# Patient Record
Sex: FEMALE | Race: BLACK OR AFRICAN AMERICAN | NOT HISPANIC OR LATINO | Employment: OTHER | URBAN - METROPOLITAN AREA
[De-identification: names, ages, dates, MRNs, and addresses within clinical notes are randomized per-mention and may not be internally consistent; named-entity substitution may affect disease eponyms.]

---

## 2017-08-09 ENCOUNTER — HOSPITAL ENCOUNTER (OUTPATIENT)
Dept: OTHER | Facility: HOSPITAL | Age: 42
Setting detail: SPECIMEN
Discharge: HOME | End: 2017-08-09
Attending: SURGERY | Admitting: SURGERY

## 2017-11-17 ENCOUNTER — HOSPITAL ENCOUNTER (OUTPATIENT)
Dept: OTHER | Facility: HOSPITAL | Age: 42
Setting detail: SPECIMEN
Discharge: HOME | End: 2017-11-17
Attending: SURGERY | Admitting: SURGERY

## 2018-09-11 ENCOUNTER — HOSPITAL ENCOUNTER (OUTPATIENT)
Dept: OTHER | Facility: HOSPITAL | Age: 43
Setting detail: SPECIMEN
Discharge: HOME | End: 2018-09-11
Attending: SURGERY | Admitting: SURGERY

## 2019-06-22 ENCOUNTER — APPOINTMENT (OUTPATIENT)
Dept: GENERAL RADIOLOGY | Facility: HOSPITAL | Age: 44
End: 2019-06-22

## 2019-06-22 ENCOUNTER — HOSPITAL ENCOUNTER (OUTPATIENT)
Dept: CARDIOLOGY | Facility: HOSPITAL | Age: 44
Setting detail: OBSERVATION
Discharge: HOME | End: 2019-06-22

## 2019-06-22 ENCOUNTER — HOSPITAL ENCOUNTER (INPATIENT)
Facility: HOSPITAL | Age: 44
LOS: 8 days | Discharge: HOME | End: 2019-06-30
Attending: INTERNAL MEDICINE | Admitting: INTERNAL MEDICINE

## 2019-06-22 DIAGNOSIS — I42.0 CARDIOMYOPATHY, DILATED (HCC): ICD-10-CM

## 2019-06-22 DIAGNOSIS — R07.9 CHEST PAIN, UNSPECIFIED TYPE: ICD-10-CM

## 2019-06-22 DIAGNOSIS — I50.9 ACUTE ON CHRONIC CONGESTIVE HEART FAILURE, UNSPECIFIED HEART FAILURE TYPE (HCC): Primary | ICD-10-CM

## 2019-06-22 DIAGNOSIS — I10 ESSENTIAL HYPERTENSION: ICD-10-CM

## 2019-06-22 DIAGNOSIS — N18.9 CHRONIC RENAL IMPAIRMENT, UNSPECIFIED CKD STAGE: ICD-10-CM

## 2019-06-22 DIAGNOSIS — I50.23 ACUTE ON CHRONIC SYSTOLIC CONGESTIVE HEART FAILURE (HCC): ICD-10-CM

## 2019-06-22 PROBLEM — J44.9 COPD (CHRONIC OBSTRUCTIVE PULMONARY DISEASE) (HCC): Status: ACTIVE | Noted: 2019-06-22

## 2019-06-22 LAB
ALBUMIN SERPL-MCNC: 4.2 G/DL (ref 3.5–4.8)
ALBUMIN/GLOB SERPL: 1.3 G/DL (ref 1–1.7)
ALP SERPL-CCNC: 70 U/L (ref 32–91)
ALT SERPL W P-5'-P-CCNC: 46 U/L (ref 14–54)
ANION GAP SERPL CALCULATED.3IONS-SCNC: 14 MMOL/L (ref 10–20)
AST SERPL-CCNC: 55 U/L (ref 15–41)
BASOPHILS # BLD AUTO: 0 10*3/MM3 (ref 0–0.2)
BASOPHILS NFR BLD AUTO: 0.6 % (ref 0–1.5)
BH CV ECHO MEAS - % IVS THICK: -6.8 %
BH CV ECHO MEAS - % LVPW THICK: -8.4 %
BH CV ECHO MEAS - ACS: 0.98 CM
BH CV ECHO MEAS - AI DEC SLOPE: 378.7 CM/SEC^2
BH CV ECHO MEAS - AI DEC TIME: 1.3 SEC
BH CV ECHO MEAS - AI MAX PG: 102.2 MMHG
BH CV ECHO MEAS - AI MAX VEL: 505.5 CM/SEC
BH CV ECHO MEAS - AI P1/2T: 391 MSEC
BH CV ECHO MEAS - AO MAX PG (FULL): 7.3 MMHG
BH CV ECHO MEAS - AO MAX PG: 11 MMHG
BH CV ECHO MEAS - AO MEAN PG (FULL): 3.9 MMHG
BH CV ECHO MEAS - AO MEAN PG: 5.8 MMHG
BH CV ECHO MEAS - AO ROOT AREA (BSA CORRECTED): 1.2
BH CV ECHO MEAS - AO ROOT AREA: 4.2 CM^2
BH CV ECHO MEAS - AO ROOT DIAM: 2.3 CM
BH CV ECHO MEAS - AO V2 MAX: 164.7 CM/SEC
BH CV ECHO MEAS - AO V2 MEAN: 111.2 CM/SEC
BH CV ECHO MEAS - AO V2 VTI: 27.2 CM
BH CV ECHO MEAS - ASC AORTA: 3 CM
BH CV ECHO MEAS - AVA(I,A): 1.5 CM^2
BH CV ECHO MEAS - AVA(I,D): 1.5 CM^2
BH CV ECHO MEAS - AVA(V,A): 1.6 CM^2
BH CV ECHO MEAS - AVA(V,D): 1.6 CM^2
BH CV ECHO MEAS - BSA(HAYCOCK): 2.1 M^2
BH CV ECHO MEAS - BSA: 2 M^2
BH CV ECHO MEAS - BZI_BMI: 30.4 KILOGRAMS/M^2
BH CV ECHO MEAS - BZI_METRIC_HEIGHT: 170.2 CM
BH CV ECHO MEAS - BZI_METRIC_WEIGHT: 88 KG
BH CV ECHO MEAS - EDV(CUBED): 291.1 ML
BH CV ECHO MEAS - EDV(MOD-SP2): 129.7 ML
BH CV ECHO MEAS - EDV(MOD-SP4): 166.5 ML
BH CV ECHO MEAS - EDV(TEICH): 225.7 ML
BH CV ECHO MEAS - EF(CUBED): 29.7 %
BH CV ECHO MEAS - EF(MOD-BP): 29 %
BH CV ECHO MEAS - EF(MOD-SP2): 31.5 %
BH CV ECHO MEAS - EF(MOD-SP4): 24 %
BH CV ECHO MEAS - EF(TEICH): 23.5 %
BH CV ECHO MEAS - ESV(CUBED): 204.7 ML
BH CV ECHO MEAS - ESV(MOD-SP2): 88.8 ML
BH CV ECHO MEAS - ESV(MOD-SP4): 126.5 ML
BH CV ECHO MEAS - ESV(TEICH): 172.8 ML
BH CV ECHO MEAS - FS: 11.1 %
BH CV ECHO MEAS - IVS/LVPW: 0.92
BH CV ECHO MEAS - IVSD: 1 CM
BH CV ECHO MEAS - IVSS: 0.93 CM
BH CV ECHO MEAS - LA DIMENSION(2D): 4.4 CM
BH CV ECHO MEAS - LV DIASTOLIC VOL/BSA (35-75): 83.4 ML/M^2
BH CV ECHO MEAS - LV MASS(C)D: 310.3 GRAMS
BH CV ECHO MEAS - LV MASS(C)DI: 155.5 GRAMS/M^2
BH CV ECHO MEAS - LV MASS(C)S: 229.2 GRAMS
BH CV ECHO MEAS - LV MASS(C)SI: 114.8 GRAMS/M^2
BH CV ECHO MEAS - LV MAX PG: 3.7 MMHG
BH CV ECHO MEAS - LV MEAN PG: 1.9 MMHG
BH CV ECHO MEAS - LV SYSTOLIC VOL/BSA (12-30): 63.4 ML/M^2
BH CV ECHO MEAS - LV V1 MAX: 95.8 CM/SEC
BH CV ECHO MEAS - LV V1 MEAN: 63.2 CM/SEC
BH CV ECHO MEAS - LV V1 VTI: 14.6 CM
BH CV ECHO MEAS - LVIDD: 6.6 CM
BH CV ECHO MEAS - LVIDS: 5.9 CM
BH CV ECHO MEAS - LVOT AREA: 2.8 CM^2
BH CV ECHO MEAS - LVOT DIAM: 1.9 CM
BH CV ECHO MEAS - LVPWD: 1.1 CM
BH CV ECHO MEAS - LVPWS: 1 CM
BH CV ECHO MEAS - MR MAX PG: 89.9 MMHG
BH CV ECHO MEAS - MR MAX VEL: 474.1 CM/SEC
BH CV ECHO MEAS - MV A MAX VEL: 51 CM/SEC
BH CV ECHO MEAS - MV DEC SLOPE: 727.6 CM/SEC^2
BH CV ECHO MEAS - MV DEC TIME: 0.2 SEC
BH CV ECHO MEAS - MV E MAX VEL: 146.1 CM/SEC
BH CV ECHO MEAS - MV E/A: 2.9
BH CV ECHO MEAS - MV MAX PG: 12 MMHG
BH CV ECHO MEAS - MV MEAN PG: 4.8 MMHG
BH CV ECHO MEAS - MV V2 MAX: 173 CM/SEC
BH CV ECHO MEAS - MV V2 MEAN: 103.5 CM/SEC
BH CV ECHO MEAS - MV V2 VTI: 42.3 CM
BH CV ECHO MEAS - MVA(VTI): 0.96 CM^2
BH CV ECHO MEAS - PA ACC TIME: 0.07 SEC
BH CV ECHO MEAS - PA MAX PG (FULL): 2.5 MMHG
BH CV ECHO MEAS - PA MAX PG: 3.4 MMHG
BH CV ECHO MEAS - PA MEAN PG (FULL): 0.61 MMHG
BH CV ECHO MEAS - PA MEAN PG: 1 MMHG
BH CV ECHO MEAS - PA PR(ACCEL): 49.4 MMHG
BH CV ECHO MEAS - PA V2 MAX: 90.1 CM/SEC
BH CV ECHO MEAS - PA V2 MEAN: 48.9 CM/SEC
BH CV ECHO MEAS - PA V2 VTI: 13 CM
BH CV ECHO MEAS - PI END-D VEL: 145.1 CM/SEC
BH CV ECHO MEAS - PI MAX PG: 11.6 MMHG
BH CV ECHO MEAS - PI MAX VEL: 170.1 CM/SEC
BH CV ECHO MEAS - RAP SYSTOLE: 8 MMHG
BH CV ECHO MEAS - RV MAX PG: 0.9 MMHG
BH CV ECHO MEAS - RV MEAN PG: 0.44 MMHG
BH CV ECHO MEAS - RV V1 MAX: 47.3 CM/SEC
BH CV ECHO MEAS - RV V1 MEAN: 30.6 CM/SEC
BH CV ECHO MEAS - RV V1 VTI: 8.4 CM
BH CV ECHO MEAS - RVDD: 1.5 CM
BH CV ECHO MEAS - RVSP: 36.8 MMHG
BH CV ECHO MEAS - SI(AO): 57.7 ML/M^2
BH CV ECHO MEAS - SI(CUBED): 43.3 ML/M^2
BH CV ECHO MEAS - SI(LVOT): 20.2 ML/M^2
BH CV ECHO MEAS - SI(MOD-SP2): 20.5 ML/M^2
BH CV ECHO MEAS - SI(MOD-SP4): 20 ML/M^2
BH CV ECHO MEAS - SI(TEICH): 26.5 ML/M^2
BH CV ECHO MEAS - SV(AO): 115.1 ML
BH CV ECHO MEAS - SV(CUBED): 86.4 ML
BH CV ECHO MEAS - SV(LVOT): 40.4 ML
BH CV ECHO MEAS - SV(MOD-SP2): 40.9 ML
BH CV ECHO MEAS - SV(MOD-SP4): 39.9 ML
BH CV ECHO MEAS - SV(TEICH): 52.9 ML
BH CV ECHO MEAS - TR MAX VEL: 266.6 CM/SEC
BILIRUB SERPL-MCNC: 1.6 MG/DL (ref 0.3–1.2)
BNP SERPL-MCNC: 1482 PG/ML
BUN BLD-MCNC: 18 MG/DL (ref 8–20)
BUN/CREAT SERPL: 12.9 (ref 5.4–26.2)
CALCIUM SPEC-SCNC: 9.2 MG/DL (ref 8.9–10.3)
CHLORIDE SERPL-SCNC: 104 MMOL/L (ref 101–111)
CO2 SERPL-SCNC: 21 MMOL/L (ref 22–32)
CREAT BLD-MCNC: 1.4 MG/DL (ref 0.4–1)
DEPRECATED RDW RBC AUTO: 50.3 FL (ref 37–54)
EOSINOPHIL # BLD AUTO: 0.1 10*3/MM3 (ref 0–0.4)
EOSINOPHIL NFR BLD AUTO: 0.7 % (ref 0.3–6.2)
ERYTHROCYTE [DISTWIDTH] IN BLOOD BY AUTOMATED COUNT: 15.5 % (ref 12.3–15.4)
GFR SERPL CREATININE-BSD FRML MDRD: 49 ML/MIN/1.73
GLOBULIN UR ELPH-MCNC: 3.2 GM/DL (ref 2.5–3.8)
GLUCOSE BLD-MCNC: 156 MG/DL (ref 65–99)
GLUCOSE BLDC GLUCOMTR-MCNC: 137 MG/DL (ref 70–105)
HCT VFR BLD AUTO: 50.6 % (ref 34–46.6)
HGB BLD-MCNC: 16.7 G/DL (ref 12–15.9)
HOLD SPECIMEN: NORMAL
HOLD SPECIMEN: NORMAL
LYMPHOCYTES # BLD AUTO: 2.5 10*3/MM3 (ref 0.7–3.1)
LYMPHOCYTES NFR BLD AUTO: 31.4 % (ref 19.6–45.3)
MAGNESIUM SERPL-MCNC: 2 MG/DL (ref 1.8–2.5)
MAXIMAL PREDICTED HEART RATE: 174 BPM
MCH RBC QN AUTO: 30.7 PG (ref 26.6–33)
MCHC RBC AUTO-ENTMCNC: 33 G/DL (ref 31.5–35.7)
MCV RBC AUTO: 93 FL (ref 79–97)
MONOCYTES # BLD AUTO: 0.2 10*3/MM3 (ref 0.1–0.9)
MONOCYTES NFR BLD AUTO: 2.9 % (ref 5–12)
NEUTROPHILS # BLD AUTO: 5 10*3/MM3 (ref 1.7–7)
NEUTROPHILS NFR BLD AUTO: 64.4 % (ref 42.7–76)
NRBC BLD AUTO-RTO: 0.2 /100 WBC (ref 0–0.2)
PHOSPHATE SERPL-MCNC: 3.9 MG/DL (ref 2.4–4.7)
PLATELET # BLD AUTO: 235 10*3/MM3 (ref 140–450)
PMV BLD AUTO: 7.8 FL (ref 6–12)
POTASSIUM BLD-SCNC: 3.6 MMOL/L (ref 3.6–5.1)
PROT SERPL-MCNC: 7.4 G/DL (ref 6.1–7.9)
RBC # BLD AUTO: 5.44 10*6/MM3 (ref 3.77–5.28)
SODIUM BLD-SCNC: 139 MMOL/L (ref 136–144)
STRESS TARGET HR: 148 BPM
TROPONIN I SERPL-MCNC: 0.03 NG/ML (ref 0–0.03)
WBC NRBC COR # BLD: 7.8 10*3/MM3 (ref 3.4–10.8)
WHOLE BLOOD HOLD SPECIMEN: NORMAL
WHOLE BLOOD HOLD SPECIMEN: NORMAL

## 2019-06-22 PROCEDURE — 94799 UNLISTED PULMONARY SVC/PX: CPT

## 2019-06-22 PROCEDURE — 83735 ASSAY OF MAGNESIUM: CPT | Performed by: INTERNAL MEDICINE

## 2019-06-22 PROCEDURE — 93306 TTE W/DOPPLER COMPLETE: CPT

## 2019-06-22 PROCEDURE — 84100 ASSAY OF PHOSPHORUS: CPT | Performed by: INTERNAL MEDICINE

## 2019-06-22 PROCEDURE — 82962 GLUCOSE BLOOD TEST: CPT

## 2019-06-22 PROCEDURE — 99214 OFFICE O/P EST MOD 30 MIN: CPT | Performed by: INTERNAL MEDICINE

## 2019-06-22 PROCEDURE — 93005 ELECTROCARDIOGRAM TRACING: CPT | Performed by: INTERNAL MEDICINE

## 2019-06-22 PROCEDURE — 93306 TTE W/DOPPLER COMPLETE: CPT | Performed by: INTERNAL MEDICINE

## 2019-06-22 PROCEDURE — 0399T HC MYOCARDL STRAIN IMAG QUAN ASSMT PER SESS: CPT

## 2019-06-22 PROCEDURE — 93005 ELECTROCARDIOGRAM TRACING: CPT

## 2019-06-22 PROCEDURE — 84484 ASSAY OF TROPONIN QUANT: CPT | Performed by: INTERNAL MEDICINE

## 2019-06-22 PROCEDURE — G0378 HOSPITAL OBSERVATION PER HR: HCPCS

## 2019-06-22 PROCEDURE — 84484 ASSAY OF TROPONIN QUANT: CPT | Performed by: NURSE PRACTITIONER

## 2019-06-22 PROCEDURE — 99222 1ST HOSP IP/OBS MODERATE 55: CPT | Performed by: INTERNAL MEDICINE

## 2019-06-22 PROCEDURE — 83880 ASSAY OF NATRIURETIC PEPTIDE: CPT | Performed by: NURSE PRACTITIONER

## 2019-06-22 PROCEDURE — 85025 COMPLETE CBC W/AUTO DIFF WBC: CPT | Performed by: NURSE PRACTITIONER

## 2019-06-22 PROCEDURE — 25010000002 ENOXAPARIN PER 10 MG: Performed by: INTERNAL MEDICINE

## 2019-06-22 PROCEDURE — 99285 EMERGENCY DEPT VISIT HI MDM: CPT

## 2019-06-22 PROCEDURE — 71045 X-RAY EXAM CHEST 1 VIEW: CPT

## 2019-06-22 PROCEDURE — 80053 COMPREHEN METABOLIC PANEL: CPT | Performed by: NURSE PRACTITIONER

## 2019-06-22 RX ORDER — FLUTICASONE PROPIONATE 50 MCG
2 SPRAY, SUSPENSION (ML) NASAL DAILY
Status: DISCONTINUED | OUTPATIENT
Start: 2019-06-23 | End: 2019-06-30 | Stop reason: HOSPADM

## 2019-06-22 RX ORDER — LEVOTHYROXINE SODIUM 0.2 MG/1
200 TABLET ORAL DAILY
Status: DISCONTINUED | OUTPATIENT
Start: 2019-06-23 | End: 2019-06-30 | Stop reason: HOSPADM

## 2019-06-22 RX ORDER — SODIUM CHLORIDE 0.9 % (FLUSH) 0.9 %
10 SYRINGE (ML) INJECTION AS NEEDED
Status: DISCONTINUED | OUTPATIENT
Start: 2019-06-22 | End: 2019-06-30 | Stop reason: HOSPADM

## 2019-06-22 RX ORDER — AMLODIPINE BESYLATE 5 MG/1
5 TABLET ORAL DAILY
Status: DISCONTINUED | OUTPATIENT
Start: 2019-06-23 | End: 2019-06-29

## 2019-06-22 RX ORDER — ASPIRIN 81 MG/1
243 TABLET, CHEWABLE ORAL ONCE
Status: COMPLETED | OUTPATIENT
Start: 2019-06-22 | End: 2019-06-22

## 2019-06-22 RX ORDER — LISINOPRIL 20 MG/1
20 TABLET ORAL ONCE
Status: COMPLETED | OUTPATIENT
Start: 2019-06-22 | End: 2019-06-22

## 2019-06-22 RX ORDER — ALPRAZOLAM 1 MG/1
1 TABLET ORAL 4 TIMES DAILY PRN
COMMUNITY
End: 2021-01-18

## 2019-06-22 RX ORDER — ONDANSETRON 4 MG/1
4 TABLET, FILM COATED ORAL EVERY 6 HOURS PRN
Status: DISCONTINUED | OUTPATIENT
Start: 2019-06-22 | End: 2019-06-30 | Stop reason: HOSPADM

## 2019-06-22 RX ORDER — FLUTICASONE PROPIONATE 50 MCG
2 SPRAY, SUSPENSION (ML) NASAL DAILY
COMMUNITY
End: 2019-08-13

## 2019-06-22 RX ORDER — DOCUSATE SODIUM 100 MG/1
100 CAPSULE, LIQUID FILLED ORAL DAILY
Status: DISCONTINUED | OUTPATIENT
Start: 2019-06-23 | End: 2019-06-30 | Stop reason: HOSPADM

## 2019-06-22 RX ORDER — MELATONIN
1000 DAILY
Status: DISCONTINUED | OUTPATIENT
Start: 2019-06-23 | End: 2019-06-30 | Stop reason: HOSPADM

## 2019-06-22 RX ORDER — CARVEDILOL 6.25 MG/1
6.25 TABLET ORAL 2 TIMES DAILY WITH MEALS
Status: DISCONTINUED | OUTPATIENT
Start: 2019-06-22 | End: 2019-06-30

## 2019-06-22 RX ORDER — HYDRALAZINE HYDROCHLORIDE 25 MG/1
50 TABLET, FILM COATED ORAL 3 TIMES DAILY
Status: DISCONTINUED | OUTPATIENT
Start: 2019-06-22 | End: 2019-06-30 | Stop reason: HOSPADM

## 2019-06-22 RX ORDER — FLUCONAZOLE 200 MG/1
200 TABLET ORAL EVERY OTHER DAY
COMMUNITY
End: 2019-08-16 | Stop reason: HOSPADM

## 2019-06-22 RX ORDER — BUMETANIDE 2 MG/1
2 TABLET ORAL 2 TIMES DAILY
COMMUNITY
End: 2019-07-22

## 2019-06-22 RX ORDER — AMLODIPINE BESYLATE 5 MG/1
5 TABLET ORAL DAILY
COMMUNITY
End: 2019-06-30 | Stop reason: HOSPADM

## 2019-06-22 RX ORDER — METRONIDAZOLE 500 MG/1
500 TABLET ORAL 2 TIMES DAILY
COMMUNITY
End: 2019-06-30 | Stop reason: HOSPADM

## 2019-06-22 RX ORDER — MELATONIN
1000 DAILY
COMMUNITY

## 2019-06-22 RX ORDER — LISINOPRIL 20 MG/1
20 TABLET ORAL DAILY
Status: DISCONTINUED | OUTPATIENT
Start: 2019-06-23 | End: 2019-06-30 | Stop reason: HOSPADM

## 2019-06-22 RX ORDER — FOLIC ACID 1 MG/1
1 TABLET ORAL DAILY
COMMUNITY

## 2019-06-22 RX ORDER — FAMOTIDINE 20 MG/1
40 TABLET, FILM COATED ORAL ONCE
Status: COMPLETED | OUTPATIENT
Start: 2019-06-23 | End: 2019-06-22

## 2019-06-22 RX ORDER — FOLIC ACID 1 MG/1
1 TABLET ORAL DAILY
Status: DISCONTINUED | OUTPATIENT
Start: 2019-06-23 | End: 2019-06-30 | Stop reason: HOSPADM

## 2019-06-22 RX ORDER — DOCUSATE SODIUM 100 MG/1
100 CAPSULE, LIQUID FILLED ORAL ONCE
Status: COMPLETED | OUTPATIENT
Start: 2019-06-22 | End: 2019-06-22

## 2019-06-22 RX ORDER — SODIUM CHLORIDE 0.9 % (FLUSH) 0.9 %
3 SYRINGE (ML) INJECTION EVERY 12 HOURS SCHEDULED
Status: DISCONTINUED | OUTPATIENT
Start: 2019-06-22 | End: 2019-06-30 | Stop reason: HOSPADM

## 2019-06-22 RX ORDER — BUMETANIDE 0.25 MG/ML
2 INJECTION INTRAMUSCULAR; INTRAVENOUS ONCE
Status: COMPLETED | OUTPATIENT
Start: 2019-06-22 | End: 2019-06-22

## 2019-06-22 RX ORDER — MELATONIN
1000 ONCE
Status: COMPLETED | OUTPATIENT
Start: 2019-06-22 | End: 2019-06-22

## 2019-06-22 RX ORDER — HYDRALAZINE HYDROCHLORIDE 50 MG/1
50 TABLET, FILM COATED ORAL 3 TIMES DAILY
COMMUNITY
End: 2019-12-12

## 2019-06-22 RX ORDER — FERROUS SULFATE 325(65) MG
325 TABLET ORAL
COMMUNITY

## 2019-06-22 RX ORDER — NITROGLYCERIN 0.4 MG/1
0.4 TABLET SUBLINGUAL
Status: DISCONTINUED | OUTPATIENT
Start: 2019-06-22 | End: 2019-06-30 | Stop reason: HOSPADM

## 2019-06-22 RX ORDER — FOLIC ACID 1 MG/1
1 TABLET ORAL ONCE
Status: COMPLETED | OUTPATIENT
Start: 2019-06-23 | End: 2019-06-22

## 2019-06-22 RX ORDER — ACETAMINOPHEN 325 MG/1
650 TABLET ORAL EVERY 4 HOURS PRN
Status: DISCONTINUED | OUTPATIENT
Start: 2019-06-22 | End: 2019-06-30 | Stop reason: HOSPADM

## 2019-06-22 RX ORDER — FAMOTIDINE 20 MG/1
40 TABLET, FILM COATED ORAL DAILY
Status: DISCONTINUED | OUTPATIENT
Start: 2019-06-23 | End: 2019-06-30 | Stop reason: HOSPADM

## 2019-06-22 RX ORDER — SODIUM CHLORIDE 0.9 % (FLUSH) 0.9 %
3-10 SYRINGE (ML) INJECTION AS NEEDED
Status: DISCONTINUED | OUTPATIENT
Start: 2019-06-22 | End: 2019-06-29

## 2019-06-22 RX ORDER — ASPIRIN 81 MG/1
81 TABLET, CHEWABLE ORAL DAILY
COMMUNITY
End: 2020-01-08 | Stop reason: HOSPADM

## 2019-06-22 RX ORDER — OXYCODONE AND ACETAMINOPHEN 7.5; 325 MG/1; MG/1
1 TABLET ORAL EVERY 6 HOURS PRN
Status: ON HOLD | COMMUNITY
End: 2020-01-08 | Stop reason: SDUPTHER

## 2019-06-22 RX ORDER — BUMETANIDE 1 MG/1
2 TABLET ORAL 2 TIMES DAILY
Status: DISCONTINUED | OUTPATIENT
Start: 2019-06-23 | End: 2019-06-26

## 2019-06-22 RX ORDER — LISINOPRIL 20 MG/1
20 TABLET ORAL DAILY
COMMUNITY
End: 2020-01-08 | Stop reason: HOSPADM

## 2019-06-22 RX ORDER — FERROUS SULFATE TAB EC 324 MG (65 MG FE EQUIVALENT) 324 (65 FE) MG
325 TABLET DELAYED RESPONSE ORAL
Status: DISCONTINUED | OUTPATIENT
Start: 2019-06-23 | End: 2019-06-30 | Stop reason: HOSPADM

## 2019-06-22 RX ORDER — ONDANSETRON 2 MG/ML
4 INJECTION INTRAMUSCULAR; INTRAVENOUS EVERY 6 HOURS PRN
Status: DISCONTINUED | OUTPATIENT
Start: 2019-06-22 | End: 2019-06-30 | Stop reason: HOSPADM

## 2019-06-22 RX ORDER — LEVOTHYROXINE SODIUM 0.2 MG/1
200 TABLET ORAL DAILY
COMMUNITY
End: 2021-01-23 | Stop reason: HOSPADM

## 2019-06-22 RX ADMIN — FOLIC ACID 1 MG: 1 TABLET ORAL at 23:22

## 2019-06-22 RX ADMIN — ENOXAPARIN SODIUM 40 MG: 40 INJECTION SUBCUTANEOUS at 21:26

## 2019-06-22 RX ADMIN — BUMETANIDE 2 MG: 0.25 INJECTION INTRAMUSCULAR; INTRAVENOUS at 11:41

## 2019-06-22 RX ADMIN — FAMOTIDINE 40 MG: 20 TABLET, FILM COATED ORAL at 23:22

## 2019-06-22 RX ADMIN — BUMETANIDE 2 MG: 0.25 INJECTION INTRAMUSCULAR; INTRAVENOUS at 21:23

## 2019-06-22 RX ADMIN — HYDRALAZINE HYDROCHLORIDE 50 MG: 25 TABLET, FILM COATED ORAL at 21:23

## 2019-06-22 RX ADMIN — ASPIRIN 81 MG 243 MG: 81 TABLET ORAL at 10:08

## 2019-06-22 RX ADMIN — LISINOPRIL 20 MG: 20 TABLET ORAL at 23:21

## 2019-06-22 RX ADMIN — MELATONIN 1000 UNITS: at 23:20

## 2019-06-22 RX ADMIN — CARVEDILOL 6.25 MG: 6.25 TABLET, FILM COATED ORAL at 21:23

## 2019-06-22 RX ADMIN — DOCUSATE SODIUM 100 MG: 100 CAPSULE, LIQUID FILLED ORAL at 23:20

## 2019-06-22 RX ADMIN — Medication: at 21:28

## 2019-06-22 RX ADMIN — NITROGLYCERIN 1 INCH: 20 OINTMENT TOPICAL at 10:08

## 2019-06-22 RX ADMIN — Medication 22 MG: at 23:12

## 2019-06-23 ENCOUNTER — APPOINTMENT (OUTPATIENT)
Dept: NUCLEAR MEDICINE | Facility: HOSPITAL | Age: 44
End: 2019-06-23

## 2019-06-23 LAB
ANION GAP SERPL CALCULATED.3IONS-SCNC: 12 MMOL/L (ref 10–20)
ANISOCYTOSIS BLD QL: ABNORMAL
ARTICHOKE IGE QN: 166 MG/DL (ref 0–100)
BH CV NUCLEAR PRIOR STUDY: 3
BNP SERPL-MCNC: 1383 PG/ML
BUN BLD-MCNC: 20 MG/DL (ref 8–20)
BUN/CREAT SERPL: 16.7 (ref 5.4–26.2)
CA-I SERPL ISE-MCNC: 1.15 MMOL/L (ref 1.2–1.3)
CALCIUM SPEC-SCNC: 8.9 MG/DL (ref 8.9–10.3)
CHLORIDE SERPL-SCNC: 105 MMOL/L (ref 101–111)
CHOLEST SERPL-MCNC: 239 MG/DL
CK SERPL-CCNC: 189 U/L (ref 20–180)
CO2 SERPL-SCNC: 20 MMOL/L (ref 22–32)
CREAT BLD-MCNC: 1.2 MG/DL (ref 0.4–1)
DEPRECATED RDW RBC AUTO: 49 FL (ref 37–54)
EOSINOPHIL # BLD MANUAL: 0.16 10*3/MM3 (ref 0–0.4)
EOSINOPHIL NFR BLD MANUAL: 2 % (ref 0.3–6.2)
ERYTHROCYTE [DISTWIDTH] IN BLOOD BY AUTOMATED COUNT: 15.2 % (ref 12.3–15.4)
GFR SERPL CREATININE-BSD FRML MDRD: 59 ML/MIN/1.73
GLUCOSE BLD-MCNC: 91 MG/DL (ref 65–99)
GLUCOSE BLDC GLUCOMTR-MCNC: 112 MG/DL (ref 70–105)
GLUCOSE BLDC GLUCOMTR-MCNC: 113 MG/DL (ref 70–105)
GLUCOSE BLDC GLUCOMTR-MCNC: 115 MG/DL (ref 70–105)
GLUCOSE BLDC GLUCOMTR-MCNC: 99 MG/DL (ref 70–105)
HCT VFR BLD AUTO: 44.1 % (ref 34–46.6)
HDLC SERPL QL: 4.27
HDLC SERPL-MCNC: 56 MG/DL
HGB BLD-MCNC: 15.1 G/DL (ref 12–15.9)
LDLC/HDLC SERPL: 2.52 {RATIO}
LYMPHOCYTES # BLD MANUAL: 2.8 10*3/MM3 (ref 0.7–3.1)
LYMPHOCYTES NFR BLD MANUAL: 35 % (ref 19.6–45.3)
LYMPHOCYTES NFR BLD MANUAL: 4 % (ref 5–12)
MAGNESIUM SERPL-MCNC: 1.9 MG/DL (ref 1.8–2.5)
MCH RBC QN AUTO: 31.4 PG (ref 26.6–33)
MCHC RBC AUTO-ENTMCNC: 34.3 G/DL (ref 31.5–35.7)
MCV RBC AUTO: 91.6 FL (ref 79–97)
MONOCYTES # BLD AUTO: 0.32 10*3/MM3 (ref 0.1–0.9)
NEUTROPHILS # BLD AUTO: 4.72 10*3/MM3 (ref 1.7–7)
NEUTROPHILS NFR BLD MANUAL: 52 % (ref 42.7–76)
NEUTS BAND NFR BLD MANUAL: 7 % (ref 0–5)
PHOSPHATE SERPL-MCNC: 4.3 MG/DL (ref 2.4–4.7)
PLAT MORPH BLD: NORMAL
PLATELET # BLD AUTO: 125 10*3/MM3 (ref 140–450)
PMV BLD AUTO: 9 FL (ref 6–12)
POTASSIUM BLD-SCNC: 3.4 MMOL/L (ref 3.6–5.1)
RBC # BLD AUTO: 4.81 10*6/MM3 (ref 3.77–5.28)
SODIUM BLD-SCNC: 137 MMOL/L (ref 136–144)
TOXIC GRANULATION: ABNORMAL
TRIGL SERPL-MCNC: 209 MG/DL
TROPONIN I SERPL-MCNC: 0.03 NG/ML (ref 0–0.03)
TSH SERPL DL<=0.05 MIU/L-ACNC: >48.6 MIU/ML (ref 0.34–5.6)
VLDLC SERPL-MCNC: 41.8 MG/DL
WBC NRBC COR # BLD: 8 10*3/MM3 (ref 3.4–10.8)

## 2019-06-23 PROCEDURE — 94799 UNLISTED PULMONARY SVC/PX: CPT

## 2019-06-23 PROCEDURE — 82330 ASSAY OF CALCIUM: CPT | Performed by: INTERNAL MEDICINE

## 2019-06-23 PROCEDURE — 84100 ASSAY OF PHOSPHORUS: CPT | Performed by: INTERNAL MEDICINE

## 2019-06-23 PROCEDURE — 84443 ASSAY THYROID STIM HORMONE: CPT | Performed by: INTERNAL MEDICINE

## 2019-06-23 PROCEDURE — A9500 TC99M SESTAMIBI: HCPCS | Performed by: INTERNAL MEDICINE

## 2019-06-23 PROCEDURE — 25010000002 ENOXAPARIN PER 10 MG: Performed by: INTERNAL MEDICINE

## 2019-06-23 PROCEDURE — 99232 SBSQ HOSP IP/OBS MODERATE 35: CPT | Performed by: INTERNAL MEDICINE

## 2019-06-23 PROCEDURE — 82962 GLUCOSE BLOOD TEST: CPT

## 2019-06-23 PROCEDURE — 0 TECHNETIUM SESTAMIBI: Performed by: INTERNAL MEDICINE

## 2019-06-23 PROCEDURE — 78451 HT MUSCLE IMAGE SPECT SING: CPT | Performed by: INTERNAL MEDICINE

## 2019-06-23 PROCEDURE — 80048 BASIC METABOLIC PNL TOTAL CA: CPT | Performed by: INTERNAL MEDICINE

## 2019-06-23 PROCEDURE — 83735 ASSAY OF MAGNESIUM: CPT | Performed by: INTERNAL MEDICINE

## 2019-06-23 PROCEDURE — 80061 LIPID PANEL: CPT | Performed by: INTERNAL MEDICINE

## 2019-06-23 PROCEDURE — 85027 COMPLETE CBC AUTOMATED: CPT | Performed by: INTERNAL MEDICINE

## 2019-06-23 PROCEDURE — 83880 ASSAY OF NATRIURETIC PEPTIDE: CPT | Performed by: INTERNAL MEDICINE

## 2019-06-23 PROCEDURE — 78451 HT MUSCLE IMAGE SPECT SING: CPT

## 2019-06-23 PROCEDURE — 82550 ASSAY OF CK (CPK): CPT | Performed by: INTERNAL MEDICINE

## 2019-06-23 PROCEDURE — 84484 ASSAY OF TROPONIN QUANT: CPT | Performed by: INTERNAL MEDICINE

## 2019-06-23 PROCEDURE — 85007 BL SMEAR W/DIFF WBC COUNT: CPT | Performed by: INTERNAL MEDICINE

## 2019-06-23 RX ORDER — OXYCODONE HYDROCHLORIDE 5 MG/1
7.5 TABLET ORAL EVERY 4 HOURS PRN
Status: DISCONTINUED | OUTPATIENT
Start: 2019-06-23 | End: 2019-06-29

## 2019-06-23 RX ADMIN — AMLODIPINE BESYLATE 5 MG: 5 TABLET ORAL at 10:16

## 2019-06-23 RX ADMIN — TECHNETIUM TC 99M SESTAMIBI 1 DOSE: 1 INJECTION INTRAVENOUS at 07:55

## 2019-06-23 RX ADMIN — OXYCODONE HYDROCHLORIDE 7.5 MG: 5 TABLET ORAL at 22:16

## 2019-06-23 RX ADMIN — LEVOTHYROXINE SODIUM 200 MCG: 100 TABLET ORAL at 05:36

## 2019-06-23 RX ADMIN — FAMOTIDINE 40 MG: 20 TABLET, FILM COATED ORAL at 10:18

## 2019-06-23 RX ADMIN — ENOXAPARIN SODIUM 40 MG: 40 INJECTION SUBCUTANEOUS at 16:47

## 2019-06-23 RX ADMIN — CARVEDILOL 6.25 MG: 6.25 TABLET, FILM COATED ORAL at 18:01

## 2019-06-23 RX ADMIN — LISINOPRIL 20 MG: 20 TABLET ORAL at 10:07

## 2019-06-23 RX ADMIN — HYDRALAZINE HYDROCHLORIDE 50 MG: 25 TABLET, FILM COATED ORAL at 16:48

## 2019-06-23 RX ADMIN — DOCUSATE SODIUM 100 MG: 100 CAPSULE, LIQUID FILLED ORAL at 10:17

## 2019-06-23 RX ADMIN — FERROUS SULFATE TAB EC 324 MG (65 MG FE EQUIVALENT) 324 MG: 324 (65 FE) TABLET DELAYED RESPONSE at 10:18

## 2019-06-23 RX ADMIN — Medication 10 ML: at 10:25

## 2019-06-23 RX ADMIN — CARVEDILOL 6.25 MG: 6.25 TABLET, FILM COATED ORAL at 10:17

## 2019-06-23 RX ADMIN — FLUTICASONE PROPIONATE 2 SPRAY: 50 SPRAY, METERED NASAL at 10:20

## 2019-06-23 RX ADMIN — Medication 3 ML: at 22:18

## 2019-06-23 RX ADMIN — HYDRALAZINE HYDROCHLORIDE 50 MG: 25 TABLET, FILM COATED ORAL at 22:15

## 2019-06-23 RX ADMIN — BUMETANIDE 2 MG: 1 TABLET ORAL at 18:02

## 2019-06-23 RX ADMIN — HYDRALAZINE HYDROCHLORIDE 50 MG: 25 TABLET, FILM COATED ORAL at 10:21

## 2019-06-23 RX ADMIN — FOLIC ACID 1 MG: 1 TABLET ORAL at 10:20

## 2019-06-23 RX ADMIN — BUMETANIDE 2 MG: 1 TABLET ORAL at 05:36

## 2019-06-23 RX ADMIN — OXYCODONE HYDROCHLORIDE 7.5 MG: 5 TABLET ORAL at 16:50

## 2019-06-24 ENCOUNTER — ANESTHESIA (OUTPATIENT)
Dept: CARDIOLOGY | Facility: HOSPITAL | Age: 44
End: 2019-06-24

## 2019-06-24 ENCOUNTER — APPOINTMENT (OUTPATIENT)
Dept: CARDIOLOGY | Facility: HOSPITAL | Age: 44
End: 2019-06-24

## 2019-06-24 ENCOUNTER — ANESTHESIA EVENT (OUTPATIENT)
Dept: CARDIOLOGY | Facility: HOSPITAL | Age: 44
End: 2019-06-24

## 2019-06-24 LAB
ANION GAP SERPL CALCULATED.3IONS-SCNC: 18 MMOL/L (ref 10–20)
BH CV ECHO MEAS - EF(MOD-BP): 30 %
BUN BLD-MCNC: 21 MG/DL (ref 8–20)
BUN/CREAT SERPL: 14 (ref 5.4–26.2)
CALCIUM SPEC-SCNC: 9.1 MG/DL (ref 8.9–10.3)
CHLORIDE SERPL-SCNC: 95 MMOL/L (ref 101–111)
CO2 SERPL-SCNC: 23 MMOL/L (ref 22–32)
CREAT BLD-MCNC: 1.5 MG/DL (ref 0.4–1)
GFR SERPL CREATININE-BSD FRML MDRD: 45 ML/MIN/1.73
GLUCOSE BLD-MCNC: 124 MG/DL (ref 65–99)
GLUCOSE BLDC GLUCOMTR-MCNC: 102 MG/DL (ref 70–105)
GLUCOSE BLDC GLUCOMTR-MCNC: 113 MG/DL (ref 70–105)
MAGNESIUM SERPL-MCNC: 1.8 MG/DL (ref 1.8–2.5)
PHOSPHATE SERPL-MCNC: 5 MG/DL (ref 2.4–4.7)
POTASSIUM BLD-SCNC: 3 MMOL/L (ref 3.6–5.1)
SODIUM BLD-SCNC: 136 MMOL/L (ref 136–144)

## 2019-06-24 PROCEDURE — 25010000002 ONDANSETRON PER 1 MG: Performed by: INTERNAL MEDICINE

## 2019-06-24 PROCEDURE — 25010000002 PROPOFOL 200 MG/20ML EMULSION: Performed by: ANESTHESIOLOGY

## 2019-06-24 PROCEDURE — 99232 SBSQ HOSP IP/OBS MODERATE 35: CPT | Performed by: INTERNAL MEDICINE

## 2019-06-24 PROCEDURE — B24BZZ4 ULTRASONOGRAPHY OF HEART WITH AORTA, TRANSESOPHAGEAL: ICD-10-PCS | Performed by: INTERNAL MEDICINE

## 2019-06-24 PROCEDURE — 25010000002 ENOXAPARIN PER 10 MG: Performed by: INTERNAL MEDICINE

## 2019-06-24 PROCEDURE — 80048 BASIC METABOLIC PNL TOTAL CA: CPT | Performed by: INTERNAL MEDICINE

## 2019-06-24 PROCEDURE — 94799 UNLISTED PULMONARY SVC/PX: CPT

## 2019-06-24 PROCEDURE — 93320 DOPPLER ECHO COMPLETE: CPT

## 2019-06-24 PROCEDURE — 82962 GLUCOSE BLOOD TEST: CPT

## 2019-06-24 PROCEDURE — 83735 ASSAY OF MAGNESIUM: CPT | Performed by: INTERNAL MEDICINE

## 2019-06-24 PROCEDURE — 93325 DOPPLER ECHO COLOR FLOW MAPG: CPT

## 2019-06-24 PROCEDURE — 93312 ECHO TRANSESOPHAGEAL: CPT | Performed by: INTERNAL MEDICINE

## 2019-06-24 PROCEDURE — 93312 ECHO TRANSESOPHAGEAL: CPT

## 2019-06-24 PROCEDURE — 84100 ASSAY OF PHOSPHORUS: CPT | Performed by: INTERNAL MEDICINE

## 2019-06-24 PROCEDURE — 93325 DOPPLER ECHO COLOR FLOW MAPG: CPT | Performed by: INTERNAL MEDICINE

## 2019-06-24 PROCEDURE — 93320 DOPPLER ECHO COMPLETE: CPT | Performed by: INTERNAL MEDICINE

## 2019-06-24 RX ORDER — PROPOFOL 10 MG/ML
INJECTION, EMULSION INTRAVENOUS AS NEEDED
Status: DISCONTINUED | OUTPATIENT
Start: 2019-06-24 | End: 2019-06-24 | Stop reason: SURG

## 2019-06-24 RX ORDER — PROPOFOL 10 MG/ML
VIAL (ML) INTRAVENOUS
Status: COMPLETED
Start: 2019-06-24 | End: 2019-06-24

## 2019-06-24 RX ADMIN — PROPOFOL 300 MG: 10 INJECTION, EMULSION INTRAVENOUS at 16:55

## 2019-06-24 RX ADMIN — Medication 10 ML: at 08:17

## 2019-06-24 RX ADMIN — MELATONIN 1000 UNITS: at 08:22

## 2019-06-24 RX ADMIN — OXYCODONE HYDROCHLORIDE 10 MG: 5 TABLET ORAL at 18:55

## 2019-06-24 RX ADMIN — LISINOPRIL 20 MG: 20 TABLET ORAL at 08:22

## 2019-06-24 RX ADMIN — FLUTICASONE PROPIONATE 2 SPRAY: 50 SPRAY, METERED NASAL at 08:20

## 2019-06-24 RX ADMIN — OXYCODONE HYDROCHLORIDE 7.5 MG: 5 TABLET ORAL at 13:50

## 2019-06-24 RX ADMIN — FERROUS SULFATE TAB EC 324 MG (65 MG FE EQUIVALENT) 324 MG: 324 (65 FE) TABLET DELAYED RESPONSE at 08:18

## 2019-06-24 RX ADMIN — CARVEDILOL 6.25 MG: 6.25 TABLET, FILM COATED ORAL at 08:20

## 2019-06-24 RX ADMIN — FOLIC ACID 1 MG: 1 TABLET ORAL at 08:18

## 2019-06-24 RX ADMIN — BUMETANIDE 2 MG: 1 TABLET ORAL at 05:23

## 2019-06-24 RX ADMIN — CARVEDILOL 6.25 MG: 6.25 TABLET, FILM COATED ORAL at 18:56

## 2019-06-24 RX ADMIN — FAMOTIDINE 40 MG: 20 TABLET, FILM COATED ORAL at 08:22

## 2019-06-24 RX ADMIN — HYDRALAZINE HYDROCHLORIDE 50 MG: 25 TABLET, FILM COATED ORAL at 20:28

## 2019-06-24 RX ADMIN — ONDANSETRON 4 MG: 2 INJECTION INTRAMUSCULAR; INTRAVENOUS at 21:26

## 2019-06-24 RX ADMIN — LEVOTHYROXINE SODIUM 200 MCG: 100 TABLET ORAL at 05:24

## 2019-06-24 RX ADMIN — BUMETANIDE 2 MG: 1 TABLET ORAL at 18:56

## 2019-06-24 RX ADMIN — ONDANSETRON HYDROCHLORIDE 4 MG: 4 TABLET, FILM COATED ORAL at 07:23

## 2019-06-24 RX ADMIN — DOCUSATE SODIUM 100 MG: 100 CAPSULE, LIQUID FILLED ORAL at 08:19

## 2019-06-24 RX ADMIN — HYDRALAZINE HYDROCHLORIDE 50 MG: 25 TABLET, FILM COATED ORAL at 08:21

## 2019-06-24 RX ADMIN — OXYCODONE HYDROCHLORIDE 7.5 MG: 5 TABLET ORAL at 05:22

## 2019-06-24 RX ADMIN — OXYCODONE HYDROCHLORIDE 10 MG: 5 TABLET ORAL at 10:00

## 2019-06-24 RX ADMIN — ENOXAPARIN SODIUM 40 MG: 40 INJECTION SUBCUTANEOUS at 18:56

## 2019-06-24 RX ADMIN — AMLODIPINE BESYLATE 5 MG: 5 TABLET ORAL at 08:21

## 2019-06-24 NOTE — ANESTHESIA PREPROCEDURE EVALUATION
Anesthesia Evaluation     Patient summary reviewed and Nursing notes reviewed   NPO Solid Status: > 8 hours  NPO Liquid Status: > 8 hours           Airway   Mallampati: II  TM distance: >3 FB  Neck ROM: full  No difficulty expected  Dental - normal exam     Pulmonary - negative pulmonary ROS and normal exam    breath sounds clear to auscultation  Cardiovascular - negative cardio ROS and normal exam  Exercise tolerance: unable to assess    ECG reviewed  Rhythm: regular  Rate: normal        Neuro/Psych- negative ROS  GI/Hepatic/Renal/Endo - negative ROS     Musculoskeletal (-) negative ROS    Abdominal  - normal exam   Substance History - negative use     OB/GYN negative ob/gyn ROS         Other - negative ROS                       Anesthesia Plan    ASA 4     MAC     intravenous induction   Anesthetic plan, all risks, benefits, and alternatives have been provided, discussed and informed consent has been obtained with: patient.

## 2019-06-24 NOTE — ANESTHESIA POSTPROCEDURE EVALUATION
Patient: Rafael Mccann    Procedure Summary     Date:  06/24/19 Room / Location:  [facility]    Anesthesia Start:  1654 Anesthesia Stop:  1728    Procedure:  ADULT TRANSESOPHAGEAL ECHO (FELICIA) W/ CONT IF NECESSARY PER PROTOCOL Diagnosis:       (Valvular Disease)      (Aortic Valve Assessment)    Scheduled Providers:  Héctor Eckert MD Provider:  Diego Burden MD    Anesthesia Type:  MAC ASA Status:  4          Anesthesia Type: MAC  Last vitals  BP   118/74 (06/24/19 1525)   Temp   97.4 °F (36.3 °C) (06/24/19 1100)   Pulse   57 (06/24/19 1525)   Resp   15 (06/24/19 1100)     SpO2   98 % (06/24/19 1525)     Post Anesthesia Care and Evaluation    Patient location during evaluation: PACU  Patient participation: complete - patient participated  Level of consciousness: awake  Pain score: 1  Pain management: adequate  Airway patency: patent  Anesthetic complications: No anesthetic complications  PONV Status: controlled  Cardiovascular status: acceptable, hemodynamically stable and stable  Respiratory status: acceptable  Hydration status: acceptable  Post Neuraxial Block status: Motor and sensory function returned to baseline

## 2019-06-25 LAB
ANION GAP SERPL CALCULATED.3IONS-SCNC: 13 MMOL/L (ref 10–20)
BUN BLD-MCNC: 19 MG/DL (ref 8–20)
BUN/CREAT SERPL: 12.7 (ref 5.4–26.2)
CALCIUM SPEC-SCNC: 9.3 MG/DL (ref 8.9–10.3)
CHLORIDE SERPL-SCNC: 97 MMOL/L (ref 101–111)
CO2 SERPL-SCNC: 26 MMOL/L (ref 22–32)
CREAT BLD-MCNC: 1.5 MG/DL (ref 0.4–1)
GFR SERPL CREATININE-BSD FRML MDRD: 45 ML/MIN/1.73
GLUCOSE BLD-MCNC: 96 MG/DL (ref 65–99)
GLUCOSE BLDC GLUCOMTR-MCNC: 115 MG/DL (ref 70–105)
GLUCOSE BLDC GLUCOMTR-MCNC: 128 MG/DL (ref 70–105)
GLUCOSE BLDC GLUCOMTR-MCNC: 81 MG/DL (ref 70–105)
GLUCOSE BLDC GLUCOMTR-MCNC: 91 MG/DL (ref 70–105)
MAGNESIUM SERPL-MCNC: 2.1 MG/DL (ref 1.8–2.5)
PHOSPHATE SERPL-MCNC: 5.5 MG/DL (ref 2.4–4.7)
POTASSIUM BLD-SCNC: 3.5 MMOL/L (ref 3.6–5.1)
SODIUM BLD-SCNC: 136 MMOL/L (ref 136–144)

## 2019-06-25 PROCEDURE — 80048 BASIC METABOLIC PNL TOTAL CA: CPT | Performed by: INTERNAL MEDICINE

## 2019-06-25 PROCEDURE — 25010000002 ENOXAPARIN PER 10 MG: Performed by: INTERNAL MEDICINE

## 2019-06-25 PROCEDURE — 99232 SBSQ HOSP IP/OBS MODERATE 35: CPT | Performed by: INTERNAL MEDICINE

## 2019-06-25 PROCEDURE — 83735 ASSAY OF MAGNESIUM: CPT | Performed by: INTERNAL MEDICINE

## 2019-06-25 PROCEDURE — 82962 GLUCOSE BLOOD TEST: CPT

## 2019-06-25 PROCEDURE — 99233 SBSQ HOSP IP/OBS HIGH 50: CPT | Performed by: INTERNAL MEDICINE

## 2019-06-25 PROCEDURE — 84100 ASSAY OF PHOSPHORUS: CPT | Performed by: INTERNAL MEDICINE

## 2019-06-25 RX ORDER — POTASSIUM CHLORIDE 20 MEQ/1
40 TABLET, EXTENDED RELEASE ORAL ONCE
Status: COMPLETED | OUTPATIENT
Start: 2019-06-25 | End: 2019-06-25

## 2019-06-25 RX ORDER — SPIRONOLACTONE 25 MG/1
25 TABLET ORAL DAILY
Status: DISCONTINUED | OUTPATIENT
Start: 2019-06-25 | End: 2019-06-30 | Stop reason: HOSPADM

## 2019-06-25 RX ADMIN — FAMOTIDINE 40 MG: 20 TABLET, FILM COATED ORAL at 08:41

## 2019-06-25 RX ADMIN — MELATONIN 1000 UNITS: at 08:42

## 2019-06-25 RX ADMIN — OXYCODONE HYDROCHLORIDE 7.5 MG: 5 TABLET ORAL at 23:36

## 2019-06-25 RX ADMIN — POTASSIUM CHLORIDE 40 MEQ: 1500 TABLET, EXTENDED RELEASE ORAL at 12:51

## 2019-06-25 RX ADMIN — Medication 3 ML: at 20:14

## 2019-06-25 RX ADMIN — FLUTICASONE PROPIONATE 2 SPRAY: 50 SPRAY, METERED NASAL at 12:50

## 2019-06-25 RX ADMIN — LEVOTHYROXINE SODIUM 200 MCG: 100 TABLET ORAL at 05:52

## 2019-06-25 RX ADMIN — OXYCODONE HYDROCHLORIDE 7.5 MG: 5 TABLET ORAL at 08:49

## 2019-06-25 RX ADMIN — BUMETANIDE 2 MG: 1 TABLET ORAL at 05:52

## 2019-06-25 RX ADMIN — DOCUSATE SODIUM 100 MG: 100 CAPSULE, LIQUID FILLED ORAL at 08:42

## 2019-06-25 RX ADMIN — BUMETANIDE 2 MG: 1 TABLET ORAL at 17:03

## 2019-06-25 RX ADMIN — OXYCODONE HYDROCHLORIDE 7.5 MG: 5 TABLET ORAL at 18:56

## 2019-06-25 RX ADMIN — LISINOPRIL 20 MG: 20 TABLET ORAL at 08:42

## 2019-06-25 RX ADMIN — Medication 3 ML: at 08:44

## 2019-06-25 RX ADMIN — CARVEDILOL 6.25 MG: 6.25 TABLET, FILM COATED ORAL at 17:06

## 2019-06-25 RX ADMIN — FERROUS SULFATE TAB EC 324 MG (65 MG FE EQUIVALENT) 325 MG: 324 (65 FE) TABLET DELAYED RESPONSE at 08:42

## 2019-06-25 RX ADMIN — FOLIC ACID 1 MG: 1 TABLET ORAL at 08:41

## 2019-06-25 RX ADMIN — CARVEDILOL 6.25 MG: 6.25 TABLET, FILM COATED ORAL at 08:49

## 2019-06-25 RX ADMIN — SPIRONOLACTONE 25 MG: 25 TABLET ORAL at 17:03

## 2019-06-25 RX ADMIN — OXYCODONE HYDROCHLORIDE 7.5 MG: 5 TABLET ORAL at 00:38

## 2019-06-25 RX ADMIN — AMLODIPINE BESYLATE 5 MG: 5 TABLET ORAL at 08:42

## 2019-06-25 RX ADMIN — ENOXAPARIN SODIUM 40 MG: 40 INJECTION SUBCUTANEOUS at 17:03

## 2019-06-25 RX ADMIN — HYDRALAZINE HYDROCHLORIDE 50 MG: 25 TABLET, FILM COATED ORAL at 08:42

## 2019-06-26 PROBLEM — I42.0 CARDIOMYOPATHY, DILATED: Status: ACTIVE | Noted: 2019-06-22

## 2019-06-26 PROBLEM — I10 ESSENTIAL HYPERTENSION: Status: ACTIVE | Noted: 2019-06-22

## 2019-06-26 PROBLEM — I50.23 ACUTE ON CHRONIC SYSTOLIC CONGESTIVE HEART FAILURE: Status: ACTIVE | Noted: 2019-06-22

## 2019-06-26 LAB
ANION GAP SERPL CALCULATED.3IONS-SCNC: 14 MMOL/L (ref 10–20)
BUN BLD-MCNC: 23 MG/DL (ref 8–20)
BUN/CREAT SERPL: 16.4 (ref 5.4–26.2)
CALCIUM SPEC-SCNC: 9.1 MG/DL (ref 8.9–10.3)
CHLORIDE SERPL-SCNC: 97 MMOL/L (ref 101–111)
CO2 SERPL-SCNC: 25 MMOL/L (ref 22–32)
CREAT BLD-MCNC: 1.4 MG/DL (ref 0.4–1)
GFR SERPL CREATININE-BSD FRML MDRD: 49 ML/MIN/1.73
GLUCOSE BLD-MCNC: 66 MG/DL (ref 65–99)
GLUCOSE BLDC GLUCOMTR-MCNC: 136 MG/DL (ref 70–105)
GLUCOSE BLDC GLUCOMTR-MCNC: 86 MG/DL (ref 70–105)
GLUCOSE BLDC GLUCOMTR-MCNC: 87 MG/DL (ref 70–105)
GLUCOSE BLDC GLUCOMTR-MCNC: 92 MG/DL (ref 70–105)
MAGNESIUM SERPL-MCNC: 2 MG/DL (ref 1.8–2.5)
PHOSPHATE SERPL-MCNC: 4.8 MG/DL (ref 2.4–4.7)
POTASSIUM BLD-SCNC: 3.4 MMOL/L (ref 3.6–5.1)
SODIUM BLD-SCNC: 136 MMOL/L (ref 136–144)

## 2019-06-26 PROCEDURE — 99232 SBSQ HOSP IP/OBS MODERATE 35: CPT | Performed by: INTERNAL MEDICINE

## 2019-06-26 PROCEDURE — 80048 BASIC METABOLIC PNL TOTAL CA: CPT | Performed by: INTERNAL MEDICINE

## 2019-06-26 PROCEDURE — 83735 ASSAY OF MAGNESIUM: CPT | Performed by: INTERNAL MEDICINE

## 2019-06-26 PROCEDURE — 82962 GLUCOSE BLOOD TEST: CPT

## 2019-06-26 PROCEDURE — 84100 ASSAY OF PHOSPHORUS: CPT | Performed by: INTERNAL MEDICINE

## 2019-06-26 PROCEDURE — 94799 UNLISTED PULMONARY SVC/PX: CPT

## 2019-06-26 PROCEDURE — 25010000002 ENOXAPARIN PER 10 MG: Performed by: INTERNAL MEDICINE

## 2019-06-26 RX ORDER — POTASSIUM CHLORIDE 20 MEQ/1
40 TABLET, EXTENDED RELEASE ORAL ONCE
Status: COMPLETED | OUTPATIENT
Start: 2019-06-26 | End: 2019-06-26

## 2019-06-26 RX ORDER — BUMETANIDE 1 MG/1
2 TABLET ORAL DAILY
Status: DISCONTINUED | OUTPATIENT
Start: 2019-06-27 | End: 2019-06-30 | Stop reason: HOSPADM

## 2019-06-26 RX ADMIN — FAMOTIDINE 40 MG: 20 TABLET, FILM COATED ORAL at 08:29

## 2019-06-26 RX ADMIN — CARVEDILOL 6.25 MG: 6.25 TABLET, FILM COATED ORAL at 18:37

## 2019-06-26 RX ADMIN — ENOXAPARIN SODIUM 40 MG: 40 INJECTION SUBCUTANEOUS at 16:26

## 2019-06-26 RX ADMIN — MELATONIN 1000 UNITS: at 08:29

## 2019-06-26 RX ADMIN — HYDRALAZINE HYDROCHLORIDE 50 MG: 25 TABLET, FILM COATED ORAL at 16:26

## 2019-06-26 RX ADMIN — AMLODIPINE BESYLATE 5 MG: 5 TABLET ORAL at 08:29

## 2019-06-26 RX ADMIN — Medication 3 ML: at 20:28

## 2019-06-26 RX ADMIN — LEVOTHYROXINE SODIUM 200 MCG: 100 TABLET ORAL at 05:13

## 2019-06-26 RX ADMIN — POTASSIUM CHLORIDE 40 MEQ: 1500 TABLET, EXTENDED RELEASE ORAL at 10:33

## 2019-06-26 RX ADMIN — DOCUSATE SODIUM 100 MG: 100 CAPSULE, LIQUID FILLED ORAL at 08:30

## 2019-06-26 RX ADMIN — FERROUS SULFATE TAB EC 324 MG (65 MG FE EQUIVALENT) 325 MG: 324 (65 FE) TABLET DELAYED RESPONSE at 08:28

## 2019-06-26 RX ADMIN — BUMETANIDE 2 MG: 1 TABLET ORAL at 05:13

## 2019-06-26 RX ADMIN — LISINOPRIL 20 MG: 20 TABLET ORAL at 08:30

## 2019-06-26 RX ADMIN — OXYCODONE HYDROCHLORIDE 7.5 MG: 5 TABLET ORAL at 16:26

## 2019-06-26 RX ADMIN — FLUTICASONE PROPIONATE 2 SPRAY: 50 SPRAY, METERED NASAL at 10:33

## 2019-06-26 RX ADMIN — FOLIC ACID 1 MG: 1 TABLET ORAL at 08:30

## 2019-06-26 RX ADMIN — OXYCODONE HYDROCHLORIDE 7.5 MG: 5 TABLET ORAL at 05:19

## 2019-06-26 RX ADMIN — Medication 3 ML: at 08:30

## 2019-06-26 RX ADMIN — HYDRALAZINE HYDROCHLORIDE 50 MG: 25 TABLET, FILM COATED ORAL at 08:29

## 2019-06-26 RX ADMIN — SPIRONOLACTONE 25 MG: 25 TABLET ORAL at 08:30

## 2019-06-26 RX ADMIN — CARVEDILOL 6.25 MG: 6.25 TABLET, FILM COATED ORAL at 08:30

## 2019-06-26 RX ADMIN — OXYCODONE HYDROCHLORIDE 7.5 MG: 5 TABLET ORAL at 22:21

## 2019-06-27 LAB
ANION GAP SERPL CALCULATED.3IONS-SCNC: 13.5 MMOL/L (ref 10–20)
BUN BLD-MCNC: 24 MG/DL (ref 8–20)
BUN/CREAT SERPL: 18.5 (ref 5.4–26.2)
CALCIUM SPEC-SCNC: 9.1 MG/DL (ref 8.9–10.3)
CHLORIDE SERPL-SCNC: 100 MMOL/L (ref 101–111)
CO2 SERPL-SCNC: 26 MMOL/L (ref 22–32)
CREAT BLD-MCNC: 1.3 MG/DL (ref 0.4–1)
DEPRECATED RDW RBC AUTO: 50.8 FL (ref 37–54)
ERYTHROCYTE [DISTWIDTH] IN BLOOD BY AUTOMATED COUNT: 15.6 % (ref 12.3–15.4)
GFR SERPL CREATININE-BSD FRML MDRD: 53 ML/MIN/1.73
GLUCOSE BLD-MCNC: 83 MG/DL (ref 65–99)
GLUCOSE BLDC GLUCOMTR-MCNC: 112 MG/DL (ref 70–105)
GLUCOSE BLDC GLUCOMTR-MCNC: 125 MG/DL (ref 70–105)
GLUCOSE BLDC GLUCOMTR-MCNC: 90 MG/DL (ref 70–105)
GLUCOSE BLDC GLUCOMTR-MCNC: 91 MG/DL (ref 70–105)
HCT VFR BLD AUTO: 50.6 % (ref 34–46.6)
HGB BLD-MCNC: 16.9 G/DL (ref 12–15.9)
MAGNESIUM SERPL-MCNC: 2.2 MG/DL (ref 1.8–2.5)
MCH RBC QN AUTO: 30.7 PG (ref 26.6–33)
MCHC RBC AUTO-ENTMCNC: 33.4 G/DL (ref 31.5–35.7)
MCV RBC AUTO: 91.9 FL (ref 79–97)
PHOSPHATE SERPL-MCNC: 4.6 MG/DL (ref 2.4–4.7)
PLATELET # BLD AUTO: 230 10*3/MM3 (ref 140–450)
PMV BLD AUTO: 8.2 FL (ref 6–12)
POTASSIUM BLD-SCNC: 3.5 MMOL/L (ref 3.6–5.1)
RBC # BLD AUTO: 5.5 10*6/MM3 (ref 3.77–5.28)
SODIUM BLD-SCNC: 136 MMOL/L (ref 136–144)
WBC NRBC COR # BLD: 7.6 10*3/MM3 (ref 3.4–10.8)

## 2019-06-27 PROCEDURE — 85027 COMPLETE CBC AUTOMATED: CPT | Performed by: INTERNAL MEDICINE

## 2019-06-27 PROCEDURE — 84100 ASSAY OF PHOSPHORUS: CPT | Performed by: INTERNAL MEDICINE

## 2019-06-27 PROCEDURE — 80048 BASIC METABOLIC PNL TOTAL CA: CPT | Performed by: INTERNAL MEDICINE

## 2019-06-27 PROCEDURE — 94799 UNLISTED PULMONARY SVC/PX: CPT

## 2019-06-27 PROCEDURE — 83735 ASSAY OF MAGNESIUM: CPT | Performed by: INTERNAL MEDICINE

## 2019-06-27 PROCEDURE — 99232 SBSQ HOSP IP/OBS MODERATE 35: CPT | Performed by: INTERNAL MEDICINE

## 2019-06-27 PROCEDURE — 82962 GLUCOSE BLOOD TEST: CPT

## 2019-06-27 RX ORDER — POTASSIUM CHLORIDE 20 MEQ/1
40 TABLET, EXTENDED RELEASE ORAL ONCE
Status: COMPLETED | OUTPATIENT
Start: 2019-06-27 | End: 2019-06-27

## 2019-06-27 RX ORDER — ATORVASTATIN CALCIUM 40 MG/1
40 TABLET, FILM COATED ORAL NIGHTLY
Status: DISCONTINUED | OUTPATIENT
Start: 2019-06-27 | End: 2019-06-30 | Stop reason: HOSPADM

## 2019-06-27 RX ADMIN — MELATONIN 1000 UNITS: at 09:18

## 2019-06-27 RX ADMIN — OXYCODONE HYDROCHLORIDE 7.5 MG: 5 TABLET ORAL at 09:28

## 2019-06-27 RX ADMIN — LEVOTHYROXINE SODIUM 200 MCG: 100 TABLET ORAL at 05:29

## 2019-06-27 RX ADMIN — DOCUSATE SODIUM 100 MG: 100 CAPSULE, LIQUID FILLED ORAL at 09:17

## 2019-06-27 RX ADMIN — FERROUS SULFATE TAB EC 324 MG (65 MG FE EQUIVALENT) 325 MG: 324 (65 FE) TABLET DELAYED RESPONSE at 09:16

## 2019-06-27 RX ADMIN — POTASSIUM CHLORIDE 40 MEQ: 1500 TABLET, EXTENDED RELEASE ORAL at 11:05

## 2019-06-27 RX ADMIN — LISINOPRIL 20 MG: 20 TABLET ORAL at 09:17

## 2019-06-27 RX ADMIN — OXYCODONE HYDROCHLORIDE 7.5 MG: 5 TABLET ORAL at 20:03

## 2019-06-27 RX ADMIN — FAMOTIDINE 40 MG: 20 TABLET, FILM COATED ORAL at 09:17

## 2019-06-27 RX ADMIN — OXYCODONE HYDROCHLORIDE 7.5 MG: 5 TABLET ORAL at 14:58

## 2019-06-27 RX ADMIN — CARVEDILOL 6.25 MG: 6.25 TABLET, FILM COATED ORAL at 17:51

## 2019-06-27 RX ADMIN — Medication 10 ML: at 21:32

## 2019-06-27 RX ADMIN — HYDRALAZINE HYDROCHLORIDE 50 MG: 25 TABLET, FILM COATED ORAL at 09:17

## 2019-06-27 RX ADMIN — SPIRONOLACTONE 25 MG: 25 TABLET ORAL at 09:17

## 2019-06-27 RX ADMIN — FOLIC ACID 1 MG: 1 TABLET ORAL at 09:17

## 2019-06-27 RX ADMIN — BUMETANIDE 2 MG: 1 TABLET ORAL at 09:17

## 2019-06-27 RX ADMIN — Medication 3 ML: at 09:19

## 2019-06-27 RX ADMIN — HYDRALAZINE HYDROCHLORIDE 50 MG: 25 TABLET, FILM COATED ORAL at 21:32

## 2019-06-27 RX ADMIN — CARVEDILOL 6.25 MG: 6.25 TABLET, FILM COATED ORAL at 09:18

## 2019-06-27 RX ADMIN — AMLODIPINE BESYLATE 5 MG: 5 TABLET ORAL at 09:16

## 2019-06-27 RX ADMIN — ATORVASTATIN CALCIUM 40 MG: 40 TABLET, FILM COATED ORAL at 21:32

## 2019-06-28 ENCOUNTER — APPOINTMENT (OUTPATIENT)
Dept: GENERAL RADIOLOGY | Facility: HOSPITAL | Age: 44
End: 2019-06-28

## 2019-06-28 ENCOUNTER — ANESTHESIA EVENT (OUTPATIENT)
Dept: CARDIOLOGY | Facility: HOSPITAL | Age: 44
End: 2019-06-28

## 2019-06-28 ENCOUNTER — ANESTHESIA (OUTPATIENT)
Dept: CARDIOLOGY | Facility: HOSPITAL | Age: 44
End: 2019-06-28

## 2019-06-28 LAB
ALBUMIN SERPL-MCNC: 4 G/DL (ref 3.5–4.8)
ALBUMIN/GLOB SERPL: 1.3 G/DL (ref 1–1.7)
ALP SERPL-CCNC: 62 U/L (ref 32–91)
ALT SERPL W P-5'-P-CCNC: 29 U/L (ref 14–54)
ANION GAP SERPL CALCULATED.3IONS-SCNC: 19.1 MMOL/L (ref 10–20)
ANION GAP SERPL CALCULATED.3IONS-SCNC: 20.9 MMOL/L (ref 10–20)
APTT PPP: 28 SECONDS (ref 24–31)
AST SERPL-CCNC: 33 U/L (ref 15–41)
BASOPHILS # BLD AUTO: 0 10*3/MM3 (ref 0–0.2)
BASOPHILS NFR BLD AUTO: 0.3 % (ref 0–1.5)
BILIRUB SERPL-MCNC: 0.7 MG/DL (ref 0.3–1.2)
BUN BLD-MCNC: 22 MG/DL (ref 8–20)
BUN BLD-MCNC: 26 MG/DL (ref 8–20)
BUN/CREAT SERPL: 16.9 (ref 5.4–26.2)
BUN/CREAT SERPL: 20 (ref 5.4–26.2)
CALCIUM SPEC-SCNC: 9.6 MG/DL (ref 8.9–10.3)
CALCIUM SPEC-SCNC: 9.6 MG/DL (ref 8.9–10.3)
CHLORIDE SERPL-SCNC: 102 MMOL/L (ref 101–111)
CHLORIDE SERPL-SCNC: 97 MMOL/L (ref 101–111)
CO2 SERPL-SCNC: 21 MMOL/L (ref 22–32)
CO2 SERPL-SCNC: 24 MMOL/L (ref 22–32)
CREAT BLD-MCNC: 1.3 MG/DL (ref 0.4–1)
CREAT BLD-MCNC: 1.3 MG/DL (ref 0.4–1)
DEPRECATED RDW RBC AUTO: 49.9 FL (ref 37–54)
EOSINOPHIL # BLD AUTO: 0.1 10*3/MM3 (ref 0–0.4)
EOSINOPHIL NFR BLD AUTO: 1.3 % (ref 0.3–6.2)
ERYTHROCYTE [DISTWIDTH] IN BLOOD BY AUTOMATED COUNT: 15.2 % (ref 12.3–15.4)
GFR SERPL CREATININE-BSD FRML MDRD: 53 ML/MIN/1.73
GFR SERPL CREATININE-BSD FRML MDRD: 53 ML/MIN/1.73
GLOBULIN UR ELPH-MCNC: 3.2 GM/DL (ref 2.5–3.8)
GLUCOSE BLD-MCNC: 130 MG/DL (ref 65–99)
GLUCOSE BLD-MCNC: 99 MG/DL (ref 65–99)
GLUCOSE BLDC GLUCOMTR-MCNC: 100 MG/DL (ref 70–105)
GLUCOSE BLDC GLUCOMTR-MCNC: 77 MG/DL (ref 70–105)
GLUCOSE BLDC GLUCOMTR-MCNC: 83 MG/DL (ref 70–105)
HCT VFR BLD AUTO: 48.9 % (ref 34–46.6)
HGB BLD-MCNC: 16 G/DL (ref 12–15.9)
INR PPP: 1.01 (ref 0.9–1.1)
LYMPHOCYTES # BLD AUTO: 2.4 10*3/MM3 (ref 0.7–3.1)
LYMPHOCYTES NFR BLD AUTO: 32.1 % (ref 19.6–45.3)
MAGNESIUM SERPL-MCNC: 2.2 MG/DL (ref 1.8–2.5)
MCH RBC QN AUTO: 30.4 PG (ref 26.6–33)
MCHC RBC AUTO-ENTMCNC: 32.8 G/DL (ref 31.5–35.7)
MCV RBC AUTO: 92.8 FL (ref 79–97)
MONOCYTES # BLD AUTO: 0.6 10*3/MM3 (ref 0.1–0.9)
MONOCYTES NFR BLD AUTO: 8.2 % (ref 5–12)
NEUTROPHILS # BLD AUTO: 4.4 10*3/MM3 (ref 1.7–7)
NEUTROPHILS NFR BLD AUTO: 58.1 % (ref 42.7–76)
NRBC BLD AUTO-RTO: 0.1 /100 WBC (ref 0–0.2)
PHOSPHATE SERPL-MCNC: 5.3 MG/DL (ref 2.4–4.7)
PLATELET # BLD AUTO: 200 10*3/MM3 (ref 140–450)
PMV BLD AUTO: 7.7 FL (ref 6–12)
POTASSIUM BLD-SCNC: 4.1 MMOL/L (ref 3.6–5.1)
POTASSIUM BLD-SCNC: 4.1 MMOL/L (ref 3.6–5.1)
POTASSIUM BLD-SCNC: 5.9 MMOL/L (ref 3.6–5.1)
PROT SERPL-MCNC: 7.2 G/DL (ref 6.1–7.9)
PROTHROMBIN TIME: 10.4 SECONDS (ref 9.6–11.7)
RBC # BLD AUTO: 5.27 10*6/MM3 (ref 3.77–5.28)
SODIUM BLD-SCNC: 136 MMOL/L (ref 136–144)
SODIUM BLD-SCNC: 138 MMOL/L (ref 136–144)
WBC NRBC COR # BLD: 7.5 10*3/MM3 (ref 3.4–10.8)

## 2019-06-28 PROCEDURE — C1894 INTRO/SHEATH, NON-LASER: HCPCS | Performed by: INTERNAL MEDICINE

## 2019-06-28 PROCEDURE — 25010000002 VANCOMYCIN 1 G RECONSTITUTED SOLUTION 1 EACH VIAL: Performed by: ANESTHESIOLOGY

## 2019-06-28 PROCEDURE — 84100 ASSAY OF PHOSPHORUS: CPT | Performed by: INTERNAL MEDICINE

## 2019-06-28 PROCEDURE — 25010000002 FENTANYL CITRATE (PF) 100 MCG/2ML SOLUTION: Performed by: ANESTHESIOLOGY

## 2019-06-28 PROCEDURE — 83735 ASSAY OF MAGNESIUM: CPT | Performed by: INTERNAL MEDICINE

## 2019-06-28 PROCEDURE — 85025 COMPLETE CBC W/AUTO DIFF WBC: CPT | Performed by: INTERNAL MEDICINE

## 2019-06-28 PROCEDURE — 02H63KZ INSERTION OF DEFIBRILLATOR LEAD INTO RIGHT ATRIUM, PERCUTANEOUS APPROACH: ICD-10-PCS | Performed by: INTERNAL MEDICINE

## 2019-06-28 PROCEDURE — 25010000002 IOPAMIDOL 61 % SOLUTION: Performed by: INTERNAL MEDICINE

## 2019-06-28 PROCEDURE — 25010000002 PROPOFOL 1000 MG/ML EMULSION: Performed by: ANESTHESIOLOGY

## 2019-06-28 PROCEDURE — C1769 GUIDE WIRE: HCPCS | Performed by: INTERNAL MEDICINE

## 2019-06-28 PROCEDURE — C1721 AICD, DUAL CHAMBER: HCPCS | Performed by: INTERNAL MEDICINE

## 2019-06-28 PROCEDURE — C1892 INTRO/SHEATH,FIXED,PEEL-AWAY: HCPCS | Performed by: INTERNAL MEDICINE

## 2019-06-28 PROCEDURE — 71045 X-RAY EXAM CHEST 1 VIEW: CPT

## 2019-06-28 PROCEDURE — 85610 PROTHROMBIN TIME: CPT | Performed by: INTERNAL MEDICINE

## 2019-06-28 PROCEDURE — 84132 ASSAY OF SERUM POTASSIUM: CPT | Performed by: INTERNAL MEDICINE

## 2019-06-28 PROCEDURE — 85730 THROMBOPLASTIN TIME PARTIAL: CPT | Performed by: INTERNAL MEDICINE

## 2019-06-28 PROCEDURE — 82962 GLUCOSE BLOOD TEST: CPT

## 2019-06-28 PROCEDURE — 33249 INSJ/RPLCMT DEFIB W/LEAD(S): CPT | Performed by: INTERNAL MEDICINE

## 2019-06-28 PROCEDURE — 02HK3KZ INSERTION OF DEFIBRILLATOR LEAD INTO RIGHT VENTRICLE, PERCUTANEOUS APPROACH: ICD-10-PCS | Performed by: INTERNAL MEDICINE

## 2019-06-28 PROCEDURE — 0JH608Z INSERTION OF DEFIBRILLATOR GENERATOR INTO CHEST SUBCUTANEOUS TISSUE AND FASCIA, OPEN APPROACH: ICD-10-PCS | Performed by: INTERNAL MEDICINE

## 2019-06-28 PROCEDURE — 93641 EP EVL 1/2CHMB PAC CVDFB TST: CPT | Performed by: INTERNAL MEDICINE

## 2019-06-28 PROCEDURE — 25010000002 MIDAZOLAM PER 1 MG: Performed by: ANESTHESIOLOGY

## 2019-06-28 PROCEDURE — C1898 LEAD, PMKR, OTHER THAN TRANS: HCPCS | Performed by: INTERNAL MEDICINE

## 2019-06-28 PROCEDURE — 4B02XTZ MEASUREMENT OF CARDIAC DEFIBRILLATOR, EXTERNAL APPROACH: ICD-10-PCS | Performed by: INTERNAL MEDICINE

## 2019-06-28 PROCEDURE — 80053 COMPREHEN METABOLIC PANEL: CPT | Performed by: INTERNAL MEDICINE

## 2019-06-28 PROCEDURE — 25010000002 MORPHINE PER 10 MG: Performed by: INTERNAL MEDICINE

## 2019-06-28 PROCEDURE — C1777 LEAD, AICD, ENDO SINGLE COIL: HCPCS | Performed by: INTERNAL MEDICINE

## 2019-06-28 PROCEDURE — 99232 SBSQ HOSP IP/OBS MODERATE 35: CPT | Performed by: INTERNAL MEDICINE

## 2019-06-28 DEVICE — LD ICD PLEXA PROMRI S65: Type: IMPLANTABLE DEVICE | Status: FUNCTIONAL

## 2019-06-28 DEVICE — LD PM SOLIA S 53: Type: IMPLANTABLE DEVICE | Status: FUNCTIONAL

## 2019-06-28 DEVICE — IMPLANTABLE DEVICE
Type: IMPLANTABLE DEVICE | Status: FUNCTIONAL
Brand: RIVACOR 7 DR-T

## 2019-06-28 RX ORDER — SODIUM CHLORIDE 0.9 % (FLUSH) 0.9 %
3-10 SYRINGE (ML) INJECTION AS NEEDED
Status: DISCONTINUED | OUTPATIENT
Start: 2019-06-28 | End: 2019-06-30 | Stop reason: HOSPADM

## 2019-06-28 RX ORDER — FENTANYL CITRATE 50 UG/ML
INJECTION, SOLUTION INTRAMUSCULAR; INTRAVENOUS AS NEEDED
Status: DISCONTINUED | OUTPATIENT
Start: 2019-06-28 | End: 2019-06-28 | Stop reason: SURG

## 2019-06-28 RX ORDER — MORPHINE SULFATE 4 MG/ML
1 INJECTION, SOLUTION INTRAMUSCULAR; INTRAVENOUS EVERY 4 HOURS PRN
Status: DISCONTINUED | OUTPATIENT
Start: 2019-06-28 | End: 2019-06-29

## 2019-06-28 RX ORDER — SODIUM CHLORIDE 9 MG/ML
INJECTION, SOLUTION INTRAVENOUS CONTINUOUS PRN
Status: DISCONTINUED | OUTPATIENT
Start: 2019-06-28 | End: 2019-06-28 | Stop reason: SURG

## 2019-06-28 RX ORDER — ONDANSETRON 2 MG/ML
4 INJECTION INTRAMUSCULAR; INTRAVENOUS EVERY 6 HOURS PRN
Status: DISCONTINUED | OUTPATIENT
Start: 2019-06-28 | End: 2019-06-30

## 2019-06-28 RX ORDER — LIDOCAINE HYDROCHLORIDE AND EPINEPHRINE BITARTRATE 20; .01 MG/ML; MG/ML
INJECTION, SOLUTION SUBCUTANEOUS
Status: DISPENSED
Start: 2019-06-28 | End: 2019-06-29

## 2019-06-28 RX ORDER — LIDOCAINE HYDROCHLORIDE AND EPINEPHRINE BITARTRATE 20; .01 MG/ML; MG/ML
INJECTION, SOLUTION SUBCUTANEOUS AS NEEDED
Status: DISCONTINUED | OUTPATIENT
Start: 2019-06-28 | End: 2019-06-28 | Stop reason: HOSPADM

## 2019-06-28 RX ORDER — NALOXONE HCL 0.4 MG/ML
0.4 VIAL (ML) INJECTION
Status: DISCONTINUED | OUTPATIENT
Start: 2019-06-28 | End: 2019-06-29

## 2019-06-28 RX ORDER — MIDAZOLAM HYDROCHLORIDE 1 MG/ML
INJECTION INTRAMUSCULAR; INTRAVENOUS
Status: COMPLETED
Start: 2019-06-28 | End: 2019-06-28

## 2019-06-28 RX ORDER — MIDAZOLAM HYDROCHLORIDE 1 MG/ML
INJECTION INTRAMUSCULAR; INTRAVENOUS AS NEEDED
Status: DISCONTINUED | OUTPATIENT
Start: 2019-06-28 | End: 2019-06-28 | Stop reason: SURG

## 2019-06-28 RX ORDER — BACITRACIN 50000 [IU]/1
INJECTION, POWDER, FOR SOLUTION INTRAMUSCULAR
Status: DISPENSED
Start: 2019-06-28 | End: 2019-06-29

## 2019-06-28 RX ORDER — FENTANYL CITRATE 50 UG/ML
INJECTION, SOLUTION INTRAMUSCULAR; INTRAVENOUS
Status: COMPLETED
Start: 2019-06-28 | End: 2019-06-28

## 2019-06-28 RX ORDER — SODIUM CHLORIDE 9 MG/ML
50 INJECTION, SOLUTION INTRAVENOUS CONTINUOUS
Status: DISCONTINUED | OUTPATIENT
Start: 2019-06-28 | End: 2019-06-30 | Stop reason: HOSPADM

## 2019-06-28 RX ORDER — PROPOFOL 10 MG/ML
VIAL (ML) INTRAVENOUS
Status: COMPLETED
Start: 2019-06-28 | End: 2019-06-28

## 2019-06-28 RX ORDER — SODIUM CHLORIDE 0.9 % (FLUSH) 0.9 %
3 SYRINGE (ML) INJECTION EVERY 12 HOURS SCHEDULED
Status: DISCONTINUED | OUTPATIENT
Start: 2019-06-28 | End: 2019-06-29

## 2019-06-28 RX ADMIN — SODIUM CHLORIDE 50 ML/HR: 900 INJECTION, SOLUTION INTRAVENOUS at 12:26

## 2019-06-28 RX ADMIN — Medication 3 ML: at 09:38

## 2019-06-28 RX ADMIN — VANCOMYCIN HYDROCHLORIDE 1 G: 1 INJECTION, POWDER, LYOPHILIZED, FOR SOLUTION INTRAVENOUS at 16:52

## 2019-06-28 RX ADMIN — HYDRALAZINE HYDROCHLORIDE 50 MG: 25 TABLET, FILM COATED ORAL at 20:36

## 2019-06-28 RX ADMIN — FENTANYL CITRATE 50 MCG: 50 INJECTION, SOLUTION INTRAMUSCULAR; INTRAVENOUS at 16:47

## 2019-06-28 RX ADMIN — LEVOTHYROXINE SODIUM 200 MCG: 100 TABLET ORAL at 06:41

## 2019-06-28 RX ADMIN — FENTANYL CITRATE 50 MCG: 50 INJECTION, SOLUTION INTRAMUSCULAR; INTRAVENOUS at 17:10

## 2019-06-28 RX ADMIN — OXYCODONE HYDROCHLORIDE 7.5 MG: 5 TABLET ORAL at 11:07

## 2019-06-28 RX ADMIN — MIDAZOLAM 2 MG: 1 INJECTION INTRAMUSCULAR; INTRAVENOUS at 16:40

## 2019-06-28 RX ADMIN — OXYCODONE HYDROCHLORIDE 7.5 MG: 5 TABLET ORAL at 19:24

## 2019-06-28 RX ADMIN — MORPHINE SULFATE 1 MG: 4 INJECTION INTRAVENOUS at 20:36

## 2019-06-28 RX ADMIN — OXYCODONE HYDROCHLORIDE 7.5 MG: 5 TABLET ORAL at 00:12

## 2019-06-28 RX ADMIN — ATORVASTATIN CALCIUM 40 MG: 40 TABLET, FILM COATED ORAL at 20:35

## 2019-06-28 RX ADMIN — Medication 3 ML: at 20:58

## 2019-06-28 RX ADMIN — SODIUM CHLORIDE: 0.9 INJECTION, SOLUTION INTRAVENOUS at 16:37

## 2019-06-28 RX ADMIN — PROPOFOL 80 MCG/KG/MIN: 10 INJECTION, EMULSION INTRAVENOUS at 16:45

## 2019-06-28 RX ADMIN — OXYCODONE HYDROCHLORIDE 7.5 MG: 5 TABLET ORAL at 23:25

## 2019-06-28 NOTE — ANESTHESIA PREPROCEDURE EVALUATION
Anesthesia Evaluation     Patient summary reviewed   no history of anesthetic complications:  NPO Solid Status: > 8 hours  NPO Liquid Status: > 8 hours           Airway   Mallampati: III  TM distance: >3 FB  Neck ROM: full  No difficulty expected  Dental      Pulmonary - normal exam   (+) COPD mild,   Cardiovascular - normal exam    (+) hypertension well controlled, CHF,       Neuro/Psych  GI/Hepatic/Renal/Endo - negative ROS     Musculoskeletal (-) negative ROS    Abdominal  - normal exam   Substance History - negative use     OB/GYN negative ob/gyn ROS         Other - negative ROS                       Anesthesia Plan    ASA 4     MAC     intravenous induction   Anesthetic plan, all risks, benefits, and alternatives have been provided, discussed and informed consent has been obtained with: patient.

## 2019-06-29 LAB
ANION GAP SERPL CALCULATED.3IONS-SCNC: 14.5 MMOL/L (ref 10–20)
BUN BLD-MCNC: 20 MG/DL (ref 8–20)
BUN/CREAT SERPL: 16.7 (ref 5.4–26.2)
CALCIUM SPEC-SCNC: 9.1 MG/DL (ref 8.9–10.3)
CHLORIDE SERPL-SCNC: 101 MMOL/L (ref 101–111)
CO2 SERPL-SCNC: 22 MMOL/L (ref 22–32)
CREAT BLD-MCNC: 1.2 MG/DL (ref 0.4–1)
GFR SERPL CREATININE-BSD FRML MDRD: 59 ML/MIN/1.73
GLUCOSE BLD-MCNC: 155 MG/DL (ref 65–99)
GLUCOSE BLDC GLUCOMTR-MCNC: 103 MG/DL (ref 70–105)
GLUCOSE BLDC GLUCOMTR-MCNC: 108 MG/DL (ref 70–105)
GLUCOSE BLDC GLUCOMTR-MCNC: 134 MG/DL (ref 70–105)
GLUCOSE BLDC GLUCOMTR-MCNC: 97 MG/DL (ref 70–105)
MAGNESIUM SERPL-MCNC: 2 MG/DL (ref 1.8–2.5)
PHOSPHATE SERPL-MCNC: 3.9 MG/DL (ref 2.4–4.7)
POTASSIUM BLD-SCNC: 3.5 MMOL/L (ref 3.6–5.1)
SODIUM BLD-SCNC: 134 MMOL/L (ref 136–144)

## 2019-06-29 PROCEDURE — 84100 ASSAY OF PHOSPHORUS: CPT | Performed by: INTERNAL MEDICINE

## 2019-06-29 PROCEDURE — 99233 SBSQ HOSP IP/OBS HIGH 50: CPT | Performed by: INTERNAL MEDICINE

## 2019-06-29 PROCEDURE — 80048 BASIC METABOLIC PNL TOTAL CA: CPT | Performed by: INTERNAL MEDICINE

## 2019-06-29 PROCEDURE — 83735 ASSAY OF MAGNESIUM: CPT | Performed by: INTERNAL MEDICINE

## 2019-06-29 PROCEDURE — 25010000002 MORPHINE PER 10 MG: Performed by: INTERNAL MEDICINE

## 2019-06-29 PROCEDURE — 25010000002 VANCOMYCIN 1 G RECONSTITUTED SOLUTION 1 EACH VIAL: Performed by: INTERNAL MEDICINE

## 2019-06-29 PROCEDURE — 82962 GLUCOSE BLOOD TEST: CPT

## 2019-06-29 PROCEDURE — 99232 SBSQ HOSP IP/OBS MODERATE 35: CPT | Performed by: INTERNAL MEDICINE

## 2019-06-29 RX ORDER — OXYCODONE HYDROCHLORIDE 5 MG/1
7.5 TABLET ORAL EVERY 6 HOURS PRN
Status: DISCONTINUED | OUTPATIENT
Start: 2019-06-29 | End: 2019-06-29

## 2019-06-29 RX ORDER — OXYCODONE HYDROCHLORIDE 5 MG/1
5 TABLET ORAL EVERY 6 HOURS PRN
Status: DISCONTINUED | OUTPATIENT
Start: 2019-06-29 | End: 2019-06-30 | Stop reason: HOSPADM

## 2019-06-29 RX ORDER — POTASSIUM CHLORIDE 20 MEQ/1
40 TABLET, EXTENDED RELEASE ORAL ONCE
Status: COMPLETED | OUTPATIENT
Start: 2019-06-29 | End: 2019-06-29

## 2019-06-29 RX ADMIN — POTASSIUM CHLORIDE 40 MEQ: 1500 TABLET, EXTENDED RELEASE ORAL at 10:15

## 2019-06-29 RX ADMIN — CARVEDILOL 6.25 MG: 6.25 TABLET, FILM COATED ORAL at 07:58

## 2019-06-29 RX ADMIN — SPIRONOLACTONE 25 MG: 25 TABLET ORAL at 07:57

## 2019-06-29 RX ADMIN — DOCUSATE SODIUM 100 MG: 100 CAPSULE, LIQUID FILLED ORAL at 07:58

## 2019-06-29 RX ADMIN — OXYCODONE HYDROCHLORIDE 7.5 MG: 5 TABLET ORAL at 05:08

## 2019-06-29 RX ADMIN — MORPHINE SULFATE 1 MG: 4 INJECTION INTRAVENOUS at 07:59

## 2019-06-29 RX ADMIN — AMLODIPINE BESYLATE 5 MG: 5 TABLET ORAL at 07:58

## 2019-06-29 RX ADMIN — FAMOTIDINE 20 MG: 20 TABLET, FILM COATED ORAL at 07:57

## 2019-06-29 RX ADMIN — MELATONIN 1000 UNITS: at 07:58

## 2019-06-29 RX ADMIN — MORPHINE SULFATE 1 MG: 4 INJECTION INTRAVENOUS at 00:57

## 2019-06-29 RX ADMIN — LISINOPRIL 20 MG: 20 TABLET ORAL at 07:58

## 2019-06-29 RX ADMIN — FLUTICASONE PROPIONATE 2 SPRAY: 50 SPRAY, METERED NASAL at 10:20

## 2019-06-29 RX ADMIN — SODIUM CHLORIDE 1000 MG: 900 INJECTION, SOLUTION INTRAVENOUS at 05:41

## 2019-06-29 RX ADMIN — BUMETANIDE 2 MG: 1 TABLET ORAL at 07:56

## 2019-06-29 RX ADMIN — ACETAMINOPHEN 650 MG: 325 TABLET, FILM COATED ORAL at 10:15

## 2019-06-29 RX ADMIN — ACETAMINOPHEN 650 MG: 325 TABLET, FILM COATED ORAL at 18:10

## 2019-06-29 RX ADMIN — FOLIC ACID 1 MG: 1 TABLET ORAL at 07:56

## 2019-06-29 RX ADMIN — OXYCODONE HYDROCHLORIDE 5 MG: 5 TABLET ORAL at 20:39

## 2019-06-29 RX ADMIN — OXYCODONE HYDROCHLORIDE 5 MG: 5 TABLET ORAL at 14:20

## 2019-06-29 RX ADMIN — LEVOTHYROXINE SODIUM 200 MCG: 100 TABLET ORAL at 05:41

## 2019-06-29 RX ADMIN — FERROUS SULFATE TAB EC 324 MG (65 MG FE EQUIVALENT) 324 MG: 324 (65 FE) TABLET DELAYED RESPONSE at 07:59

## 2019-06-29 RX ADMIN — ATORVASTATIN CALCIUM 40 MG: 40 TABLET, FILM COATED ORAL at 20:39

## 2019-06-29 NOTE — ANESTHESIA POSTPROCEDURE EVALUATION
Patient: Rafael Mccann    Procedure Summary     Date:  06/28/19 Room / Location:  [facility] CATH LAB 3 / [facility] CATH INVASIVE LOCATION    Anesthesia Start:  1635 Anesthesia Stop:  1817    Procedure:  Dual-chamber ICD insertion (Left ) Diagnosis:       Cardiomyopathy, dilated (CMS/HCC)      Acute on chronic systolic congestive heart failure (CMS/HCC)      Essential hypertension      (STATUS post dual-chamber ICD implantation without complications)    Provider:  Héctor Eckert MD Provider:  Boone Stone MD    Anesthesia Type:  MAC ASA Status:  4          Anesthesia Type: MAC  Last vitals  BP   127/82 (06/28/19 1945)   Temp   97.6 °F (36.4 °C) (06/28/19 1155)   Pulse   78 (06/28/19 1945)   Resp   18 (06/28/19 1155)     SpO2   94 % (06/28/19 1915)     Post Anesthesia Care and Evaluation    Patient location during evaluation: PACU  Patient participation: complete - patient participated  Level of consciousness: awake  Pain scale: See nurse's notes for pain score.  Pain management: adequate  Airway patency: patent  Anesthetic complications: No anesthetic complications  PONV Status: none  Cardiovascular status: acceptable  Respiratory status: acceptable  Hydration status: acceptable    Comments: Patient seen and examined postoperatively; vital signs stable; SpO2 greater than or equal to 90%; cardiopulmonary status stable; nausea/vomiting adequately controlled; pain adequately controlled; no apparent anesthesia complications; patient discharged from anesthesia care when discharge criteria were met

## 2019-06-30 VITALS
WEIGHT: 182.98 LBS | RESPIRATION RATE: 16 BRPM | DIASTOLIC BLOOD PRESSURE: 79 MMHG | BODY MASS INDEX: 28.72 KG/M2 | HEIGHT: 67 IN | HEART RATE: 74 BPM | SYSTOLIC BLOOD PRESSURE: 124 MMHG | OXYGEN SATURATION: 97 % | TEMPERATURE: 97.2 F

## 2019-06-30 PROBLEM — I50.9 CHF EXACERBATION: Status: RESOLVED | Noted: 2019-06-22 | Resolved: 2019-06-30

## 2019-06-30 PROBLEM — N18.9 CHRONIC RENAL IMPAIRMENT: Status: ACTIVE | Noted: 2019-06-30

## 2019-06-30 PROBLEM — I50.23 ACUTE ON CHRONIC SYSTOLIC CONGESTIVE HEART FAILURE (HCC): Status: RESOLVED | Noted: 2019-06-22 | Resolved: 2019-06-30

## 2019-06-30 LAB
GLUCOSE BLDC GLUCOMTR-MCNC: 73 MG/DL (ref 70–105)
GLUCOSE BLDC GLUCOMTR-MCNC: 97 MG/DL (ref 70–105)
MAGNESIUM SERPL-MCNC: 1.8 MG/DL (ref 1.8–2.5)
PHOSPHATE SERPL-MCNC: 4.4 MG/DL (ref 2.4–4.7)

## 2019-06-30 PROCEDURE — 82962 GLUCOSE BLOOD TEST: CPT

## 2019-06-30 PROCEDURE — 99238 HOSP IP/OBS DSCHRG MGMT 30/<: CPT | Performed by: INTERNAL MEDICINE

## 2019-06-30 PROCEDURE — 83735 ASSAY OF MAGNESIUM: CPT | Performed by: INTERNAL MEDICINE

## 2019-06-30 PROCEDURE — 99232 SBSQ HOSP IP/OBS MODERATE 35: CPT | Performed by: INTERNAL MEDICINE

## 2019-06-30 PROCEDURE — 84100 ASSAY OF PHOSPHORUS: CPT | Performed by: INTERNAL MEDICINE

## 2019-06-30 RX ORDER — CARVEDILOL 12.5 MG/1
12.5 TABLET ORAL 2 TIMES DAILY WITH MEALS
Qty: 60 TABLET | Refills: 0 | Status: SHIPPED | OUTPATIENT
Start: 2019-06-30 | End: 2020-03-06

## 2019-06-30 RX ORDER — ATORVASTATIN CALCIUM 40 MG/1
40 TABLET, FILM COATED ORAL NIGHTLY
Qty: 30 TABLET | Refills: 0 | Status: SHIPPED | OUTPATIENT
Start: 2019-06-30 | End: 2020-05-16

## 2019-06-30 RX ORDER — CARVEDILOL 6.25 MG/1
12.5 TABLET ORAL 2 TIMES DAILY WITH MEALS
Status: DISCONTINUED | OUTPATIENT
Start: 2019-06-30 | End: 2019-06-30 | Stop reason: HOSPADM

## 2019-06-30 RX ORDER — ACETAMINOPHEN 325 MG/1
650 TABLET ORAL EVERY 4 HOURS PRN
Start: 2019-06-30 | End: 2019-08-13

## 2019-06-30 RX ORDER — POTASSIUM CHLORIDE 20 MEQ/1
40 TABLET, EXTENDED RELEASE ORAL ONCE
Status: COMPLETED | OUTPATIENT
Start: 2019-06-30 | End: 2019-06-30

## 2019-06-30 RX ADMIN — ACETAMINOPHEN 650 MG: 325 TABLET, FILM COATED ORAL at 08:14

## 2019-06-30 RX ADMIN — MELATONIN 1000 UNITS: at 08:15

## 2019-06-30 RX ADMIN — FLUTICASONE PROPIONATE 2 SPRAY: 50 SPRAY, METERED NASAL at 08:19

## 2019-06-30 RX ADMIN — LISINOPRIL 20 MG: 20 TABLET ORAL at 08:16

## 2019-06-30 RX ADMIN — SODIUM CHLORIDE 50 ML/HR: 900 INJECTION, SOLUTION INTRAVENOUS at 02:22

## 2019-06-30 RX ADMIN — POTASSIUM CHLORIDE 40 MEQ: 1500 TABLET, EXTENDED RELEASE ORAL at 11:19

## 2019-06-30 RX ADMIN — OXYCODONE HYDROCHLORIDE 5 MG: 5 TABLET ORAL at 11:19

## 2019-06-30 RX ADMIN — DOCUSATE SODIUM 100 MG: 100 CAPSULE, LIQUID FILLED ORAL at 08:15

## 2019-06-30 RX ADMIN — BUMETANIDE 2 MG: 1 TABLET ORAL at 08:15

## 2019-06-30 RX ADMIN — LEVOTHYROXINE SODIUM 200 MCG: 100 TABLET ORAL at 05:46

## 2019-06-30 RX ADMIN — OXYCODONE HYDROCHLORIDE 5 MG: 5 TABLET ORAL at 03:32

## 2019-06-30 RX ADMIN — Medication 3 ML: at 08:19

## 2019-06-30 RX ADMIN — HYDRALAZINE HYDROCHLORIDE 50 MG: 25 TABLET, FILM COATED ORAL at 08:16

## 2019-06-30 RX ADMIN — FERROUS SULFATE TAB EC 324 MG (65 MG FE EQUIVALENT) 324 MG: 324 (65 FE) TABLET DELAYED RESPONSE at 08:15

## 2019-06-30 RX ADMIN — FOLIC ACID 1 MG: 1 TABLET ORAL at 08:16

## 2019-06-30 RX ADMIN — CARVEDILOL 6.25 MG: 6.25 TABLET, FILM COATED ORAL at 08:16

## 2019-06-30 RX ADMIN — SPIRONOLACTONE 25 MG: 25 TABLET ORAL at 08:15

## 2019-06-30 RX ADMIN — FAMOTIDINE 40 MG: 20 TABLET, FILM COATED ORAL at 08:16

## 2019-07-01 ENCOUNTER — READMISSION MANAGEMENT (OUTPATIENT)
Dept: CALL CENTER | Facility: HOSPITAL | Age: 44
End: 2019-07-01

## 2019-07-02 ENCOUNTER — READMISSION MANAGEMENT (OUTPATIENT)
Dept: CALL CENTER | Facility: HOSPITAL | Age: 44
End: 2019-07-02

## 2019-07-02 NOTE — OUTREACH NOTE
Prep Survey      Responses   Facility patient discharged from?  [facility]   Is patient eligible?  Yes   Discharge diagnosis  A/C CHF,  AICD placed   Does the patient have one of the following disease processes/diagnoses(primary or secondary)?  CHF   Does the patient have Home health ordered?  No   Is there a DME ordered?  No   Prep survey completed?  Yes          Gilma Gaytan RN

## 2019-07-09 ENCOUNTER — READMISSION MANAGEMENT (OUTPATIENT)
Dept: CALL CENTER | Facility: HOSPITAL | Age: 44
End: 2019-07-09

## 2019-07-09 NOTE — OUTREACH NOTE
CHF Week 2 Survey      Responses   Facility patient discharged from?  [facility]   Does the patient have one of the following disease processes/diagnoses(primary or secondary)?  CHF   Week 2 attempt successful?  Yes   Call start time  1056   Call end time  1104   Discharge diagnosis  A/C CHF,  AICD placed   Is patient permission given to speak with other caregiver?  No   Meds reviewed with patient/caregiver?  Yes   Is the patient having any side effects they believe may be caused by any medication additions or changes?  No   Does the patient have all medications ordered at discharge?  Yes   Is the patient taking all medications as directed (includes completed medication regime)?  Yes   Does the patient have a primary care provider?   Yes   Does the patient have an appointment with their PCP within 7 days of discharge?  Greater than 7 days   Comments regarding PCP  Phani Adames MD . Patient seeing PCP tomorrow.    What is preventing the patient from scheduling follow up appointments within 7 days of discharge?  Earlier appointment not available   Nursing Interventions  Verified appointment date/time/provider   Has the patient kept scheduled appointments due by today?  N/A   Comments  Follow Up with Wayne Luna MD , Thursday Aug 8, 2019 3:15 PM    Has home health visited the patient within 72 hours of discharge?  N/A   Psychosocial issues?  No   Did the patient receive a copy of their discharge instructions?  Yes   Nursing interventions  Reviewed instructions with patient   What is the patient's perception of their health status since discharge?  Improving   Nursing interventions  Nurse provided patient education   Is the patient weighing daily?  Yes   Does the patient have scales?  Yes   Daily weight interventions  Education provided on importance of daily weight   Is the patient able to teach back Heart Failure diet management?  Yes   Is the patient able to teach back Heart Failure Zones?  Yes   Is the  patient able to teach back signs and symptoms of worsening condition? (i.e. weight gain, shortness of air, etc.)  Yes   Is the patient/caregiver able to teach back the hierarchy of who to call/visit for symptoms/problems? PCP, Specialist, Home health nurse, Urgent Care, ED, 911  Yes   CHF Week 2 call completed?  Yes          Dina Estes RN

## 2019-07-16 ENCOUNTER — READMISSION MANAGEMENT (OUTPATIENT)
Dept: CALL CENTER | Facility: HOSPITAL | Age: 44
End: 2019-07-16

## 2019-07-16 NOTE — OUTREACH NOTE
CHF Week 3 Survey      Responses   Facility patient discharged from?  [facility]   Does the patient have one of the following disease processes/diagnoses(primary or secondary)?  CHF   Week 3 attempt successful?  No   Unsuccessful attempts  Attempt 1          Dina Estes RN

## 2019-07-17 ENCOUNTER — READMISSION MANAGEMENT (OUTPATIENT)
Dept: CALL CENTER | Facility: HOSPITAL | Age: 44
End: 2019-07-17

## 2019-07-17 NOTE — OUTREACH NOTE
CHF Week 3 Survey      Responses   Facility patient discharged from?  [facility]   Does the patient have one of the following disease processes/diagnoses(primary or secondary)?  CHF   Week 3 attempt successful?  Yes   Call start time  0900   Call end time  0904   Discharge diagnosis  A/C CHF,  AICD placed   Meds reviewed with patient/caregiver?  Yes   Is the patient having any side effects they believe may be caused by any medication additions or changes?  No   Does the patient have all medications ordered at discharge?  Yes   Is the patient taking all medications as directed (includes completed medication regime)?  Yes   Does the patient have a primary care provider?   Yes   Does the patient have an appointment with their PCP within 7 days of discharge?  Greater than 7 days   What is preventing the patient from scheduling follow up appointments within 7 days of discharge?  Earlier appointment not available [PATIENT HAS ALREADY SEEN DR. JACQUES AT THIS POINT]   Has the patient kept scheduled appointments due by today?  N/A   Comments  PT GOING IN FOR A PACEMAKER CHECK AT DR. READ'S OFFICE TOMORROW   Has home health visited the patient within 72 hours of discharge?  N/A   Did the patient receive a copy of their discharge instructions?  Yes   Nursing interventions  Reviewed instructions with patient   What is the patient's perception of their health status since discharge?  Improving   Is the patient weighing daily?  Yes   Does the patient have scales?  Yes   Daily weight interventions  Education provided on importance of daily weight   Is the patient able to teach back Heart Failure diet management?  Yes   Is the patient able to teach back Heart Failure Zones?  Yes   Is the patient able to teach back signs and symptoms of worsening condition? (i.e. weight gain, shortness of air, etc.)  Yes   Is the patient/caregiver able to teach back the hierarchy of who to call/visit for symptoms/problems? PCP, Specialist, Home health  nurse, Urgent Care, ED, 911  Yes   CHF Week 3 call completed?  Yes   Revoked  No further contact(revokes)-requires comment          Jacquelyn Varela LPN

## 2019-07-18 ENCOUNTER — ANCILLARY ORDERS (OUTPATIENT)
Dept: CARDIOLOGY | Facility: CLINIC | Age: 44
End: 2019-07-18

## 2019-07-18 ENCOUNTER — CLINICAL SUPPORT NO REQUIREMENTS (OUTPATIENT)
Dept: CARDIOLOGY | Facility: CLINIC | Age: 44
End: 2019-07-18

## 2019-07-18 VITALS
RESPIRATION RATE: 18 BRPM | OXYGEN SATURATION: 98 % | DIASTOLIC BLOOD PRESSURE: 76 MMHG | HEART RATE: 78 BPM | SYSTOLIC BLOOD PRESSURE: 110 MMHG

## 2019-07-18 DIAGNOSIS — Z95.810 PRESENCE OF AUTOMATIC CARDIOVERTER/DEFIBRILLATOR (AICD): Primary | ICD-10-CM

## 2019-07-18 DIAGNOSIS — T82.110A AICD LEAD MALFUNCTION: Primary | ICD-10-CM

## 2019-07-18 DIAGNOSIS — Z95.810 PRESENCE OF AUTOMATIC CARDIOVERTER/DEFIBRILLATOR (AICD): ICD-10-CM

## 2019-07-18 PROCEDURE — 93283 PRGRMG EVAL IMPLANTABLE DFB: CPT | Performed by: INTERNAL MEDICINE

## 2019-07-18 NOTE — PROGRESS NOTES
Patient here for incision check s/p   Biotronik dual chamber ICD implanted on 06/28/19.  Incision intact and healing well.  No signs or symptoms of infection noted.  Pt made aware that she can shower with incision uncovered.  Pt made aware that for the first month after implant she can't reach; push; pull or lift anything greater than 5 lbs with her left arm.  Pt is transmitting on home monitor.  Pt verbalized understanding.  Rep to interrogate device.    Per device rep; patient has atrial lead stimulation.  Device rep discussed interrogation with Dr. Eckert on the phone.  Pt to see Dr. Eckert on Monday 07/22/19.  Pt verbalized understanding.

## 2019-07-19 ENCOUNTER — HOSPITAL ENCOUNTER (OUTPATIENT)
Dept: GENERAL RADIOLOGY | Facility: HOSPITAL | Age: 44
Discharge: HOME | End: 2019-07-19
Admitting: INTERNAL MEDICINE

## 2019-07-19 DIAGNOSIS — Z95.810 PRESENCE OF AUTOMATIC CARDIOVERTER/DEFIBRILLATOR (AICD): ICD-10-CM

## 2019-07-19 DIAGNOSIS — T82.110A AICD LEAD MALFUNCTION: ICD-10-CM

## 2019-07-19 PROCEDURE — 71046 X-RAY EXAM CHEST 2 VIEWS: CPT

## 2019-07-20 ENCOUNTER — APPOINTMENT (OUTPATIENT)
Dept: GENERAL RADIOLOGY | Facility: HOSPITAL | Age: 44
End: 2019-07-20

## 2019-07-20 ENCOUNTER — HOSPITAL ENCOUNTER (EMERGENCY)
Facility: HOSPITAL | Age: 44
Discharge: HOME | End: 2019-07-20
Attending: EMERGENCY MEDICINE | Admitting: EMERGENCY MEDICINE

## 2019-07-20 VITALS
RESPIRATION RATE: 16 BRPM | TEMPERATURE: 98.6 F | BODY MASS INDEX: 25.76 KG/M2 | OXYGEN SATURATION: 96 % | WEIGHT: 170 LBS | SYSTOLIC BLOOD PRESSURE: 134 MMHG | DIASTOLIC BLOOD PRESSURE: 76 MMHG | HEIGHT: 68 IN | HEART RATE: 88 BPM

## 2019-07-20 DIAGNOSIS — E87.6 HYPOKALEMIA: ICD-10-CM

## 2019-07-20 DIAGNOSIS — R53.1 WEAKNESS: Primary | ICD-10-CM

## 2019-07-20 DIAGNOSIS — M79.10 MYALGIA: ICD-10-CM

## 2019-07-20 LAB
ANION GAP SERPL CALCULATED.3IONS-SCNC: 17.9 MMOL/L (ref 5–15)
BASOPHILS # BLD AUTO: 0 10*3/MM3 (ref 0–0.2)
BASOPHILS NFR BLD AUTO: 0.4 % (ref 0–1.5)
BUN BLD-MCNC: 18 MG/DL (ref 8–20)
BUN/CREAT SERPL: 10 (ref 5.4–26.2)
CALCIUM SPEC-SCNC: 9.2 MG/DL (ref 8.9–10.3)
CHLORIDE SERPL-SCNC: 96 MMOL/L (ref 101–111)
CO2 SERPL-SCNC: 23 MMOL/L (ref 22–32)
CREAT BLD-MCNC: 1.8 MG/DL (ref 0.4–1)
DEPRECATED RDW RBC AUTO: 45.9 FL (ref 37–54)
EOSINOPHIL # BLD AUTO: 0 10*3/MM3 (ref 0–0.4)
EOSINOPHIL NFR BLD AUTO: 0.1 % (ref 0.3–6.2)
ERYTHROCYTE [DISTWIDTH] IN BLOOD BY AUTOMATED COUNT: 14.3 % (ref 12.3–15.4)
GFR SERPL CREATININE-BSD FRML MDRD: 37 ML/MIN/1.73
GLUCOSE BLD-MCNC: 178 MG/DL (ref 65–99)
HCT VFR BLD AUTO: 49.7 % (ref 34–46.6)
HGB BLD-MCNC: 16.5 G/DL (ref 12–15.9)
LYMPHOCYTES # BLD AUTO: 1.6 10*3/MM3 (ref 0.7–3.1)
LYMPHOCYTES NFR BLD AUTO: 13.3 % (ref 19.6–45.3)
MCH RBC QN AUTO: 30.3 PG (ref 26.6–33)
MCHC RBC AUTO-ENTMCNC: 33.1 G/DL (ref 31.5–35.7)
MCV RBC AUTO: 91.5 FL (ref 79–97)
MONOCYTES # BLD AUTO: 0.8 10*3/MM3 (ref 0.1–0.9)
MONOCYTES NFR BLD AUTO: 6.4 % (ref 5–12)
NEUTROPHILS # BLD AUTO: 9.8 10*3/MM3 (ref 1.7–7)
NEUTROPHILS NFR BLD AUTO: 79.8 % (ref 42.7–76)
NRBC BLD AUTO-RTO: 0 /100 WBC (ref 0–0.2)
PLATELET # BLD AUTO: 218 10*3/MM3 (ref 140–450)
PMV BLD AUTO: 7.9 FL (ref 6–12)
POTASSIUM BLD-SCNC: 2.9 MMOL/L (ref 3.6–5.1)
RBC # BLD AUTO: 5.43 10*6/MM3 (ref 3.77–5.28)
SODIUM BLD-SCNC: 134 MMOL/L (ref 136–144)
WBC NRBC COR # BLD: 12.3 10*3/MM3 (ref 3.4–10.8)

## 2019-07-20 PROCEDURE — 85025 COMPLETE CBC W/AUTO DIFF WBC: CPT | Performed by: EMERGENCY MEDICINE

## 2019-07-20 PROCEDURE — 99284 EMERGENCY DEPT VISIT MOD MDM: CPT

## 2019-07-20 PROCEDURE — 93005 ELECTROCARDIOGRAM TRACING: CPT | Performed by: EMERGENCY MEDICINE

## 2019-07-20 PROCEDURE — 25010000002 ONDANSETRON PER 1 MG: Performed by: EMERGENCY MEDICINE

## 2019-07-20 PROCEDURE — 96375 TX/PRO/DX INJ NEW DRUG ADDON: CPT

## 2019-07-20 PROCEDURE — 71045 X-RAY EXAM CHEST 1 VIEW: CPT

## 2019-07-20 PROCEDURE — 96374 THER/PROPH/DIAG INJ IV PUSH: CPT

## 2019-07-20 PROCEDURE — 80048 BASIC METABOLIC PNL TOTAL CA: CPT | Performed by: EMERGENCY MEDICINE

## 2019-07-20 PROCEDURE — 25010000002 MORPHINE PER 10 MG: Performed by: EMERGENCY MEDICINE

## 2019-07-20 RX ORDER — SODIUM CHLORIDE 0.9 % (FLUSH) 0.9 %
10 SYRINGE (ML) INJECTION AS NEEDED
Status: DISCONTINUED | OUTPATIENT
Start: 2019-07-20 | End: 2019-07-20 | Stop reason: HOSPADM

## 2019-07-20 RX ORDER — POTASSIUM CHLORIDE 750 MG/1
10 TABLET, FILM COATED, EXTENDED RELEASE ORAL DAILY
Qty: 5 TABLET | Refills: 0 | Status: SHIPPED | OUTPATIENT
Start: 2019-07-20 | End: 2019-10-10 | Stop reason: SDUPTHER

## 2019-07-20 RX ORDER — ONDANSETRON 2 MG/ML
4 INJECTION INTRAMUSCULAR; INTRAVENOUS ONCE
Status: COMPLETED | OUTPATIENT
Start: 2019-07-20 | End: 2019-07-20

## 2019-07-20 RX ORDER — MORPHINE SULFATE 4 MG/ML
2 INJECTION, SOLUTION INTRAMUSCULAR; INTRAVENOUS ONCE
Status: COMPLETED | OUTPATIENT
Start: 2019-07-20 | End: 2019-07-20

## 2019-07-20 RX ADMIN — ONDANSETRON 4 MG: 2 INJECTION INTRAMUSCULAR; INTRAVENOUS at 13:17

## 2019-07-20 RX ADMIN — MORPHINE SULFATE 2 MG: 4 INJECTION INTRAVENOUS at 13:17

## 2019-07-22 ENCOUNTER — PREP FOR SURGERY (OUTPATIENT)
Dept: OTHER | Facility: HOSPITAL | Age: 44
End: 2019-07-22

## 2019-07-22 ENCOUNTER — OFFICE VISIT (OUTPATIENT)
Dept: CARDIOLOGY | Facility: CLINIC | Age: 44
End: 2019-07-22

## 2019-07-22 ENCOUNTER — HOSPITAL ENCOUNTER (OUTPATIENT)
Facility: HOSPITAL | Age: 44
Setting detail: HOSPITAL OUTPATIENT SURGERY
End: 2019-07-22
Attending: INTERNAL MEDICINE | Admitting: INTERNAL MEDICINE

## 2019-07-22 VITALS
WEIGHT: 170 LBS | RESPIRATION RATE: 18 BRPM | BODY MASS INDEX: 25.76 KG/M2 | SYSTOLIC BLOOD PRESSURE: 94 MMHG | HEIGHT: 68 IN | OXYGEN SATURATION: 98 % | HEART RATE: 79 BPM | DIASTOLIC BLOOD PRESSURE: 70 MMHG

## 2019-07-22 DIAGNOSIS — T82.120A DISPLACEMENT OF ATRIAL PACEMAKER LEADS, INITIAL ENCOUNTER: ICD-10-CM

## 2019-07-22 DIAGNOSIS — Z95.810 ICD (IMPLANTABLE CARDIOVERTER-DEFIBRILLATOR), DUAL, IN SITU: ICD-10-CM

## 2019-07-22 DIAGNOSIS — I42.0 CARDIOMYOPATHY, DILATED (HCC): ICD-10-CM

## 2019-07-22 DIAGNOSIS — I42.0 DILATED CARDIOMYOPATHY (HCC): Primary | ICD-10-CM

## 2019-07-22 DIAGNOSIS — R00.2 PALPITATIONS: ICD-10-CM

## 2019-07-22 DIAGNOSIS — I50.22 CHRONIC SYSTOLIC CONGESTIVE HEART FAILURE (HCC): ICD-10-CM

## 2019-07-22 DIAGNOSIS — Z95.810 ICD (IMPLANTABLE CARDIOVERTER-DEFIBRILLATOR), DUAL, IN SITU: Primary | ICD-10-CM

## 2019-07-22 PROCEDURE — 99024 POSTOP FOLLOW-UP VISIT: CPT | Performed by: INTERNAL MEDICINE

## 2019-07-22 PROCEDURE — 93000 ELECTROCARDIOGRAM COMPLETE: CPT | Performed by: INTERNAL MEDICINE

## 2019-07-22 RX ORDER — SODIUM CHLORIDE 0.9 % (FLUSH) 0.9 %
3-10 SYRINGE (ML) INJECTION AS NEEDED
Status: CANCELLED | OUTPATIENT
Start: 2019-07-22

## 2019-07-22 RX ORDER — SODIUM CHLORIDE 0.9 % (FLUSH) 0.9 %
3 SYRINGE (ML) INJECTION EVERY 12 HOURS SCHEDULED
Status: CANCELLED | OUTPATIENT
Start: 2019-07-22

## 2019-07-22 RX ORDER — FUROSEMIDE 40 MG/1
40 TABLET ORAL 2 TIMES DAILY
COMMUNITY
End: 2020-01-08 | Stop reason: HOSPADM

## 2019-07-22 NOTE — PROGRESS NOTES
Chief complaint--diaphragmatic stimulation and orthostatic hypotension symptoms      Subject--44-year-old female patient has dilated cardiomyopathy which was persistent with moderate mitral regurgitation and underwent a dual-chamber ICD implantation for primary prevention with underlying sinus bradycardia--she came for ICD check and complaint of phrenic nerve stimulation--ICD reprogrammed to the VDD mode with resolution of her symptoms and chest x-ray was done which showed atrial lead proximal migration--she went to the ER because of orthostatic symptoms and hypokalemia and patient is taking both Bumex and Lasix and complains of significant tiredness and fatigue without any edema  Complains of ongoing class III dyspnea with fatigue--somewhat better     Medical History        Past Medical History:   Diagnosis Date   • CHF (congestive heart failure) (CMS/HCC)     • COPD (chronic obstructive pulmonary disease) (CMS/[facility])     • Hypertension           Surgical History         Past Surgical History:   Procedure Laterality Date   • BREAST LUMPECTOMY       • CHOLECYSTECTOMY       • HYSTERECTOMY       • THYROID SURGERY             History reviewed. No pertinent family history.  Social History            Tobacco Use   • Smoking status: Former Smoker       Last attempt to quit: 2019       Years since quittin.4   • Smokeless tobacco: Never Used   Substance Use Topics   • Alcohol use: No       Frequency: Never   • Drug use: No    Allergies:  Hydrocodone and Penicillin g     Review of Systems   General:  positive for fatigue and tiredness  Eyes: No redness  Cardiovascular: No chest pain, no palpitations  Respiratory:   positive for class 3 shortness of breath  Gastrointestinal: No nausea or vomiting, bleeding  Genitourinary: no hematuria or dysuria  Musculoskeletal: No arthralgia or myalgia  Skin: No rash  Neurologic: No numbness, tingling, syncope  Hematologic/Lymphatic: No abnormal bleeding      Medications and allergies  reviewed    EKG shows sinus rhythm with diffuse nonspecific ST-T wave changes with a heart rate of 70 without any significant changes compared to prior ECG and indication for ECG includes dilated cardiomyopathy and heart failure        Physical Exam  VITALS REVIEWED--blood pressure is 98/60, pulse  64 bpm patient is afebrile within 14 times a minute     General:      well developed, well nourished, in no acute distress.    Head:      normocephalic and atraumatic.    Eyes:      PERRL/EOM intact, conjunctiva and sclera clear with out nystagmus.    Neck:      no masses, thyromegaly,  trachea central with normal respiratory effort and PMI displaced laterally  Lungs:      clear bilaterally to auscultation.    Heart:       Sinus rhythm  , soft systolic murmur without any rub   Msk:      no deformity or scoliosis noted of thoracic or lumbar spine.    Pulses:      pulses normal in all 4 extremities.    Extremities:       no cyanosis or clubbing--trace left pedal edema and trace right pedal edema.    Neurologic:      no focal deficits.   alert oriented x3  Skin:      intact without lesions or rashes.    Psych:      alert and cooperative; normal mood and affect; normal attention span and concentration.       ICD site is clean         Assessment plan     Recurrent class III systolic heart failure with severe LV dysfunction despite adequate medical treatment with EF 30%  Moderate mitral regurgitation and mild to moderate aortic regurgitation  Hypertensive heart disease  Chronic kidney disease and hypokalemia  Diabetes  Bradycardia SINUS  Post dual-chamber ICD insertion --phrenic nerve stimulation from proximal migration of atrial pacing lead resolved after programming to VDD mode --patient to undergo atrial lead revision and risks and benefits and outcomes educated   Orthostatic hypotension with hypokalemia from polypharmacy and duplicate using of diuretics--stop Bumex  Reduce Lasix dose to once daily dosing  I will repeat the  labs including potassium and magnesium and renal functions prior to lead revision

## 2019-08-08 ENCOUNTER — OFFICE VISIT (OUTPATIENT)
Dept: CARDIOLOGY | Facility: CLINIC | Age: 44
End: 2019-08-08

## 2019-08-08 VITALS
DIASTOLIC BLOOD PRESSURE: 89 MMHG | OXYGEN SATURATION: 97 % | HEART RATE: 92 BPM | BODY MASS INDEX: 26.62 KG/M2 | WEIGHT: 173 LBS | SYSTOLIC BLOOD PRESSURE: 139 MMHG

## 2019-08-08 DIAGNOSIS — I34.0 NON-RHEUMATIC MITRAL REGURGITATION: ICD-10-CM

## 2019-08-08 DIAGNOSIS — I35.1 NONRHEUMATIC AORTIC VALVE INSUFFICIENCY: ICD-10-CM

## 2019-08-08 DIAGNOSIS — I42.0 CARDIOMYOPATHY, DILATED (HCC): Primary | ICD-10-CM

## 2019-08-08 DIAGNOSIS — I42.0 DILATED CARDIOMYOPATHY (HCC): ICD-10-CM

## 2019-08-08 DIAGNOSIS — I10 ESSENTIAL HYPERTENSION: ICD-10-CM

## 2019-08-08 PROCEDURE — 99214 OFFICE O/P EST MOD 30 MIN: CPT | Performed by: INTERNAL MEDICINE

## 2019-08-08 NOTE — PROGRESS NOTES
Subjective:     Encounter Date:08/08/2019      Patient ID: Rafael Mccann is a 44 y.o. female.    Chief Complaint:      Dear Dr. Adames,    It is my pleasure seeing patient Rafael Mccann in the office today    She was recently hospitalized at [facility] with the diagnosis of     Essential hypertension   Acute on chronic systolic heart failure  History of cardiomyopathy   Acute on chronic renal insufficiency  Coronary artery disease with diffuse RCA disease  History of diabetes mellitus type 2  History of thyroid disease  Uncontrolled hypertension  Moderate mitral regurgitation  Moderate aortic regurgitation  Worsening cardiomyopathy with reduced LV ejection fraction most recent echocardiogram with LV ejection fraction of 30 to 35%        Patient with a prior history of a known cardiomyopathy, history of coronary artery disease with diffuse stenosis involving the right coronary artery currently being medically managed  Last cardiac catheterization 6 months back and last cardiac evaluation 6 months back currently on medical therapy for congestive heart failure and coronary artery disease  Patient presented with symptoms of shortness of breath, chest pain, progressively worsening edema and swelling  Patient also noted to have uncontrolled hypertension  Need for compliance with medical therapy and close monitoring and follow-up discussed with the patient  Continue beta-blocker  Diuresis and close monitoring of renal function  Worsening cardiomyopathy with reduced LV ejection fraction most recent echocardiogram with LV ejection fraction of 30 to 35%  s/p AICD placement  She likely needs a revision of the atrial lead  Need for close monitoring and follow-up discussed with the patient  Further recommendations based on patient hospital course  Repeat echocardiogram 1 week before next visit to reassess the LV systolic function and also valvular regurgitation                 The following  portions of the patient's history were reviewed and updated as appropriate: allergies, current medications, past family history, past medical history, past social history, past surgical history and problem list.    Allergies   Allergen Reactions   • Hydrocodone Hives   • Penicillin G Unknown (See Comments)         Current Outpatient Medications:   •  acetaminophen (TYLENOL) 325 MG tablet, Take 2 tablets by mouth Every 4 (Four) Hours As Needed for Mild Pain ., Disp: , Rfl:   •  ALPRAZolam (XANAX) 1 MG tablet, Take 1 mg by mouth 3 (Three) Times a Day As Needed for Anxiety., Disp: , Rfl:   •  aspirin 81 MG chewable tablet, Chew 81 mg Daily., Disp: , Rfl:   •  atorvastatin (LIPITOR) 40 MG tablet, Take 1 tablet by mouth Every Night., Disp: 30 tablet, Rfl: 0  •  carvedilol (COREG) 12.5 MG tablet, Take 1 tablet by mouth 2 (Two) Times a Day With Meals., Disp: 60 tablet, Rfl: 0  •  cholecalciferol (VITAMIN D3) 1000 units tablet, Take 1,000 Units by mouth Daily., Disp: , Rfl:   •  ferrous sulfate 325 (65 FE) MG tablet, Take 325 mg by mouth Daily With Breakfast., Disp: , Rfl:   •  fluconazole (DIFLUCAN) 200 MG tablet, Take 200 mg by mouth Every Other Day., Disp: , Rfl:   •  fluticasone (FLONASE) 50 MCG/ACT nasal spray, 2 sprays into the nostril(s) as directed by provider Daily., Disp: , Rfl:   •  folic acid (FOLVITE) 1 MG tablet, Take 1 mg by mouth Daily., Disp: , Rfl:   •  furosemide (LASIX) 40 MG tablet, Take 40 mg by mouth 2 (Two) Times a Day., Disp: , Rfl:   •  hydrALAZINE (APRESOLINE) 50 MG tablet, Take 50 mg by mouth 3 (Three) Times a Day., Disp: , Rfl:   •  levothyroxine (SYNTHROID, LEVOTHROID) 200 MCG tablet, Take 200 mcg by mouth Daily., Disp: , Rfl:   •  lisinopril (PRINIVIL,ZESTRIL) 20 MG tablet, Take 20 mg by mouth Daily., Disp: , Rfl:   •  metFORMIN (GLUCOPHAGE) 500 MG tablet, Take 500 mg by mouth Daily With Breakfast., Disp: , Rfl:   •  oxyCODONE-acetaminophen (PERCOCET) 7.5-325 MG per tablet, Take 1 tablet by  mouth Every 8 (Eight) Hours As Needed., Disp: , Rfl:   •  potassium chloride (K-DUR) 10 MEQ CR tablet, Take 1 tablet by mouth Daily., Disp: 5 tablet, Rfl: 0    History reviewed. No pertinent family history.    Past Medical History:   Diagnosis Date   • CHF (congestive heart failure) (CMS/HCC)    • COPD (chronic obstructive pulmonary disease) (CMS/HCC)    • Hyperlipidemia    • Hypertension        Past Surgical History:   Procedure Laterality Date   • BREAST LUMPECTOMY     • CARDIAC ELECTROPHYSIOLOGY PROCEDURE Left 2019    Procedure: Dual-chamber ICD insertion;  Surgeon: Héctor Eckert MD;  Location: [facility] INVASIVE LOCATION;  Service: Cardiovascular   • CHOLECYSTECTOMY     • HYSTERECTOMY     • INSERT / REPLACE / REMOVE PACEMAKER     • THYROID SURGERY         Social History     Socioeconomic History   • Marital status:      Spouse name: Not on file   • Number of children: Not on file   • Years of education: Not on file   • Highest education level: Not on file   Tobacco Use   • Smoking status: Former Smoker     Last attempt to quit: 2019     Years since quittin.6   • Smokeless tobacco: Never Used   Substance and Sexual Activity   • Alcohol use: No     Frequency: Never   • Drug use: No   • Sexual activity: Defer       Review of Systems   Constitution: Negative for chills, decreased appetite and malaise/fatigue.   HENT: Negative for congestion.    Eyes: Negative for blurred vision and double vision.   Cardiovascular: Positive for dyspnea on exertion and paroxysmal nocturnal dyspnea. Negative for chest pain, irregular heartbeat, leg swelling, near-syncope, orthopnea, palpitations and syncope.   Respiratory: Negative for cough and shortness of breath.    Hematologic/Lymphatic: Negative for adenopathy. Does not bruise/bleed easily.   Skin: Negative for rash.   Gastrointestinal: Negative for bloating and abdominal pain.   Neurological: Negative for dizziness and focal weakness.             Objective:         Vitals:    08/08/19 1525   BP: 139/89   Pulse: 92   SpO2: 97%       Physical Exam   Constitutional: She is oriented to person, place, and time. She appears well-developed and well-nourished.   HENT:   Head: Normocephalic and atraumatic.   Eyes: Conjunctivae are normal. Pupils are equal, round, and reactive to light.   Neck: Normal range of motion. Neck supple. No thyromegaly present.   Cardiovascular: Normal rate, regular rhythm, S1 normal, S2 normal and intact distal pulses.   Pulmonary/Chest: Effort normal and breath sounds normal.   Abdominal: Soft. Bowel sounds are normal.   Musculoskeletal: She exhibits no edema.   Neurological: She is alert and oriented to person, place, and time.   Skin: Skin is warm.   Nursing note and vitals reviewed.

## 2019-08-13 ENCOUNTER — APPOINTMENT (OUTPATIENT)
Dept: GENERAL RADIOLOGY | Facility: HOSPITAL | Age: 44
End: 2019-08-13

## 2019-08-13 ENCOUNTER — ANESTHESIA EVENT (OUTPATIENT)
Dept: CARDIOLOGY | Facility: HOSPITAL | Age: 44
End: 2019-08-13

## 2019-08-13 ENCOUNTER — HOSPITAL ENCOUNTER (OUTPATIENT)
Dept: CARDIOLOGY | Facility: HOSPITAL | Age: 44
Setting detail: OBSERVATION
Discharge: HOME | End: 2019-08-13

## 2019-08-13 ENCOUNTER — HOSPITAL ENCOUNTER (OUTPATIENT)
Facility: HOSPITAL | Age: 44
Discharge: HOME | End: 2019-08-16
Attending: EMERGENCY MEDICINE | Admitting: HOSPITALIST

## 2019-08-13 VITALS
DIASTOLIC BLOOD PRESSURE: 60 MMHG | SYSTOLIC BLOOD PRESSURE: 112 MMHG | WEIGHT: 172 LBS | HEIGHT: 67 IN | BODY MASS INDEX: 27 KG/M2

## 2019-08-13 DIAGNOSIS — R00.2 PALPITATIONS: Primary | ICD-10-CM

## 2019-08-13 DIAGNOSIS — Z95.810 ICD (IMPLANTABLE CARDIOVERTER-DEFIBRILLATOR), DUAL, IN SITU: ICD-10-CM

## 2019-08-13 DIAGNOSIS — I42.0 CARDIOMYOPATHY, DILATED (HCC): ICD-10-CM

## 2019-08-13 DIAGNOSIS — T82.120A DISPLACEMENT OF ATRIAL PACEMAKER LEADS, INITIAL ENCOUNTER: ICD-10-CM

## 2019-08-13 LAB
ALBUMIN SERPL-MCNC: 3.7 G/DL (ref 3.5–4.8)
ALBUMIN/GLOB SERPL: 1.1 G/DL (ref 1–1.7)
ALP SERPL-CCNC: 61 U/L (ref 32–91)
ALT SERPL W P-5'-P-CCNC: 31 U/L (ref 14–54)
ANION GAP SERPL CALCULATED.3IONS-SCNC: 15.1 MMOL/L (ref 5–15)
APTT PPP: 29.6 SECONDS (ref 24–31)
AST SERPL-CCNC: 33 U/L (ref 15–41)
BASOPHILS # BLD AUTO: 0 10*3/MM3 (ref 0–0.2)
BASOPHILS NFR BLD AUTO: 0.5 % (ref 0–1.5)
BILIRUB SERPL-MCNC: 0.8 MG/DL (ref 0.3–1.2)
BUN BLD-MCNC: 25 MG/DL (ref 8–20)
BUN/CREAT SERPL: 13.2 (ref 5.4–26.2)
CALCIUM SPEC-SCNC: 9 MG/DL (ref 8.9–10.3)
CHLORIDE SERPL-SCNC: 99 MMOL/L (ref 101–111)
CO2 SERPL-SCNC: 26 MMOL/L (ref 22–32)
CREAT BLD-MCNC: 1.9 MG/DL (ref 0.4–1)
DEPRECATED RDW RBC AUTO: 43.3 FL (ref 37–54)
EOSINOPHIL # BLD AUTO: 0.1 10*3/MM3 (ref 0–0.4)
EOSINOPHIL NFR BLD AUTO: 2.1 % (ref 0.3–6.2)
ERYTHROCYTE [DISTWIDTH] IN BLOOD BY AUTOMATED COUNT: 13.7 % (ref 12.3–15.4)
GFR SERPL CREATININE-BSD FRML MDRD: 34 ML/MIN/1.73
GLOBULIN UR ELPH-MCNC: 3.3 GM/DL (ref 2.5–3.8)
GLUCOSE BLD-MCNC: 89 MG/DL (ref 65–99)
GLUCOSE BLDC GLUCOMTR-MCNC: 102 MG/DL (ref 70–105)
GLUCOSE BLDC GLUCOMTR-MCNC: 102 MG/DL (ref 70–105)
GLUCOSE BLDC GLUCOMTR-MCNC: 111 MG/DL (ref 70–105)
HBA1C MFR BLD: 6.2 % (ref 3.5–5.6)
HCT VFR BLD AUTO: 45.2 % (ref 34–46.6)
HGB BLD-MCNC: 15 G/DL (ref 12–15.9)
HOLD SPECIMEN: NORMAL
INR PPP: 1.06 (ref 0.9–1.1)
LYMPHOCYTES # BLD AUTO: 2.3 10*3/MM3 (ref 0.7–3.1)
LYMPHOCYTES NFR BLD AUTO: 37 % (ref 19.6–45.3)
MAGNESIUM SERPL-MCNC: 1.9 MG/DL (ref 1.8–2.5)
MCH RBC QN AUTO: 29.8 PG (ref 26.6–33)
MCHC RBC AUTO-ENTMCNC: 33.1 G/DL (ref 31.5–35.7)
MCV RBC AUTO: 89.9 FL (ref 79–97)
MONOCYTES # BLD AUTO: 0.5 10*3/MM3 (ref 0.1–0.9)
MONOCYTES NFR BLD AUTO: 8.3 % (ref 5–12)
NEUTROPHILS # BLD AUTO: 3.2 10*3/MM3 (ref 1.7–7)
NEUTROPHILS NFR BLD AUTO: 52.1 % (ref 42.7–76)
NRBC BLD AUTO-RTO: 0.1 /100 WBC (ref 0–0.2)
PLATELET # BLD AUTO: 174 10*3/MM3 (ref 140–450)
PMV BLD AUTO: 8 FL (ref 6–12)
POTASSIUM BLD-SCNC: 3.1 MMOL/L (ref 3.6–5.1)
PROT SERPL-MCNC: 7 G/DL (ref 6.1–7.9)
PROTHROMBIN TIME: 10.8 SECONDS (ref 9.6–11.7)
RBC # BLD AUTO: 5.03 10*6/MM3 (ref 3.77–5.28)
SODIUM BLD-SCNC: 137 MMOL/L (ref 136–144)
TROPONIN I SERPL-MCNC: 0.03 NG/ML (ref 0–0.03)
TROPONIN I SERPL-MCNC: 0.08 NG/ML (ref 0–0.03)
TROPONIN I SERPL-MCNC: 0.09 NG/ML (ref 0–0.03)
TSH SERPL DL<=0.05 MIU/L-ACNC: 0.27 MIU/ML (ref 0.34–5.6)
WBC NRBC COR # BLD: 6.1 10*3/MM3 (ref 3.4–10.8)

## 2019-08-13 PROCEDURE — 93005 ELECTROCARDIOGRAM TRACING: CPT | Performed by: EMERGENCY MEDICINE

## 2019-08-13 PROCEDURE — 99220 PR INITIAL OBSERVATION CARE/DAY 70 MINUTES: CPT | Performed by: HOSPITALIST

## 2019-08-13 PROCEDURE — 71045 X-RAY EXAM CHEST 1 VIEW: CPT

## 2019-08-13 PROCEDURE — 82962 GLUCOSE BLOOD TEST: CPT

## 2019-08-13 PROCEDURE — 99213 OFFICE O/P EST LOW 20 MIN: CPT | Performed by: INTERNAL MEDICINE

## 2019-08-13 PROCEDURE — 85025 COMPLETE CBC W/AUTO DIFF WBC: CPT | Performed by: EMERGENCY MEDICINE

## 2019-08-13 PROCEDURE — G0378 HOSPITAL OBSERVATION PER HR: HCPCS

## 2019-08-13 PROCEDURE — 96374 THER/PROPH/DIAG INJ IV PUSH: CPT

## 2019-08-13 PROCEDURE — 84484 ASSAY OF TROPONIN QUANT: CPT | Performed by: EMERGENCY MEDICINE

## 2019-08-13 PROCEDURE — 93306 TTE W/DOPPLER COMPLETE: CPT | Performed by: INTERNAL MEDICINE

## 2019-08-13 PROCEDURE — 83735 ASSAY OF MAGNESIUM: CPT | Performed by: EMERGENCY MEDICINE

## 2019-08-13 PROCEDURE — 83036 HEMOGLOBIN GLYCOSYLATED A1C: CPT | Performed by: NURSE PRACTITIONER

## 2019-08-13 PROCEDURE — 99285 EMERGENCY DEPT VISIT HI MDM: CPT

## 2019-08-13 PROCEDURE — 25010000002 KETOROLAC TROMETHAMINE PER 15 MG: Performed by: PHYSICIAN ASSISTANT

## 2019-08-13 PROCEDURE — 80053 COMPREHEN METABOLIC PANEL: CPT | Performed by: EMERGENCY MEDICINE

## 2019-08-13 PROCEDURE — 85610 PROTHROMBIN TIME: CPT | Performed by: EMERGENCY MEDICINE

## 2019-08-13 PROCEDURE — 96375 TX/PRO/DX INJ NEW DRUG ADDON: CPT

## 2019-08-13 PROCEDURE — 84484 ASSAY OF TROPONIN QUANT: CPT | Performed by: PHYSICIAN ASSISTANT

## 2019-08-13 PROCEDURE — 84484 ASSAY OF TROPONIN QUANT: CPT | Performed by: NURSE PRACTITIONER

## 2019-08-13 PROCEDURE — 85730 THROMBOPLASTIN TIME PARTIAL: CPT | Performed by: EMERGENCY MEDICINE

## 2019-08-13 PROCEDURE — 93306 TTE W/DOPPLER COMPLETE: CPT

## 2019-08-13 PROCEDURE — 84443 ASSAY THYROID STIM HORMONE: CPT | Performed by: EMERGENCY MEDICINE

## 2019-08-13 RX ORDER — SODIUM CHLORIDE 0.9 % (FLUSH) 0.9 %
3-10 SYRINGE (ML) INJECTION AS NEEDED
Status: CANCELLED | OUTPATIENT
Start: 2019-08-13

## 2019-08-13 RX ORDER — ATORVASTATIN CALCIUM 40 MG/1
40 TABLET, FILM COATED ORAL NIGHTLY
Status: DISCONTINUED | OUTPATIENT
Start: 2019-08-13 | End: 2019-08-16 | Stop reason: HOSPADM

## 2019-08-13 RX ORDER — ONDANSETRON 4 MG/1
4 TABLET, FILM COATED ORAL EVERY 6 HOURS PRN
Status: DISCONTINUED | OUTPATIENT
Start: 2019-08-13 | End: 2019-08-13 | Stop reason: SDUPTHER

## 2019-08-13 RX ORDER — NICOTINE POLACRILEX 4 MG
15 LOZENGE BUCCAL
Status: DISCONTINUED | OUTPATIENT
Start: 2019-08-13 | End: 2019-08-16 | Stop reason: HOSPADM

## 2019-08-13 RX ORDER — ALUMINA, MAGNESIA, AND SIMETHICONE 2400; 2400; 240 MG/30ML; MG/30ML; MG/30ML
15 SUSPENSION ORAL EVERY 6 HOURS PRN
Status: DISCONTINUED | OUTPATIENT
Start: 2019-08-13 | End: 2019-08-16 | Stop reason: HOSPADM

## 2019-08-13 RX ORDER — HYDRALAZINE HYDROCHLORIDE 25 MG/1
50 TABLET, FILM COATED ORAL 3 TIMES DAILY
Status: DISCONTINUED | OUTPATIENT
Start: 2019-08-13 | End: 2019-08-16 | Stop reason: HOSPADM

## 2019-08-13 RX ORDER — POTASSIUM CHLORIDE 20 MEQ/1
TABLET, EXTENDED RELEASE ORAL
Status: COMPLETED
Start: 2019-08-13 | End: 2019-08-13

## 2019-08-13 RX ORDER — ONDANSETRON 2 MG/ML
4 INJECTION INTRAMUSCULAR; INTRAVENOUS EVERY 6 HOURS PRN
Status: DISCONTINUED | OUTPATIENT
Start: 2019-08-13 | End: 2019-08-16 | Stop reason: HOSPADM

## 2019-08-13 RX ORDER — SODIUM CHLORIDE 0.9 % (FLUSH) 0.9 %
3 SYRINGE (ML) INJECTION EVERY 12 HOURS SCHEDULED
Status: DISCONTINUED | OUTPATIENT
Start: 2019-08-13 | End: 2019-08-15 | Stop reason: SDUPTHER

## 2019-08-13 RX ORDER — DEXTROSE MONOHYDRATE 25 G/50ML
25 INJECTION, SOLUTION INTRAVENOUS
Status: DISCONTINUED | OUTPATIENT
Start: 2019-08-13 | End: 2019-08-16 | Stop reason: HOSPADM

## 2019-08-13 RX ORDER — DOCUSATE SODIUM 100 MG/1
100 CAPSULE, LIQUID FILLED ORAL 2 TIMES DAILY PRN
Status: DISCONTINUED | OUTPATIENT
Start: 2019-08-13 | End: 2019-08-16 | Stop reason: HOSPADM

## 2019-08-13 RX ORDER — KETOROLAC TROMETHAMINE 15 MG/ML
15 INJECTION, SOLUTION INTRAMUSCULAR; INTRAVENOUS EVERY 6 HOURS PRN
Status: DISCONTINUED | OUTPATIENT
Start: 2019-08-13 | End: 2019-08-14

## 2019-08-13 RX ORDER — MAGNESIUM SULFATE HEPTAHYDRATE 40 MG/ML
2 INJECTION, SOLUTION INTRAVENOUS AS NEEDED
Status: DISCONTINUED | OUTPATIENT
Start: 2019-08-13 | End: 2019-08-16 | Stop reason: HOSPADM

## 2019-08-13 RX ORDER — FOLIC ACID 1 MG/1
1 TABLET ORAL DAILY
Status: DISCONTINUED | OUTPATIENT
Start: 2019-08-13 | End: 2019-08-16 | Stop reason: HOSPADM

## 2019-08-13 RX ORDER — FUROSEMIDE 40 MG/1
40 TABLET ORAL 2 TIMES DAILY
Status: DISCONTINUED | OUTPATIENT
Start: 2019-08-13 | End: 2019-08-13

## 2019-08-13 RX ORDER — ASPIRIN 81 MG/1
324 TABLET, CHEWABLE ORAL ONCE
Status: COMPLETED | OUTPATIENT
Start: 2019-08-13 | End: 2019-08-13

## 2019-08-13 RX ORDER — POTASSIUM CHLORIDE 1.5 G/1.77G
40 POWDER, FOR SOLUTION ORAL AS NEEDED
Status: DISCONTINUED | OUTPATIENT
Start: 2019-08-13 | End: 2019-08-16 | Stop reason: HOSPADM

## 2019-08-13 RX ORDER — POTASSIUM CHLORIDE 20 MEQ/1
20 TABLET, EXTENDED RELEASE ORAL DAILY
Status: DISCONTINUED | OUTPATIENT
Start: 2019-08-13 | End: 2019-08-16 | Stop reason: HOSPADM

## 2019-08-13 RX ORDER — ONDANSETRON 2 MG/ML
4 INJECTION INTRAMUSCULAR; INTRAVENOUS EVERY 6 HOURS PRN
Status: DISCONTINUED | OUTPATIENT
Start: 2019-08-13 | End: 2019-08-13 | Stop reason: SDUPTHER

## 2019-08-13 RX ORDER — FUROSEMIDE 40 MG/1
40 TABLET ORAL
Status: DISCONTINUED | OUTPATIENT
Start: 2019-08-13 | End: 2019-08-14

## 2019-08-13 RX ORDER — ONDANSETRON 4 MG/1
4 TABLET, FILM COATED ORAL EVERY 6 HOURS PRN
Status: DISCONTINUED | OUTPATIENT
Start: 2019-08-13 | End: 2019-08-16 | Stop reason: HOSPADM

## 2019-08-13 RX ORDER — SODIUM CHLORIDE 0.9 % (FLUSH) 0.9 %
3 SYRINGE (ML) INJECTION EVERY 12 HOURS SCHEDULED
Status: DISCONTINUED | OUTPATIENT
Start: 2019-08-13 | End: 2019-08-16 | Stop reason: HOSPADM

## 2019-08-13 RX ORDER — SODIUM CHLORIDE 0.9 % (FLUSH) 0.9 %
3-10 SYRINGE (ML) INJECTION AS NEEDED
Status: DISCONTINUED | OUTPATIENT
Start: 2019-08-13 | End: 2019-08-15 | Stop reason: SDUPTHER

## 2019-08-13 RX ORDER — LISINOPRIL 20 MG/1
20 TABLET ORAL DAILY
Status: DISCONTINUED | OUTPATIENT
Start: 2019-08-13 | End: 2019-08-16 | Stop reason: HOSPADM

## 2019-08-13 RX ORDER — SODIUM CHLORIDE 9 MG/ML
9 INJECTION, SOLUTION INTRAVENOUS CONTINUOUS PRN
Status: CANCELLED | OUTPATIENT
Start: 2019-08-13

## 2019-08-13 RX ORDER — SODIUM CHLORIDE 0.9 % (FLUSH) 0.9 %
3 SYRINGE (ML) INJECTION EVERY 12 HOURS SCHEDULED
Status: CANCELLED | OUTPATIENT
Start: 2019-08-13

## 2019-08-13 RX ORDER — CARVEDILOL 6.25 MG/1
12.5 TABLET ORAL 2 TIMES DAILY WITH MEALS
Status: DISCONTINUED | OUTPATIENT
Start: 2019-08-13 | End: 2019-08-13

## 2019-08-13 RX ORDER — ASPIRIN 81 MG/1
81 TABLET, CHEWABLE ORAL DAILY
Status: DISCONTINUED | OUTPATIENT
Start: 2019-08-13 | End: 2019-08-16 | Stop reason: HOSPADM

## 2019-08-13 RX ORDER — SODIUM CHLORIDE 0.9 % (FLUSH) 0.9 %
3-10 SYRINGE (ML) INJECTION AS NEEDED
Status: DISCONTINUED | OUTPATIENT
Start: 2019-08-13 | End: 2019-08-16 | Stop reason: HOSPADM

## 2019-08-13 RX ORDER — LEVOTHYROXINE SODIUM 175 UG/1
175 TABLET ORAL DAILY
Status: DISCONTINUED | OUTPATIENT
Start: 2019-08-14 | End: 2019-08-16 | Stop reason: HOSPADM

## 2019-08-13 RX ORDER — DEXTROSE MONOHYDRATE 25 G/50ML
25 INJECTION, SOLUTION INTRAVENOUS
Status: DISCONTINUED | OUTPATIENT
Start: 2019-08-13 | End: 2019-08-13 | Stop reason: SDUPTHER

## 2019-08-13 RX ORDER — POTASSIUM CHLORIDE 20 MEQ/1
40 TABLET, EXTENDED RELEASE ORAL AS NEEDED
Status: DISCONTINUED | OUTPATIENT
Start: 2019-08-13 | End: 2019-08-16 | Stop reason: HOSPADM

## 2019-08-13 RX ORDER — NICOTINE POLACRILEX 4 MG
15 LOZENGE BUCCAL
Status: DISCONTINUED | OUTPATIENT
Start: 2019-08-13 | End: 2019-08-13 | Stop reason: SDUPTHER

## 2019-08-13 RX ORDER — CALCIUM CARBONATE 200(500)MG
2 TABLET,CHEWABLE ORAL 2 TIMES DAILY PRN
Status: DISCONTINUED | OUTPATIENT
Start: 2019-08-13 | End: 2019-08-16 | Stop reason: HOSPADM

## 2019-08-13 RX ORDER — FERROUS SULFATE TAB EC 324 MG (65 MG FE EQUIVALENT) 324 (65 FE) MG
324 TABLET DELAYED RESPONSE ORAL
Status: DISCONTINUED | OUTPATIENT
Start: 2019-08-13 | End: 2019-08-16 | Stop reason: HOSPADM

## 2019-08-13 RX ORDER — CARVEDILOL 6.25 MG/1
12.5 TABLET ORAL 2 TIMES DAILY WITH MEALS
Status: DISCONTINUED | OUTPATIENT
Start: 2019-08-13 | End: 2019-08-16 | Stop reason: HOSPADM

## 2019-08-13 RX ORDER — BISACODYL 5 MG/1
5 TABLET, DELAYED RELEASE ORAL DAILY PRN
Status: DISCONTINUED | OUTPATIENT
Start: 2019-08-13 | End: 2019-08-16 | Stop reason: HOSPADM

## 2019-08-13 RX ORDER — OXYCODONE HYDROCHLORIDE 5 MG/1
7.5 TABLET ORAL EVERY 6 HOURS PRN
Status: DISCONTINUED | OUTPATIENT
Start: 2019-08-13 | End: 2019-08-16 | Stop reason: HOSPADM

## 2019-08-13 RX ORDER — MAGNESIUM SULFATE HEPTAHYDRATE 40 MG/ML
4 INJECTION, SOLUTION INTRAVENOUS AS NEEDED
Status: DISCONTINUED | OUTPATIENT
Start: 2019-08-13 | End: 2019-08-16 | Stop reason: HOSPADM

## 2019-08-13 RX ORDER — LEVOTHYROXINE SODIUM 0.2 MG/1
200 TABLET ORAL DAILY
Status: DISCONTINUED | OUTPATIENT
Start: 2019-08-13 | End: 2019-08-13

## 2019-08-13 RX ORDER — BISACODYL 10 MG
10 SUPPOSITORY, RECTAL RECTAL DAILY PRN
Status: DISCONTINUED | OUTPATIENT
Start: 2019-08-13 | End: 2019-08-13 | Stop reason: SDUPTHER

## 2019-08-13 RX ORDER — MELATONIN
1000 DAILY
Status: DISCONTINUED | OUTPATIENT
Start: 2019-08-13 | End: 2019-08-16 | Stop reason: HOSPADM

## 2019-08-13 RX ORDER — BISACODYL 10 MG
10 SUPPOSITORY, RECTAL RECTAL DAILY PRN
Status: DISCONTINUED | OUTPATIENT
Start: 2019-08-13 | End: 2019-08-16 | Stop reason: HOSPADM

## 2019-08-13 RX ADMIN — Medication 3 ML: at 21:03

## 2019-08-13 RX ADMIN — OXYCODONE HYDROCHLORIDE 7.5 MG: 5 TABLET ORAL at 17:18

## 2019-08-13 RX ADMIN — KETOROLAC TROMETHAMINE 15 MG: 15 INJECTION, SOLUTION INTRAMUSCULAR; INTRAVENOUS at 09:37

## 2019-08-13 RX ADMIN — ATORVASTATIN CALCIUM 40 MG: 40 TABLET, FILM COATED ORAL at 20:59

## 2019-08-13 RX ADMIN — HYDRALAZINE HYDROCHLORIDE 50 MG: 25 TABLET, FILM COATED ORAL at 20:59

## 2019-08-13 RX ADMIN — POTASSIUM CHLORIDE 40 MEQ: 20 TABLET, EXTENDED RELEASE ORAL at 09:36

## 2019-08-13 RX ADMIN — POTASSIUM CHLORIDE 20 MEQ: 1500 TABLET, EXTENDED RELEASE ORAL at 03:37

## 2019-08-13 RX ADMIN — ASPIRIN 81 MG 324 MG: 81 TABLET ORAL at 02:15

## 2019-08-13 RX ADMIN — POTASSIUM CHLORIDE 40 MEQ: 20 TABLET, EXTENDED RELEASE ORAL at 17:18

## 2019-08-13 RX ADMIN — FUROSEMIDE 40 MG: 40 TABLET ORAL at 17:18

## 2019-08-13 RX ADMIN — POTASSIUM CHLORIDE 20 MEQ: 20 TABLET, EXTENDED RELEASE ORAL at 03:37

## 2019-08-13 NOTE — ED NOTES
Heart Palpitations for the past hour.  Also c/o low blood pressure and generalized weakness.       Malcolm Chakraborty RN  08/13/19 0138

## 2019-08-13 NOTE — NURSING NOTE
Placed call to cardiologist regarding troponin of 0.03 and then 0.09 on second draw. Waiting for call back. Call placed to Dr NICHOLE about echo and will let him know troponin results.

## 2019-08-13 NOTE — ED PROVIDER NOTES
Subjective   44-year-old female complains of episode of palpitations she qualifies as a fast heart rate associate with nausea, lightheadedness and fatigue.  Patient had mild left-sided chest pain associated with this with mild shortness of air.  Patient has a known cardiomyopathy with AICD placement earlier in the year.            Review of Systems   Cardiovascular: Positive for chest pain and palpitations.   Gastrointestinal: Positive for nausea.   Neurological: Positive for light-headedness.   All other systems reviewed and are negative.      Past Medical History:   Diagnosis Date   • CHF (congestive heart failure) (CMS/HCC)    • COPD (chronic obstructive pulmonary disease) (CMS/HCC)    • Hyperlipidemia    • Hypertension        Allergies   Allergen Reactions   • Hydrocodone Hives   • Penicillin G Unknown (See Comments)       Past Surgical History:   Procedure Laterality Date   • BREAST LUMPECTOMY     • CARDIAC ELECTROPHYSIOLOGY PROCEDURE Left 2019    Procedure: Dual-chamber ICD insertion;  Surgeon: Héctor Eckert MD;  Location: [facility] INVASIVE LOCATION;  Service: Cardiovascular   • CHOLECYSTECTOMY     • HYSTERECTOMY     • INSERT / REPLACE / REMOVE PACEMAKER     • THYROID SURGERY         No family history on file.    Social History     Socioeconomic History   • Marital status:      Spouse name: Not on file   • Number of children: Not on file   • Years of education: Not on file   • Highest education level: Not on file   Tobacco Use   • Smoking status: Former Smoker     Last attempt to quit: 2019     Years since quittin.6   • Smokeless tobacco: Never Used   Substance and Sexual Activity   • Alcohol use: No     Frequency: Never   • Drug use: No   • Sexual activity: Defer           Objective   Physical Exam   Constitutional: She is oriented to person, place, and time. She appears well-developed and well-nourished.   HENT:   Head: Normocephalic and atraumatic.   Mouth/Throat: Oropharynx is  clear and moist.   Eyes: Conjunctivae and EOM are normal. Pupils are equal, round, and reactive to light.   Neck: Normal range of motion. Neck supple.   Cardiovascular: Normal rate, regular rhythm, normal heart sounds and intact distal pulses.   Pulmonary/Chest: Effort normal and breath sounds normal.   Abdominal: Soft. Bowel sounds are normal. She exhibits no distension. There is no tenderness.   Musculoskeletal: Normal range of motion. She exhibits no edema or deformity.   Neurological: She is alert and oriented to person, place, and time. No cranial nerve deficit.   Motor and sensation intact   Skin: Skin is warm and dry. Capillary refill takes less than 2 seconds.   Psychiatric: She has a normal mood and affect. Her behavior is normal.       Procedures           ED Course  ED Course as of Aug 13 0340   Tue Aug 13, 2019   0204 EKG interpretation: Normal sinus rhythm, rate 78 LVH with likely strain pattern, unchanged from tracing done on July 20, 2019  [JR]      ED Course User Index  [JR] Mukesh Mcmahan MD                  [facility]  Number of Diagnoses or Management Options  Palpitations:   Diagnosis management comments: Results for orders placed or performed during the hospital encounter of 08/13/19  -Comprehensive Metabolic Panel       Result                      Value             Ref Range           Glucose                     89                65 - 99 mg/dL       BUN                         25 (H)            8 - 20 mg/dL        Creatinine                  1.90 (H)          0.40 - 1.00 *       Sodium                      137               136 - 144 mm*       Potassium                   3.1 (L)           3.6 - 5.1 mm*       Chloride                    99 (L)            101 - 111 mm*       CO2                         26.0              22.0 - 32.0 *       Calcium                     9.0               8.9 - 10.3 m*       Total Protein               7.0               6.1 - 7.9 g/*       Albumin                     3.70               3.50 - 4.80 *       ALT (SGPT)                  31                14 - 54 U/L         AST (SGOT)                  33                15 - 41 U/L         Alkaline Phosphatase        61                32 - 91 U/L         Total Bilirubin             0.8               0.3 - 1.2 mg*       eGFR   Amer          34 (L)            >60 mL/min/1*       Globulin                    3.3               2.5 - 3.8 gm*       A/G Ratio                   1.1               1.0 - 1.7 g/*       BUN/Creatinine Ratio        13.2              5.4 - 26.2          Anion Gap                   15.1 (H)          5.0 - 15.0 m*  -Protime-INR       Result                      Value             Ref Range           Protime                     10.8              9.6 - 11.7 S*       INR                         1.06              0.90 - 1.10    -aPTT       Result                      Value             Ref Range           PTT                         29.6              24.0 - 31.0 *  -Troponin       Result                      Value             Ref Range           Troponin I                  0.030             0.000 - 0.03*  -TSH       Result                      Value             Ref Range           TSH                         0.270 (L)         0.340 - 5.60*  -Magnesium       Result                      Value             Ref Range           Magnesium                   1.9               1.8 - 2.5 mg*  -CBC Auto Differential       Result                      Value             Ref Range           WBC                         6.10              3.40 - 10.80*       RBC                         5.03              3.77 - 5.28 *       Hemoglobin                  15.0              12.0 - 15.9 *       Hematocrit                  45.2              34.0 - 46.6 %       MCV                         89.9              79.0 - 97.0 *       MCH                         29.8              26.6 - 33.0 *       MCHC                        33.1              31.5 - 35.7 *        RDW                         13.7              12.3 - 15.4 %       RDW-SD                      43.3              37.0 - 54.0 *       MPV                         8.0               6.0 - 12.0 fL       Platelets                   174               140 - 450 10*       Neutrophil %                52.1              42.7 - 76.0 %       Lymphocyte %                37.0              19.6 - 45.3 %       Monocyte %                  8.3               5.0 - 12.0 %        Eosinophil %                2.1               0.3 - 6.2 %         Basophil %                  0.5               0.0 - 1.5 %         Neutrophils, Absolute       3.20              1.70 - 7.00 *       Lymphocytes, Absolute       2.30              0.70 - 3.10 *       Monocytes, Absolute         0.50              0.10 - 0.90 *       Eosinophils, Absolute       0.10              0.00 - 0.40 *       Basophils, Absolute         0.00              0.00 - 0.20 *       nRBC                        0.1               0.0 - 0.2 /1*  -Gold Top - SST       Result                      Value             Ref Range           Extra Tube                                                    Hold for add-on    Patient well, vital signs stable, asymptomatic at present.  Unable to evaluate AICD secondary to machine being misplaced.  Will admit for this and further evaluation.        Final diagnoses:   Palpitations            Mukesh Mcmahan MD  08/13/19 2695

## 2019-08-13 NOTE — PROGRESS NOTES
Chief complaint--patient admitted through ER for symptoms of palpitations and diaphoresis and nausea which has resolved after presentation to the emergency room without syncope patient is awaiting atrial lead revision in the morning   No symptoms when I examined the patient      Recent history for review --44-year-old female patient has dilated cardiomyopathy which was persistent with moderate mitral regurgitation and underwent a dual-chamber ICD implantation for primary prevention with underlying sinus bradycardia--she came for ICD check and complaint of phrenic nerve stimulation--ICD reprogrammed to the VDD mode with resolution of her symptoms and chest x-ray was done which showed atrial lead proximal migration--she went to the ER because of orthostatic symptoms and hypokalemia and patient is taking both Bumex and Lasix and complains of significant tiredness and fatigue without any edema  Complains of ongoing class III dyspnea with fatigue--somewhat better     Medical History           Past Medical History:   Diagnosis Date   • CHF (congestive heart failure) (CMS/[facility])     • COPD (chronic obstructive pulmonary disease) (CMS/[facility])     • Hypertension           Surgical History             Past Surgical History:   Procedure Laterality Date   • BREAST LUMPECTOMY       • CHOLECYSTECTOMY       • HYSTERECTOMY       • THYROID SURGERY             History reviewed. No pertinent family history.  Social History                Tobacco Use   • Smoking status: Former Smoker       Last attempt to quit: 2019       Years since quittin.4   • Smokeless tobacco: Never Used   Substance Use Topics   • Alcohol use: No       Frequency: Never   • Drug use: No    Allergies:  Hydrocodone and Penicillin g     Review of Systems   General:  positive for fatigue and tiredness  Eyes: No redness  Cardiovascular: No chest pain, no palpitations  Respiratory:   positive for class 3 shortness of breath  Gastrointestinal: No nausea or vomiting,  bleeding  Genitourinary: no hematuria or dysuria  Musculoskeletal: No arthralgia or myalgia  Skin: No rash  Neurologic: No numbness, tingling, syncope  Hematologic/Lymphatic: No abnormal bleeding        Medications and allergies reviewed             Physical Exam  VITALS REVIEWED--blood pressure is 112/60, pulse  64 bpm patient is afebrile within 14 times a minute     General:      well developed, well nourished, in no acute distress.    Head:      normocephalic and atraumatic.    Eyes:      PERRL/EOM intact, conjunctiva and sclera clear with out nystagmus.    Neck:      no masses, thyromegaly,  trachea central with normal respiratory effort and PMI displaced laterally  Lungs:      clear bilaterally to auscultation.    Heart:       Sinus rhythm  , soft systolic murmur without any rub   Msk:      no deformity or scoliosis noted of thoracic or lumbar spine.    Pulses:      pulses normal in all 4 extremities.    Extremities:       no cyanosis or clubbing--trace left pedal edema and trace right pedal edema.    Neurologic:      no focal deficits.   alert oriented x3  Skin:      intact without lesions or rashes.    Psych:      alert and cooperative; normal mood and affect; normal attention span and concentration.       ICD site is clean         Assessment plan     Recurrent class III systolic heart failure with severe LV dysfunction despite adequate medical treatment with EF 30%  Moderate mitral regurgitation and mild to moderate aortic regurgitation  Hypertensive heart disease  Chronic kidney disease and hypokalemia  Diabetes  Bradycardia SINUS  Post dual-chamber ICD insertion --phrenic nerve stimulation from proximal migration of atrial pacing lead resolved after programming to VDD mode --patient to undergo atrial lead revision and risks and benefits and outcomes educated --procedure scheduled in the morning   Prior history of orthostatic hypotension with hypokalemia from polypharmacy   Potassium to be  replaced  Abnormal thyroid and levothyroxine dose titrated down by hospitalist

## 2019-08-13 NOTE — PLAN OF CARE
Problem: Patient Care Overview  Goal: Plan of Care Review  Outcome: Ongoing (interventions implemented as appropriate)   08/13/19 7842   Coping/Psychosocial   Plan of Care Reviewed With patient   Plan of Care Review   Progress no change   OTHER   Outcome Summary Pt will go for pacemaker revision tomorrow morning at 8. Feeling okay with no complaints     Goal: Individualization and Mutuality  Outcome: Ongoing (interventions implemented as appropriate)    Goal: Discharge Needs Assessment  Outcome: Ongoing (interventions implemented as appropriate)    Goal: Interprofessional Rounds/Family Conf  Outcome: Ongoing (interventions implemented as appropriate)      Problem: Cardiac Rhythm Management Device (Adult)  Goal: Signs and Symptoms of Listed Potential Problems Will be Absent, Minimized or Managed (Cardiac Rhythm Management Device)  Outcome: Ongoing (interventions implemented as appropriate)      Problem: Fall Risk (Adult)  Goal: Identify Related Risk Factors and Signs and Symptoms  Outcome: Ongoing (interventions implemented as appropriate)    Goal: Absence of Fall  Outcome: Ongoing (interventions implemented as appropriate)

## 2019-08-13 NOTE — H&P
[facility] HOSPITALIST     Phani Adames MD    CHIEF COMPLAINT:     Palpitations, diaphoresis, nausea, near syncope, chest pain, and shortness of breath    HISTORY OF PRESENT ILLNESS:    Ms. Demario Mccann is a 44 year old  female with PMH of dilated cardiomyopathy, systolic heart failure w/ severe LV dysfunction EF 30%, moderate mitral regurgitation, mild to moderate aortic regurgitation, dual-chamber ICD, COPD, HTN, CKD, DM II. She is supposed to undergo atrial lead revision tomorrow with Dr. Eckert. She presented to the ED 8/13/2019 with complaints of palpitations. She felt like her heart was fluttering. She was nauseous and diaphoretic and dizzy like she might pass out. She stated she checked her vitals and her bp was 109/60 and HR was 66. She felt like she had chest pain and dyspnea from her heart palpating.     In the ED EKG showed NSR, LVH with likely strain pattern, unchanged from previous. Her troponin was normal. CXR showed mildly limited study demonstrating stable cardiomegaly with active pulmonary disease. Her K 3.1, Mg 1.9, Cr 1.9. She was given 324mg aspirin. She was admitted for further evaluation. She normally sees Dr. Luna.       Past Medical History:   Diagnosis Date   • CHF (congestive heart failure) (CMS/[facility])    • COPD (chronic obstructive pulmonary disease) (CMS/[facility])    • Diabetes (CMS/[facility])    • Hyperlipidemia    • Hypertension      Past Surgical History:   Procedure Laterality Date   • BREAST LUMPECTOMY     • CARDIAC ELECTROPHYSIOLOGY PROCEDURE Left 6/28/2019    Procedure: Dual-chamber ICD insertion;  Surgeon: Héctor Eckert MD;  Location: [facility] INVASIVE LOCATION;  Service: Cardiovascular   • CHOLECYSTECTOMY     • HYSTERECTOMY     • INSERT / REPLACE / REMOVE PACEMAKER     • THYROID SURGERY       Family History   Problem Relation Age of Onset   • Heart disease Father      Social History     Tobacco Use   • Smoking status: Former  "Smoker     Last attempt to quit: 2019     Years since quittin.6   • Smokeless tobacco: Never Used   Substance Use Topics   • Alcohol use: No     Frequency: Never   • Drug use: No       (Not in a hospital admission)  Allergies:  Hydrocodone and Penicillin g      There is no immunization history on file for this patient.        REVIEW OF SYSTEMS:     Review of Systems   Constitution: Positive for diaphoresis.   HENT: Negative.    Eyes: Negative.    Cardiovascular: Positive for chest pain, irregular heartbeat and palpitations.   Respiratory: Positive for shortness of breath. Negative for cough.    Endocrine: Negative.    Hematologic/Lymphatic: Negative.    Skin: Negative.    Musculoskeletal: Negative.    Gastrointestinal: Positive for nausea. Negative for vomiting.   Genitourinary: Negative.    Neurological: Negative.    Psychiatric/Behavioral: Negative.    Allergic/Immunologic: Negative.    All other systems reviewed and are negative.      Vital Signs  Temp:  [97.5 °F (36.4 °C)] 97.5 °F (36.4 °C)  Heart Rate:  [59-92] 63  Resp:  [16-18] 16  BP: (102-137)/(43-88) 114/55    Flowsheet Rows      First Filed Value   Admission Height  170.2 cm (67\") Documented at 2019   Admission Weight  78.2 kg (172 lb 6.4 oz) Documented at 2019 0134           Physical Exam:    Physical Exam   Constitutional: She is oriented to person, place, and time. She appears well-developed and well-nourished. No distress.   HENT:   Head: Normocephalic and atraumatic.   Nose: Nose normal.   Eyes: Conjunctivae and EOM are normal. Pupils are equal, round, and reactive to light.   Neck: Normal range of motion.   Cardiovascular: Normal rate, regular rhythm and intact distal pulses.   Murmur heard.  Pulmonary/Chest: Effort normal and breath sounds normal. No respiratory distress.   Abdominal: Soft. Bowel sounds are normal.   Musculoskeletal: Normal range of motion. She exhibits no edema, tenderness or deformity.   Neurological: She " is alert and oriented to person, place, and time. No cranial nerve deficit.   Skin: Skin is warm and dry. No rash noted. She is not diaphoretic. No erythema.   Psychiatric: She has a normal mood and affect. Her behavior is normal.   Nursing note and vitals reviewed.        Results Review:    I reviewed the patient's new clinical results.  Lab Results (most recent)     Procedure Component Value Units Date/Time    Extra Tubes [[id number]] Collected:  08/13/19 0216    Specimen:  Blood, Venous Line Updated:  08/13/19 0330    Narrative:       The following orders were created for panel order Extra Tubes.  Procedure                               Abnormality         Status                     ---------                               -----------         ------                     Gold Top - SST[[id number]]                                   Final result                 Please view results for these tests on the individual orders.    Gold Top - SST [[id number]] Collected:  08/13/19 0216    Specimen:  Blood Updated:  08/13/19 0330     Extra Tube Hold for add-ons.     Comment: Auto resulted.       TSH [[id number]]  (Abnormal) Collected:  08/13/19 0216    Specimen:  Blood Updated:  08/13/19 0313     TSH 0.270 mIU/mL      Comment: Results may be falsely decreased if patient taking Biotin.       Comprehensive Metabolic Panel [[id number]]  (Abnormal) Collected:  08/13/19 0216    Specimen:  Blood Updated:  08/13/19 0307     Glucose 89 mg/dL      BUN 25 mg/dL      Creatinine 1.90 mg/dL      Sodium 137 mmol/L      Potassium 3.1 mmol/L      Chloride 99 mmol/L      CO2 26.0 mmol/L      Calcium 9.0 mg/dL      Total Protein 7.0 g/dL      Albumin 3.70 g/dL      ALT (SGPT) 31 U/L      AST (SGOT) 33 U/L      Alkaline Phosphatase 61 U/L      Total Bilirubin 0.8 mg/dL      eGFR  African Amer 34 mL/min/1.73      Globulin 3.3 gm/dL      A/G Ratio 1.1 g/dL      BUN/Creatinine Ratio 13.2     Anion Gap 15.1 mmol/L     Magnesium [[id number]]  (Normal)  Collected:  08/13/19 0216    Specimen:  Blood Updated:  08/13/19 0306     Magnesium 1.9 mg/dL     Troponin [[id number]]  (Normal) Collected:  08/13/19 0216    Specimen:  Blood Updated:  08/13/19 0300     Troponin I 0.030 ng/mL     Narrative:       Troponin I Reference Range:    0.00-0.03  Negative.  Repeat testing in 4-6 hours if clinically indicated.    0.04-0.29  Suspicious for myocardial injury. Serial measurements and clinical  correlation may be necessary to confirm or exclude diagnosis of acute  coronary syndrome.  Repeat testing in 4-6 hours if indicated.     >0.29 Consistent with myocardial injury.  Recommend clinical and laboratory correlation.     Results my be falsely decreased if patient taking Biotin.     Protime-INR [[id number]]  (Normal) Collected:  08/13/19 0216    Specimen:  Blood Updated:  08/13/19 0234     Protime 10.8 Seconds      INR 1.06    aPTT [[id number]]  (Normal) Collected:  08/13/19 0216    Specimen:  Blood Updated:  08/13/19 0234     PTT 29.6 seconds     CBC & Differential [[id number]] Collected:  08/13/19 0216    Specimen:  Blood Updated:  08/13/19 0226    Narrative:       The following orders were created for panel order CBC & Differential.  Procedure                               Abnormality         Status                     ---------                               -----------         ------                     CBC Auto Differential[[id number]]        Normal              Final result                 Please view results for these tests on the individual orders.    CBC Auto Differential [[id number]]  (Normal) Collected:  08/13/19 0216    Specimen:  Blood Updated:  08/13/19 0226     WBC 6.10 10*3/mm3      RBC 5.03 10*6/mm3      Hemoglobin 15.0 g/dL      Hematocrit 45.2 %      MCV 89.9 fL      MCH 29.8 pg      MCHC 33.1 g/dL      RDW 13.7 %      RDW-SD 43.3 fl      MPV 8.0 fL      Platelets 174 10*3/mm3      Neutrophil % 52.1 %      Lymphocyte % 37.0 %      Monocyte % 8.3 %      Eosinophil %  2.1 %      Basophil % 0.5 %      Neutrophils, Absolute 3.20 10*3/mm3      Lymphocytes, Absolute 2.30 10*3/mm3      Monocytes, Absolute 0.50 10*3/mm3      Eosinophils, Absolute 0.10 10*3/mm3      Basophils, Absolute 0.00 10*3/mm3      nRBC 0.1 /100 WBC           Imaging Results (most recent)     Procedure Component Value Units Date/Time    XR Chest 1 View [[id number]] Collected:  08/13/19 0737     Updated:  08/13/19 0740    Narrative:       DATE OF EXAM:  8/13/2019 2:30 AM     PROCEDURE:  XR CHEST 1 VW-     INDICATIONS:  cp     COMPARISON:  7/20/2019, 7/19/2019, and 6/28/2019.     TECHNIQUE:   Single radiographic AP view of the chest was obtained.     FINDINGS:  Study is mildly limited by lordotic patient positioning. Overlying  artifacts. Stable left chest wall cardiac pacer/defibrillator. Partially  visualized chronic linear subsegmental scarring in the mid left lung.  The lungs otherwise remain clear. No pneumothorax. Stable enlargement of  the cardiac silhouette, likely accentuated by technique. No acute  osseous abnormality is identified.        Impression:       Mildly limited study demonstrating stable cardiomegaly with no active  pulmonary disease.     Electronically Signed By-Shane Moon On:8/13/2019 7:38 AM  This report was finalized on [id number] by  Shane Moon, .            Results for orders placed during the hospital encounter of 06/22/19   Adult Transesophageal Echo (FELICIA) W/ Cont if Necessary Per Protocol    Narrative · The left ventricular cavity is moderately dilated.  · Left ventricular wall thickness is consistent with mild asymmetric   hypertrophy.  · Left ventricular diastolic dysfunction.  · Right ventricular cavity is moderately dilated.  · Severely reduced right ventricular systolic function noted.  · Left atrial cavity size is moderately dilated.  · Mild to moderate aortic valve regurgitation is present.  · Moderate mitral valve regurgitation is present  · Mild tricuspid valve  regurgitation is present.     Patient underwent FELICIA after getting a valid consent and sedation   administered anesthesia team and FELICIA probe was easily placed into   esophagus and multiplanar imaging dormitory, color Doppler followed by   bubble study with agitated saline without any complications    Findings    Moderate to severe left ventricular enlargement with eccentric left   ventricle hypertrophy with almost abnormalities more profound in the   septum and anterior wall with overall EF between 25 to 30%  Anterior mitral valve leaflet pathology with thickening with moderate   mitral regurgitation  Mild to moderate aortic eustachian  Moderate to severe RV dysfunction also noted  No effusion             Assessment/Plan     Palpitations, Chest pain, Dyspnea, Near Syncope  - EKG showed NSR, LVH with likely strain pattern, unchanged from previous per ED report  - troponin 0.03  - CXR showed mildly limited study demonstrating stable cardiomegaly with active pulmonary disease.   - serial troponins s/o ACS  - consult cardiology    Hypokalemia  - replacement protocol     Dilated cardiomyopathy/systolic heart failure EF 30%, MR, AR s/p AICD 6/2019   - cont home aspirin, BB, ACE-I, statin  - pt scheduled for revision of atrial lead tomorrow with Babar  - consult cardiology    COPD  - stable not in exacerbation    Hypertension  - stable. Cont home coreg, lisinopril, hydralazine, Lasix    Hyperlipidemia  - cont home statin    Hypothyroidism  - TSH 0.27  - reduce home Synthroid dose from 200mcg to 175mcg, will need repeat tsh level as outpatient    DM II  - hold metformin while inpatient  - SSI only for now    CKD  - Cr currently 1.9 and previous Cr on 6/29/2019 was 1.2  - may need diuretics adjusted, will defer to cardiology      DVT prophylaxis - SCDs for now      ALEX Bragg  08/13/19  9:47 AM

## 2019-08-14 ENCOUNTER — ANESTHESIA (OUTPATIENT)
Dept: CARDIOLOGY | Facility: HOSPITAL | Age: 44
End: 2019-08-14

## 2019-08-14 ENCOUNTER — APPOINTMENT (OUTPATIENT)
Dept: NUCLEAR MEDICINE | Facility: HOSPITAL | Age: 44
End: 2019-08-14

## 2019-08-14 LAB
ANION GAP SERPL CALCULATED.3IONS-SCNC: 12.7 MMOL/L (ref 5–15)
BASOPHILS # BLD AUTO: 0 10*3/MM3 (ref 0–0.2)
BASOPHILS NFR BLD AUTO: 0.4 % (ref 0–1.5)
BH CV ECHO MEAS - EF(MOD-BP): 36 %
BH CV ECHO MEAS - LA DIMENSION(2D): 3.4 CM
BUN BLD-MCNC: 28 MG/DL (ref 8–20)
BUN/CREAT SERPL: 23.3 (ref 5.4–26.2)
CALCIUM SPEC-SCNC: 8.9 MG/DL (ref 8.9–10.3)
CHLORIDE SERPL-SCNC: 106 MMOL/L (ref 101–111)
CO2 SERPL-SCNC: 22 MMOL/L (ref 22–32)
CREAT BLD-MCNC: 1.2 MG/DL (ref 0.4–1)
DEPRECATED RDW RBC AUTO: 43.3 FL (ref 37–54)
EOSINOPHIL # BLD AUTO: 0.1 10*3/MM3 (ref 0–0.4)
EOSINOPHIL NFR BLD AUTO: 2.1 % (ref 0.3–6.2)
ERYTHROCYTE [DISTWIDTH] IN BLOOD BY AUTOMATED COUNT: 13.8 % (ref 12.3–15.4)
GFR SERPL CREATININE-BSD FRML MDRD: 59 ML/MIN/1.73
GLUCOSE BLD-MCNC: 101 MG/DL (ref 65–99)
GLUCOSE BLDC GLUCOMTR-MCNC: 153 MG/DL (ref 70–105)
GLUCOSE BLDC GLUCOMTR-MCNC: 86 MG/DL (ref 70–105)
GLUCOSE BLDC GLUCOMTR-MCNC: 97 MG/DL (ref 70–105)
HCT VFR BLD AUTO: 43.3 % (ref 34–46.6)
HGB BLD-MCNC: 14.3 G/DL (ref 12–15.9)
LV EF 2D ECHO EST: 35 %
LYMPHOCYTES # BLD AUTO: 2.9 10*3/MM3 (ref 0.7–3.1)
LYMPHOCYTES NFR BLD AUTO: 43.7 % (ref 19.6–45.3)
MAXIMAL PREDICTED HEART RATE: 174 BPM
MCH RBC QN AUTO: 29.5 PG (ref 26.6–33)
MCHC RBC AUTO-ENTMCNC: 32.9 G/DL (ref 31.5–35.7)
MCV RBC AUTO: 89.6 FL (ref 79–97)
MONOCYTES # BLD AUTO: 0.7 10*3/MM3 (ref 0.1–0.9)
MONOCYTES NFR BLD AUTO: 10.6 % (ref 5–12)
NEUTROPHILS # BLD AUTO: 2.9 10*3/MM3 (ref 1.7–7)
NEUTROPHILS NFR BLD AUTO: 43.2 % (ref 42.7–76)
NRBC BLD AUTO-RTO: 0.2 /100 WBC (ref 0–0.2)
PLATELET # BLD AUTO: 158 10*3/MM3 (ref 140–450)
PMV BLD AUTO: 8.1 FL (ref 6–12)
POTASSIUM BLD-SCNC: 3.7 MMOL/L (ref 3.6–5.1)
RBC # BLD AUTO: 4.84 10*6/MM3 (ref 3.77–5.28)
SODIUM BLD-SCNC: 137 MMOL/L (ref 136–144)
STRESS TARGET HR: 148 BPM
TROPONIN I SERPL-MCNC: 0.05 NG/ML (ref 0–0.03)
WBC NRBC COR # BLD: 6.6 10*3/MM3 (ref 3.4–10.8)

## 2019-08-14 PROCEDURE — 25010000002 FENTANYL CITRATE (PF) 100 MCG/2ML SOLUTION: Performed by: ANESTHESIOLOGIST ASSISTANT

## 2019-08-14 PROCEDURE — 93640 EP EVAL 1/2CHMBR PACG CVDFB: CPT | Performed by: INTERNAL MEDICINE

## 2019-08-14 PROCEDURE — 25010000002 PROPOFOL 1000 MG/ML EMULSION: Performed by: ANESTHESIOLOGIST ASSISTANT

## 2019-08-14 PROCEDURE — 82962 GLUCOSE BLOOD TEST: CPT

## 2019-08-14 PROCEDURE — 85025 COMPLETE CBC W/AUTO DIFF WBC: CPT | Performed by: PHYSICIAN ASSISTANT

## 2019-08-14 PROCEDURE — G0378 HOSPITAL OBSERVATION PER HR: HCPCS

## 2019-08-14 PROCEDURE — 63710000001 INSULIN LISPRO (HUMAN) PER 5 UNITS: Performed by: PHYSICIAN ASSISTANT

## 2019-08-14 PROCEDURE — 25010000002 MORPHINE PER 10 MG: Performed by: INTERNAL MEDICINE

## 2019-08-14 PROCEDURE — 99225 PR SBSQ OBSERVATION CARE/DAY 25 MINUTES: CPT | Performed by: HOSPITALIST

## 2019-08-14 PROCEDURE — 25010000002 VANCOMYCIN 1 G RECONSTITUTED SOLUTION 1 EACH VIAL: Performed by: INTERNAL MEDICINE

## 2019-08-14 PROCEDURE — 25010000002 VANCOMYCIN 1 G RECONSTITUTED SOLUTION: Performed by: ANESTHESIOLOGIST ASSISTANT

## 2019-08-14 PROCEDURE — 33215 REPOSITION PACING-DEFIB LEAD: CPT | Performed by: INTERNAL MEDICINE

## 2019-08-14 PROCEDURE — 80048 BASIC METABOLIC PNL TOTAL CA: CPT | Performed by: PHYSICIAN ASSISTANT

## 2019-08-14 PROCEDURE — 84484 ASSAY OF TROPONIN QUANT: CPT | Performed by: PHYSICIAN ASSISTANT

## 2019-08-14 PROCEDURE — 25010000002 MIDAZOLAM PER 1 MG: Performed by: ANESTHESIOLOGIST ASSISTANT

## 2019-08-14 PROCEDURE — 25010000002 PROPOFOL 10 MG/ML EMULSION: Performed by: ANESTHESIOLOGIST ASSISTANT

## 2019-08-14 DEVICE — THE CANGAROO® ENVELOPE IS INTENDED TO SECURELY HOLD A CARDIAC IMPLANTABLE ELECTRONIC DEVICE OR AN IMPLANTABLE NEUROSTIMULATOR TO CREATE A STABLE ENVIRONMENT WHEN IMPLANTED IN THE BODY. THE CARDIAC IMPLANTABLE ELECTRONIC DEVICES THAT MAY BE USED WITH THE CANGAROO® ENVELOPE INCLUDE PACEMAKER PULSE GENERATORS, DEFIBRILLATORS, OR OTHER CARDIAC IMPLANTABLE ELECTRONIC DEVICES. THE IMPLANTABLE NEUROSTIMULATOR DEVICES THAT MAY BE USED WITH THE CANGAROO® ENVELOPE INCLUDE VAGUS NERVE STIMULATORS, SPINAL CORDNEUROMODULATORS, DEEP BRAIN STIMULATORS AND SACRAL NERVE STIMULATORS.
Type: IMPLANTABLE DEVICE | Status: FUNCTIONAL
Brand: CANGAROO ENVELOPE

## 2019-08-14 RX ORDER — EPHEDRINE SULFATE 50 MG/ML
5 INJECTION, SOLUTION INTRAVENOUS ONCE AS NEEDED
Status: DISCONTINUED | OUTPATIENT
Start: 2019-08-14 | End: 2019-08-16 | Stop reason: HOSPADM

## 2019-08-14 RX ORDER — MORPHINE SULFATE 4 MG/ML
1 INJECTION, SOLUTION INTRAMUSCULAR; INTRAVENOUS EVERY 4 HOURS PRN
Status: DISCONTINUED | OUTPATIENT
Start: 2019-08-14 | End: 2019-08-16 | Stop reason: HOSPADM

## 2019-08-14 RX ORDER — VANCOMYCIN HYDROCHLORIDE 1 G/20ML
INJECTION, POWDER, LYOPHILIZED, FOR SOLUTION INTRAVENOUS AS NEEDED
Status: DISCONTINUED | OUTPATIENT
Start: 2019-08-14 | End: 2019-08-14 | Stop reason: SURG

## 2019-08-14 RX ORDER — FUROSEMIDE 40 MG/1
40 TABLET ORAL DAILY
Status: DISCONTINUED | OUTPATIENT
Start: 2019-08-14 | End: 2019-08-16 | Stop reason: HOSPADM

## 2019-08-14 RX ORDER — ALBUTEROL SULFATE 2.5 MG/3ML
2.5 SOLUTION RESPIRATORY (INHALATION) ONCE AS NEEDED
Status: DISCONTINUED | OUTPATIENT
Start: 2019-08-14 | End: 2019-08-16 | Stop reason: HOSPADM

## 2019-08-14 RX ORDER — PROPOFOL 10 MG/ML
VIAL (ML) INTRAVENOUS AS NEEDED
Status: DISCONTINUED | OUTPATIENT
Start: 2019-08-14 | End: 2019-08-14 | Stop reason: SURG

## 2019-08-14 RX ORDER — LIDOCAINE HYDROCHLORIDE AND EPINEPHRINE 10; 10 MG/ML; UG/ML
INJECTION, SOLUTION INFILTRATION; PERINEURAL AS NEEDED
Status: DISCONTINUED | OUTPATIENT
Start: 2019-08-14 | End: 2019-08-14 | Stop reason: HOSPADM

## 2019-08-14 RX ORDER — DIPHENHYDRAMINE HYDROCHLORIDE 50 MG/ML
12.5 INJECTION INTRAMUSCULAR; INTRAVENOUS
Status: DISCONTINUED | OUTPATIENT
Start: 2019-08-14 | End: 2019-08-16 | Stop reason: HOSPADM

## 2019-08-14 RX ORDER — LIDOCAINE HYDROCHLORIDE 10 MG/ML
INJECTION, SOLUTION EPIDURAL; INFILTRATION; INTRACAUDAL; PERINEURAL AS NEEDED
Status: DISCONTINUED | OUTPATIENT
Start: 2019-08-14 | End: 2019-08-14 | Stop reason: SURG

## 2019-08-14 RX ORDER — FENTANYL CITRATE 50 UG/ML
INJECTION, SOLUTION INTRAMUSCULAR; INTRAVENOUS AS NEEDED
Status: DISCONTINUED | OUTPATIENT
Start: 2019-08-14 | End: 2019-08-14 | Stop reason: SURG

## 2019-08-14 RX ORDER — LABETALOL HYDROCHLORIDE 5 MG/ML
5 INJECTION, SOLUTION INTRAVENOUS
Status: DISCONTINUED | OUTPATIENT
Start: 2019-08-14 | End: 2019-08-16 | Stop reason: HOSPADM

## 2019-08-14 RX ORDER — MEPERIDINE HYDROCHLORIDE 25 MG/ML
12.5 INJECTION INTRAMUSCULAR; INTRAVENOUS; SUBCUTANEOUS
Status: ACTIVE | OUTPATIENT
Start: 2019-08-14 | End: 2019-08-15

## 2019-08-14 RX ORDER — NALOXONE HCL 0.4 MG/ML
0.4 VIAL (ML) INJECTION
Status: DISCONTINUED | OUTPATIENT
Start: 2019-08-14 | End: 2019-08-16 | Stop reason: HOSPADM

## 2019-08-14 RX ORDER — KETAMINE HYDROCHLORIDE 10 MG/ML
INJECTION INTRAMUSCULAR; INTRAVENOUS AS NEEDED
Status: DISCONTINUED | OUTPATIENT
Start: 2019-08-14 | End: 2019-08-14 | Stop reason: SURG

## 2019-08-14 RX ORDER — HYDRALAZINE HYDROCHLORIDE 20 MG/ML
5 INJECTION INTRAMUSCULAR; INTRAVENOUS
Status: DISCONTINUED | OUTPATIENT
Start: 2019-08-14 | End: 2019-08-16 | Stop reason: HOSPADM

## 2019-08-14 RX ORDER — MIDAZOLAM HYDROCHLORIDE 1 MG/ML
1 INJECTION INTRAMUSCULAR; INTRAVENOUS
Status: DISCONTINUED | OUTPATIENT
Start: 2019-08-14 | End: 2019-08-16 | Stop reason: HOSPADM

## 2019-08-14 RX ORDER — ONDANSETRON 2 MG/ML
4 INJECTION INTRAMUSCULAR; INTRAVENOUS EVERY 6 HOURS PRN
Status: DISCONTINUED | OUTPATIENT
Start: 2019-08-14 | End: 2019-08-14

## 2019-08-14 RX ORDER — MIDAZOLAM HYDROCHLORIDE 1 MG/ML
INJECTION INTRAMUSCULAR; INTRAVENOUS AS NEEDED
Status: DISCONTINUED | OUTPATIENT
Start: 2019-08-14 | End: 2019-08-14 | Stop reason: SURG

## 2019-08-14 RX ORDER — SPIRONOLACTONE 25 MG/1
25 TABLET ORAL DAILY
Status: DISCONTINUED | OUTPATIENT
Start: 2019-08-14 | End: 2019-08-16 | Stop reason: HOSPADM

## 2019-08-14 RX ORDER — GLYCOPYRROLATE 0.2 MG/ML
INJECTION INTRAMUSCULAR; INTRAVENOUS AS NEEDED
Status: DISCONTINUED | OUTPATIENT
Start: 2019-08-14 | End: 2019-08-14 | Stop reason: SURG

## 2019-08-14 RX ORDER — SODIUM CHLORIDE 9 MG/ML
INJECTION, SOLUTION INTRAVENOUS CONTINUOUS PRN
Status: DISCONTINUED | OUTPATIENT
Start: 2019-08-14 | End: 2019-08-14 | Stop reason: SURG

## 2019-08-14 RX ADMIN — MORPHINE SULFATE 1 MG: 4 INJECTION INTRAVENOUS at 20:45

## 2019-08-14 RX ADMIN — LIDOCAINE HYDROCHLORIDE 50 MG: 10 INJECTION, SOLUTION EPIDURAL; INFILTRATION; INTRACAUDAL; PERINEURAL at 07:59

## 2019-08-14 RX ADMIN — VANCOMYCIN HYDROCHLORIDE 1 G: 1 INJECTION, POWDER, LYOPHILIZED, FOR SOLUTION INTRAVENOUS at 07:59

## 2019-08-14 RX ADMIN — MIDAZOLAM 2 MG: 1 INJECTION INTRAMUSCULAR; INTRAVENOUS at 07:59

## 2019-08-14 RX ADMIN — SPIRONOLACTONE 25 MG: 25 TABLET ORAL at 16:41

## 2019-08-14 RX ADMIN — Medication 3 ML: at 20:59

## 2019-08-14 RX ADMIN — HYDRALAZINE HYDROCHLORIDE 50 MG: 25 TABLET, FILM COATED ORAL at 20:00

## 2019-08-14 RX ADMIN — KETAMINE HYDROCHLORIDE 50 MG: 10 INJECTION INTRAMUSCULAR; INTRAVENOUS at 08:41

## 2019-08-14 RX ADMIN — PROPOFOL 80 MG: 10 INJECTION, EMULSION INTRAVENOUS at 08:00

## 2019-08-14 RX ADMIN — FUROSEMIDE 40 MG: 40 TABLET ORAL at 12:05

## 2019-08-14 RX ADMIN — PROPOFOL 80 MCG/KG/MIN: 10 INJECTION, EMULSION INTRAVENOUS at 08:01

## 2019-08-14 RX ADMIN — OXYCODONE HYDROCHLORIDE 7.5 MG: 5 TABLET ORAL at 12:05

## 2019-08-14 RX ADMIN — FENTANYL CITRATE 50 MCG: 50 INJECTION, SOLUTION INTRAMUSCULAR; INTRAVENOUS at 08:44

## 2019-08-14 RX ADMIN — INSULIN LISPRO 2 UNITS: 100 INJECTION, SOLUTION INTRAVENOUS; SUBCUTANEOUS at 20:57

## 2019-08-14 RX ADMIN — CARVEDILOL 12.5 MG: 6.25 TABLET, FILM COATED ORAL at 19:02

## 2019-08-14 RX ADMIN — GLYCOPYRROLATE 0.2 MG: 0.2 INJECTION, SOLUTION INTRAMUSCULAR; INTRAVENOUS at 08:33

## 2019-08-14 RX ADMIN — HYDRALAZINE HYDROCHLORIDE 50 MG: 25 TABLET, FILM COATED ORAL at 16:41

## 2019-08-14 RX ADMIN — SODIUM CHLORIDE 1000 MG: 900 INJECTION, SOLUTION INTRAVENOUS at 07:44

## 2019-08-14 RX ADMIN — ATORVASTATIN CALCIUM 40 MG: 40 TABLET, FILM COATED ORAL at 20:00

## 2019-08-14 RX ADMIN — OXYCODONE HYDROCHLORIDE 7.5 MG: 5 TABLET ORAL at 00:20

## 2019-08-14 RX ADMIN — MORPHINE SULFATE 1 MG: 4 INJECTION INTRAVENOUS at 16:41

## 2019-08-14 RX ADMIN — SODIUM CHLORIDE: 0.9 INJECTION, SOLUTION INTRAVENOUS at 07:59

## 2019-08-14 RX ADMIN — KETAMINE HYDROCHLORIDE 50 MG: 10 INJECTION INTRAMUSCULAR; INTRAVENOUS at 08:01

## 2019-08-14 RX ADMIN — OXYCODONE HYDROCHLORIDE 7.5 MG: 5 TABLET ORAL at 19:59

## 2019-08-14 RX ADMIN — FENTANYL CITRATE 50 MCG: 50 INJECTION, SOLUTION INTRAMUSCULAR; INTRAVENOUS at 07:59

## 2019-08-14 NOTE — PLAN OF CARE
Problem: Patient Care Overview  Goal: Plan of Care Review  Outcome: Ongoing (interventions implemented as appropriate)   08/14/19 0137   Coping/Psychosocial   Plan of Care Reviewed With patient   Plan of Care Review   Progress improving   OTHER   Outcome Summary Pt. c/o back pain last night, gave roxicodone as ordered, no other complaints, pt. rested well throughout night, NPO at [facility] for pacer revision surgery, will continue to monitor        Problem: Cardiac Rhythm Management Device (Adult)  Goal: Signs and Symptoms of Listed Potential Problems Will be Absent, Minimized or Managed (Cardiac Rhythm Management Device)  Outcome: Ongoing (interventions implemented as appropriate)      Problem: Fall Risk (Adult)  Goal: Identify Related Risk Factors and Signs and Symptoms  Outcome: Outcome(s) achieved Date Met: 08/14/19    Goal: Absence of Fall  Outcome: Ongoing (interventions implemented as appropriate)

## 2019-08-14 NOTE — ANESTHESIA PREPROCEDURE EVALUATION
Anesthesia Evaluation     Patient summary reviewed and Nursing notes reviewed   NPO Solid Status: > 8 hours  NPO Liquid Status: > 8 hours           Airway   Mallampati: II  TM distance: >3 FB  Neck ROM: full  No difficulty expected  Dental      Pulmonary - negative pulmonary ROS and normal exam   Cardiovascular - negative cardio ROS    Rhythm: regular  Rate: normal        Neuro/Psych- negative ROS  GI/Hepatic/Renal/Endo - negative ROS     Musculoskeletal (-) negative ROS    Abdominal  - normal exam   Substance History - negative use     OB/GYN negative ob/gyn ROS         Other                        Anesthesia Plan    ASA 4     MAC     intravenous induction   Anesthetic plan, all risks, benefits, and alternatives have been provided, discussed and informed consent has been obtained with: patient.    Plan discussed with CAA.

## 2019-08-14 NOTE — ANESTHESIA POSTPROCEDURE EVALUATION
Patient: Rafael Mccann    Procedure Summary     Date:  08/14/19 Room / Location:  [place] CATH LAB 3 / [facility] CATH INVASIVE LOCATION    Anesthesia Start:  0752 Anesthesia Stop:  0947    Procedure:  Lead Revision (Left ) Diagnosis:       ICD (implantable cardioverter-defibrillator), dual, in situ      Cardiomyopathy, dilated (CMS/HCC)      Displacement of atrial pacemaker leads, initial encounter      (Atrial lead repositioning and DFT testing)    Provider:  Héctor Eckert MD Provider:  Amaris Stapleton MD    Anesthesia Type:  MAC ASA Status:  4          Anesthesia Type: MAC  Last vitals  BP   170/88 (08/14/19 1015)   Temp   97.5 °F (36.4 °C) (08/14/19 0748)   Pulse   102 (08/14/19 1030)   Resp   14 (08/14/19 0748)     SpO2   (!) 89 % (08/14/19 1030)     Post Anesthesia Care and Evaluation    Patient location during evaluation: PACU  Patient participation: complete - patient participated  Level of consciousness: awake  Pain score: 0 (See nurse's notes for pain score)  Pain management: adequate  Airway patency: patent  Anesthetic complications: No anesthetic complications  PONV Status: none  Cardiovascular status: acceptable  Respiratory status: acceptable  Hydration status: acceptable    Comments: Patient seen and examined postoperatively; vital signs stable; SpO2 greater than or equal to 90%; cardiopulmonary status stable; nausea/vomiting adequately controlled; pain adequately controlled; no apparent anesthesia complications; patient discharged from anesthesia care when discharge criteria were met

## 2019-08-14 NOTE — PROGRESS NOTES
Discharge Planning Assessment   [place]     Patient Name: Rafael Mccann  MRN: [id number]  Today's Date: 8/14/2019    Admit Date: 8/13/2019    Discharge Needs Assessment     Row Name 08/14/19 1453       Living Environment    Lives With  child(jasbir), adult    Current Living Arrangements  home/apartment/condo    Primary Care Provided by  self    Able to Return to Prior Arrangements  yes       Resource/Environmental Concerns    Resource/Environmental Concerns  none    Transportation Concerns  car, none       Transition Planning    Patient/Family Anticipates Transition to  home    Patient/Family Anticipated Services at Transition  none    Transportation Anticipated  car, drives self;family or friend will provide       Discharge Needs Assessment    Concerns to be Addressed  no discharge needs identified    Equipment Currently Used at Home  bipap/cpap;oxygen Prior ro surgery states she was using oxygen cont at 2 L per [employer]     Anticipated Changes Related to Illness  none        Discharge Plan     Row Name 08/14/19 1456       Plan    Plan  Routine d/c to home                Demographic Summary     Row Name 08/14/19 1453       General Information    Admission Type  observation    Arrived From  emergency department    Required Notices Provided  Observation Status Notice    Referral Source  admission list    Reason for Consult  discharge planning    Preferred Language  English        Functional Status     Row Name 08/14/19 1453       Functional Status, IADL    Medications  independent    Meal Preparation  independent    Housekeeping  independent    Laundry  independent    Shopping  independent        D/C Barriers - None       Eugenia Ray RN

## 2019-08-14 NOTE — PLAN OF CARE
Problem: Patient Care Overview  Goal: Plan of Care Review  Outcome: Ongoing (interventions implemented as appropriate)   08/14/19 0250   OTHER   Outcome Summary Patient arrived to unit at 1056 after having her atrial lead revised. Patient complained of pain at the incision site. Left arm is in sling. Patient is currently on bedrest until the following morning.      Goal: Individualization and Mutuality  Outcome: Ongoing (interventions implemented as appropriate)    Goal: Discharge Needs Assessment  Outcome: Ongoing (interventions implemented as appropriate)    Goal: Interprofessional Rounds/Family Conf  Outcome: Ongoing (interventions implemented as appropriate)      Problem: Cardiac Rhythm Management Device (Adult)  Goal: Signs and Symptoms of Listed Potential Problems Will be Absent, Minimized or Managed (Cardiac Rhythm Management Device)  Outcome: Ongoing (interventions implemented as appropriate)      Problem: Fall Risk (Adult)  Goal: Absence of Fall  Outcome: Ongoing (interventions implemented as appropriate)

## 2019-08-15 LAB
ANION GAP SERPL CALCULATED.3IONS-SCNC: 16.8 MMOL/L (ref 5–15)
BASOPHILS # BLD AUTO: 0 10*3/MM3 (ref 0–0.2)
BASOPHILS NFR BLD AUTO: 0.4 % (ref 0–1.5)
BUN BLD-MCNC: 20 MG/DL (ref 8–20)
BUN/CREAT SERPL: 16.7 (ref 5.4–26.2)
CALCIUM SPEC-SCNC: 8.9 MG/DL (ref 8.9–10.3)
CHLORIDE SERPL-SCNC: 102 MMOL/L (ref 101–111)
CO2 SERPL-SCNC: 23 MMOL/L (ref 22–32)
CREAT BLD-MCNC: 1.2 MG/DL (ref 0.4–1)
DEPRECATED RDW RBC AUTO: 43.3 FL (ref 37–54)
EOSINOPHIL # BLD AUTO: 0.1 10*3/MM3 (ref 0–0.4)
EOSINOPHIL NFR BLD AUTO: 1.6 % (ref 0.3–6.2)
ERYTHROCYTE [DISTWIDTH] IN BLOOD BY AUTOMATED COUNT: 13.7 % (ref 12.3–15.4)
GFR SERPL CREATININE-BSD FRML MDRD: 59 ML/MIN/1.73
GLUCOSE BLD-MCNC: 126 MG/DL (ref 65–99)
GLUCOSE BLDC GLUCOMTR-MCNC: 112 MG/DL (ref 70–105)
GLUCOSE BLDC GLUCOMTR-MCNC: 112 MG/DL (ref 70–105)
GLUCOSE BLDC GLUCOMTR-MCNC: 125 MG/DL (ref 70–105)
GLUCOSE BLDC GLUCOMTR-MCNC: 94 MG/DL (ref 70–105)
HCT VFR BLD AUTO: 43.7 % (ref 34–46.6)
HGB BLD-MCNC: 14.3 G/DL (ref 12–15.9)
LYMPHOCYTES # BLD AUTO: 2.4 10*3/MM3 (ref 0.7–3.1)
LYMPHOCYTES NFR BLD AUTO: 31.1 % (ref 19.6–45.3)
MCH RBC QN AUTO: 29.6 PG (ref 26.6–33)
MCHC RBC AUTO-ENTMCNC: 32.7 G/DL (ref 31.5–35.7)
MCV RBC AUTO: 90.5 FL (ref 79–97)
MONOCYTES # BLD AUTO: 0.7 10*3/MM3 (ref 0.1–0.9)
MONOCYTES NFR BLD AUTO: 8.5 % (ref 5–12)
NEUTROPHILS # BLD AUTO: 4.5 10*3/MM3 (ref 1.7–7)
NEUTROPHILS NFR BLD AUTO: 58.4 % (ref 42.7–76)
NRBC BLD AUTO-RTO: 0.1 /100 WBC (ref 0–0.2)
PLATELET # BLD AUTO: 175 10*3/MM3 (ref 140–450)
PMV BLD AUTO: 8.1 FL (ref 6–12)
POTASSIUM BLD-SCNC: 3.8 MMOL/L (ref 3.6–5.1)
RBC # BLD AUTO: 4.83 10*6/MM3 (ref 3.77–5.28)
SODIUM BLD-SCNC: 138 MMOL/L (ref 136–144)
WBC NRBC COR # BLD: 7.8 10*3/MM3 (ref 3.4–10.8)

## 2019-08-15 PROCEDURE — 85025 COMPLETE CBC W/AUTO DIFF WBC: CPT | Performed by: PHYSICIAN ASSISTANT

## 2019-08-15 PROCEDURE — 99225 PR SBSQ OBSERVATION CARE/DAY 25 MINUTES: CPT | Performed by: HOSPITALIST

## 2019-08-15 PROCEDURE — 25010000002 MORPHINE PER 10 MG: Performed by: INTERNAL MEDICINE

## 2019-08-15 PROCEDURE — 82962 GLUCOSE BLOOD TEST: CPT

## 2019-08-15 PROCEDURE — G0378 HOSPITAL OBSERVATION PER HR: HCPCS

## 2019-08-15 PROCEDURE — 80048 BASIC METABOLIC PNL TOTAL CA: CPT | Performed by: PHYSICIAN ASSISTANT

## 2019-08-15 PROCEDURE — 25010000002 VANCOMYCIN 1 G RECONSTITUTED SOLUTION 1 EACH VIAL: Performed by: INTERNAL MEDICINE

## 2019-08-15 PROCEDURE — 99024 POSTOP FOLLOW-UP VISIT: CPT | Performed by: NURSE PRACTITIONER

## 2019-08-15 RX ADMIN — MORPHINE SULFATE 1 MG: 4 INJECTION INTRAVENOUS at 01:32

## 2019-08-15 RX ADMIN — FERROUS SULFATE TAB EC 324 MG (65 MG FE EQUIVALENT) 324 MG: 324 (65 FE) TABLET DELAYED RESPONSE at 08:01

## 2019-08-15 RX ADMIN — LISINOPRIL 20 MG: 20 TABLET ORAL at 08:01

## 2019-08-15 RX ADMIN — Medication 3 ML: at 20:23

## 2019-08-15 RX ADMIN — ASPIRIN 81 MG 81 MG: 81 TABLET ORAL at 08:01

## 2019-08-15 RX ADMIN — OXYCODONE HYDROCHLORIDE 7.5 MG: 5 TABLET ORAL at 06:28

## 2019-08-15 RX ADMIN — FOLIC ACID 1 MG: 1 TABLET ORAL at 08:01

## 2019-08-15 RX ADMIN — Medication 3 ML: at 08:02

## 2019-08-15 RX ADMIN — LEVOTHYROXINE SODIUM 175 MCG: 175 TABLET ORAL at 08:01

## 2019-08-15 RX ADMIN — POTASSIUM CHLORIDE 20 MEQ: 20 TABLET, EXTENDED RELEASE ORAL at 08:01

## 2019-08-15 RX ADMIN — ATORVASTATIN CALCIUM 40 MG: 40 TABLET, FILM COATED ORAL at 20:22

## 2019-08-15 RX ADMIN — SODIUM CHLORIDE 1000 MG: 900 INJECTION, SOLUTION INTRAVENOUS at 06:28

## 2019-08-15 RX ADMIN — Medication 3 ML: at 08:06

## 2019-08-15 RX ADMIN — MORPHINE SULFATE 1 MG: 4 INJECTION INTRAVENOUS at 07:06

## 2019-08-15 RX ADMIN — HYDRALAZINE HYDROCHLORIDE 50 MG: 25 TABLET, FILM COATED ORAL at 08:01

## 2019-08-15 RX ADMIN — SPIRONOLACTONE 25 MG: 25 TABLET ORAL at 08:05

## 2019-08-15 RX ADMIN — OXYCODONE HYDROCHLORIDE 7.5 MG: 5 TABLET ORAL at 23:26

## 2019-08-15 RX ADMIN — FUROSEMIDE 40 MG: 40 TABLET ORAL at 08:01

## 2019-08-15 RX ADMIN — CARVEDILOL 12.5 MG: 6.25 TABLET, FILM COATED ORAL at 08:01

## 2019-08-15 RX ADMIN — MELATONIN 1000 UNITS: at 08:01

## 2019-08-15 RX ADMIN — HYDRALAZINE HYDROCHLORIDE 50 MG: 25 TABLET, FILM COATED ORAL at 20:22

## 2019-08-15 NOTE — PROGRESS NOTES
"Hospitalist Team  0      Patient Care Team:  Phani Adames MD as PCP - General (Internal Medicine)      Chief Complaint / Subjective  Awake, denies for any new complaint, no nausea or vomiting.        ROS    Family History   Problem Relation Age of Onset   • Heart disease Father        Past Medical History:   Diagnosis Date   • CHF (congestive heart failure) (CMS/[facility])    • COPD (chronic obstructive pulmonary disease) (CMS/[facility])    • Diabetes (CMS/[facility])    • Hyperlipidemia    • Hypertension        Social History     Socioeconomic History   • Marital status:      Spouse name: Not on file   • Number of children: Not on file   • Years of education: Not on file   • Highest education level: Not on file   Tobacco Use   • Smoking status: Former Smoker     Last attempt to quit: 2019     Years since quittin.6   • Smokeless tobacco: Never Used   Substance and Sexual Activity   • Alcohol use: No     Frequency: Never   • Drug use: No   • Sexual activity: Defer           Objective      Vital Signs  Temp:  [97.3 °F (36.3 °C)-98.8 °F (37.1 °C)] 97.6 °F (36.4 °C)  Heart Rate:  [] 96  Resp:  [9-28] 16  BP: ()/(56-81) 105/71  Oxygen Therapy  SpO2: 98 %  Pulse Oximetry Type: Continuous  Device (Oxygen Therapy): room air  Flow (L/min): 0  Oximetry Probe Site Changed: No  Flowsheet Rows      First Filed Value   Admission Height  170.2 cm (67\") Documented at 2019 0134   Admission Weight  78.2 kg (172 lb 6.4 oz) Documented at 2019 0134        Intake & Output (last 3 days)        07 -  0700  07 - 08/15 0700 08/15 07 -  0700    P.O. 480 480 120    I.V. (mL/kg)  300 (3.6)     Total Intake(mL/kg) 480 (6) 780 (9.3) 120 (1.4)    Net +480 +780 +120           Urine Unmeasured Occurrence  1 x         Lines, Drains & Airways    Active LDAs     Name:   Placement date:   Placement time:   Site:   Days:    Peripheral IV 19 Right Antecubital   19    " Antecubital   less than 1    Peripheral IV 08/14/19 0748 Anterior;Left Forearm   08/14/19    0748    Forearm   less than 1                Physical Exam:    Physical Exam   Constitutional: She is oriented to person, place, and time. She appears well-developed and well-nourished. No distress.   HENT:   Head: Normocephalic and atraumatic.   Right Ear: External ear normal.   Left Ear: External ear normal.   Nose: Nose normal.   Mouth/Throat: Oropharynx is clear and moist. No oropharyngeal exudate.   Eyes: Conjunctivae and EOM are normal. Pupils are equal, round, and reactive to light. Right eye exhibits no discharge. Left eye exhibits no discharge. No scleral icterus.   Neck: Normal range of motion. No JVD present. No tracheal deviation present. No thyromegaly present.   Cardiovascular: Normal rate, regular rhythm, normal heart sounds and intact distal pulses. Exam reveals no gallop and no friction rub.   No murmur heard.  Pulmonary/Chest: Effort normal and breath sounds normal. No stridor. No respiratory distress. She has no wheezes. She has no rales. She exhibits no tenderness.   Abdominal: Soft. Bowel sounds are normal. She exhibits no distension and no mass. There is no tenderness. There is no rebound and no guarding. No hernia.   Musculoskeletal: Normal range of motion. She exhibits no edema, tenderness or deformity.   Lymphadenopathy:     She has no cervical adenopathy.   Neurological: She is alert and oriented to person, place, and time. No cranial nerve deficit or sensory deficit. She exhibits normal muscle tone. Coordination normal.   Skin: Skin is warm and dry. No rash noted. She is not diaphoretic. No erythema.   Psychiatric: She has a normal mood and affect. Her behavior is normal.   Nursing note and vitals reviewed.        Procedures:    Procedure(s):  Lead Revision    Assessment / Plan      -Atrial migration with phrenic nerve stimulation s/p RA lead revision 8/14/19 by Dr. Eckert----will discharge once  cleared by EP service.       Hypokalemia  - replacement protocol      Dilated cardiomyopathy/systolic heart failure EF 30%, MR, AR s/p AICD 6/2019   - cont home aspirin, BB, ACE-I, statin  - status post revision of atrial lead tomorrow with Marvinliudmila     COPD  - stable not in exacerbation     Hypertension  - stable. Cont home coreg, lisinopril, hydralazine, Lasix     Hyperlipidemia  - cont home statin     Hypothyroidism  - TSH 0.27  - reduce home Synthroid dose from 200mcg to 175mcg, will need repeat tsh level as outpatient     DM II  - hold metformin while inpatient  - SSI only for now     CKD  - Cr currently 1.9 and previous Cr on 6/29/2019 was 1.2  - may need diuretics adjusted, will defer to cardiology       DVT prophylaxis - SCDs for now          Results Review:     I reviewed the patient's new clinical results.    Results from last 7 days   Lab Units 08/15/19  0356 08/14/19  0337 08/13/19 0216   WBC 10*3/mm3 7.80 6.60 6.10   HEMOGLOBIN g/dL 14.3 14.3 15.0   HEMATOCRIT % 43.7 43.3 45.2   PLATELETS 10*3/mm3 175 158 174     Results from last 7 days   Lab Units 08/15/19  0356 08/14/19  0337 08/13/19 0216   SODIUM mmol/L 138 137 137   POTASSIUM mmol/L 3.8 3.7 3.1*   CHLORIDE mmol/L 102 106 99*   CO2 mmol/L 23.0 22.0 26.0   BUN mg/dL 20 28* 25*   CREATININE mg/dL 1.20* 1.20* 1.90*   CALCIUM mg/dL 8.9 8.9 9.0   BILIRUBIN mg/dL  --   --  0.8   ALK PHOS U/L  --   --  61   ALT (SGPT) U/L  --   --  31   AST (SGOT) U/L  --   --  33   GLUCOSE mg/dL 126* 101* 89     Results from last 7 days   Lab Units 08/13/19 0216   MAGNESIUM mg/dL 1.9     Lab Results   Component Value Date    CALCIUM 8.9 08/15/2019    PHOS 4.4 06/30/2019     Hemoglobin A1C   Date Value Ref Range Status   08/13/2019 6.2 (H) 3.5 - 5.6 % Final     Results from last 7 days   Lab Units 08/13/19  0216   INR  1.06               Microbiology Results (last 10 days)     ** No results found for the last 240 hours. **          ECG/EMG Results (most recent)      Procedure Component Value Units Date/Time    ECG 12 Lead [[id number]] Collected:  08/13/19 0141     Updated:  08/14/19 0623    Narrative:       HEART RATE= 78  bpm  RR Interval= 768  ms  LA Interval= 173  ms  P Horizontal Axis= -13  deg  P Front Axis= 54  deg  QRSD Interval= 107  ms  QT Interval= 411  ms  QRS Axis= -27  deg  T Wave Axis= 93  deg  - ABNORMAL ECG -  Sinus rhythm  Probable left atrial enlargement  Left ventricular hypertrophy  Anterior Q waves, possibly due to LVH  Nonspecific T abnormalities, lateral leads  Electronically Signed By: Mukesh Mcmahan ([place]) 14-Aug-2019 06:23:20  Date and Time of Study: 2019-08-13 01:41:12    EP/CRM Study [[id number]] Resulted:  08/14/19 0942     Updated:  08/14/19 0946    Narrative:       Procedure indication--severe dilated cardiomyopathy with LV ejection   fraction below 30%, compensated class 3 systolic heart failure,  sinus   bradycardia--patient went dual-chamber ICD few weeks ago and was noted to   have proximal migration of atrial lead and brought in for atrial lead   revision      1 g of vancomycin  before procedure and sedation by anesthesia     Procedures done  1.  Repositioning of the right atrial lead   2.  Defibrillation threshold testing  3. Lead testing and fluoroscopy      Procedure note  After obtaining a valid consent patient was draped in sterile fashion and   incision was infiltrated with local anesthesia--incision was opened and   hemostasis was acquired with cautery and ICD generator brought out of   pocket and the leads were gently dissected and freed from the   sleeve--stylette was placed in the right atrial lead and repositioned into   the right atrium--right atrial sensing was 2.4 mV with a threshold of 0.8   V with impedance of 520 ohms--additional slack was also placed in the   right ventricular lead--anchor sutures were then placed with nonabsorbable   suture on the lead sleeves--pocket was copiously irrigated antibiotic   solution and leads were  reconnected to the generator and the generator and   leads were placed in a pouch called core matrix--ventricular fibrillation   induction was done with a transvenous shock at 21 J without any evidence   of 52 ohms and back to sinus rhythm without any complications and incision   was closed in several layers and a sterile dressing applied without any   complications and final programming of the device attached to chart   Right atrial lead manufactured by [employer] and model #[id number]   Right ventricle lead  [employer] model #[id number] ICD   generator manufactured by [employer] model #[id number]     Plan  Transfer to [facility]  Potential discharge in the morning    Adult Transthoracic Echo Complete W/ Cont if Necessary Per Protocol [[id number]] Resulted:  08/14/19 0956     Updated:  08/14/19 1003     Target HR (85%) 148 bpm      Max. Pred. HR (100%) 174 bpm      EF(MOD-bp) 36.0 %      LA dimension(2D) 3.4 cm      Echo EF Estimated 35 %     Narrative:       · Mild pulmonic valve regurgitation is present.  · The left ventricular cavity is moderately dilated.  · Left ventricular wall thickness is consistent with mild concentric   hypertrophy.  · Estimated EF = 35%.  · Left ventricular systolic function is severely decreased.  · Mild to moderate tricuspid valve regurgitation is present.  · Moderate aortic valve regurgitation is present.  · Mild-to-moderate mitral valve regurgitation is present  · Mild aortic valve stenosis is present.  · Left atrial cavity size is mild-to-moderately dilated.  · There is moderate calcification of the aortic valve.                  Results for orders placed during the hospital encounter of 08/13/19   Adult Transthoracic Echo Complete W/ Cont if Necessary Per Protocol    Narrative · Mild pulmonic valve regurgitation is present.  · The left ventricular cavity is moderately dilated.  · Left ventricular wall thickness is consistent with mild concentric    hypertrophy.  · Estimated EF = 35%.  · Left ventricular systolic function is severely decreased.  · Mild to moderate tricuspid valve regurgitation is present.  · Moderate aortic valve regurgitation is present.  · Mild-to-moderate mitral valve regurgitation is present  · Mild aortic valve stenosis is present.  · Left atrial cavity size is mild-to-moderately dilated.  · There is moderate calcification of the aortic valve.          Xr Chest 1 View    Result Date: 8/13/2019  Mildly limited study demonstrating stable cardiomegaly with no active pulmonary disease.  Electronically Signed By-Shane Moon On:8/13/2019 7:38 AM This report was finalized on [id number] by  Shane Moon, .      Xrays, labs reviewed personally by physician.    Medication Review:   I have reviewed the patient's current medication list    Scheduled Meds    aspirin 81 mg Oral Daily   atorvastatin 40 mg Oral Nightly   carvedilol 12.5 mg Oral BID With Meals   cholecalciferol 1,000 Units Oral Daily   ferrous sulfate 324 mg Oral Daily With Breakfast   folic acid 1 mg Oral Daily   furosemide 40 mg Oral Daily   hydrALAZINE 50 mg Oral TID   insulin lispro 0-7 Units Subcutaneous 4x Daily With Meals & Nightly   levothyroxine 175 mcg Oral Daily   lisinopril 20 mg Oral Daily   potassium chloride 20 mEq Oral Daily   sodium chloride 3 mL Intravenous Q12H   spironolactone 25 mg Oral Daily       Meds Infusions       Meds PRN  •  albuterol  •  aluminum-magnesium hydroxide-simethicone  •  bisacodyl  •  bisacodyl  •  calcium carbonate  •  dextrose  •  dextrose  •  diphenhydrAMINE  •  docusate sodium  •  ePHEDrine  •  glucagon (human recombinant)  •  hydrALAZINE  •  labetalol  •  magnesium hydroxide  •  magnesium sulfate **OR** magnesium sulfate **OR** magnesium sulfate  •  meperidine  •  midazolam  •  Morphine **AND** naloxone  •  ondansetron **OR** ondansetron  •  oxyCODONE  •  potassium chloride  •  potassium chloride  •  sodium chloride        Salgram  MD Jade  08/15/19  7:45 PM

## 2019-08-15 NOTE — PLAN OF CARE
Problem: Patient Care Overview  Goal: Plan of Care Review  Outcome: Ongoing (interventions implemented as appropriate)      Problem: Cardiac Rhythm Management Device (Adult)  Goal: Signs and Symptoms of Listed Potential Problems Will be Absent, Minimized or Managed (Cardiac Rhythm Management Device)  Outcome: Ongoing (interventions implemented as appropriate)      Problem: Fall Risk (Adult)  Goal: Absence of Fall  Outcome: Ongoing (interventions implemented as appropriate)

## 2019-08-15 NOTE — PROGRESS NOTES
"Hospitalist Team  0      Patient Care Team:  Phani Adames MD as PCP - General (Internal Medicine)      Chief Complaint / Subjective  Denies for any new complaint, no nausea or vomiting.       ROS    Family History   Problem Relation Age of Onset   • Heart disease Father        Past Medical History:   Diagnosis Date   • CHF (congestive heart failure) (CMS/[facility])    • COPD (chronic obstructive pulmonary disease) (CMS/[facility])    • Diabetes (CMS/[facility])    • Hyperlipidemia    • Hypertension        Social History     Socioeconomic History   • Marital status:      Spouse name: Not on file   • Number of children: Not on file   • Years of education: Not on file   • Highest education level: Not on file   Tobacco Use   • Smoking status: Former Smoker     Last attempt to quit: 2019     Years since quittin.6   • Smokeless tobacco: Never Used   Substance and Sexual Activity   • Alcohol use: No     Frequency: Never   • Drug use: No   • Sexual activity: Defer           Objective      Vital Signs  Temp:  [96.1 °F (35.6 °C)-98 °F (36.7 °C)] 97.3 °F (36.3 °C)  Heart Rate:  [] 86  Resp:  [12-16] 12  BP: (110-178)/() 113/72  Oxygen Therapy  SpO2: 99 %  Pulse Oximetry Type: Intermittent  Device (Oxygen Therapy): room air  Flow (L/min): 0  Oximetry Probe Site Changed: No  Flowsheet Rows      First Filed Value   Admission Height  170.2 cm (67\") Documented at 2019 0134   Admission Weight  78.2 kg (172 lb 6.4 oz) Documented at 2019 0134        Intake & Output (last 3 days)        07 -  0700  07 -  0700  07 - 08/15 0700    P.O.  480 480    I.V. (mL/kg)   300 (3.7)    Total Intake(mL/kg)  480 (6) 780 (9.7)    Net  +480 +780           Urine Unmeasured Occurrence   1 x        Lines, Drains & Airways    Active LDAs     Name:   Placement date:   Placement time:   Site:   Days:    Peripheral IV 19 Right Antecubital   19    Antecubital   less than 1    " Peripheral IV 08/14/19 0748 Anterior;Left Forearm   08/14/19    0748    Forearm   less than 1                Physical Exam:    Physical Exam   Constitutional: She is oriented to person, place, and time. She appears well-developed and well-nourished. No distress.   HENT:   Head: Normocephalic and atraumatic.   Right Ear: External ear normal.   Left Ear: External ear normal.   Nose: Nose normal.   Mouth/Throat: Oropharynx is clear and moist. No oropharyngeal exudate.   Eyes: Conjunctivae and EOM are normal. Pupils are equal, round, and reactive to light. Right eye exhibits no discharge. Left eye exhibits no discharge. No scleral icterus.   Neck: Normal range of motion. No JVD present. No tracheal deviation present. No thyromegaly present.   Cardiovascular: Normal rate, regular rhythm, normal heart sounds and intact distal pulses. Exam reveals no gallop and no friction rub.   No murmur heard.  Pulmonary/Chest: Effort normal and breath sounds normal. No stridor. No respiratory distress. She has no wheezes. She has no rales. She exhibits no tenderness.   Abdominal: Soft. Bowel sounds are normal. She exhibits no distension and no mass. There is no tenderness. There is no rebound and no guarding. No hernia.   Musculoskeletal: Normal range of motion. She exhibits no edema, tenderness or deformity.   Lymphadenopathy:     She has no cervical adenopathy.   Neurological: She is alert and oriented to person, place, and time. No cranial nerve deficit or sensory deficit. She exhibits normal muscle tone. Coordination normal.   Skin: Skin is warm and dry. No rash noted. She is not diaphoretic. No erythema.   Psychiatric: She has a normal mood and affect. Her behavior is normal.   Nursing note and vitals reviewed.        Procedures:    Procedure(s):  Lead Revision    Assessment / Plan  Palpitations, Chest pain, Dyspnea, Near Syncope  - EKG showed NSR, LVH with likely strain pattern, unchanged from previous per ED report  - troponin  0.03  - CXR showed mildly limited study demonstrating stable cardiomegaly with active pulmonary disease.   - serial troponins negative.  -       Hypokalemia  - replacement protocol      Dilated cardiomyopathy/systolic heart failure EF 30%, MR, AR s/p AICD 6/2019   - cont home aspirin, BB, ACE-I, statin  - status post revision of atrial lead tomorrow with Babar     COPD  - stable not in exacerbation     Hypertension  - stable. Cont home coreg, lisinopril, hydralazine, Lasix     Hyperlipidemia  - cont home statin     Hypothyroidism  - TSH 0.27  - reduce home Synthroid dose from 200mcg to 175mcg, will need repeat tsh level as outpatient     DM II  - hold metformin while inpatient  - SSI only for now     CKD  - Cr currently 1.9 and previous Cr on 6/29/2019 was 1.2  - may need diuretics adjusted, will defer to cardiology       DVT prophylaxis - SCDs for now          Results Review:     I reviewed the patient's new clinical results.    Results from last 7 days   Lab Units 08/14/19  0337 08/13/19  0216   WBC 10*3/mm3 6.60 6.10   HEMOGLOBIN g/dL 14.3 15.0   HEMATOCRIT % 43.3 45.2   PLATELETS 10*3/mm3 158 174     Results from last 7 days   Lab Units 08/14/19  0337 08/13/19  0216   SODIUM mmol/L 137 137   POTASSIUM mmol/L 3.7 3.1*   CHLORIDE mmol/L 106 99*   CO2 mmol/L 22.0 26.0   BUN mg/dL 28* 25*   CREATININE mg/dL 1.20* 1.90*   CALCIUM mg/dL 8.9 9.0   BILIRUBIN mg/dL  --  0.8   ALK PHOS U/L  --  61   ALT (SGPT) U/L  --  31   AST (SGOT) U/L  --  33   GLUCOSE mg/dL 101* 89     Results from last 7 days   Lab Units 08/13/19  0216   MAGNESIUM mg/dL 1.9     Lab Results   Component Value Date    CALCIUM 8.9 08/14/2019    PHOS 4.4 06/30/2019     Hemoglobin A1C   Date Value Ref Range Status   08/13/2019 6.2 (H) 3.5 - 5.6 % Final     Results from last 7 days   Lab Units 08/13/19  0216   INR  1.06               Microbiology Results (last 10 days)     ** No results found for the last 240 hours. **          ECG/EMG Results (most  recent)     Procedure Component Value Units Date/Time    ECG 12 Lead [[id number]] Collected:  08/13/19 0141     Updated:  08/14/19 0623    Narrative:       HEART RATE= 78  bpm  RR Interval= 768  ms  NV Interval= 173  ms  P Horizontal Axis= -13  deg  P Front Axis= 54  deg  QRSD Interval= 107  ms  QT Interval= 411  ms  QRS Axis= -27  deg  T Wave Axis= 93  deg  - ABNORMAL ECG -  Sinus rhythm  Probable left atrial enlargement  Left ventricular hypertrophy  Anterior Q waves, possibly due to LVH  Nonspecific T abnormalities, lateral leads  Electronically Signed By: Mukesh Mcmahan ([place]) 14-Aug-2019 06:23:20  Date and Time of Study: 2019-08-13 01:41:12    EP/CRM Study [[id number]] Resulted:  08/14/19 0942     Updated:  08/14/19 0946    Narrative:       Procedure indication--severe dilated cardiomyopathy with LV ejection   fraction below 30%, compensated class 3 systolic heart failure,  sinus   bradycardia--patient went dual-chamber ICD few weeks ago and was noted to   have proximal migration of atrial lead and brought in for atrial lead   revision      1 g of vancomycin  before procedure and sedation by anesthesia     Procedures done  1.  Repositioning of the right atrial lead   2.  Defibrillation threshold testing  3. Lead testing and fluoroscopy      Procedure note  After obtaining a valid consent patient was draped in sterile fashion and   incision was infiltrated with local anesthesia--incision was opened and   hemostasis was acquired with cautery and ICD generator brought out of   pocket and the leads were gently dissected and freed from the   sleeve--stylette was placed in the right atrial lead and repositioned into   the right atrium--right atrial sensing was 2.4 mV with a threshold of 0.8   V with impedance of 520 ohms--additional slack was also placed in the   right ventricular lead--anchor sutures were then placed with nonabsorbable   suture on the lead sleeves--pocket was copiously irrigated antibiotic   solution and  leads were reconnected to the generator and the generator and   leads were placed in a pouch called core matrix--ventricular fibrillation   induction was done with a transvenous shock at 21 J without any evidence   of 52 ohms and back to sinus rhythm without any complications and incision   was closed in several layers and a sterile dressing applied without any   complications and final programming of the device attached to chart   Right atrial lead manufactured by [employer] and model #[id number]   Right ventricle lead  [employer] model #[id number] ICD   generator manufactured by [employer] model #[id number]     Plan  Transfer to [facility]  Potential discharge in the morning    Adult Transthoracic Echo Complete W/ Cont if Necessary Per Protocol [[id number]] Resulted:  08/14/19 0956     Updated:  08/14/19 1003     Target HR (85%) 148 bpm      Max. Pred. HR (100%) 174 bpm      EF(MOD-bp) 36.0 %      LA dimension(2D) 3.4 cm      Echo EF Estimated 35 %     Narrative:       · Mild pulmonic valve regurgitation is present.  · The left ventricular cavity is moderately dilated.  · Left ventricular wall thickness is consistent with mild concentric   hypertrophy.  · Estimated EF = 35%.  · Left ventricular systolic function is severely decreased.  · Mild to moderate tricuspid valve regurgitation is present.  · Moderate aortic valve regurgitation is present.  · Mild-to-moderate mitral valve regurgitation is present  · Mild aortic valve stenosis is present.  · Left atrial cavity size is mild-to-moderately dilated.  · There is moderate calcification of the aortic valve.                  Results for orders placed during the hospital encounter of 08/13/19   Adult Transthoracic Echo Complete W/ Cont if Necessary Per Protocol    Narrative · Mild pulmonic valve regurgitation is present.  · The left ventricular cavity is moderately dilated.  · Left ventricular wall thickness is consistent with mild concentric    hypertrophy.  · Estimated EF = 35%.  · Left ventricular systolic function is severely decreased.  · Mild to moderate tricuspid valve regurgitation is present.  · Moderate aortic valve regurgitation is present.  · Mild-to-moderate mitral valve regurgitation is present  · Mild aortic valve stenosis is present.  · Left atrial cavity size is mild-to-moderately dilated.  · There is moderate calcification of the aortic valve.          Xr Chest 1 View    Result Date: 8/13/2019  Mildly limited study demonstrating stable cardiomegaly with no active pulmonary disease.  Electronically Signed By-Shane Moon On:8/13/2019 7:38 AM This report was finalized on [id number] by  Shane Moon, .      Xrays, labs reviewed personally by physician.    Medication Review:   I have reviewed the patient's current medication list    Scheduled Meds    aspirin 81 mg Oral Daily   atorvastatin 40 mg Oral Nightly   carvedilol 12.5 mg Oral BID With Meals   cholecalciferol 1,000 Units Oral Daily   ferrous sulfate 324 mg Oral Daily With Breakfast   folic acid 1 mg Oral Daily   furosemide 40 mg Oral Daily   hydrALAZINE 50 mg Oral TID   insulin lispro 0-7 Units Subcutaneous 4x Daily With Meals & Nightly   levothyroxine 175 mcg Oral Daily   lisinopril 20 mg Oral Daily   potassium chloride 20 mEq Oral Daily   sodium chloride 3 mL Intravenous Q12H   sodium chloride 3 mL Intravenous Q12H   spironolactone 25 mg Oral Daily   [START ON 8/15/2019] vancomycin 1,000 mg Intravenous Once       Meds Infusions       Meds PRN  •  albuterol  •  aluminum-magnesium hydroxide-simethicone  •  bisacodyl  •  bisacodyl  •  calcium carbonate  •  dextrose  •  dextrose  •  diphenhydrAMINE  •  docusate sodium  •  ePHEDrine  •  glucagon (human recombinant)  •  hydrALAZINE  •  labetalol  •  magnesium hydroxide  •  magnesium sulfate **OR** magnesium sulfate **OR** magnesium sulfate  •  meperidine  •  midazolam  •  Morphine **AND** naloxone  •  ondansetron **OR** ondansetron  •   oxyCODONE  •  potassium chloride  •  potassium chloride  •  sodium chloride  •  sodium chloride        Meliza Hansen MD  08/14/19  9:12 PM

## 2019-08-15 NOTE — PROGRESS NOTES
" LOS: 0 days   Patient Care Team:  Phani Adames MD as PCP - General (Internal Medicine)    Chief Complaint: Dilated cardiomyopathy status post RA lead revision    Subjective:  Symptoms:  No shortness of breath or chest pain.    Diet:  No nausea or vomiting.    Activity level: Impaired due to weakness.    Pain:  Pain is requiring pain medication and well controlled.          Vital Signs  Temp:  [96.1 °F (35.6 °C)-98.8 °F (37.1 °C)] 98.8 °F (37.1 °C)  Heart Rate:  [] 91  Resp:  [12-16] 14  BP: (110-178)/() 125/81  Body mass index is 28.83 kg/m².    Intake/Output Summary (Last 24 hours) at 8/15/2019 0828  Last data filed at 8/14/2019 1440  Gross per 24 hour   Intake 780 ml   Output --   Net 780 ml     No intake/output data recorded.          08/14/19  0546 08/14/19  1032 08/15/19  0500   Weight: 80.1 kg (176 lb 9.4 oz) 80.6 kg (177 lb 11.1 oz) 83.5 kg (184 lb 1.4 oz)         Objective:  General Appearance:  Comfortable.    Vital signs: (most recent): Blood pressure 98/56, pulse 78, temperature 98.6 °F (37 °C), temperature source Axillary, resp. rate 9, height 170.2 cm (67\"), weight 83.5 kg (184 lb 1.4 oz), SpO2 95 %, not currently breastfeeding.    Lungs:  Normal effort and normal respiratory rate.  Breath sounds clear to auscultation.    Heart: Normal rate.  Regular rhythm.  S1 normal and S2 normal.    Abdomen: Abdomen is soft and non-distended.    Neurological: Patient is alert and oriented to person, place and time.    Skin:  Warm and dry.  (Left chest dressing dry and intact)            Results Review:        WBC WBC   Date Value Ref Range Status   08/15/2019 7.80 3.40 - 10.80 10*3/mm3 Final   08/14/2019 6.60 3.40 - 10.80 10*3/mm3 Final   08/13/2019 6.10 3.40 - 10.80 10*3/mm3 Final      HGB Hemoglobin   Date Value Ref Range Status   08/15/2019 14.3 12.0 - 15.9 g/dL Final   08/14/2019 14.3 12.0 - 15.9 g/dL Final   08/13/2019 15.0 12.0 - 15.9 g/dL Final      HCT Hematocrit   Date Value Ref Range " Status   08/15/2019 43.7 34.0 - 46.6 % Final   08/14/2019 43.3 34.0 - 46.6 % Final   08/13/2019 45.2 34.0 - 46.6 % Final      Platelets Platelets   Date Value Ref Range Status   08/15/2019 175 140 - 450 10*3/mm3 Final   08/14/2019 158 140 - 450 10*3/mm3 Final   08/13/2019 174 140 - 450 10*3/mm3 Final        PT/INR:    Protime   Date Value Ref Range Status   08/13/2019 10.8 9.6 - 11.7 Seconds Final   /  INR   Date Value Ref Range Status   08/13/2019 1.06 0.90 - 1.10 Final       Sodium Sodium   Date Value Ref Range Status   08/15/2019 138 136 - 144 mmol/L Final   08/14/2019 137 136 - 144 mmol/L Final   08/13/2019 137 136 - 144 mmol/L Final      Potassium Potassium   Date Value Ref Range Status   08/15/2019 3.8 3.6 - 5.1 mmol/L Final   08/14/2019 3.7 3.6 - 5.1 mmol/L Final   08/13/2019 3.1 (L) 3.6 - 5.1 mmol/L Final      Chloride Chloride   Date Value Ref Range Status   08/15/2019 102 101 - 111 mmol/L Final   08/14/2019 106 101 - 111 mmol/L Final   08/13/2019 99 (L) 101 - 111 mmol/L Final      Bicarbonate CO2   Date Value Ref Range Status   08/15/2019 23.0 22.0 - 32.0 mmol/L Final   08/14/2019 22.0 22.0 - 32.0 mmol/L Final   08/13/2019 26.0 22.0 - 32.0 mmol/L Final      BUN BUN   Date Value Ref Range Status   08/15/2019 20 8 - 20 mg/dL Final   08/14/2019 28 (H) 8 - 20 mg/dL Final   08/13/2019 25 (H) 8 - 20 mg/dL Final      Creatinine Creatinine   Date Value Ref Range Status   08/15/2019 1.20 (H) 0.40 - 1.00 mg/dL Final   08/14/2019 1.20 (H) 0.40 - 1.00 mg/dL Final   08/13/2019 1.90 (H) 0.40 - 1.00 mg/dL Final      Calcium Calcium   Date Value Ref Range Status   08/15/2019 8.9 8.9 - 10.3 mg/dL Final   08/14/2019 8.9 8.9 - 10.3 mg/dL Final   08/13/2019 9.0 8.9 - 10.3 mg/dL Final      Magnesium Magnesium   Date Value Ref Range Status   08/13/2019 1.9 1.8 - 2.5 mg/dL Final            aspirin 81 mg Oral Daily   atorvastatin 40 mg Oral Nightly   carvedilol 12.5 mg Oral BID With Meals   cholecalciferol 1,000 Units Oral Daily    ferrous sulfate 324 mg Oral Daily With Breakfast   folic acid 1 mg Oral Daily   furosemide 40 mg Oral Daily   hydrALAZINE 50 mg Oral TID   insulin lispro 0-7 Units Subcutaneous 4x Daily With Meals & Nightly   levothyroxine 175 mcg Oral Daily   lisinopril 20 mg Oral Daily   potassium chloride 20 mEq Oral Daily   sodium chloride 3 mL Intravenous Q12H   sodium chloride 3 mL Intravenous Q12H   spironolactone 25 mg Oral Daily            Patient Active Problem List   Diagnosis Code   • COPD (chronic obstructive pulmonary disease) (CMS/[facility]) J44.9   • Hypertension I10   • Cardiomyopathy, dilated (CMS/[facility]) I42.0   • Essential hypertension I10   • Chronic renal impairment N18.9   • ICD (implantable cardioverter-defibrillator), dual, in situ Z95.810   • Displacement of atrial pacemaker leads T82.120A   • Palpitations R00.2       ECHOCARDIOGRAM: 8/13/2019 FELICIA preprocedure: EF 35%, mild to moderate TR, moderate AR, mild to moderate MR, mild to moderately dilated left atrium          Normal sinus rhythm    Assessment & Plan    -Atrial migration with phrenic nerve stimulation s/p RA lead revision 8/14/19 by Dr. Eckert----appropriate device function per interrogation this morning  -Sinus bradycardia, dilated cardiomyopathy, hx dual chamber ICD ([employer])   -Admit with orthostatic hypotension r/t polypharmacy diuresis  -HTN----well controlled on current regimen  -COPD----albuterol prn  -CKD----creatinine stable post procedure  -CAD, chronic RCA disease----last cath 1/2018, Dr. Luna following    Patient complaint of increased drowsiness, states that she had similar issue with initial implant, bedside nurse states that patient has been falling asleep during eating and conversation.  Currently able to carry on a conversation with me, but states that she still is too drowsy.  Will evaluate pain medications.  Okay for discharge this afternoon/evening if level of consciousness has continued to improve.  Patient to follow-up in  our office next week for incision and device check.  Please call for any questions or concerns.    ALEX Bright  08/15/19  8:28 AM  [place], EP cardiology, [phone] (cell)  **All problems new to this practitioner  **EKG independently reviewed and interpreted

## 2019-08-15 NOTE — PLAN OF CARE
Problem: Patient Care Overview  Goal: Plan of Care Review  Outcome: Ongoing (interventions implemented as appropriate)   08/15/19 0535   Coping/Psychosocial   Plan of Care Reviewed With patient   Plan of Care Review   Progress no change   OTHER   Outcome Summary Patient remained stable all night. No overt signs of bleeding noted.     Goal: Individualization and Mutuality  Outcome: Ongoing (interventions implemented as appropriate)    Goal: Discharge Needs Assessment  Outcome: Ongoing (interventions implemented as appropriate)      Problem: Cardiac Rhythm Management Device (Adult)  Goal: Signs and Symptoms of Listed Potential Problems Will be Absent, Minimized or Managed (Cardiac Rhythm Management Device)  Outcome: Ongoing (interventions implemented as appropriate)      Problem: Fall Risk (Adult)  Goal: Absence of Fall  Outcome: Ongoing (interventions implemented as appropriate)

## 2019-08-16 VITALS
SYSTOLIC BLOOD PRESSURE: 93 MMHG | HEART RATE: 79 BPM | RESPIRATION RATE: 14 BRPM | HEIGHT: 67 IN | DIASTOLIC BLOOD PRESSURE: 55 MMHG | TEMPERATURE: 98.9 F | OXYGEN SATURATION: 97 % | WEIGHT: 190.04 LBS | BODY MASS INDEX: 29.83 KG/M2

## 2019-08-16 LAB
ANION GAP SERPL CALCULATED.3IONS-SCNC: 14.1 MMOL/L (ref 5–15)
BASOPHILS # BLD AUTO: 0 10*3/MM3 (ref 0–0.2)
BASOPHILS NFR BLD AUTO: 0.3 % (ref 0–1.5)
BUN BLD-MCNC: 17 MG/DL (ref 8–20)
BUN/CREAT SERPL: 13.1 (ref 5.4–26.2)
CALCIUM SPEC-SCNC: 8.7 MG/DL (ref 8.9–10.3)
CHLORIDE SERPL-SCNC: 103 MMOL/L (ref 101–111)
CO2 SERPL-SCNC: 23 MMOL/L (ref 22–32)
CREAT BLD-MCNC: 1.3 MG/DL (ref 0.4–1)
DEPRECATED RDW RBC AUTO: 43.8 FL (ref 37–54)
EOSINOPHIL # BLD AUTO: 0.1 10*3/MM3 (ref 0–0.4)
EOSINOPHIL NFR BLD AUTO: 1.3 % (ref 0.3–6.2)
ERYTHROCYTE [DISTWIDTH] IN BLOOD BY AUTOMATED COUNT: 13.9 % (ref 12.3–15.4)
GFR SERPL CREATININE-BSD FRML MDRD: 53 ML/MIN/1.73
GLUCOSE BLD-MCNC: 106 MG/DL (ref 65–99)
GLUCOSE BLDC GLUCOMTR-MCNC: 85 MG/DL (ref 70–105)
GLUCOSE BLDC GLUCOMTR-MCNC: 87 MG/DL (ref 70–105)
HCT VFR BLD AUTO: 41.6 % (ref 34–46.6)
HGB BLD-MCNC: 14 G/DL (ref 12–15.9)
LYMPHOCYTES # BLD AUTO: 2.5 10*3/MM3 (ref 0.7–3.1)
LYMPHOCYTES NFR BLD AUTO: 33.1 % (ref 19.6–45.3)
MCH RBC QN AUTO: 30.1 PG (ref 26.6–33)
MCHC RBC AUTO-ENTMCNC: 33.6 G/DL (ref 31.5–35.7)
MCV RBC AUTO: 89.5 FL (ref 79–97)
MONOCYTES # BLD AUTO: 0.8 10*3/MM3 (ref 0.1–0.9)
MONOCYTES NFR BLD AUTO: 10.2 % (ref 5–12)
NEUTROPHILS # BLD AUTO: 4.2 10*3/MM3 (ref 1.7–7)
NEUTROPHILS NFR BLD AUTO: 55.1 % (ref 42.7–76)
NRBC BLD AUTO-RTO: 0.1 /100 WBC (ref 0–0.2)
PLATELET # BLD AUTO: 146 10*3/MM3 (ref 140–450)
PMV BLD AUTO: 8.6 FL (ref 6–12)
POTASSIUM BLD-SCNC: 4.1 MMOL/L (ref 3.6–5.1)
RBC # BLD AUTO: 4.65 10*6/MM3 (ref 3.77–5.28)
SODIUM BLD-SCNC: 136 MMOL/L (ref 136–144)
WBC NRBC COR # BLD: 7.5 10*3/MM3 (ref 3.4–10.8)

## 2019-08-16 PROCEDURE — 82962 GLUCOSE BLOOD TEST: CPT

## 2019-08-16 PROCEDURE — 80048 BASIC METABOLIC PNL TOTAL CA: CPT | Performed by: PHYSICIAN ASSISTANT

## 2019-08-16 PROCEDURE — 85025 COMPLETE CBC W/AUTO DIFF WBC: CPT | Performed by: PHYSICIAN ASSISTANT

## 2019-08-16 PROCEDURE — 99217 PR OBSERVATION CARE DISCHARGE MANAGEMENT: CPT | Performed by: HOSPITALIST

## 2019-08-16 PROCEDURE — G0378 HOSPITAL OBSERVATION PER HR: HCPCS

## 2019-08-16 RX ADMIN — FERROUS SULFATE TAB EC 324 MG (65 MG FE EQUIVALENT) 324 MG: 324 (65 FE) TABLET DELAYED RESPONSE at 08:44

## 2019-08-16 RX ADMIN — LEVOTHYROXINE SODIUM 175 MCG: 175 TABLET ORAL at 08:44

## 2019-08-16 RX ADMIN — MELATONIN 1000 UNITS: at 08:44

## 2019-08-16 RX ADMIN — CARVEDILOL 12.5 MG: 6.25 TABLET, FILM COATED ORAL at 08:44

## 2019-08-16 RX ADMIN — SPIRONOLACTONE 25 MG: 25 TABLET ORAL at 08:43

## 2019-08-16 RX ADMIN — LISINOPRIL 20 MG: 20 TABLET ORAL at 08:43

## 2019-08-16 RX ADMIN — ASPIRIN 81 MG 81 MG: 81 TABLET ORAL at 08:44

## 2019-08-16 RX ADMIN — HYDRALAZINE HYDROCHLORIDE 50 MG: 25 TABLET, FILM COATED ORAL at 08:43

## 2019-08-16 RX ADMIN — FUROSEMIDE 40 MG: 40 TABLET ORAL at 08:43

## 2019-08-16 RX ADMIN — FOLIC ACID 1 MG: 1 TABLET ORAL at 08:43

## 2019-08-16 RX ADMIN — Medication 3 ML: at 08:44

## 2019-08-16 RX ADMIN — POTASSIUM CHLORIDE 20 MEQ: 20 TABLET, EXTENDED RELEASE ORAL at 08:44

## 2019-08-16 NOTE — PLAN OF CARE
Problem: Patient Care Overview  Goal: Plan of Care Review  Outcome: Outcome(s) achieved Date Met: 08/16/19      Problem: Cardiac Rhythm Management Device (Adult)  Goal: Signs and Symptoms of Listed Potential Problems Will be Absent, Minimized or Managed (Cardiac Rhythm Management Device)  Outcome: Outcome(s) achieved Date Met: 08/16/19      Problem: Fall Risk (Adult)  Goal: Absence of Fall  Outcome: Outcome(s) achieved Date Met: 08/16/19

## 2019-08-16 NOTE — PLAN OF CARE
Problem: Patient Care Overview  Goal: Plan of Care Review  Outcome: Ongoing (interventions implemented as appropriate)   08/16/19 0247   Coping/Psychosocial   Plan of Care Reviewed With patient   Plan of Care Review   Progress no change   OTHER   Outcome Summary Patient still complains of pain at incision site. Slightly more awake tonight, easy to arouse. Will continue to monitor.        Problem: Cardiac Rhythm Management Device (Adult)  Goal: Signs and Symptoms of Listed Potential Problems Will be Absent, Minimized or Managed (Cardiac Rhythm Management Device)  Outcome: Ongoing (interventions implemented as appropriate)      Problem: Fall Risk (Adult)  Goal: Absence of Fall  Outcome: Ongoing (interventions implemented as appropriate)

## 2019-08-16 NOTE — DISCHARGE SUMMARY
Date of Admission: 8/13/2019    Date of Discharge:  8/16/2019    Length of stay:  LOS: 0 days     Presenting Problem/History of Present Illness  Active Hospital Problems    Diagnosis  POA   • Palpitations [R00.2]  Yes   • ICD (implantable cardioverter-defibrillator), dual, in situ [Z95.810]  Yes   • Displacement of atrial pacemaker leads [T82.120A]  Yes   • Chronic renal impairment [N18.9]  Yes   • Cardiomyopathy, dilated (CMS/HCC) [I42.0]  Yes   • COPD (chronic obstructive pulmonary disease) (CMS/HCC) [J44.9]  Yes   • Hypertension [I10]  Yes      Resolved Hospital Problems   No resolved problems to display.      Discharge diagnosis    -Atrial migration with phrenic nerve stimulation s/p RA lead revision 8/14/19 by Dr. Eckert----   Hypokalemia replace as per protocol     Dilated cardiomyopathy/systolic heart failure EF 30%, MR, AR s/p AICD 6/2019   - status post revision of atrial lead  with Babar  COPD  Hypertension  Hyperlipidemia  Hypothyroidism  DM II  CKD                  Hospital Course  Patient is a 44 y.o. female presented with palpitation, patient recognized to have malposition of atrial lead, seen by cardiology service Dr. Eckert, underwent revision of atrial lead.  Patient has the uneventful postop.  Dr. Eckert advised for the discharge today to follow-up with him in the office.  Patient waiting on her right.  Patient advised to follow-up with the family physician and also with the cardiology service within a week time.  Patient verbalized understanding.    Past Medical History:     Past Medical History:   Diagnosis Date   • CHF (congestive heart failure) (CMS/HCC)    • COPD (chronic obstructive pulmonary disease) (CMS/HCC)    • Diabetes (CMS/HCC)    • Hyperlipidemia    • Hypertension        Past Surgical History:     Past Surgical History:   Procedure Laterality Date   • BREAST LUMPECTOMY     • CARDIAC ELECTROPHYSIOLOGY PROCEDURE Left 6/28/2019    Procedure: Dual-chamber ICD insertion;   Surgeon: Héctor Eckert MD;  Location: [facility] CATH INVASIVE LOCATION;  Service: Cardiovascular   • CARDIAC ELECTROPHYSIOLOGY PROCEDURE Left 2019    Procedure: Lead Revision;  Surgeon: Héctor Eckert MD;  Location: [facility] CATH INVASIVE LOCATION;  Service: Cardiovascular   • CHOLECYSTECTOMY     • HYSTERECTOMY     • INSERT / REPLACE / REMOVE PACEMAKER     • THYROID SURGERY         Social History:   Social History     Socioeconomic History   • Marital status:      Spouse name: Not on file   • Number of children: Not on file   • Years of education: Not on file   • Highest education level: Not on file   Tobacco Use   • Smoking status: Former Smoker     Last attempt to quit: 2019     Years since quittin.6   • Smokeless tobacco: Never Used   Substance and Sexual Activity   • Alcohol use: No     Frequency: Never   • Drug use: No   • Sexual activity: Defer       Procedures Performed  Procedure(s):  Lead Revision       Consults:   Consults     Date and Time Order Name Status Description    2019 0745 Inpatient Cardiology Consult            Pertinent Test Results:     I reviewed the patient's new clinical results.    Results from last 7 days   Lab Units 08/16/19  0336 08/15/19  0356 08/14/19  0337   WBC 10*3/mm3 7.50 7.80 6.60   HEMOGLOBIN g/dL 14.0 14.3 14.3   HEMATOCRIT % 41.6 43.7 43.3   PLATELETS 10*3/mm3 146 175 158     Results from last 7 days   Lab Units 08/16/19  0336 08/15/19  0356 08/14/19  0337   SODIUM mmol/L 136 138 137   POTASSIUM mmol/L 4.1 3.8 3.7   CHLORIDE mmol/L 103 102 106   CO2 mmol/L 23.0 23.0 22.0   BUN mg/dL 17 20 28*   CREATININE mg/dL 1.30* 1.20* 1.20*   GLUCOSE mg/dL 106* 126* 101*   CALCIUM mg/dL 8.7* 8.9 8.9     Results from last 7 days   Lab Units 19  0336 08/15/19  0356 19  0216   SODIUM mmol/L 136 138 137 137   POTASSIUM mmol/L 4.1 3.8 3.7 3.1*   CHLORIDE mmol/L 103 102 106 99*   CO2 mmol/L 23.0 23.0 22.0 26.0   BUN mg/dL 17 20 28* 25*    CREATININE mg/dL 1.30* 1.20* 1.20* 1.90*   CALCIUM mg/dL 8.7* 8.9 8.9 9.0   BILIRUBIN mg/dL  --   --   --  0.8   ALK PHOS U/L  --   --   --  61   ALT (SGPT) U/L  --   --   --  31   AST (SGOT) U/L  --   --   --  33   GLUCOSE mg/dL 106* 126* 101* 89     Results from last 7 days   Lab Units 08/13/19  0216   MAGNESIUM mg/dL 1.9     Lab Results   Component Value Date    CALCIUM 8.7 (L) 08/16/2019    PHOS 4.4 06/30/2019     No results found for: HGBA1C          Microbiology Results (last 10 days)     ** No results found for the last 240 hours. **          ECG/EMG Results (most recent)     Procedure Component Value Units Date/Time    ECG 12 Lead [[id number]] Collected:  08/13/19 0141     Updated:  08/14/19 0623    Narrative:       HEART RATE= 78  bpm  RR Interval= 768  ms  WI Interval= 173  ms  P Horizontal Axis= -13  deg  P Front Axis= 54  deg  QRSD Interval= 107  ms  QT Interval= 411  ms  QRS Axis= -27  deg  T Wave Axis= 93  deg  - ABNORMAL ECG -  Sinus rhythm  Probable left atrial enlargement  Left ventricular hypertrophy  Anterior Q waves, possibly due to LVH  Nonspecific T abnormalities, lateral leads  Electronically Signed By: Mukesh Mcmahan) 14-Aug-2019 06:23:20  Date and Time of Study: 2019-08-13 01:41:12    EP/CRM Study [[id number]] Resulted:  08/14/19 0942     Updated:  08/14/19 0946    Narrative:       Procedure indication--severe dilated cardiomyopathy with LV ejection   fraction below 30%, compensated class 3 systolic heart failure,  sinus   bradycardia--patient went dual-chamber ICD few weeks ago and was noted to   have proximal migration of atrial lead and brought in for atrial lead   revision      1 g of vancomycin  before procedure and sedation by anesthesia     Procedures done  1.  Repositioning of the right atrial lead   2.  Defibrillation threshold testing  3. Lead testing and fluoroscopy      Procedure note  After obtaining a valid consent patient was draped in sterile fashion and   incision was  infiltrated with local anesthesia--incision was opened and   hemostasis was acquired with cautery and ICD generator brought out of   pocket and the leads were gently dissected and freed from the   sleeve--stylette was placed in the right atrial lead and repositioned into   the right atrium--right atrial sensing was 2.4 mV with a threshold of 0.8   V with impedance of 520 ohms--additional slack was also placed in the   right ventricular lead--anchor sutures were then placed with nonabsorbable   suture on the lead sleeves--pocket was copiously irrigated antibiotic   solution and leads were reconnected to the generator and the generator and   leads were placed in a pouch called core matrix--ventricular fibrillation   induction was done with a transvenous shock at 21 J without any evidence   of 52 ohms and back to sinus rhythm without any complications and incision   was closed in several layers and a sterile dressing applied without any   complications and final programming of the device attached to chart   Right atrial lead manufactured by [employer] and model #[id number]   Right ventricle lead  [employer] model #[id number] ICD   generator manufactured by [employer] model #[id number]     Plan  Transfer to [facility]  Potential discharge in the morning    Adult Transthoracic Echo Complete W/ Cont if Necessary Per Protocol [[id number]] Resulted:  08/14/19 0956     Updated:  08/14/19 1003     Target HR (85%) 148 bpm      Max. Pred. HR (100%) 174 bpm      EF(MOD-bp) 36.0 %      LA dimension(2D) 3.4 cm      Echo EF Estimated 35 %     Narrative:       · Mild pulmonic valve regurgitation is present.  · The left ventricular cavity is moderately dilated.  · Left ventricular wall thickness is consistent with mild concentric   hypertrophy.  · Estimated EF = 35%.  · Left ventricular systolic function is severely decreased.  · Mild to moderate tricuspid valve regurgitation is present.  · Moderate aortic  valve regurgitation is present.  · Mild-to-moderate mitral valve regurgitation is present  · Mild aortic valve stenosis is present.  · Left atrial cavity size is mild-to-moderately dilated.  · There is moderate calcification of the aortic valve.                  Results for orders placed during the hospital encounter of 08/13/19   Adult Transthoracic Echo Complete W/ Cont if Necessary Per Protocol    Narrative · Mild pulmonic valve regurgitation is present.  · The left ventricular cavity is moderately dilated.  · Left ventricular wall thickness is consistent with mild concentric   hypertrophy.  · Estimated EF = 35%.  · Left ventricular systolic function is severely decreased.  · Mild to moderate tricuspid valve regurgitation is present.  · Moderate aortic valve regurgitation is present.  · Mild-to-moderate mitral valve regurgitation is present  · Mild aortic valve stenosis is present.  · Left atrial cavity size is mild-to-moderately dilated.  · There is moderate calcification of the aortic valve.          Xr Chest 1 View    Result Date: 8/13/2019  Mildly limited study demonstrating stable cardiomegaly with no active pulmonary disease.  Electronically Signed By-Shane Moon On:8/13/2019 7:38 AM This report was finalized on [id number] by  Shane Moon, .      Xrays, labs reviewed personally by physician.      Condition on Discharge:    Stable    Vital Signs  Temp:  [97.6 °F (36.4 °C)-99 °F (37.2 °C)] 98.9 °F (37.2 °C)  Heart Rate:  [] 78  Resp:  [14-28] 14  BP: ()/(55-76) 93/55    Physical Exam:  Physical Exam   Constitutional: She is oriented to person, place, and time. She appears well-developed and well-nourished. No distress.   HENT:   Head: Normocephalic and atraumatic.   Right Ear: External ear normal.   Left Ear: External ear normal.   Nose: Nose normal.   Mouth/Throat: Oropharynx is clear and moist. No oropharyngeal exudate.   Eyes: Conjunctivae and EOM are normal. Pupils are equal, round, and  reactive to light. Right eye exhibits no discharge. Left eye exhibits no discharge. No scleral icterus.   Neck: Normal range of motion. No JVD present. No tracheal deviation present. No thyromegaly present.   Cardiovascular: Normal rate, regular rhythm, normal heart sounds and intact distal pulses. Exam reveals no gallop and no friction rub.   No murmur heard.  Pulmonary/Chest: Effort normal and breath sounds normal. No stridor. No respiratory distress. She has no wheezes. She has no rales. She exhibits no tenderness.   Abdominal: Soft. Bowel sounds are normal. She exhibits no distension and no mass. There is no tenderness. There is no rebound and no guarding. No hernia.   Musculoskeletal: Normal range of motion. She exhibits no edema, tenderness or deformity.   Lymphadenopathy:     She has no cervical adenopathy.   Neurological: She is alert and oriented to person, place, and time. No cranial nerve deficit or sensory deficit. She exhibits normal muscle tone. Coordination normal.   Skin: Skin is warm and dry. No rash noted. She is not diaphoretic. No erythema.   Psychiatric: She has a normal mood and affect. Her behavior is normal.   Nursing note and vitals reviewed.      Discharge Diagnosis:     COPD (chronic obstructive pulmonary disease) (CMS/[facility])    Hypertension    Cardiomyopathy, dilated (CMS/[facility])    Chronic renal impairment    ICD (implantable cardioverter-defibrillator), dual, in situ    Displacement of atrial pacemaker leads    Palpitations      Discharge Disposition  Home or Self Care       Discharge Medications      Continue These Medications      Instructions Start Date   ALPRAZolam 1 MG tablet  Commonly known as:  XANAX   1 mg, Oral, 4 Times Daily PRN      aspirin 81 MG chewable tablet   81 mg, Oral, Daily      atorvastatin 40 MG tablet  Commonly known as:  LIPITOR   40 mg, Oral, Nightly      carvedilol 12.5 MG tablet  Commonly known as:  COREG   12.5 mg, Oral, 2 Times Daily With Meals       cholecalciferol 1000 units tablet  Commonly known as:  VITAMIN D3   1,000 Units, Oral, Daily      ferrous sulfate 325 (65 FE) MG tablet   65 mg, Oral, Daily With Breakfast      folic acid 1 MG tablet  Commonly known as:  FOLVITE   1 mg, Oral, Daily      furosemide 40 MG tablet  Commonly known as:  LASIX   40 mg, Oral, 2 Times Daily      hydrALAZINE 50 MG tablet  Commonly known as:  APRESOLINE   50 mg, Oral, 3 Times Daily      levothyroxine 200 MCG tablet  Commonly known as:  SYNTHROID, LEVOTHROID   200 mcg, Oral, Daily      lisinopril 20 MG tablet  Commonly known as:  PRINIVIL,ZESTRIL   20 mg, Oral, Daily      metFORMIN 500 MG tablet  Commonly known as:  GLUCOPHAGE   500 mg, Oral, Daily With Breakfast      oxyCODONE-acetaminophen 7.5-325 MG per tablet  Commonly known as:  PERCOCET   1 tablet, Oral, Every 6 Hours PRN      potassium chloride 10 MEQ CR tablet  Commonly known as:  K-DUR   10 mEq, Oral, Daily         Stop These Medications    fluconazole 200 MG tablet  Commonly known as:  DIFLUCAN            Discharge Diet:   Diet Instructions     Diet: Cardiac, Regular      Discharge Diet:   Cardiac  Regular             Activity at Discharge:   Activity Instructions     Driving Restrictions      Type of Restriction:  Driving    Driving Restrictions:  No Driving While Taking Narcotics    Other Activity Restrictions      Type of Restriction:  Other    Explain Other Restrictions:  Do not raise device/surgical arm over head, lift anything over 5 lbs, or excessive movement for 1 month after implant.  Wear sling at night, may wear during day if unable to remember not to utilize arm excessively.  Perform light movements of joints maggie          Follow-up Appointments  Future Appointments   Date Time Provider Department Center   8/22/2019 10:00 AM BARRY AMBROSIO, [facility] None   9/27/2019 10:00 AM JAY JAY WYNNE ECHO/VAS [facility]   10/10/2019  1:30 PM Wayne Luna MD [facility] None      Additional Instructions for the Follow-ups that You Need to Schedule     Discharge Follow-up with PCP   As directed       Currently Documented PCP:    Phani Adames MD    PCP Phone Number:    [phone]     Follow Up Details:  one week time.         Discharge Follow-up with Specialty: Dr. Eckert; 3 Months   As directed      Specialty:  Dr. Eckert    Follow Up:  3 Months         Discharge Follow-up with Specified Provider: Cardiology service Dr. Montanez; 2 Weeks   As directed      To:  Cardiology service Dr. Montanez    Follow Up:  2 Weeks         Discharge Follow-up with Specified Provider: Dr. Eckert's APRN/RN; 1 Month   As directed      To:  Dr. Franklin APRN/RN    Follow Up:  1 Month    Follow Up Details:  for incision and device check         Discharge Follow-up with Specified Provider: Dr. Franklin APRN/RN; 1 Week   As directed      To:  Dr. Franklin APRN/RN    Follow Up:  1 Week    Follow Up Details:  August 22nd at 10:00am for incision and device check, bring all medications to office appointment               Test Results Pending at Discharge       Risk for Readmission (LACE) Score: 11 (8/16/2019  6:00 AM)          Meliza Hansen MD  08/16/19  11:14 AM    Time: Greater than 30 minutes in arranging for the discharge.

## 2019-08-17 ENCOUNTER — READMISSION MANAGEMENT (OUTPATIENT)
Dept: CALL CENTER | Facility: HOSPITAL | Age: 44
End: 2019-08-17

## 2019-08-18 NOTE — OUTREACH NOTE
Prep Survey      Responses   Facility patient discharged from?  [facility]   Is patient eligible?  Yes   Discharge diagnosis  Lead revision this visit   Does the patient have one of the following disease processes/diagnoses(primary or secondary)?  Other   Does the patient have Home health ordered?  No   Is there a DME ordered?  No   Prep survey completed?  Yes          Shikha Cosme RN

## 2019-08-19 ENCOUNTER — READMISSION MANAGEMENT (OUTPATIENT)
Dept: CALL CENTER | Facility: HOSPITAL | Age: 44
End: 2019-08-19

## 2019-08-19 NOTE — PROGRESS NOTES
Case Management Discharge Note    Final Note: Routine d/c to home               Final Discharge Disposition Code: 01 - home or self-care

## 2019-08-19 NOTE — OUTREACH NOTE
Medical Week 1 Survey      Responses   Facility patient discharged from?  [facility]   Does the patient have one of the following disease processes/diagnoses(primary or secondary)?  Other   Is there a successful TCM telephone encounter documented?  No   Week 1 attempt successful?  No   Unsuccessful attempts  Attempt 4 [NO ANSWER, NO VM]          Jacquelyn Varela LPN

## 2019-08-22 ENCOUNTER — CLINICAL SUPPORT NO REQUIREMENTS (OUTPATIENT)
Dept: CARDIOLOGY | Facility: CLINIC | Age: 44
End: 2019-08-22

## 2019-08-22 DIAGNOSIS — Z95.810 ICD (IMPLANTABLE CARDIOVERTER-DEFIBRILLATOR), DUAL, IN SITU: ICD-10-CM

## 2019-08-22 DIAGNOSIS — I42.0 CARDIOMYOPATHY, DILATED (HCC): Primary | ICD-10-CM

## 2019-08-25 PROCEDURE — 93288 INTERROG EVL PM/LDLS PM IP: CPT | Performed by: NURSE PRACTITIONER

## 2019-08-25 NOTE — PROGRESS NOTES
I saw Ms. Mccann in the office today for follow-up pacemaker site check.  Her left-sided chest incision is well approximated, Steri-Strips are still intact, her incision is without drainage, edema, or erythema.  She is still wearing her left arm sling, I reiterated that she does not have to wear it during the day if she can remember restrictions on her left arm.  She states that she continues to wear it as it is more comfortable, I reiterated that it is important to exercise her joints to prevent impaired mobility or frozen joint.  Her pacemaker check in the office demonstrated appropriate ventricular functionality, however her atrial lead did not produce capture at maximal thresholds.  She is not atrial dependent.  She states that she has the same symptomology on her right side at maximal voltage that she had prior to lead revision.  She is to follow-up in 1 month with Dr. Eckert in the pacemaker clinic to reevaluate.    ALEX Mayers  EP cardiology

## 2019-08-26 ENCOUNTER — READMISSION MANAGEMENT (OUTPATIENT)
Dept: CALL CENTER | Facility: HOSPITAL | Age: 44
End: 2019-08-26

## 2019-08-26 NOTE — OUTREACH NOTE
Medical Week 2 Survey      Responses   Facility patient discharged from?  [facility]   Does the patient have one of the following disease processes/diagnoses(primary or secondary)?  Other   Week 2 attempt successful?  No   Rescheduled  Revoked   Revoke  Decline to participate [NO ANSWER, NO VM]          Jacquelyn Varela LPN

## 2019-09-17 ENCOUNTER — HOSPITAL ENCOUNTER (EMERGENCY)
Facility: HOSPITAL | Age: 44
Discharge: HOME | End: 2019-09-17
Admitting: EMERGENCY MEDICINE

## 2019-09-17 VITALS
HEIGHT: 67 IN | DIASTOLIC BLOOD PRESSURE: 79 MMHG | OXYGEN SATURATION: 94 % | WEIGHT: 177 LBS | SYSTOLIC BLOOD PRESSURE: 125 MMHG | BODY MASS INDEX: 27.78 KG/M2 | HEART RATE: 67 BPM | TEMPERATURE: 98.5 F | RESPIRATION RATE: 17 BRPM

## 2019-09-17 DIAGNOSIS — R21 RASH OF FACE: Primary | ICD-10-CM

## 2019-09-17 PROCEDURE — 96375 TX/PRO/DX INJ NEW DRUG ADDON: CPT

## 2019-09-17 PROCEDURE — 99283 EMERGENCY DEPT VISIT LOW MDM: CPT

## 2019-09-17 PROCEDURE — 25010000002 DIPHENHYDRAMINE PER 50 MG: Performed by: PHYSICIAN ASSISTANT

## 2019-09-17 PROCEDURE — 25010000002 METHYLPREDNISOLONE PER 125 MG: Performed by: PHYSICIAN ASSISTANT

## 2019-09-17 PROCEDURE — 96374 THER/PROPH/DIAG INJ IV PUSH: CPT

## 2019-09-17 RX ORDER — FAMOTIDINE 20 MG/1
20 TABLET, FILM COATED ORAL 2 TIMES DAILY
Qty: 30 TABLET | Refills: 0 | Status: SHIPPED | OUTPATIENT
Start: 2019-09-17 | End: 2020-01-08 | Stop reason: HOSPADM

## 2019-09-17 RX ORDER — DIPHENHYDRAMINE HYDROCHLORIDE 50 MG/ML
25 INJECTION INTRAMUSCULAR; INTRAVENOUS ONCE
Status: COMPLETED | OUTPATIENT
Start: 2019-09-17 | End: 2019-09-17

## 2019-09-17 RX ORDER — SODIUM CHLORIDE 0.9 % (FLUSH) 0.9 %
10 SYRINGE (ML) INJECTION AS NEEDED
Status: DISCONTINUED | OUTPATIENT
Start: 2019-09-17 | End: 2019-09-17 | Stop reason: HOSPADM

## 2019-09-17 RX ORDER — METHYLPREDNISOLONE SODIUM SUCCINATE 125 MG/2ML
125 INJECTION, POWDER, LYOPHILIZED, FOR SOLUTION INTRAMUSCULAR; INTRAVENOUS ONCE
Status: COMPLETED | OUTPATIENT
Start: 2019-09-17 | End: 2019-09-17

## 2019-09-17 RX ORDER — METHYLPREDNISOLONE 4 MG/1
TABLET ORAL
Qty: 21 TABLET | Refills: 0 | Status: SHIPPED | OUTPATIENT
Start: 2019-09-17 | End: 2019-10-10 | Stop reason: SDUPTHER

## 2019-09-17 RX ADMIN — FAMOTIDINE 20 MG: 10 INJECTION, SOLUTION INTRAVENOUS at 14:48

## 2019-09-17 RX ADMIN — DIPHENHYDRAMINE HYDROCHLORIDE 25 MG: 50 INJECTION, SOLUTION INTRAMUSCULAR; INTRAVENOUS at 14:48

## 2019-09-17 RX ADMIN — METHYLPREDNISOLONE SODIUM SUCCINATE 125 MG: 125 INJECTION, POWDER, FOR SOLUTION INTRAMUSCULAR; INTRAVENOUS at 14:47

## 2019-09-17 NOTE — DISCHARGE INSTRUCTIONS
Patient to take steroid pack and Pepcid.  Patient may use Benadryl OTC for further relief of itching and discomfort.    Follow-up with primary care provider this week for further management.  Avoid fragrant lotions or soaps to the hands and face.    Patient to return to the ER for new or worsening symptoms, increased swelling, redness.  Patient also to return for difficulty breathing, chest pain or swelling of lips and tongue.

## 2019-09-17 NOTE — ED PROVIDER NOTES
Marie   Is a 44-year-old female who presents the ER complaining of face on her rash and hands since yesterday morning.  States that she woke up with a red and itching rash on her face that is tender to the touch.  She reports that the rash has now spread to her hands.  She denies eating or drinking anything new, denies any new lotions or laundry detergents.  She states she only has allergies to penicillin and hydrocodone.  She reports that she has had some swelling in her lips, but denies any swelling in her tongue, or shortness of breath.  She denies any chest pain or fever            Review of Systems   Constitutional: Negative for fever.   HENT: Negative for sore throat and trouble swallowing.    Eyes: Positive for itching.   Respiratory: Negative for cough, chest tightness, shortness of breath and wheezing.    Cardiovascular: Negative for chest pain.   Gastrointestinal: Negative for abdominal pain, diarrhea, nausea and vomiting.   Genitourinary: Negative for dysuria.   Skin: Positive for color change and rash.        Redness and itching in the face and hands   Neurological: Negative for weakness, numbness and headaches.   Psychiatric/Behavioral: Negative for behavioral problems.       Past Medical History:   Diagnosis Date   • CHF (congestive heart failure) (CMS/[facility])    • COPD (chronic obstructive pulmonary disease) (CMS/[facility])    • Diabetes (CMS/[facility])    • Hyperlipidemia    • Hypertension        Allergies   Allergen Reactions   • Hydrocodone Hives   • Penicillin G Unknown (See Comments)       Past Surgical History:   Procedure Laterality Date   • BREAST LUMPECTOMY     • CARDIAC ELECTROPHYSIOLOGY PROCEDURE Left 6/28/2019    Procedure: Dual-chamber ICD insertion;  Surgeon: Héctor Eckert MD;  Location: [facility] INVASIVE LOCATION;  Service: Cardiovascular   • CARDIAC ELECTROPHYSIOLOGY PROCEDURE Left 8/14/2019    Procedure: Lead Revision;  Surgeon: Héctor Eckert MD;  Location: [facility] INVASIVE  "LOCATION;  Service: Cardiovascular   • CHOLECYSTECTOMY     • HYSTERECTOMY     • INSERT / REPLACE / REMOVE PACEMAKER     • THYROID SURGERY         Family History   Problem Relation Age of Onset   • Heart disease Father        Social History     Socioeconomic History   • Marital status:      Spouse name: Not on file   • Number of children: Not on file   • Years of education: Not on file   • Highest education level: Not on file   Tobacco Use   • Smoking status: Former Smoker     Last attempt to quit: 2019     Years since quittin.7   • Smokeless tobacco: Never Used   Substance and Sexual Activity   • Alcohol use: No     Frequency: Never   • Drug use: No   • Sexual activity: Defer           Objective   Physical Exam   Constitutional: She is oriented to person, place, and time. She appears well-developed and well-nourished. No distress.   HENT:   Head: Normocephalic and atraumatic.   Mouth/Throat: Oropharynx is clear and moist.   Eyes: EOM are normal. Pupils are equal, round, and reactive to light.   Neck: Normal range of motion. Neck supple.   Cardiovascular: Normal rate, regular rhythm and normal heart sounds.   Pulmonary/Chest: Effort normal and breath sounds normal.   Abdominal: Soft. Bowel sounds are normal. She exhibits no distension. There is no tenderness. There is no rebound and no guarding.   Musculoskeletal: Normal range of motion.   Neurological: She is alert and oriented to person, place, and time.   Skin: Skin is warm and dry. Capillary refill takes less than 2 seconds. Rash noted. There is erythema.   Red diffuse raised rash on entire face.  No abrasions, lesions or bites noted.  Some small bumps on bilateral wrists   Psychiatric: She has a normal mood and affect. Her behavior is normal.       Procedures           ED Course    /86   Pulse 77   Temp 98.3 °F (36.8 °C) (Oral)   Resp 14   Ht 170.2 cm (67\")   Wt 80.3 kg (177 lb)   SpO2 98%   BMI 27.72 kg/m²   Labs Reviewed - No data to " display  Medications   sodium chloride 0.9 % flush 10 mL (not administered)   diphenhydrAMINE (BENADRYL) injection 25 mg (25 mg Intravenous Given 9/17/19 1448)   famotidine (PEPCID) injection 20 mg (20 mg Intravenous Given 9/17/19 1448)   methylPREDNISolone sodium succinate (SOLU-Medrol) injection 125 mg (125 mg Intravenous Given 9/17/19 1447)     No radiology results for the last day                MDM  Number of Diagnoses or Management Options  Rash of face:   Diagnosis management comments: MEDICAL DECISION  Comorbidities: COPD, CHF, hypertension, diabetes  Differentials: Allergic reaction, contact dermatitis; this list is not all inclusive and does not constitute the entirety of considered causes    Patient was given Benadryl, Pepcid, Solu-Medrol.  She reports relief of tenderness and decreased redness in her face, she still complains of mild itching.  It is suspected that the rash may be an allergic reaction, source unknown.  She denies any shortness of breath or difficulty breathing.  Patient will be discharged with Medrol Dosepak and Pepcid.  She will be advised to take Benadryl OTC as needed for itching.  To follow-up with her primary care provider this week.  Patient was stable on discharge.        Final diagnoses:   Rash of face              Dina Jack, PA  09/17/19 2542

## 2019-09-23 ENCOUNTER — CLINICAL SUPPORT NO REQUIREMENTS (OUTPATIENT)
Dept: CARDIOLOGY | Facility: CLINIC | Age: 44
End: 2019-09-23

## 2019-09-23 DIAGNOSIS — I42.0 CARDIOMYOPATHY, DILATED (HCC): Primary | ICD-10-CM

## 2019-09-23 PROCEDURE — 93283 PRGRMG EVAL IMPLANTABLE DFB: CPT | Performed by: NURSE PRACTITIONER

## 2019-09-23 PROCEDURE — 99024 POSTOP FOLLOW-UP VISIT: CPT | Performed by: NURSE PRACTITIONER

## 2019-09-23 NOTE — PROGRESS NOTES
Cardiology Office Implant follow up      Reason for f/u: Incision check and device interrogation      Dear Dr. Adames, Phani Ennis MD and colleagues:    It was nice to see Rafael Mccann in follow up today 6 weeks weeks after device implant revision.  As you know, she is a 44 y.o. female with dilated cardiomyopathy with dual-chamber ICD who underwent RA lead revision at [facility] by Dr. Eckert on 8/14/2019. The device was interrogated and has normal functionality, the RA lead is sensing appropriately but pacing thresholds were unable to elicit a capture, see attached for specifics.  Incision left chest wall is well approximated without erythema, edema, or drainage.        Past Medical History:   Diagnosis Date   • CHF (congestive heart failure) (CMS/HCC)    • COPD (chronic obstructive pulmonary disease) (CMS/HCC)    • Diabetes (CMS/HCC)    • Hyperlipidemia    • Hypertension        Past Surgical History:   Procedure Laterality Date   • BREAST LUMPECTOMY     • CARDIAC ELECTROPHYSIOLOGY PROCEDURE Left 6/28/2019    Procedure: Dual-chamber ICD insertion;  Surgeon: Héctor Eckert MD;  Location: [facility] INVASIVE LOCATION;  Service: Cardiovascular   • CARDIAC ELECTROPHYSIOLOGY PROCEDURE Left 8/14/2019    Procedure: Lead Revision;  Surgeon: Héctor Eckert MD;  Location: [facility] INVASIVE LOCATION;  Service: Cardiovascular   • CHOLECYSTECTOMY     • HYSTERECTOMY     • INSERT / REPLACE / REMOVE PACEMAKER     • THYROID SURGERY           Current Outpatient Medications:   •  ALPRAZolam (XANAX) 1 MG tablet, Take 1 mg by mouth 4 (Four) Times a Day As Needed for Anxiety., Disp: , Rfl:   •  aspirin 81 MG chewable tablet, Chew 81 mg Daily., Disp: , Rfl:   •  atorvastatin (LIPITOR) 40 MG tablet, Take 1 tablet by mouth Every Night., Disp: 30 tablet, Rfl: 0  •  carvedilol (COREG) 12.5 MG tablet, Take 1 tablet by mouth 2 (Two) Times a Day With Meals., Disp: 60 tablet, Rfl: 0  •  cholecalciferol (VITAMIN D3) 1000  units tablet, Take 1,000 Units by mouth Daily., Disp: , Rfl:   •  famotidine (PEPCID) 20 MG tablet, Take 1 tablet by mouth 2 (Two) Times a Day., Disp: 30 tablet, Rfl: 0  •  ferrous sulfate 325 (65 FE) MG tablet, Take 65 mg by mouth Daily With Breakfast., Disp: , Rfl:   •  folic acid (FOLVITE) 1 MG tablet, Take 1 mg by mouth Daily., Disp: , Rfl:   •  furosemide (LASIX) 40 MG tablet, Take 40 mg by mouth 2 (Two) Times a Day., Disp: , Rfl:   •  hydrALAZINE (APRESOLINE) 50 MG tablet, Take 50 mg by mouth 3 (Three) Times a Day., Disp: , Rfl:   •  levothyroxine (SYNTHROID, LEVOTHROID) 200 MCG tablet, Take 200 mcg by mouth Daily., Disp: , Rfl:   •  lisinopril (PRINIVIL,ZESTRIL) 20 MG tablet, Take 20 mg by mouth Daily., Disp: , Rfl:   •  metFORMIN (GLUCOPHAGE) 500 MG tablet, Take 500 mg by mouth Daily With Breakfast., Disp: , Rfl:   •  methylPREDNISolone (MEDROL, NADIRA,) 4 MG tablet, Take as directed on package instructions., Disp: 21 tablet, Rfl: 0  •  oxyCODONE-acetaminophen (PERCOCET) 7.5-325 MG per tablet, Take 1 tablet by mouth Every 6 (Six) Hours As Needed., Disp: , Rfl:   •  potassium chloride (K-DUR) 10 MEQ CR tablet, Take 1 tablet by mouth Daily., Disp: 5 tablet, Rfl: 0    Social History     Socioeconomic History   • Marital status:      Spouse name: Not on file   • Number of children: Not on file   • Years of education: Not on file   • Highest education level: Not on file   Tobacco Use   • Smoking status: Former Smoker     Last attempt to quit: 2019     Years since quittin.7   • Smokeless tobacco: Never Used   Substance and Sexual Activity   • Alcohol use: No     Frequency: Never   • Drug use: No   • Sexual activity: Defer       Family History   Problem Relation Age of Onset   • Heart disease Father              ROS  There were no vitals taken for this visit..  Objective     Physical Exam      Procedures          Assessment/Plan:    Return to office 2 months for 3 months post revision incision and device  check.         Sara Park, ALEX  EP cardiology

## 2019-09-25 ENCOUNTER — HOSPITAL ENCOUNTER (EMERGENCY)
Facility: HOSPITAL | Age: 44
Discharge: HOME | End: 2019-09-25
Attending: EMERGENCY MEDICINE | Admitting: EMERGENCY MEDICINE

## 2019-09-25 ENCOUNTER — APPOINTMENT (OUTPATIENT)
Dept: GENERAL RADIOLOGY | Facility: HOSPITAL | Age: 44
End: 2019-09-25

## 2019-09-25 VITALS
WEIGHT: 178.57 LBS | DIASTOLIC BLOOD PRESSURE: 88 MMHG | OXYGEN SATURATION: 100 % | TEMPERATURE: 97.8 F | SYSTOLIC BLOOD PRESSURE: 152 MMHG | HEART RATE: 76 BPM | HEIGHT: 67 IN | RESPIRATION RATE: 18 BRPM | BODY MASS INDEX: 28.03 KG/M2

## 2019-09-25 DIAGNOSIS — R07.9 CHEST PAIN, UNSPECIFIED TYPE: Primary | ICD-10-CM

## 2019-09-25 LAB
ANION GAP SERPL CALCULATED.3IONS-SCNC: 14.2 MMOL/L (ref 5–15)
BASOPHILS # BLD AUTO: 0.1 10*3/MM3 (ref 0–0.2)
BASOPHILS NFR BLD AUTO: 0.9 % (ref 0–1.5)
BUN BLD-MCNC: 10 MG/DL (ref 8–20)
BUN/CREAT SERPL: 9.1 (ref 5.4–26.2)
CALCIUM SPEC-SCNC: 8.4 MG/DL (ref 8.9–10.3)
CHLORIDE SERPL-SCNC: 104 MMOL/L (ref 101–111)
CO2 SERPL-SCNC: 25 MMOL/L (ref 22–32)
CREAT BLD-MCNC: 1.1 MG/DL (ref 0.4–1)
DEPRECATED RDW RBC AUTO: 43.3 FL (ref 37–54)
EOSINOPHIL # BLD AUTO: 0.1 10*3/MM3 (ref 0–0.4)
EOSINOPHIL NFR BLD AUTO: 1.5 % (ref 0.3–6.2)
ERYTHROCYTE [DISTWIDTH] IN BLOOD BY AUTOMATED COUNT: 14.1 % (ref 12.3–15.4)
GFR SERPL CREATININE-BSD FRML MDRD: 65 ML/MIN/1.73
GLUCOSE BLD-MCNC: 141 MG/DL (ref 65–99)
HCT VFR BLD AUTO: 41 % (ref 34–46.6)
HGB BLD-MCNC: 14 G/DL (ref 12–15.9)
HOLD SPECIMEN: NORMAL
LYMPHOCYTES # BLD AUTO: 2.2 10*3/MM3 (ref 0.7–3.1)
LYMPHOCYTES NFR BLD AUTO: 31.6 % (ref 19.6–45.3)
MCH RBC QN AUTO: 30.2 PG (ref 26.6–33)
MCHC RBC AUTO-ENTMCNC: 34.2 G/DL (ref 31.5–35.7)
MCV RBC AUTO: 88.5 FL (ref 79–97)
MONOCYTES # BLD AUTO: 0.4 10*3/MM3 (ref 0.1–0.9)
MONOCYTES NFR BLD AUTO: 5.6 % (ref 5–12)
NEUTROPHILS # BLD AUTO: 4.1 10*3/MM3 (ref 1.7–7)
NEUTROPHILS NFR BLD AUTO: 60.4 % (ref 42.7–76)
NRBC BLD AUTO-RTO: 0.1 /100 WBC (ref 0–0.2)
PLATELET # BLD AUTO: 209 10*3/MM3 (ref 140–450)
PMV BLD AUTO: 6.9 FL (ref 6–12)
POTASSIUM BLD-SCNC: 3.2 MMOL/L (ref 3.6–5.1)
RBC # BLD AUTO: 4.63 10*6/MM3 (ref 3.77–5.28)
SODIUM BLD-SCNC: 140 MMOL/L (ref 136–144)
TROPONIN I SERPL-MCNC: <0.03 NG/ML (ref 0–0.03)
WBC NRBC COR # BLD: 6.9 10*3/MM3 (ref 3.4–10.8)
WHOLE BLOOD HOLD SPECIMEN: NORMAL

## 2019-09-25 PROCEDURE — 71045 X-RAY EXAM CHEST 1 VIEW: CPT

## 2019-09-25 PROCEDURE — 84484 ASSAY OF TROPONIN QUANT: CPT | Performed by: EMERGENCY MEDICINE

## 2019-09-25 PROCEDURE — 85025 COMPLETE CBC W/AUTO DIFF WBC: CPT | Performed by: EMERGENCY MEDICINE

## 2019-09-25 PROCEDURE — 99283 EMERGENCY DEPT VISIT LOW MDM: CPT

## 2019-09-25 PROCEDURE — 80048 BASIC METABOLIC PNL TOTAL CA: CPT | Performed by: EMERGENCY MEDICINE

## 2019-09-25 PROCEDURE — 93005 ELECTROCARDIOGRAM TRACING: CPT | Performed by: EMERGENCY MEDICINE

## 2019-09-25 RX ORDER — SODIUM CHLORIDE 0.9 % (FLUSH) 0.9 %
10 SYRINGE (ML) INJECTION AS NEEDED
Status: DISCONTINUED | OUTPATIENT
Start: 2019-09-25 | End: 2019-09-26 | Stop reason: HOSPADM

## 2019-09-25 RX ORDER — NAPROXEN 375 MG/1
375 TABLET ORAL 2 TIMES DAILY PRN
Qty: 14 TABLET | Refills: 0 | Status: SHIPPED | OUTPATIENT
Start: 2019-09-25 | End: 2019-10-10 | Stop reason: SDUPTHER

## 2019-09-26 NOTE — ED PROVIDER NOTES
Subjective   Patient is a 44-year-old female with sharp pain in her chest and may throughout the day today.  The pain lasts seconds to minutes at a time.  She has cough fever shortness of breath [place] or other associated complaints            Review of Systems  Negative for headache years of cough fever shortness of breath abdominal pain bombarded dysuria weakness weight loss or other complaint  Past Medical History:   Diagnosis Date   • CHF (congestive heart failure) (CMS/[facility])    • COPD (chronic obstructive pulmonary disease) (CMS/[facility])    • Diabetes (CMS/[facility])    • Hyperlipidemia    • Hypertension        Allergies   Allergen Reactions   • Hydrocodone Hives   • Penicillin G Unknown (See Comments)       Past Surgical History:   Procedure Laterality Date   • BREAST LUMPECTOMY     • CARDIAC ELECTROPHYSIOLOGY PROCEDURE Left 2019    Procedure: Dual-chamber ICD insertion;  Surgeon: Héctor Eckert MD;  Location: [facility] CATH INVASIVE LOCATION;  Service: Cardiovascular   • CARDIAC ELECTROPHYSIOLOGY PROCEDURE Left 2019    Procedure: Lead Revision;  Surgeon: Héctor Eckert MD;  Location: [facility] CATH INVASIVE LOCATION;  Service: Cardiovascular   • CHOLECYSTECTOMY     • HYSTERECTOMY     • INSERT / REPLACE / REMOVE PACEMAKER     • THYROID SURGERY         Family History   Problem Relation Age of Onset   • Heart disease Father        Social History     Socioeconomic History   • Marital status:      Spouse name: Not on file   • Number of children: Not on file   • Years of education: Not on file   • Highest education level: Not on file   Tobacco Use   • Smoking status: Former Smoker     Last attempt to quit: 2019     Years since quittin.7   • Smokeless tobacco: Never Used   Substance and Sexual Activity   • Alcohol use: No     Frequency: Never   • Drug use: No   • Sexual activity: Defer           Objective   Physical Exam  HEENT exam shows TMs to be clear but oropharynx, spit sclerae nonicteric.   Neck has no adenopathy JVD [place] lungs are clear.  Heart has regular rhythm without murmur gallop.  Chest is nontender.  Abdomen is soft nontender.  Extremity exam has no cyanosis or edema.  Procedures       KG interpretation shows normal sinus rhythm with no acute ST change    ED Course      Results for orders placed or performed during the hospital encounter of 09/25/19   Basic Metabolic Panel   Result Value Ref Range    Glucose 141 (H) 65 - 99 mg/dL    BUN 10 8 - 20 mg/dL    Creatinine 1.10 (H) 0.40 - 1.00 mg/dL    Sodium 140 136 - 144 mmol/L    Potassium 3.2 (L) 3.6 - 5.1 mmol/L    Chloride 104 101 - 111 mmol/L    CO2 25.0 22.0 - 32.0 mmol/L    Calcium 8.4 (L) 8.9 - 10.3 mg/dL    eGFR  African Amer 65 >60 mL/min/1.73    BUN/Creatinine Ratio 9.1 5.4 - 26.2    Anion Gap 14.2 5.0 - 15.0 mmol/L   Troponin   Result Value Ref Range    Troponin I <0.030 0.000 - 0.030 ng/mL   CBC Auto Differential   Result Value Ref Range    WBC 6.90 3.40 - 10.80 10*3/mm3    RBC 4.63 3.77 - 5.28 10*6/mm3    Hemoglobin 14.0 12.0 - 15.9 g/dL    Hematocrit 41.0 34.0 - 46.6 %    MCV 88.5 79.0 - 97.0 fL    MCH 30.2 26.6 - 33.0 pg    MCHC 34.2 31.5 - 35.7 g/dL    RDW 14.1 12.3 - 15.4 %    RDW-SD 43.3 37.0 - 54.0 fl    MPV 6.9 6.0 - 12.0 fL    Platelets 209 140 - 450 10*3/mm3    Neutrophil % 60.4 42.7 - 76.0 %    Lymphocyte % 31.6 19.6 - 45.3 %    Monocyte % 5.6 5.0 - 12.0 %    Eosinophil % 1.5 0.3 - 6.2 %    Basophil % 0.9 0.0 - 1.5 %    Neutrophils, Absolute 4.10 1.70 - 7.00 10*3/mm3    Lymphocytes, Absolute 2.20 0.70 - 3.10 10*3/mm3    Monocytes, Absolute 0.40 0.10 - 0.90 10*3/mm3    Eosinophils, Absolute 0.10 0.00 - 0.40 10*3/mm3    Basophils, Absolute 0.10 0.00 - 0.20 10*3/mm3    nRBC 0.1 0.0 - 0.2 /100 WBC   Light Blue Top   Result Value Ref Range    Extra Tube hold for add-on    Gold Top - SST   Result Value Ref Range    Extra Tube Hold for add-ons.      Xr Chest 1 View    Result Date: 9/25/2019  No acute infiltrate is identified.   Electronically Signed By-Dr. Asim Hilton MD On:9/25/2019 10:13 PM This report was finalized on [id number] by Dr. Asim Hilton MD.                MDM  Number of Diagnoses or Management Options  Diagnosis management comments: Patient has a benign physical exam.  She has no evidence of acute condition based on EKG and troponin.  Metabolic panel is normal.  There is no evidence of infectious process.  Patient is pain-free throughout her ED visit.  She will be discharged and will be placed on Naprosyn.  She will see MD for recheck.    Risk of Complications, Morbidity, and/or Mortality  Presenting problems: high  Diagnostic procedures: high  Management options: high    Patient Progress  Patient progress: stable      Final diagnoses:   Chest pain, unspecified type              Chidi Moses MD  09/25/19 2823

## 2019-09-27 ENCOUNTER — HOSPITAL ENCOUNTER (OUTPATIENT)
Dept: CARDIOLOGY | Facility: HOSPITAL | Age: 44
Discharge: HOME | End: 2019-09-27
Admitting: INTERNAL MEDICINE

## 2019-09-27 VITALS
BODY MASS INDEX: 27.94 KG/M2 | HEIGHT: 67 IN | SYSTOLIC BLOOD PRESSURE: 160 MMHG | DIASTOLIC BLOOD PRESSURE: 95 MMHG | WEIGHT: 178 LBS | HEART RATE: 79 BPM

## 2019-09-27 DIAGNOSIS — I42.0 CARDIOMYOPATHY, DILATED (HCC): ICD-10-CM

## 2019-09-27 PROCEDURE — 93306 TTE W/DOPPLER COMPLETE: CPT

## 2019-10-03 LAB
ASCENDING AORTA: 3.2 CM
AV VENA CONTRACTA: 0.8 CM
BH CV ECHO MEAS - ACS: 1.5 CM
BH CV ECHO MEAS - AI DEC SLOPE: 520.4 CM/SEC^2
BH CV ECHO MEAS - AI DEC TIME: 1 SEC
BH CV ECHO MEAS - AI MAX PG: 126.7 MMHG
BH CV ECHO MEAS - AI MAX VEL: 562.6 CM/SEC
BH CV ECHO MEAS - AI P1/2T: 316.7 MSEC
BH CV ECHO MEAS - AO MAX PG (FULL): 11 MMHG
BH CV ECHO MEAS - AO MAX PG: 19.2 MMHG
BH CV ECHO MEAS - AO MEAN PG (FULL): 5.9 MMHG
BH CV ECHO MEAS - AO MEAN PG: 10.1 MMHG
BH CV ECHO MEAS - AO ROOT AREA (BSA CORRECTED): 8.1
BH CV ECHO MEAS - AO ROOT AREA: 5.8 CM^2
BH CV ECHO MEAS - AO ROOT DIAM: 2.7 CM
BH CV ECHO MEAS - AO V2 MAX: 219.3 CM/SEC
BH CV ECHO MEAS - AO V2 MEAN: 149.4 CM/SEC
BH CV ECHO MEAS - AO V2 VTI: 41.4 CM
BH CV ECHO MEAS - ASC AORTA: 3.2 CM
BH CV ECHO MEAS - AVA(I,A): 2.2 CM^2
BH CV ECHO MEAS - AVA(I,D): 2.2 CM^2
BH CV ECHO MEAS - AVA(V,A): 2.2 CM^2
BH CV ECHO MEAS - AVA(V,D): 2.2 CM^2
BH CV ECHO MEAS - BSA(HAYCOCK): 0.76 M^2
BH CV ECHO MEAS - BSA: 0.33 M^2
BH CV ECHO MEAS - BZI_BMI: 3476 KILOGRAMS/M^2
BH CV ECHO MEAS - BZI_METRIC_HEIGHT: 15.2 CM
BH CV ECHO MEAS - BZI_METRIC_WEIGHT: 80.7 KG
BH CV ECHO MEAS - EDV(CUBED): 317.4 ML
BH CV ECHO MEAS - EDV(MOD-SP2): 225.2 ML
BH CV ECHO MEAS - EDV(MOD-SP4): 210.4 ML
BH CV ECHO MEAS - EDV(TEICH): 241 ML
BH CV ECHO MEAS - EF(CUBED): 37.8 %
BH CV ECHO MEAS - EF(MOD-BP): 36 %
BH CV ECHO MEAS - EF(MOD-SP2): 39.5 %
BH CV ECHO MEAS - EF(MOD-SP4): 32.4 %
BH CV ECHO MEAS - EF(TEICH): 30.3 %
BH CV ECHO MEAS - ESV(CUBED): 197.3 ML
BH CV ECHO MEAS - ESV(MOD-SP2): 136.3 ML
BH CV ECHO MEAS - ESV(MOD-SP4): 142.3 ML
BH CV ECHO MEAS - ESV(TEICH): 168 ML
BH CV ECHO MEAS - FS: 14.7 %
BH CV ECHO MEAS - IVS/LVPW: 1
BH CV ECHO MEAS - IVSD: 1.1 CM
BH CV ECHO MEAS - LA DIMENSION(2D): 3.9 CM
BH CV ECHO MEAS - LA DIMENSION: 4.3 CM
BH CV ECHO MEAS - LA/AO: 1.6
BH CV ECHO MEAS - LAT PEAK E' VEL: 6 CM/SEC
BH CV ECHO MEAS - LV DIASTOLIC VOL/BSA (35-75): 628.9 ML/M^2
BH CV ECHO MEAS - LV IVRT: 0.12 SEC
BH CV ECHO MEAS - LV MASS(C)D: 332.5 GRAMS
BH CV ECHO MEAS - LV MASS(C)DI: 993.7 GRAMS/M^2
BH CV ECHO MEAS - LV MAX PG: 8.2 MMHG
BH CV ECHO MEAS - LV MEAN PG: 4.2 MMHG
BH CV ECHO MEAS - LV SYSTOLIC VOL/BSA (12-30): 425.4 ML/M^2
BH CV ECHO MEAS - LV V1 MAX: 143.1 CM/SEC
BH CV ECHO MEAS - LV V1 MEAN: 96.2 CM/SEC
BH CV ECHO MEAS - LV V1 VTI: 26.7 CM
BH CV ECHO MEAS - LVIDD: 6.8 CM
BH CV ECHO MEAS - LVIDS: 5.8 CM
BH CV ECHO MEAS - LVOT AREA: 3.4 CM^2
BH CV ECHO MEAS - LVOT DIAM: 2.1 CM
BH CV ECHO MEAS - LVPWD: 1.1 CM
BH CV ECHO MEAS - MED PEAK E' VEL: 7 CM/SEC
BH CV ECHO MEAS - MR MAX VEL: 556 CM/SEC
BH CV ECHO MEAS - MR VTI: 217 CM
BH CV ECHO MEAS - MV A MAX VEL: 109 CM/SEC
BH CV ECHO MEAS - MV DEC SLOPE: 499.6 CM/SEC^2
BH CV ECHO MEAS - MV DEC TIME: 0.22 SEC
BH CV ECHO MEAS - MV E MAX VEL: 108.2 CM/SEC
BH CV ECHO MEAS - MV E/A: 0.99
BH CV ECHO MEAS - MV P1/2T: 57 MSEC
BH CV ECHO MEAS - MVA(P1/2T): 3.8 CM2
BH CV ECHO MEAS - PA ACC SLOPE: 538 CM/SEC2
BH CV ECHO MEAS - PA ACC TIME: 0.1 SEC
BH CV ECHO MEAS - PA MAX PG (FULL): 0.57 MMHG
BH CV ECHO MEAS - PA MAX PG: 1.9 MMHG
BH CV ECHO MEAS - PA MEAN PG (FULL): 0.49 MMHG
BH CV ECHO MEAS - PA MEAN PG: 1.2 MMHG
BH CV ECHO MEAS - PA PR(ACCEL): 32.1 MMHG
BH CV ECHO MEAS - PA V2 MAX: 69.4 CM/SEC
BH CV ECHO MEAS - PA V2 MEAN: 52.7 CM/SEC
BH CV ECHO MEAS - PA V2 VTI: 14.4 CM
BH CV ECHO MEAS - PAPD(PI EDV): 25 MMHG
BH CV ECHO MEAS - PI END-D VEL: 155.7 CM/SEC
BH CV ECHO MEAS - PVA(I,A): 4 CM^2
BH CV ECHO MEAS - PVA(I,D): 4 CM^2
BH CV ECHO MEAS - PVA(V,A): 4.5 CM^2
BH CV ECHO MEAS - PVA(V,D): 4.5 CM^2
BH CV ECHO MEAS - QP/QS: 0.63
BH CV ECHO MEAS - RAP SYSTOLE: 15 MMHG
BH CV ECHO MEAS - RV MAX PG: 1.4 MMHG
BH CV ECHO MEAS - RV MEAN PG: 0.71 MMHG
BH CV ECHO MEAS - RV V1 MAX: 58.3 CM/SEC
BH CV ECHO MEAS - RV V1 MEAN: 39.3 CM/SEC
BH CV ECHO MEAS - RV V1 VTI: 10.7 CM
BH CV ECHO MEAS - RVOT AREA: 5.4 CM^2
BH CV ECHO MEAS - RVOT DIAM: 2.6 CM
BH CV ECHO MEAS - RVSP: 57.1 MMHG
BH CV ECHO MEAS - SI(AO): 719.2 ML/M^2
BH CV ECHO MEAS - SI(CUBED): 359 ML/M^2
BH CV ECHO MEAS - SI(LVOT): 271.5 ML/M^2
BH CV ECHO MEAS - SI(MOD-SP2): 265.6 ML/M^2
BH CV ECHO MEAS - SI(MOD-SP4): 203.5 ML/M^2
BH CV ECHO MEAS - SI(TEICH): 218.1 ML/M^2
BH CV ECHO MEAS - SUP REN AO DIAM: 2.2 CM
BH CV ECHO MEAS - SV(AO): 240.6 ML
BH CV ECHO MEAS - SV(CUBED): 120.1 ML
BH CV ECHO MEAS - SV(LVOT): 90.9 ML
BH CV ECHO MEAS - SV(MOD-SP2): 88.9 ML
BH CV ECHO MEAS - SV(MOD-SP4): 68.1 ML
BH CV ECHO MEAS - SV(RVOT): 57.1 ML
BH CV ECHO MEAS - SV(TEICH): 73 ML
BH CV ECHO MEAS - TAPSE (>1.6): 1.9 CM2
BH CV ECHO MEAS - TR MAX VEL: 324.4 CM/SEC
BH CV ECHO MEASUREMENTS AVERAGE E/E' RATIO: 16.65
BH CV XLRA - RV BASE: 2.8 CM
BH CV XLRA - RV MID: 1.9 CM
BH CV XLRA - TDI S': 9 CM/SEC
IVRT: 118 MSEC
LEFT ATRIUM VOLUME INDEX: 45 ML/M2
LEFT ATRIUM VOLUME: 86 CM3
LV EF 2D ECHO EST: 35 %
MAXIMAL PREDICTED HEART RATE: 174 BPM
MR PISA EROA: 0.3 CM2
MV REGURGITANT VOLUME: 63 CC
MV VENA CONTRACTA: 0.7 CM
PISA ALIASING VEL: 0.46 M/S
PISA AR MAX VEL: 573 M/S
PISA RADIUS: 0.8 CM
PV VALVE AREA: 4.5 CM2
STRESS TARGET HR: 148 BPM

## 2019-10-03 PROCEDURE — 93306 TTE W/DOPPLER COMPLETE: CPT | Performed by: INTERNAL MEDICINE

## 2019-10-04 ENCOUNTER — TELEPHONE (OUTPATIENT)
Dept: CARDIOLOGY | Facility: CLINIC | Age: 44
End: 2019-10-04

## 2019-10-04 NOTE — TELEPHONE ENCOUNTER
Called and reminded Pt of her appt on 10/10/19 with Dr. Lennon and advised that Dr. Lennon will go over echo results at that time. Pt stated understanding.

## 2019-10-10 ENCOUNTER — OFFICE VISIT (OUTPATIENT)
Dept: CARDIOLOGY | Facility: CLINIC | Age: 44
End: 2019-10-10

## 2019-10-10 VITALS
RESPIRATION RATE: 18 BRPM | HEIGHT: 67 IN | DIASTOLIC BLOOD PRESSURE: 84 MMHG | OXYGEN SATURATION: 100 % | SYSTOLIC BLOOD PRESSURE: 147 MMHG | BODY MASS INDEX: 27.94 KG/M2 | HEART RATE: 67 BPM | WEIGHT: 178 LBS

## 2019-10-10 DIAGNOSIS — I42.0 CARDIOMYOPATHY, DILATED (HCC): Primary | ICD-10-CM

## 2019-10-10 DIAGNOSIS — I34.0 NONRHEUMATIC MITRAL VALVE REGURGITATION: ICD-10-CM

## 2019-10-10 DIAGNOSIS — E78.2 MIXED HYPERLIPIDEMIA: ICD-10-CM

## 2019-10-10 DIAGNOSIS — I25.10 CORONARY ARTERY DISEASE INVOLVING NATIVE CORONARY ARTERY OF NATIVE HEART WITHOUT ANGINA PECTORIS: ICD-10-CM

## 2019-10-10 DIAGNOSIS — I10 ESSENTIAL HYPERTENSION: ICD-10-CM

## 2019-10-10 DIAGNOSIS — I42.0 DILATED CARDIOMYOPATHY (HCC): ICD-10-CM

## 2019-10-10 DIAGNOSIS — I35.1 NONRHEUMATIC AORTIC VALVE INSUFFICIENCY: ICD-10-CM

## 2019-10-10 PROCEDURE — 99214 OFFICE O/P EST MOD 30 MIN: CPT | Performed by: INTERNAL MEDICINE

## 2019-10-10 RX ORDER — SPIRONOLACTONE 25 MG/1
25 TABLET ORAL DAILY
Qty: 30 TABLET | Refills: 2 | Status: SHIPPED | OUTPATIENT
Start: 2019-10-10 | End: 2019-11-19 | Stop reason: SDUPTHER

## 2019-10-10 NOTE — PROGRESS NOTES
"Cardiology Office Visit      Encounter Date:  10/10/2019    Patient ID:   Rafael Mccann is a 44 y.o. female.    Reason For Followup:  Cardiomyopathy  Hypertension  Hyperlipidemia  Valvular heart disease      Brief Clinical History:  Dear Dr. Adames, Phani Ennis MD    I had the pleasure of seeing Rafael Mccann today. As you are well aware, this is a 44 y.o. female with a past medical history that is significant for cardiomyopathy and valvular heart disease         Interval History:  Patient is complaining of some shortness of breath but otherwise denies any other new complaints    Assessment & Plan    Impressions:  Essential hypertension  Chronic systolic heart failure  History of cardiomyopathy   Chronic renal insufficiency  Coronary artery disease with diffuse RCA disease  History of diabetes mellitus type 2  History of thyroid disease  Moderate to severe mitral regurgitation  Moderate to severe aortic regurgitation  Cardiomyopathy with LV ejection fraction of 35%  s/p AICD placement/normal device function    Recommendations:  Patient with a prior history of a known cardiomyopathy, history of coronary artery disease with diffuse stenosis involving the right coronary artery currently being medically managed  Need for compliance with medical therapy and close monitoring and follow-up discussed with the patient  Continue beta-blocker  Lasix 40 mg p.o. once a day  Spirinolactone 25 mg p.o. once a day  Check a BMP in 1 week   Repeat echocardiogram in 2 months to reassess the valvular heart disease  Risk benefits and alternatives discussed the patient    Objective:    Vitals:  Vitals:    10/10/19 1546   BP: 147/84   BP Location: Left arm   Pulse: 67   Resp: 18   SpO2: 100%   Weight: 80.7 kg (178 lb)   Height: 170.2 cm (67\")       Physical Exam:    General: Alert, cooperative, no distress, appears stated age  Head:  Normocephalic, atraumatic, mucous membranes moist  Eyes:  Conjunctiva/corneas clear, EOM's " intact     Neck:  Supple,  no adenopathy;      Lungs: Clear to auscultation bilaterally, no wheezes rhonchi rales are noted  Chest wall: No tenderness  Heart::  Displaced LV apical impulse S1-S2 normal 3 x 6 pansystolic murmur left sternal border radiation to axilla, by 6 diastolic murmur right sternal border  Abdomen: Soft, non-tender, nondistended bowel sounds active  Extremities: No cyanosis, clubbing, or edema  Pulses: 2+ and symmetric all extremities  Skin:  No rashes or lesions  Neuro/psych: A&O x3. CN II through XII are grossly intact with appropriate affect      Allergies:  Allergies   Allergen Reactions   • Hydrocodone Hives   • Penicillin G Unknown (See Comments)       Medication Review:     Current Outpatient Medications:   •  ALPRAZolam (XANAX) 1 MG tablet, Take 1 mg by mouth 4 (Four) Times a Day As Needed for Anxiety., Disp: , Rfl:   •  aspirin 81 MG chewable tablet, Chew 81 mg Daily., Disp: , Rfl:   •  atorvastatin (LIPITOR) 40 MG tablet, Take 1 tablet by mouth Every Night., Disp: 30 tablet, Rfl: 0  •  carvedilol (COREG) 12.5 MG tablet, Take 1 tablet by mouth 2 (Two) Times a Day With Meals., Disp: 60 tablet, Rfl: 0  •  cholecalciferol (VITAMIN D3) 1000 units tablet, Take 1,000 Units by mouth Daily., Disp: , Rfl:   •  famotidine (PEPCID) 20 MG tablet, Take 1 tablet by mouth 2 (Two) Times a Day., Disp: 30 tablet, Rfl: 0  •  ferrous sulfate 325 (65 FE) MG tablet, Take 65 mg by mouth Daily With Breakfast., Disp: , Rfl:   •  folic acid (FOLVITE) 1 MG tablet, Take 1 mg by mouth Daily., Disp: , Rfl:   •  furosemide (LASIX) 40 MG tablet, Take 40 mg by mouth 2 (Two) Times a Day., Disp: , Rfl:   •  hydrALAZINE (APRESOLINE) 50 MG tablet, Take 50 mg by mouth 3 (Three) Times a Day., Disp: , Rfl:   •  levothyroxine (SYNTHROID, LEVOTHROID) 200 MCG tablet, Take 200 mcg by mouth Daily., Disp: , Rfl:   •  lisinopril (PRINIVIL,ZESTRIL) 20 MG tablet, Take 20 mg by mouth Daily., Disp: , Rfl:   •  metFORMIN (GLUCOPHAGE) 500  MG tablet, Take 500 mg by mouth Daily With Breakfast., Disp: , Rfl:   •  methylPREDNISolone (MEDROL, NADIRA,) 4 MG tablet, Take as directed on package instructions., Disp: 21 tablet, Rfl: 0  •  naproxen (NAPROSYN) 375 MG tablet, Take 1 tablet by mouth 2 (Two) Times a Day As Needed for Mild Pain ., Disp: 14 tablet, Rfl: 0  •  oxyCODONE-acetaminophen (PERCOCET) 7.5-325 MG per tablet, Take 1 tablet by mouth Every 6 (Six) Hours As Needed., Disp: , Rfl:   •  potassium chloride (K-DUR) 10 MEQ CR tablet, Take 1 tablet by mouth Daily., Disp: 5 tablet, Rfl: 0    Family History:  Family History   Problem Relation Age of Onset   • Heart disease Father        Past Medical History:  Past Medical History:   Diagnosis Date   • CHF (congestive heart failure) (CMS/HCC)    • COPD (chronic obstructive pulmonary disease) (CMS/HCC)    • Diabetes (CMS/[facility])    • Hyperlipidemia    • Hypertension        Past surgical History:  Past Surgical History:   Procedure Laterality Date   • BREAST LUMPECTOMY     • CARDIAC ELECTROPHYSIOLOGY PROCEDURE Left 2019    Procedure: Dual-chamber ICD insertion;  Surgeon: Héctor Eckert MD;  Location: [facility] CATH INVASIVE LOCATION;  Service: Cardiovascular   • CARDIAC ELECTROPHYSIOLOGY PROCEDURE Left 2019    Procedure: Lead Revision;  Surgeon: Héctor Eckert MD;  Location: [facility] CATH INVASIVE LOCATION;  Service: Cardiovascular   • CHOLECYSTECTOMY     • HYSTERECTOMY     • INSERT / REPLACE / REMOVE PACEMAKER     • THYROID SURGERY         Social History:  Social History     Socioeconomic History   • Marital status:      Spouse name: Not on file   • Number of children: Not on file   • Years of education: Not on file   • Highest education level: Not on file   Tobacco Use   • Smoking status: Former Smoker     Last attempt to quit: 2019     Years since quittin.7   • Smokeless tobacco: Never Used   Substance and Sexual Activity   • Alcohol use: No     Frequency: Never   • Drug use: No   •  Sexual activity: Defer       Review of Systems:  The following systems were reviewed as they relate to the cardiovascular system: Constitutional, Eyes, ENT, Cardiovascular, Respiratory, Gastrointestinal, Integumentary, Neurological, Psychiatric, Hematologic, Endocrine, Musculoskeletal, and Genitourinary. The pertinent cardiovascular findings are reported above with all other cardiovascular points within those systems being negative.    Diagnostic Study Review:     Current Electrocardiogram:  Procedures      NOTE: The following portions of the patient's history were reviewed and updated this visit as appropriate: allergies, current medications, past family history, past medical history, past social history, past surgical history and problem list.

## 2019-10-14 ENCOUNTER — TELEPHONE (OUTPATIENT)
Dept: CARDIAC REHAB | Facility: HOSPITAL | Age: 44
End: 2019-10-14

## 2019-11-08 ENCOUNTER — HOSPITAL ENCOUNTER (EMERGENCY)
Facility: HOSPITAL | Age: 44
Discharge: HOME | End: 2019-11-08
Attending: EMERGENCY MEDICINE | Admitting: EMERGENCY MEDICINE

## 2019-11-08 VITALS
WEIGHT: 180 LBS | BODY MASS INDEX: 28.25 KG/M2 | TEMPERATURE: 98.2 F | HEART RATE: 72 BPM | OXYGEN SATURATION: 100 % | SYSTOLIC BLOOD PRESSURE: 101 MMHG | DIASTOLIC BLOOD PRESSURE: 70 MMHG | RESPIRATION RATE: 14 BRPM | HEIGHT: 67 IN

## 2019-11-08 DIAGNOSIS — K08.89 TOOTHACHE: ICD-10-CM

## 2019-11-08 DIAGNOSIS — R51.9 ACUTE NONINTRACTABLE HEADACHE, UNSPECIFIED HEADACHE TYPE: Primary | ICD-10-CM

## 2019-11-08 PROCEDURE — 96374 THER/PROPH/DIAG INJ IV PUSH: CPT

## 2019-11-08 PROCEDURE — 25010000002 PROCHLORPERAZINE 10 MG/2ML SOLUTION: Performed by: EMERGENCY MEDICINE

## 2019-11-08 PROCEDURE — 25010000002 KETOROLAC TROMETHAMINE PER 15 MG: Performed by: EMERGENCY MEDICINE

## 2019-11-08 PROCEDURE — 96375 TX/PRO/DX INJ NEW DRUG ADDON: CPT

## 2019-11-08 PROCEDURE — 99283 EMERGENCY DEPT VISIT LOW MDM: CPT

## 2019-11-08 PROCEDURE — 25010000002 DIPHENHYDRAMINE PER 50 MG: Performed by: EMERGENCY MEDICINE

## 2019-11-08 RX ORDER — KETOROLAC TROMETHAMINE 30 MG/ML
30 INJECTION, SOLUTION INTRAMUSCULAR; INTRAVENOUS ONCE
Status: COMPLETED | OUTPATIENT
Start: 2019-11-08 | End: 2019-11-08

## 2019-11-08 RX ORDER — PROCHLORPERAZINE EDISYLATE 5 MG/ML
10 INJECTION INTRAMUSCULAR; INTRAVENOUS ONCE
Status: COMPLETED | OUTPATIENT
Start: 2019-11-08 | End: 2019-11-08

## 2019-11-08 RX ORDER — DIPHENHYDRAMINE HYDROCHLORIDE 50 MG/ML
25 INJECTION INTRAMUSCULAR; INTRAVENOUS ONCE
Status: COMPLETED | OUTPATIENT
Start: 2019-11-08 | End: 2019-11-08

## 2019-11-08 RX ADMIN — PROCHLORPERAZINE EDISYLATE 10 MG: 5 INJECTION INTRAMUSCULAR; INTRAVENOUS at 04:25

## 2019-11-08 RX ADMIN — DIPHENHYDRAMINE HYDROCHLORIDE 25 MG: 50 INJECTION, SOLUTION INTRAMUSCULAR; INTRAVENOUS at 04:25

## 2019-11-08 RX ADMIN — KETOROLAC TROMETHAMINE 30 MG: 30 INJECTION, SOLUTION INTRAMUSCULAR at 04:25

## 2019-11-08 NOTE — ED PROVIDER NOTES
Subjective   44-year-old female complains of 3 weeks of right dental pain at mandibular molar.  Patient has been seen by her dentist and placed on antibiotics.  Patient was seen by her family doctor several days ago who did a CT of her neck and switched her antibiotics.  Patient states occasion the pain radiates into her right parietal head and down into the glands of her neck.  Patient has no documented fevers.  Patient states occasionally the pain makes her anxious and she subsequent has palpitations but then calms down and this resolved.  No chest pain or shortness of air.  Pain is moderate, worse with chewing.  She states she has had headaches like this in the past with migraines, not the worst of her life, no sudden onset.            Review of Systems   HENT: Positive for dental problem and sore throat.    Cardiovascular: Positive for palpitations.   Neurological: Positive for headaches.   Psychiatric/Behavioral: The patient is nervous/anxious.    All other systems reviewed and are negative.      Past Medical History:   Diagnosis Date   • CHF (congestive heart failure) (CMS/HCC)    • COPD (chronic obstructive pulmonary disease) (CMS/HCC)    • Diabetes (CMS/HCC)    • Hyperlipidemia    • Hypertension        Allergies   Allergen Reactions   • Hydrocodone Hives   • Penicillin G Unknown (See Comments)       Past Surgical History:   Procedure Laterality Date   • BREAST LUMPECTOMY     • CARDIAC ELECTROPHYSIOLOGY PROCEDURE Left 6/28/2019    Procedure: Dual-chamber ICD insertion;  Surgeon: Héctor Eckert MD;  Location: [facility] CATH INVASIVE LOCATION;  Service: Cardiovascular   • CARDIAC ELECTROPHYSIOLOGY PROCEDURE Left 8/14/2019    Procedure: Lead Revision;  Surgeon: Héctor Eckert MD;  Location: [facility] CATH INVASIVE LOCATION;  Service: Cardiovascular   • CHOLECYSTECTOMY     • HYSTERECTOMY     • INSERT / REPLACE / REMOVE PACEMAKER     • THYROID SURGERY         Family History   Problem Relation Age of Onset   •  Heart disease Father        Social History     Socioeconomic History   • Marital status:      Spouse name: Not on file   • Number of children: Not on file   • Years of education: Not on file   • Highest education level: Not on file   Tobacco Use   • Smoking status: Former Smoker     Last attempt to quit: 2019     Years since quittin.8   • Smokeless tobacco: Never Used   Substance and Sexual Activity   • Alcohol use: No     Frequency: Never   • Drug use: No   • Sexual activity: Defer           Objective   Physical Exam   Constitutional: She is oriented to person, place, and time. She appears well-developed and well-nourished.   HENT:   Head: Normocephalic and atraumatic.   Mouth/Throat: Oropharynx is clear and moist.   Right mandibular molar with decay and tenderness to palpation, no drainable abscess, no trismus, posterior oropharynx is normal without any swelling.  Voice is clear without any hoarseness.  Neck is supple.  No adenopathy or submandibular swelling appreciated.   Eyes: Conjunctivae are normal. Pupils are equal, round, and reactive to light.   Neck: Normal range of motion. Neck supple.   Cardiovascular: Normal rate, regular rhythm, normal heart sounds and intact distal pulses.   Pulmonary/Chest: Effort normal and breath sounds normal.   Neurological: She is alert and oriented to person, place, and time. No cranial nerve deficit.   Motor and sensation intact   Skin: Skin is warm and dry. Capillary refill takes less than 2 seconds.   Psychiatric: She has a normal mood and affect. Her behavior is normal.       Procedures           ED Course                  MDM  Number of Diagnoses or Management Options  Acute nonintractable headache, unspecified headache type:   Toothache:   Diagnosis management comments: Well, pain relieved.  Patient has good follow-up with her doctor understands the need to return if there is any fever, increased pain in ability to swallow or open mouth.  Vital signs been  stable without any ectopy on the monitor during the ED stay.      Final diagnoses:   Acute nonintractable headache, unspecified headache type   Toothache              Mukesh Mcmahan MD  11/08/19 0508

## 2019-11-08 NOTE — ED NOTES
Pt has extensive cardiac hx and is dealing with a decayed tooth (right) that may be the source of headache currently. Due to being on abx for the tooth, the pt is concerned that her heart may become affected.     Claire Bah RN  11/08/19 0400

## 2019-11-19 RX ORDER — SPIRONOLACTONE 25 MG/1
25 TABLET ORAL DAILY
Qty: 30 TABLET | Refills: 2 | Status: SHIPPED | OUTPATIENT
Start: 2019-11-19 | End: 2020-01-08 | Stop reason: HOSPADM

## 2019-12-07 ENCOUNTER — HOSPITAL ENCOUNTER (EMERGENCY)
Facility: HOSPITAL | Age: 44
Discharge: HOME | End: 2019-12-07
Admitting: EMERGENCY MEDICINE

## 2019-12-07 VITALS
BODY MASS INDEX: 31.14 KG/M2 | SYSTOLIC BLOOD PRESSURE: 141 MMHG | WEIGHT: 198.41 LBS | OXYGEN SATURATION: 99 % | RESPIRATION RATE: 16 BRPM | TEMPERATURE: 98.1 F | DIASTOLIC BLOOD PRESSURE: 86 MMHG | HEART RATE: 71 BPM | HEIGHT: 67 IN

## 2019-12-07 DIAGNOSIS — K04.7 DENTAL ABSCESS: ICD-10-CM

## 2019-12-07 DIAGNOSIS — G89.29 CHRONIC DENTAL PAIN: Primary | ICD-10-CM

## 2019-12-07 DIAGNOSIS — K08.9 CHRONIC DENTAL PAIN: Primary | ICD-10-CM

## 2019-12-07 PROCEDURE — 99283 EMERGENCY DEPT VISIT LOW MDM: CPT

## 2019-12-07 RX ORDER — CLINDAMYCIN HYDROCHLORIDE 300 MG/1
300 CAPSULE ORAL 3 TIMES DAILY
Qty: 21 CAPSULE | Refills: 0 | Status: SHIPPED | OUTPATIENT
Start: 2019-12-07 | End: 2019-12-14

## 2019-12-07 RX ORDER — CLINDAMYCIN HYDROCHLORIDE 300 MG/1
300 CAPSULE ORAL ONCE
Status: COMPLETED | OUTPATIENT
Start: 2019-12-07 | End: 2019-12-07

## 2019-12-07 RX ORDER — IBUPROFEN 400 MG/1
800 TABLET ORAL ONCE
Status: COMPLETED | OUTPATIENT
Start: 2019-12-07 | End: 2019-12-07

## 2019-12-07 RX ADMIN — IBUPROFEN 800 MG: 400 TABLET, FILM COATED ORAL at 23:14

## 2019-12-07 RX ADMIN — CLINDAMYCIN HYDROCHLORIDE 300 MG: 300 CAPSULE ORAL at 23:14

## 2019-12-08 NOTE — ED PROVIDER NOTES
Subjective   Here with complaints of dental pain on the right side hurting into the jaw lower and upper has a chronic issue with this is been on antibiotics has seen dentist states also went to [facility] they did nothing for her per patient.  Running a fever not take currently taking antibiotics      Dental Pain   Location:  Lower and upper  Upper teeth location:  2/RU 2nd molar and 1/RU 3rd molar  Lower teeth location:  17/LL 3rd molar and 18/LL 2nd molar  Quality:  Aching  Severity:  Moderate  Onset quality:  Gradual  Duration:  2 days  Timing:  Constant  Progression:  Worsening  Chronicity:  Chronic  Context: abscess, dental caries and poor dentition    Context: cap still on, not crown fracture, not dental fracture, not enamel fracture, filling intact, not intrusion, not malocclusion, not recent dental surgery and not trauma    Previous work-up:  Dental exam  Relieved by:  Nothing  Worsened by:  Nothing  Ineffective treatments:  NSAIDs  Associated symptoms: facial pain    Associated symptoms: no congestion, no difficulty swallowing, no facial swelling, no fever, no headaches, no neck pain, no oral bleeding and no oral lesions    Risk factors: smoking        Review of Systems   Constitutional: Negative for activity change, appetite change, fatigue and fever.   HENT: Positive for dental problem. Negative for congestion, facial swelling, mouth sores, trouble swallowing and voice change.    Eyes: Negative for discharge and redness.   Respiratory: Negative for apnea, cough, chest tightness, shortness of breath and stridor.    Cardiovascular: Negative for chest pain, palpitations and leg swelling.   Gastrointestinal: Negative for abdominal distention, abdominal pain and nausea.   Musculoskeletal: Negative for back pain, joint swelling and neck pain.   Skin: Negative for color change, pallor and rash.   Neurological: Negative for dizziness, numbness and headaches.   Hematological: Negative for adenopathy.        Past Medical History:   Diagnosis Date   • CHF (congestive heart failure) (CMS/[facility])    • COPD (chronic obstructive pulmonary disease) (CMS/[facility])    • Diabetes (CMS/[facility])    • Hyperlipidemia    • Hypertension        Allergies   Allergen Reactions   • Hydrocodone Hives   • Penicillin G Unknown (See Comments)       Past Surgical History:   Procedure Laterality Date   • BREAST LUMPECTOMY     • CARDIAC ELECTROPHYSIOLOGY PROCEDURE Left 2019    Procedure: Dual-chamber ICD insertion;  Surgeon: Héctor Eckert MD;  Location:  [facility] CATH INVASIVE LOCATION;  Service: Cardiovascular   • CARDIAC ELECTROPHYSIOLOGY PROCEDURE Left 2019    Procedure: Lead Revision;  Surgeon: Héctor Eckert MD;  Location:  [facility] CATH INVASIVE LOCATION;  Service: Cardiovascular   • CHOLECYSTECTOMY     • HYSTERECTOMY     • INSERT / REPLACE / REMOVE PACEMAKER     • THYROID SURGERY         Family History   Problem Relation Age of Onset   • Heart disease Father        Social History     Socioeconomic History   • Marital status:      Spouse name: Not on file   • Number of children: Not on file   • Years of education: Not on file   • Highest education level: Not on file   Tobacco Use   • Smoking status: Former Smoker     Last attempt to quit: 2019     Years since quittin.9   • Smokeless tobacco: Never Used   Substance and Sexual Activity   • Alcohol use: No     Frequency: Never   • Drug use: No   • Sexual activity: Defer           Objective   Physical Exam   Constitutional: She is oriented to person, place, and time. She appears well-developed and well-nourished. No distress.   HENT:   Head: Normocephalic and atraumatic.   Mouth/Throat: Oropharynx is clear and moist and mucous membranes are normal. Abnormal dentition. Dental abscesses and dental caries present.       Eyes: Pupils are equal, round, and reactive to light. Conjunctivae and EOM are normal.   Neck: Normal range of motion. Neck supple.   Cardiovascular: Normal  rate and regular rhythm.   Pulmonary/Chest: Effort normal and breath sounds normal.   Abdominal: Soft. Bowel sounds are normal.   Musculoskeletal: Normal range of motion.   Neurological: She is alert and oriented to person, place, and time.   Skin: Skin is warm and dry. She is not diaphoretic.   Psychiatric: She has a normal mood and affect. Her behavior is normal. Judgment and thought content normal.   Nursing note and vitals reviewed.      Procedures           ED Course    No radiology results for the last day  Medications   ibuprofen (ADVIL,MOTRIN) tablet 800 mg (800 mg Oral Given 12/7/19 2314)   clindamycin (CLEOCIN) capsule 300 mg (300 mg Oral Given 12/7/19 2314)     Labs Reviewed - No data to display                    No data recorded                        MDM  Number of Diagnoses or Management Options  Chronic dental pain:   Dental abscess:   Diagnosis management comments: Patient nontoxic in appearance we will treat with clindamycin she is to follow-up with her dentist verbalized understanding      Final diagnoses:   Chronic dental pain   Dental abscess              Loraine Almonte, ALEX  12/07/19 4555

## 2019-12-08 NOTE — DISCHARGE INSTRUCTIONS
Continue over-the-counter Tylenol and Motrin as directed on the bottle call antibiotics we will up with with dentist

## 2019-12-12 ENCOUNTER — OFFICE VISIT (OUTPATIENT)
Dept: CARDIOLOGY | Facility: CLINIC | Age: 44
End: 2019-12-12

## 2019-12-12 VITALS
WEIGHT: 198 LBS | HEIGHT: 67 IN | HEART RATE: 58 BPM | BODY MASS INDEX: 31.08 KG/M2 | RESPIRATION RATE: 18 BRPM | SYSTOLIC BLOOD PRESSURE: 172 MMHG | DIASTOLIC BLOOD PRESSURE: 98 MMHG

## 2019-12-12 DIAGNOSIS — I42.0 DILATED CARDIOMYOPATHY (HCC): ICD-10-CM

## 2019-12-12 DIAGNOSIS — I35.1 NONRHEUMATIC AORTIC VALVE INSUFFICIENCY: ICD-10-CM

## 2019-12-12 DIAGNOSIS — I25.10 CORONARY ARTERIOSCLEROSIS IN NATIVE ARTERY: ICD-10-CM

## 2019-12-12 DIAGNOSIS — I10 ESSENTIAL HYPERTENSION: Primary | ICD-10-CM

## 2019-12-12 DIAGNOSIS — I34.0 NONRHEUMATIC MITRAL VALVE REGURGITATION: ICD-10-CM

## 2019-12-12 PROCEDURE — 99214 OFFICE O/P EST MOD 30 MIN: CPT | Performed by: INTERNAL MEDICINE

## 2019-12-12 RX ORDER — HYDRALAZINE HYDROCHLORIDE 100 MG/1
100 TABLET, FILM COATED ORAL 3 TIMES DAILY
Qty: 90 TABLET | Refills: 4 | Status: SHIPPED | OUTPATIENT
Start: 2019-12-12 | End: 2020-01-08 | Stop reason: HOSPADM

## 2019-12-12 NOTE — PROGRESS NOTES
"Cardiology Office Visit      Encounter Date:  12/12/2019    Patient ID:   Rafael Mccann is a 44 y.o. female.    Reason For Followup:  Cardiomyopathy  Hypertension  Hyperlipidemia  Valvular heart disease      Brief Clinical History:  Dear Dr. Adames, Phani Ennis MD    I had the pleasure of seeing Rafael Mccann today. As you are well aware, this is a 44 y.o. female with a past medical history that is significant for cardiomyopathy and valvular heart disease         Interval History:  Patient is complaining of some shortness of breath but otherwise denies any other new complaints    Assessment & Plan    Impressions:  Essential hypertension  Chronic systolic heart failure  History of cardiomyopathy   Chronic renal insufficiency  Coronary artery disease with diffuse RCA disease  History of diabetes mellitus type 2  History of thyroid disease  Moderate to severe mitral regurgitation  Moderate to severe aortic regurgitation  Cardiomyopathy with LV ejection fraction of 35%  s/p AICD placement/normal device function    Recommendations:  Patient with a prior history of a known cardiomyopathy, history of coronary artery disease with diffuse stenosis involving the right coronary artery currently being medically managed  Need for compliance with medical therapy and close monitoring and follow-up discussed with the patient  Continue beta-blocker  Lasix 40 mg p.o. once a day  Spirinolactone 25 mg p.o. once a day  Increase hydralazine 200 mg p.o. 3 times a day for better control of blood pressure  Repeat echocardiogram to reassess the valvular heart disease  Risk benefits and alternatives discussed the patient  Home blood pressure monitoring  Follow-up in office in 1 month    Objective:    Vitals:  Vitals:    12/12/19 1417   BP: 172/98   BP Location: Left arm   Pulse: 58   Resp: 18   Weight: 89.8 kg (198 lb)   Height: 170.2 cm (67\")       Physical Exam:    General: Alert, cooperative, no distress, appears stated " age  Head:  Normocephalic, atraumatic, mucous membranes moist  Eyes:  Conjunctiva/corneas clear, EOM's intact     Neck:  Supple,  no adenopathy;      Lungs: Clear to auscultation bilaterally, no wheezes rhonchi rales are noted  Chest wall: No tenderness  Heart::  Regular rate and rhythm, S1 and S2 normal, displaced LV apical impulse 2 x 6 ejection systolic murmur and also 2 x 6 diastolic murmur left sternal border  Abdomen: Soft, non-tender, nondistended bowel sounds active  Extremities: No cyanosis, clubbing, or edema  Pulses: 2+ and symmetric all extremities  Skin:  No rashes or lesions  Neuro/psych: A&O x3. CN II through XII are grossly intact with appropriate affect      Allergies:  Allergies   Allergen Reactions   • Hydrocodone Hives   • Penicillin G Unknown (See Comments)       Medication Review:     Current Outpatient Medications:   •  ALPRAZolam (XANAX) 1 MG tablet, Take 1 mg by mouth 4 (Four) Times a Day As Needed for Anxiety., Disp: , Rfl:   •  aspirin 81 MG chewable tablet, Chew 81 mg Daily., Disp: , Rfl:   •  atorvastatin (LIPITOR) 40 MG tablet, Take 1 tablet by mouth Every Night., Disp: 30 tablet, Rfl: 0  •  carvedilol (COREG) 12.5 MG tablet, Take 1 tablet by mouth 2 (Two) Times a Day With Meals., Disp: 60 tablet, Rfl: 0  •  cholecalciferol (VITAMIN D3) 1000 units tablet, Take 1,000 Units by mouth Daily., Disp: , Rfl:   •  clindamycin (CLEOCIN) 300 MG capsule, Take 1 capsule by mouth 3 (Three) Times a Day for 7 days., Disp: 21 capsule, Rfl: 0  •  famotidine (PEPCID) 20 MG tablet, Take 1 tablet by mouth 2 (Two) Times a Day., Disp: 30 tablet, Rfl: 0  •  ferrous sulfate 325 (65 FE) MG tablet, Take 65 mg by mouth Daily With Breakfast., Disp: , Rfl:   •  folic acid (FOLVITE) 1 MG tablet, Take 1 mg by mouth Daily., Disp: , Rfl:   •  furosemide (LASIX) 40 MG tablet, Take 40 mg by mouth 2 (Two) Times a Day., Disp: , Rfl:   •  hydrALAZINE (APRESOLINE) 50 MG tablet, Take 50 mg by mouth 3 (Three) Times a Day.,  Disp: , Rfl:   •  levothyroxine (SYNTHROID, LEVOTHROID) 200 MCG tablet, Take 200 mcg by mouth Daily., Disp: , Rfl:   •  lisinopril (PRINIVIL,ZESTRIL) 20 MG tablet, Take 20 mg by mouth Daily., Disp: , Rfl:   •  metFORMIN (GLUCOPHAGE) 500 MG tablet, Take 500 mg by mouth Daily With Breakfast., Disp: , Rfl:   •  oxyCODONE-acetaminophen (PERCOCET) 7.5-325 MG per tablet, Take 1 tablet by mouth Every 6 (Six) Hours As Needed., Disp: , Rfl:   •  spironolactone (ALDACTONE) 25 MG tablet, Take 1 tablet by mouth Daily., Disp: 30 tablet, Rfl: 2    Family History:  Family History   Problem Relation Age of Onset   • Heart disease Father        Past Medical History:  Past Medical History:   Diagnosis Date   • CHF (congestive heart failure) (CMS/HCC)    • COPD (chronic obstructive pulmonary disease) (CMS/HCC)    • Diabetes (CMS/HCC)    • Hyperlipidemia    • Hypertension        Past surgical History:  Past Surgical History:   Procedure Laterality Date   • BREAST LUMPECTOMY     • CARDIAC ELECTROPHYSIOLOGY PROCEDURE Left 2019    Procedure: Dual-chamber ICD insertion;  Surgeon: Héctor Eckert MD;  Location: [facility] CATH INVASIVE LOCATION;  Service: Cardiovascular   • CARDIAC ELECTROPHYSIOLOGY PROCEDURE Left 2019    Procedure: Lead Revision;  Surgeon: Héctor Eckert MD;  Location: [facility] CATH INVASIVE LOCATION;  Service: Cardiovascular   • CHOLECYSTECTOMY     • HYSTERECTOMY     • INSERT / REPLACE / REMOVE PACEMAKER     • THYROID SURGERY         Social History:  Social History     Socioeconomic History   • Marital status:      Spouse name: Not on file   • Number of children: Not on file   • Years of education: Not on file   • Highest education level: Not on file   Tobacco Use   • Smoking status: Former Smoker     Last attempt to quit: 2019     Years since quittin.9   • Smokeless tobacco: Never Used   Substance and Sexual Activity   • Alcohol use: No     Frequency: Never   • Drug use: No   • Sexual activity:  Defer       Review of Systems:  The following systems were reviewed as they relate to the cardiovascular system: Constitutional, Eyes, ENT, Cardiovascular, Respiratory, Gastrointestinal, Integumentary, Neurological, Psychiatric, Hematologic, Endocrine, Musculoskeletal, and Genitourinary. The pertinent cardiovascular findings are reported above with all other cardiovascular points within those systems being negative.    Diagnostic Study Review:     Current Electrocardiogram:  Procedures      NOTE: The following portions of the patient's history were reviewed and updated this visit as appropriate: allergies, current medications, past family history, past medical history, past social history, past surgical history and problem list.

## 2019-12-22 ENCOUNTER — HOSPITAL ENCOUNTER (INPATIENT)
Facility: HOSPITAL | Age: 44
LOS: 16 days | Discharge: NURSING, REHAB OR LONG-TERM CARE | End: 2020-01-08
Attending: EMERGENCY MEDICINE | Admitting: THORACIC SURGERY (CARDIOTHORACIC VASCULAR SURGERY)

## 2019-12-22 ENCOUNTER — APPOINTMENT (OUTPATIENT)
Dept: GENERAL RADIOLOGY | Facility: HOSPITAL | Age: 44
End: 2019-12-22

## 2019-12-22 DIAGNOSIS — I25.119 CORONARY ARTERY DISEASE INVOLVING NATIVE CORONARY ARTERY OF NATIVE HEART WITH ANGINA PECTORIS (HCC): ICD-10-CM

## 2019-12-22 DIAGNOSIS — Z95.810 ICD (IMPLANTABLE CARDIOVERTER-DEFIBRILLATOR), DUAL, IN SITU: ICD-10-CM

## 2019-12-22 DIAGNOSIS — I10 ESSENTIAL HYPERTENSION: ICD-10-CM

## 2019-12-22 DIAGNOSIS — R07.9 CHEST PAIN, UNSPECIFIED TYPE: Primary | ICD-10-CM

## 2019-12-22 DIAGNOSIS — Z95.2 S/P AVR (AORTIC VALVE REPLACEMENT): ICD-10-CM

## 2019-12-22 DIAGNOSIS — I20.0 UNSTABLE ANGINA PECTORIS (HCC): ICD-10-CM

## 2019-12-22 DIAGNOSIS — I35.1 NONRHEUMATIC AORTIC VALVE INSUFFICIENCY: ICD-10-CM

## 2019-12-22 DIAGNOSIS — I35.1 AORTIC VALVE INSUFFICIENCY, ETIOLOGY OF CARDIAC VALVE DISEASE UNSPECIFIED: ICD-10-CM

## 2019-12-22 DIAGNOSIS — I42.0 CARDIOMYOPATHY, DILATED (HCC): ICD-10-CM

## 2019-12-22 DIAGNOSIS — Z95.1 S/P CABG X 3: ICD-10-CM

## 2019-12-22 DIAGNOSIS — J43.9 PULMONARY EMPHYSEMA, UNSPECIFIED EMPHYSEMA TYPE (HCC): ICD-10-CM

## 2019-12-22 LAB
ALBUMIN SERPL-MCNC: 4.3 G/DL (ref 3.5–5.2)
ALBUMIN/GLOB SERPL: 1.3 G/DL
ALP SERPL-CCNC: 77 U/L (ref 39–117)
ALT SERPL W P-5'-P-CCNC: 17 U/L (ref 1–33)
ANION GAP SERPL CALCULATED.3IONS-SCNC: 14 MMOL/L (ref 5–15)
AST SERPL-CCNC: 25 U/L (ref 1–32)
BASOPHILS # BLD AUTO: 0 10*3/MM3 (ref 0–0.2)
BASOPHILS NFR BLD AUTO: 0.5 % (ref 0–1.5)
BILIRUB SERPL-MCNC: 0.4 MG/DL (ref 0.2–1.2)
BUN BLD-MCNC: 16 MG/DL (ref 6–20)
BUN/CREAT SERPL: 12.1 (ref 7–25)
CALCIUM SPEC-SCNC: 9.2 MG/DL (ref 8.6–10.5)
CHLORIDE SERPL-SCNC: 101 MMOL/L (ref 98–107)
CO2 SERPL-SCNC: 25 MMOL/L (ref 22–29)
CREAT BLD-MCNC: 1.32 MG/DL (ref 0.57–1)
DEPRECATED RDW RBC AUTO: 45.1 FL (ref 37–54)
EOSINOPHIL # BLD AUTO: 0.1 10*3/MM3 (ref 0–0.4)
EOSINOPHIL NFR BLD AUTO: 0.9 % (ref 0.3–6.2)
ERYTHROCYTE [DISTWIDTH] IN BLOOD BY AUTOMATED COUNT: 13.8 % (ref 12.3–15.4)
GFR SERPL CREATININE-BSD FRML MDRD: 53 ML/MIN/1.73
GLOBULIN UR ELPH-MCNC: 3.4 GM/DL
GLUCOSE BLD-MCNC: 97 MG/DL (ref 65–99)
HCT VFR BLD AUTO: 45.8 % (ref 34–46.6)
HGB BLD-MCNC: 15.6 G/DL (ref 12–15.9)
LYMPHOCYTES # BLD AUTO: 2 10*3/MM3 (ref 0.7–3.1)
LYMPHOCYTES NFR BLD AUTO: 22 % (ref 19.6–45.3)
MCH RBC QN AUTO: 31.7 PG (ref 26.6–33)
MCHC RBC AUTO-ENTMCNC: 34.1 G/DL (ref 31.5–35.7)
MCV RBC AUTO: 92.8 FL (ref 79–97)
MONOCYTES # BLD AUTO: 0.5 10*3/MM3 (ref 0.1–0.9)
MONOCYTES NFR BLD AUTO: 5.6 % (ref 5–12)
NEUTROPHILS # BLD AUTO: 6.4 10*3/MM3 (ref 1.7–7)
NEUTROPHILS NFR BLD AUTO: 71 % (ref 42.7–76)
NRBC BLD AUTO-RTO: 0.1 /100 WBC (ref 0–0.2)
NT-PROBNP SERPL-MCNC: 1606 PG/ML (ref 5–450)
PLATELET # BLD AUTO: 224 10*3/MM3 (ref 140–450)
PMV BLD AUTO: 8.1 FL (ref 6–12)
POTASSIUM BLD-SCNC: 3.6 MMOL/L (ref 3.5–5.2)
PROT SERPL-MCNC: 7.7 G/DL (ref 6–8.5)
RBC # BLD AUTO: 4.93 10*6/MM3 (ref 3.77–5.28)
SODIUM BLD-SCNC: 140 MMOL/L (ref 136–145)
TROPONIN T SERPL-MCNC: <0.01 NG/ML (ref 0–0.03)
WBC NRBC COR # BLD: 9 10*3/MM3 (ref 3.4–10.8)

## 2019-12-22 PROCEDURE — 83880 ASSAY OF NATRIURETIC PEPTIDE: CPT | Performed by: EMERGENCY MEDICINE

## 2019-12-22 PROCEDURE — 85610 PROTHROMBIN TIME: CPT | Performed by: EMERGENCY MEDICINE

## 2019-12-22 PROCEDURE — 85730 THROMBOPLASTIN TIME PARTIAL: CPT | Performed by: EMERGENCY MEDICINE

## 2019-12-22 PROCEDURE — 71045 X-RAY EXAM CHEST 1 VIEW: CPT

## 2019-12-22 PROCEDURE — 84484 ASSAY OF TROPONIN QUANT: CPT | Performed by: EMERGENCY MEDICINE

## 2019-12-22 PROCEDURE — 80053 COMPREHEN METABOLIC PANEL: CPT | Performed by: EMERGENCY MEDICINE

## 2019-12-22 PROCEDURE — 85379 FIBRIN DEGRADATION QUANT: CPT | Performed by: EMERGENCY MEDICINE

## 2019-12-22 PROCEDURE — 93005 ELECTROCARDIOGRAM TRACING: CPT | Performed by: EMERGENCY MEDICINE

## 2019-12-22 PROCEDURE — 85025 COMPLETE CBC W/AUTO DIFF WBC: CPT | Performed by: EMERGENCY MEDICINE

## 2019-12-22 PROCEDURE — 99284 EMERGENCY DEPT VISIT MOD MDM: CPT

## 2019-12-22 RX ORDER — NITROGLYCERIN 0.4 MG/1
0.4 TABLET SUBLINGUAL
Status: DISCONTINUED | OUTPATIENT
Start: 2019-12-22 | End: 2019-12-27 | Stop reason: HOSPADM

## 2019-12-22 RX ORDER — SODIUM CHLORIDE 0.9 % (FLUSH) 0.9 %
10 SYRINGE (ML) INJECTION AS NEEDED
Status: DISCONTINUED | OUTPATIENT
Start: 2019-12-22 | End: 2019-12-27 | Stop reason: HOSPADM

## 2019-12-22 RX ORDER — ASPIRIN 81 MG/1
324 TABLET, CHEWABLE ORAL ONCE
Status: COMPLETED | OUTPATIENT
Start: 2019-12-22 | End: 2019-12-22

## 2019-12-22 RX ADMIN — NITROGLYCERIN 0.4 MG: 0.4 TABLET SUBLINGUAL at 23:54

## 2019-12-22 RX ADMIN — NITROGLYCERIN 0.4 MG: 0.4 TABLET SUBLINGUAL at 23:39

## 2019-12-22 RX ADMIN — ASPIRIN 81 MG 324 MG: 81 TABLET ORAL at 23:39

## 2019-12-23 ENCOUNTER — APPOINTMENT (OUTPATIENT)
Dept: CARDIOLOGY | Facility: HOSPITAL | Age: 44
End: 2019-12-23

## 2019-12-23 PROBLEM — I20.0 UNSTABLE ANGINA PECTORIS (HCC): Status: ACTIVE | Noted: 2019-12-22

## 2019-12-23 PROBLEM — R07.9 CHEST PAIN: Status: ACTIVE | Noted: 2019-12-23

## 2019-12-23 PROBLEM — R00.2 PALPITATIONS: Status: RESOLVED | Noted: 2019-08-13 | Resolved: 2019-12-23

## 2019-12-23 PROBLEM — E03.9 HYPOTHYROIDISM (ACQUIRED): Chronic | Status: ACTIVE | Noted: 2019-12-23

## 2019-12-23 PROBLEM — K21.9 GERD WITHOUT ESOPHAGITIS: Chronic | Status: ACTIVE | Noted: 2019-12-23

## 2019-12-23 PROBLEM — I35.1 AORTIC VALVE REGURGITATION: Status: ACTIVE | Noted: 2019-12-22

## 2019-12-23 PROBLEM — T82.120A DISPLACEMENT OF ATRIAL PACEMAKER LEADS: Status: RESOLVED | Noted: 2019-07-22 | Resolved: 2019-12-23

## 2019-12-23 LAB
ANION GAP SERPL CALCULATED.3IONS-SCNC: 13 MMOL/L (ref 5–15)
APTT PPP: 27.6 SECONDS (ref 24–31)
BASOPHILS # BLD AUTO: 0 10*3/MM3 (ref 0–0.2)
BASOPHILS NFR BLD AUTO: 0.4 % (ref 0–1.5)
BH CV ECHO MEAS - % IVS THICK: 21.7 %
BH CV ECHO MEAS - % LVPW THICK: 8.6 %
BH CV ECHO MEAS - ACS: 1.3 CM
BH CV ECHO MEAS - AI DEC SLOPE: 296.1 CM/SEC^2
BH CV ECHO MEAS - AI DEC TIME: 1.6 SEC
BH CV ECHO MEAS - AI MAX PG: 94.4 MMHG
BH CV ECHO MEAS - AI MAX VEL: 485.7 CM/SEC
BH CV ECHO MEAS - AI P1/2T: 480.5 MSEC
BH CV ECHO MEAS - AO MAX PG (FULL): 14.8 MMHG
BH CV ECHO MEAS - AO MAX PG: 17.4 MMHG
BH CV ECHO MEAS - AO MEAN PG (FULL): 7.5 MMHG
BH CV ECHO MEAS - AO MEAN PG: 8.9 MMHG
BH CV ECHO MEAS - AO ROOT AREA (BSA CORRECTED): 1.3
BH CV ECHO MEAS - AO ROOT AREA: 5.3 CM^2
BH CV ECHO MEAS - AO ROOT DIAM: 2.6 CM
BH CV ECHO MEAS - AO V2 MAX: 206.9 CM/SEC
BH CV ECHO MEAS - AO V2 MEAN: 140.4 CM/SEC
BH CV ECHO MEAS - AO V2 VTI: 40.3 CM
BH CV ECHO MEAS - AVA(I,A): 1.5 CM^2
BH CV ECHO MEAS - AVA(I,D): 1.5 CM^2
BH CV ECHO MEAS - AVA(V,A): 1.4 CM^2
BH CV ECHO MEAS - AVA(V,D): 1.4 CM^2
BH CV ECHO MEAS - BSA(HAYCOCK): 2.1 M^2
BH CV ECHO MEAS - BSA: 2 M^2
BH CV ECHO MEAS - BZI_BMI: 30.2 KILOGRAMS/M^2
BH CV ECHO MEAS - BZI_METRIC_HEIGHT: 170.2 CM
BH CV ECHO MEAS - BZI_METRIC_WEIGHT: 87.5 KG
BH CV ECHO MEAS - EDV(CUBED): 106.3 ML
BH CV ECHO MEAS - EDV(MOD-SP2): 101.6 ML
BH CV ECHO MEAS - EDV(MOD-SP4): 108.8 ML
BH CV ECHO MEAS - EDV(TEICH): 104.3 ML
BH CV ECHO MEAS - EF(CUBED): 45.5 %
BH CV ECHO MEAS - EF(MOD-BP): 40 %
BH CV ECHO MEAS - EF(MOD-SP2): 35.8 %
BH CV ECHO MEAS - EF(MOD-SP4): 43.1 %
BH CV ECHO MEAS - EF(TEICH): 37.9 %
BH CV ECHO MEAS - ESV(CUBED): 58 ML
BH CV ECHO MEAS - ESV(MOD-SP2): 65.3 ML
BH CV ECHO MEAS - ESV(MOD-SP4): 62 ML
BH CV ECHO MEAS - ESV(TEICH): 64.7 ML
BH CV ECHO MEAS - FS: 18.3 %
BH CV ECHO MEAS - IVS/LVPW: 0.73
BH CV ECHO MEAS - IVSD: 1.4 CM
BH CV ECHO MEAS - IVSS: 1.7 CM
BH CV ECHO MEAS - LA DIMENSION(2D): 3.5 CM
BH CV ECHO MEAS - LV DIASTOLIC VOL/BSA (35-75): 54.6 ML/M^2
BH CV ECHO MEAS - LV MASS(C)D: 354.3 GRAMS
BH CV ECHO MEAS - LV MASS(C)DI: 177.9 GRAMS/M^2
BH CV ECHO MEAS - LV MASS(C)S: 333.6 GRAMS
BH CV ECHO MEAS - LV MASS(C)SI: 167.5 GRAMS/M^2
BH CV ECHO MEAS - LV MAX PG: 2.6 MMHG
BH CV ECHO MEAS - LV MEAN PG: 1.5 MMHG
BH CV ECHO MEAS - LV SYSTOLIC VOL/BSA (12-30): 31.1 ML/M^2
BH CV ECHO MEAS - LV V1 MAX: 79.9 CM/SEC
BH CV ECHO MEAS - LV V1 MEAN: 57.5 CM/SEC
BH CV ECHO MEAS - LV V1 VTI: 16.1 CM
BH CV ECHO MEAS - LVIDD: 4.7 CM
BH CV ECHO MEAS - LVIDS: 3.9 CM
BH CV ECHO MEAS - LVOT AREA: 3.7 CM^2
BH CV ECHO MEAS - LVOT DIAM: 2.2 CM
BH CV ECHO MEAS - LVPWD: 1.9 CM
BH CV ECHO MEAS - LVPWS: 2.1 CM
BH CV ECHO MEAS - MV A MAX VEL: 72 CM/SEC
BH CV ECHO MEAS - MV DEC SLOPE: 348.4 CM/SEC^2
BH CV ECHO MEAS - MV DEC TIME: 0.24 SEC
BH CV ECHO MEAS - MV E MAX VEL: 85.2 CM/SEC
BH CV ECHO MEAS - MV E/A: 1.2
BH CV ECHO MEAS - MV MAX PG: 3.7 MMHG
BH CV ECHO MEAS - MV MEAN PG: 1.9 MMHG
BH CV ECHO MEAS - MV V2 MAX: 96.6 CM/SEC
BH CV ECHO MEAS - MV V2 MEAN: 64.9 CM/SEC
BH CV ECHO MEAS - MV V2 VTI: 28.1 CM
BH CV ECHO MEAS - MVA(VTI): 2.1 CM^2
BH CV ECHO MEAS - PA ACC TIME: 0.13 SEC
BH CV ECHO MEAS - PA MAX PG (FULL): 0.57 MMHG
BH CV ECHO MEAS - PA MAX PG: 2 MMHG
BH CV ECHO MEAS - PA MEAN PG (FULL): 0.48 MMHG
BH CV ECHO MEAS - PA MEAN PG: 1.2 MMHG
BH CV ECHO MEAS - PA PR(ACCEL): 21.4 MMHG
BH CV ECHO MEAS - PA V2 MAX: 70.6 CM/SEC
BH CV ECHO MEAS - PA V2 MEAN: 53 CM/SEC
BH CV ECHO MEAS - PA V2 VTI: 16.5 CM
BH CV ECHO MEAS - RAP SYSTOLE: 3 MMHG
BH CV ECHO MEAS - RV MAX PG: 1.4 MMHG
BH CV ECHO MEAS - RV MEAN PG: 0.72 MMHG
BH CV ECHO MEAS - RV V1 MAX: 59.7 CM/SEC
BH CV ECHO MEAS - RV V1 MEAN: 40.4 CM/SEC
BH CV ECHO MEAS - RV V1 VTI: 13.8 CM
BH CV ECHO MEAS - RVDD: 1.9 CM
BH CV ECHO MEAS - RVSP: 20.8 MMHG
BH CV ECHO MEAS - SI(AO): 106.3 ML/M^2
BH CV ECHO MEAS - SI(CUBED): 24.3 ML/M^2
BH CV ECHO MEAS - SI(LVOT): 30.1 ML/M^2
BH CV ECHO MEAS - SI(MOD-SP2): 18.2 ML/M^2
BH CV ECHO MEAS - SI(MOD-SP4): 23.5 ML/M^2
BH CV ECHO MEAS - SI(TEICH): 19.9 ML/M^2
BH CV ECHO MEAS - SV(AO): 211.7 ML
BH CV ECHO MEAS - SV(CUBED): 48.3 ML
BH CV ECHO MEAS - SV(LVOT): 60 ML
BH CV ECHO MEAS - SV(MOD-SP2): 36.3 ML
BH CV ECHO MEAS - SV(MOD-SP4): 46.9 ML
BH CV ECHO MEAS - SV(TEICH): 39.5 ML
BH CV ECHO MEAS - TR MAX VEL: 211.2 CM/SEC
BUN BLD-MCNC: 16 MG/DL (ref 6–20)
BUN/CREAT SERPL: 14.7 (ref 7–25)
CALCIUM SPEC-SCNC: 9 MG/DL (ref 8.6–10.5)
CHLORIDE SERPL-SCNC: 100 MMOL/L (ref 98–107)
CO2 SERPL-SCNC: 26 MMOL/L (ref 22–29)
CREAT BLD-MCNC: 1.09 MG/DL (ref 0.57–1)
D DIMER PPP FEU-MCNC: 0.47 MCGFEU/ML (ref 0.17–0.59)
DEPRECATED RDW RBC AUTO: 45.5 FL (ref 37–54)
EOSINOPHIL # BLD AUTO: 0.1 10*3/MM3 (ref 0–0.4)
EOSINOPHIL NFR BLD AUTO: 0.9 % (ref 0.3–6.2)
ERYTHROCYTE [DISTWIDTH] IN BLOOD BY AUTOMATED COUNT: 14 % (ref 12.3–15.4)
GFR SERPL CREATININE-BSD FRML MDRD: 65 ML/MIN/1.73
GLUCOSE BLD-MCNC: 100 MG/DL (ref 65–99)
GLUCOSE BLDC GLUCOMTR-MCNC: 117 MG/DL (ref 70–105)
GLUCOSE BLDC GLUCOMTR-MCNC: 128 MG/DL (ref 70–105)
GLUCOSE BLDC GLUCOMTR-MCNC: 86 MG/DL (ref 70–105)
GLUCOSE BLDC GLUCOMTR-MCNC: 88 MG/DL (ref 70–105)
HCT VFR BLD AUTO: 41.5 % (ref 34–46.6)
HGB BLD-MCNC: 14.5 G/DL (ref 12–15.9)
HOLD SPECIMEN: NORMAL
HOLD SPECIMEN: NORMAL
INR PPP: 0.97 (ref 0.9–1.1)
LV EF 2D ECHO EST: 35 %
LYMPHOCYTES # BLD AUTO: 3 10*3/MM3 (ref 0.7–3.1)
LYMPHOCYTES NFR BLD AUTO: 33.7 % (ref 19.6–45.3)
MAXIMAL PREDICTED HEART RATE: 174 BPM
MCH RBC QN AUTO: 32.3 PG (ref 26.6–33)
MCHC RBC AUTO-ENTMCNC: 34.9 G/DL (ref 31.5–35.7)
MCV RBC AUTO: 92.5 FL (ref 79–97)
MONOCYTES # BLD AUTO: 0.6 10*3/MM3 (ref 0.1–0.9)
MONOCYTES NFR BLD AUTO: 7.2 % (ref 5–12)
NEUTROPHILS # BLD AUTO: 5.2 10*3/MM3 (ref 1.7–7)
NEUTROPHILS NFR BLD AUTO: 57.8 % (ref 42.7–76)
NRBC BLD AUTO-RTO: 0.1 /100 WBC (ref 0–0.2)
PLATELET # BLD AUTO: 199 10*3/MM3 (ref 140–450)
PMV BLD AUTO: 7.6 FL (ref 6–12)
POTASSIUM BLD-SCNC: 3.7 MMOL/L (ref 3.5–5.2)
PROTHROMBIN TIME: 10.3 SECONDS (ref 9.6–11.7)
RBC # BLD AUTO: 4.49 10*6/MM3 (ref 3.77–5.28)
SODIUM BLD-SCNC: 139 MMOL/L (ref 136–145)
STRESS TARGET HR: 148 BPM
TROPONIN T SERPL-MCNC: <0.01 NG/ML (ref 0–0.03)
WBC NRBC COR # BLD: 8.9 10*3/MM3 (ref 3.4–10.8)
WHOLE BLOOD HOLD SPECIMEN: NORMAL
WHOLE BLOOD HOLD SPECIMEN: NORMAL

## 2019-12-23 PROCEDURE — 99214 OFFICE O/P EST MOD 30 MIN: CPT | Performed by: INTERNAL MEDICINE

## 2019-12-23 PROCEDURE — G0378 HOSPITAL OBSERVATION PER HR: HCPCS

## 2019-12-23 PROCEDURE — 82962 GLUCOSE BLOOD TEST: CPT

## 2019-12-23 PROCEDURE — 80048 BASIC METABOLIC PNL TOTAL CA: CPT | Performed by: NURSE PRACTITIONER

## 2019-12-23 PROCEDURE — 85025 COMPLETE CBC W/AUTO DIFF WBC: CPT | Performed by: NURSE PRACTITIONER

## 2019-12-23 PROCEDURE — 99222 1ST HOSP IP/OBS MODERATE 55: CPT | Performed by: FAMILY MEDICINE

## 2019-12-23 PROCEDURE — 93306 TTE W/DOPPLER COMPLETE: CPT

## 2019-12-23 PROCEDURE — 84484 ASSAY OF TROPONIN QUANT: CPT | Performed by: NURSE PRACTITIONER

## 2019-12-23 PROCEDURE — 25010000002 ENOXAPARIN PER 10 MG: Performed by: NURSE PRACTITIONER

## 2019-12-23 PROCEDURE — 93306 TTE W/DOPPLER COMPLETE: CPT | Performed by: INTERNAL MEDICINE

## 2019-12-23 RX ORDER — ASPIRIN 81 MG/1
81 TABLET, CHEWABLE ORAL DAILY
Status: DISCONTINUED | OUTPATIENT
Start: 2019-12-23 | End: 2019-12-27 | Stop reason: HOSPADM

## 2019-12-23 RX ORDER — PANTOPRAZOLE SODIUM 40 MG/1
40 TABLET, DELAYED RELEASE ORAL EVERY MORNING
Status: DISCONTINUED | OUTPATIENT
Start: 2019-12-23 | End: 2019-12-25

## 2019-12-23 RX ORDER — HYDRALAZINE HYDROCHLORIDE 25 MG/1
100 TABLET, FILM COATED ORAL 3 TIMES DAILY
Status: DISCONTINUED | OUTPATIENT
Start: 2019-12-23 | End: 2019-12-27 | Stop reason: HOSPADM

## 2019-12-23 RX ORDER — CHOLECALCIFEROL (VITAMIN D3) 125 MCG
5 CAPSULE ORAL NIGHTLY PRN
Status: DISCONTINUED | OUTPATIENT
Start: 2019-12-23 | End: 2019-12-27 | Stop reason: HOSPADM

## 2019-12-23 RX ORDER — SODIUM CHLORIDE 0.9 % (FLUSH) 0.9 %
10 SYRINGE (ML) INJECTION EVERY 12 HOURS SCHEDULED
Status: DISCONTINUED | OUTPATIENT
Start: 2019-12-23 | End: 2019-12-27 | Stop reason: HOSPADM

## 2019-12-23 RX ORDER — ACETAMINOPHEN 325 MG/1
650 TABLET ORAL EVERY 4 HOURS PRN
Status: DISCONTINUED | OUTPATIENT
Start: 2019-12-23 | End: 2019-12-27 | Stop reason: HOSPADM

## 2019-12-23 RX ORDER — MONTELUKAST SODIUM 10 MG/1
10 TABLET ORAL NIGHTLY
Status: ON HOLD | COMMUNITY
End: 2022-08-18

## 2019-12-23 RX ORDER — GABAPENTIN 300 MG/1
300 CAPSULE ORAL 3 TIMES DAILY
Status: DISCONTINUED | OUTPATIENT
Start: 2019-12-23 | End: 2019-12-27 | Stop reason: HOSPADM

## 2019-12-23 RX ORDER — ALPRAZOLAM 1 MG/1
1 TABLET ORAL 4 TIMES DAILY PRN
Status: DISCONTINUED | OUTPATIENT
Start: 2019-12-23 | End: 2019-12-27 | Stop reason: HOSPADM

## 2019-12-23 RX ORDER — ATORVASTATIN CALCIUM 40 MG/1
40 TABLET, FILM COATED ORAL NIGHTLY
Status: DISCONTINUED | OUTPATIENT
Start: 2019-12-23 | End: 2019-12-27 | Stop reason: HOSPADM

## 2019-12-23 RX ORDER — ACETAMINOPHEN 160 MG/5ML
650 SOLUTION ORAL EVERY 4 HOURS PRN
Status: DISCONTINUED | OUTPATIENT
Start: 2019-12-23 | End: 2019-12-27 | Stop reason: HOSPADM

## 2019-12-23 RX ORDER — MONTELUKAST SODIUM 10 MG/1
10 TABLET ORAL NIGHTLY
Status: DISCONTINUED | OUTPATIENT
Start: 2019-12-23 | End: 2019-12-27 | Stop reason: HOSPADM

## 2019-12-23 RX ORDER — NICOTINE POLACRILEX 4 MG
15 LOZENGE BUCCAL
Status: DISCONTINUED | OUTPATIENT
Start: 2019-12-23 | End: 2019-12-27 | Stop reason: HOSPADM

## 2019-12-23 RX ORDER — FOLIC ACID 1 MG/1
1 TABLET ORAL DAILY
Status: DISCONTINUED | OUTPATIENT
Start: 2019-12-23 | End: 2019-12-27 | Stop reason: HOSPADM

## 2019-12-23 RX ORDER — ONDANSETRON 2 MG/ML
4 INJECTION INTRAMUSCULAR; INTRAVENOUS EVERY 6 HOURS PRN
Status: DISCONTINUED | OUTPATIENT
Start: 2019-12-23 | End: 2019-12-24

## 2019-12-23 RX ORDER — ALUMINA, MAGNESIA, AND SIMETHICONE 2400; 2400; 240 MG/30ML; MG/30ML; MG/30ML
15 SUSPENSION ORAL EVERY 6 HOURS PRN
Status: DISCONTINUED | OUTPATIENT
Start: 2019-12-23 | End: 2019-12-25

## 2019-12-23 RX ORDER — ACETAMINOPHEN 650 MG/1
650 SUPPOSITORY RECTAL EVERY 4 HOURS PRN
Status: DISCONTINUED | OUTPATIENT
Start: 2019-12-23 | End: 2019-12-27 | Stop reason: HOSPADM

## 2019-12-23 RX ORDER — ONDANSETRON 4 MG/1
4 TABLET, FILM COATED ORAL EVERY 6 HOURS PRN
Status: DISCONTINUED | OUTPATIENT
Start: 2019-12-23 | End: 2019-12-24

## 2019-12-23 RX ORDER — LISINOPRIL 20 MG/1
20 TABLET ORAL DAILY
Status: DISCONTINUED | OUTPATIENT
Start: 2019-12-23 | End: 2019-12-25

## 2019-12-23 RX ORDER — GABAPENTIN 300 MG/1
300 CAPSULE ORAL DAILY PRN
Status: ON HOLD | COMMUNITY
End: 2020-11-10

## 2019-12-23 RX ORDER — SODIUM CHLORIDE 0.9 % (FLUSH) 0.9 %
10 SYRINGE (ML) INJECTION AS NEEDED
Status: DISCONTINUED | OUTPATIENT
Start: 2019-12-23 | End: 2019-12-27 | Stop reason: HOSPADM

## 2019-12-23 RX ORDER — BISACODYL 5 MG/1
5 TABLET, DELAYED RELEASE ORAL DAILY PRN
Status: DISCONTINUED | OUTPATIENT
Start: 2019-12-23 | End: 2019-12-27 | Stop reason: HOSPADM

## 2019-12-23 RX ORDER — AMLODIPINE BESYLATE 5 MG/1
5 TABLET ORAL 2 TIMES DAILY
Status: DISCONTINUED | OUTPATIENT
Start: 2019-12-23 | End: 2019-12-26

## 2019-12-23 RX ORDER — OXYCODONE HYDROCHLORIDE 5 MG/1
7.5 TABLET ORAL EVERY 4 HOURS PRN
Status: DISCONTINUED | OUTPATIENT
Start: 2019-12-23 | End: 2019-12-27 | Stop reason: HOSPADM

## 2019-12-23 RX ORDER — FUROSEMIDE 40 MG/1
40 TABLET ORAL
Status: DISCONTINUED | OUTPATIENT
Start: 2019-12-23 | End: 2019-12-25

## 2019-12-23 RX ORDER — SPIRONOLACTONE 25 MG/1
25 TABLET ORAL DAILY
Status: DISCONTINUED | OUTPATIENT
Start: 2019-12-23 | End: 2019-12-25

## 2019-12-23 RX ORDER — ALLOPURINOL 300 MG/1
300 TABLET ORAL DAILY
COMMUNITY
End: 2020-01-08 | Stop reason: HOSPADM

## 2019-12-23 RX ORDER — FLUCONAZOLE 200 MG/1
200 TABLET ORAL
COMMUNITY
End: 2020-01-08 | Stop reason: HOSPADM

## 2019-12-23 RX ORDER — AMLODIPINE BESYLATE 5 MG/1
5 TABLET ORAL 2 TIMES DAILY
COMMUNITY
End: 2020-01-08 | Stop reason: HOSPADM

## 2019-12-23 RX ORDER — LEVOTHYROXINE SODIUM 0.1 MG/1
200 TABLET ORAL
Status: DISCONTINUED | OUTPATIENT
Start: 2019-12-23 | End: 2019-12-26

## 2019-12-23 RX ORDER — FAMOTIDINE 20 MG/1
20 TABLET, FILM COATED ORAL 2 TIMES DAILY
Status: DISCONTINUED | OUTPATIENT
Start: 2019-12-23 | End: 2019-12-27 | Stop reason: HOSPADM

## 2019-12-23 RX ORDER — DEXTROSE MONOHYDRATE 25 G/50ML
25 INJECTION, SOLUTION INTRAVENOUS
Status: DISCONTINUED | OUTPATIENT
Start: 2019-12-23 | End: 2019-12-27 | Stop reason: HOSPADM

## 2019-12-23 RX ORDER — CARVEDILOL 6.25 MG/1
12.5 TABLET ORAL 2 TIMES DAILY WITH MEALS
Status: DISCONTINUED | OUTPATIENT
Start: 2019-12-23 | End: 2019-12-27 | Stop reason: HOSPADM

## 2019-12-23 RX ORDER — PANTOPRAZOLE SODIUM 40 MG/1
40 TABLET, DELAYED RELEASE ORAL DAILY
Status: ON HOLD | COMMUNITY
End: 2022-08-18

## 2019-12-23 RX ORDER — FERROUS SULFATE TAB EC 324 MG (65 MG FE EQUIVALENT) 324 (65 FE) MG
324 TABLET DELAYED RESPONSE ORAL
Status: DISCONTINUED | OUTPATIENT
Start: 2019-12-23 | End: 2019-12-27 | Stop reason: HOSPADM

## 2019-12-23 RX ORDER — MELATONIN
1000 DAILY
Status: DISCONTINUED | OUTPATIENT
Start: 2019-12-23 | End: 2019-12-27 | Stop reason: HOSPADM

## 2019-12-23 RX ORDER — CLINDAMYCIN HYDROCHLORIDE 300 MG/1
300 CAPSULE ORAL 2 TIMES DAILY
COMMUNITY
Start: 2019-12-18 | End: 2020-01-08 | Stop reason: HOSPADM

## 2019-12-23 RX ORDER — ALLOPURINOL 300 MG/1
300 TABLET ORAL DAILY
Status: DISCONTINUED | OUTPATIENT
Start: 2019-12-23 | End: 2019-12-27 | Stop reason: HOSPADM

## 2019-12-23 RX ADMIN — ONDANSETRON HYDROCHLORIDE 4 MG: 4 TABLET, FILM COATED ORAL at 08:53

## 2019-12-23 RX ADMIN — FUROSEMIDE 40 MG: 40 TABLET ORAL at 08:45

## 2019-12-23 RX ADMIN — ENOXAPARIN SODIUM 40 MG: 40 INJECTION SUBCUTANEOUS at 16:50

## 2019-12-23 RX ADMIN — PANTOPRAZOLE SODIUM 40 MG: 40 TABLET, DELAYED RELEASE ORAL at 06:25

## 2019-12-23 RX ADMIN — GABAPENTIN 300 MG: 300 CAPSULE ORAL at 16:50

## 2019-12-23 RX ADMIN — CARVEDILOL 12.5 MG: 6.25 TABLET, FILM COATED ORAL at 08:45

## 2019-12-23 RX ADMIN — Medication 10 ML: at 21:37

## 2019-12-23 RX ADMIN — OXYCODONE HYDROCHLORIDE 7.5 MG: 5 TABLET ORAL at 17:09

## 2019-12-23 RX ADMIN — AMLODIPINE BESYLATE 5 MG: 5 TABLET ORAL at 08:45

## 2019-12-23 RX ADMIN — GABAPENTIN 300 MG: 300 CAPSULE ORAL at 21:35

## 2019-12-23 RX ADMIN — MONTELUKAST SODIUM 10 MG: 10 TABLET, FILM COATED ORAL at 21:35

## 2019-12-23 RX ADMIN — NITROGLYCERIN 1 INCH: 20 OINTMENT TOPICAL at 01:37

## 2019-12-23 RX ADMIN — Medication 10 ML: at 08:46

## 2019-12-23 RX ADMIN — FAMOTIDINE 20 MG: 20 TABLET ORAL at 21:35

## 2019-12-23 RX ADMIN — ATORVASTATIN CALCIUM 40 MG: 40 TABLET, FILM COATED ORAL at 21:39

## 2019-12-23 RX ADMIN — ALLOPURINOL 300 MG: 300 TABLET ORAL at 08:45

## 2019-12-23 RX ADMIN — FAMOTIDINE 20 MG: 20 TABLET ORAL at 08:45

## 2019-12-23 RX ADMIN — LEVOTHYROXINE SODIUM 200 MCG: 100 TABLET ORAL at 06:25

## 2019-12-23 RX ADMIN — GABAPENTIN 300 MG: 300 CAPSULE ORAL at 09:08

## 2019-12-23 RX ADMIN — ONDANSETRON HYDROCHLORIDE 4 MG: 4 TABLET, FILM COATED ORAL at 17:03

## 2019-12-23 RX ADMIN — FUROSEMIDE 40 MG: 40 TABLET ORAL at 17:03

## 2019-12-23 RX ADMIN — SPIRONOLACTONE 25 MG: 25 TABLET, FILM COATED ORAL at 08:45

## 2019-12-23 RX ADMIN — FOLIC ACID 1 MG: 1 TABLET ORAL at 08:45

## 2019-12-23 RX ADMIN — HYDRALAZINE HYDROCHLORIDE 100 MG: 25 TABLET ORAL at 08:45

## 2019-12-23 RX ADMIN — FERROUS SULFATE TAB EC 324 MG (65 MG FE EQUIVALENT) 324 MG: 324 (65 FE) TABLET DELAYED RESPONSE at 08:45

## 2019-12-23 RX ADMIN — LISINOPRIL 20 MG: 20 TABLET ORAL at 08:45

## 2019-12-23 RX ADMIN — ASPIRIN 81 MG 81 MG: 81 TABLET ORAL at 08:45

## 2019-12-23 RX ADMIN — ALPRAZOLAM 1 MG: 1 TABLET ORAL at 02:56

## 2019-12-23 RX ADMIN — AMLODIPINE BESYLATE 5 MG: 5 TABLET ORAL at 21:35

## 2019-12-23 RX ADMIN — OXYCODONE HYDROCHLORIDE 7.5 MG: 5 TABLET ORAL at 02:55

## 2019-12-23 RX ADMIN — ALPRAZOLAM 1 MG: 1 TABLET ORAL at 21:34

## 2019-12-23 RX ADMIN — MELATONIN 1000 UNITS: at 08:45

## 2019-12-23 RX ADMIN — HYDRALAZINE HYDROCHLORIDE 100 MG: 25 TABLET ORAL at 21:34

## 2019-12-23 RX ADMIN — CARVEDILOL 12.5 MG: 6.25 TABLET, FILM COATED ORAL at 17:08

## 2019-12-23 RX ADMIN — OXYCODONE HYDROCHLORIDE 7.5 MG: 5 TABLET ORAL at 21:34

## 2019-12-23 RX ADMIN — OXYCODONE HYDROCHLORIDE 7.5 MG: 5 TABLET ORAL at 08:53

## 2019-12-23 RX ADMIN — HYDRALAZINE HYDROCHLORIDE 100 MG: 25 TABLET ORAL at 16:50

## 2019-12-24 ENCOUNTER — APPOINTMENT (OUTPATIENT)
Dept: CARDIOLOGY | Facility: HOSPITAL | Age: 44
End: 2019-12-24

## 2019-12-24 LAB
ANION GAP SERPL CALCULATED.3IONS-SCNC: 11 MMOL/L (ref 5–15)
APTT PPP: 29.3 SECONDS (ref 24–31)
BH CV XLRA MEAS - DIST GSV THIGH DIST LEFT: 0.34 CM
BH CV XLRA MEAS - DIST GSV THIGH DIST RIGHT: 0.23 CM
BH CV XLRA MEAS - GSV ANKLE DIST LEFT: 0.3 CM
BH CV XLRA MEAS - GSV ANKLE DIST RIGHT: 0.3 CM
BH CV XLRA MEAS - MID GSV CALF LEFT: 0.17 CM
BH CV XLRA MEAS - MID GSV CALF RIGHT: 0.2 CM
BH CV XLRA MEAS - MID GSV THIGH  LEFT: 0.29 CM
BH CV XLRA MEAS - MID GSV THIGH  RIGHT: 0.22 CM
BH CV XLRA MEAS - PROX GSV CALF DIST LEFT: 0.22 CM
BH CV XLRA MEAS - PROX GSV CALF DIST RIGHT: 0.24 CM
BH CV XLRA MEAS - PROX GSV THIGH  LEFT: 0.21 CM
BH CV XLRA MEAS - PROX GSV THIGH  RIGHT: 0.33 CM
BH CV XLRA MEAS LEFT CCA RATIO VEL: 110 CM/SEC
BH CV XLRA MEAS LEFT DIST CCA EDV: -25.5 CM/SEC
BH CV XLRA MEAS LEFT DIST CCA PSV: -85.1 CM/SEC
BH CV XLRA MEAS LEFT DIST ICA EDV: -36 CM/SEC
BH CV XLRA MEAS LEFT DIST ICA PSV: -83.2 CM/SEC
BH CV XLRA MEAS LEFT ICA RATIO VEL: -93.8 CM/SEC
BH CV XLRA MEAS LEFT ICA/CCA RATIO: -0.85
BH CV XLRA MEAS LEFT PROX CCA EDV: 23.6 CM/SEC
BH CV XLRA MEAS LEFT PROX CCA PSV: 110 CM/SEC
BH CV XLRA MEAS LEFT PROX ECA PSV: -86.4 CM/SEC
BH CV XLRA MEAS LEFT PROX ICA EDV: -32.9 CM/SEC
BH CV XLRA MEAS LEFT PROX ICA PSV: -93.8 CM/SEC
BH CV XLRA MEAS LEFT PROX SCLA PSV: 177 CM/SEC
BH CV XLRA MEAS LEFT VERTEBRAL A PSV: -51.6 CM/SEC
BH CV XLRA MEAS RIGHT CCA RATIO VEL: 92.6 CM/SEC
BH CV XLRA MEAS RIGHT DIST CCA EDV: 13.7 CM/SEC
BH CV XLRA MEAS RIGHT DIST CCA PSV: 69 CM/SEC
BH CV XLRA MEAS RIGHT DIST ICA EDV: -38.5 CM/SEC
BH CV XLRA MEAS RIGHT DIST ICA PSV: -96.9 CM/SEC
BH CV XLRA MEAS RIGHT ICA RATIO VEL: -96.9 CM/SEC
BH CV XLRA MEAS RIGHT ICA/CCA RATIO: -1
BH CV XLRA MEAS RIGHT PROX CCA EDV: 14.3 CM/SEC
BH CV XLRA MEAS RIGHT PROX CCA PSV: 92.6 CM/SEC
BH CV XLRA MEAS RIGHT PROX ECA PSV: -107 CM/SEC
BH CV XLRA MEAS RIGHT PROX ICA EDV: -26.7 CM/SEC
BH CV XLRA MEAS RIGHT PROX ICA PSV: -95.7 CM/SEC
BH CV XLRA MEAS RIGHT PROX SCLA PSV: 197 CM/SEC
BH CV XLRA MEAS RIGHT VERTEBRAL A PSV: 57.8 CM/SEC
BUN BLD-MCNC: 15 MG/DL (ref 6–20)
BUN/CREAT SERPL: 11.5 (ref 7–25)
CALCIUM SPEC-SCNC: 9.1 MG/DL (ref 8.6–10.5)
CHLORIDE SERPL-SCNC: 99 MMOL/L (ref 98–107)
CO2 SERPL-SCNC: 26 MMOL/L (ref 22–29)
CREAT BLD-MCNC: 1.31 MG/DL (ref 0.57–1)
DEPRECATED RDW RBC AUTO: 45.9 FL (ref 37–54)
ERYTHROCYTE [DISTWIDTH] IN BLOOD BY AUTOMATED COUNT: 14.1 % (ref 12.3–15.4)
GFR SERPL CREATININE-BSD FRML MDRD: 53 ML/MIN/1.73
GLUCOSE BLD-MCNC: 111 MG/DL (ref 65–99)
GLUCOSE BLDC GLUCOMTR-MCNC: 100 MG/DL (ref 70–105)
GLUCOSE BLDC GLUCOMTR-MCNC: 120 MG/DL (ref 70–105)
GLUCOSE BLDC GLUCOMTR-MCNC: 123 MG/DL (ref 70–105)
GLUCOSE BLDC GLUCOMTR-MCNC: 93 MG/DL (ref 70–105)
HCT VFR BLD AUTO: 42.8 % (ref 34–46.6)
HGB BLD-MCNC: 15.1 G/DL (ref 12–15.9)
INR PPP: 0.92 (ref 0.9–1.1)
MCH RBC QN AUTO: 32.5 PG (ref 26.6–33)
MCHC RBC AUTO-ENTMCNC: 35.3 G/DL (ref 31.5–35.7)
MCV RBC AUTO: 92 FL (ref 79–97)
PLATELET # BLD AUTO: 224 10*3/MM3 (ref 140–450)
PMV BLD AUTO: 7.6 FL (ref 6–12)
POTASSIUM BLD-SCNC: 3.5 MMOL/L (ref 3.5–5.2)
PROTHROMBIN TIME: 9.8 SECONDS (ref 9.6–11.7)
RBC # BLD AUTO: 4.65 10*6/MM3 (ref 3.77–5.28)
SODIUM BLD-SCNC: 136 MMOL/L (ref 136–145)
WBC NRBC COR # BLD: 8.2 10*3/MM3 (ref 3.4–10.8)

## 2019-12-24 PROCEDURE — 93970 EXTREMITY STUDY: CPT

## 2019-12-24 PROCEDURE — 0 IOPAMIDOL PER 1 ML: Performed by: INTERNAL MEDICINE

## 2019-12-24 PROCEDURE — 93567 NJX CAR CTH SPRVLV AORTGRPHY: CPT | Performed by: INTERNAL MEDICINE

## 2019-12-24 PROCEDURE — B2051ZZ PLAIN RADIOGRAPHY OF LEFT HEART USING LOW OSMOLAR CONTRAST: ICD-10-PCS | Performed by: THORACIC SURGERY (CARDIOTHORACIC VASCULAR SURGERY)

## 2019-12-24 PROCEDURE — 99254 IP/OBS CNSLTJ NEW/EST MOD 60: CPT | Performed by: PHYSICIAN ASSISTANT

## 2019-12-24 PROCEDURE — 02RF08Z REPLACEMENT OF AORTIC VALVE WITH ZOOPLASTIC TISSUE, OPEN APPROACH: ICD-10-PCS | Performed by: THORACIC SURGERY (CARDIOTHORACIC VASCULAR SURGERY)

## 2019-12-24 PROCEDURE — 93460 R&L HRT ART/VENTRICLE ANGIO: CPT | Performed by: INTERNAL MEDICINE

## 2019-12-24 PROCEDURE — 85730 THROMBOPLASTIN TIME PARTIAL: CPT | Performed by: INTERNAL MEDICINE

## 2019-12-24 PROCEDURE — 80048 BASIC METABOLIC PNL TOTAL CA: CPT | Performed by: INTERNAL MEDICINE

## 2019-12-24 PROCEDURE — B2011ZZ PLAIN RADIOGRAPHY OF MULTIPLE CORONARY ARTERIES USING LOW OSMOLAR CONTRAST: ICD-10-PCS | Performed by: THORACIC SURGERY (CARDIOTHORACIC VASCULAR SURGERY)

## 2019-12-24 PROCEDURE — C1769 GUIDE WIRE: HCPCS | Performed by: INTERNAL MEDICINE

## 2019-12-24 PROCEDURE — 85610 PROTHROMBIN TIME: CPT | Performed by: INTERNAL MEDICINE

## 2019-12-24 PROCEDURE — 25010000002 MIDAZOLAM PER 1 MG: Performed by: INTERNAL MEDICINE

## 2019-12-24 PROCEDURE — 99255 IP/OBS CONSLTJ NEW/EST HI 80: CPT | Performed by: THORACIC SURGERY (CARDIOTHORACIC VASCULAR SURGERY)

## 2019-12-24 PROCEDURE — C1894 INTRO/SHEATH, NON-LASER: HCPCS | Performed by: INTERNAL MEDICINE

## 2019-12-24 PROCEDURE — 99232 SBSQ HOSP IP/OBS MODERATE 35: CPT | Performed by: FAMILY MEDICINE

## 2019-12-24 PROCEDURE — 25010000002 FENTANYL CITRATE (PF) 100 MCG/2ML SOLUTION: Performed by: INTERNAL MEDICINE

## 2019-12-24 PROCEDURE — 4A023N8 MEASUREMENT OF CARDIAC SAMPLING AND PRESSURE, BILATERAL, PERCUTANEOUS APPROACH: ICD-10-PCS | Performed by: THORACIC SURGERY (CARDIOTHORACIC VASCULAR SURGERY)

## 2019-12-24 PROCEDURE — 25010000002 ENOXAPARIN PER 10 MG: Performed by: FAMILY MEDICINE

## 2019-12-24 PROCEDURE — 99153 MOD SED SAME PHYS/QHP EA: CPT | Performed by: INTERNAL MEDICINE

## 2019-12-24 PROCEDURE — 99232 SBSQ HOSP IP/OBS MODERATE 35: CPT | Performed by: INTERNAL MEDICINE

## 2019-12-24 PROCEDURE — 93880 EXTRACRANIAL BILAT STUDY: CPT

## 2019-12-24 PROCEDURE — 99152 MOD SED SAME PHYS/QHP 5/>YRS: CPT | Performed by: INTERNAL MEDICINE

## 2019-12-24 PROCEDURE — 85027 COMPLETE CBC AUTOMATED: CPT | Performed by: INTERNAL MEDICINE

## 2019-12-24 PROCEDURE — 82962 GLUCOSE BLOOD TEST: CPT

## 2019-12-24 RX ORDER — ATROPINE SULFATE 1 MG/ML
.5-1 INJECTION, SOLUTION INTRAMUSCULAR; INTRAVENOUS; SUBCUTANEOUS
Status: DISCONTINUED | OUTPATIENT
Start: 2019-12-24 | End: 2019-12-27 | Stop reason: HOSPADM

## 2019-12-24 RX ORDER — ONDANSETRON 4 MG/1
4 TABLET, FILM COATED ORAL EVERY 6 HOURS PRN
Status: DISCONTINUED | OUTPATIENT
Start: 2019-12-24 | End: 2019-12-27 | Stop reason: HOSPADM

## 2019-12-24 RX ORDER — LIDOCAINE HYDROCHLORIDE 20 MG/ML
INJECTION, SOLUTION INFILTRATION; PERINEURAL AS NEEDED
Status: DISCONTINUED | OUTPATIENT
Start: 2019-12-24 | End: 2019-12-24 | Stop reason: HOSPADM

## 2019-12-24 RX ORDER — ISOSORBIDE MONONITRATE 30 MG/1
30 TABLET, EXTENDED RELEASE ORAL
Status: DISCONTINUED | OUTPATIENT
Start: 2019-12-24 | End: 2019-12-27 | Stop reason: HOSPADM

## 2019-12-24 RX ORDER — ACETAMINOPHEN 325 MG/1
650 TABLET ORAL EVERY 4 HOURS PRN
Status: DISCONTINUED | OUTPATIENT
Start: 2019-12-24 | End: 2019-12-27 | Stop reason: HOSPADM

## 2019-12-24 RX ORDER — SODIUM CHLORIDE 9 MG/ML
250 INJECTION, SOLUTION INTRAVENOUS ONCE AS NEEDED
Status: COMPLETED | OUTPATIENT
Start: 2019-12-24 | End: 2019-12-27

## 2019-12-24 RX ORDER — MIDAZOLAM HYDROCHLORIDE 1 MG/ML
INJECTION INTRAMUSCULAR; INTRAVENOUS AS NEEDED
Status: DISCONTINUED | OUTPATIENT
Start: 2019-12-24 | End: 2019-12-24 | Stop reason: HOSPADM

## 2019-12-24 RX ORDER — ONDANSETRON 2 MG/ML
4 INJECTION INTRAMUSCULAR; INTRAVENOUS EVERY 6 HOURS PRN
Status: DISCONTINUED | OUTPATIENT
Start: 2019-12-24 | End: 2019-12-27 | Stop reason: HOSPADM

## 2019-12-24 RX ORDER — FENTANYL CITRATE 50 UG/ML
INJECTION, SOLUTION INTRAMUSCULAR; INTRAVENOUS AS NEEDED
Status: DISCONTINUED | OUTPATIENT
Start: 2019-12-24 | End: 2019-12-24 | Stop reason: HOSPADM

## 2019-12-24 RX ADMIN — Medication 10 ML: at 08:04

## 2019-12-24 RX ADMIN — LEVOTHYROXINE SODIUM 200 MCG: 100 TABLET ORAL at 06:25

## 2019-12-24 RX ADMIN — GABAPENTIN 300 MG: 300 CAPSULE ORAL at 08:04

## 2019-12-24 RX ADMIN — ASPIRIN 81 MG 81 MG: 81 TABLET ORAL at 08:04

## 2019-12-24 RX ADMIN — MONTELUKAST SODIUM 10 MG: 10 TABLET, FILM COATED ORAL at 21:07

## 2019-12-24 RX ADMIN — FAMOTIDINE 20 MG: 20 TABLET ORAL at 21:07

## 2019-12-24 RX ADMIN — PANTOPRAZOLE SODIUM 40 MG: 40 TABLET, DELAYED RELEASE ORAL at 06:25

## 2019-12-24 RX ADMIN — GABAPENTIN 300 MG: 300 CAPSULE ORAL at 16:51

## 2019-12-24 RX ADMIN — OXYCODONE HYDROCHLORIDE 7.5 MG: 5 TABLET ORAL at 21:21

## 2019-12-24 RX ADMIN — ATORVASTATIN CALCIUM 40 MG: 40 TABLET, FILM COATED ORAL at 21:07

## 2019-12-24 RX ADMIN — FUROSEMIDE 40 MG: 40 TABLET ORAL at 08:04

## 2019-12-24 RX ADMIN — SPIRONOLACTONE 25 MG: 25 TABLET, FILM COATED ORAL at 08:04

## 2019-12-24 RX ADMIN — ALLOPURINOL 300 MG: 300 TABLET ORAL at 08:04

## 2019-12-24 RX ADMIN — FERROUS SULFATE TAB EC 324 MG (65 MG FE EQUIVALENT) 324 MG: 324 (65 FE) TABLET DELAYED RESPONSE at 08:04

## 2019-12-24 RX ADMIN — Medication 10 ML: at 21:07

## 2019-12-24 RX ADMIN — FOLIC ACID 1 MG: 1 TABLET ORAL at 08:04

## 2019-12-24 RX ADMIN — FUROSEMIDE 40 MG: 40 TABLET ORAL at 17:19

## 2019-12-24 RX ADMIN — ENOXAPARIN SODIUM 30 MG: 30 INJECTION SUBCUTANEOUS at 16:51

## 2019-12-24 RX ADMIN — FAMOTIDINE 20 MG: 20 TABLET ORAL at 08:04

## 2019-12-24 RX ADMIN — ALPRAZOLAM 1 MG: 1 TABLET ORAL at 21:21

## 2019-12-24 RX ADMIN — MELATONIN 1000 UNITS: at 08:04

## 2019-12-24 RX ADMIN — HYDRALAZINE HYDROCHLORIDE 100 MG: 25 TABLET ORAL at 21:07

## 2019-12-25 ENCOUNTER — APPOINTMENT (OUTPATIENT)
Dept: GENERAL RADIOLOGY | Facility: HOSPITAL | Age: 44
End: 2019-12-25

## 2019-12-25 LAB
ANION GAP SERPL CALCULATED.3IONS-SCNC: 10 MMOL/L (ref 5–15)
BASOPHILS # BLD AUTO: 0.1 10*3/MM3 (ref 0–0.2)
BASOPHILS NFR BLD AUTO: 0.9 % (ref 0–1.5)
BILIRUB UR QL STRIP: NEGATIVE
BUN BLD-MCNC: 16 MG/DL (ref 6–20)
BUN/CREAT SERPL: 13.7 (ref 7–25)
CALCIUM SPEC-SCNC: 9 MG/DL (ref 8.6–10.5)
CHLORIDE SERPL-SCNC: 102 MMOL/L (ref 98–107)
CK SERPL-CCNC: 104 U/L (ref 20–180)
CLARITY UR: CLEAR
CO2 SERPL-SCNC: 24 MMOL/L (ref 22–29)
COLOR UR: YELLOW
CREAT BLD-MCNC: 1.17 MG/DL (ref 0.57–1)
DEPRECATED RDW RBC AUTO: 46.4 FL (ref 37–54)
EOSINOPHIL # BLD AUTO: 0.1 10*3/MM3 (ref 0–0.4)
EOSINOPHIL NFR BLD AUTO: 1.1 % (ref 0.3–6.2)
EOSINOPHIL SPEC QL MICRO: 0 % EOS/100 CELLS (ref 0–0)
ERYTHROCYTE [DISTWIDTH] IN BLOOD BY AUTOMATED COUNT: 14.1 % (ref 12.3–15.4)
GFR SERPL CREATININE-BSD FRML MDRD: 60 ML/MIN/1.73
GLUCOSE BLD-MCNC: 109 MG/DL (ref 65–99)
GLUCOSE BLDC GLUCOMTR-MCNC: 102 MG/DL (ref 70–105)
GLUCOSE BLDC GLUCOMTR-MCNC: 104 MG/DL (ref 70–105)
GLUCOSE BLDC GLUCOMTR-MCNC: 122 MG/DL (ref 70–105)
GLUCOSE BLDC GLUCOMTR-MCNC: 99 MG/DL (ref 70–105)
GLUCOSE UR STRIP-MCNC: NEGATIVE MG/DL
HCT VFR BLD AUTO: 41.9 % (ref 34–46.6)
HGB BLD-MCNC: 14.6 G/DL (ref 12–15.9)
HGB UR QL STRIP.AUTO: NEGATIVE
KETONES UR QL STRIP: NEGATIVE
LEUKOCYTE ESTERASE UR QL STRIP.AUTO: NEGATIVE
LYMPHOCYTES # BLD AUTO: 2.2 10*3/MM3 (ref 0.7–3.1)
LYMPHOCYTES NFR BLD AUTO: 27.2 % (ref 19.6–45.3)
MAGNESIUM SERPL-MCNC: 2.1 MG/DL (ref 1.6–2.6)
MCH RBC QN AUTO: 32.4 PG (ref 26.6–33)
MCHC RBC AUTO-ENTMCNC: 34.8 G/DL (ref 31.5–35.7)
MCV RBC AUTO: 93.2 FL (ref 79–97)
MONOCYTES # BLD AUTO: 0.8 10*3/MM3 (ref 0.1–0.9)
MONOCYTES NFR BLD AUTO: 10.4 % (ref 5–12)
NEUTROPHILS # BLD AUTO: 4.9 10*3/MM3 (ref 1.7–7)
NEUTROPHILS NFR BLD AUTO: 60.4 % (ref 42.7–76)
NITRITE UR QL STRIP: NEGATIVE
NRBC BLD AUTO-RTO: 0.1 /100 WBC (ref 0–0.2)
PH UR STRIP.AUTO: 5.5 [PH] (ref 5–8)
PHOSPHATE SERPL-MCNC: 2.9 MG/DL (ref 2.5–4.5)
PLATELET # BLD AUTO: 193 10*3/MM3 (ref 140–450)
PMV BLD AUTO: 7.5 FL (ref 6–12)
POTASSIUM BLD-SCNC: 3.5 MMOL/L (ref 3.5–5.2)
PROT UR QL STRIP: NEGATIVE
PTH-INTACT SERPL-MCNC: 54.2 PG/ML (ref 15–65)
RBC # BLD AUTO: 4.49 10*6/MM3 (ref 3.77–5.28)
SODIUM BLD-SCNC: 136 MMOL/L (ref 136–145)
SODIUM UR-SCNC: 50 MMOL/L
SP GR UR STRIP: 1.02 (ref 1–1.03)
TSH SERPL DL<=0.05 MIU/L-ACNC: 11.57 UIU/ML (ref 0.27–4.2)
URATE SERPL-MCNC: 5.1 MG/DL (ref 2.4–5.7)
UROBILINOGEN UR QL STRIP: NORMAL
WBC NRBC COR # BLD: 8.1 10*3/MM3 (ref 3.4–10.8)

## 2019-12-25 PROCEDURE — 84443 ASSAY THYROID STIM HORMONE: CPT | Performed by: INTERNAL MEDICINE

## 2019-12-25 PROCEDURE — 83970 ASSAY OF PARATHORMONE: CPT | Performed by: INTERNAL MEDICINE

## 2019-12-25 PROCEDURE — 99232 SBSQ HOSP IP/OBS MODERATE 35: CPT | Performed by: FAMILY MEDICINE

## 2019-12-25 PROCEDURE — 84300 ASSAY OF URINE SODIUM: CPT | Performed by: INTERNAL MEDICINE

## 2019-12-25 PROCEDURE — 84100 ASSAY OF PHOSPHORUS: CPT | Performed by: FAMILY MEDICINE

## 2019-12-25 PROCEDURE — 87205 SMEAR GRAM STAIN: CPT | Performed by: INTERNAL MEDICINE

## 2019-12-25 PROCEDURE — 70355 PANORAMIC X-RAY OF JAWS: CPT

## 2019-12-25 PROCEDURE — 81003 URINALYSIS AUTO W/O SCOPE: CPT | Performed by: INTERNAL MEDICINE

## 2019-12-25 PROCEDURE — 84550 ASSAY OF BLOOD/URIC ACID: CPT | Performed by: INTERNAL MEDICINE

## 2019-12-25 PROCEDURE — 85025 COMPLETE CBC W/AUTO DIFF WBC: CPT | Performed by: FAMILY MEDICINE

## 2019-12-25 PROCEDURE — 83735 ASSAY OF MAGNESIUM: CPT | Performed by: FAMILY MEDICINE

## 2019-12-25 PROCEDURE — 82962 GLUCOSE BLOOD TEST: CPT

## 2019-12-25 PROCEDURE — 99232 SBSQ HOSP IP/OBS MODERATE 35: CPT | Performed by: INTERNAL MEDICINE

## 2019-12-25 PROCEDURE — 82550 ASSAY OF CK (CPK): CPT | Performed by: INTERNAL MEDICINE

## 2019-12-25 PROCEDURE — 80048 BASIC METABOLIC PNL TOTAL CA: CPT | Performed by: FAMILY MEDICINE

## 2019-12-25 PROCEDURE — 87641 MR-STAPH DNA AMP PROBE: CPT | Performed by: FAMILY MEDICINE

## 2019-12-25 PROCEDURE — 25010000002 ENOXAPARIN PER 10 MG: Performed by: FAMILY MEDICINE

## 2019-12-25 RX ADMIN — ENOXAPARIN SODIUM 30 MG: 30 INJECTION SUBCUTANEOUS at 15:16

## 2019-12-25 RX ADMIN — CARVEDILOL 12.5 MG: 6.25 TABLET, FILM COATED ORAL at 17:21

## 2019-12-25 RX ADMIN — LISINOPRIL 20 MG: 20 TABLET ORAL at 08:37

## 2019-12-25 RX ADMIN — MAGNESIUM HYDROXIDE 10 ML: 2400 SUSPENSION ORAL at 15:15

## 2019-12-25 RX ADMIN — FUROSEMIDE 40 MG: 40 TABLET ORAL at 08:35

## 2019-12-25 RX ADMIN — ASPIRIN 81 MG 81 MG: 81 TABLET ORAL at 08:36

## 2019-12-25 RX ADMIN — ALLOPURINOL 300 MG: 300 TABLET ORAL at 08:35

## 2019-12-25 RX ADMIN — ISOSORBIDE MONONITRATE 30 MG: 30 TABLET, EXTENDED RELEASE ORAL at 09:45

## 2019-12-25 RX ADMIN — OXYCODONE HYDROCHLORIDE 7.5 MG: 5 TABLET ORAL at 20:05

## 2019-12-25 RX ADMIN — PANTOPRAZOLE SODIUM 40 MG: 40 TABLET, DELAYED RELEASE ORAL at 08:35

## 2019-12-25 RX ADMIN — GABAPENTIN 300 MG: 300 CAPSULE ORAL at 00:44

## 2019-12-25 RX ADMIN — FOLIC ACID 1 MG: 1 TABLET ORAL at 08:35

## 2019-12-25 RX ADMIN — FAMOTIDINE 20 MG: 20 TABLET ORAL at 08:35

## 2019-12-25 RX ADMIN — MELATONIN 1000 UNITS: at 08:36

## 2019-12-25 RX ADMIN — Medication 10 ML: at 20:10

## 2019-12-25 RX ADMIN — CARVEDILOL 12.5 MG: 6.25 TABLET, FILM COATED ORAL at 08:37

## 2019-12-25 RX ADMIN — FAMOTIDINE 20 MG: 20 TABLET ORAL at 20:05

## 2019-12-25 RX ADMIN — ATORVASTATIN CALCIUM 40 MG: 40 TABLET, FILM COATED ORAL at 20:05

## 2019-12-25 RX ADMIN — MONTELUKAST SODIUM 10 MG: 10 TABLET, FILM COATED ORAL at 20:05

## 2019-12-25 RX ADMIN — OXYCODONE HYDROCHLORIDE 7.5 MG: 5 TABLET ORAL at 09:44

## 2019-12-25 RX ADMIN — SPIRONOLACTONE 25 MG: 25 TABLET, FILM COATED ORAL at 08:37

## 2019-12-25 RX ADMIN — Medication 10 ML: at 08:38

## 2019-12-25 RX ADMIN — LEVOTHYROXINE SODIUM 200 MCG: 100 TABLET ORAL at 06:26

## 2019-12-25 RX ADMIN — FERROUS SULFATE TAB EC 324 MG (65 MG FE EQUIVALENT) 324 MG: 324 (65 FE) TABLET DELAYED RESPONSE at 08:36

## 2019-12-26 ENCOUNTER — APPOINTMENT (OUTPATIENT)
Dept: ULTRASOUND IMAGING | Facility: HOSPITAL | Age: 44
End: 2019-12-26

## 2019-12-26 ENCOUNTER — TELEPHONE (OUTPATIENT)
Dept: CARDIAC SURGERY | Facility: CLINIC | Age: 44
End: 2019-12-26

## 2019-12-26 PROBLEM — I25.119 CORONARY ARTERY DISEASE INVOLVING NATIVE CORONARY ARTERY OF NATIVE HEART WITH ANGINA PECTORIS: Status: ACTIVE | Noted: 2019-12-22

## 2019-12-26 PROBLEM — I35.1 NONRHEUMATIC AORTIC VALVE INSUFFICIENCY: Status: ACTIVE | Noted: 2019-12-22

## 2019-12-26 LAB
ABO GROUP BLD: NORMAL
ALBUMIN SERPL-MCNC: 4 G/DL (ref 3.5–5.2)
ALBUMIN/GLOB SERPL: 1.4 G/DL
ALP SERPL-CCNC: 75 U/L (ref 39–117)
ALT SERPL W P-5'-P-CCNC: 15 U/L (ref 1–33)
ANION GAP SERPL CALCULATED.3IONS-SCNC: 10 MMOL/L (ref 5–15)
APTT PPP: 27.9 SECONDS (ref 24–31)
ARTERIAL PATENCY WRIST A: ABNORMAL
AST SERPL-CCNC: 19 U/L (ref 1–32)
ATMOSPHERIC PRESS: ABNORMAL MM[HG]
BASE EXCESS BLDA CALC-SCNC: 2.1 MMOL/L (ref 0–3)
BASOPHILS # BLD AUTO: 0 10*3/MM3 (ref 0–0.2)
BASOPHILS NFR BLD AUTO: 0.5 % (ref 0–1.5)
BDY SITE: ABNORMAL
BILIRUB SERPL-MCNC: 0.4 MG/DL (ref 0.2–1.2)
BLD GP AB SCN SERPL QL: NEGATIVE
BUN BLD-MCNC: 14 MG/DL (ref 6–20)
BUN/CREAT SERPL: 13.1 (ref 7–25)
CA-I SERPL ISE-MCNC: 1.16 MMOL/L (ref 1.2–1.3)
CALCIUM SPEC-SCNC: 9.1 MG/DL (ref 8.6–10.5)
CHLORIDE SERPL-SCNC: 101 MMOL/L (ref 98–107)
CHOLEST SERPL-MCNC: 191 MG/DL (ref 0–200)
CLOSE TME COLL+ADP + EPINEP PNL BLD: 97 % (ref 86–100)
CO2 BLDA-SCNC: 29.9 MMOL/L (ref 22–29)
CO2 SERPL-SCNC: 26 MMOL/L (ref 22–29)
CREAT BLD-MCNC: 1.07 MG/DL (ref 0.57–1)
DEPRECATED RDW RBC AUTO: 45.1 FL (ref 37–54)
DEPRECATED RDW RBC AUTO: 45.5 FL (ref 37–54)
EOSINOPHIL # BLD AUTO: 0.1 10*3/MM3 (ref 0–0.4)
EOSINOPHIL NFR BLD AUTO: 1.5 % (ref 0.3–6.2)
ERYTHROCYTE [DISTWIDTH] IN BLOOD BY AUTOMATED COUNT: 13.9 % (ref 12.3–15.4)
ERYTHROCYTE [DISTWIDTH] IN BLOOD BY AUTOMATED COUNT: 14 % (ref 12.3–15.4)
GFR SERPL CREATININE-BSD FRML MDRD: 67 ML/MIN/1.73
GLOBULIN UR ELPH-MCNC: 2.9 GM/DL
GLUCOSE BLD-MCNC: 112 MG/DL (ref 65–99)
GLUCOSE BLDC GLUCOMTR-MCNC: 101 MG/DL (ref 70–105)
GLUCOSE BLDC GLUCOMTR-MCNC: 106 MG/DL (ref 70–105)
GLUCOSE BLDC GLUCOMTR-MCNC: 107 MG/DL (ref 70–105)
GLUCOSE BLDC GLUCOMTR-MCNC: 73 MG/DL (ref 70–105)
HBA1C MFR BLD: 5.3 % (ref 3.5–5.6)
HCO3 BLDA-SCNC: 28.4 MMOL/L (ref 21–28)
HCT VFR BLD AUTO: 40.3 % (ref 34–46.6)
HCT VFR BLD AUTO: 41.3 % (ref 34–46.6)
HDLC SERPL-MCNC: 73 MG/DL (ref 40–60)
HEMODILUTION: NO
HGB BLD-MCNC: 13.9 G/DL (ref 12–15.9)
HGB BLD-MCNC: 14.1 G/DL (ref 12–15.9)
INR PPP: 0.91 (ref 0.9–1.1)
LDLC SERPL CALC-MCNC: 98 MG/DL (ref 0–100)
LDLC/HDLC SERPL: 1.34 {RATIO}
LYMPHOCYTES # BLD AUTO: 1.9 10*3/MM3 (ref 0.7–3.1)
LYMPHOCYTES NFR BLD AUTO: 26 % (ref 19.6–45.3)
MAGNESIUM SERPL-MCNC: 2.2 MG/DL (ref 1.6–2.6)
MCH RBC QN AUTO: 31.4 PG (ref 26.6–33)
MCH RBC QN AUTO: 32 PG (ref 26.6–33)
MCHC RBC AUTO-ENTMCNC: 34.2 G/DL (ref 31.5–35.7)
MCHC RBC AUTO-ENTMCNC: 34.5 G/DL (ref 31.5–35.7)
MCV RBC AUTO: 92 FL (ref 79–97)
MCV RBC AUTO: 93 FL (ref 79–97)
MODALITY: ABNORMAL
MONOCYTES # BLD AUTO: 0.6 10*3/MM3 (ref 0.1–0.9)
MONOCYTES NFR BLD AUTO: 8.4 % (ref 5–12)
MRSA DNA SPEC QL NAA+PROBE: NORMAL
NEUTROPHILS # BLD AUTO: 4.6 10*3/MM3 (ref 1.7–7)
NEUTROPHILS NFR BLD AUTO: 63.6 % (ref 42.7–76)
NRBC BLD AUTO-RTO: 0 /100 WBC (ref 0–0.2)
PCO2 BLDA: 49.4 MM HG (ref 35–48)
PH BLDA: 7.37 PH UNITS (ref 7.35–7.45)
PHOSPHATE SERPL-MCNC: 3.5 MG/DL (ref 2.5–4.5)
PLATELET # BLD AUTO: 153 10*3/MM3 (ref 140–450)
PLATELET # BLD AUTO: 171 10*3/MM3 (ref 140–450)
PMV BLD AUTO: 7.6 FL (ref 6–12)
PMV BLD AUTO: 7.9 FL (ref 6–12)
PO2 BLDA: 66.8 MM HG (ref 83–108)
POTASSIUM BLD-SCNC: 3.6 MMOL/L (ref 3.5–5.2)
PROT SERPL-MCNC: 6.9 G/DL (ref 6–8.5)
PROTHROMBIN TIME: 9.7 SECONDS (ref 9.6–11.7)
RBC # BLD AUTO: 4.34 10*6/MM3 (ref 3.77–5.28)
RBC # BLD AUTO: 4.49 10*6/MM3 (ref 3.77–5.28)
RH BLD: POSITIVE
SAO2 % BLDCOA: 92 % (ref 94–98)
SODIUM BLD-SCNC: 137 MMOL/L (ref 136–145)
T&S EXPIRATION DATE: NORMAL
TRIGL SERPL-MCNC: 102 MG/DL (ref 0–150)
VLDLC SERPL-MCNC: 20.4 MG/DL
WBC NRBC COR # BLD: 7.2 10*3/MM3 (ref 3.4–10.8)
WBC NRBC COR # BLD: 7.4 10*3/MM3 (ref 3.4–10.8)

## 2019-12-26 PROCEDURE — 25010000002 CALCIUM GLUCONATE-NACL 1-0.675 GM/50ML-% SOLUTION: Performed by: INTERNAL MEDICINE

## 2019-12-26 PROCEDURE — 85610 PROTHROMBIN TIME: CPT | Performed by: NURSE PRACTITIONER

## 2019-12-26 PROCEDURE — 86900 BLOOD TYPING SEROLOGIC ABO: CPT

## 2019-12-26 PROCEDURE — 86900 BLOOD TYPING SEROLOGIC ABO: CPT | Performed by: NURSE PRACTITIONER

## 2019-12-26 PROCEDURE — 85730 THROMBOPLASTIN TIME PARTIAL: CPT | Performed by: NURSE PRACTITIONER

## 2019-12-26 PROCEDURE — 83036 HEMOGLOBIN GLYCOSYLATED A1C: CPT | Performed by: NURSE PRACTITIONER

## 2019-12-26 PROCEDURE — 86923 COMPATIBILITY TEST ELECTRIC: CPT

## 2019-12-26 PROCEDURE — 86901 BLOOD TYPING SEROLOGIC RH(D): CPT | Performed by: NURSE PRACTITIONER

## 2019-12-26 PROCEDURE — 85576 BLOOD PLATELET AGGREGATION: CPT | Performed by: NURSE PRACTITIONER

## 2019-12-26 PROCEDURE — 99024 POSTOP FOLLOW-UP VISIT: CPT | Performed by: THORACIC SURGERY (CARDIOTHORACIC VASCULAR SURGERY)

## 2019-12-26 PROCEDURE — 85027 COMPLETE CBC AUTOMATED: CPT | Performed by: INTERNAL MEDICINE

## 2019-12-26 PROCEDURE — 86850 RBC ANTIBODY SCREEN: CPT | Performed by: NURSE PRACTITIONER

## 2019-12-26 PROCEDURE — 86901 BLOOD TYPING SEROLOGIC RH(D): CPT

## 2019-12-26 PROCEDURE — 76775 US EXAM ABDO BACK WALL LIM: CPT

## 2019-12-26 PROCEDURE — 83735 ASSAY OF MAGNESIUM: CPT | Performed by: INTERNAL MEDICINE

## 2019-12-26 PROCEDURE — 82803 BLOOD GASES ANY COMBINATION: CPT

## 2019-12-26 PROCEDURE — 36600 WITHDRAWAL OF ARTERIAL BLOOD: CPT

## 2019-12-26 PROCEDURE — 99232 SBSQ HOSP IP/OBS MODERATE 35: CPT | Performed by: INTERNAL MEDICINE

## 2019-12-26 PROCEDURE — 82962 GLUCOSE BLOOD TEST: CPT

## 2019-12-26 PROCEDURE — 25010000002 ENOXAPARIN PER 10 MG: Performed by: FAMILY MEDICINE

## 2019-12-26 PROCEDURE — 84100 ASSAY OF PHOSPHORUS: CPT | Performed by: INTERNAL MEDICINE

## 2019-12-26 PROCEDURE — 82330 ASSAY OF CALCIUM: CPT | Performed by: INTERNAL MEDICINE

## 2019-12-26 PROCEDURE — 80061 LIPID PANEL: CPT | Performed by: NURSE PRACTITIONER

## 2019-12-26 PROCEDURE — 85025 COMPLETE CBC W/AUTO DIFF WBC: CPT | Performed by: NURSE PRACTITIONER

## 2019-12-26 PROCEDURE — 80053 COMPREHEN METABOLIC PANEL: CPT | Performed by: INTERNAL MEDICINE

## 2019-12-26 RX ORDER — SODIUM CHLORIDE 0.9 % (FLUSH) 0.9 %
30 SYRINGE (ML) INJECTION ONCE AS NEEDED
Status: DISCONTINUED | OUTPATIENT
Start: 2019-12-26 | End: 2019-12-27 | Stop reason: HOSPADM

## 2019-12-26 RX ORDER — CHLORHEXIDINE GLUCONATE 500 MG/1
1 CLOTH TOPICAL EVERY 12 HOURS
Status: COMPLETED | OUTPATIENT
Start: 2019-12-26 | End: 2019-12-27

## 2019-12-26 RX ORDER — CHLORHEXIDINE GLUCONATE 0.12 MG/ML
15 RINSE ORAL EVERY 12 HOURS SCHEDULED
Status: DISCONTINUED | OUTPATIENT
Start: 2019-12-26 | End: 2019-12-27 | Stop reason: HOSPADM

## 2019-12-26 RX ORDER — ALPRAZOLAM 0.25 MG/1
0.25 TABLET ORAL EVERY 8 HOURS PRN
Status: DISCONTINUED | OUTPATIENT
Start: 2019-12-26 | End: 2019-12-27 | Stop reason: HOSPADM

## 2019-12-26 RX ORDER — FUROSEMIDE 40 MG/1
40 TABLET ORAL DAILY
Status: DISCONTINUED | OUTPATIENT
Start: 2019-12-26 | End: 2019-12-27 | Stop reason: HOSPADM

## 2019-12-26 RX ORDER — CALCIUM GLUCONATE 20 MG/ML
1 INJECTION, SOLUTION INTRAVENOUS ONCE
Status: COMPLETED | OUTPATIENT
Start: 2019-12-26 | End: 2019-12-26

## 2019-12-26 RX ORDER — SODIUM CHLORIDE 9 MG/ML
30 INJECTION, SOLUTION INTRAVENOUS CONTINUOUS PRN
Status: DISCONTINUED | OUTPATIENT
Start: 2019-12-26 | End: 2019-12-27 | Stop reason: HOSPADM

## 2019-12-26 RX ADMIN — MAGNESIUM HYDROXIDE 10 ML: 2400 SUSPENSION ORAL at 22:16

## 2019-12-26 RX ADMIN — CHLORHEXIDINE GLUCONATE 1 APPLICATION: 500 CLOTH TOPICAL at 22:21

## 2019-12-26 RX ADMIN — FUROSEMIDE 40 MG: 40 TABLET ORAL at 08:57

## 2019-12-26 RX ADMIN — OXYCODONE HYDROCHLORIDE 7.5 MG: 5 TABLET ORAL at 09:03

## 2019-12-26 RX ADMIN — OXYCODONE HYDROCHLORIDE 7.5 MG: 5 TABLET ORAL at 22:16

## 2019-12-26 RX ADMIN — LEVOTHYROXINE SODIUM 200 MCG: 100 TABLET ORAL at 05:00

## 2019-12-26 RX ADMIN — Medication 10 ML: at 08:58

## 2019-12-26 RX ADMIN — MELATONIN TAB 5 MG 5 MG: 5 TAB at 22:19

## 2019-12-26 RX ADMIN — ASPIRIN 81 MG 81 MG: 81 TABLET ORAL at 08:57

## 2019-12-26 RX ADMIN — Medication 10 ML: at 22:23

## 2019-12-26 RX ADMIN — GABAPENTIN 300 MG: 300 CAPSULE ORAL at 08:57

## 2019-12-26 RX ADMIN — FAMOTIDINE 20 MG: 20 TABLET ORAL at 22:18

## 2019-12-26 RX ADMIN — FAMOTIDINE 20 MG: 20 TABLET ORAL at 08:57

## 2019-12-26 RX ADMIN — MUPIROCIN 1 APPLICATION: 20 OINTMENT TOPICAL at 22:22

## 2019-12-26 RX ADMIN — FERROUS SULFATE TAB EC 324 MG (65 MG FE EQUIVALENT) 324 MG: 324 (65 FE) TABLET DELAYED RESPONSE at 08:57

## 2019-12-26 RX ADMIN — ALPRAZOLAM 1 MG: 1 TABLET ORAL at 16:14

## 2019-12-26 RX ADMIN — OXYCODONE HYDROCHLORIDE 7.5 MG: 5 TABLET ORAL at 16:14

## 2019-12-26 RX ADMIN — OXYCODONE HYDROCHLORIDE 7.5 MG: 5 TABLET ORAL at 04:59

## 2019-12-26 RX ADMIN — MELATONIN 1000 UNITS: at 08:57

## 2019-12-26 RX ADMIN — CARVEDILOL 12.5 MG: 6.25 TABLET, FILM COATED ORAL at 08:57

## 2019-12-26 RX ADMIN — MONTELUKAST SODIUM 10 MG: 10 TABLET, FILM COATED ORAL at 22:22

## 2019-12-26 RX ADMIN — ALLOPURINOL 300 MG: 300 TABLET ORAL at 08:57

## 2019-12-26 RX ADMIN — ATORVASTATIN CALCIUM 40 MG: 40 TABLET, FILM COATED ORAL at 22:18

## 2019-12-26 RX ADMIN — CARVEDILOL 12.5 MG: 6.25 TABLET, FILM COATED ORAL at 18:25

## 2019-12-26 RX ADMIN — ALPRAZOLAM 1 MG: 1 TABLET ORAL at 22:19

## 2019-12-26 RX ADMIN — CALCIUM GLUCONATE 1 G: 20 INJECTION, SOLUTION INTRAVENOUS at 08:58

## 2019-12-26 RX ADMIN — ALPRAZOLAM 1 MG: 1 TABLET ORAL at 09:03

## 2019-12-26 RX ADMIN — GABAPENTIN 300 MG: 300 CAPSULE ORAL at 22:17

## 2019-12-26 RX ADMIN — FOLIC ACID 1 MG: 1 TABLET ORAL at 08:57

## 2019-12-26 RX ADMIN — CHLORHEXIDINE GLUCONATE 15 ML: 1.2 RINSE ORAL at 22:20

## 2019-12-26 RX ADMIN — ENOXAPARIN SODIUM 30 MG: 30 INJECTION SUBCUTANEOUS at 16:14

## 2019-12-26 NOTE — TELEPHONE ENCOUNTER
REP YENY SNYDER RETURNED MY CALL. HE DOES NOT UNDERSTAND WHAT THEY ARE WANTING HE STATED THEY ARE NOT USUALLY PRESENT DURING SURGERY, BUT THEY DO COME CHECK THE PACER AFTER. I GAVE HIM THE CONTACT INFORMATION FOR ALEX WORKMAN SINCE SHE IS THE PROVIDER THAT REQUESTED I CONTACT THE [employer] REP.

## 2019-12-26 NOTE — TELEPHONE ENCOUNTER
S/W EDUARDO AT [employer] TO CONTACT REP FOR PACER TO BE TURNED OFF FOR SURGERY TOMORROW 12/272019 @  [facility] WITH DR BRITO. REP NAME IS YENY SNYDER. EDUARDO STATED THEY CAN NOT GIVE OUT HIS NUMBER BUT SHE TOOK THE CONTACT INFORMATION FROM OUR OFFICE FOR REP TO CONFIRM ATTENDANCE AT SURGERY AT  [facility] @ 730 AM TOMORROW Friday 12/27/2019

## 2019-12-27 ENCOUNTER — APPOINTMENT (OUTPATIENT)
Dept: GENERAL RADIOLOGY | Facility: HOSPITAL | Age: 44
End: 2019-12-27

## 2019-12-27 ENCOUNTER — ANESTHESIA (OUTPATIENT)
Dept: PERIOP | Facility: HOSPITAL | Age: 44
End: 2019-12-27

## 2019-12-27 ENCOUNTER — ANESTHESIA EVENT (OUTPATIENT)
Dept: PERIOP | Facility: HOSPITAL | Age: 44
End: 2019-12-27

## 2019-12-27 LAB
ABO + RH BLD: NORMAL
ABO + RH BLD: NORMAL
ACT BLD: 147 SECONDS (ref 89–137)
ACT BLD: 147 SECONDS (ref 89–137)
ACT BLD: 345 SECONDS (ref 89–137)
ACT BLD: 488 SECONDS (ref 89–137)
ACT BLD: 532 SECONDS (ref 89–137)
ACT BLD: 560 SECONDS (ref 89–137)
ACT BLD: 566 SECONDS (ref 89–137)
ACT BLD: 576 SECONDS (ref 89–137)
ACT BLD: 588 SECONDS (ref 89–137)
ACT BLD: 610 SECONDS (ref 89–137)
ALBUMIN SERPL-MCNC: 3.7 G/DL (ref 3.5–5.2)
ANION GAP SERPL CALCULATED.3IONS-SCNC: 10 MMOL/L (ref 5–15)
ANION GAP SERPL CALCULATED.3IONS-SCNC: 8 MMOL/L (ref 5–15)
APTT PPP: 30.9 SECONDS (ref 24–31)
ARTERIAL PATENCY WRIST A: ABNORMAL
ARTERIAL PATENCY WRIST A: NORMAL
ARTERIAL PATENCY WRIST A: NORMAL
ATMOSPHERIC PRESS: ABNORMAL MM[HG]
ATMOSPHERIC PRESS: NORMAL MM[HG]
ATMOSPHERIC PRESS: NORMAL MM[HG]
BASE DEFICIT: ABNORMAL
BASE EXCESS BLDA CALC-SCNC: -0.6 MMOL/L (ref 0–3)
BASE EXCESS BLDA CALC-SCNC: -0.7 MMOL/L (ref 0–3)
BASE EXCESS BLDA CALC-SCNC: 0 MMOL/L (ref 0–3)
BASE EXCESS BLDA CALC-SCNC: 0.7 MMOL/L (ref 0–3)
BASE EXCESS BLDA CALC-SCNC: 0.7 MMOL/L (ref 0–3)
BASE EXCESS BLDA CALC-SCNC: 0.8 MMOL/L (ref 0–3)
BASE EXCESS BLDA CALC-SCNC: 1 MMOL/L (ref 0–3)
BASE EXCESS BLDA CALC-SCNC: 1 MMOL/L (ref 0–3)
BASE EXCESS BLDA CALC-SCNC: 1.2 MMOL/L (ref 0–3)
BASE EXCESS BLDA CALC-SCNC: 1.5 MMOL/L (ref 0–3)
BASE EXCESS BLDA CALC-SCNC: 3 MMOL/L (ref 0–3)
BASE EXCESS BLDA CALC-SCNC: 4 MMOL/L (ref 0–3)
BASE EXCESS BLDA CALC-SCNC: 4 MMOL/L (ref 0–3)
BASE EXCESS BLDA CALC-SCNC: 5 MMOL/L (ref 0–3)
BASE EXCESS BLDA CALC-SCNC: 5 MMOL/L (ref 0–3)
BASE EXCESS BLDA CALC-SCNC: 7 MMOL/L (ref 0–3)
BASE EXCESS BLDV CALC-SCNC: ABNORMAL MMOL/L
BASOPHILS # BLD AUTO: 0 10*3/MM3 (ref 0–0.2)
BASOPHILS NFR BLD AUTO: 0.1 % (ref 0–1.5)
BDY SITE: ABNORMAL
BDY SITE: NORMAL
BDY SITE: NORMAL
BH BB BLOOD EXPIRATION DATE: NORMAL
BH BB BLOOD EXPIRATION DATE: NORMAL
BH BB BLOOD TYPE BARCODE: 5100
BH BB BLOOD TYPE BARCODE: 5100
BH BB DISPENSE STATUS: NORMAL
BH BB DISPENSE STATUS: NORMAL
BH BB PRODUCT CODE: NORMAL
BH BB PRODUCT CODE: NORMAL
BH BB UNIT NUMBER: NORMAL
BH BB UNIT NUMBER: NORMAL
BODY TEMPERATURE: 93.1 C
BODY TEMPERATURE: 93.6 C
BODY TEMPERATURE: 94.3 C
BODY TEMPERATURE: 96.5 C
BUN BLD-MCNC: 11 MG/DL (ref 6–20)
BUN BLD-MCNC: 14 MG/DL (ref 6–20)
BUN/CREAT SERPL: 12.5 (ref 7–25)
BUN/CREAT SERPL: 14.7 (ref 7–25)
CA-I BLDA-SCNC: 0.93 MMOL/L (ref 1.15–1.33)
CA-I BLDA-SCNC: 0.96 MMOL/L (ref 1.15–1.33)
CA-I BLDA-SCNC: 0.97 MMOL/L (ref 1.15–1.33)
CA-I BLDA-SCNC: 1.05 MMOL/L (ref 1.12–1.32)
CA-I BLDA-SCNC: 1.07 MMOL/L (ref 1.12–1.32)
CA-I BLDA-SCNC: 1.08 MMOL/L (ref 1.12–1.32)
CA-I BLDA-SCNC: 1.09 MMOL/L (ref 1.12–1.32)
CA-I BLDA-SCNC: 1.13 MMOL/L (ref 1.12–1.32)
CA-I BLDA-SCNC: 1.13 MMOL/L (ref 1.15–1.33)
CA-I BLDA-SCNC: 1.19 MMOL/L (ref 1.15–1.33)
CA-I BLDA-SCNC: 1.21 MMOL/L (ref 1.12–1.32)
CA-I BLDA-SCNC: 1.21 MMOL/L (ref 1.15–1.33)
CA-I BLDA-SCNC: 1.27 MMOL/L (ref 1.15–1.33)
CA-I BLDA-SCNC: 1.36 MMOL/L (ref 1.15–1.33)
CA-I BLDA-SCNC: 1.38 MMOL/L (ref 1.12–1.32)
CA-I SERPL ISE-MCNC: 1.13 MMOL/L (ref 1.2–1.3)
CALCIUM SPEC-SCNC: 8.3 MG/DL (ref 8.6–10.5)
CALCIUM SPEC-SCNC: 9.4 MG/DL (ref 8.6–10.5)
CHLORIDE SERPL-SCNC: 100 MMOL/L (ref 98–107)
CHLORIDE SERPL-SCNC: 108 MMOL/L (ref 98–107)
CO2 BLDA-SCNC: 25.6 MMOL/L (ref 22–29)
CO2 BLDA-SCNC: 26 MMOL/L (ref 22–29)
CO2 BLDA-SCNC: 26 MMOL/L (ref 23–27)
CO2 BLDA-SCNC: 26.2 MMOL/L (ref 22–29)
CO2 BLDA-SCNC: 27 MMOL/L (ref 22–29)
CO2 BLDA-SCNC: 27.2 MMOL/L (ref 22–29)
CO2 BLDA-SCNC: 27.2 MMOL/L (ref 22–29)
CO2 BLDA-SCNC: 27.4 MMOL/L (ref 22–29)
CO2 BLDA-SCNC: 27.6 MMOL/L (ref 22–29)
CO2 BLDA-SCNC: 28 MMOL/L (ref 23–27)
CO2 BLDA-SCNC: 30 MMOL/L (ref 23–27)
CO2 BLDA-SCNC: 31 MMOL/L (ref 23–27)
CO2 BLDA-SCNC: 31 MMOL/L (ref 23–27)
CO2 BLDA-SCNC: 32 MMOL/L (ref 23–27)
CO2 BLDA-SCNC: 32 MMOL/L (ref 23–27)
CO2 BLDA-SCNC: 33 MMOL/L (ref 23–27)
CO2 CONTENT VENOUS: 30 MMOL/L (ref 24–29)
CO2 SERPL-SCNC: 24 MMOL/L (ref 22–29)
CO2 SERPL-SCNC: 28 MMOL/L (ref 22–29)
CREAT BLD-MCNC: 0.88 MG/DL (ref 0.57–1)
CREAT BLD-MCNC: 0.95 MG/DL (ref 0.57–1)
D-LACTATE SERPL-SCNC: 0.7 MMOL/L (ref 0.5–2)
D-LACTATE SERPL-SCNC: 0.8 MMOL/L (ref 0.5–2)
D-LACTATE SERPL-SCNC: 0.8 MMOL/L (ref 0.5–2)
DEPRECATED RDW RBC AUTO: 45.1 FL (ref 37–54)
DEPRECATED RDW RBC AUTO: 49 FL (ref 37–54)
EOSINOPHIL # BLD AUTO: 0.1 10*3/MM3 (ref 0–0.4)
EOSINOPHIL NFR BLD AUTO: 0.7 % (ref 0.3–6.2)
ERYTHROCYTE [DISTWIDTH] IN BLOOD BY AUTOMATED COUNT: 13.6 % (ref 12.3–15.4)
ERYTHROCYTE [DISTWIDTH] IN BLOOD BY AUTOMATED COUNT: 14.3 % (ref 12.3–15.4)
GFR SERPL CREATININE-BSD FRML MDRD: 77 ML/MIN/1.73
GFR SERPL CREATININE-BSD FRML MDRD: 84 ML/MIN/1.73
GLUCOSE BLD-MCNC: 110 MG/DL (ref 65–99)
GLUCOSE BLD-MCNC: 181 MG/DL (ref 65–99)
GLUCOSE BLDC GLUCOMTR-MCNC: 103 MG/DL (ref 70–105)
GLUCOSE BLDC GLUCOMTR-MCNC: 111 MG/DL (ref 70–105)
GLUCOSE BLDC GLUCOMTR-MCNC: 112 MG/DL (ref 70–105)
GLUCOSE BLDC GLUCOMTR-MCNC: 118 MG/DL (ref 70–105)
GLUCOSE BLDC GLUCOMTR-MCNC: 119 MG/DL (ref 70–105)
GLUCOSE BLDC GLUCOMTR-MCNC: 121 MG/DL (ref 70–105)
GLUCOSE BLDC GLUCOMTR-MCNC: 125 MG/DL (ref 74–100)
GLUCOSE BLDC GLUCOMTR-MCNC: 126 MG/DL (ref 70–105)
GLUCOSE BLDC GLUCOMTR-MCNC: 126 MG/DL (ref 74–100)
GLUCOSE BLDC GLUCOMTR-MCNC: 126 MG/DL (ref 74–100)
GLUCOSE BLDC GLUCOMTR-MCNC: 134 MG/DL (ref 70–105)
GLUCOSE BLDC GLUCOMTR-MCNC: 137 MG/DL (ref 74–100)
GLUCOSE BLDC GLUCOMTR-MCNC: 138 MG/DL (ref 70–105)
GLUCOSE BLDC GLUCOMTR-MCNC: 155 MG/DL (ref 74–100)
GLUCOSE BLDC GLUCOMTR-MCNC: 171 MG/DL (ref 74–100)
GLUCOSE BLDC GLUCOMTR-MCNC: 178 MG/DL (ref 74–100)
GLUCOSE BLDC GLUCOMTR-MCNC: 181 MG/DL (ref 74–100)
GLUCOSE BLDC GLUCOMTR-MCNC: 189 MG/DL (ref 70–105)
HCO3 BLDA-SCNC: 24.4 MMOL/L (ref 21–28)
HCO3 BLDA-SCNC: 24.6 MMOL/L (ref 21–28)
HCO3 BLDA-SCNC: 25 MMOL/L (ref 21–28)
HCO3 BLDA-SCNC: 25.3 MMOL/L (ref 22–26)
HCO3 BLDA-SCNC: 25.7 MMOL/L (ref 21–28)
HCO3 BLDA-SCNC: 25.7 MMOL/L (ref 21–28)
HCO3 BLDA-SCNC: 26 MMOL/L (ref 21–28)
HCO3 BLDA-SCNC: 26.1 MMOL/L (ref 21–28)
HCO3 BLDA-SCNC: 26.2 MMOL/L (ref 21–28)
HCO3 BLDA-SCNC: 26.4 MMOL/L (ref 22–26)
HCO3 BLDA-SCNC: 28.4 MMOL/L (ref 22–26)
HCO3 BLDA-SCNC: 29.3 MMOL/L (ref 22–26)
HCO3 BLDA-SCNC: 29.3 MMOL/L (ref 22–26)
HCO3 BLDA-SCNC: 30.1 MMOL/L (ref 22–26)
HCO3 BLDA-SCNC: 30.5 MMOL/L (ref 22–26)
HCO3 BLDA-SCNC: 31.4 MMOL/L (ref 22–26)
HCO3 BLDV-SCNC: 28.3 MMOL/L (ref 23–28)
HCT VFR BLD AUTO: 33.7 % (ref 34–46.6)
HCT VFR BLD AUTO: 41.2 % (ref 34–46.6)
HCT VFR BLDA CALC: 26 % (ref 38–51)
HCT VFR BLDA CALC: 26 % (ref 38–51)
HCT VFR BLDA CALC: 28 % (ref 38–51)
HCT VFR BLDA CALC: 28 % (ref 38–51)
HCT VFR BLDA CALC: 29 % (ref 38–51)
HCT VFR BLDA CALC: 30 % (ref 38–51)
HCT VFR BLDA CALC: 32 % (ref 38–51)
HCT VFR BLDA CALC: 32 % (ref 38–51)
HCT VFR BLDA CALC: 37 % (ref 38–51)
HEMODILUTION: NO
HEMODILUTION: YES
HGB BLD-MCNC: 11.6 G/DL (ref 12–15.9)
HGB BLD-MCNC: 13.6 G/DL (ref 12–15.9)
HGB BLDA-MCNC: 10.2 G/DL (ref 12–17)
HGB BLDA-MCNC: 10.3 G/DL (ref 12–17)
HGB BLDA-MCNC: 10.3 G/DL (ref 12–17)
HGB BLDA-MCNC: 10.9 G/DL (ref 12–17)
HGB BLDA-MCNC: 11 G/DL (ref 12–17)
HGB BLDA-MCNC: 12.6 G/DL (ref 12–17)
HGB BLDA-MCNC: 8.8 G/DL (ref 12–17)
HGB BLDA-MCNC: 8.8 G/DL (ref 12–17)
HGB BLDA-MCNC: 9.5 G/DL (ref 12–17)
HGB BLDA-MCNC: 9.7 G/DL (ref 12–17)
HGB BLDA-MCNC: 9.9 G/DL (ref 12–17)
HOROWITZ INDEX BLD+IHG-RTO: 40 %
HOROWITZ INDEX BLD+IHG-RTO: 50 %
HOROWITZ INDEX BLD+IHG-RTO: 70 %
INR PPP: 1.2 (ref 0.9–1.1)
LYMPHOCYTES # BLD AUTO: 1.7 10*3/MM3 (ref 0.7–3.1)
LYMPHOCYTES NFR BLD AUTO: 15 % (ref 19.6–45.3)
MCH RBC QN AUTO: 31.5 PG (ref 26.6–33)
MCH RBC QN AUTO: 32.4 PG (ref 26.6–33)
MCHC RBC AUTO-ENTMCNC: 33.1 G/DL (ref 31.5–35.7)
MCHC RBC AUTO-ENTMCNC: 34.5 G/DL (ref 31.5–35.7)
MCV RBC AUTO: 93.9 FL (ref 79–97)
MCV RBC AUTO: 94.9 FL (ref 79–97)
MODALITY: ABNORMAL
MODALITY: NORMAL
MODALITY: NORMAL
MONOCYTES # BLD AUTO: 0.6 10*3/MM3 (ref 0.1–0.9)
MONOCYTES NFR BLD AUTO: 5.3 % (ref 5–12)
NEUTROPHILS # BLD AUTO: 9.2 10*3/MM3 (ref 1.7–7)
NEUTROPHILS NFR BLD AUTO: 78.9 % (ref 42.7–76)
NRBC BLD AUTO-RTO: 0 /100 WBC (ref 0–0.2)
PCO2 BLDA: 38.3 MM HG (ref 35–48)
PCO2 BLDA: 39 MM HG (ref 35–45)
PCO2 BLDA: 39.6 MM HG (ref 35–48)
PCO2 BLDA: 40.8 MM HG (ref 35–48)
PCO2 BLDA: 41.7 MM HG (ref 35–48)
PCO2 BLDA: 42.1 MM HG (ref 35–48)
PCO2 BLDA: 42.4 MM HG (ref 35–48)
PCO2 BLDA: 44.3 MM HG (ref 35–48)
PCO2 BLDA: 45.9 MM HG (ref 35–48)
PCO2 BLDA: 47 MM HG (ref 35–45)
PCO2 BLDA: 47 MM HG (ref 35–45)
PCO2 BLDA: 49 MM HG (ref 35–45)
PCO2 BLDA: 50 MM HG (ref 35–45)
PCO2 BLDA: 50 MM HG (ref 35–45)
PCO2 BLDA: 51 MM HG (ref 35–45)
PCO2 BLDA: 51 MM HG (ref 35–45)
PCO2 BLDV: 51 MM HG (ref 41–51)
PCO2 TEMP ADJ BLD: 36.1 MM HG (ref 35–48)
PCO2 TEMP ADJ BLD: 36.4 MM HG (ref 35–48)
PCO2 TEMP ADJ BLD: 37.1 MM HG (ref 35–48)
PCO2 TEMP ADJ BLD: 39.9 MM HG (ref 35–48)
PEEP RESPIRATORY: 5 CM[H2O]
PEEP RESPIRATORY: 5 CM[H2O]
PEEP RESPIRATORY: 8 CM[H2O]
PH BLDA: 7.36 PH UNITS (ref 7.35–7.45)
PH BLDA: 7.36 PH UNITS (ref 7.35–7.45)
PH BLDA: 7.37 PH UNITS (ref 7.35–7.45)
PH BLDA: 7.38 PH UNITS (ref 7.35–7.45)
PH BLDA: 7.39 PH UNITS (ref 7.35–7.45)
PH BLDA: 7.4 PH UNITS (ref 7.35–7.45)
PH BLDA: 7.42 PH UNITS (ref 7.35–7.45)
PH BLDA: 7.43 PH UNITS (ref 7.35–7.45)
PH BLDV: 7.35 PH UNITS (ref 7.31–7.41)
PH, TEMP CORRECTED: 7.42 PH UNITS (ref 7.35–7.45)
PH, TEMP CORRECTED: 7.44 PH UNITS (ref 7.35–7.45)
PHOSPHATE SERPL-MCNC: 3 MG/DL (ref 2.5–4.5)
PLATELET # BLD AUTO: 110 10*3/MM3 (ref 140–450)
PLATELET # BLD AUTO: 151 10*3/MM3 (ref 140–450)
PMV BLD AUTO: 7.1 FL (ref 6–12)
PMV BLD AUTO: 7.3 FL (ref 6–12)
PO2 BLDA: 100.5 MM HG (ref 83–108)
PO2 BLDA: 110.8 MM HG (ref 83–108)
PO2 BLDA: 197 MM HG (ref 80–105)
PO2 BLDA: 208 MM HG (ref 80–105)
PO2 BLDA: 390 MM HG (ref 80–105)
PO2 BLDA: 405 MM HG (ref 80–105)
PO2 BLDA: 420 MM HG (ref 80–105)
PO2 BLDA: 422 MM HG (ref 80–105)
PO2 BLDA: 426 MM HG (ref 80–105)
PO2 BLDA: 444 MM HG (ref 80–105)
PO2 BLDA: 64.9 MM HG (ref 83–108)
PO2 BLDA: 71.4 MM HG (ref 83–108)
PO2 BLDA: 74 MM HG (ref 83–108)
PO2 BLDA: 81.1 MM HG (ref 83–108)
PO2 BLDA: 82.8 MM HG (ref 83–108)
PO2 BLDA: 89 MM HG (ref 83–108)
PO2 BLDV: 51 MM HG
PO2 TEMP ADJ BLD: 103.8 MM HG (ref 83–108)
PO2 TEMP ADJ BLD: 52.6 MM HG (ref 83–108)
PO2 TEMP ADJ BLD: 59.2 MM HG (ref 83–108)
PO2 TEMP ADJ BLD: 69.4 MM HG (ref 83–108)
POTASSIUM BLD-SCNC: 3.6 MMOL/L (ref 3.5–5.2)
POTASSIUM BLD-SCNC: 4.4 MMOL/L (ref 3.5–5.2)
POTASSIUM BLDA-SCNC: 3.8 MMOL/L (ref 3.5–4.9)
POTASSIUM BLDA-SCNC: 3.9 MMOL/L (ref 3.5–4.5)
POTASSIUM BLDA-SCNC: 3.9 MMOL/L (ref 3.5–4.9)
POTASSIUM BLDA-SCNC: 3.9 MMOL/L (ref 3.5–4.9)
POTASSIUM BLDA-SCNC: 4 MMOL/L (ref 3.5–4.5)
POTASSIUM BLDA-SCNC: 4.1 MMOL/L (ref 3.5–4.5)
POTASSIUM BLDA-SCNC: 4.1 MMOL/L (ref 3.5–4.9)
POTASSIUM BLDA-SCNC: 4.2 MMOL/L (ref 3.5–4.5)
POTASSIUM BLDA-SCNC: 4.3 MMOL/L (ref 3.5–4.5)
POTASSIUM BLDA-SCNC: 4.4 MMOL/L (ref 3.5–4.9)
POTASSIUM BLDA-SCNC: 4.4 MMOL/L (ref 3.5–4.9)
POTASSIUM BLDA-SCNC: 4.5 MMOL/L (ref 3.5–4.9)
PROTHROMBIN TIME: 12.2 SECONDS (ref 9.6–11.7)
RBC # BLD AUTO: 3.59 10*6/MM3 (ref 3.77–5.28)
RBC # BLD AUTO: 4.34 10*6/MM3 (ref 3.77–5.28)
RESPIRATORY RATE: 14
SAO2 % BLDCOA: 100 % (ref 95–98)
SAO2 % BLDCOA: 91.7 % (ref 94–98)
SAO2 % BLDCOA: 93.9 % (ref 94–98)
SAO2 % BLDCOA: 93.9 % (ref 94–98)
SAO2 % BLDCOA: 95.6 % (ref 94–98)
SAO2 % BLDCOA: 96.1 % (ref 94–98)
SAO2 % BLDCOA: 97.1 % (ref 94–98)
SAO2 % BLDCOA: 97.7 % (ref 94–98)
SAO2 % BLDCOA: 98.4 % (ref 94–98)
SAO2 % BLDCOV: 84 %
SODIUM BLD-SCNC: 138 MMOL/L (ref 136–145)
SODIUM BLD-SCNC: 140 MMOL/L (ref 136–145)
SODIUM BLDA-SCNC: 132 MMOL/L (ref 138–146)
SODIUM BLDA-SCNC: 135 MMOL/L (ref 138–146)
SODIUM BLDA-SCNC: 136 MMOL/L (ref 138–146)
SODIUM BLDA-SCNC: 137 MMOL/L (ref 138–146)
SODIUM BLDA-SCNC: 138 MMOL/L (ref 138–146)
SODIUM BLDA-SCNC: 139 MMOL/L (ref 138–146)
SODIUM BLDA-SCNC: 139 MMOL/L (ref 138–146)
SODIUM BLDA-SCNC: 140 MMOL/L (ref 138–146)
SODIUM BLDA-SCNC: 141 MMOL/L (ref 138–146)
UNIT  ABO: NORMAL
UNIT  ABO: NORMAL
UNIT  RH: NORMAL
UNIT  RH: NORMAL
VENTILATOR MODE: ABNORMAL
VENTILATOR MODE: NORMAL
VENTILATOR MODE: NORMAL
VT ON VENT VENT: 600 ML
WBC NRBC COR # BLD: 11.7 10*3/MM3 (ref 3.4–10.8)
WBC NRBC COR # BLD: 6.9 10*3/MM3 (ref 3.4–10.8)

## 2019-12-27 PROCEDURE — 25010000002 ALBUMIN HUMAN 5% PER 50 ML

## 2019-12-27 PROCEDURE — 82947 ASSAY GLUCOSE BLOOD QUANT: CPT

## 2019-12-27 PROCEDURE — 33405 REPLACEMENT AORTIC VALVE OPN: CPT | Performed by: PHYSICIAN ASSISTANT

## 2019-12-27 PROCEDURE — 25010000002 HYDROMORPHONE PER 4 MG: Performed by: NURSE PRACTITIONER

## 2019-12-27 PROCEDURE — 82803 BLOOD GASES ANY COMBINATION: CPT

## 2019-12-27 PROCEDURE — 25010000002 PAPAVERINE PER 60 MG: Performed by: THORACIC SURGERY (CARDIOTHORACIC VASCULAR SURGERY)

## 2019-12-27 PROCEDURE — 33508 ENDOSCOPIC VEIN HARVEST: CPT | Performed by: PHYSICIAN ASSISTANT

## 2019-12-27 PROCEDURE — 82962 GLUCOSE BLOOD TEST: CPT

## 2019-12-27 PROCEDURE — 85014 HEMATOCRIT: CPT

## 2019-12-27 PROCEDURE — 25010000002 HEPARIN (PORCINE) PER 1000 UNITS: Performed by: ANESTHESIOLOGY

## 2019-12-27 PROCEDURE — 88311 DECALCIFY TISSUE: CPT | Performed by: THORACIC SURGERY (CARDIOTHORACIC VASCULAR SURGERY)

## 2019-12-27 PROCEDURE — 99232 SBSQ HOSP IP/OBS MODERATE 35: CPT | Performed by: INTERNAL MEDICINE

## 2019-12-27 PROCEDURE — 5A1221Z PERFORMANCE OF CARDIAC OUTPUT, CONTINUOUS: ICD-10-PCS | Performed by: INTERNAL MEDICINE

## 2019-12-27 PROCEDURE — 80048 BASIC METABOLIC PNL TOTAL CA: CPT | Performed by: INTERNAL MEDICINE

## 2019-12-27 PROCEDURE — 25010000002 ALBUMIN HUMAN 5% PER 50 ML: Performed by: NURSE PRACTITIONER

## 2019-12-27 PROCEDURE — 25010000002 EPINEPHRINE 1 MG/ML SOLUTION 30 ML VIAL: Performed by: THORACIC SURGERY (CARDIOTHORACIC VASCULAR SURGERY)

## 2019-12-27 PROCEDURE — 25010000002 MIDAZOLAM PER 1 MG: Performed by: ANESTHESIOLOGY

## 2019-12-27 PROCEDURE — 85610 PROTHROMBIN TIME: CPT | Performed by: NURSE PRACTITIONER

## 2019-12-27 PROCEDURE — 33508 ENDOSCOPIC VEIN HARVEST: CPT | Performed by: THORACIC SURGERY (CARDIOTHORACIC VASCULAR SURGERY)

## 2019-12-27 PROCEDURE — 25010000002 VANCOMYCIN 10 G RECONSTITUTED SOLUTION: Performed by: NURSE PRACTITIONER

## 2019-12-27 PROCEDURE — 25010000002 HEPARIN (PORCINE) PER 1000 UNITS: Performed by: THORACIC SURGERY (CARDIOTHORACIC VASCULAR SURGERY)

## 2019-12-27 PROCEDURE — 33405 REPLACEMENT AORTIC VALVE OPN: CPT | Performed by: THORACIC SURGERY (CARDIOTHORACIC VASCULAR SURGERY)

## 2019-12-27 PROCEDURE — 94799 UNLISTED PULMONARY SVC/PX: CPT

## 2019-12-27 PROCEDURE — 82330 ASSAY OF CALCIUM: CPT

## 2019-12-27 PROCEDURE — 25010000002 AMIODARONE PER 30 MG: Performed by: THORACIC SURGERY (CARDIOTHORACIC VASCULAR SURGERY)

## 2019-12-27 PROCEDURE — 25010000002 AMIODARONE PER 30 MG: Performed by: ANESTHESIOLOGY

## 2019-12-27 PROCEDURE — 71045 X-RAY EXAM CHEST 1 VIEW: CPT

## 2019-12-27 PROCEDURE — 85027 COMPLETE CBC AUTOMATED: CPT | Performed by: INTERNAL MEDICINE

## 2019-12-27 PROCEDURE — 93005 ELECTROCARDIOGRAM TRACING: CPT | Performed by: NURSE PRACTITIONER

## 2019-12-27 PROCEDURE — 83605 ASSAY OF LACTIC ACID: CPT

## 2019-12-27 PROCEDURE — 25010000002 ONDANSETRON PER 1 MG: Performed by: INTERNAL MEDICINE

## 2019-12-27 PROCEDURE — 80069 RENAL FUNCTION PANEL: CPT | Performed by: NURSE PRACTITIONER

## 2019-12-27 PROCEDURE — C1713 ANCHOR/SCREW BN/BN,TIS/BN: HCPCS | Performed by: THORACIC SURGERY (CARDIOTHORACIC VASCULAR SURGERY)

## 2019-12-27 PROCEDURE — 25010000002 MAGNESIUM SULFATE IN D5W 1G/100ML (PREMIX) 1-5 GM/100ML-% SOLUTION: Performed by: NURSE PRACTITIONER

## 2019-12-27 PROCEDURE — 84132 ASSAY OF SERUM POTASSIUM: CPT

## 2019-12-27 PROCEDURE — 85347 COAGULATION TIME ACTIVATED: CPT

## 2019-12-27 PROCEDURE — P9041 ALBUMIN (HUMAN),5%, 50ML: HCPCS

## 2019-12-27 PROCEDURE — 85025 COMPLETE CBC W/AUTO DIFF WBC: CPT | Performed by: NURSE PRACTITIONER

## 2019-12-27 PROCEDURE — 94002 VENT MGMT INPAT INIT DAY: CPT

## 2019-12-27 PROCEDURE — 25010000002 PROTAMINE SULFATE PER 10 MG: Performed by: ANESTHESIOLOGY

## 2019-12-27 PROCEDURE — 63710000001 INSULIN REGULAR HUMAN PER 5 UNITS: Performed by: ANESTHESIOLOGY

## 2019-12-27 PROCEDURE — 25010000002 ONDANSETRON PER 1 MG: Performed by: NURSE PRACTITIONER

## 2019-12-27 PROCEDURE — A4648 IMPLANTABLE TISSUE MARKER: HCPCS | Performed by: THORACIC SURGERY (CARDIOTHORACIC VASCULAR SURGERY)

## 2019-12-27 PROCEDURE — 80051 ELECTROLYTE PANEL: CPT

## 2019-12-27 PROCEDURE — 4A0239Z MEASUREMENT OF CARDIAC OUTPUT, PERCUTANEOUS APPROACH: ICD-10-PCS | Performed by: INTERNAL MEDICINE

## 2019-12-27 PROCEDURE — 84295 ASSAY OF SERUM SODIUM: CPT

## 2019-12-27 PROCEDURE — 33512 CABG VEIN THREE: CPT | Performed by: PHYSICIAN ASSISTANT

## 2019-12-27 PROCEDURE — C1751 CATH, INF, PER/CENT/MIDLINE: HCPCS | Performed by: ANESTHESIOLOGY

## 2019-12-27 PROCEDURE — 25010000002 FENTANYL CITRATE (PF) 250 MCG/5ML SOLUTION: Performed by: ANESTHESIOLOGY

## 2019-12-27 PROCEDURE — B3001ZZ PLAIN RADIOGRAPHY OF THORACIC AORTA USING LOW OSMOLAR CONTRAST: ICD-10-PCS | Performed by: INTERNAL MEDICINE

## 2019-12-27 PROCEDURE — C1729 CATH, DRAINAGE: HCPCS | Performed by: THORACIC SURGERY (CARDIOTHORACIC VASCULAR SURGERY)

## 2019-12-27 PROCEDURE — 88305 TISSUE EXAM BY PATHOLOGIST: CPT | Performed by: THORACIC SURGERY (CARDIOTHORACIC VASCULAR SURGERY)

## 2019-12-27 PROCEDURE — 82330 ASSAY OF CALCIUM: CPT | Performed by: NURSE PRACTITIONER

## 2019-12-27 PROCEDURE — 021109W BYPASS CORONARY ARTERY, TWO ARTERIES FROM AORTA WITH AUTOLOGOUS VENOUS TISSUE, OPEN APPROACH: ICD-10-PCS | Performed by: INTERNAL MEDICINE

## 2019-12-27 PROCEDURE — P9041 ALBUMIN (HUMAN),5%, 50ML: HCPCS | Performed by: NURSE PRACTITIONER

## 2019-12-27 PROCEDURE — 93318 ECHO TRANSESOPHAGEAL INTRAOP: CPT | Performed by: ANESTHESIOLOGY

## 2019-12-27 PROCEDURE — B24BZZ4 ULTRASONOGRAPHY OF HEART WITH AORTA, TRANSESOPHAGEAL: ICD-10-PCS | Performed by: INTERNAL MEDICINE

## 2019-12-27 PROCEDURE — 33512 CABG VEIN THREE: CPT | Performed by: THORACIC SURGERY (CARDIOTHORACIC VASCULAR SURGERY)

## 2019-12-27 PROCEDURE — 25010000002 MAGNESIUM SULFATE PER 500 MG OF MAGNESIUM: Performed by: ANESTHESIOLOGY

## 2019-12-27 PROCEDURE — 06BQ4ZZ EXCISION OF LEFT SAPHENOUS VEIN, PERCUTANEOUS ENDOSCOPIC APPROACH: ICD-10-PCS | Performed by: INTERNAL MEDICINE

## 2019-12-27 PROCEDURE — 85730 THROMBOPLASTIN TIME PARTIAL: CPT | Performed by: NURSE PRACTITIONER

## 2019-12-27 PROCEDURE — 85018 HEMOGLOBIN: CPT

## 2019-12-27 DEVICE — COR-KNOT MINI® COMBO KITBASE PACKAGE TYPE - KITEACH STERILE PACKAGE KIT CONTAINS (2) SINGLE PATIENT USE COR-KNOT MINI® DEVICES AND (12) COR-KNOT® QUICK LOADS®.
Type: IMPLANTABLE DEVICE | Status: FUNCTIONAL
Brand: COR-KNOT MINI®

## 2019-12-27 DEVICE — SS SUTURE, 6 PER SLEEVE
Type: IMPLANTABLE DEVICE | Status: FUNCTIONAL
Brand: MYO/WIRE II

## 2019-12-27 DEVICE — INCISIONLINE PLEDGET TFLN SFT LG: Type: IMPLANTABLE DEVICE | Status: FUNCTIONAL

## 2019-12-27 DEVICE — COR-KNOT® QUICK LOAD® SINGLES
Type: IMPLANTABLE DEVICE | Status: FUNCTIONAL
Brand: COR-KNOT® QUICK LOAD®

## 2019-12-27 DEVICE — IMPLANTABLE DEVICE: Type: IMPLANTABLE DEVICE | Site: HEART | Status: FUNCTIONAL

## 2019-12-27 RX ORDER — LIDOCAINE HYDROCHLORIDE 20 MG/ML
INJECTION, SOLUTION EPIDURAL; INFILTRATION; INTRACAUDAL; PERINEURAL AS NEEDED
Status: DISCONTINUED | OUTPATIENT
Start: 2019-12-27 | End: 2019-12-27 | Stop reason: SURG

## 2019-12-27 RX ORDER — MILRINONE LACTATE 0.2 MG/ML
0.12 INJECTION, SOLUTION INTRAVENOUS
Status: DISCONTINUED | OUTPATIENT
Start: 2019-12-27 | End: 2019-12-29

## 2019-12-27 RX ORDER — NITROGLYCERIN 10 MG/100ML
INJECTION INTRAVENOUS CONTINUOUS PRN
Status: DISCONTINUED | OUTPATIENT
Start: 2019-12-27 | End: 2019-12-27 | Stop reason: SURG

## 2019-12-27 RX ORDER — ACETAMINOPHEN 500 MG
500 TABLET ORAL EVERY 6 HOURS
Status: DISCONTINUED | OUTPATIENT
Start: 2019-12-27 | End: 2019-12-28

## 2019-12-27 RX ORDER — AMINOCAPROIC ACID 250 MG/ML
INJECTION, SOLUTION INTRAVENOUS AS NEEDED
Status: DISCONTINUED | OUTPATIENT
Start: 2019-12-27 | End: 2019-12-27 | Stop reason: SURG

## 2019-12-27 RX ORDER — POTASSIUM CHLORIDE 1.5 G/1.77G
20 POWDER, FOR SOLUTION ORAL AS NEEDED
Status: DISCONTINUED | OUTPATIENT
Start: 2019-12-28 | End: 2020-01-08 | Stop reason: HOSPADM

## 2019-12-27 RX ORDER — HYDROMORPHONE HCL 110MG/55ML
0.25 PATIENT CONTROLLED ANALGESIA SYRINGE INTRAVENOUS
Status: DISCONTINUED | OUTPATIENT
Start: 2019-12-27 | End: 2019-12-30

## 2019-12-27 RX ORDER — ATORVASTATIN CALCIUM 40 MG/1
40 TABLET, FILM COATED ORAL NIGHTLY
Status: DISCONTINUED | OUTPATIENT
Start: 2019-12-28 | End: 2020-01-08 | Stop reason: HOSPADM

## 2019-12-27 RX ORDER — LEVOTHYROXINE SODIUM 0.2 MG/1
200 TABLET ORAL
Status: DISCONTINUED | OUTPATIENT
Start: 2019-12-28 | End: 2020-01-08 | Stop reason: HOSPADM

## 2019-12-27 RX ORDER — AMIODARONE HYDROCHLORIDE 50 MG/ML
INJECTION, SOLUTION INTRAVENOUS AS NEEDED
Status: DISCONTINUED | OUTPATIENT
Start: 2019-12-27 | End: 2019-12-27 | Stop reason: SURG

## 2019-12-27 RX ORDER — GABAPENTIN 100 MG/1
100 CAPSULE ORAL EVERY 8 HOURS SCHEDULED
Status: DISCONTINUED | OUTPATIENT
Start: 2019-12-28 | End: 2019-12-28

## 2019-12-27 RX ORDER — AMIODARONE HCL/D5W 450 MG/250
1 PLASTIC BAG, INJECTION (ML) INTRAVENOUS CONTINUOUS
Status: DISCONTINUED | OUTPATIENT
Start: 2019-12-27 | End: 2019-12-27

## 2019-12-27 RX ORDER — PANTOPRAZOLE SODIUM 40 MG/1
40 TABLET, DELAYED RELEASE ORAL
Status: DISCONTINUED | OUTPATIENT
Start: 2019-12-28 | End: 2020-01-08 | Stop reason: HOSPADM

## 2019-12-27 RX ORDER — OXYCODONE HYDROCHLORIDE 5 MG/1
5 TABLET ORAL EVERY 4 HOURS PRN
Status: DISPENSED | OUTPATIENT
Start: 2019-12-27 | End: 2020-01-06

## 2019-12-27 RX ORDER — ALPRAZOLAM 0.25 MG/1
0.25 TABLET ORAL 2 TIMES DAILY PRN
Status: DISPENSED | OUTPATIENT
Start: 2019-12-28 | End: 2020-01-06

## 2019-12-27 RX ORDER — POTASSIUM CHLORIDE 7.45 MG/ML
INJECTION INTRAVENOUS
Status: DISPENSED
Start: 2019-12-27 | End: 2019-12-28

## 2019-12-27 RX ORDER — POTASSIUM CHLORIDE 20 MEQ/1
20 TABLET, EXTENDED RELEASE ORAL AS NEEDED
Status: DISCONTINUED | OUTPATIENT
Start: 2019-12-28 | End: 2020-01-08 | Stop reason: HOSPADM

## 2019-12-27 RX ORDER — DEXMEDETOMIDINE HYDROCHLORIDE 4 UG/ML
.2-.7 INJECTION, SOLUTION INTRAVENOUS
Status: DISCONTINUED | OUTPATIENT
Start: 2019-12-27 | End: 2019-12-28

## 2019-12-27 RX ORDER — POTASSIUM CHLORIDE 7.45 MG/ML
10 INJECTION INTRAVENOUS
Status: DISCONTINUED | OUTPATIENT
Start: 2019-12-27 | End: 2020-01-08 | Stop reason: HOSPADM

## 2019-12-27 RX ORDER — MIDAZOLAM HYDROCHLORIDE 1 MG/ML
INJECTION INTRAMUSCULAR; INTRAVENOUS AS NEEDED
Status: DISCONTINUED | OUTPATIENT
Start: 2019-12-27 | End: 2019-12-27 | Stop reason: SURG

## 2019-12-27 RX ORDER — MEPERIDINE HYDROCHLORIDE 25 MG/ML
25 INJECTION INTRAMUSCULAR; INTRAVENOUS; SUBCUTANEOUS EVERY 4 HOURS PRN
Status: DISCONTINUED | OUTPATIENT
Start: 2019-12-27 | End: 2019-12-28

## 2019-12-27 RX ORDER — SODIUM CHLORIDE 9 MG/ML
30 INJECTION, SOLUTION INTRAVENOUS CONTINUOUS
Status: DISCONTINUED | OUTPATIENT
Start: 2019-12-27 | End: 2020-01-08 | Stop reason: HOSPADM

## 2019-12-27 RX ORDER — ONDANSETRON 2 MG/ML
4 INJECTION INTRAMUSCULAR; INTRAVENOUS EVERY 6 HOURS PRN
Status: DISCONTINUED | OUTPATIENT
Start: 2019-12-27 | End: 2020-01-08 | Stop reason: HOSPADM

## 2019-12-27 RX ORDER — POLYETHYLENE GLYCOL 3350 17 G/17G
17 POWDER, FOR SOLUTION ORAL 2 TIMES DAILY
Status: DISCONTINUED | OUTPATIENT
Start: 2019-12-28 | End: 2020-01-08 | Stop reason: HOSPADM

## 2019-12-27 RX ORDER — CHLORHEXIDINE GLUCONATE 0.12 MG/ML
15 RINSE ORAL EVERY 12 HOURS
Status: DISCONTINUED | OUTPATIENT
Start: 2019-12-27 | End: 2019-12-30

## 2019-12-27 RX ORDER — SENNA PLUS 8.6 MG/1
1 TABLET ORAL 2 TIMES DAILY
Status: DISCONTINUED | OUTPATIENT
Start: 2019-12-28 | End: 2020-01-08 | Stop reason: HOSPADM

## 2019-12-27 RX ORDER — ACETAMINOPHEN 650 MG/1
650 SUPPOSITORY RECTAL EVERY 6 HOURS
Status: DISCONTINUED | OUTPATIENT
Start: 2019-12-27 | End: 2019-12-28

## 2019-12-27 RX ORDER — ETOMIDATE 2 MG/ML
INJECTION INTRAVENOUS AS NEEDED
Status: DISCONTINUED | OUTPATIENT
Start: 2019-12-27 | End: 2019-12-27 | Stop reason: SURG

## 2019-12-27 RX ORDER — FENTANYL CITRATE 50 UG/ML
INJECTION, SOLUTION INTRAMUSCULAR; INTRAVENOUS AS NEEDED
Status: DISCONTINUED | OUTPATIENT
Start: 2019-12-27 | End: 2019-12-27 | Stop reason: SURG

## 2019-12-27 RX ORDER — ALBUMIN, HUMAN INJ 5% 5 %
SOLUTION INTRAVENOUS
Status: COMPLETED
Start: 2019-12-27 | End: 2019-12-27

## 2019-12-27 RX ORDER — PANTOPRAZOLE SODIUM 40 MG/10ML
40 INJECTION, POWDER, LYOPHILIZED, FOR SOLUTION INTRAVENOUS ONCE
Status: COMPLETED | OUTPATIENT
Start: 2019-12-27 | End: 2019-12-27

## 2019-12-27 RX ORDER — AMIODARONE HCL/D5W 450 MG/250
0.5 PLASTIC BAG, INJECTION (ML) INTRAVENOUS CONTINUOUS
Status: DISCONTINUED | OUTPATIENT
Start: 2019-12-27 | End: 2019-12-27 | Stop reason: HOSPADM

## 2019-12-27 RX ORDER — NITROGLYCERIN 10 MG/100ML
5-50 INJECTION INTRAVENOUS CONTINUOUS PRN
Status: DISCONTINUED | OUTPATIENT
Start: 2019-12-27 | End: 2019-12-28

## 2019-12-27 RX ORDER — SODIUM CHLORIDE 9 MG/ML
INJECTION, SOLUTION INTRAVENOUS AS NEEDED
Status: DISCONTINUED | OUTPATIENT
Start: 2019-12-27 | End: 2019-12-27 | Stop reason: HOSPADM

## 2019-12-27 RX ORDER — HEPARIN SODIUM 1000 [USP'U]/ML
INJECTION, SOLUTION INTRAVENOUS; SUBCUTANEOUS AS NEEDED
Status: DISCONTINUED | OUTPATIENT
Start: 2019-12-27 | End: 2019-12-27 | Stop reason: SURG

## 2019-12-27 RX ORDER — ONDANSETRON 2 MG/ML
INJECTION INTRAMUSCULAR; INTRAVENOUS
Status: DISCONTINUED
Start: 2019-12-27 | End: 2019-12-27 | Stop reason: WASHOUT

## 2019-12-27 RX ORDER — ASPIRIN 325 MG
325 TABLET ORAL ONCE
Status: COMPLETED | OUTPATIENT
Start: 2019-12-27 | End: 2019-12-27

## 2019-12-27 RX ORDER — PHENYLEPHRINE HCL IN 0.9% NACL 0.5 MG/5ML
SYRINGE (ML) INTRAVENOUS AS NEEDED
Status: DISCONTINUED | OUTPATIENT
Start: 2019-12-27 | End: 2019-12-27 | Stop reason: SURG

## 2019-12-27 RX ORDER — AMIODARONE HCL/D5W 450 MG/250
0.5 PLASTIC BAG, INJECTION (ML) INTRAVENOUS CONTINUOUS
Status: DISCONTINUED | OUTPATIENT
Start: 2019-12-27 | End: 2019-12-28

## 2019-12-27 RX ORDER — MAGNESIUM SULFATE 1 G/100ML
1 INJECTION INTRAVENOUS EVERY 8 HOURS
Status: COMPLETED | OUTPATIENT
Start: 2019-12-27 | End: 2019-12-28

## 2019-12-27 RX ORDER — ALBUMIN, HUMAN INJ 5% 5 %
1000 SOLUTION INTRAVENOUS AS NEEDED
Status: ACTIVE | OUTPATIENT
Start: 2019-12-27 | End: 2019-12-28

## 2019-12-27 RX ORDER — ASPIRIN 81 MG/1
324 TABLET, CHEWABLE ORAL DAILY
Status: DISCONTINUED | OUTPATIENT
Start: 2019-12-27 | End: 2019-12-28

## 2019-12-27 RX ORDER — XYLITOL/YERBA SANTA
2 AEROSOL, SPRAY WITH PUMP (ML) MUCOUS MEMBRANE EVERY 4 HOURS PRN
Status: DISCONTINUED | OUTPATIENT
Start: 2019-12-27 | End: 2020-01-08 | Stop reason: HOSPADM

## 2019-12-27 RX ORDER — DOCUSATE SODIUM 100 MG/1
100 CAPSULE, LIQUID FILLED ORAL 2 TIMES DAILY
Status: DISCONTINUED | OUTPATIENT
Start: 2019-12-27 | End: 2020-01-08 | Stop reason: HOSPADM

## 2019-12-27 RX ORDER — VECURONIUM BROMIDE 1 MG/ML
INJECTION, POWDER, LYOPHILIZED, FOR SOLUTION INTRAVENOUS AS NEEDED
Status: DISCONTINUED | OUTPATIENT
Start: 2019-12-27 | End: 2019-12-27 | Stop reason: SURG

## 2019-12-27 RX ORDER — NICARDIPINE HYDROCHLORIDE 2.5 MG/ML
INJECTION INTRAVENOUS AS NEEDED
Status: DISCONTINUED | OUTPATIENT
Start: 2019-12-27 | End: 2019-12-27 | Stop reason: SURG

## 2019-12-27 RX ADMIN — VECURONIUM BROMIDE 6 MG: 1 INJECTION, POWDER, LYOPHILIZED, FOR SOLUTION INTRAVENOUS at 09:06

## 2019-12-27 RX ADMIN — CALCIUM CHLORIDE 1 G: 100 INJECTION INTRAVENOUS; INTRAVENTRICULAR at 17:16

## 2019-12-27 RX ADMIN — CHLORHEXIDINE GLUCONATE 0.12% ORAL RINSE 15 ML: 1.2 LIQUID ORAL at 21:24

## 2019-12-27 RX ADMIN — SODIUM CHLORIDE: 0.9 INJECTION, SOLUTION INTRAVENOUS at 13:03

## 2019-12-27 RX ADMIN — NICARDIPINE HYDROCHLORIDE 1 MG: 2.5 INJECTION INTRAVENOUS at 10:34

## 2019-12-27 RX ADMIN — CARVEDILOL 12.5 MG: 6.25 TABLET, FILM COATED ORAL at 06:09

## 2019-12-27 RX ADMIN — PANTOPRAZOLE SODIUM 40 MG: 40 INJECTION, POWDER, FOR SOLUTION INTRAVENOUS at 18:17

## 2019-12-27 RX ADMIN — ACETAMINOPHEN 500 MG: 500 TABLET, FILM COATED ORAL at 18:17

## 2019-12-27 RX ADMIN — AMIODARONE HYDROCHLORIDE 0.5 MG/MIN: 50 INJECTION, SOLUTION INTRAVENOUS at 21:36

## 2019-12-27 RX ADMIN — VECURONIUM BROMIDE 6 MG: 1 INJECTION, POWDER, LYOPHILIZED, FOR SOLUTION INTRAVENOUS at 11:52

## 2019-12-27 RX ADMIN — VANCOMYCIN HYDROCHLORIDE 1250 MG: 10 INJECTION, POWDER, LYOPHILIZED, FOR SOLUTION INTRAVENOUS at 20:15

## 2019-12-27 RX ADMIN — ONDANSETRON 4 MG: 2 INJECTION INTRAMUSCULAR; INTRAVENOUS at 11:25

## 2019-12-27 RX ADMIN — NICARDIPINE HYDROCHLORIDE 1 MG: 2.5 INJECTION INTRAVENOUS at 09:13

## 2019-12-27 RX ADMIN — SODIUM CHLORIDE 2 UNITS/HR: 900 INJECTION INTRAVENOUS at 06:58

## 2019-12-27 RX ADMIN — LIDOCAINE HYDROCHLORIDE 100 MG: 20 INJECTION, SOLUTION EPIDURAL; INFILTRATION; INTRACAUDAL; PERINEURAL at 11:35

## 2019-12-27 RX ADMIN — EPINEPHRINE 0.04 MCG/KG/MIN: 1 INJECTION INTRAMUSCULAR; INTRAVENOUS; SUBCUTANEOUS at 16:21

## 2019-12-27 RX ADMIN — NICARDIPINE HYDROCHLORIDE 1 MG: 2.5 INJECTION INTRAVENOUS at 09:56

## 2019-12-27 RX ADMIN — SODIUM CHLORIDE: 0.9 INJECTION, SOLUTION INTRAVENOUS at 09:12

## 2019-12-27 RX ADMIN — SODIUM CHLORIDE 30 ML/HR: 0.9 INJECTION, SOLUTION INTRAVENOUS at 14:30

## 2019-12-27 RX ADMIN — AMINOCAPROIC ACID 10 G: 250 INJECTION, SOLUTION INTRAVENOUS at 12:36

## 2019-12-27 RX ADMIN — AMIODARONE HYDROCHLORIDE 150 MG: 50 INJECTION, SOLUTION INTRAVENOUS at 11:33

## 2019-12-27 RX ADMIN — MILRINONE LACTATE IN DEXTROSE 0.12 MCG/KG/MIN: 200 INJECTION, SOLUTION INTRAVENOUS at 15:20

## 2019-12-27 RX ADMIN — LEVOTHYROXINE SODIUM 225 MCG: 50 TABLET ORAL at 06:07

## 2019-12-27 RX ADMIN — SODIUM CHLORIDE 0.5 MCG/HR: 900 INJECTION INTRAVENOUS at 06:58

## 2019-12-27 RX ADMIN — CHLORHEXIDINE GLUCONATE 1 APPLICATION: 500 CLOTH TOPICAL at 06:08

## 2019-12-27 RX ADMIN — PROTAMINE SULFATE 300 MG: 10 INJECTION, SOLUTION INTRAVENOUS at 12:07

## 2019-12-27 RX ADMIN — ALBUMIN HUMAN 250 ML: 0.05 INJECTION, SOLUTION INTRAVENOUS at 15:00

## 2019-12-27 RX ADMIN — NICARDIPINE HYDROCHLORIDE 0.5 MG: 2.5 INJECTION INTRAVENOUS at 12:46

## 2019-12-27 RX ADMIN — FENTANYL CITRATE 1500 MCG: 50 INJECTION, SOLUTION INTRAMUSCULAR; INTRAVENOUS at 06:58

## 2019-12-27 RX ADMIN — HYDROMORPHONE HYDROCHLORIDE 0.25 MG: 2 INJECTION INTRAMUSCULAR; INTRAVENOUS; SUBCUTANEOUS at 21:24

## 2019-12-27 RX ADMIN — MAGNESIUM SULFATE HEPTAHYDRATE 2 G: 500 INJECTION, SOLUTION INTRAMUSCULAR; INTRAVENOUS at 11:29

## 2019-12-27 RX ADMIN — AMIODARONE HYDROCHLORIDE 1 MG/MIN: 50 INJECTION, SOLUTION INTRAVENOUS at 15:22

## 2019-12-27 RX ADMIN — HEPARIN SODIUM 28000 UNITS: 1000 INJECTION, SOLUTION INTRAVENOUS; SUBCUTANEOUS at 08:22

## 2019-12-27 RX ADMIN — AMINOCAPROIC ACID 10 G: 250 INJECTION, SOLUTION INTRAVENOUS at 06:58

## 2019-12-27 RX ADMIN — VECURONIUM BROMIDE 8 MG: 1 INJECTION, POWDER, LYOPHILIZED, FOR SOLUTION INTRAVENOUS at 10:28

## 2019-12-27 RX ADMIN — PHENYLEPHRINE HYDROCHLORIDE 100 MCG: 10 INJECTION INTRAVENOUS at 08:55

## 2019-12-27 RX ADMIN — ASPIRIN 325 MG ORAL TABLET 325 MG: 325 PILL ORAL at 18:17

## 2019-12-27 RX ADMIN — VECURONIUM BROMIDE 14 MG: 1 INJECTION, POWDER, LYOPHILIZED, FOR SOLUTION INTRAVENOUS at 07:05

## 2019-12-27 RX ADMIN — ALBUMIN HUMAN 250 ML: 0.05 INJECTION, SOLUTION INTRAVENOUS at 14:30

## 2019-12-27 RX ADMIN — VECURONIUM BROMIDE 6 MG: 1 INJECTION, POWDER, LYOPHILIZED, FOR SOLUTION INTRAVENOUS at 12:52

## 2019-12-27 RX ADMIN — Medication 1 MG/MIN: at 11:44

## 2019-12-27 RX ADMIN — MAGNESIUM SULFATE HEPTAHYDRATE 1 G: 1 INJECTION, SOLUTION INTRAVENOUS at 18:17

## 2019-12-27 RX ADMIN — PHENYLEPHRINE HYDROCHLORIDE 100 MCG: 10 INJECTION INTRAVENOUS at 09:03

## 2019-12-27 RX ADMIN — DEXMEDETOMIDINE HYDROCHLORIDE 0.5 MCG/KG/HR: 100 INJECTION, SOLUTION INTRAVENOUS at 14:54

## 2019-12-27 RX ADMIN — NITROGLYCERIN 20 MCG/MIN: 10 INJECTION INTRAVENOUS at 08:57

## 2019-12-27 RX ADMIN — MIDAZOLAM 6 MG: 1 INJECTION INTRAMUSCULAR; INTRAVENOUS at 06:58

## 2019-12-27 RX ADMIN — HYDROMORPHONE HYDROCHLORIDE 0.25 MG: 2 INJECTION INTRAMUSCULAR; INTRAVENOUS; SUBCUTANEOUS at 18:17

## 2019-12-27 RX ADMIN — ONDANSETRON 4 MG: 2 INJECTION INTRAMUSCULAR; INTRAVENOUS at 18:17

## 2019-12-27 RX ADMIN — ETOMIDATE 12 MG: 2 INJECTION, SOLUTION INTRAVENOUS at 06:58

## 2019-12-27 RX ADMIN — DEXMEDETOMIDINE HYDROCHLORIDE 0.5 MCG/KG/HR: 100 INJECTION, SOLUTION INTRAVENOUS at 16:20

## 2019-12-27 RX ADMIN — MIDAZOLAM 2 MG: 1 INJECTION INTRAMUSCULAR; INTRAVENOUS at 10:34

## 2019-12-27 RX ADMIN — MUPIROCIN 1 APPLICATION: 20 OINTMENT TOPICAL at 20:14

## 2019-12-27 NOTE — ANESTHESIA POSTPROCEDURE EVALUATION
Patient: Rafael Mccann    Procedure Summary     Date:  12/27/19 Room / Location:  [facility] CVOR 01 / [facility] CVOR    Anesthesia Start:  0658 Anesthesia Stop:  1425    Procedures:       CORONARY ARTERY BYPASS GRAFTING (N/A Chest)      AORTIC VALVE REPAIR/REPLACEMENT (N/A Chest) Diagnosis:       Coronary artery disease involving native coronary artery of native heart with angina pectoris (CMS/HCC)      Nonrheumatic aortic valve insufficiency      (Coronary artery disease involving native coronary artery of native heart with angina pectoris (CMS/HCC) [I25.119])      (Nonrheumatic aortic valve insufficiency [I35.1])    Surgeon:  Lane Stock MD Provider:  Diego Burden MD    Anesthesia Type:  general ASA Status:  4          Anesthesia Type: general    Vitals  No vitals data found for the desired time range.          Post Anesthesia Care and Evaluation    Patient location during evaluation: ICU  Patient participation: complete - patient cannot participate  Level of consciousness: obtunded/minimal responses  Pain scale: See nurse's notes for pain score.  Pain management: adequate  Airway patency: patent  Anesthetic complications: No anesthetic complications  PONV Status: none  Cardiovascular status: acceptable  Respiratory status: acceptable  Hydration status: acceptable    Comments: Patient seen and examined postoperatively; vital signs stable; SpO2 greater than or equal to 90%; cardiopulmonary status stable; nausea/vomiting adequately controlled; pain adequately controlled; no apparent anesthesia complications; patient discharged from anesthesia care when discharge criteria were met

## 2019-12-27 NOTE — ANESTHESIA PROCEDURE NOTES
Central Line      Patient reassessed immediately prior to procedure    Patient location during procedure: OR  Indications: central pressure monitoring, vascular access and MD/Surgeon request  Preanesthetic Checklist  Completed: patient identified, site marked, surgical consent, pre-op evaluation, timeout performed, IV checked, risks and benefits discussed and monitors and equipment checked  Central Line Prep  Sterile Tech:cap, gloves, gown, mask and sterile barriers  Prep: chloraprep  Patient monitoring: blood pressure monitoring, continuous pulse oximetry and EKG  Central Line Procedure  Laterality:right  Location:internal jugular  Catheter Type:double lumen and Cordis  Catheter Size:9 Fr  Guidance:ultrasound guided  PROCEDURE NOTE/ULTRASOUND INTERPRETATION.  Using ultrasound guidance the potential vascular sites for insertion of the catheter were visualized to determine the patency of the vessel to be used for vascular access.  After selecting the appropriate site for insertion, the needle was visualized under ultrasound being inserted into the internal jugular vein, followed by ultrasound confirmation of wire and catheter placement. There were no abnormalities seen on ultrasound; an image was taken; and the patient tolerated the procedure with no complications. Images: still images obtained, printed/placed on chart  Assessment  Post procedure:biopatch applied, line sutured and occlusive dressing applied  Assessement:blood return through all ports, free fluid flow and Quinten Test  Complications:no  Patient Tolerance:patient tolerated the procedure well with no apparent complications

## 2019-12-27 NOTE — ANESTHESIA PROCEDURE NOTES
Airway  Urgency: elective    Date/Time: 12/27/2019 7:05 AM  Airway not difficult    General Information and Staff    Patient location during procedure: OR    Indications and Patient Condition  Indications for airway management: airway protection    Preoxygenated: yes  Mask difficulty assessment: 1 - vent by mask    Final Airway Details  Final airway type: endotracheal airway      Successful airway: ETT  Cuffed: yes   Successful intubation technique: direct laryngoscopy  Facilitating devices/methods: intubating stylet  Endotracheal tube insertion site: oral  Blade: Zainab  Blade size: 3  ETT size (mm): 8.0  Cormack-Lehane Classification: grade I - full view of glottis  Placement verified by: chest auscultation, capnometry and palpation of cuff   Measured from: lips  ETT/EBT  to lips (cm): 22  Number of attempts at approach: 1  Assessment: lips, teeth, and gum same as pre-op and atraumatic intubation

## 2019-12-27 NOTE — ANESTHESIA PROCEDURE NOTES
Arterial Line      Patient reassessed immediately prior to procedure    Patient location during procedure: OR   Line placed for hemodynamic monitoring.  Preanesthetic Checklist  Completed: patient identified, site marked, surgical consent, pre-op evaluation, risks and benefits discussed and monitors and equipment checked  Arterial Line Prep   Sterile Tech: gloves  Prep: ChloraPrep  Patient monitoring: continuous pulse oximetry, EKG and blood pressure monitoring  Arterial Line Procedure   Laterality:left  Location:  radial artery  Catheter size: 22 G   Guidance: ultrasound guided and palpation technique  Number of attempts: 1  Successful placement: yes  Post Assessment   Dressing Type: occlusive dressing applied, secured with tape and wrist guard applied.   Complications no  Circ/Move/Sens Assessment: normal.   Patient Tolerance: patient tolerated the procedure well with no apparent complications

## 2019-12-27 NOTE — ANESTHESIA PROCEDURE NOTES
Central Line      Patient location during procedure: OR  Central Line Procedure  Catheter Type:Rigby-Brad  Additional Notes  Rigby Placed at time of Cordis

## 2019-12-27 NOTE — ANESTHESIA PREPROCEDURE EVALUATION
Anesthesia Evaluation     Patient summary reviewed and Nursing notes reviewed   NPO Solid Status: > 8 hours  NPO Liquid Status: > 8 hours           Airway   Mallampati: II  TM distance: >3 FB  Neck ROM: full  No difficulty expected  Dental - normal exam     Pulmonary - normal exam    breath sounds clear to auscultation  (+) COPD,   Cardiovascular - normal exam  Exercise tolerance: unable to assess    ECG reviewed  Rhythm: regular  Rate: normal    (+) hypertension, valvular problems/murmurs AI, CAD, angina, CHF Systolic <55%, hyperlipidemia,       Neuro/Psych- negative ROS  GI/Hepatic/Renal/Endo    (+)  GERD,  renal disease CRI, diabetes mellitus,     Musculoskeletal (-) negative ROS    Abdominal  - normal exam   Substance History - negative use     OB/GYN negative ob/gyn ROS         Other - negative ROS                       Anesthesia Plan    ASA 4     general     intravenous induction     Anesthetic plan, all risks, benefits, and alternatives have been provided, discussed and informed consent has been obtained with: patient.

## 2019-12-27 NOTE — ANESTHESIA PROCEDURE NOTES
Procedure Performed: Emergent/Open-Heart Anesthesia FELICIA     Start Time:        End Time:      Preanesthesia Checklist:  Patient identified, IV assessed, risks and benefits discussed, monitors and equipment assessed, procedure being performed at surgeon's request and anesthesia consent obtained.    General Procedure Information  FELICIA Placed for monitoring purposes only -- This is not a diagnostic FELICIA  Diagnostic Indications for Echo:  hemodynamic monitoring  Location performed:  OR  Intubated  Heart visualized  Probe Type:  Biplane  Modalities:  Color flow mapping    Echocardiographic and Doppler Measurements    Ventricles    Right Ventricle:  Cavity size normal.    Left Ventricle:  Cavity size dilated.  Global Function moderately impaired.          Valves    Aortic Valve:  Annulus normal.  Regurgitation moderate.  Leaflets normal.  Leaflet motions normal.      Mitral Valve:  Annulus normal.  Regurgitation mild.  Leaflet motions normal.      Tricuspid Valve:  Regurgitation mild.              Atria      Left Atrium:  Size dilated.                  Anesthesia Information  Performed Personally      Echocardiogram Comments:       EF 30% pre op.  Post Op:35%, no paravalv leaks

## 2019-12-28 ENCOUNTER — APPOINTMENT (OUTPATIENT)
Dept: GENERAL RADIOLOGY | Facility: HOSPITAL | Age: 44
End: 2019-12-28

## 2019-12-28 LAB
ALBUMIN SERPL-MCNC: 4.1 G/DL (ref 3.5–5.2)
ANION GAP SERPL CALCULATED.3IONS-SCNC: 11 MMOL/L (ref 5–15)
ARTERIAL PATENCY WRIST A: ABNORMAL
ATMOSPHERIC PRESS: ABNORMAL MM[HG]
BASE EXCESS BLDA CALC-SCNC: 0.7 MMOL/L (ref 0–3)
BDY SITE: ABNORMAL
BUN BLD-MCNC: 11 MG/DL (ref 6–20)
BUN/CREAT SERPL: 11.3 (ref 7–25)
CA-I SERPL ISE-MCNC: 1.19 MMOL/L (ref 1.2–1.3)
CALCIUM SPEC-SCNC: 8.6 MG/DL (ref 8.6–10.5)
CHLORIDE SERPL-SCNC: 103 MMOL/L (ref 98–107)
CO2 BLDA-SCNC: 27.3 MMOL/L (ref 22–29)
CO2 SERPL-SCNC: 24 MMOL/L (ref 22–29)
CREAT BLD-MCNC: 0.97 MG/DL (ref 0.57–1)
DEPRECATED RDW RBC AUTO: 45.1 FL (ref 37–54)
ERYTHROCYTE [DISTWIDTH] IN BLOOD BY AUTOMATED COUNT: 13.6 % (ref 12.3–15.4)
GFR SERPL CREATININE-BSD FRML MDRD: 75 ML/MIN/1.73
GLUCOSE BLD-MCNC: 146 MG/DL (ref 65–99)
GLUCOSE BLDC GLUCOMTR-MCNC: 107 MG/DL (ref 70–105)
GLUCOSE BLDC GLUCOMTR-MCNC: 111 MG/DL (ref 70–105)
GLUCOSE BLDC GLUCOMTR-MCNC: 115 MG/DL (ref 70–105)
GLUCOSE BLDC GLUCOMTR-MCNC: 118 MG/DL (ref 70–105)
GLUCOSE BLDC GLUCOMTR-MCNC: 124 MG/DL (ref 70–105)
GLUCOSE BLDC GLUCOMTR-MCNC: 127 MG/DL (ref 70–105)
GLUCOSE BLDC GLUCOMTR-MCNC: 138 MG/DL (ref 70–105)
GLUCOSE BLDC GLUCOMTR-MCNC: 150 MG/DL (ref 70–105)
GLUCOSE BLDC GLUCOMTR-MCNC: 166 MG/DL (ref 70–105)
GLUCOSE BLDC GLUCOMTR-MCNC: 234 MG/DL (ref 70–105)
HCO3 BLDA-SCNC: 26 MMOL/L (ref 21–28)
HCT VFR BLD AUTO: 28.7 % (ref 34–46.6)
HEMODILUTION: NO
HGB BLD-MCNC: 10 G/DL (ref 12–15.9)
HOROWITZ INDEX BLD+IHG-RTO: <21 %
MAGNESIUM SERPL-MCNC: 2.4 MG/DL (ref 1.6–2.6)
MCH RBC QN AUTO: 32.6 PG (ref 26.6–33)
MCHC RBC AUTO-ENTMCNC: 34.8 G/DL (ref 31.5–35.7)
MCV RBC AUTO: 93.5 FL (ref 79–97)
MODALITY: ABNORMAL
PCO2 BLDA: 43.7 MM HG (ref 35–48)
PH BLDA: 7.38 PH UNITS (ref 7.35–7.45)
PHOSPHATE SERPL-MCNC: 3.9 MG/DL (ref 2.5–4.5)
PLATELET # BLD AUTO: 104 10*3/MM3 (ref 140–450)
PMV BLD AUTO: 7.7 FL (ref 6–12)
PO2 BLDA: 68.1 MM HG (ref 83–108)
POTASSIUM BLD-SCNC: 4 MMOL/L (ref 3.5–5.2)
RBC # BLD AUTO: 3.07 10*6/MM3 (ref 3.77–5.28)
SAO2 % BLDCOA: 92.9 % (ref 94–98)
SODIUM BLD-SCNC: 138 MMOL/L (ref 136–145)
WBC NRBC COR # BLD: 9.9 10*3/MM3 (ref 3.4–10.8)

## 2019-12-28 PROCEDURE — 93005 ELECTROCARDIOGRAM TRACING: CPT | Performed by: NURSE PRACTITIONER

## 2019-12-28 PROCEDURE — 80069 RENAL FUNCTION PANEL: CPT | Performed by: NURSE PRACTITIONER

## 2019-12-28 PROCEDURE — 25010000002 VANCOMYCIN 10 G RECONSTITUTED SOLUTION: Performed by: THORACIC SURGERY (CARDIOTHORACIC VASCULAR SURGERY)

## 2019-12-28 PROCEDURE — 25010000002 CALCIUM GLUCONATE-NACL 1-0.675 GM/50ML-% SOLUTION: Performed by: INTERNAL MEDICINE

## 2019-12-28 PROCEDURE — 82330 ASSAY OF CALCIUM: CPT | Performed by: NURSE PRACTITIONER

## 2019-12-28 PROCEDURE — 85027 COMPLETE CBC AUTOMATED: CPT | Performed by: NURSE PRACTITIONER

## 2019-12-28 PROCEDURE — 99232 SBSQ HOSP IP/OBS MODERATE 35: CPT | Performed by: INTERNAL MEDICINE

## 2019-12-28 PROCEDURE — 82803 BLOOD GASES ANY COMBINATION: CPT

## 2019-12-28 PROCEDURE — 83735 ASSAY OF MAGNESIUM: CPT | Performed by: NURSE PRACTITIONER

## 2019-12-28 PROCEDURE — 25010000002 VANCOMYCIN 10 G RECONSTITUTED SOLUTION: Performed by: NURSE PRACTITIONER

## 2019-12-28 PROCEDURE — 71045 X-RAY EXAM CHEST 1 VIEW: CPT

## 2019-12-28 PROCEDURE — 25010000002 HYDROMORPHONE PER 4 MG: Performed by: THORACIC SURGERY (CARDIOTHORACIC VASCULAR SURGERY)

## 2019-12-28 PROCEDURE — 25010000002 MAGNESIUM SULFATE IN D5W 1G/100ML (PREMIX) 1-5 GM/100ML-% SOLUTION: Performed by: NURSE PRACTITIONER

## 2019-12-28 PROCEDURE — 25010000002 HYDROMORPHONE PER 4 MG: Performed by: NURSE PRACTITIONER

## 2019-12-28 PROCEDURE — 25010000002 FUROSEMIDE PER 20 MG: Performed by: INTERNAL MEDICINE

## 2019-12-28 PROCEDURE — 97167 OT EVAL HIGH COMPLEX 60 MIN: CPT

## 2019-12-28 PROCEDURE — 25010000002 ONDANSETRON PER 1 MG: Performed by: NURSE PRACTITIONER

## 2019-12-28 PROCEDURE — 82962 GLUCOSE BLOOD TEST: CPT

## 2019-12-28 PROCEDURE — 97535 SELF CARE MNGMENT TRAINING: CPT

## 2019-12-28 RX ORDER — ALLOPURINOL 300 MG/1
300 TABLET ORAL DAILY
Status: DISCONTINUED | OUTPATIENT
Start: 2019-12-28 | End: 2020-01-08

## 2019-12-28 RX ORDER — LEVOTHYROXINE SODIUM 0.1 MG/1
200 TABLET ORAL
Status: DISCONTINUED | OUTPATIENT
Start: 2019-12-28 | End: 2019-12-28 | Stop reason: SDUPTHER

## 2019-12-28 RX ORDER — ACETAMINOPHEN 500 MG
1000 TABLET ORAL 4 TIMES DAILY PRN
Status: DISCONTINUED | OUTPATIENT
Start: 2019-12-28 | End: 2019-12-29

## 2019-12-28 RX ORDER — BUMETANIDE 1 MG/1
1 TABLET ORAL 2 TIMES DAILY
Status: DISCONTINUED | OUTPATIENT
Start: 2019-12-28 | End: 2019-12-29

## 2019-12-28 RX ORDER — FOLIC ACID 1 MG/1
1 TABLET ORAL DAILY
Status: DISCONTINUED | OUTPATIENT
Start: 2019-12-28 | End: 2020-01-08 | Stop reason: HOSPADM

## 2019-12-28 RX ORDER — AMIODARONE HYDROCHLORIDE 200 MG/1
300 TABLET ORAL 2 TIMES DAILY WITH MEALS
Status: DISCONTINUED | OUTPATIENT
Start: 2019-12-28 | End: 2020-01-01

## 2019-12-28 RX ORDER — ZOLPIDEM TARTRATE 5 MG/1
5 TABLET ORAL NIGHTLY PRN
Status: DISCONTINUED | OUTPATIENT
Start: 2019-12-28 | End: 2019-12-31

## 2019-12-28 RX ORDER — FLUCONAZOLE 100 MG/1
200 TABLET ORAL
Status: COMPLETED | OUTPATIENT
Start: 2019-12-28 | End: 2020-01-03

## 2019-12-28 RX ORDER — ASPIRIN 325 MG
325 TABLET ORAL ONCE
Status: COMPLETED | OUTPATIENT
Start: 2019-12-29 | End: 2019-12-29

## 2019-12-28 RX ORDER — POTASSIUM CHLORIDE 20 MEQ/1
20 TABLET, EXTENDED RELEASE ORAL DAILY
Status: DISCONTINUED | OUTPATIENT
Start: 2019-12-29 | End: 2019-12-29

## 2019-12-28 RX ORDER — CALCIUM GLUCONATE 20 MG/ML
1 INJECTION, SOLUTION INTRAVENOUS ONCE
Status: COMPLETED | OUTPATIENT
Start: 2019-12-28 | End: 2019-12-28

## 2019-12-28 RX ORDER — GABAPENTIN 300 MG/1
300 CAPSULE ORAL 3 TIMES DAILY
Status: DISCONTINUED | OUTPATIENT
Start: 2019-12-28 | End: 2019-12-31

## 2019-12-28 RX ORDER — FUROSEMIDE 10 MG/ML
20 INJECTION INTRAMUSCULAR; INTRAVENOUS ONCE
Status: COMPLETED | OUTPATIENT
Start: 2019-12-28 | End: 2019-12-28

## 2019-12-28 RX ORDER — GABAPENTIN 100 MG/1
100 CAPSULE ORAL EVERY 12 HOURS SCHEDULED
Status: DISCONTINUED | OUTPATIENT
Start: 2019-12-28 | End: 2019-12-28

## 2019-12-28 RX ORDER — FUROSEMIDE 40 MG/1
40 TABLET ORAL DAILY
Status: DISCONTINUED | OUTPATIENT
Start: 2019-12-28 | End: 2019-12-28 | Stop reason: SDUPTHER

## 2019-12-28 RX ORDER — MONTELUKAST SODIUM 10 MG/1
10 TABLET ORAL NIGHTLY
Status: DISCONTINUED | OUTPATIENT
Start: 2019-12-28 | End: 2020-01-08 | Stop reason: HOSPADM

## 2019-12-28 RX ORDER — FERROUS SULFATE TAB EC 324 MG (65 MG FE EQUIVALENT) 324 (65 FE) MG
324 TABLET DELAYED RESPONSE ORAL
Status: DISCONTINUED | OUTPATIENT
Start: 2019-12-28 | End: 2020-01-08 | Stop reason: HOSPADM

## 2019-12-28 RX ADMIN — AMIODARONE HYDROCHLORIDE 300 MG: 200 TABLET ORAL at 08:17

## 2019-12-28 RX ADMIN — MUPIROCIN 1 APPLICATION: 20 OINTMENT TOPICAL at 20:32

## 2019-12-28 RX ADMIN — MONTELUKAST SODIUM 10 MG: 10 TABLET, FILM COATED ORAL at 20:32

## 2019-12-28 RX ADMIN — LEVOTHYROXINE SODIUM 200 MCG: 200 TABLET ORAL at 06:22

## 2019-12-28 RX ADMIN — VANCOMYCIN HYDROCHLORIDE 1250 MG: 10 INJECTION, POWDER, LYOPHILIZED, FOR SOLUTION INTRAVENOUS at 06:22

## 2019-12-28 RX ADMIN — MAGNESIUM SULFATE HEPTAHYDRATE 1 G: 1 INJECTION, SOLUTION INTRAVENOUS at 08:35

## 2019-12-28 RX ADMIN — MUPIROCIN 1 APPLICATION: 20 OINTMENT TOPICAL at 08:17

## 2019-12-28 RX ADMIN — MILRINONE LACTATE IN DEXTROSE 0.12 MCG/KG/MIN: 200 INJECTION, SOLUTION INTRAVENOUS at 13:52

## 2019-12-28 RX ADMIN — HYDROMORPHONE HYDROCHLORIDE 0.25 MG: 2 INJECTION INTRAMUSCULAR; INTRAVENOUS; SUBCUTANEOUS at 21:56

## 2019-12-28 RX ADMIN — FUROSEMIDE 20 MG: 10 INJECTION, SOLUTION INTRAMUSCULAR; INTRAVENOUS at 12:08

## 2019-12-28 RX ADMIN — OXYCODONE HYDROCHLORIDE 5 MG: 5 TABLET ORAL at 08:18

## 2019-12-28 RX ADMIN — SENNOSIDES 1 TABLET: 8.6 TABLET, FILM COATED ORAL at 20:34

## 2019-12-28 RX ADMIN — CALCIUM GLUCONATE 1 G: 20 INJECTION, SOLUTION INTRAVENOUS at 08:36

## 2019-12-28 RX ADMIN — ONDANSETRON 4 MG: 2 INJECTION INTRAMUSCULAR; INTRAVENOUS at 01:09

## 2019-12-28 RX ADMIN — ALPRAZOLAM 0.25 MG: 0.25 TABLET ORAL at 07:04

## 2019-12-28 RX ADMIN — OXYCODONE HYDROCHLORIDE 5 MG: 5 TABLET ORAL at 03:23

## 2019-12-28 RX ADMIN — CHLORHEXIDINE GLUCONATE 0.12% ORAL RINSE 15 ML: 1.2 LIQUID ORAL at 08:16

## 2019-12-28 RX ADMIN — GABAPENTIN 100 MG: 100 CAPSULE ORAL at 06:24

## 2019-12-28 RX ADMIN — HYDROMORPHONE HYDROCHLORIDE 0.25 MG: 2 INJECTION INTRAMUSCULAR; INTRAVENOUS; SUBCUTANEOUS at 19:18

## 2019-12-28 RX ADMIN — HYDROMORPHONE HYDROCHLORIDE 0.25 MG: 2 INJECTION INTRAMUSCULAR; INTRAVENOUS; SUBCUTANEOUS at 07:04

## 2019-12-28 RX ADMIN — ATORVASTATIN CALCIUM 40 MG: 40 TABLET, FILM COATED ORAL at 20:31

## 2019-12-28 RX ADMIN — HYDROMORPHONE HYDROCHLORIDE 0.25 MG: 2 INJECTION INTRAMUSCULAR; INTRAVENOUS; SUBCUTANEOUS at 10:20

## 2019-12-28 RX ADMIN — VANCOMYCIN HYDROCHLORIDE 1250 MG: 10 INJECTION, POWDER, LYOPHILIZED, FOR SOLUTION INTRAVENOUS at 17:02

## 2019-12-28 RX ADMIN — POLYETHYLENE GLYCOL 3350 17 G: 17 POWDER, FOR SOLUTION ORAL at 20:34

## 2019-12-28 RX ADMIN — FERROUS SULFATE TAB EC 324 MG (65 MG FE EQUIVALENT) 324 MG: 324 (65 FE) TABLET DELAYED RESPONSE at 08:36

## 2019-12-28 RX ADMIN — OXYCODONE HYDROCHLORIDE 5 MG: 5 TABLET ORAL at 21:17

## 2019-12-28 RX ADMIN — HYDROMORPHONE HYDROCHLORIDE 0.25 MG: 2 INJECTION INTRAMUSCULAR; INTRAVENOUS; SUBCUTANEOUS at 17:22

## 2019-12-28 RX ADMIN — HYDROMORPHONE HYDROCHLORIDE 0.25 MG: 2 INJECTION INTRAMUSCULAR; INTRAVENOUS; SUBCUTANEOUS at 01:09

## 2019-12-28 RX ADMIN — OXYCODONE HYDROCHLORIDE 5 MG: 5 TABLET ORAL at 17:02

## 2019-12-28 RX ADMIN — GABAPENTIN 100 MG: 100 CAPSULE ORAL at 03:24

## 2019-12-28 RX ADMIN — CHLORHEXIDINE GLUCONATE 0.12% ORAL RINSE 15 ML: 1.2 LIQUID ORAL at 20:32

## 2019-12-28 RX ADMIN — GABAPENTIN 300 MG: 300 CAPSULE ORAL at 20:32

## 2019-12-28 RX ADMIN — MAGNESIUM SULFATE HEPTAHYDRATE 1 G: 1 INJECTION, SOLUTION INTRAVENOUS at 01:09

## 2019-12-28 RX ADMIN — HYDROMORPHONE HYDROCHLORIDE 0.25 MG: 2 INJECTION INTRAMUSCULAR; INTRAVENOUS; SUBCUTANEOUS at 03:40

## 2019-12-28 RX ADMIN — OXYCODONE HYDROCHLORIDE 5 MG: 5 TABLET ORAL at 12:09

## 2019-12-28 RX ADMIN — ACETAMINOPHEN 500 MG: 500 TABLET, FILM COATED ORAL at 12:08

## 2019-12-28 RX ADMIN — DOCUSATE SODIUM 100 MG: 100 CAPSULE, LIQUID FILLED ORAL at 08:17

## 2019-12-28 RX ADMIN — ACETAMINOPHEN 500 MG: 500 TABLET, FILM COATED ORAL at 01:08

## 2019-12-28 RX ADMIN — ALLOPURINOL 300 MG: 300 TABLET ORAL at 08:36

## 2019-12-28 RX ADMIN — ONDANSETRON 4 MG: 2 INJECTION INTRAMUSCULAR; INTRAVENOUS at 08:38

## 2019-12-28 RX ADMIN — ASPIRIN 81 MG 324 MG: 81 TABLET ORAL at 08:17

## 2019-12-28 RX ADMIN — FLUCONAZOLE 200 MG: 100 TABLET ORAL at 17:02

## 2019-12-28 RX ADMIN — GABAPENTIN 300 MG: 300 CAPSULE ORAL at 17:02

## 2019-12-28 RX ADMIN — POLYETHYLENE GLYCOL 3350 17 G: 17 POWDER, FOR SOLUTION ORAL at 08:16

## 2019-12-28 RX ADMIN — ACETAMINOPHEN 500 MG: 500 TABLET, FILM COATED ORAL at 06:22

## 2019-12-28 RX ADMIN — FOLIC ACID 1 MG: 1 TABLET ORAL at 08:36

## 2019-12-28 RX ADMIN — SENNOSIDES 1 TABLET: 8.6 TABLET, FILM COATED ORAL at 08:17

## 2019-12-28 RX ADMIN — BUMETANIDE 1 MG: 1 TABLET ORAL at 20:31

## 2019-12-28 RX ADMIN — AMIODARONE HYDROCHLORIDE 300 MG: 200 TABLET ORAL at 17:02

## 2019-12-28 RX ADMIN — PANTOPRAZOLE SODIUM 40 MG: 40 TABLET, DELAYED RELEASE ORAL at 06:22

## 2019-12-28 RX ADMIN — DOCUSATE SODIUM 100 MG: 100 CAPSULE, LIQUID FILLED ORAL at 20:32

## 2019-12-29 ENCOUNTER — APPOINTMENT (OUTPATIENT)
Dept: GENERAL RADIOLOGY | Facility: HOSPITAL | Age: 44
End: 2019-12-29

## 2019-12-29 LAB
ALBUMIN SERPL-MCNC: 3.8 G/DL (ref 3.5–5.2)
ALBUMIN/GLOB SERPL: 1.4 G/DL
ALP SERPL-CCNC: 54 U/L (ref 39–117)
ALT SERPL W P-5'-P-CCNC: 25 U/L (ref 1–33)
ANION GAP SERPL CALCULATED.3IONS-SCNC: 12 MMOL/L (ref 5–15)
ARTERIAL PATENCY WRIST A: POSITIVE
AST SERPL-CCNC: 58 U/L (ref 1–32)
ATMOSPHERIC PRESS: ABNORMAL MM[HG]
BASE EXCESS BLDA CALC-SCNC: 3.4 MMOL/L (ref 0–3)
BDY SITE: ABNORMAL
BILIRUB SERPL-MCNC: 0.8 MG/DL (ref 0.2–1.2)
BUN BLD-MCNC: 12 MG/DL (ref 6–20)
BUN/CREAT SERPL: 7.9 (ref 7–25)
CA-I SERPL ISE-MCNC: 1.18 MMOL/L (ref 1.2–1.3)
CALCIUM SPEC-SCNC: 8.7 MG/DL (ref 8.6–10.5)
CHLORIDE SERPL-SCNC: 96 MMOL/L (ref 98–107)
CO2 BLDA-SCNC: 30.4 MMOL/L (ref 22–29)
CO2 SERPL-SCNC: 26 MMOL/L (ref 22–29)
CREAT BLD-MCNC: 1.52 MG/DL (ref 0.57–1)
DEPRECATED RDW RBC AUTO: 44.2 FL (ref 37–54)
ERYTHROCYTE [DISTWIDTH] IN BLOOD BY AUTOMATED COUNT: 13.4 % (ref 12.3–15.4)
GFR SERPL CREATININE-BSD FRML MDRD: 45 ML/MIN/1.73
GLOBULIN UR ELPH-MCNC: 2.8 GM/DL
GLUCOSE BLD-MCNC: 134 MG/DL (ref 65–99)
GLUCOSE BLDC GLUCOMTR-MCNC: 107 MG/DL (ref 70–105)
GLUCOSE BLDC GLUCOMTR-MCNC: 112 MG/DL (ref 70–105)
GLUCOSE BLDC GLUCOMTR-MCNC: 113 MG/DL (ref 70–105)
GLUCOSE BLDC GLUCOMTR-MCNC: 113 MG/DL (ref 70–105)
GLUCOSE BLDC GLUCOMTR-MCNC: 114 MG/DL (ref 70–105)
GLUCOSE BLDC GLUCOMTR-MCNC: 120 MG/DL (ref 70–105)
GLUCOSE BLDC GLUCOMTR-MCNC: 122 MG/DL (ref 70–105)
GLUCOSE BLDC GLUCOMTR-MCNC: 135 MG/DL (ref 70–105)
GLUCOSE BLDC GLUCOMTR-MCNC: 139 MG/DL (ref 70–105)
HCO3 BLDA-SCNC: 28.9 MMOL/L (ref 21–28)
HCT VFR BLD AUTO: 27.8 % (ref 34–46.6)
HEMODILUTION: NO
HGB BLD-MCNC: 9.7 G/DL (ref 12–15.9)
HOROWITZ INDEX BLD+IHG-RTO: 21 %
MAGNESIUM SERPL-MCNC: 2 MG/DL (ref 1.6–2.6)
MCH RBC QN AUTO: 32.5 PG (ref 26.6–33)
MCHC RBC AUTO-ENTMCNC: 34.9 G/DL (ref 31.5–35.7)
MCV RBC AUTO: 93.1 FL (ref 79–97)
MODALITY: ABNORMAL
PCO2 BLDA: 47.5 MM HG (ref 35–48)
PH BLDA: 7.39 PH UNITS (ref 7.35–7.45)
PHOSPHATE SERPL-MCNC: 4.6 MG/DL (ref 2.5–4.5)
PLATELET # BLD AUTO: 106 10*3/MM3 (ref 140–450)
PMV BLD AUTO: 7.6 FL (ref 6–12)
PO2 BLDA: 41.6 MM HG (ref 83–108)
POTASSIUM BLD-SCNC: 4.1 MMOL/L (ref 3.5–5.2)
PROT SERPL-MCNC: 6.6 G/DL (ref 6–8.5)
RBC # BLD AUTO: 2.98 10*6/MM3 (ref 3.77–5.28)
SAO2 % BLDCOA: 75.9 % (ref 94–98)
SODIUM BLD-SCNC: 134 MMOL/L (ref 136–145)
WBC NRBC COR # BLD: 9.9 10*3/MM3 (ref 3.4–10.8)

## 2019-12-29 PROCEDURE — 71045 X-RAY EXAM CHEST 1 VIEW: CPT

## 2019-12-29 PROCEDURE — 25010000002 HYDROMORPHONE PER 4 MG: Performed by: THORACIC SURGERY (CARDIOTHORACIC VASCULAR SURGERY)

## 2019-12-29 PROCEDURE — 97116 GAIT TRAINING THERAPY: CPT

## 2019-12-29 PROCEDURE — 94799 UNLISTED PULMONARY SVC/PX: CPT

## 2019-12-29 PROCEDURE — 97163 PT EVAL HIGH COMPLEX 45 MIN: CPT

## 2019-12-29 PROCEDURE — 84100 ASSAY OF PHOSPHORUS: CPT | Performed by: INTERNAL MEDICINE

## 2019-12-29 PROCEDURE — 25010000002 MAGNESIUM SULFATE 2 GM/50ML SOLUTION: Performed by: THORACIC SURGERY (CARDIOTHORACIC VASCULAR SURGERY)

## 2019-12-29 PROCEDURE — 97530 THERAPEUTIC ACTIVITIES: CPT

## 2019-12-29 PROCEDURE — 99232 SBSQ HOSP IP/OBS MODERATE 35: CPT | Performed by: INTERNAL MEDICINE

## 2019-12-29 PROCEDURE — 82803 BLOOD GASES ANY COMBINATION: CPT

## 2019-12-29 PROCEDURE — 80053 COMPREHEN METABOLIC PANEL: CPT | Performed by: INTERNAL MEDICINE

## 2019-12-29 PROCEDURE — 36600 WITHDRAWAL OF ARTERIAL BLOOD: CPT

## 2019-12-29 PROCEDURE — 25010000002 ALBUMIN HUMAN 5% PER 50 ML: Performed by: THORACIC SURGERY (CARDIOTHORACIC VASCULAR SURGERY)

## 2019-12-29 PROCEDURE — 82330 ASSAY OF CALCIUM: CPT | Performed by: INTERNAL MEDICINE

## 2019-12-29 PROCEDURE — 93005 ELECTROCARDIOGRAM TRACING: CPT | Performed by: NURSE PRACTITIONER

## 2019-12-29 PROCEDURE — 82962 GLUCOSE BLOOD TEST: CPT

## 2019-12-29 PROCEDURE — 85027 COMPLETE CBC AUTOMATED: CPT | Performed by: THORACIC SURGERY (CARDIOTHORACIC VASCULAR SURGERY)

## 2019-12-29 PROCEDURE — 83735 ASSAY OF MAGNESIUM: CPT | Performed by: INTERNAL MEDICINE

## 2019-12-29 PROCEDURE — P9041 ALBUMIN (HUMAN),5%, 50ML: HCPCS | Performed by: THORACIC SURGERY (CARDIOTHORACIC VASCULAR SURGERY)

## 2019-12-29 RX ORDER — NOREPINEPHRINE BIT/0.9 % NACL 8 MG/250ML
.02-.04 INFUSION BOTTLE (ML) INTRAVENOUS
Status: DISCONTINUED | OUTPATIENT
Start: 2019-12-29 | End: 2019-12-31

## 2019-12-29 RX ORDER — DEXTROSE MONOHYDRATE 25 G/50ML
25 INJECTION, SOLUTION INTRAVENOUS
Status: DISCONTINUED | OUTPATIENT
Start: 2019-12-29 | End: 2020-01-08 | Stop reason: HOSPADM

## 2019-12-29 RX ORDER — ALBUMIN, HUMAN INJ 5% 5 %
12.5 SOLUTION INTRAVENOUS ONCE
Status: DISCONTINUED | OUTPATIENT
Start: 2019-12-29 | End: 2019-12-29 | Stop reason: SDUPTHER

## 2019-12-29 RX ORDER — MAGNESIUM SULFATE HEPTAHYDRATE 40 MG/ML
2 INJECTION, SOLUTION INTRAVENOUS ONCE
Status: DISCONTINUED | OUTPATIENT
Start: 2019-12-29 | End: 2019-12-29 | Stop reason: SDUPTHER

## 2019-12-29 RX ORDER — ALBUMIN, HUMAN INJ 5% 5 %
500 SOLUTION INTRAVENOUS ONCE
Status: COMPLETED | OUTPATIENT
Start: 2019-12-29 | End: 2019-12-29

## 2019-12-29 RX ORDER — MAGNESIUM SULFATE HEPTAHYDRATE 40 MG/ML
2 INJECTION, SOLUTION INTRAVENOUS ONCE
Status: COMPLETED | OUTPATIENT
Start: 2019-12-29 | End: 2019-12-29

## 2019-12-29 RX ORDER — ALBUMIN, HUMAN INJ 5% 5 %
250 SOLUTION INTRAVENOUS ONCE
Status: COMPLETED | OUTPATIENT
Start: 2019-12-29 | End: 2019-12-29

## 2019-12-29 RX ORDER — ACETAMINOPHEN 500 MG
500 TABLET ORAL EVERY 6 HOURS PRN
Status: DISCONTINUED | OUTPATIENT
Start: 2019-12-29 | End: 2020-01-08 | Stop reason: HOSPADM

## 2019-12-29 RX ORDER — NICOTINE POLACRILEX 4 MG
15 LOZENGE BUCCAL
Status: DISCONTINUED | OUTPATIENT
Start: 2019-12-29 | End: 2020-01-08 | Stop reason: HOSPADM

## 2019-12-29 RX ADMIN — FOLIC ACID 1 MG: 1 TABLET ORAL at 08:07

## 2019-12-29 RX ADMIN — ALPRAZOLAM 0.25 MG: 0.25 TABLET ORAL at 10:23

## 2019-12-29 RX ADMIN — MONTELUKAST SODIUM 10 MG: 10 TABLET, FILM COATED ORAL at 21:33

## 2019-12-29 RX ADMIN — MAGNESIUM SULFATE HEPTAHYDRATE 2 G: 40 INJECTION, SOLUTION INTRAVENOUS at 08:09

## 2019-12-29 RX ADMIN — HYDROMORPHONE HYDROCHLORIDE 0.25 MG: 2 INJECTION INTRAMUSCULAR; INTRAVENOUS; SUBCUTANEOUS at 19:42

## 2019-12-29 RX ADMIN — PANTOPRAZOLE SODIUM 40 MG: 40 TABLET, DELAYED RELEASE ORAL at 05:06

## 2019-12-29 RX ADMIN — OXYCODONE HYDROCHLORIDE 5 MG: 5 TABLET ORAL at 21:33

## 2019-12-29 RX ADMIN — CHLORHEXIDINE GLUCONATE 0.12% ORAL RINSE 15 ML: 1.2 LIQUID ORAL at 08:05

## 2019-12-29 RX ADMIN — LEVOTHYROXINE SODIUM 200 MCG: 200 TABLET ORAL at 05:06

## 2019-12-29 RX ADMIN — HYDROMORPHONE HYDROCHLORIDE 0.25 MG: 2 INJECTION INTRAMUSCULAR; INTRAVENOUS; SUBCUTANEOUS at 23:20

## 2019-12-29 RX ADMIN — DOCUSATE SODIUM 100 MG: 100 CAPSULE, LIQUID FILLED ORAL at 21:33

## 2019-12-29 RX ADMIN — OXYCODONE HYDROCHLORIDE 5 MG: 5 TABLET ORAL at 08:06

## 2019-12-29 RX ADMIN — ALBUMIN HUMAN 250 ML: 0.05 INJECTION, SOLUTION INTRAVENOUS at 08:07

## 2019-12-29 RX ADMIN — FERROUS SULFATE TAB EC 324 MG (65 MG FE EQUIVALENT) 324 MG: 324 (65 FE) TABLET DELAYED RESPONSE at 08:06

## 2019-12-29 RX ADMIN — GABAPENTIN 300 MG: 300 CAPSULE ORAL at 08:06

## 2019-12-29 RX ADMIN — MUPIROCIN 1 APPLICATION: 20 OINTMENT TOPICAL at 08:05

## 2019-12-29 RX ADMIN — ATORVASTATIN CALCIUM 40 MG: 40 TABLET, FILM COATED ORAL at 21:33

## 2019-12-29 RX ADMIN — ALLOPURINOL 300 MG: 300 TABLET ORAL at 08:07

## 2019-12-29 RX ADMIN — HYDROMORPHONE HYDROCHLORIDE 0.25 MG: 2 INJECTION INTRAMUSCULAR; INTRAVENOUS; SUBCUTANEOUS at 04:29

## 2019-12-29 RX ADMIN — CHLORHEXIDINE GLUCONATE 0.12% ORAL RINSE 15 ML: 1.2 LIQUID ORAL at 21:33

## 2019-12-29 RX ADMIN — CALCIUM CHLORIDE 0.5 G: 100 INJECTION INTRAVENOUS; INTRAVENTRICULAR at 13:49

## 2019-12-29 RX ADMIN — SENNOSIDES 1 TABLET: 8.6 TABLET, FILM COATED ORAL at 08:07

## 2019-12-29 RX ADMIN — POLYETHYLENE GLYCOL 3350 17 G: 17 POWDER, FOR SOLUTION ORAL at 08:05

## 2019-12-29 RX ADMIN — GABAPENTIN 300 MG: 300 CAPSULE ORAL at 21:33

## 2019-12-29 RX ADMIN — ALPRAZOLAM 0.25 MG: 0.25 TABLET ORAL at 21:33

## 2019-12-29 RX ADMIN — SENNOSIDES 1 TABLET: 8.6 TABLET, FILM COATED ORAL at 21:33

## 2019-12-29 RX ADMIN — ASPIRIN 325 MG ORAL TABLET 325 MG: 325 PILL ORAL at 08:06

## 2019-12-29 RX ADMIN — ALBUMIN HUMAN 500 ML: 0.05 INJECTION, SOLUTION INTRAVENOUS at 13:22

## 2019-12-29 RX ADMIN — DOCUSATE SODIUM 100 MG: 100 CAPSULE, LIQUID FILLED ORAL at 08:07

## 2019-12-29 RX ADMIN — AMIODARONE HYDROCHLORIDE 300 MG: 200 TABLET ORAL at 18:01

## 2019-12-29 RX ADMIN — MUPIROCIN 1 APPLICATION: 20 OINTMENT TOPICAL at 21:33

## 2019-12-29 RX ADMIN — POLYETHYLENE GLYCOL 3350 17 G: 17 POWDER, FOR SOLUTION ORAL at 21:33

## 2019-12-29 RX ADMIN — Medication 0.02 MCG/KG/MIN: at 13:23

## 2019-12-29 RX ADMIN — AMIODARONE HYDROCHLORIDE 300 MG: 200 TABLET ORAL at 08:06

## 2019-12-30 LAB
ALBUMIN SERPL-MCNC: 4.1 G/DL (ref 3.5–5.2)
AMPHET+METHAMPHET UR QL: NEGATIVE
ANION GAP SERPL CALCULATED.3IONS-SCNC: 10 MMOL/L (ref 5–15)
ARTERIAL PATENCY WRIST A: POSITIVE
ATMOSPHERIC PRESS: ABNORMAL MM[HG]
BARBITURATES UR QL SCN: NEGATIVE
BASE EXCESS BLDA CALC-SCNC: 4.9 MMOL/L (ref 0–3)
BDY SITE: ABNORMAL
BENZODIAZ UR QL SCN: NEGATIVE
BUN BLD-MCNC: 13 MG/DL (ref 6–20)
BUN/CREAT SERPL: 10.4 (ref 7–25)
CA-I BLDA-SCNC: 0.98 MMOL/L (ref 1.15–1.33)
CALCIUM SPEC-SCNC: 8.8 MG/DL (ref 8.6–10.5)
CANNABINOIDS SERPL QL: NEGATIVE
CHLORIDE SERPL-SCNC: 99 MMOL/L (ref 98–107)
CO2 BLDA-SCNC: 31.9 MMOL/L (ref 22–29)
CO2 SERPL-SCNC: 27 MMOL/L (ref 22–29)
COCAINE UR QL: NEGATIVE
CREAT BLD-MCNC: 1.25 MG/DL (ref 0.57–1)
D-LACTATE SERPL-SCNC: 0.8 MMOL/L (ref 0.5–2)
DEPRECATED RDW RBC AUTO: 44.2 FL (ref 37–54)
ERYTHROCYTE [DISTWIDTH] IN BLOOD BY AUTOMATED COUNT: 13.3 % (ref 12.3–15.4)
GFR SERPL CREATININE-BSD FRML MDRD: 56 ML/MIN/1.73
GLUCOSE BLD-MCNC: 157 MG/DL (ref 65–99)
GLUCOSE BLDC GLUCOMTR-MCNC: 124 MG/DL (ref 74–100)
GLUCOSE BLDC GLUCOMTR-MCNC: 128 MG/DL (ref 70–105)
GLUCOSE BLDC GLUCOMTR-MCNC: 128 MG/DL (ref 70–105)
GLUCOSE BLDC GLUCOMTR-MCNC: 139 MG/DL (ref 70–105)
GLUCOSE BLDC GLUCOMTR-MCNC: 146 MG/DL (ref 70–105)
GLUCOSE BLDC GLUCOMTR-MCNC: 157 MG/DL (ref 70–105)
HCO3 BLDA-SCNC: 30.4 MMOL/L (ref 21–28)
HCT VFR BLD AUTO: 25.3 % (ref 34–46.6)
HCT VFR BLDA CALC: 24 % (ref 38–51)
HEMODILUTION: NO
HGB BLD-MCNC: 8.6 G/DL (ref 12–15.9)
HGB BLDA-MCNC: 8.1 G/DL (ref 12–17)
HOROWITZ INDEX BLD+IHG-RTO: 36 %
LAB AP CASE REPORT: NORMAL
MAGNESIUM SERPL-MCNC: 2.2 MG/DL (ref 1.6–2.6)
MCH RBC QN AUTO: 31.6 PG (ref 26.6–33)
MCHC RBC AUTO-ENTMCNC: 33.9 G/DL (ref 31.5–35.7)
MCV RBC AUTO: 93.1 FL (ref 79–97)
METHADONE UR QL SCN: NEGATIVE
MODALITY: ABNORMAL
OPIATES UR QL: NEGATIVE
OXYCODONE UR QL SCN: POSITIVE
PATH REPORT.FINAL DX SPEC: NORMAL
PATH REPORT.GROSS SPEC: NORMAL
PCO2 BLDA: 49.4 MM HG (ref 35–48)
PH BLDA: 7.4 PH UNITS (ref 7.35–7.45)
PHOSPHATE SERPL-MCNC: 3 MG/DL (ref 2.5–4.5)
PLATELET # BLD AUTO: 134 10*3/MM3 (ref 140–450)
PMV BLD AUTO: 7.4 FL (ref 6–12)
PO2 BLDA: 68.1 MM HG (ref 83–108)
POTASSIUM BLD-SCNC: 4.4 MMOL/L (ref 3.5–5.2)
POTASSIUM BLDA-SCNC: 4 MMOL/L (ref 3.5–4.5)
RBC # BLD AUTO: 2.71 10*6/MM3 (ref 3.77–5.28)
SAO2 % BLDCOA: 92.9 % (ref 94–98)
SODIUM BLD-SCNC: 136 MMOL/L (ref 136–145)
SODIUM BLDA-SCNC: 135 MMOL/L (ref 138–146)
WBC NRBC COR # BLD: 8 10*3/MM3 (ref 3.4–10.8)

## 2019-12-30 PROCEDURE — 80307 DRUG TEST PRSMV CHEM ANLYZR: CPT | Performed by: NURSE PRACTITIONER

## 2019-12-30 PROCEDURE — 97530 THERAPEUTIC ACTIVITIES: CPT

## 2019-12-30 PROCEDURE — 25010000002 DIPHENHYDRAMINE PER 50 MG: Performed by: NURSE PRACTITIONER

## 2019-12-30 PROCEDURE — 99024 POSTOP FOLLOW-UP VISIT: CPT | Performed by: NURSE PRACTITIONER

## 2019-12-30 PROCEDURE — 99232 SBSQ HOSP IP/OBS MODERATE 35: CPT | Performed by: INTERNAL MEDICINE

## 2019-12-30 PROCEDURE — 82330 ASSAY OF CALCIUM: CPT

## 2019-12-30 PROCEDURE — 25010000002 ONDANSETRON PER 1 MG: Performed by: THORACIC SURGERY (CARDIOTHORACIC VASCULAR SURGERY)

## 2019-12-30 PROCEDURE — 82803 BLOOD GASES ANY COMBINATION: CPT

## 2019-12-30 PROCEDURE — 82962 GLUCOSE BLOOD TEST: CPT

## 2019-12-30 PROCEDURE — 25010000002 NALOXONE PER 1 MG

## 2019-12-30 PROCEDURE — 25010000002 CALCIUM GLUCONATE 2-0.675 GM/100ML-% SOLUTION: Performed by: NURSE PRACTITIONER

## 2019-12-30 PROCEDURE — 36600 WITHDRAWAL OF ARTERIAL BLOOD: CPT

## 2019-12-30 PROCEDURE — 80051 ELECTROLYTE PANEL: CPT

## 2019-12-30 PROCEDURE — 97535 SELF CARE MNGMENT TRAINING: CPT

## 2019-12-30 PROCEDURE — 85018 HEMOGLOBIN: CPT

## 2019-12-30 PROCEDURE — 83605 ASSAY OF LACTIC ACID: CPT

## 2019-12-30 PROCEDURE — 25010000002 ENOXAPARIN PER 10 MG: Performed by: THORACIC SURGERY (CARDIOTHORACIC VASCULAR SURGERY)

## 2019-12-30 PROCEDURE — 25010000002 HYDROMORPHONE PER 4 MG: Performed by: THORACIC SURGERY (CARDIOTHORACIC VASCULAR SURGERY)

## 2019-12-30 PROCEDURE — 85027 COMPLETE CBC AUTOMATED: CPT | Performed by: THORACIC SURGERY (CARDIOTHORACIC VASCULAR SURGERY)

## 2019-12-30 PROCEDURE — 25010000002 MAGNESIUM SULFATE 2 GM/50ML SOLUTION: Performed by: NURSE PRACTITIONER

## 2019-12-30 PROCEDURE — 80069 RENAL FUNCTION PANEL: CPT | Performed by: INTERNAL MEDICINE

## 2019-12-30 PROCEDURE — 83735 ASSAY OF MAGNESIUM: CPT | Performed by: INTERNAL MEDICINE

## 2019-12-30 RX ORDER — DIPHENHYDRAMINE HYDROCHLORIDE 50 MG/ML
12.5 INJECTION INTRAMUSCULAR; INTRAVENOUS ONCE
Status: COMPLETED | OUTPATIENT
Start: 2019-12-30 | End: 2019-12-30

## 2019-12-30 RX ORDER — BUMETANIDE 0.25 MG/ML
0.5 INJECTION INTRAMUSCULAR; INTRAVENOUS ONCE
Status: COMPLETED | OUTPATIENT
Start: 2019-12-30 | End: 2019-12-30

## 2019-12-30 RX ORDER — LACTULOSE 10 G/15ML
30 SOLUTION ORAL DAILY
Status: DISCONTINUED | OUTPATIENT
Start: 2019-12-30 | End: 2020-01-08 | Stop reason: HOSPADM

## 2019-12-30 RX ORDER — CALCIUM GLUCONATE 20 MG/ML
2 INJECTION, SOLUTION INTRAVENOUS ONCE
Status: COMPLETED | OUTPATIENT
Start: 2019-12-30 | End: 2019-12-30

## 2019-12-30 RX ORDER — NALOXONE HCL 0.4 MG/ML
0.4 VIAL (ML) INJECTION
Status: DISCONTINUED | OUTPATIENT
Start: 2019-12-30 | End: 2020-01-08 | Stop reason: HOSPADM

## 2019-12-30 RX ORDER — TRAMADOL HYDROCHLORIDE 50 MG/1
50 TABLET ORAL EVERY 6 HOURS PRN
Status: DISCONTINUED | OUTPATIENT
Start: 2019-12-30 | End: 2019-12-31

## 2019-12-30 RX ORDER — NALOXONE HYDROCHLORIDE 0.4 MG/ML
INJECTION, SOLUTION INTRAMUSCULAR; INTRAVENOUS; SUBCUTANEOUS
Status: COMPLETED
Start: 2019-12-30 | End: 2019-12-30

## 2019-12-30 RX ORDER — MIDODRINE HYDROCHLORIDE 5 MG/1
5 TABLET ORAL
Status: DISCONTINUED | OUTPATIENT
Start: 2019-12-30 | End: 2019-12-30

## 2019-12-30 RX ORDER — BUMETANIDE 0.25 MG/ML
1 INJECTION INTRAMUSCULAR; INTRAVENOUS ONCE
Status: COMPLETED | OUTPATIENT
Start: 2019-12-30 | End: 2019-12-30

## 2019-12-30 RX ORDER — MAGNESIUM SULFATE HEPTAHYDRATE 40 MG/ML
2 INJECTION, SOLUTION INTRAVENOUS ONCE
Status: COMPLETED | OUTPATIENT
Start: 2019-12-30 | End: 2019-12-30

## 2019-12-30 RX ADMIN — MONTELUKAST SODIUM 10 MG: 10 TABLET, FILM COATED ORAL at 20:26

## 2019-12-30 RX ADMIN — TRAMADOL HYDROCHLORIDE 50 MG: 50 TABLET, FILM COATED ORAL at 16:18

## 2019-12-30 RX ADMIN — FERROUS SULFATE TAB EC 324 MG (65 MG FE EQUIVALENT) 324 MG: 324 (65 FE) TABLET DELAYED RESPONSE at 09:00

## 2019-12-30 RX ADMIN — ACETAMINOPHEN 500 MG: 500 TABLET, FILM COATED ORAL at 16:18

## 2019-12-30 RX ADMIN — BUMETANIDE 1 MG: 0.25 INJECTION INTRAMUSCULAR; INTRAVENOUS at 13:28

## 2019-12-30 RX ADMIN — OXYCODONE HYDROCHLORIDE 5 MG: 5 TABLET ORAL at 08:57

## 2019-12-30 RX ADMIN — DIPHENHYDRAMINE HYDROCHLORIDE 12.5 MG: 50 INJECTION, SOLUTION INTRAMUSCULAR; INTRAVENOUS at 13:29

## 2019-12-30 RX ADMIN — DOCUSATE SODIUM 100 MG: 100 CAPSULE, LIQUID FILLED ORAL at 09:00

## 2019-12-30 RX ADMIN — ALLOPURINOL 300 MG: 300 TABLET ORAL at 09:00

## 2019-12-30 RX ADMIN — POLYETHYLENE GLYCOL 3350 17 G: 17 POWDER, FOR SOLUTION ORAL at 08:57

## 2019-12-30 RX ADMIN — FLUCONAZOLE 200 MG: 100 TABLET ORAL at 20:31

## 2019-12-30 RX ADMIN — PANTOPRAZOLE SODIUM 40 MG: 40 TABLET, DELAYED RELEASE ORAL at 06:17

## 2019-12-30 RX ADMIN — MUPIROCIN 1 APPLICATION: 20 OINTMENT TOPICAL at 20:27

## 2019-12-30 RX ADMIN — FOLIC ACID 1 MG: 1 TABLET ORAL at 09:00

## 2019-12-30 RX ADMIN — POLYETHYLENE GLYCOL 3350 17 G: 17 POWDER, FOR SOLUTION ORAL at 20:26

## 2019-12-30 RX ADMIN — AMIODARONE HYDROCHLORIDE 300 MG: 200 TABLET ORAL at 08:58

## 2019-12-30 RX ADMIN — GABAPENTIN 300 MG: 300 CAPSULE ORAL at 15:10

## 2019-12-30 RX ADMIN — GABAPENTIN 300 MG: 300 CAPSULE ORAL at 20:26

## 2019-12-30 RX ADMIN — SENNOSIDES 1 TABLET: 8.6 TABLET, FILM COATED ORAL at 09:00

## 2019-12-30 RX ADMIN — LACTULOSE 30 G: 10 SOLUTION ORAL at 12:51

## 2019-12-30 RX ADMIN — DOCUSATE SODIUM 100 MG: 100 CAPSULE, LIQUID FILLED ORAL at 20:26

## 2019-12-30 RX ADMIN — GABAPENTIN 300 MG: 300 CAPSULE ORAL at 09:00

## 2019-12-30 RX ADMIN — CHLORHEXIDINE GLUCONATE 0.12% ORAL RINSE 15 ML: 1.2 LIQUID ORAL at 08:57

## 2019-12-30 RX ADMIN — HYDROMORPHONE HYDROCHLORIDE 0.25 MG: 2 INJECTION INTRAMUSCULAR; INTRAVENOUS; SUBCUTANEOUS at 06:48

## 2019-12-30 RX ADMIN — NALOXONE HYDROCHLORIDE: 0.4 INJECTION, SOLUTION INTRAMUSCULAR; INTRAVENOUS; SUBCUTANEOUS at 18:40

## 2019-12-30 RX ADMIN — ONDANSETRON 4 MG: 2 INJECTION INTRAMUSCULAR; INTRAVENOUS at 09:12

## 2019-12-30 RX ADMIN — SENNOSIDES 1 TABLET: 8.6 TABLET, FILM COATED ORAL at 20:26

## 2019-12-30 RX ADMIN — MIDODRINE HYDROCHLORIDE 5 MG: 5 TABLET ORAL at 08:57

## 2019-12-30 RX ADMIN — MAGNESIUM SULFATE HEPTAHYDRATE 2 G: 40 INJECTION, SOLUTION INTRAVENOUS at 12:52

## 2019-12-30 RX ADMIN — BUMETANIDE 0.5 MG: 0.25 INJECTION INTRAMUSCULAR; INTRAVENOUS at 22:55

## 2019-12-30 RX ADMIN — HYDROMORPHONE HYDROCHLORIDE 0.25 MG: 2 INJECTION INTRAMUSCULAR; INTRAVENOUS; SUBCUTANEOUS at 01:31

## 2019-12-30 RX ADMIN — ATORVASTATIN CALCIUM 40 MG: 40 TABLET, FILM COATED ORAL at 20:26

## 2019-12-30 RX ADMIN — CALCIUM GLUCONATE 2 G: 20 INJECTION, SOLUTION INTRAVENOUS at 12:52

## 2019-12-30 RX ADMIN — ENOXAPARIN SODIUM 40 MG: 40 INJECTION SUBCUTANEOUS at 20:26

## 2019-12-30 RX ADMIN — OXYCODONE HYDROCHLORIDE 5 MG: 5 TABLET ORAL at 13:02

## 2019-12-30 RX ADMIN — OXYCODONE HYDROCHLORIDE 5 MG: 5 TABLET ORAL at 03:36

## 2019-12-30 RX ADMIN — MUPIROCIN 1 APPLICATION: 20 OINTMENT TOPICAL at 09:02

## 2019-12-30 RX ADMIN — LEVOTHYROXINE SODIUM 200 MCG: 200 TABLET ORAL at 06:17

## 2019-12-31 ENCOUNTER — APPOINTMENT (OUTPATIENT)
Dept: CARDIOLOGY | Facility: HOSPITAL | Age: 44
End: 2019-12-31

## 2019-12-31 ENCOUNTER — APPOINTMENT (OUTPATIENT)
Dept: GENERAL RADIOLOGY | Facility: HOSPITAL | Age: 44
End: 2019-12-31

## 2019-12-31 LAB
ABO GROUP BLD: NORMAL
ALBUMIN SERPL-MCNC: 3.9 G/DL (ref 3.5–5.2)
ANION GAP SERPL CALCULATED.3IONS-SCNC: 11 MMOL/L (ref 5–15)
ARTERIAL PATENCY WRIST A: POSITIVE
ATMOSPHERIC PRESS: ABNORMAL MM[HG]
BASE EXCESS BLDA CALC-SCNC: 4.4 MMOL/L (ref 0–3)
BDY SITE: ABNORMAL
BH CV LOWER VASCULAR LEFT COMMON FEMORAL AUGMENT: NORMAL
BH CV LOWER VASCULAR LEFT COMMON FEMORAL COMPETENT: NORMAL
BH CV LOWER VASCULAR LEFT COMMON FEMORAL COMPRESS: NORMAL
BH CV LOWER VASCULAR LEFT COMMON FEMORAL PHASIC: NORMAL
BH CV LOWER VASCULAR LEFT COMMON FEMORAL SPONT: NORMAL
BH CV LOWER VASCULAR LEFT DISTAL FEMORAL COMPRESS: NORMAL
BH CV LOWER VASCULAR LEFT GASTRONEMIUS COMPRESS: NORMAL
BH CV LOWER VASCULAR LEFT GREATER SAPH AK COMPRESS: NORMAL
BH CV LOWER VASCULAR LEFT GREATER SAPH BK COMPRESS: NORMAL
BH CV LOWER VASCULAR LEFT LESSER SAPH COMPRESS: NORMAL
BH CV LOWER VASCULAR LEFT MID FEMORAL AUGMENT: NORMAL
BH CV LOWER VASCULAR LEFT MID FEMORAL COMPETENT: NORMAL
BH CV LOWER VASCULAR LEFT MID FEMORAL COMPRESS: NORMAL
BH CV LOWER VASCULAR LEFT MID FEMORAL PHASIC: NORMAL
BH CV LOWER VASCULAR LEFT MID FEMORAL SPONT: NORMAL
BH CV LOWER VASCULAR LEFT PERONEAL COMPRESS: NORMAL
BH CV LOWER VASCULAR LEFT POPLITEAL AUGMENT: NORMAL
BH CV LOWER VASCULAR LEFT POPLITEAL COMPETENT: NORMAL
BH CV LOWER VASCULAR LEFT POPLITEAL COMPRESS: NORMAL
BH CV LOWER VASCULAR LEFT POPLITEAL PHASIC: NORMAL
BH CV LOWER VASCULAR LEFT POPLITEAL SPONT: NORMAL
BH CV LOWER VASCULAR LEFT POSTERIOR TIBIAL COMPRESS: NORMAL
BH CV LOWER VASCULAR LEFT PROXIMAL FEMORAL COMPRESS: NORMAL
BH CV LOWER VASCULAR LEFT SAPHENOFEMORAL JUNCTION AUGMENT: NORMAL
BH CV LOWER VASCULAR LEFT SAPHENOFEMORAL JUNCTION COMPETENT: NORMAL
BH CV LOWER VASCULAR LEFT SAPHENOFEMORAL JUNCTION COMPRESS: NORMAL
BH CV LOWER VASCULAR LEFT SAPHENOFEMORAL JUNCTION PHASIC: NORMAL
BH CV LOWER VASCULAR LEFT SAPHENOFEMORAL JUNCTION SPONT: NORMAL
BH CV LOWER VASCULAR RIGHT COMMON FEMORAL AUGMENT: NORMAL
BH CV LOWER VASCULAR RIGHT COMMON FEMORAL COMPETENT: NORMAL
BH CV LOWER VASCULAR RIGHT COMMON FEMORAL COMPRESS: NORMAL
BH CV LOWER VASCULAR RIGHT COMMON FEMORAL PHASIC: NORMAL
BH CV LOWER VASCULAR RIGHT COMMON FEMORAL SPONT: NORMAL
BH CV LOWER VASCULAR RIGHT DISTAL FEMORAL COMPRESS: NORMAL
BH CV LOWER VASCULAR RIGHT GASTRONEMIUS COMPRESS: NORMAL
BH CV LOWER VASCULAR RIGHT GREATER SAPH AK COMPRESS: NORMAL
BH CV LOWER VASCULAR RIGHT GREATER SAPH BK COMPRESS: NORMAL
BH CV LOWER VASCULAR RIGHT LESSER SAPH COMPRESS: NORMAL
BH CV LOWER VASCULAR RIGHT MID FEMORAL AUGMENT: NORMAL
BH CV LOWER VASCULAR RIGHT MID FEMORAL COMPETENT: NORMAL
BH CV LOWER VASCULAR RIGHT MID FEMORAL COMPRESS: NORMAL
BH CV LOWER VASCULAR RIGHT MID FEMORAL PHASIC: NORMAL
BH CV LOWER VASCULAR RIGHT MID FEMORAL SPONT: NORMAL
BH CV LOWER VASCULAR RIGHT PERONEAL COMPRESS: NORMAL
BH CV LOWER VASCULAR RIGHT POPLITEAL AUGMENT: NORMAL
BH CV LOWER VASCULAR RIGHT POPLITEAL COMPETENT: NORMAL
BH CV LOWER VASCULAR RIGHT POPLITEAL COMPRESS: NORMAL
BH CV LOWER VASCULAR RIGHT POPLITEAL PHASIC: NORMAL
BH CV LOWER VASCULAR RIGHT POPLITEAL SPONT: NORMAL
BH CV LOWER VASCULAR RIGHT POSTERIOR TIBIAL COMPRESS: NORMAL
BH CV LOWER VASCULAR RIGHT PROXIMAL FEMORAL COMPRESS: NORMAL
BH CV LOWER VASCULAR RIGHT SAPHENOFEMORAL JUNCTION AUGMENT: NORMAL
BH CV LOWER VASCULAR RIGHT SAPHENOFEMORAL JUNCTION COMPETENT: NORMAL
BH CV LOWER VASCULAR RIGHT SAPHENOFEMORAL JUNCTION COMPRESS: NORMAL
BH CV LOWER VASCULAR RIGHT SAPHENOFEMORAL JUNCTION PHASIC: NORMAL
BH CV LOWER VASCULAR RIGHT SAPHENOFEMORAL JUNCTION SPONT: NORMAL
BLD GP AB SCN SERPL QL: NEGATIVE
BUN BLD-MCNC: 12 MG/DL (ref 6–20)
BUN/CREAT SERPL: 10.2 (ref 7–25)
CA-I SERPL ISE-MCNC: 1.14 MMOL/L (ref 1.2–1.3)
CALCIUM SPEC-SCNC: 8.8 MG/DL (ref 8.6–10.5)
CHLORIDE SERPL-SCNC: 96 MMOL/L (ref 98–107)
CO2 BLDA-SCNC: 31.9 MMOL/L (ref 22–29)
CO2 SERPL-SCNC: 30 MMOL/L (ref 22–29)
CREAT BLD-MCNC: 1.18 MG/DL (ref 0.57–1)
DEPRECATED RDW RBC AUTO: 43.8 FL (ref 37–54)
ERYTHROCYTE [DISTWIDTH] IN BLOOD BY AUTOMATED COUNT: 13 % (ref 12.3–15.4)
GFR SERPL CREATININE-BSD FRML MDRD: 60 ML/MIN/1.73
GLUCOSE BLD-MCNC: 122 MG/DL (ref 65–99)
GLUCOSE BLDC GLUCOMTR-MCNC: 101 MG/DL (ref 70–105)
GLUCOSE BLDC GLUCOMTR-MCNC: 109 MG/DL (ref 70–105)
GLUCOSE BLDC GLUCOMTR-MCNC: 120 MG/DL (ref 70–105)
GLUCOSE BLDC GLUCOMTR-MCNC: 121 MG/DL (ref 70–105)
GLUCOSE BLDC GLUCOMTR-MCNC: 124 MG/DL (ref 70–105)
GLUCOSE BLDC GLUCOMTR-MCNC: 143 MG/DL (ref 70–105)
GLUCOSE BLDC GLUCOMTR-MCNC: 155 MG/DL (ref 70–105)
HCO3 BLDA-SCNC: 30.3 MMOL/L (ref 21–28)
HCT VFR BLD AUTO: 25.3 % (ref 34–46.6)
HEMODILUTION: NO
HGB BLD-MCNC: 8.5 G/DL (ref 12–15.9)
HOROWITZ INDEX BLD+IHG-RTO: 36 %
MAGNESIUM SERPL-MCNC: 2.1 MG/DL (ref 1.6–2.6)
MCH RBC QN AUTO: 31.7 PG (ref 26.6–33)
MCHC RBC AUTO-ENTMCNC: 33.6 G/DL (ref 31.5–35.7)
MCV RBC AUTO: 94.4 FL (ref 79–97)
MODALITY: ABNORMAL
PCO2 BLDA: 51.7 MM HG (ref 35–48)
PH BLDA: 7.38 PH UNITS (ref 7.35–7.45)
PHOSPHATE SERPL-MCNC: 3.1 MG/DL (ref 2.5–4.5)
PLATELET # BLD AUTO: 170 10*3/MM3 (ref 140–450)
PMV BLD AUTO: 7.6 FL (ref 6–12)
PO2 BLDA: 53.2 MM HG (ref 83–108)
POTASSIUM BLD-SCNC: 4 MMOL/L (ref 3.5–5.2)
RBC # BLD AUTO: 2.68 10*6/MM3 (ref 3.77–5.28)
RH BLD: POSITIVE
SAO2 % BLDCOA: 85.7 % (ref 94–98)
SODIUM BLD-SCNC: 137 MMOL/L (ref 136–145)
T&S EXPIRATION DATE: NORMAL
WBC NRBC COR # BLD: 5.1 10*3/MM3 (ref 3.4–10.8)

## 2019-12-31 PROCEDURE — 25010000002 ENOXAPARIN PER 10 MG: Performed by: THORACIC SURGERY (CARDIOTHORACIC VASCULAR SURGERY)

## 2019-12-31 PROCEDURE — 97755 ASSISTIVE TECHNOLOGY ASSESS: CPT

## 2019-12-31 PROCEDURE — 82330 ASSAY OF CALCIUM: CPT | Performed by: NURSE PRACTITIONER

## 2019-12-31 PROCEDURE — 86901 BLOOD TYPING SEROLOGIC RH(D): CPT | Performed by: NURSE PRACTITIONER

## 2019-12-31 PROCEDURE — 25010000002 CALCIUM GLUCONATE 2-0.675 GM/100ML-% SOLUTION: Performed by: NURSE PRACTITIONER

## 2019-12-31 PROCEDURE — 82803 BLOOD GASES ANY COMBINATION: CPT

## 2019-12-31 PROCEDURE — 82962 GLUCOSE BLOOD TEST: CPT

## 2019-12-31 PROCEDURE — 25010000002 ALBUMIN HUMAN 5% PER 50 ML: Performed by: THORACIC SURGERY (CARDIOTHORACIC VASCULAR SURGERY)

## 2019-12-31 PROCEDURE — 25010000002 FUROSEMIDE PER 20 MG: Performed by: NURSE PRACTITIONER

## 2019-12-31 PROCEDURE — 94760 N-INVAS EAR/PLS OXIMETRY 1: CPT

## 2019-12-31 PROCEDURE — 94799 UNLISTED PULMONARY SVC/PX: CPT

## 2019-12-31 PROCEDURE — 99232 SBSQ HOSP IP/OBS MODERATE 35: CPT | Performed by: INTERNAL MEDICINE

## 2019-12-31 PROCEDURE — P9041 ALBUMIN (HUMAN),5%, 50ML: HCPCS | Performed by: THORACIC SURGERY (CARDIOTHORACIC VASCULAR SURGERY)

## 2019-12-31 PROCEDURE — 71045 X-RAY EXAM CHEST 1 VIEW: CPT

## 2019-12-31 PROCEDURE — 86900 BLOOD TYPING SEROLOGIC ABO: CPT | Performed by: NURSE PRACTITIONER

## 2019-12-31 PROCEDURE — 86850 RBC ANTIBODY SCREEN: CPT | Performed by: NURSE PRACTITIONER

## 2019-12-31 PROCEDURE — 63710000001 INSULIN LISPRO (HUMAN) PER 5 UNITS: Performed by: NURSE PRACTITIONER

## 2019-12-31 PROCEDURE — 94762 N-INVAS EAR/PLS OXIMTRY CONT: CPT

## 2019-12-31 PROCEDURE — 94640 AIRWAY INHALATION TREATMENT: CPT

## 2019-12-31 PROCEDURE — 86923 COMPATIBILITY TEST ELECTRIC: CPT

## 2019-12-31 PROCEDURE — 85027 COMPLETE CBC AUTOMATED: CPT | Performed by: THORACIC SURGERY (CARDIOTHORACIC VASCULAR SURGERY)

## 2019-12-31 PROCEDURE — 97530 THERAPEUTIC ACTIVITIES: CPT

## 2019-12-31 PROCEDURE — 93970 EXTREMITY STUDY: CPT

## 2019-12-31 PROCEDURE — 80069 RENAL FUNCTION PANEL: CPT | Performed by: INTERNAL MEDICINE

## 2019-12-31 PROCEDURE — 83735 ASSAY OF MAGNESIUM: CPT | Performed by: NURSE PRACTITIONER

## 2019-12-31 PROCEDURE — 36600 WITHDRAWAL OF ARTERIAL BLOOD: CPT

## 2019-12-31 PROCEDURE — 99024 POSTOP FOLLOW-UP VISIT: CPT | Performed by: NURSE PRACTITIONER

## 2019-12-31 PROCEDURE — 97535 SELF CARE MNGMENT TRAINING: CPT

## 2019-12-31 RX ORDER — CALCIUM GLUCONATE 20 MG/ML
2 INJECTION, SOLUTION INTRAVENOUS 2 TIMES DAILY
Status: COMPLETED | OUTPATIENT
Start: 2019-12-31 | End: 2019-12-31

## 2019-12-31 RX ORDER — ALBUMIN, HUMAN INJ 5% 5 %
250 SOLUTION INTRAVENOUS ONCE
Status: COMPLETED | OUTPATIENT
Start: 2019-12-31 | End: 2019-12-31

## 2019-12-31 RX ORDER — BUMETANIDE 0.25 MG/ML
2 INJECTION INTRAMUSCULAR; INTRAVENOUS ONCE
Status: DISCONTINUED | OUTPATIENT
Start: 2019-12-31 | End: 2019-12-31

## 2019-12-31 RX ORDER — FUROSEMIDE 10 MG/ML
20 INJECTION INTRAMUSCULAR; INTRAVENOUS ONCE
Status: COMPLETED | OUTPATIENT
Start: 2019-12-31 | End: 2019-12-31

## 2019-12-31 RX ORDER — ASPIRIN 81 MG/1
81 TABLET, CHEWABLE ORAL DAILY
Status: DISCONTINUED | OUTPATIENT
Start: 2019-12-31 | End: 2020-01-02

## 2019-12-31 RX ORDER — MIDODRINE HYDROCHLORIDE 5 MG/1
5 TABLET ORAL
Status: DISCONTINUED | OUTPATIENT
Start: 2019-12-31 | End: 2020-01-02

## 2019-12-31 RX ORDER — GUAIFENESIN 600 MG/1
600 TABLET, EXTENDED RELEASE ORAL EVERY 12 HOURS SCHEDULED
Status: DISCONTINUED | OUTPATIENT
Start: 2019-12-31 | End: 2020-01-08 | Stop reason: HOSPADM

## 2019-12-31 RX ORDER — POTASSIUM CHLORIDE 750 MG/1
10 TABLET, FILM COATED, EXTENDED RELEASE ORAL ONCE
Status: DISCONTINUED | OUTPATIENT
Start: 2019-12-31 | End: 2019-12-31

## 2019-12-31 RX ORDER — LIDOCAINE 50 MG/G
1 PATCH TOPICAL
Status: DISCONTINUED | OUTPATIENT
Start: 2019-12-31 | End: 2020-01-01

## 2019-12-31 RX ORDER — NOREPINEPHRINE BIT/0.9 % NACL 8 MG/250ML
0.02 INFUSION BOTTLE (ML) INTRAVENOUS
Status: DISCONTINUED | OUTPATIENT
Start: 2019-12-31 | End: 2020-01-01

## 2019-12-31 RX ORDER — IPRATROPIUM BROMIDE AND ALBUTEROL SULFATE 2.5; .5 MG/3ML; MG/3ML
3 SOLUTION RESPIRATORY (INHALATION)
Status: DISCONTINUED | OUTPATIENT
Start: 2019-12-31 | End: 2020-01-08 | Stop reason: HOSPADM

## 2019-12-31 RX ADMIN — ATORVASTATIN CALCIUM 40 MG: 40 TABLET, FILM COATED ORAL at 20:50

## 2019-12-31 RX ADMIN — MUPIROCIN 1 APPLICATION: 20 OINTMENT TOPICAL at 09:41

## 2019-12-31 RX ADMIN — OXYCODONE HYDROCHLORIDE 5 MG: 5 TABLET ORAL at 04:45

## 2019-12-31 RX ADMIN — MUPIROCIN 1 APPLICATION: 20 OINTMENT TOPICAL at 20:51

## 2019-12-31 RX ADMIN — ENOXAPARIN SODIUM 40 MG: 40 INJECTION SUBCUTANEOUS at 20:51

## 2019-12-31 RX ADMIN — IPRATROPIUM BROMIDE AND ALBUTEROL SULFATE 3 ML: .5; 3 SOLUTION RESPIRATORY (INHALATION) at 11:32

## 2019-12-31 RX ADMIN — SENNOSIDES 1 TABLET: 8.6 TABLET, FILM COATED ORAL at 09:25

## 2019-12-31 RX ADMIN — ALLOPURINOL 300 MG: 300 TABLET ORAL at 09:26

## 2019-12-31 RX ADMIN — ALPRAZOLAM 0.25 MG: 0.25 TABLET ORAL at 09:25

## 2019-12-31 RX ADMIN — FUROSEMIDE 20 MG: 10 INJECTION, SOLUTION INTRAMUSCULAR; INTRAVENOUS at 10:13

## 2019-12-31 RX ADMIN — ASPIRIN 81 MG 81 MG: 81 TABLET ORAL at 10:13

## 2019-12-31 RX ADMIN — GUAIFENESIN 600 MG: 600 TABLET, EXTENDED RELEASE ORAL at 20:51

## 2019-12-31 RX ADMIN — DOCUSATE SODIUM 100 MG: 100 CAPSULE, LIQUID FILLED ORAL at 20:50

## 2019-12-31 RX ADMIN — CALCIUM GLUCONATE 2 G: 20 INJECTION, SOLUTION INTRAVENOUS at 10:13

## 2019-12-31 RX ADMIN — LEVOTHYROXINE SODIUM 200 MCG: 200 TABLET ORAL at 06:41

## 2019-12-31 RX ADMIN — PANTOPRAZOLE SODIUM 40 MG: 40 TABLET, DELAYED RELEASE ORAL at 06:41

## 2019-12-31 RX ADMIN — OXYCODONE HYDROCHLORIDE 5 MG: 5 TABLET ORAL at 09:25

## 2019-12-31 RX ADMIN — AMIODARONE HYDROCHLORIDE 300 MG: 200 TABLET ORAL at 09:40

## 2019-12-31 RX ADMIN — FERROUS SULFATE TAB EC 324 MG (65 MG FE EQUIVALENT) 324 MG: 324 (65 FE) TABLET DELAYED RESPONSE at 09:25

## 2019-12-31 RX ADMIN — LACTULOSE 30 G: 10 SOLUTION ORAL at 15:36

## 2019-12-31 RX ADMIN — Medication 0.01 MCG/KG/MIN: at 15:15

## 2019-12-31 RX ADMIN — POLYETHYLENE GLYCOL 3350 17 G: 17 POWDER, FOR SOLUTION ORAL at 09:26

## 2019-12-31 RX ADMIN — CALCIUM GLUCONATE 2 G: 20 INJECTION, SOLUTION INTRAVENOUS at 20:51

## 2019-12-31 RX ADMIN — MONTELUKAST SODIUM 10 MG: 10 TABLET, FILM COATED ORAL at 20:51

## 2019-12-31 RX ADMIN — SENNOSIDES 1 TABLET: 8.6 TABLET, FILM COATED ORAL at 20:51

## 2019-12-31 RX ADMIN — ALBUMIN HUMAN 250 ML: 0.05 INJECTION, SOLUTION INTRAVENOUS at 16:37

## 2019-12-31 RX ADMIN — DOCUSATE SODIUM 100 MG: 100 CAPSULE, LIQUID FILLED ORAL at 09:26

## 2019-12-31 RX ADMIN — IPRATROPIUM BROMIDE AND ALBUTEROL SULFATE 3 ML: .5; 3 SOLUTION RESPIRATORY (INHALATION) at 18:56

## 2019-12-31 RX ADMIN — FOLIC ACID 1 MG: 1 TABLET ORAL at 09:26

## 2019-12-31 RX ADMIN — IPRATROPIUM BROMIDE AND ALBUTEROL SULFATE 3 ML: .5; 3 SOLUTION RESPIRATORY (INHALATION) at 15:22

## 2019-12-31 RX ADMIN — AMIODARONE HYDROCHLORIDE 300 MG: 200 TABLET ORAL at 19:44

## 2019-12-31 RX ADMIN — MIDODRINE HYDROCHLORIDE 5 MG: 5 TABLET ORAL at 15:35

## 2019-12-31 RX ADMIN — GUAIFENESIN 600 MG: 600 TABLET, EXTENDED RELEASE ORAL at 09:26

## 2019-12-31 RX ADMIN — LIDOCAINE 1 PATCH: 50 PATCH CUTANEOUS at 09:26

## 2019-12-31 RX ADMIN — POLYETHYLENE GLYCOL 3350 17 G: 17 POWDER, FOR SOLUTION ORAL at 20:51

## 2019-12-31 RX ADMIN — OXYCODONE HYDROCHLORIDE 5 MG: 5 TABLET ORAL at 20:51

## 2020-01-01 ENCOUNTER — APPOINTMENT (OUTPATIENT)
Dept: GENERAL RADIOLOGY | Facility: HOSPITAL | Age: 45
End: 2020-01-01

## 2020-01-01 ENCOUNTER — APPOINTMENT (OUTPATIENT)
Dept: CARDIOLOGY | Facility: HOSPITAL | Age: 45
End: 2020-01-01

## 2020-01-01 LAB
ALBUMIN SERPL-MCNC: 3.9 G/DL (ref 3.5–5.2)
ANION GAP SERPL CALCULATED.3IONS-SCNC: 11 MMOL/L (ref 5–15)
BH CV ECHO MEAS - BSA(HAYCOCK): 2.1 M^2
BH CV ECHO MEAS - BSA: 2.1 M^2
BH CV ECHO MEAS - BZI_BMI: 32.4 KILOGRAMS/M^2
BH CV ECHO MEAS - BZI_METRIC_HEIGHT: 170.2 CM
BH CV ECHO MEAS - BZI_METRIC_WEIGHT: 93.9 KG
BH CV ECHO MEAS - EDV(MOD-SP2): 72.7 ML
BH CV ECHO MEAS - EDV(MOD-SP4): 84.7 ML
BH CV ECHO MEAS - EF(MOD-BP): 35 %
BH CV ECHO MEAS - EF(MOD-SP2): 47.6 %
BH CV ECHO MEAS - EF(MOD-SP4): 49.3 %
BH CV ECHO MEAS - ESV(MOD-SP2): 38.1 ML
BH CV ECHO MEAS - ESV(MOD-SP4): 43 ML
BH CV ECHO MEAS - LV DIASTOLIC VOL/BSA (35-75): 41.3 ML/M^2
BH CV ECHO MEAS - LV SYSTOLIC VOL/BSA (12-30): 20.9 ML/M^2
BH CV ECHO MEAS - SI(MOD-SP2): 16.9 ML/M^2
BH CV ECHO MEAS - SI(MOD-SP4): 20.3 ML/M^2
BH CV ECHO MEAS - SV(MOD-SP2): 34.6 ML
BH CV ECHO MEAS - SV(MOD-SP4): 41.8 ML
BUN BLD-MCNC: 11 MG/DL (ref 6–20)
BUN/CREAT SERPL: 10.6 (ref 7–25)
CA-I SERPL ISE-MCNC: 1.16 MMOL/L (ref 1.2–1.3)
CALCIUM SPEC-SCNC: 8.8 MG/DL (ref 8.6–10.5)
CHLORIDE SERPL-SCNC: 97 MMOL/L (ref 98–107)
CO2 SERPL-SCNC: 31 MMOL/L (ref 22–29)
CREAT BLD-MCNC: 1.04 MG/DL (ref 0.57–1)
DEPRECATED RDW RBC AUTO: 42.4 FL (ref 37–54)
ERYTHROCYTE [DISTWIDTH] IN BLOOD BY AUTOMATED COUNT: 12.9 % (ref 12.3–15.4)
GFR SERPL CREATININE-BSD FRML MDRD: 69 ML/MIN/1.73
GLUCOSE BLD-MCNC: 106 MG/DL (ref 65–99)
GLUCOSE BLDC GLUCOMTR-MCNC: 120 MG/DL (ref 70–105)
GLUCOSE BLDC GLUCOMTR-MCNC: 120 MG/DL (ref 70–105)
GLUCOSE BLDC GLUCOMTR-MCNC: 140 MG/DL (ref 70–105)
HCT VFR BLD AUTO: 25.5 % (ref 34–46.6)
HGB BLD-MCNC: 8.7 G/DL (ref 12–15.9)
LV EF 2D ECHO EST: 40 %
MAGNESIUM SERPL-MCNC: 2 MG/DL (ref 1.6–2.6)
MCH RBC QN AUTO: 31.7 PG (ref 26.6–33)
MCHC RBC AUTO-ENTMCNC: 34 G/DL (ref 31.5–35.7)
MCV RBC AUTO: 93.2 FL (ref 79–97)
PHOSPHATE SERPL-MCNC: 3.5 MG/DL (ref 2.5–4.5)
PLATELET # BLD AUTO: 207 10*3/MM3 (ref 140–450)
PMV BLD AUTO: 7 FL (ref 6–12)
POTASSIUM BLD-SCNC: 3.7 MMOL/L (ref 3.5–5.2)
RBC # BLD AUTO: 2.73 10*6/MM3 (ref 3.77–5.28)
SODIUM BLD-SCNC: 139 MMOL/L (ref 136–145)
WBC NRBC COR # BLD: 6.4 10*3/MM3 (ref 3.4–10.8)

## 2020-01-01 PROCEDURE — 94760 N-INVAS EAR/PLS OXIMETRY 1: CPT

## 2020-01-01 PROCEDURE — 94799 UNLISTED PULMONARY SVC/PX: CPT

## 2020-01-01 PROCEDURE — 93321 DOPPLER ECHO F-UP/LMTD STD: CPT | Performed by: INTERNAL MEDICINE

## 2020-01-01 PROCEDURE — 93325 DOPPLER ECHO COLOR FLOW MAPG: CPT | Performed by: INTERNAL MEDICINE

## 2020-01-01 PROCEDURE — 82330 ASSAY OF CALCIUM: CPT | Performed by: THORACIC SURGERY (CARDIOTHORACIC VASCULAR SURGERY)

## 2020-01-01 PROCEDURE — 71045 X-RAY EXAM CHEST 1 VIEW: CPT

## 2020-01-01 PROCEDURE — 25010000002 CALCIUM GLUCONATE-NACL 1-0.675 GM/50ML-% SOLUTION: Performed by: THORACIC SURGERY (CARDIOTHORACIC VASCULAR SURGERY)

## 2020-01-01 PROCEDURE — 99024 POSTOP FOLLOW-UP VISIT: CPT | Performed by: THORACIC SURGERY (CARDIOTHORACIC VASCULAR SURGERY)

## 2020-01-01 PROCEDURE — 80069 RENAL FUNCTION PANEL: CPT | Performed by: INTERNAL MEDICINE

## 2020-01-01 PROCEDURE — 83735 ASSAY OF MAGNESIUM: CPT | Performed by: THORACIC SURGERY (CARDIOTHORACIC VASCULAR SURGERY)

## 2020-01-01 PROCEDURE — 25010000002 MAGNESIUM SULFATE 2 GM/50ML SOLUTION: Performed by: THORACIC SURGERY (CARDIOTHORACIC VASCULAR SURGERY)

## 2020-01-01 PROCEDURE — 25010000002 ENOXAPARIN PER 10 MG: Performed by: THORACIC SURGERY (CARDIOTHORACIC VASCULAR SURGERY)

## 2020-01-01 PROCEDURE — 93325 DOPPLER ECHO COLOR FLOW MAPG: CPT

## 2020-01-01 PROCEDURE — 25010000002 ONDANSETRON PER 1 MG: Performed by: THORACIC SURGERY (CARDIOTHORACIC VASCULAR SURGERY)

## 2020-01-01 PROCEDURE — 99232 SBSQ HOSP IP/OBS MODERATE 35: CPT | Performed by: INTERNAL MEDICINE

## 2020-01-01 PROCEDURE — 82962 GLUCOSE BLOOD TEST: CPT

## 2020-01-01 PROCEDURE — 93308 TTE F-UP OR LMTD: CPT

## 2020-01-01 PROCEDURE — 85027 COMPLETE CBC AUTOMATED: CPT | Performed by: THORACIC SURGERY (CARDIOTHORACIC VASCULAR SURGERY)

## 2020-01-01 PROCEDURE — 93308 TTE F-UP OR LMTD: CPT | Performed by: INTERNAL MEDICINE

## 2020-01-01 RX ORDER — LIDOCAINE 50 MG/G
1 PATCH TOPICAL
Status: DISCONTINUED | OUTPATIENT
Start: 2020-01-01 | End: 2020-01-01 | Stop reason: SDUPTHER

## 2020-01-01 RX ORDER — AMIODARONE HYDROCHLORIDE 200 MG/1
200 TABLET ORAL 2 TIMES DAILY WITH MEALS
Status: DISCONTINUED | OUTPATIENT
Start: 2020-01-01 | End: 2020-01-06

## 2020-01-01 RX ORDER — CALCIUM GLUCONATE 20 MG/ML
1 INJECTION, SOLUTION INTRAVENOUS ONCE
Status: COMPLETED | OUTPATIENT
Start: 2020-01-01 | End: 2020-01-01

## 2020-01-01 RX ORDER — LIDOCAINE 50 MG/G
2 PATCH TOPICAL
Status: DISCONTINUED | OUTPATIENT
Start: 2020-01-02 | End: 2020-01-08 | Stop reason: HOSPADM

## 2020-01-01 RX ORDER — MAGNESIUM SULFATE HEPTAHYDRATE 40 MG/ML
2 INJECTION, SOLUTION INTRAVENOUS ONCE
Status: COMPLETED | OUTPATIENT
Start: 2020-01-01 | End: 2020-01-01

## 2020-01-01 RX ORDER — IPRATROPIUM BROMIDE AND ALBUTEROL SULFATE 2.5; .5 MG/3ML; MG/3ML
3 SOLUTION RESPIRATORY (INHALATION) EVERY 4 HOURS PRN
Status: DISCONTINUED | OUTPATIENT
Start: 2020-01-01 | End: 2020-01-08 | Stop reason: HOSPADM

## 2020-01-01 RX ORDER — LIDOCAINE 50 MG/G
PATCH TOPICAL
Status: COMPLETED
Start: 2020-01-01 | End: 2020-01-01

## 2020-01-01 RX ADMIN — MONTELUKAST SODIUM 10 MG: 10 TABLET, FILM COATED ORAL at 21:24

## 2020-01-01 RX ADMIN — DOCUSATE SODIUM 100 MG: 100 CAPSULE, LIQUID FILLED ORAL at 21:24

## 2020-01-01 RX ADMIN — ONDANSETRON 4 MG: 2 INJECTION INTRAMUSCULAR; INTRAVENOUS at 00:55

## 2020-01-01 RX ADMIN — SENNOSIDES 1 TABLET: 8.6 TABLET, FILM COATED ORAL at 08:18

## 2020-01-01 RX ADMIN — LIDOCAINE 1 PATCH: 50 PATCH CUTANEOUS at 08:19

## 2020-01-01 RX ADMIN — IPRATROPIUM BROMIDE AND ALBUTEROL SULFATE 3 ML: .5; 3 SOLUTION RESPIRATORY (INHALATION) at 15:55

## 2020-01-01 RX ADMIN — FOLIC ACID 1 MG: 1 TABLET ORAL at 08:18

## 2020-01-01 RX ADMIN — OXYCODONE HYDROCHLORIDE 5 MG: 5 TABLET ORAL at 14:12

## 2020-01-01 RX ADMIN — ASPIRIN 81 MG 81 MG: 81 TABLET ORAL at 08:18

## 2020-01-01 RX ADMIN — ACETAMINOPHEN 500 MG: 500 TABLET, FILM COATED ORAL at 09:42

## 2020-01-01 RX ADMIN — FERROUS SULFATE TAB EC 324 MG (65 MG FE EQUIVALENT) 324 MG: 324 (65 FE) TABLET DELAYED RESPONSE at 08:18

## 2020-01-01 RX ADMIN — IPRATROPIUM BROMIDE AND ALBUTEROL SULFATE 3 ML: .5; 3 SOLUTION RESPIRATORY (INHALATION) at 11:10

## 2020-01-01 RX ADMIN — CALCIUM GLUCONATE 1 G: 20 INJECTION, SOLUTION INTRAVENOUS at 14:12

## 2020-01-01 RX ADMIN — SENNOSIDES 1 TABLET: 8.6 TABLET, FILM COATED ORAL at 21:24

## 2020-01-01 RX ADMIN — ONDANSETRON 4 MG: 2 INJECTION INTRAMUSCULAR; INTRAVENOUS at 22:09

## 2020-01-01 RX ADMIN — ATORVASTATIN CALCIUM 40 MG: 40 TABLET, FILM COATED ORAL at 21:24

## 2020-01-01 RX ADMIN — LACTULOSE 30 G: 10 SOLUTION ORAL at 08:18

## 2020-01-01 RX ADMIN — MIDODRINE HYDROCHLORIDE 5 MG: 5 TABLET ORAL at 14:12

## 2020-01-01 RX ADMIN — MIDODRINE HYDROCHLORIDE 5 MG: 5 TABLET ORAL at 18:35

## 2020-01-01 RX ADMIN — OXYCODONE HYDROCHLORIDE 5 MG: 5 TABLET ORAL at 21:06

## 2020-01-01 RX ADMIN — AMIODARONE HYDROCHLORIDE 200 MG: 200 TABLET ORAL at 08:18

## 2020-01-01 RX ADMIN — OXYCODONE HYDROCHLORIDE 5 MG: 5 TABLET ORAL at 05:00

## 2020-01-01 RX ADMIN — OXYCODONE HYDROCHLORIDE 5 MG: 5 TABLET ORAL at 08:45

## 2020-01-01 RX ADMIN — MUPIROCIN 1 APPLICATION: 20 OINTMENT TOPICAL at 10:08

## 2020-01-01 RX ADMIN — ACETAMINOPHEN 500 MG: 500 TABLET, FILM COATED ORAL at 01:59

## 2020-01-01 RX ADMIN — ACETAMINOPHEN 500 MG: 500 TABLET, FILM COATED ORAL at 23:36

## 2020-01-01 RX ADMIN — LEVOTHYROXINE SODIUM 200 MCG: 200 TABLET ORAL at 05:01

## 2020-01-01 RX ADMIN — GUAIFENESIN 600 MG: 600 TABLET, EXTENDED RELEASE ORAL at 08:18

## 2020-01-01 RX ADMIN — IPRATROPIUM BROMIDE AND ALBUTEROL SULFATE 3 ML: .5; 3 SOLUTION RESPIRATORY (INHALATION) at 07:12

## 2020-01-01 RX ADMIN — MIDODRINE HYDROCHLORIDE 5 MG: 5 TABLET ORAL at 08:18

## 2020-01-01 RX ADMIN — AMIODARONE HYDROCHLORIDE 200 MG: 200 TABLET ORAL at 18:35

## 2020-01-01 RX ADMIN — FLUCONAZOLE 200 MG: 100 TABLET ORAL at 18:35

## 2020-01-01 RX ADMIN — LIDOCAINE 1 PATCH: 50 PATCH CUTANEOUS at 10:07

## 2020-01-01 RX ADMIN — ENOXAPARIN SODIUM 40 MG: 40 INJECTION SUBCUTANEOUS at 21:24

## 2020-01-01 RX ADMIN — GUAIFENESIN 600 MG: 600 TABLET, EXTENDED RELEASE ORAL at 21:24

## 2020-01-01 RX ADMIN — OXYCODONE HYDROCHLORIDE 5 MG: 5 TABLET ORAL at 00:47

## 2020-01-01 RX ADMIN — POLYETHYLENE GLYCOL 3350 17 G: 17 POWDER, FOR SOLUTION ORAL at 21:24

## 2020-01-01 RX ADMIN — IPRATROPIUM BROMIDE AND ALBUTEROL SULFATE 3 ML: .5; 3 SOLUTION RESPIRATORY (INHALATION) at 19:27

## 2020-01-01 RX ADMIN — PANTOPRAZOLE SODIUM 40 MG: 40 TABLET, DELAYED RELEASE ORAL at 05:01

## 2020-01-01 RX ADMIN — DOCUSATE SODIUM 100 MG: 100 CAPSULE, LIQUID FILLED ORAL at 08:18

## 2020-01-01 RX ADMIN — POLYETHYLENE GLYCOL 3350 17 G: 17 POWDER, FOR SOLUTION ORAL at 08:18

## 2020-01-01 RX ADMIN — ALLOPURINOL 300 MG: 300 TABLET ORAL at 08:18

## 2020-01-01 RX ADMIN — MAGNESIUM SULFATE HEPTAHYDRATE 2 G: 40 INJECTION, SOLUTION INTRAVENOUS at 08:19

## 2020-01-01 NOTE — PLAN OF CARE
Problem: Patient Care Overview  Goal: Plan of Care Review  1/1/2020 0504 by Betsey Gonzalez, RN  Outcome: Ongoing (interventions implemented as appropriate)  Flowsheets  Taken 1/1/2020 0504  Progress: no change  Outcome Summary: Pt is now POD 5. Ambulates well with standby assistance. Did not sleep much tonight due to the overnight pulxe oximetry and pain. Weaned off levophed and oxygen down to 2L via nasal canula.  Taken 1/1/2020 0410  Plan of Care Reviewed With: patient  1/1/2020 0504 by Betsey Gonzalez, RN  Outcome: Ongoing (interventions implemented as appropriate)  Flowsheets  Taken 1/1/2020 0447  Progress: no change  Outcome Summary: Pt is now POD 5. Ambulates well with standby assistance.  Taken 1/1/2020 0410  Plan of Care Reviewed With: patient

## 2020-01-01 NOTE — PLAN OF CARE
"Patient ambulated in the toro twice today. Increased duration on second walk. Patient complains of \"feeling bad\". Poor effort on the IS, only pulls 500. Levophed restarted per Dr. Stock. Will continue to monitor.  "

## 2020-01-01 NOTE — PROGRESS NOTES
POD # 5 CABG  No complaints  VSS  Labs noted, creat I  Off levophed this am  Chest clear, cor reg, incisions clean  Slow progress, CKD better  Will check CVP and Ca level  Increase activity

## 2020-01-01 NOTE — NURSING NOTE
Patient is undergoing overnight pulse ox study. On room air, oxygen sats were low 80s and pt complaining of shortness of breath. Oxygen put back on via nasal canula at 2L.

## 2020-01-01 NOTE — PROGRESS NOTES
Cardiology Office Visit      Encounter Date:  12/12/2019    Patient ID:   Rafael Mccnan is a 44 y.o. female.      Impressions:     Congestive heart failure  Acute systolic heart failure exacerbation on chronic systolic heart failure  CHF NYHA class IV  Valvular heart disease with a significant aortic insufficiency  Essential hypertension  Chronic systolic heart failure  History of cardiomyopathy   Chronic renal insufficiency  Coronary artery disease with diffuse RCA disease  History of diabetes mellitus type 2  History of thyroid disease  Cardiomyopathy with LV ejection fraction of 35%  s/p AICD placement/normal device function     Impressions: Catheterization  Severe two-vessel coronary artery disease  Moderate stenosis involving the distal LAD  4+ aortic regurgitation  Normal PA pressures  Normal filling pressures   Patient is s/p urgent AVR (21 mm Magna), CABG x3 with SV to Diag1 and SV sequential to PDA and then OM3 12/27/2019  Status post coronary artery bypass surgery x3 and aortic valve replacement surgery  Patient is currently hemodynamically stable   Normal sinus rhythm  Continue close monitoring  Continue postsurgical care   Making good progress  Agree with the starting Middaugh drain  Once her blood pressure is improved start low-dose beta-blockers  Ambulate  Schedule for a limited echo to assess LV systolic function and rule out pericardial effusion for persistent hypotension  Further recommendations based on patient hospital course      Reason For Followup:  Cardiomyopathy  Hypertension  Hyperlipidemia  Valvular heart disease  Coronary artery disease         Interval History:  44-year-old -American female who presented to the hospital initially with report of chest discomfort.  Patient has known history of valvular heart disease.      Subjective:  Patient is reports some chest discomfort- 5/10.  Patient has Percocet ordered for her chronic pain.  She denies any shortness of breath.   Patient nervous about upcoming surgery.    Assessment & Plan    Impressions:     Congestive heart failure  Acute systolic heart failure exacerbation on chronic systolic heart failure  CHF NYHA class IV  Valvular heart disease with a significant aortic insufficiency  Essential hypertension  Chronic systolic heart failure  History of cardiomyopathy   Chronic renal insufficiency  Coronary artery disease with diffuse RCA disease  History of diabetes mellitus type 2  History of thyroid disease  Cardiomyopathy with LV ejection fraction of 35%  s/p AICD placement/normal device function             Objective:    Vitals:  Vitals:    01/01/20 0804 01/01/20 1110 01/01/20 1113 01/01/20 1244   BP:       Pulse:  88 89    Resp:  16     Temp: 98.3 °F (36.8 °C)   98 °F (36.7 °C)   TempSrc:       SpO2:  96% 95%    Weight:       Height:           Physical Exam:    General: Well-developed, well-nourished 44-year-old -American female   Extubated sitting up in chair  Still chest tube in place  Head:  Normocephalic, atraumatic, mucous membranes moist  Eyes:     Neck:  Supple,  no adenopathy, IJ right;      Lungs: Clear to auscultation bilaterally, few crackles at the bilateral bases  chest wall: Mid line scar with no infection or bleeding  Heart::  Regular rate and rhythm, S1 and S2 normal, 2 x 6 ejection systolic murmur  Abdomen: Soft, nondistended  Extremities: No cyanosis, clubbing, or edema  Pulses: 2+ and symmetric all extremities  Skin:  No rashes or lesions  Neuro/psych:     Allergies:  Allergies   Allergen Reactions   • Hydrocodone Hives   • Penicillin G Unknown (See Comments)       Medication Review:     Current Facility-Administered Medications:   •  acetaminophen (TYLENOL) tablet 500 mg, 500 mg, Oral, Q6H PRN, Lane Stock MD, 500 mg at 01/01/20 0942  •  allopurinol (ZYLOPRIM) tablet 300 mg, 300 mg, Oral, Daily, Chidi Pace MD, 300 mg at 01/01/20 0818  •  ALPRAZolam (XANAX) tablet 0.25 mg, 0.25 mg, Oral,  BID PRN, Chidi Pace MD, 0.25 mg at 12/31/19 0925  •  amiodarone (PACERONE) tablet 200 mg, 200 mg, Oral, BID With Meals, Lane Stock MD, 200 mg at 01/01/20 0818  •  aspirin chewable tablet 81 mg, 81 mg, Oral, Daily, Kayla Burrell APRN, 81 mg at 01/01/20 0818  •  atorvastatin (LIPITOR) tablet 40 mg, 40 mg, Oral, Nightly, Chidi Pace MD, 40 mg at 12/31/19 2050  •  calcium gluconate 1g/50ml 0.675% NaCl IV SOLN, 1 g, Intravenous, Once, Lane Stock MD  •  dextrose (D50W) 25 g/ 50mL Intravenous Solution 25 g, 25 g, Intravenous, Q15 Min PRN, Kayla Burrell APRN  •  dextrose (GLUTOSE) oral gel 15 g, 15 g, Oral, Q15 Min PRN, Kayla Burrell, APRN  •  docusate sodium (COLACE) capsule 100 mg, 100 mg, Oral, BID, Chidi Pace MD, 100 mg at 01/01/20 0818  •  enoxaparin (LOVENOX) syringe 40 mg, 40 mg, Subcutaneous, Daily, Chidi Pace MD, 40 mg at 12/31/19 2051  •  ferrous sulfate EC tablet 324 mg, 324 mg, Oral, Daily With Breakfast, Chidi Pace MD, 324 mg at 01/01/20 0818  •  fluconazole (DIFLUCAN) tablet 200 mg, 200 mg, Oral, Q48H, Chidi Pace MD, 200 mg at 12/30/19 2031  •  folic acid (FOLVITE) tablet 1 mg, 1 mg, Oral, Daily, Chidi Pace MD, 1 mg at 01/01/20 0818  •  glucagon (human recombinant) (GLUCAGEN DIAGNOSTIC) injection 1 mg, 1 mg, Subcutaneous, Q15 Min PRN, Kayla Burrell, APRN  •  guaiFENesin (MUCINEX) 12 hr tablet 600 mg, 600 mg, Oral, Q12H, Yasin, Noe, MD, 600 mg at 01/01/20 0818  •  insulin lispro (humaLOG) injection 0-9 Units, 0-9 Units, Subcutaneous, 4x Daily With Meals & Nightly **AND** insulin lispro (humaLOG) injection 0-9 Units, 0-9 Units, Subcutaneous, PRN, Kayla Burrell APRN  •  ipratropium-albuterol (DUO-NEB) nebulizer solution 3 mL, 3 mL, Nebulization, 4x Daily - RT, Loraine Son, ALEX, 3 mL at 01/01/20 1110  •  ipratropium-albuterol (DUO-NEB) nebulizer solution 3 mL, 3 mL, Nebulization, Q4H PRN, Loraine Son,  APRN  •  lactulose (CHRONULAC) 10 GM/15ML solution 30 g, 30 g, Oral, Daily, Kayla Burrell, APRN, 30 g at 01/01/20 0818  •  levothyroxine (SYNTHROID, LEVOTHROID) tablet 200 mcg, 200 mcg, Oral, Q AM, Chidi Pace MD, 200 mcg at 01/01/20 0501  •  [START ON 1/2/2020] lidocaine (LIDODERM) 5 % 2 patch, 2 patch, Transdermal, Q24H, Hasmukh David MD, 1 patch at 01/01/20 1007  •  midodrine (PROAMATINE) tablet 5 mg, 5 mg, Oral, TID AC, Adalid Sterling MD, 5 mg at 01/01/20 0818  •  montelukast (SINGULAIR) tablet 10 mg, 10 mg, Oral, Nightly, Chidi Pace MD, 10 mg at 12/31/19 2051  •  MOUTH KOTE solution 2 mL, 2 spray, Mouth/Throat, Q4H PRN, Chidi Pace MD  •  naloxone (NARCAN) injection 0.4 mg, 0.4 mg, Intravenous, Q5 Min PRN, Lane Stock MD  •  ondansetron (ZOFRAN) injection 4 mg, 4 mg, Intravenous, Q6H PRN, Chidi Pace MD, 4 mg at 01/01/20 0055  •  oxyCODONE (ROXICODONE) immediate release tablet 5 mg, 5 mg, Oral, Q4H PRN, Chidi Pace MD, 5 mg at 01/01/20 0845  •  pantoprazole (PROTONIX) EC tablet 40 mg, 40 mg, Oral, Q AM, Chidi Pace MD, 40 mg at 01/01/20 0501  •  polyethylene glycol (MIRALAX) powder 17 g, 17 g, Oral, BID, Chidi Pace MD, 17 g at 01/01/20 0818  •  potassium chloride (K-DUR,KLOR-CON) CR tablet 20 mEq, 20 mEq, Oral, PRN **OR** potassium chloride (KLOR-CON) packet 20 mEq, 20 mEq, Oral, PRN, Chidi Pace MD  •  potassium chloride 10 mEq in 100 mL IVPB, 10 mEq, Intravenous, Q1H PRN **OR** potassium chloride 10 mEq in 100 mL IVPB, 10 mEq, Intravenous, Q1H PRN, Chidi Pace MD  •  senna (SENOKOT) tablet 1 tablet, 1 tablet, Oral, BID, Chidi Pace MD, 1 tablet at 01/01/20 0818  •  sodium chloride 0.9 % infusion, 30 mL/hr, Intravenous, Continuous, Chidi Pace MD, Last Rate: 30 mL/hr at 12/27/19 1430, 30 mL/hr at 12/27/19 1430    Family History:  Family History   Problem Relation Age of Onset   • Heart disease Father         Past Medical History:  Past Medical History:   Diagnosis Date   • CHF (congestive heart failure) (CMS/[facility])    • COPD (chronic obstructive pulmonary disease) (CMS/[facility])    • Diabetes (CMS/[facility])    • Hyperlipidemia    • Hypertension        Past surgical History:  Past Surgical History:   Procedure Laterality Date   • BREAST LUMPECTOMY     • CARDIAC CATHETERIZATION N/A 2019    Procedure: Right and Left Heart Cath 19 @ 0900;  Surgeon: Wayne Luna MD;  Location: [facility] CATH INVASIVE LOCATION;  Service: Cardiovascular   • CARDIAC CATHETERIZATION N/A 2019    Procedure: Coronary angiography;  Surgeon: Wayne Luna MD;  Location: [facility] CATH INVASIVE LOCATION;  Service: Cardiovascular   • CARDIAC ELECTROPHYSIOLOGY PROCEDURE Left 2019    Procedure: Dual-chamber ICD insertion;  Surgeon: Héctor Eckert MD;  Location: [facility] CATH INVASIVE LOCATION;  Service: Cardiovascular   • CARDIAC ELECTROPHYSIOLOGY PROCEDURE Left 2019    Procedure: Lead Revision;  Surgeon: Héctor Eckert MD;  Location: [facility] CATH INVASIVE LOCATION;  Service: Cardiovascular   • CHOLECYSTECTOMY     • HYSTERECTOMY     • INSERT / REPLACE / REMOVE PACEMAKER     • THYROID SURGERY         Social History:  Social History     Socioeconomic History   • Marital status:      Spouse name: Not on file   • Number of children: Not on file   • Years of education: Not on file   • Highest education level: Not on file   Tobacco Use   • Smoking status: Former Smoker     Last attempt to quit: 2019     Years since quittin.0   • Smokeless tobacco: Never Used   Substance and Sexual Activity   • Alcohol use: No     Frequency: Never   • Drug use: No   • Sexual activity: Defer       Review of Systems:  The following systems were reviewed as they relate to the cardiovascular system: Constitutional, Eyes, ENT, Cardiovascular, Respiratory, Gastrointestinal, Integumentary, Neurological, Psychiatric,  Hematologic, Endocrine, Musculoskeletal, and Genitourinary. The pertinent cardiovascular findings are reported above with all other cardiovascular points within those systems being negative.        NOTE: The following portions of the patient's history were reviewed and updated this visit as appropriate: allergies, current medications, past family history, past medical history, past social history, past surgical history and problem list.

## 2020-01-01 NOTE — PROGRESS NOTES
"NEPHROLOGY PROGRESS NOTE------[facility]    [facility]  [phone]  Adalid Sterling MD      Patient Care Team:  Phani Adames MD as PCP - General (Internal Medicine)  Ashish Smalls MD as Consulting Physician (Nephrology)      Provider:  Adalid Sterling MD  Patient Name: Rafael Mccann  :  1975    SUBJECTIVE:  F/u LINSEY  No chest pain or SOA   Off pressors  Lip swelling down    Medication:    allopurinol 300 mg Oral Daily   amiodarone 200 mg Oral BID With Meals   aspirin 81 mg Oral Daily   atorvastatin 40 mg Oral Nightly   docusate sodium 100 mg Oral BID   enoxaparin 40 mg Subcutaneous Daily   ferrous sulfate 324 mg Oral Daily With Breakfast   fluconazole 200 mg Oral Q48H   folic acid 1 mg Oral Daily   guaiFENesin 600 mg Oral Q12H   insulin lispro 0-9 Units Subcutaneous 4x Daily With Meals & Nightly   ipratropium-albuterol 3 mL Nebulization 4x Daily - RT   lactulose 30 g Oral Daily   levothyroxine 200 mcg Oral Q AM   [START ON 2020] lidocaine 2 patch Transdermal Q24H   midodrine 5 mg Oral TID AC   montelukast 10 mg Oral Nightly   mupirocin 1 application Each Nare BID   pantoprazole 40 mg Oral Q AM   polyethylene glycol 17 g Oral BID   senna 1 tablet Oral BID       sodium chloride 30 mL/hr Last Rate: 30 mL/hr (19 1430)       OBJECTIVE    Vital Sign Min/Max for last 24 hours  Temp  Min: 98 °F (36.7 °C)  Max: 99.5 °F (37.5 °C)   BP  Min: 84/58  Max: 131/72   Pulse  Min: 90  Max: 106   Resp  Min: 16  Max: 16   SpO2  Min: 81 %  Max: 99 %   No data recorded   Weight  Min: 94.3 kg (207 lb 14.3 oz)  Max: 94.3 kg (207 lb 14.3 oz)     Flowsheet Rows      First Filed Value   Admission Height  170.2 cm (67\") Documented at 2019   Admission Weight  91.8 kg (202 lb 6.1 oz) Documented at 2019 225          No intake/output data recorded.  I/O last 3 completed shifts:  In:  [P.O.:1710; I.V.:420]  Out: 4200 " [Urine:4200]    Physical Exam:  General Appearance: alert, appears stated age and cooperative. LIP edema  Head: normocephalic, without obvious abnormality and atraumatic  Eyes: conjunctivae and sclerae normal and no icterus  Neck: supple  Lungs: CTA bilaterally  Heart: regular rhythm & normal rate and normal S1, S2 +WALLY  Chest: S/P SURGICAL CHANGES  Abdomen: soft, NT  Extremities: moves extremities well, +TRACE PRETIBIAL EDEMA BILAT, no cyanosis and no redness  Skin: no bleeding, bruising or rash, turgor normal, color normal and no lesions noted  Neurologic: Alert, and oriented. No focal deficits    Labs:    WBC WBC   Date Value Ref Range Status   01/01/2020 6.40 3.40 - 10.80 10*3/mm3 Final   12/31/2019 5.10 3.40 - 10.80 10*3/mm3 Final   12/30/2019 8.00 3.40 - 10.80 10*3/mm3 Final      HGB Hemoglobin   Date Value Ref Range Status   01/01/2020 8.7 (L) 12.0 - 15.9 g/dL Final   12/31/2019 8.5 (L) 12.0 - 15.9 g/dL Final   12/30/2019 8.1 (L) 12.0 - 17.0 g/dL Final   12/30/2019 8.6 (L) 12.0 - 15.9 g/dL Final      HCT Hematocrit   Date Value Ref Range Status   01/01/2020 25.5 (L) 34.0 - 46.6 % Final   12/31/2019 25.3 (L) 34.0 - 46.6 % Final   12/30/2019 24 (L) 38 - 51 % Final   12/30/2019 25.3 (L) 34.0 - 46.6 % Final      Platlets No results found for: LABPLAT   MCV MCV   Date Value Ref Range Status   01/01/2020 93.2 79.0 - 97.0 fL Final   12/31/2019 94.4 79.0 - 97.0 fL Final   12/30/2019 93.1 79.0 - 97.0 fL Final          Sodium Sodium   Date Value Ref Range Status   01/01/2020 139 136 - 145 mmol/L Final   12/31/2019 137 136 - 145 mmol/L Final   12/30/2019 136 136 - 145 mmol/L Final      Potassium Potassium   Date Value Ref Range Status   01/01/2020 3.7 3.5 - 5.2 mmol/L Final   12/31/2019 4.0 3.5 - 5.2 mmol/L Final   12/30/2019 4.4 3.5 - 5.2 mmol/L Final      Chloride Chloride   Date Value Ref Range Status   01/01/2020 97 (L) 98 - 107 mmol/L Final   12/31/2019 96 (L) 98 - 107 mmol/L Final   12/30/2019 99 98 - 107 mmol/L  Final      CO2 CO2   Date Value Ref Range Status   01/01/2020 31.0 (H) 22.0 - 29.0 mmol/L Final   12/31/2019 30.0 (H) 22.0 - 29.0 mmol/L Final   12/30/2019 27.0 22.0 - 29.0 mmol/L Final      BUN BUN   Date Value Ref Range Status   01/01/2020 11 6 - 20 mg/dL Final   12/31/2019 12 6 - 20 mg/dL Final   12/30/2019 13 6 - 20 mg/dL Final      Creatinine Creatinine   Date Value Ref Range Status   01/01/2020 1.04 (H) 0.57 - 1.00 mg/dL Final   12/31/2019 1.18 (H) 0.57 - 1.00 mg/dL Final   12/30/2019 1.25 (H) 0.57 - 1.00 mg/dL Final      Calcium Calcium   Date Value Ref Range Status   01/01/2020 8.8 8.6 - 10.5 mg/dL Final   12/31/2019 8.8 8.6 - 10.5 mg/dL Final   12/30/2019 8.8 8.6 - 10.5 mg/dL Final      PO4 No components found for: PO4   Albumin Albumin   Date Value Ref Range Status   01/01/2020 3.90 3.50 - 5.20 g/dL Final   12/31/2019 3.90 3.50 - 5.20 g/dL Final   12/30/2019 4.10 3.50 - 5.20 g/dL Final      Magnesium Magnesium   Date Value Ref Range Status   01/01/2020 2.0 1.6 - 2.6 mg/dL Final   12/31/2019 2.1 1.6 - 2.6 mg/dL Final   12/30/2019 2.2 1.6 - 2.6 mg/dL Final      Uric Acid No components found for: URIC ACID     Imaging Results (Last 72 Hours)     Procedure Component Value Units Date/Time    XR Chest 1 View [[id number]] Collected:  12/31/19 1152     Updated:  12/31/19 1155    Narrative:       DATE OF EXAM:  12/31/2019 10:48 AM     PROCEDURE:  XR CHEST 1 VW-     INDICATIONS:  Hypoxia; R07.9-Chest pain, unspecified; I35.1-Nonrheumatic aortic  (valve) insufficiency; I42.0-Dilated cardiomyopathy; Z95.810-Presence of  automatic (implantable) cardiac defibrillator; I20.0-Unstable angina;  I25.119-Atherosclerotic heart disease of native coronary artery with  unspecified angina pectoris; I35.1-Nonrheumatic aortic (valve)  insufficiency; J43.9-Emphysema, unspecified patient's a 44-year-old  female with hypoxia history of open heart surgery on December 26.  History of aortic regurg, former smoker      COMPARISON:  Comparison is made to study of 12/29/2019     TECHNIQUE:   Single radiographic AP view of the chest was obtained.     FINDINGS:  Today's portable erect study was obtained on 12/31/2019 at 10:50 AM.     Today's study demonstrates the left basilar opacity remaining stable  there is increasing right basilar opacity consistent with increasing  atelectasis and probable small pleural effusion. The right internal  jugular vascular sheath remains in good position the left-sided  dual-lead pacemaker defibrillator remains in good position changes of  median sternotomy coronary artery bypass grafting and aortic valvular  replacement are again seen.        Impression:          1. Mild interval worsening from study of December 29  2. Increasing right basilar opacities felt to represent increasing  atelectasis  3. Stable moderate left basilar opacity consistent with atelectasis,  pneumonia and/or pleural effusion     Electronically Signed By-Ashwin Beavers Jr. On:12/31/2019 11:53 AM  This report was finalized on [id number] by  Ashwin Beavers Jr., .    XR Chest 1 View [[id number]] Collected:  12/29/19 1313     Updated:  12/29/19 1319    Narrative:       DATE OF EXAM:  12/29/2019 1:02 PM     PROCEDURE:  XR CHEST 1 VW-     INDICATIONS:  CT removal; R07.9-Chest pain, unspecified; I35.1-Nonrheumatic aortic  (valve) insufficiency; I42.0-Dilated cardiomyopathy; Z95.810-Presence of  automatic (implantable) cardiac defibrillator; I20.0-Unstable angina;  I25.119-Atherosclerotic heart disease of native coronary artery with  unspecified angina pectoris; I35.1-Nonrheumatic aortic (valve)  insufficiency patient's a 44-year-old female status post chest tube  removal     COMPARISON:  Study of 5:01 AM earlier today     TECHNIQUE:   Single radiographic AP view of the chest was obtained.     FINDINGS:  Today's portable erect study was obtained on 12/29/2019 at 12:52 PM     Today's follow-up study demonstrates the right internal jugular  sheath  being in place. The mediastinal drains have been removed there is no  evidence of pneumomediastinum or pneumothorax on today's study. Moderate  opacity in the left lung base continues either representing atelectasis  or pneumonia. Mild right basilar atelectasis is seen. A left-sided  dual-lead pacemaker defibrillator is seen. Changes of median sternotomy  and coronary artery bypass grafting are present. The upper abdomen  appears unremarkable.        Impression:          1. No significant change from earlier than the day other than removal of  mediastinal drains  2. Bilateral basilar opacities left greater than right felt to represent  atelectasis and/or infiltrate, possible pneumonia on the left,  atelectasis right base.  3. Right internal jugular sheath remains intact with tip in superior  vena cava, left-sided dual-lead pacemaker defibrillator appears  unchanged as are changes of CABG.     Electronically Signed By-Ashwin Beavers Jr. On:12/29/2019 1:16 PM  This report was finalized on [id number] by  Ashwin Beavers Jr., .          Results for orders placed during the hospital encounter of 12/22/19   XR Chest 1 View    Narrative DATE OF EXAM:  12/31/2019 10:48 AM     PROCEDURE:  XR CHEST 1 VW-     INDICATIONS:  Hypoxia; R07.9-Chest pain, unspecified; I35.1-Nonrheumatic aortic  (valve) insufficiency; I42.0-Dilated cardiomyopathy; Z95.810-Presence of  automatic (implantable) cardiac defibrillator; I20.0-Unstable angina;  I25.119-Atherosclerotic heart disease of native coronary artery with  unspecified angina pectoris; I35.1-Nonrheumatic aortic (valve)  insufficiency; J43.9-Emphysema, unspecified patient's a 44-year-old  female with hypoxia history of open heart surgery on December 26.  History of aortic regurg, former smoker     COMPARISON:  Comparison is made to study of 12/29/2019     TECHNIQUE:   Single radiographic AP view of the chest was obtained.     FINDINGS:  Today's portable erect study was obtained on  12/31/2019 at 10:50 AM.     Today's study demonstrates the left basilar opacity remaining stable  there is increasing right basilar opacity consistent with increasing  atelectasis and probable small pleural effusion. The right internal  jugular vascular sheath remains in good position the left-sided  dual-lead pacemaker defibrillator remains in good position changes of  median sternotomy coronary artery bypass grafting and aortic valvular  replacement are again seen.        Impression    1. Mild interval worsening from study of December 29  2. Increasing right basilar opacities felt to represent increasing  atelectasis  3. Stable moderate left basilar opacity consistent with atelectasis,  pneumonia and/or pleural effusion     Electronically Signed By-Ashwin Beavers Jr. On:12/31/2019 11:53 AM  This report was finalized on [id number] by  Ashwin Beavers Jr., .   XR Chest 1 View    Narrative DATE OF EXAM:  12/29/2019 1:02 PM     PROCEDURE:  XR CHEST 1 VW-     INDICATIONS:  CT removal; R07.9-Chest pain, unspecified; I35.1-Nonrheumatic aortic  (valve) insufficiency; I42.0-Dilated cardiomyopathy; Z95.810-Presence of  automatic (implantable) cardiac defibrillator; I20.0-Unstable angina;  I25.119-Atherosclerotic heart disease of native coronary artery with  unspecified angina pectoris; I35.1-Nonrheumatic aortic (valve)  insufficiency patient's a 44-year-old female status post chest tube  removal     COMPARISON:  Study of 5:01 AM earlier today     TECHNIQUE:   Single radiographic AP view of the chest was obtained.     FINDINGS:  Today's portable erect study was obtained on 12/29/2019 at 12:52 PM     Today's follow-up study demonstrates the right internal jugular sheath  being in place. The mediastinal drains have been removed there is no  evidence of pneumomediastinum or pneumothorax on today's study. Moderate  opacity in the left lung base continues either representing atelectasis  or pneumonia. Mild right basilar atelectasis  is seen. A left-sided  dual-lead pacemaker defibrillator is seen. Changes of median sternotomy  and coronary artery bypass grafting are present. The upper abdomen  appears unremarkable.        Impression    1. No significant change from earlier than the day other than removal of  mediastinal drains  2. Bilateral basilar opacities left greater than right felt to represent  atelectasis and/or infiltrate, possible pneumonia on the left,  atelectasis right base.  3. Right internal jugular sheath remains intact with tip in superior  vena cava, left-sided dual-lead pacemaker defibrillator appears  unchanged as are changes of CABG.     Electronically Signed By-Ashwin Beavers Jr. On:12/29/2019 1:16 PM  This report was finalized on [id number] by  Ashwin Beavers Jr., .   XR Chest 1 View    Narrative DATE OF EXAM:  12/29/2019 5:03 AM     PROCEDURE:  XR CHEST 1 VW-     INDICATIONS:  Post-Op Heart Surgery; R07.9-Chest pain, unspecified; I35.1-Nonrheumatic  aortic (valve) insufficiency; I42.0-Dilated cardiomyopathy;  Z95.810-Presence of automatic (implantable) cardiac defibrillator;  I20.0-Unstable angina; I25.119-Atherosclerotic heart disease of native  coronary artery with unspecified angina pectoris; I35.1-Nonrheumatic  aortic (valve) insufficiency patient's a 44-year-old female with COPD,  hypertension, cardiomyopathy, aortic regurg, status post CABG, day 2     COMPARISON:  Study of 12/28/2019 at 4:23 AM     TECHNIQUE:   Single radiographic AP view of the chest was obtained.     FINDINGS:  Today's portable erect study was obtained on 12/29/2019 at 5:01 AM     Today's exam demonstrates interval removal of Montgomery-Brad catheter. The  right internal jugular sheath remains with tip in superior vena cava.  The left-sided dual-lead pacemaker defibrillator is unchanged.  Mediastinal drain is again seen. The heart remains enlarged. There is  poor inspiratory volume. Bilateral basilar infiltrates are present left  greater than right, these  have mildly improved. The upper abdomen  appears unremarkable.        Impression    1. Mild improvement from 12/28/2019  2. Decreasing basilar infiltrates left greater than right  3. Interval removal of Cades-Brad catheter  4. Mediastinal drain, right internal jugular sheath and pacemaker  defibrillator remain unchanged  4 changes of median sternotomy and aortic valvular replacement, changes  of CABG     Electronically Signed By-Ashwin Beavers Jr. On:12/29/2019 6:34 AM  This report was finalized on [id number] by  Ashwin Beavers Jr., .       Results for orders placed during the hospital encounter of 12/22/19   Duplex Venous Lower Extremity - Bilateral CAR    Narrative · Normal bilateral lower extremity venous duplex scan.            ASSESSMENT / PLAN      Chest pain    COPD (chronic obstructive pulmonary disease) (CMS/HCC)    Hypertension    Cardiomyopathy, dilated (CMS/HCC)    Essential hypertension    Chronic renal impairment    ICD (implantable cardioverter-defibrillator), dual, in situ    GERD without esophagitis    Hypothyroidism (acquired)    Aortic valve regurgitation    Unstable angina pectoris (CMS/HCC)    Coronary artery disease involving native coronary artery of native heart with angina pectoris (CMS/HCC)    Nonrheumatic aortic valve insufficiency      1. ARF/LINSEY/CRF/CKD STG 2------Nonoliguric. ARF/LINSEY resolved. +Mild ARF/LINSEY on top of what appears to be CRF/CKD STG 2 with a baseline serum creatinine of 1.1. Unknown if patient has baseline proteinuria or hematuria. CRF/CKD STG 2 likely secondary to HTN NS/DM and chronic renal hypoperfusion from Cardiomyopathy. +Mild ARF/LINSEY appeared to be secondary to prerenal/hemodynamic fluctuation from MI S/P Cath/IV dye exposure with concomitant ACE-I and diuretic use. Keep off of Zestril for now.   Dose meds for CrCl 30-60 cc/min. No NSAIDs. No further IV dye exposure, unless emergently needed.     2. CAD S/P MI S/P CATH--------CABG done per , CT Surgery.     3.  DILATED CARDIOMYOPATHY/VALVULAR HEART DISEASE--------No gross overload at present. Restart little diuretic. Has PM/AICD. Per , Cardiology     4. HTN WITH CKD------BP okay. Avoid hypotension. No ACE/ARB/DRI for now. Temporarily holding diuretics     5. HYPERURICEMIA---------On Allopurinol.  Uric normal     6. HYPERLIPIDEMIA------On Statin.       7. GERD-------On H2 blocker     8. DMII------Metformin on hold as just had IV dye exposure. BS okay. On Glucometers, SSI     9. VITAMIN D DEFICIENCY     10. DM PERIPHERAL NEUROPATHY-------On Neurontin     11. HYPOTHYROIDISM------Increased Synthroid as TSH up      Creatinine better, diuresing well, received lasix yesterday  Continue midodrine  PRN diuretics      Adalid Sterling MD  [facility]  [phone]  01/01/20  9:17 AM

## 2020-01-01 NOTE — PLAN OF CARE
Problem: Patient Care Overview  Goal: Plan of Care Review  Outcome: Ongoing (interventions implemented as appropriate)  Flowsheets  Taken 1/1/2020 1430  Progress: no change  Outcome Summary: Patient remains off of levophed. Oxygenation has improved despite poor pulmonary toileting and refusal to ambulate when prompted. Patient complains of sharp pain below ribs that prevents her from catching her breath and from taking deep breaths. Limited echo ordered. Will continue to monitor.  Taken 1/1/2020 0800  Plan of Care Reviewed With: patient;family

## 2020-01-01 NOTE — PROGRESS NOTES
Pulmonary/ Critical Care progress Note        Patient Name:  Rafael Mccann    :  1975    Medical Record:  [id number]    Requesting Physician    Phani Adames, *    Primary Care Physician     Phani Adames MD    Reason for consultation    Rafael Mccann is a 44 y.o. female who we were asked to see in consultation for decreased level of consciousness.   Patient is POD#3 CABG and AVR after presenting to the ER on 19 with complaints of chest pain, dyspnea and tachycardia.  Patient with a history of CAD being medically managed as well as cardiomyopathy.   Cardiac cath on 19 showed severe two vessel disease and 4+aortic regurg.       This evening patient was found to be very difficult to arouse in bed after being up to chair this morning and ambulating in the afternoon.   Patient was given Narcan with improvement in her mental status.  ABG was obtained as well which showed pCO2 of 49 and a pAO2 of 68.   Patient had been requiring Dilaudid 0.25 mg q2h IV and Oxycodone 5 mg q4h, for pain control in addition had Ultram and Benadryl earlier today.      Review of Systems    :  OOB to chair, awake and alert.  Complaints of right lower rib pain.    20:  OOB to chair,  Remains with complaints of pain      Home medicationS  Prior to Admission medications    Medication Sig Start Date End Date Taking? Authorizing Provider   allopurinol (ZYLOPRIM) 300 MG tablet Take 300 mg by mouth Daily.   Yes ProviderDheeraj MD   ALPRAZolam (XANAX) 1 MG tablet Take 1 mg by mouth 4 (Four) Times a Day As Needed for Anxiety.   Yes ProviderDheeraj MD   amLODIPine (NORVASC) 5 MG tablet Take 5 mg by mouth 2 (Two) Times a Day.   Yes ProviderDheeraj MD   aspirin 81 MG chewable tablet Chew 81 mg Daily.   Yes ProviderDheeraj MD   atorvastatin (LIPITOR) 40 MG tablet Take 1 tablet by mouth Every Night. 19  Yes Hannah Wills MD   carvedilol (COREG) 12.5 MG tablet Take 1  tablet by mouth 2 (Two) Times a Day With Meals. 6/30/19  Yes Hannah Wills MD   cholecalciferol (VITAMIN D3) 1000 units tablet Take 1,000 Units by mouth Daily.   Yes Dheeraj Alvarez MD   clindamycin (CLEOCIN) 300 MG capsule Take 300 mg by mouth 2 (Two) Times a Day. 12/18/19 1/1/20 Yes Dheeraj Alvarez MD   famotidine (PEPCID) 20 MG tablet Take 1 tablet by mouth 2 (Two) Times a Day. 9/17/19  Yes Dina Jack PA   ferrous sulfate 325 (65 FE) MG tablet Take 65 mg by mouth Daily With Breakfast.   Yes Dheeraj Alvarez MD   fluconazole (DIFLUCAN) 200 MG tablet Take 200 mg by mouth Every Other Day.   Yes Dheeraj Alvarez MD   folic acid (FOLVITE) 1 MG tablet Take 1 mg by mouth Daily.   Yes Dheeraj Alvarez MD   furosemide (LASIX) 40 MG tablet Take 40 mg by mouth 2 (Two) Times a Day.   Yes Dheeraj Alvarez MD   gabapentin (NEURONTIN) 300 MG capsule Take 300 mg by mouth 3 (Three) Times a Day.   Yes Dheeraj Alvarez MD   hydrALAZINE (APRESOLINE) 100 MG tablet Take 1 tablet by mouth 3 (Three) Times a Day. 12/12/19  Yes Wanye Luna MD   levothyroxine (SYNTHROID, LEVOTHROID) 200 MCG tablet Take 200 mcg by mouth Daily.   Yes Dheeraj Alvarez MD   lisinopril (PRINIVIL,ZESTRIL) 20 MG tablet Take 20 mg by mouth Daily.   Yes Dheeraj Alvarez MD   metFORMIN (GLUCOPHAGE) 500 MG tablet Take 500 mg by mouth Daily With Breakfast.   Yes Dheeraj Alvarez MD   montelukast (SINGULAIR) 10 MG tablet Take 10 mg by mouth Every Night.   Yes Dheeraj Alvarez MD   oxyCODONE-acetaminophen (PERCOCET) 7.5-325 MG per tablet Take 1 tablet by mouth Every 6 (Six) Hours As Needed.   Yes Dheeraj Alvarez MD   pantoprazole (PROTONIX) 40 MG EC tablet Take 40 mg by mouth Daily.   Yes Dheeraj lAvarez MD   spironolactone (ALDACTONE) 25 MG tablet Take 1 tablet by mouth Daily. 11/19/19  Yes Wayne Luna MD       Medical History    Past Medical History:   Diagnosis Date    • CHF (congestive heart failure) (CMS/[facility])    • COPD (chronic obstructive pulmonary disease) (CMS/[facility])    • Diabetes (CMS/[facility])    • Hyperlipidemia    • Hypertension     INDRA on PAP therapy     Surgical History    Past Surgical History:   Procedure Laterality Date   • BREAST LUMPECTOMY     • CARDIAC CATHETERIZATION N/A 2019    Procedure: Right and Left Heart Cath 19 @ 0900;  Surgeon: Wayne Luna MD;  Location: [facility] CATH INVASIVE LOCATION;  Service: Cardiovascular   • CARDIAC CATHETERIZATION N/A 2019    Procedure: Coronary angiography;  Surgeon: Wayne Luna MD;  Location: [facility] CATH INVASIVE LOCATION;  Service: Cardiovascular   • CARDIAC ELECTROPHYSIOLOGY PROCEDURE Left 2019    Procedure: Dual-chamber ICD insertion;  Surgeon: Héctor Eckert MD;  Location: [facility] CATH INVASIVE LOCATION;  Service: Cardiovascular   • CARDIAC ELECTROPHYSIOLOGY PROCEDURE Left 2019    Procedure: Lead Revision;  Surgeon: Héctor Eckert MD;  Location: [facility] CATH INVASIVE LOCATION;  Service: Cardiovascular   • CHOLECYSTECTOMY     • HYSTERECTOMY     • INSERT / REPLACE / REMOVE PACEMAKER     • THYROID SURGERY          Family History    Family History   Problem Relation Age of Onset   • Heart disease Father        Social History    Social History     Tobacco Use   • Smoking status: Former Smoker     Last attempt to quit: 2019     Years since quittin.0   • Smokeless tobacco: Never Used   Substance Use Topics   • Alcohol use: No     Frequency: Never        Allergies    Allergies   Allergen Reactions   • Hydrocodone Hives   • Penicillin G Unknown (See Comments)       Medications    Scheduled Meds:    allopurinol 300 mg Oral Daily   amiodarone 200 mg Oral BID With Meals   aspirin 81 mg Oral Daily   atorvastatin 40 mg Oral Nightly   calcium gluconate 1 g Intravenous Once   docusate sodium 100 mg Oral BID   enoxaparin 40 mg Subcutaneous Daily   ferrous sulfate 324 mg Oral Daily  With Breakfast   fluconazole 200 mg Oral Q48H   folic acid 1 mg Oral Daily   guaiFENesin 600 mg Oral Q12H   insulin lispro 0-9 Units Subcutaneous 4x Daily With Meals & Nightly   ipratropium-albuterol 3 mL Nebulization 4x Daily - RT   lactulose 30 g Oral Daily   levothyroxine 200 mcg Oral Q AM   [START ON 2020] lidocaine 2 patch Transdermal Q24H   midodrine 5 mg Oral TID AC   montelukast 10 mg Oral Nightly   pantoprazole 40 mg Oral Q AM   polyethylene glycol 17 g Oral BID   senna 1 tablet Oral BID     Continuous Infusions:    sodium chloride 30 mL/hr Last Rate: 30 mL/hr (19 1430)     PRN Meds:.•  acetaminophen  •  ALPRAZolam  •  dextrose  •  dextrose  •  glucagon (human recombinant)  •  insulin lispro **AND** insulin lispro  •  MOUTH KOTE  •  naloxone  •  ondansetron  •  oxyCODONE  •  potassium chloride **OR** potassium chloride  •  potassium chloride **OR** potassium chloride      Physical Exam    tMax 24 hrs:  Temp (24hrs), Av.6 °F (37 °C), Min:98 °F (36.7 °C), Max:99.5 °F (37.5 °C)    Vitals Ranges:  Temp:  [98 °F (36.7 °C)-99.5 °F (37.5 °C)] 98.3 °F (36.8 °C)  Heart Rate:  [] 93  Resp:  [16] 16  BP: ()/(40-95) 91/55  Intake and Output Last 3 Shifts:  I/O last 3 completed shifts:  In: 2130 [P.O.:1710; I.V.:420]  Out: 4200 [Urine:4200]    Constitutional:  Alert, no acute respiratory distress   HEENT:  Atraumatic, PERRL, conjunctiva normal, moist oral mucosa, no nasal discharge.  Trachea is midline.  Respiratory:  No respiratory distress, normal breath sounds, no rales, no wheezing   Cardiovascular:  Normal rate, normal rhythm and no murmurs.  Pulses 2+ and equal in all four extremities.    GI:  Soft, nondistended, positive bowel sounds.  :  No costovertebral angle tenderness   Extremities: No edema, cyanosis or tenderness.  Integument:  No rashes.   Neurologic:  Alert & oriented x 3, CN 2-12 normal, normal motor function, normal sensory function, no focal deficits noted   Psychiatric:   Speech and behavior appropriate     labs    CBC  Results from last 7 days   Lab Units 01/01/20  0634 12/31/19  0631 12/30/19  1849 12/30/19 0338 12/29/19 0352 12/28/19 0336 12/27/19 2209 12/27/19 1423 12/27/19  0353   WBC 10*3/mm3 6.40 5.10  --  8.00 9.90 9.90  --   --  11.70*  --  6.90   RBC 10*6/mm3 2.73* 2.68*  --  2.71* 2.98* 3.07*  --   --  3.59*  --  4.34   HEMOGLOBIN g/dL 8.7* 8.5*  --  8.6* 9.7* 10.0*  --   --  11.6*  --  13.6   HEMOGLOBIN, POC g/dL  --   --  8.1*  --   --   --  9.7*   < >  --    < >  --    HEMATOCRIT % 25.5* 25.3*  --  25.3* 27.8* 28.7*  --   --  33.7*  --  41.2   HEMATOCRIT POC %  --   --  24*  --   --   --  28*   < >  --    < >  --    MCV fL 93.2 94.4  --  93.1 93.1 93.5  --   --  93.9  --  94.9   PLATELETS 10*3/mm3 207 170  --  134* 106* 104*  --   --  110*  --  151    < > = values in this interval not displayed.       BMP  Results from last 7 days   Lab Units 01/01/20  0634 12/31/19  0631 12/30/19 0338 12/29/19 0352 12/28/19 0336 12/27/19 1423 12/27/19  0353 12/26/19  0454   SODIUM mmol/L 139 137 136 134* 138 140 138 137   POTASSIUM mmol/L 3.7 4.0 4.4 4.1 4.0 4.4 3.6 3.6   CHLORIDE mmol/L 97* 96* 99 96* 103 108* 100 101   CO2 mmol/L 31.0* 30.0* 27.0 26.0 24.0 24.0 28.0 26.0   BUN mg/dL 11 12 13 12 11 11 14 14   CREATININE mg/dL 1.04* 1.18* 1.25* 1.52* 0.97 0.88 0.95 1.07*   GLUCOSE mg/dL 106* 122* 157* 134* 146* 181* 110* 112*   MAGNESIUM mg/dL 2.0 2.1 2.2 2.0 2.4  --   --  2.2   PHOSPHORUS mg/dL 3.5 3.1 3.0 4.6* 3.9 3.0  --  3.5       CMP   Results from last 7 days   Lab Units 01/01/20  0634 12/31/19  0631 12/30/19  0338 12/29/19  0352 12/28/19  0336 12/27/19  1423 12/27/19  0353 12/26/19  0454   SODIUM mmol/L 139 137 136 134* 138 140 138 137   POTASSIUM mmol/L 3.7 4.0 4.4 4.1 4.0 4.4 3.6 3.6   CHLORIDE mmol/L 97* 96* 99 96* 103 108* 100 101   CO2 mmol/L 31.0* 30.0* 27.0 26.0 24.0 24.0 28.0 26.0   BUN mg/dL 11 12 13 12 11 11 14 14   CREATININE mg/dL 1.04* 1.18* 1.25* 1.52*  0.97 0.88 0.95 1.07*   GLUCOSE mg/dL 106* 122* 157* 134* 146* 181* 110* 112*   ALBUMIN g/dL 3.90 3.90 4.10 3.80 4.10 3.70  --  4.00   BILIRUBIN mg/dL  --   --   --  0.8  --   --   --  0.4   ALK PHOS U/L  --   --   --  54  --   --   --  75   AST (SGOT) U/L  --   --   --  58*  --   --   --  19   ALT (SGPT) U/L  --   --   --  25  --   --   --  15         TROPONIN        CoAg  Results from last 7 days   Lab Units 12/27/19  1423 12/26/19  1043   INR  1.20* 0.91   APTT seconds 30.9 27.9       Creatinine Clearance  Estimated Creatinine Clearance: 79.7 mL/min (A) (by C-G formula based on SCr of 1.04 mg/dL (H)).    ABG  Results from last 7 days   Lab Units 12/31/19  0043 12/30/19  1849 12/29/19  0816 12/28/19  0815 12/27/19  2209 12/27/19  2113 12/27/19 2016   PH, ARTERIAL pH units 7.376 7.396 7.393 7.382 7.356 7.425 7.404   PCO2, ARTERIAL mm Hg 51.7* 49.4* 47.5 43.7 45.9 39.6 41.7   PO2 ART mm Hg 53.2* 68.1* 41.6* 68.1* 74.0* 89.0 82.8*   O2 SATURATION ART % 85.7* 92.9* 75.9* 92.9* 93.9* 97.1 96.1   BASE EXCESS ART mmol/L 4.4* 4.9* 3.4* 0.7 0.0 1.5 1.2       Imaging & Other Studies    Imaging Results (Last 72 Hours)     Procedure Component Value Units Date/Time    XR Chest 1 View [[id number]] Collected:  12/31/19 1152     Updated:  12/31/19 1155    Narrative:       DATE OF EXAM:  12/31/2019 10:48 AM     PROCEDURE:  XR CHEST 1 VW-     INDICATIONS:  Hypoxia; R07.9-Chest pain, unspecified; I35.1-Nonrheumatic aortic  (valve) insufficiency; I42.0-Dilated cardiomyopathy; Z95.810-Presence of  automatic (implantable) cardiac defibrillator; I20.0-Unstable angina;  I25.119-Atherosclerotic heart disease of native coronary artery with  unspecified angina pectoris; I35.1-Nonrheumatic aortic (valve)  insufficiency; J43.9-Emphysema, unspecified patient's a 44-year-old  female with hypoxia history of open heart surgery on December 26.  History of aortic regurg, former smoker     COMPARISON:  Comparison is made to study of 12/29/2019      TECHNIQUE:   Single radiographic AP view of the chest was obtained.     FINDINGS:  Today's portable erect study was obtained on 12/31/2019 at 10:50 AM.     Today's study demonstrates the left basilar opacity remaining stable  there is increasing right basilar opacity consistent with increasing  atelectasis and probable small pleural effusion. The right internal  jugular vascular sheath remains in good position the left-sided  dual-lead pacemaker defibrillator remains in good position changes of  median sternotomy coronary artery bypass grafting and aortic valvular  replacement are again seen.        Impression:          1. Mild interval worsening from study of December 29  2. Increasing right basilar opacities felt to represent increasing  atelectasis  3. Stable moderate left basilar opacity consistent with atelectasis,  pneumonia and/or pleural effusion     Electronically Signed By-Ashwin Beavers Jr. On:12/31/2019 11:53 AM  This report was finalized on [id number] by  Ashwin Beavers Jr., .    XR Chest 1 View [[id number]] Collected:  12/29/19 1313     Updated:  12/29/19 1319    Narrative:       DATE OF EXAM:  12/29/2019 1:02 PM     PROCEDURE:  XR CHEST 1 VW-     INDICATIONS:  CT removal; R07.9-Chest pain, unspecified; I35.1-Nonrheumatic aortic  (valve) insufficiency; I42.0-Dilated cardiomyopathy; Z95.810-Presence of  automatic (implantable) cardiac defibrillator; I20.0-Unstable angina;  I25.119-Atherosclerotic heart disease of native coronary artery with  unspecified angina pectoris; I35.1-Nonrheumatic aortic (valve)  insufficiency patient's a 44-year-old female status post chest tube  removal     COMPARISON:  Study of 5:01 AM earlier today     TECHNIQUE:   Single radiographic AP view of the chest was obtained.     FINDINGS:  Today's portable erect study was obtained on 12/29/2019 at 12:52 PM     Today's follow-up study demonstrates the right internal jugular sheath  being in place. The mediastinal drains have been  removed there is no  evidence of pneumomediastinum or pneumothorax on today's study. Moderate  opacity in the left lung base continues either representing atelectasis  or pneumonia. Mild right basilar atelectasis is seen. A left-sided  dual-lead pacemaker defibrillator is seen. Changes of median sternotomy  and coronary artery bypass grafting are present. The upper abdomen  appears unremarkable.        Impression:          1. No significant change from earlier than the day other than removal of  mediastinal drains  2. Bilateral basilar opacities left greater than right felt to represent  atelectasis and/or infiltrate, possible pneumonia on the left,  atelectasis right base.  3. Right internal jugular sheath remains intact with tip in superior  vena cava, left-sided dual-lead pacemaker defibrillator appears  unchanged as are changes of CABG.     Electronically Signed By-Ashwin Beavers Jr. On:12/29/2019 1:16 PM  This report was finalized on [id number] by  Ashwin Beavers Jr., .            AssessmenT/PLAN  Decreased level of consciousness related to opioids  INDRA compliant with BiPAP use  POD CABG and AVR - aortic regurgitation and CAD  HFrEF - EF35%  AICD  CKD stage II  T2DM  Hypothyroidism on Synthroid  Anxiety on Xanax  Chronic pain follows outpatient pain management clinict  COPD without exacerbation  Cardiomyopathy    Plan:   -Chest x-ray today with worse pulmonary vascular congestion and atelectasis.  Nephrology following and likely would need diuretics.  -No aggressive pulmonary toilet with incentive spirometry, flutter valve and nebs.  -supplemental oxygen, maintain sats 92%  -on FLuconazole  -ABG reviewed  -cont to use home PAP with sleep   -limit sedating medication   -D/C gabapentin  -Lidocaine patch  -pulmonary toilet, IS and flutter    -duonebs and Mucinex     OT recommends - rehab    Attending physician statement:  Above note scribed by nurse practitioner for me and later reviewed for accuracy. I've examined  the patient and reviewed all labs and images.   I have directly participated in the evaluation and management of this patient.  Hasmukh welch MD

## 2020-01-02 LAB
ALBUMIN SERPL-MCNC: 3.7 G/DL (ref 3.5–5.2)
ANION GAP SERPL CALCULATED.3IONS-SCNC: 11 MMOL/L (ref 5–15)
BUN BLD-MCNC: 11 MG/DL (ref 6–20)
BUN/CREAT SERPL: 11 (ref 7–25)
CALCIUM SPEC-SCNC: 9.1 MG/DL (ref 8.6–10.5)
CHLORIDE SERPL-SCNC: 96 MMOL/L (ref 98–107)
CO2 SERPL-SCNC: 31 MMOL/L (ref 22–29)
CREAT BLD-MCNC: 1 MG/DL (ref 0.57–1)
DEPRECATED RDW RBC AUTO: 44.6 FL (ref 37–54)
ERYTHROCYTE [DISTWIDTH] IN BLOOD BY AUTOMATED COUNT: 13.4 % (ref 12.3–15.4)
GFR SERPL CREATININE-BSD FRML MDRD: 72 ML/MIN/1.73
GLUCOSE BLD-MCNC: 119 MG/DL (ref 65–99)
GLUCOSE BLDC GLUCOMTR-MCNC: 100 MG/DL (ref 70–105)
GLUCOSE BLDC GLUCOMTR-MCNC: 113 MG/DL (ref 70–105)
GLUCOSE BLDC GLUCOMTR-MCNC: 140 MG/DL (ref 70–105)
HCT VFR BLD AUTO: 24 % (ref 34–46.6)
HGB BLD-MCNC: 8.2 G/DL (ref 12–15.9)
MAGNESIUM SERPL-MCNC: 2.2 MG/DL (ref 1.6–2.6)
MCH RBC QN AUTO: 32.1 PG (ref 26.6–33)
MCHC RBC AUTO-ENTMCNC: 34.4 G/DL (ref 31.5–35.7)
MCV RBC AUTO: 93.5 FL (ref 79–97)
PHOSPHATE SERPL-MCNC: 4 MG/DL (ref 2.5–4.5)
PLATELET # BLD AUTO: 211 10*3/MM3 (ref 140–450)
PMV BLD AUTO: 7.1 FL (ref 6–12)
POTASSIUM BLD-SCNC: 3.6 MMOL/L (ref 3.5–5.2)
RBC # BLD AUTO: 2.56 10*6/MM3 (ref 3.77–5.28)
SODIUM BLD-SCNC: 138 MMOL/L (ref 136–145)
WBC NRBC COR # BLD: 5.5 10*3/MM3 (ref 3.4–10.8)

## 2020-01-02 PROCEDURE — 25010000002 ONDANSETRON PER 1 MG: Performed by: THORACIC SURGERY (CARDIOTHORACIC VASCULAR SURGERY)

## 2020-01-02 PROCEDURE — 97535 SELF CARE MNGMENT TRAINING: CPT

## 2020-01-02 PROCEDURE — 85027 COMPLETE CBC AUTOMATED: CPT | Performed by: THORACIC SURGERY (CARDIOTHORACIC VASCULAR SURGERY)

## 2020-01-02 PROCEDURE — 82962 GLUCOSE BLOOD TEST: CPT

## 2020-01-02 PROCEDURE — 97110 THERAPEUTIC EXERCISES: CPT

## 2020-01-02 PROCEDURE — 97530 THERAPEUTIC ACTIVITIES: CPT

## 2020-01-02 PROCEDURE — 99232 SBSQ HOSP IP/OBS MODERATE 35: CPT | Performed by: INTERNAL MEDICINE

## 2020-01-02 PROCEDURE — 94799 UNLISTED PULMONARY SVC/PX: CPT

## 2020-01-02 PROCEDURE — 93010 ELECTROCARDIOGRAM REPORT: CPT | Performed by: INTERNAL MEDICINE

## 2020-01-02 PROCEDURE — 99024 POSTOP FOLLOW-UP VISIT: CPT | Performed by: NURSE PRACTITIONER

## 2020-01-02 PROCEDURE — P9016 RBC LEUKOCYTES REDUCED: HCPCS

## 2020-01-02 PROCEDURE — 25010000002 ENOXAPARIN PER 10 MG: Performed by: THORACIC SURGERY (CARDIOTHORACIC VASCULAR SURGERY)

## 2020-01-02 PROCEDURE — 97537 COMMUNITY/WORK REINTEGRATION: CPT

## 2020-01-02 PROCEDURE — 80069 RENAL FUNCTION PANEL: CPT | Performed by: INTERNAL MEDICINE

## 2020-01-02 PROCEDURE — 86900 BLOOD TYPING SEROLOGIC ABO: CPT

## 2020-01-02 PROCEDURE — 36430 TRANSFUSION BLD/BLD COMPNT: CPT

## 2020-01-02 PROCEDURE — 83735 ASSAY OF MAGNESIUM: CPT | Performed by: NURSE PRACTITIONER

## 2020-01-02 PROCEDURE — 97116 GAIT TRAINING THERAPY: CPT

## 2020-01-02 RX ORDER — BUMETANIDE 1 MG/1
1 TABLET ORAL DAILY
Status: DISCONTINUED | OUTPATIENT
Start: 2020-01-02 | End: 2020-01-08

## 2020-01-02 RX ORDER — MIDODRINE HYDROCHLORIDE 5 MG/1
5 TABLET ORAL
Status: DISCONTINUED | OUTPATIENT
Start: 2020-01-02 | End: 2020-01-03

## 2020-01-02 RX ORDER — SODIUM PHOSPHATE, DIBASIC AND SODIUM PHOSPHATE, MONOBASIC 3.5; 9.5 G/66ML; G/66ML
1 ENEMA RECTAL ONCE
Status: COMPLETED | OUTPATIENT
Start: 2020-01-02 | End: 2020-01-02

## 2020-01-02 RX ORDER — BISACODYL 10 MG
10 SUPPOSITORY, RECTAL RECTAL DAILY
Status: DISCONTINUED | OUTPATIENT
Start: 2020-01-02 | End: 2020-01-08 | Stop reason: HOSPADM

## 2020-01-02 RX ORDER — ASPIRIN 81 MG/1
324 TABLET, CHEWABLE ORAL DAILY
Status: DISCONTINUED | OUTPATIENT
Start: 2020-01-03 | End: 2020-01-03 | Stop reason: CLARIF

## 2020-01-02 RX ADMIN — AMIODARONE HYDROCHLORIDE 200 MG: 200 TABLET ORAL at 18:05

## 2020-01-02 RX ADMIN — FOLIC ACID 1 MG: 1 TABLET ORAL at 09:49

## 2020-01-02 RX ADMIN — MIDODRINE HYDROCHLORIDE 5 MG: 5 TABLET ORAL at 09:49

## 2020-01-02 RX ADMIN — SENNOSIDES 1 TABLET: 8.6 TABLET, FILM COATED ORAL at 21:15

## 2020-01-02 RX ADMIN — MONTELUKAST SODIUM 10 MG: 10 TABLET, FILM COATED ORAL at 21:15

## 2020-01-02 RX ADMIN — OXYCODONE HYDROCHLORIDE 5 MG: 5 TABLET ORAL at 09:55

## 2020-01-02 RX ADMIN — DOCUSATE SODIUM 100 MG: 100 CAPSULE, LIQUID FILLED ORAL at 21:15

## 2020-01-02 RX ADMIN — AMIODARONE HYDROCHLORIDE 200 MG: 200 TABLET ORAL at 09:55

## 2020-01-02 RX ADMIN — ONDANSETRON 4 MG: 2 INJECTION INTRAMUSCULAR; INTRAVENOUS at 10:11

## 2020-01-02 RX ADMIN — ENOXAPARIN SODIUM 40 MG: 40 INJECTION SUBCUTANEOUS at 21:15

## 2020-01-02 RX ADMIN — OXYCODONE HYDROCHLORIDE 5 MG: 5 TABLET ORAL at 14:14

## 2020-01-02 RX ADMIN — BISACODYL 10 MG: 10 SUPPOSITORY RECTAL at 10:00

## 2020-01-02 RX ADMIN — SENNOSIDES 1 TABLET: 8.6 TABLET, FILM COATED ORAL at 09:49

## 2020-01-02 RX ADMIN — ALLOPURINOL 300 MG: 300 TABLET ORAL at 09:49

## 2020-01-02 RX ADMIN — OXYCODONE HYDROCHLORIDE 5 MG: 5 TABLET ORAL at 01:03

## 2020-01-02 RX ADMIN — DOCUSATE SODIUM 100 MG: 100 CAPSULE, LIQUID FILLED ORAL at 09:49

## 2020-01-02 RX ADMIN — IPRATROPIUM BROMIDE AND ALBUTEROL SULFATE 3 ML: .5; 3 SOLUTION RESPIRATORY (INHALATION) at 07:37

## 2020-01-02 RX ADMIN — LIDOCAINE 2 PATCH: 50 PATCH CUTANEOUS at 09:50

## 2020-01-02 RX ADMIN — LACTULOSE 30 G: 10 SOLUTION ORAL at 09:50

## 2020-01-02 RX ADMIN — PANTOPRAZOLE SODIUM 40 MG: 40 TABLET, DELAYED RELEASE ORAL at 05:56

## 2020-01-02 RX ADMIN — ATORVASTATIN CALCIUM 40 MG: 40 TABLET, FILM COATED ORAL at 21:15

## 2020-01-02 RX ADMIN — BUMETANIDE 1 MG: 1 TABLET ORAL at 16:57

## 2020-01-02 RX ADMIN — LEVOTHYROXINE SODIUM 200 MCG: 200 TABLET ORAL at 05:56

## 2020-01-02 RX ADMIN — ONDANSETRON 4 MG: 2 INJECTION INTRAMUSCULAR; INTRAVENOUS at 21:25

## 2020-01-02 RX ADMIN — OXYCODONE HYDROCHLORIDE 5 MG: 5 TABLET ORAL at 06:08

## 2020-01-02 RX ADMIN — GUAIFENESIN 600 MG: 600 TABLET, EXTENDED RELEASE ORAL at 09:49

## 2020-01-02 RX ADMIN — POLYETHYLENE GLYCOL 3350 17 G: 17 POWDER, FOR SOLUTION ORAL at 09:49

## 2020-01-02 RX ADMIN — SODIUM PHOSPHATE, DIBASIC AND SODIUM PHOSPHATE, MONOBASIC 1 ENEMA: 3.5; 9.5 ENEMA RECTAL at 14:56

## 2020-01-02 RX ADMIN — ACETAMINOPHEN 500 MG: 500 TABLET, FILM COATED ORAL at 17:01

## 2020-01-02 RX ADMIN — IPRATROPIUM BROMIDE AND ALBUTEROL SULFATE 3 ML: .5; 3 SOLUTION RESPIRATORY (INHALATION) at 20:24

## 2020-01-02 RX ADMIN — FERROUS SULFATE TAB EC 324 MG (65 MG FE EQUIVALENT) 324 MG: 324 (65 FE) TABLET DELAYED RESPONSE at 09:49

## 2020-01-02 RX ADMIN — IPRATROPIUM BROMIDE AND ALBUTEROL SULFATE 3 ML: .5; 3 SOLUTION RESPIRATORY (INHALATION) at 15:30

## 2020-01-02 RX ADMIN — GUAIFENESIN 600 MG: 600 TABLET, EXTENDED RELEASE ORAL at 21:15

## 2020-01-02 RX ADMIN — OXYCODONE HYDROCHLORIDE 5 MG: 5 TABLET ORAL at 22:18

## 2020-01-02 NOTE — PROGRESS NOTES
Continued Stay Note   [place]     Patient Name: Rafael Mccann  MRN: [id number]  Today's Date: 1/2/2020    Admit Date: 12/22/2019    Discharge Plan     Row Name 01/02/20 1456       Plan    Plan   [facility] vs rehab, list left for patient to review with family    Plan Comments  transfused today. spoke to patient and , patient states sister may be able to stay with patient as  travels for work.            Allison Gamboa, RN   [phone]

## 2020-01-02 NOTE — PROGRESS NOTES
Pulmonary/ Critical Care progress Note        Patient Name:  Rafael Mccann    :  1975    Medical Record:  [id number]    Requesting Physician    Phani Adames, *    Primary Care Physician     Phani Adames MD    Reason for consultation    Rafael Mccann is a 44 y.o. female who we were asked to see in consultation for decreased level of consciousness.   Patient is POD#3 CABG and AVR after presenting to the ER on 19 with complaints of chest pain, dyspnea and tachycardia.  Patient with a history of CAD being medically managed as well as cardiomyopathy.   Cardiac cath on 19 showed severe two vessel disease and 4+aortic regurg.       This evening patient was found to be very difficult to arouse in bed after being up to chair this morning and ambulating in the afternoon.   Patient was given Narcan with improvement in her mental status.  ABG was obtained as well which showed pCO2 of 49 and a pAO2 of 68.   Patient had been requiring Dilaudid 0.25 mg q2h IV and Oxycodone 5 mg q4h, for pain control in addition had Ultram and Benadryl earlier today.      Review of Systems    :  OOB to chair, awake and alert.  Complaints of right lower rib pain.    20:  OOB to chair,  Remains with complaints of pain  20:  Sitting on side of bed, remains with some pain to right side.      Home medicationS  Prior to Admission medications    Medication Sig Start Date End Date Taking? Authorizing Provider   allopurinol (ZYLOPRIM) 300 MG tablet Take 300 mg by mouth Daily.   Yes ProviderDheeraj MD   ALPRAZolam (XANAX) 1 MG tablet Take 1 mg by mouth 4 (Four) Times a Day As Needed for Anxiety.   Yes Dheeraj Alvarez MD   amLODIPine (NORVASC) 5 MG tablet Take 5 mg by mouth 2 (Two) Times a Day.   Yes Dheeraj Alvarez MD   aspirin 81 MG chewable tablet Chew 81 mg Daily.   Yes Dheeraj Alvarez MD   atorvastatin (LIPITOR) 40 MG tablet Take 1 tablet by mouth Every Night.  6/30/19  Yes Hannah Wills MD   carvedilol (COREG) 12.5 MG tablet Take 1 tablet by mouth 2 (Two) Times a Day With Meals. 6/30/19  Yes Hannah Wills MD   cholecalciferol (VITAMIN D3) 1000 units tablet Take 1,000 Units by mouth Daily.   Yes Dheeraj Alvarez MD   clindamycin (CLEOCIN) 300 MG capsule Take 300 mg by mouth 2 (Two) Times a Day. 12/18/19 1/1/20 Yes Dheeraj Alvarez MD   famotidine (PEPCID) 20 MG tablet Take 1 tablet by mouth 2 (Two) Times a Day. 9/17/19  Yes Dina Jack PA   ferrous sulfate 325 (65 FE) MG tablet Take 65 mg by mouth Daily With Breakfast.   Yes Dheeraj Alvarez MD   fluconazole (DIFLUCAN) 200 MG tablet Take 200 mg by mouth Every Other Day.   Yes Dheeraj Alvarez MD   folic acid (FOLVITE) 1 MG tablet Take 1 mg by mouth Daily.   Yes Dheeraj Alvarez MD   furosemide (LASIX) 40 MG tablet Take 40 mg by mouth 2 (Two) Times a Day.   Yes ProviderDheeraj MD   gabapentin (NEURONTIN) 300 MG capsule Take 300 mg by mouth 3 (Three) Times a Day.   Yes Dheeraj Alvarez MD   hydrALAZINE (APRESOLINE) 100 MG tablet Take 1 tablet by mouth 3 (Three) Times a Day. 12/12/19  Yes Wayne Luna MD   levothyroxine (SYNTHROID, LEVOTHROID) 200 MCG tablet Take 200 mcg by mouth Daily.   Yes Dheeraj Alvarez MD   lisinopril (PRINIVIL,ZESTRIL) 20 MG tablet Take 20 mg by mouth Daily.   Yes ProviderDheeraj MD   metFORMIN (GLUCOPHAGE) 500 MG tablet Take 500 mg by mouth Daily With Breakfast.   Yes Dheeraj Alvarez MD   montelukast (SINGULAIR) 10 MG tablet Take 10 mg by mouth Every Night.   Yes Dheeraj Alvarez MD   oxyCODONE-acetaminophen (PERCOCET) 7.5-325 MG per tablet Take 1 tablet by mouth Every 6 (Six) Hours As Needed.   Yes Dheeraj Alvarez MD   pantoprazole (PROTONIX) 40 MG EC tablet Take 40 mg by mouth Daily.   Yes Dheeraj Alvarez MD   spironolactone (ALDACTONE) 25 MG tablet Take 1 tablet by mouth Daily. 11/19/19  Yes Wayne Luna  MD KAY       Medical History    Past Medical History:   Diagnosis Date   • CHF (congestive heart failure) (CMS/[facility])    • COPD (chronic obstructive pulmonary disease) (CMS/[facility])    • Diabetes (CMS/[facility])    • Hyperlipidemia    • Hypertension     INDRA on PAP therapy     Surgical History    Past Surgical History:   Procedure Laterality Date   • BREAST LUMPECTOMY     • CARDIAC CATHETERIZATION N/A 2019    Procedure: Right and Left Heart Cath 19 @ 0900;  Surgeon: Wayne Luna MD;  Location: [facility] CATH INVASIVE LOCATION;  Service: Cardiovascular   • CARDIAC CATHETERIZATION N/A 2019    Procedure: Coronary angiography;  Surgeon: Wayne Luna MD;  Location: [facility] CATH INVASIVE LOCATION;  Service: Cardiovascular   • CARDIAC ELECTROPHYSIOLOGY PROCEDURE Left 2019    Procedure: Dual-chamber ICD insertion;  Surgeon: Héctor Eckert MD;  Location: [facility] CATH INVASIVE LOCATION;  Service: Cardiovascular   • CARDIAC ELECTROPHYSIOLOGY PROCEDURE Left 2019    Procedure: Lead Revision;  Surgeon: Héctor Eckert MD;  Location: [facility] CATH INVASIVE LOCATION;  Service: Cardiovascular   • CHOLECYSTECTOMY     • HYSTERECTOMY     • INSERT / REPLACE / REMOVE PACEMAKER     • THYROID SURGERY          Family History    Family History   Problem Relation Age of Onset   • Heart disease Father        Social History    Social History     Tobacco Use   • Smoking status: Former Smoker     Last attempt to quit: 2019     Years since quittin.0   • Smokeless tobacco: Never Used   Substance Use Topics   • Alcohol use: No     Frequency: Never        Allergies    Allergies   Allergen Reactions   • Hydrocodone Hives   • Penicillin G Unknown (See Comments)       Medications    Scheduled Meds:    allopurinol 300 mg Oral Daily   amiodarone 200 mg Oral BID With Meals   [START ON 1/3/2020] aspirin 324 mg Oral Daily   atorvastatin 40 mg Oral Nightly   bisacodyl 10 mg Rectal Daily   docusate sodium 100 mg  Oral BID   enoxaparin 40 mg Subcutaneous Daily   ferrous sulfate 324 mg Oral Daily With Breakfast   fluconazole 200 mg Oral Q48H   folic acid 1 mg Oral Daily   guaiFENesin 600 mg Oral Q12H   insulin lispro 0-9 Units Subcutaneous 4x Daily With Meals & Nightly   ipratropium-albuterol 3 mL Nebulization 4x Daily - RT   lactulose 30 g Oral Daily   levothyroxine 200 mcg Oral Q AM   lidocaine 2 patch Transdermal Q24H   midodrine 5 mg Oral TID AC   montelukast 10 mg Oral Nightly   pantoprazole 40 mg Oral Q AM   polyethylene glycol 17 g Oral BID   senna 1 tablet Oral BID     Continuous Infusions:    sodium chloride 30 mL/hr Last Rate: 30 mL/hr (19 1430)     PRN Meds:.•  acetaminophen  •  ALPRAZolam  •  dextrose  •  dextrose  •  glucagon (human recombinant)  •  insulin lispro **AND** insulin lispro  •  ipratropium-albuterol  •  MOUTH KOTE  •  naloxone  •  ondansetron  •  oxyCODONE  •  potassium chloride **OR** potassium chloride  •  potassium chloride **OR** potassium chloride      Physical Exam    tMax 24 hrs:  Temp (24hrs), Av.9 °F (36.6 °C), Min:97.4 °F (36.3 °C), Max:98.4 °F (36.9 °C)    Vitals Ranges:  Temp:  [97.4 °F (36.3 °C)-98.4 °F (36.9 °C)] 98 °F (36.7 °C)  Heart Rate:  [80-93] 89  Resp:  [16] 16  BP: ()/(55-87) 115/77  Intake and Output Last 3 Shifts:  I/O last 3 completed shifts:  In: 2240 [P.O.:1440; I.V.:800]  Out: 3250 [Urine:3250]    Constitutional:  Alert, no acute respiratory distress   HEENT:  Atraumatic, PERRL, conjunctiva normal, moist oral mucosa, no nasal discharge.  Trachea is midline.  Respiratory:  No respiratory distress, normal breath sounds, no rales, no wheezing   Cardiovascular:  Normal rate, normal rhythm and no murmurs.  Pulses 2+ and equal in all four extremities.    GI:  Soft, nondistended, positive bowel sounds.  :  No costovertebral angle tenderness   Extremities: No edema, cyanosis or tenderness.  Integument:  No rashes.   Neurologic:  Alert & oriented x 3, CN 2-12  normal, normal motor function, normal sensory function, no focal deficits noted   Psychiatric:  Speech and behavior appropriate     labs    CBC  Results from last 7 days   Lab Units 01/02/20  0558 01/01/20  0634 12/31/19  0631 12/30/19  1849 12/30/19  0338 12/29/19  0352 12/28/19  0336 12/27/19  1423   WBC 10*3/mm3 5.50 6.40 5.10  --  8.00 9.90 9.90  --  11.70*   RBC 10*6/mm3 2.56* 2.73* 2.68*  --  2.71* 2.98* 3.07*  --  3.59*   HEMOGLOBIN g/dL 8.2* 8.7* 8.5*  --  8.6* 9.7* 10.0*  --  11.6*   HEMOGLOBIN, POC g/dL  --   --   --  8.1*  --   --   --    < >  --    HEMATOCRIT % 24.0* 25.5* 25.3*  --  25.3* 27.8* 28.7*  --  33.7*   HEMATOCRIT POC %  --   --   --  24*  --   --   --    < >  --    MCV fL 93.5 93.2 94.4  --  93.1 93.1 93.5  --  93.9   PLATELETS 10*3/mm3 211 207 170  --  134* 106* 104*  --  110*    < > = values in this interval not displayed.       BMP  Results from last 7 days   Lab Units 01/02/20  0558 01/01/20  0634 12/31/19  0631 12/30/19  0338 12/29/19  0352 12/28/19  0336 12/27/19  1423   SODIUM mmol/L 138 139 137 136 134* 138 140   POTASSIUM mmol/L 3.6 3.7 4.0 4.4 4.1 4.0 4.4   CHLORIDE mmol/L 96* 97* 96* 99 96* 103 108*   CO2 mmol/L 31.0* 31.0* 30.0* 27.0 26.0 24.0 24.0   BUN mg/dL 11 11 12 13 12 11 11   CREATININE mg/dL 1.00 1.04* 1.18* 1.25* 1.52* 0.97 0.88   GLUCOSE mg/dL 119* 106* 122* 157* 134* 146* 181*   MAGNESIUM mg/dL 2.2 2.0 2.1 2.2 2.0 2.4  --    PHOSPHORUS mg/dL 4.0 3.5 3.1 3.0 4.6* 3.9 3.0       CMP   Results from last 7 days   Lab Units 01/02/20  0558 01/01/20  0634 12/31/19  0631 12/30/19  0338 12/29/19  0352 12/28/19  0336 12/27/19  1423   SODIUM mmol/L 138 139 137 136 134* 138 140   POTASSIUM mmol/L 3.6 3.7 4.0 4.4 4.1 4.0 4.4   CHLORIDE mmol/L 96* 97* 96* 99 96* 103 108*   CO2 mmol/L 31.0* 31.0* 30.0* 27.0 26.0 24.0 24.0   BUN mg/dL 11 11 12 13 12 11 11   CREATININE mg/dL 1.00 1.04* 1.18* 1.25* 1.52* 0.97 0.88   GLUCOSE mg/dL 119* 106* 122* 157* 134* 146* 181*   ALBUMIN g/dL 3.70  3.90 3.90 4.10 3.80 4.10 3.70   BILIRUBIN mg/dL  --   --   --   --  0.8  --   --    ALK PHOS U/L  --   --   --   --  54  --   --    AST (SGOT) U/L  --   --   --   --  58*  --   --    ALT (SGPT) U/L  --   --   --   --  25  --   --          TROPONIN        CoAg  Results from last 7 days   Lab Units 12/27/19  1423 12/26/19  1043   INR  1.20* 0.91   APTT seconds 30.9 27.9       Creatinine Clearance  Estimated Creatinine Clearance: 83.2 mL/min (by C-G formula based on SCr of 1 mg/dL).    ABG  Results from last 7 days   Lab Units 12/31/19  0043 12/30/19  1849 12/29/19  0816 12/28/19  0815 12/27/19  2209 12/27/19  2113 12/27/19 2016   PH, ARTERIAL pH units 7.376 7.396 7.393 7.382 7.356 7.425 7.404   PCO2, ARTERIAL mm Hg 51.7* 49.4* 47.5 43.7 45.9 39.6 41.7   PO2 ART mm Hg 53.2* 68.1* 41.6* 68.1* 74.0* 89.0 82.8*   O2 SATURATION ART % 85.7* 92.9* 75.9* 92.9* 93.9* 97.1 96.1   BASE EXCESS ART mmol/L 4.4* 4.9* 3.4* 0.7 0.0 1.5 1.2       Imaging & Other Studies    Imaging Results (Last 72 Hours)     Procedure Component Value Units Date/Time    XR Chest 1 View [[id number]] Collected:  01/01/20 1100     Updated:  01/01/20 1103    Narrative:       DATE OF EXAM:  1/1/2020 10:53 AM     PROCEDURE:  XR CHEST 1 VW-     INDICATIONS:  right sided sharp pain below ribs; R07.9-Chest pain, unspecified;  I35.1-Nonrheumatic aortic (valve) insufficiency; I42.0-Dilated  cardiomyopathy; Z95.810-Presence of automatic (implantable) cardiac  defibrillator; I20.0-Unstable angina; I25.119-Atherosclerotic heart  disease of native coronary artery with unspecified angina pectoris;  I35.1-Nonrheumatic aortic (valve) insufficiency; J43.9-Emphysema,      COMPARISON:  12/31/2019     TECHNIQUE:   Single radiographic AP view of the chest was obtained.     FINDINGS:  There is a right internal jugular [place]-Brad sheath in place with the tip  in the superior vena cava. There is a left subclavian pacemaker  device/AICD device with leads overlying the right atrium  and right  ventricle. The heart is enlarged with changes of CABG. There are  bilateral pleural effusions left greater than right with bilateral  basilar airspace disease. Aeration is slightly worse in the interval.        Impression:       Mild worsening of bilateral basilar infiltrates with moderate left and  small-to-moderate right pleural effusions.     Electronically Signed By-Chidi Walton On:1/1/2020 11:01 AM  This report was finalized on [id number] by  Chidi Walton, .    XR Chest 1 View [[id number]] Collected:  12/31/19 1152     Updated:  12/31/19 1155    Narrative:       DATE OF EXAM:  12/31/2019 10:48 AM     PROCEDURE:  XR CHEST 1 VW-     INDICATIONS:  Hypoxia; R07.9-Chest pain, unspecified; I35.1-Nonrheumatic aortic  (valve) insufficiency; I42.0-Dilated cardiomyopathy; Z95.810-Presence of  automatic (implantable) cardiac defibrillator; I20.0-Unstable angina;  I25.119-Atherosclerotic heart disease of native coronary artery with  unspecified angina pectoris; I35.1-Nonrheumatic aortic (valve)  insufficiency; J43.9-Emphysema, unspecified patient's a 44-year-old  female with hypoxia history of open heart surgery on December 26.  History of aortic regurg, former smoker     COMPARISON:  Comparison is made to study of 12/29/2019     TECHNIQUE:   Single radiographic AP view of the chest was obtained.     FINDINGS:  Today's portable erect study was obtained on 12/31/2019 at 10:50 AM.     Today's study demonstrates the left basilar opacity remaining stable  there is increasing right basilar opacity consistent with increasing  atelectasis and probable small pleural effusion. The right internal  jugular vascular sheath remains in good position the left-sided  dual-lead pacemaker defibrillator remains in good position changes of  median sternotomy coronary artery bypass grafting and aortic valvular  replacement are again seen.        Impression:          1. Mild interval worsening from study of December 29  2.  Increasing right basilar opacities felt to represent increasing  atelectasis  3. Stable moderate left basilar opacity consistent with atelectasis,  pneumonia and/or pleural effusion     Electronically Signed By-Ashwin Beavers Jr. On:12/31/2019 11:53 AM  This report was finalized on [id number] by  Ashwin Beavers Jr., .            AssessmenT/PLAN  Decreased level of consciousness related to opioids  INDRA compliant with BiPAP use  POD CABG and AVR - aortic regurgitation and CAD  HFrEF - EF35%  AICD  CKD stage II  T2DM  Hypothyroidism on Synthroid  Anxiety on Xanax  Chronic pain follows outpatient pain management clinict  COPD without exacerbation  Cardiomyopathy    Plan:   -Chest x-ray today with worse pulmonary vascular congestion and atelectasis.    -Renal following  -aggressive pulmonary toilet with incentive spirometry, flutter valve and nebs.  -supplemental oxygen, maintain sats 92%  -on Fluconazole  -ABG reviewed  -cont to use home PAP with sleep   -limit sedating medication   -D/C gabapentin  -Lidocaine patch  -duonebs and Mucinex   -may benefit from some diuretics    Pulmonary status is stable.  OT recommends - rehab    Attending physician statement:  Above note scribed by nurse practitioner for me and later reviewed for accuracy. I've examined the patient and reviewed all labs and images.   I have directly participated in the evaluation and management of this patient.  Hasmukh welch MD

## 2020-01-02 NOTE — THERAPY TREATMENT NOTE
Patient Name: Rafael Mccann  : 1975    MRN: [id number]                              Today's Date: 2020       Admit Date: 2019    Visit Dx:     ICD-10-CM ICD-9-CM   1. Chest pain, unspecified type R07.9 786.50   2. Aortic valve insufficiency, etiology of cardiac valve disease unspecified I35.1 424.1   3. Cardiomyopathy, dilated (CMS/[facility]) I42.0 425.4   4. ICD (implantable cardioverter-defibrillator), dual, in situ Z95.810 V45.02   5. Unstable angina pectoris (CMS/[facility]) I20.0 411.1   6. Coronary artery disease involving native coronary artery of native heart with angina pectoris (CMS/[facility]) I25.119 414.01     413.9   7. Nonrheumatic aortic valve insufficiency I35.1 424.1   8. Pulmonary emphysema, unspecified emphysema type (CMS/[facility]) J43.9 492.8     Patient Active Problem List   Diagnosis   • COPD (chronic obstructive pulmonary disease) (CMS/[facility])   • Hypertension   • Cardiomyopathy, dilated (CMS/[facility])   • Essential hypertension   • Chronic renal impairment   • ICD (implantable cardioverter-defibrillator), dual, in situ   • Chest pain   • GERD without esophagitis   • Hypothyroidism (acquired)   • Aortic valve regurgitation   • Unstable angina pectoris (CMS/[facility])   • Coronary artery disease involving native coronary artery of native heart with angina pectoris (CMS/[facility])   • Nonrheumatic aortic valve insufficiency     Past Medical History:   Diagnosis Date   • CHF (congestive heart failure) (CMS/[facility])    • COPD (chronic obstructive pulmonary disease) (CMS/[facility])    • Diabetes (CMS/[facility])    • Hyperlipidemia    • Hypertension      Past Surgical History:   Procedure Laterality Date   • AORTIC VALVE REPAIR/REPLACEMENT N/A 2019    Procedure: AORTIC VALVE REPAIR/REPLACEMENT;  Surgeon: Lane Stock MD;  Location: [facility];  Service: Cardiothoracic   • BREAST LUMPECTOMY     • CARDIAC CATHETERIZATION N/A 2019    Procedure: Right and Left Heart Cath 19 @ 0900;  Surgeon: Wayne Luna  MD KAY;  Location: [facility] CATH INVASIVE LOCATION;  Service: Cardiovascular   • CARDIAC CATHETERIZATION N/A 12/24/2019    Procedure: Coronary angiography;  Surgeon: Wayne Luna MD;  Location: [facility] CATH INVASIVE LOCATION;  Service: Cardiovascular   • CARDIAC ELECTROPHYSIOLOGY PROCEDURE Left 6/28/2019    Procedure: Dual-chamber ICD insertion;  Surgeon: Héctor Eckert MD;  Location: [facility] CATH INVASIVE LOCATION;  Service: Cardiovascular   • CARDIAC ELECTROPHYSIOLOGY PROCEDURE Left 8/14/2019    Procedure: Lead Revision;  Surgeon: Héctor Eckert MD;  Location: [facility] CATH INVASIVE LOCATION;  Service: Cardiovascular   • CHOLECYSTECTOMY     • CORONARY ARTERY BYPASS GRAFT N/A 12/27/2019    Procedure: CORONARY ARTERY BYPASS GRAFTING;  Surgeon: Lane Stock MD;  Location: [facility] CVOR;  Service: Cardiothoracic   • HYSTERECTOMY     • INSERT / REPLACE / REMOVE PACEMAKER     • THYROID SURGERY       General Information     Row Name 01/02/20 1215          PT Evaluation Time/Intention    Document Type  therapy note (daily note)  -CR     Mode of Treatment  physical therapy  -CR       User Key  (r) = Recorded By, (t) = Taken By, (c) = Cosigned By    Initials Name Provider Type    CR Reyes, Carmela, PT Physical Therapist        Mobility     Row Name 01/02/20 1215          Bed Mobility Assessment/Treatment    Comment (Bed Mobility)  sitting EOB  -CR     Row Name 01/02/20 1215          Sit-Stand Transfer    Sit-Stand Imperial (Transfers)  supervision  -CR     Assistive Device (Sit-Stand Transfers)  walker, front-wheeled  -CR     Row Name 01/02/20 1232 01/02/20 1215       Gait/Stairs Assessment/Training    Gait/Stairs Assessment/Training  --  gait/ambulation independence;gait/ambulation assistive device  -CR    Imperial Level (Gait)  --  contact guard  -CR    Assistive Device (Gait)  --  walker, front-wheeled  -CR    Distance in Feet (Gait)  --  120  -CR    Deviations/Abnormal Patterns (Gait)  --   gait speed decreased  -CR    Bilateral Gait Deviations  --  forward flexed posture  -CR    Comment (Gait/Stairs)  Cardiac level 3  -CR  --      User Key  (r) = Recorded By, (t) = Taken By, (c) = Cosigned By    Initials Name Provider Type    CR Reyes, Carmela, PT Physical Therapist        Obj/Interventions     Row Name 01/02/20 1217          Therapeutic Exercise    Lower Extremity (Therapeutic Exercise)  LAQ (long arc quad), bilateral  -CR     Lower Extremity Range of Motion (Therapeutic Exercise)  ankle dorsiflexion/plantar flexion, bilateral  -CR     Exercise Type (Therapeutic Exercise)  AROM (active range of motion)  -CR     Position (Therapeutic Exercise)  seated  -CR     Sets/Reps (Therapeutic Exercise)  1/10  -CR     Row Name 01/02/20 1217          Static Sitting Balance    Level of Larue (Unsupported Sitting, Static Balance)  independent  -CR     Sitting Position (Unsupported Sitting, Static Balance)  sitting on edge of bed  -CR     Time Able to Maintain Position (Unsupported Sitting, Static Balance)  more than 5 minutes  -CR     Row Name 01/02/20 1217          Dynamic Sitting Balance    Level of Lar[place], Reaches Outside Midline (Sitting, Dynamic Balance)  independent  -CR     Sitting Position, Reaches Outside Midline (Sitting, Dynamic Balance)  sitting on edge of bed  -CR     Row Name 01/02/20 1217          Static Standing Balance    Level of Lar[place] (Supported Standing, Static Balance)  supervision  -CR     Time Able to Maintain Position (Supported Standing, Static Balance)  45 to 60 seconds  -CR     Row Name 01/02/20 1217          Dynamic Standing Balance    Level of Lar[place], Reaches Outside Midline (Standing, Dynamic Balance)  supervision  -CR     Time Able to Maintain Position, Reaches Outside Midline (Standing, Dynamic Balance)  45 to 60 seconds  -CR       User Key  (r) = Recorded By, (t) = Taken By, (c) = Cosigned By    Initials Name Provider Type    CR Reyes, Carmela, PT Physical  Therapist        Goals/Plan     Row Name 01/02/20 1220          Transfer Goal 1 (PT)    Progress/Outcome (Transfer Goal 1, PT)  continuing progress toward goal  -CR     Row Name 01/02/20 1220          Gait Training Goal 1 (PT)    Progress/Outcome (Gait Training Goal 1, PT)  continuing progress toward goal  -CR     Row Name 01/02/20 1223          Stairs Goal 1 (PT)    Activity/Assistive Device (Stairs Goal 1, PT)  stairs, all skills  -CR     Breese Level/Cues Needed (Stairs Goal 1, PT)  supervision required  -CR     Number of Stairs (Stairs Goal 1, PT)  15  -CR     Time Frame (Stairs Goal 1, PT)  3 days  -CR     Row Name 01/02/20 1220          Patient Education Goal (PT)    Progress/Outcome (Patient Education Goal, PT)  continuing progress toward goal  -CR       User Key  (r) = Recorded By, (t) = Taken By, (c) = Cosigned By    Initials Name Provider Type    CR Reyes, Carmela, PT Physical Therapist        Clinical Impression     Row Name 01/02/20 1219          Pain Assessment    Additional Documentation  Pain Scale: Numbers Pre/Post-Treatment (Group)  -CR     Row Name 01/02/20 1219          Pain Scale: Numbers Pre/Post-Treatment    Pain Scale: Numbers, Pretreatment  3/10  -CR     Pain Scale: Numbers, Post-Treatment  3/10  -CR     Pain Location - Orientation  incisional  -CR     Row Name 01/02/20 1219          Plan of Care Review    Plan of Care Reviewed With  patient  -CR     Progress  improving  -CR     Row Name 01/02/20 1219          Physical Therapy Clinical Impression    Predicted Duration of Therapy (PT)  d/c  -CR     Row Name 01/02/20 1219          Vital Signs    Pre Systolic BP Rehab  125  -CR     Pre Treatment Diastolic BP  75  -CR     Pre SpO2 (%)  95  -CR     O2 Delivery Pre Treatment  room air  -CR     Row Name 01/02/20 1219          Positioning and Restraints    Pre-Treatment Position  in bed  -CR     Post Treatment Position  chair  -CR     In Chair  notified nsg;sitting;call light within reach;with  nsg  -CR       User Key  (r) = Recorded By, (t) = Taken By, (c) = Cosigned By    Initials Name Provider Type    CR Reyes, Carmela, PT Physical Therapist        Outcome Measures     Row Name 01/02/20 1223          How much help from another person do you currently need...    Turning from your back to your side while in flat bed without using bedrails?  3  -CR     Moving from lying on back to sitting on the side of a flat bed without bedrails?  3  -CR     Moving to and from a bed to a chair (including a wheelchair)?  3  -CR     Standing up from a chair using your arms (e.g., wheelchair, bedside chair)?  4  -CR     Climbing 3-5 steps with a railing?  3  -CR     To walk in hospital room?  3  -CR     AM-PAC 6 Clicks Score (PT)  19  -CR       User Key  (r) = Recorded By, (t) = Taken By, (c) = Cosigned By    Initials Name Provider Type    CR Reyes, Carmela, PT Physical Therapist          PT Recommendation and Plan     Plan of Care Reviewed With: patient  Progress: improving  Outcome Summary: Patient tolerated increased activity this session, able to ambulate 120 ft using rw; no report of chest pain or SOA during activity. Pt able to ambulate short distance without a.d. , gait slow but steady. Pt reports she has 1 flight of steps to get in home and will need to be assessed prior to d/c.     Time Calculation:   PT Charges     Row Name 01/02/20 1232             Time Calculation    Start Time  0950  -CR      Stop Time  1017  -CR      Time Calculation (min)  27 min  -CR      PT Received On  01/02/20  -CR      PT - Next Appointment  01/03/20  -CR         Time Calculation- PT    Total Timed Code Minutes- PT  27 minute(s)  -CR        User Key  (r) = Recorded By, (t) = Taken By, (c) = Cosigned By    Initials Name Provider Type    CR Reyes, Carmela, PT Physical Therapist        Therapy Charges for Today     Code Description Service Date Service Provider Modifiers Qty    68776870090 HC GAIT TRAINING EA 15 MIN 1/2/2020 Reyes, Carmela,  PT GP 1    25427144759 HC PT THER PROC EA 15 MIN 1/2/2020 Reyes, Carmela, PT GP 1    98577783944 HC PT THERAPEUTIC ACT EA 15 MIN 1/2/2020 Reyes, Carmela, PT GP 1          PT G-Codes  Outcome Measure Options: AM-PAC 6 Clicks Basic Mobility (PT)  AM-PAC 6 Clicks Score (PT): 19    Carmela Reyes, PT  1/2/2020

## 2020-01-02 NOTE — PLAN OF CARE
Problem: Patient Care Overview  Goal: Plan of Care Review  Outcome: Ongoing (interventions implemented as appropriate)  Flowsheets  Taken 1/2/2020 0318  Progress: no change  Outcome Summary: Patient has been awake majority of the night. Requiring 2L oxygen to keep sats greater than 90%. Still complains of right rib/flank pain.  Taken 1/2/2020 0087  Plan of Care Reviewed With: patient

## 2020-01-02 NOTE — PROGRESS NOTES
Postop Day #6-- CABGX3/AVR-- [place]  EF 35% (echo)    Subjective:  Patient up to chair. Continues to complain of right lower rib pain. Patient has not had bowel movement since surgery and believes it may be contributing. Blood pressure remains borderline low    Drips: None    Intake/Output Summary (Last 24 hours) at 1/2/2020 1050  Last data filed at 1/2/2020 0600  Gross per 24 hour   Intake 1340 ml   Output 1550 ml   Net -210 ml     Temp:  [97.4 °F (36.3 °C)-98.4 °F (36.9 °C)] 97.7 °F (36.5 °C)  Heart Rate:  [80-89] 89  Resp:  [16] 16  BP: (101-127)/(55-77) 127/75    Results from last 7 days   Lab Units 01/02/20  0558 01/01/20  0634  12/27/19  1423   WBC 10*3/mm3 5.50 6.40   < > 11.70*   HEMOGLOBIN g/dL 8.2* 8.7*   < > 11.6*   HEMOGLOBIN, POC   --   --    < >  --    HEMATOCRIT % 24.0* 25.5*   < > 33.7*   HEMATOCRIT POC   --   --    < >  --    PLATELETS 10*3/mm3 211 207   < > 110*   INR   --   --   --  1.20*    < > = values in this interval not displayed.     Results from last 7 days   Lab Units 01/02/20  0558   CREATININE mg/dL 1.00   POTASSIUM mmol/L 3.6   SODIUM mmol/L 138   MAGNESIUM mg/dL 2.2   PHOSPHORUS mg/dL 4.0       Physical Exam:  Neuro intact, nad, resting in bed  Tele:  SR 90's  Diminished bases, 95% 4L NC  Sternal incision healing well  Benign abd, No BM  No edema    Assessment/Plan:  Principal Problem:    Chest pain  Active Problems:    COPD (chronic obstructive pulmonary disease) (CMS/HCC)    Hypertension    Cardiomyopathy, dilated (CMS/HCC)    Essential hypertension    Chronic renal impairment    ICD (implantable cardioverter-defibrillator), dual, in situ    GERD without esophagitis    Hypothyroidism (acquired)    Aortic valve regurgitation    Unstable angina pectoris (CMS/HCC)    Coronary artery disease involving native coronary artery of native heart with angina pectoris (CMS/HCC)    Nonrheumatic aortic valve insufficiency    Aortic regurgitation s/p AVR (tissue)-- aspirin  CAD s/p CABG -- aspirin,  statin, no BB d/t hypotension  Postop hypotension-- stable  Postop expected ELLEN-- 1 PRBC today  HFrEF (EF35%)-- maximize meds prior to discharge  AICD placement-- Biotronik  CKD stage 2-- nephrology following  HTN-- stable  DM II, last A1c 6.2-- SSI  Hypothyroidism-- Synthroid  Anxiety-- Xanax  Chronic pain-- sees [facility]    Routine care  Mobilize  1 PRBC today  Suppository now-- may need enema if suppository doesn't produce BM    TORRES LEE, APRN  1/2/2020  10:50 AM     Transfuse today.  Mobilize.

## 2020-01-02 NOTE — PROGRESS NOTES
Nutrition Services    Patient Name:  Rafael Mccann  YOB: 1975  MRN: [id number]  Admit Date:  12/22/2019    Chart review r/t LOS. Wt trending up this admit, BMI 32.9. Average intakes on 76% x25 meals documented. Ordering full meals (entrees + sides) on Healthy [employer] diet. No PI. +BM 1/1. No nutrition intervention at this time. See at next LOS unless otherwise indicated.    Electronically signed by:  Antonia Gabriel RD  01/02/20 9:51 AM

## 2020-01-02 NOTE — PROGRESS NOTES
Cardiology Office Visit      Encounter Date:  12/12/2019    Patient ID:   Rafael Mccann is a 44 y.o. female.        Reason For Followup:  Cardiomyopathy  Hypertension  Hyperlipidemia  Valvular heart disease  Coronary artery disease         Interval History:  44-year-old -American female who presented to the hospital initially with report of chest discomfort.  Patient has known history of valvular heart disease.  Patient received 1 unit PRBC today  Rhythm sinus      Subjective:  Patient is reports some chest discomfort- 5/10.  Patient has Percocet ordered for her chronic pain.  She denies any shortness of breath.  Patient nervous about upcoming surgery.    Assessment & Plan    Impressions:     Congestive heart failure  Acute systolic heart failure exacerbation on chronic systolic heart failure  CHF NYHA class IV  Valvular heart disease with a significant aortic insufficiency  Essential hypertension  Chronic systolic heart failure  History of cardiomyopathy   Chronic renal insufficiency  Coronary artery disease with diffuse RCA disease  History of diabetes mellitus type 2  History of thyroid disease  Cardiomyopathy with LV ejection fraction of 35%  s/p AICD placement/normal device function     Plan:  Normal sinus rhythm  Continue close monitoring  Continue postsurgical care   Making good progress  Once her blood pressure is improved start low-dose beta-blockers  Ambulate  Schedule for a limited echo to assess LV systolic function and rule out pericardial effusion for persistent hypotension  Further recommendations based on patient hospital course    Cardiology attending:  As above.  Agree with assessment plan.  Seen and examined.  Chart and labs reviewed.  Patient reports fatigue.  Heart regular.  Continue with routine postsurgical care.  Increase activity as tolerated.        Objective:    Vitals:  Vitals:    01/02/20 0800 01/02/20 1030 01/02/20 1100 01/02/20 1115   BP:  127/75 128/71 118/74   Pulse:    84 80   Resp:  16 16 16   Temp: 98 °F (36.7 °C) 97.7 °F (36.5 °C) 97.5 °F (36.4 °C) 97.5 °F (36.4 °C)   TempSrc: Oral Oral Oral Oral   SpO2:  100% 99% 97%   Weight:       Height:           Physical Exam:    General: Well-developed, well-nourished 44-year-old -American female   Extubated sitting up in chair  Still chest tube in place  Head:  Normocephalic, atraumatic, mucous membranes moist  Eyes:     Neck:  Supple,  no adenopathy, IJ right;      Lungs: Clear to auscultation bilaterally, few crackles at the bilateral bases  chest wall: Mid line scar with no infection or bleeding  Heart::  Regular rate and rhythm, S1 and S2 normal, 2 x 6 ejection systolic murmur  Abdomen: Soft, nondistended  Extremities: No cyanosis, clubbing, or edema  Pulses: 2+ and symmetric all extremities  Skin:  No rashes or lesions  Neuro/psych:     Allergies:  Allergies   Allergen Reactions   • Hydrocodone Hives   • Penicillin G Unknown (See Comments)       Medication Review:     Current Facility-Administered Medications:   •  acetaminophen (TYLENOL) tablet 500 mg, 500 mg, Oral, Q6H PRN, Lane Stock MD, 500 mg at 01/01/20 2336  •  allopurinol (ZYLOPRIM) tablet 300 mg, 300 mg, Oral, Daily, Chidi Pace MD, 300 mg at 01/02/20 0949  •  ALPRAZolam (XANAX) tablet 0.25 mg, 0.25 mg, Oral, BID PRN, Chidi Pace MD, 0.25 mg at 12/31/19 0925  •  amiodarone (PACERONE) tablet 200 mg, 200 mg, Oral, BID With Meals, Lane Stock MD, 200 mg at 01/02/20 0955  •  [START ON 1/3/2020] aspirin chewable tablet 324 mg, 324 mg, Oral, Daily, Lane Stock MD  •  atorvastatin (LIPITOR) tablet 40 mg, 40 mg, Oral, Nightly, Chidi Pace MD, 40 mg at 01/01/20 2124  •  bisacodyl (DULCOLAX) suppository 10 mg, 10 mg, Rectal, Daily, Kayla Burrell, ALEX  •  dextrose (D50W) 25 g/ 50mL Intravenous Solution 25 g, 25 g, Intravenous, Q15 Min PRN, Kayla Burrell, ALEX  •  dextrose (GLUTOSE) oral gel 15 g, 15 g, Oral, Q15 Min PRN,  Kayla Burrell APRN  •  docusate sodium (COLACE) capsule 100 mg, 100 mg, Oral, BID, Chidi Pace MD, 100 mg at 01/02/20 0949  •  enoxaparin (LOVENOX) syringe 40 mg, 40 mg, Subcutaneous, Daily, Chidi Pace MD, 40 mg at 01/01/20 2124  •  ferrous sulfate EC tablet 324 mg, 324 mg, Oral, Daily With Breakfast, Chidi Pace MD, 324 mg at 01/02/20 0949  •  fluconazole (DIFLUCAN) tablet 200 mg, 200 mg, Oral, Q48H, Chidi Pace MD, 200 mg at 01/01/20 1835  •  folic acid (FOLVITE) tablet 1 mg, 1 mg, Oral, Daily, Chidi Pace MD, 1 mg at 01/02/20 0949  •  glucagon (human recombinant) (GLUCAGEN DIAGNOSTIC) injection 1 mg, 1 mg, Subcutaneous, Q15 Min PRN, Kayla Burrell APRN  •  guaiFENesin (MUCINEX) 12 hr tablet 600 mg, 600 mg, Oral, Q12H, Hasmukh David MD, 600 mg at 01/02/20 0949  •  insulin lispro (humaLOG) injection 0-9 Units, 0-9 Units, Subcutaneous, 4x Daily With Meals & Nightly **AND** insulin lispro (humaLOG) injection 0-9 Units, 0-9 Units, Subcutaneous, PRN, Kayla Burrell APRN  •  ipratropium-albuterol (DUO-NEB) nebulizer solution 3 mL, 3 mL, Nebulization, 4x Daily - RT, Loraine Son APRN, 3 mL at 01/02/20 0737  •  ipratropium-albuterol (DUO-NEB) nebulizer solution 3 mL, 3 mL, Nebulization, Q4H PRN, Loraine Son APRN  •  lactulose (CHRONULAC) 10 GM/15ML solution 30 g, 30 g, Oral, Daily, Kayla Burrell APRN, 30 g at 01/02/20 0950  •  levothyroxine (SYNTHROID, LEVOTHROID) tablet 200 mcg, 200 mcg, Oral, Q AM, Chidi Pace MD, 200 mcg at 01/02/20 0556  •  lidocaine (LIDODERM) 5 % 2 patch, 2 patch, Transdermal, Q24H, Hasmukh David MD, 2 patch at 01/02/20 0950  •  midodrine (PROAMATINE) tablet 5 mg, 5 mg, Oral, TID AC, Adalid Sterling MD, 5 mg at 01/02/20 0949  •  montelukast (SINGULAIR) tablet 10 mg, 10 mg, Oral, Nightly, Chidi Pace MD, 10 mg at 01/01/20 2124  •  MOUTH KOTE solution 2 mL, 2 spray, Mouth/Throat, Q4H PRN, Chidi Pace,  MD  •  naloxone (NARCAN) injection 0.4 mg, 0.4 mg, Intravenous, Q5 Min PRN, Lane Stock MD  •  ondansetron (ZOFRAN) injection 4 mg, 4 mg, Intravenous, Q6H PRN, Chidi Pace MD, 4 mg at 01/02/20 1011  •  oxyCODONE (ROXICODONE) immediate release tablet 5 mg, 5 mg, Oral, Q4H PRN, Chidi Pace MD, 5 mg at 01/02/20 0955  •  pantoprazole (PROTONIX) EC tablet 40 mg, 40 mg, Oral, Q AM, Chidi Pace MD, 40 mg at 01/02/20 0556  •  polyethylene glycol (MIRALAX) powder 17 g, 17 g, Oral, BID, Chidi Pace MD, 17 g at 01/02/20 0949  •  potassium chloride (K-DUR,KLOR-CON) CR tablet 20 mEq, 20 mEq, Oral, PRN **OR** potassium chloride (KLOR-CON) packet 20 mEq, 20 mEq, Oral, PRN, Chidi Pace MD  •  potassium chloride 10 mEq in 100 mL IVPB, 10 mEq, Intravenous, Q1H PRN **OR** potassium chloride 10 mEq in 100 mL IVPB, 10 mEq, Intravenous, Q1H PRN, Chidi Pace MD  •  senna (SENOKOT) tablet 1 tablet, 1 tablet, Oral, BID, Chidi Pace MD, 1 tablet at 01/02/20 0949  •  sodium chloride 0.9 % infusion, 30 mL/hr, Intravenous, Continuous, Chidi Pace MD, Last Rate: 30 mL/hr at 12/27/19 1430, 30 mL/hr at 12/27/19 1430    Family History:  Family History   Problem Relation Age of Onset   • Heart disease Father        Past Medical History:  Past Medical History:   Diagnosis Date   • CHF (congestive heart failure) (CMS/HCC)    • COPD (chronic obstructive pulmonary disease) (CMS/HCC)    • Diabetes (CMS/HCC)    • Hyperlipidemia    • Hypertension        Past surgical History:  Past Surgical History:   Procedure Laterality Date   • AORTIC VALVE REPAIR/REPLACEMENT N/A 12/27/2019    Procedure: AORTIC VALVE REPAIR/REPLACEMENT;  Surgeon: Lane Stock MD;  Location: [facility];  Service: Cardiothoracic   • BREAST LUMPECTOMY     • CARDIAC CATHETERIZATION N/A 12/24/2019    Procedure: Right and Left Heart Cath 12/24/19 @ 0900;  Surgeon: Wayne Luna MD;  Location: [facility]  INVASIVE LOCATION;  Service: Cardiovascular   • CARDIAC CATHETERIZATION N/A 2019    Procedure: Coronary angiography;  Surgeon: Wayne Luna MD;  Location: [facility] CATH INVASIVE LOCATION;  Service: Cardiovascular   • CARDIAC ELECTROPHYSIOLOGY PROCEDURE Left 2019    Procedure: Dual-chamber ICD insertion;  Surgeon: Héctor Eckert MD;  Location: [facility] CATH INVASIVE LOCATION;  Service: Cardiovascular   • CARDIAC ELECTROPHYSIOLOGY PROCEDURE Left 2019    Procedure: Lead Revision;  Surgeon: Héctor Eckert MD;  Location: [facility] CATH INVASIVE LOCATION;  Service: Cardiovascular   • CHOLECYSTECTOMY     • CORONARY ARTERY BYPASS GRAFT N/A 2019    Procedure: CORONARY ARTERY BYPASS GRAFTING;  Surgeon: Lane Stock MD;  Location: [facility] CVOR;  Service: Cardiothoracic   • HYSTERECTOMY     • INSERT / REPLACE / REMOVE PACEMAKER     • THYROID SURGERY         Social History:  Social History     Socioeconomic History   • Marital status:      Spouse name: Not on file   • Number of children: Not on file   • Years of education: Not on file   • Highest education level: Not on file   Tobacco Use   • Smoking status: Former Smoker     Last attempt to quit: 2019     Years since quittin.0   • Smokeless tobacco: Never Used   Substance and Sexual Activity   • Alcohol use: No     Frequency: Never   • Drug use: No   • Sexual activity: Defer       Review of Systems:  The following systems were reviewed as they relate to the cardiovascular system: Constitutional, Eyes, ENT, Cardiovascular, Respiratory, Gastrointestinal, Integumentary, Neurological, Psychiatric, Hematologic, Endocrine, Musculoskeletal, and Genitourinary. The pertinent cardiovascular findings are reported above with all other cardiovascular points within those systems being negative.        NOTE: The following portions of the patient's history were reviewed and updated this visit as appropriate: allergies, current medications,  past family history, past medical history, past social history, past surgical history and problem list.

## 2020-01-02 NOTE — PROGRESS NOTES
"NEPHROLOGY PROGRESS NOTE------[facility]    [facility]  [phone]  Adalid Sterling MD      Patient Care Team:  Phani Adames MD as PCP - General (Internal Medicine)  Ashish Smalls MD as Consulting Physician (Nephrology)      Provider:  Adalid Sterling MD  Patient Name: Rafael Mccann  :  1975    SUBJECTIVE:  F/u LINSEY  No chest pain or SOA   Diuresing well    Medication:    allopurinol 300 mg Oral Daily   amiodarone 200 mg Oral BID With Meals   aspirin 81 mg Oral Daily   atorvastatin 40 mg Oral Nightly   docusate sodium 100 mg Oral BID   enoxaparin 40 mg Subcutaneous Daily   ferrous sulfate 324 mg Oral Daily With Breakfast   fluconazole 200 mg Oral Q48H   folic acid 1 mg Oral Daily   guaiFENesin 600 mg Oral Q12H   insulin lispro 0-9 Units Subcutaneous 4x Daily With Meals & Nightly   ipratropium-albuterol 3 mL Nebulization 4x Daily - RT   lactulose 30 g Oral Daily   levothyroxine 200 mcg Oral Q AM   lidocaine 2 patch Transdermal Q24H   midodrine 5 mg Oral TID AC   montelukast 10 mg Oral Nightly   pantoprazole 40 mg Oral Q AM   polyethylene glycol 17 g Oral BID   senna 1 tablet Oral BID       sodium chloride 30 mL/hr Last Rate: 30 mL/hr (19 1430)       OBJECTIVE    Vital Sign Min/Max for last 24 hours  Temp  Min: 97.4 °F (36.3 °C)  Max: 98.4 °F (36.9 °C)   BP  Min: 97/55  Max: 142/81   Pulse  Min: 80  Max: 95   Resp  Min: 16  Max: 16   SpO2  Min: 93 %  Max: 99 %   No data recorded   Weight  Min: 93.9 kg (207 lb)  Max: 95.2 kg (209 lb 14.1 oz)     Flowsheet Rows      First Filed Value   Admission Height  170.2 cm (67\") Documented at 2019 225   Admission Weight  91.8 kg (202 lb 6.1 oz) Documented at 2019 2250          No intake/output data recorded.  I/O last 3 completed shifts:  In: 2240 [P.O.:1440; I.V.:800]  Out: 3250 [Urine:3250]    Physical Exam:  General Appearance: alert, appears stated age and cooperative. LIP " edema  Head: normocephalic, without obvious abnormality and atraumatic  Eyes: conjunctivae and sclerae normal and no icterus  Neck: supple  Lungs: CTA bilaterally  Heart: regular rhythm & normal rate and normal S1, S2 +WALLY  Chest: S/P SURGICAL CHANGES  Abdomen: soft, NT  Extremities: moves extremities well, +TRACE PRETIBIAL EDEMA BILAT, no cyanosis and no redness  Skin: no bleeding, bruising or rash, turgor normal, color normal and no lesions noted  Neurologic: Alert, and oriented. No focal deficits    Labs:    WBC WBC   Date Value Ref Range Status   01/02/2020 5.50 3.40 - 10.80 10*3/mm3 Final   01/01/2020 6.40 3.40 - 10.80 10*3/mm3 Final   12/31/2019 5.10 3.40 - 10.80 10*3/mm3 Final      HGB Hemoglobin   Date Value Ref Range Status   01/02/2020 8.2 (L) 12.0 - 15.9 g/dL Final   01/01/2020 8.7 (L) 12.0 - 15.9 g/dL Final   12/31/2019 8.5 (L) 12.0 - 15.9 g/dL Final   12/30/2019 8.1 (L) 12.0 - 17.0 g/dL Final      HCT Hematocrit   Date Value Ref Range Status   01/02/2020 24.0 (L) 34.0 - 46.6 % Final   01/01/2020 25.5 (L) 34.0 - 46.6 % Final   12/31/2019 25.3 (L) 34.0 - 46.6 % Final   12/30/2019 24 (L) 38 - 51 % Final      Platlets No results found for: LABPLAT   MCV MCV   Date Value Ref Range Status   01/02/2020 93.5 79.0 - 97.0 fL Final   01/01/2020 93.2 79.0 - 97.0 fL Final   12/31/2019 94.4 79.0 - 97.0 fL Final          Sodium Sodium   Date Value Ref Range Status   01/02/2020 138 136 - 145 mmol/L Final   01/01/2020 139 136 - 145 mmol/L Final   12/31/2019 137 136 - 145 mmol/L Final      Potassium Potassium   Date Value Ref Range Status   01/02/2020 3.6 3.5 - 5.2 mmol/L Final   01/01/2020 3.7 3.5 - 5.2 mmol/L Final   12/31/2019 4.0 3.5 - 5.2 mmol/L Final      Chloride Chloride   Date Value Ref Range Status   01/02/2020 96 (L) 98 - 107 mmol/L Final   01/01/2020 97 (L) 98 - 107 mmol/L Final   12/31/2019 96 (L) 98 - 107 mmol/L Final      CO2 CO2   Date Value Ref Range Status   01/02/2020 31.0 (H) 22.0 - 29.0 mmol/L Final    01/01/2020 31.0 (H) 22.0 - 29.0 mmol/L Final   12/31/2019 30.0 (H) 22.0 - 29.0 mmol/L Final      BUN BUN   Date Value Ref Range Status   01/02/2020 11 6 - 20 mg/dL Final   01/01/2020 11 6 - 20 mg/dL Final   12/31/2019 12 6 - 20 mg/dL Final      Creatinine Creatinine   Date Value Ref Range Status   01/02/2020 1.00 0.57 - 1.00 mg/dL Final   01/01/2020 1.04 (H) 0.57 - 1.00 mg/dL Final   12/31/2019 1.18 (H) 0.57 - 1.00 mg/dL Final      Calcium Calcium   Date Value Ref Range Status   01/02/2020 9.1 8.6 - 10.5 mg/dL Final   01/01/2020 8.8 8.6 - 10.5 mg/dL Final   12/31/2019 8.8 8.6 - 10.5 mg/dL Final      PO4 No components found for: PO4   Albumin Albumin   Date Value Ref Range Status   01/02/2020 3.70 3.50 - 5.20 g/dL Final   01/01/2020 3.90 3.50 - 5.20 g/dL Final   12/31/2019 3.90 3.50 - 5.20 g/dL Final      Magnesium Magnesium   Date Value Ref Range Status   01/01/2020 2.0 1.6 - 2.6 mg/dL Final   12/31/2019 2.1 1.6 - 2.6 mg/dL Final      Uric Acid No components found for: URIC ACID     Imaging Results (Last 72 Hours)     Procedure Component Value Units Date/Time    XR Chest 1 View [[id number]] Collected:  01/01/20 1100     Updated:  01/01/20 1103    Narrative:       DATE OF EXAM:  1/1/2020 10:53 AM     PROCEDURE:  XR CHEST 1 VW-     INDICATIONS:  right sided sharp pain below ribs; R07.9-Chest pain, unspecified;  I35.1-Nonrheumatic aortic (valve) insufficiency; I42.0-Dilated  cardiomyopathy; Z95.810-Presence of automatic (implantable) cardiac  defibrillator; I20.0-Unstable angina; I25.119-Atherosclerotic heart  disease of native coronary artery with unspecified angina pectoris;  I35.1-Nonrheumatic aortic (valve) insufficiency; J43.9-Emphysema,      COMPARISON:  12/31/2019     TECHNIQUE:   Single radiographic AP view of the chest was obtained.     FINDINGS:  There is a right internal jugular [place]-Brad sheath in place with the tip  in the superior vena cava. There is a left subclavian pacemaker  device/AICD device with  leads overlying the right atrium and right  ventricle. The heart is enlarged with changes of CABG. There are  bilateral pleural effusions left greater than right with bilateral  basilar airspace disease. Aeration is slightly worse in the interval.        Impression:       Mild worsening of bilateral basilar infiltrates with moderate left and  small-to-moderate right pleural effusions.     Electronically Signed By-Chidi Walton On:1/1/2020 11:01 AM  This report was finalized on [id number] by  Chidi Walton, .    XR Chest 1 View [[id number]] Collected:  12/31/19 1152     Updated:  12/31/19 1155    Narrative:       DATE OF EXAM:  12/31/2019 10:48 AM     PROCEDURE:  XR CHEST 1 VW-     INDICATIONS:  Hypoxia; R07.9-Chest pain, unspecified; I35.1-Nonrheumatic aortic  (valve) insufficiency; I42.0-Dilated cardiomyopathy; Z95.810-Presence of  automatic (implantable) cardiac defibrillator; I20.0-Unstable angina;  I25.119-Atherosclerotic heart disease of native coronary artery with  unspecified angina pectoris; I35.1-Nonrheumatic aortic (valve)  insufficiency; J43.9-Emphysema, unspecified patient's a 44-year-old  female with hypoxia history of open heart surgery on December 26.  History of aortic regurg, former smoker     COMPARISON:  Comparison is made to study of 12/29/2019     TECHNIQUE:   Single radiographic AP view of the chest was obtained.     FINDINGS:  Today's portable erect study was obtained on 12/31/2019 at 10:50 AM.     Today's study demonstrates the left basilar opacity remaining stable  there is increasing right basilar opacity consistent with increasing  atelectasis and probable small pleural effusion. The right internal  jugular vascular sheath remains in good position the left-sided  dual-lead pacemaker defibrillator remains in good position changes of  median sternotomy coronary artery bypass grafting and aortic valvular  replacement are again seen.        Impression:          1. Mild interval worsening from  study of December 29  2. Increasing right basilar opacities felt to represent increasing  atelectasis  3. Stable moderate left basilar opacity consistent with atelectasis,  pneumonia and/or pleural effusion     Electronically Signed By-Ashwin Beavers Jr. On:12/31/2019 11:53 AM  This report was finalized on [id number] by  Ashwin Beavers Jr., .          Results for orders placed during the hospital encounter of 12/22/19   XR Chest 1 View    Narrative DATE OF EXAM:  1/1/2020 10:53 AM     PROCEDURE:  XR CHEST 1 VW-     INDICATIONS:  right sided sharp pain below ribs; R07.9-Chest pain, unspecified;  I35.1-Nonrheumatic aortic (valve) insufficiency; I42.0-Dilated  cardiomyopathy; Z95.810-Presence of automatic (implantable) cardiac  defibrillator; I20.0-Unstable angina; I25.119-Atherosclerotic heart  disease of native coronary artery with unspecified angina pectoris;  I35.1-Nonrheumatic aortic (valve) insufficiency; J43.9-Emphysema,      COMPARISON:  12/31/2019     TECHNIQUE:   Single radiographic AP view of the chest was obtained.     FINDINGS:  There is a right internal jugular [place]-Brad sheath in place with the tip  in the superior vena cava. There is a left subclavian pacemaker  device/AICD device with leads overlying the right atrium and right  ventricle. The heart is enlarged with changes of CABG. There are  bilateral pleural effusions left greater than right with bilateral  basilar airspace disease. Aeration is slightly worse in the interval.        Impression Mild worsening of bilateral basilar infiltrates with moderate left and  small-to-moderate right pleural effusions.     Electronically Signed By-Chidi Walton On:1/1/2020 11:01 AM  This report was finalized on [id number] by  Chidi Walton, .   XR Chest 1 View    Narrative DATE OF EXAM:  12/31/2019 10:48 AM     PROCEDURE:  XR CHEST 1 VW-     INDICATIONS:  Hypoxia; R07.9-Chest pain, unspecified; I35.1-Nonrheumatic aortic  (valve) insufficiency; I42.0-Dilated  cardiomyopathy; Z95.810-Presence of  automatic (implantable) cardiac defibrillator; I20.0-Unstable angina;  I25.119-Atherosclerotic heart disease of native coronary artery with  unspecified angina pectoris; I35.1-Nonrheumatic aortic (valve)  insufficiency; J43.9-Emphysema, unspecified patient's a 44-year-old  female with hypoxia history of open heart surgery on December 26.  History of aortic regurg, former smoker     COMPARISON:  Comparison is made to study of 12/29/2019     TECHNIQUE:   Single radiographic AP view of the chest was obtained.     FINDINGS:  Today's portable erect study was obtained on 12/31/2019 at 10:50 AM.     Today's study demonstrates the left basilar opacity remaining stable  there is increasing right basilar opacity consistent with increasing  atelectasis and probable small pleural effusion. The right internal  jugular vascular sheath remains in good position the left-sided  dual-lead pacemaker defibrillator remains in good position changes of  median sternotomy coronary artery bypass grafting and aortic valvular  replacement are again seen.        Impression    1. Mild interval worsening from study of December 29  2. Increasing right basilar opacities felt to represent increasing  atelectasis  3. Stable moderate left basilar opacity consistent with atelectasis,  pneumonia and/or pleural effusion     Electronically Signed By-Ashwin Beavers Jr. On:12/31/2019 11:53 AM  This report was finalized on [id number] by  Ashwin Beavers Jr., .   XR Chest 1 View    Narrative DATE OF EXAM:  12/29/2019 1:02 PM     PROCEDURE:  XR CHEST 1 VW-     INDICATIONS:  CT removal; R07.9-Chest pain, unspecified; I35.1-Nonrheumatic aortic  (valve) insufficiency; I42.0-Dilated cardiomyopathy; Z95.810-Presence of  automatic (implantable) cardiac defibrillator; I20.0-Unstable angina;  I25.119-Atherosclerotic heart disease of native coronary artery with  unspecified angina pectoris; I35.1-Nonrheumatic aortic  (valve)  insufficiency patient's a 44-year-old female status post chest tube  removal     COMPARISON:  Study of 5:01 AM earlier today     TECHNIQUE:   Single radiographic AP view of the chest was obtained.     FINDINGS:  Today's portable erect study was obtained on 12/29/2019 at 12:52 PM     Today's follow-up study demonstrates the right internal jugular sheath  being in place. The mediastinal drains have been removed there is no  evidence of pneumomediastinum or pneumothorax on today's study. Moderate  opacity in the left lung base continues either representing atelectasis  or pneumonia. Mild right basilar atelectasis is seen. A left-sided  dual-lead pacemaker defibrillator is seen. Changes of median sternotomy  and coronary artery bypass grafting are present. The upper abdomen  appears unremarkable.        Impression    1. No significant change from earlier than the day other than removal of  mediastinal drains  2. Bilateral basilar opacities left greater than right felt to represent  atelectasis and/or infiltrate, possible pneumonia on the left,  atelectasis right base.  3. Right internal jugular sheath remains intact with tip in superior  vena cava, left-sided dual-lead pacemaker defibrillator appears  unchanged as are changes of CABG.     Electronically Signed By-Ashwin Beavers Jr. On:12/29/2019 1:16 PM  This report was finalized on [id number] by  Ashwin Beavers Jr., .       Results for orders placed during the hospital encounter of 12/22/19   Duplex Venous Lower Extremity - Bilateral CAR    Narrative · Normal bilateral lower extremity venous duplex scan.            ASSESSMENT / PLAN      Chest pain    COPD (chronic obstructive pulmonary disease) (CMS/HCC)    Hypertension    Cardiomyopathy, dilated (CMS/HCC)    Essential hypertension    Chronic renal impairment    ICD (implantable cardioverter-defibrillator), dual, in situ    GERD without esophagitis    Hypothyroidism (acquired)    Aortic valve regurgitation     Unstable angina pectoris (CMS/HCC)    Coronary artery disease involving native coronary artery of native heart with angina pectoris (CMS/HCC)    Nonrheumatic aortic valve insufficiency      1. ARF/LINSEY/CRF/CKD STG 2------Nonoliguric. ARF/LINSEY resolved. +Mild ARF/LINSEY on top of what appears to be CRF/CKD STG 2 with a baseline serum creatinine of 1.1. Unknown if patient has baseline proteinuria or hematuria. CRF/CKD STG 2 likely secondary to HTN NS/DM and chronic renal hypoperfusion from Cardiomyopathy. +Mild ARF/LINSEY appeared to be secondary to prerenal/hemodynamic fluctuation from MI S/P Cath/IV dye exposure with concomitant ACE-I and diuretic use. Keep off of Zestril for now.   Dose meds for CrCl 30-60 cc/min. No NSAIDs. No further IV dye exposure, unless emergently needed.     2. CAD S/P MI S/P CATH--------CABG done per , CT Surgery.     3. DILATED CARDIOMYOPATHY/VALVULAR HEART DISEASE--------No gross overload at present. Restart little diuretic. Has PM/AICD. Per , Cardiology     4. HTN WITH CKD------BP okay. Avoid hypotension. No ACE/ARB/DRI for now. Temporarily holding diuretics     5. HYPERURICEMIA---------On Allopurinol.  Uric normal     6. HYPERLIPIDEMIA------On Statin.       7. GERD-------On H2 blocker     8. DMII------Metformin on hold as just had IV dye exposure. BS okay. On Glucometers, SSI     9. VITAMIN D DEFICIENCY     10. DM PERIPHERAL NEUROPATHY-------On Neurontin     11. HYPOTHYROIDISM------Increased Synthroid as TSH up      Creatinine better, diuresing well,   BP better, decrease midodrine    Adalid Sterling MD  [facility]  [phone]  01/02/20  7:27 AM

## 2020-01-02 NOTE — THERAPY TREATMENT NOTE
Acute Care - Occupational Therapy Treatment Note  [facility]     Patient Name: Rafael Mccann  : 1975  MRN: [id number]  Today's Date: 2020             Admit Date: 2019       ICD-10-CM ICD-9-CM   1. Chest pain, unspecified type R07.9 786.50   2. Aortic valve insufficiency, etiology of cardiac valve disease unspecified I35.1 424.1   3. Cardiomyopathy, dilated (CMS/[facility]) I42.0 425.4   4. ICD (implantable cardioverter-defibrillator), dual, in situ Z95.810 V45.02   5. Unstable angina pectoris (CMS/[facility]) I20.0 411.1   6. Coronary artery disease involving native coronary artery of native heart with angina pectoris (CMS/[facility]) I25.119 414.01     413.9   7. Nonrheumatic aortic valve insufficiency I35.1 424.1   8. Pulmonary emphysema, unspecified emphysema type (CMS/[facility]) J43.9 492.8     Patient Active Problem List   Diagnosis   • COPD (chronic obstructive pulmonary disease) (CMS/[facility])   • Hypertension   • Cardiomyopathy, dilated (CMS/HCC)   • Essential hypertension   • Chronic renal impairment   • ICD (implantable cardioverter-defibrillator), dual, in situ   • Chest pain   • GERD without esophagitis   • Hypothyroidism (acquired)   • Aortic valve regurgitation   • Unstable angina pectoris (CMS/HCC)   • Coronary artery disease involving native coronary artery of native heart with angina pectoris (CMS/HCC)   • Nonrheumatic aortic valve insufficiency     Past Medical History:   Diagnosis Date   • CHF (congestive heart failure) (CMS/[facility])    • COPD (chronic obstructive pulmonary disease) (CMS/[facility])    • Diabetes (CMS/[facility])    • Hyperlipidemia    • Hypertension      Past Surgical History:   Procedure Laterality Date   • AORTIC VALVE REPAIR/REPLACEMENT N/A 2019    Procedure: AORTIC VALVE REPAIR/REPLACEMENT;  Surgeon: Lane Stock MD;  Location: [facility];  Service: Cardiothoracic   • BREAST LUMPECTOMY     • CARDIAC CATHETERIZATION N/A 2019    Procedure: Right and Left Heart Cath 19 @ 0900;   Surgeon: Wayne Luna MD;  Location: [facility] CATH INVASIVE LOCATION;  Service: Cardiovascular   • CARDIAC CATHETERIZATION N/A 12/24/2019    Procedure: Coronary angiography;  Surgeon: Wayne Luna MD;  Location: [facility] CATH INVASIVE LOCATION;  Service: Cardiovascular   • CARDIAC ELECTROPHYSIOLOGY PROCEDURE Left 6/28/2019    Procedure: Dual-chamber ICD insertion;  Surgeon: Héctor Eckert MD;  Location: [facility] CATH INVASIVE LOCATION;  Service: Cardiovascular   • CARDIAC ELECTROPHYSIOLOGY PROCEDURE Left 8/14/2019    Procedure: Lead Revision;  Surgeon: Héctor Eckert MD;  Location: [facility] CATH INVASIVE LOCATION;  Service: Cardiovascular   • CHOLECYSTECTOMY     • CORONARY ARTERY BYPASS GRAFT N/A 12/27/2019    Procedure: CORONARY ARTERY BYPASS GRAFTING;  Surgeon: Lane Stock MD;  Location: [facility] CVOR;  Service: Cardiothoracic   • HYSTERECTOMY     • INSERT / REPLACE / REMOVE PACEMAKER     • THYROID SURGERY         Therapy Treatment    Rehabilitation Treatment Summary     Row Name 01/02/20 1343             Treatment Time/Intention    Discipline  occupational therapist  -      Document Type  wound care  -      Subjective Information  complains of;fatigue  -      Mode of Treatment  individual therapy  -      Patient/Family Observations  spouse present & supportive. Pt appears very fatigued. She is having blood transfusion. She was seen walking w/ PT & reports she felt very tired after this & does not appear up to standing activity or seated HEP. Pt requests seated oral care. She needed supervision for this & demo some impaired functional reach & coordination. Pt has been trying to move bowels & unsuccessful at this time.  -      Patient Effort  adequate  -      Recorded by [] Maribeth Rodriguez OT 01/02/20 1503      Row Name 01/02/20 1345             Vital Signs    Pre Systolic BP Rehab  134  -MH      Pre Treatment Diastolic BP  73  -MH      Pretreatment Heart Rate (beats/min)   90  -      Intratreatment Heart Rate (beats/min)  110  -MH      Posttreatment Heart Rate (beats/min)  90  -MH      Pre SpO2 (%)  88  -MH      O2 Delivery Pre Treatment  room air  -      O2 Delivery Intra Treatment  supplemental O2  -      Post SpO2 (%)  95  -MH      O2 Delivery Post Treatment  supplemental O2  -MH      Pre Patient Position  Sitting  -MH      Intra Patient Position  Sitting  -MH      Post Patient Position  Sitting  -MH      Recorded by [] Maribeth Rodriguez OT 01/02/20 1503      Row Name 01/02/20 1345             Cognitive Assessment/Intervention- PT/OT    Orientation Status (Cognition)  oriented x 3  -      Follows Commands (Cognition)  follows one step commands;delayed response/completion;verbal cues/prompting required  -      Safety Deficit (Cognitive)  mild deficit  -      Recorded by [] Maribeth Rodriguez OT 01/02/20 1503      Row Name 01/02/20 1345             Transfer Assessment/Treatment    Transfer Assessment/Treatment  toilet transfer  -      Recorded by [] Maribeth Rodriguez OT 01/02/20 1503      Row Name 01/02/20 1345             Toilet Transfer    Type (Toilet Transfer)  stand pivot/stand step BS  -      [place] Level (Toilet Transfer)  minimum assist (75% patient effort)  -      Recorded by [] Maribeth Rodriguez, OT 01/02/20 1503      Row Name 01/02/20 1345             ADL Assessment/Intervention    BADL Assessment/Intervention  toileting;grooming;feeding  -      Recorded by [] Maribeth Rodriguez OT 01/02/20 1503      Row Name 01/02/20 1345             Grooming Assessment/Training    [place] Level (Grooming)  oral care regimen;contact guard assist;verbal cues;set up;supervision;nonverbal cues (demo/gesture)  -      Grooming Position  supported sitting  -      Recorded by [] Maribeth Rodriguez OT 01/02/20 1503      Row Name 01/02/20 1345             Self-Feeding Assessment/Training    [place] Level (Feeding)  prepare tray/open items;moderate assist (50%  patient effort)  -      Recorded by [] Maribeth Rodriguez, OT 01/02/20 1503      Row Name 01/02/20 1345             BADL Safety/Performance    Impairments, BADL Safety/Performance  balance;cognition;endurance/activity tolerance;coordination;grasp/prehension;pain;strength;shortness of breath;sensory awareness  -      Recorded by [] Maribeth Rodriguez, OT 01/02/20 1503      Row Name 01/02/20 1345             Positioning and Restraints    Pre-Treatment Position  sitting in chair/recliner  -      Post Treatment Position  chair  -MH      In Chair  notified nsg;call light within reach;sitting;encouraged to call for assist;exit alarm on;with family/caregiver  -      Recorded by [] Maribeth Rodriguez, OT 01/02/20 1503      Row Name 01/02/20 1342             Pain Scale: Word Pre/Post-Treatment    Pain: Word Scale, Pretreatment  4 - moderate pain  -      Pain: Word Scale, Post-Treatment  4 - moderate pain  -      Recorded by [] Maribeth Rodriguez, OT 01/02/20 1503      Row Name                Wound 12/27/19 anterior;midline sternal Incision    Wound - Properties Group Date first assessed: 12/27/19 [NM] Present on Hospital Admission: N [NM] Orientation: anterior;midline [NM] Location: sternal [NM] Primary Wound Type: Incision [NM] Recorded by:  [NM] Roz Santana RN 12/27/19 1000    Row Name                Wound 12/27/19 Left anterior;distal thigh Incision    Wound - Properties Group Date first assessed: 12/27/19 [NM] Present on Hospital Admission: N [NM] Side: Left [NM] Orientation: anterior;distal [NM] Location: thigh [NM] Primary Wound Type: Incision [NM], endovein harvest sites  Recorded by:  [NM] Roz Snatana RN 12/27/19 1001    Row Name 01/02/20 1345             Coping    Observed Emotional State  accepting;calm;cooperative;flat;withdrawn  -      Verbalized Emotional State  acceptance  -      Recorded by [] Maribeth Rodriguez, OT 01/02/20 1503      Row Name 01/02/20 1345             Plan of Care Review     Plan of Care Reviewed With  patient;spouse  -      Progress  no change  -      Outcome Summary  Pt noted to be severely fatigued after walking & she is anemic this date, requiring transfusion. Pt is day 6 out from CABG & is not meeting her goals for functional mobility or ADL. Pt has sibling who may come to town to assist Pt at home since spouse will be working. Ot is recommending [facility] rehab at d/c as Pt has dependent shldrn at home the family will also need to look after.  -MH2      Recorded by [] Maribeth Rodriguez OT 01/02/20 1503  [MH2] Maribeth Rodriguez OT 01/02/20 1506      Row Name 01/02/20 1345             Outcome Summary/Treatment Plan (OT)    Anticipated Discharge Disposition (OT)  inpatient rehabilitation facility  -      Recorded by [] Maribeth Rodriguez, ELIZ 01/02/20 1503        User Key  (r) = Recorded By, (t) = Taken By, (c) = Cosigned By    Initials Name Effective Dates Discipline     Maribeth Rodriguez OT 03/01/19 -  OT    Roz Gage RN 03/01/19 -  Nurse        Wound 12/27/19 anterior;midline sternal Incision (Active)   Dressing Appearance no drainage;open to air 1/2/2020 12:00 PM   Closure Approximated 1/2/2020 12:00 PM   Drainage Amount none 1/2/2020 12:00 PM   Care, Wound cleansed with;antimicrobial agent applied 1/2/2020  8:00 AM   Dressing Care, Wound open to air 1/2/2020  8:00 AM       Wound 12/27/19 Left anterior;distal thigh Incision (Active)   Dressing Appearance dry;intact;no drainage 1/2/2020 12:00 PM   Closure Approximated 1/2/2020 12:00 PM   Base clean;dry 1/2/2020 12:00 PM   Drainage Amount none 1/2/2020 12:00 PM   Care, Wound cleansed with;antimicrobial agent applied 1/2/2020  8:00 AM   Dressing Care, Wound open to air 1/2/2020  8:00 AM     Rehab Goal Summary     Row Name 01/02/20 1223 01/02/20 1220          Transfer Goal 1 (PT)    Progress/Outcome (Transfer Goal 1, PT)  --  continuing progress toward goal  -CR        Gait Training Goal 1 (PT)    Progress/Outcome (Gait  Training Goal 1, PT)  --  continuing progress toward goal  -CR        Stairs Goal 1 (PT)    Activity/Assistive Device (Stairs Goal 1, PT)  stairs, all skills  -CR  --     [place] Level/Cues Needed (Stairs Goal 1, PT)  supervision required  -CR  --     Number of Stairs (Stairs Goal 1, PT)  15  -CR  --     Time Frame (Stairs Goal 1, PT)  3 days  -CR  --        Patient Education Goal (PT)    Progress/Outcome (Patient Education Goal, PT)  --  continuing progress toward goal  -CR       User Key  (r) = Recorded By, (t) = Taken By, (c) = Cosigned By    Initials Name Provider Type Discipline    CR Reyes, Carmela, PT Physical Therapist PT            OT Recommendation and Plan  Outcome Summary/Treatment Plan (OT)  Anticipated Discharge Disposition (OT): inpatient rehabilitation facility  Plan of Care Review  Plan of Care Reviewed With: patient, spouse  Plan of Care Reviewed With: patient, spouse  Outcome Summary: Pt noted to be severely fatigued after walking & she is anemic this date, requiring transfusion. Pt is day 6 out from CABG & is not meeting her goals for functional mobility or ADL. Pt has sibling who may come to [facility] to assist Pt at home since spouse will be working. Ot is recommending [facility] rehab at d/c as Pt has dependent shldrn at home the family will also need to look after.  Outcome Measures     Row Name 12/31/19 1600             How much help from another person do you currently need...    Turning from your back to your side while in flat bed without using bedrails?  3  -MH      Moving from lying on back to sitting on the side of a flat bed without bedrails?  2  -MH      Moving to and from a bed to a chair (including a wheelchair)?  3  -MH      Standing up from a chair using your arms (e.g., wheelchair, bedside chair)?  3  -MH      Climbing 3-5 steps with a railing?  1  -MH      To walk in hospital room?  3  -MH      AM-PAC 6 Clicks Score (PT)  15  -MH         Functional Assessment    Outcome Measure  Options  AM-PAC 6 Clicks Basic Mobility (PT)  -        User Key  (r) = Recorded By, (t) = Taken By, (c) = Cosigned By    Initials Name Provider Type    Maribeth Pierre OT Occupational Therapist           Time Calculation:   Time Calculation- OT     Row Name 01/02/20 1506             Time Calculation- OT    OT Start Time  1345  -      OT Stop Time  1405  -      OT Time Calculation (min)  20 min  -      Total Timed Code Minutes- OT  20 minute(s)  -      OT Received On  01/02/20  -      OT - Next Appointment  01/03/20  -        User Key  (r) = Recorded By, (t) = Taken By, (c) = Cosigned By    Initials Name Provider Type     Maribeth Rodriguez OT Occupational Therapist        Therapy Charges for Today     Code Description Service Date Service Provider Modifiers Qty    59003226545 HC OT SELF CARE/MGMT/TRAIN EA 15 MIN 1/2/2020 Maribeth Rodriguez OT GO 1    27935373392 HC OT COMMUN WK REINTTRAIN EA 15 MIN 1/2/2020 Maribeth Rodriguez OT GO 1               Maribeth Rodriguez OT  1/2/2020

## 2020-01-02 NOTE — PLAN OF CARE
Problem: Patient Care Overview  Goal: Plan of Care Review  Outcome: Ongoing (interventions implemented as appropriate)  Flowsheets  Taken 1/2/2020 1228  Progress: improving  Outcome Summary: Patient tolerated increased activity this session, able to ambulate 120 ft using rw; no report of chest pain or SOA during activity. Pt able to ambulate short distance without a.d. , gait slow but steady. Pt reports she has 1 flight of steps to get in home and will need to be assessed prior to d/c.  Taken 1/2/2020 1219  Plan of Care Reviewed With: patient

## 2020-01-02 NOTE — PAYOR COMM NOTE
"This is clinical update for Calvin Downs, auth# [id number]    EXTENDED AUTHORIZATION PENDING:   PLEASE CALL OR FAX DETERMINATION TO CONTACT BELOW. THANK YOU.     OSCAR JEAN BAPTISTE RN  UTILIZATION REVIEW  [facility]  PH: [phone]  FX: [phone]      Calvin Dillon (44 y.o. Female)     Date of Birth Social Security Number Address Home Phone MRN    1975  [address] [place] [address] [phone] [id number]    Amish Marital Status          Muslim        Admission Date Admission Type Admitting Provider Attending Provider Department, Room/Bed    12/22/19 Emergency [address] Stock MD Khan, Ahmad Aftab, MD [facility] CARDIOVASCULAR CARE UNIT, 2216/1    Discharge Date Discharge Disposition Discharge Destination                       Attending Provider:  Lane Stock MD    Allergies:  Hydrocodone, Penicillin G    Isolation:  None   Infection:  None   Code Status:  CPR    Ht:  170.2 cm (67\")   Wt:  95.2 kg (209 lb 14.1 oz)    Admission Cmt:  None   Principal Problem:  Chest pain [R07.9]                 Active Insurance as of 12/22/2019     Primary Coverage     Payor Plan Insurance Group Employer/Plan Group    ANTHEM BLUE CROSS ANTHEM BLUE CROSS BLUE SHIELD PPO [id number]     Payor Plan Address Payor Plan Phone Number Payor Plan Fax Number Effective Dates    [address] [phone]  5/6/2018 - None Entered    [facility] [id number]       Subscriber Name Subscriber Birth Date Member ID       CATRACHO DOWNS 11/7/1970 [id number]           Secondary Coverage     Payor Plan Insurance Group Employer/Plan Group    ANTHEM MEDICARE REPLACEMENT ANTHEM MEDICARE ADVANTAGE [id number]     Payor Plan Address Payor Plan Phone Number Payor Plan Fax Number Effective Dates    [address] [phone]  1/1/2019 - None Entered    [facility] [address]       Subscriber Name Subscriber Birth Date Member ID       CALVIN DILLON 1975 [id number]           "       Emergency Contacts      (Rel.) Home Phone Work Phone Mobile Phone    celio leo (Daughter) -- -- [phone]            Operative/Procedure Notes (last 24 hours) (Notes from 01/01/20 1136 through 01/02/20 1136)    No notes of this type exist for this encounter.            Physician Progress Notes (last 48 hours) (Notes from 12/31/19 1136 through 01/02/20 1136)      Kayla Burrell APRN at 01/02/20 1050          Postop Day #6-- CABGX3/AVR-- Stock  EF 35% (echo)    Subjective:  Patient up to chair. Continues to complain of right lower rib pain. Patient has not had bowel movement since surgery and believes it may be contributing. Blood pressure remains borderline low    Drips: None    Intake/Output Summary (Last 24 hours) at 1/2/2020 1050  Last data filed at 1/2/2020 0600  Gross per 24 hour   Intake 1340 ml   Output 1550 ml   Net -210 ml     Temp:  [97.4 °F (36.3 °C)-98.4 °F (36.9 °C)] 97.7 °F (36.5 °C)  Heart Rate:  [80-89] 89  Resp:  [16] 16  BP: (101-127)/(55-77) 127/75    Results from last 7 days   Lab Units 01/02/20  0558 01/01/20  0634  12/27/19  1423   WBC 10*3/mm3 5.50 6.40   < > 11.70*   HEMOGLOBIN g/dL 8.2* 8.7*   < > 11.6*   HEMOGLOBIN, POC   --   --    < >  --    HEMATOCRIT % 24.0* 25.5*   < > 33.7*   HEMATOCRIT POC   --   --    < >  --    PLATELETS 10*3/mm3 211 207   < > 110*   INR   --   --   --  1.20*    < > = values in this interval not displayed.     Results from last 7 days   Lab Units 01/02/20  0558   CREATININE mg/dL 1.00   POTASSIUM mmol/L 3.6   SODIUM mmol/L 138   MAGNESIUM mg/dL 2.2   PHOSPHORUS mg/dL 4.0       Physical Exam:  Neuro intact, nad, resting in bed  Tele:  SR 90's  Diminished bases, 95% 4L NC  Sternal incision healing well  Benign abd, No BM  No edema    Assessment/Plan:  Principal Problem:    Chest pain  Active Problems:    COPD (chronic obstructive pulmonary disease) (CMS/HCC)    Hypertension    Cardiomyopathy, dilated (CMS/HCC)    Essential  hypertension    Chronic renal impairment    ICD (implantable cardioverter-defibrillator), dual, in situ    GERD without esophagitis    Hypothyroidism (acquired)    Aortic valve regurgitation    Unstable angina pectoris (CMS/HCC)    Coronary artery disease involving native coronary artery of native heart with angina pectoris (CMS/HCC)    Nonrheumatic aortic valve insufficiency    Aortic regurgitation s/p AVR (tissue)-- aspirin  CAD s/p CABG -- aspirin, statin, no BB d/t hypotension  Postop hypotension-- stable  Postop expected ELLEN-- 1 PRBC today  HFrEF (EF35%)-- maximize meds prior to discharge  AICD placement-- Biotronik  CKD stage 2-- nephrology following  HTN-- stable  DM II, last A1c 6.2-- SSI  Hypothyroidism-- Synthroid  Anxiety-- Xanax  Chronic pain-- sees [facility]    Routine care  Mobilize  1 PRBC today  Suppository now-- may need enema if suppository doesn't produce BM    TORRES BURRELL, ALEX  2020  10:50 AM         Electronically signed by Torres Burrell APRN at 20 1052     Loraine Son APRN at 20 0915          Pulmonary/ Critical Care progress Note        Patient Name:  Rafael Mccann    :  1975    Medical Record:  [id number]    Requesting Physician    Phani Adames, *    Primary Care Physician     Phani Adames MD    Reason for consultation    Rafael Mccann is a 44 y.o. female who we were asked to see in consultation for decreased level of consciousness.   Patient is POD#3 CABG and AVR after presenting to the ER on 19 with complaints of chest pain, dyspnea and tachycardia.  Patient with a history of CAD being medically managed as well as cardiomyopathy.   Cardiac cath on 19 showed severe two vessel disease and 4+aortic regurg.       This evening patient was found to be very difficult to arouse in bed after being up to chair this morning and ambulating in the afternoon.   Patient was given Narcan with improvement in  her mental status.  ABG was obtained as well which showed pCO2 of 49 and a pAO2 of 68.   Patient had been requiring Dilaudid 0.25 mg q2h IV and Oxycodone 5 mg q4h, for pain control in addition had Ultram and Benadryl earlier today.      Review of Systems    12/31:  OOB to chair, awake and alert.  Complaints of right lower rib pain.    1/1/20:  OOB to chair,  Remains with complaints of pain  1/2/20:  Sitting on side of bed, remains with some pain to right side.      Home medicationS  Prior to Admission medications    Medication Sig Start Date End Date Taking? Authorizing Provider   allopurinol (ZYLOPRIM) 300 MG tablet Take 300 mg by mouth Daily.   Yes Dheeraj Alvarez MD   ALPRAZolam (XANAX) 1 MG tablet Take 1 mg by mouth 4 (Four) Times a Day As Needed for Anxiety.   Yes Dheeraj Alvarez MD   amLODIPine (NORVASC) 5 MG tablet Take 5 mg by mouth 2 (Two) Times a Day.   Yes Dheeraj Alvarez MD   aspirin 81 MG chewable tablet Chew 81 mg Daily.   Yes Dheeraj Alvarez MD   atorvastatin (LIPITOR) 40 MG tablet Take 1 tablet by mouth Every Night. 6/30/19  Yes Hannah Wills MD   carvedilol (COREG) 12.5 MG tablet Take 1 tablet by mouth 2 (Two) Times a Day With Meals. 6/30/19  Yes Hannah Wills MD   cholecalciferol (VITAMIN D3) 1000 units tablet Take 1,000 Units by mouth Daily.   Yes Dheeraj Alvarez MD   clindamycin (CLEOCIN) 300 MG capsule Take 300 mg by mouth 2 (Two) Times a Day. 12/18/19 1/1/20 Yes Dheeraj Alvarez MD   famotidine (PEPCID) 20 MG tablet Take 1 tablet by mouth 2 (Two) Times a Day. 9/17/19  Yes Dina Jack PA   ferrous sulfate 325 (65 FE) MG tablet Take 65 mg by mouth Daily With Breakfast.   Yes Dheeraj Alvarez MD   fluconazole (DIFLUCAN) 200 MG tablet Take 200 mg by mouth Every Other Day.   Yes Dheeraj Alvarez MD   folic acid (FOLVITE) 1 MG tablet Take 1 mg by mouth Daily.   Yes Dheeraj Alvarez MD   furosemide (LASIX) 40 MG tablet Take 40 mg by mouth 2 (Two)  Times a Day.   Yes ProviderDheeraj MD   gabapentin (NEURONTIN) 300 MG capsule Take 300 mg by mouth 3 (Three) Times a Day.   Yes Dheeraj Alvarez MD   hydrALAZINE (APRESOLINE) 100 MG tablet Take 1 tablet by mouth 3 (Three) Times a Day. 12/12/19  Yes Wayne Luna MD   levothyroxine (SYNTHROID, LEVOTHROID) 200 MCG tablet Take 200 mcg by mouth Daily.   Yes Dheeraj Alvarez MD   lisinopril (PRINIVIL,ZESTRIL) 20 MG tablet Take 20 mg by mouth Daily.   Yes ProviderDheeraj MD   metFORMIN (GLUCOPHAGE) 500 MG tablet Take 500 mg by mouth Daily With Breakfast.   Yes ProviderDheeraj MD   montelukast (SINGULAIR) 10 MG tablet Take 10 mg by mouth Every Night.   Yes Dheeraj Alvarez MD   oxyCODONE-acetaminophen (PERCOCET) 7.5-325 MG per tablet Take 1 tablet by mouth Every 6 (Six) Hours As Needed.   Yes Dheeraj Alvarez MD   pantoprazole (PROTONIX) 40 MG EC tablet Take 40 mg by mouth Daily.   Yes ProviderDheeraj MD   spironolactone (ALDACTONE) 25 MG tablet Take 1 tablet by mouth Daily. 11/19/19  Yes Wayne Luna MD       Medical History    Past Medical History:   Diagnosis Date   • CHF (congestive heart failure) (CMS/[facility])    • COPD (chronic obstructive pulmonary disease) (CMS/[facility])    • Diabetes (CMS/[facility])    • Hyperlipidemia    • Hypertension     INDRA on PAP therapy     Surgical History    Past Surgical History:   Procedure Laterality Date   • BREAST LUMPECTOMY     • CARDIAC CATHETERIZATION N/A 12/24/2019    Procedure: Right and Left Heart Cath 12/24/19 @ 0900;  Surgeon: Wayne Luna MD;  Location: [facility] CATH INVASIVE LOCATION;  Service: Cardiovascular   • CARDIAC CATHETERIZATION N/A 12/24/2019    Procedure: Coronary angiography;  Surgeon: Wayne Luna MD;  Location: [facility] CATH INVASIVE LOCATION;  Service: Cardiovascular   • CARDIAC ELECTROPHYSIOLOGY PROCEDURE Left 6/28/2019    Procedure: Dual-chamber ICD insertion;  Surgeon: Héctor Eckert  MD;  Location: [facility] CATH INVASIVE LOCATION;  Service: Cardiovascular   • CARDIAC ELECTROPHYSIOLOGY PROCEDURE Left 2019    Procedure: Lead Revision;  Surgeon: Héctor Eckert MD;  Location: [facility] CATH INVASIVE LOCATION;  Service: Cardiovascular   • CHOLECYSTECTOMY     • HYSTERECTOMY     • INSERT / REPLACE / REMOVE PACEMAKER     • THYROID SURGERY          Family History    Family History   Problem Relation Age of Onset   • Heart disease Father        Social History    Social History     Tobacco Use   • Smoking status: Former Smoker     Last attempt to quit: 2019     Years since quittin.0   • Smokeless tobacco: Never Used   Substance Use Topics   • Alcohol use: No     Frequency: Never        Allergies    Allergies   Allergen Reactions   • Hydrocodone Hives   • Penicillin G Unknown (See Comments)       Medications    Scheduled Meds:    allopurinol 300 mg Oral Daily   amiodarone 200 mg Oral BID With Meals   [START ON 1/3/2020] aspirin 324 mg Oral Daily   atorvastatin 40 mg Oral Nightly   bisacodyl 10 mg Rectal Daily   docusate sodium 100 mg Oral BID   enoxaparin 40 mg Subcutaneous Daily   ferrous sulfate 324 mg Oral Daily With Breakfast   fluconazole 200 mg Oral Q48H   folic acid 1 mg Oral Daily   guaiFENesin 600 mg Oral Q12H   insulin lispro 0-9 Units Subcutaneous 4x Daily With Meals & Nightly   ipratropium-albuterol 3 mL Nebulization 4x Daily - RT   lactulose 30 g Oral Daily   levothyroxine 200 mcg Oral Q AM   lidocaine 2 patch Transdermal Q24H   midodrine 5 mg Oral TID AC   montelukast 10 mg Oral Nightly   pantoprazole 40 mg Oral Q AM   polyethylene glycol 17 g Oral BID   senna 1 tablet Oral BID     Continuous Infusions:    sodium chloride 30 mL/hr Last Rate: 30 mL/hr (19 1430)     PRN Meds:.•  acetaminophen  •  ALPRAZolam  •  dextrose  •  dextrose  •  glucagon (human recombinant)  •  insulin lispro **AND** insulin lispro  •  ipratropium-albuterol  •  MOUTH KOTE  •  naloxone  •  ondansetron  •   oxyCODONE  •  potassium chloride **OR** potassium chloride  •  potassium chloride **OR** potassium chloride      Physical Exam    tMax 24 hrs:  Temp (24hrs), Av.9 °F (36.6 °C), Min:97.4 °F (36.3 °C), Max:98.4 °F (36.9 °C)    Vitals Ranges:  Temp:  [97.4 °F (36.3 °C)-98.4 °F (36.9 °C)] 98 °F (36.7 °C)  Heart Rate:  [80-93] 89  Resp:  [16] 16  BP: ()/(55-87) 115/77  Intake and Output Last 3 Shifts:  I/O last 3 completed shifts:  In: 2240 [P.O.:1440; I.V.:800]  Out: 3250 [Urine:3250]    Constitutional:  Alert, no acute respiratory distress   HEENT:  Atraumatic, PERRL, conjunctiva normal, moist oral mucosa, no nasal discharge.  Trachea is midline.  Respiratory:  No respiratory distress, normal breath sounds, no rales, no wheezing   Cardiovascular:  Normal rate, normal rhythm and no murmurs.  Pulses 2+ and equal in all four extremities.    GI:  Soft, nondistended, positive bowel sounds.  :  No costovertebral angle tenderness   Extremities: No edema, cyanosis or tenderness.  Integument:  No rashes.   Neurologic:  Alert & oriented x 3, CN 2-12 normal, normal motor function, normal sensory function, no focal deficits noted   Psychiatric:  Speech and behavior appropriate     labs    CBC  Results from last 7 days   Lab Units 20  0558 20  0634 19  0631 19  1849 19  0338 19  0352 19  0336  19  1423   WBC 10*3/mm3 5.50 6.40 5.10  --  8.00 9.90 9.90  --  11.70*   RBC 10*6/mm3 2.56* 2.73* 2.68*  --  2.71* 2.98* 3.07*  --  3.59*   HEMOGLOBIN g/dL 8.2* 8.7* 8.5*  --  8.6* 9.7* 10.0*  --  11.6*   HEMOGLOBIN, POC g/dL  --   --   --  8.1*  --   --   --    < >  --    HEMATOCRIT % 24.0* 25.5* 25.3*  --  25.3* 27.8* 28.7*  --  33.7*   HEMATOCRIT POC %  --   --   --  24*  --   --   --    < >  --    MCV fL 93.5 93.2 94.4  --  93.1 93.1 93.5  --  93.9   PLATELETS 10*3/mm3 211 207 170  --  134* 106* 104*  --  110*    < > = values in this interval not displayed.       BMP  Results  from last 7 days   Lab Units 01/02/20  0558 01/01/20  0634 12/31/19  0631 12/30/19  0338 12/29/19  0352 12/28/19  0336 12/27/19  1423   SODIUM mmol/L 138 139 137 136 134* 138 140   POTASSIUM mmol/L 3.6 3.7 4.0 4.4 4.1 4.0 4.4   CHLORIDE mmol/L 96* 97* 96* 99 96* 103 108*   CO2 mmol/L 31.0* 31.0* 30.0* 27.0 26.0 24.0 24.0   BUN mg/dL 11 11 12 13 12 11 11   CREATININE mg/dL 1.00 1.04* 1.18* 1.25* 1.52* 0.97 0.88   GLUCOSE mg/dL 119* 106* 122* 157* 134* 146* 181*   MAGNESIUM mg/dL 2.2 2.0 2.1 2.2 2.0 2.4  --    PHOSPHORUS mg/dL 4.0 3.5 3.1 3.0 4.6* 3.9 3.0       CMP   Results from last 7 days   Lab Units 01/02/20  0558 01/01/20  0634 12/31/19  0631 12/30/19  0338 12/29/19  0352 12/28/19  0336 12/27/19  1423   SODIUM mmol/L 138 139 137 136 134* 138 140   POTASSIUM mmol/L 3.6 3.7 4.0 4.4 4.1 4.0 4.4   CHLORIDE mmol/L 96* 97* 96* 99 96* 103 108*   CO2 mmol/L 31.0* 31.0* 30.0* 27.0 26.0 24.0 24.0   BUN mg/dL 11 11 12 13 12 11 11   CREATININE mg/dL 1.00 1.04* 1.18* 1.25* 1.52* 0.97 0.88   GLUCOSE mg/dL 119* 106* 122* 157* 134* 146* 181*   ALBUMIN g/dL 3.70 3.90 3.90 4.10 3.80 4.10 3.70   BILIRUBIN mg/dL  --   --   --   --  0.8  --   --    ALK PHOS U/L  --   --   --   --  54  --   --    AST (SGOT) U/L  --   --   --   --  58*  --   --    ALT (SGPT) U/L  --   --   --   --  25  --   --          TROPONIN        CoAg  Results from last 7 days   Lab Units 12/27/19  1423 12/26/19  1043   INR  1.20* 0.91   APTT seconds 30.9 27.9       Creatinine Clearance  Estimated Creatinine Clearance: 83.2 mL/min (by C-G formula based on SCr of 1 mg/dL).    ABG  Results from last 7 days   Lab Units 12/31/19  0043 12/30/19  1849 12/29/19  0816 12/28/19  0815 12/27/19  2209 12/27/19  2113 12/27/19 2016   PH, ARTERIAL pH units 7.376 7.396 7.393 7.382 7.356 7.425 7.404   PCO2, ARTERIAL mm Hg 51.7* 49.4* 47.5 43.7 45.9 39.6 41.7   PO2 ART mm Hg 53.2* 68.1* 41.6* 68.1* 74.0* 89.0 82.8*   O2 SATURATION ART % 85.7* 92.9* 75.9* 92.9* 93.9* 97.1 96.1    BASE EXCESS ART mmol/L 4.4* 4.9* 3.4* 0.7 0.0 1.5 1.2       Imaging & Other Studies    Imaging Results (Last 72 Hours)     Procedure Component Value Units Date/Time    XR Chest 1 View [[id number]] Collected:  01/01/20 1100     Updated:  01/01/20 1103    Narrative:       DATE OF EXAM:  1/1/2020 10:53 AM     PROCEDURE:  XR CHEST 1 VW-     INDICATIONS:  right sided sharp pain below ribs; R07.9-Chest pain, unspecified;  I35.1-Nonrheumatic aortic (valve) insufficiency; I42.0-Dilated  cardiomyopathy; Z95.810-Presence of automatic (implantable) cardiac  defibrillator; I20.0-Unstable angina; I25.119-Atherosclerotic heart  disease of native coronary artery with unspecified angina pectoris;  I35.1-Nonrheumatic aortic (valve) insufficiency; J43.9-Emphysema,      COMPARISON:  12/31/2019     TECHNIQUE:   Single radiographic AP view of the chest was obtained.     FINDINGS:  There is a right internal jugular Irwin-Brad sheath in place with the tip  in the superior vena cava. There is a left subclavian pacemaker  device/AICD device with leads overlying the right atrium and right  ventricle. The heart is enlarged with changes of CABG. There are  bilateral pleural effusions left greater than right with bilateral  basilar airspace disease. Aeration is slightly worse in the interval.        Impression:       Mild worsening of bilateral basilar infiltrates with moderate left and  small-to-moderate right pleural effusions.     Electronically Signed By-Chidi Walton On:1/1/2020 11:01 AM  This report was finalized on [id number] by  Chidi Walton, .    XR Chest 1 View [[id number]] Collected:  12/31/19 1152     Updated:  12/31/19 1155    Narrative:       DATE OF EXAM:  12/31/2019 10:48 AM     PROCEDURE:  XR CHEST 1 VW-     INDICATIONS:  Hypoxia; R07.9-Chest pain, unspecified; I35.1-Nonrheumatic aortic  (valve) insufficiency; I42.0-Dilated cardiomyopathy; Z95.810-Presence of  automatic (implantable) cardiac defibrillator; I20.0-Unstable  angina;  I25.119-Atherosclerotic heart disease of native coronary artery with  unspecified angina pectoris; I35.1-Nonrheumatic aortic (valve)  insufficiency; J43.9-Emphysema, unspecified patient's a 44-year-old  female with hypoxia history of open heart surgery on December 26.  History of aortic regurg, former smoker     COMPARISON:  Comparison is made to study of 12/29/2019     TECHNIQUE:   Single radiographic AP view of the chest was obtained.     FINDINGS:  Today's portable erect study was obtained on 12/31/2019 at 10:50 AM.     Today's study demonstrates the left basilar opacity remaining stable  there is increasing right basilar opacity consistent with increasing  atelectasis and probable small pleural effusion. The right internal  jugular vascular sheath remains in good position the left-sided  dual-lead pacemaker defibrillator remains in good position changes of  median sternotomy coronary artery bypass grafting and aortic valvular  replacement are again seen.        Impression:          1. Mild interval worsening from study of December 29  2. Increasing right basilar opacities felt to represent increasing  atelectasis  3. Stable moderate left basilar opacity consistent with atelectasis,  pneumonia and/or pleural effusion     Electronically Signed By-Ashwin Beavers Jr. On:12/31/2019 11:53 AM  This report was finalized on [id number] by  Ashwin Beavers Jr., .            AssessmenT/PLAN  Decreased level of consciousness related to opioids  INDRA compliant with BiPAP use  POD CABG and AVR - aortic regurgitation and CAD  HFrEF - EF35%  AICD  CKD stage II  T2DM  Hypothyroidism on Synthroid  Anxiety on Xanax  Chronic pain follows outpatient pain management clinict  COPD without exacerbation  Cardiomyopathy    Plan:   -Chest x-ray today with worse pulmonary vascular congestion and atelectasis.  Nephrology following and likely would need diuretics  -aggressive pulmonary toilet with incentive spirometry, flutter valve and  nebs.  -supplemental oxygen, maintain sats 92%  -on Fluconazole  -ABG reviewed  -cont to use home PAP with sleep   -limit sedating medication   -D/C gabapentin  -Lidocaine patch  -duonebs and Mucinex   -may benefit from some diuretics    Pulmonary status is stable.  OT recommends - rehab        Electronically signed by Loraine Son APRN at 01/02/20 0921     Wayne Luna MD at 01/01/20 3192          Cardiology Office Visit      Encounter Date:  12/12/2019    Patient ID:   Rafael Mccann is a 44 y.o. female.      Impressions:     Congestive heart failure  Acute systolic heart failure exacerbation on chronic systolic heart failure  CHF NYHA class IV  Valvular heart disease with a significant aortic insufficiency  Essential hypertension  Chronic systolic heart failure  History of cardiomyopathy   Chronic renal insufficiency  Coronary artery disease with diffuse RCA disease  History of diabetes mellitus type 2  History of thyroid disease  Cardiomyopathy with LV ejection fraction of 35%  s/p AICD placement/normal device function     Impressions: Catheterization  Severe two-vessel coronary artery disease  Moderate stenosis involving the distal LAD  4+ aortic regurgitation  Normal PA pressures  Normal filling pressures   Patient is s/p urgent AVR (21 mm Magna), CABG x3 with SV to Diag1 and SV sequential to PDA and then OM3 12/27/2019  Status post coronary artery bypass surgery x3 and aortic valve replacement surgery  Patient is currently hemodynamically stable   Normal sinus rhythm  Continue close monitoring  Continue postsurgical care   Making good progress  Agree with the starting Middaugh drain  Once her blood pressure is improved start low-dose beta-blockers  Ambulate  Schedule for a limited echo to assess LV systolic function and rule out pericardial effusion for persistent hypotension  Further recommendations based on patient hospital course      Reason For  Followup:  Cardiomyopathy  Hypertension  Hyperlipidemia  Valvular heart disease  Coronary artery disease         Interval History:  44-year-old -American female who presented to the hospital initially with report of chest discomfort.  Patient has known history of valvular heart disease.      Subjective:  Patient is reports some chest discomfort- 5/10.  Patient has Percocet ordered for her chronic pain.  She denies any shortness of breath.  Patient nervous about upcoming surgery.    Assessment & Plan    Impressions:     Congestive heart failure  Acute systolic heart failure exacerbation on chronic systolic heart failure  CHF NYHA class IV  Valvular heart disease with a significant aortic insufficiency  Essential hypertension  Chronic systolic heart failure  History of cardiomyopathy   Chronic renal insufficiency  Coronary artery disease with diffuse RCA disease  History of diabetes mellitus type 2  History of thyroid disease  Cardiomyopathy with LV ejection fraction of 35%  s/p AICD placement/normal device function             Objective:    Vitals:  Vitals:    01/01/20 0804 01/01/20 1110 01/01/20 1113 01/01/20 1244   BP:       Pulse:  88 89    Resp:  16     Temp: 98.3 °F (36.8 °C)   98 °F (36.7 °C)   TempSrc:       SpO2:  96% 95%    Weight:       Height:           Physical Exam:    General: Well-developed, well-nourished 44-year-old -American female   Extubated sitting up in chair  Still chest tube in place  Head:  Normocephalic, atraumatic, mucous membranes moist  Eyes:     Neck:  Supple,  no adenopathy, IJ right;      Lungs: Clear to auscultation bilaterally, few crackles at the bilateral bases  chest wall: Mid line scar with no infection or bleeding  Heart::  Regular rate and rhythm, S1 and S2 normal, 2 x 6 ejection systolic murmur  Abdomen: Soft, nondistended  Extremities: No cyanosis, clubbing, or edema  Pulses: 2+ and symmetric all extremities  Skin:  No rashes or  lesions  Neuro/psych:     Allergies:  Allergies   Allergen Reactions   • Hydrocodone Hives   • Penicillin G Unknown (See Comments)       Medication Review:     Current Facility-Administered Medications:   •  acetaminophen (TYLENOL) tablet 500 mg, 500 mg, Oral, Q6H PRN, Lane Stock MD, 500 mg at 01/01/20 0942  •  allopurinol (ZYLOPRIM) tablet 300 mg, 300 mg, Oral, Daily, Chidi Pace MD, 300 mg at 01/01/20 0818  •  ALPRAZolam (XANAX) tablet 0.25 mg, 0.25 mg, Oral, BID PRN, Chidi Pace MD, 0.25 mg at 12/31/19 0925  •  amiodarone (PACERONE) tablet 200 mg, 200 mg, Oral, BID With Meals, Lane Stock MD, 200 mg at 01/01/20 0818  •  aspirin chewable tablet 81 mg, 81 mg, Oral, Daily, Kayla Burrell APRN, 81 mg at 01/01/20 0818  •  atorvastatin (LIPITOR) tablet 40 mg, 40 mg, Oral, Nightly, Chidi Pace MD, 40 mg at 12/31/19 2050  •  calcium gluconate 1g/50ml 0.675% NaCl IV SOLN, 1 g, Intravenous, Once, Lane Stock MD  •  dextrose (D50W) 25 g/ 50mL Intravenous Solution 25 g, 25 g, Intravenous, Q15 Min PRN, Kayla Burrell APRN  •  dextrose (GLUTOSE) oral gel 15 g, 15 g, Oral, Q15 Min PRN, Kayla Burrell APRN  •  docusate sodium (COLACE) capsule 100 mg, 100 mg, Oral, BID, Chidi Pace MD, 100 mg at 01/01/20 0818  •  enoxaparin (LOVENOX) syringe 40 mg, 40 mg, Subcutaneous, Daily, Chidi Pace MD, 40 mg at 12/31/19 2051  •  ferrous sulfate EC tablet 324 mg, 324 mg, Oral, Daily With Breakfast, Chidi Pace MD, 324 mg at 01/01/20 0818  •  fluconazole (DIFLUCAN) tablet 200 mg, 200 mg, Oral, Q48H, Chidi Pace MD, 200 mg at 12/30/19 2031  •  folic acid (FOLVITE) tablet 1 mg, 1 mg, Oral, Daily, Chidi Pace MD, 1 mg at 01/01/20 0818  •  glucagon (human recombinant) (GLUCAGEN DIAGNOSTIC) injection 1 mg, 1 mg, Subcutaneous, Q15 Min PRN, Kayla Burrell APRN  •  guaiFENesin (MUCINEX) 12 hr tablet 600 mg, 600 mg, Oral, Q12H, Hasmukh David MD, 600 mg  at 01/01/20 0818  •  insulin lispro (humaLOG) injection 0-9 Units, 0-9 Units, Subcutaneous, 4x Daily With Meals & Nightly **AND** insulin lispro (humaLOG) injection 0-9 Units, 0-9 Units, Subcutaneous, PRN, Kayla Burrell APRN  •  ipratropium-albuterol (DUO-NEB) nebulizer solution 3 mL, 3 mL, Nebulization, 4x Daily - RT, Loriane Son APRN, 3 mL at 01/01/20 1110  •  ipratropium-albuterol (DUO-NEB) nebulizer solution 3 mL, 3 mL, Nebulization, Q4H PRN, Loraine Son APRN  •  lactulose (CHRONULAC) 10 GM/15ML solution 30 g, 30 g, Oral, Daily, Kayla Burrell APRN, 30 g at 01/01/20 0818  •  levothyroxine (SYNTHROID, LEVOTHROID) tablet 200 mcg, 200 mcg, Oral, Q AM, Chidi Pace MD, 200 mcg at 01/01/20 0501  •  [START ON 1/2/2020] lidocaine (LIDODERM) 5 % 2 patch, 2 patch, Transdermal, Q24H, Hasmukh David MD, 1 patch at 01/01/20 1007  •  midodrine (PROAMATINE) tablet 5 mg, 5 mg, Oral, TID AC, Adalid Sterling MD, 5 mg at 01/01/20 0818  •  montelukast (SINGULAIR) tablet 10 mg, 10 mg, Oral, Nightly, Chidi Pace MD, 10 mg at 12/31/19 2051  •  MOUTH KOTE solution 2 mL, 2 spray, Mouth/Throat, Q4H PRN, Chidi Pace MD  •  naloxone (NARCAN) injection 0.4 mg, 0.4 mg, Intravenous, Q5 Min PRN, Lane Stock MD  •  ondansetron (ZOFRAN) injection 4 mg, 4 mg, Intravenous, Q6H PRN, Chidi Pace MD, 4 mg at 01/01/20 0055  •  oxyCODONE (ROXICODONE) immediate release tablet 5 mg, 5 mg, Oral, Q4H PRN, Chidi Pace MD, 5 mg at 01/01/20 0845  •  pantoprazole (PROTONIX) EC tablet 40 mg, 40 mg, Oral, Q AM, Chidi Pace MD, 40 mg at 01/01/20 0501  •  polyethylene glycol (MIRALAX) powder 17 g, 17 g, Oral, BID, Chidi Pace MD, 17 g at 01/01/20 0818  •  potassium chloride (K-DUR,KLOR-CON) CR tablet 20 mEq, 20 mEq, Oral, PRN **OR** potassium chloride (KLOR-CON) packet 20 mEq, 20 mEq, Oral, PRN, Chidi Pace MD  •  potassium chloride 10 mEq in 100 mL IVPB, 10 mEq,  Intravenous, Q1H PRN **OR** potassium chloride 10 mEq in 100 mL IVPB, 10 mEq, Intravenous, Q1H PRN, Chidi Pace MD  •  senna (SENOKOT) tablet 1 tablet, 1 tablet, Oral, BID, Chidi Pace MD, 1 tablet at 01/01/20 0818  •  sodium chloride 0.9 % infusion, 30 mL/hr, Intravenous, Continuous, Chidi Pace MD, Last Rate: 30 mL/hr at 12/27/19 1430, 30 mL/hr at 12/27/19 1430    Family History:  Family History   Problem Relation Age of Onset   • Heart disease Father        Past Medical History:  Past Medical History:   Diagnosis Date   • CHF (congestive heart failure) (CMS/[facility])    • COPD (chronic obstructive pulmonary disease) (CMS/[facility])    • Diabetes (CMS/[facility])    • Hyperlipidemia    • Hypertension        Past surgical History:  Past Surgical History:   Procedure Laterality Date   • BREAST LUMPECTOMY     • CARDIAC CATHETERIZATION N/A 12/24/2019    Procedure: Right and Left Heart Cath 12/24/19 @ 0900;  Surgeon: Wayne Luna MD;  Location: [facility] CATH INVASIVE LOCATION;  Service: Cardiovascular   • CARDIAC CATHETERIZATION N/A 12/24/2019    Procedure: Coronary angiography;  Surgeon: Wayne Luna MD;  Location: [facility] CATH INVASIVE LOCATION;  Service: Cardiovascular   • CARDIAC ELECTROPHYSIOLOGY PROCEDURE Left 6/28/2019    Procedure: Dual-chamber ICD insertion;  Surgeon: Héctor Eckert MD;  Location: [facility] CATH INVASIVE LOCATION;  Service: Cardiovascular   • CARDIAC ELECTROPHYSIOLOGY PROCEDURE Left 8/14/2019    Procedure: Lead Revision;  Surgeon: Héctor Eckert MD;  Location: [facility] CATH INVASIVE LOCATION;  Service: Cardiovascular   • CHOLECYSTECTOMY     • HYSTERECTOMY     • INSERT / REPLACE / REMOVE PACEMAKER     • THYROID SURGERY         Social History:  Social History     Socioeconomic History   • Marital status:      Spouse name: Not on file   • Number of children: Not on file   • Years of education: Not on file   • Highest education level: Not on file   Tobacco Use    • Smoking status: Former Smoker     Last attempt to quit: 2019     Years since quittin.0   • Smokeless tobacco: Never Used   Substance and Sexual Activity   • Alcohol use: No     Frequency: Never   • Drug use: No   • Sexual activity: Defer       Review of Systems:  The following systems were reviewed as they relate to the cardiovascular system: Constitutional, Eyes, ENT, Cardiovascular, Respiratory, Gastrointestinal, Integumentary, Neurological, Psychiatric, Hematologic, Endocrine, Musculoskeletal, and Genitourinary. The pertinent cardiovascular findings are reported above with all other cardiovascular points within those systems being negative.        NOTE: The following portions of the patient's history were reviewed and updated this visit as appropriate: allergies, current medications, past family history, past medical history, past social history, past surgical history and problem list.    Electronically signed by Wayne Luna MD at 20 1400     Hasmukh David MD at 20 1043          Pulmonary/ Critical Care progress Note        Patient Name:  Rafael Mccann    :  1975    Medical Record:  [id number]    Requesting Physician    Phani Adames, *    Primary Care Physician     Phani Adames MD    Reason for consultation    Rafael Mccann is a 44 y.o. female who we were asked to see in consultation for decreased level of consciousness.   Patient is POD#3 CABG and AVR after presenting to the ER on 19 with complaints of chest pain, dyspnea and tachycardia.  Patient with a history of CAD being medically managed as well as cardiomyopathy.   Cardiac cath on 19 showed severe two vessel disease and 4+aortic regurg.       This evening patient was found to be very difficult to arouse in bed after being up to chair this morning and ambulating in the afternoon.   Patient was given Narcan with improvement in her mental status.  ABG was obtained as  well which showed pCO2 of 49 and a pAO2 of 68.   Patient had been requiring Dilaudid 0.25 mg q2h IV and Oxycodone 5 mg q4h, for pain control in addition had Ultram and Benadryl earlier today.      Review of Systems    12/31:  OOB to chair, awake and alert.  Complaints of right lower rib pain.    1/1/20:  OOB to chair,  Remains with complaints of pain      Home medicationS  Prior to Admission medications    Medication Sig Start Date End Date Taking? Authorizing Provider   allopurinol (ZYLOPRIM) 300 MG tablet Take 300 mg by mouth Daily.   Yes Dheeraj Alvarez MD   ALPRAZolam (XANAX) 1 MG tablet Take 1 mg by mouth 4 (Four) Times a Day As Needed for Anxiety.   Yes Dheeraj Alvarez MD   amLODIPine (NORVASC) 5 MG tablet Take 5 mg by mouth 2 (Two) Times a Day.   Yes Dheeraj Alvarez MD   aspirin 81 MG chewable tablet Chew 81 mg Daily.   Yes Dheeraj Alvarez MD   atorvastatin (LIPITOR) 40 MG tablet Take 1 tablet by mouth Every Night. 6/30/19  Yes Hannah Wills MD   carvedilol (COREG) 12.5 MG tablet Take 1 tablet by mouth 2 (Two) Times a Day With Meals. 6/30/19  Yes Hannah Wills MD   cholecalciferol (VITAMIN D3) 1000 units tablet Take 1,000 Units by mouth Daily.   Yes Dheeraj Alvarez MD   clindamycin (CLEOCIN) 300 MG capsule Take 300 mg by mouth 2 (Two) Times a Day. 12/18/19 1/1/20 Yes Dheeraj Alvarez MD   famotidine (PEPCID) 20 MG tablet Take 1 tablet by mouth 2 (Two) Times a Day. 9/17/19  Yes Dina Jack PA   ferrous sulfate 325 (65 FE) MG tablet Take 65 mg by mouth Daily With Breakfast.   Yes Dheeraj Alvarez MD   fluconazole (DIFLUCAN) 200 MG tablet Take 200 mg by mouth Every Other Day.   Yes Dheeraj Alvarez MD   folic acid (FOLVITE) 1 MG tablet Take 1 mg by mouth Daily.   Yes Dheeraj Alvarez MD   furosemide (LASIX) 40 MG tablet Take 40 mg by mouth 2 (Two) Times a Day.   Yes Dheeraj Alvarez MD   gabapentin (NEURONTIN) 300 MG capsule Take 300 mg by mouth 3  (Three) Times a Day.   Yes ProviderDheeraj MD   hydrALAZINE (APRESOLINE) 100 MG tablet Take 1 tablet by mouth 3 (Three) Times a Day. 12/12/19  Yes Wayne Luna MD   levothyroxine (SYNTHROID, LEVOTHROID) 200 MCG tablet Take 200 mcg by mouth Daily.   Yes Dheeraj Alvarez MD   lisinopril (PRINIVIL,ZESTRIL) 20 MG tablet Take 20 mg by mouth Daily.   Yes Dheeraj Alvarez MD   metFORMIN (GLUCOPHAGE) 500 MG tablet Take 500 mg by mouth Daily With Breakfast.   Yes ProviderDheeraj MD   montelukast (SINGULAIR) 10 MG tablet Take 10 mg by mouth Every Night.   Yes ProviderDheeraj MD   oxyCODONE-acetaminophen (PERCOCET) 7.5-325 MG per tablet Take 1 tablet by mouth Every 6 (Six) Hours As Needed.   Yes ProviderDheeraj MD   pantoprazole (PROTONIX) 40 MG EC tablet Take 40 mg by mouth Daily.   Yes ProviderDheeraj MD   spironolactone (ALDACTONE) 25 MG tablet Take 1 tablet by mouth Daily. 11/19/19  Yes Wayne Luna MD       Medical History    Past Medical History:   Diagnosis Date   • CHF (congestive heart failure) (CMS/[facility])    • COPD (chronic obstructive pulmonary disease) (CMS/[facility])    • Diabetes (CMS/[facility])    • Hyperlipidemia    • Hypertension     INDRA on PAP therapy     Surgical History    Past Surgical History:   Procedure Laterality Date   • BREAST LUMPECTOMY     • CARDIAC CATHETERIZATION N/A 12/24/2019    Procedure: Right and Left Heart Cath 12/24/19 @ 0900;  Surgeon: Wayne Luna MD;  Location: [facility] CATH INVASIVE LOCATION;  Service: Cardiovascular   • CARDIAC CATHETERIZATION N/A 12/24/2019    Procedure: Coronary angiography;  Surgeon: Wayne Luna MD;  Location: [facility] CATH INVASIVE LOCATION;  Service: Cardiovascular   • CARDIAC ELECTROPHYSIOLOGY PROCEDURE Left 6/28/2019    Procedure: Dual-chamber ICD insertion;  Surgeon: Héctor Eckert MD;  Location: [facility] CATH INVASIVE LOCATION;  Service: Cardiovascular   • CARDIAC ELECTROPHYSIOLOGY  PROCEDURE Left 2019    Procedure: Lead Revision;  Surgeon: Héctor Eckert MD;  Location: [facility] INVASIVE LOCATION;  Service: Cardiovascular   • CHOLECYSTECTOMY     • HYSTERECTOMY     • INSERT / REPLACE / REMOVE PACEMAKER     • THYROID SURGERY          Family History    Family History   Problem Relation Age of Onset   • Heart disease Father        Social History    Social History     Tobacco Use   • Smoking status: Former Smoker     Last attempt to quit: 2019     Years since quittin.0   • Smokeless tobacco: Never Used   Substance Use Topics   • Alcohol use: No     Frequency: Never        Allergies    Allergies   Allergen Reactions   • Hydrocodone Hives   • Penicillin G Unknown (See Comments)       Medications    Scheduled Meds:    allopurinol 300 mg Oral Daily   amiodarone 200 mg Oral BID With Meals   aspirin 81 mg Oral Daily   atorvastatin 40 mg Oral Nightly   calcium gluconate 1 g Intravenous Once   docusate sodium 100 mg Oral BID   enoxaparin 40 mg Subcutaneous Daily   ferrous sulfate 324 mg Oral Daily With Breakfast   fluconazole 200 mg Oral Q48H   folic acid 1 mg Oral Daily   guaiFENesin 600 mg Oral Q12H   insulin lispro 0-9 Units Subcutaneous 4x Daily With Meals & Nightly   ipratropium-albuterol 3 mL Nebulization 4x Daily - RT   lactulose 30 g Oral Daily   levothyroxine 200 mcg Oral Q AM   [START ON 2020] lidocaine 2 patch Transdermal Q24H   midodrine 5 mg Oral TID AC   montelukast 10 mg Oral Nightly   pantoprazole 40 mg Oral Q AM   polyethylene glycol 17 g Oral BID   senna 1 tablet Oral BID     Continuous Infusions:    sodium chloride 30 mL/hr Last Rate: 30 mL/hr (19 1430)     PRN Meds:.•  acetaminophen  •  ALPRAZolam  •  dextrose  •  dextrose  •  glucagon (human recombinant)  •  insulin lispro **AND** insulin lispro  •  MOUTH KOTE  •  naloxone  •  ondansetron  •  oxyCODONE  •  potassium chloride **OR** potassium chloride  •  potassium chloride **OR** potassium  chloride      Physical Exam    tMax 24 hrs:  Temp (24hrs), Av.6 °F (37 °C), Min:98 °F (36.7 °C), Max:99.5 °F (37.5 °C)    Vitals Ranges:  Temp:  [98 °F (36.7 °C)-99.5 °F (37.5 °C)] 98.3 °F (36.8 °C)  Heart Rate:  [] 93  Resp:  [16] 16  BP: ()/(40-95) 91/55  Intake and Output Last 3 Shifts:  I/O last 3 completed shifts:  In: 2130 [P.O.:1710; I.V.:420]  Out: 4200 [Urine:4200]    Constitutional:  Alert, no acute respiratory distress   HEENT:  Atraumatic, PERRL, conjunctiva normal, moist oral mucosa, no nasal discharge.  Trachea is midline.  Respiratory:  No respiratory distress, normal breath sounds, no rales, no wheezing   Cardiovascular:  Normal rate, normal rhythm and no murmurs.  Pulses 2+ and equal in all four extremities.    GI:  Soft, nondistended, positive bowel sounds.  :  No costovertebral angle tenderness   Extremities: No edema, cyanosis or tenderness.  Integument:  No rashes.   Neurologic:  Alert & oriented x 3, CN 2-12 normal, normal motor function, normal sensory function, no focal deficits noted   Psychiatric:  Speech and behavior appropriate     labs    CBC  Results from last 7 days   Lab Units 20  0634 19  0631 19  1849 19  0338 19  0352 19  0336 19  2209  19  1423  19  0353   WBC 10*3/mm3 6.40 5.10  --  8.00 9.90 9.90  --   --  11.70*  --  6.90   RBC 10*6/mm3 2.73* 2.68*  --  2.71* 2.98* 3.07*  --   --  3.59*  --  4.34   HEMOGLOBIN g/dL 8.7* 8.5*  --  8.6* 9.7* 10.0*  --   --  11.6*  --  13.6   HEMOGLOBIN, POC g/dL  --   --  8.1*  --   --   --  9.7*   < >  --    < >  --    HEMATOCRIT % 25.5* 25.3*  --  25.3* 27.8* 28.7*  --   --  33.7*  --  41.2   HEMATOCRIT POC %  --   --  24*  --   --   --  28*   < >  --    < >  --    MCV fL 93.2 94.4  --  93.1 93.1 93.5  --   --  93.9  --  94.9   PLATELETS 10*3/mm3 207 170  --  134* 106* 104*  --   --  110*  --  151    < > = values in this interval not displayed.       BMP  Results from last 7  days   Lab Units 01/01/20  0634 12/31/19  0631 12/30/19  0338 12/29/19  0352 12/28/19  0336 12/27/19  1423 12/27/19  0353 12/26/19  0454   SODIUM mmol/L 139 137 136 134* 138 140 138 137   POTASSIUM mmol/L 3.7 4.0 4.4 4.1 4.0 4.4 3.6 3.6   CHLORIDE mmol/L 97* 96* 99 96* 103 108* 100 101   CO2 mmol/L 31.0* 30.0* 27.0 26.0 24.0 24.0 28.0 26.0   BUN mg/dL 11 12 13 12 11 11 14 14   CREATININE mg/dL 1.04* 1.18* 1.25* 1.52* 0.97 0.88 0.95 1.07*   GLUCOSE mg/dL 106* 122* 157* 134* 146* 181* 110* 112*   MAGNESIUM mg/dL 2.0 2.1 2.2 2.0 2.4  --   --  2.2   PHOSPHORUS mg/dL 3.5 3.1 3.0 4.6* 3.9 3.0  --  3.5       CMP   Results from last 7 days   Lab Units 01/01/20  0634 12/31/19  0631 12/30/19  0338 12/29/19  0352 12/28/19  0336 12/27/19  1423 12/27/19  0353 12/26/19  0454   SODIUM mmol/L 139 137 136 134* 138 140 138 137   POTASSIUM mmol/L 3.7 4.0 4.4 4.1 4.0 4.4 3.6 3.6   CHLORIDE mmol/L 97* 96* 99 96* 103 108* 100 101   CO2 mmol/L 31.0* 30.0* 27.0 26.0 24.0 24.0 28.0 26.0   BUN mg/dL 11 12 13 12 11 11 14 14   CREATININE mg/dL 1.04* 1.18* 1.25* 1.52* 0.97 0.88 0.95 1.07*   GLUCOSE mg/dL 106* 122* 157* 134* 146* 181* 110* 112*   ALBUMIN g/dL 3.90 3.90 4.10 3.80 4.10 3.70  --  4.00   BILIRUBIN mg/dL  --   --   --  0.8  --   --   --  0.4   ALK PHOS U/L  --   --   --  54  --   --   --  75   AST (SGOT) U/L  --   --   --  58*  --   --   --  19   ALT (SGPT) U/L  --   --   --  25  --   --   --  15         TROPONIN        CoAg  Results from last 7 days   Lab Units 12/27/19  1423 12/26/19  1043   INR  1.20* 0.91   APTT seconds 30.9 27.9       Creatinine Clearance  Estimated Creatinine Clearance: 79.7 mL/min (A) (by C-G formula based on SCr of 1.04 mg/dL (H)).    ABG  Results from last 7 days   Lab Units 12/31/19  0043 12/30/19  1849 12/29/19  0816 12/28/19  0815 12/27/19  2209 12/27/19  2113 12/27/19 2016   PH, ARTERIAL pH units 7.376 7.396 7.393 7.382 7.356 7.425 7.404   PCO2, ARTERIAL mm Hg 51.7* 49.4* 47.5 43.7 45.9 39.6 41.7   PO2  ART mm Hg 53.2* 68.1* 41.6* 68.1* 74.0* 89.0 82.8*   O2 SATURATION ART % 85.7* 92.9* 75.9* 92.9* 93.9* 97.1 96.1   BASE EXCESS ART mmol/L 4.4* 4.9* 3.4* 0.7 0.0 1.5 1.2       Imaging & Other Studies    Imaging Results (Last 72 Hours)     Procedure Component Value Units Date/Time    XR Chest 1 View [[id number]] Collected:  12/31/19 1152     Updated:  12/31/19 1155    Narrative:       DATE OF EXAM:  12/31/2019 10:48 AM     PROCEDURE:  XR CHEST 1 VW-     INDICATIONS:  Hypoxia; R07.9-Chest pain, unspecified; I35.1-Nonrheumatic aortic  (valve) insufficiency; I42.0-Dilated cardiomyopathy; Z95.810-Presence of  automatic (implantable) cardiac defibrillator; I20.0-Unstable angina;  I25.119-Atherosclerotic heart disease of native coronary artery with  unspecified angina pectoris; I35.1-Nonrheumatic aortic (valve)  insufficiency; J43.9-Emphysema, unspecified patient's a 44-year-old  female with hypoxia history of open heart surgery on December 26.  History of aortic regurg, former smoker     COMPARISON:  Comparison is made to study of 12/29/2019     TECHNIQUE:   Single radiographic AP view of the chest was obtained.     FINDINGS:  Today's portable erect study was obtained on 12/31/2019 at 10:50 AM.     Today's study demonstrates the left basilar opacity remaining stable  there is increasing right basilar opacity consistent with increasing  atelectasis and probable small pleural effusion. The right internal  jugular vascular sheath remains in good position the left-sided  dual-lead pacemaker defibrillator remains in good position changes of  median sternotomy coronary artery bypass grafting and aortic valvular  replacement are again seen.        Impression:          1. Mild interval worsening from study of December 29  2. Increasing right basilar opacities felt to represent increasing  atelectasis  3. Stable moderate left basilar opacity consistent with atelectasis,  pneumonia and/or pleural effusion     Electronically Signed  By-Ashwin Beavers Jr. On:12/31/2019 11:53 AM  This report was finalized on [id number] by  Ashwin Beavers Jr., .    XR Chest 1 View [[id number]] Collected:  12/29/19 1313     Updated:  12/29/19 1319    Narrative:       DATE OF EXAM:  12/29/2019 1:02 PM     PROCEDURE:  XR CHEST 1 VW-     INDICATIONS:  CT removal; R07.9-Chest pain, unspecified; I35.1-Nonrheumatic aortic  (valve) insufficiency; I42.0-Dilated cardiomyopathy; Z95.810-Presence of  automatic (implantable) cardiac defibrillator; I20.0-Unstable angina;  I25.119-Atherosclerotic heart disease of native coronary artery with  unspecified angina pectoris; I35.1-Nonrheumatic aortic (valve)  insufficiency patient's a 44-year-old female status post chest tube  removal     COMPARISON:  Study of 5:01 AM earlier today     TECHNIQUE:   Single radiographic AP view of the chest was obtained.     FINDINGS:  Today's portable erect study was obtained on 12/29/2019 at 12:52 PM     Today's follow-up study demonstrates the right internal jugular sheath  being in place. The mediastinal drains have been removed there is no  evidence of pneumomediastinum or pneumothorax on today's study. Moderate  opacity in the left lung base continues either representing atelectasis  or pneumonia. Mild right basilar atelectasis is seen. A left-sided  dual-lead pacemaker defibrillator is seen. Changes of median sternotomy  and coronary artery bypass grafting are present. The upper abdomen  appears unremarkable.        Impression:          1. No significant change from earlier than the day other than removal of  mediastinal drains  2. Bilateral basilar opacities left greater than right felt to represent  atelectasis and/or infiltrate, possible pneumonia on the left,  atelectasis right base.  3. Right internal jugular sheath remains intact with tip in superior  vena cava, left-sided dual-lead pacemaker defibrillator appears  unchanged as are changes of CABG.     Electronically Signed By-Ashwin Beavers Jr.  On:2019 1:16 PM  This report was finalized on [id number] by  Ashwin Beavers Jr., .            AssessmenT/PLAN  Decreased level of consciousness related to opioids  INDRA compliant with BiPAP use  POD CABG and AVR - aortic regurgitation and CAD  HFrEF - EF35%  AICD  CKD stage II  T2DM  Hypothyroidism on Synthroid  Anxiety on Xanax  Chronic pain follows outpatient pain management clinict  COPD without exacerbation  Cardiomyopathy    Plan:   -Chest x-ray today with worse pulmonary vascular congestion and atelectasis.  Nephrology following and likely would need diuretics.  -No aggressive pulmonary toilet with incentive spirometry, flutter valve and nebs.  -supplemental oxygen, maintain sats 92%  -on FLuconazole  -ABG reviewed  -cont to use home PAP with sleep   -limit sedating medication   -D/C gabapentin  -Lidocaine patch  -pulmonary toilet, IS and flutter    -duonebs and Mucinex     OT recommends - rehab    Attending physician statement:  Above note scribed by nurse practitioner for me and later reviewed for accuracy. I've examined the patient and reviewed all labs and images.   I have directly participated in the evaluation and management of this patient.  Hasmukh david MD        Electronically signed by Hasmukh David MD at 20 1551     Adalid Sterling MD at 20 0917          NEPHROLOGY PROGRESS NOTE------[facility]    [facility]  [phone]  Adalid Sterling MD      Patient Care Team:  Phani Adames MD as PCP - General (Internal Medicine)  Ashish Smalls MD as Consulting Physician (Nephrology)      Provider:  Adalid Sterling MD  Patient Name: Rafael Mccann  :  1975    SUBJECTIVE:  F/u LINSEY  No chest pain or SOA   Off pressors  Lip swelling down    Medication:    allopurinol 300 mg Oral Daily   amiodarone 200 mg Oral BID With Meals   aspirin 81 mg Oral Daily   atorvastatin 40 mg Oral Nightly   docusate sodium 100 mg Oral  "BID   enoxaparin 40 mg Subcutaneous Daily   ferrous sulfate 324 mg Oral Daily With Breakfast   fluconazole 200 mg Oral Q48H   folic acid 1 mg Oral Daily   guaiFENesin 600 mg Oral Q12H   insulin lispro 0-9 Units Subcutaneous 4x Daily With Meals & Nightly   ipratropium-albuterol 3 mL Nebulization 4x Daily - RT   lactulose 30 g Oral Daily   levothyroxine 200 mcg Oral Q AM   [START ON 1/2/2020] lidocaine 2 patch Transdermal Q24H   midodrine 5 mg Oral TID AC   montelukast 10 mg Oral Nightly   mupirocin 1 application Each Nare BID   pantoprazole 40 mg Oral Q AM   polyethylene glycol 17 g Oral BID   senna 1 tablet Oral BID       sodium chloride 30 mL/hr Last Rate: 30 mL/hr (12/27/19 1430)       OBJECTIVE    Vital Sign Min/Max for last 24 hours  Temp  Min: 98 °F (36.7 °C)  Max: 99.5 °F (37.5 °C)   BP  Min: 84/58  Max: 131/72   Pulse  Min: 90  Max: 106   Resp  Min: 16  Max: 16   SpO2  Min: 81 %  Max: 99 %   No data recorded   Weight  Min: 94.3 kg (207 lb 14.3 oz)  Max: 94.3 kg (207 lb 14.3 oz)     Flowsheet Rows      First Filed Value   Admission Height  170.2 cm (67\") Documented at 12/22/2019 2250   Admission Weight  91.8 kg (202 lb 6.1 oz) Documented at 12/22/2019 2250          No intake/output data recorded.  I/O last 3 completed shifts:  In: 2130 [P.O.:1710; I.V.:420]  Out: 4200 [Urine:4200]    Physical Exam:  General Appearance: alert, appears stated age and cooperative. LIP edema  Head: normocephalic, without obvious abnormality and atraumatic  Eyes: conjunctivae and sclerae normal and no icterus  Neck: supple  Lungs: CTA bilaterally  Heart: regular rhythm & normal rate and normal S1, S2 +WALLY  Chest: S/P SURGICAL CHANGES  Abdomen: soft, NT  Extremities: moves extremities well, +TRACE PRETIBIAL EDEMA BILAT, no cyanosis and no redness  Skin: no bleeding, bruising or rash, turgor normal, color normal and no lesions noted  Neurologic: Alert, and oriented. No focal deficits    Labs:    WBC WBC   Date Value Ref Range Status "   01/01/2020 6.40 3.40 - 10.80 10*3/mm3 Final   12/31/2019 5.10 3.40 - 10.80 10*3/mm3 Final   12/30/2019 8.00 3.40 - 10.80 10*3/mm3 Final      HGB Hemoglobin   Date Value Ref Range Status   01/01/2020 8.7 (L) 12.0 - 15.9 g/dL Final   12/31/2019 8.5 (L) 12.0 - 15.9 g/dL Final   12/30/2019 8.1 (L) 12.0 - 17.0 g/dL Final   12/30/2019 8.6 (L) 12.0 - 15.9 g/dL Final      HCT Hematocrit   Date Value Ref Range Status   01/01/2020 25.5 (L) 34.0 - 46.6 % Final   12/31/2019 25.3 (L) 34.0 - 46.6 % Final   12/30/2019 24 (L) 38 - 51 % Final   12/30/2019 25.3 (L) 34.0 - 46.6 % Final      Platlets No results found for: LABPLAT   MCV MCV   Date Value Ref Range Status   01/01/2020 93.2 79.0 - 97.0 fL Final   12/31/2019 94.4 79.0 - 97.0 fL Final   12/30/2019 93.1 79.0 - 97.0 fL Final          Sodium Sodium   Date Value Ref Range Status   01/01/2020 139 136 - 145 mmol/L Final   12/31/2019 137 136 - 145 mmol/L Final   12/30/2019 136 136 - 145 mmol/L Final      Potassium Potassium   Date Value Ref Range Status   01/01/2020 3.7 3.5 - 5.2 mmol/L Final   12/31/2019 4.0 3.5 - 5.2 mmol/L Final   12/30/2019 4.4 3.5 - 5.2 mmol/L Final      Chloride Chloride   Date Value Ref Range Status   01/01/2020 97 (L) 98 - 107 mmol/L Final   12/31/2019 96 (L) 98 - 107 mmol/L Final   12/30/2019 99 98 - 107 mmol/L Final      CO2 CO2   Date Value Ref Range Status   01/01/2020 31.0 (H) 22.0 - 29.0 mmol/L Final   12/31/2019 30.0 (H) 22.0 - 29.0 mmol/L Final   12/30/2019 27.0 22.0 - 29.0 mmol/L Final      BUN BUN   Date Value Ref Range Status   01/01/2020 11 6 - 20 mg/dL Final   12/31/2019 12 6 - 20 mg/dL Final   12/30/2019 13 6 - 20 mg/dL Final      Creatinine Creatinine   Date Value Ref Range Status   01/01/2020 1.04 (H) 0.57 - 1.00 mg/dL Final   12/31/2019 1.18 (H) 0.57 - 1.00 mg/dL Final   12/30/2019 1.25 (H) 0.57 - 1.00 mg/dL Final      Calcium Calcium   Date Value Ref Range Status   01/01/2020 8.8 8.6 - 10.5 mg/dL Final   12/31/2019 8.8 8.6 - 10.5 mg/dL  Final   12/30/2019 8.8 8.6 - 10.5 mg/dL Final      PO4 No components found for: PO4   Albumin Albumin   Date Value Ref Range Status   01/01/2020 3.90 3.50 - 5.20 g/dL Final   12/31/2019 3.90 3.50 - 5.20 g/dL Final   12/30/2019 4.10 3.50 - 5.20 g/dL Final      Magnesium Magnesium   Date Value Ref Range Status   01/01/2020 2.0 1.6 - 2.6 mg/dL Final   12/31/2019 2.1 1.6 - 2.6 mg/dL Final   12/30/2019 2.2 1.6 - 2.6 mg/dL Final      Uric Acid No components found for: URIC ACID     Imaging Results (Last 72 Hours)     Procedure Component Value Units Date/Time    XR Chest 1 View [[id number]] Collected:  12/31/19 1152     Updated:  12/31/19 1155    Narrative:       DATE OF EXAM:  12/31/2019 10:48 AM     PROCEDURE:  XR CHEST 1 VW-     INDICATIONS:  Hypoxia; R07.9-Chest pain, unspecified; I35.1-Nonrheumatic aortic  (valve) insufficiency; I42.0-Dilated cardiomyopathy; Z95.810-Presence of  automatic (implantable) cardiac defibrillator; I20.0-Unstable angina;  I25.119-Atherosclerotic heart disease of native coronary artery with  unspecified angina pectoris; I35.1-Nonrheumatic aortic (valve)  insufficiency; J43.9-Emphysema, unspecified patient's a 44-year-old  female with hypoxia history of open heart surgery on December 26.  History of aortic regurg, former smoker     COMPARISON:  Comparison is made to study of 12/29/2019     TECHNIQUE:   Single radiographic AP view of the chest was obtained.     FINDINGS:  Today's portable erect study was obtained on 12/31/2019 at 10:50 AM.     Today's study demonstrates the left basilar opacity remaining stable  there is increasing right basilar opacity consistent with increasing  atelectasis and probable small pleural effusion. The right internal  jugular vascular sheath remains in good position the left-sided  dual-lead pacemaker defibrillator remains in good position changes of  median sternotomy coronary artery bypass grafting and aortic valvular  replacement are again seen.         Impression:          1. Mild interval worsening from study of December 29  2. Increasing right basilar opacities felt to represent increasing  atelectasis  3. Stable moderate left basilar opacity consistent with atelectasis,  pneumonia and/or pleural effusion     Electronically Signed By-Ashwin Beavers Jr. On:12/31/2019 11:53 AM  This report was finalized on [id number] by  Ashwin Beavers Jr., .    XR Chest 1 View [[id number]] Collected:  12/29/19 1313     Updated:  12/29/19 1319    Narrative:       DATE OF EXAM:  12/29/2019 1:02 PM     PROCEDURE:  XR CHEST 1 VW-     INDICATIONS:  CT removal; R07.9-Chest pain, unspecified; I35.1-Nonrheumatic aortic  (valve) insufficiency; I42.0-Dilated cardiomyopathy; Z95.810-Presence of  automatic (implantable) cardiac defibrillator; I20.0-Unstable angina;  I25.119-Atherosclerotic heart disease of native coronary artery with  unspecified angina pectoris; I35.1-Nonrheumatic aortic (valve)  insufficiency patient's a 44-year-old female status post chest tube  removal     COMPARISON:  Study of 5:01 AM earlier today     TECHNIQUE:   Single radiographic AP view of the chest was obtained.     FINDINGS:  Today's portable erect study was obtained on 12/29/2019 at 12:52 PM     Today's follow-up study demonstrates the right internal jugular sheath  being in place. The mediastinal drains have been removed there is no  evidence of pneumomediastinum or pneumothorax on today's study. Moderate  opacity in the left lung base continues either representing atelectasis  or pneumonia. Mild right basilar atelectasis is seen. A left-sided  dual-lead pacemaker defibrillator is seen. Changes of median sternotomy  and coronary artery bypass grafting are present. The upper abdomen  appears unremarkable.        Impression:          1. No significant change from earlier than the day other than removal of  mediastinal drains  2. Bilateral basilar opacities left greater than right felt to represent  atelectasis  and/or infiltrate, possible pneumonia on the left,  atelectasis right base.  3. Right internal jugular sheath remains intact with tip in superior  vena cava, left-sided dual-lead pacemaker defibrillator appears  unchanged as are changes of CABG.     Electronically Signed By-Ashwin Beavers Jr. On:12/29/2019 1:16 PM  This report was finalized on [id number] by  Ashwin Beavers Jr., .          Results for orders placed during the hospital encounter of 12/22/19   XR Chest 1 View    Narrative DATE OF EXAM:  12/31/2019 10:48 AM     PROCEDURE:  XR CHEST 1 VW-     INDICATIONS:  Hypoxia; R07.9-Chest pain, unspecified; I35.1-Nonrheumatic aortic  (valve) insufficiency; I42.0-Dilated cardiomyopathy; Z95.810-Presence of  automatic (implantable) cardiac defibrillator; I20.0-Unstable angina;  I25.119-Atherosclerotic heart disease of native coronary artery with  unspecified angina pectoris; I35.1-Nonrheumatic aortic (valve)  insufficiency; J43.9-Emphysema, unspecified patient's a 44-year-old  female with hypoxia history of open heart surgery on December 26.  History of aortic regurg, former smoker     COMPARISON:  Comparison is made to study of 12/29/2019     TECHNIQUE:   Single radiographic AP view of the chest was obtained.     FINDINGS:  Today's portable erect study was obtained on 12/31/2019 at 10:50 AM.     Today's study demonstrates the left basilar opacity remaining stable  there is increasing right basilar opacity consistent with increasing  atelectasis and probable small pleural effusion. The right internal  jugular vascular sheath remains in good position the left-sided  dual-lead pacemaker defibrillator remains in good position changes of  median sternotomy coronary artery bypass grafting and aortic valvular  replacement are again seen.        Impression    1. Mild interval worsening from study of December 29  2. Increasing right basilar opacities felt to represent increasing  atelectasis  3. Stable moderate left basilar  opacity consistent with atelectasis,  pneumonia and/or pleural effusion     Electronically Signed By-Ashwin Beavers Jr. On:12/31/2019 11:53 AM  This report was finalized on [id number] by  Ashwin Beavers Jr., .   XR Chest 1 View    Narrative DATE OF EXAM:  12/29/2019 1:02 PM     PROCEDURE:  XR CHEST 1 VW-     INDICATIONS:  CT removal; R07.9-Chest pain, unspecified; I35.1-Nonrheumatic aortic  (valve) insufficiency; I42.0-Dilated cardiomyopathy; Z95.810-Presence of  automatic (implantable) cardiac defibrillator; I20.0-Unstable angina;  I25.119-Atherosclerotic heart disease of native coronary artery with  unspecified angina pectoris; I35.1-Nonrheumatic aortic (valve)  insufficiency patient's a 44-year-old female status post chest tube  removal     COMPARISON:  Study of 5:01 AM earlier today     TECHNIQUE:   Single radiographic AP view of the chest was obtained.     FINDINGS:  Today's portable erect study was obtained on 12/29/2019 at 12:52 PM     Today's follow-up study demonstrates the right internal jugular sheath  being in place. The mediastinal drains have been removed there is no  evidence of pneumomediastinum or pneumothorax on today's study. Moderate  opacity in the left lung base continues either representing atelectasis  or pneumonia. Mild right basilar atelectasis is seen. A left-sided  dual-lead pacemaker defibrillator is seen. Changes of median sternotomy  and coronary artery bypass grafting are present. The upper abdomen  appears unremarkable.        Impression    1. No significant change from earlier than the day other than removal of  mediastinal drains  2. Bilateral basilar opacities left greater than right felt to represent  atelectasis and/or infiltrate, possible pneumonia on the left,  atelectasis right base.  3. Right internal jugular sheath remains intact with tip in superior  vena cava, left-sided dual-lead pacemaker defibrillator appears  unchanged as are changes of CABG.     Electronically Signed  By-Ashwin Beavers Jr. On:12/29/2019 1:16 PM  This report was finalized on [id number] by  Ashwin Beavers Jr., .   XR Chest 1 View    Narrative DATE OF EXAM:  12/29/2019 5:03 AM     PROCEDURE:  XR CHEST 1 VW-     INDICATIONS:  Post-Op Heart Surgery; R07.9-Chest pain, unspecified; I35.1-Nonrheumatic  aortic (valve) insufficiency; I42.0-Dilated cardiomyopathy;  Z95.810-Presence of automatic (implantable) cardiac defibrillator;  I20.0-Unstable angina; I25.119-Atherosclerotic heart disease of native  coronary artery with unspecified angina pectoris; I35.1-Nonrheumatic  aortic (valve) insufficiency patient's a 44-year-old female with COPD,  hypertension, cardiomyopathy, aortic regurg, status post CABG, day 2     COMPARISON:  Study of 12/28/2019 at 4:23 AM     TECHNIQUE:   Single radiographic AP view of the chest was obtained.     FINDINGS:  Today's portable erect study was obtained on 12/29/2019 at 5:01 AM     Today's exam demonstrates interval removal of [place]-Brad catheter. The  right internal jugular sheath remains with tip in superior vena cava.  The left-sided dual-lead pacemaker defibrillator is unchanged.  Mediastinal drain is again seen. The heart remains enlarged. There is  poor inspiratory volume. Bilateral basilar infiltrates are present left  greater than right, these have mildly improved. The upper abdomen  appears unremarkable.        Impression    1. Mild improvement from 12/28/2019  2. Decreasing basilar infiltrates left greater than right  3. Interval removal of [place]-Brad catheter  4. Mediastinal drain, right internal jugular sheath and pacemaker  defibrillator remain unchanged  4 changes of median sternotomy and aortic valvular replacement, changes  of CABG     Electronically Signed By-Ashwin Beavers Jr. On:12/29/2019 6:34 AM  This report was finalized on [id number] by  Ashwin Beavers Jr., .       Results for orders placed during the hospital encounter of 12/22/19   Duplex Venous Lower Extremity - Bilateral  CAR    Narrative · Normal bilateral lower extremity venous duplex scan.            ASSESSMENT / PLAN      Chest pain    COPD (chronic obstructive pulmonary disease) (CMS/HCC)    Hypertension    Cardiomyopathy, dilated (CMS/HCC)    Essential hypertension    Chronic renal impairment    ICD (implantable cardioverter-defibrillator), dual, in situ    GERD without esophagitis    Hypothyroidism (acquired)    Aortic valve regurgitation    Unstable angina pectoris (CMS/HCC)    Coronary artery disease involving native coronary artery of native heart with angina pectoris (CMS/HCC)    Nonrheumatic aortic valve insufficiency      1. ARF/LINSEY/CRF/CKD STG 2------Nonoliguric. ARF/LINSEY resolved. +Mild ARF/LINSEY on top of what appears to be CRF/CKD STG 2 with a baseline serum creatinine of 1.1. Unknown if patient has baseline proteinuria or hematuria. CRF/CKD STG 2 likely secondary to HTN NS/DM and chronic renal hypoperfusion from Cardiomyopathy. +Mild ARF/LINSEY appeared to be secondary to prerenal/hemodynamic fluctuation from MI S/P Cath/IV dye exposure with concomitant ACE-I and diuretic use. Keep off of Zestril for now.   Dose meds for CrCl 30-60 cc/min. No NSAIDs. No further IV dye exposure, unless emergently needed.     2. CAD S/P MI S/P CATH--------CABG done per , CT Surgery.     3. DILATED CARDIOMYOPATHY/VALVULAR HEART DISEASE--------No gross overload at present. Restart little diuretic. Has PM/AICD. Per , Cardiology     4. HTN WITH CKD------BP okay. Avoid hypotension. No ACE/ARB/DRI for now. Temporarily holding diuretics     5. HYPERURICEMIA---------On Allopurinol.  Uric normal     6. HYPERLIPIDEMIA------On Statin.       7. GERD-------On H2 blocker     8. DMII------Metformin on hold as just had IV dye exposure. BS okay. On Glucometers, SSI     9. VITAMIN D DEFICIENCY     10. DM PERIPHERAL NEUROPATHY-------On Neurontin     11. HYPOTHYROIDISM------Increased Synthroid as TSH up      Creatinine better, diuresing well,  received lasix yesterday  Continue midodrine  PRN diuretics      Adalid Sterling MD  [facility]  [phone]  01/01/20  9:17 AM      Electronically signed by Adalid Sterling MD at 01/01/20 1335     Benji García MD at 01/01/20 0813        POD # 5 CABG  No complaints  VSS  Labs noted, creat I  Off levophed this am  Chest clear, cor reg, incisions clean  Slow progress, CKD better  Will check CVP and Ca level  Increase activity    Electronically signed by Benji García MD at 01/01/20 0814       Consult Notes (last 48 hours) (Notes from 12/31/19 1136 through 01/02/20 1136)    No notes of this type exist for this encounter.

## 2020-01-03 LAB
ABO + RH BLD: NORMAL
ALBUMIN SERPL-MCNC: 3.6 G/DL (ref 3.5–5.2)
ANION GAP SERPL CALCULATED.3IONS-SCNC: 13 MMOL/L (ref 5–15)
BH BB BLOOD EXPIRATION DATE: NORMAL
BH BB BLOOD TYPE BARCODE: 5100
BH BB DISPENSE STATUS: NORMAL
BH BB PRODUCT CODE: NORMAL
BH BB UNIT NUMBER: NORMAL
BUN BLD-MCNC: 9 MG/DL (ref 6–20)
BUN/CREAT SERPL: 7.8 (ref 7–25)
CALCIUM SPEC-SCNC: 8.6 MG/DL (ref 8.6–10.5)
CHLORIDE SERPL-SCNC: 95 MMOL/L (ref 98–107)
CO2 SERPL-SCNC: 30 MMOL/L (ref 22–29)
CREAT BLD-MCNC: 1.15 MG/DL (ref 0.57–1)
DEPRECATED RDW RBC AUTO: 46.8 FL (ref 37–54)
ERYTHROCYTE [DISTWIDTH] IN BLOOD BY AUTOMATED COUNT: 14.6 % (ref 12.3–15.4)
GFR SERPL CREATININE-BSD FRML MDRD: 62 ML/MIN/1.73
GLUCOSE BLD-MCNC: 140 MG/DL (ref 65–99)
GLUCOSE BLDC GLUCOMTR-MCNC: 116 MG/DL (ref 70–105)
GLUCOSE BLDC GLUCOMTR-MCNC: 177 MG/DL (ref 70–105)
GLUCOSE BLDC GLUCOMTR-MCNC: 92 MG/DL (ref 70–105)
GLUCOSE BLDC GLUCOMTR-MCNC: 96 MG/DL (ref 70–105)
HCT VFR BLD AUTO: 27.5 % (ref 34–46.6)
HGB BLD-MCNC: 9.5 G/DL (ref 12–15.9)
MCH RBC QN AUTO: 31.3 PG (ref 26.6–33)
MCHC RBC AUTO-ENTMCNC: 34.6 G/DL (ref 31.5–35.7)
MCV RBC AUTO: 90.6 FL (ref 79–97)
PHOSPHATE SERPL-MCNC: 3.5 MG/DL (ref 2.5–4.5)
PLATELET # BLD AUTO: 268 10*3/MM3 (ref 140–450)
PMV BLD AUTO: 6.8 FL (ref 6–12)
POTASSIUM BLD-SCNC: 3.3 MMOL/L (ref 3.5–5.2)
POTASSIUM BLD-SCNC: 3.7 MMOL/L (ref 3.5–5.2)
RBC # BLD AUTO: 3.03 10*6/MM3 (ref 3.77–5.28)
SODIUM BLD-SCNC: 138 MMOL/L (ref 136–145)
UNIT  ABO: NORMAL
UNIT  RH: NORMAL
WBC NRBC COR # BLD: 6 10*3/MM3 (ref 3.4–10.8)

## 2020-01-03 PROCEDURE — 84132 ASSAY OF SERUM POTASSIUM: CPT | Performed by: THORACIC SURGERY (CARDIOTHORACIC VASCULAR SURGERY)

## 2020-01-03 PROCEDURE — 99232 SBSQ HOSP IP/OBS MODERATE 35: CPT | Performed by: INTERNAL MEDICINE

## 2020-01-03 PROCEDURE — 85027 COMPLETE CBC AUTOMATED: CPT | Performed by: THORACIC SURGERY (CARDIOTHORACIC VASCULAR SURGERY)

## 2020-01-03 PROCEDURE — 97530 THERAPEUTIC ACTIVITIES: CPT

## 2020-01-03 PROCEDURE — 25010000002 ENOXAPARIN PER 10 MG: Performed by: THORACIC SURGERY (CARDIOTHORACIC VASCULAR SURGERY)

## 2020-01-03 PROCEDURE — 82962 GLUCOSE BLOOD TEST: CPT

## 2020-01-03 PROCEDURE — 94799 UNLISTED PULMONARY SVC/PX: CPT

## 2020-01-03 PROCEDURE — 80069 RENAL FUNCTION PANEL: CPT | Performed by: INTERNAL MEDICINE

## 2020-01-03 PROCEDURE — 99024 POSTOP FOLLOW-UP VISIT: CPT | Performed by: PHYSICIAN ASSISTANT

## 2020-01-03 PROCEDURE — 97110 THERAPEUTIC EXERCISES: CPT

## 2020-01-03 PROCEDURE — 97116 GAIT TRAINING THERAPY: CPT

## 2020-01-03 RX ORDER — ASPIRIN 325 MG
325 TABLET ORAL DAILY
Status: DISCONTINUED | OUTPATIENT
Start: 2020-01-03 | End: 2020-01-08 | Stop reason: HOSPADM

## 2020-01-03 RX ADMIN — FOLIC ACID 1 MG: 1 TABLET ORAL at 08:13

## 2020-01-03 RX ADMIN — AMIODARONE HYDROCHLORIDE 200 MG: 200 TABLET ORAL at 08:12

## 2020-01-03 RX ADMIN — POTASSIUM CHLORIDE 20 MEQ: 1500 TABLET, EXTENDED RELEASE ORAL at 15:37

## 2020-01-03 RX ADMIN — MIDODRINE HYDROCHLORIDE 5 MG: 5 TABLET ORAL at 06:56

## 2020-01-03 RX ADMIN — OXYCODONE HYDROCHLORIDE 5 MG: 5 TABLET ORAL at 05:15

## 2020-01-03 RX ADMIN — SENNOSIDES 1 TABLET: 8.6 TABLET, FILM COATED ORAL at 20:59

## 2020-01-03 RX ADMIN — DOCUSATE SODIUM 100 MG: 100 CAPSULE, LIQUID FILLED ORAL at 20:59

## 2020-01-03 RX ADMIN — OXYCODONE HYDROCHLORIDE 5 MG: 5 TABLET ORAL at 21:00

## 2020-01-03 RX ADMIN — IPRATROPIUM BROMIDE AND ALBUTEROL SULFATE 3 ML: .5; 3 SOLUTION RESPIRATORY (INHALATION) at 12:18

## 2020-01-03 RX ADMIN — FLUCONAZOLE 200 MG: 100 TABLET ORAL at 18:04

## 2020-01-03 RX ADMIN — ASPIRIN 325 MG ORAL TABLET 325 MG: 325 PILL ORAL at 08:12

## 2020-01-03 RX ADMIN — OXYCODONE HYDROCHLORIDE 5 MG: 5 TABLET ORAL at 15:33

## 2020-01-03 RX ADMIN — POTASSIUM CHLORIDE 20 MEQ: 1500 TABLET, EXTENDED RELEASE ORAL at 06:56

## 2020-01-03 RX ADMIN — ATORVASTATIN CALCIUM 40 MG: 40 TABLET, FILM COATED ORAL at 20:59

## 2020-01-03 RX ADMIN — DOCUSATE SODIUM 100 MG: 100 CAPSULE, LIQUID FILLED ORAL at 08:13

## 2020-01-03 RX ADMIN — PANTOPRAZOLE SODIUM 40 MG: 40 TABLET, DELAYED RELEASE ORAL at 05:15

## 2020-01-03 RX ADMIN — ENOXAPARIN SODIUM 40 MG: 40 INJECTION SUBCUTANEOUS at 20:59

## 2020-01-03 RX ADMIN — IPRATROPIUM BROMIDE AND ALBUTEROL SULFATE 3 ML: .5; 3 SOLUTION RESPIRATORY (INHALATION) at 15:55

## 2020-01-03 RX ADMIN — IPRATROPIUM BROMIDE AND ALBUTEROL SULFATE 3 ML: .5; 3 SOLUTION RESPIRATORY (INHALATION) at 08:05

## 2020-01-03 RX ADMIN — LIDOCAINE 2 PATCH: 50 PATCH CUTANEOUS at 08:14

## 2020-01-03 RX ADMIN — ALLOPURINOL 300 MG: 300 TABLET ORAL at 08:13

## 2020-01-03 RX ADMIN — MONTELUKAST SODIUM 10 MG: 10 TABLET, FILM COATED ORAL at 20:59

## 2020-01-03 RX ADMIN — IPRATROPIUM BROMIDE AND ALBUTEROL SULFATE 3 ML: .5; 3 SOLUTION RESPIRATORY (INHALATION) at 19:32

## 2020-01-03 RX ADMIN — GUAIFENESIN 600 MG: 600 TABLET, EXTENDED RELEASE ORAL at 08:13

## 2020-01-03 RX ADMIN — GUAIFENESIN 600 MG: 600 TABLET, EXTENDED RELEASE ORAL at 20:59

## 2020-01-03 RX ADMIN — FERROUS SULFATE TAB EC 324 MG (65 MG FE EQUIVALENT) 324 MG: 324 (65 FE) TABLET DELAYED RESPONSE at 08:13

## 2020-01-03 RX ADMIN — BUMETANIDE 1 MG: 1 TABLET ORAL at 08:13

## 2020-01-03 RX ADMIN — LEVOTHYROXINE SODIUM 200 MCG: 200 TABLET ORAL at 05:15

## 2020-01-03 RX ADMIN — AMIODARONE HYDROCHLORIDE 200 MG: 200 TABLET ORAL at 18:04

## 2020-01-03 NOTE — THERAPY TREATMENT NOTE
Patient Name: Rafael Mccann  : 1975    MRN: [id number]                              Today's Date: 1/3/2020       Admit Date: 2019    Visit Dx:     ICD-10-CM ICD-9-CM   1. Chest pain, unspecified type R07.9 786.50   2. Aortic valve insufficiency, etiology of cardiac valve disease unspecified I35.1 424.1   3. Cardiomyopathy, dilated (CMS/[facility]) I42.0 425.4   4. ICD (implantable cardioverter-defibrillator), dual, in situ Z95.810 V45.02   5. Unstable angina pectoris (CMS/[facility]) I20.0 411.1   6. Coronary artery disease involving native coronary artery of native heart with angina pectoris (CMS/[facility]) I25.119 414.01     413.9   7. Nonrheumatic aortic valve insufficiency I35.1 424.1   8. Pulmonary emphysema, unspecified emphysema type (CMS/[facility]) J43.9 492.8     Patient Active Problem List   Diagnosis   • COPD (chronic obstructive pulmonary disease) (CMS/[facility])   • Hypertension   • Cardiomyopathy, dilated (CMS/[facility])   • Essential hypertension   • Chronic renal impairment   • ICD (implantable cardioverter-defibrillator), dual, in situ   • Chest pain   • GERD without esophagitis   • Hypothyroidism (acquired)   • Aortic valve regurgitation   • Unstable angina pectoris (CMS/[facility])   • Coronary artery disease involving native coronary artery of native heart with angina pectoris (CMS/[facility])   • Nonrheumatic aortic valve insufficiency     Past Medical History:   Diagnosis Date   • CHF (congestive heart failure) (CMS/[facility])    • COPD (chronic obstructive pulmonary disease) (CMS/[facility])    • Diabetes (CMS/[facility])    • Hyperlipidemia    • Hypertension      Past Surgical History:   Procedure Laterality Date   • AORTIC VALVE REPAIR/REPLACEMENT N/A 2019    Procedure: AORTIC VALVE REPAIR/REPLACEMENT;  Surgeon: Lane Stock MD;  Location: [facility];  Service: Cardiothoracic   • BREAST LUMPECTOMY     • CARDIAC CATHETERIZATION N/A 2019    Procedure: Right and Left Heart Cath 19 @ 0900;  Surgeon: Wayne Luna  MD KAY;  Location: [facility] CATH INVASIVE LOCATION;  Service: Cardiovascular   • CARDIAC CATHETERIZATION N/A 12/24/2019    Procedure: Coronary angiography;  Surgeon: Wayne Luna MD;  Location: [facility] CATH INVASIVE LOCATION;  Service: Cardiovascular   • CARDIAC ELECTROPHYSIOLOGY PROCEDURE Left 6/28/2019    Procedure: Dual-chamber ICD insertion;  Surgeon: Héctor Eckert MD;  Location: [facility] CATH INVASIVE LOCATION;  Service: Cardiovascular   • CARDIAC ELECTROPHYSIOLOGY PROCEDURE Left 8/14/2019    Procedure: Lead Revision;  Surgeon: Héctor Eckert MD;  Location: [facility] CATH INVASIVE LOCATION;  Service: Cardiovascular   • CHOLECYSTECTOMY     • CORONARY ARTERY BYPASS GRAFT N/A 12/27/2019    Procedure: CORONARY ARTERY BYPASS GRAFTING;  Surgeon: Lane Stock MD;  Location: [facility] CVOR;  Service: Cardiothoracic   • HYSTERECTOMY     • INSERT / REPLACE / REMOVE PACEMAKER     • THYROID SURGERY       General Information     Row Name 01/03/20 1322          PT Evaluation Time/Intention    Document Type  therapy note (daily note)  -CR     Mode of Treatment  physical therapy  -CR       User Key  (r) = Recorded By, (t) = Taken By, (c) = Cosigned By    Initials Name Provider Type    CR Reyes, Carmela, PT Physical Therapist        Mobility     Row Name 01/03/20 1322          Bed Mobility Assessment/Treatment    Bed Mobility Assessment/Treatment  sit-supine  -CR     [place] Level (Bed Mobility)  minimum assist (75% patient effort)  -CR     Row Name 01/03/20 1322          Sit-Stand Transfer    Sit-Stand [place] (Transfers)  supervision  -CR     Assistive Device (Sit-Stand Transfers)  walker, front-wheeled  -CR     Row Name 01/03/20 1322          Gait/Stairs Assessment/Training    Gait/Stairs Assessment/Training  gait/ambulation assistive device  -CR     [place] Level (Gait)  supervision  -CR     Assistive Device (Gait)  walker, front-wheeled  -CR     Distance in Feet (Gait)  140  -CR      Deviations/Abnormal Patterns (Gait)  gait speed decreased  -CR     Comment (Gait/Stairs)  cardiac level 3  -CR       User Key  (r) = Recorded By, (t) = Taken By, (c) = Cosigned By    Initials Name Provider Type    CR Reyes, Carmela, PT Physical Therapist        Obj/Interventions     Row Name 01/03/20 1323          Static Sitting Balance    Level of [place] (Unsupported Sitting, Static Balance)  independent  -CR     Sitting Position (Unsupported Sitting, Static Balance)  sitting on edge of bed  -CR     Time Able to Maintain Position (Unsupported Sitting, Static Balance)  1 to 2 minutes  -CR     Row Name 01/03/20 1323          Dynamic Standing Balance    Level of [place], Reaches Outside Midline (Standing, Dynamic Balance)  supervision  -CR     Time Able to Maintain Position, Reaches Outside Midline (Standing, Dynamic Balance)  1 to 2 minutes  -CR     Assistive Device Utilized (Supported Standing, Dynamic Balance)  walker, rolling  -CR       User Key  (r) = Recorded By, (t) = Taken By, (c) = Cosigned By    Initials Name Provider Type    CR Reyes, Carmela, PT Physical Therapist        Goals/Plan     Row Name 01/03/20 1326          Bed Mobility Goal 1 (PT)    Progress/Outcomes (Bed Mobility Goal 1, PT)  continuing progress toward goal  -CR     Row Name 01/03/20 1326          Transfer Goal 1 (PT)    Progress/Outcome (Transfer Goal 1, PT)  continuing progress toward goal  -CR     Row Name 01/03/20 1326          Gait Training Goal 1 (PT)    Progress/Outcome (Gait Training Goal 1, PT)  continuing progress toward goal  -CR       User Key  (r) = Recorded By, (t) = Taken By, (c) = Cosigned By    Initials Name Provider Type    CR Reyes, Carmela, PT Physical Therapist        Clinical Impression     Row Name 01/03/20 1324          Pain Assessment    Additional Documentation  Pain Scale: Numbers Pre/Post-Treatment (Group)  -CR     Row Name 01/03/20 1324          Pain Scale: Numbers Pre/Post-Treatment    Pain Scale:  Numbers, Pretreatment  2/10  -CR     Pain Scale: Numbers, Post-Treatment  2/10  -CR     Pain Location - Orientation  incisional  -CR     Row Name 01/03/20 1324          Plan of Care Review    Plan of Care Reviewed With  patient  -CR     Progress  improving  -CR     Outcome Summary  making good progress. tolerated increase distance in ambulation with supplemental o2  -CR     Row Name 01/03/20 1324          Vital Signs    Pre SpO2 (%)  97  -CR     O2 Delivery Pre Treatment  supplemental O2  -CR     Intra SpO2 (%)  94  -CR     O2 Delivery Intra Treatment  supplemental O2  -CR     Row Name 01/03/20 1324          Positioning and Restraints    Pre-Treatment Position  in bed  -CR     Post Treatment Position  bed  -CR     In Bed  notified nsg;sitting EOB;call light within reach  -CR       User Key  (r) = Recorded By, (t) = Taken By, (c) = Cosigned By    Initials Name Provider Type    CR Reyes, Carmela, SHAHANA Physical Therapist        Outcome Measures     Row Name 01/03/20 1327          How much help from another person do you currently need...    Turning from your back to your side while in flat bed without using bedrails?  3  -CR     Moving from lying on back to sitting on the side of a flat bed without bedrails?  3  -CR     Moving to and from a bed to a chair (including a wheelchair)?  3  -CR     Standing up from a chair using your arms (e.g., wheelchair, bedside chair)?  4  -CR     Climbing 3-5 steps with a railing?  3  -CR     To walk in hospital room?  3  -CR     AM-PAC 6 Clicks Score (PT)  19  -CR     Row Name 01/03/20 1327          Functional Assessment    Outcome Measure Options  Modified [place]  -CR       User Key  (r) = Recorded By, (t) = Taken By, (c) = Cosigned By    Initials Name Provider Type    CR Reyes, Carmela, PT Physical Therapist          PT Recommendation and Plan     Outcome Summary/Treatment Plan (PT)  Anticipated Discharge Disposition (PT): (pt unsure if she wants rehab first)  Plan of Care Reviewed  With: patient  Progress: improving  Outcome Summary: Patient continues to make good progress; now tolerated increase distance in ambulation using rw with supplemental O2. Noted SOA during ambulation but pt having more coughing spells this session. Pt had shower prior to tx session so with increased activity tolerance. Still needing cues for sternal precautions especially with coughing. Pt undecided on d/c plans, no family present at bedside to discuss needs for home d/c. Pt needs to be able to ascend/descend 1 flight of stairs if pt goes home from hospital.     Time Calculation:   PT Charges     Row Name 01/03/20 1341             Time Calculation    Start Time  1200  -CR      Stop Time  1217  -CR      Time Calculation (min)  17 min  -CR      PT Received On  01/03/20  -CR      PT - Next Appointment  01/04/20  -CR         Time Calculation- PT    Total Timed Code Minutes- PT  17 minute(s)  -CR        User Key  (r) = Recorded By, (t) = Taken By, (c) = Cosigned By    Initials Name Provider Type    CR Reyes, Carmela, PT Physical Therapist        Therapy Charges for Today     Code Description Service Date Service Provider Modifiers Qty    39536960677 HC GAIT TRAINING EA 15 MIN 1/2/2020 Reyes, Carmela, PT GP 1    29643881745 HC PT THER PROC EA 15 MIN 1/2/2020 Reyes, Carmela, PT GP 1    73750723586 HC PT THERAPEUTIC ACT EA 15 MIN 1/2/2020 Reyes, Carmela, PT GP 1    38891329502 HC GAIT TRAINING EA 15 MIN 1/3/2020 Reyes, Carmela, PT GP 1          PT G-Codes  Outcome Measure Options: Modified [place]  AM-PAC 6 Clicks Score (PT): 19    Carmela Reyes, PT  1/3/2020

## 2020-01-03 NOTE — PROGRESS NOTES
Cardiology Follow-up      Encounter Date:  12/12/2019    Patient ID:   Rafael Mccann is a 44 y.o. female.        Reason For Followup:  Cardiomyopathy  Hypertension  Hyperlipidemia  Valvular heart disease  Coronary artery disease         Subjective:  Seen and examined.  Chart and labs reviewed.  Patient reports some chest soreness but reports her shortness of breath is improved.  She is ambulated.  Hemoglobin is remained stable.  Discussed with CT surgery.    Assessment & Plan    Impressions:   Congestive heart failure  Acute systolic heart failure exacerbation on chronic systolic heart failure  CHF NYHA class IV  Valvular heart disease with a significant aortic insufficiency  Essential hypertension  Chronic systolic heart failure  History of cardiomyopathy   Chronic renal insufficiency  Coronary artery disease with diffuse RCA disease  History of diabetes mellitus type 2  History of thyroid disease  Cardiomyopathy with LV ejection fraction of 35%  s/p AICD placement/normal device function     Plan:  Continuation of her current medical regimen at the present time.  Anticipate discharge to rehab soon.  Continue to increase activity as tolerated.        Objective:    Vitals:  Vitals:    01/03/20 1140 01/03/20 1200 01/03/20 1218 01/03/20 1221   BP:       Pulse: 89  82    Resp:   18 18   Temp:  98 °F (36.7 °C)     TempSrc:  Oral     SpO2: 99%      Weight:       Height:           Physical Exam:    General: Well-developed, well-nourished 44-year-old -American female seen in no acute distress.  Head:  Normocephalic, atraumatic, mucous membranes moist  Eyes:  Extraocular muscles intact  Neck:  Supple,  no bruit  Lungs: Clear to auscultation bilaterally, few crackles at the bilateral bases  chest wall: Mid line scar with no infection or bleeding  Heart::  Regular rate and rhythm, S1 and S2 normal, 2 x 6 ejection systolic murmur  Abdomen: Soft, nondistended  Extremities: No cyanosis, clubbing, or  edema  Pulses: 2+ and symmetric all extremities  Skin:  No rashes or lesions  Neuro/psych: Alert and oriented x3 cranial nerves II through XII are grossly intact    Allergies:  Allergies   Allergen Reactions   • Hydrocodone Hives   • Penicillin G Unknown (See Comments)       Medication Review:     Current Facility-Administered Medications:   •  acetaminophen (TYLENOL) tablet 500 mg, 500 mg, Oral, Q6H PRN, Lane Stock MD, 500 mg at 01/02/20 1701  •  allopurinol (ZYLOPRIM) tablet 300 mg, 300 mg, Oral, Daily, Chidi Pace MD, 300 mg at 01/03/20 0813  •  ALPRAZolam (XANAX) tablet 0.25 mg, 0.25 mg, Oral, BID PRN, Chidi Pace MD, 0.25 mg at 12/31/19 0925  •  amiodarone (PACERONE) tablet 200 mg, 200 mg, Oral, BID With Meals, Lane Stock MD, 200 mg at 01/03/20 0812  •  aspirin tablet 325 mg, 325 mg, Oral, Daily, Lane Stock MD, 325 mg at 01/03/20 0812  •  atorvastatin (LIPITOR) tablet 40 mg, 40 mg, Oral, Nightly, Chidi Pace MD, 40 mg at 01/02/20 2115  •  bisacodyl (DULCOLAX) suppository 10 mg, 10 mg, Rectal, Daily, Kayla Burrell APRN, 10 mg at 01/02/20 1000  •  bumetanide (BUMEX) tablet 1 mg, 1 mg, Oral, Daily, Adalid Sterling MD, 1 mg at 01/03/20 0813  •  dextrose (D50W) 25 g/ 50mL Intravenous Solution 25 g, 25 g, Intravenous, Q15 Min PRN, Kayla Burrell APRN  •  dextrose (GLUTOSE) oral gel 15 g, 15 g, Oral, Q15 Min PRN, Kayla Burrell APRN  •  docusate sodium (COLACE) capsule 100 mg, 100 mg, Oral, BID, Chidi Pace MD, 100 mg at 01/03/20 0813  •  enoxaparin (LOVENOX) syringe 40 mg, 40 mg, Subcutaneous, Daily, Chidi Pace MD, 40 mg at 01/02/20 2115  •  ferrous sulfate EC tablet 324 mg, 324 mg, Oral, Daily With Breakfast, Chidi Pace MD, 324 mg at 01/03/20 0813  •  fluconazole (DIFLUCAN) tablet 200 mg, 200 mg, Oral, Q48H, Chidi Pace MD, 200 mg at 01/01/20 1835  •  folic acid (FOLVITE) tablet 1 mg, 1 mg, Oral, Daily, Chidi Pace,  MD, 1 mg at 01/03/20 0813  •  glucagon (human recombinant) (GLUCAGEN DIAGNOSTIC) injection 1 mg, 1 mg, Subcutaneous, Q15 Min PRN, Kayla Burrell APRN  •  guaiFENesin (MUCINEX) 12 hr tablet 600 mg, 600 mg, Oral, Q12H, Hasmukh David MD, 600 mg at 01/03/20 0813  •  insulin lispro (humaLOG) injection 0-9 Units, 0-9 Units, Subcutaneous, 4x Daily With Meals & Nightly **AND** insulin lispro (humaLOG) injection 0-9 Units, 0-9 Units, Subcutaneous, PRN, Kayla Burrell APRN  •  ipratropium-albuterol (DUO-NEB) nebulizer solution 3 mL, 3 mL, Nebulization, 4x Daily - RT, Loraine Son APRN, 3 mL at 01/03/20 1218  •  ipratropium-albuterol (DUO-NEB) nebulizer solution 3 mL, 3 mL, Nebulization, Q4H PRN, Loraine Son APRN  •  lactulose (CHRONULAC) 10 GM/15ML solution 30 g, 30 g, Oral, Daily, Kayla Burrell APRN, 30 g at 01/02/20 0950  •  levothyroxine (SYNTHROID, LEVOTHROID) tablet 200 mcg, 200 mcg, Oral, Q AM, Chidi Pace MD, 200 mcg at 01/03/20 0515  •  lidocaine (LIDODERM) 5 % 2 patch, 2 patch, Transdermal, Q24H, Hasmukh David MD, 2 patch at 01/03/20 0814  •  montelukast (SINGULAIR) tablet 10 mg, 10 mg, Oral, Nightly, Chidi Pace MD, 10 mg at 01/02/20 2115  •  MOUTH KOTE solution 2 mL, 2 spray, Mouth/Throat, Q4H PRN, Chidi Pace MD  •  naloxone (NARCAN) injection 0.4 mg, 0.4 mg, Intravenous, Q5 Min PRN, Lane Stock MD  •  ondansetron (ZOFRAN) injection 4 mg, 4 mg, Intravenous, Q6H PRN, Chidi Pace MD, 4 mg at 01/02/20 2125  •  oxyCODONE (ROXICODONE) immediate release tablet 5 mg, 5 mg, Oral, Q4H PRN, Chidi Pace MD, 5 mg at 01/03/20 0515  •  pantoprazole (PROTONIX) EC tablet 40 mg, 40 mg, Oral, Q AM, Chidi Pace MD, 40 mg at 01/03/20 0515  •  polyethylene glycol (MIRALAX) powder 17 g, 17 g, Oral, BID, Chidi Pace MD, 17 g at 01/02/20 0949  •  potassium chloride (K-DUR,KLOR-CON) CR tablet 20 mEq, 20 mEq, Oral, PRN, 20 mEq at 01/03/20 0656 **OR**  potassium chloride (KLOR-CON) packet 20 mEq, 20 mEq, Oral, PRN, Chidi Pace MD  •  potassium chloride 10 mEq in 100 mL IVPB, 10 mEq, Intravenous, Q1H PRN **OR** potassium chloride 10 mEq in 100 mL IVPB, 10 mEq, Intravenous, Q1H PRN, Chidi Pace MD  •  senna (SENOKOT) tablet 1 tablet, 1 tablet, Oral, BID, Chidi Pace MD, 1 tablet at 01/02/20 2115  •  sodium chloride 0.9 % infusion, 30 mL/hr, Intravenous, Continuous, Chidi Pace MD, Last Rate: 30 mL/hr at 12/27/19 1430, 30 mL/hr at 12/27/19 1430    Family History:  Family History   Problem Relation Age of Onset   • Heart disease Father        Past Medical History:  Past Medical History:   Diagnosis Date   • CHF (congestive heart failure) (CMS/[facility])    • COPD (chronic obstructive pulmonary disease) (CMS/[facility])    • Diabetes (CMS/[facility])    • Hyperlipidemia    • Hypertension        Past surgical History:  Past Surgical History:   Procedure Laterality Date   • AORTIC VALVE REPAIR/REPLACEMENT N/A 12/27/2019    Procedure: AORTIC VALVE REPAIR/REPLACEMENT;  Surgeon: Lane Stock MD;  Location: [facility];  Service: Cardiothoracic   • BREAST LUMPECTOMY     • CARDIAC CATHETERIZATION N/A 12/24/2019    Procedure: Right and Left Heart Cath 12/24/19 @ 0900;  Surgeon: Wayne Luna MD;  Location: [facility] CATH INVASIVE LOCATION;  Service: Cardiovascular   • CARDIAC CATHETERIZATION N/A 12/24/2019    Procedure: Coronary angiography;  Surgeon: Wayne Luna MD;  Location: [facility] CATH INVASIVE LOCATION;  Service: Cardiovascular   • CARDIAC ELECTROPHYSIOLOGY PROCEDURE Left 6/28/2019    Procedure: Dual-chamber ICD insertion;  Surgeon: Héctor Eckert MD;  Location: [facility] CATH INVASIVE LOCATION;  Service: Cardiovascular   • CARDIAC ELECTROPHYSIOLOGY PROCEDURE Left 8/14/2019    Procedure: Lead Revision;  Surgeon: Héctor Eckert MD;  Location: [facility] CATH INVASIVE LOCATION;  Service: Cardiovascular   • CHOLECYSTECTOMY     •  CORONARY ARTERY BYPASS GRAFT N/A 2019    Procedure: CORONARY ARTERY BYPASS GRAFTING;  Surgeon: Lane Stock MD;  Location: [facility];  Service: Cardiothoracic   • HYSTERECTOMY     • INSERT / REPLACE / REMOVE PACEMAKER     • THYROID SURGERY         Social History:  Social History     Socioeconomic History   • Marital status:      Spouse name: Not on file   • Number of children: Not on file   • Years of education: Not on file   • Highest education level: Not on file   Tobacco Use   • Smoking status: Former Smoker     Last attempt to quit: 2019     Years since quittin.0   • Smokeless tobacco: Never Used   Substance and Sexual Activity   • Alcohol use: No     Frequency: Never   • Drug use: No   • Sexual activity: Defer       Review of Systems:  The following systems were reviewed as they relate to the cardiovascular system: Constitutional, Eyes, ENT, Cardiovascular, Respiratory, Gastrointestinal, Integumentary, Neurological, Psychiatric, Hematologic, Endocrine, Musculoskeletal, and Genitourinary. The pertinent cardiovascular findings are reported above with all other cardiovascular points within those systems being negative.        NOTE: The following portions of the patient's history were reviewed and updated this visit as appropriate: allergies, current medications, past family history, past medical history, past social history, past surgical history and problem list.

## 2020-01-03 NOTE — PLAN OF CARE
Problem: Patient Care Overview  Goal: Plan of Care Review  Outcome: Ongoing (interventions implemented as appropriate)  Flowsheets  Taken 1/3/2020 1630  Plan of Care Reviewed With: patient  Taken 1/3/2020 1600  Outcome Summary: Pt tolerates initial instruction for HEP well but remains fatigued & on supplimental O2. She is not yet reaching her goals for post-op CABG at 1 week post op. OT continues to recommend ST IP rehab at d/c. Pt is motivated & particiaptes well in therapy. She would do well at a rehab.

## 2020-01-03 NOTE — PAYOR COMM NOTE
"This is clinical update for Calvin Downs, auth# [id number]    EXTENDED AUTHORIZATION PENDING:   PLEASE CALL OR FAX DETERMINATION TO CONTACT BELOW. THANK YOU.     OSCAR JEAN BAPTISTE RN  UTILIZATION REVIEW  [facility]  PH: [phone]  FX: [phone]      Calvin Dillon (44 y.o. Female)     Date of Birth Social Security Number Address Home Phone MRN    1975  [address] [place] [address] [phone] [id number]    Sikhism Marital Status          Religion        Admission Date Admission Type Admitting Provider Attending Provider Department, Room/Bed    12/22/19 Emergency [address] Stock MD Khan, Ahmad Aftab, MD [facility] CARDIOVASCULAR CARE UNIT, 2216/1    Discharge Date Discharge Disposition Discharge Destination                       Attending Provider:  Lane Stock MD    Allergies:  Hydrocodone, Penicillin G    Isolation:  None   Infection:  None   Code Status:  CPR    Ht:  170.2 cm (67\")   Wt:  92.1 kg (203 lb 0.7 oz)    Admission Cmt:  None   Principal Problem:  Chest pain [R07.9]                 Active Insurance as of 12/22/2019     Primary Coverage     Payor Plan Insurance Group Employer/Plan Group    ANTHEM BLUE CROSS ANTHEM BLUE CROSS BLUE SHIELD PPO [id number]     Payor Plan Address Payor Plan Phone Number Payor Plan Fax Number Effective Dates    [address] [phone]  5/6/2018 - None Entered    [facility] [id number]       Subscriber Name Subscriber Birth Date Member ID       CATRACHO DOWNS 11/7/1970 [id number]           Secondary Coverage     Payor Plan Insurance Group Employer/Plan Group    ANTHEM MEDICARE REPLACEMENT ANTHEM MEDICARE ADVANTAGE [id number]     Payor Plan Address Payor Plan Phone Number Payor Plan Fax Number Effective Dates    [address] [phone]  1/1/2019 - None Entered    [facility] [id number]       Subscriber Name Subscriber Birth Date Member ID       CALVIN DILLON 1975 [id number]           " "      Emergency Contacts      (Rel.) Home Phone Work Phone Mobile Phone    celio leo (Daughter) -- -- [phone]            Operative/Procedure Notes (last 24 hours) (Notes from 01/02/20 1504 through 01/03/20 1504)    No notes of this type exist for this encounter.            Physician Progress Notes (last 24 hours) (Notes from 01/02/20 1504 through 01/03/20 1504)      Romeo Fan PA at 01/03/20 1036           LOS: 10 days   Patient Care Team:  Phani Adames MD as PCP - General (Internal Medicine)  Ashish Smalls MD as Consulting Physician (Nephrology)    Chief Complaint: Postop CABG\AVR      POD #7  Subjective   Had BM yesterday and feels better.  Right-sided chest pain less severe.  Still with very poor pulmonary effort.    Objective     I and O    Intake/Output Summary (Last 24 hours) at 1/3/2020 1037  Last data filed at 1/3/2020 1000  Gross per 24 hour   Intake 1422 ml   Output 4400 ml   Net -2978 ml     I/O this shift:  In: -   Out: 500 [Urine:500]      Wt Readings from Last 3 Encounters:   01/03/20 92.1 kg (203 lb 0.7 oz)   12/12/19 89.8 kg (198 lb)   12/07/19 90 kg (198 lb 6.6 oz)       Flowsheet Rows      First Filed Value   Admission Height  170.2 cm (67\") Documented at 12/22/2019 2250   Admission Weight  91.8 kg (202 lb 6.1 oz) Documented at 12/22/2019 2250          Objective:  General Appearance:  Comfortable and in no acute distress.    Vital signs: (most recent): Blood pressure 155/89, pulse 81, temperature 98.2 °F (36.8 °C), temperature source Oral, resp. rate 18, height 170.2 cm (67\"), weight 92.1 kg (203 lb 0.7 oz), SpO2 (!) 86 %, not currently breastfeeding.  No fever.    Lungs:  Normal effort and normal respiratory rate.  There are decreased breath sounds.    Heart: Normal rate.  Regular rhythm.    Chest: (Incisions look good.)  Abdomen: Abdomen is soft.  Bowel sounds are normal.   There is no abdominal tenderness.     Neurological: Patient " is alert and oriented to person, place and time.    Skin:  Warm and dry.  (Leg incision looks good.)            Results Review:        WBC WBC   Date Value Ref Range Status   01/03/2020 6.00 3.40 - 10.80 10*3/mm3 Final   01/02/2020 5.50 3.40 - 10.80 10*3/mm3 Final   01/01/2020 6.40 3.40 - 10.80 10*3/mm3 Final      HGB Hemoglobin   Date Value Ref Range Status   01/03/2020 9.5 (L) 12.0 - 15.9 g/dL Final   01/02/2020 8.2 (L) 12.0 - 15.9 g/dL Final   01/01/2020 8.7 (L) 12.0 - 15.9 g/dL Final      HCT Hematocrit   Date Value Ref Range Status   01/03/2020 27.5 (L) 34.0 - 46.6 % Final   01/02/2020 24.0 (L) 34.0 - 46.6 % Final   01/01/2020 25.5 (L) 34.0 - 46.6 % Final      Platelets Platelets   Date Value Ref Range Status   01/03/2020 268 140 - 450 10*3/mm3 Final   01/02/2020 211 140 - 450 10*3/mm3 Final   01/01/2020 207 140 - 450 10*3/mm3 Final        PT/INR:  No results found for: PROTIME/No results found for: INR    Sodium Sodium   Date Value Ref Range Status   01/03/2020 138 136 - 145 mmol/L Final   01/02/2020 138 136 - 145 mmol/L Final   01/01/2020 139 136 - 145 mmol/L Final      Potassium Potassium   Date Value Ref Range Status   01/03/2020 3.3 (L) 3.5 - 5.2 mmol/L Final   01/02/2020 3.6 3.5 - 5.2 mmol/L Final   01/01/2020 3.7 3.5 - 5.2 mmol/L Final      Chloride Chloride   Date Value Ref Range Status   01/03/2020 95 (L) 98 - 107 mmol/L Final   01/02/2020 96 (L) 98 - 107 mmol/L Final   01/01/2020 97 (L) 98 - 107 mmol/L Final      Bicarbonate CO2   Date Value Ref Range Status   01/03/2020 30.0 (H) 22.0 - 29.0 mmol/L Final   01/02/2020 31.0 (H) 22.0 - 29.0 mmol/L Final   01/01/2020 31.0 (H) 22.0 - 29.0 mmol/L Final      BUN BUN   Date Value Ref Range Status   01/03/2020 9 6 - 20 mg/dL Final   01/02/2020 11 6 - 20 mg/dL Final   01/01/2020 11 6 - 20 mg/dL Final      Creatinine Creatinine   Date Value Ref Range Status   01/03/2020 1.15 (H) 0.57 - 1.00 mg/dL Final   01/02/2020 1.00 0.57 - 1.00 mg/dL Final   01/01/2020 1.04  (H) 0.57 - 1.00 mg/dL Final      Calcium Calcium   Date Value Ref Range Status   01/03/2020 8.6 8.6 - 10.5 mg/dL Final   01/02/2020 9.1 8.6 - 10.5 mg/dL Final   01/01/2020 8.8 8.6 - 10.5 mg/dL Final      Magnesium Magnesium   Date Value Ref Range Status   01/02/2020 2.2 1.6 - 2.6 mg/dL Final   01/01/2020 2.0 1.6 - 2.6 mg/dL Final          Medication Review: Reviewed    allopurinol 300 mg Oral Daily   amiodarone 200 mg Oral BID With Meals   aspirin 325 mg Oral Daily   atorvastatin 40 mg Oral Nightly   bisacodyl 10 mg Rectal Daily   bumetanide 1 mg Oral Daily   docusate sodium 100 mg Oral BID   enoxaparin 40 mg Subcutaneous Daily   ferrous sulfate 324 mg Oral Daily With Breakfast   fluconazole 200 mg Oral Q48H   folic acid 1 mg Oral Daily   guaiFENesin 600 mg Oral Q12H   insulin lispro 0-9 Units Subcutaneous 4x Daily With Meals & Nightly   ipratropium-albuterol 3 mL Nebulization 4x Daily - RT   lactulose 30 g Oral Daily   levothyroxine 200 mcg Oral Q AM   lidocaine 2 patch Transdermal Q24H   montelukast 10 mg Oral Nightly   pantoprazole 40 mg Oral Q AM   polyethylene glycol 17 g Oral BID   senna 1 tablet Oral BID       sodium chloride 30 mL/hr Last Rate: 30 mL/hr (12/27/19 1430)           Assessment/Plan       Patient Active Problem List   Diagnosis Code   • COPD (chronic obstructive pulmonary disease) (CMS/[facility]) J44.9   • Hypertension I10   • Cardiomyopathy, dilated (CMS/[facility]) I42.0   • Essential hypertension I10   • Chronic renal impairment N18.9   • ICD (implantable cardioverter-defibrillator), dual, in situ Z95.810   • Chest pain R07.9   • GERD without esophagitis K21.9   • Hypothyroidism (acquired) E03.9   • Aortic valve regurgitation I35.1   • Unstable angina pectoris (CMS/[facility]) I20.0   • Coronary artery disease involving native coronary artery of native heart with angina pectoris (CMS/[facility]) I25.119   • Nonrheumatic aortic valve insufficiency I35.1       Plan:   Still with very poor inspiratory effort.  Probably  will need rehab at discharge.  Hopefully home over weekend.  Discomfort of yesterday improved following BM.  Blood pressure improved, should be able to tolerate a beta-blocker.  Continue BID Amio for now.  If BP OK tomorrow would start Coreg.  Renal function stable    MELIZA Quintana  20  10:37 AM      Electronically signed by Romeo Fan PA at 20 1312     Adalid Sterling MD at 20 0934          NEPHROLOGY PROGRESS NOTE------[facility]    [facility]  [phone]  Adalid Sterling MD      Patient Care Team:  Phani Adames MD as PCP - General (Internal Medicine)  Ashish Smalls MD as Consulting Physician (Nephrology)      Provider:  Adalid Sterling MD  Patient Name: Rafael Mccann  :  1975    SUBJECTIVE:  F/u LINSEY  No chest pain or SOA   Diuresing well    Medication:    allopurinol 300 mg Oral Daily   amiodarone 200 mg Oral BID With Meals   aspirin 325 mg Oral Daily   atorvastatin 40 mg Oral Nightly   bisacodyl 10 mg Rectal Daily   bumetanide 1 mg Oral Daily   docusate sodium 100 mg Oral BID   enoxaparin 40 mg Subcutaneous Daily   ferrous sulfate 324 mg Oral Daily With Breakfast   fluconazole 200 mg Oral Q48H   folic acid 1 mg Oral Daily   guaiFENesin 600 mg Oral Q12H   insulin lispro 0-9 Units Subcutaneous 4x Daily With Meals & Nightly   ipratropium-albuterol 3 mL Nebulization 4x Daily - RT   lactulose 30 g Oral Daily   levothyroxine 200 mcg Oral Q AM   lidocaine 2 patch Transdermal Q24H   midodrine 5 mg Oral BID AC   montelukast 10 mg Oral Nightly   pantoprazole 40 mg Oral Q AM   polyethylene glycol 17 g Oral BID   senna 1 tablet Oral BID       sodium chloride 30 mL/hr Last Rate: 30 mL/hr (19 1430)       OBJECTIVE    Vital Sign Min/Max for last 24 hours  Temp  Min: 97.5 °F (36.4 °C)  Max: 98.6 °F (37 °C)   BP  Min: 118/74  Max: 155/89   Pulse  Min: 79  Max: 94   Resp  Min: 16  Max: 18   SpO2  Min:  "86 %  Max: 100 %   No data recorded   Weight  Min: 92.1 kg (203 lb 0.7 oz)  Max: 92.1 kg (203 lb 0.7 oz)     Flowsheet Rows      First Filed Value   Admission Height  170.2 cm (67\") Documented at 12/22/2019 2250   Admission Weight  91.8 kg (202 lb 6.1 oz) Documented at 12/22/2019 2250          No intake/output data recorded.  I/O last 3 completed shifts:  In: 2762 [P.O.:1800; I.V.:672; Blood:290]  Out: 5450 [Urine:5450]    Physical Exam:  General Appearance: alert, appears stated age and cooperative. LIP edema  Head: normocephalic, without obvious abnormality and atraumatic  Eyes: conjunctivae and sclerae normal and no icterus  Neck: supple  Lungs: CTA bilaterally  Heart: regular rhythm & normal rate and normal S1, S2 +WALLY  Chest: S/P SURGICAL CHANGES  Abdomen: soft, NT  Extremities: moves extremities well, +TRACE PRETIBIAL EDEMA BILAT, no cyanosis and no redness  Skin: no bleeding, bruising or rash, turgor normal, color normal and no lesions noted  Neurologic: Alert, and oriented. No focal deficits    Labs:    WBC WBC   Date Value Ref Range Status   01/03/2020 6.00 3.40 - 10.80 10*3/mm3 Final   01/02/2020 5.50 3.40 - 10.80 10*3/mm3 Final   01/01/2020 6.40 3.40 - 10.80 10*3/mm3 Final      HGB Hemoglobin   Date Value Ref Range Status   01/03/2020 9.5 (L) 12.0 - 15.9 g/dL Final   01/02/2020 8.2 (L) 12.0 - 15.9 g/dL Final   01/01/2020 8.7 (L) 12.0 - 15.9 g/dL Final      HCT Hematocrit   Date Value Ref Range Status   01/03/2020 27.5 (L) 34.0 - 46.6 % Final   01/02/2020 24.0 (L) 34.0 - 46.6 % Final   01/01/2020 25.5 (L) 34.0 - 46.6 % Final      Platlets No results found for: LABPLAT   MCV MCV   Date Value Ref Range Status   01/03/2020 90.6 79.0 - 97.0 fL Final   01/02/2020 93.5 79.0 - 97.0 fL Final   01/01/2020 93.2 79.0 - 97.0 fL Final          Sodium Sodium   Date Value Ref Range Status   01/03/2020 138 136 - 145 mmol/L Final   01/02/2020 138 136 - 145 mmol/L Final   01/01/2020 139 136 - 145 mmol/L Final      Potassium " Potassium   Date Value Ref Range Status   01/03/2020 3.3 (L) 3.5 - 5.2 mmol/L Final   01/02/2020 3.6 3.5 - 5.2 mmol/L Final   01/01/2020 3.7 3.5 - 5.2 mmol/L Final      Chloride Chloride   Date Value Ref Range Status   01/03/2020 95 (L) 98 - 107 mmol/L Final   01/02/2020 96 (L) 98 - 107 mmol/L Final   01/01/2020 97 (L) 98 - 107 mmol/L Final      CO2 CO2   Date Value Ref Range Status   01/03/2020 30.0 (H) 22.0 - 29.0 mmol/L Final   01/02/2020 31.0 (H) 22.0 - 29.0 mmol/L Final   01/01/2020 31.0 (H) 22.0 - 29.0 mmol/L Final      BUN BUN   Date Value Ref Range Status   01/03/2020 9 6 - 20 mg/dL Final   01/02/2020 11 6 - 20 mg/dL Final   01/01/2020 11 6 - 20 mg/dL Final      Creatinine Creatinine   Date Value Ref Range Status   01/03/2020 1.15 (H) 0.57 - 1.00 mg/dL Final   01/02/2020 1.00 0.57 - 1.00 mg/dL Final   01/01/2020 1.04 (H) 0.57 - 1.00 mg/dL Final      Calcium Calcium   Date Value Ref Range Status   01/03/2020 8.6 8.6 - 10.5 mg/dL Final   01/02/2020 9.1 8.6 - 10.5 mg/dL Final   01/01/2020 8.8 8.6 - 10.5 mg/dL Final      PO4 No components found for: PO4   Albumin Albumin   Date Value Ref Range Status   01/03/2020 3.60 3.50 - 5.20 g/dL Final   01/02/2020 3.70 3.50 - 5.20 g/dL Final   01/01/2020 3.90 3.50 - 5.20 g/dL Final      Magnesium Magnesium   Date Value Ref Range Status   01/02/2020 2.2 1.6 - 2.6 mg/dL Final   01/01/2020 2.0 1.6 - 2.6 mg/dL Final      Uric Acid No components found for: URIC ACID     Imaging Results (Last 72 Hours)     Procedure Component Value Units Date/Time    XR Chest 1 View [[id number]] Collected:  01/01/20 1100     Updated:  01/01/20 1103    Narrative:       DATE OF EXAM:  1/1/2020 10:53 AM     PROCEDURE:  XR CHEST 1 VW-     INDICATIONS:  right sided sharp pain below ribs; R07.9-Chest pain, unspecified;  I35.1-Nonrheumatic aortic (valve) insufficiency; I42.0-Dilated  cardiomyopathy; Z95.810-Presence of automatic (implantable) cardiac  defibrillator; I20.0-Unstable angina;  I25.119-Atherosclerotic heart  disease of native coronary artery with unspecified angina pectoris;  I35.1-Nonrheumatic aortic (valve) insufficiency; J43.9-Emphysema,      COMPARISON:  12/31/2019     TECHNIQUE:   Single radiographic AP view of the chest was obtained.     FINDINGS:  There is a right internal jugular [place]-Brad sheath in place with the tip  in the superior vena cava. There is a left subclavian pacemaker  device/AICD device with leads overlying the right atrium and right  ventricle. The heart is enlarged with changes of CABG. There are  bilateral pleural effusions left greater than right with bilateral  basilar airspace disease. Aeration is slightly worse in the interval.        Impression:       Mild worsening of bilateral basilar infiltrates with moderate left and  small-to-moderate right pleural effusions.     Electronically Signed By-Chidi Walton On:1/1/2020 11:01 AM  This report was finalized on [id number] by  Chidi Walton, .    XR Chest 1 View [[id number]] Collected:  12/31/19 1152     Updated:  12/31/19 1155    Narrative:       DATE OF EXAM:  12/31/2019 10:48 AM     PROCEDURE:  XR CHEST 1 VW-     INDICATIONS:  Hypoxia; R07.9-Chest pain, unspecified; I35.1-Nonrheumatic aortic  (valve) insufficiency; I42.0-Dilated cardiomyopathy; Z95.810-Presence of  automatic (implantable) cardiac defibrillator; I20.0-Unstable angina;  I25.119-Atherosclerotic heart disease of native coronary artery with  unspecified angina pectoris; I35.1-Nonrheumatic aortic (valve)  insufficiency; J43.9-Emphysema, unspecified patient's a 44-year-old  female with hypoxia history of open heart surgery on December 26.  History of aortic regurg, former smoker     COMPARISON:  Comparison is made to study of 12/29/2019     TECHNIQUE:   Single radiographic AP view of the chest was obtained.     FINDINGS:  Today's portable erect study was obtained on 12/31/2019 at 10:50 AM.     Today's study demonstrates the left basilar opacity  remaining stable  there is increasing right basilar opacity consistent with increasing  atelectasis and probable small pleural effusion. The right internal  jugular vascular sheath remains in good position the left-sided  dual-lead pacemaker defibrillator remains in good position changes of  median sternotomy coronary artery bypass grafting and aortic valvular  replacement are again seen.        Impression:          1. Mild interval worsening from study of December 29  2. Increasing right basilar opacities felt to represent increasing  atelectasis  3. Stable moderate left basilar opacity consistent with atelectasis,  pneumonia and/or pleural effusion     Electronically Signed By-Ashwin Beavers Jr. On:12/31/2019 11:53 AM  This report was finalized on [id number] by  Ashwin Beavers Jr., .          Results for orders placed during the hospital encounter of 12/22/19   XR Chest 1 View    Narrative DATE OF EXAM:  1/1/2020 10:53 AM     PROCEDURE:  XR CHEST 1 VW-     INDICATIONS:  right sided sharp pain below ribs; R07.9-Chest pain, unspecified;  I35.1-Nonrheumatic aortic (valve) insufficiency; I42.0-Dilated  cardiomyopathy; Z95.810-Presence of automatic (implantable) cardiac  defibrillator; I20.0-Unstable angina; I25.119-Atherosclerotic heart  disease of native coronary artery with unspecified angina pectoris;  I35.1-Nonrheumatic aortic (valve) insufficiency; J43.9-Emphysema,      COMPARISON:  12/31/2019     TECHNIQUE:   Single radiographic AP view of the chest was obtained.     FINDINGS:  There is a right internal jugular Al[place]-Brad sheath in place with the tip  in the superior vena cava. There is a left subclavian pacemaker  device/AICD device with leads overlying the right atrium and right  ventricle. The heart is enlarged with changes of CABG. There are  bilateral pleural effusions left greater than right with bilateral  basilar airspace disease. Aeration is slightly worse in the interval.        Impression Mild worsening  of bilateral basilar infiltrates with moderate left and  small-to-moderate right pleural effusions.     Electronically Signed By-Chidi Walton On:1/1/2020 11:01 AM  This report was finalized on [id number] by  Chidi Walton, .   XR Chest 1 View    Narrative DATE OF EXAM:  12/31/2019 10:48 AM     PROCEDURE:  XR CHEST 1 VW-     INDICATIONS:  Hypoxia; R07.9-Chest pain, unspecified; I35.1-Nonrheumatic aortic  (valve) insufficiency; I42.0-Dilated cardiomyopathy; Z95.810-Presence of  automatic (implantable) cardiac defibrillator; I20.0-Unstable angina;  I25.119-Atherosclerotic heart disease of native coronary artery with  unspecified angina pectoris; I35.1-Nonrheumatic aortic (valve)  insufficiency; J43.9-Emphysema, unspecified patient's a 44-year-old  female with hypoxia history of open heart surgery on December 26.  History of aortic regurg, former smoker     COMPARISON:  Comparison is made to study of 12/29/2019     TECHNIQUE:   Single radiographic AP view of the chest was obtained.     FINDINGS:  Today's portable erect study was obtained on 12/31/2019 at 10:50 AM.     Today's study demonstrates the left basilar opacity remaining stable  there is increasing right basilar opacity consistent with increasing  atelectasis and probable small pleural effusion. The right internal  jugular vascular sheath remains in good position the left-sided  dual-lead pacemaker defibrillator remains in good position changes of  median sternotomy coronary artery bypass grafting and aortic valvular  replacement are again seen.        Impression    1. Mild interval worsening from study of December 29  2. Increasing right basilar opacities felt to represent increasing  atelectasis  3. Stable moderate left basilar opacity consistent with atelectasis,  pneumonia and/or pleural effusion     Electronically Signed By-Ashwin Beavers Jr. On:12/31/2019 11:53 AM  This report was finalized on [id number] by  Ashwin Beavers Jr., .   XR Chest 1 View     Narrative DATE OF EXAM:  12/29/2019 1:02 PM     PROCEDURE:  XR CHEST 1 VW-     INDICATIONS:  CT removal; R07.9-Chest pain, unspecified; I35.1-Nonrheumatic aortic  (valve) insufficiency; I42.0-Dilated cardiomyopathy; Z95.810-Presence of  automatic (implantable) cardiac defibrillator; I20.0-Unstable angina;  I25.119-Atherosclerotic heart disease of native coronary artery with  unspecified angina pectoris; I35.1-Nonrheumatic aortic (valve)  insufficiency patient's a 44-year-old female status post chest tube  removal     COMPARISON:  Study of 5:01 AM earlier today     TECHNIQUE:   Single radiographic AP view of the chest was obtained.     FINDINGS:  Today's portable erect study was obtained on 12/29/2019 at 12:52 PM     Today's follow-up study demonstrates the right internal jugular sheath  being in place. The mediastinal drains have been removed there is no  evidence of pneumomediastinum or pneumothorax on today's study. Moderate  opacity in the left lung base continues either representing atelectasis  or pneumonia. Mild right basilar atelectasis is seen. A left-sided  dual-lead pacemaker defibrillator is seen. Changes of median sternotomy  and coronary artery bypass grafting are present. The upper abdomen  appears unremarkable.        Impression    1. No significant change from earlier than the day other than removal of  mediastinal drains  2. Bilateral basilar opacities left greater than right felt to represent  atelectasis and/or infiltrate, possible pneumonia on the left,  atelectasis right base.  3. Right internal jugular sheath remains intact with tip in superior  vena cava, left-sided dual-lead pacemaker defibrillator appears  unchanged as are changes of CABG.     Electronically Signed By-Ashwin Beavers Jr. On:12/29/2019 1:16 PM  This report was finalized on [id number] by  Ashwin Beavers Jr., .       Results for orders placed during the hospital encounter of 12/22/19   Duplex Venous Lower Extremity - Bilateral CAR     Narrative · Normal bilateral lower extremity venous duplex scan.            ASSESSMENT / PLAN      Chest pain    COPD (chronic obstructive pulmonary disease) (CMS/HCC)    Hypertension    Cardiomyopathy, dilated (CMS/HCC)    Essential hypertension    Chronic renal impairment    ICD (implantable cardioverter-defibrillator), dual, in situ    GERD without esophagitis    Hypothyroidism (acquired)    Aortic valve regurgitation    Unstable angina pectoris (CMS/HCC)    Coronary artery disease involving native coronary artery of native heart with angina pectoris (CMS/HCC)    Nonrheumatic aortic valve insufficiency      1. ARF/LINSEY/CRF/CKD STG 2------Nonoliguric. ARF/LINSEY resolved. +Mild ARF/LINSEY on top of what appears to be CRF/CKD STG 2 with a baseline serum creatinine of 1.1. Unknown if patient has baseline proteinuria or hematuria. CRF/CKD STG 2 likely secondary to HTN NS/DM and chronic renal hypoperfusion from Cardiomyopathy. +Mild ARF/LINSEY appeared to be secondary to prerenal/hemodynamic fluctuation from MI S/P Cath/IV dye exposure with concomitant ACE-I and diuretic use. Keep off of Zestril for now.   Dose meds for CrCl 30-60 cc/min. No NSAIDs. No further IV dye exposure, unless emergently needed.     2. CAD S/P MI S/P CATH--------CABG done per , CT Surgery.     3. DILATED CARDIOMYOPATHY/VALVULAR HEART DISEASE--------No gross overload at present. Restart little diuretic. Has PM/AICD. Per , Cardiology     4. HTN WITH CKD------BP okay. Avoid hypotension. No ACE/ARB/DRI for now. Temporarily holding diuretics     5. HYPERURICEMIA---------On Allopurinol.  Uric normal     6. HYPERLIPIDEMIA------On Statin.       7. GERD-------On H2 blocker     8. DMII------Metformin on hold as just had IV dye exposure. BS okay. On Glucometers, SSI     9. VITAMIN D DEFICIENCY     10. DM PERIPHERAL NEUROPATHY-------On Neurontin     11. HYPOTHYROIDISM------Increased Synthroid as TSH up      Creatinine better, diuresing well,  replace K  BP better, stop midodrine    Adalid Sterling MD  [facility]  [phone]  01/03/20  9:34 AM      Electronically signed by Adalid Sterling MD at 01/03/20 1448       Consult Notes (last 24 hours) (Notes from 01/02/20 1504 through 01/03/20 1504)    No notes of this type exist for this encounter.

## 2020-01-03 NOTE — THERAPY TREATMENT NOTE
Acute Care - Occupational Therapy Treatment Note  [facility]     Patient Name: Rafael Mccann  : 1975  MRN: [id number]  Today's Date: 1/3/2020             Admit Date: 2019       ICD-10-CM ICD-9-CM   1. Chest pain, unspecified type R07.9 786.50   2. Aortic valve insufficiency, etiology of cardiac valve disease unspecified I35.1 424.1   3. Cardiomyopathy, dilated (CMS/[facility]) I42.0 425.4   4. ICD (implantable cardioverter-defibrillator), dual, in situ Z95.810 V45.02   5. Unstable angina pectoris (CMS/[facility]) I20.0 411.1   6. Coronary artery disease involving native coronary artery of native heart with angina pectoris (CMS/[facility]) I25.119 414.01     413.9   7. Nonrheumatic aortic valve insufficiency I35.1 424.1   8. Pulmonary emphysema, unspecified emphysema type (CMS/[facility]) J43.9 492.8     Patient Active Problem List   Diagnosis   • COPD (chronic obstructive pulmonary disease) (CMS/[facility])   • Hypertension   • Cardiomyopathy, dilated (CMS/HCC)   • Essential hypertension   • Chronic renal impairment   • ICD (implantable cardioverter-defibrillator), dual, in situ   • Chest pain   • GERD without esophagitis   • Hypothyroidism (acquired)   • Aortic valve regurgitation   • Unstable angina pectoris (CMS/HCC)   • Coronary artery disease involving native coronary artery of native heart with angina pectoris (CMS/HCC)   • Nonrheumatic aortic valve insufficiency     Past Medical History:   Diagnosis Date   • CHF (congestive heart failure) (CMS/[facility])    • COPD (chronic obstructive pulmonary disease) (CMS/[facility])    • Diabetes (CMS/[facility])    • Hyperlipidemia    • Hypertension      Past Surgical History:   Procedure Laterality Date   • AORTIC VALVE REPAIR/REPLACEMENT N/A 2019    Procedure: AORTIC VALVE REPAIR/REPLACEMENT;  Surgeon: Lane Stock MD;  Location: [facility];  Service: Cardiothoracic   • BREAST LUMPECTOMY     • CARDIAC CATHETERIZATION N/A 2019    Procedure: Right and Left Heart Cath 19 @ 0900;   Surgeon: Wayne Luna MD;  Location: [facility] CATH INVASIVE LOCATION;  Service: Cardiovascular   • CARDIAC CATHETERIZATION N/A 12/24/2019    Procedure: Coronary angiography;  Surgeon: Wayne Luna MD;  Location: [facility] CATH INVASIVE LOCATION;  Service: Cardiovascular   • CARDIAC ELECTROPHYSIOLOGY PROCEDURE Left 6/28/2019    Procedure: Dual-chamber ICD insertion;  Surgeon: Héctor Eckert MD;  Location: [facility] CATH INVASIVE LOCATION;  Service: Cardiovascular   • CARDIAC ELECTROPHYSIOLOGY PROCEDURE Left 8/14/2019    Procedure: Lead Revision;  Surgeon: Héctor Eckert MD;  Location: [facility] CATH INVASIVE LOCATION;  Service: Cardiovascular   • CHOLECYSTECTOMY     • CORONARY ARTERY BYPASS GRAFT N/A 12/27/2019    Procedure: CORONARY ARTERY BYPASS GRAFTING;  Surgeon: Lane Stock MD;  Location: [facility] CVOR;  Service: Cardiothoracic   • HYSTERECTOMY     • INSERT / REPLACE / REMOVE PACEMAKER     • THYROID SURGERY         Therapy Treatment    Rehabilitation Treatment Summary     Row Name 01/03/20 1600             Treatment Time/Intention    Discipline  occupational therapist  -      Document Type  therapy note (daily note)  -      Subjective Information  complains of;fatigue  -      Mode of Treatment  individual therapy  -      Patient/Family Observations  just returning to supine after a meal. Pt takes breifsupine rest break then comes to sit EOB again for seated cardiac HEP utilizing handout as a guide. Pt tolerates it with X2 brief rest breaks & she is left EOB on room air & RN notified that Pt is requesting humidification of her O2. She sats 88-92 on room air.  BP was 135/76 after HEP.  -      Patient Effort  good  -MH      Recorded by [MH] Maribeth Rodriguez, OT 01/03/20 1630      Row Name 01/03/20 1600             Bed Mobility Assessment/Treatment    Bed Mobility Assessment/Treatment  supine-sit-supine  -      Supine-Sit-Supine Grainger (Bed Mobility)  minimum assist (75%  patient effort);verbal cues  -      Recorded by [MH] Maribeth Rodriguez, OT 01/03/20 1630      Row Name 01/03/20 1600             Positioning and Restraints    Pre-Treatment Position  in bed  -      Post Treatment Position  bed  -      In Bed  notified nsg;sitting EOB;call light within reach;encouraged to call for assist  -      Recorded by [MH] Maribeth Rodriguez, OT 01/03/20 1630      Row Name 01/03/20 1600             Pain Scale: Numbers Pre/Post-Treatment    Pain Scale: Numbers, Pretreatment  2/10  -      Pain Scale: Numbers, Post-Treatment  2/10  -      Recorded by [MH] Maribeth Rodriguez, OT 01/03/20 1630      Row Name                Wound 12/27/19 anterior;midline sternal Incision    Wound - Properties Group Date first assessed: 12/27/19 [NM] Present on Hospital Admission: N [NM] Orientation: anterior;midline [NM] Location: sternal [NM] Primary Wound Type: Incision [NM] Recorded by:  [NM] Roz Santana RN 12/27/19 1000    Row Name                Wound 12/27/19 Left anterior;distal thigh Incision    Wound - Properties Group Date first assessed: 12/27/19 [NM] Present on Hospital Admission: N [NM] Side: Left [NM] Orientation: anterior;distal [NM] Location: thigh [NM] Primary Wound Type: Incision [NM], endovein harvest sites  Recorded by:  [NM] Roz Santana RN 12/27/19 1001    Row Name 01/03/20 1600             Plan of Care Review    Plan of Care Reviewed With  patient  -MH      Progress  improving  -      Outcome Summary  Pt tolerates initial instruction for HEP well but remains fatigued & on supplimental o2. She is not yet reaching her goals for post-op CABG at 1 week post op & Ot continues to recommend ST IP rehab at d/c. Pt is motivated & particiaptes well in therapy. She would do well at a rehab.  -      Recorded by [MH] Maribeth Rodriguez, OT 01/03/20 1630      Row Name 01/03/20 1600             Outcome Summary/Treatment Plan (OT)    Anticipated Discharge Disposition (OT)  inpatient rehabilitation  facility  -      Recorded by [] Maribeth Rodriguez, OT 01/03/20 1630        User Key  (r) = Recorded By, (t) = Taken By, (c) = Cosigned By    Initials Name Effective Dates Discipline     Maribeth Rodriguez, OT 03/01/19 -  OT    Roz Gage RN 03/01/19 -  Nurse        Wound 12/27/19 anterior;midline sternal Incision (Active)   Dressing Appearance no drainage;open to air 1/3/2020 12:00 PM   Closure Approximated 1/3/2020 12:00 PM   Base dry;clean 1/3/2020 12:00 PM   Drainage Amount none 1/3/2020 12:00 PM   Care, Wound cleansed with 1/3/2020  4:00 AM   Dressing Care, Wound open to air 1/3/2020  4:00 AM       Wound 12/27/19 Left anterior;distal thigh Incision (Active)   Dressing Appearance dry;intact;no drainage 1/3/2020 12:00 PM   Closure Approximated 1/3/2020 12:00 PM   Base clean;dry 1/3/2020 12:00 PM   Periwound intact;dry 1/3/2020 12:00 PM   Drainage Amount none 1/3/2020 12:00 PM   Care, Wound cleansed with 1/3/2020  4:00 AM   Dressing Care, Wound open to air 1/3/2020  4:00 AM     Rehab Goal Summary     Row Name 01/03/20 1326             Bed Mobility Goal 1 (PT)    Progress/Outcomes (Bed Mobility Goal 1, PT)  continuing progress toward goal  -CR         Transfer Goal 1 (PT)    Progress/Outcome (Transfer Goal 1, PT)  continuing progress toward goal  -CR         Gait Training Goal 1 (PT)    Progress/Outcome (Gait Training Goal 1, PT)  continuing progress toward goal  -CR        User Key  (r) = Recorded By, (t) = Taken By, (c) = Cosigned By    Initials Name Provider Type Discipline    CR Reyes, Carmela, PT Physical Therapist PT            OT Recommendation and Plan  Outcome Summary/Treatment Plan (OT)  Anticipated Discharge Disposition (OT): inpatient rehabilitation facility  Plan of Care Review  Plan of Care Reviewed With: patient  Plan of Care Reviewed With: patient  Outcome Summary: Pt tolerates initial instruction for HEP well but remains fatigued & on supplimental o2. She is not yet reaching her goals for  post-op CABG at 1 week post op & Ot continues to recommend ST IP rehab at d/c. Pt is motivated & particiaptes well in therapy. She would do well at a rehab.       Time Calculation:   Time Calculation- OT     Row Name 01/03/20 1631             Time Calculation-     OT Start Time  1425  -      OT Stop Time  1455  -      OT Time Calculation (min)  30 min  -      Total Timed Code Minutes- OT  30 minute(s)  -      OT Received On  01/03/20  -      OT - Next Appointment  01/06/20  -        User Key  (r) = Recorded By, (t) = Taken By, (c) = Cosigned By    Initials Name Provider Type     Maribeth Rodriguez OT Occupational Therapist        Therapy Charges for Today     Code Description Service Date Service Provider Modifiers Qty    36035122564 HC OT SELF CARE/MGMT/TRAIN EA 15 MIN 1/2/2020 Maribeth Rodriguez, OT GO 1    91137679803 HC OT COMMUN WK REINTTRAIN EA 15 MIN 1/2/2020 Maribeth Rodriguez, OT GO 1    63347267503 HC OT THER PROC EA 15 MIN 1/3/2020 Maribeth Rodriguez, OT GO 1    71164672581 HC OT THERAPEUTIC ACT EA 15 MIN 1/3/2020 Maribeth Rodriguez, OT GO 1               Maribeth Rodriguez OT  1/3/2020

## 2020-01-03 NOTE — PROGRESS NOTES
"NEPHROLOGY PROGRESS NOTE------[facility]    [facility]  [phone]  Adalid Sterling MD      Patient Care Team:  Phani Adames MD as PCP - General (Internal Medicine)  Ashish Smalls MD as Consulting Physician (Nephrology)      Provider:  Adalid Sterling MD  Patient Name: Rafael Mccann  :  1975    SUBJECTIVE:  F/u LINSEY  No chest pain or SOA   Diuresing well    Medication:    allopurinol 300 mg Oral Daily   amiodarone 200 mg Oral BID With Meals   aspirin 325 mg Oral Daily   atorvastatin 40 mg Oral Nightly   bisacodyl 10 mg Rectal Daily   bumetanide 1 mg Oral Daily   docusate sodium 100 mg Oral BID   enoxaparin 40 mg Subcutaneous Daily   ferrous sulfate 324 mg Oral Daily With Breakfast   fluconazole 200 mg Oral Q48H   folic acid 1 mg Oral Daily   guaiFENesin 600 mg Oral Q12H   insulin lispro 0-9 Units Subcutaneous 4x Daily With Meals & Nightly   ipratropium-albuterol 3 mL Nebulization 4x Daily - RT   lactulose 30 g Oral Daily   levothyroxine 200 mcg Oral Q AM   lidocaine 2 patch Transdermal Q24H   midodrine 5 mg Oral BID AC   montelukast 10 mg Oral Nightly   pantoprazole 40 mg Oral Q AM   polyethylene glycol 17 g Oral BID   senna 1 tablet Oral BID       sodium chloride 30 mL/hr Last Rate: 30 mL/hr (19 1430)       OBJECTIVE    Vital Sign Min/Max for last 24 hours  Temp  Min: 97.5 °F (36.4 °C)  Max: 98.6 °F (37 °C)   BP  Min: 118/74  Max: 155/89   Pulse  Min: 79  Max: 94   Resp  Min: 16  Max: 18   SpO2  Min: 86 %  Max: 100 %   No data recorded   Weight  Min: 92.1 kg (203 lb 0.7 oz)  Max: 92.1 kg (203 lb 0.7 oz)     Flowsheet Rows      First Filed Value   Admission Height  170.2 cm (67\") Documented at 2019   Admission Weight  91.8 kg (202 lb 6.1 oz) Documented at 2019 225          No intake/output data recorded.  I/O last 3 completed shifts:  In: 2762 [P.O.:1800; I.V.:672; Blood:290]  Out: 5450 [Urine:5450]    Physical " Exam:  General Appearance: alert, appears stated age and cooperative. LIP edema  Head: normocephalic, without obvious abnormality and atraumatic  Eyes: conjunctivae and sclerae normal and no icterus  Neck: supple  Lungs: CTA bilaterally  Heart: regular rhythm & normal rate and normal S1, S2 +WALLY  Chest: S/P SURGICAL CHANGES  Abdomen: soft, NT  Extremities: moves extremities well, +TRACE PRETIBIAL EDEMA BILAT, no cyanosis and no redness  Skin: no bleeding, bruising or rash, turgor normal, color normal and no lesions noted  Neurologic: Alert, and oriented. No focal deficits    Labs:    WBC WBC   Date Value Ref Range Status   01/03/2020 6.00 3.40 - 10.80 10*3/mm3 Final   01/02/2020 5.50 3.40 - 10.80 10*3/mm3 Final   01/01/2020 6.40 3.40 - 10.80 10*3/mm3 Final      HGB Hemoglobin   Date Value Ref Range Status   01/03/2020 9.5 (L) 12.0 - 15.9 g/dL Final   01/02/2020 8.2 (L) 12.0 - 15.9 g/dL Final   01/01/2020 8.7 (L) 12.0 - 15.9 g/dL Final      HCT Hematocrit   Date Value Ref Range Status   01/03/2020 27.5 (L) 34.0 - 46.6 % Final   01/02/2020 24.0 (L) 34.0 - 46.6 % Final   01/01/2020 25.5 (L) 34.0 - 46.6 % Final      Platlets No results found for: LABPLAT   MCV MCV   Date Value Ref Range Status   01/03/2020 90.6 79.0 - 97.0 fL Final   01/02/2020 93.5 79.0 - 97.0 fL Final   01/01/2020 93.2 79.0 - 97.0 fL Final          Sodium Sodium   Date Value Ref Range Status   01/03/2020 138 136 - 145 mmol/L Final   01/02/2020 138 136 - 145 mmol/L Final   01/01/2020 139 136 - 145 mmol/L Final      Potassium Potassium   Date Value Ref Range Status   01/03/2020 3.3 (L) 3.5 - 5.2 mmol/L Final   01/02/2020 3.6 3.5 - 5.2 mmol/L Final   01/01/2020 3.7 3.5 - 5.2 mmol/L Final      Chloride Chloride   Date Value Ref Range Status   01/03/2020 95 (L) 98 - 107 mmol/L Final   01/02/2020 96 (L) 98 - 107 mmol/L Final   01/01/2020 97 (L) 98 - 107 mmol/L Final      CO2 CO2   Date Value Ref Range Status   01/03/2020 30.0 (H) 22.0 - 29.0 mmol/L Final    01/02/2020 31.0 (H) 22.0 - 29.0 mmol/L Final   01/01/2020 31.0 (H) 22.0 - 29.0 mmol/L Final      BUN BUN   Date Value Ref Range Status   01/03/2020 9 6 - 20 mg/dL Final   01/02/2020 11 6 - 20 mg/dL Final   01/01/2020 11 6 - 20 mg/dL Final      Creatinine Creatinine   Date Value Ref Range Status   01/03/2020 1.15 (H) 0.57 - 1.00 mg/dL Final   01/02/2020 1.00 0.57 - 1.00 mg/dL Final   01/01/2020 1.04 (H) 0.57 - 1.00 mg/dL Final      Calcium Calcium   Date Value Ref Range Status   01/03/2020 8.6 8.6 - 10.5 mg/dL Final   01/02/2020 9.1 8.6 - 10.5 mg/dL Final   01/01/2020 8.8 8.6 - 10.5 mg/dL Final      PO4 No components found for: PO4   Albumin Albumin   Date Value Ref Range Status   01/03/2020 3.60 3.50 - 5.20 g/dL Final   01/02/2020 3.70 3.50 - 5.20 g/dL Final   01/01/2020 3.90 3.50 - 5.20 g/dL Final      Magnesium Magnesium   Date Value Ref Range Status   01/02/2020 2.2 1.6 - 2.6 mg/dL Final   01/01/2020 2.0 1.6 - 2.6 mg/dL Final      Uric Acid No components found for: URIC ACID     Imaging Results (Last 72 Hours)     Procedure Component Value Units Date/Time    XR Chest 1 View [[id number]] Collected:  01/01/20 1100     Updated:  01/01/20 1103    Narrative:       DATE OF EXAM:  1/1/2020 10:53 AM     PROCEDURE:  XR CHEST 1 VW-     INDICATIONS:  right sided sharp pain below ribs; R07.9-Chest pain, unspecified;  I35.1-Nonrheumatic aortic (valve) insufficiency; I42.0-Dilated  cardiomyopathy; Z95.810-Presence of automatic (implantable) cardiac  defibrillator; I20.0-Unstable angina; I25.119-Atherosclerotic heart  disease of native coronary artery with unspecified angina pectoris;  I35.1-Nonrheumatic aortic (valve) insufficiency; J43.9-Emphysema,      COMPARISON:  12/31/2019     TECHNIQUE:   Single radiographic AP view of the chest was obtained.     FINDINGS:  There is a right internal jugular [place]-Brad sheath in place with the tip  in the superior vena cava. There is a left subclavian pacemaker  device/AICD device with  leads overlying the right atrium and right  ventricle. The heart is enlarged with changes of CABG. There are  bilateral pleural effusions left greater than right with bilateral  basilar airspace disease. Aeration is slightly worse in the interval.        Impression:       Mild worsening of bilateral basilar infiltrates with moderate left and  small-to-moderate right pleural effusions.     Electronically Signed By-Chidi Walton On:1/1/2020 11:01 AM  This report was finalized on [id number] by  Chidi Walton, .    XR Chest 1 View [[id number]] Collected:  12/31/19 1152     Updated:  12/31/19 1155    Narrative:       DATE OF EXAM:  12/31/2019 10:48 AM     PROCEDURE:  XR CHEST 1 VW-     INDICATIONS:  Hypoxia; R07.9-Chest pain, unspecified; I35.1-Nonrheumatic aortic  (valve) insufficiency; I42.0-Dilated cardiomyopathy; Z95.810-Presence of  automatic (implantable) cardiac defibrillator; I20.0-Unstable angina;  I25.119-Atherosclerotic heart disease of native coronary artery with  unspecified angina pectoris; I35.1-Nonrheumatic aortic (valve)  insufficiency; J43.9-Emphysema, unspecified patient's a 44-year-old  female with hypoxia history of open heart surgery on December 26.  History of aortic regurg, former smoker     COMPARISON:  Comparison is made to study of 12/29/2019     TECHNIQUE:   Single radiographic AP view of the chest was obtained.     FINDINGS:  Today's portable erect study was obtained on 12/31/2019 at 10:50 AM.     Today's study demonstrates the left basilar opacity remaining stable  there is increasing right basilar opacity consistent with increasing  atelectasis and probable small pleural effusion. The right internal  jugular vascular sheath remains in good position the left-sided  dual-lead pacemaker defibrillator remains in good position changes of  median sternotomy coronary artery bypass grafting and aortic valvular  replacement are again seen.        Impression:          1. Mild interval worsening from  study of December 29  2. Increasing right basilar opacities felt to represent increasing  atelectasis  3. Stable moderate left basilar opacity consistent with atelectasis,  pneumonia and/or pleural effusion     Electronically Signed By-Ashwin Beavers Jr. On:12/31/2019 11:53 AM  This report was finalized on [id number] by  Ashwin Beavers Jr., .          Results for orders placed during the hospital encounter of 12/22/19   XR Chest 1 View    Narrative DATE OF EXAM:  1/1/2020 10:53 AM     PROCEDURE:  XR CHEST 1 VW-     INDICATIONS:  right sided sharp pain below ribs; R07.9-Chest pain, unspecified;  I35.1-Nonrheumatic aortic (valve) insufficiency; I42.0-Dilated  cardiomyopathy; Z95.810-Presence of automatic (implantable) cardiac  defibrillator; I20.0-Unstable angina; I25.119-Atherosclerotic heart  disease of native coronary artery with unspecified angina pectoris;  I35.1-Nonrheumatic aortic (valve) insufficiency; J43.9-Emphysema,      COMPARISON:  12/31/2019     TECHNIQUE:   Single radiographic AP view of the chest was obtained.     FINDINGS:  There is a right internal jugular [place]Brad sheath in place with the tip  in the superior vena cava. There is a left subclavian pacemaker  device/AICD device with leads overlying the right atrium and right  ventricle. The heart is enlarged with changes of CABG. There are  bilateral pleural effusions left greater than right with bilateral  basilar airspace disease. Aeration is slightly worse in the interval.        Impression Mild worsening of bilateral basilar infiltrates with moderate left and  small-to-moderate right pleural effusions.     Electronically Signed By-Chidi Walton On:1/1/2020 11:01 AM  This report was finalized on [id number] by  Chidi Walton, .   XR Chest 1 View    Narrative DATE OF EXAM:  12/31/2019 10:48 AM     PROCEDURE:  XR CHEST 1 VW-     INDICATIONS:  Hypoxia; R07.9-Chest pain, unspecified; I35.1-Nonrheumatic aortic  (valve) insufficiency; I42.0-Dilated  cardiomyopathy; Z95.810-Presence of  automatic (implantable) cardiac defibrillator; I20.0-Unstable angina;  I25.119-Atherosclerotic heart disease of native coronary artery with  unspecified angina pectoris; I35.1-Nonrheumatic aortic (valve)  insufficiency; J43.9-Emphysema, unspecified patient's a 44-year-old  female with hypoxia history of open heart surgery on December 26.  History of aortic regurg, former smoker     COMPARISON:  Comparison is made to study of 12/29/2019     TECHNIQUE:   Single radiographic AP view of the chest was obtained.     FINDINGS:  Today's portable erect study was obtained on 12/31/2019 at 10:50 AM.     Today's study demonstrates the left basilar opacity remaining stable  there is increasing right basilar opacity consistent with increasing  atelectasis and probable small pleural effusion. The right internal  jugular vascular sheath remains in good position the left-sided  dual-lead pacemaker defibrillator remains in good position changes of  median sternotomy coronary artery bypass grafting and aortic valvular  replacement are again seen.        Impression    1. Mild interval worsening from study of December 29  2. Increasing right basilar opacities felt to represent increasing  atelectasis  3. Stable moderate left basilar opacity consistent with atelectasis,  pneumonia and/or pleural effusion     Electronically Signed By-Ashwin Beavers Jr. On:12/31/2019 11:53 AM  This report was finalized on [id number] by  Ashwin Beavers Jr., .   XR Chest 1 View    Narrative DATE OF EXAM:  12/29/2019 1:02 PM     PROCEDURE:  XR CHEST 1 VW-     INDICATIONS:  CT removal; R07.9-Chest pain, unspecified; I35.1-Nonrheumatic aortic  (valve) insufficiency; I42.0-Dilated cardiomyopathy; Z95.810-Presence of  automatic (implantable) cardiac defibrillator; I20.0-Unstable angina;  I25.119-Atherosclerotic heart disease of native coronary artery with  unspecified angina pectoris; I35.1-Nonrheumatic aortic  (valve)  insufficiency patient's a 44-year-old female status post chest tube  removal     COMPARISON:  Study of 5:01 AM earlier today     TECHNIQUE:   Single radiographic AP view of the chest was obtained.     FINDINGS:  Today's portable erect study was obtained on 12/29/2019 at 12:52 PM     Today's follow-up study demonstrates the right internal jugular sheath  being in place. The mediastinal drains have been removed there is no  evidence of pneumomediastinum or pneumothorax on today's study. Moderate  opacity in the left lung base continues either representing atelectasis  or pneumonia. Mild right basilar atelectasis is seen. A left-sided  dual-lead pacemaker defibrillator is seen. Changes of median sternotomy  and coronary artery bypass grafting are present. The upper abdomen  appears unremarkable.        Impression    1. No significant change from earlier than the day other than removal of  mediastinal drains  2. Bilateral basilar opacities left greater than right felt to represent  atelectasis and/or infiltrate, possible pneumonia on the left,  atelectasis right base.  3. Right internal jugular sheath remains intact with tip in superior  vena cava, left-sided dual-lead pacemaker defibrillator appears  unchanged as are changes of CABG.     Electronically Signed By-sAhwin Beavers Jr. On:12/29/2019 1:16 PM  This report was finalized on [id number] by  Ashwin Beavers Jr., .       Results for orders placed during the hospital encounter of 12/22/19   Duplex Venous Lower Extremity - Bilateral CAR    Narrative · Normal bilateral lower extremity venous duplex scan.            ASSESSMENT / PLAN      Chest pain    COPD (chronic obstructive pulmonary disease) (CMS/HCC)    Hypertension    Cardiomyopathy, dilated (CMS/HCC)    Essential hypertension    Chronic renal impairment    ICD (implantable cardioverter-defibrillator), dual, in situ    GERD without esophagitis    Hypothyroidism (acquired)    Aortic valve regurgitation     Unstable angina pectoris (CMS/HCC)    Coronary artery disease involving native coronary artery of native heart with angina pectoris (CMS/HCC)    Nonrheumatic aortic valve insufficiency      1. ARF/LINSEY/CRF/CKD STG 2------Nonoliguric. ARF/LINSEY resolved. +Mild ARF/LINSEY on top of what appears to be CRF/CKD STG 2 with a baseline serum creatinine of 1.1. Unknown if patient has baseline proteinuria or hematuria. CRF/CKD STG 2 likely secondary to HTN NS/DM and chronic renal hypoperfusion from Cardiomyopathy. +Mild ARF/LINSEY appeared to be secondary to prerenal/hemodynamic fluctuation from MI S/P Cath/IV dye exposure with concomitant ACE-I and diuretic use. Keep off of Zestril for now.   Dose meds for CrCl 30-60 cc/min. No NSAIDs. No further IV dye exposure, unless emergently needed.     2. CAD S/P MI S/P CATH--------CABG done per , CT Surgery.     3. DILATED CARDIOMYOPATHY/VALVULAR HEART DISEASE--------No gross overload at present. Restart little diuretic. Has PM/AICD. Per , Cardiology     4. HTN WITH CKD------BP okay. Avoid hypotension. No ACE/ARB/DRI for now. Temporarily holding diuretics     5. HYPERURICEMIA---------On Allopurinol.  Uric normal     6. HYPERLIPIDEMIA------On Statin.       7. GERD-------On H2 blocker     8. DMII------Metformin on hold as just had IV dye exposure. BS okay. On Glucometers, SSI     9. VITAMIN D DEFICIENCY     10. DM PERIPHERAL NEUROPATHY-------On Neurontin     11. HYPOTHYROIDISM------Increased Synthroid as TSH up      Creatinine better, diuresing well, replace K  BP better, stop midodrine    Adalid Sterling MD  [facility]  [phone]  01/03/20  9:34 AM

## 2020-01-03 NOTE — PLAN OF CARE
Problem: Patient Care Overview  Goal: Plan of Care Review  Outcome: Ongoing (interventions implemented as appropriate)  Flowsheets  Taken 1/3/2020 1324  Plan of Care Reviewed With: patient  Taken 1/3/2020 1328  Outcome Summary: Patient continues to make good progress; now tolerated increase distance in ambulation using rw with supplemental O2. Noted SOA during ambulation but pt having more coughing spells this session. Pt had shower prior to tx session so with increased activity tolerance. Still needing cues for sternal precautions especially with coughing. Pt undecided on d/c plans, no family present at bedside to discuss needs for home d/c. Pt needs to be able to ascend/descend 1 flight of stairs if pt goes home from hospital.

## 2020-01-03 NOTE — PROGRESS NOTES
Pulmonary/ Critical Care progress Note        Patient Name:  Rafael Mccann    :  1975    Medical Record:  [id number]    Requesting Physician    Phani Adames, *    Primary Care Physician     Phani Adames MD    Reason for consultation    Rafael Mccann is a 44 y.o. female who we were asked to see in consultation for decreased level of consciousness.   Patient is POD#3 CABG and AVR after presenting to the ER on 19 with complaints of chest pain, dyspnea and tachycardia.  Patient with a history of CAD being medically managed as well as cardiomyopathy.   Cardiac cath on 19 showed severe two vessel disease and 4+aortic regurg.       This evening patient was found to be very difficult to arouse in bed after being up to chair this morning and ambulating in the afternoon.   Patient was given Narcan with improvement in her mental status.  ABG was obtained as well which showed pCO2 of 49 and a pAO2 of 68.   Patient had been requiring Dilaudid 0.25 mg q2h IV and Oxycodone 5 mg q4h, for pain control in addition had Ultram and Benadryl earlier today.      Review of Systems    :  OOB to chair, awake and alert.  Complaints of right lower rib pain.    20:  OOB to chair,  Remains with complaints of pain  20:  Sitting on side of bed, remains with some pain to right side.    1/3: chest pain much better.  Her oxygen requirement stable on 2 L    Home medicationS  Prior to Admission medications    Medication Sig Start Date End Date Taking? Authorizing Provider   allopurinol (ZYLOPRIM) 300 MG tablet Take 300 mg by mouth Daily.   Yes Dheeraj Alvarez MD   ALPRAZolam (XANAX) 1 MG tablet Take 1 mg by mouth 4 (Four) Times a Day As Needed for Anxiety.   Yes Dheeraj Alvarez MD   amLODIPine (NORVASC) 5 MG tablet Take 5 mg by mouth 2 (Two) Times a Day.   Yes Dheeraj Alvarez MD   aspirin 81 MG chewable tablet Chew 81 mg Daily.   Yes Dheeraj Alvarez MD    atorvastatin (LIPITOR) 40 MG tablet Take 1 tablet by mouth Every Night. 6/30/19  Yes Hannah Wills MD   carvedilol (COREG) 12.5 MG tablet Take 1 tablet by mouth 2 (Two) Times a Day With Meals. 6/30/19  Yes Hannah Wills MD   cholecalciferol (VITAMIN D3) 1000 units tablet Take 1,000 Units by mouth Daily.   Yes Dheeraj Alvarez MD   clindamycin (CLEOCIN) 300 MG capsule Take 300 mg by mouth 2 (Two) Times a Day. 12/18/19 1/1/20 Yes Dheeraj Alvarez MD   famotidine (PEPCID) 20 MG tablet Take 1 tablet by mouth 2 (Two) Times a Day. 9/17/19  Yes Dina Jack PA   ferrous sulfate 325 (65 FE) MG tablet Take 65 mg by mouth Daily With Breakfast.   Yes Dheeraj Alvarez MD   fluconazole (DIFLUCAN) 200 MG tablet Take 200 mg by mouth Every Other Day.   Yes Dheeraj Alvarez MD   folic acid (FOLVITE) 1 MG tablet Take 1 mg by mouth Daily.   Yes Dheeraj Alvarez MD   furosemide (LASIX) 40 MG tablet Take 40 mg by mouth 2 (Two) Times a Day.   Yes Dheeraj Alvarez MD   gabapentin (NEURONTIN) 300 MG capsule Take 300 mg by mouth 3 (Three) Times a Day.   Yes Dheeraj Alvarez MD   hydrALAZINE (APRESOLINE) 100 MG tablet Take 1 tablet by mouth 3 (Three) Times a Day. 12/12/19  Yes Wayne Luna MD   levothyroxine (SYNTHROID, LEVOTHROID) 200 MCG tablet Take 200 mcg by mouth Daily.   Yes Dheeraj Alvarez MD   lisinopril (PRINIVIL,ZESTRIL) 20 MG tablet Take 20 mg by mouth Daily.   Yes Dheeraj Alvarez MD   metFORMIN (GLUCOPHAGE) 500 MG tablet Take 500 mg by mouth Daily With Breakfast.   Yes Dheeraj Alvarez MD   montelukast (SINGULAIR) 10 MG tablet Take 10 mg by mouth Every Night.   Yes Dheeraj Alvarez MD   oxyCODONE-acetaminophen (PERCOCET) 7.5-325 MG per tablet Take 1 tablet by mouth Every 6 (Six) Hours As Needed.   Yes Dheeraj Alvarez MD   pantoprazole (PROTONIX) 40 MG EC tablet Take 40 mg by mouth Daily.   Yes Dheeraj Alvarez MD   spironolactone (ALDACTONE) 25  MG tablet Take 1 tablet by mouth Daily. 19  Yes Wayne Luna MD       Medical History    Past Medical History:   Diagnosis Date   • CHF (congestive heart failure) (CMS/[facility])    • COPD (chronic obstructive pulmonary disease) (CMS/[facility])    • Diabetes (CMS/[facility])    • Hyperlipidemia    • Hypertension     INDRA on PAP therapy     Surgical History    Past Surgical History:   Procedure Laterality Date   • AORTIC VALVE REPAIR/REPLACEMENT N/A 2019    Procedure: AORTIC VALVE REPAIR/REPLACEMENT;  Surgeon: Lane Stock MD;  Location: [facility];  Service: Cardiothoracic   • BREAST LUMPECTOMY     • CARDIAC CATHETERIZATION N/A 2019    Procedure: Right and Left Heart Cath 19 @ 0900;  Surgeon: Wayne Luna MD;  Location: [facility] CATH INVASIVE LOCATION;  Service: Cardiovascular   • CARDIAC CATHETERIZATION N/A 2019    Procedure: Coronary angiography;  Surgeon: Wayne Luna MD;  Location: [facility] CATH INVASIVE LOCATION;  Service: Cardiovascular   • CARDIAC ELECTROPHYSIOLOGY PROCEDURE Left 2019    Procedure: Dual-chamber ICD insertion;  Surgeon: Héctor Eckert MD;  Location: [facility] CATH INVASIVE LOCATION;  Service: Cardiovascular   • CARDIAC ELECTROPHYSIOLOGY PROCEDURE Left 2019    Procedure: Lead Revision;  Surgeon: Héctor Eckert MD;  Location: [facility] CATH INVASIVE LOCATION;  Service: Cardiovascular   • CHOLECYSTECTOMY     • CORONARY ARTERY BYPASS GRAFT N/A 2019    Procedure: CORONARY ARTERY BYPASS GRAFTING;  Surgeon: Lane Stock MD;  Location: [facility];  Service: Cardiothoracic   • HYSTERECTOMY     • INSERT / REPLACE / REMOVE PACEMAKER     • THYROID SURGERY          Family History    Family History   Problem Relation Age of Onset   • Heart disease Father        Social History    Social History     Tobacco Use   • Smoking status: Former Smoker     Last attempt to quit: 2019     Years since quittin.0   • Smokeless tobacco: Never  Used   Substance Use Topics   • Alcohol use: No     Frequency: Never        Allergies    Allergies   Allergen Reactions   • Hydrocodone Hives   • Penicillin G Unknown (See Comments)       Medications    Scheduled Meds:    allopurinol 300 mg Oral Daily   amiodarone 200 mg Oral BID With Meals   aspirin 325 mg Oral Daily   atorvastatin 40 mg Oral Nightly   bisacodyl 10 mg Rectal Daily   bumetanide 1 mg Oral Daily   docusate sodium 100 mg Oral BID   enoxaparin 40 mg Subcutaneous Daily   ferrous sulfate 324 mg Oral Daily With Breakfast   fluconazole 200 mg Oral Q48H   folic acid 1 mg Oral Daily   guaiFENesin 600 mg Oral Q12H   insulin lispro 0-9 Units Subcutaneous 4x Daily With Meals & Nightly   ipratropium-albuterol 3 mL Nebulization 4x Daily - RT   lactulose 30 g Oral Daily   levothyroxine 200 mcg Oral Q AM   lidocaine 2 patch Transdermal Q24H   midodrine 5 mg Oral BID AC   montelukast 10 mg Oral Nightly   pantoprazole 40 mg Oral Q AM   polyethylene glycol 17 g Oral BID   senna 1 tablet Oral BID     Continuous Infusions:    sodium chloride 30 mL/hr Last Rate: 30 mL/hr (19 1430)     PRN Meds:.•  acetaminophen  •  ALPRAZolam  •  dextrose  •  dextrose  •  glucagon (human recombinant)  •  insulin lispro **AND** insulin lispro  •  ipratropium-albuterol  •  MOUTH KOTE  •  naloxone  •  ondansetron  •  oxyCODONE  •  potassium chloride **OR** potassium chloride  •  potassium chloride **OR** potassium chloride      Physical Exam    tMax 24 hrs:  Temp (24hrs), Av °F (36.7 °C), Min:97.5 °F (36.4 °C), Max:98.6 °F (37 °C)    Vitals Ranges:  Temp:  [97.5 °F (36.4 °C)-98.6 °F (37 °C)] 98.2 °F (36.8 °C)  Heart Rate:  [79-94] 81  Resp:  [16-18] 18  BP: (118-155)/(65-95) 155/89  Intake and Output Last 3 Shifts:  I/O last 3 completed shifts:  In: 2762 [P.O.:1800; I.V.:672; Blood:290]  Out: 5450 [Urine:5450]    Constitutional:  Alert, no acute respiratory distress   HEENT:  Atraumatic, PERRL, conjunctiva normal, moist oral  mucosa, no nasal discharge.  Trachea is midline.  Respiratory:  No respiratory distress, normal breath sounds, no rales, no wheezing   Cardiovascular:  Normal rate, normal rhythm and no murmurs.  Pulses 2+ and equal in all four extremities.    GI:  Soft, nondistended, positive bowel sounds.  :  No costovertebral angle tenderness   Extremities: No edema, cyanosis or tenderness.  Integument:  No rashes.   Neurologic:  Alert & oriented x 3, CN 2-12 normal, normal motor function, normal sensory function, no focal deficits noted   Psychiatric:  Speech and behavior appropriate     labs    CBC  Results from last 7 days   Lab Units 01/03/20  0521 01/02/20  0558 01/01/20  0634 12/31/19  0631 12/30/19  1849 12/30/19  0338 12/29/19  0352 12/28/19  0336   WBC 10*3/mm3 6.00 5.50 6.40 5.10  --  8.00 9.90 9.90   RBC 10*6/mm3 3.03* 2.56* 2.73* 2.68*  --  2.71* 2.98* 3.07*   HEMOGLOBIN g/dL 9.5* 8.2* 8.7* 8.5*  --  8.6* 9.7* 10.0*   HEMOGLOBIN, POC g/dL  --   --   --   --  8.1*  --   --   --    HEMATOCRIT % 27.5* 24.0* 25.5* 25.3*  --  25.3* 27.8* 28.7*   HEMATOCRIT POC %  --   --   --   --  24*  --   --   --    MCV fL 90.6 93.5 93.2 94.4  --  93.1 93.1 93.5   PLATELETS 10*3/mm3 268 211 207 170  --  134* 106* 104*       BMP  Results from last 7 days   Lab Units 01/03/20  0521 01/02/20  0558 01/01/20  0634 12/31/19  0631 12/30/19  0338 12/29/19  0352 12/28/19  0336   SODIUM mmol/L 138 138 139 137 136 134* 138   POTASSIUM mmol/L 3.3* 3.6 3.7 4.0 4.4 4.1 4.0   CHLORIDE mmol/L 95* 96* 97* 96* 99 96* 103   CO2 mmol/L 30.0* 31.0* 31.0* 30.0* 27.0 26.0 24.0   BUN mg/dL 9 11 11 12 13 12 11   CREATININE mg/dL 1.15* 1.00 1.04* 1.18* 1.25* 1.52* 0.97   GLUCOSE mg/dL 140* 119* 106* 122* 157* 134* 146*   MAGNESIUM mg/dL  --  2.2 2.0 2.1 2.2 2.0 2.4   PHOSPHORUS mg/dL 3.5 4.0 3.5 3.1 3.0 4.6* 3.9       CMP   Results from last 7 days   Lab Units 01/03/20  0521 01/02/20  0558 01/01/20  0634 12/31/19  0631 12/30/19  0338 12/29/19  0352  12/28/19  0336   SODIUM mmol/L 138 138 139 137 136 134* 138   POTASSIUM mmol/L 3.3* 3.6 3.7 4.0 4.4 4.1 4.0   CHLORIDE mmol/L 95* 96* 97* 96* 99 96* 103   CO2 mmol/L 30.0* 31.0* 31.0* 30.0* 27.0 26.0 24.0   BUN mg/dL 9 11 11 12 13 12 11   CREATININE mg/dL 1.15* 1.00 1.04* 1.18* 1.25* 1.52* 0.97   GLUCOSE mg/dL 140* 119* 106* 122* 157* 134* 146*   ALBUMIN g/dL 3.60 3.70 3.90 3.90 4.10 3.80 4.10   BILIRUBIN mg/dL  --   --   --   --   --  0.8  --    ALK PHOS U/L  --   --   --   --   --  54  --    AST (SGOT) U/L  --   --   --   --   --  58*  --    ALT (SGPT) U/L  --   --   --   --   --  25  --          TROPONIN        CoAg  Results from last 7 days   Lab Units 12/27/19  1423   INR  1.20*   APTT seconds 30.9       Creatinine Clearance  Estimated Creatinine Clearance: 71.2 mL/min (A) (by C-G formula based on SCr of 1.15 mg/dL (H)).    ABG  Results from last 7 days   Lab Units 12/31/19  0043 12/30/19  1849 12/29/19  0816 12/28/19  0815 12/27/19  2209 12/27/19  2113 12/27/19 2016   PH, ARTERIAL pH units 7.376 7.396 7.393 7.382 7.356 7.425 7.404   PCO2, ARTERIAL mm Hg 51.7* 49.4* 47.5 43.7 45.9 39.6 41.7   PO2 ART mm Hg 53.2* 68.1* 41.6* 68.1* 74.0* 89.0 82.8*   O2 SATURATION ART % 85.7* 92.9* 75.9* 92.9* 93.9* 97.1 96.1   BASE EXCESS ART mmol/L 4.4* 4.9* 3.4* 0.7 0.0 1.5 1.2       Imaging & Other Studies    Imaging Results (Last 72 Hours)     Procedure Component Value Units Date/Time    XR Chest 1 View [[id number]] Collected:  01/01/20 1100     Updated:  01/01/20 1103    Narrative:       DATE OF EXAM:  1/1/2020 10:53 AM     PROCEDURE:  XR CHEST 1 VW-     INDICATIONS:  right sided sharp pain below ribs; R07.9-Chest pain, unspecified;  I35.1-Nonrheumatic aortic (valve) insufficiency; I42.0-Dilated  cardiomyopathy; Z95.810-Presence of automatic (implantable) cardiac  defibrillator; I20.0-Unstable angina; I25.119-Atherosclerotic heart  disease of native coronary artery with unspecified angina pectoris;  I35.1-Nonrheumatic  aortic (valve) insufficiency; J43.9-Emphysema,      COMPARISON:  12/31/2019     TECHNIQUE:   Single radiographic AP view of the chest was obtained.     FINDINGS:  There is a right internal jugular [place]-Brad sheath in place with the tip  in the superior vena cava. There is a left subclavian pacemaker  device/AICD device with leads overlying the right atrium and right  ventricle. The heart is enlarged with changes of CABG. There are  bilateral pleural effusions left greater than right with bilateral  basilar airspace disease. Aeration is slightly worse in the interval.        Impression:       Mild worsening of bilateral basilar infiltrates with moderate left and  small-to-moderate right pleural effusions.     Electronically Signed By-Chidi Walton On:1/1/2020 11:01 AM  This report was finalized on [id number] by  Chidi Walton, .    XR Chest 1 View [[id number]] Collected:  12/31/19 1152     Updated:  12/31/19 1155    Narrative:       DATE OF EXAM:  12/31/2019 10:48 AM     PROCEDURE:  XR CHEST 1 VW-     INDICATIONS:  Hypoxia; R07.9-Chest pain, unspecified; I35.1-Nonrheumatic aortic  (valve) insufficiency; I42.0-Dilated cardiomyopathy; Z95.810-Presence of  automatic (implantable) cardiac defibrillator; I20.0-Unstable angina;  I25.119-Atherosclerotic heart disease of native coronary artery with  unspecified angina pectoris; I35.1-Nonrheumatic aortic (valve)  insufficiency; J43.9-Emphysema, unspecified patient's a 44-year-old  female with hypoxia history of open heart surgery on December 26.  History of aortic regurg, former smoker     COMPARISON:  Comparison is made to study of 12/29/2019     TECHNIQUE:   Single radiographic AP view of the chest was obtained.     FINDINGS:  Today's portable erect study was obtained on 12/31/2019 at 10:50 AM.     Today's study demonstrates the left basilar opacity remaining stable  there is increasing right basilar opacity consistent with increasing  atelectasis and probable small pleural  effusion. The right internal  jugular vascular sheath remains in good position the left-sided  dual-lead pacemaker defibrillator remains in good position changes of  median sternotomy coronary artery bypass grafting and aortic valvular  replacement are again seen.        Impression:          1. Mild interval worsening from study of December 29  2. Increasing right basilar opacities felt to represent increasing  atelectasis  3. Stable moderate left basilar opacity consistent with atelectasis,  pneumonia and/or pleural effusion     Electronically Signed By-Ashwin Beavers Jr. On:12/31/2019 11:53 AM  This report was finalized on [id number] by  Ashwin Beavers Jr., .            AssessmenT/PLAN  Decreased level of consciousness related to opioids  INDRA compliant with BiPAP use  POD CABG and AVR - aortic regurgitation and CAD  HFrEF - EF35%  AICD  CKD stage II  T2DM  Hypothyroidism on Synthroid  Anxiety on Xanax  Chronic pain follows outpatient pain management clinict  COPD without exacerbation  Cardiomyopathy    Plan:   -remains on 2 L oxygen via nasal cannula  -Renal following  -aggressive pulmonary toilet with incentive spirometry, flutter valve and nebs.  -supplemental oxygen, maintain sats 92%  -on Fluconazole  -ABG reviewed  -cont to use home PAP with sleep   -limit sedating medication   -D/C gabapentin  -Lidocaine patch  -duonebs and Mucinex   -may benefit from some diuretics. Deferred to nephrology    Pulmonary status is stable. We will continue to follow  OT recommends - rehab

## 2020-01-03 NOTE — PROGRESS NOTES
" LOS: 10 days   Patient Care Team:  Phani Adames MD as PCP - General (Internal Medicine)  Ashish Smalls MD as Consulting Physician (Nephrology)    Chief Complaint: Postop CABG\AVR      POD #7  Subjective   Had BM yesterday and feels better.  Right-sided chest pain less severe.  Still with very poor pulmonary effort.    Objective     I and O    Intake/Output Summary (Last 24 hours) at 1/3/2020 1037  Last data filed at 1/3/2020 1000  Gross per 24 hour   Intake 1422 ml   Output 4400 ml   Net -2978 ml     I/O this shift:  In: -   Out: 500 [Urine:500]      Wt Readings from Last 3 Encounters:   01/03/20 92.1 kg (203 lb 0.7 oz)   12/12/19 89.8 kg (198 lb)   12/07/19 90 kg (198 lb 6.6 oz)       Flowsheet Rows      First Filed Value   Admission Height  170.2 cm (67\") Documented at 12/22/2019 2250   Admission Weight  91.8 kg (202 lb 6.1 oz) Documented at 12/22/2019 2250          Objective:  General Appearance:  Comfortable and in no acute distress.    Vital signs: (most recent): Blood pressure 155/89, pulse 81, temperature 98.2 °F (36.8 °C), temperature source Oral, resp. rate 18, height 170.2 cm (67\"), weight 92.1 kg (203 lb 0.7 oz), SpO2 (!) 86 %, not currently breastfeeding.  No fever.    Lungs:  Normal effort and normal respiratory rate.  There are decreased breath sounds.    Heart: Normal rate.  Regular rhythm.    Chest: (Incisions look good.)  Abdomen: Abdomen is soft.  Bowel sounds are normal.   There is no abdominal tenderness.     Neurological: Patient is alert and oriented to person, place and time.    Skin:  Warm and dry.  (Leg incision looks good.)            Results Review:        WBC WBC   Date Value Ref Range Status   01/03/2020 6.00 3.40 - 10.80 10*3/mm3 Final   01/02/2020 5.50 3.40 - 10.80 10*3/mm3 Final   01/01/2020 6.40 3.40 - 10.80 10*3/mm3 Final      HGB Hemoglobin   Date Value Ref Range Status   01/03/2020 9.5 (L) 12.0 - 15.9 g/dL Final   01/02/2020 8.2 (L) 12.0 - 15.9 g/dL Final "   01/01/2020 8.7 (L) 12.0 - 15.9 g/dL Final      HCT Hematocrit   Date Value Ref Range Status   01/03/2020 27.5 (L) 34.0 - 46.6 % Final   01/02/2020 24.0 (L) 34.0 - 46.6 % Final   01/01/2020 25.5 (L) 34.0 - 46.6 % Final      Platelets Platelets   Date Value Ref Range Status   01/03/2020 268 140 - 450 10*3/mm3 Final   01/02/2020 211 140 - 450 10*3/mm3 Final   01/01/2020 207 140 - 450 10*3/mm3 Final        PT/INR:  No results found for: PROTIME/No results found for: INR    Sodium Sodium   Date Value Ref Range Status   01/03/2020 138 136 - 145 mmol/L Final   01/02/2020 138 136 - 145 mmol/L Final   01/01/2020 139 136 - 145 mmol/L Final      Potassium Potassium   Date Value Ref Range Status   01/03/2020 3.3 (L) 3.5 - 5.2 mmol/L Final   01/02/2020 3.6 3.5 - 5.2 mmol/L Final   01/01/2020 3.7 3.5 - 5.2 mmol/L Final      Chloride Chloride   Date Value Ref Range Status   01/03/2020 95 (L) 98 - 107 mmol/L Final   01/02/2020 96 (L) 98 - 107 mmol/L Final   01/01/2020 97 (L) 98 - 107 mmol/L Final      Bicarbonate CO2   Date Value Ref Range Status   01/03/2020 30.0 (H) 22.0 - 29.0 mmol/L Final   01/02/2020 31.0 (H) 22.0 - 29.0 mmol/L Final   01/01/2020 31.0 (H) 22.0 - 29.0 mmol/L Final      BUN BUN   Date Value Ref Range Status   01/03/2020 9 6 - 20 mg/dL Final   01/02/2020 11 6 - 20 mg/dL Final   01/01/2020 11 6 - 20 mg/dL Final      Creatinine Creatinine   Date Value Ref Range Status   01/03/2020 1.15 (H) 0.57 - 1.00 mg/dL Final   01/02/2020 1.00 0.57 - 1.00 mg/dL Final   01/01/2020 1.04 (H) 0.57 - 1.00 mg/dL Final      Calcium Calcium   Date Value Ref Range Status   01/03/2020 8.6 8.6 - 10.5 mg/dL Final   01/02/2020 9.1 8.6 - 10.5 mg/dL Final   01/01/2020 8.8 8.6 - 10.5 mg/dL Final      Magnesium Magnesium   Date Value Ref Range Status   01/02/2020 2.2 1.6 - 2.6 mg/dL Final   01/01/2020 2.0 1.6 - 2.6 mg/dL Final          Medication Review: Reviewed    allopurinol 300 mg Oral Daily   amiodarone 200 mg Oral BID With Meals    aspirin 325 mg Oral Daily   atorvastatin 40 mg Oral Nightly   bisacodyl 10 mg Rectal Daily   bumetanide 1 mg Oral Daily   docusate sodium 100 mg Oral BID   enoxaparin 40 mg Subcutaneous Daily   ferrous sulfate 324 mg Oral Daily With Breakfast   fluconazole 200 mg Oral Q48H   folic acid 1 mg Oral Daily   guaiFENesin 600 mg Oral Q12H   insulin lispro 0-9 Units Subcutaneous 4x Daily With Meals & Nightly   ipratropium-albuterol 3 mL Nebulization 4x Daily - RT   lactulose 30 g Oral Daily   levothyroxine 200 mcg Oral Q AM   lidocaine 2 patch Transdermal Q24H   montelukast 10 mg Oral Nightly   pantoprazole 40 mg Oral Q AM   polyethylene glycol 17 g Oral BID   senna 1 tablet Oral BID       sodium chloride 30 mL/hr Last Rate: 30 mL/hr (12/27/19 1430)           Assessment/Plan       Patient Active Problem List   Diagnosis Code   • COPD (chronic obstructive pulmonary disease) (CMS/[facility]) J44.9   • Hypertension I10   • Cardiomyopathy, dilated (CMS/[facility]) I42.0   • Essential hypertension I10   • Chronic renal impairment N18.9   • ICD (implantable cardioverter-defibrillator), dual, in situ Z95.810   • Chest pain R07.9   • GERD without esophagitis K21.9   • Hypothyroidism (acquired) E03.9   • Aortic valve regurgitation I35.1   • Unstable angina pectoris (CMS/[facility]) I20.0   • Coronary artery disease involving native coronary artery of native heart with angina pectoris (CMS/[facility]) I25.119   • Nonrheumatic aortic valve insufficiency I35.1       Plan:   Still with very poor inspiratory effort.  Probably will need rehab at discharge.  Hopefully home over weekend.  Discomfort of yesterday improved following BM.  Blood pressure improved, should be able to tolerate a beta-blocker.  Continue BID Amio for now.  If BP OK tomorrow would start Coreg.  Renal function stable    MELIZA Quintana  01/03/20  10:37 AM     I encouraged her to use her IS.  Mobilize as much as possible; I believe she will benefit from acute rehab.  BP improving,  hopefully we can start low dose B Blocker tomorrow.  She will probably need a low dose diuretic on a daily basis as well.

## 2020-01-04 LAB
ALBUMIN SERPL-MCNC: 3.9 G/DL (ref 3.5–5.2)
ANION GAP SERPL CALCULATED.3IONS-SCNC: 11 MMOL/L (ref 5–15)
BUN BLD-MCNC: 10 MG/DL (ref 6–20)
BUN/CREAT SERPL: 9.9 (ref 7–25)
CALCIUM SPEC-SCNC: 9.3 MG/DL (ref 8.6–10.5)
CHLORIDE SERPL-SCNC: 99 MMOL/L (ref 98–107)
CO2 SERPL-SCNC: 31 MMOL/L (ref 22–29)
CREAT BLD-MCNC: 1.01 MG/DL (ref 0.57–1)
DEPRECATED RDW RBC AUTO: 46.4 FL (ref 37–54)
ERYTHROCYTE [DISTWIDTH] IN BLOOD BY AUTOMATED COUNT: 14.4 % (ref 12.3–15.4)
GFR SERPL CREATININE-BSD FRML MDRD: 72 ML/MIN/1.73
GLUCOSE BLD-MCNC: 97 MG/DL (ref 65–99)
GLUCOSE BLDC GLUCOMTR-MCNC: 76 MG/DL (ref 70–105)
GLUCOSE BLDC GLUCOMTR-MCNC: 96 MG/DL (ref 70–105)
HCT VFR BLD AUTO: 29.2 % (ref 34–46.6)
HGB BLD-MCNC: 10.1 G/DL (ref 12–15.9)
MCH RBC QN AUTO: 31.8 PG (ref 26.6–33)
MCHC RBC AUTO-ENTMCNC: 34.7 G/DL (ref 31.5–35.7)
MCV RBC AUTO: 91.7 FL (ref 79–97)
PHOSPHATE SERPL-MCNC: 4 MG/DL (ref 2.5–4.5)
PLATELET # BLD AUTO: 309 10*3/MM3 (ref 140–450)
PMV BLD AUTO: 6.8 FL (ref 6–12)
POTASSIUM BLD-SCNC: 4 MMOL/L (ref 3.5–5.2)
RBC # BLD AUTO: 3.18 10*6/MM3 (ref 3.77–5.28)
SODIUM BLD-SCNC: 141 MMOL/L (ref 136–145)
WBC NRBC COR # BLD: 7 10*3/MM3 (ref 3.4–10.8)

## 2020-01-04 PROCEDURE — 97116 GAIT TRAINING THERAPY: CPT

## 2020-01-04 PROCEDURE — 82962 GLUCOSE BLOOD TEST: CPT

## 2020-01-04 PROCEDURE — 94799 UNLISTED PULMONARY SVC/PX: CPT

## 2020-01-04 PROCEDURE — 97535 SELF CARE MNGMENT TRAINING: CPT

## 2020-01-04 PROCEDURE — 80069 RENAL FUNCTION PANEL: CPT | Performed by: INTERNAL MEDICINE

## 2020-01-04 PROCEDURE — 25010000002 ENOXAPARIN PER 10 MG: Performed by: THORACIC SURGERY (CARDIOTHORACIC VASCULAR SURGERY)

## 2020-01-04 PROCEDURE — 85027 COMPLETE CBC AUTOMATED: CPT | Performed by: THORACIC SURGERY (CARDIOTHORACIC VASCULAR SURGERY)

## 2020-01-04 PROCEDURE — 94760 N-INVAS EAR/PLS OXIMETRY 1: CPT

## 2020-01-04 PROCEDURE — 99024 POSTOP FOLLOW-UP VISIT: CPT | Performed by: THORACIC SURGERY (CARDIOTHORACIC VASCULAR SURGERY)

## 2020-01-04 PROCEDURE — 97530 THERAPEUTIC ACTIVITIES: CPT

## 2020-01-04 RX ORDER — CARVEDILOL 6.25 MG/1
6.25 TABLET ORAL 2 TIMES DAILY WITH MEALS
Status: DISCONTINUED | OUTPATIENT
Start: 2020-01-04 | End: 2020-01-06

## 2020-01-04 RX ADMIN — GUAIFENESIN 600 MG: 600 TABLET, EXTENDED RELEASE ORAL at 20:04

## 2020-01-04 RX ADMIN — ALPRAZOLAM 0.25 MG: 0.25 TABLET ORAL at 23:43

## 2020-01-04 RX ADMIN — LEVOTHYROXINE SODIUM 200 MCG: 200 TABLET ORAL at 05:46

## 2020-01-04 RX ADMIN — IPRATROPIUM BROMIDE AND ALBUTEROL SULFATE 3 ML: .5; 3 SOLUTION RESPIRATORY (INHALATION) at 15:50

## 2020-01-04 RX ADMIN — ALPRAZOLAM 0.25 MG: 0.25 TABLET ORAL at 00:25

## 2020-01-04 RX ADMIN — DOCUSATE SODIUM 100 MG: 100 CAPSULE, LIQUID FILLED ORAL at 09:44

## 2020-01-04 RX ADMIN — AMIODARONE HYDROCHLORIDE 200 MG: 200 TABLET ORAL at 09:47

## 2020-01-04 RX ADMIN — AMIODARONE HYDROCHLORIDE 200 MG: 200 TABLET ORAL at 19:31

## 2020-01-04 RX ADMIN — ENOXAPARIN SODIUM 40 MG: 40 INJECTION SUBCUTANEOUS at 20:03

## 2020-01-04 RX ADMIN — SENNOSIDES 1 TABLET: 8.6 TABLET, FILM COATED ORAL at 20:03

## 2020-01-04 RX ADMIN — PANTOPRAZOLE SODIUM 40 MG: 40 TABLET, DELAYED RELEASE ORAL at 05:46

## 2020-01-04 RX ADMIN — OXYCODONE HYDROCHLORIDE 5 MG: 5 TABLET ORAL at 23:43

## 2020-01-04 RX ADMIN — GUAIFENESIN 600 MG: 600 TABLET, EXTENDED RELEASE ORAL at 09:44

## 2020-01-04 RX ADMIN — FERROUS SULFATE TAB EC 324 MG (65 MG FE EQUIVALENT) 324 MG: 324 (65 FE) TABLET DELAYED RESPONSE at 09:44

## 2020-01-04 RX ADMIN — ACETAMINOPHEN 500 MG: 500 TABLET, FILM COATED ORAL at 00:25

## 2020-01-04 RX ADMIN — BUMETANIDE 1 MG: 1 TABLET ORAL at 09:44

## 2020-01-04 RX ADMIN — ALLOPURINOL 300 MG: 300 TABLET ORAL at 09:44

## 2020-01-04 RX ADMIN — MONTELUKAST SODIUM 10 MG: 10 TABLET, FILM COATED ORAL at 20:03

## 2020-01-04 RX ADMIN — METFORMIN HYDROCHLORIDE 500 MG: 500 TABLET ORAL at 20:03

## 2020-01-04 RX ADMIN — IPRATROPIUM BROMIDE AND ALBUTEROL SULFATE 3 ML: .5; 3 SOLUTION RESPIRATORY (INHALATION) at 08:20

## 2020-01-04 RX ADMIN — ASPIRIN 325 MG ORAL TABLET 325 MG: 325 PILL ORAL at 09:44

## 2020-01-04 RX ADMIN — LIDOCAINE 2 PATCH: 50 PATCH CUTANEOUS at 09:44

## 2020-01-04 RX ADMIN — CARVEDILOL 6.25 MG: 6.25 TABLET, FILM COATED ORAL at 19:32

## 2020-01-04 RX ADMIN — IPRATROPIUM BROMIDE AND ALBUTEROL SULFATE 3 ML: .5; 3 SOLUTION RESPIRATORY (INHALATION) at 11:01

## 2020-01-04 RX ADMIN — FOLIC ACID 1 MG: 1 TABLET ORAL at 09:44

## 2020-01-04 RX ADMIN — ATORVASTATIN CALCIUM 40 MG: 40 TABLET, FILM COATED ORAL at 20:04

## 2020-01-04 RX ADMIN — DOCUSATE SODIUM 100 MG: 100 CAPSULE, LIQUID FILLED ORAL at 20:03

## 2020-01-04 RX ADMIN — OXYCODONE HYDROCHLORIDE 5 MG: 5 TABLET ORAL at 19:24

## 2020-01-04 RX ADMIN — OXYCODONE HYDROCHLORIDE 5 MG: 5 TABLET ORAL at 03:37

## 2020-01-04 RX ADMIN — SENNOSIDES 1 TABLET: 8.6 TABLET, FILM COATED ORAL at 09:44

## 2020-01-04 RX ADMIN — IPRATROPIUM BROMIDE AND ALBUTEROL SULFATE 3 ML: .5; 3 SOLUTION RESPIRATORY (INHALATION) at 19:40

## 2020-01-04 NOTE — DISCHARGE PLACEMENT REQUEST
"Calvin Dillon (44 y.o. Female)     Date of Birth Social Security Number Address Home Phone MRN    1975  [address] [address] [phone] [id number]    Synagogue Marital Status          Quaker        Admission Date Admission Type Admitting Provider Attending Provider Department, Room/Bed    12/22/19 Emergency Laen Stock MD Khan, Ahmad Aftab, MD Psychiatric [facility] UNIT, 2216/1    Discharge Date Discharge Disposition Discharge Destination                       Attending Provider:  aLne Stock MD    Allergies:  Hydrocodone, Penicillin G    Isolation:  None   Infection:  None   Code Status:  CPR    Ht:  170.2 cm (67\")   Wt:  89.5 kg (197 lb 5 oz)    Admission Cmt:  None   Principal Problem:  Chest pain [R07.9]                 Active Insurance as of 12/22/2019     Primary Coverage     Payor Plan Insurance Group Employer/Plan Group    ANTHEM BLUE CROSS ANTHEM BLUE CROSS BLUE SHIELD PPO [id number]     Payor Plan Address Payor Plan Phone Number Payor Plan Fax Number Effective Dates    [address] [phone]  5/6/2018 - None Entered    [facility] [id number]       Subscriber Name Subscriber Birth Date Member ID       CATRACHO DOWNS 11/7/1970 [id number]           Secondary Coverage     Payor Plan Insurance Group Employer/Plan Group    ANTHEM MEDICARE REPLACEMENT ANTHEM MEDICARE ADVANTAGE [id number]     Payor Plan Address Payor Plan Phone Number Payor Plan Fax Number Effective Dates    [address] [phone]  1/1/2019 - None Entered    [facility] [address]       Subscriber Name Subscriber Birth Date Member ID       CALVIN DILLON 1975 [id number]                 Emergency Contacts      (Rel.) Home Phone Work Phone Mobile Phone    celio leo (Daughter) -- -- [phone]              Current Facility-Administered Medications   Medication Dose Route Frequency Provider Last Rate Last Dose   • acetaminophen " (TYLENOL) tablet 500 mg  500 mg Oral Q6H PRN Lane Stock MD   500 mg at 01/04/20 0025   • allopurinol (ZYLOPRIM) tablet 300 mg  300 mg Oral Daily Chidi Pace MD   300 mg at 01/04/20 0944   • ALPRAZolam (XANAX) tablet 0.25 mg  0.25 mg Oral BID PRN Chidi Pace MD   0.25 mg at 01/04/20 0025   • amiodarone (PACERONE) tablet 200 mg  200 mg Oral BID With Meals Lane Stock MD   200 mg at 01/04/20 0947   • aspirin tablet 325 mg  325 mg Oral Daily Lane Stock MD   325 mg at 01/04/20 0944   • atorvastatin (LIPITOR) tablet 40 mg  40 mg Oral Nightly Chidi Pace MD   40 mg at 01/03/20 2059   • bisacodyl (DULCOLAX) suppository 10 mg  10 mg Rectal Daily Kayla Burrell APRN   10 mg at 01/02/20 1000   • bumetanide (BUMEX) tablet 1 mg  1 mg Oral Daily Adalid Sterling MD   1 mg at 01/04/20 0944   • dextrose (D50W) 25 g/ 50mL Intravenous Solution 25 g  25 g Intravenous Q15 Min PRN Kayla Burrell APRN       • dextrose (GLUTOSE) oral gel 15 g  15 g Oral Q15 Min PRN Kayla Burrell APRN       • docusate sodium (COLACE) capsule 100 mg  100 mg Oral BID Chidi Pace MD   100 mg at 01/04/20 0944   • enoxaparin (LOVENOX) syringe 40 mg  40 mg Subcutaneous Daily Chidi Pace MD   40 mg at 01/03/20 2059   • ferrous sulfate EC tablet 324 mg  324 mg Oral Daily With Breakfast Chidi Pace MD   324 mg at 01/04/20 0944   • folic acid (FOLVITE) tablet 1 mg  1 mg Oral Daily Chidi Pace MD   1 mg at 01/04/20 0944   • glucagon (human recombinant) (GLUCAGEN DIAGNOSTIC) injection 1 mg  1 mg Subcutaneous Q15 Min PRN Kayla Burrell APRN       • guaiFENesin (MUCINEX) 12 hr tablet 600 mg  600 mg Oral Q12H Hasmukh David MD   600 mg at 01/04/20 0944   • insulin lispro (humaLOG) injection 0-9 Units  0-9 Units Subcutaneous 4x Daily With Meals & Nightly Kayla Burrell APRN        And   • insulin lispro (humaLOG) injection 0-9 Units  0-9 Units Subcutaneous PRN Indra  ALEX Garza       • ipratropium-albuterol (DUO-NEB) nebulizer solution 3 mL  3 mL Nebulization 4x Daily - RT Loraine Son APRN   3 mL at 01/04/20 1101   • ipratropium-albuterol (DUO-NEB) nebulizer solution 3 mL  3 mL Nebulization Q4H PRN Loraine Son APRN       • lactulose (CHRONULAC) 10 GM/15ML solution 30 g  30 g Oral Daily Kayla Burrell APRN   30 g at 01/02/20 0950   • levothyroxine (SYNTHROID, LEVOTHROID) tablet 200 mcg  200 mcg Oral Q AM Chidi Pace MD   200 mcg at 01/04/20 0546   • lidocaine (LIDODERM) 5 % 2 patch  2 patch Transdermal Q24H Hasmukh David MD   2 patch at 01/04/20 0944   • montelukast (SINGULAIR) tablet 10 mg  10 mg Oral Nightly Chidi Pace MD   10 mg at 01/03/20 2059   • MOUTH KOTE solution 2 mL  2 spray Mouth/Throat Q4H PRN Chidi Pace MD       • naloxone (NARCAN) injection 0.4 mg  0.4 mg Intravenous Q5 Min PRN Lane Stock MD       • ondansetron (ZOFRAN) injection 4 mg  4 mg Intravenous Q6H PRN Chidi Pace MD   4 mg at 01/02/20 2125   • oxyCODONE (ROXICODONE) immediate release tablet 5 mg  5 mg Oral Q4H PRN Chidi Pace MD   5 mg at 01/04/20 0337   • pantoprazole (PROTONIX) EC tablet 40 mg  40 mg Oral Q AM Chidi Pace MD   40 mg at 01/04/20 0546   • polyethylene glycol (MIRALAX) powder 17 g  17 g Oral BID Chidi Pace MD   17 g at 01/02/20 0949   • potassium chloride (K-DUR,KLOR-CON) CR tablet 20 mEq  20 mEq Oral PRN Chidi Pace MD   20 mEq at 01/03/20 1537    Or   • potassium chloride (KLOR-CON) packet 20 mEq  20 mEq Oral PRN Chidi Pace MD       • potassium chloride 10 mEq in 100 mL IVPB  10 mEq Intravenous Q1H PRN Chidi Pace MD        Or   • potassium chloride 10 mEq in 100 mL IVPB  10 mEq Intravenous Q1H PRN Chidi Pace MD       • senna (SENOKOT) tablet 1 tablet  1 tablet Oral BID Chidi Pace MD   1 tablet at 01/04/20 0944   • sodium chloride 0.9 % infusion  30 mL/hr  Intravenous Continuous Chidi Pace MD 30 mL/hr at 12/27/19 1430 30 mL/hr at 12/27/19 1430        Physician Progress Notes (last 48 hours) (Notes from 01/02/20 1242 through 01/04/20 1242)      Benji García MD at 01/04/20 0816        POD # 8 AVR  No complaints  Labs noted  On )2  Chest clear, cor reg, incisions clean  Continue to increase activity and rehab soon    Electronically signed by Benji García MD at 01/04/20 0817     Marcello Garrido DO at 01/03/20 1509          Cardiology Follow-up      Encounter Date:  12/12/2019    Patient ID:   Rafael Mccann is a 44 y.o. female.        Reason For Followup:  Cardiomyopathy  Hypertension  Hyperlipidemia  Valvular heart disease  Coronary artery disease         Subjective:  Seen and examined.  Chart and labs reviewed.  Patient reports some chest soreness but reports her shortness of breath is improved.  She is ambulated.  Hemoglobin is remained stable.  Discussed with CT surgery.    Assessment & Plan    Impressions:   Congestive heart failure  Acute systolic heart failure exacerbation on chronic systolic heart failure  CHF NYHA class IV  Valvular heart disease with a significant aortic insufficiency  Essential hypertension  Chronic systolic heart failure  History of cardiomyopathy   Chronic renal insufficiency  Coronary artery disease with diffuse RCA disease  History of diabetes mellitus type 2  History of thyroid disease  Cardiomyopathy with LV ejection fraction of 35%  s/p AICD placement/normal device function     Plan:  Continuation of her current medical regimen at the present time.  Anticipate discharge to rehab soon.  Continue to increase activity as tolerated.        Objective:    Vitals:  Vitals:    01/03/20 1140 01/03/20 1200 01/03/20 1218 01/03/20 1221   BP:       Pulse: 89  82    Resp:   18 18   Temp:  98 °F (36.7 °C)     TempSrc:  Oral     SpO2: 99%      Weight:       Height:           Physical  Exam:    General: Well-developed, well-nourished 44-year-old -American female seen in no acute distress.  Head:  Normocephalic, atraumatic, mucous membranes moist  Eyes:  Extraocular muscles intact  Neck:  Supple,  no bruit  Lungs: Clear to auscultation bilaterally, few crackles at the bilateral bases  chest wall: Mid line scar with no infection or bleeding  Heart::  Regular rate and rhythm, S1 and S2 normal, 2 x 6 ejection systolic murmur  Abdomen: Soft, nondistended  Extremities: No cyanosis, clubbing, or edema  Pulses: 2+ and symmetric all extremities  Skin:  No rashes or lesions  Neuro/psych: Alert and oriented x3 cranial nerves II through XII are grossly intact    Allergies:  Allergies   Allergen Reactions   • Hydrocodone Hives   • Penicillin G Unknown (See Comments)       Medication Review:     Current Facility-Administered Medications:   •  acetaminophen (TYLENOL) tablet 500 mg, 500 mg, Oral, Q6H PRN, Lane Stock MD, 500 mg at 01/02/20 1701  •  allopurinol (ZYLOPRIM) tablet 300 mg, 300 mg, Oral, Daily, Chidi Pace MD, 300 mg at 01/03/20 0813  •  ALPRAZolam (XANAX) tablet 0.25 mg, 0.25 mg, Oral, BID PRN, Chidi Pace MD, 0.25 mg at 12/31/19 0925  •  amiodarone (PACERONE) tablet 200 mg, 200 mg, Oral, BID With Meals, Lane Stock MD, 200 mg at 01/03/20 0812  •  aspirin tablet 325 mg, 325 mg, Oral, Daily, Lane Stock MD, 325 mg at 01/03/20 0812  •  atorvastatin (LIPITOR) tablet 40 mg, 40 mg, Oral, Nightly, Chidi Pace MD, 40 mg at 01/02/20 2115  •  bisacodyl (DULCOLAX) suppository 10 mg, 10 mg, Rectal, Daily, Kayla Burrell APRN, 10 mg at 01/02/20 1000  •  bumetanide (BUMEX) tablet 1 mg, 1 mg, Oral, Daily, Adalid Sterling MD, 1 mg at 01/03/20 0813  •  dextrose (D50W) 25 g/ 50mL Intravenous Solution 25 g, 25 g, Intravenous, Q15 Min PRN, Kayla Burrell APRN  •  dextrose (GLUTOSE) oral gel 15 g, 15 g, Oral, Q15 Min PRN, Kayla Burrell APRN  •  docusate  sodium (COLACE) capsule 100 mg, 100 mg, Oral, BID, Chidi Pace MD, 100 mg at 01/03/20 0813  •  enoxaparin (LOVENOX) syringe 40 mg, 40 mg, Subcutaneous, Daily, Chidi Pace MD, 40 mg at 01/02/20 2115  •  ferrous sulfate EC tablet 324 mg, 324 mg, Oral, Daily With Breakfast, Chidi Pace MD, 324 mg at 01/03/20 0813  •  fluconazole (DIFLUCAN) tablet 200 mg, 200 mg, Oral, Q48H, Chidi Pace MD, 200 mg at 01/01/20 1835  •  folic acid (FOLVITE) tablet 1 mg, 1 mg, Oral, Daily, Chidi Pace MD, 1 mg at 01/03/20 0813  •  glucagon (human recombinant) (GLUCAGEN DIAGNOSTIC) injection 1 mg, 1 mg, Subcutaneous, Q15 Min PRN, Kayla Burrell APRN  •  guaiFENesin (MUCINEX) 12 hr tablet 600 mg, 600 mg, Oral, Q12H, Hasmukh David MD, 600 mg at 01/03/20 0813  •  insulin lispro (humaLOG) injection 0-9 Units, 0-9 Units, Subcutaneous, 4x Daily With Meals & Nightly **AND** insulin lispro (humaLOG) injection 0-9 Units, 0-9 Units, Subcutaneous, PRN, Kayla Burrell APRN  •  ipratropium-albuterol (DUO-NEB) nebulizer solution 3 mL, 3 mL, Nebulization, 4x Daily - RT, Loraine Son APRN, 3 mL at 01/03/20 1218  •  ipratropium-albuterol (DUO-NEB) nebulizer solution 3 mL, 3 mL, Nebulization, Q4H PRN, Loraine Son APRN  •  lactulose (CHRONULAC) 10 GM/15ML solution 30 g, 30 g, Oral, Daily, Kayla Burrell APRN, 30 g at 01/02/20 0950  •  levothyroxine (SYNTHROID, LEVOTHROID) tablet 200 mcg, 200 mcg, Oral, Q AM, Chidi Pace MD, 200 mcg at 01/03/20 0515  •  lidocaine (LIDODERM) 5 % 2 patch, 2 patch, Transdermal, Q24H, Hasmukh David MD, 2 patch at 01/03/20 0814  •  montelukast (SINGULAIR) tablet 10 mg, 10 mg, Oral, Nightly, Chidi Pace MD, 10 mg at 01/02/20 2115  •  MOUTH KOTE solution 2 mL, 2 spray, Mouth/Throat, Q4H PRN, Chidi Pace MD  •  naloxone (NARCAN) injection 0.4 mg, 0.4 mg, Intravenous, Q5 Min PRN, Lane Stock MD  •  ondansetron (ZOFRAN) injection 4 mg, 4 mg,  Intravenous, Q6H PRN, Chidi Pace MD, 4 mg at 01/02/20 2125  •  oxyCODONE (ROXICODONE) immediate release tablet 5 mg, 5 mg, Oral, Q4H PRN, Chidi Pace MD, 5 mg at 01/03/20 0515  •  pantoprazole (PROTONIX) EC tablet 40 mg, 40 mg, Oral, Q AM, Chidi Pace MD, 40 mg at 01/03/20 0515  •  polyethylene glycol (MIRALAX) powder 17 g, 17 g, Oral, BID, Chidi Pace MD, 17 g at 01/02/20 0949  •  potassium chloride (K-DUR,KLOR-CON) CR tablet 20 mEq, 20 mEq, Oral, PRN, 20 mEq at 01/03/20 0656 **OR** potassium chloride (KLOR-CON) packet 20 mEq, 20 mEq, Oral, PRN, Chidi Pace MD  •  potassium chloride 10 mEq in 100 mL IVPB, 10 mEq, Intravenous, Q1H PRN **OR** potassium chloride 10 mEq in 100 mL IVPB, 10 mEq, Intravenous, Q1H PRN, Chidi Pace MD  •  senna (SENOKOT) tablet 1 tablet, 1 tablet, Oral, BID, Chidi Pace MD, 1 tablet at 01/02/20 2115  •  sodium chloride 0.9 % infusion, 30 mL/hr, Intravenous, Continuous, Chidi Pace MD, Last Rate: 30 mL/hr at 12/27/19 1430, 30 mL/hr at 12/27/19 1430    Family History:  Family History   Problem Relation Age of Onset   • Heart disease Father        Past Medical History:  Past Medical History:   Diagnosis Date   • CHF (congestive heart failure) (CMS/HCC)    • COPD (chronic obstructive pulmonary disease) (CMS/HCC)    • Diabetes (CMS/HCC)    • Hyperlipidemia    • Hypertension        Past surgical History:  Past Surgical History:   Procedure Laterality Date   • AORTIC VALVE REPAIR/REPLACEMENT N/A 12/27/2019    Procedure: AORTIC VALVE REPAIR/REPLACEMENT;  Surgeon: Lane Stock MD;  Location: [facility];  Service: Cardiothoracic   • BREAST LUMPECTOMY     • CARDIAC CATHETERIZATION N/A 12/24/2019    Procedure: Right and Left Heart Cath 12/24/19 @ 0900;  Surgeon: Wayne Luna MD;  Location: [facility] CATH INVASIVE LOCATION;  Service: Cardiovascular   • CARDIAC CATHETERIZATION N/A 12/24/2019    Procedure: Coronary  angiography;  Surgeon: Wayne Luna MD;  Location: [facility] CATH INVASIVE LOCATION;  Service: Cardiovascular   • CARDIAC ELECTROPHYSIOLOGY PROCEDURE Left 2019    Procedure: Dual-chamber ICD insertion;  Surgeon: Héctor Eckert MD;  Location: [facility] CATH INVASIVE LOCATION;  Service: Cardiovascular   • CARDIAC ELECTROPHYSIOLOGY PROCEDURE Left 2019    Procedure: Lead Revision;  Surgeon: Héctor Eckert MD;  Location: [facility] CATH INVASIVE LOCATION;  Service: Cardiovascular   • CHOLECYSTECTOMY     • CORONARY ARTERY BYPASS GRAFT N/A 2019    Procedure: CORONARY ARTERY BYPASS GRAFTING;  Surgeon: Lane Stock MD;  Location: [facility] CVOR;  Service: Cardiothoracic   • HYSTERECTOMY     • INSERT / REPLACE / REMOVE PACEMAKER     • THYROID SURGERY         Social History:  Social History     Socioeconomic History   • Marital status:      Spouse name: Not on file   • Number of children: Not on file   • Years of education: Not on file   • Highest education level: Not on file   Tobacco Use   • Smoking status: Former Smoker     Last attempt to quit: 2019     Years since quittin.0   • Smokeless tobacco: Never Used   Substance and Sexual Activity   • Alcohol use: No     Frequency: Never   • Drug use: No   • Sexual activity: Defer       Review of Systems:  The following systems were reviewed as they relate to the cardiovascular system: Constitutional, Eyes, ENT, Cardiovascular, Respiratory, Gastrointestinal, Integumentary, Neurological, Psychiatric, Hematologic, Endocrine, Musculoskeletal, and Genitourinary. The pertinent cardiovascular findings are reported above with all other cardiovascular points within those systems being negative.        NOTE: The following portions of the patient's history were reviewed and updated this visit as appropriate: allergies, current medications, past family history, past medical history, past social history, past surgical history and problem  "list.    Electronically signed by Marcello Garrido DO at 01/03/20 1513     Lane Stock MD at 01/03/20 1036           LOS: 10 days   Patient Care Team:  Phani Adames MD as PCP - General (Internal Medicine)  Ashish Smalls MD as Consulting Physician (Nephrology)    Chief Complaint: Postop CABG\AVR      POD #7  Subjective   Had BM yesterday and feels better.  Right-sided chest pain less severe.  Still with very poor pulmonary effort.    Objective     I and O    Intake/Output Summary (Last 24 hours) at 1/3/2020 1037  Last data filed at 1/3/2020 1000  Gross per 24 hour   Intake 1422 ml   Output 4400 ml   Net -2978 ml     I/O this shift:  In: -   Out: 500 [Urine:500]      Wt Readings from Last 3 Encounters:   01/03/20 92.1 kg (203 lb 0.7 oz)   12/12/19 89.8 kg (198 lb)   12/07/19 90 kg (198 lb 6.6 oz)       Flowsheet Rows      First Filed Value   Admission Height  170.2 cm (67\") Documented at 12/22/2019 2250   Admission Weight  91.8 kg (202 lb 6.1 oz) Documented at 12/22/2019 2250          Objective:  General Appearance:  Comfortable and in no acute distress.    Vital signs: (most recent): Blood pressure 155/89, pulse 81, temperature 98.2 °F (36.8 °C), temperature source Oral, resp. rate 18, height 170.2 cm (67\"), weight 92.1 kg (203 lb 0.7 oz), SpO2 (!) 86 %, not currently breastfeeding.  No fever.    Lungs:  Normal effort and normal respiratory rate.  There are decreased breath sounds.    Heart: Normal rate.  Regular rhythm.    Chest: (Incisions look good.)  Abdomen: Abdomen is soft.  Bowel sounds are normal.   There is no abdominal tenderness.     Neurological: Patient is alert and oriented to person, place and time.    Skin:  Warm and dry.  (Leg incision looks good.)            Results Review:        WBC WBC   Date Value Ref Range Status   01/03/2020 6.00 3.40 - 10.80 10*3/mm3 Final   01/02/2020 5.50 3.40 - 10.80 10*3/mm3 Final   01/01/2020 6.40 3.40 - 10.80 10*3/mm3 Final    "   HGB Hemoglobin   Date Value Ref Range Status   01/03/2020 9.5 (L) 12.0 - 15.9 g/dL Final   01/02/2020 8.2 (L) 12.0 - 15.9 g/dL Final   01/01/2020 8.7 (L) 12.0 - 15.9 g/dL Final      HCT Hematocrit   Date Value Ref Range Status   01/03/2020 27.5 (L) 34.0 - 46.6 % Final   01/02/2020 24.0 (L) 34.0 - 46.6 % Final   01/01/2020 25.5 (L) 34.0 - 46.6 % Final      Platelets Platelets   Date Value Ref Range Status   01/03/2020 268 140 - 450 10*3/mm3 Final   01/02/2020 211 140 - 450 10*3/mm3 Final   01/01/2020 207 140 - 450 10*3/mm3 Final        PT/INR:  No results found for: PROTIME/No results found for: INR    Sodium Sodium   Date Value Ref Range Status   01/03/2020 138 136 - 145 mmol/L Final   01/02/2020 138 136 - 145 mmol/L Final   01/01/2020 139 136 - 145 mmol/L Final      Potassium Potassium   Date Value Ref Range Status   01/03/2020 3.3 (L) 3.5 - 5.2 mmol/L Final   01/02/2020 3.6 3.5 - 5.2 mmol/L Final   01/01/2020 3.7 3.5 - 5.2 mmol/L Final      Chloride Chloride   Date Value Ref Range Status   01/03/2020 95 (L) 98 - 107 mmol/L Final   01/02/2020 96 (L) 98 - 107 mmol/L Final   01/01/2020 97 (L) 98 - 107 mmol/L Final      Bicarbonate CO2   Date Value Ref Range Status   01/03/2020 30.0 (H) 22.0 - 29.0 mmol/L Final   01/02/2020 31.0 (H) 22.0 - 29.0 mmol/L Final   01/01/2020 31.0 (H) 22.0 - 29.0 mmol/L Final      BUN BUN   Date Value Ref Range Status   01/03/2020 9 6 - 20 mg/dL Final   01/02/2020 11 6 - 20 mg/dL Final   01/01/2020 11 6 - 20 mg/dL Final      Creatinine Creatinine   Date Value Ref Range Status   01/03/2020 1.15 (H) 0.57 - 1.00 mg/dL Final   01/02/2020 1.00 0.57 - 1.00 mg/dL Final   01/01/2020 1.04 (H) 0.57 - 1.00 mg/dL Final      Calcium Calcium   Date Value Ref Range Status   01/03/2020 8.6 8.6 - 10.5 mg/dL Final   01/02/2020 9.1 8.6 - 10.5 mg/dL Final   01/01/2020 8.8 8.6 - 10.5 mg/dL Final      Magnesium Magnesium   Date Value Ref Range Status   01/02/2020 2.2 1.6 - 2.6 mg/dL Final   01/01/2020 2.0 1.6  - 2.6 mg/dL Final          Medication Review: Reviewed    allopurinol 300 mg Oral Daily   amiodarone 200 mg Oral BID With Meals   aspirin 325 mg Oral Daily   atorvastatin 40 mg Oral Nightly   bisacodyl 10 mg Rectal Daily   bumetanide 1 mg Oral Daily   docusate sodium 100 mg Oral BID   enoxaparin 40 mg Subcutaneous Daily   ferrous sulfate 324 mg Oral Daily With Breakfast   fluconazole 200 mg Oral Q48H   folic acid 1 mg Oral Daily   guaiFENesin 600 mg Oral Q12H   insulin lispro 0-9 Units Subcutaneous 4x Daily With Meals & Nightly   ipratropium-albuterol 3 mL Nebulization 4x Daily - RT   lactulose 30 g Oral Daily   levothyroxine 200 mcg Oral Q AM   lidocaine 2 patch Transdermal Q24H   montelukast 10 mg Oral Nightly   pantoprazole 40 mg Oral Q AM   polyethylene glycol 17 g Oral BID   senna 1 tablet Oral BID       sodium chloride 30 mL/hr Last Rate: 30 mL/hr (12/27/19 1430)           Assessment/Plan       Patient Active Problem List   Diagnosis Code   • COPD (chronic obstructive pulmonary disease) (CMS/[facility]) J44.9   • Hypertension I10   • Cardiomyopathy, dilated (CMS/[facility]) I42.0   • Essential hypertension I10   • Chronic renal impairment N18.9   • ICD (implantable cardioverter-defibrillator), dual, in situ Z95.810   • Chest pain R07.9   • GERD without esophagitis K21.9   • Hypothyroidism (acquired) E03.9   • Aortic valve regurgitation I35.1   • Unstable angina pectoris (CMS/[facility]) I20.0   • Coronary artery disease involving native coronary artery of native heart with angina pectoris (CMS/[facility]) I25.119   • Nonrheumatic aortic valve insufficiency I35.1       Plan:   Still with very poor inspiratory effort.  Probably will need rehab at discharge.  Hopefully home over weekend.  Discomfort of yesterday improved following BM.  Blood pressure improved, should be able to tolerate a beta-blocker.  Continue BID Amio for now.  If BP OK tomorrow would start Coreg.  Renal function stable    MELIZA Quintana  01/03/20  10:37 AM      I encouraged her to use her IS.  Mobilize as much as possible; I believe she will benefit from acute rehab.  BP improving, hopefully we can start low dose B Blocker tomorrow.  She will probably need a low dose diuretic on a daily basis as well.      Electronically signed by Lane Stock MD at 20 0217     Adalid Sterling MD at 20 1558          NEPHROLOGY PROGRESS NOTE------[facility]    [facility]  [phone]  Adalid Sterling MD      Patient Care Team:  Phani Adames MD as PCP - General (Internal Medicine)  Ashish Smalls MD as Consulting Physician (Nephrology)      Provider:  Adalid Sterling MD  Patient Name: Rafael Mccann  :  1975    SUBJECTIVE:  F/u LINSEY  No chest pain or SOA   Diuresing well    Medication:    allopurinol 300 mg Oral Daily   amiodarone 200 mg Oral BID With Meals   aspirin 325 mg Oral Daily   atorvastatin 40 mg Oral Nightly   bisacodyl 10 mg Rectal Daily   bumetanide 1 mg Oral Daily   docusate sodium 100 mg Oral BID   enoxaparin 40 mg Subcutaneous Daily   ferrous sulfate 324 mg Oral Daily With Breakfast   fluconazole 200 mg Oral Q48H   folic acid 1 mg Oral Daily   guaiFENesin 600 mg Oral Q12H   insulin lispro 0-9 Units Subcutaneous 4x Daily With Meals & Nightly   ipratropium-albuterol 3 mL Nebulization 4x Daily - RT   lactulose 30 g Oral Daily   levothyroxine 200 mcg Oral Q AM   lidocaine 2 patch Transdermal Q24H   midodrine 5 mg Oral BID AC   montelukast 10 mg Oral Nightly   pantoprazole 40 mg Oral Q AM   polyethylene glycol 17 g Oral BID   senna 1 tablet Oral BID       sodium chloride 30 mL/hr Last Rate: 30 mL/hr (19 1430)       OBJECTIVE    Vital Sign Min/Max for last 24 hours  Temp  Min: 97.5 °F (36.4 °C)  Max: 98.6 °F (37 °C)   BP  Min: 118/74  Max: 155/89   Pulse  Min: 79  Max: 94   Resp  Min: 16  Max: 18   SpO2  Min: 86 %  Max: 100 %   No data recorded   Weight  Min: 92.1 kg (203 lb 0.7  "oz)  Max: 92.1 kg (203 lb 0.7 oz)     Flowsheet Rows      First Filed Value   Admission Height  170.2 cm (67\") Documented at 12/22/2019 2250   Admission Weight  91.8 kg (202 lb 6.1 oz) Documented at 12/22/2019 2250          No intake/output data recorded.  I/O last 3 completed shifts:  In: 2762 [P.O.:1800; I.V.:672; Blood:290]  Out: 5450 [Urine:5450]    Physical Exam:  General Appearance: alert, appears stated age and cooperative. LIP edema  Head: normocephalic, without obvious abnormality and atraumatic  Eyes: conjunctivae and sclerae normal and no icterus  Neck: supple  Lungs: CTA bilaterally  Heart: regular rhythm & normal rate and normal S1, S2 +WALLY  Chest: S/P SURGICAL CHANGES  Abdomen: soft, NT  Extremities: moves extremities well, +TRACE PRETIBIAL EDEMA BILAT, no cyanosis and no redness  Skin: no bleeding, bruising or rash, turgor normal, color normal and no lesions noted  Neurologic: Alert, and oriented. No focal deficits    Labs:    WBC WBC   Date Value Ref Range Status   01/03/2020 6.00 3.40 - 10.80 10*3/mm3 Final   01/02/2020 5.50 3.40 - 10.80 10*3/mm3 Final   01/01/2020 6.40 3.40 - 10.80 10*3/mm3 Final      HGB Hemoglobin   Date Value Ref Range Status   01/03/2020 9.5 (L) 12.0 - 15.9 g/dL Final   01/02/2020 8.2 (L) 12.0 - 15.9 g/dL Final   01/01/2020 8.7 (L) 12.0 - 15.9 g/dL Final      HCT Hematocrit   Date Value Ref Range Status   01/03/2020 27.5 (L) 34.0 - 46.6 % Final   01/02/2020 24.0 (L) 34.0 - 46.6 % Final   01/01/2020 25.5 (L) 34.0 - 46.6 % Final      Platlets No results found for: LABPLAT   MCV MCV   Date Value Ref Range Status   01/03/2020 90.6 79.0 - 97.0 fL Final   01/02/2020 93.5 79.0 - 97.0 fL Final   01/01/2020 93.2 79.0 - 97.0 fL Final          Sodium Sodium   Date Value Ref Range Status   01/03/2020 138 136 - 145 mmol/L Final   01/02/2020 138 136 - 145 mmol/L Final   01/01/2020 139 136 - 145 mmol/L Final      Potassium Potassium   Date Value Ref Range Status   01/03/2020 3.3 (L) 3.5 - 5.2 " mmol/L Final   01/02/2020 3.6 3.5 - 5.2 mmol/L Final   01/01/2020 3.7 3.5 - 5.2 mmol/L Final      Chloride Chloride   Date Value Ref Range Status   01/03/2020 95 (L) 98 - 107 mmol/L Final   01/02/2020 96 (L) 98 - 107 mmol/L Final   01/01/2020 97 (L) 98 - 107 mmol/L Final      CO2 CO2   Date Value Ref Range Status   01/03/2020 30.0 (H) 22.0 - 29.0 mmol/L Final   01/02/2020 31.0 (H) 22.0 - 29.0 mmol/L Final   01/01/2020 31.0 (H) 22.0 - 29.0 mmol/L Final      BUN BUN   Date Value Ref Range Status   01/03/2020 9 6 - 20 mg/dL Final   01/02/2020 11 6 - 20 mg/dL Final   01/01/2020 11 6 - 20 mg/dL Final      Creatinine Creatinine   Date Value Ref Range Status   01/03/2020 1.15 (H) 0.57 - 1.00 mg/dL Final   01/02/2020 1.00 0.57 - 1.00 mg/dL Final   01/01/2020 1.04 (H) 0.57 - 1.00 mg/dL Final      Calcium Calcium   Date Value Ref Range Status   01/03/2020 8.6 8.6 - 10.5 mg/dL Final   01/02/2020 9.1 8.6 - 10.5 mg/dL Final   01/01/2020 8.8 8.6 - 10.5 mg/dL Final      PO4 No components found for: PO4   Albumin Albumin   Date Value Ref Range Status   01/03/2020 3.60 3.50 - 5.20 g/dL Final   01/02/2020 3.70 3.50 - 5.20 g/dL Final   01/01/2020 3.90 3.50 - 5.20 g/dL Final      Magnesium Magnesium   Date Value Ref Range Status   01/02/2020 2.2 1.6 - 2.6 mg/dL Final   01/01/2020 2.0 1.6 - 2.6 mg/dL Final      Uric Acid No components found for: URIC ACID     Imaging Results (Last 72 Hours)     Procedure Component Value Units Date/Time    XR Chest 1 View [[id number]] Collected:  01/01/20 1100     Updated:  01/01/20 1103    Narrative:       DATE OF EXAM:  1/1/2020 10:53 AM     PROCEDURE:  XR CHEST 1 VW-     INDICATIONS:  right sided sharp pain below ribs; R07.9-Chest pain, unspecified;  I35.1-Nonrheumatic aortic (valve) insufficiency; I42.0-Dilated  cardiomyopathy; Z95.810-Presence of automatic (implantable) cardiac  defibrillator; I20.0-Unstable angina; I25.119-Atherosclerotic heart  disease of native coronary artery with unspecified  angina pectoris;  I35.1-Nonrheumatic aortic (valve) insufficiency; J43.9-Emphysema,      COMPARISON:  12/31/2019     TECHNIQUE:   Single radiographic AP view of the chest was obtained.     FINDINGS:  There is a right internal jugular [place]-Brad sheath in place with the tip  in the superior vena cava. There is a left subclavian pacemaker  device/AICD device with leads overlying the right atrium and right  ventricle. The heart is enlarged with changes of CABG. There are  bilateral pleural effusions left greater than right with bilateral  basilar airspace disease. Aeration is slightly worse in the interval.        Impression:       Mild worsening of bilateral basilar infiltrates with moderate left and  small-to-moderate right pleural effusions.     Electronically Signed By-Chidi Walton On:1/1/2020 11:01 AM  This report was finalized on [id number] by  Chidi Walton, .    XR Chest 1 View [[id number]] Collected:  12/31/19 1152     Updated:  12/31/19 1155    Narrative:       DATE OF EXAM:  12/31/2019 10:48 AM     PROCEDURE:  XR CHEST 1 VW-     INDICATIONS:  Hypoxia; R07.9-Chest pain, unspecified; I35.1-Nonrheumatic aortic  (valve) insufficiency; I42.0-Dilated cardiomyopathy; Z95.810-Presence of  automatic (implantable) cardiac defibrillator; I20.0-Unstable angina;  I25.119-Atherosclerotic heart disease of native coronary artery with  unspecified angina pectoris; I35.1-Nonrheumatic aortic (valve)  insufficiency; J43.9-Emphysema, unspecified patient's a 44-year-old  female with hypoxia history of open heart surgery on December 26.  History of aortic regurg, former smoker     COMPARISON:  Comparison is made to study of 12/29/2019     TECHNIQUE:   Single radiographic AP view of the chest was obtained.     FINDINGS:  Today's portable erect study was obtained on 12/31/2019 at 10:50 AM.     Today's study demonstrates the left basilar opacity remaining stable  there is increasing right basilar opacity consistent with  increasing  atelectasis and probable small pleural effusion. The right internal  jugular vascular sheath remains in good position the left-sided  dual-lead pacemaker defibrillator remains in good position changes of  median sternotomy coronary artery bypass grafting and aortic valvular  replacement are again seen.        Impression:          1. Mild interval worsening from study of December 29  2. Increasing right basilar opacities felt to represent increasing  atelectasis  3. Stable moderate left basilar opacity consistent with atelectasis,  pneumonia and/or pleural effusion     Electronically Signed By-Ashwin Beavers Jr. On:12/31/2019 11:53 AM  This report was finalized on [id number] by  Ashwin Beavers Jr., .          Results for orders placed during the hospital encounter of 12/22/19   XR Chest 1 View    Narrative DATE OF EXAM:  1/1/2020 10:53 AM     PROCEDURE:  XR CHEST 1 VW-     INDICATIONS:  right sided sharp pain below ribs; R07.9-Chest pain, unspecified;  I35.1-Nonrheumatic aortic (valve) insufficiency; I42.0-Dilated  cardiomyopathy; Z95.810-Presence of automatic (implantable) cardiac  defibrillator; I20.0-Unstable angina; I25.119-Atherosclerotic heart  disease of native coronary artery with unspecified angina pectoris;  I35.1-Nonrheumatic aortic (valve) insufficiency; J43.9-Emphysema,      COMPARISON:  12/31/2019     TECHNIQUE:   Single radiographic AP view of the chest was obtained.     FINDINGS:  There is a right internal jugular [place]-Brad sheath in place with the tip  in the superior vena cava. There is a left subclavian pacemaker  device/AICD device with leads overlying the right atrium and right  ventricle. The heart is enlarged with changes of CABG. There are  bilateral pleural effusions left greater than right with bilateral  basilar airspace disease. Aeration is slightly worse in the interval.        Impression Mild worsening of bilateral basilar infiltrates with moderate left and  small-to-moderate  right pleural effusions.     Electronically Signed By-Chidi Walton On:1/1/2020 11:01 AM  This report was finalized on [id number] by  Chidi Walton, .   XR Chest 1 View    Narrative DATE OF EXAM:  12/31/2019 10:48 AM     PROCEDURE:  XR CHEST 1 VW-     INDICATIONS:  Hypoxia; R07.9-Chest pain, unspecified; I35.1-Nonrheumatic aortic  (valve) insufficiency; I42.0-Dilated cardiomyopathy; Z95.810-Presence of  automatic (implantable) cardiac defibrillator; I20.0-Unstable angina;  I25.119-Atherosclerotic heart disease of native coronary artery with  unspecified angina pectoris; I35.1-Nonrheumatic aortic (valve)  insufficiency; J43.9-Emphysema, unspecified patient's a 44-year-old  female with hypoxia history of open heart surgery on December 26.  History of aortic regurg, former smoker     COMPARISON:  Comparison is made to study of 12/29/2019     TECHNIQUE:   Single radiographic AP view of the chest was obtained.     FINDINGS:  Today's portable erect study was obtained on 12/31/2019 at 10:50 AM.     Today's study demonstrates the left basilar opacity remaining stable  there is increasing right basilar opacity consistent with increasing  atelectasis and probable small pleural effusion. The right internal  jugular vascular sheath remains in good position the left-sided  dual-lead pacemaker defibrillator remains in good position changes of  median sternotomy coronary artery bypass grafting and aortic valvular  replacement are again seen.        Impression    1. Mild interval worsening from study of December 29  2. Increasing right basilar opacities felt to represent increasing  atelectasis  3. Stable moderate left basilar opacity consistent with atelectasis,  pneumonia and/or pleural effusion     Electronically Signed By-Ashwin Beavers Jr. On:12/31/2019 11:53 AM  This report was finalized on [id number] by  Ashwin Beavers Jr., .   XR Chest 1 View    Narrative DATE OF EXAM:  12/29/2019 1:02 PM     PROCEDURE:  XR CHEST 1 VW-      INDICATIONS:  CT removal; R07.9-Chest pain, unspecified; I35.1-Nonrheumatic aortic  (valve) insufficiency; I42.0-Dilated cardiomyopathy; Z95.810-Presence of  automatic (implantable) cardiac defibrillator; I20.0-Unstable angina;  I25.119-Atherosclerotic heart disease of native coronary artery with  unspecified angina pectoris; I35.1-Nonrheumatic aortic (valve)  insufficiency patient's a 44-year-old female status post chest tube  removal     COMPARISON:  Study of 5:01 AM earlier today     TECHNIQUE:   Single radiographic AP view of the chest was obtained.     FINDINGS:  Today's portable erect study was obtained on 12/29/2019 at 12:52 PM     Today's follow-up study demonstrates the right internal jugular sheath  being in place. The mediastinal drains have been removed there is no  evidence of pneumomediastinum or pneumothorax on today's study. Moderate  opacity in the left lung base continues either representing atelectasis  or pneumonia. Mild right basilar atelectasis is seen. A left-sided  dual-lead pacemaker defibrillator is seen. Changes of median sternotomy  and coronary artery bypass grafting are present. The upper abdomen  appears unremarkable.        Impression    1. No significant change from earlier than the day other than removal of  mediastinal drains  2. Bilateral basilar opacities left greater than right felt to represent  atelectasis and/or infiltrate, possible pneumonia on the left,  atelectasis right base.  3. Right internal jugular sheath remains intact with tip in superior  vena cava, left-sided dual-lead pacemaker defibrillator appears  unchanged as are changes of CABG.     Electronically Signed By-Ashwin Beavers Jr. On:12/29/2019 1:16 PM  This report was finalized on [id number] by  Ashwin Beavers Jr., .       Results for orders placed during the hospital encounter of 12/22/19   Duplex Venous Lower Extremity - Bilateral CAR    Narrative · Normal bilateral lower extremity venous duplex scan.             ASSESSMENT / PLAN      Chest pain    COPD (chronic obstructive pulmonary disease) (CMS/[facility])    Hypertension    Cardiomyopathy, dilated (CMS/[facility])    Essential hypertension    Chronic renal impairment    ICD (implantable cardioverter-defibrillator), dual, in situ    GERD without esophagitis    Hypothyroidism (acquired)    Aortic valve regurgitation    Unstable angina pectoris (CMS/[facility])    Coronary artery disease involving native coronary artery of native heart with angina pectoris (CMS/[facility])    Nonrheumatic aortic valve insufficiency      1. ARF/LINSEY/CRF/CKD STG 2------Nonoliguric. ARF/LINSEY resolved. +Mild ARF/LINSEY on top of what appears to be CRF/CKD STG 2 with a baseline serum creatinine of 1.1. Unknown if patient has baseline proteinuria or hematuria. CRF/CKD STG 2 likely secondary to HTN NS/DM and chronic renal hypoperfusion from Cardiomyopathy. +Mild ARF/LINSEY appeared to be secondary to prerenal/hemodynamic fluctuation from MI S/P Cath/IV dye exposure with concomitant ACE-I and diuretic use. Keep off of Zestril for now.   Dose meds for CrCl 30-60 cc/min. No NSAIDs. No further IV dye exposure, unless emergently needed.     2. CAD S/P MI S/P CATH--------CABG done per , CT Surgery.     3. DILATED CARDIOMYOPATHY/VALVULAR HEART DISEASE--------No gross overload at present. Restart little diuretic. Has PM/AICD. Per , Cardiology     4. HTN WITH CKD------BP okay. Avoid hypotension. No ACE/ARB/DRI for now. Temporarily holding diuretics     5. HYPERURICEMIA---------On Allopurinol.  Uric normal     6. HYPERLIPIDEMIA------On Statin.       7. GERD-------On H2 blocker     8. DMII------Metformin on hold as just had IV dye exposure. BS okay. On Glucometers, SSI     9. VITAMIN D DEFICIENCY     10. DM PERIPHERAL NEUROPATHY-------On Neurontin     11. HYPOTHYROIDISM------Increased Synthroid as TSH up      Creatinine better, diuresing well, replace K  BP better, stop midodrine    Adalid Sterling MD  [facility]  [phone]  20  9:34 AM      Electronically signed by Adalid Sterling MD at 20 1448     Hasmukh David MD at 20 [phone]          Pulmonary/ Critical Care progress Note        Patient Name:  Rafael Mccann    :  1975    Medical Record:  [id number]    Requesting Physician    Phani Adames, *    Primary Care Physician     Phani Adames MD    Reason for consultation    Rafael Mccann is a 44 y.o. female who we were asked to see in consultation for decreased level of consciousness.   Patient is POD#3 CABG and AVR after presenting to the ER on 19 with complaints of chest pain, dyspnea and tachycardia.  Patient with a history of CAD being medically managed as well as cardiomyopathy.   Cardiac cath on 19 showed severe two vessel disease and 4+aortic regurg.       This evening patient was found to be very difficult to arouse in bed after being up to chair this morning and ambulating in the afternoon.   Patient was given Narcan with improvement in her mental status.  ABG was obtained as well which showed pCO2 of 49 and a pAO2 of 68.   Patient had been requiring Dilaudid 0.25 mg q2h IV and Oxycodone 5 mg q4h, for pain control in addition had Ultram and Benadryl earlier today.      Review of Systems    :  OOB to chair, awake and alert.  Complaints of right lower rib pain.    20:  OOB to chair,  Remains with complaints of pain  20:  Sitting on side of bed, remains with some pain to right side.    1/3: chest pain much better.  Her oxygen requirement stable on 2 L    Home medicationS  Prior to Admission medications    Medication Sig Start Date End Date Taking? Authorizing Provider   allopurinol (ZYLOPRIM) 300 MG tablet Take 300 mg by mouth Daily.   Yes ProviderDheeraj MD   ALPRAZolam (XANAX) 1 MG tablet Take 1 mg by mouth 4 (Four) Times a Day As Needed for Anxiety.   Yes Provider, MD Dheeraj   amLODIPine (NORVASC)  5 MG tablet Take 5 mg by mouth 2 (Two) Times a Day.   Yes Dheeraj Alvarez MD   aspirin 81 MG chewable tablet Chew 81 mg Daily.   Yes Dheeraj Alvarez MD   atorvastatin (LIPITOR) 40 MG tablet Take 1 tablet by mouth Every Night. 6/30/19  Yes Hannah Wills MD   carvedilol (COREG) 12.5 MG tablet Take 1 tablet by mouth 2 (Two) Times a Day With Meals. 6/30/19  Yes Hannah Wills MD   cholecalciferol (VITAMIN D3) 1000 units tablet Take 1,000 Units by mouth Daily.   Yes Dheeraj Alvarez MD   clindamycin (CLEOCIN) 300 MG capsule Take 300 mg by mouth 2 (Two) Times a Day. 12/18/19 1/1/20 Yes Dheeraj Alvarez MD   famotidine (PEPCID) 20 MG tablet Take 1 tablet by mouth 2 (Two) Times a Day. 9/17/19  Yes Dina Jack PA   ferrous sulfate 325 (65 FE) MG tablet Take 65 mg by mouth Daily With Breakfast.   Yes ProviderDheeraj MD   fluconazole (DIFLUCAN) 200 MG tablet Take 200 mg by mouth Every Other Day.   Yes ProviderDheeraj MD   folic acid (FOLVITE) 1 MG tablet Take 1 mg by mouth Daily.   Yes ProviderDheeraj MD   furosemide (LASIX) 40 MG tablet Take 40 mg by mouth 2 (Two) Times a Day.   Yes ProviderDheeraj MD   gabapentin (NEURONTIN) 300 MG capsule Take 300 mg by mouth 3 (Three) Times a Day.   Yes Dheeraj Alvarez MD   hydrALAZINE (APRESOLINE) 100 MG tablet Take 1 tablet by mouth 3 (Three) Times a Day. 12/12/19  Yes Wayne Luna MD   levothyroxine (SYNTHROID, LEVOTHROID) 200 MCG tablet Take 200 mcg by mouth Daily.   Yes ProviderDheeraj MD   lisinopril (PRINIVIL,ZESTRIL) 20 MG tablet Take 20 mg by mouth Daily.   Yes ProviderDheeraj MD   metFORMIN (GLUCOPHAGE) 500 MG tablet Take 500 mg by mouth Daily With Breakfast.   Yes ProviderDheeraj MD   montelukast (SINGULAIR) 10 MG tablet Take 10 mg by mouth Every Night.   Yes Dheeraj Alvarez MD   oxyCODONE-acetaminophen (PERCOCET) 7.5-325 MG per tablet Take 1 tablet by mouth Every 6 (Six) Hours As Needed.    Yes Provider, MD Dheeraj   pantoprazole (PROTONIX) 40 MG EC tablet Take 40 mg by mouth Daily.   Yes Provider, MD Dheeraj   spironolactone (ALDACTONE) 25 MG tablet Take 1 tablet by mouth Daily. 11/19/19  Yes Wayne Luna MD       Medical History    Past Medical History:   Diagnosis Date   • CHF (congestive heart failure) (CMS/[facility])    • COPD (chronic obstructive pulmonary disease) (CMS/[facility])    • Diabetes (CMS/[facility])    • Hyperlipidemia    • Hypertension     INDRA on PAP therapy     Surgical History    Past Surgical History:   Procedure Laterality Date   • AORTIC VALVE REPAIR/REPLACEMENT N/A 12/27/2019    Procedure: AORTIC VALVE REPAIR/REPLACEMENT;  Surgeon: Lane Stock MD;  Location: [facility];  Service: Cardiothoracic   • BREAST LUMPECTOMY     • CARDIAC CATHETERIZATION N/A 12/24/2019    Procedure: Right and Left Heart Cath 12/24/19 @ 0900;  Surgeon: Wayne Luna MD;  Location: [facility] CATH INVASIVE LOCATION;  Service: Cardiovascular   • CARDIAC CATHETERIZATION N/A 12/24/2019    Procedure: Coronary angiography;  Surgeon: Wayne Luna MD;  Location: [facility] CATH INVASIVE LOCATION;  Service: Cardiovascular   • CARDIAC ELECTROPHYSIOLOGY PROCEDURE Left 6/28/2019    Procedure: Dual-chamber ICD insertion;  Surgeon: Héctor Eckert MD;  Location: [facility] CATH INVASIVE LOCATION;  Service: Cardiovascular   • CARDIAC ELECTROPHYSIOLOGY PROCEDURE Left 8/14/2019    Procedure: Lead Revision;  Surgeon: Héctor Eckert MD;  Location: [facility] CATH INVASIVE LOCATION;  Service: Cardiovascular   • CHOLECYSTECTOMY     • CORONARY ARTERY BYPASS GRAFT N/A 12/27/2019    Procedure: CORONARY ARTERY BYPASS GRAFTING;  Surgeon: Lane Stock MD;  Location: [facility];  Service: Cardiothoracic   • HYSTERECTOMY     • INSERT / REPLACE / REMOVE PACEMAKER     • THYROID SURGERY          Family History    Family History   Problem Relation Age of Onset   • Heart disease Father        Social History     Social History     Tobacco Use   • Smoking status: Former Smoker     Last attempt to quit: 2019     Years since quittin.0   • Smokeless tobacco: Never Used   Substance Use Topics   • Alcohol use: No     Frequency: Never        Allergies    Allergies   Allergen Reactions   • Hydrocodone Hives   • Penicillin G Unknown (See Comments)       Medications    Scheduled Meds:    allopurinol 300 mg Oral Daily   amiodarone 200 mg Oral BID With Meals   aspirin 325 mg Oral Daily   atorvastatin 40 mg Oral Nightly   bisacodyl 10 mg Rectal Daily   bumetanide 1 mg Oral Daily   docusate sodium 100 mg Oral BID   enoxaparin 40 mg Subcutaneous Daily   ferrous sulfate 324 mg Oral Daily With Breakfast   fluconazole 200 mg Oral Q48H   folic acid 1 mg Oral Daily   guaiFENesin 600 mg Oral Q12H   insulin lispro 0-9 Units Subcutaneous 4x Daily With Meals & Nightly   ipratropium-albuterol 3 mL Nebulization 4x Daily - RT   lactulose 30 g Oral Daily   levothyroxine 200 mcg Oral Q AM   lidocaine 2 patch Transdermal Q24H   midodrine 5 mg Oral BID AC   montelukast 10 mg Oral Nightly   pantoprazole 40 mg Oral Q AM   polyethylene glycol 17 g Oral BID   senna 1 tablet Oral BID     Continuous Infusions:    sodium chloride 30 mL/hr Last Rate: 30 mL/hr (19 1430)     PRN Meds:.•  acetaminophen  •  ALPRAZolam  •  dextrose  •  dextrose  •  glucagon (human recombinant)  •  insulin lispro **AND** insulin lispro  •  ipratropium-albuterol  •  MOUTH KOTE  •  naloxone  •  ondansetron  •  oxyCODONE  •  potassium chloride **OR** potassium chloride  •  potassium chloride **OR** potassium chloride      Physical Exam    tMax 24 hrs:  Temp (24hrs), Av °F (36.7 °C), Min:97.5 °F (36.4 °C), Max:98.6 °F (37 °C)    Vitals Ranges:  Temp:  [97.5 °F (36.4 °C)-98.6 °F (37 °C)] 98.2 °F (36.8 °C)  Heart Rate:  [79-94] 81  Resp:  [16-18] 18  BP: (118-155)/(65-95) 155/89  Intake and Output Last 3 Shifts:  I/O last 3 completed shifts:  In: 2762 [P.O.:1800;  I.V.:672; Blood:290]  Out: 5450 [Urine:5450]    Constitutional:  Alert, no acute respiratory distress   HEENT:  Atraumatic, PERRL, conjunctiva normal, moist oral mucosa, no nasal discharge.  Trachea is midline.  Respiratory:  No respiratory distress, normal breath sounds, no rales, no wheezing   Cardiovascular:  Normal rate, normal rhythm and no murmurs.  Pulses 2+ and equal in all four extremities.    GI:  Soft, nondistended, positive bowel sounds.  :  No costovertebral angle tenderness   Extremities: No edema, cyanosis or tenderness.  Integument:  No rashes.   Neurologic:  Alert & oriented x 3, CN 2-12 normal, normal motor function, normal sensory function, no focal deficits noted   Psychiatric:  Speech and behavior appropriate     labs    CBC  Results from last 7 days   Lab Units 01/03/20  0521 01/02/20  0558 01/01/20  0634 12/31/19  0631 12/30/19  1849 12/30/19  0338 12/29/19  0352 12/28/19  0336   WBC 10*3/mm3 6.00 5.50 6.40 5.10  --  8.00 9.90 9.90   RBC 10*6/mm3 3.03* 2.56* 2.73* 2.68*  --  2.71* 2.98* 3.07*   HEMOGLOBIN g/dL 9.5* 8.2* 8.7* 8.5*  --  8.6* 9.7* 10.0*   HEMOGLOBIN, POC g/dL  --   --   --   --  8.1*  --   --   --    HEMATOCRIT % 27.5* 24.0* 25.5* 25.3*  --  25.3* 27.8* 28.7*   HEMATOCRIT POC %  --   --   --   --  24*  --   --   --    MCV fL 90.6 93.5 93.2 94.4  --  93.1 93.1 93.5   PLATELETS 10*3/mm3 268 211 207 170  --  134* 106* 104*       BMP  Results from last 7 days   Lab Units 01/03/20  0521 01/02/20  0558 01/01/20  0634 12/31/19  0631 12/30/19  0338 12/29/19  0352 12/28/19  0336   SODIUM mmol/L 138 138 139 137 136 134* 138   POTASSIUM mmol/L 3.3* 3.6 3.7 4.0 4.4 4.1 4.0   CHLORIDE mmol/L 95* 96* 97* 96* 99 96* 103   CO2 mmol/L 30.0* 31.0* 31.0* 30.0* 27.0 26.0 24.0   BUN mg/dL 9 11 11 12 13 12 11   CREATININE mg/dL 1.15* 1.00 1.04* 1.18* 1.25* 1.52* 0.97   GLUCOSE mg/dL 140* 119* 106* 122* 157* 134* 146*   MAGNESIUM mg/dL  --  2.2 2.0 2.1 2.2 2.0 2.4   PHOSPHORUS mg/dL 3.5 4.0 3.5 3.1  3.0 4.6* 3.9       CMP   Results from last 7 days   Lab Units 01/03/20  0521 01/02/20  0558 01/01/20  0634 12/31/19  0631 12/30/19  0338 12/29/19  0352 12/28/19  0336   SODIUM mmol/L 138 138 139 137 136 134* 138   POTASSIUM mmol/L 3.3* 3.6 3.7 4.0 4.4 4.1 4.0   CHLORIDE mmol/L 95* 96* 97* 96* 99 96* 103   CO2 mmol/L 30.0* 31.0* 31.0* 30.0* 27.0 26.0 24.0   BUN mg/dL 9 11 11 12 13 12 11   CREATININE mg/dL 1.15* 1.00 1.04* 1.18* 1.25* 1.52* 0.97   GLUCOSE mg/dL 140* 119* 106* 122* 157* 134* 146*   ALBUMIN g/dL 3.60 3.70 3.90 3.90 4.10 3.80 4.10   BILIRUBIN mg/dL  --   --   --   --   --  0.8  --    ALK PHOS U/L  --   --   --   --   --  54  --    AST (SGOT) U/L  --   --   --   --   --  58*  --    ALT (SGPT) U/L  --   --   --   --   --  25  --          TROPONIN        CoAg  Results from last 7 days   Lab Units 12/27/19  1423   INR  1.20*   APTT seconds 30.9       Creatinine Clearance  Estimated Creatinine Clearance: 71.2 mL/min (A) (by C-G formula based on SCr of 1.15 mg/dL (H)).    ABG  Results from last 7 days   Lab Units 12/31/19  0043 12/30/19  1849 12/29/19  0816 12/28/19  0815 12/27/19  2209 12/27/19  2113 12/27/19 2016   PH, ARTERIAL pH units 7.376 7.396 7.393 7.382 7.356 7.425 7.404   PCO2, ARTERIAL mm Hg 51.7* 49.4* 47.5 43.7 45.9 39.6 41.7   PO2 ART mm Hg 53.2* 68.1* 41.6* 68.1* 74.0* 89.0 82.8*   O2 SATURATION ART % 85.7* 92.9* 75.9* 92.9* 93.9* 97.1 96.1   BASE EXCESS ART mmol/L 4.4* 4.9* 3.4* 0.7 0.0 1.5 1.2       Imaging & Other Studies    Imaging Results (Last 72 Hours)     Procedure Component Value Units Date/Time    XR Chest 1 View [[id number]] Collected:  01/01/20 1100     Updated:  01/01/20 1103    Narrative:       DATE OF EXAM:  1/1/2020 10:53 AM     PROCEDURE:  XR CHEST 1 VW-     INDICATIONS:  right sided sharp pain below ribs; R07.9-Chest pain, unspecified;  I35.1-Nonrheumatic aortic (valve) insufficiency; I42.0-Dilated  cardiomyopathy; Z95.810-Presence of automatic (implantable)  cardiac  defibrillator; I20.0-Unstable angina; I25.119-Atherosclerotic heart  disease of native coronary artery with unspecified angina pectoris;  I35.1-Nonrheumatic aortic (valve) insufficiency; J43.9-Emphysema,      COMPARISON:  12/31/2019     TECHNIQUE:   Single radiographic AP view of the chest was obtained.     FINDINGS:  There is a right internal jugular Avery-Brad sheath in place with the tip  in the superior vena cava. There is a left subclavian pacemaker  device/AICD device with leads overlying the right atrium and right  ventricle. The heart is enlarged with changes of CABG. There are  bilateral pleural effusions left greater than right with bilateral  basilar airspace disease. Aeration is slightly worse in the interval.        Impression:       Mild worsening of bilateral basilar infiltrates with moderate left and  small-to-moderate right pleural effusions.     Electronically Signed By-Chidi Walton On:1/1/2020 11:01 AM  This report was finalized on [id number] by  Chidi Walton, .    XR Chest 1 View [[id number]] Collected:  12/31/19 1152     Updated:  12/31/19 1155    Narrative:       DATE OF EXAM:  12/31/2019 10:48 AM     PROCEDURE:  XR CHEST 1 VW-     INDICATIONS:  Hypoxia; R07.9-Chest pain, unspecified; I35.1-Nonrheumatic aortic  (valve) insufficiency; I42.0-Dilated cardiomyopathy; Z95.810-Presence of  automatic (implantable) cardiac defibrillator; I20.0-Unstable angina;  I25.119-Atherosclerotic heart disease of native coronary artery with  unspecified angina pectoris; I35.1-Nonrheumatic aortic (valve)  insufficiency; J43.9-Emphysema, unspecified patient's a 44-year-old  female with hypoxia history of open heart surgery on December 26.  History of aortic regurg, former smoker     COMPARISON:  Comparison is made to study of 12/29/2019     TECHNIQUE:   Single radiographic AP view of the chest was obtained.     FINDINGS:  Today's portable erect study was obtained on 12/31/2019 at 10:50 AM.     Today's study  demonstrates the left basilar opacity remaining stable  there is increasing right basilar opacity consistent with increasing  atelectasis and probable small pleural effusion. The right internal  jugular vascular sheath remains in good position the left-sided  dual-lead pacemaker defibrillator remains in good position changes of  median sternotomy coronary artery bypass grafting and aortic valvular  replacement are again seen.        Impression:          1. Mild interval worsening from study of December 29  2. Increasing right basilar opacities felt to represent increasing  atelectasis  3. Stable moderate left basilar opacity consistent with atelectasis,  pneumonia and/or pleural effusion     Electronically Signed By-Ashwin Beavers Jr. On:12/31/2019 11:53 AM  This report was finalized on [id number] by  Ashwin Beavers Jr., .            AssessmenT/PLAN  Decreased level of consciousness related to opioids  INDRA compliant with BiPAP use  POD CABG and AVR - aortic regurgitation and CAD  HFrEF - EF35%  AICD  CKD stage II  T2DM  Hypothyroidism on Synthroid  Anxiety on Xanax  Chronic pain follows outpatient pain management clinict  COPD without exacerbation  Cardiomyopathy    Plan:   -remains on 2 L oxygen via nasal cannula  -Renal following  -aggressive pulmonary toilet with incentive spirometry, flutter valve and nebs.  -supplemental oxygen, maintain sats 92%  -on Fluconazole  -ABG reviewed  -cont to use home PAP with sleep   -limit sedating medication   -D/C gabapentin  -Lidocaine patch  -duonebs and Mucinex   -may benefit from some diuretics. Deferred to nephrology    Pulmonary status is stable. We will continue to follow  OT recommends - rehab          Electronically signed by Hasmukh David MD at 01/03/20 3733

## 2020-01-04 NOTE — PLAN OF CARE
Problem: Patient Care Overview  Goal: Plan of Care Review  Outcome: Ongoing (interventions implemented as appropriate)  Note:   Patient unit x3 today. Patient spoke with case management and pre-cert was started for [facility] acute care. Patient taken off NC and on room air with sats maintaining at 92% or greater. Patient IS is progressing, but still poor. Will cont to work with patient on IS and respiratory exercises. Will cont to monitor       Problem: Patient Care Overview  Goal: Individualization and Mutuality  Outcome: Ongoing (interventions implemented as appropriate)     Problem: Patient Care Overview  Goal: Discharge Needs Assessment  Outcome: Ongoing (interventions implemented as appropriate)     Problem: Patient Care Overview  Goal: Interprofessional Rounds/Family Conf  Outcome: Ongoing (interventions implemented as appropriate)     Problem: Cardiac: ACS (Acute Coronary Syndrome) (Adult)  Goal: Signs and Symptoms of Listed Potential Problems Will be Absent, Minimized or Managed (Cardiac: ACS)  Outcome: Ongoing (interventions implemented as appropriate)     Problem: Skin Injury Risk (Adult)  Goal: Identify Related Risk Factors and Signs and Symptoms  Outcome: Ongoing (interventions implemented as appropriate)     Problem: Skin Injury Risk (Adult)  Goal: Skin Health and Integrity  Outcome: Ongoing (interventions implemented as appropriate)     Problem: Fall Risk (Adult)  Goal: Identify Related Risk Factors and Signs and Symptoms  Outcome: Ongoing (interventions implemented as appropriate)     Problem: Fall Risk (Adult)  Goal: Absence of Fall  Outcome: Ongoing (interventions implemented as appropriate)     Problem: Cardiac Surgery (Adult)  Goal: Signs and Symptoms of Listed Potential Problems Will be Absent, Minimized or Managed (Cardiac Surgery)  Outcome: Ongoing (interventions implemented as appropriate)     Problem: Cardiac Surgery (Adult)  Goal: Anesthesia/Sedation Recovery  Outcome: Ongoing (interventions  implemented as appropriate)

## 2020-01-04 NOTE — PROGRESS NOTES
Discharge Planning Assessment   [facility]     Patient Name: Rafael Mccann  MRN: [id number]  Today's Date: 1/4/2020    Admit Date: 12/22/2019    Discharge Plan     Row Name 01/04/20 1306       Plan    Plan  [place] plan: INPT Rehab referral initiated to [facility]. Liasion notified. Precert required-to be started 1/6/20.     Plan Comments  Barriers to discharge: precert required for INPT rehab placement.         Destination      Service Provider Request Status Selected Services Address Phone Number Fax Number    [facility] Pending - Request Sent N/A [address] [place] [address] [phone]         Home Medical Care      Service Provider Request Status Selected Services Address Phone Number Fax Number    [facility] Accepted N/A [address] [place] [address] [phone]            Expected Discharge Date and Time     Expected Discharge Date Expected Discharge Time    Jan. 7, 2020 April YUE Cummings

## 2020-01-04 NOTE — PROGRESS NOTES
"NEPHROLOGY PROGRESS NOTE------[facility]    [facility]  [phone]  Adalid Sterling MD      Patient Care Team:  Phani Adames MD as PCP - General (Internal Medicine)  Ashish Smalls MD as Consulting Physician (Nephrology)      Provider:  Adalid Sterling MD  Patient Name: Rafael Mccann  :  1975    SUBJECTIVE:  F/u LINSEY  No chest pain or SOA   Diuresing well    Medication:    allopurinol 300 mg Oral Daily   amiodarone 200 mg Oral BID With Meals   aspirin 325 mg Oral Daily   atorvastatin 40 mg Oral Nightly   bisacodyl 10 mg Rectal Daily   bumetanide 1 mg Oral Daily   docusate sodium 100 mg Oral BID   enoxaparin 40 mg Subcutaneous Daily   ferrous sulfate 324 mg Oral Daily With Breakfast   folic acid 1 mg Oral Daily   guaiFENesin 600 mg Oral Q12H   insulin lispro 0-9 Units Subcutaneous 4x Daily With Meals & Nightly   ipratropium-albuterol 3 mL Nebulization 4x Daily - RT   lactulose 30 g Oral Daily   levothyroxine 200 mcg Oral Q AM   lidocaine 2 patch Transdermal Q24H   montelukast 10 mg Oral Nightly   pantoprazole 40 mg Oral Q AM   polyethylene glycol 17 g Oral BID   senna 1 tablet Oral BID       sodium chloride 30 mL/hr Last Rate: 30 mL/hr (19 1430)       OBJECTIVE    Vital Sign Min/Max for last 24 hours  Temp  Min: 97.8 °F (36.6 °C)  Max: 98.4 °F (36.9 °C)   BP  Min: 155/96  Max: 168/101   Pulse  Min: 79  Max: 91   Resp  Min: 18  Max: 20   SpO2  Min: 94 %  Max: 100 %   No data recorded   Weight  Min: 89.5 kg (197 lb 5 oz)  Max: 89.5 kg (197 lb 5 oz)     Flowsheet Rows      First Filed Value   Admission Height  170.2 cm (67\") Documented at 2019 2250   Admission Weight  91.8 kg (202 lb 6.1 oz) Documented at 2019 2250          I/O this shift:  In: 480 [P.O.:480]  Out: -   I/O last 3 completed shifts:  In: 1440 [P.O.:1440]  Out: 3400 [Urine:3400]    Physical Exam:  General Appearance: alert, appears stated age and cooperative. " LIP edema  Head: normocephalic, without obvious abnormality and atraumatic  Eyes: conjunctivae and sclerae normal and no icterus  Neck: supple  Lungs: CTA bilaterally  Heart: regular rhythm & normal rate and normal S1, S2 +WALLY  Chest: S/P SURGICAL CHANGES  Abdomen: soft, NT  Extremities: moves extremities well, +TRACE PRETIBIAL EDEMA BILAT, no cyanosis and no redness  Skin: no bleeding, bruising or rash, turgor normal, color normal and no lesions noted  Neurologic: Alert, and oriented. No focal deficits    Labs:    WBC WBC   Date Value Ref Range Status   01/04/2020 7.00 3.40 - 10.80 10*3/mm3 Final   01/03/2020 6.00 3.40 - 10.80 10*3/mm3 Final   01/02/2020 5.50 3.40 - 10.80 10*3/mm3 Final      HGB Hemoglobin   Date Value Ref Range Status   01/04/2020 10.1 (L) 12.0 - 15.9 g/dL Final   01/03/2020 9.5 (L) 12.0 - 15.9 g/dL Final   01/02/2020 8.2 (L) 12.0 - 15.9 g/dL Final      HCT Hematocrit   Date Value Ref Range Status   01/04/2020 29.2 (L) 34.0 - 46.6 % Final   01/03/2020 27.5 (L) 34.0 - 46.6 % Final   01/02/2020 24.0 (L) 34.0 - 46.6 % Final      Platlets No results found for: LABPLAT   MCV MCV   Date Value Ref Range Status   01/04/2020 91.7 79.0 - 97.0 fL Final   01/03/2020 90.6 79.0 - 97.0 fL Final   01/02/2020 93.5 79.0 - 97.0 fL Final          Sodium Sodium   Date Value Ref Range Status   01/04/2020 141 136 - 145 mmol/L Final   01/03/2020 138 136 - 145 mmol/L Final   01/02/2020 138 136 - 145 mmol/L Final      Potassium Potassium   Date Value Ref Range Status   01/04/2020 4.0 3.5 - 5.2 mmol/L Final   01/03/2020 3.7 3.5 - 5.2 mmol/L Final   01/03/2020 3.3 (L) 3.5 - 5.2 mmol/L Final   01/02/2020 3.6 3.5 - 5.2 mmol/L Final      Chloride Chloride   Date Value Ref Range Status   01/04/2020 99 98 - 107 mmol/L Final   01/03/2020 95 (L) 98 - 107 mmol/L Final   01/02/2020 96 (L) 98 - 107 mmol/L Final      CO2 CO2   Date Value Ref Range Status   01/04/2020 31.0 (H) 22.0 - 29.0 mmol/L Final   01/03/2020 30.0 (H) 22.0 - 29.0  mmol/L Final   01/02/2020 31.0 (H) 22.0 - 29.0 mmol/L Final      BUN BUN   Date Value Ref Range Status   01/04/2020 10 6 - 20 mg/dL Final   01/03/2020 9 6 - 20 mg/dL Final   01/02/2020 11 6 - 20 mg/dL Final      Creatinine Creatinine   Date Value Ref Range Status   01/04/2020 1.01 (H) 0.57 - 1.00 mg/dL Final   01/03/2020 1.15 (H) 0.57 - 1.00 mg/dL Final   01/02/2020 1.00 0.57 - 1.00 mg/dL Final      Calcium Calcium   Date Value Ref Range Status   01/04/2020 9.3 8.6 - 10.5 mg/dL Final   01/03/2020 8.6 8.6 - 10.5 mg/dL Final   01/02/2020 9.1 8.6 - 10.5 mg/dL Final      PO4 No components found for: PO4   Albumin Albumin   Date Value Ref Range Status   01/04/2020 3.90 3.50 - 5.20 g/dL Final   01/03/2020 3.60 3.50 - 5.20 g/dL Final   01/02/2020 3.70 3.50 - 5.20 g/dL Final      Magnesium Magnesium   Date Value Ref Range Status   01/02/2020 2.2 1.6 - 2.6 mg/dL Final      Uric Acid No components found for: URIC ACID     Imaging Results (Last 72 Hours)     ** No results found for the last 72 hours. **          Results for orders placed during the hospital encounter of 12/22/19   XR Chest 1 View    Narrative DATE OF EXAM:  1/1/2020 10:53 AM     PROCEDURE:  XR CHEST 1 VW-     INDICATIONS:  right sided sharp pain below ribs; R07.9-Chest pain, unspecified;  I35.1-Nonrheumatic aortic (valve) insufficiency; I42.0-Dilated  cardiomyopathy; Z95.810-Presence of automatic (implantable) cardiac  defibrillator; I20.0-Unstable angina; I25.119-Atherosclerotic heart  disease of native coronary artery with unspecified angina pectoris;  I35.1-Nonrheumatic aortic (valve) insufficiency; J43.9-Emphysema,      COMPARISON:  12/31/2019     TECHNIQUE:   Single radiographic AP view of the chest was obtained.     FINDINGS:  There is a right internal jugular Honaker-Brad sheath in place with the tip  in the superior vena cava. There is a left subclavian pacemaker  device/AICD device with leads overlying the right atrium and right  ventricle. The heart is  enlarged with changes of CABG. There are  bilateral pleural effusions left greater than right with bilateral  basilar airspace disease. Aeration is slightly worse in the interval.        Impression Mild worsening of bilateral basilar infiltrates with moderate left and  small-to-moderate right pleural effusions.     Electronically Signed By-Chidi Walton On:1/1/2020 11:01 AM  This report was finalized on [id number] by  Chidi Walton, .   XR Chest 1 View    Narrative DATE OF EXAM:  12/31/2019 10:48 AM     PROCEDURE:  XR CHEST 1 VW-     INDICATIONS:  Hypoxia; R07.9-Chest pain, unspecified; I35.1-Nonrheumatic aortic  (valve) insufficiency; I42.0-Dilated cardiomyopathy; Z95.810-Presence of  automatic (implantable) cardiac defibrillator; I20.0-Unstable angina;  I25.119-Atherosclerotic heart disease of native coronary artery with  unspecified angina pectoris; I35.1-Nonrheumatic aortic (valve)  insufficiency; J43.9-Emphysema, unspecified patient's a 44-year-old  female with hypoxia history of open heart surgery on December 26.  History of aortic regurg, former smoker     COMPARISON:  Comparison is made to study of 12/29/2019     TECHNIQUE:   Single radiographic AP view of the chest was obtained.     FINDINGS:  Today's portable erect study was obtained on 12/31/2019 at 10:50 AM.     Today's study demonstrates the left basilar opacity remaining stable  there is increasing right basilar opacity consistent with increasing  atelectasis and probable small pleural effusion. The right internal  jugular vascular sheath remains in good position the left-sided  dual-lead pacemaker defibrillator remains in good position changes of  median sternotomy coronary artery bypass grafting and aortic valvular  replacement are again seen.        Impression    1. Mild interval worsening from study of December 29  2. Increasing right basilar opacities felt to represent increasing  atelectasis  3. Stable moderate left basilar opacity consistent  with atelectasis,  pneumonia and/or pleural effusion     Electronically Signed By-Ashwin Beavers Jr. On:12/31/2019 11:53 AM  This report was finalized on [id number] by  Ashwin Beavers Jr., .   XR Chest 1 View    Narrative DATE OF EXAM:  12/29/2019 1:02 PM     PROCEDURE:  XR CHEST 1 VW-     INDICATIONS:  CT removal; R07.9-Chest pain, unspecified; I35.1-Nonrheumatic aortic  (valve) insufficiency; I42.0-Dilated cardiomyopathy; Z95.810-Presence of  automatic (implantable) cardiac defibrillator; I20.0-Unstable angina;  I25.119-Atherosclerotic heart disease of native coronary artery with  unspecified angina pectoris; I35.1-Nonrheumatic aortic (valve)  insufficiency patient's a 44-year-old female status post chest tube  removal     COMPARISON:  Study of 5:01 AM earlier today     TECHNIQUE:   Single radiographic AP view of the chest was obtained.     FINDINGS:  Today's portable erect study was obtained on 12/29/2019 at 12:52 PM     Today's follow-up study demonstrates the right internal jugular sheath  being in place. The mediastinal drains have been removed there is no  evidence of pneumomediastinum or pneumothorax on today's study. Moderate  opacity in the left lung base continues either representing atelectasis  or pneumonia. Mild right basilar atelectasis is seen. A left-sided  dual-lead pacemaker defibrillator is seen. Changes of median sternotomy  and coronary artery bypass grafting are present. The upper abdomen  appears unremarkable.        Impression    1. No significant change from earlier than the day other than removal of  mediastinal drains  2. Bilateral basilar opacities left greater than right felt to represent  atelectasis and/or infiltrate, possible pneumonia on the left,  atelectasis right base.  3. Right internal jugular sheath remains intact with tip in superior  vena cava, left-sided dual-lead pacemaker defibrillator appears  unchanged as are changes of CABG.     Electronically Signed By-Ashwin Beavers Jr.  On:12/29/2019 1:16 PM  This report was finalized on [id number] by  Ashwin Beavers Jr., .       Results for orders placed during the hospital encounter of 12/22/19   Duplex Venous Lower Extremity - Bilateral CAR    Narrative · Normal bilateral lower extremity venous duplex scan.            ASSESSMENT / PLAN      Chest pain    COPD (chronic obstructive pulmonary disease) (CMS/HCC)    Hypertension    Cardiomyopathy, dilated (CMS/HCC)    Essential hypertension    Chronic renal impairment    ICD (implantable cardioverter-defibrillator), dual, in situ    GERD without esophagitis    Hypothyroidism (acquired)    Aortic valve regurgitation    Unstable angina pectoris (CMS/HCC)    Coronary artery disease involving native coronary artery of native heart with angina pectoris (CMS/HCC)    Nonrheumatic aortic valve insufficiency      1. ARF/LINSEY/CRF/CKD STG 2------Nonoliguric. ARF/LINSEY resolved. +Mild ARF/LINSEY on top of what appears to be CRF/CKD STG 2 with a baseline serum creatinine of 1.1. Unknown if patient has baseline proteinuria or hematuria. CRF/CKD STG 2 likely secondary to HTN NS/DM and chronic renal hypoperfusion from Cardiomyopathy. +Mild ARF/LINSEY appeared to be secondary to prerenal/hemodynamic fluctuation from MI S/P Cath/IV dye exposure with concomitant ACE-I and diuretic use. Keep off of Zestril for now.   Dose meds for CrCl 30-60 cc/min. No NSAIDs. No further IV dye exposure, unless emergently needed.     2. CAD S/P MI S/P CATH--------CABG done per , CT Surgery.     3. DILATED CARDIOMYOPATHY/VALVULAR HEART DISEASE--------No gross overload at present. Restart little diuretic. Has PM/AICD. Per , Cardiology     4. HTN WITH CKD------BP okay. Avoid hypotension. No ACE/ARB/DRI for now. Temporarily holding diuretics     5. HYPERURICEMIA---------On Allopurinol.  Uric normal     6. HYPERLIPIDEMIA------On Statin.       7. GERD-------On H2 blocker     8. DMII------Metformin on hold. BS okay. On Glucometers,  SSI     9. VITAMIN D DEFICIENCY     10. DM PERIPHERAL NEUROPATHY-------On Neurontin     11. HYPOTHYROIDISM------Increased Synthroid as TSH up      Creatinine stable, diuresing well  BP high now, start cored  Can resume metformin    Adalid Sterling MD  [facility]  [phone]  01/04/20  1:11 PM

## 2020-01-04 NOTE — PROGRESS NOTES
POD # 8 AVR  No complaints  Labs noted  On )2  Chest clear, cor reg, incisions clean  Continue to increase activity and rehab soon

## 2020-01-04 NOTE — PROGRESS NOTES
Pulmonary/ Critical Care progress Note        Patient Name:  Rafael Mccann    :  1975    Medical Record:  [id number]    Requesting Physician    Phani Adames, *    Primary Care Physician     Phani Adames MD    Reason for consultation    Rafael Mccann is a 44 y.o. female who we were asked to see in consultation for decreased level of consciousness.   Patient is POD#3 CABG and AVR after presenting to the ER on 19 with complaints of chest pain, dyspnea and tachycardia.  Patient with a history of CAD being medically managed as well as cardiomyopathy.   Cardiac cath on 19 showed severe two vessel disease and 4+aortic regurg.       This evening patient was found to be very difficult to arouse in bed after being up to chair this morning and ambulating in the afternoon.   Patient was given Narcan with improvement in her mental status.  ABG was obtained as well which showed pCO2 of 49 and a pAO2 of 68.   Patient had been requiring Dilaudid 0.25 mg q2h IV and Oxycodone 5 mg q4h, for pain control in addition had Ultram and Benadryl earlier today.      Review of Systems    :  OOB to chair, awake and alert.  Complaints of right lower rib pain.    20:  OOB to chair,  Remains with complaints of pain  20:  Sitting on side of bed, remains with some pain to right side.    1/3: chest pain much better.  Her oxygen requirement stable on 2 L  - she is on roomair. Denies any pain, having BM and tolerating diet    Home medicationS  Prior to Admission medications    Medication Sig Start Date End Date Taking? Authorizing Provider   allopurinol (ZYLOPRIM) 300 MG tablet Take 300 mg by mouth Daily.   Yes Dheeraj Alvarez MD   ALPRAZolam (XANAX) 1 MG tablet Take 1 mg by mouth 4 (Four) Times a Day As Needed for Anxiety.   Yes Dheeraj Alvarez MD   amLODIPine (NORVASC) 5 MG tablet Take 5 mg by mouth 2 (Two) Times a Day.   Yes Dheeraj Alvarez MD   aspirin 81 MG  chewable tablet Chew 81 mg Daily.   Yes Dheeraj Alvarez MD   atorvastatin (LIPITOR) 40 MG tablet Take 1 tablet by mouth Every Night. 6/30/19  Yes Hannah Wills MD   carvedilol (COREG) 12.5 MG tablet Take 1 tablet by mouth 2 (Two) Times a Day With Meals. 6/30/19  Yes Hannah Wills MD   cholecalciferol (VITAMIN D3) 1000 units tablet Take 1,000 Units by mouth Daily.   Yes Dheeraj Alvarez MD   clindamycin (CLEOCIN) 300 MG capsule Take 300 mg by mouth 2 (Two) Times a Day. 12/18/19 1/1/20 Yes Dheeraj Alvarez MD   famotidine (PEPCID) 20 MG tablet Take 1 tablet by mouth 2 (Two) Times a Day. 9/17/19  Yes Dina Jack PA   ferrous sulfate 325 (65 FE) MG tablet Take 65 mg by mouth Daily With Breakfast.   Yes Dheeraj Alvarez MD   fluconazole (DIFLUCAN) 200 MG tablet Take 200 mg by mouth Every Other Day.   Yes ProviderDheeraj MD   folic acid (FOLVITE) 1 MG tablet Take 1 mg by mouth Daily.   Yes ProviderDheeraj MD   furosemide (LASIX) 40 MG tablet Take 40 mg by mouth 2 (Two) Times a Day.   Yes ProviderDheeraj MD   gabapentin (NEURONTIN) 300 MG capsule Take 300 mg by mouth 3 (Three) Times a Day.   Yes ProviderDheeraj MD   hydrALAZINE (APRESOLINE) 100 MG tablet Take 1 tablet by mouth 3 (Three) Times a Day. 12/12/19  Yes Wayne Luna MD   levothyroxine (SYNTHROID, LEVOTHROID) 200 MCG tablet Take 200 mcg by mouth Daily.   Yes ProviderDheeraj MD   lisinopril (PRINIVIL,ZESTRIL) 20 MG tablet Take 20 mg by mouth Daily.   Yes Dheeraj Alvarez MD   metFORMIN (GLUCOPHAGE) 500 MG tablet Take 500 mg by mouth Daily With Breakfast.   Yes ProviderDheeraj MD   montelukast (SINGULAIR) 10 MG tablet Take 10 mg by mouth Every Night.   Yes Dheeraj Alvarez MD   oxyCODONE-acetaminophen (PERCOCET) 7.5-325 MG per tablet Take 1 tablet by mouth Every 6 (Six) Hours As Needed.   Yes Dheeraj Alvarez MD   pantoprazole (PROTONIX) 40 MG EC tablet Take 40 mg by mouth  Daily.   Yes Provider, MD Dheeraj   spironolactone (ALDACTONE) 25 MG tablet Take 1 tablet by mouth Daily. 11/19/19  Yes Wayne Luna MD       Medical History    Past Medical History:   Diagnosis Date   • CHF (congestive heart failure) (CMS/[facility])    • COPD (chronic obstructive pulmonary disease) (CMS/[facility])    • Diabetes (CMS/[facility])    • Hyperlipidemia    • Hypertension     INDRA on PAP therapy     Surgical History    Past Surgical History:   Procedure Laterality Date   • AORTIC VALVE REPAIR/REPLACEMENT N/A 12/27/2019    Procedure: AORTIC VALVE REPAIR/REPLACEMENT;  Surgeon: Lane Stock MD;  Location: [facility];  Service: Cardiothoracic   • BREAST LUMPECTOMY     • CARDIAC CATHETERIZATION N/A 12/24/2019    Procedure: Right and Left Heart Cath 12/24/19 @ 0900;  Surgeon: Wayne Luna MD;  Location: [facility] CATH INVASIVE LOCATION;  Service: Cardiovascular   • CARDIAC CATHETERIZATION N/A 12/24/2019    Procedure: Coronary angiography;  Surgeon: Wayne Luna MD;  Location: [facility] CATH INVASIVE LOCATION;  Service: Cardiovascular   • CARDIAC ELECTROPHYSIOLOGY PROCEDURE Left 6/28/2019    Procedure: Dual-chamber ICD insertion;  Surgeon: Héctor Eckert MD;  Location: [facility] CATH INVASIVE LOCATION;  Service: Cardiovascular   • CARDIAC ELECTROPHYSIOLOGY PROCEDURE Left 8/14/2019    Procedure: Lead Revision;  Surgeon: Héctor Eckert MD;  Location: [facility] CATH INVASIVE LOCATION;  Service: Cardiovascular   • CHOLECYSTECTOMY     • CORONARY ARTERY BYPASS GRAFT N/A 12/27/2019    Procedure: CORONARY ARTERY BYPASS GRAFTING;  Surgeon: Lane Stock MD;  Location: [facility];  Service: Cardiothoracic   • HYSTERECTOMY     • INSERT / REPLACE / REMOVE PACEMAKER     • THYROID SURGERY          Family History    Family History   Problem Relation Age of Onset   • Heart disease Father        Social History    Social History     Tobacco Use   • Smoking status: Former Smoker     Last attempt to  quit: 2019     Years since quittin.0   • Smokeless tobacco: Never Used   Substance Use Topics   • Alcohol use: No     Frequency: Never        Allergies    Allergies   Allergen Reactions   • Hydrocodone Hives   • Penicillin G Unknown (See Comments)       Medications    Scheduled Meds:    allopurinol 300 mg Oral Daily   amiodarone 200 mg Oral BID With Meals   aspirin 325 mg Oral Daily   atorvastatin 40 mg Oral Nightly   bisacodyl 10 mg Rectal Daily   bumetanide 1 mg Oral Daily   docusate sodium 100 mg Oral BID   enoxaparin 40 mg Subcutaneous Daily   ferrous sulfate 324 mg Oral Daily With Breakfast   folic acid 1 mg Oral Daily   guaiFENesin 600 mg Oral Q12H   insulin lispro 0-9 Units Subcutaneous 4x Daily With Meals & Nightly   ipratropium-albuterol 3 mL Nebulization 4x Daily - RT   lactulose 30 g Oral Daily   levothyroxine 200 mcg Oral Q AM   lidocaine 2 patch Transdermal Q24H   montelukast 10 mg Oral Nightly   pantoprazole 40 mg Oral Q AM   polyethylene glycol 17 g Oral BID   senna 1 tablet Oral BID     Continuous Infusions:    sodium chloride 30 mL/hr Last Rate: 30 mL/hr (19 1430)     PRN Meds:.•  acetaminophen  •  ALPRAZolam  •  dextrose  •  dextrose  •  glucagon (human recombinant)  •  insulin lispro **AND** insulin lispro  •  ipratropium-albuterol  •  MOUTH KOTE  •  naloxone  •  ondansetron  •  oxyCODONE  •  potassium chloride **OR** potassium chloride  •  potassium chloride **OR** potassium chloride      Physical Exam    tMax 24 hrs:  Temp (24hrs), Av.1 °F (36.7 °C), Min:97.8 °F (36.6 °C), Max:98.4 °F (36.9 °C)    Vitals Ranges:  Temp:  [97.8 °F (36.6 °C)-98.4 °F (36.9 °C)] 98.4 °F (36.9 °C)  Heart Rate:  [79-91] 91  Resp:  [18] 18  BP: (129-155)/(72-96) 155/96  Intake and Output Last 3 Shifts:  I/O last 3 completed shifts:  In: 1440 [P.O.:1440]  Out: 3400 [Urine:3400]    Constitutional:  Alert, no acute respiratory distress   HEENT:  Atraumatic, PERRL, conjunctiva normal, moist oral mucosa, no  nasal discharge.  Trachea is midline.  Respiratory:  No respiratory distress, normal breath sounds, no rales, no wheezing   Cardiovascular:  Normal rate, normal rhythm and no murmurs.  Pulses 2+ and equal in all four extremities.    GI:  Soft, nondistended, positive bowel sounds.  :  No costovertebral angle tenderness   Extremities: No edema, cyanosis or tenderness.  Integument:  No rashes.   Neurologic:  Alert & oriented x 3, CN 2-12 normal, normal motor function, normal sensory function, no focal deficits noted   Psychiatric:  Speech and behavior appropriate     labs    CBC  Results from last 7 days   Lab Units 01/04/20  0321 01/03/20  0521 01/02/20  0558 01/01/20  0634 12/31/19  0631 12/30/19  1849 12/30/19  0338 12/29/19  0352   WBC 10*3/mm3 7.00 6.00 5.50 6.40 5.10  --  8.00 9.90   RBC 10*6/mm3 3.18* 3.03* 2.56* 2.73* 2.68*  --  2.71* 2.98*   HEMOGLOBIN g/dL 10.1* 9.5* 8.2* 8.7* 8.5*  --  8.6* 9.7*   HEMOGLOBIN, POC g/dL  --   --   --   --   --  8.1*  --   --    HEMATOCRIT % 29.2* 27.5* 24.0* 25.5* 25.3*  --  25.3* 27.8*   HEMATOCRIT POC %  --   --   --   --   --  24*  --   --    MCV fL 91.7 90.6 93.5 93.2 94.4  --  93.1 93.1   PLATELETS 10*3/mm3 309 268 211 207 170  --  134* 106*       BMP  Results from last 7 days   Lab Units 01/04/20  0321 01/03/20  1938 01/03/20  0521 01/02/20  0558 01/01/20  0634 12/31/19  0631 12/30/19  0338 12/29/19  0352   SODIUM mmol/L 141  --  138 138 139 137 136 134*   POTASSIUM mmol/L 4.0 3.7 3.3* 3.6 3.7 4.0 4.4 4.1   CHLORIDE mmol/L 99  --  95* 96* 97* 96* 99 96*   CO2 mmol/L 31.0*  --  30.0* 31.0* 31.0* 30.0* 27.0 26.0   BUN mg/dL 10  --  9 11 11 12 13 12   CREATININE mg/dL 1.01*  --  1.15* 1.00 1.04* 1.18* 1.25* 1.52*   GLUCOSE mg/dL 97  --  140* 119* 106* 122* 157* 134*   MAGNESIUM mg/dL  --   --   --  2.2 2.0 2.1 2.2 2.0   PHOSPHORUS mg/dL 4.0  --  3.5 4.0 3.5 3.1 3.0 4.6*       CMP   Results from last 7 days   Lab Units 01/04/20  0321 01/03/20  1938 01/03/20  0521  01/02/20  0558 01/01/20  0634 12/31/19  0631 12/30/19  0338 12/29/19  0352   SODIUM mmol/L 141  --  138 138 139 137 136 134*   POTASSIUM mmol/L 4.0 3.7 3.3* 3.6 3.7 4.0 4.4 4.1   CHLORIDE mmol/L 99  --  95* 96* 97* 96* 99 96*   CO2 mmol/L 31.0*  --  30.0* 31.0* 31.0* 30.0* 27.0 26.0   BUN mg/dL 10  --  9 11 11 12 13 12   CREATININE mg/dL 1.01*  --  1.15* 1.00 1.04* 1.18* 1.25* 1.52*   GLUCOSE mg/dL 97  --  140* 119* 106* 122* 157* 134*   ALBUMIN g/dL 3.90  --  3.60 3.70 3.90 3.90 4.10 3.80   BILIRUBIN mg/dL  --   --   --   --   --   --   --  0.8   ALK PHOS U/L  --   --   --   --   --   --   --  54   AST (SGOT) U/L  --   --   --   --   --   --   --  58*   ALT (SGPT) U/L  --   --   --   --   --   --   --  25         TROPONIN        CoAg        Creatinine Clearance  Estimated Creatinine Clearance: 80 mL/min (A) (by C-G formula based on SCr of 1.01 mg/dL (H)).    ABG  Results from last 7 days   Lab Units 12/31/19  0043 12/30/19  1849 12/29/19  0816   PH, ARTERIAL pH units 7.376 7.396 7.393   PCO2, ARTERIAL mm Hg 51.7* 49.4* 47.5   PO2 ART mm Hg 53.2* 68.1* 41.6*   O2 SATURATION ART % 85.7* 92.9* 75.9*   BASE EXCESS ART mmol/L 4.4* 4.9* 3.4*       Imaging & Other Studies    Imaging Results (Last 72 Hours)     Procedure Component Value Units Date/Time    XR Chest 1 View [[id number]] Collected:  01/01/20 1100     Updated:  01/01/20 1103    Narrative:       DATE OF EXAM:  1/1/2020 10:53 AM     PROCEDURE:  XR CHEST 1 VW-     INDICATIONS:  right sided sharp pain below ribs; R07.9-Chest pain, unspecified;  I35.1-Nonrheumatic aortic (valve) insufficiency; I42.0-Dilated  cardiomyopathy; Z95.810-Presence of automatic (implantable) cardiac  defibrillator; I20.0-Unstable angina; I25.119-Atherosclerotic heart  disease of native coronary artery with unspecified angina pectoris;  I35.1-Nonrheumatic aortic (valve) insufficiency; J43.9-Emphysema,      COMPARISON:  12/31/2019     TECHNIQUE:   Single radiographic AP view of the chest was  obtained.     FINDINGS:  There is a right internal jugular Kingston-Brad sheath in place with the tip  in the superior vena cava. There is a left subclavian pacemaker  device/AICD device with leads overlying the right atrium and right  ventricle. The heart is enlarged with changes of CABG. There are  bilateral pleural effusions left greater than right with bilateral  basilar airspace disease. Aeration is slightly worse in the interval.        Impression:       Mild worsening of bilateral basilar infiltrates with moderate left and  small-to-moderate right pleural effusions.     Electronically Signed By-Chidi Walton On:1/1/2020 11:01 AM  This report was finalized on [id number] by  Chidi Walton, .            AssessmenT/PLAN  Decreased level of consciousness related to opioids  INDRA compliant with BiPAP use  POD CABG and AVR - aortic regurgitation and CAD  HFrEF - EF35%  AICD  CKD stage II  T2DM  Hypothyroidism on Synthroid  Anxiety on Xanax  Chronic pain follows outpatient pain management clinict  COPD without exacerbation  Cardiomyopathy    Plan:   -on room air  -Renal following  -aggressive pulmonary toilet with incentive spirometry, flutter valve and nebs.  -supplemental oxygen, maintain sats 92%  -on Fluconazole  -ABG reviewed  -cont to use home PAP with sleep   -limit sedating medication   -D/C gabapentin  -Lidocaine patch  -duonebs and Mucinex   -may benefit from some diuretics. Deferred to nephrology    Pulmonary status is stable. We will continue to follow  OT recommends - rehab  Mara Peterson MD  1/4/2020  2:31 PM

## 2020-01-04 NOTE — PLAN OF CARE
Problem: Patient Care Overview  Goal: Plan of Care Review  Flowsheets (Taken 1/4/2020 0930)  Progress: improving  Plan of Care Reviewed With: patient  Outcome Summary: Pt is able to mobilize this date with frequent reminders to follow sternal precautions. She requires standing rest >5 mins between 2 x 100' bouts of ambulation, she is slow and guarded and demonstrates increased work of breathing at times with reported fatigue and mild SOA. However, vitals remain stable throughout session. She reports having good support from family, although her spouse is gone overnight >5 days/week and her children have commitments that keep them out of the home for >12 hrs/day. Due to increased time home alone, constant need for reminders of sternal precautions and limited endurance; pt would benefit from Rehab placement. to progress toward safe independence before d/c home.

## 2020-01-04 NOTE — THERAPY TREATMENT NOTE
Patient Name: Rafael Mccann  : 1975    MRN: [id number]                              Today's Date: 2020       Admit Date: 2019    Visit Dx:     ICD-10-CM ICD-9-CM   1. Chest pain, unspecified type R07.9 786.50   2. Aortic valve insufficiency, etiology of cardiac valve disease unspecified I35.1 424.1   3. Cardiomyopathy, dilated (CMS/[facility]) I42.0 425.4   4. ICD (implantable cardioverter-defibrillator), dual, in situ Z95.810 V45.02   5. Unstable angina pectoris (CMS/[facility]) I20.0 411.1   6. Coronary artery disease involving native coronary artery of native heart with angina pectoris (CMS/[facility]) I25.119 414.01     413.9   7. Nonrheumatic aortic valve insufficiency I35.1 424.1   8. Pulmonary emphysema, unspecified emphysema type (CMS/[facility]) J43.9 492.8     Patient Active Problem List   Diagnosis   • COPD (chronic obstructive pulmonary disease) (CMS/[facility])   • Hypertension   • Cardiomyopathy, dilated (CMS/[facility])   • Essential hypertension   • Chronic renal impairment   • ICD (implantable cardioverter-defibrillator), dual, in situ   • Chest pain   • GERD without esophagitis   • Hypothyroidism (acquired)   • Aortic valve regurgitation   • Unstable angina pectoris (CMS/[facility])   • Coronary artery disease involving native coronary artery of native heart with angina pectoris (CMS/[facility])   • Nonrheumatic aortic valve insufficiency     Past Medical History:   Diagnosis Date   • CHF (congestive heart failure) (CMS/[facility])    • COPD (chronic obstructive pulmonary disease) (CMS/[facility])    • Diabetes (CMS/[facility])    • Hyperlipidemia    • Hypertension      Past Surgical History:   Procedure Laterality Date   • AORTIC VALVE REPAIR/REPLACEMENT N/A 2019    Procedure: AORTIC VALVE REPAIR/REPLACEMENT;  Surgeon: Lane Stock MD;  Location: [facility];  Service: Cardiothoracic   • BREAST LUMPECTOMY     • CARDIAC CATHETERIZATION N/A 2019    Procedure: Right and Left Heart Cath 19 @ 0900;  Surgeon: Wayne Luna  MD KAY;  Location: [facility] CATH INVASIVE LOCATION;  Service: Cardiovascular   • CARDIAC CATHETERIZATION N/A 12/24/2019    Procedure: Coronary angiography;  Surgeon: Wayne Luna MD;  Location: [facility] CATH INVASIVE LOCATION;  Service: Cardiovascular   • CARDIAC ELECTROPHYSIOLOGY PROCEDURE Left 6/28/2019    Procedure: Dual-chamber ICD insertion;  Surgeon: Héctor Eckert MD;  Location: [facility] CATH INVASIVE LOCATION;  Service: Cardiovascular   • CARDIAC ELECTROPHYSIOLOGY PROCEDURE Left 8/14/2019    Procedure: Lead Revision;  Surgeon: Héctor Eckert MD;  Location: [facility] CATH INVASIVE LOCATION;  Service: Cardiovascular   • CHOLECYSTECTOMY     • CORONARY ARTERY BYPASS GRAFT N/A 12/27/2019    Procedure: CORONARY ARTERY BYPASS GRAFTING;  Surgeon: Lane Stock MD;  Location: [facility] CVOR;  Service: Cardiothoracic   • HYSTERECTOMY     • INSERT / REPLACE / REMOVE PACEMAKER     • THYROID SURGERY       General Information     Row Name 01/04/20 0925          PT Evaluation Time/Intention    Document Type  therapy note (daily note)  -     Mode of Treatment  physical therapy  -     Row Name 01/04/20 0925          Safety Issues, Functional Mobility    Safety Issues Affecting Function (Mobility)  safety precaution awareness;safety precautions follow-through/compliance  -     Impairments Affecting Function (Mobility)  endurance/activity tolerance;strength  -     Comment, Safety Issues/Impairments (Mobility)  Pt requires frequent reminders regarding sternal precautions throughout session  -       User Key  (r) = Recorded By, (t) = Taken By, (c) = Cosigned By    Initials Name Provider Type     Gayatri Torres PTA Physical Therapy Assistant        Mobility     Row Name 01/04/20 9602          Sit-Stand Transfer    Sit-Stand [place] (Transfers)  supervision;verbal cues  -     Assistive Device (Sit-Stand Transfers)  walker, front-wheeled  -     Row Name 01/04/20 9226          Gait/Stairs  Assessment/Training    Distance in Feet (Gait)  100' + 100'  -     Pattern (Gait)  swing-through  -SM     Deviations/Abnormal Patterns (Gait)  kalen decreased  -     Bilateral Gait Deviations  heel strike decreased  -     Handrail Location (Stairs)  right side (ascending)  -SM     Ascending Technique (Stairs)  step-to-step  -SM     Descending Technique (Stairs)  step-to-step  -SM     Comment (Gait/Stairs)  No overt imbalance, good use of AD   -SM       User Key  (r) = Recorded By, (t) = Taken By, (c) = Cosigned By    Initials Name Provider Type     Gayatri Torres PTA Physical Therapy Assistant        Obj/Interventions     Row Name 01/04/20 0928          Static Sitting Balance    Level of [place] (Unsupported Sitting, Static Balance)  independent  -     Sitting Position (Unsupported Sitting, Static Balance)  sitting in chair  -     Time Able to Maintain Position (Unsupported Sitting, Static Balance)  more than 5 minutes  -     Row Name 01/04/20 0928          Dynamic Sitting Balance    Level of [place], Reaches Outside Midline (Sitting, Dynamic Balance)  supervision  -     Sitting Position, Reaches Outside Midline (Sitting, Dynamic Balance)  sitting in chair  -     Comment, Reaches Outside Midline (Sitting, Dynamic Balance)   Verbal cues to remain within sternal precautions while moving self forward in chair.   -     Row Name 01/04/20 0928          Static Standing Balance    Level of [place] (Supported Standing, Static Balance)  conditional independence  -     Time Able to Maintain Position (Supported Standing, Static Balance)  1 to 2 minutes  -     Assistive Device Utilized (Supported Standing, Static Balance)  walker, rolling  -     Row Name 01/04/20 0928          Dynamic Standing Balance    Level of [place], Reaches Outside Midline (Standing, Dynamic Balance)  conditional independence  -     Time Able to Maintain Position, Reaches Outside Midline (Standing,  Dynamic Balance)  45 to 60 seconds  -     Assistive Device Utilized (Supported Standing, Dynamic Balance)  walker, rolling  -       User Key  (r) = Recorded By, (t) = Taken By, (c) = Cosigned By    Initials Name Provider Type    Gayatri Harrington PTA Physical Therapy Assistant        Goals/Plan    No documentation.       Clinical Impression     [facility] Name 01/04/20 0930          Pain Assessment    Additional Documentation  Pain Scale: FACES Pre/Post-Treatment (Group)  -[facility] Name 01/04/20 0930          Pain Scale: FACES Pre/Post-Treatment    Pain: FACES Scale, Pretreatment  0-->no hurt  -     Pain: FACES Scale, Post-Treatment  4-->hurts little more  -     Pre/Post Treatment Pain Comment  Sternal pain with coughing following ambulation, pt requires VC's to use Heart Hugger or heart pillow to brace sternal incision.   -[facility] Name 01/04/20 0930          Plan of Care Review    Plan of Care Reviewed With  patient  -     Progress  improving  -     Outcome Summary  Pt is able to mobilize this date with frequent reminders to follow sternal precautions. She requires standing rest >5 mins between 2 x 100' bouts of ambulation, she is slow and guarded and demonstrates increased work of breathing at times with reported fatigue and mild SOA. However, vitals remain stable throughout session. She reports having good support from family, although her spouse is gone overnight >5 days/week and her children have commitments that keep them out of the home for >12 hrs/day. Due to increased time home alone, constant need for reminders of sternal precautions and limited endurance; pt would benefit from Rehab placement. to progress toward safe independence before d/c home.   -[facility] Name 01/04/20 0930          Vital Signs    Posttreatment Heart Rate (beats/min)  99  -     Post SpO2 (%)  95  -     O2 Delivery Post Treatment  supplemental O2  -[facility] Name 01/04/20 0930          Positioning and Restraints     Pre-Treatment Position  sitting in chair/recliner  -     Post Treatment Position  chair  -SM     In Bed  notified nsg;sitting;call light within reach  -       User Key  (r) = Recorded By, (t) = Taken By, (c) = Cosigned By    Initials Name Provider Type    Gayatri Harrington PTA Physical Therapy Assistant        Outcome Measures     Row Name 01/04/20 0936          How much help from another person do you currently need...    Turning from your back to your side while in flat bed without using bedrails?  3  -SM     Moving from lying on back to sitting on the side of a flat bed without bedrails?  3  -SM     Moving to and from a bed to a chair (including a wheelchair)?  3  -SM     Standing up from a chair using your arms (e.g., wheelchair, bedside chair)?  3  -SM     Climbing 3-5 steps with a railing?  3  -SM     To walk in hospital room?  3  -SM     AM-PAC 6 Clicks Score (PT)  18  -SM     Row Name 01/04/20 0936          Modified [place]na Scale    Modified [place] Scale  --  -       User Key  (r) = Recorded By, (t) = Taken By, (c) = Cosigned By    Initials Name Provider Type    Gayatri Harrington PTA Physical Therapy Assistant          PT Recommendation and Plan     Plan of Care Reviewed With: patient  Progress: improving  Outcome Summary: Pt is able to mobilize this date with frequent reminders to follow sternal precautions. She requires standing rest >5 mins between 2 x 100' bouts of ambulation, she is slow and guarded and demonstrates increased work of breathing at times with reported fatigue and mild SOA. However, vitals remain stable throughout session. She reports having good support from family, although her spouse is gone overnight >5 days/week and her children have commitments that keep them out of the home for >12 hrs/day. Due to increased time home alone, constant need for reminders of sternal precautions and limited endurance; pt would benefit from Rehab placement. to progress toward safe independence  before d/c home.      Time Calculation:   PT Charges     Row Name 01/04/20 0937             Time Calculation    Start Time  0750  -      Stop Time  0820  -      Time Calculation (min)  30 min  -      PT Received On  01/04/20  -      PT - Next Appointment  01/06/20  -         Time Calculation- PT    Total Timed Code Minutes- PT  30 minute(s)  -         Timed Charges    05229 - Gait Training Minutes   10  -      17265 - PT Therapeutic Activity Minutes  10  -      91329 - PT Self Care/Mgmt Minutes  10  -        User Key  (r) = Recorded By, (t) = Taken By, (c) = Cosigned By    Initials Name Provider Type    Gayatri Harrington PTA Physical Therapy Assistant        Therapy Charges for Today     Code Description Service Date Service Provider Modifiers Qty    07881184190 HC GAIT TRAINING EA 15 MIN 1/4/2020 Gayatri Torres, PTA GP 1    55570176085 HC PT SELF CARE/MGMT/TRAIN EA 15 MIN 1/4/2020 Gayatri Torres, PTA GP 1    65041636647 HC PT THERAPEUTIC ACT EA 15 MIN 1/4/2020 Gayatri Torres, ENEDINA GP 1          PT G-Codes  Outcome Measure Options: Modified [place]  AM-PAC 6 Clicks Score (PT): 18    Gayatri Torres PTA  1/4/2020

## 2020-01-05 LAB
ALBUMIN SERPL-MCNC: 4 G/DL (ref 3.5–5.2)
ANION GAP SERPL CALCULATED.3IONS-SCNC: 13 MMOL/L (ref 5–15)
BUN BLD-MCNC: 12 MG/DL (ref 6–20)
BUN/CREAT SERPL: 11 (ref 7–25)
CALCIUM SPEC-SCNC: 9.6 MG/DL (ref 8.6–10.5)
CHLORIDE SERPL-SCNC: 99 MMOL/L (ref 98–107)
CO2 SERPL-SCNC: 28 MMOL/L (ref 22–29)
CREAT BLD-MCNC: 1.09 MG/DL (ref 0.57–1)
DEPRECATED RDW RBC AUTO: 43.8 FL (ref 37–54)
ERYTHROCYTE [DISTWIDTH] IN BLOOD BY AUTOMATED COUNT: 13.7 % (ref 12.3–15.4)
GFR SERPL CREATININE-BSD FRML MDRD: 65 ML/MIN/1.73
GLUCOSE BLD-MCNC: 143 MG/DL (ref 65–99)
GLUCOSE BLDC GLUCOMTR-MCNC: 108 MG/DL (ref 70–105)
GLUCOSE BLDC GLUCOMTR-MCNC: 115 MG/DL (ref 70–105)
GLUCOSE BLDC GLUCOMTR-MCNC: 118 MG/DL (ref 70–105)
GLUCOSE BLDC GLUCOMTR-MCNC: 143 MG/DL (ref 70–105)
HCT VFR BLD AUTO: 31.1 % (ref 34–46.6)
HGB BLD-MCNC: 10.8 G/DL (ref 12–15.9)
MCH RBC QN AUTO: 31.5 PG (ref 26.6–33)
MCHC RBC AUTO-ENTMCNC: 34.8 G/DL (ref 31.5–35.7)
MCV RBC AUTO: 90.5 FL (ref 79–97)
PHOSPHATE SERPL-MCNC: 3.8 MG/DL (ref 2.5–4.5)
PLATELET # BLD AUTO: 381 10*3/MM3 (ref 140–450)
PMV BLD AUTO: 6.4 FL (ref 6–12)
POTASSIUM BLD-SCNC: 3.3 MMOL/L (ref 3.5–5.2)
POTASSIUM BLD-SCNC: 4.2 MMOL/L (ref 3.5–5.2)
RBC # BLD AUTO: 3.43 10*6/MM3 (ref 3.77–5.28)
SODIUM BLD-SCNC: 140 MMOL/L (ref 136–145)
WBC NRBC COR # BLD: 8.6 10*3/MM3 (ref 3.4–10.8)

## 2020-01-05 PROCEDURE — 99024 POSTOP FOLLOW-UP VISIT: CPT | Performed by: THORACIC SURGERY (CARDIOTHORACIC VASCULAR SURGERY)

## 2020-01-05 PROCEDURE — 94799 UNLISTED PULMONARY SVC/PX: CPT

## 2020-01-05 PROCEDURE — 84132 ASSAY OF SERUM POTASSIUM: CPT | Performed by: THORACIC SURGERY (CARDIOTHORACIC VASCULAR SURGERY)

## 2020-01-05 PROCEDURE — 80069 RENAL FUNCTION PANEL: CPT | Performed by: INTERNAL MEDICINE

## 2020-01-05 PROCEDURE — 25010000002 ENOXAPARIN PER 10 MG: Performed by: THORACIC SURGERY (CARDIOTHORACIC VASCULAR SURGERY)

## 2020-01-05 PROCEDURE — 85027 COMPLETE CBC AUTOMATED: CPT | Performed by: THORACIC SURGERY (CARDIOTHORACIC VASCULAR SURGERY)

## 2020-01-05 PROCEDURE — 99232 SBSQ HOSP IP/OBS MODERATE 35: CPT | Performed by: INTERNAL MEDICINE

## 2020-01-05 PROCEDURE — 82962 GLUCOSE BLOOD TEST: CPT

## 2020-01-05 RX ADMIN — SENNOSIDES 1 TABLET: 8.6 TABLET, FILM COATED ORAL at 20:41

## 2020-01-05 RX ADMIN — BUMETANIDE 1 MG: 1 TABLET ORAL at 09:42

## 2020-01-05 RX ADMIN — IPRATROPIUM BROMIDE AND ALBUTEROL SULFATE 3 ML: .5; 3 SOLUTION RESPIRATORY (INHALATION) at 08:11

## 2020-01-05 RX ADMIN — FERROUS SULFATE TAB EC 324 MG (65 MG FE EQUIVALENT) 324 MG: 324 (65 FE) TABLET DELAYED RESPONSE at 09:42

## 2020-01-05 RX ADMIN — METFORMIN HYDROCHLORIDE 500 MG: 500 TABLET ORAL at 09:43

## 2020-01-05 RX ADMIN — GUAIFENESIN 600 MG: 600 TABLET, EXTENDED RELEASE ORAL at 20:41

## 2020-01-05 RX ADMIN — ATORVASTATIN CALCIUM 40 MG: 40 TABLET, FILM COATED ORAL at 20:41

## 2020-01-05 RX ADMIN — AMIODARONE HYDROCHLORIDE 200 MG: 200 TABLET ORAL at 17:14

## 2020-01-05 RX ADMIN — GUAIFENESIN 600 MG: 600 TABLET, EXTENDED RELEASE ORAL at 09:42

## 2020-01-05 RX ADMIN — POTASSIUM CHLORIDE 20 MEQ: 1500 TABLET, EXTENDED RELEASE ORAL at 09:42

## 2020-01-05 RX ADMIN — MONTELUKAST SODIUM 10 MG: 10 TABLET, FILM COATED ORAL at 20:41

## 2020-01-05 RX ADMIN — POTASSIUM CHLORIDE 20 MEQ: 1500 TABLET, EXTENDED RELEASE ORAL at 05:43

## 2020-01-05 RX ADMIN — METFORMIN HYDROCHLORIDE 500 MG: 500 TABLET ORAL at 17:14

## 2020-01-05 RX ADMIN — AMIODARONE HYDROCHLORIDE 200 MG: 200 TABLET ORAL at 09:42

## 2020-01-05 RX ADMIN — ALLOPURINOL 300 MG: 300 TABLET ORAL at 09:42

## 2020-01-05 RX ADMIN — DOCUSATE SODIUM 100 MG: 100 CAPSULE, LIQUID FILLED ORAL at 20:41

## 2020-01-05 RX ADMIN — ENOXAPARIN SODIUM 40 MG: 40 INJECTION SUBCUTANEOUS at 20:41

## 2020-01-05 RX ADMIN — IPRATROPIUM BROMIDE AND ALBUTEROL SULFATE 3 ML: .5; 3 SOLUTION RESPIRATORY (INHALATION) at 19:37

## 2020-01-05 RX ADMIN — PANTOPRAZOLE SODIUM 40 MG: 40 TABLET, DELAYED RELEASE ORAL at 05:43

## 2020-01-05 RX ADMIN — CARVEDILOL 6.25 MG: 6.25 TABLET, FILM COATED ORAL at 09:42

## 2020-01-05 RX ADMIN — ASPIRIN 325 MG ORAL TABLET 325 MG: 325 PILL ORAL at 09:43

## 2020-01-05 RX ADMIN — IPRATROPIUM BROMIDE AND ALBUTEROL SULFATE 3 ML: .5; 3 SOLUTION RESPIRATORY (INHALATION) at 12:24

## 2020-01-05 RX ADMIN — OXYCODONE HYDROCHLORIDE 5 MG: 5 TABLET ORAL at 09:43

## 2020-01-05 RX ADMIN — CARVEDILOL 6.25 MG: 6.25 TABLET, FILM COATED ORAL at 17:14

## 2020-01-05 RX ADMIN — FOLIC ACID 1 MG: 1 TABLET ORAL at 09:42

## 2020-01-05 RX ADMIN — LEVOTHYROXINE SODIUM 200 MCG: 200 TABLET ORAL at 05:43

## 2020-01-05 RX ADMIN — ACETAMINOPHEN 500 MG: 500 TABLET, FILM COATED ORAL at 01:42

## 2020-01-05 RX ADMIN — OXYCODONE HYDROCHLORIDE 5 MG: 5 TABLET ORAL at 20:41

## 2020-01-05 NOTE — PROGRESS NOTES
Pulmonary/ Critical Care progress Note        Patient Name:  Rafael Mccann    :  1975    Medical Record:  [id number]    Requesting Physician    Phani Adames, *    Primary Care Physician     Phani Adames MD    Reason for consultation    Rafael Mccann is a 44 y.o. female who we were asked to see in consultation for decreased level of consciousness.   Patient is POD#3 CABG and AVR after presenting to the ER on 19 with complaints of chest pain, dyspnea and tachycardia.  Patient with a history of CAD being medically managed as well as cardiomyopathy.   Cardiac cath on 19 showed severe two vessel disease and 4+aortic regurg.       This evening patient was found to be very difficult to arouse in bed after being up to chair this morning and ambulating in the afternoon.   Patient was given Narcan with improvement in her mental status.  ABG was obtained as well which showed pCO2 of 49 and a pAO2 of 68.   Patient had been requiring Dilaudid 0.25 mg q2h IV and Oxycodone 5 mg q4h, for pain control in addition had Ultram and Benadryl earlier today.      Review of Systems    :  OOB to chair, awake and alert.  Complaints of right lower rib pain.    20:  OOB to chair,  Remains with complaints of pain  20:  Sitting on side of bed, remains with some pain to right side.    1/3: chest pain much better.  Her oxygen requirement stable on 2 L  - she is on roomair. Denies any pain, having BM and tolerating diet  - stable, on room air, walks in room without assistance    Home medicationS  Prior to Admission medications    Medication Sig Start Date End Date Taking? Authorizing Provider   allopurinol (ZYLOPRIM) 300 MG tablet Take 300 mg by mouth Daily.   Yes ProviderDheeraj MD   ALPRAZolam (XANAX) 1 MG tablet Take 1 mg by mouth 4 (Four) Times a Day As Needed for Anxiety.   Yes ProviderDheeraj MD   amLODIPine (NORVASC) 5 MG tablet Take 5 mg by mouth 2 (Two)  Times a Day.   Yes Dheeraj Alvarez MD   aspirin 81 MG chewable tablet Chew 81 mg Daily.   Yes Dheeraj Alvarez MD   atorvastatin (LIPITOR) 40 MG tablet Take 1 tablet by mouth Every Night. 6/30/19  Yes Hannah Wills MD   carvedilol (COREG) 12.5 MG tablet Take 1 tablet by mouth 2 (Two) Times a Day With Meals. 6/30/19  Yes Hannah Wills MD   cholecalciferol (VITAMIN D3) 1000 units tablet Take 1,000 Units by mouth Daily.   Yes Dheeraj Alvarez MD   clindamycin (CLEOCIN) 300 MG capsule Take 300 mg by mouth 2 (Two) Times a Day. 12/18/19 1/1/20 Yes Dheeraj Alvarez MD   famotidine (PEPCID) 20 MG tablet Take 1 tablet by mouth 2 (Two) Times a Day. 9/17/19  Yes Dina Jack PA   ferrous sulfate 325 (65 FE) MG tablet Take 65 mg by mouth Daily With Breakfast.   Yes Dheeraj Alvarez MD   fluconazole (DIFLUCAN) 200 MG tablet Take 200 mg by mouth Every Other Day.   Yes Dheeraj Alvarez MD   folic acid (FOLVITE) 1 MG tablet Take 1 mg by mouth Daily.   Yes Dheeraj Alvarez MD   furosemide (LASIX) 40 MG tablet Take 40 mg by mouth 2 (Two) Times a Day.   Yes Dheeraj Alvarez MD   gabapentin (NEURONTIN) 300 MG capsule Take 300 mg by mouth 3 (Three) Times a Day.   Yes Dheeraj Alvarez MD   hydrALAZINE (APRESOLINE) 100 MG tablet Take 1 tablet by mouth 3 (Three) Times a Day. 12/12/19  Yes Wayne Luna MD   levothyroxine (SYNTHROID, LEVOTHROID) 200 MCG tablet Take 200 mcg by mouth Daily.   Yes Dheeraj Alvarez MD   lisinopril (PRINIVIL,ZESTRIL) 20 MG tablet Take 20 mg by mouth Daily.   Yes Dheeraj Alvarez MD   metFORMIN (GLUCOPHAGE) 500 MG tablet Take 500 mg by mouth Daily With Breakfast.   Yes Dheeraj Alvarez MD   montelukast (SINGULAIR) 10 MG tablet Take 10 mg by mouth Every Night.   Yes Dheeraj Alvarez MD   oxyCODONE-acetaminophen (PERCOCET) 7.5-325 MG per tablet Take 1 tablet by mouth Every 6 (Six) Hours As Needed.   Yes Kim MD Dheeraj    pantoprazole (PROTONIX) 40 MG EC tablet Take 40 mg by mouth Daily.   Yes Provider, MD Dheeraj   spironolactone (ALDACTONE) 25 MG tablet Take 1 tablet by mouth Daily. 11/19/19  Yes Wayne Luna MD       Medical History    Past Medical History:   Diagnosis Date   • CHF (congestive heart failure) (CMS/[facility])    • COPD (chronic obstructive pulmonary disease) (CMS/[facility])    • Diabetes (CMS/[facility])    • Hyperlipidemia    • Hypertension     INDRA on PAP therapy     Surgical History    Past Surgical History:   Procedure Laterality Date   • AORTIC VALVE REPAIR/REPLACEMENT N/A 12/27/2019    Procedure: AORTIC VALVE REPAIR/REPLACEMENT;  Surgeon: Lane Stock MD;  Location: [facility] CVOR;  Service: Cardiothoracic   • BREAST LUMPECTOMY     • CARDIAC CATHETERIZATION N/A 12/24/2019    Procedure: Right and Left Heart Cath 12/24/19 @ 0900;  Surgeon: Wayne Luna MD;  Location: [facility] CATH INVASIVE LOCATION;  Service: Cardiovascular   • CARDIAC CATHETERIZATION N/A 12/24/2019    Procedure: Coronary angiography;  Surgeon: Wayne Luna MD;  Location: [facility] CATH INVASIVE LOCATION;  Service: Cardiovascular   • CARDIAC ELECTROPHYSIOLOGY PROCEDURE Left 6/28/2019    Procedure: Dual-chamber ICD insertion;  Surgeon: Héctor Eckert MD;  Location: [facility] CATH INVASIVE LOCATION;  Service: Cardiovascular   • CARDIAC ELECTROPHYSIOLOGY PROCEDURE Left 8/14/2019    Procedure: Lead Revision;  Surgeon: Héctor Eckert MD;  Location: [facility] CATH INVASIVE LOCATION;  Service: Cardiovascular   • CHOLECYSTECTOMY     • CORONARY ARTERY BYPASS GRAFT N/A 12/27/2019    Procedure: CORONARY ARTERY BYPASS GRAFTING;  Surgeon: Lane Stock MD;  Location: [facility] CVOR;  Service: Cardiothoracic   • HYSTERECTOMY     • INSERT / REPLACE / REMOVE PACEMAKER     • THYROID SURGERY          Family History    Family History   Problem Relation Age of Onset   • Heart disease Father        Social History    Social History     Tobacco  Use   • Smoking status: Former Smoker     Last attempt to quit: 2019     Years since quittin.0   • Smokeless tobacco: Never Used   Substance Use Topics   • Alcohol use: No     Frequency: Never        Allergies    Allergies   Allergen Reactions   • Hydrocodone Hives   • Penicillin G Unknown (See Comments)       Medications    Scheduled Meds:    allopurinol 300 mg Oral Daily   amiodarone 200 mg Oral BID With Meals   aspirin 325 mg Oral Daily   atorvastatin 40 mg Oral Nightly   bisacodyl 10 mg Rectal Daily   bumetanide 1 mg Oral Daily   carvedilol 6.25 mg Oral BID With Meals   docusate sodium 100 mg Oral BID   enoxaparin 40 mg Subcutaneous Daily   ferrous sulfate 324 mg Oral Daily With Breakfast   folic acid 1 mg Oral Daily   guaiFENesin 600 mg Oral Q12H   insulin lispro 0-9 Units Subcutaneous 4x Daily With Meals & Nightly   ipratropium-albuterol 3 mL Nebulization 4x Daily - RT   lactulose 30 g Oral Daily   levothyroxine 200 mcg Oral Q AM   lidocaine 2 patch Transdermal Q24H   metFORMIN 500 mg Oral BID With Meals   montelukast 10 mg Oral Nightly   pantoprazole 40 mg Oral Q AM   polyethylene glycol 17 g Oral BID   senna 1 tablet Oral BID     Continuous Infusions:    sodium chloride 30 mL/hr Last Rate: 30 mL/hr (19 1430)     PRN Meds:.•  acetaminophen  •  ALPRAZolam  •  dextrose  •  dextrose  •  glucagon (human recombinant)  •  insulin lispro **AND** insulin lispro  •  ipratropium-albuterol  •  MOUTH KOTE  •  naloxone  •  ondansetron  •  oxyCODONE  •  potassium chloride **OR** potassium chloride  •  potassium chloride **OR** potassium chloride      Physical Exam    tMax 24 hrs:  Temp (24hrs), Av °F (36.7 °C), Min:97.5 °F (36.4 °C), Max:98.4 °F (36.9 °C)    Vitals Ranges:  Temp:  [97.5 °F (36.4 °C)-98.4 °F (36.9 °C)] 98.1 °F (36.7 °C)  Heart Rate:  [74-91] 75  Resp:  [18-20] 18  BP: (138-170)/() 140/90  Intake and Output Last 3 Shifts:  I/O last 3 completed shifts:  In:  [P.O.:]  Out: -      Constitutional:  Alert, no acute respiratory distress   HEENT:  Atraumatic, PERRL, conjunctiva normal, moist oral mucosa, no nasal discharge.  Trachea is midline.  Respiratory:  No respiratory distress, normal breath sounds, no rales, no wheezing   Cardiovascular:  Normal rate, normal rhythm and no murmurs.  Pulses 2+ and equal in all four extremities.    GI:  Soft, nondistended, positive bowel sounds.  :  No costovertebral angle tenderness   Extremities: No edema, cyanosis or tenderness.  Integument:  No rashes.   Neurologic:  Alert & oriented x 3, CN 2-12 normal, normal motor function, normal sensory function, no focal deficits noted   Psychiatric:  Speech and behavior appropriate     labs    CBC  Results from last 7 days   Lab Units 01/05/20 0446 01/04/20 0321 01/03/20 0521 01/02/20 0558 01/01/20 0634 12/31/19 0631 12/30/19  1849 12/30/19  0338   WBC 10*3/mm3 8.60 7.00 6.00 5.50 6.40 5.10  --  8.00   RBC 10*6/mm3 3.43* 3.18* 3.03* 2.56* 2.73* 2.68*  --  2.71*   HEMOGLOBIN g/dL 10.8* 10.1* 9.5* 8.2* 8.7* 8.5*  --  8.6*   HEMOGLOBIN, POC g/dL  --   --   --   --   --   --  8.1*  --    HEMATOCRIT % 31.1* 29.2* 27.5* 24.0* 25.5* 25.3*  --  25.3*   HEMATOCRIT POC %  --   --   --   --   --   --  24*  --    MCV fL 90.5 91.7 90.6 93.5 93.2 94.4  --  93.1   PLATELETS 10*3/mm3 381 309 268 211 207 170  --  134*       BMP  Results from last 7 days   Lab Units 01/05/20 0446 01/04/20 0321 01/03/20 1938 01/03/20 0521 01/02/20  0558 01/01/20  0634 12/31/19  0631 12/30/19  0338   SODIUM mmol/L 140 141  --  138 138 139 137 136   POTASSIUM mmol/L 3.3* 4.0 3.7 3.3* 3.6 3.7 4.0 4.4   CHLORIDE mmol/L 99 99  --  95* 96* 97* 96* 99   CO2 mmol/L 28.0 31.0*  --  30.0* 31.0* 31.0* 30.0* 27.0   BUN mg/dL 12 10  --  9 11 11 12 13   CREATININE mg/dL 1.09* 1.01*  --  1.15* 1.00 1.04* 1.18* 1.25*   GLUCOSE mg/dL 143* 97  --  140* 119* 106* 122* 157*   MAGNESIUM mg/dL  --   --   --   --  2.2 2.0 2.1 2.2   PHOSPHORUS mg/dL 3.8 4.0   --  3.5 4.0 3.5 3.1 3.0       CMP   Results from last 7 days   Lab Units 01/05/20  0446 01/04/20  0321 01/03/20  1938 01/03/20  0521 01/02/20  0558 01/01/20  0634 12/31/19  0631 12/30/19  0338   SODIUM mmol/L 140 141  --  138 138 139 137 136   POTASSIUM mmol/L 3.3* 4.0 3.7 3.3* 3.6 3.7 4.0 4.4   CHLORIDE mmol/L 99 99  --  95* 96* 97* 96* 99   CO2 mmol/L 28.0 31.0*  --  30.0* 31.0* 31.0* 30.0* 27.0   BUN mg/dL 12 10  --  9 11 11 12 13   CREATININE mg/dL 1.09* 1.01*  --  1.15* 1.00 1.04* 1.18* 1.25*   GLUCOSE mg/dL 143* 97  --  140* 119* 106* 122* 157*   ALBUMIN g/dL 4.00 3.90  --  3.60 3.70 3.90 3.90 4.10         TROPONIN        CoAg        Creatinine Clearance  Estimated Creatinine Clearance: 74.1 mL/min (A) (by C-G formula based on SCr of 1.09 mg/dL (H)).    ABG  Results from last 7 days   Lab Units 12/31/19  0043 12/30/19  1849   PH, ARTERIAL pH units 7.376 7.396   PCO2, ARTERIAL mm Hg 51.7* 49.4*   PO2 ART mm Hg 53.2* 68.1*   O2 SATURATION ART % 85.7* 92.9*   BASE EXCESS ART mmol/L 4.4* 4.9*       Imaging & Other Studies    Imaging Results (Last 72 Hours)     ** No results found for the last 72 hours. **            AssessmenT/PLAN  Decreased level of consciousness related to opioids  INDRA compliant with BiPAP use  POD CABG and AVR - aortic regurgitation and CAD  HFrEF - EF35%  AICD  CKD stage II  T2DM  Hypothyroidism on Synthroid  Anxiety on Xanax  Chronic pain follows outpatient pain management clinict  COPD without exacerbation  Cardiomyopathy    Plan:   -on room air  -Renal following  -aggressive pulmonary toilet with incentive spirometry, flutter valve and nebs.  -cont to use home PAP with sleep   -limit sedating medication   -D/C gabapentin  -Lidocaine patch  -duonebs and Mucinex    on oral diuretics    Pulmonary status is stable. We will continue to follow  OT recommends - rehab  Awaiting rehab placement   Maar Peterson MD  1/5/2020  12:36 PM

## 2020-01-05 NOTE — PROGRESS NOTES
Cardiology    Patient Care Team:  Phani Adames MD as PCP - General (Internal Medicine)  Ashish Smalls MD as Consulting Physician (Nephrology)          ADMITTING DIAGNOSES: Coronary disease chest pain    SUBJECTIVE: Doing well up into the toilet by herself    Review of Symptoms:  Constitutional: Patient afebrile no chills or unexpected weight changes  Respiratory: No cough, no wheezing or dyspnea  Cardiovascular: No chest pain, palpitations, dyspnea, orthopnea and no edema  Gastrointestinal: No nausea, vomiting, constipation or diarrhea.  No melena or dark stools    All other systems reviewed and are negative     PAST MEDICAL HISTORY:   Past Medical History:   Diagnosis Date   • CHF (congestive heart failure) (CMS/[facility])    • COPD (chronic obstructive pulmonary disease) (CMS/[facility])    • Diabetes (CMS/[facility])    • Hyperlipidemia    • Hypertension        ALLERGIES:   Allergies   Allergen Reactions   • Hydrocodone Hives   • Penicillin G Unknown (See Comments)         PAST SURGICAL HISTORY:   Past Surgical History:   Procedure Laterality Date   • AORTIC VALVE REPAIR/REPLACEMENT N/A 12/27/2019    Procedure: AORTIC VALVE REPAIR/REPLACEMENT;  Surgeon: Lane Stock MD;  Location: [facility];  Service: Cardiothoracic   • BREAST LUMPECTOMY     • CARDIAC CATHETERIZATION N/A 12/24/2019    Procedure: Right and Left Heart Cath 12/24/19 @ 0900;  Surgeon: Wayne Luna MD;  Location: [facility] CATH INVASIVE LOCATION;  Service: Cardiovascular   • CARDIAC CATHETERIZATION N/A 12/24/2019    Procedure: Coronary angiography;  Surgeon: Wayne Luna MD;  Location: [facility] CATH INVASIVE LOCATION;  Service: Cardiovascular   • CARDIAC ELECTROPHYSIOLOGY PROCEDURE Left 6/28/2019    Procedure: Dual-chamber ICD insertion;  Surgeon: Héctor Eckert MD;  Location: [facility] CATH INVASIVE LOCATION;  Service: Cardiovascular   • CARDIAC ELECTROPHYSIOLOGY PROCEDURE Left 8/14/2019    Procedure: Lead Revision;   Surgeon: Héctor Eckert MD;  Location: [facility] CATH INVASIVE LOCATION;  Service: Cardiovascular   • CHOLECYSTECTOMY     • CORONARY ARTERY BYPASS GRAFT N/A 2019    Procedure: CORONARY ARTERY BYPASS GRAFTING;  Surgeon: Lane Stock MD;  Location: [facility] CVOR;  Service: Cardiothoracic   • HYSTERECTOMY     • INSERT / REPLACE / REMOVE PACEMAKER     • THYROID SURGERY            FAMILY HISTORY:   Family History   Problem Relation Age of Onset   • Heart disease Father          SOCIAL HISTORY:   Social History     Socioeconomic History   • Marital status:      Spouse name: Not on file   • Number of children: Not on file   • Years of education: Not on file   • Highest education level: Not on file   Tobacco Use   • Smoking status: Former Smoker     Last attempt to quit: 2019     Years since quittin.0   • Smokeless tobacco: Never Used   Substance and Sexual Activity   • Alcohol use: No     Frequency: Never   • Drug use: No   • Sexual activity: Defer       CURRENT MEDICATIONS:     Current Facility-Administered Medications:   •  acetaminophen (TYLENOL) tablet 500 mg, 500 mg, Oral, Q6H PRN, Lane Stock MD, 500 mg at 20 0142  •  allopurinol (ZYLOPRIM) tablet 300 mg, 300 mg, Oral, Daily, Chidi Pace MD, 300 mg at 20 0942  •  ALPRAZolam (XANAX) tablet 0.25 mg, 0.25 mg, Oral, BID PRN, Chidi Pace MD, 0.25 mg at 20 2343  •  amiodarone (PACERONE) tablet 200 mg, 200 mg, Oral, BID With Meals, Lane Stock MD, 200 mg at 20 0942  •  aspirin tablet 325 mg, 325 mg, Oral, Daily, Lane Stock MD, 325 mg at 20 0943  •  atorvastatin (LIPITOR) tablet 40 mg, 40 mg, Oral, Nightly, Chidi Pace MD, 40 mg at 20  •  bisacodyl (DULCOLAX) suppository 10 mg, 10 mg, Rectal, Daily, Kayla Burrell APRN, 10 mg at 20 1000  •  bumetanide (BUMEX) tablet 1 mg, 1 mg, Oral, Daily, Adalid Sterling MD, 1 mg at 20 0942  •  carvedilol  (COREG) tablet 6.25 mg, 6.25 mg, Oral, BID With Meals, Adalid Sterling MD, 6.25 mg at 01/05/20 0942  •  dextrose (D50W) 25 g/ 50mL Intravenous Solution 25 g, 25 g, Intravenous, Q15 Min PRN, Kayla Burrell APRN  •  dextrose (GLUTOSE) oral gel 15 g, 15 g, Oral, Q15 Min PRN, Kayla Burrell APRN  •  docusate sodium (COLACE) capsule 100 mg, 100 mg, Oral, BID, Chidi Pace MD, 100 mg at 01/04/20 2003  •  enoxaparin (LOVENOX) syringe 40 mg, 40 mg, Subcutaneous, Daily, Chidi Pace MD, 40 mg at 01/04/20 2003  •  ferrous sulfate EC tablet 324 mg, 324 mg, Oral, Daily With Breakfast, Chidi Pace MD, 324 mg at 01/05/20 0942  •  folic acid (FOLVITE) tablet 1 mg, 1 mg, Oral, Daily, Chidi Pace MD, 1 mg at 01/05/20 0942  •  glucagon (human recombinant) (GLUCAGEN DIAGNOSTIC) injection 1 mg, 1 mg, Subcutaneous, Q15 Min PRN, Kayla Burrell APRN  •  guaiFENesin (MUCINEX) 12 hr tablet 600 mg, 600 mg, Oral, Q12H, Hasmukh David MD, 600 mg at 01/05/20 0942  •  insulin lispro (humaLOG) injection 0-9 Units, 0-9 Units, Subcutaneous, 4x Daily With Meals & Nightly **AND** insulin lispro (humaLOG) injection 0-9 Units, 0-9 Units, Subcutaneous, PRN, Kayla Burrell APRN  •  ipratropium-albuterol (DUO-NEB) nebulizer solution 3 mL, 3 mL, Nebulization, 4x Daily - RT, Loraine Son APRN, 3 mL at 01/05/20 0811  •  ipratropium-albuterol (DUO-NEB) nebulizer solution 3 mL, 3 mL, Nebulization, Q4H PRN, Loraine Son APRN  •  lactulose (CHRONULAC) 10 GM/15ML solution 30 g, 30 g, Oral, Daily, Kayla Burrell APRN, 30 g at 01/02/20 0950  •  levothyroxine (SYNTHROID, LEVOTHROID) tablet 200 mcg, 200 mcg, Oral, Q AM, Chidi Pace MD, 200 mcg at 01/05/20 0543  •  lidocaine (LIDODERM) 5 % 2 patch, 2 patch, Transdermal, Q24H, Hasmukh David MD, 2 patch at 01/04/20 0944  •  metFORMIN (GLUCOPHAGE) tablet 500 mg, 500 mg, Oral, BID With Meals, Adalid Sterling MD, 500 mg at 01/05/20 0943  •  montelukast  (SINGULAIR) tablet 10 mg, 10 mg, Oral, Nightly, Chidi Pace MD, 10 mg at 01/04/20 2003  •  MOUTH KOTE solution 2 mL, 2 spray, Mouth/Throat, Q4H PRN, Chidi Pace MD  •  naloxone (NARCAN) injection 0.4 mg, 0.4 mg, Intravenous, Q5 Min PRN, Lane Stock MD  •  ondansetron (ZOFRAN) injection 4 mg, 4 mg, Intravenous, Q6H PRN, Chidi Pace MD, 4 mg at 01/02/20 2125  •  oxyCODONE (ROXICODONE) immediate release tablet 5 mg, 5 mg, Oral, Q4H PRN, Chidi Pace MD, 5 mg at 01/05/20 0943  •  pantoprazole (PROTONIX) EC tablet 40 mg, 40 mg, Oral, Q AM, Chidi Pace MD, 40 mg at 01/05/20 0543  •  polyethylene glycol (MIRALAX) powder 17 g, 17 g, Oral, BID, Chidi Pace MD, 17 g at 01/02/20 0949  •  potassium chloride (K-DUR,KLOR-CON) CR tablet 20 mEq, 20 mEq, Oral, PRN, 20 mEq at 01/05/20 0942 **OR** potassium chloride (KLOR-CON) packet 20 mEq, 20 mEq, Oral, PRN, Chidi Pace MD  •  potassium chloride 10 mEq in 100 mL IVPB, 10 mEq, Intravenous, Q1H PRN **OR** potassium chloride 10 mEq in 100 mL IVPB, 10 mEq, Intravenous, Q1H PRN, Chidi Pace MD  •  senna (SENOKOT) tablet 1 tablet, 1 tablet, Oral, BID, Chidi Pace MD, 1 tablet at 01/04/20 2003  •  sodium chloride 0.9 % infusion, 30 mL/hr, Intravenous, Continuous, Chidi Pace MD, Last Rate: 30 mL/hr at 12/27/19 1430, 30 mL/hr at 12/27/19 1430      DIAGNOSTIC DATA:     I reviewed the patient's new clinical results.    Lab Results (last 24 hours)     Procedure Component Value Units Date/Time    POC Glucose Once [[id number]]  (Abnormal) Collected:  01/05/20 1148    Specimen:  Blood Updated:  01/05/20 1157     Glucose 118 mg/dL      Comment: Serial Number: [id number]:  [id number]       POC Glucose Once [[id number]]  (Abnormal) Collected:  01/05/20 0950    Specimen:  Blood Updated:  01/05/20 0952     Glucose 143 mg/dL      Comment: Serial Number: [id number]:  [id number]       Renal Function Panel  [[id number]]  (Abnormal) Collected:  01/05/20 0446    Specimen:  Blood Updated:  01/05/20 0522     Glucose 143 mg/dL      BUN 12 mg/dL      Creatinine 1.09 mg/dL      Sodium 140 mmol/L      Potassium 3.3 mmol/L      Chloride 99 mmol/L      CO2 28.0 mmol/L      Calcium 9.6 mg/dL      Albumin 4.00 g/dL      Phosphorus 3.8 mg/dL      Anion Gap 13.0 mmol/L      BUN/Creatinine Ratio 11.0     eGFR  African Amer 65 mL/min/1.73     Narrative:       GFR Normal >60  Chronic Kidney Disease <60  Kidney Failure <15      CBC (No Diff) [[id number]]  (Abnormal) Collected:  01/05/20 0446    Specimen:  Blood Updated:  01/05/20 0500     WBC 8.60 10*3/mm3      RBC 3.43 10*6/mm3      Hemoglobin 10.8 g/dL      Hematocrit 31.1 %      MCV 90.5 fL      MCH 31.5 pg      MCHC 34.8 g/dL      RDW 13.7 %      RDW-SD 43.8 fl      MPV 6.4 fL      Platelets 381 10*3/mm3     POC Glucose Once [[id number]]  (Normal) Collected:  01/04/20 1621    Specimen:  Blood Updated:  01/04/20 1629     Glucose 76 mg/dL      Comment: Serial Number: [id number]:  [id number]             Imaging Results (Last 24 Hours)     ** No results found for the last 24 hours. **          Xray reviewed personally by physician.      ECG reviewed personally by physician  ECG/EMG Results (most recent)     Procedure Component Value Units Date/Time    ECG 12 Lead [[id number]] Collected:  12/22/19 2259     Updated:  12/23/19 0636    Narrative:       HEART RATE= 89  bpm  RR Interval= 676  ms  [place] Interval= 171  ms  P Horizontal Axis= 8  deg  P Front Axis= 50  deg  QRSD Interval= 90  ms  QT Interval= 378  ms  QRS Axis= -37  deg  T Wave Axis= 106  deg  - ABNORMAL ECG -  Sinus rhythm  Probable left atrial enlargement  Left ventricular hypertrophy  Nonspecific T abnormalities, lateral leads  When compared with ECG of 25-Sep-2019 21:31:50,  Significant repolarization change  Electronically Signed By: Mukesh Mcmahan ([place]) 23-Dec-2019 06:35:58  Date and Time of Study: 2019-12-22 22:59:34    Adult  Transthoracic Echo Complete W/ Cont if Necessary Per Protocol [[id number]] Collected:  12/23/19 0951     Updated:  12/23/19 1717     BSA 2.0 m^2      BH CV ECHO ARLYN - RVDD 1.9 cm      IVSd 1.4 cm      IVSs 1.7 cm      LVIDd 4.7 cm      LVIDs 3.9 cm      LVPWd 1.9 cm      BH CV ECHO ARLYN - LVPWS 2.1 cm      IVS/LVPW 0.73     FS 18.3 %      EDV(Teich) 104.3 ml      ESV(Teich) 64.7 ml      EF(Teich) 37.9 %      EDV(cubed) 106.3 ml      ESV(cubed) 58.0 ml      EF(cubed) 45.5 %      % IVS thick 21.7 %      % LVPW thick 8.6 %      LV mass(C)d 354.3 grams      LV mass(C)dI 177.9 grams/m^2      LV mass(C)s 333.6 grams      LV mass(C)sI 167.5 grams/m^2      SV(Teich) 39.5 ml      SI(Teich) 19.9 ml/m^2      SV(cubed) 48.3 ml      SI(cubed) 24.3 ml/m^2      Ao root diam 2.6 cm      Ao root area 5.3 cm^2      ACS 1.3 cm      LVOT diam 2.2 cm      LVOT area 3.7 cm^2      EDV(MOD-sp4) 108.8 ml      ESV(MOD-sp4) 62.0 ml      EF(MOD-sp4) 43.1 %      EDV(MOD-sp2) 101.6 ml      ESV(MOD-sp2) 65.3 ml      EF(MOD-sp2) 35.8 %      SV(MOD-sp4) 46.9 ml      SI(MOD-sp4) 23.5 ml/m^2      SV(MOD-sp2) 36.3 ml      SI(MOD-sp2) 18.2 ml/m^2      Ao root area (BSA corrected) 1.3     LV Valdez Vol (BSA corrected) 54.6 ml/m^2      LV Sys Vol (BSA corrected) 31.1 ml/m^2      MV E max elizabeth 85.2 cm/sec      MV A max elizabeth 72.0 cm/sec      MV E/A 1.2     MV V2 max 96.6 cm/sec      MV max PG 3.7 mmHg      MV V2 mean 64.9 cm/sec      MV mean PG 1.9 mmHg      MV V2 VTI 28.1 cm      MVA(VTI) 2.1 cm^2      MV dec slope 348.4 cm/sec^2      MV dec time 0.24 sec      Ao pk elizabeth 206.9 cm/sec      Ao max PG 17.4 mmHg      Ao max PG (full) 14.8 mmHg      Ao V2 mean 140.4 cm/sec      Ao mean PG 8.9 mmHg      Ao mean PG (full) 7.5 mmHg      Ao V2 VTI 40.3 cm      PAUL(I,A) 1.5 cm^2      PAUL(I,D) 1.5 cm^2      PAUL(V,A) 1.4 cm^2      PAUL(V,D) 1.4 cm^2      AI max elizabeth 485.7 cm/sec      AI max PG 94.4 mmHg      AI dec slope 296.1 cm/sec^2      AI dec time 1.6 sec      AI P1/2t  480.5 msec      LV V1 max PG 2.6 mmHg      LV V1 mean PG 1.5 mmHg      LV V1 max 79.9 cm/sec      LV V1 mean 57.5 cm/sec      LV V1 VTI 16.1 cm      SV(Ao) 211.7 ml      SI(Ao) 106.3 ml/m^2      SV(LVOT) 60.0 ml      SI(LVOT) 30.1 ml/m^2      PA V2 max 70.6 cm/sec      PA max PG 2.0 mmHg      PA max PG (full) 0.57 mmHg      PA V2 mean 53.0 cm/sec      PA mean PG 1.2 mmHg      PA mean PG (full) 0.48 mmHg      PA V2 VTI 16.5 cm      PA acc time 0.13 sec      RV V1 max PG 1.4 mmHg      RV V1 mean PG 0.72 mmHg      RV V1 max 59.7 cm/sec      RV V1 mean 40.4 cm/sec      RV V1 VTI 13.8 cm      TR max elizabeth 211.2 cm/sec      RVSP(TR) 20.8 mmHg      RAP systole 3.0 mmHg      PA pr(Accel) 21.4 mmHg       CV ECHO ARLYN - BZI_BMI 30.2 kilograms/m^2       CV ECHO ARLYN - BSA(HAYCOCK) 2.1 m^2       CV ECHO ARLYN - BZI_METRIC_WEIGHT 87.5 kg       CV ECHO ARLYN - BZI_METRIC_HEIGHT 170.2 cm      Target HR (85%) 148 bpm      Max. Pred. HR (100%) 174 bpm      EF(MOD-bp) 40 %      LA dimension(2D) 3.5 cm      Echo EF Estimated 35 %     Narrative:       · Left ventricular wall thickness is consistent with moderate concentric   hypertrophy.  · Estimated EF = 35%.  · There is mild calcification of the aortic valve.  · Severe aortic valve regurgitation is present.  · Mild aortic valve stenosis is present.  · Mild tricuspid valve regurgitation is present.  · Right ventricular cavity is mildly dilated.  · Mildly reduced right ventricular systolic function noted.  · Mild mitral valve regurgitation is present  · Left atrial cavity size is mild-to-moderately dilated.       ECG 12 Lead [[id number]] Collected:  12/27/19 1436     Updated:  12/27/19 1437    Narrative:       HEART RATE= 64  bpm  RR Interval= 940  ms  AL Interval= 166  ms  P Horizontal Axis= 15  deg  P Front Axis= 39  deg  QRSD Interval= 107  ms  QT Interval= 540  ms  QRS Axis= -22  deg  T Wave Axis= -58  deg  - ABNORMAL ECG -  Sinus rhythm  Probable left ventricular  hypertrophy  Prolonged QT interval  When compared with ECG of 22-Dec-2019 22:59:34,  Significant rate decrease  Significant repolarization change  Significant axis, voltage or hypertrophy change  Electronically Signed By:   Date and Time of Study: 2019-12-27 14:36:58    ECG 12 Lead [[id number]] Collected:  12/28/19 0356     Updated:  12/28/19 0358    Narrative:       HEART RATE= 84  bpm  RR Interval= 712  ms  HI Interval= 175  ms  P Horizontal Axis=   deg  P Front Axis= 30  deg  QRSD Interval= 98  ms  QT Interval= 395  ms  QRS Axis= -31  deg  T Wave Axis= 95  deg  - ABNORMAL ECG -  Sinus rhythm  Probable left atrial enlargement  Left ventricular hypertrophy  Electronically Signed By:   Date and Time of Study: 2019-12-28 03:56:26    Adult Transthoracic Echo Limited W/ Cont if Necessary Per Protocol [[id number]] Collected:  01/01/20 1416     Updated:  01/01/20 1600     BSA 2.1 m^2      EDV(MOD-sp4) 84.7 ml      ESV(MOD-sp4) 43.0 ml      EF(MOD-sp4) 49.3 %      EDV(MOD-sp2) 72.7 ml      ESV(MOD-sp2) 38.1 ml      EF(MOD-sp2) 47.6 %      SV(MOD-sp4) 41.8 ml      SI(MOD-sp4) 20.3 ml/m^2      SV(MOD-sp2) 34.6 ml      SI(MOD-sp2) 16.9 ml/m^2      LV Valdez Vol (BSA corrected) 41.3 ml/m^2      LV Sys Vol (BSA corrected) 20.9 ml/m^2       CV ECHO ARLYN - BZI_BMI 32.4 kilograms/m^2       CV ECHO ARLYN - BSA(HAYCOCK) 2.1 m^2       CV ECHO ARLYN - BZI_METRIC_WEIGHT 93.9 kg       CV ECHO ARLYN - BZI_METRIC_HEIGHT 170.2 cm      EF(MOD-bp) 35 %      Echo EF Estimated 40 %     Narrative:       · Left ventricular wall thickness is consistent with mild concentric   hypertrophy.  · Estimated EF = 40%.  · Right ventricular cavity is mildly dilated.  · Mildly reduced right ventricular systolic function noted.       ECG 12 Lead [[id number]] Collected:  12/29/19 0422     Updated:  01/02/20 0929    Narrative:       HEART RATE= 92  bpm  RR Interval= 652  ms  HI Interval= 187  ms  P Horizontal Axis= 17  deg  P Front Axis= 23  deg  QRSD  "Interval= 93  ms  QT Interval= 386  ms  QRS Axis= -26  deg  T Wave Axis= 117  deg  - ABNORMAL ECG -  Sinus rhythm  Left ventricular hypertrophy  Nonspecific T abnormalities, lateral leads  When compared with ECG of 28-Dec-2019 3:56:26,  Significant axis, voltage or hypertrophy change  Electronically Signed By: Wayne Luna (JAY JAY) 02-Jan-2020 09:29:29  Date and Time of Study: 2019-12-29 04:22:15              VITAL SIGNS: Temp:  [97.5 °F (36.4 °C)-98.4 °F (36.9 °C)] 98.1 °F (36.7 °C)  Heart Rate:  [74-91] 74  Resp:  [18-20] 18  BP: (138-170)/() 140/90   Flowsheet Rows      First Filed Value   Admission Height  170.2 cm (67\") Documented at 12/22/2019 2250   Admission Weight  91.8 kg (202 lb 6.1 oz) Documented at 12/22/2019 2250        Physical exam  Constitutional: well-nourished, and appears stated age in no acute distress  PERRL: Conjunctiva clear, no pallor, anicteric  HENMT: normocephalic, normal dentition, no cyanosis or pallor  Neck:no bruits, or thrills and bilateral normal carotid upstroke. Normal jugular venous pressure  Cardiovascular: No parasternal heaves an non-displaced focal PMI. Normal rate and rhythm: no rub, gallop, murmur or click and normal S1 and S2; no lower or upper extremity edema.   Lungs: unlabored, no wheezing with no rales or rhonchi on auscultation.  Extremities: Warm, no clubbing, cyanosis, or edema. Full and equal peripheral pulses in extremities with no bruits appreciated.   Abdomen: soft, non-tender, non-distended  Musculoskeletal: no joint tenderness or swelling and no erythema  Skin: Warm and dry, non-erythematous   Neuro:alert and normal affect. Oriented to time, place and person.     ASSESSMENT AND PLAN:   CAD  Dyslipidemia  Coronary artery bypass grafting  Hypertension  Mitral valve regurgitation  Aortic regurgitation status post AVR    Plan  CAD  Dyslipidemia  Coronary artery bypass grafting  Hypertension  Mitral valve regurgitation  Aortic regurgitation status post " AVR    Doing well no complaints.  Continue supportive care.      Manish Alvarado MD  01/05/20  12:18 PM

## 2020-01-05 NOTE — PROGRESS NOTES
"NEPHROLOGY PROGRESS NOTE------[facility]    [facility]  [phone]  Adalid Sterling MD      Patient Care Team:  Phani Adames MD as PCP - General (Internal Medicine)  Ashish Smalls MD as Consulting Physician (Nephrology)      Provider:  Adalid Sterling MD  Patient Name: Rafael Mccann  :  1975    SUBJECTIVE:  F/u LINSEY  No chest pain or SOA   Diuresing well    Medication:    allopurinol 300 mg Oral Daily   amiodarone 200 mg Oral BID With Meals   aspirin 325 mg Oral Daily   atorvastatin 40 mg Oral Nightly   bisacodyl 10 mg Rectal Daily   bumetanide 1 mg Oral Daily   carvedilol 6.25 mg Oral BID With Meals   docusate sodium 100 mg Oral BID   enoxaparin 40 mg Subcutaneous Daily   ferrous sulfate 324 mg Oral Daily With Breakfast   folic acid 1 mg Oral Daily   guaiFENesin 600 mg Oral Q12H   insulin lispro 0-9 Units Subcutaneous 4x Daily With Meals & Nightly   ipratropium-albuterol 3 mL Nebulization 4x Daily - RT   lactulose 30 g Oral Daily   levothyroxine 200 mcg Oral Q AM   lidocaine 2 patch Transdermal Q24H   metFORMIN 500 mg Oral BID With Meals   montelukast 10 mg Oral Nightly   pantoprazole 40 mg Oral Q AM   polyethylene glycol 17 g Oral BID   senna 1 tablet Oral BID       sodium chloride 30 mL/hr Last Rate: 30 mL/hr (19 1430)       OBJECTIVE    Vital Sign Min/Max for last 24 hours  Temp  Min: 97.5 °F (36.4 °C)  Max: 98.4 °F (36.9 °C)   BP  Min: 138/101  Max: 170/99   Pulse  Min: 81  Max: 91   Resp  Min: 18  Max: 20   SpO2  Min: 94 %  Max: 100 %   No data recorded   No data recorded     Flowsheet Rows      First Filed Value   Admission Height  170.2 cm (67\") Documented at 2019   Admission Weight  91.8 kg (202 lb 6.1 oz) Documented at 2019          No intake/output data recorded.  I/O last 3 completed shifts:  In:  [P.O.:]  Out: -     Physical Exam:  General Appearance: alert, appears stated age and " cooperative. LIP edema  Head: normocephalic, without obvious abnormality and atraumatic  Eyes: conjunctivae and sclerae normal and no icterus  Neck: supple  Lungs: CTA bilaterally  Heart: regular rhythm & normal rate and normal S1, S2 +WALLY  Chest: S/P SURGICAL CHANGES  Abdomen: soft, NT  Extremities: moves extremities well, +TRACE PRETIBIAL EDEMA BILAT, no cyanosis and no redness  Skin: no bleeding, bruising or rash, turgor normal, color normal and no lesions noted  Neurologic: Alert, and oriented. No focal deficits    Labs:    WBC WBC   Date Value Ref Range Status   01/05/2020 8.60 3.40 - 10.80 10*3/mm3 Final   01/04/2020 7.00 3.40 - 10.80 10*3/mm3 Final   01/03/2020 6.00 3.40 - 10.80 10*3/mm3 Final      HGB Hemoglobin   Date Value Ref Range Status   01/05/2020 10.8 (L) 12.0 - 15.9 g/dL Final   01/04/2020 10.1 (L) 12.0 - 15.9 g/dL Final   01/03/2020 9.5 (L) 12.0 - 15.9 g/dL Final      HCT Hematocrit   Date Value Ref Range Status   01/05/2020 31.1 (L) 34.0 - 46.6 % Final   01/04/2020 29.2 (L) 34.0 - 46.6 % Final   01/03/2020 27.5 (L) 34.0 - 46.6 % Final      Platlets No results found for: LABPLAT   MCV MCV   Date Value Ref Range Status   01/05/2020 90.5 79.0 - 97.0 fL Final   01/04/2020 91.7 79.0 - 97.0 fL Final   01/03/2020 90.6 79.0 - 97.0 fL Final          Sodium Sodium   Date Value Ref Range Status   01/05/2020 140 136 - 145 mmol/L Final   01/04/2020 141 136 - 145 mmol/L Final   01/03/2020 138 136 - 145 mmol/L Final      Potassium Potassium   Date Value Ref Range Status   01/05/2020 3.3 (L) 3.5 - 5.2 mmol/L Final   01/04/2020 4.0 3.5 - 5.2 mmol/L Final   01/03/2020 3.7 3.5 - 5.2 mmol/L Final   01/03/2020 3.3 (L) 3.5 - 5.2 mmol/L Final      Chloride Chloride   Date Value Ref Range Status   01/05/2020 99 98 - 107 mmol/L Final   01/04/2020 99 98 - 107 mmol/L Final   01/03/2020 95 (L) 98 - 107 mmol/L Final      CO2 CO2   Date Value Ref Range Status   01/05/2020 28.0 22.0 - 29.0 mmol/L Final   01/04/2020 31.0 (H)  22.0 - 29.0 mmol/L Final   01/03/2020 30.0 (H) 22.0 - 29.0 mmol/L Final      BUN BUN   Date Value Ref Range Status   01/05/2020 12 6 - 20 mg/dL Final   01/04/2020 10 6 - 20 mg/dL Final   01/03/2020 9 6 - 20 mg/dL Final      Creatinine Creatinine   Date Value Ref Range Status   01/05/2020 1.09 (H) 0.57 - 1.00 mg/dL Final   01/04/2020 1.01 (H) 0.57 - 1.00 mg/dL Final   01/03/2020 1.15 (H) 0.57 - 1.00 mg/dL Final      Calcium Calcium   Date Value Ref Range Status   01/05/2020 9.6 8.6 - 10.5 mg/dL Final   01/04/2020 9.3 8.6 - 10.5 mg/dL Final   01/03/2020 8.6 8.6 - 10.5 mg/dL Final      PO4 No components found for: PO4   Albumin Albumin   Date Value Ref Range Status   01/05/2020 4.00 3.50 - 5.20 g/dL Final   01/04/2020 3.90 3.50 - 5.20 g/dL Final   01/03/2020 3.60 3.50 - 5.20 g/dL Final      Magnesium No results found for: MG   Uric Acid No components found for: URIC ACID     Imaging Results (Last 72 Hours)     ** No results found for the last 72 hours. **          Results for orders placed during the hospital encounter of 12/22/19   XR Chest 1 View    Narrative DATE OF EXAM:  1/1/2020 10:53 AM     PROCEDURE:  XR CHEST 1 VW-     INDICATIONS:  right sided sharp pain below ribs; R07.9-Chest pain, unspecified;  I35.1-Nonrheumatic aortic (valve) insufficiency; I42.0-Dilated  cardiomyopathy; Z95.810-Presence of automatic (implantable) cardiac  defibrillator; I20.0-Unstable angina; I25.119-Atherosclerotic heart  disease of native coronary artery with unspecified angina pectoris;  I35.1-Nonrheumatic aortic (valve) insufficiency; J43.9-Emphysema,      COMPARISON:  12/31/2019     TECHNIQUE:   Single radiographic AP view of the chest was obtained.     FINDINGS:  There is a right internal jugular Orlando-Brad sheath in place with the tip  in the superior vena cava. There is a left subclavian pacemaker  device/AICD device with leads overlying the right atrium and right  ventricle. The heart is enlarged with changes of CABG. There  are  bilateral pleural effusions left greater than right with bilateral  basilar airspace disease. Aeration is slightly worse in the interval.        Impression Mild worsening of bilateral basilar infiltrates with moderate left and  small-to-moderate right pleural effusions.     Electronically Signed By-Chidi Walton On:1/1/2020 11:01 AM  This report was finalized on [id number] by  Chidi Walton, .   XR Chest 1 View    Narrative DATE OF EXAM:  12/31/2019 10:48 AM     PROCEDURE:  XR CHEST 1 VW-     INDICATIONS:  Hypoxia; R07.9-Chest pain, unspecified; I35.1-Nonrheumatic aortic  (valve) insufficiency; I42.0-Dilated cardiomyopathy; Z95.810-Presence of  automatic (implantable) cardiac defibrillator; I20.0-Unstable angina;  I25.119-Atherosclerotic heart disease of native coronary artery with  unspecified angina pectoris; I35.1-Nonrheumatic aortic (valve)  insufficiency; J43.9-Emphysema, unspecified patient's a 44-year-old  female with hypoxia history of open heart surgery on December 26.  History of aortic regurg, former smoker     COMPARISON:  Comparison is made to study of 12/29/2019     TECHNIQUE:   Single radiographic AP view of the chest was obtained.     FINDINGS:  Today's portable erect study was obtained on 12/31/2019 at 10:50 AM.     Today's study demonstrates the left basilar opacity remaining stable  there is increasing right basilar opacity consistent with increasing  atelectasis and probable small pleural effusion. The right internal  jugular vascular sheath remains in good position the left-sided  dual-lead pacemaker defibrillator remains in good position changes of  median sternotomy coronary artery bypass grafting and aortic valvular  replacement are again seen.        Impression    1. Mild interval worsening from study of December 29  2. Increasing right basilar opacities felt to represent increasing  atelectasis  3. Stable moderate left basilar opacity consistent with atelectasis,  pneumonia and/or  pleural effusion     Electronically Signed By-Ashwin Beavers Jr. On:12/31/2019 11:53 AM  This report was finalized on [id number] by  Ashwin Beavers Jr., .   XR Chest 1 View    Narrative DATE OF EXAM:  12/29/2019 1:02 PM     PROCEDURE:  XR CHEST 1 VW-     INDICATIONS:  CT removal; R07.9-Chest pain, unspecified; I35.1-Nonrheumatic aortic  (valve) insufficiency; I42.0-Dilated cardiomyopathy; Z95.810-Presence of  automatic (implantable) cardiac defibrillator; I20.0-Unstable angina;  I25.119-Atherosclerotic heart disease of native coronary artery with  unspecified angina pectoris; I35.1-Nonrheumatic aortic (valve)  insufficiency patient's a 44-year-old female status post chest tube  removal     COMPARISON:  Study of 5:01 AM earlier today     TECHNIQUE:   Single radiographic AP view of the chest was obtained.     FINDINGS:  Today's portable erect study was obtained on 12/29/2019 at 12:52 PM     Today's follow-up study demonstrates the right internal jugular sheath  being in place. The mediastinal drains have been removed there is no  evidence of pneumomediastinum or pneumothorax on today's study. Moderate  opacity in the left lung base continues either representing atelectasis  or pneumonia. Mild right basilar atelectasis is seen. A left-sided  dual-lead pacemaker defibrillator is seen. Changes of median sternotomy  and coronary artery bypass grafting are present. The upper abdomen  appears unremarkable.        Impression    1. No significant change from earlier than the day other than removal of  mediastinal drains  2. Bilateral basilar opacities left greater than right felt to represent  atelectasis and/or infiltrate, possible pneumonia on the left,  atelectasis right base.  3. Right internal jugular sheath remains intact with tip in superior  vena cava, left-sided dual-lead pacemaker defibrillator appears  unchanged as are changes of CABG.     Electronically Signed By-Ashwin Beavers Jr. On:12/29/2019 1:16 PM  This report  was finalized on [id number] by  Ashwin Beavers Jr., .       Results for orders placed during the hospital encounter of 12/22/19   Duplex Venous Lower Extremity - Bilateral CAR    Narrative · Normal bilateral lower extremity venous duplex scan.            ASSESSMENT / PLAN      Chest pain    COPD (chronic obstructive pulmonary disease) (CMS/HCC)    Hypertension    Cardiomyopathy, dilated (CMS/HCC)    Essential hypertension    Chronic renal impairment    ICD (implantable cardioverter-defibrillator), dual, in situ    GERD without esophagitis    Hypothyroidism (acquired)    Aortic valve regurgitation    Unstable angina pectoris (CMS/HCC)    Coronary artery disease involving native coronary artery of native heart with angina pectoris (CMS/HCC)    Nonrheumatic aortic valve insufficiency      1. ARF/LINSEY/CRF/CKD STG 2------Nonoliguric. ARF/LINSEY resolved. +Mild ARF/LINSEY on top of what appears to be CRF/CKD STG 2 with a baseline serum creatinine of 1.1. Unknown if patient has baseline proteinuria or hematuria. CRF/CKD STG 2 likely secondary to HTN NS/DM and chronic renal hypoperfusion from Cardiomyopathy. +Mild ARF/LINSEY appeared to be secondary to prerenal/hemodynamic fluctuation from MI S/P Cath/IV dye exposure with concomitant ACE-I and diuretic use. Keep off of Zestril for now.   Dose meds for CrCl 30-60 cc/min. No NSAIDs. No further IV dye exposure, unless emergently needed.     2. CAD S/P MI S/P CATH--------CABG done per , CT Surgery.     3. DILATED CARDIOMYOPATHY/VALVULAR HEART DISEASE--------No gross overload at present. Restart little diuretic. Has PM/AICD. Per , Cardiology     4. HTN WITH CKD------BP okay. Avoid hypotension. No ACE/ARB/DRI for now. Temporarily holding diuretics     5. HYPERURICEMIA---------On Allopurinol.  Uric normal     6. HYPERLIPIDEMIA------On Statin.       7. GERD-------On H2 blocker     8. DMII------Resumed metformin. BS okay. On Glucometers, SSI     9. VITAMIN D  DEFICIENCY     10. DM PERIPHERAL NEUROPATHY-------On Neurontin     11. HYPOTHYROIDISM------Increased Synthroid as TSH up      Creatinine stable, diuresing well  BP fair, on coreg now      Adalid Sterling MD  [facility]  [phone]  01/05/20  7:31 AM

## 2020-01-05 NOTE — PROGRESS NOTES
S/P AVR  Looks much better, no C/O  Incisions clean  Ready for Rehab. She may be ready for home with nurse if no acceptance to rehab soon

## 2020-01-06 ENCOUNTER — APPOINTMENT (OUTPATIENT)
Dept: GENERAL RADIOLOGY | Facility: HOSPITAL | Age: 45
End: 2020-01-06

## 2020-01-06 LAB
ALBUMIN SERPL-MCNC: 4.2 G/DL (ref 3.5–5.2)
ANION GAP SERPL CALCULATED.3IONS-SCNC: 13 MMOL/L (ref 5–15)
BUN BLD-MCNC: 16 MG/DL (ref 6–20)
BUN/CREAT SERPL: 14 (ref 7–25)
CALCIUM SPEC-SCNC: 9.2 MG/DL (ref 8.6–10.5)
CHLORIDE SERPL-SCNC: 99 MMOL/L (ref 98–107)
CO2 SERPL-SCNC: 25 MMOL/L (ref 22–29)
CREAT BLD-MCNC: 1.14 MG/DL (ref 0.57–1)
DEPRECATED RDW RBC AUTO: 46.4 FL (ref 37–54)
ERYTHROCYTE [DISTWIDTH] IN BLOOD BY AUTOMATED COUNT: 14.3 % (ref 12.3–15.4)
GFR SERPL CREATININE-BSD FRML MDRD: 62 ML/MIN/1.73
GLUCOSE BLD-MCNC: 111 MG/DL (ref 65–99)
GLUCOSE BLDC GLUCOMTR-MCNC: 105 MG/DL (ref 70–105)
GLUCOSE BLDC GLUCOMTR-MCNC: 116 MG/DL (ref 70–105)
GLUCOSE BLDC GLUCOMTR-MCNC: 128 MG/DL (ref 70–105)
GLUCOSE BLDC GLUCOMTR-MCNC: 84 MG/DL (ref 70–105)
HCT VFR BLD AUTO: 31.4 % (ref 34–46.6)
HGB BLD-MCNC: 10.9 G/DL (ref 12–15.9)
MCH RBC QN AUTO: 31.7 PG (ref 26.6–33)
MCHC RBC AUTO-ENTMCNC: 34.5 G/DL (ref 31.5–35.7)
MCV RBC AUTO: 91.9 FL (ref 79–97)
PHOSPHATE SERPL-MCNC: 3.9 MG/DL (ref 2.5–4.5)
PLATELET # BLD AUTO: 421 10*3/MM3 (ref 140–450)
PMV BLD AUTO: 6.4 FL (ref 6–12)
POTASSIUM BLD-SCNC: 3.8 MMOL/L (ref 3.5–5.2)
RBC # BLD AUTO: 3.42 10*6/MM3 (ref 3.77–5.28)
SODIUM BLD-SCNC: 137 MMOL/L (ref 136–145)
WBC NRBC COR # BLD: 10 10*3/MM3 (ref 3.4–10.8)

## 2020-01-06 PROCEDURE — 97535 SELF CARE MNGMENT TRAINING: CPT

## 2020-01-06 PROCEDURE — 97116 GAIT TRAINING THERAPY: CPT

## 2020-01-06 PROCEDURE — 97530 THERAPEUTIC ACTIVITIES: CPT

## 2020-01-06 PROCEDURE — 85027 COMPLETE CBC AUTOMATED: CPT | Performed by: THORACIC SURGERY (CARDIOTHORACIC VASCULAR SURGERY)

## 2020-01-06 PROCEDURE — 99024 POSTOP FOLLOW-UP VISIT: CPT | Performed by: NURSE PRACTITIONER

## 2020-01-06 PROCEDURE — 94799 UNLISTED PULMONARY SVC/PX: CPT

## 2020-01-06 PROCEDURE — 82962 GLUCOSE BLOOD TEST: CPT

## 2020-01-06 PROCEDURE — 99232 SBSQ HOSP IP/OBS MODERATE 35: CPT | Performed by: INTERNAL MEDICINE

## 2020-01-06 PROCEDURE — 80069 RENAL FUNCTION PANEL: CPT | Performed by: INTERNAL MEDICINE

## 2020-01-06 PROCEDURE — 25010000002 ENOXAPARIN PER 10 MG: Performed by: THORACIC SURGERY (CARDIOTHORACIC VASCULAR SURGERY)

## 2020-01-06 PROCEDURE — 71046 X-RAY EXAM CHEST 2 VIEWS: CPT

## 2020-01-06 RX ORDER — OXYCODONE HYDROCHLORIDE 5 MG/1
5 TABLET ORAL EVERY 6 HOURS PRN
Status: DISCONTINUED | OUTPATIENT
Start: 2020-01-06 | End: 2020-01-08 | Stop reason: HOSPADM

## 2020-01-06 RX ORDER — CARVEDILOL 6.25 MG/1
12.5 TABLET ORAL 2 TIMES DAILY WITH MEALS
Status: DISCONTINUED | OUTPATIENT
Start: 2020-01-06 | End: 2020-01-08 | Stop reason: HOSPADM

## 2020-01-06 RX ORDER — AMIODARONE HYDROCHLORIDE 200 MG/1
200 TABLET ORAL
Status: DISCONTINUED | OUTPATIENT
Start: 2020-01-07 | End: 2020-01-08 | Stop reason: HOSPADM

## 2020-01-06 RX ADMIN — FOLIC ACID 1 MG: 1 TABLET ORAL at 09:46

## 2020-01-06 RX ADMIN — IPRATROPIUM BROMIDE AND ALBUTEROL SULFATE 3 ML: .5; 3 SOLUTION RESPIRATORY (INHALATION) at 14:38

## 2020-01-06 RX ADMIN — GUAIFENESIN 600 MG: 600 TABLET, EXTENDED RELEASE ORAL at 09:45

## 2020-01-06 RX ADMIN — CARVEDILOL 12.5 MG: 6.25 TABLET, FILM COATED ORAL at 17:58

## 2020-01-06 RX ADMIN — DOCUSATE SODIUM 100 MG: 100 CAPSULE, LIQUID FILLED ORAL at 22:05

## 2020-01-06 RX ADMIN — IPRATROPIUM BROMIDE AND ALBUTEROL SULFATE 3 ML: .5; 3 SOLUTION RESPIRATORY (INHALATION) at 11:27

## 2020-01-06 RX ADMIN — ATORVASTATIN CALCIUM 40 MG: 40 TABLET, FILM COATED ORAL at 22:06

## 2020-01-06 RX ADMIN — ENOXAPARIN SODIUM 40 MG: 40 INJECTION SUBCUTANEOUS at 22:05

## 2020-01-06 RX ADMIN — PANTOPRAZOLE SODIUM 40 MG: 40 TABLET, DELAYED RELEASE ORAL at 06:38

## 2020-01-06 RX ADMIN — IPRATROPIUM BROMIDE AND ALBUTEROL SULFATE 3 ML: .5; 3 SOLUTION RESPIRATORY (INHALATION) at 04:34

## 2020-01-06 RX ADMIN — AMIODARONE HYDROCHLORIDE 200 MG: 200 TABLET ORAL at 09:46

## 2020-01-06 RX ADMIN — BUMETANIDE 1 MG: 1 TABLET ORAL at 09:46

## 2020-01-06 RX ADMIN — GUAIFENESIN 600 MG: 600 TABLET, EXTENDED RELEASE ORAL at 22:06

## 2020-01-06 RX ADMIN — AMIODARONE HYDROCHLORIDE 200 MG: 200 TABLET ORAL at 17:58

## 2020-01-06 RX ADMIN — ASPIRIN 325 MG ORAL TABLET 325 MG: 325 PILL ORAL at 09:46

## 2020-01-06 RX ADMIN — SENNOSIDES 1 TABLET: 8.6 TABLET, FILM COATED ORAL at 22:06

## 2020-01-06 RX ADMIN — METFORMIN HYDROCHLORIDE 500 MG: 500 TABLET ORAL at 17:58

## 2020-01-06 RX ADMIN — METFORMIN HYDROCHLORIDE 500 MG: 500 TABLET ORAL at 09:46

## 2020-01-06 RX ADMIN — FERROUS SULFATE TAB EC 324 MG (65 MG FE EQUIVALENT) 324 MG: 324 (65 FE) TABLET DELAYED RESPONSE at 09:46

## 2020-01-06 RX ADMIN — OXYCODONE HYDROCHLORIDE 5 MG: 5 TABLET ORAL at 02:52

## 2020-01-06 RX ADMIN — ALPRAZOLAM 0.25 MG: 0.25 TABLET ORAL at 22:10

## 2020-01-06 RX ADMIN — ALPRAZOLAM 0.25 MG: 0.25 TABLET ORAL at 02:53

## 2020-01-06 RX ADMIN — LEVOTHYROXINE SODIUM 200 MCG: 200 TABLET ORAL at 06:38

## 2020-01-06 RX ADMIN — CARVEDILOL 6.25 MG: 6.25 TABLET, FILM COATED ORAL at 09:45

## 2020-01-06 RX ADMIN — MONTELUKAST SODIUM 10 MG: 10 TABLET, FILM COATED ORAL at 22:05

## 2020-01-06 RX ADMIN — IPRATROPIUM BROMIDE AND ALBUTEROL SULFATE 3 ML: .5; 3 SOLUTION RESPIRATORY (INHALATION) at 20:13

## 2020-01-06 RX ADMIN — IPRATROPIUM BROMIDE AND ALBUTEROL SULFATE 3 ML: .5; 3 SOLUTION RESPIRATORY (INHALATION) at 07:16

## 2020-01-06 RX ADMIN — ALLOPURINOL 300 MG: 300 TABLET ORAL at 09:46

## 2020-01-06 RX ADMIN — DOCUSATE SODIUM 100 MG: 100 CAPSULE, LIQUID FILLED ORAL at 09:45

## 2020-01-06 NOTE — PROGRESS NOTES
Postop Day #10-- CABGX3/AVR-- [place]  EF 35% (echo)    Subjective:  Patient up to chair. Anxious for discharge. PT recommends rehab. Awaiting precert for rehab    Drips: None    Intake/Output Summary (Last 24 hours) at 1/6/2020 1102  Last data filed at 1/5/2020 2100  Gross per 24 hour   Intake 1220 ml   Output --   Net 1220 ml     Temp:  [97.3 °F (36.3 °C)-98.5 °F (36.9 °C)] 98.2 °F (36.8 °C)  Heart Rate:  [74-83] 83  Resp:  [16-18] 16  BP: (140-160)/(90-92) 160/92    Results from last 7 days   Lab Units 01/06/20  0439 01/05/20  0446   WBC 10*3/mm3 10.00 8.60   HEMOGLOBIN g/dL 10.9* 10.8*   HEMATOCRIT % 31.4* 31.1*   PLATELETS 10*3/mm3 421 381     Results from last 7 days   Lab Units 01/06/20  0439  01/02/20  0558   CREATININE mg/dL 1.14*   < > 1.00   POTASSIUM mmol/L 3.8   < > 3.6   SODIUM mmol/L 137   < > 138   MAGNESIUM mg/dL  --   --  2.2   PHOSPHORUS mg/dL 3.9   < > 4.0    < > = values in this interval not displayed.       Physical Exam:  Neuro intact, nad, up to chair  Tele:  SR 80's  Diminished bases, 96% room air  Sternal incision healing well  Benign abd, + BM  No edema    Assessment/Plan:  Principal Problem:    Chest pain  Active Problems:    COPD (chronic obstructive pulmonary disease) (CMS/HCC)    Hypertension    Cardiomyopathy, dilated (CMS/HCC)    Essential hypertension    Chronic renal impairment    ICD (implantable cardioverter-defibrillator), dual, in situ    GERD without esophagitis    Hypothyroidism (acquired)    Aortic valve regurgitation    Unstable angina pectoris (CMS/HCC)    Coronary artery disease involving native coronary artery of native heart with angina pectoris (CMS/HCC)    Nonrheumatic aortic valve insufficiency    Aortic regurgitation s/p AVR (tissue)-- aspirin  CAD s/p CABG -- aspirin, statin, BB  Postop hypotension-- stable  Postop expected ELLEN-- 1 PRBC 1/2  HFrEF (EF35%)-- maximize meds prior to discharge  AICD placement-- Biotronik  CKD stage 2-- nephrology following  HTN--  stable  DM II, last A1c 6.2-- SSI  Hypothyroidism-- Synthroid  Anxiety-- Xanax  Chronic pain-- sees [facility]    Routine care  Mobilize  Increase BB dosage  Awaiting precert for rehab    TORRES WALTERELIUD, APRN  1/6/2020  11:02 AM     Neuro intact.  CV stable, increasing B Blocker.  Pulm toileting, mobilize.  PO as tolerated.  Low dose diuretic.  Transfer to rehab when arrangements complete.

## 2020-01-06 NOTE — PAYOR COMM NOTE
"Updated clinical review for extension request.        RETURN CONTACT  NORIS CIFUENTES RN   [facility]   [facility] /    PH: [phone]  FX: [phone]    Demario Calvin Mccann (44 y.o. Female)     Date of Birth Social Security Number Address Home Phone MRN    1975  [address] [place] [address] [phone] [id number]    Sabianism Marital Status          Anabaptism        Admission Date Admission Type Admitting Provider Attending Provider Department, Room/Bed    12/22/19 Emergency [address] Stock MD Khan, Ahmad Aftab, [facility]    Discharge Date Discharge Disposition Discharge Destination                       Attending Provider:  Lane Stock MD    Allergies:  Hydrocodone, Penicillin G    Isolation:  None   Infection:  None   Code Status:  CPR    Ht:  170.2 cm (67\")   Wt:  89 kg (196 lb 3.2 oz)    Admission Cmt:  None   Principal Problem:  Chest pain [R07.9]                 Active Insurance as of 12/22/2019     Primary Coverage     Payor Plan Insurance Group Employer/Plan Group    ANTHEM BLUE CROSS ANTHEM BLUE CROSS BLUE SHIELD PPO [id number]     Payor Plan Address Payor Plan Phone Number Payor Plan Fax Number Effective Dates    [address] [phone]  5/6/2018 - None Entered    [facility] [id number]       Subscriber Name Subscriber Birth Date Member ID       CATRACHO MCCANN 11/7/1970 [id number]           Secondary Coverage     Payor Plan Insurance Group Employer/Plan Group    ANTHEM MEDICARE REPLACEMENT ANTHEM MEDICARE ADVANTAGE [id number]     Payor Plan Address Payor Plan Phone Number Payor Plan Fax Number Effective Dates    [address] [phone]  1/1/2019 - None Entered    [facility] [id number]       Subscriber Name Subscriber Birth Date Member ID       DEMARIO CALVIN MCCANN 1975 [id number]                 Emergency Contacts      (Rel.) Home Phone Work Phone Mobile Phone    demario, " celio (Daughter) -- -- [phone]               Physician Progress Notes (last 24 hours) (Notes from 01/05/20 1026 through 01/06/20 1026)      Ursula Ann APRN at 01/06/20 0941          Cardiology Follow-up      Encounter Date:  12/12/2019    Patient ID:   Rafael Mccann is a 44 y.o. female.        Reason For Followup:  Cardiomyopathy  Hypertension  Hyperlipidemia  Valvular heart disease  Coronary artery disease         Subjective:  Seen and examined.  Chart and labs reviewed.  Patient reports some mild chest soreness, but denies shortness of breath.  She is ambulating.  Hemoglobin has remained stable.  Discussed with CT surgery.    Assessment & Plan    Impressions:  Congestive heart failure  Acute systolic heart failure exacerbation on chronic systolic heart failure  CHF NYHA class IV  Valvular heart disease with a significant aortic insufficiency  Essential hypertension  Chronic systolic heart failure  History of cardiomyopathy   Chronic renal insufficiency  Coronary artery disease with diffuse RCA disease  History of diabetes mellitus type 2  History of thyroid disease  Cardiomyopathy with LV ejection fraction of 35%  s/p AICD placement/normal device function     Plan:  Continuation of her current medical regimen at the present time.  Anticipate discharge to rehab today.  Continue to increase activity as tolerated.  Follow-up our office after rehab        Objective:    Vitals:  Vitals:    01/06/20 0500 01/06/20 0716 01/06/20 0722 01/06/20 0758   BP:       BP Location:       Patient Position:       Pulse:  82 83    Resp:  16     Temp:    98.2 °F (36.8 °C)   TempSrc:    Oral   SpO2:  97%     Weight: 89 kg (196 lb 3.2 oz)      Height:           Physical Exam:    General: Well-developed, well-nourished 44-year-old -American female seen in no acute distress.  Head:  Normocephalic, atraumatic, mucous membranes moist  Eyes:  Extraocular muscles intact  Neck:  Supple,  no bruit  Lungs: Clear  to auscultation bilaterally, few crackles at the bilateral bases  chest wall: Mid line scar with no infection or bleeding  Heart::  Regular rate and rhythm, S1 and S2 normal, 2 x 6 ejection systolic murmur  Abdomen: Soft, nondistended  Extremities: No cyanosis, clubbing, or edema  Pulses: 2+ and symmetric all extremities  Skin:  No rashes or lesions  Neuro/psych: Alert and oriented x3 cranial nerves II through XII are grossly intact    Allergies:  Allergies   Allergen Reactions   • Hydrocodone Hives   • Penicillin G Unknown (See Comments)       Medication Review:     Current Facility-Administered Medications:   •  acetaminophen (TYLENOL) tablet 500 mg, 500 mg, Oral, Q6H PRN, Lane Stock MD, 500 mg at 01/05/20 0142  •  allopurinol (ZYLOPRIM) tablet 300 mg, 300 mg, Oral, Daily, Chidi Pace MD, 300 mg at 01/05/20 0942  •  ALPRAZolam (XANAX) tablet 0.25 mg, 0.25 mg, Oral, BID PRN, Chidi Pace MD, 0.25 mg at 01/06/20 0253  •  amiodarone (PACERONE) tablet 200 mg, 200 mg, Oral, BID With Meals, Lane Stock MD, 200 mg at 01/05/20 1714  •  aspirin tablet 325 mg, 325 mg, Oral, Daily, Lane Stock MD, 325 mg at 01/05/20 0943  •  atorvastatin (LIPITOR) tablet 40 mg, 40 mg, Oral, Nightly, Chidi Pace MD, 40 mg at 01/05/20 2041  •  bisacodyl (DULCOLAX) suppository 10 mg, 10 mg, Rectal, Daily, Kayla Burrell APRN, 10 mg at 01/02/20 1000  •  bumetanide (BUMEX) tablet 1 mg, 1 mg, Oral, Daily, Adalid Sterling MD, 1 mg at 01/05/20 0942  •  carvedilol (COREG) tablet 6.25 mg, 6.25 mg, Oral, BID With Meals, Adalid Sterling MD, 6.25 mg at 01/05/20 1714  •  dextrose (D50W) 25 g/ 50mL Intravenous Solution 25 g, 25 g, Intravenous, Q15 Min PRN, Kayla Burrell APRN  •  dextrose (GLUTOSE) oral gel 15 g, 15 g, Oral, Q15 Min PRN, Kayla Burrell APRN  •  docusate sodium (COLACE) capsule 100 mg, 100 mg, Oral, BID, Chidi Pace MD, 100 mg at 01/05/20 2041  •  enoxaparin (LOVENOX) syringe  40 mg, 40 mg, Subcutaneous, Daily, Chidi Pace MD, 40 mg at 01/05/20 2041  •  ferrous sulfate EC tablet 324 mg, 324 mg, Oral, Daily With Breakfast, Chidi Pace MD, 324 mg at 01/05/20 0942  •  folic acid (FOLVITE) tablet 1 mg, 1 mg, Oral, Daily, Chidi Pace MD, 1 mg at 01/05/20 0942  •  glucagon (human recombinant) (GLUCAGEN DIAGNOSTIC) injection 1 mg, 1 mg, Subcutaneous, Q15 Min PRN, Kalya Burrell, ALEX  •  guaiFENesin (MUCINEX) 12 hr tablet 600 mg, 600 mg, Oral, Q12H, Hasmukh David MD, 600 mg at 01/05/20 2041  •  insulin lispro (humaLOG) injection 0-9 Units, 0-9 Units, Subcutaneous, 4x Daily With Meals & Nightly **AND** insulin lispro (humaLOG) injection 0-9 Units, 0-9 Units, Subcutaneous, PRN, Kayla Burrell, ALEX  •  ipratropium-albuterol (DUO-NEB) nebulizer solution 3 mL, 3 mL, Nebulization, 4x Daily - RT, Loraine Son APRN, 3 mL at 01/06/20 0716  •  ipratropium-albuterol (DUO-NEB) nebulizer solution 3 mL, 3 mL, Nebulization, Q4H PRN, Loraine Son APRN, 3 mL at 01/06/20 0434  •  lactulose (CHRONULAC) 10 GM/15ML solution 30 g, 30 g, Oral, Daily, Kayla Burrell APRN, 30 g at 01/02/20 0950  •  levothyroxine (SYNTHROID, LEVOTHROID) tablet 200 mcg, 200 mcg, Oral, Q AM, Chidi Pace MD, 200 mcg at 01/06/20 0638  •  lidocaine (LIDODERM) 5 % 2 patch, 2 patch, Transdermal, Q24H, Hasmukh David MD, 2 patch at 01/04/20 0944  •  metFORMIN (GLUCOPHAGE) tablet 500 mg, 500 mg, Oral, BID With Meals, Adalid Sterling MD, 500 mg at 01/05/20 1714  •  montelukast (SINGULAIR) tablet 10 mg, 10 mg, Oral, Nightly, Chidi Pace MD, 10 mg at 01/05/20 2041  •  MOUTH KOTE solution 2 mL, 2 spray, Mouth/Throat, Q4H PRN, Chidi Pace MD  •  naloxone (NARCAN) injection 0.4 mg, 0.4 mg, Intravenous, Q5 Min PRN, Lane Stock MD  •  ondansetron (ZOFRAN) injection 4 mg, 4 mg, Intravenous, Q6H PRN, Chidi Pace MD, 4 mg at 01/02/20 2125  •  oxyCODONE (ROXICODONE)  immediate release tablet 5 mg, 5 mg, Oral, Q4H PRN, Chidi Pace MD, 5 mg at 01/06/20 0252  •  pantoprazole (PROTONIX) EC tablet 40 mg, 40 mg, Oral, Q AM, Chidi Pace MD, 40 mg at 01/06/20 0638  •  polyethylene glycol (MIRALAX) powder 17 g, 17 g, Oral, BID, Chidi Pace MD, 17 g at 01/02/20 0949  •  potassium chloride (K-DUR,KLOR-CON) CR tablet 20 mEq, 20 mEq, Oral, PRN, 20 mEq at 01/05/20 0942 **OR** potassium chloride (KLOR-CON) packet 20 mEq, 20 mEq, Oral, PRN, Chidi Pace MD  •  potassium chloride 10 mEq in 100 mL IVPB, 10 mEq, Intravenous, Q1H PRN **OR** potassium chloride 10 mEq in 100 mL IVPB, 10 mEq, Intravenous, Q1H PRN, Chidi Pace MD  •  senna (SENOKOT) tablet 1 tablet, 1 tablet, Oral, BID, Chidi Pace MD, 1 tablet at 01/05/20 2041  •  sodium chloride 0.9 % infusion, 30 mL/hr, Intravenous, Continuous, Chidi Pace MD, Last Rate: 30 mL/hr at 12/27/19 1430, 30 mL/hr at 12/27/19 1430    Family History:  Family History   Problem Relation Age of Onset   • Heart disease Father        Past Medical History:  Past Medical History:   Diagnosis Date   • CHF (congestive heart failure) (CMS/[facility])    • COPD (chronic obstructive pulmonary disease) (CMS/HCC)    • Diabetes (CMS/[facility])    • Hyperlipidemia    • Hypertension        Past surgical History:  Past Surgical History:   Procedure Laterality Date   • AORTIC VALVE REPAIR/REPLACEMENT N/A 12/27/2019    Procedure: AORTIC VALVE REPAIR/REPLACEMENT;  Surgeon: Lane Stock MD;  Location: [facility];  Service: Cardiothoracic   • BREAST LUMPECTOMY     • CARDIAC CATHETERIZATION N/A 12/24/2019    Procedure: Right and Left Heart Cath 12/24/19 @ 0900;  Surgeon: Wayne Luna MD;  Location: [facility] CATH INVASIVE LOCATION;  Service: Cardiovascular   • CARDIAC CATHETERIZATION N/A 12/24/2019    Procedure: Coronary angiography;  Surgeon: Wayne Luna MD;  Location: [facility] CATH INVASIVE LOCATION;  Service:  Cardiovascular   • CARDIAC ELECTROPHYSIOLOGY PROCEDURE Left 2019    Procedure: Dual-chamber ICD insertion;  Surgeon: Héctor Eckert MD;  Location: [facility] CATH INVASIVE LOCATION;  Service: Cardiovascular   • CARDIAC ELECTROPHYSIOLOGY PROCEDURE Left 2019    Procedure: Lead Revision;  Surgeon: Héctor Eckert MD;  Location: [facility] CATH INVASIVE LOCATION;  Service: Cardiovascular   • CHOLECYSTECTOMY     • CORONARY ARTERY BYPASS GRAFT N/A 2019    Procedure: CORONARY ARTERY BYPASS GRAFTING;  Surgeon: Lane Stock MD;  Location: [facility] CVOR;  Service: Cardiothoracic   • HYSTERECTOMY     • INSERT / REPLACE / REMOVE PACEMAKER     • THYROID SURGERY         Social History:  Social History     Socioeconomic History   • Marital status:      Spouse name: Not on file   • Number of children: Not on file   • Years of education: Not on file   • Highest education level: Not on file   Tobacco Use   • Smoking status: Former Smoker     Last attempt to quit: 2019     Years since quittin.0   • Smokeless tobacco: Never Used   Substance and Sexual Activity   • Alcohol use: No     Frequency: Never   • Drug use: No   • Sexual activity: Defer       Review of Systems:  The following systems were reviewed as they relate to the cardiovascular system: Constitutional, Eyes, ENT, Cardiovascular, Respiratory, Gastrointestinal, Integumentary, Neurological, Psychiatric, Hematologic, Endocrine, Musculoskeletal, and Genitourinary. The pertinent cardiovascular findings are reported above with all other cardiovascular points within those systems being negative.        NOTE: The following portions of the patient's history were reviewed and updated this visit as appropriate: allergies, current medications, past family history, past medical history, past social history, past surgical history and problem list.        Electronically signed by ALEX Hua, 20, 9:41 AM.      Electronically signed by  "Ursula Ann APRN at 20 0952     Ashish Smalls MD at 20 0652          NEPHROLOGY PROGRESS NOTE------[facility]    [facility]  [phone]  Anette Smalls MD      Patient Care Team:  Phani Adames MD as PCP - General (Internal Medicine)  Ashish Smalls MD as Consulting Physician (Nephrology)      Provider:  Anette Smalls MD  Patient Name: Rafael Mccann  :  1975    SUBJECTIVE:    No SOB, CP, dysuria, palpitations. Awaiting rehab placement    Medication:    allopurinol 300 mg Oral Daily   amiodarone 200 mg Oral BID With Meals   aspirin 325 mg Oral Daily   atorvastatin 40 mg Oral Nightly   bisacodyl 10 mg Rectal Daily   bumetanide 1 mg Oral Daily   carvedilol 6.25 mg Oral BID With Meals   docusate sodium 100 mg Oral BID   enoxaparin 40 mg Subcutaneous Daily   ferrous sulfate 324 mg Oral Daily With Breakfast   folic acid 1 mg Oral Daily   guaiFENesin 600 mg Oral Q12H   insulin lispro 0-9 Units Subcutaneous 4x Daily With Meals & Nightly   ipratropium-albuterol 3 mL Nebulization 4x Daily - RT   lactulose 30 g Oral Daily   levothyroxine 200 mcg Oral Q AM   lidocaine 2 patch Transdermal Q24H   metFORMIN 500 mg Oral BID With Meals   montelukast 10 mg Oral Nightly   pantoprazole 40 mg Oral Q AM   polyethylene glycol 17 g Oral BID   senna 1 tablet Oral BID       sodium chloride 30 mL/hr Last Rate: 30 mL/hr (19 1430)       OBJECTIVE    Vital Sign Min/Max for last 24 hours  Temp  Min: 97.3 °F (36.3 °C)  Max: 98.5 °F (36.9 °C)   BP  Min: 140/90  Max: 160/92   Pulse  Min: 74  Max: 85   Resp  Min: 18  Max: 18   SpO2  Min: 93 %  Max: 100 %   No data recorded   Weight  Min: 89 kg (196 lb 3.2 oz)  Max: 89 kg (196 lb 3.2 oz)     Flowsheet Rows      First Filed Value   Admission Height  170.2 cm (67\") Documented at 2019 2250   Admission Weight  91.8 kg (202 lb 6.1 oz) Documented at 2019    "       I/O this shift:  In: 240 [P.O.:240]  Out: -   I/O last 3 completed shifts:  In: 2480 [P.O.:2480]  Out: -     Physical Exam:  General Appearance: alert, appears stated age and cooperative. LIP edema  Head: normocephalic, without obvious abnormality and atraumatic  Eyes: conjunctivae and sclerae normal and no icterus  Neck: supple  Lungs: CTA bilaterally  Heart: regular rhythm & normal rate and normal S1, S2 +WALLY  Chest: S/P SURGICAL CHANGES  Abdomen: soft, NT  Extremities: moves extremities well, +TRACE PRETIBIAL EDEMA BILAT, no cyanosis and no redness  Skin: no bleeding, bruising or rash, turgor normal, color normal and no lesions noted  Neurologic: Alert, and oriented. No focal deficits    Labs:    WBC WBC   Date Value Ref Range Status   01/06/2020 10.00 3.40 - 10.80 10*3/mm3 Final   01/05/2020 8.60 3.40 - 10.80 10*3/mm3 Final   01/04/2020 7.00 3.40 - 10.80 10*3/mm3 Final      HGB Hemoglobin   Date Value Ref Range Status   01/06/2020 10.9 (L) 12.0 - 15.9 g/dL Final   01/05/2020 10.8 (L) 12.0 - 15.9 g/dL Final   01/04/2020 10.1 (L) 12.0 - 15.9 g/dL Final      HCT Hematocrit   Date Value Ref Range Status   01/06/2020 31.4 (L) 34.0 - 46.6 % Final   01/05/2020 31.1 (L) 34.0 - 46.6 % Final   01/04/2020 29.2 (L) 34.0 - 46.6 % Final      Platlets No results found for: LABPLAT   MCV MCV   Date Value Ref Range Status   01/06/2020 91.9 79.0 - 97.0 fL Final   01/05/2020 90.5 79.0 - 97.0 fL Final   01/04/2020 91.7 79.0 - 97.0 fL Final          Sodium Sodium   Date Value Ref Range Status   01/06/2020 137 136 - 145 mmol/L Final   01/05/2020 140 136 - 145 mmol/L Final   01/04/2020 141 136 - 145 mmol/L Final      Potassium Potassium   Date Value Ref Range Status   01/06/2020 3.8 3.5 - 5.2 mmol/L Final   01/05/2020 4.2 3.5 - 5.2 mmol/L Final   01/05/2020 3.3 (L) 3.5 - 5.2 mmol/L Final   01/04/2020 4.0 3.5 - 5.2 mmol/L Final   01/03/2020 3.7 3.5 - 5.2 mmol/L Final      Chloride Chloride   Date Value Ref Range Status    01/06/2020 99 98 - 107 mmol/L Final   01/05/2020 99 98 - 107 mmol/L Final   01/04/2020 99 98 - 107 mmol/L Final      CO2 CO2   Date Value Ref Range Status   01/06/2020 25.0 22.0 - 29.0 mmol/L Final   01/05/2020 28.0 22.0 - 29.0 mmol/L Final   01/04/2020 31.0 (H) 22.0 - 29.0 mmol/L Final      BUN BUN   Date Value Ref Range Status   01/06/2020 16 6 - 20 mg/dL Final   01/05/2020 12 6 - 20 mg/dL Final   01/04/2020 10 6 - 20 mg/dL Final      Creatinine Creatinine   Date Value Ref Range Status   01/06/2020 1.14 (H) 0.57 - 1.00 mg/dL Final   01/05/2020 1.09 (H) 0.57 - 1.00 mg/dL Final   01/04/2020 1.01 (H) 0.57 - 1.00 mg/dL Final      Calcium Calcium   Date Value Ref Range Status   01/06/2020 9.2 8.6 - 10.5 mg/dL Final   01/05/2020 9.6 8.6 - 10.5 mg/dL Final   01/04/2020 9.3 8.6 - 10.5 mg/dL Final      PO4 No components found for: PO4   Albumin Albumin   Date Value Ref Range Status   01/06/2020 4.20 3.50 - 5.20 g/dL Final   01/05/2020 4.00 3.50 - 5.20 g/dL Final   01/04/2020 3.90 3.50 - 5.20 g/dL Final      Magnesium No results found for: MG   Uric Acid No components found for: URIC ACID     Imaging Results (Last 72 Hours)     ** No results found for the last 72 hours. **          Results for orders placed during the hospital encounter of 12/22/19   XR Chest 1 View    Narrative DATE OF EXAM:  1/1/2020 10:53 AM     PROCEDURE:  XR CHEST 1 VW-     INDICATIONS:  right sided sharp pain below ribs; R07.9-Chest pain, unspecified;  I35.1-Nonrheumatic aortic (valve) insufficiency; I42.0-Dilated  cardiomyopathy; Z95.810-Presence of automatic (implantable) cardiac  defibrillator; I20.0-Unstable angina; I25.119-Atherosclerotic heart  disease of native coronary artery with unspecified angina pectoris;  I35.1-Nonrheumatic aortic (valve) insufficiency; J43.9-Emphysema,      COMPARISON:  12/31/2019     TECHNIQUE:   Single radiographic AP view of the chest was obtained.     FINDINGS:  There is a right internal jugular [place]-Brad sheath in  place with the tip  in the superior vena cava. There is a left subclavian pacemaker  device/AICD device with leads overlying the right atrium and right  ventricle. The heart is enlarged with changes of CABG. There are  bilateral pleural effusions left greater than right with bilateral  basilar airspace disease. Aeration is slightly worse in the interval.        Impression Mild worsening of bilateral basilar infiltrates with moderate left and  small-to-moderate right pleural effusions.     Electronically Signed By-Chidi Walton On:1/1/2020 11:01 AM  This report was finalized on [id number] by  Chidi Walton, .   XR Chest 1 View    Narrative DATE OF EXAM:  12/31/2019 10:48 AM     PROCEDURE:  XR CHEST 1 VW-     INDICATIONS:  Hypoxia; R07.9-Chest pain, unspecified; I35.1-Nonrheumatic aortic  (valve) insufficiency; I42.0-Dilated cardiomyopathy; Z95.810-Presence of  automatic (implantable) cardiac defibrillator; I20.0-Unstable angina;  I25.119-Atherosclerotic heart disease of native coronary artery with  unspecified angina pectoris; I35.1-Nonrheumatic aortic (valve)  insufficiency; J43.9-Emphysema, unspecified patient's a 44-year-old  female with hypoxia history of open heart surgery on December 26.  History of aortic regurg, former smoker     COMPARISON:  Comparison is made to study of 12/29/2019     TECHNIQUE:   Single radiographic AP view of the chest was obtained.     FINDINGS:  Today's portable erect study was obtained on 12/31/2019 at 10:50 AM.     Today's study demonstrates the left basilar opacity remaining stable  there is increasing right basilar opacity consistent with increasing  atelectasis and probable small pleural effusion. The right internal  jugular vascular sheath remains in good position the left-sided  dual-lead pacemaker defibrillator remains in good position changes of  median sternotomy coronary artery bypass grafting and aortic valvular  replacement are again seen.        Impression    1. Mild  interval worsening from study of December 29  2. Increasing right basilar opacities felt to represent increasing  atelectasis  3. Stable moderate left basilar opacity consistent with atelectasis,  pneumonia and/or pleural effusion     Electronically Signed By-Ashwin Beavers Jr. On:12/31/2019 11:53 AM  This report was finalized on [id number] by  Ashwin Beavers Jr., .   XR Chest 1 View    Narrative DATE OF EXAM:  12/29/2019 1:02 PM     PROCEDURE:  XR CHEST 1 VW-     INDICATIONS:  CT removal; R07.9-Chest pain, unspecified; I35.1-Nonrheumatic aortic  (valve) insufficiency; I42.0-Dilated cardiomyopathy; Z95.810-Presence of  automatic (implantable) cardiac defibrillator; I20.0-Unstable angina;  I25.119-Atherosclerotic heart disease of native coronary artery with  unspecified angina pectoris; I35.1-Nonrheumatic aortic (valve)  insufficiency patient's a 44-year-old female status post chest tube  removal     COMPARISON:  Study of 5:01 AM earlier today     TECHNIQUE:   Single radiographic AP view of the chest was obtained.     FINDINGS:  Today's portable erect study was obtained on 12/29/2019 at 12:52 PM     Today's follow-up study demonstrates the right internal jugular sheath  being in place. The mediastinal drains have been removed there is no  evidence of pneumomediastinum or pneumothorax on today's study. Moderate  opacity in the left lung base continues either representing atelectasis  or pneumonia. Mild right basilar atelectasis is seen. A left-sided  dual-lead pacemaker defibrillator is seen. Changes of median sternotomy  and coronary artery bypass grafting are present. The upper abdomen  appears unremarkable.        Impression    1. No significant change from earlier than the day other than removal of  mediastinal drains  2. Bilateral basilar opacities left greater than right felt to represent  atelectasis and/or infiltrate, possible pneumonia on the left,  atelectasis right base.  3. Right internal jugular sheath  remains intact with tip in superior  vena cava, left-sided dual-lead pacemaker defibrillator appears  unchanged as are changes of CABG.     Electronically Signed By-Ashwin Beavers Jr. On:12/29/2019 1:16 PM  This report was finalized on [id number] by  Ashwin Beavers Jr., .       Results for orders placed during the hospital encounter of 12/22/19   Duplex Venous Lower Extremity - Bilateral CAR    Narrative · Normal bilateral lower extremity venous duplex scan.            ASSESSMENT / PLAN      Chest pain    COPD (chronic obstructive pulmonary disease) (CMS/HCC)    Hypertension    Cardiomyopathy, dilated (CMS/HCC)    Essential hypertension    Chronic renal impairment    ICD (implantable cardioverter-defibrillator), dual, in situ    GERD without esophagitis    Hypothyroidism (acquired)    Aortic valve regurgitation    Unstable angina pectoris (CMS/HCC)    Coronary artery disease involving native coronary artery of native heart with angina pectoris (CMS/HCC)    Nonrheumatic aortic valve insufficiency      1. ARF/LINSEY/CRF/CKD STG 2------Nonoliguric. ARF/LINSEY resolved. +Mild ARF/LINSEY on top of what appears to be CRF/CKD STG 2 with a baseline serum creatinine of 1.1. Unknown if patient has baseline proteinuria or hematuria. CRF/CKD STG 2 likely secondary to HTN NS/DM and chronic renal hypoperfusion from Cardiomyopathy. +Mild ARF/LINSEY appeared to be secondary to prerenal/hemodynamic fluctuation from MI S/P Cath/IV dye exposure with concomitant ACE-I and diuretic use. Keep off of Zestril for now.   Dose meds for CrCl 30-60 cc/min. No NSAIDs. No further IV dye exposure, unless emergently needed.     2. CAD S/P MI S/P CATH--------CABG done per , CT Surgery.     3. DILATED CARDIOMYOPATHY/VALVULAR HEART DISEASE--------No gross overload at present. Stable on current Bumex dose. Has PM/AICD. Per , Cardiology     4. HTN WITH CKD------BP okay. Avoid hypotension. No ACE/ARB/DRI for now. Temporarily holding diuretics     5.  HYPERURICEMIA---------On Allopurinol.  Uric normal     6. HYPERLIPIDEMIA------On Statin.       7. GERD-------On H2 blocker     8. DMII------Resumed metformin. BS okay. On Glucometers, SSI     9. VITAMIN D DEFICIENCY     10. DM PERIPHERAL NEUROPATHY-------On Neurontin     11. HYPOTHYROIDISM------Increased Synthroid as TSH up            Anette Smalls MD  [facility]  [phone]  20  6:52 AM      Electronically signed by Ashish Smalls MD at 20 4432     Mara Peterson MD at 20 1236          Pulmonary/ Critical Care progress Note        Patient Name:  Rafael Mccann    :  1975    Medical Record:  [id number]    Requesting Physician    Phani Adames, *    Primary Care Physician     Phani Adames MD    Reason for consultation    Rafael Mccann is a 44 y.o. female who we were asked to see in consultation for decreased level of consciousness.   Patient is POD#3 CABG and AVR after presenting to the ER on 19 with complaints of chest pain, dyspnea and tachycardia.  Patient with a history of CAD being medically managed as well as cardiomyopathy.   Cardiac cath on 19 showed severe two vessel disease and 4+aortic regurg.       This evening patient was found to be very difficult to arouse in bed after being up to chair this morning and ambulating in the afternoon.   Patient was given Narcan with improvement in her mental status.  ABG was obtained as well which showed pCO2 of 49 and a pAO2 of 68.   Patient had been requiring Dilaudid 0.25 mg q2h IV and Oxycodone 5 mg q4h, for pain control in addition had Ultram and Benadryl earlier today.      Review of Systems    :  OOB to chair, awake and alert.  Complaints of right lower rib pain.    20:  OOB to chair,  Remains with complaints of pain  20:  Sitting on side of bed, remains with some pain to right side.    1/3: chest pain much better.  Her oxygen  requirement stable on 2 L  1/4- she is on roomair. Denies any pain, having BM and tolerating diet  1/5- stable, on room air, walks in room without assistance    Home medicationS  Prior to Admission medications    Medication Sig Start Date End Date Taking? Authorizing Provider   allopurinol (ZYLOPRIM) 300 MG tablet Take 300 mg by mouth Daily.   Yes Dheeraj Alvarez MD   ALPRAZolam (XANAX) 1 MG tablet Take 1 mg by mouth 4 (Four) Times a Day As Needed for Anxiety.   Yes Dheeraj Alvarez MD   amLODIPine (NORVASC) 5 MG tablet Take 5 mg by mouth 2 (Two) Times a Day.   Yes Dheeraj Alvarez MD   aspirin 81 MG chewable tablet Chew 81 mg Daily.   Yes Dheeraj Alvarez MD   atorvastatin (LIPITOR) 40 MG tablet Take 1 tablet by mouth Every Night. 6/30/19  Yes Hannah Wills MD   carvedilol (COREG) 12.5 MG tablet Take 1 tablet by mouth 2 (Two) Times a Day With Meals. 6/30/19  Yes Hannah Wills MD   cholecalciferol (VITAMIN D3) 1000 units tablet Take 1,000 Units by mouth Daily.   Yes Dheeraj Alvarez MD   clindamycin (CLEOCIN) 300 MG capsule Take 300 mg by mouth 2 (Two) Times a Day. 12/18/19 1/1/20 Yes Dheeraj Alvarez MD   famotidine (PEPCID) 20 MG tablet Take 1 tablet by mouth 2 (Two) Times a Day. 9/17/19  Yes Dina Jack PA   ferrous sulfate 325 (65 FE) MG tablet Take 65 mg by mouth Daily With Breakfast.   Yes Dheeraj Alvarez MD   fluconazole (DIFLUCAN) 200 MG tablet Take 200 mg by mouth Every Other Day.   Yes Dheeraj Alvarez MD   folic acid (FOLVITE) 1 MG tablet Take 1 mg by mouth Daily.   Yes Dheeraj Alvarez MD   furosemide (LASIX) 40 MG tablet Take 40 mg by mouth 2 (Two) Times a Day.   Yes Dheeraj Alvarez MD   gabapentin (NEURONTIN) 300 MG capsule Take 300 mg by mouth 3 (Three) Times a Day.   Yes Dheeraj Alvarez MD   hydrALAZINE (APRESOLINE) 100 MG tablet Take 1 tablet by mouth 3 (Three) Times a Day. 12/12/19  Yes Wayne Luna MD   levothyroxine  (SYNTHROID, LEVOTHROID) 200 MCG tablet Take 200 mcg by mouth Daily.   Yes ProviderDheeraj MD   lisinopril (PRINIVIL,ZESTRIL) 20 MG tablet Take 20 mg by mouth Daily.   Yes ProviderDheeraj MD   metFORMIN (GLUCOPHAGE) 500 MG tablet Take 500 mg by mouth Daily With Breakfast.   Yes ProviderDheeraj MD   montelukast (SINGULAIR) 10 MG tablet Take 10 mg by mouth Every Night.   Yes ProviderDheeraj MD   oxyCODONE-acetaminophen (PERCOCET) 7.5-325 MG per tablet Take 1 tablet by mouth Every 6 (Six) Hours As Needed.   Yes ProviderDheeraj MD   pantoprazole (PROTONIX) 40 MG EC tablet Take 40 mg by mouth Daily.   Yes ProviderDheeraj MD   spironolactone (ALDACTONE) 25 MG tablet Take 1 tablet by mouth Daily. 11/19/19  Yes Wayne Luna MD       Medical History    Past Medical History:   Diagnosis Date   • CHF (congestive heart failure) (CMS/[facility])    • COPD (chronic obstructive pulmonary disease) (CMS/[facility])    • Diabetes (CMS/[facility])    • Hyperlipidemia    • Hypertension     INDRA on PAP therapy     Surgical History    Past Surgical History:   Procedure Laterality Date   • AORTIC VALVE REPAIR/REPLACEMENT N/A 12/27/2019    Procedure: AORTIC VALVE REPAIR/REPLACEMENT;  Surgeon: Lane Stock MD;  Location: [facility];  Service: Cardiothoracic   • BREAST LUMPECTOMY     • CARDIAC CATHETERIZATION N/A 12/24/2019    Procedure: Right and Left Heart Cath 12/24/19 @ 0900;  Surgeon: Wayne Luna MD;  Location: [facility] CATH INVASIVE LOCATION;  Service: Cardiovascular   • CARDIAC CATHETERIZATION N/A 12/24/2019    Procedure: Coronary angiography;  Surgeon: Wayne Luna MD;  Location: [facility] CATH INVASIVE LOCATION;  Service: Cardiovascular   • CARDIAC ELECTROPHYSIOLOGY PROCEDURE Left 6/28/2019    Procedure: Dual-chamber ICD insertion;  Surgeon: Héctor Eckert MD;  Location: [facility] CATH INVASIVE LOCATION;  Service: Cardiovascular   • CARDIAC ELECTROPHYSIOLOGY PROCEDURE Left 8/14/2019     Procedure: Lead Revision;  Surgeon: Héctor Eckert MD;  Location: [facility] CATH INVASIVE LOCATION;  Service: Cardiovascular   • CHOLECYSTECTOMY     • CORONARY ARTERY BYPASS GRAFT N/A 2019    Procedure: CORONARY ARTERY BYPASS GRAFTING;  Surgeon: Lane Stock MD;  Location: [facility] CVOR;  Service: Cardiothoracic   • HYSTERECTOMY     • INSERT / REPLACE / REMOVE PACEMAKER     • THYROID SURGERY          Family History    Family History   Problem Relation Age of Onset   • Heart disease Father        Social History    Social History     Tobacco Use   • Smoking status: Former Smoker     Last attempt to quit: 2019     Years since quittin.0   • Smokeless tobacco: Never Used   Substance Use Topics   • Alcohol use: No     Frequency: Never        Allergies    Allergies   Allergen Reactions   • Hydrocodone Hives   • Penicillin G Unknown (See Comments)       Medications    Scheduled Meds:    allopurinol 300 mg Oral Daily   amiodarone 200 mg Oral BID With Meals   aspirin 325 mg Oral Daily   atorvastatin 40 mg Oral Nightly   bisacodyl 10 mg Rectal Daily   bumetanide 1 mg Oral Daily   carvedilol 6.25 mg Oral BID With Meals   docusate sodium 100 mg Oral BID   enoxaparin 40 mg Subcutaneous Daily   ferrous sulfate 324 mg Oral Daily With Breakfast   folic acid 1 mg Oral Daily   guaiFENesin 600 mg Oral Q12H   insulin lispro 0-9 Units Subcutaneous 4x Daily With Meals & Nightly   ipratropium-albuterol 3 mL Nebulization 4x Daily - RT   lactulose 30 g Oral Daily   levothyroxine 200 mcg Oral Q AM   lidocaine 2 patch Transdermal Q24H   metFORMIN 500 mg Oral BID With Meals   montelukast 10 mg Oral Nightly   pantoprazole 40 mg Oral Q AM   polyethylene glycol 17 g Oral BID   senna 1 tablet Oral BID     Continuous Infusions:    sodium chloride 30 mL/hr Last Rate: 30 mL/hr (19 1430)     PRN Meds:.•  acetaminophen  •  ALPRAZolam  •  dextrose  •  dextrose  •  glucagon (human recombinant)  •  insulin lispro **AND** insulin  lispro  •  ipratropium-albuterol  •  MOUTH KOTE  •  naloxone  •  ondansetron  •  oxyCODONE  •  potassium chloride **OR** potassium chloride  •  potassium chloride **OR** potassium chloride      Physical Exam    tMax 24 hrs:  Temp (24hrs), Av °F (36.7 °C), Min:97.5 °F (36.4 °C), Max:98.4 °F (36.9 °C)    Vitals Ranges:  Temp:  [97.5 °F (36.4 °C)-98.4 °F (36.9 °C)] 98.1 °F (36.7 °C)  Heart Rate:  [74-91] 75  Resp:  [18-20] 18  BP: (138-170)/() 140/90  Intake and Output Last 3 Shifts:  I/O last 3 completed shifts:  In:  [P.O.:]  Out: -     Constitutional:  Alert, no acute respiratory distress   HEENT:  Atraumatic, PERRL, conjunctiva normal, moist oral mucosa, no nasal discharge.  Trachea is midline.  Respiratory:  No respiratory distress, normal breath sounds, no rales, no wheezing   Cardiovascular:  Normal rate, normal rhythm and no murmurs.  Pulses 2+ and equal in all four extremities.    GI:  Soft, nondistended, positive bowel sounds.  :  No costovertebral angle tenderness   Extremities: No edema, cyanosis or tenderness.  Integument:  No rashes.   Neurologic:  Alert & oriented x 3, CN 2-12 normal, normal motor function, normal sensory function, no focal deficits noted   Psychiatric:  Speech and behavior appropriate     labs    CBC  Results from last 7 days   Lab Units 20  0446 20  0321 20  0521 20  0558 20  0634 19  0631 19  1849 19  0338   WBC 10*3/mm3 8.60 7.00 6.00 5.50 6.40 5.10  --  8.00   RBC 10*6/mm3 3.43* 3.18* 3.03* 2.56* 2.73* 2.68*  --  2.71*   HEMOGLOBIN g/dL 10.8* 10.1* 9.5* 8.2* 8.7* 8.5*  --  8.6*   HEMOGLOBIN, POC g/dL  --   --   --   --   --   --  8.1*  --    HEMATOCRIT % 31.1* 29.2* 27.5* 24.0* 25.5* 25.3*  --  25.3*   HEMATOCRIT POC %  --   --   --   --   --   --  24*  --    MCV fL 90.5 91.7 90.6 93.5 93.2 94.4  --  93.1   PLATELETS 10*3/mm3 381 309 268 211 207 170  --  134*       BMP  Results from last 7 days   Lab Units  01/05/20 0446 01/04/20 0321 01/03/20 1938 01/03/20 0521 01/02/20  0558 01/01/20 0634 12/31/19  0631 12/30/19  0338   SODIUM mmol/L 140 141  --  138 138 139 137 136   POTASSIUM mmol/L 3.3* 4.0 3.7 3.3* 3.6 3.7 4.0 4.4   CHLORIDE mmol/L 99 99  --  95* 96* 97* 96* 99   CO2 mmol/L 28.0 31.0*  --  30.0* 31.0* 31.0* 30.0* 27.0   BUN mg/dL 12 10  --  9 11 11 12 13   CREATININE mg/dL 1.09* 1.01*  --  1.15* 1.00 1.04* 1.18* 1.25*   GLUCOSE mg/dL 143* 97  --  140* 119* 106* 122* 157*   MAGNESIUM mg/dL  --   --   --   --  2.2 2.0 2.1 2.2   PHOSPHORUS mg/dL 3.8 4.0  --  3.5 4.0 3.5 3.1 3.0       CMP   Results from last 7 days   Lab Units 01/05/20 0446 01/04/20 0321 01/03/20 1938 01/03/20 0521 01/02/20  0558 01/01/20  0634 12/31/19  0631 12/30/19  0338   SODIUM mmol/L 140 141  --  138 138 139 137 136   POTASSIUM mmol/L 3.3* 4.0 3.7 3.3* 3.6 3.7 4.0 4.4   CHLORIDE mmol/L 99 99  --  95* 96* 97* 96* 99   CO2 mmol/L 28.0 31.0*  --  30.0* 31.0* 31.0* 30.0* 27.0   BUN mg/dL 12 10  --  9 11 11 12 13   CREATININE mg/dL 1.09* 1.01*  --  1.15* 1.00 1.04* 1.18* 1.25*   GLUCOSE mg/dL 143* 97  --  140* 119* 106* 122* 157*   ALBUMIN g/dL 4.00 3.90  --  3.60 3.70 3.90 3.90 4.10         TROPONIN        CoAg        Creatinine Clearance  Estimated Creatinine Clearance: 74.1 mL/min (A) (by C-G formula based on SCr of 1.09 mg/dL (H)).    ABG  Results from last 7 days   Lab Units 12/31/19  0043 12/30/19  1849   PH, ARTERIAL pH units 7.376 7.396   PCO2, ARTERIAL mm Hg 51.7* 49.4*   PO2 ART mm Hg 53.2* 68.1*   O2 SATURATION ART % 85.7* 92.9*   BASE EXCESS ART mmol/L 4.4* 4.9*       Imaging & Other Studies    Imaging Results (Last 72 Hours)     ** No results found for the last 72 hours. **            AssessmenT/PLAN  Decreased level of consciousness related to opioids  INDRA compliant with BiPAP use  POD CABG and AVR - aortic regurgitation and CAD  HFrEF - EF35%  AICD  CKD stage II  T2DM  Hypothyroidism on Synthroid  Anxiety on Xanax  Chronic  pain follows outpatient pain management clinict  COPD without exacerbation  Cardiomyopathy    Plan:   -on room air  -Renal following  -aggressive pulmonary toilet with incentive spirometry, flutter valve and nebs.  -cont to use home PAP with sleep   -limit sedating medication   -D/C gabapentin  -Lidocaine patch  -duonebs and Mucinex    on oral diuretics    Pulmonary status is stable. We will continue to follow  OT recommends - rehab  Awaiting rehab placement   Mara Peterson MD  1/5/2020  12:36 PM          Electronically signed by Mara Peterson MD at 01/05/20 1618     [facility] at 01/05/20 1217          Cardiology    Patient Care Team:  Phani Adames MD as PCP - General (Internal Medicine)  Ashish Smalls MD as Consulting Physician (Nephrology)          ADMITTING DIAGNOSES: Coronary disease chest pain    SUBJECTIVE: Doing well up into the toilet by herself    Review of Symptoms:  Constitutional: Patient afebrile no chills or unexpected weight changes  Respiratory: No cough, no wheezing or dyspnea  Cardiovascular: No chest pain, palpitations, dyspnea, orthopnea and no edema  Gastrointestinal: No nausea, vomiting, constipation or diarrhea.  No melena or dark stools    All other systems reviewed and are negative     PAST MEDICAL HISTORY:   Past Medical History:   Diagnosis Date   • CHF (congestive heart failure) (CMS/[facility])    • COPD (chronic obstructive pulmonary disease) (CMS/[facility])    • Diabetes (CMS/[facility])    • Hyperlipidemia    • Hypertension        ALLERGIES:   Allergies   Allergen Reactions   • Hydrocodone Hives   • Penicillin G Unknown (See Comments)         PAST SURGICAL HISTORY:   Past Surgical History:   Procedure Laterality Date   • AORTIC VALVE REPAIR/REPLACEMENT N/A 12/27/2019    Procedure: AORTIC VALVE REPAIR/REPLACEMENT;  Surgeon: Lane Stock MD;  Location: [facility];  Service: Cardiothoracic   • BREAST LUMPECTOMY     • CARDIAC CATHETERIZATION N/A  2019    Procedure: Right and Left Heart Cath 19 @ 0900;  Surgeon: Wayne Luna MD;  Location: [facility] CATH INVASIVE LOCATION;  Service: Cardiovascular   • CARDIAC CATHETERIZATION N/A 2019    Procedure: Coronary angiography;  Surgeon: Wayne Luna MD;  Location: [facility] CATH INVASIVE LOCATION;  Service: Cardiovascular   • CARDIAC ELECTROPHYSIOLOGY PROCEDURE Left 2019    Procedure: Dual-chamber ICD insertion;  Surgeon: Héctor Eckert MD;  Location: [facility] CATH INVASIVE LOCATION;  Service: Cardiovascular   • CARDIAC ELECTROPHYSIOLOGY PROCEDURE Left 2019    Procedure: Lead Revision;  Surgeon: Héctor Eckert MD;  Location: [facility] CATH INVASIVE LOCATION;  Service: Cardiovascular   • CHOLECYSTECTOMY     • CORONARY ARTERY BYPASS GRAFT N/A 2019    Procedure: CORONARY ARTERY BYPASS GRAFTING;  Surgeon: Lane Stock MD;  Location: [facility] CVOR;  Service: Cardiothoracic   • HYSTERECTOMY     • INSERT / REPLACE / REMOVE PACEMAKER     • THYROID SURGERY            FAMILY HISTORY:   Family History   Problem Relation Age of Onset   • Heart disease Father          SOCIAL HISTORY:   Social History     Socioeconomic History   • Marital status:      Spouse name: Not on file   • Number of children: Not on file   • Years of education: Not on file   • Highest education level: Not on file   Tobacco Use   • Smoking status: Former Smoker     Last attempt to quit: 2019     Years since quittin.0   • Smokeless tobacco: Never Used   Substance and Sexual Activity   • Alcohol use: No     Frequency: Never   • Drug use: No   • Sexual activity: Defer       CURRENT MEDICATIONS:     Current Facility-Administered Medications:   •  acetaminophen (TYLENOL) tablet 500 mg, 500 mg, Oral, Q6H PRN, Lane Stock MD, 500 mg at 20 0142  •  allopurinol (ZYLOPRIM) tablet 300 mg, 300 mg, Oral, Daily, Chidi Pace MD, 300 mg at 20 0942  •  ALPRAZolam (XANAX)  tablet 0.25 mg, 0.25 mg, Oral, BID PRN, Chidi Pace MD, 0.25 mg at 01/04/20 2343  •  amiodarone (PACERONE) tablet 200 mg, 200 mg, Oral, BID With Meals, Lane Stock MD, 200 mg at 01/05/20 0942  •  aspirin tablet 325 mg, 325 mg, Oral, Daily, Lane Stock MD, 325 mg at 01/05/20 0943  •  atorvastatin (LIPITOR) tablet 40 mg, 40 mg, Oral, Nightly, Chidi Pace MD, 40 mg at 01/04/20 2004  •  bisacodyl (DULCOLAX) suppository 10 mg, 10 mg, Rectal, Daily, Kayla Burrell APRN, 10 mg at 01/02/20 1000  •  bumetanide (BUMEX) tablet 1 mg, 1 mg, Oral, Daily, Adalid Sterling MD, 1 mg at 01/05/20 0942  •  carvedilol (COREG) tablet 6.25 mg, 6.25 mg, Oral, BID With Meals, Adalid Stelring MD, 6.25 mg at 01/05/20 0942  •  dextrose (D50W) 25 g/ 50mL Intravenous Solution 25 g, 25 g, Intravenous, Q15 Min PRN, Kayla Burrell, ALEX  •  dextrose (GLUTOSE) oral gel 15 g, 15 g, Oral, Q15 Min PRN, Kayla Burrell APRN  •  docusate sodium (COLACE) capsule 100 mg, 100 mg, Oral, BID, Chidi Pace MD, 100 mg at 01/04/20 2003  •  enoxaparin (LOVENOX) syringe 40 mg, 40 mg, Subcutaneous, Daily, Chidi Pace MD, 40 mg at 01/04/20 2003  •  ferrous sulfate EC tablet 324 mg, 324 mg, Oral, Daily With Breakfast, Chidi Pace MD, 324 mg at 01/05/20 0942  •  folic acid (FOLVITE) tablet 1 mg, 1 mg, Oral, Daily, Chidi Pace MD, 1 mg at 01/05/20 0942  •  glucagon (human recombinant) (GLUCAGEN DIAGNOSTIC) injection 1 mg, 1 mg, Subcutaneous, Q15 Min PRN, Kayla Burrell APRN  •  guaiFENesin (MUCINEX) 12 hr tablet 600 mg, 600 mg, Oral, Q12H, Hasmukh David MD, 600 mg at 01/05/20 0942  •  insulin lispro (humaLOG) injection 0-9 Units, 0-9 Units, Subcutaneous, 4x Daily With Meals & Nightly **AND** insulin lispro (humaLOG) injection 0-9 Units, 0-9 Units, Subcutaneous, PRN, Kayla Burrell APRN  •  ipratropium-albuterol (DUO-NEB) nebulizer solution 3 mL, 3 mL, Nebulization, 4x Daily - RT, Adelaida,  ALEX León, 3 mL at 01/05/20 0811  •  ipratropium-albuterol (DUO-NEB) nebulizer solution 3 mL, 3 mL, Nebulization, Q4H PRN, Loraine Son APRN  •  lactulose (CHRONULAC) 10 GM/15ML solution 30 g, 30 g, Oral, Daily, Kayla Burrell, ALEX, 30 g at 01/02/20 0950  •  levothyroxine (SYNTHROID, LEVOTHROID) tablet 200 mcg, 200 mcg, Oral, Q AM, Chidi Pace MD, 200 mcg at 01/05/20 0543  •  lidocaine (LIDODERM) 5 % 2 patch, 2 patch, Transdermal, Q24H, Hasmukh David MD, 2 patch at 01/04/20 0944  •  metFORMIN (GLUCOPHAGE) tablet 500 mg, 500 mg, Oral, BID With Meals, Adalid Sterling MD, 500 mg at 01/05/20 0943  •  montelukast (SINGULAIR) tablet 10 mg, 10 mg, Oral, Nightly, Chidi Pace MD, 10 mg at 01/04/20 2003  •  MOUTH KOTE solution 2 mL, 2 spray, Mouth/Throat, Q4H PRN, Chidi Pace MD  •  naloxone (NARCAN) injection 0.4 mg, 0.4 mg, Intravenous, Q5 Min PRN, Lane Stock MD  •  ondansetron (ZOFRAN) injection 4 mg, 4 mg, Intravenous, Q6H PRN, Chidi Pace MD, 4 mg at 01/02/20 2125  •  oxyCODONE (ROXICODONE) immediate release tablet 5 mg, 5 mg, Oral, Q4H PRN, Chidi Pace MD, 5 mg at 01/05/20 0943  •  pantoprazole (PROTONIX) EC tablet 40 mg, 40 mg, Oral, Q AM, Chidi Pace MD, 40 mg at 01/05/20 0543  •  polyethylene glycol (MIRALAX) powder 17 g, 17 g, Oral, BID, Chidi Pace MD, 17 g at 01/02/20 0949  •  potassium chloride (K-DUR,KLOR-CON) CR tablet 20 mEq, 20 mEq, Oral, PRN, 20 mEq at 01/05/20 0942 **OR** potassium chloride (KLOR-CON) packet 20 mEq, 20 mEq, Oral, PRN, Chidi Pace MD  •  potassium chloride 10 mEq in 100 mL IVPB, 10 mEq, Intravenous, Q1H PRN **OR** potassium chloride 10 mEq in 100 mL IVPB, 10 mEq, Intravenous, Q1H PRN, Chidi Pace MD  •  senna (SENOKOT) tablet 1 tablet, 1 tablet, Oral, BID, Chidi Pace MD, 1 tablet at 01/04/20 2003  •  sodium chloride 0.9 % infusion, 30 mL/hr, Intravenous, Continuous, Chidi Pace,  MD, Last Rate: 30 mL/hr at 12/27/19 1430, 30 mL/hr at 12/27/19 1430      DIAGNOSTIC DATA:     I reviewed the patient's new clinical results.    Lab Results (last 24 hours)     Procedure Component Value Units Date/Time    POC Glucose Once [[id number]]  (Abnormal) Collected:  01/05/20 1148    Specimen:  Blood Updated:  01/05/20 1157     Glucose 118 mg/dL      Comment: Serial Number: [id number]:  [id number]       POC Glucose Once [[id number]]  (Abnormal) Collected:  01/05/20 0950    Specimen:  Blood Updated:  01/05/20 0952     Glucose 143 mg/dL      Comment: Serial Number: [id number]:  [id number]       Renal Function Panel [[id number]]  (Abnormal) Collected:  01/05/20 0446    Specimen:  Blood Updated:  01/05/20 0522     Glucose 143 mg/dL      BUN 12 mg/dL      Creatinine 1.09 mg/dL      Sodium 140 mmol/L      Potassium 3.3 mmol/L      Chloride 99 mmol/L      CO2 28.0 mmol/L      Calcium 9.6 mg/dL      Albumin 4.00 g/dL      Phosphorus 3.8 mg/dL      Anion Gap 13.0 mmol/L      BUN/Creatinine Ratio 11.0     eGFR  African Amer 65 mL/min/1.73     Narrative:       GFR Normal >60  Chronic Kidney Disease <60  Kidney Failure <15      CBC (No Diff) [[id number]]  (Abnormal) Collected:  01/05/20 0446    Specimen:  Blood Updated:  01/05/20 0500     WBC 8.60 10*3/mm3      RBC 3.43 10*6/mm3      Hemoglobin 10.8 g/dL      Hematocrit 31.1 %      MCV 90.5 fL      MCH 31.5 pg      MCHC 34.8 g/dL      RDW 13.7 %      RDW-SD 43.8 fl      MPV 6.4 fL      Platelets 381 10*3/mm3     POC Glucose Once [[id number]]  (Normal) Collected:  01/04/20 1621    Specimen:  Blood Updated:  01/04/20 1629     Glucose 76 mg/dL      Comment: Serial Number: [id number]:  [id number]             Imaging Results (Last 24 Hours)     ** No results found for the last 24 hours. **          Xray reviewed personally by physician.      ECG reviewed personally by physician  ECG/EMG Results (most recent)     Procedure Component Value Units Date/Time    ECG 12  Lead [[id number]] Collected:  12/22/19 2259     Updated:  12/23/19 0636    Narrative:       HEART RATE= 89  bpm  RR Interval= 676  ms  MT Interval= 171  ms  P Horizontal Axis= 8  deg  P Front Axis= 50  deg  QRSD Interval= 90  ms  QT Interval= 378  ms  QRS Axis= -37  deg  T Wave Axis= 106  deg  - ABNORMAL ECG -  Sinus rhythm  Probable left atrial enlargement  Left ventricular hypertrophy  Nonspecific T abnormalities, lateral leads  When compared with ECG of 25-Sep-2019 21:31:50,  Significant repolarization change  Electronically Signed By: Mukesh Mcmahan ([place]) 23-Dec-2019 06:35:58  Date and Time of Study: 2019-12-22 22:59:34    Adult Transthoracic Echo Complete W/ Cont if Necessary Per Protocol [[id number]] Collected:  12/23/19 0951     Updated:  12/23/19 1717     BSA 2.0 m^2       CV ECHO ARLYN - RVDD 1.9 cm      IVSd 1.4 cm      IVSs 1.7 cm      LVIDd 4.7 cm      LVIDs 3.9 cm      LVPWd 1.9 cm      BH CV ECHO ARLYN - LVPWS 2.1 cm      IVS/LVPW 0.73     FS 18.3 %      EDV(Teich) 104.3 ml      ESV(Teich) 64.7 ml      EF(Teich) 37.9 %      EDV(cubed) 106.3 ml      ESV(cubed) 58.0 ml      EF(cubed) 45.5 %      % IVS thick 21.7 %      % LVPW thick 8.6 %      LV mass(C)d 354.3 grams      LV mass(C)dI 177.9 grams/m^2      LV mass(C)s 333.6 grams      LV mass(C)sI 167.5 grams/m^2      SV(Teich) 39.5 ml      SI(Teich) 19.9 ml/m^2      SV(cubed) 48.3 ml      SI(cubed) 24.3 ml/m^2      Ao root diam 2.6 cm      Ao root area 5.3 cm^2      ACS 1.3 cm      LVOT diam 2.2 cm      LVOT area 3.7 cm^2      EDV(MOD-sp4) 108.8 ml      ESV(MOD-sp4) 62.0 ml      EF(MOD-sp4) 43.1 %      EDV(MOD-sp2) 101.6 ml      ESV(MOD-sp2) 65.3 ml      EF(MOD-sp2) 35.8 %      SV(MOD-sp4) 46.9 ml      SI(MOD-sp4) 23.5 ml/m^2      SV(MOD-sp2) 36.3 ml      SI(MOD-sp2) 18.2 ml/m^2      Ao root area (BSA corrected) 1.3     LV [place] Vol (BSA corrected) 54.6 ml/m^2      LV Sys Vol (BSA corrected) 31.1 ml/m^2      MV E max elizabeth 85.2 cm/sec      MV A max elizabeth 72.0  cm/sec      MV E/A 1.2     MV V2 max 96.6 cm/sec      MV max PG 3.7 mmHg      MV V2 mean 64.9 cm/sec      MV mean PG 1.9 mmHg      MV V2 VTI 28.1 cm      MVA(VTI) 2.1 cm^2      MV dec slope 348.4 cm/sec^2      MV dec time 0.24 sec      Ao pk elizabeth 206.9 cm/sec      Ao max PG 17.4 mmHg      Ao max PG (full) 14.8 mmHg      Ao V2 mean 140.4 cm/sec      Ao mean PG 8.9 mmHg      Ao mean PG (full) 7.5 mmHg      Ao V2 VTI 40.3 cm      PAUL(I,A) 1.5 cm^2      PAUL(I,D) 1.5 cm^2      PAUL(V,A) 1.4 cm^2      PAUL(V,D) 1.4 cm^2      AI max elizabeth 485.7 cm/sec      AI max PG 94.4 mmHg      AI dec slope 296.1 cm/sec^2      AI dec time 1.6 sec      AI P1/2t 480.5 msec      LV V1 max PG 2.6 mmHg      LV V1 mean PG 1.5 mmHg      LV V1 max 79.9 cm/sec      LV V1 mean 57.5 cm/sec      LV V1 VTI 16.1 cm      SV(Ao) 211.7 ml      SI(Ao) 106.3 ml/m^2      SV(LVOT) 60.0 ml      SI(LVOT) 30.1 ml/m^2      PA V2 max 70.6 cm/sec      PA max PG 2.0 mmHg      PA max PG (full) 0.57 mmHg      PA V2 mean 53.0 cm/sec      PA mean PG 1.2 mmHg      PA mean PG (full) 0.48 mmHg      PA V2 VTI 16.5 cm      PA acc time 0.13 sec      RV V1 max PG 1.4 mmHg      RV V1 mean PG 0.72 mmHg      RV V1 max 59.7 cm/sec      RV V1 mean 40.4 cm/sec      RV V1 VTI 13.8 cm      TR max elizabeth 211.2 cm/sec      RVSP(TR) 20.8 mmHg      RAP systole 3.0 mmHg      PA pr(Accel) 21.4 mmHg       CV ECHO ARLYN - BZI_BMI 30.2 kilograms/m^2       CV ECHO ARLYN - BSA(HAYCOCK) 2.1 m^2       CV ECHO ARLYN - BZI_METRIC_WEIGHT 87.5 kg      BH CV ECHO ARLYN - BZI_METRIC_HEIGHT 170.2 cm      Target HR (85%) 148 bpm      Max. Pred. HR (100%) 174 bpm      EF(MOD-bp) 40 %      LA dimension(2D) 3.5 cm      Echo EF Estimated 35 %     Narrative:       · Left ventricular wall thickness is consistent with moderate concentric   hypertrophy.  · Estimated EF = 35%.  · There is mild calcification of the aortic valve.  · Severe aortic valve regurgitation is present.  · Mild aortic valve stenosis is  present.  · Mild tricuspid valve regurgitation is present.  · Right ventricular cavity is mildly dilated.  · Mildly reduced right ventricular systolic function noted.  · Mild mitral valve regurgitation is present  · Left atrial cavity size is mild-to-moderately dilated.       ECG 12 Lead [[id number]] Collected:  12/27/19 1436     Updated:  12/27/19 1437    Narrative:       HEART RATE= 64  bpm  RR Interval= 940  ms  ID Interval= 166  ms  P Horizontal Axis= 15  deg  P Front Axis= 39  deg  QRSD Interval= 107  ms  QT Interval= 540  ms  QRS Axis= -22  deg  T Wave Axis= -58  deg  - ABNORMAL ECG -  Sinus rhythm  Probable left ventricular hypertrophy  Prolonged QT interval  When compared with ECG of 22-Dec-2019 22:59:34,  Significant rate decrease  Significant repolarization change  Significant axis, voltage or hypertrophy change  Electronically Signed By:   Date and Time of Study: 2019-12-27 14:36:58    ECG 12 Lead [[id number]] Collected:  12/28/19 0356     Updated:  12/28/19 0358    Narrative:       HEART RATE= 84  bpm  RR Interval= 712  ms  ID Interval= 175  ms  P Horizontal Axis=   deg  P Front Axis= 30  deg  QRSD Interval= 98  ms  QT Interval= 395  ms  QRS Axis= -31  deg  T Wave Axis= 95  deg  - ABNORMAL ECG -  Sinus rhythm  Probable left atrial enlargement  Left ventricular hypertrophy  Electronically Signed By:   Date and Time of Study: 2019-12-28 03:56:26    Adult Transthoracic Echo Limited W/ Cont if Necessary Per Protocol [[id number]] Collected:  01/01/20 1416     Updated:  01/01/20 1600     BSA 2.1 m^2      EDV(MOD-sp4) 84.7 ml      ESV(MOD-sp4) 43.0 ml      EF(MOD-sp4) 49.3 %      EDV(MOD-sp2) 72.7 ml      ESV(MOD-sp2) 38.1 ml      EF(MOD-sp2) 47.6 %      SV(MOD-sp4) 41.8 ml      SI(MOD-sp4) 20.3 ml/m^2      SV(MOD-sp2) 34.6 ml      SI(MOD-sp2) 16.9 ml/m^2      LV [place] Vol (BSA corrected) 41.3 ml/m^2      LV Sys Vol (BSA corrected) 20.9 ml/m^2       CV ECHO ARLYN - BZI_BMI 32.4 kilograms/m^2       CV ECHO ARLYN -  "BSA([facility]) 2.1 m^2       CV ECHO ARLYN - BZI_METRIC_WEIGHT 93.9 kg       CV ECHO ARLYN - BZI_METRIC_HEIGHT 170.2 cm      EF(MOD-bp) 35 %      Echo EF Estimated 40 %     Narrative:       · Left ventricular wall thickness is consistent with mild concentric   hypertrophy.  · Estimated EF = 40%.  · Right ventricular cavity is mildly dilated.  · Mildly reduced right ventricular systolic function noted.       ECG 12 Lead [[id number]] Collected:  12/29/19 0422     Updated:  01/02/20 0929    Narrative:       HEART RATE= 92  bpm  RR Interval= 652  ms  MI Interval= 187  ms  P Horizontal Axis= 17  deg  P Front Axis= 23  deg  QRSD Interval= 93  ms  QT Interval= 386  ms  QRS Axis= -26  deg  T Wave Axis= 117  deg  - ABNORMAL ECG -  Sinus rhythm  Left ventricular hypertrophy  Nonspecific T abnormalities, lateral leads  When compared with ECG of 28-Dec-2019 3:56:26,  Significant axis, voltage or hypertrophy change  Electronically Signed By: Wayne Luna ([facility]) 02-Jan-2020 09:29:29  Date and Time of Study: 2019-12-29 04:22:15              VITAL SIGNS: Temp:  [97.5 °F (36.4 °C)-98.4 °F (36.9 °C)] 98.1 °F (36.7 °C)  Heart Rate:  [74-91] 74  Resp:  [18-20] 18  BP: (138-170)/() 140/90   Flowsheet Rows      First Filed Value   Admission Height  170.2 cm (67\") Documented at 12/22/2019 2250   Admission Weight  91.8 kg (202 lb 6.1 oz) Documented at 12/22/2019 2250        Physical exam  Constitutional: well-nourished, and appears stated age in no acute distress  PERRL: Conjunctiva clear, no pallor, anicteric  HENMT: normocephalic, normal dentition, no cyanosis or pallor  Neck:no bruits, or thrills and bilateral normal carotid upstroke. Normal jugular venous pressure  Cardiovascular: No parasternal heaves an non-displaced focal PMI. Normal rate and rhythm: no rub, gallop, murmur or click and normal S1 and S2; no lower or upper extremity edema.   Lungs: unlabored, no wheezing with no rales or rhonchi on " auscultation.  Extremities: Warm, no clubbing, cyanosis, or edema. Full and equal peripheral pulses in extremities with no bruits appreciated.   Abdomen: soft, non-tender, non-distended  Musculoskeletal: no joint tenderness or swelling and no erythema  Skin: Warm and dry, non-erythematous   Neuro:alert and normal affect. Oriented to time, place and person.     ASSESSMENT AND PLAN:   CAD  Dyslipidemia  Coronary artery bypass grafting  Hypertension  Mitral valve regurgitation  Aortic regurgitation status post AVR    Plan  CAD  Dyslipidemia  Coronary artery bypass grafting  Hypertension  Mitral valve regurgitation  Aortic regurgitation status post AVR    Doing well no complaints.  Continue supportive care.      Manish Alvarado MD  01/05/20  12:18 PM            Electronically signed by Manish Alvarado MD at 01/05/20 1221       Consult Notes (last 24 hours) (Notes from 01/05/20 1026 through 01/06/20 1026)    No notes of this type exist for this encounter.

## 2020-01-06 NOTE — PROGRESS NOTES
Continued Stay Note   [place]     Patient Name: Rafael Mccann  MRN: [id number]  Today's Date: 1/6/2020    Admit Date: 12/22/2019    Discharge Plan     Row Name 01/06/20 1127       Plan    Plan  DC Plan:  [facility] sub-acute at discharge pending bed availability and pre-cert.  Pre-cert started today (1/6 at 10:30 am).  No PASRR needed for SIR.    Plan Comments  SW spoke to [facility] liaison via text.  Pre-cert started for sub-acute placement at 10:30 am.       Azra Alexander, JEAN, LSW    Office Phone:  [phone]  Office Fax:  [phone]  Email:  [email or web address]

## 2020-01-06 NOTE — PROGRESS NOTES
"NEPHROLOGY PROGRESS NOTE------[facility]    [facility]  [phone]  Anette Smalls MD      Patient Care Team:  Phani Adames MD as PCP - General (Internal Medicine)  Ashish Smalls MD as Consulting Physician (Nephrology)      Provider:  Anette Smalls MD  Patient Name: Rafael Mccann  :  1975    SUBJECTIVE:    No SOB, CP, dysuria, palpitations. Awaiting rehab placement    Medication:    allopurinol 300 mg Oral Daily   amiodarone 200 mg Oral BID With Meals   aspirin 325 mg Oral Daily   atorvastatin 40 mg Oral Nightly   bisacodyl 10 mg Rectal Daily   bumetanide 1 mg Oral Daily   carvedilol 6.25 mg Oral BID With Meals   docusate sodium 100 mg Oral BID   enoxaparin 40 mg Subcutaneous Daily   ferrous sulfate 324 mg Oral Daily With Breakfast   folic acid 1 mg Oral Daily   guaiFENesin 600 mg Oral Q12H   insulin lispro 0-9 Units Subcutaneous 4x Daily With Meals & Nightly   ipratropium-albuterol 3 mL Nebulization 4x Daily - RT   lactulose 30 g Oral Daily   levothyroxine 200 mcg Oral Q AM   lidocaine 2 patch Transdermal Q24H   metFORMIN 500 mg Oral BID With Meals   montelukast 10 mg Oral Nightly   pantoprazole 40 mg Oral Q AM   polyethylene glycol 17 g Oral BID   senna 1 tablet Oral BID       sodium chloride 30 mL/hr Last Rate: 30 mL/hr (19 1430)       OBJECTIVE    Vital Sign Min/Max for last 24 hours  Temp  Min: 97.3 °F (36.3 °C)  Max: 98.5 °F (36.9 °C)   BP  Min: 140/90  Max: 160/92   Pulse  Min: 74  Max: 85   Resp  Min: 18  Max: 18   SpO2  Min: 93 %  Max: 100 %   No data recorded   Weight  Min: 89 kg (196 lb 3.2 oz)  Max: 89 kg (196 lb 3.2 oz)     Flowsheet Rows      First Filed Value   Admission Height  170.2 cm (67\") Documented at 2019 2250   Admission Weight  91.8 kg (202 lb 6.1 oz) Documented at 2019 2250          I/O this shift:  In: 240 [P.O.:240]  Out: -   I/O last 3 completed shifts:  In: 7574 " [P.O.:2480]  Out: -     Physical Exam:  General Appearance: alert, appears stated age and cooperative. LIP edema  Head: normocephalic, without obvious abnormality and atraumatic  Eyes: conjunctivae and sclerae normal and no icterus  Neck: supple  Lungs: CTA bilaterally  Heart: regular rhythm & normal rate and normal S1, S2 +WALLY  Chest: S/P SURGICAL CHANGES  Abdomen: soft, NT  Extremities: moves extremities well, +TRACE PRETIBIAL EDEMA BILAT, no cyanosis and no redness  Skin: no bleeding, bruising or rash, turgor normal, color normal and no lesions noted  Neurologic: Alert, and oriented. No focal deficits    Labs:    WBC WBC   Date Value Ref Range Status   01/06/2020 10.00 3.40 - 10.80 10*3/mm3 Final   01/05/2020 8.60 3.40 - 10.80 10*3/mm3 Final   01/04/2020 7.00 3.40 - 10.80 10*3/mm3 Final      HGB Hemoglobin   Date Value Ref Range Status   01/06/2020 10.9 (L) 12.0 - 15.9 g/dL Final   01/05/2020 10.8 (L) 12.0 - 15.9 g/dL Final   01/04/2020 10.1 (L) 12.0 - 15.9 g/dL Final      HCT Hematocrit   Date Value Ref Range Status   01/06/2020 31.4 (L) 34.0 - 46.6 % Final   01/05/2020 31.1 (L) 34.0 - 46.6 % Final   01/04/2020 29.2 (L) 34.0 - 46.6 % Final      Platlets No results found for: LABPLAT   MCV MCV   Date Value Ref Range Status   01/06/2020 91.9 79.0 - 97.0 fL Final   01/05/2020 90.5 79.0 - 97.0 fL Final   01/04/2020 91.7 79.0 - 97.0 fL Final          Sodium Sodium   Date Value Ref Range Status   01/06/2020 137 136 - 145 mmol/L Final   01/05/2020 140 136 - 145 mmol/L Final   01/04/2020 141 136 - 145 mmol/L Final      Potassium Potassium   Date Value Ref Range Status   01/06/2020 3.8 3.5 - 5.2 mmol/L Final   01/05/2020 4.2 3.5 - 5.2 mmol/L Final   01/05/2020 3.3 (L) 3.5 - 5.2 mmol/L Final   01/04/2020 4.0 3.5 - 5.2 mmol/L Final   01/03/2020 3.7 3.5 - 5.2 mmol/L Final      Chloride Chloride   Date Value Ref Range Status   01/06/2020 99 98 - 107 mmol/L Final   01/05/2020 99 98 - 107 mmol/L Final   01/04/2020 99 98 - 107  mmol/L Final      CO2 CO2   Date Value Ref Range Status   01/06/2020 25.0 22.0 - 29.0 mmol/L Final   01/05/2020 28.0 22.0 - 29.0 mmol/L Final   01/04/2020 31.0 (H) 22.0 - 29.0 mmol/L Final      BUN BUN   Date Value Ref Range Status   01/06/2020 16 6 - 20 mg/dL Final   01/05/2020 12 6 - 20 mg/dL Final   01/04/2020 10 6 - 20 mg/dL Final      Creatinine Creatinine   Date Value Ref Range Status   01/06/2020 1.14 (H) 0.57 - 1.00 mg/dL Final   01/05/2020 1.09 (H) 0.57 - 1.00 mg/dL Final   01/04/2020 1.01 (H) 0.57 - 1.00 mg/dL Final      Calcium Calcium   Date Value Ref Range Status   01/06/2020 9.2 8.6 - 10.5 mg/dL Final   01/05/2020 9.6 8.6 - 10.5 mg/dL Final   01/04/2020 9.3 8.6 - 10.5 mg/dL Final      PO4 No components found for: PO4   Albumin Albumin   Date Value Ref Range Status   01/06/2020 4.20 3.50 - 5.20 g/dL Final   01/05/2020 4.00 3.50 - 5.20 g/dL Final   01/04/2020 3.90 3.50 - 5.20 g/dL Final      Magnesium No results found for: MG   Uric Acid No components found for: URIC ACID     Imaging Results (Last 72 Hours)     ** No results found for the last 72 hours. **          Results for orders placed during the hospital encounter of 12/22/19   XR Chest 1 View    Narrative DATE OF EXAM:  1/1/2020 10:53 AM     PROCEDURE:  XR CHEST 1 VW-     INDICATIONS:  right sided sharp pain below ribs; R07.9-Chest pain, unspecified;  I35.1-Nonrheumatic aortic (valve) insufficiency; I42.0-Dilated  cardiomyopathy; Z95.810-Presence of automatic (implantable) cardiac  defibrillator; I20.0-Unstable angina; I25.119-Atherosclerotic heart  disease of native coronary artery with unspecified angina pectoris;  I35.1-Nonrheumatic aortic (valve) insufficiency; J43.9-Emphysema,      COMPARISON:  12/31/2019     TECHNIQUE:   Single radiographic AP view of the chest was obtained.     FINDINGS:  There is a right internal jugular [place] sheath in place with the tip  in the superior vena cava. There is a left subclavian pacemaker  device/AICD  device with leads overlying the right atrium and right  ventricle. The heart is enlarged with changes of CABG. There are  bilateral pleural effusions left greater than right with bilateral  basilar airspace disease. Aeration is slightly worse in the interval.        Impression Mild worsening of bilateral basilar infiltrates with moderate left and  small-to-moderate right pleural effusions.     Electronically Signed By-Chidi Walton On:1/1/2020 11:01 AM  This report was finalized on [id number] by  Chidi Walton, .   XR Chest 1 View    Narrative DATE OF EXAM:  12/31/2019 10:48 AM     PROCEDURE:  XR CHEST 1 VW-     INDICATIONS:  Hypoxia; R07.9-Chest pain, unspecified; I35.1-Nonrheumatic aortic  (valve) insufficiency; I42.0-Dilated cardiomyopathy; Z95.810-Presence of  automatic (implantable) cardiac defibrillator; I20.0-Unstable angina;  I25.119-Atherosclerotic heart disease of native coronary artery with  unspecified angina pectoris; I35.1-Nonrheumatic aortic (valve)  insufficiency; J43.9-Emphysema, unspecified patient's a 44-year-old  female with hypoxia history of open heart surgery on December 26.  History of aortic regurg, former smoker     COMPARISON:  Comparison is made to study of 12/29/2019     TECHNIQUE:   Single radiographic AP view of the chest was obtained.     FINDINGS:  Today's portable erect study was obtained on 12/31/2019 at 10:50 AM.     Today's study demonstrates the left basilar opacity remaining stable  there is increasing right basilar opacity consistent with increasing  atelectasis and probable small pleural effusion. The right internal  jugular vascular sheath remains in good position the left-sided  dual-lead pacemaker defibrillator remains in good position changes of  median sternotomy coronary artery bypass grafting and aortic valvular  replacement are again seen.        Impression    1. Mild interval worsening from study of December 29  2. Increasing right basilar opacities felt to represent  increasing  atelectasis  3. Stable moderate left basilar opacity consistent with atelectasis,  pneumonia and/or pleural effusion     Electronically Signed By-Ashwin Beavers Jr. On:12/31/2019 11:53 AM  This report was finalized on [id number] by  Ashwin Beavers Jr., .   XR Chest 1 View    Narrative DATE OF EXAM:  12/29/2019 1:02 PM     PROCEDURE:  XR CHEST 1 VW-     INDICATIONS:  CT removal; R07.9-Chest pain, unspecified; I35.1-Nonrheumatic aortic  (valve) insufficiency; I42.0-Dilated cardiomyopathy; Z95.810-Presence of  automatic (implantable) cardiac defibrillator; I20.0-Unstable angina;  I25.119-Atherosclerotic heart disease of native coronary artery with  unspecified angina pectoris; I35.1-Nonrheumatic aortic (valve)  insufficiency patient's a 44-year-old female status post chest tube  removal     COMPARISON:  Study of 5:01 AM earlier today     TECHNIQUE:   Single radiographic AP view of the chest was obtained.     FINDINGS:  Today's portable erect study was obtained on 12/29/2019 at 12:52 PM     Today's follow-up study demonstrates the right internal jugular sheath  being in place. The mediastinal drains have been removed there is no  evidence of pneumomediastinum or pneumothorax on today's study. Moderate  opacity in the left lung base continues either representing atelectasis  or pneumonia. Mild right basilar atelectasis is seen. A left-sided  dual-lead pacemaker defibrillator is seen. Changes of median sternotomy  and coronary artery bypass grafting are present. The upper abdomen  appears unremarkable.        Impression    1. No significant change from earlier than the day other than removal of  mediastinal drains  2. Bilateral basilar opacities left greater than right felt to represent  atelectasis and/or infiltrate, possible pneumonia on the left,  atelectasis right base.  3. Right internal jugular sheath remains intact with tip in superior  vena cava, left-sided dual-lead pacemaker defibrillator  appears  unchanged as are changes of CABG.     Electronically Signed By-Ashwin Beavers Jr. On:12/29/2019 1:16 PM  This report was finalized on [id number] by  Ashwin Beavers Jr., .       Results for orders placed during the hospital encounter of 12/22/19   Duplex Venous Lower Extremity - Bilateral CAR    Narrative · Normal bilateral lower extremity venous duplex scan.            ASSESSMENT / PLAN      Chest pain    COPD (chronic obstructive pulmonary disease) (CMS/HCC)    Hypertension    Cardiomyopathy, dilated (CMS/HCC)    Essential hypertension    Chronic renal impairment    ICD (implantable cardioverter-defibrillator), dual, in situ    GERD without esophagitis    Hypothyroidism (acquired)    Aortic valve regurgitation    Unstable angina pectoris (CMS/HCC)    Coronary artery disease involving native coronary artery of native heart with angina pectoris (CMS/HCC)    Nonrheumatic aortic valve insufficiency      1. ARF/LINSEY/CRF/CKD STG 2------Nonoliguric. ARF/LINSEY resolved. +Mild ARF/LINSEY on top of what appears to be CRF/CKD STG 2 with a baseline serum creatinine of 1.1. Unknown if patient has baseline proteinuria or hematuria. CRF/CKD STG 2 likely secondary to HTN NS/DM and chronic renal hypoperfusion from Cardiomyopathy. +Mild ARF/LINSEY appeared to be secondary to prerenal/hemodynamic fluctuation from MI S/P Cath/IV dye exposure with concomitant ACE-I and diuretic use. Keep off of Zestril for now.   Dose meds for CrCl 30-60 cc/min. No NSAIDs. No further IV dye exposure, unless emergently needed.     2. CAD S/P MI S/P CATH--------CABG done per , CT Surgery.     3. DILATED CARDIOMYOPATHY/VALVULAR HEART DISEASE--------No gross overload at present. Stable on current Bumex dose. Has PM/AICD. Per , Cardiology     4. HTN WITH CKD------BP okay. Avoid hypotension. No ACE/ARB/DRI for now. Temporarily holding diuretics     5. HYPERURICEMIA---------On Allopurinol.  Uric normal     6. HYPERLIPIDEMIA------On Statin.        7. GERD-------On H2 blocker     8. DMII------Resumed metformin. BS okay. On Glucometers, SSI     9. VITAMIN D DEFICIENCY     10. DM PERIPHERAL NEUROPATHY-------On Neurontin     11. HYPOTHYROIDISM------Increased Synthroid as TSH up            Anette Smalls MD  [facility]  [phone]  01/06/20  6:52 AM

## 2020-01-06 NOTE — PROGRESS NOTES
Cardiology Follow-up      Encounter Date:  12/12/2019    Patient ID:   Rafael Mccann is a 44 y.o. female.      Impressions:     Congestive heart failure  Acute systolic heart failure exacerbation on chronic systolic heart failure  CHF NYHA class IV  Valvular heart disease with a significant aortic insufficiency  Essential hypertension  Chronic systolic heart failure  History of cardiomyopathy   Chronic renal insufficiency  Coronary artery disease with diffuse RCA disease  History of diabetes mellitus type 2  History of thyroid disease  Cardiomyopathy with LV ejection fraction of 35%  s/p AICD placement/normal device function     Impressions: Catheterization  Severe two-vessel coronary artery disease  Moderate stenosis involving the distal LAD  4+ aortic regurgitation  Normal PA pressures  Normal filling pressures   Patient is s/p urgent AVR (21 mm Magna), CABG x3 with SV to Diag1 and SV sequential to PDA and then OM3 12/27/2019  Status post coronary artery bypass surgery x3 and aortic valve replacement surgery  Patient is currently hemodynamically stable   Normal sinus rhythm  Ambulate  Continuation of her current medical regimen at the present time.  Anticipate discharge to rehab today.  Continue to increase activity as tolerated.  Follow-up our office in 2 weeks  Need for close monitoring and follow-up discussed with the patient      Reason For Followup:  Cardiomyopathy  Hypertension  Hyperlipidemia  Valvular heart disease  Coronary artery disease         Subjective:  Seen and examined.  Chart and labs reviewed.  Patient reports some mild chest soreness, but denies shortness of breath.  She is ambulating.  Hemoglobin has remained stable.  Discussed with CT surgery.    Assessment & Plan    Impressions:  Congestive heart failure  Acute systolic heart failure exacerbation on chronic systolic heart failure            Objective:    Vitals:  Vitals:    01/06/20 0500 01/06/20 0716 01/06/20 0722 01/06/20 0758    BP:       BP Location:       Patient Position:       Pulse:  82 83    Resp:  16     Temp:    98.2 °F (36.8 °C)   TempSrc:    Oral   SpO2:  97%     Weight: 89 kg (196 lb 3.2 oz)      Height:           Physical Exam:    General: Well-developed, well-nourished 44-year-old -American female seen in no acute distress.  Head:  Normocephalic, atraumatic, mucous membranes moist  Eyes:  Extraocular muscles intact  Neck:  Supple,  no bruit  Lungs: Clear to auscultation bilaterally, few crackles at the bilateral bases  chest wall: Mid line scar with no infection or bleeding  Heart::  Regular rate and rhythm, S1 and S2 normal, 2 x 6 ejection systolic murmur  Abdomen: Soft, nondistended  Extremities: No cyanosis, clubbing, or edema  Pulses: 2+ and symmetric all extremities  Skin:  No rashes or lesions  Neuro/psych: Alert and oriented x3 cranial nerves II through XII are grossly intact    Allergies:  Allergies   Allergen Reactions   • Hydrocodone Hives   • Penicillin G Unknown (See Comments)       Medication Review:     Current Facility-Administered Medications:   •  acetaminophen (TYLENOL) tablet 500 mg, 500 mg, Oral, Q6H PRN, Lane Stock MD, 500 mg at 01/05/20 0142  •  allopurinol (ZYLOPRIM) tablet 300 mg, 300 mg, Oral, Daily, Chidi Pace MD, 300 mg at 01/05/20 0942  •  ALPRAZolam (XANAX) tablet 0.25 mg, 0.25 mg, Oral, BID PRN, Chidi Pace MD, 0.25 mg at 01/06/20 0253  •  amiodarone (PACERONE) tablet 200 mg, 200 mg, Oral, BID With Meals, Lane Stock MD, 200 mg at 01/05/20 1714  •  aspirin tablet 325 mg, 325 mg, Oral, Daily, Lane Stock MD, 325 mg at 01/05/20 0943  •  atorvastatin (LIPITOR) tablet 40 mg, 40 mg, Oral, Nightly, Chidi Pace MD, 40 mg at 01/05/20 2041  •  bisacodyl (DULCOLAX) suppository 10 mg, 10 mg, Rectal, Daily, Kayla Burrell APRN, 10 mg at 01/02/20 1000  •  bumetanide (BUMEX) tablet 1 mg, 1 mg, Oral, Daily, Adalid Sterling MD, 1 mg at 01/05/20 0942  •   carvedilol (COREG) tablet 6.25 mg, 6.25 mg, Oral, BID With Meals, Adalid Sterling MD, 6.25 mg at 01/05/20 1714  •  dextrose (D50W) 25 g/ 50mL Intravenous Solution 25 g, 25 g, Intravenous, Q15 Min PRN, Kayla Burrell APRN  •  dextrose (GLUTOSE) oral gel 15 g, 15 g, Oral, Q15 Min PRN, Kayla Burrell APRN  •  docusate sodium (COLACE) capsule 100 mg, 100 mg, Oral, BID, Chidi Pace MD, 100 mg at 01/05/20 2041  •  enoxaparin (LOVENOX) syringe 40 mg, 40 mg, Subcutaneous, Daily, Chidi Pace MD, 40 mg at 01/05/20 2041  •  ferrous sulfate EC tablet 324 mg, 324 mg, Oral, Daily With Breakfast, Chidi Pace MD, 324 mg at 01/05/20 0942  •  folic acid (FOLVITE) tablet 1 mg, 1 mg, Oral, Daily, Chidi Pace MD, 1 mg at 01/05/20 0942  •  glucagon (human recombinant) (GLUCAGEN DIAGNOSTIC) injection 1 mg, 1 mg, Subcutaneous, Q15 Min PRN, Kayla Burrell APRN  •  guaiFENesin (MUCINEX) 12 hr tablet 600 mg, 600 mg, Oral, Q12H, Hasmukh David MD, 600 mg at 01/05/20 2041  •  insulin lispro (humaLOG) injection 0-9 Units, 0-9 Units, Subcutaneous, 4x Daily With Meals & Nightly **AND** insulin lispro (humaLOG) injection 0-9 Units, 0-9 Units, Subcutaneous, PRN, Kayla Burrell APRN  •  ipratropium-albuterol (DUO-NEB) nebulizer solution 3 mL, 3 mL, Nebulization, 4x Daily - RT, Loraine Son APRN, 3 mL at 01/06/20 0716  •  ipratropium-albuterol (DUO-NEB) nebulizer solution 3 mL, 3 mL, Nebulization, Q4H PRN, Loraine Son APRN, 3 mL at 01/06/20 0434  •  lactulose (CHRONULAC) 10 GM/15ML solution 30 g, 30 g, Oral, Daily, Kayla Burrell, ALEX, 30 g at 01/02/20 0950  •  levothyroxine (SYNTHROID, LEVOTHROID) tablet 200 mcg, 200 mcg, Oral, Q AM, Chidi Pace MD, 200 mcg at 01/06/20 0638  •  lidocaine (LIDODERM) 5 % 2 patch, 2 patch, Transdermal, Q24H, Hasmukh David MD, 2 patch at 01/04/20 0944  •  metFORMIN (GLUCOPHAGE) tablet 500 mg, 500 mg, Oral, BID With Meals, Adalid Sterling MD, 500  mg at 01/05/20 1714  •  montelukast (SINGULAIR) tablet 10 mg, 10 mg, Oral, Nightly, Chidi Pace MD, 10 mg at 01/05/20 2041  •  MOUTH KOTE solution 2 mL, 2 spray, Mouth/Throat, Q4H PRN, Chidi Pace MD  •  naloxone (NARCAN) injection 0.4 mg, 0.4 mg, Intravenous, Q5 Min PRN, Lane Stock MD  •  ondansetron (ZOFRAN) injection 4 mg, 4 mg, Intravenous, Q6H PRN, Chidi Pace MD, 4 mg at 01/02/20 2125  •  oxyCODONE (ROXICODONE) immediate release tablet 5 mg, 5 mg, Oral, Q4H PRN, Chidi Pace MD, 5 mg at 01/06/20 0252  •  pantoprazole (PROTONIX) EC tablet 40 mg, 40 mg, Oral, Q AM, Chidi Pace MD, 40 mg at 01/06/20 0638  •  polyethylene glycol (MIRALAX) powder 17 g, 17 g, Oral, BID, Chidi Pace MD, 17 g at 01/02/20 0949  •  potassium chloride (K-DUR,KLOR-CON) CR tablet 20 mEq, 20 mEq, Oral, PRN, 20 mEq at 01/05/20 0942 **OR** potassium chloride (KLOR-CON) packet 20 mEq, 20 mEq, Oral, PRN, Chidi Pace MD  •  potassium chloride 10 mEq in 100 mL IVPB, 10 mEq, Intravenous, Q1H PRN **OR** potassium chloride 10 mEq in 100 mL IVPB, 10 mEq, Intravenous, Q1H PRN, Chidi Pace MD  •  senna (SENOKOT) tablet 1 tablet, 1 tablet, Oral, BID, Chidi Pace MD, 1 tablet at 01/05/20 2041  •  sodium chloride 0.9 % infusion, 30 mL/hr, Intravenous, Continuous, Chidi Pace MD, Last Rate: 30 mL/hr at 12/27/19 1430, 30 mL/hr at 12/27/19 1430    Family History:  Family History   Problem Relation Age of Onset   • Heart disease Father        Past Medical History:  Past Medical History:   Diagnosis Date   • CHF (congestive heart failure) (CMS/HCC)    • COPD (chronic obstructive pulmonary disease) (CMS/HCC)    • Diabetes (CMS/HCC)    • Hyperlipidemia    • Hypertension        Past surgical History:  Past Surgical History:   Procedure Laterality Date   • AORTIC VALVE REPAIR/REPLACEMENT N/A 12/27/2019    Procedure: AORTIC VALVE REPAIR/REPLACEMENT;  Surgeon: Lane Stock,  MD;  Location: [facility];  Service: Cardiothoracic   • BREAST LUMPECTOMY     • CARDIAC CATHETERIZATION N/A 2019    Procedure: Right and Left Heart Cath 19 @ 0900;  Surgeon: Wayne Luna MD;  Location: [facility] CATH INVASIVE LOCATION;  Service: Cardiovascular   • CARDIAC CATHETERIZATION N/A 2019    Procedure: Coronary angiography;  Surgeon: Wayne Luna MD;  Location: [facility] CATH INVASIVE LOCATION;  Service: Cardiovascular   • CARDIAC ELECTROPHYSIOLOGY PROCEDURE Left 2019    Procedure: Dual-chamber ICD insertion;  Surgeon: Héctor Eckert MD;  Location: [facility] CATH INVASIVE LOCATION;  Service: Cardiovascular   • CARDIAC ELECTROPHYSIOLOGY PROCEDURE Left 2019    Procedure: Lead Revision;  Surgeon: Héctor Eckert MD;  Location: [facility] CATH INVASIVE LOCATION;  Service: Cardiovascular   • CHOLECYSTECTOMY     • CORONARY ARTERY BYPASS GRAFT N/A 2019    Procedure: CORONARY ARTERY BYPASS GRAFTING;  Surgeon: Lane Stock MD;  Location: [facility];  Service: Cardiothoracic   • HYSTERECTOMY     • INSERT / REPLACE / REMOVE PACEMAKER     • THYROID SURGERY         Social History:  Social History     Socioeconomic History   • Marital status:      Spouse name: Not on file   • Number of children: Not on file   • Years of education: Not on file   • Highest education level: Not on file   Tobacco Use   • Smoking status: Former Smoker     Last attempt to quit: 2019     Years since quittin.0   • Smokeless tobacco: Never Used   Substance and Sexual Activity   • Alcohol use: No     Frequency: Never   • Drug use: No   • Sexual activity: Defer       Review of Systems:  The following systems were reviewed as they relate to the cardiovascular system: Constitutional, Eyes, ENT, Cardiovascular, Respiratory, Gastrointestinal, Integumentary, Neurological, Psychiatric, Hematologic, Endocrine, Musculoskeletal, and Genitourinary. The pertinent cardiovascular findings  are reported above with all other cardiovascular points within those systems being negative.        NOTE: The following portions of the patient's history were reviewed and updated this visit as appropriate: allergies, current medications, past family history, past medical history, past social history, past surgical history and problem list.        Electronically signed by ALEX Hua, 01/06/20, 9:41 AM.

## 2020-01-06 NOTE — PLAN OF CARE
Problem: Patient Care Overview  Goal: Plan of Care Review  Flowsheets (Taken 1/6/2020 7737)  Progress: improving  Plan of Care Reviewed With: patient  Outcome Summary: Pt has made marked improvement since last session, she is now on room air with SpO2 >90% during activity and > 95% at rest, she is able to ambulate > 100' Indepdently with no AD, no LOB, and no gait dysfunction noted. Pt does report fatigue following 2 bouts of 100' gait, rating RPE at 8/12, though pt with no physical indicators of SOA or increased work of breathing. This therapist is confident pt would be safe to d/c home with assist of family and HHPT.

## 2020-01-06 NOTE — THERAPY TREATMENT NOTE
Acute Care - Occupational Therapy Treatment Note  [facility]     Patient Name: Rafael Mccann  : 1975  MRN: [id number]  Today's Date: 2020             Admit Date: 2019       ICD-10-CM ICD-9-CM   1. Chest pain, unspecified type R07.9 786.50   2. Aortic valve insufficiency, etiology of cardiac valve disease unspecified I35.1 424.1   3. Cardiomyopathy, dilated (CMS/[facility]) I42.0 425.4   4. ICD (implantable cardioverter-defibrillator), dual, in situ Z95.810 V45.02   5. Unstable angina pectoris (CMS/[facility]) I20.0 411.1   6. Coronary artery disease involving native coronary artery of native heart with angina pectoris (CMS/[facility]) I25.119 414.01     413.9   7. Nonrheumatic aortic valve insufficiency I35.1 424.1   8. Pulmonary emphysema, unspecified emphysema type (CMS/[facility]) J43.9 492.8   9. Essential hypertension I10 401.9     Patient Active Problem List   Diagnosis   • COPD (chronic obstructive pulmonary disease) (CMS/[facility])   • Hypertension   • Cardiomyopathy, dilated (CMS/[facility])   • Essential hypertension   • Chronic renal impairment   • ICD (implantable cardioverter-defibrillator), dual, in situ   • Chest pain   • GERD without esophagitis   • Hypothyroidism (acquired)   • Aortic valve regurgitation   • Unstable angina pectoris (CMS/[facility])   • Coronary artery disease involving native coronary artery of native heart with angina pectoris (CMS/[facility])   • Nonrheumatic aortic valve insufficiency     Past Medical History:   Diagnosis Date   • CHF (congestive heart failure) (CMS/[facility])    • COPD (chronic obstructive pulmonary disease) (CMS/[facility])    • Diabetes (CMS/[facility])    • Hyperlipidemia    • Hypertension      Past Surgical History:   Procedure Laterality Date   • AORTIC VALVE REPAIR/REPLACEMENT N/A 2019    Procedure: AORTIC VALVE REPAIR/REPLACEMENT;  Surgeon: Lane Stock MD;  Location: [facility];  Service: Cardiothoracic   • BREAST LUMPECTOMY     • CARDIAC CATHETERIZATION N/A 2019    Procedure: Right  and Left Heart Cath 12/24/19 @ 0900;  Surgeon: Wayne Luna MD;  Location: [facility] CATH INVASIVE LOCATION;  Service: Cardiovascular   • CARDIAC CATHETERIZATION N/A 12/24/2019    Procedure: Coronary angiography;  Surgeon: Wayne Luna MD;  Location: [facility] CATH INVASIVE LOCATION;  Service: Cardiovascular   • CARDIAC ELECTROPHYSIOLOGY PROCEDURE Left 6/28/2019    Procedure: Dual-chamber ICD insertion;  Surgeon: Héctor Eckert MD;  Location: [facility] CATH INVASIVE LOCATION;  Service: Cardiovascular   • CARDIAC ELECTROPHYSIOLOGY PROCEDURE Left 8/14/2019    Procedure: Lead Revision;  Surgeon: Héctor Eckert MD;  Location: [facility] CATH INVASIVE LOCATION;  Service: Cardiovascular   • CHOLECYSTECTOMY     • CORONARY ARTERY BYPASS GRAFT N/A 12/27/2019    Procedure: CORONARY ARTERY BYPASS GRAFTING;  Surgeon: Lane Stock MD;  Location: [facility];  Service: Cardiothoracic   • HYSTERECTOMY     • INSERT / REPLACE / REMOVE PACEMAKER     • THYROID SURGERY         Therapy Treatment    Rehabilitation Treatment Summary     Row Name 01/06/20 1200             Treatment Time/Intention    Discipline  occupational therapist  -AL      Document Type  therapy note (daily note)  -AL      Subjective Information  -- reports she is doing all self care independently.  -AL      Mode of Treatment  occupational therapy  -AL      Patient/Family Observations  pt stated if she is able to go home at dc v rehab, this is what she prefers.   -AL      Patient Effort  good  -AL      Recorded by [AL] Allison Danielle, OT 01/06/20 1251      Row Name 01/06/20 1200             Safety Issues, Functional Mobility    Comment, Safety Issues/Impairments (Mobility)  needs intermittent cueing for attn to sternal precautions.   -AL      Recorded by [AL] Allison Danielle, OT 01/06/20 1251      Row Name 01/06/20 1200             Functional Mobility    Functional Mobility- Comment  pt walked in room/bathroom, completed transfers, badls  without AD, on RA, no lob> conditional indep-mod I. pt also completed sit to stand repetitionsconsecutively without AD mod I. able to maintain O2 saturation in 90's.   -AL      Recorded by [AL] Allison Danielle, OT 01/06/20 1251      Row Name 01/06/20 1200             Bed-Chair Transfer    Bed-Chair Hugo (Transfers)  conditional independence  -AL      Recorded by [AL] Allison Danielle, OT 01/06/20 1251      Row Name 01/06/20 1200             Sit-Stand Transfer    Sit-Stand Hugo (Transfers)  conditional independence;supervision intermittent cueing needed for sternal prec. completed x 10  -AL      Assistive Device (Sit-Stand Transfers)  -- no AD  -AL      Recorded by [AL] Allison Danielle OT 01/06/20 1251      Row Name 01/06/20 1200             Stand-Sit Transfer    Stand-Sit Hugo (Transfers)  verbal cues;supervision;conditional independence intermittent cues for sternal precautions.   -AL      Assistive Device (Stand-Sit Transfers)  -- no ad  -AL      Recorded by [AL] Allison Danielle, OT 01/06/20 1251      Row Name 01/06/20 1200             Toilet Transfer    Hugo Level (Toilet Transfer)  conditional independence  -AL      Recorded by [AL] Allison Danielle, OT 01/06/20 1251      Row Name 01/06/20 1200             Lower Body Dressing Assessment/Training    Lower Body Dressing Hugo Level  lower body dressing skills;conditional independence  -AL      Recorded by [AL] Allison Danielle, OT 01/06/20 1251      Row Name 01/06/20 1200             Grooming Assessment/Training    Hugo Level (Grooming)  grooming skills;independent;conditional independence  -AL      Grooming Position  sink side  -AL      Recorded by [AL] Allison Danielle, OT 01/06/20 1251      Row Name 01/06/20 1200             Self-Feeding Assessment/Training    Hugo Level (Feeding)  feeding skills;independent  -AL      Recorded by [AL] Allison Danielle, OT 01/06/20 1251      Row Name 01/06/20 1200              Toileting Assessment/Training    Nicholas Level (Toileting)  toileting skills;conditional independence  -AL      Comment (Toileting)  pt up ad maria del rosario on RA completing toileting without assitance.   -AL      Recorded by [AL] Allison Danielle, OT 01/06/20 1251      Row Name 01/06/20 1200             Static Sitting Balance    Level of Nicholas (Unsupported Sitting, Static Balance)  independent  -AL      Recorded by [AL] Allison Danielle OT 01/06/20 1251      Row Name 01/06/20 1200             Dynamic Sitting Balance    Level of Nicholas, Reaches Outside Midline (Sitting, Dynamic Balance)  independent  -AL      Recorded by [AL] Allison Danielle OT 01/06/20 1251      Row Name 01/06/20 1200             Static Standing Balance    Level of Nicholas (Supported Standing, Static Balance)  conditional independence  -AL      Assistive Device Utilized (Supported Standing, Static Balance)  -- no ad  -AL      Recorded by [AL] Allison Danielle OT 01/06/20 1251      Row Name 01/06/20 1200             Dynamic Standing Balance    Level of Nicholas, Reaches Outside Midline (Standing, Dynamic Balance)  conditional independence  -AL      Assistive Device Utilized (Supported Standing, Dynamic Balance)  -- no ad  -AL      Recorded by [AL] Allison Danielle OT 01/06/20 1251      Row Name 01/06/20 1200             Positioning and Restraints    Pre-Treatment Position  in bed sitting eob  -AL      Post Treatment Position  bed eob  -AL      In Bed  sitting EOB;with nsg;with family/caregiver  -AL      Recorded by [AL] Allison Danielle OT 01/06/20 1251      Row Name                Wound 12/27/19 anterior;midline sternal Incision    Wound - Properties Group Date first assessed: 12/27/19 [NM] Present on Hospital Admission: N [NM] Orientation: anterior;midline [NM] Location: sternal [NM] Primary Wound Type: Incision [NM] Recorded by:  [NM] Roz Santana RN 12/27/19 1000    Row Name                Wound 12/27/19 Left anterior;distal  thigh Incision    Wound - Properties Group Date first assessed: 12/27/19 [NM] Present on Hospital Admission: N [NM] Side: Left [NM] Orientation: anterior;distal [NM] Location: thigh [NM] Primary Wound Type: Incision [NM], endovein harvest sites  Recorded by:  [NM] Roz Santana RN 12/27/19 1001    Row Name 01/06/20 1200             Plan of Care Review    Plan of Care Reviewed With  patient  -AL      Progress  improving  -AL      Outcome Summary  pt now completing badls indep-conditional indep without ad, on RA with good saturation. pt showed good balalnce throughout. she does need occasional cues to attend to sternal precautions. in terms of OT, feel pt is capable of returning home with hh instead of rehab.   -AL      Recorded by [AL] Allison Danielle OT 01/06/20 1251      Row Name 01/06/20 1200             Outcome Summary/Treatment Plan (OT)    Daily Summary of Progress (OT)  progress toward functional goals as expected  -AL      Anticipated Discharge Disposition (OT)  home with home health  -AL      Recorded by [AL] Allison Danielle OT 01/06/20 1251        User Key  (r) = Recorded By, (t) = Taken By, (c) = Cosigned By    Initials Name Effective Dates Discipline    AL Allison Danielle OT 03/01/19 -  OT    NM Roz Santana, RN 03/01/19 -  Nurse        Wound 12/27/19 anterior;midline sternal Incision (Active)   Dressing Appearance no drainage;open to air 1/6/2020  8:00 AM   Closure Approximated 1/6/2020  8:00 AM   Base dry;clean 1/6/2020  8:00 AM   Drainage Amount none 1/6/2020  8:00 AM   Care, Wound cleansed with;sterile normal saline;other (see comments) 1/6/2020  8:00 AM       Wound 12/27/19 Left anterior;distal thigh Incision (Active)   Dressing Appearance dry;intact;no drainage 1/6/2020  8:00 AM   Closure Approximated 1/6/2020  8:00 AM   Base clean;dry 1/6/2020  8:00 AM   Periwound intact;dry 1/6/2020  8:00 AM   Drainage Amount none 1/6/2020  8:00 AM           OT Recommendation and Plan  Outcome  Summary/Treatment Plan (OT)  Daily Summary of Progress (OT): progress toward functional goals as expected  Anticipated Discharge Disposition (OT): home with home health  Planned Therapy Interventions (OT Eval): activity tolerance training, transfer/mobility retraining, BADL retraining  Therapy Frequency (OT Eval): 5 times/wk  Daily Summary of Progress (OT): progress toward functional goals as expected  Plan of Care Review  Plan of Care Reviewed With: patient  Plan of Care Reviewed With: patient  Outcome Summary: pt now completing badls indep-conditional indep without ad, on RA with good saturation. pt showed good balalnce throughout. she does need occasional cues to attend to sternal precautions. in terms of OT, feel pt is capable of returning home with hh instead of rehab.        Time Calculation:   Time Calculation- OT     Row Name 01/06/20 1241             Time Calculation- OT    OT Start Time  1000  -AL      OT Stop Time  1018  -AL      OT Time Calculation (min)  18 min  -AL      Total Timed Code Minutes- OT  18 minute(s)  -AL      OT Received On  01/06/20  -AL      OT - Next Appointment  01/08/20  -AL        User Key  (r) = Recorded By, (t) = Taken By, (c) = Cosigned By    Initials Name Provider Type    AL Allison Danielle OT Occupational Therapist        Therapy Charges for Today     Code Description Service Date Service Provider Modifiers Qty    [id number]  OT SELF CARE/MGMT/TRAIN EA 15 MIN 1/6/2020 Allison Danielle OT GO 1    [id number] HC OT THERAPEUTIC ACT EA 15 MIN 1/6/2020 Allison Danielle OT GO 1               Allison Danielle OT  1/6/2020

## 2020-01-06 NOTE — PROGRESS NOTES
Continued Stay Note   [facility]     Patient Name: Rafael Mccann  MRN: [id number]  Today's Date: 1/6/2020    Admit Date: 12/22/2019    Discharge Plan     Row Name 01/06/20 1409       Plan    Plan Comments  Barriers to dc: getting [facility] today and awaiting precert for [facility]    Row Name 01/06/20 1408              Plan    Plan  DC Plan:  [facility] sub-acute at discharge pending bed availability and pre-cert.  Pre-cert started today (1/6 at 10:30 am).  No PASRR needed for [facility].    Plan Comments  SW spoke to [facility] liaison via text.  Pre-cert started for sub-acute placement at 10:30 am.              Leela Membreno RN

## 2020-01-06 NOTE — PROGRESS NOTES
Pulmonary/ Critical Care progress Note        Patient Name:  Rafael Mccann    :  1975    Medical Record:  [id number]    Rafael Mccann is a 44 y.o. female who we were asked to see in consultation for decreased level of consciousness.   Patient is POD#3 CABG and AVR after presenting to the ER on 19 with complaints of chest pain, dyspnea and tachycardia.  Patient with a history of CAD being medically managed as well as cardiomyopathy.   Cardiac cath on 19 showed severe two vessel disease and 4+aortic regurg.       This evening patient was found to be very difficult to arouse in bed after being up to chair this morning and ambulating in the afternoon.   Patient was given Narcan with improvement in her mental status.  ABG was obtained as well which showed pCO2 of 49 and a pAO2 of 68.   Patient had been requiring Dilaudid 0.25 mg q2h IV and Oxycodone 5 mg q4h, for pain control in addition had Ultram and Benadryl earlier today.      SUBJECTIVE:   :  OOB to chair, awake and alert.  Complaints of right lower rib pain.    20:  OOB to chair,  Remains with complaints of pain  20:  Sitting on side of bed, remains with some pain to right side.    1/3: chest pain much better.  Her oxygen requirement stable on 2 L  - she is on roomair. Denies any pain, having BM and tolerating diet  - stable, on room air, walks in room without assistance  : Awake.  Currently on room air.  Complains of some shortness of breath with activity.  No nausea or vomiting.  Complains of some chest wall pain.    Allergies    Allergies   Allergen Reactions   • Hydrocodone Hives   • Penicillin G Unknown (See Comments)       Medications    Scheduled Meds:    allopurinol 300 mg Oral Daily   amiodarone 200 mg Oral BID With Meals   aspirin 325 mg Oral Daily   atorvastatin 40 mg Oral Nightly   bisacodyl 10 mg Rectal Daily   bumetanide 1 mg Oral Daily   carvedilol 6.25 mg Oral BID With Meals   docusate  sodium 100 mg Oral BID   enoxaparin 40 mg Subcutaneous Daily   ferrous sulfate 324 mg Oral Daily With Breakfast   folic acid 1 mg Oral Daily   guaiFENesin 600 mg Oral Q12H   insulin lispro 0-9 Units Subcutaneous 4x Daily With Meals & Nightly   ipratropium-albuterol 3 mL Nebulization 4x Daily - RT   lactulose 30 g Oral Daily   levothyroxine 200 mcg Oral Q AM   lidocaine 2 patch Transdermal Q24H   metFORMIN 500 mg Oral BID With Meals   montelukast 10 mg Oral Nightly   pantoprazole 40 mg Oral Q AM   polyethylene glycol 17 g Oral BID   senna 1 tablet Oral BID     Continuous Infusions:    sodium chloride 30 mL/hr Last Rate: 30 mL/hr (19 1430)     PRN Meds:.•  acetaminophen  •  ALPRAZolam  •  dextrose  •  dextrose  •  glucagon (human recombinant)  •  insulin lispro **AND** insulin lispro  •  ipratropium-albuterol  •  MOUTH KOTE  •  naloxone  •  ondansetron  •  oxyCODONE  •  potassium chloride **OR** potassium chloride  •  potassium chloride **OR** potassium chloride      Physical Exam    tMax 24 hrs:  Temp (24hrs), Av °F (36.7 °C), Min:97.3 °F (36.3 °C), Max:98.5 °F (36.9 °C)    Vitals Ranges:  Temp:  [97.3 °F (36.3 °C)-98.5 °F (36.9 °C)] 98 °F (36.7 °C)  Heart Rate:  [74-85] 83  Resp:  [16-18] 16  BP: (140-160)/(90-92) 160/92  Intake and Output Last 3 Shifts:  I/O last 3 completed shifts:  In: 1520 [P.O.:1520]  Out: -     General Appearance: Awake, no distress, appears stated age  Head:  Normocephalic, without obvious abnormality, atraumatic  Eyes: conjunctiva/corneas clear , EOMI  Neck:  Supple,  no adenopathy, no JVD or bruit      Lungs: Good air entry bilaterally, no crackles or wheezes  Chest wall:  No tenderness  Heart:  Regular rate and rhythm, S1 and S2 normal, no murmur, rub or gallop  Abdomen:  Soft, non-tender, bowel sounds active all four quadrants,  no masses, no hepatomegaly, no splenomegaly  Extremities:  Extremities normal, no cyanosis or edema  Pulses: 2+ and symmetric all extremities  Skin:  No  rashes or lesions  Neurologic: Awake, 5 x 5 power in all extremities.  Cranial nerves II through XII grossly intact.      labs    Lab Results (last 24 hours)     Procedure Component Value Units Date/Time    Renal Function Panel [[id number]]  (Abnormal) Collected:  01/06/20 0439    Specimen:  Blood Updated:  01/06/20 0534     Glucose 111 mg/dL      BUN 16 mg/dL      Creatinine 1.14 mg/dL      Sodium 137 mmol/L      Potassium 3.8 mmol/L      Chloride 99 mmol/L      CO2 25.0 mmol/L      Calcium 9.2 mg/dL      Albumin 4.20 g/dL      Phosphorus 3.9 mg/dL      Anion Gap 13.0 mmol/L      BUN/Creatinine Ratio 14.0     eGFR  African Amer 62 mL/min/1.73     Narrative:       GFR Normal >60  Chronic Kidney Disease <60  Kidney Failure <15      CBC (No Diff) [[id number]]  (Abnormal) Collected:  01/06/20 0439    Specimen:  Blood Updated:  01/06/20 0449     WBC 10.00 10*3/mm3      RBC 3.42 10*6/mm3      Hemoglobin 10.9 g/dL      Hematocrit 31.4 %      MCV 91.9 fL      MCH 31.7 pg      MCHC 34.5 g/dL      RDW 14.3 %      RDW-SD 46.4 fl      MPV 6.4 fL      Platelets 421 10*3/mm3     POC Glucose Once [[id number]]  (Abnormal) Collected:  01/05/20 1955    Specimen:  Blood Updated:  01/05/20 1957     Glucose 115 mg/dL      Comment: Serial Number: [id number]:  [id number]       Potassium [[id number]]  (Normal) Collected:  01/05/20 1853    Specimen:  Blood Updated:  01/05/20 1914     Potassium 4.2 mmol/L     POC Glucose Once [[id number]]  (Abnormal) Collected:  01/05/20 1629    Specimen:  Blood Updated:  01/05/20 1633     Glucose 108 mg/dL      Comment: Serial Number: [id number]:  [id number]       POC Glucose Once [[id number]]  (Abnormal) Collected:  01/05/20 1148    Specimen:  Blood Updated:  01/05/20 1157     Glucose 118 mg/dL      Comment: Serial Number: [id number]:  [id number]       POC Glucose Once [[id number]]  (Abnormal) Collected:  01/05/20 0950    Specimen:  Blood Updated:  01/05/20 0952     Glucose 143 mg/dL       Comment: Serial Number: [id number]:  [id number]                  Imaging & Other Studies    Imaging Results (Last 72 Hours)     ** No results found for the last 72 hours. **            AssessmenT/PLAN  Decreased level of consciousness related to opioids  INDRA compliant with BiPAP use  POD CABG and AVR - aortic regurgitation and CAD  HFrEF - EF35%  AICD  CKD stage II  T2DM  Hypothyroidism on Synthroid  Anxiety on Xanax  Chronic pain follows outpatient pain management clinict  COPD without exacerbation  Cardiomyopathy    Plan:   -Currently on room air.  -Continue efforts at pulmonary toilet.  Need for incentive spirometry reinforced.  -cont to use home PAP with sleep   -limit sedating medication   -Lidocaine patch  -duonebs and Mucinex   - on oral diuretics.  Nephrology managing  OT recommends - rehab  Awaiting rehab placement   Discussed with patient and family at bedside.  Pulmonary status is unchanged.  We will continue with current plan of care.

## 2020-01-06 NOTE — THERAPY TREATMENT NOTE
Patient Name: Rafael Mccann  : 1975    MRN: [id number]                              Today's Date: 2020       Admit Date: 2019    Visit Dx:     ICD-10-CM ICD-9-CM   1. Chest pain, unspecified type R07.9 786.50   2. Aortic valve insufficiency, etiology of cardiac valve disease unspecified I35.1 424.1   3. Cardiomyopathy, dilated (CMS/[facility]) I42.0 425.4   4. ICD (implantable cardioverter-defibrillator), dual, in situ Z95.810 V45.02   5. Unstable angina pectoris (CMS/[facility]) I20.0 411.1   6. Coronary artery disease involving native coronary artery of native heart with angina pectoris (CMS/[facility]) I25.119 414.01     413.9   7. Nonrheumatic aortic valve insufficiency I35.1 424.1   8. Pulmonary emphysema, unspecified emphysema type (CMS/[facility]) J43.9 492.8   9. Essential hypertension I10 401.9     Patient Active Problem List   Diagnosis   • COPD (chronic obstructive pulmonary disease) (CMS/[facility])   • Hypertension   • Cardiomyopathy, dilated (CMS/[facility])   • Essential hypertension   • Chronic renal impairment   • ICD (implantable cardioverter-defibrillator), dual, in situ   • Chest pain   • GERD without esophagitis   • Hypothyroidism (acquired)   • Aortic valve regurgitation   • Unstable angina pectoris (CMS/[facility])   • Coronary artery disease involving native coronary artery of native heart with angina pectoris (CMS/[facility])   • Nonrheumatic aortic valve insufficiency     Past Medical History:   Diagnosis Date   • CHF (congestive heart failure) (CMS/[facility])    • COPD (chronic obstructive pulmonary disease) (CMS/[facility])    • Diabetes (CMS/[facility])    • Hyperlipidemia    • Hypertension      Past Surgical History:   Procedure Laterality Date   • AORTIC VALVE REPAIR/REPLACEMENT N/A 2019    Procedure: AORTIC VALVE REPAIR/REPLACEMENT;  Surgeon: Lane Stokc MD;  Location: [facility];  Service: Cardiothoracic   • BREAST LUMPECTOMY     • CARDIAC CATHETERIZATION N/A 2019    Procedure: Right and Left Heart Cath 19  @ 0900;  Surgeon: Wayne Luna MD;  Location: [facility] CATH INVASIVE LOCATION;  Service: Cardiovascular   • CARDIAC CATHETERIZATION N/A 12/24/2019    Procedure: Coronary angiography;  Surgeon: Wayne Luna MD;  Location: [facility] CATH INVASIVE LOCATION;  Service: Cardiovascular   • CARDIAC ELECTROPHYSIOLOGY PROCEDURE Left 6/28/2019    Procedure: Dual-chamber ICD insertion;  Surgeon: Héctor Eckert MD;  Location: [facility] CATH INVASIVE LOCATION;  Service: Cardiovascular   • CARDIAC ELECTROPHYSIOLOGY PROCEDURE Left 8/14/2019    Procedure: Lead Revision;  Surgeon: Héctor Eckert MD;  Location: [facility] CATH INVASIVE LOCATION;  Service: Cardiovascular   • CHOLECYSTECTOMY     • CORONARY ARTERY BYPASS GRAFT N/A 12/27/2019    Procedure: CORONARY ARTERY BYPASS GRAFTING;  Surgeon: Lane Stock MD;  Location: [facility] CVOR;  Service: Cardiothoracic   • HYSTERECTOMY     • INSERT / REPLACE / REMOVE PACEMAKER     • THYROID SURGERY       General Information     Row Name 01/06/20 1325          PT Evaluation Time/Intention    Document Type  therapy note (daily note)  -     Mode of Treatment  physical therapy  -     Row Name 01/06/20 1325          Cognitive Assessment/Intervention- PT/OT    Cognitive Assessment/Intervention Comment  Pt is severely agitated and anxious re: d/c plans. Per OT pt voices excitement/desire to d/c home, to this therapist pt voices aggrivation that d/c plans continue to change once she makes a decision.   -     Row Name 01/06/20 1325          Safety Issues, Functional Mobility    Safety Issues Affecting Function (Mobility)  safety precautions follow-through/compliance  -     Comment, Safety Issues/Impairments (Mobility)  Occasional reminders in regard to sternal precautions (improving, as no reminders given today)  -       User Key  (r) = Recorded By, (t) = Taken By, (c) = Cosigned By    Initials Name Provider Type     Gayatri Torres, PTA Physical Therapy Assistant         Mobility     Row Name 01/06/20 1327          Bed Mobility Assessment/Treatment    Comment (Bed Mobility)  Not assessed as pt is sitting EOB on entering room   -     Row Name 01/06/20 1327          Sit-Stand Transfer    Sit-Stand [place] (Transfers)  conditional independence Pt is able to perform sit<> stand from heightened bed surface and no VC's   -[facility] Name 01/06/20 1327          Gait/Stairs Assessment/Training    [place] Level (Gait)  independent  -     Distance in Feet (Gait)  100' x 2   -     Comment (Gait/Stairs)  Pt is able to ambulate 2 x 100' with no AD and no significant gait dysfunction noted.   -       User Key  (r) = Recorded By, (t) = Taken By, (c) = Cosigned By    Initials Name Provider Type    Gayatri Harrington PTA Physical Therapy Assistant        Obj/Interventions     [facility] Name 01/06/20 1332          Static Sitting Balance    Level of [place] (Unsupported Sitting, Static Balance)  independent  -     Sitting Position (Unsupported Sitting, Static Balance)  sitting on edge of bed  -     Time Able to Maintain Position (Unsupported Sitting, Static Balance)  more than 5 minutes  -[facility] Name 01/06/20 1332          Dynamic Sitting Balance    Level of [place], Reaches Outside Midline (Sitting, Dynamic Balance)  independent  -     Sitting Position, Reaches Outside Midline (Sitting, Dynamic Balance)  sitting on edge of bed  -[facility] Name 01/06/20 1332          Static Standing Balance    Level of [place] (Supported Standing, Static Balance)  independent  -     Time Able to Maintain Position (Supported Standing, Static Balance)  45 to 60 seconds  -[facility] Name 01/06/20 1332          Dynamic Standing Balance    Level of [place], Reaches Outside Midline (Standing, Dynamic Balance)  independent  -     Time Able to Maintain Position, Reaches Outside Midline (Standing, Dynamic Balance)  2 to 3 minutes  -       User Key  (r) = Recorded By,  (t) = Taken By, (c) = Cosigned By    Initials Name Provider Type    Gayatri Harrington PTA Physical Therapy Assistant        Goals/Plan    No documentation.       Clinical Impression     Row Name 01/06/20 1332          Pain Scale: Numbers Pre/Post-Treatment    Pain Scale: Numbers, Pretreatment  0/10 - no pain  -SM     Pain Scale: Numbers, Post-Treatment  0/10 - no pain  -SM     Row Name 01/06/20 1332          Plan of Care Review    Plan of Care Reviewed With  patient  -     Progress  improving  -     Outcome Summary  Pt has made marked improvement since last session, she is now on room air with SpO2 >90% during activity and > 95% at rest, she is able to ambulate > 100' Indepdently with no AD, no LOB, and no gait dysfunction noted. Pt does report fatigue following 2 bouts of 100' gait, rating RPE at 8/12, though pt with no physical indicators of SOA or increased work of breathing. This therapist is confident pt would be safe to d/c home with assist of family and HHPT.    -SM     Row Name 01/06/20 1332          Vital Signs    Pre SpO2 (%)  98  -SM     O2 Delivery Pre Treatment  room air  -SM     Intra SpO2 (%)  93  -SM     O2 Delivery Intra Treatment  room air  -SM     Post SpO2 (%)  97  -SM     O2 Delivery Post Treatment  room air  -SM     Row Name 01/06/20 1332          Positioning and Restraints    Post Treatment Position  chair  -SM     In Chair  sitting;with other staff  -       User Key  (r) = Recorded By, (t) = Taken By, (c) = Cosigned By    Initials Name Provider Type    Gayatri Harrington PTA Physical Therapy Assistant        Outcome Measures     Row Name 01/06/20 5365          How much help from another person do you currently need...    Turning from your back to your side while in flat bed without using bedrails?  3  -SM     Moving from lying on back to sitting on the side of a flat bed without bedrails?  3  -SM     Moving to and from a bed to a chair (including a wheelchair)?  4  -SM     Standing  up from a chair using your arms (e.g., wheelchair, bedside chair)?  4  -SM     Climbing 3-5 steps with a railing?  4  -SM     To walk in hospital room?  4  -SM     AM-PAC 6 Clicks Score (PT)  22  -SM       User Key  (r) = Recorded By, (t) = Taken By, (c) = Cosigned By    Initials Name Provider Type    Gayatri Harrington PTA Physical Therapy Assistant          PT Recommendation and Plan     Plan of Care Reviewed With: patient  Progress: improving  Outcome Summary: Pt has made marked improvement since last session, she is now on room air with SpO2 >90% during activity and > 95% at rest, she is able to ambulate > 100' Indepdently with no AD, no LOB, and no gait dysfunction noted. Pt does report fatigue following 2 bouts of 100' gait, rating RPE at 8/12, though pt with no physical indicators of SOA or increased work of breathing. This therapist is confident pt would be safe to d/c home with assist of family and HHPT.       Time Calculation:   PT Charges     Row Name 01/06/20 1339             Time Calculation    Start Time  1005  -      Stop Time  1030  -      Time Calculation (min)  25 min  -      PT Received On  01/06/20  -      PT - Next Appointment  01/08/20  -         Time Calculation- PT    Total Timed Code Minutes- PT  25 minute(s)  -         Timed Charges    85901 - Gait Training Minutes   8  -SM      83509 - PT Therapeutic Activity Minutes  8  -      52918 - PT Self Care/Mgmt Minutes  8  -SM        User Key  (r) = Recorded By, (t) = Taken By, (c) = Cosigned By    Initials Name Provider Type    Gayatri Harrington PTA Physical Therapy Assistant        Therapy Charges for Today     Code Description Service Date Service Provider Modifiers Qty    54152451701 HC GAIT TRAINING EA 15 MIN 1/6/2020 Gayatri Torres, ENEDINA GP 1    96667577069 HC PT THERAPEUTIC ACT EA 15 MIN 1/6/2020 Gayatri Torres PTA GP 1    04516204022 HC PT SELF CARE/MGMT/TRAIN EA 15 MIN 1/6/2020 Gayatri Torres, ENEDINA GP 1          PT  G-Codes  Outcome Measure Options: Modified Rayna  AM-PAC 6 Clicks Score (PT): 22    Gayatri Torres, PTA  1/6/2020

## 2020-01-06 NOTE — PLAN OF CARE
Problem: Patient Care Overview  Goal: Plan of Care Review  Outcome: Ongoing (interventions implemented as appropriate)  Flowsheets  Taken 1/6/2020 1200  Progress: improving  Outcome Summary: pt now completing badls indep-conditional indep without ad, on RA with good saturation. pt showed good balance throughout. she does need occasional cues to attend to sternal precautions. in terms of OT, feel pt is capable of returning home with hh instead of rehab.   Taken 1/6/2020 1252  Plan of Care Reviewed With: patient

## 2020-01-07 ENCOUNTER — APPOINTMENT (OUTPATIENT)
Dept: CARDIOLOGY | Facility: HOSPITAL | Age: 45
End: 2020-01-07

## 2020-01-07 PROBLEM — Z95.1 S/P CABG X 3: Status: ACTIVE | Noted: 2020-01-07

## 2020-01-07 PROBLEM — Z95.2 S/P AVR (AORTIC VALVE REPLACEMENT): Status: ACTIVE | Noted: 2020-01-07

## 2020-01-07 LAB
ALBUMIN SERPL-MCNC: 4.1 G/DL (ref 3.5–5.2)
ALBUMIN/GLOB SERPL: 1.1 G/DL
ALP SERPL-CCNC: 125 U/L (ref 39–117)
ALT SERPL W P-5'-P-CCNC: 57 U/L (ref 1–33)
ANION GAP SERPL CALCULATED.3IONS-SCNC: 15 MMOL/L (ref 5–15)
AST SERPL-CCNC: 35 U/L (ref 1–32)
BACTERIA UR QL AUTO: ABNORMAL /HPF
BH CV LOWER VASCULAR LEFT COMMON FEMORAL AUGMENT: NORMAL
BH CV LOWER VASCULAR LEFT COMMON FEMORAL COMPETENT: NORMAL
BH CV LOWER VASCULAR LEFT COMMON FEMORAL COMPRESS: NORMAL
BH CV LOWER VASCULAR LEFT COMMON FEMORAL PHASIC: NORMAL
BH CV LOWER VASCULAR LEFT COMMON FEMORAL SPONT: NORMAL
BH CV LOWER VASCULAR LEFT DISTAL FEMORAL COMPRESS: NORMAL
BH CV LOWER VASCULAR LEFT GASTRONEMIUS COMPRESS: NORMAL
BH CV LOWER VASCULAR LEFT GREATER SAPH BK COMPRESS: NORMAL
BH CV LOWER VASCULAR LEFT LESSER SAPH COMPRESS: NORMAL
BH CV LOWER VASCULAR LEFT MID FEMORAL AUGMENT: NORMAL
BH CV LOWER VASCULAR LEFT MID FEMORAL COMPETENT: NORMAL
BH CV LOWER VASCULAR LEFT MID FEMORAL COMPRESS: NORMAL
BH CV LOWER VASCULAR LEFT MID FEMORAL PHASIC: NORMAL
BH CV LOWER VASCULAR LEFT MID FEMORAL SPONT: NORMAL
BH CV LOWER VASCULAR LEFT PERONEAL COMPRESS: NORMAL
BH CV LOWER VASCULAR LEFT POPLITEAL AUGMENT: NORMAL
BH CV LOWER VASCULAR LEFT POPLITEAL COMPETENT: NORMAL
BH CV LOWER VASCULAR LEFT POPLITEAL COMPRESS: NORMAL
BH CV LOWER VASCULAR LEFT POPLITEAL PHASIC: NORMAL
BH CV LOWER VASCULAR LEFT POPLITEAL SPONT: NORMAL
BH CV LOWER VASCULAR LEFT POSTERIOR TIBIAL COMPRESS: NORMAL
BH CV LOWER VASCULAR LEFT PROXIMAL FEMORAL COMPRESS: NORMAL
BH CV LOWER VASCULAR LEFT SAPHENOFEMORAL JUNCTION AUGMENT: NORMAL
BH CV LOWER VASCULAR LEFT SAPHENOFEMORAL JUNCTION COMPETENT: NORMAL
BH CV LOWER VASCULAR LEFT SAPHENOFEMORAL JUNCTION COMPRESS: NORMAL
BH CV LOWER VASCULAR LEFT SAPHENOFEMORAL JUNCTION PHASIC: NORMAL
BH CV LOWER VASCULAR LEFT SAPHENOFEMORAL JUNCTION SPONT: NORMAL
BH CV LOWER VASCULAR RIGHT COMMON FEMORAL AUGMENT: NORMAL
BH CV LOWER VASCULAR RIGHT COMMON FEMORAL COMPETENT: NORMAL
BH CV LOWER VASCULAR RIGHT COMMON FEMORAL COMPRESS: NORMAL
BH CV LOWER VASCULAR RIGHT COMMON FEMORAL PHASIC: NORMAL
BH CV LOWER VASCULAR RIGHT COMMON FEMORAL SPONT: NORMAL
BH CV LOWER VASCULAR RIGHT DISTAL FEMORAL COMPRESS: NORMAL
BH CV LOWER VASCULAR RIGHT GASTRONEMIUS COMPRESS: NORMAL
BH CV LOWER VASCULAR RIGHT GREATER SAPH AK COMPRESS: NORMAL
BH CV LOWER VASCULAR RIGHT GREATER SAPH BK COMPRESS: NORMAL
BH CV LOWER VASCULAR RIGHT LESSER SAPH COMPRESS: NORMAL
BH CV LOWER VASCULAR RIGHT MID FEMORAL AUGMENT: NORMAL
BH CV LOWER VASCULAR RIGHT MID FEMORAL COMPETENT: NORMAL
BH CV LOWER VASCULAR RIGHT MID FEMORAL COMPRESS: NORMAL
BH CV LOWER VASCULAR RIGHT MID FEMORAL PHASIC: NORMAL
BH CV LOWER VASCULAR RIGHT MID FEMORAL SPONT: NORMAL
BH CV LOWER VASCULAR RIGHT PERONEAL COMPRESS: NORMAL
BH CV LOWER VASCULAR RIGHT POPLITEAL AUGMENT: NORMAL
BH CV LOWER VASCULAR RIGHT POPLITEAL COMPETENT: NORMAL
BH CV LOWER VASCULAR RIGHT POPLITEAL COMPRESS: NORMAL
BH CV LOWER VASCULAR RIGHT POPLITEAL PHASIC: NORMAL
BH CV LOWER VASCULAR RIGHT POPLITEAL SPONT: NORMAL
BH CV LOWER VASCULAR RIGHT POSTERIOR TIBIAL COMPRESS: NORMAL
BH CV LOWER VASCULAR RIGHT PROXIMAL FEMORAL COMPRESS: NORMAL
BH CV LOWER VASCULAR RIGHT SAPHENOFEMORAL JUNCTION AUGMENT: NORMAL
BH CV LOWER VASCULAR RIGHT SAPHENOFEMORAL JUNCTION COMPETENT: NORMAL
BH CV LOWER VASCULAR RIGHT SAPHENOFEMORAL JUNCTION COMPRESS: NORMAL
BH CV LOWER VASCULAR RIGHT SAPHENOFEMORAL JUNCTION PHASIC: NORMAL
BH CV LOWER VASCULAR RIGHT SAPHENOFEMORAL JUNCTION SPONT: NORMAL
BILIRUB SERPL-MCNC: 0.4 MG/DL (ref 0.2–1.2)
BILIRUB UR QL STRIP: NEGATIVE
BUN BLD-MCNC: 14 MG/DL (ref 6–20)
BUN/CREAT SERPL: 12.3 (ref 7–25)
CA-I SERPL ISE-MCNC: 1.22 MMOL/L (ref 1.2–1.3)
CALCIUM SPEC-SCNC: 9.4 MG/DL (ref 8.6–10.5)
CHLORIDE SERPL-SCNC: 97 MMOL/L (ref 98–107)
CLARITY UR: CLEAR
CO2 SERPL-SCNC: 24 MMOL/L (ref 22–29)
COLOR UR: YELLOW
CREAT BLD-MCNC: 1.14 MG/DL (ref 0.57–1)
DEPRECATED RDW RBC AUTO: 46.8 FL (ref 37–54)
ERYTHROCYTE [DISTWIDTH] IN BLOOD BY AUTOMATED COUNT: 14.3 % (ref 12.3–15.4)
GFR SERPL CREATININE-BSD FRML MDRD: 62 ML/MIN/1.73
GLOBULIN UR ELPH-MCNC: 3.8 GM/DL
GLUCOSE BLD-MCNC: 117 MG/DL (ref 65–99)
GLUCOSE BLDC GLUCOMTR-MCNC: 108 MG/DL (ref 70–105)
GLUCOSE BLDC GLUCOMTR-MCNC: 120 MG/DL (ref 70–105)
GLUCOSE BLDC GLUCOMTR-MCNC: 134 MG/DL (ref 70–105)
GLUCOSE UR STRIP-MCNC: NEGATIVE MG/DL
HCT VFR BLD AUTO: 32.6 % (ref 34–46.6)
HGB BLD-MCNC: 11 G/DL (ref 12–15.9)
HGB UR QL STRIP.AUTO: ABNORMAL
HYALINE CASTS UR QL AUTO: ABNORMAL /LPF
KETONES UR QL STRIP: NEGATIVE
LEUKOCYTE ESTERASE UR QL STRIP.AUTO: NEGATIVE
MAGNESIUM SERPL-MCNC: 2 MG/DL (ref 1.6–2.6)
MCH RBC QN AUTO: 30.9 PG (ref 26.6–33)
MCHC RBC AUTO-ENTMCNC: 33.7 G/DL (ref 31.5–35.7)
MCV RBC AUTO: 91.9 FL (ref 79–97)
NITRITE UR QL STRIP: NEGATIVE
PH UR STRIP.AUTO: 6.5 [PH] (ref 5–8)
PHOSPHATE SERPL-MCNC: 3.8 MG/DL (ref 2.5–4.5)
PLATELET # BLD AUTO: 481 10*3/MM3 (ref 140–450)
PMV BLD AUTO: 7 FL (ref 6–12)
POTASSIUM BLD-SCNC: 3.6 MMOL/L (ref 3.5–5.2)
PROT SERPL-MCNC: 7.9 G/DL (ref 6–8.5)
PROT UR QL STRIP: NEGATIVE
RBC # BLD AUTO: 3.55 10*6/MM3 (ref 3.77–5.28)
RBC # UR: ABNORMAL /HPF
REF LAB TEST METHOD: ABNORMAL
SODIUM BLD-SCNC: 136 MMOL/L (ref 136–145)
SP GR UR STRIP: 1.01 (ref 1–1.03)
SQUAMOUS #/AREA URNS HPF: ABNORMAL /HPF
UROBILINOGEN UR QL STRIP: ABNORMAL
WBC NRBC COR # BLD: 12.3 10*3/MM3 (ref 3.4–10.8)
WBC UR QL AUTO: ABNORMAL /HPF

## 2020-01-07 PROCEDURE — 94762 N-INVAS EAR/PLS OXIMTRY CONT: CPT

## 2020-01-07 PROCEDURE — 84100 ASSAY OF PHOSPHORUS: CPT | Performed by: INTERNAL MEDICINE

## 2020-01-07 PROCEDURE — 99232 SBSQ HOSP IP/OBS MODERATE 35: CPT | Performed by: INTERNAL MEDICINE

## 2020-01-07 PROCEDURE — 83735 ASSAY OF MAGNESIUM: CPT | Performed by: INTERNAL MEDICINE

## 2020-01-07 PROCEDURE — 81001 URINALYSIS AUTO W/SCOPE: CPT | Performed by: NURSE PRACTITIONER

## 2020-01-07 PROCEDURE — 94799 UNLISTED PULMONARY SVC/PX: CPT

## 2020-01-07 PROCEDURE — 25010000002 ENOXAPARIN PER 10 MG: Performed by: THORACIC SURGERY (CARDIOTHORACIC VASCULAR SURGERY)

## 2020-01-07 PROCEDURE — 93010 ELECTROCARDIOGRAM REPORT: CPT | Performed by: INTERNAL MEDICINE

## 2020-01-07 PROCEDURE — 99024 POSTOP FOLLOW-UP VISIT: CPT | Performed by: NURSE PRACTITIONER

## 2020-01-07 PROCEDURE — 85027 COMPLETE CBC AUTOMATED: CPT | Performed by: THORACIC SURGERY (CARDIOTHORACIC VASCULAR SURGERY)

## 2020-01-07 PROCEDURE — 82330 ASSAY OF CALCIUM: CPT | Performed by: INTERNAL MEDICINE

## 2020-01-07 PROCEDURE — 97530 THERAPEUTIC ACTIVITIES: CPT

## 2020-01-07 PROCEDURE — 93970 EXTREMITY STUDY: CPT

## 2020-01-07 PROCEDURE — 80053 COMPREHEN METABOLIC PANEL: CPT | Performed by: INTERNAL MEDICINE

## 2020-01-07 PROCEDURE — 97110 THERAPEUTIC EXERCISES: CPT

## 2020-01-07 PROCEDURE — 82962 GLUCOSE BLOOD TEST: CPT

## 2020-01-07 RX ORDER — BUMETANIDE 1 MG/1
1 TABLET ORAL DAILY
Qty: 30 TABLET | Refills: 1 | Status: CANCELLED | OUTPATIENT
Start: 2020-01-08

## 2020-01-07 RX ORDER — LISINOPRIL 20 MG/1
10 TABLET ORAL DAILY
Qty: 30 TABLET | Refills: 3 | Status: CANCELLED | OUTPATIENT
Start: 2020-01-07

## 2020-01-07 RX ADMIN — POTASSIUM CHLORIDE 20 MEQ: 1500 TABLET, EXTENDED RELEASE ORAL at 08:12

## 2020-01-07 RX ADMIN — ATORVASTATIN CALCIUM 40 MG: 40 TABLET, FILM COATED ORAL at 21:49

## 2020-01-07 RX ADMIN — FOLIC ACID 1 MG: 1 TABLET ORAL at 08:12

## 2020-01-07 RX ADMIN — BUMETANIDE 1 MG: 1 TABLET ORAL at 08:12

## 2020-01-07 RX ADMIN — LEVOTHYROXINE SODIUM 200 MCG: 200 TABLET ORAL at 05:29

## 2020-01-07 RX ADMIN — IPRATROPIUM BROMIDE AND ALBUTEROL SULFATE 3 ML: .5; 3 SOLUTION RESPIRATORY (INHALATION) at 11:27

## 2020-01-07 RX ADMIN — IPRATROPIUM BROMIDE AND ALBUTEROL SULFATE 3 ML: .5; 3 SOLUTION RESPIRATORY (INHALATION) at 15:07

## 2020-01-07 RX ADMIN — IPRATROPIUM BROMIDE AND ALBUTEROL SULFATE 3 ML: .5; 3 SOLUTION RESPIRATORY (INHALATION) at 07:01

## 2020-01-07 RX ADMIN — OXYCODONE HYDROCHLORIDE 5 MG: 5 TABLET ORAL at 05:29

## 2020-01-07 RX ADMIN — METFORMIN HYDROCHLORIDE 500 MG: 500 TABLET ORAL at 08:12

## 2020-01-07 RX ADMIN — GUAIFENESIN 600 MG: 600 TABLET, EXTENDED RELEASE ORAL at 08:12

## 2020-01-07 RX ADMIN — OXYCODONE HYDROCHLORIDE 5 MG: 5 TABLET ORAL at 11:14

## 2020-01-07 RX ADMIN — GUAIFENESIN 600 MG: 600 TABLET, EXTENDED RELEASE ORAL at 21:49

## 2020-01-07 RX ADMIN — OXYCODONE HYDROCHLORIDE 5 MG: 5 TABLET ORAL at 17:15

## 2020-01-07 RX ADMIN — LIDOCAINE 1 PATCH: 50 PATCH CUTANEOUS at 11:09

## 2020-01-07 RX ADMIN — METFORMIN HYDROCHLORIDE 500 MG: 500 TABLET ORAL at 17:15

## 2020-01-07 RX ADMIN — IPRATROPIUM BROMIDE AND ALBUTEROL SULFATE 3 ML: .5; 3 SOLUTION RESPIRATORY (INHALATION) at 19:50

## 2020-01-07 RX ADMIN — ALLOPURINOL 300 MG: 300 TABLET ORAL at 08:12

## 2020-01-07 RX ADMIN — PANTOPRAZOLE SODIUM 40 MG: 40 TABLET, DELAYED RELEASE ORAL at 05:29

## 2020-01-07 RX ADMIN — CARVEDILOL 12.5 MG: 6.25 TABLET, FILM COATED ORAL at 17:15

## 2020-01-07 RX ADMIN — ASPIRIN 325 MG ORAL TABLET 325 MG: 325 PILL ORAL at 08:12

## 2020-01-07 RX ADMIN — AMIODARONE HYDROCHLORIDE 200 MG: 200 TABLET ORAL at 08:12

## 2020-01-07 RX ADMIN — SENNOSIDES 1 TABLET: 8.6 TABLET, FILM COATED ORAL at 08:12

## 2020-01-07 RX ADMIN — ENOXAPARIN SODIUM 40 MG: 40 INJECTION SUBCUTANEOUS at 21:49

## 2020-01-07 RX ADMIN — CARVEDILOL 12.5 MG: 6.25 TABLET, FILM COATED ORAL at 08:11

## 2020-01-07 RX ADMIN — FERROUS SULFATE TAB EC 324 MG (65 MG FE EQUIVALENT) 324 MG: 324 (65 FE) TABLET DELAYED RESPONSE at 08:12

## 2020-01-07 RX ADMIN — DOCUSATE SODIUM 100 MG: 100 CAPSULE, LIQUID FILLED ORAL at 08:11

## 2020-01-07 RX ADMIN — MONTELUKAST SODIUM 10 MG: 10 TABLET, FILM COATED ORAL at 21:49

## 2020-01-07 NOTE — DISCHARGE SUMMARY
Date of Admission: 12/22/2019  Date of Discharge:  1/8/2020    Discharge Diagnosis:   Aortic regurgitation s/p AVR (tissue)  CAD s/p CABG  Postop hypotension  Postop expected ELLEN-- 1 PRBC 1/2/2020  HFrEF (EF35%)  AICD placement-- Biotronik  CKD stage 2  HTN  DM II, last A1c 6.2  Hypothyroidism  Anxiety  Chronic pain    Presenting Problem/History of Present Illness  Chest pain, unspecified type [R07.9]  Chest pain, unspecified type [R07.9]     Hospital Course  Patient is a 44 y.o. female with known aortic regurgitation presented to [facility] ED with complaints of chest pain. [facility] Cardiac Surgery was consulted for aortic valve disease. Patient underwent cardiac cath revealing 2V CAD. Nephrology was consulted for increased creatinine. On 12/27/19 she underwent CABGX3/ AVR (tissue) with Dr. Stock. She was transferred to the recovery unit in stable condition and extubated overnight. Creatinine increased to 1.52 but has returned to baseline at time of discharge. She required Levophed for BP support for several days. But her blood pressure has recovered and she was restarted on her home Coreg. She was slow to regain mobility and rehab at discharge was suggested. She passed an overnight oximetry prior to discharge and will not require home oxygen. She is now being discharged in stable condition to [facility] rehab. She is being discharged on aspirin, statin, and beta blocker. Her ace inhibitor and aldosterone antagonist were not restarted d/t rising creatinine. This should be readdressed as an outpatient.     Procedures Performed  Procedure(s):  12/27/2019-- CORONARY ARTERY BYPASS GRAFTING  AORTIC VALVE REPAIR/REPLACEMENT       Consults:   Consults     Date and Time Order Name Status Description    12/30/2019 1838 Inpatient Pulmonology Consult Completed     12/27/2019 1225 Inpatient Cardiology Consult      12/25/2019 0903 Inpatient Nephrology Consult Completed     12/23/2019 0253 Inpatient Cardiology Consult Completed     12/23/2019  0118 Inpatient Hospitalist Consult Completed           Pertinent Test Results:    Lab Results   Component Value Date    WBC 11.90 (H) 01/08/2020    HGB 11.2 (L) 01/08/2020    HCT 32.9 (L) 01/08/2020    MCV 91.5 01/08/2020     (H) 01/08/2020      Lab Results   Component Value Date    GLUCOSE 89 01/08/2020    CALCIUM 9.3 01/08/2020     01/08/2020    K 4.1 01/08/2020    CO2 24.0 01/08/2020    CL 99 01/08/2020    BUN 14 01/08/2020    CREATININE 1.28 (H) 01/08/2020    EGFRIFAFRI 54 (L) 01/08/2020    BCR 10.9 01/08/2020    ANIONGAP 14.0 01/08/2020     Lab Results   Component Value Date    INR 1.20 (H) 12/27/2019    PROTIME 12.2 (H) 12/27/2019         Condition on Discharge:  Patient is being discharged to [facility] rehab in stable condition.    Vital Signs  Temp:  [97.5 °F (36.4 °C)-98.1 °F (36.7 °C)] 97.5 °F (36.4 °C)  Heart Rate:  [71-97] 85  Resp:  [16-20] 20  BP: (113-157)/(75-97) 157/93      Discharge Disposition  Rehab Facility or Unit (DC - External)    Discharge Medications     Discharge Medications      New Medications      Instructions Start Date   amiodarone 200 MG tablet  Commonly known as:  PACERONE   200 mg, Oral, Every 24 Hours Scheduled      aspirin 325 MG tablet  Replaces:  aspirin 81 MG chewable tablet   325 mg, Oral, Daily      bumetanide 0.5 MG tablet  Commonly known as:  BUMEX   0.5 mg, Oral, Daily   Start Date:  January 9, 2020     docusate sodium 100 MG capsule   100 mg, Oral, 2 Times Daily      traMADol 50 MG tablet  Commonly known as:  ULTRAM   25 mg, Oral, Every 6 Hours PRN         Changes to Medications      Instructions Start Date   allopurinol 100 MG tablet  Commonly known as:  ZYLOPRIM  What changed:    · medication strength  · how much to take   200 mg, Oral, Daily   Start Date:  January 9, 2020     oxyCODONE-acetaminophen 7.5-325 MG per tablet  Commonly known as:  PERCOCET  What changed:    · reasons to take this  · additional instructions   1 tablet, Oral, Every 6 Hours PRN, May  resume after postop oxycodone prescription is completed.         Continue These Medications      Instructions Start Date   ALPRAZolam 1 MG tablet  Commonly known as:  XANAX   1 mg, Oral, 4 Times Daily PRN      atorvastatin 40 MG tablet  Commonly known as:  LIPITOR   40 mg, Oral, Nightly      carvedilol 12.5 MG tablet  Commonly known as:  COREG   12.5 mg, Oral, 2 Times Daily With Meals      cholecalciferol 25 MCG (1000 UT) tablet  Commonly known as:  VITAMIN D3   1,000 Units, Oral, Daily      ferrous sulfate 325 (65 FE) MG tablet   65 mg, Oral, Daily With Breakfast      folic acid 1 MG tablet  Commonly known as:  FOLVITE   1 mg, Oral, Daily      gabapentin 300 MG capsule  Commonly known as:  NEURONTIN   300 mg, Oral, 3 Times Daily      levothyroxine 200 MCG tablet  Commonly known as:  SYNTHROID, LEVOTHROID   200 mcg, Oral, Daily      metFORMIN 500 MG tablet  Commonly known as:  GLUCOPHAGE   500 mg, Oral, Daily With Breakfast      montelukast 10 MG tablet  Commonly known as:  SINGULAIR   10 mg, Oral, Nightly      pantoprazole 40 MG EC tablet  Commonly known as:  PROTONIX   40 mg, Oral, Daily         Stop These Medications    amLODIPine 5 MG tablet  Commonly known as:  NORVASC     aspirin 81 MG chewable tablet  Replaced by:  aspirin 325 MG tablet     clindamycin 300 MG capsule  Commonly known as:  CLEOCIN     famotidine 20 MG tablet  Commonly known as:  PEPCID     fluconazole 200 MG tablet  Commonly known as:  DIFLUCAN     furosemide 40 MG tablet  Commonly known as:  LASIX     hydrALAZINE 100 MG tablet  Commonly known as:  APRESOLINE     lisinopril 20 MG tablet  Commonly known as:  PRINIVIL,ZESTRIL     spironolactone 25 MG tablet  Commonly known as:  ALDACTONE            Discharge Diet:   Diet Instructions     Diet: Cardiac      Discharge Diet:  Cardiac          Activity at Discharge:   Activity Instructions     Activity as Tolerated      Bathing Restrictions      Shower daily.    Type of Restriction:  Bathing     Bathing Restrictions:  No Tub Bath    Driving Restrictions      Do not resume driving while taking narcotic pain medication    Type of Restriction:  Driving    Driving Restrictions:  No Driving (Time Limited)    Length:  2 Weeks    Lifting Restrictions      Type of Restriction:  Lifting    Lifting Restrictions:  Lifting Restriction (Indicate Limit)    Weight Limit (Pounds):  10    Length of Lifting Restriction:  6 weeks          Follow-up Appointments  Future Appointments   Date Time Provider Department Center   2/13/2020  1:30 PM Lane Stock MD [facility] None   3/6/2020  2:00 PM [facility] None   3/6/2020  2:30 PM Héctor Eckert MD [facility] None   3/12/2020  3:15 PM Wayne Luna MD [facility] None     Additional Instructions for the Follow-ups that You Need to Schedule     Ambulatory Referral to Home Health   As directed      Face to Face Visit Date:  12/31/2019    Follow-up provider for Plan of Care?:  I will be treating the patient on an ongoing basis.  Please send me the Plan of Care for signature.    Follow-up provider:  LANE STOCK [[id number]]    Reason/Clinical Findings:  cabg    Describe mobility limitations that make leaving home difficult:  cant leave home withoyt taxing effort    Nursing/Therapeutic Services Requested:  Other (treat and eval)    Frequency:  1 Week 1         Discharge Follow-up with PCP   As directed       Currently Documented PCP:    Phani Adames MD    PCP Phone Number:    [phone]     Follow Up Details:  in one month         Discharge Follow-up with Specialty: Cardiology; 2 Weeks   As directed      Specialty:  Cardiology    Follow Up:  2 Weeks    Follow Up Details:  Follow up with cardiologist Dr. Luna in 2 weeks.         Discharge Follow-up with Specialty: Pain Mangement; 1 Week   As directed      Specialty:  Pain Mangement    Follow Up:  1 Week    Follow Up Details:  Follow up with your pain management  specialist 1 week after discharge.         Discharge Follow-up with Specified Provider: Cardiac Surgery; 6 Weeks   As directed      To:  Cardiac Surgery    Follow Up:  6 Weeks    Follow Up Details:  Follow up with cardiac surgeon Dr. Lane Stock on 2/13/2020 at 1:30pm         Discharge Follow-up with Specified Provider: Nephrology; 2 Weeks   As directed      To:  Nephrology    Follow Up:  2 Weeks    Follow Up Details:  Follow up with nephrologist Dr. Smalls in 2 weeks.         Call MD With Problems / Concerns   Jan 09, 2020      Instructions: Call for temp >101 or surgical wound drainage    Order Comments:  Instructions: Call for temp >101 or surgical wound drainage          Basic Metabolic Panel    Jan 13, 2020 (Approximate)      CBC & Differential    Jan 13, 2020 (Approximate)      Manual Differential:  No         Comprehensive Metabolic Panel    Jan 13, 2020 (Approximate)      TSH    Jan 13, 2020 (Approximate)            Test Results Pending at Discharge       ALEX WORKMAN  01/08/20  11:06 AM    Okay for transfer to rehab.

## 2020-01-07 NOTE — PROGRESS NOTES
"NEPHROLOGY PROGRESS NOTE------[facility]    [facility]  [phone]  Anette Smalls MD      Patient Care Team:  Phani Adames MD as PCP - General (Internal Medicine)  Ashish Smalls MD as Consulting Physician (Nephrology)      Provider:  Anette Smalls MD  Patient Name: Rafael Mccann  :  1975    SUBJECTIVE:    No complaints. Feeling good. No SOB, CP    Medication:    allopurinol 300 mg Oral Daily   amiodarone 200 mg Oral Q24H   aspirin 325 mg Oral Daily   atorvastatin 40 mg Oral Nightly   bisacodyl 10 mg Rectal Daily   bumetanide 1 mg Oral Daily   carvedilol 12.5 mg Oral BID With Meals   docusate sodium 100 mg Oral BID   enoxaparin 40 mg Subcutaneous Daily   ferrous sulfate 324 mg Oral Daily With Breakfast   folic acid 1 mg Oral Daily   guaiFENesin 600 mg Oral Q12H   insulin lispro 0-9 Units Subcutaneous 4x Daily With Meals & Nightly   ipratropium-albuterol 3 mL Nebulization 4x Daily - RT   lactulose 30 g Oral Daily   levothyroxine 200 mcg Oral Q AM   lidocaine 2 patch Transdermal Q24H   metFORMIN 500 mg Oral BID With Meals   montelukast 10 mg Oral Nightly   pantoprazole 40 mg Oral Q AM   polyethylene glycol 17 g Oral BID   senna 1 tablet Oral BID       sodium chloride 30 mL/hr Last Rate: 30 mL/hr (19 1430)       OBJECTIVE    Vital Sign Min/Max for last 24 hours  Temp  Min: 97.7 °F (36.5 °C)  Max: 98.7 °F (37.1 °C)   BP  Min: 149/97  Max: 158/98   Pulse  Min: 78  Max: 88   Resp  Min: 16  Max: 16   SpO2  Min: 95 %  Max: 97 %   No data recorded   No data recorded     Flowsheet Rows      First Filed Value   Admission Height  170.2 cm (67\") Documented at 2019 2250   Admission Weight  91.8 kg (202 lb 6.1 oz) Documented at 2019 2250          I/O this shift:  In: 480 [P.O.:480]  Out: -   I/O last 3 completed shifts:  In:  [P.O.:]  Out: -     Physical Exam:  General Appearance: alert, appears stated age and " cooperative. LIP edema  Head: normocephalic, without obvious abnormality and atraumatic  Eyes: conjunctivae and sclerae normal and no icterus  Neck: supple  Lungs: CTA bilaterally  Heart: regular rhythm & normal rate and normal S1, S2 +WALLY  Chest: S/P SURGICAL CHANGES  Abdomen: soft, NT/ND +BS  Extremities: moves extremities well, +TRACE PRETIBIAL EDEMA BILAT, no cyanosis and no redness  Skin: no bleeding, bruising or rash, turgor normal, color normal and no lesions noted  Neurologic: Alert, and oriented. No focal deficits    Labs:    WBC WBC   Date Value Ref Range Status   01/06/2020 10.00 3.40 - 10.80 10*3/mm3 Final   01/05/2020 8.60 3.40 - 10.80 10*3/mm3 Final      HGB Hemoglobin   Date Value Ref Range Status   01/06/2020 10.9 (L) 12.0 - 15.9 g/dL Final   01/05/2020 10.8 (L) 12.0 - 15.9 g/dL Final      HCT Hematocrit   Date Value Ref Range Status   01/06/2020 31.4 (L) 34.0 - 46.6 % Final   01/05/2020 31.1 (L) 34.0 - 46.6 % Final      Platlets No results found for: LABPLAT   MCV MCV   Date Value Ref Range Status   01/06/2020 91.9 79.0 - 97.0 fL Final   01/05/2020 90.5 79.0 - 97.0 fL Final          Sodium Sodium   Date Value Ref Range Status   01/06/2020 137 136 - 145 mmol/L Final   01/05/2020 140 136 - 145 mmol/L Final      Potassium Potassium   Date Value Ref Range Status   01/06/2020 3.8 3.5 - 5.2 mmol/L Final   01/05/2020 4.2 3.5 - 5.2 mmol/L Final   01/05/2020 3.3 (L) 3.5 - 5.2 mmol/L Final      Chloride Chloride   Date Value Ref Range Status   01/06/2020 99 98 - 107 mmol/L Final   01/05/2020 99 98 - 107 mmol/L Final      CO2 CO2   Date Value Ref Range Status   01/06/2020 25.0 22.0 - 29.0 mmol/L Final   01/05/2020 28.0 22.0 - 29.0 mmol/L Final      BUN BUN   Date Value Ref Range Status   01/06/2020 16 6 - 20 mg/dL Final   01/05/2020 12 6 - 20 mg/dL Final      Creatinine Creatinine   Date Value Ref Range Status   01/06/2020 1.14 (H) 0.57 - 1.00 mg/dL Final   01/05/2020 1.09 (H) 0.57 - 1.00 mg/dL Final       Calcium Calcium   Date Value Ref Range Status   01/06/2020 9.2 8.6 - 10.5 mg/dL Final   01/05/2020 9.6 8.6 - 10.5 mg/dL Final      PO4 No components found for: PO4   Albumin Albumin   Date Value Ref Range Status   01/06/2020 4.20 3.50 - 5.20 g/dL Final   01/05/2020 4.00 3.50 - 5.20 g/dL Final      Magnesium No results found for: MG   Uric Acid No components found for: URIC ACID     Imaging Results (Last 72 Hours)     Procedure Component Value Units Date/Time    XR Chest PA & Lateral [[id number]] Collected:  01/06/20 1229     Updated:  01/06/20 1232    Narrative:       DATE OF EXAM:  1/6/2020 12:16 PM     PROCEDURE:  XR CHEST PA AND LATERAL-     INDICATIONS:  follow up bilateral pleural effusion; R07.9-Chest pain, unspecified;  I35.1-Nonrheumatic aortic (valve) insufficiency; I42.0-Dilated  cardiomyopathy; Z95.810-Presence of automatic (implantable) cardiac  defibrillator; I20.0-Unstable angina; I25.119-Atherosclerotic heart  disease of native coronary artery with unspecified angina pectoris;  I35.1-Nonrheumatic aortic (valve) insufficiency; J43.9-Emphysem       COMPARISON:  AP portable chest 1 2020.     TECHNIQUE:   Two radiologic views of the chest.     FINDINGS:  Low volume inspiration. Bibasilar airspace disease is present, with  improved aeration of the left lower lobe since prior study. There is  better visualization left hemidiaphragm. Small bilateral pleural  effusions are present, and may be improved on the left. However, the  right basilar opacity appears increased which may represent worsening  right basilar consolidation and pleural fluid. Stable cardiac  enlargement with median sternotomy, valve replacement, and features of  CABG. The left chest wall pacemaker and leads appear unchanged. No  pneumothorax is visible. No acute osseous abnormalities are identified.  Right IJ introducer sheath has been removed.       Impression:       Interval improvement in left pleural effusion and left basilar  airspace  disease since 01/01/2020. However, Right basilar airspace disease and  right pleural effusion appear slightly worsened.     Electronically Signed By-Dr. Pao Iglesias MD On:1/6/2020 12:30 PM  This report was finalized on [id number] by Dr. Pao Iglesias MD.          Results for orders placed during the hospital encounter of 12/22/19   XR Chest PA & Lateral    Narrative DATE OF EXAM:  1/6/2020 12:16 PM     PROCEDURE:  XR CHEST PA AND LATERAL-     INDICATIONS:  follow up bilateral pleural effusion; R07.9-Chest pain, unspecified;  I35.1-Nonrheumatic aortic (valve) insufficiency; I42.0-Dilated  cardiomyopathy; Z95.810-Presence of automatic (implantable) cardiac  defibrillator; I20.0-Unstable angina; I25.119-Atherosclerotic heart  disease of native coronary artery with unspecified angina pectoris;  I35.1-Nonrheumatic aortic (valve) insufficiency; J43.9-Emphysem       COMPARISON:  AP portable chest 1 2020.     TECHNIQUE:   Two radiologic views of the chest.     FINDINGS:  Low volume inspiration. Bibasilar airspace disease is present, with  improved aeration of the left lower lobe since prior study. There is  better visualization left hemidiaphragm. Small bilateral pleural  effusions are present, and may be improved on the left. However, the  right basilar opacity appears increased which may represent worsening  right basilar consolidation and pleural fluid. Stable cardiac  enlargement with median sternotomy, valve replacement, and features of  CABG. The left chest wall pacemaker and leads appear unchanged. No  pneumothorax is visible. No acute osseous abnormalities are identified.  Right IJ introducer sheath has been removed.       Impression Interval improvement in left pleural effusion and left basilar airspace  disease since 01/01/2020. However, Right basilar airspace disease and  right pleural effusion appear slightly worsened.     Electronically Signed By-Dr. Pao Iglesias MD On:1/6/2020 12:30  PM  This report was finalized on [id number] by Dr. Pao Iglesias MD.   XR Chest 1 View    Narrative DATE OF EXAM:  1/1/2020 10:53 AM     PROCEDURE:  XR CHEST 1 VW-     INDICATIONS:  right sided sharp pain below ribs; R07.9-Chest pain, unspecified;  I35.1-Nonrheumatic aortic (valve) insufficiency; I42.0-Dilated  cardiomyopathy; Z95.810-Presence of automatic (implantable) cardiac  defibrillator; I20.0-Unstable angina; I25.119-Atherosclerotic heart  disease of native coronary artery with unspecified angina pectoris;  I35.1-Nonrheumatic aortic (valve) insufficiency; J43.9-Emphysema,      COMPARISON:  12/31/2019     TECHNIQUE:   Single radiographic AP view of the chest was obtained.     FINDINGS:  There is a right internal jugular [place] sheath in place with the tip  in the superior vena cava. There is a left subclavian pacemaker  device/AICD device with leads overlying the right atrium and right  ventricle. The heart is enlarged with changes of CABG. There are  bilateral pleural effusions left greater than right with bilateral  basilar airspace disease. Aeration is slightly worse in the interval.        Impression Mild worsening of bilateral basilar infiltrates with moderate left and  small-to-moderate right pleural effusions.     Electronically Signed By-Chidi Walton On:1/1/2020 11:01 AM  This report was finalized on [id number] by  Chidi Walton, .   XR Chest 1 View    Narrative DATE OF EXAM:  12/31/2019 10:48 AM     PROCEDURE:  XR CHEST 1 VW-     INDICATIONS:  Hypoxia; R07.9-Chest pain, unspecified; I35.1-Nonrheumatic aortic  (valve) insufficiency; I42.0-Dilated cardiomyopathy; Z95.810-Presence of  automatic (implantable) cardiac defibrillator; I20.0-Unstable angina;  I25.119-Atherosclerotic heart disease of native coronary artery with  unspecified angina pectoris; I35.1-Nonrheumatic aortic (valve)  insufficiency; J43.9-Emphysema, unspecified patient's a 44-year-old  female with hypoxia history of open heart  surgery on December 26.  History of aortic regurg, former smoker     COMPARISON:  Comparison is made to study of 12/29/2019     TECHNIQUE:   Single radiographic AP view of the chest was obtained.     FINDINGS:  Today's portable erect study was obtained on 12/31/2019 at 10:50 AM.     Today's study demonstrates the left basilar opacity remaining stable  there is increasing right basilar opacity consistent with increasing  atelectasis and probable small pleural effusion. The right internal  jugular vascular sheath remains in good position the left-sided  dual-lead pacemaker defibrillator remains in good position changes of  median sternotomy coronary artery bypass grafting and aortic valvular  replacement are again seen.        Impression    1. Mild interval worsening from study of December 29  2. Increasing right basilar opacities felt to represent increasing  atelectasis  3. Stable moderate left basilar opacity consistent with atelectasis,  pneumonia and/or pleural effusion     Electronically Signed By-Ashwin Beavers Jr. On:12/31/2019 11:53 AM  This report was finalized on [id number] by  Ashwin Beavers Jr., .       Results for orders placed during the hospital encounter of 12/22/19   Duplex Venous Lower Extremity - Bilateral CAR    Narrative · Normal bilateral lower extremity venous duplex scan.            ASSESSMENT / PLAN      Chest pain    COPD (chronic obstructive pulmonary disease) (CMS/HCC)    Hypertension    Cardiomyopathy, dilated (CMS/HCC)    Essential hypertension    Chronic renal impairment    ICD (implantable cardioverter-defibrillator), dual, in situ    GERD without esophagitis    Hypothyroidism (acquired)    Aortic valve regurgitation    Unstable angina pectoris (CMS/HCC)    Coronary artery disease involving native coronary artery of native heart with angina pectoris (CMS/HCC)    Nonrheumatic aortic valve insufficiency      1. ARF/LINSEY/CRF/CKD STG 2------Nonoliguric. Current creatinine is at baseline.  ARF/LINSEY resolved. +Mild ARF/LINSEY on top of what appears to be CRF/CKD STG 2 with a baseline serum creatinine of 1.1. Unknown if patient has baseline proteinuria or hematuria. CRF/CKD STG 2 likely secondary to HTN NS/DM and chronic renal hypoperfusion from Cardiomyopathy. +Mild ARF/LINSEY appeared to be secondary to prerenal/hemodynamic fluctuation from MI S/P Cath/IV dye exposure with concomitant ACE-I and diuretic use. Keep off of Zestril for now.   Dose meds for CrCl 30-60 cc/min. No NSAIDs. No further IV dye exposure, unless emergently needed.     2. CAD S/P MI S/P CATH--------CABG done per , CT Surgery.     3. DILATED CARDIOMYOPATHY/VALVULAR HEART DISEASE--------No gross overload at present. Stable on current Bumex dose. Has PM/AICD. Per , Cardiology     4. HTN WITH CKD------BP okay. Avoid hypotension. No ACE/ARB/DRI for now. Temporarily holding diuretics     5. HYPERURICEMIA---------On Allopurinol.  Uric normal     6. HYPERLIPIDEMIA------On Statin.       7. GERD-------On H2 blocker     8. DMII------Resumed metformin. BS okay. On Glucometers, SSI     9. VITAMIN D DEFICIENCY     10. DM PERIPHERAL NEUROPATHY-------On Neurontin     11. HYPOTHYROIDISM------Increased Synthroid as TSH up    12. ANEMIA-----H/H stable            Anette Smalls MD  [facility]  [phone]  01/07/20  6:38 AM

## 2020-01-07 NOTE — THERAPY TREATMENT NOTE
Acute Care - Occupational Therapy Treatment Note  [facility]     Patient Name: Rafael Mccann  : 1975  MRN: [id number]  Today's Date: 2020             Admit Date: 2019       ICD-10-CM ICD-9-CM   1. Chest pain, unspecified type R07.9 786.50   2. Aortic valve insufficiency, etiology of cardiac valve disease unspecified I35.1 424.1   3. Cardiomyopathy, dilated (CMS/Formerly [facility]) I42.0 425.4   4. ICD (implantable cardioverter-defibrillator), dual, in situ Z95.810 V45.02   5. Unstable angina pectoris (CMS/Formerly [facility]) I20.0 411.1   6. Coronary artery disease involving native coronary artery of native heart with angina pectoris (CMS/[facility]ly [facility]) I25.119 414.01     413.9   7. Nonrheumatic aortic valve insufficiency I35.1 424.1   8. Pulmonary emphysema, unspecified emphysema type (CMS/[facility]) J43.9 492.8   9. Essential hypertension I10 401.9     Patient Active Problem List   Diagnosis   • COPD (chronic obstructive pulmonary disease) (CMS/[facility]ly [facility])   • Hypertension   • Cardiomyopathy, dilated (CMS/[facility])   • Essential hypertension   • Chronic renal impairment   • ICD (implantable cardioverter-defibrillator), dual, in situ   • Chest pain   • GERD without esophagitis   • Hypothyroidism (acquired)   • Aortic valve regurgitation   • Unstable angina pectoris (CMS/[facility])   • Coronary artery disease involving native coronary artery of native heart with angina pectoris (CMS/[facility])   • Nonrheumatic aortic valve insufficiency     Past Medical History:   Diagnosis Date   • CHF (congestive heart failure) (CMS/[facility]ly [facility])    • COPD (chronic obstructive pulmonary disease) (CMS/[facility])    • Diabetes (CMS/[facility])    • Hyperlipidemia    • Hypertension      Past Surgical History:   Procedure Laterality Date   • AORTIC VALVE REPAIR/REPLACEMENT N/A 2019    Procedure: AORTIC VALVE REPAIR/REPLACEMENT;  Surgeon: Lane Stock MD;  Location: [facility];  Service: Cardiothoracic   • BREAST LUMPECTOMY     • CARDIAC CATHETERIZATION N/A 2019    Procedure: Right  and Left Heart Cath 12/24/19 @ 0900;  Surgeon: Wayne Luna MD;  Location: [facility] CATH INVASIVE LOCATION;  Service: Cardiovascular   • CARDIAC CATHETERIZATION N/A 12/24/2019    Procedure: Coronary angiography;  Surgeon: Wayne Luna MD;  Location: [facility] CATH INVASIVE LOCATION;  Service: Cardiovascular   • CARDIAC ELECTROPHYSIOLOGY PROCEDURE Left 6/28/2019    Procedure: Dual-chamber ICD insertion;  Surgeon: Héctor Eckert MD;  Location: [facility] CATH INVASIVE LOCATION;  Service: Cardiovascular   • CARDIAC ELECTROPHYSIOLOGY PROCEDURE Left 8/14/2019    Procedure: Lead Revision;  Surgeon: Héctor Eckert MD;  Location: [facility] CATH INVASIVE LOCATION;  Service: Cardiovascular   • CHOLECYSTECTOMY     • CORONARY ARTERY BYPASS GRAFT N/A 12/27/2019    Procedure: CORONARY ARTERY BYPASS GRAFTING;  Surgeon: Lane Stock MD;  Location: [facility] CVOR;  Service: Cardiothoracic   • HYSTERECTOMY     • INSERT / REPLACE / REMOVE PACEMAKER     • THYROID SURGERY         Therapy Treatment    Rehabilitation Treatment Summary     Row Name 01/07/20 0845             Treatment Time/Intention    Discipline  occupational therapist  -      Document Type  therapy note (daily note)  -      Subjective Information  complains of cold  -      Mode of Treatment  individual therapy  -      Therapy Frequency (OT Eval)  5 times/wk  -      Patient Effort  good  -      Comment  Pt sat up EOB from supine & put on her socks. She discussed D/C plans w/ rounding cardiothoracic surgeon who indicates verbally & in his documentation that he prefers this Pt to d/c to rehab & notes her WBC count is newly elevated to 12. Pt reports she has showered & climbed stairs both X2 & tolerated it well. Pt completes seated HEP w/ minimal cues & no rest breaks needed. She then transfers to the chair to eat. All self care & basic mobility are observed to be completed w/ supervision. BP is 168/89 & she is on room air. A doppler study  will be done of her LE since she notes some discoloration of the Rt foot & was abed/immobiliuzed for longer then usual post CABG.  -MH      Recorded by [] Maribeth Rodirguez, OT 01/07/20 0924      Row Name 01/07/20 0845             Bed-Chair Transfer    Bed-Chair [place] (Transfers)  independent no cues needed for safe technique.  -MH      Recorded by [] Maribeth Rodriguez, OT 01/07/20 0924      Row Name 01/07/20 0845             Sit-Stand Transfer    Sit-Stand [place] (Transfers)  conditional independence  -MH      Recorded by [] Maribeth Rodriguez, OT 01/07/20 0924      Row Name 01/07/20 0845             Pain Scale: FACES Pre/Post-Treatment    Pain: FACES Scale, Pretreatment  0-->no hurt  -      Pain: FACES Scale, Post-Treatment  0-->no hurt  -MH      Recorded by [] Maribeth Rodriguez, OT 01/07/20 0924      Row Name                Wound 12/27/19 anterior;midline sternal Incision    Wound - Properties Group Date first assessed: 12/27/19 [NM] Present on Hospital Admission: N [NM] Orientation: anterior;midline [NM] Location: sternal [NM] Primary Wound Type: Incision [NM] Recorded by:  [NM] Roz Santana RN 12/27/19 1000    Row Name                Wound 12/27/19 Left anterior;distal thigh Incision    Wound - Properties Group Date first assessed: 12/27/19 [NM] Present on Hospital Admission: N [NM] Side: Left [NM] Orientation: anterior;distal [NM] Location: thigh [NM] Primary Wound Type: Incision [NM], endovein harvest sites  Recorded by:  [NM] Roz Santana RN 12/27/19 1001    Row Name 01/07/20 0845             Coping    Observed Emotional State  accepting;anxious;calm;cooperative  -      Verbalized Emotional State  acceptance  -      Recorded by [] Maribeth Rodriguez, OT 01/07/20 0924      Row Name 01/07/20 0845 01/07/20 0811          Plan of Care Review    Plan of Care Reviewed With  patient  -MH  --     Progress  improving  -  --     Outcome Summary  Pt is doing well now though WBC count is elevated.  She has been able to climb stairs & shower multiple times. She is walking short distaces w/o assist & completing her ADL w/ setup. From a functional perspective she should be safe for home w/ HHC & 24 hour family support, though it is noted that her cardiothoracic surgeon is anticipating IP rehab for his medically complex patient.  -MH2  --  -MH     Recorded by [] Maribeth Rodriguez, OT 01/07/20 0924  [MH2] Maribeth Rodriguez, OT 01/07/20 0940 [MH] Maribeth Rodriguez, OT 01/07/20 0940     Row Name 01/07/20 0845 01/07/20 0811          Outcome Summary/Treatment Plan (OT)    Anticipated Discharge Disposition (OT)  home with 24/7 care;home with home health  -  --  -MH     Recorded by [] Maribeth Rodriguez, OT 01/07/20 0939 [MH] Maribeth Rodriguez, OT 01/07/20 0940       User Key  (r) = Recorded By, (t) = Taken By, (c) = Cosigned By    Initials Name Effective Dates Discipline     Maribeth Rodriguez, OT 03/01/19 -  OT    Roz Gage RN 03/01/19 -  Nurse        Wound 12/27/19 anterior;midline sternal Incision (Active)   Dressing Appearance no drainage;open to air 1/7/2020  8:11 AM   Closure Approximated;Liquid skin adhesive 1/7/2020  8:11 AM   Base dry;clean 1/7/2020  8:11 AM   Periwound intact;dry 1/7/2020  8:11 AM   Periwound Temperature warm 1/7/2020  8:11 AM   Drainage Amount none 1/7/2020  8:11 AM   Care, Wound cleansed with;soap and water 1/6/2020  8:00 PM       Wound 12/27/19 Left anterior;distal thigh Incision (Active)   Dressing Appearance open to air 1/7/2020  8:11 AM   Closure Approximated;Liquid skin adhesive 1/7/2020  8:11 AM   Base clean;dry 1/7/2020  8:11 AM   Periwound intact;dry 1/7/2020  8:11 AM   Drainage Amount none 1/7/2020  8:11 AM   Care, Wound cleansed with;soap and water 1/6/2020  8:00 PM           OT Recommendation and Plan  Outcome Summary/Treatment Plan (OT)  Anticipated Discharge Disposition (OT): home with 24/7 care, home with home health  Therapy Frequency (OT Eval): 5 times/wk  Plan of Care  Review  Plan of Care Reviewed With: patient  Plan of Care Reviewed With: patient  Outcome Summary: Pt is doing well now though WBC count is elevated. She has been able to climb stairs & shower multiple times. She is walking short distaces w/o assist & completing her ADL w/ setup. From a functional perspective she should be safe for home w/ HHC & 24 hour family support, though it is noted that her cardiothoracic surgeon is anticipating IP rehab for his medically complex patient.       Time Calculation:   Time Calculation- OT     Row Name 01/07/20 0941             Time Calculation-     OT Start Time  0845  -      OT Stop Time  0915  -      OT Time Calculation (min)  30 min  -      Total Timed Code Minutes- OT  30 minute(s)  -      OT Received On  01/07/20  -      OT - Next Appointment  01/08/20  -        User Key  (r) = Recorded By, (t) = Taken By, (c) = Cosigned By    Initials Name Provider Type     Maribeth Rodriguez OT Occupational Therapist        Therapy Charges for Today     Code Description Service Date Service Provider Modifiers Qty    [id number]  OT THERAPEUTIC ACT EA 15 MIN 1/7/2020 Maribeth Rodriguez, OT GO 1    [id number]  OT THER PROC EA 15 MIN 1/7/2020 Maribeth Rodriguez, OT GO 1               Maribeth Rodriguez OT  1/7/2020

## 2020-01-07 NOTE — PROGRESS NOTES
Postop Day #11-- CABGX3/AVR-- [place]  EF 35% (echo)    Subjective:  Patient sitting on side of bed preparing to work with PT. No complaints. WBC slightly increased, will recheck in am.    Drips: None    Intake/Output Summary (Last 24 hours) at 1/7/2020 0901  Last data filed at 1/7/2020 0811  Gross per 24 hour   Intake 1200 ml   Output --   Net 1200 ml     Temp:  [97.7 °F (36.5 °C)-98.7 °F (37.1 °C)] 98.3 °F (36.8 °C)  Heart Rate:  [75-88] 75  Resp:  [16-20] 20  BP: (149-161)/(97-98) 161/98    Results from last 7 days   Lab Units 01/07/20  0638 01/06/20  0439   WBC 10*3/mm3 12.30* 10.00   HEMOGLOBIN g/dL 11.0* 10.9*   HEMATOCRIT % 32.6* 31.4*   PLATELETS 10*3/mm3 481* 421     Results from last 7 days   Lab Units 01/07/20  0638   CREATININE mg/dL 1.14*   POTASSIUM mmol/L 3.6   SODIUM mmol/L 136   MAGNESIUM mg/dL 2.0   PHOSPHORUS mg/dL 3.8       Physical Exam:  Neuro intact, nad, up to chair  Tele:  SR 80's  Diminished bases, 96% room air  Sternal incision healing well  Benign abd, + BM  No edema    Assessment/Plan:  Principal Problem:    Chest pain  Active Problems:    COPD (chronic obstructive pulmonary disease) (CMS/HCC)    Hypertension    Cardiomyopathy, dilated (CMS/HCC)    Essential hypertension    Chronic renal impairment    ICD (implantable cardioverter-defibrillator), dual, in situ    GERD without esophagitis    Hypothyroidism (acquired)    Aortic valve regurgitation    Unstable angina pectoris (CMS/HCC)    Coronary artery disease involving native coronary artery of native heart with angina pectoris (CMS/HCC)    Nonrheumatic aortic valve insufficiency    Aortic regurgitation s/p AVR (tissue)-- aspirin  CAD s/p CABG -- aspirin, statin, BB  Postop hypotension-- stable  Postop expected ELLEN-- 1 PRBC 1/2/2020  HFrEF (EF35%)-- maximize meds prior to discharge  AICD placement-- Biotronik  CKD stage 2-- nephrology following  HTN-- stable  DM II, last A1c 6.2-- SSI  Hypothyroidism-- Synthroid  Anxiety-- Xanax  Chronic  pain-- sees [facility]    Routine care  Mobilize  Check UA and venous duplex  Awaiting precert for rehab    TORRES LEE, APRN  1/7/2020  9:01 AM     Rehab transfer when bed available. Recheck WBC tomorrow.

## 2020-01-07 NOTE — PAYOR COMM NOTE
"This is clinical update for Calvin Downs, auth# [id number]    EXTENDED AUTHORIZATION PENDING:   PLEASE CALL OR FAX DETERMINATION TO CONTACT BELOW. THANK YOU.     OSCAR JEAN BAPTISTE RN  UTILIZATION REVIEW  [facility]  PH: [phone]  FX: [phone]      Calvin Dillon (44 y.o. Female)     Date of Birth Social Security Number Address Home Phone MRN    1975  [address] [place] [address] [phone] [id number]    Denominational Marital Status          Yazidism        Admission Date Admission Type Admitting Provider Attending Provider Department, Room/Bed    12/22/19 Emergency [address] Stock MD Khan, Ahmad Aftab, MD [facility] CARDIOVASCULAR CARE UNIT, 2216/1    Discharge Date Discharge Disposition Discharge Destination                       Attending Provider:  Lane Stock MD    Allergies:  Hydrocodone, Penicillin G    Isolation:  None   Infection:  None   Code Status:  CPR    Ht:  170.2 cm (67\")   Wt:  89 kg (196 lb 3.2 oz)    Admission Cmt:  None   Principal Problem:  Chest pain [R07.9]                 Active Insurance as of 12/22/2019     Primary Coverage     Payor Plan Insurance Group Employer/Plan Group    ANTHEM BLUE CROSS ANTHEM BLUE CROSS BLUE SHIELD PPO [id number]     Payor Plan Address Payor Plan Phone Number Payor Plan Fax Number Effective Dates    [address] [phone]  5/6/2018 - None Entered    [place] [address]       Subscriber Name Subscriber Birth Date Member ID       CATRACHO DOWNS 11/7/1970 [id number]           Secondary Coverage     Payor Plan Insurance Group Employer/Plan Group    ANTHEM MEDICARE REPLACEMENT ANTHEM MEDICARE ADVANTAGE [id number]     Payor Plan Address Payor Plan Phone Number Payor Plan Fax Number Effective Dates    [address] [phone]  1/1/2019 - None Entered    [facility] [id number]       Subscriber Name Subscriber Birth Date Member ID       CALVIN DILLON 1975 [id number]           "       Emergency Contacts      (Rel.) Home Phone Work Phone Mobile Phone    celio leo (Daughter) -- -- [phone]            Operative/Procedure Notes (last 24 hours) (Notes from 20 1051 through 20 105)    No notes of this type exist for this encounter.            Physician Progress Notes (last 24 hours) (Notes from 20 1051 through 20 105)      Loraine Son APRN at 20 0923          Pulmonary/ Critical Care progress Note        Patient Name:  Rafael Mccann    :  1975    Medical Record:  [id number]    Rafael Mccann is a 44 y.o. female who we were asked to see in consultation for decreased level of consciousness.   Patient is POD#3 CABG and AVR after presenting to the ER on 19 with complaints of chest pain, dyspnea and tachycardia.  Patient with a history of CAD being medically managed as well as cardiomyopathy.   Cardiac cath on 19 showed severe two vessel disease and 4+aortic regurg.       This evening patient was found to be very difficult to arouse in bed after being up to chair this morning and ambulating in the afternoon.   Patient was given Narcan with improvement in her mental status.  ABG was obtained as well which showed pCO2 of 49 and a pAO2 of 68.   Patient had been requiring Dilaudid 0.25 mg q2h IV and Oxycodone 5 mg q4h, for pain control in addition had Ultram and Benadryl earlier today.      SUBJECTIVE:   :  OOB to chair, awake and alert.  Complaints of right lower rib pain.    20:  OOB to chair,  Remains with complaints of pain  20:  Sitting on side of bed, remains with some pain to right side.    1/3: chest pain much better.  Her oxygen requirement stable on 2 L  - she is on roomair. Denies any pain, having BM and tolerating diet  - stable, on room air, walks in room without assistance  : Awake.  Currently on room air.  Complains of some shortness of breath with activity.  No nausea or  vomiting.  Complains of some chest wall pain.  :  Awake and pain is controlled.  No new issues.  On RA    Allergies    Allergies   Allergen Reactions   • Hydrocodone Hives   • Penicillin G Unknown (See Comments)       Medications    Scheduled Meds:    allopurinol 300 mg Oral Daily   amiodarone 200 mg Oral Q24H   aspirin 325 mg Oral Daily   atorvastatin 40 mg Oral Nightly   bisacodyl 10 mg Rectal Daily   bumetanide 1 mg Oral Daily   carvedilol 12.5 mg Oral BID With Meals   docusate sodium 100 mg Oral BID   enoxaparin 40 mg Subcutaneous Daily   ferrous sulfate 324 mg Oral Daily With Breakfast   folic acid 1 mg Oral Daily   guaiFENesin 600 mg Oral Q12H   insulin lispro 0-9 Units Subcutaneous 4x Daily With Meals & Nightly   ipratropium-albuterol 3 mL Nebulization 4x Daily - RT   lactulose 30 g Oral Daily   levothyroxine 200 mcg Oral Q AM   lidocaine 2 patch Transdermal Q24H   metFORMIN 500 mg Oral BID With Meals   montelukast 10 mg Oral Nightly   pantoprazole 40 mg Oral Q AM   polyethylene glycol 17 g Oral BID   senna 1 tablet Oral BID     Continuous Infusions:    sodium chloride 30 mL/hr Last Rate: 30 mL/hr (19 1430)     PRN Meds:.•  acetaminophen  •  dextrose  •  dextrose  •  glucagon (human recombinant)  •  insulin lispro **AND** insulin lispro  •  ipratropium-albuterol  •  MOUTH KOTE  •  naloxone  •  ondansetron  •  oxyCODONE  •  potassium chloride **OR** potassium chloride  •  potassium chloride **OR** potassium chloride      Physical Exam    tMax 24 hrs:  Temp (24hrs), Av.2 °F (36.8 °C), Min:97.7 °F (36.5 °C), Max:98.7 °F (37.1 °C)    Vitals Ranges:  Temp:  [97.7 °F (36.5 °C)-98.7 °F (37.1 °C)] 98.3 °F (36.8 °C)  Heart Rate:  [75-88] 75  Resp:  [16-20] 20  BP: (149-161)/(97-98) 161/98  Intake and Output Last 3 Shifts:  I/O last 3 completed shifts:  In: 1200 [P.O.:1200]  Out: -     General Appearance: Awake, no distress, appears stated age  Head:  Normocephalic, without obvious abnormality,  atraumatic  Eyes: conjunctiva/corneas clear , EOMI  Neck:  Supple,  no adenopathy, no JVD or bruit      Lungs: Good air entry bilaterally, no crackles or wheezes  Chest wall:  No tenderness  Heart:  Regular rate and rhythm, S1 and S2 normal, no murmur, rub or gallop  Abdomen:  Soft, non-tender, bowel sounds active all four quadrants,  no masses, no hepatomegaly, no splenomegaly  Extremities:  Extremities normal, no cyanosis or edema  Pulses: 2+ and symmetric all extremities  Skin:  No rashes or lesions  Neurologic: Awake, 5 x 5 power in all extremities.  Cranial nerves II through XII grossly intact.      labs    Lab Results (last 24 hours)     Procedure Component Value Units Date/Time    Comprehensive Metabolic Panel [[id number]]  (Abnormal) Collected:  01/07/20 0638    Specimen:  Blood Updated:  01/07/20 0733     Glucose 117 mg/dL      BUN 14 mg/dL      Creatinine 1.14 mg/dL      Sodium 136 mmol/L      Potassium 3.6 mmol/L      Chloride 97 mmol/L      CO2 24.0 mmol/L      Calcium 9.4 mg/dL      Total Protein 7.9 g/dL      Albumin 4.10 g/dL      ALT (SGPT) 57 U/L      AST (SGOT) 35 U/L      Alkaline Phosphatase 125 U/L      Total Bilirubin 0.4 mg/dL      eGFR   Amer 62 mL/min/1.73      Globulin 3.8 gm/dL      A/G Ratio 1.1 g/dL      BUN/Creatinine Ratio 12.3     Anion Gap 15.0 mmol/L     Narrative:       GFR Normal >60  Chronic Kidney Disease <60  Kidney Failure <15      Magnesium [[id number]]  (Normal) Collected:  01/07/20 0638    Specimen:  Blood Updated:  01/07/20 0729     Magnesium 2.0 mg/dL     Phosphorus [[id number]]  (Normal) Collected:  01/07/20 0638    Specimen:  Blood Updated:  01/07/20 0729     Phosphorus 3.8 mg/dL     Calcium, Ionized [[id number]]  (Normal) Collected:  01/07/20 0637    Specimen:  Blood Updated:  01/07/20 0720     Ionized Calcium 1.22 mmol/L     CBC (No Diff) [[id number]]  (Abnormal) Collected:  01/07/20 0638    Specimen:  Blood Updated:  01/07/20 0702     WBC 12.30 10*3/mm3      RBC  3.55 10*6/mm3      Hemoglobin 11.0 g/dL      Hematocrit 32.6 %      MCV 91.9 fL      MCH 30.9 pg      MCHC 33.7 g/dL      RDW 14.3 %      RDW-SD 46.8 fl      MPV 7.0 fL      Platelets 481 10*3/mm3     POC Glucose Once [[id number]]  (Abnormal) Collected:  01/06/20 1955    Specimen:  Blood Updated:  01/06/20 1957     Glucose 116 mg/dL      Comment: Serial Number: [id number]:  [id number]       POC Glucose Once [[id number]]  (Normal) Collected:  01/06/20 1649    Specimen:  Blood Updated:  01/06/20 1657     Glucose 105 mg/dL      Comment: Serial Number: [id number]:  [id number]       POC Glucose Once [[id number]]  (Abnormal) Collected:  01/06/20 1229    Specimen:  Blood Updated:  01/06/20 1611     Glucose 128 mg/dL      Comment: Serial Number: [id number]:  [id number]                  Imaging & Other Studies    Imaging Results (Last 72 Hours)     Procedure Component Value Units Date/Time    XR Chest PA & Lateral [[id number]] Collected:  01/06/20 1229     Updated:  01/06/20 1232    Narrative:       DATE OF EXAM:  1/6/2020 12:16 PM     PROCEDURE:  XR CHEST PA AND LATERAL-     INDICATIONS:  follow up bilateral pleural effusion; R07.9-Chest pain, unspecified;  I35.1-Nonrheumatic aortic (valve) insufficiency; I42.0-Dilated  cardiomyopathy; Z95.810-Presence of automatic (implantable) cardiac  defibrillator; I20.0-Unstable angina; I25.119-Atherosclerotic heart  disease of native coronary artery with unspecified angina pectoris;  I35.1-Nonrheumatic aortic (valve) insufficiency; J43.9-Emphysem       COMPARISON:  AP portable chest 1 2020.     TECHNIQUE:   Two radiologic views of the chest.     FINDINGS:  Low volume inspiration. Bibasilar airspace disease is present, with  improved aeration of the left lower lobe since prior study. There is  better visualization left hemidiaphragm. Small bilateral pleural  effusions are present, and may be improved on the left. However, the  right basilar opacity appears increased which  may represent worsening  right basilar consolidation and pleural fluid. Stable cardiac  enlargement with median sternotomy, valve replacement, and features of  CABG. The left chest wall pacemaker and leads appear unchanged. No  pneumothorax is visible. No acute osseous abnormalities are identified.  Right IJ introducer sheath has been removed.       Impression:       Interval improvement in left pleural effusion and left basilar airspace  disease since 01/01/2020. However, Right basilar airspace disease and  right pleural effusion appear slightly worsened.     Electronically Signed By-Dr. Pao Iglesias MD On:1/6/2020 12:30 PM  This report was finalized on [id number] by Dr. Pao Iglesias MD.            AssessmenT/PLAN  Decreased level of consciousness related to opioids  INDRA compliant with BiPAP use  POD CABG and AVR - aortic regurgitation and CAD  HFrEF - EF35%  AICD  CKD stage II  T2DM  Hypothyroidism on Synthroid  Anxiety on Xanax  Chronic pain follows outpatient pain management clinict  COPD without exacerbation  Cardiomyopathy    Plan:   -Currently on room air  -Continue efforts at pulmonary toilet.  Need for incentive spirometry reinforced.  -cont to use home PAP with sleep   -limit sedating medication   -Lidocaine patch  -duonebs and Mucinex   -on oral diuretics.  Nephrology managing  -OT recommends - rehab versus home with family     Awaiting rehab placement   Pulmonary status is unchanged.  We will continue with current plan of care.          Electronically signed by Loraine Son APRN at 01/07/20 0924     Kayla Burrell APRN at 01/07/20 0901          Postop Day #11-- CABGX3/AVR-- Stock  EF 35% (echo)    Subjective:  Patient sitting on side of bed preparing to work with PT. No complaints. WBC slightly increased, will recheck in am.    Drips: None    Intake/Output Summary (Last 24 hours) at 1/7/2020 0901  Last data filed at 1/7/2020 0811  Gross per 24 hour   Intake 1200 ml   Output --   Net 1200  ml     Temp:  [97.7 °F (36.5 °C)-98.7 °F (37.1 °C)] 98.3 °F (36.8 °C)  Heart Rate:  [75-88] 75  Resp:  [16-20] 20  BP: (149-161)/(97-98) 161/98    Results from last 7 days   Lab Units 01/07/20  0638 01/06/20  0439   WBC 10*3/mm3 12.30* 10.00   HEMOGLOBIN g/dL 11.0* 10.9*   HEMATOCRIT % 32.6* 31.4*   PLATELETS 10*3/mm3 481* 421     Results from last 7 days   Lab Units 01/07/20  0638   CREATININE mg/dL 1.14*   POTASSIUM mmol/L 3.6   SODIUM mmol/L 136   MAGNESIUM mg/dL 2.0   PHOSPHORUS mg/dL 3.8       Physical Exam:  Neuro intact, nad, up to chair  Tele:  SR 80's  Diminished bases, 96% room air  Sternal incision healing well  Benign abd, + BM  No edema    Assessment/Plan:  Principal Problem:    Chest pain  Active Problems:    COPD (chronic obstructive pulmonary disease) (CMS/HCC)    Hypertension    Cardiomyopathy, dilated (CMS/HCC)    Essential hypertension    Chronic renal impairment    ICD (implantable cardioverter-defibrillator), dual, in situ    GERD without esophagitis    Hypothyroidism (acquired)    Aortic valve regurgitation    Unstable angina pectoris (CMS/HCC)    Coronary artery disease involving native coronary artery of native heart with angina pectoris (CMS/HCC)    Nonrheumatic aortic valve insufficiency    Aortic regurgitation s/p AVR (tissue)-- aspirin  CAD s/p CABG -- aspirin, statin, BB  Postop hypotension-- stable  Postop expected ELLEN-- 1 PRBC 1/2/2020  HFrEF (EF35%)-- maximize meds prior to discharge  AICD placement-- Biotronik  CKD stage 2-- nephrology following  HTN-- stable  DM II, last A1c 6.2-- SSI  Hypothyroidism-- Synthroid  Anxiety-- Xanax  Chronic pain-- sees [facility]    Routine care  Mobilize  Check UA and venous duplex  Awaiting precert for rehab    ALEX WORKMAN  1/7/2020  9:01 AM         Electronically signed by Kayla Burrell APRN at 01/07/20 0904     Ashish Smalls MD at 01/07/20 0638          NEPHROLOGY PROGRESS NOTE------[facility]  "[facility]    [facility]  [phone]  Anette Smalls MD      Patient Care Team:  Phani Adames MD as PCP - General (Internal Medicine)  Ashish Smalls MD as Consulting Physician (Nephrology)      Provider:  Anette Smalls MD  Patient Name: Rafael Mccann  :  1975    SUBJECTIVE:    No complaints. Feeling good. No SOB, CP    Medication:    allopurinol 300 mg Oral Daily   amiodarone 200 mg Oral Q24H   aspirin 325 mg Oral Daily   atorvastatin 40 mg Oral Nightly   bisacodyl 10 mg Rectal Daily   bumetanide 1 mg Oral Daily   carvedilol 12.5 mg Oral BID With Meals   docusate sodium 100 mg Oral BID   enoxaparin 40 mg Subcutaneous Daily   ferrous sulfate 324 mg Oral Daily With Breakfast   folic acid 1 mg Oral Daily   guaiFENesin 600 mg Oral Q12H   insulin lispro 0-9 Units Subcutaneous 4x Daily With Meals & Nightly   ipratropium-albuterol 3 mL Nebulization 4x Daily - RT   lactulose 30 g Oral Daily   levothyroxine 200 mcg Oral Q AM   lidocaine 2 patch Transdermal Q24H   metFORMIN 500 mg Oral BID With Meals   montelukast 10 mg Oral Nightly   pantoprazole 40 mg Oral Q AM   polyethylene glycol 17 g Oral BID   senna 1 tablet Oral BID       sodium chloride 30 mL/hr Last Rate: 30 mL/hr (19 1430)       OBJECTIVE    Vital Sign Min/Max for last 24 hours  Temp  Min: 97.7 °F (36.5 °C)  Max: 98.7 °F (37.1 °C)   BP  Min: 149/97  Max: 158/98   Pulse  Min: 78  Max: 88   Resp  Min: 16  Max: 16   SpO2  Min: 95 %  Max: 97 %   No data recorded   No data recorded     Flowsheet Rows      First Filed Value   Admission Height  170.2 cm (67\") Documented at 2019   Admission Weight  91.8 kg (202 lb 6.1 oz) Documented at 20190          I/O this shift:  In: 480 [P.O.:480]  Out: -   I/O last 3 completed shifts:  In:  [P.O.:]  Out: -     Physical Exam:  General Appearance: alert, appears stated age and cooperative. LIP edema  Head: normocephalic, without " obvious abnormality and atraumatic  Eyes: conjunctivae and sclerae normal and no icterus  Neck: supple  Lungs: CTA bilaterally  Heart: regular rhythm & normal rate and normal S1, S2 +WALLY  Chest: S/P SURGICAL CHANGES  Abdomen: soft, NT/ND +BS  Extremities: moves extremities well, +TRACE PRETIBIAL EDEMA BILAT, no cyanosis and no redness  Skin: no bleeding, bruising or rash, turgor normal, color normal and no lesions noted  Neurologic: Alert, and oriented. No focal deficits    Labs:    WBC WBC   Date Value Ref Range Status   01/06/2020 10.00 3.40 - 10.80 10*3/mm3 Final   01/05/2020 8.60 3.40 - 10.80 10*3/mm3 Final      HGB Hemoglobin   Date Value Ref Range Status   01/06/2020 10.9 (L) 12.0 - 15.9 g/dL Final   01/05/2020 10.8 (L) 12.0 - 15.9 g/dL Final      HCT Hematocrit   Date Value Ref Range Status   01/06/2020 31.4 (L) 34.0 - 46.6 % Final   01/05/2020 31.1 (L) 34.0 - 46.6 % Final      Platlets No results found for: LABPLAT   MCV MCV   Date Value Ref Range Status   01/06/2020 91.9 79.0 - 97.0 fL Final   01/05/2020 90.5 79.0 - 97.0 fL Final          Sodium Sodium   Date Value Ref Range Status   01/06/2020 137 136 - 145 mmol/L Final   01/05/2020 140 136 - 145 mmol/L Final      Potassium Potassium   Date Value Ref Range Status   01/06/2020 3.8 3.5 - 5.2 mmol/L Final   01/05/2020 4.2 3.5 - 5.2 mmol/L Final   01/05/2020 3.3 (L) 3.5 - 5.2 mmol/L Final      Chloride Chloride   Date Value Ref Range Status   01/06/2020 99 98 - 107 mmol/L Final   01/05/2020 99 98 - 107 mmol/L Final      CO2 CO2   Date Value Ref Range Status   01/06/2020 25.0 22.0 - 29.0 mmol/L Final   01/05/2020 28.0 22.0 - 29.0 mmol/L Final      BUN BUN   Date Value Ref Range Status   01/06/2020 16 6 - 20 mg/dL Final   01/05/2020 12 6 - 20 mg/dL Final      Creatinine Creatinine   Date Value Ref Range Status   01/06/2020 1.14 (H) 0.57 - 1.00 mg/dL Final   01/05/2020 1.09 (H) 0.57 - 1.00 mg/dL Final      Calcium Calcium   Date Value Ref Range Status    01/06/2020 9.2 8.6 - 10.5 mg/dL Final   01/05/2020 9.6 8.6 - 10.5 mg/dL Final      PO4 No components found for: PO4   Albumin Albumin   Date Value Ref Range Status   01/06/2020 4.20 3.50 - 5.20 g/dL Final   01/05/2020 4.00 3.50 - 5.20 g/dL Final      Magnesium No results found for: MG   Uric Acid No components found for: URIC ACID     Imaging Results (Last 72 Hours)     Procedure Component Value Units Date/Time    XR Chest PA & Lateral [[id number]] Collected:  01/06/20 1229     Updated:  01/06/20 1232    Narrative:       DATE OF EXAM:  1/6/2020 12:16 PM     PROCEDURE:  XR CHEST PA AND LATERAL-     INDICATIONS:  follow up bilateral pleural effusion; R07.9-Chest pain, unspecified;  I35.1-Nonrheumatic aortic (valve) insufficiency; I42.0-Dilated  cardiomyopathy; Z95.810-Presence of automatic (implantable) cardiac  defibrillator; I20.0-Unstable angina; I25.119-Atherosclerotic heart  disease of native coronary artery with unspecified angina pectoris;  I35.1-Nonrheumatic aortic (valve) insufficiency; J43.9-Emphysem       COMPARISON:  AP portable chest 1 2020.     TECHNIQUE:   Two radiologic views of the chest.     FINDINGS:  Low volume inspiration. Bibasilar airspace disease is present, with  improved aeration of the left lower lobe since prior study. There is  better visualization left hemidiaphragm. Small bilateral pleural  effusions are present, and may be improved on the left. However, the  right basilar opacity appears increased which may represent worsening  right basilar consolidation and pleural fluid. Stable cardiac  enlargement with median sternotomy, valve replacement, and features of  CABG. The left chest wall pacemaker and leads appear unchanged. No  pneumothorax is visible. No acute osseous abnormalities are identified.  Right IJ introducer sheath has been removed.       Impression:       Interval improvement in left pleural effusion and left basilar airspace  disease since 01/01/2020. However, Right basilar  airspace disease and  right pleural effusion appear slightly worsened.     Electronically Signed By-Dr. Pao Iglesias MD On:1/6/2020 12:30 PM  This report was finalized on [id number] by Dr. Pao Iglesias MD.          Results for orders placed during the hospital encounter of 12/22/19   XR Chest PA & Lateral    Narrative DATE OF EXAM:  1/6/2020 12:16 PM     PROCEDURE:  XR CHEST PA AND LATERAL-     INDICATIONS:  follow up bilateral pleural effusion; R07.9-Chest pain, unspecified;  I35.1-Nonrheumatic aortic (valve) insufficiency; I42.0-Dilated  cardiomyopathy; Z95.810-Presence of automatic (implantable) cardiac  defibrillator; I20.0-Unstable angina; I25.119-Atherosclerotic heart  disease of native coronary artery with unspecified angina pectoris;  I35.1-Nonrheumatic aortic (valve) insufficiency; J43.9-Emphysem       COMPARISON:  AP portable chest 1 2020.     TECHNIQUE:   Two radiologic views of the chest.     FINDINGS:  Low volume inspiration. Bibasilar airspace disease is present, with  improved aeration of the left lower lobe since prior study. There is  better visualization left hemidiaphragm. Small bilateral pleural  effusions are present, and may be improved on the left. However, the  right basilar opacity appears increased which may represent worsening  right basilar consolidation and pleural fluid. Stable cardiac  enlargement with median sternotomy, valve replacement, and features of  CABG. The left chest wall pacemaker and leads appear unchanged. No  pneumothorax is visible. No acute osseous abnormalities are identified.  Right IJ introducer sheath has been removed.       Impression Interval improvement in left pleural effusion and left basilar airspace  disease since 01/01/2020. However, Right basilar airspace disease and  right pleural effusion appear slightly worsened.     Electronically Signed By-Dr. Pao Iglesias MD On:1/6/2020 12:30 PM  This report was finalized on [id number] by Dr. Cedeno  MD Kelsie.   XR Chest 1 View    Narrative DATE OF EXAM:  1/1/2020 10:53 AM     PROCEDURE:  XR CHEST 1 VW-     INDICATIONS:  right sided sharp pain below ribs; R07.9-Chest pain, unspecified;  I35.1-Nonrheumatic aortic (valve) insufficiency; I42.0-Dilated  cardiomyopathy; Z95.810-Presence of automatic (implantable) cardiac  defibrillator; I20.0-Unstable angina; I25.119-Atherosclerotic heart  disease of native coronary artery with unspecified angina pectoris;  I35.1-Nonrheumatic aortic (valve) insufficiency; J43.9-Emphysema,      COMPARISON:  12/31/2019     TECHNIQUE:   Single radiographic AP view of the chest was obtained.     FINDINGS:  There is a right internal jugular [place] sheath in place with the tip  in the superior vena cava. There is a left subclavian pacemaker  device/AICD device with leads overlying the right atrium and right  ventricle. The heart is enlarged with changes of CABG. There are  bilateral pleural effusions left greater than right with bilateral  basilar airspace disease. Aeration is slightly worse in the interval.        Impression Mild worsening of bilateral basilar infiltrates with moderate left and  small-to-moderate right pleural effusions.     Electronically Signed By-Chidi Walton On:1/1/2020 11:01 AM  This report was finalized on [id number] by  Chidi Walton, .   XR Chest 1 View    Narrative DATE OF EXAM:  12/31/2019 10:48 AM     PROCEDURE:  XR CHEST 1 VW-     INDICATIONS:  Hypoxia; R07.9-Chest pain, unspecified; I35.1-Nonrheumatic aortic  (valve) insufficiency; I42.0-Dilated cardiomyopathy; Z95.810-Presence of  automatic (implantable) cardiac defibrillator; I20.0-Unstable angina;  I25.119-Atherosclerotic heart disease of native coronary artery with  unspecified angina pectoris; I35.1-Nonrheumatic aortic (valve)  insufficiency; J43.9-Emphysema, unspecified patient's a 44-year-old  female with hypoxia history of open heart surgery on December 26.  History of aortic regurg, former  smoker     COMPARISON:  Comparison is made to study of 12/29/2019     TECHNIQUE:   Single radiographic AP view of the chest was obtained.     FINDINGS:  Today's portable erect study was obtained on 12/31/2019 at 10:50 AM.     Today's study demonstrates the left basilar opacity remaining stable  there is increasing right basilar opacity consistent with increasing  atelectasis and probable small pleural effusion. The right internal  jugular vascular sheath remains in good position the left-sided  dual-lead pacemaker defibrillator remains in good position changes of  median sternotomy coronary artery bypass grafting and aortic valvular  replacement are again seen.        Impression    1. Mild interval worsening from study of December 29  2. Increasing right basilar opacities felt to represent increasing  atelectasis  3. Stable moderate left basilar opacity consistent with atelectasis,  pneumonia and/or pleural effusion     Electronically Signed By-Ashwin Beavers Jr. On:12/31/2019 11:53 AM  This report was finalized on [id number] by  Ashwin Beavers Jr., .       Results for orders placed during the hospital encounter of 12/22/19   Duplex Venous Lower Extremity - Bilateral CAR    Narrative · Normal bilateral lower extremity venous duplex scan.            ASSESSMENT / PLAN      Chest pain    COPD (chronic obstructive pulmonary disease) (CMS/HCC)    Hypertension    Cardiomyopathy, dilated (CMS/HCC)    Essential hypertension    Chronic renal impairment    ICD (implantable cardioverter-defibrillator), dual, in situ    GERD without esophagitis    Hypothyroidism (acquired)    Aortic valve regurgitation    Unstable angina pectoris (CMS/HCC)    Coronary artery disease involving native coronary artery of native heart with angina pectoris (CMS/HCC)    Nonrheumatic aortic valve insufficiency      1. ARF/LINSEY/CRF/CKD STG 2------Nonoliguric. Current creatinine is at baseline. ARF/LINSEY resolved. +Mild ARF/LINSEY on top of what appears to be  CRF/CKD STG 2 with a baseline serum creatinine of 1.1. Unknown if patient has baseline proteinuria or hematuria. CRF/CKD STG 2 likely secondary to HTN NS/DM and chronic renal hypoperfusion from Cardiomyopathy. +Mild ARF/LINSEY appeared to be secondary to prerenal/hemodynamic fluctuation from MI S/P Cath/IV dye exposure with concomitant ACE-I and diuretic use. Keep off of Zestril for now.   Dose meds for CrCl 30-60 cc/min. No NSAIDs. No further IV dye exposure, unless emergently needed.     2. CAD S/P MI S/P CATH--------CABG done per , CT Surgery.     3. DILATED CARDIOMYOPATHY/VALVULAR HEART DISEASE--------No gross overload at present. Stable on current Bumex dose. Has PM/AICD. Per , Cardiology     4. HTN WITH CKD------BP okay. Avoid hypotension. No ACE/ARB/DRI for now. Temporarily holding diuretics     5. HYPERURICEMIA---------On Allopurinol.  Uric normal     6. HYPERLIPIDEMIA------On Statin.       7. GERD-------On H2 blocker     8. DMII------Resumed metformin. BS okay. On Glucometers, SSI     9. VITAMIN D DEFICIENCY     10. DM PERIPHERAL NEUROPATHY-------On Neurontin     11. HYPOTHYROIDISM------Increased Synthroid as TSH up    12. ANEMIA-----H/H stable            Anette Smalls MD  [facility]  [phone]  01/07/20  6:38 AM      Electronically signed by Ashish Smalls MD at 01/07/20 1022     Lane Stock MD at 01/06/20 1102          Postop Day #10-- CABGX3/AVR-- Dayami  EF 35% (echo)    Subjective:  Patient up to chair. Anxious for discharge. PT recommends rehab. Awaiting precert for rehab    Drips: None    Intake/Output Summary (Last 24 hours) at 1/6/2020 1102  Last data filed at 1/5/2020 2100  Gross per 24 hour   Intake 1220 ml   Output --   Net 1220 ml     Temp:  [97.3 °F (36.3 °C)-98.5 °F (36.9 °C)] 98.2 °F (36.8 °C)  Heart Rate:  [74-83] 83  Resp:  [16-18] 16  BP: (140-160)/(90-92) 160/92    Results from last 7 days   Lab Units 01/06/20  0439  01/05/20  0446   WBC 10*3/mm3 10.00 8.60   HEMOGLOBIN g/dL 10.9* 10.8*   HEMATOCRIT % 31.4* 31.1*   PLATELETS 10*3/mm3 421 381     Results from last 7 days   Lab Units 01/06/20  0439  01/02/20  0558   CREATININE mg/dL 1.14*   < > 1.00   POTASSIUM mmol/L 3.8   < > 3.6   SODIUM mmol/L 137   < > 138   MAGNESIUM mg/dL  --   --  2.2   PHOSPHORUS mg/dL 3.9   < > 4.0    < > = values in this interval not displayed.       Physical Exam:  Neuro intact, nad, up to chair  Tele:  SR 80's  Diminished bases, 96% room air  Sternal incision healing well  Benign abd, + BM  No edema    Assessment/Plan:  Principal Problem:    Chest pain  Active Problems:    COPD (chronic obstructive pulmonary disease) (CMS/HCC)    Hypertension    Cardiomyopathy, dilated (CMS/HCC)    Essential hypertension    Chronic renal impairment    ICD (implantable cardioverter-defibrillator), dual, in situ    GERD without esophagitis    Hypothyroidism (acquired)    Aortic valve regurgitation    Unstable angina pectoris (CMS/HCC)    Coronary artery disease involving native coronary artery of native heart with angina pectoris (CMS/HCC)    Nonrheumatic aortic valve insufficiency    Aortic regurgitation s/p AVR (tissue)-- aspirin  CAD s/p CABG -- aspirin, statin, BB  Postop hypotension-- stable  Postop expected ELLEN-- 1 PRBC 1/2  HFrEF (EF35%)-- maximize meds prior to discharge  AICD placement-- Biotronik  CKD stage 2-- nephrology following  HTN-- stable  DM II, last A1c 6.2-- SSI  Hypothyroidism-- Synthroid  Anxiety-- Xanax  Chronic pain-- sees [facility]    Routine care  Mobilize  Increase BB dosage  Awaiting precert for rehab    TORRES JESUS, ALEX  1/6/2020  11:02 AM     Neuro intact.  CV stable, increasing B Blocker.  Pulm toileting, mobilize.  PO as tolerated.  Low dose diuretic.  Transfer to rehab when arrangements complete.      Electronically signed by Lane Stock MD at 01/06/20 9087       Consult Notes (last 24 hours) (Notes from  01/06/20 1051 through 01/07/20 1051)    No notes of this type exist for this encounter.

## 2020-01-07 NOTE — PLAN OF CARE
Pt is doing well now though WBC count is elevated. She has been able to climb stairs & shower multiple times. She is walking short distaces w/o assist & completing her ADL w/ setup. From a functional perspective she should be safe for home w/ HHC & 24 hour family support, though it is noted that her cardiothoracic surgeon is anticipating IP rehab for his medically complex patient.

## 2020-01-07 NOTE — PROGRESS NOTES
Cardiology Follow-up      Encounter Date:  12/12/2019    Patient ID:   Rafael Mccann is a 44 y.o. female.      Impressions:     Congestive heart failure  Acute systolic heart failure exacerbation on chronic systolic heart failure  CHF NYHA class IV  Valvular heart disease with a significant aortic insufficiency  Essential hypertension  Chronic systolic heart failure  History of cardiomyopathy   Chronic renal insufficiency  Coronary artery disease with diffuse RCA disease  History of diabetes mellitus type 2  History of thyroid disease  Cardiomyopathy with LV ejection fraction of 35%  s/p AICD placement/normal device function     Impressions: Catheterization  Severe two-vessel coronary artery disease  Moderate stenosis involving the distal LAD  4+ aortic regurgitation  Normal PA pressures  Normal filling pressures   Patient is s/p urgent AVR (21 mm Magna), CABG x3 with SV to Diag1 and SV sequential to PDA and then OM3 12/27/2019  Status post coronary artery bypass surgery x3 and aortic valve replacement surgery  Patient is currently hemodynamically stable   Normal sinus rhythm  Ambulate  Continuation of her current medical regimen at the present time.  Anticipate discharge to rehab today.  Continue to increase activity as tolerated.  Follow-up our office in 2 weeks  Need for close monitoring and follow-up discussed with the patient      Reason For Followup:  Cardiomyopathy  Hypertension  Hyperlipidemia  Valvular heart disease  Coronary artery disease         Subjective:  Seen and examined.  Chart and labs reviewed.  Patient reports some mild chest soreness, but denies shortness of breath.  She is ambulating.  Hemoglobin has remained stable.  Discussed with CT surgery.    Assessment & Plan    Impressions:  Congestive heart failure  Acute systolic heart failure exacerbation on chronic systolic heart failure            Objective:    Vitals:  Vitals:    01/07/20 0811 01/07/20 1115 01/07/20 1127 01/07/20 1130    BP: 161/98 113/80     BP Location: Left arm Left arm     Patient Position: Lying Sitting     Pulse: 75 81 79 80   Resp:  20 20    Temp:       TempSrc:       SpO2: 95%  95%    Weight:       Height:           Physical Exam:    General: Well-developed, well-nourished 44-year-old -American female seen in no acute distress.  Head:  Normocephalic, atraumatic, mucous membranes moist  Eyes:  Extraocular muscles intact  Neck:  Supple,  no bruit  Lungs: Clear to auscultation bilaterally, few crackles at the bilateral bases  chest wall: Mid line scar with no infection or bleeding  Heart::  Regular rate and rhythm, S1 and S2 normal, 2 x 6 ejection systolic murmur  Abdomen: Soft, nondistended  Extremities: No cyanosis, clubbing, or edema  Pulses: 2+ and symmetric all extremities  Skin:  No rashes or lesions  Neuro/psych: Alert and oriented x3 cranial nerves II through XII are grossly intact    Allergies:  Allergies   Allergen Reactions   • Hydrocodone Hives   • Penicillin G Unknown (See Comments)       Medication Review:     Current Facility-Administered Medications:   •  acetaminophen (TYLENOL) tablet 500 mg, 500 mg, Oral, Q6H PRN, Lane Stock MD, 500 mg at 01/05/20 0142  •  allopurinol (ZYLOPRIM) tablet 300 mg, 300 mg, Oral, Daily, Chidi Pace MD, 300 mg at 01/07/20 0812  •  amiodarone (PACERONE) tablet 200 mg, 200 mg, Oral, Q24H, Lane Stock MD, 200 mg at 01/07/20 0812  •  aspirin tablet 325 mg, 325 mg, Oral, Daily, Lane Stock MD, 325 mg at 01/07/20 0812  •  atorvastatin (LIPITOR) tablet 40 mg, 40 mg, Oral, Nightly, Chidi Pace MD, 40 mg at 01/06/20 2206  •  bisacodyl (DULCOLAX) suppository 10 mg, 10 mg, Rectal, Daily, Kayla Burrell APRN, 10 mg at 01/02/20 1000  •  bumetanide (BUMEX) tablet 1 mg, 1 mg, Oral, Daily, Adalid Sterling MD, 1 mg at 01/07/20 0812  •  carvedilol (COREG) tablet 12.5 mg, 12.5 mg, Oral, BID With Meals, Kayla Burrell APRN, 12.5 mg at 01/07/20  0811  •  dextrose (D50W) 25 g/ 50mL Intravenous Solution 25 g, 25 g, Intravenous, Q15 Min PRN, Kayla Burrell APRN  •  dextrose (GLUTOSE) oral gel 15 g, 15 g, Oral, Q15 Min PRN, Kayla Burrell APRN  •  docusate sodium (COLACE) capsule 100 mg, 100 mg, Oral, BID, Chidi Pace MD, 100 mg at 01/07/20 0811  •  enoxaparin (LOVENOX) syringe 40 mg, 40 mg, Subcutaneous, Daily, Chidi Pace MD, 40 mg at 01/06/20 2205  •  ferrous sulfate EC tablet 324 mg, 324 mg, Oral, Daily With Breakfast, Chidi Pace MD, 324 mg at 01/07/20 0812  •  folic acid (FOLVITE) tablet 1 mg, 1 mg, Oral, Daily, Chidi Pace MD, 1 mg at 01/07/20 0812  •  glucagon (human recombinant) (GLUCAGEN DIAGNOSTIC) injection 1 mg, 1 mg, Subcutaneous, Q15 Min PRN, Kayla Burrell APRN  •  guaiFENesin (MUCINEX) 12 hr tablet 600 mg, 600 mg, Oral, Q12H, Hasmukh David MD, 600 mg at 01/07/20 0812  •  insulin lispro (humaLOG) injection 0-9 Units, 0-9 Units, Subcutaneous, 4x Daily With Meals & Nightly **AND** insulin lispro (humaLOG) injection 0-9 Units, 0-9 Units, Subcutaneous, PRN, Kayla Burrell APRN  •  ipratropium-albuterol (DUO-NEB) nebulizer solution 3 mL, 3 mL, Nebulization, 4x Daily - RT, Loraine Son APRN, 3 mL at 01/07/20 1127  •  ipratropium-albuterol (DUO-NEB) nebulizer solution 3 mL, 3 mL, Nebulization, Q4H PRN, Loraine Son APRN, 3 mL at 01/06/20 0434  •  lactulose (CHRONULAC) 10 GM/15ML solution 30 g, 30 g, Oral, Daily, Kayla Burrell, ALEX, 30 g at 01/02/20 0950  •  levothyroxine (SYNTHROID, LEVOTHROID) tablet 200 mcg, 200 mcg, Oral, Q AM, Chidi Pace MD, 200 mcg at 01/07/20 0529  •  lidocaine (LIDODERM) 5 % 2 patch, 2 patch, Transdermal, Q24H, Hasmukh David MD, 1 patch at 01/07/20 1109  •  metFORMIN (GLUCOPHAGE) tablet 500 mg, 500 mg, Oral, BID With Meals, Adalid Sterling MD, 500 mg at 01/07/20 0812  •  montelukast (SINGULAIR) tablet 10 mg, 10 mg, Oral, Nightly, Chidi Pace MD,  10 mg at 01/06/20 2205  •  MOUTH KOTE solution 2 mL, 2 spray, Mouth/Throat, Q4H PRN, Chidi Pace MD  •  naloxone (NARCAN) injection 0.4 mg, 0.4 mg, Intravenous, Q5 Min PRN, Lane Stock MD  •  ondansetron (ZOFRAN) injection 4 mg, 4 mg, Intravenous, Q6H PRN, Chidi Pace MD, 4 mg at 01/02/20 2125  •  oxyCODONE (ROXICODONE) immediate release tablet 5 mg, 5 mg, Oral, Q6H PRN, Lane Stock MD, 5 mg at 01/07/20 1114  •  pantoprazole (PROTONIX) EC tablet 40 mg, 40 mg, Oral, Q AM, Chidi Pace MD, 40 mg at 01/07/20 0529  •  polyethylene glycol (MIRALAX) powder 17 g, 17 g, Oral, BID, Chidi Pace MD, 17 g at 01/02/20 0949  •  potassium chloride (K-DUR,KLOR-CON) CR tablet 20 mEq, 20 mEq, Oral, PRN, 20 mEq at 01/07/20 0812 **OR** potassium chloride (KLOR-CON) packet 20 mEq, 20 mEq, Oral, PRN, Chidi Pace MD  •  potassium chloride 10 mEq in 100 mL IVPB, 10 mEq, Intravenous, Q1H PRN **OR** potassium chloride 10 mEq in 100 mL IVPB, 10 mEq, Intravenous, Q1H PRN, Chidi Pace MD  •  senna (SENOKOT) tablet 1 tablet, 1 tablet, Oral, BID, Chidi Pace MD, 1 tablet at 01/07/20 0812  •  sodium chloride 0.9 % infusion, 30 mL/hr, Intravenous, Continuous, Chidi Pace MD, Last Rate: 30 mL/hr at 12/27/19 1430, 30 mL/hr at 12/27/19 1430    Family History:  Family History   Problem Relation Age of Onset   • Heart disease Father        Past Medical History:  Past Medical History:   Diagnosis Date   • CHF (congestive heart failure) (CMS/HCC)    • COPD (chronic obstructive pulmonary disease) (CMS/[facility])    • Diabetes (CMS/[facility])    • Hyperlipidemia    • Hypertension        Past surgical History:  Past Surgical History:   Procedure Laterality Date   • AORTIC VALVE REPAIR/REPLACEMENT N/A 12/27/2019    Procedure: AORTIC VALVE REPAIR/REPLACEMENT;  Surgeon: Lane Stock MD;  Location: [facility];  Service: Cardiothoracic   • BREAST LUMPECTOMY     • CARDIAC CATHETERIZATION N/A  2019    Procedure: Right and Left Heart Cath 19 @ 0900;  Surgeon: Wayne Luna MD;  Location: [facility] CATH INVASIVE LOCATION;  Service: Cardiovascular   • CARDIAC CATHETERIZATION N/A 2019    Procedure: Coronary angiography;  Surgeon: Wayne Luna MD;  Location: [facility] CATH INVASIVE LOCATION;  Service: Cardiovascular   • CARDIAC ELECTROPHYSIOLOGY PROCEDURE Left 2019    Procedure: Dual-chamber ICD insertion;  Surgeon: Héctor Eckert MD;  Location: [facility] CATH INVASIVE LOCATION;  Service: Cardiovascular   • CARDIAC ELECTROPHYSIOLOGY PROCEDURE Left 2019    Procedure: Lead Revision;  Surgeon: Héctor Eckert MD;  Location: [facility] CATH INVASIVE LOCATION;  Service: Cardiovascular   • CHOLECYSTECTOMY     • CORONARY ARTERY BYPASS GRAFT N/A 2019    Procedure: CORONARY ARTERY BYPASS GRAFTING;  Surgeon: Lane Stock MD;  Location: [facility] CVOR;  Service: Cardiothoracic   • HYSTERECTOMY     • INSERT / REPLACE / REMOVE PACEMAKER     • THYROID SURGERY         Social History:  Social History     Socioeconomic History   • Marital status:      Spouse name: Not on file   • Number of children: Not on file   • Years of education: Not on file   • Highest education level: Not on file   Tobacco Use   • Smoking status: Former Smoker     Last attempt to quit: 2019     Years since quittin.0   • Smokeless tobacco: Never Used   Substance and Sexual Activity   • Alcohol use: No     Frequency: Never   • Drug use: No   • Sexual activity: Defer       Review of Systems:  The following systems were reviewed as they relate to the cardiovascular system: Constitutional, Eyes, ENT, Cardiovascular, Respiratory, Gastrointestinal, Integumentary, Neurological, Psychiatric, Hematologic, Endocrine, Musculoskeletal, and Genitourinary. The pertinent cardiovascular findings are reported above with all other cardiovascular points within those systems being negative.        NOTE: The  following portions of the patient's history were reviewed and updated this visit as appropriate: allergies, current medications, past family history, past medical history, past social history, past surgical history and problem list.        Electronically signed by ALEX Hua, 01/06/20, 9:41 AM.

## 2020-01-07 NOTE — PROGRESS NOTES
Continued Stay Note   [place]     Patient Name: Rafael Mccann  MRN: [id number]  Today's Date: 1/7/2020    Admit Date: 12/22/2019    Discharge Plan     Row Name 01/07/20 1633       Plan    Plan  DC Plan:  [facility] sub-acute at discharge pending bed availability and pre-cert.  Pre-cert started on 1/06 at 10:30 am.  Pre-cert still pending as of 1/07 at 16:30.  No PASRR neededf for SIR.    Plan Comments  SW and CM have followed up with [facility] throughout the day concerning pt's pre-cert.  At 14:30 today (1/07) the pre-cert is still pending.  Per [facility] liaison she is going to call company back in a half hour and if approved will call nurses station to set up transfer.         Azra Alexander, JEAN, LSW    Office Phone:  [phone]  Office Fax:  [phone]  Email:  [email or web address]

## 2020-01-07 NOTE — PROGRESS NOTES
Pulmonary/ Critical Care progress Note        Patient Name:  Rafael Mccann    :  1975    Medical Record:  [id number]    Rafael Mccann is a 44 y.o. female who we were asked to see in consultation for decreased level of consciousness.   Patient is POD#3 CABG and AVR after presenting to the ER on 19 with complaints of chest pain, dyspnea and tachycardia.  Patient with a history of CAD being medically managed as well as cardiomyopathy.   Cardiac cath on 19 showed severe two vessel disease and 4+aortic regurg.       This evening patient was found to be very difficult to arouse in bed after being up to chair this morning and ambulating in the afternoon.   Patient was given Narcan with improvement in her mental status.  ABG was obtained as well which showed pCO2 of 49 and a pAO2 of 68.   Patient had been requiring Dilaudid 0.25 mg q2h IV and Oxycodone 5 mg q4h, for pain control in addition had Ultram and Benadryl earlier today.      SUBJECTIVE:   :  OOB to chair, awake and alert.  Complaints of right lower rib pain.    20:  OOB to chair,  Remains with complaints of pain  20:  Sitting on side of bed, remains with some pain to right side.    1/3: chest pain much better.  Her oxygen requirement stable on 2 L  - she is on roomair. Denies any pain, having BM and tolerating diet  - stable, on room air, walks in room without assistance  : Awake.  Currently on room air.  Complains of some shortness of breath with activity.  No nausea or vomiting.  Complains of some chest wall pain.  :  Awake and pain is controlled.  No new issues.  On RA    Allergies    Allergies   Allergen Reactions   • Hydrocodone Hives   • Penicillin G Unknown (See Comments)       Medications    Scheduled Meds:    allopurinol 300 mg Oral Daily   amiodarone 200 mg Oral Q24H   aspirin 325 mg Oral Daily   atorvastatin 40 mg Oral Nightly   bisacodyl 10 mg Rectal Daily   bumetanide 1 mg Oral Daily    carvedilol 12.5 mg Oral BID With Meals   docusate sodium 100 mg Oral BID   enoxaparin 40 mg Subcutaneous Daily   ferrous sulfate 324 mg Oral Daily With Breakfast   folic acid 1 mg Oral Daily   guaiFENesin 600 mg Oral Q12H   insulin lispro 0-9 Units Subcutaneous 4x Daily With Meals & Nightly   ipratropium-albuterol 3 mL Nebulization 4x Daily - RT   lactulose 30 g Oral Daily   levothyroxine 200 mcg Oral Q AM   lidocaine 2 patch Transdermal Q24H   metFORMIN 500 mg Oral BID With Meals   montelukast 10 mg Oral Nightly   pantoprazole 40 mg Oral Q AM   polyethylene glycol 17 g Oral BID   senna 1 tablet Oral BID     Continuous Infusions:    sodium chloride 30 mL/hr Last Rate: 30 mL/hr (19 1430)     PRN Meds:.•  acetaminophen  •  dextrose  •  dextrose  •  glucagon (human recombinant)  •  insulin lispro **AND** insulin lispro  •  ipratropium-albuterol  •  MOUTH KOTE  •  naloxone  •  ondansetron  •  oxyCODONE  •  potassium chloride **OR** potassium chloride  •  potassium chloride **OR** potassium chloride      Physical Exam    tMax 24 hrs:  Temp (24hrs), Av.2 °F (36.8 °C), Min:97.7 °F (36.5 °C), Max:98.7 °F (37.1 °C)    Vitals Ranges:  Temp:  [97.7 °F (36.5 °C)-98.7 °F (37.1 °C)] 98.3 °F (36.8 °C)  Heart Rate:  [75-88] 75  Resp:  [16-20] 20  BP: (149-161)/(97-98) 161/98  Intake and Output Last 3 Shifts:  I/O last 3 completed shifts:  In: 1200 [P.O.:1200]  Out: -     General Appearance: Awake, no distress, appears stated age  Head:  Normocephalic, without obvious abnormality, atraumatic  Eyes: conjunctiva/corneas clear , EOMI  Neck:  Supple,  no adenopathy, no JVD or bruit      Lungs: Good air entry bilaterally, no crackles or wheezes  Chest wall:  No tenderness  Heart:  Regular rate and rhythm, S1 and S2 normal, no murmur, rub or gallop  Abdomen:  Soft, non-tender, bowel sounds active all four quadrants,  no masses, no hepatomegaly, no splenomegaly  Extremities:  Extremities normal, no cyanosis or edema  Pulses: 2+  and symmetric all extremities  Skin:  No rashes or lesions  Neurologic: Awake, 5 x 5 power in all extremities.  Cranial nerves II through XII grossly intact.      labs    Lab Results (last 24 hours)     Procedure Component Value Units Date/Time    Comprehensive Metabolic Panel [[id number]]  (Abnormal) Collected:  01/07/20 0638    Specimen:  Blood Updated:  01/07/20 0733     Glucose 117 mg/dL      BUN 14 mg/dL      Creatinine 1.14 mg/dL      Sodium 136 mmol/L      Potassium 3.6 mmol/L      Chloride 97 mmol/L      CO2 24.0 mmol/L      Calcium 9.4 mg/dL      Total Protein 7.9 g/dL      Albumin 4.10 g/dL      ALT (SGPT) 57 U/L      AST (SGOT) 35 U/L      Alkaline Phosphatase 125 U/L      Total Bilirubin 0.4 mg/dL      eGFR   Amer 62 mL/min/1.73      Globulin 3.8 gm/dL      A/G Ratio 1.1 g/dL      BUN/Creatinine Ratio 12.3     Anion Gap 15.0 mmol/L     Narrative:       GFR Normal >60  Chronic Kidney Disease <60  Kidney Failure <15      Magnesium [[id number]]  (Normal) Collected:  01/07/20 0638    Specimen:  Blood Updated:  01/07/20 0729     Magnesium 2.0 mg/dL     Phosphorus [[id number]]  (Normal) Collected:  01/07/20 0638    Specimen:  Blood Updated:  01/07/20 0729     Phosphorus 3.8 mg/dL     Calcium, Ionized [[id number]]  (Normal) Collected:  01/07/20 0637    Specimen:  Blood Updated:  01/07/20 0720     Ionized Calcium 1.22 mmol/L     CBC (No Diff) [[id number]]  (Abnormal) Collected:  01/07/20 0638    Specimen:  Blood Updated:  01/07/20 0702     WBC 12.30 10*3/mm3      RBC 3.55 10*6/mm3      Hemoglobin 11.0 g/dL      Hematocrit 32.6 %      MCV 91.9 fL      MCH 30.9 pg      MCHC 33.7 g/dL      RDW 14.3 %      RDW-SD 46.8 fl      MPV 7.0 fL      Platelets 481 10*3/mm3     POC Glucose Once [[id number]]  (Abnormal) Collected:  01/06/20 1955    Specimen:  Blood Updated:  01/06/20 1957     Glucose 116 mg/dL      Comment: Serial Number: [id number]:  [id number]       POC Glucose Once [[id number]]  (Normal) Collected:   01/06/20 1649    Specimen:  Blood Updated:  01/06/20 1657     Glucose 105 mg/dL      Comment: Serial Number: [id number]:  [id number]       POC Glucose Once [[id number]]  (Abnormal) Collected:  01/06/20 1229    Specimen:  Blood Updated:  01/06/20 1611     Glucose 128 mg/dL      Comment: Serial Number: [id number]:  [id number]                  Imaging & Other Studies    Imaging Results (Last 72 Hours)     Procedure Component Value Units Date/Time    XR Chest PA & Lateral [[id number]] Collected:  01/06/20 1229     Updated:  01/06/20 1232    Narrative:       DATE OF EXAM:  1/6/2020 12:16 PM     PROCEDURE:  XR CHEST PA AND LATERAL-     INDICATIONS:  follow up bilateral pleural effusion; R07.9-Chest pain, unspecified;  I35.1-Nonrheumatic aortic (valve) insufficiency; I42.0-Dilated  cardiomyopathy; Z95.810-Presence of automatic (implantable) cardiac  defibrillator; I20.0-Unstable angina; I25.119-Atherosclerotic heart  disease of native coronary artery with unspecified angina pectoris;  I35.1-Nonrheumatic aortic (valve) insufficiency; J43.9-Emphysem       COMPARISON:  AP portable chest 1 2020.     TECHNIQUE:   Two radiologic views of the chest.     FINDINGS:  Low volume inspiration. Bibasilar airspace disease is present, with  improved aeration of the left lower lobe since prior study. There is  better visualization left hemidiaphragm. Small bilateral pleural  effusions are present, and may be improved on the left. However, the  right basilar opacity appears increased which may represent worsening  right basilar consolidation and pleural fluid. Stable cardiac  enlargement with median sternotomy, valve replacement, and features of  CABG. The left chest wall pacemaker and leads appear unchanged. No  pneumothorax is visible. No acute osseous abnormalities are identified.  Right IJ introducer sheath has been removed.       Impression:       Interval improvement in left pleural effusion and left basilar airspace  disease  since 01/01/2020. However, Right basilar airspace disease and  right pleural effusion appear slightly worsened.     Electronically Signed By-Dr. Pao Iglesias MD On:1/6/2020 12:30 PM  This report was finalized on [id number] by Dr. Pao Iglesias MD.            AssessmenT/PLAN  Decreased level of consciousness related to opioids  INDRA compliant with BiPAP use  POD CABG and AVR - aortic regurgitation and CAD  HFrEF - EF35%  AICD  CKD stage II  T2DM  Hypothyroidism on Synthroid  Anxiety on Xanax  Chronic pain follows outpatient pain management clinict  COPD without exacerbation  Cardiomyopathy    Plan:   -Currently on room air  -Continue efforts at pulmonary toilet.  Need for incentive spirometry reinforced.  -cont to use home PAP with sleep   -limit sedating medication   -Lidocaine patch  -duonebs and Mucinex   -on oral diuretics.  Nephrology managing  -OT recommends - rehab versus home with family     Awaiting rehab placement   Pulmonary status is unchanged.  We will continue with current plan of care.

## 2020-01-07 NOTE — PLAN OF CARE
Patient doing well overall. c/o inability to sleep at night. Plan for d/c to rehab soon, when precert approved per SW - patient VSS, ambulating, working with PT/OT.

## 2020-01-08 VITALS
OXYGEN SATURATION: 96 % | HEIGHT: 67 IN | WEIGHT: 189.15 LBS | BODY MASS INDEX: 29.69 KG/M2 | RESPIRATION RATE: 16 BRPM | TEMPERATURE: 98.3 F | DIASTOLIC BLOOD PRESSURE: 86 MMHG | SYSTOLIC BLOOD PRESSURE: 135 MMHG | HEART RATE: 78 BPM

## 2020-01-08 LAB
ALBUMIN SERPL-MCNC: 4.1 G/DL (ref 3.5–5.2)
ANION GAP SERPL CALCULATED.3IONS-SCNC: 14 MMOL/L (ref 5–15)
BUN BLD-MCNC: 14 MG/DL (ref 6–20)
BUN/CREAT SERPL: 10.9 (ref 7–25)
CALCIUM SPEC-SCNC: 9.3 MG/DL (ref 8.6–10.5)
CHLORIDE SERPL-SCNC: 99 MMOL/L (ref 98–107)
CO2 SERPL-SCNC: 24 MMOL/L (ref 22–29)
CREAT BLD-MCNC: 1.28 MG/DL (ref 0.57–1)
DEPRECATED RDW RBC AUTO: 45.9 FL (ref 37–54)
ERYTHROCYTE [DISTWIDTH] IN BLOOD BY AUTOMATED COUNT: 14.1 % (ref 12.3–15.4)
GFR SERPL CREATININE-BSD FRML MDRD: 54 ML/MIN/1.73
GLUCOSE BLD-MCNC: 89 MG/DL (ref 65–99)
GLUCOSE BLDC GLUCOMTR-MCNC: 109 MG/DL (ref 70–105)
HCT VFR BLD AUTO: 32.9 % (ref 34–46.6)
HGB BLD-MCNC: 11.2 G/DL (ref 12–15.9)
MCH RBC QN AUTO: 31.3 PG (ref 26.6–33)
MCHC RBC AUTO-ENTMCNC: 34.2 G/DL (ref 31.5–35.7)
MCV RBC AUTO: 91.5 FL (ref 79–97)
PHOSPHATE SERPL-MCNC: 4 MG/DL (ref 2.5–4.5)
PLATELET # BLD AUTO: 507 10*3/MM3 (ref 140–450)
PMV BLD AUTO: 6.7 FL (ref 6–12)
POTASSIUM BLD-SCNC: 4.1 MMOL/L (ref 3.5–5.2)
RBC # BLD AUTO: 3.59 10*6/MM3 (ref 3.77–5.28)
SODIUM BLD-SCNC: 137 MMOL/L (ref 136–145)
WBC NRBC COR # BLD: 11.9 10*3/MM3 (ref 3.4–10.8)

## 2020-01-08 PROCEDURE — 85027 COMPLETE CBC AUTOMATED: CPT | Performed by: THORACIC SURGERY (CARDIOTHORACIC VASCULAR SURGERY)

## 2020-01-08 PROCEDURE — 94799 UNLISTED PULMONARY SVC/PX: CPT

## 2020-01-08 PROCEDURE — 80069 RENAL FUNCTION PANEL: CPT | Performed by: INTERNAL MEDICINE

## 2020-01-08 PROCEDURE — 82962 GLUCOSE BLOOD TEST: CPT

## 2020-01-08 PROCEDURE — 97116 GAIT TRAINING THERAPY: CPT

## 2020-01-08 PROCEDURE — 97530 THERAPEUTIC ACTIVITIES: CPT

## 2020-01-08 RX ORDER — ALLOPURINOL 100 MG/1
200 TABLET ORAL DAILY
Qty: 30 TABLET | Refills: 1 | Status: ON HOLD | OUTPATIENT
Start: 2020-01-09 | End: 2020-11-10

## 2020-01-08 RX ORDER — TRAMADOL HYDROCHLORIDE 50 MG/1
25 TABLET ORAL EVERY 6 HOURS PRN
Qty: 30 TABLET | Refills: 0 | Status: SHIPPED | OUTPATIENT
Start: 2020-01-08 | End: 2020-05-16

## 2020-01-08 RX ORDER — AMIODARONE HYDROCHLORIDE 200 MG/1
200 TABLET ORAL
Qty: 30 TABLET | Refills: 3 | Status: SHIPPED | OUTPATIENT
Start: 2020-01-08 | End: 2020-01-29

## 2020-01-08 RX ORDER — ASPIRIN 325 MG
325 TABLET ORAL DAILY
Qty: 30 TABLET | Refills: 3 | Status: ON HOLD | OUTPATIENT
Start: 2020-01-08 | End: 2020-11-25

## 2020-01-08 RX ORDER — LISINOPRIL 20 MG/1
10 TABLET ORAL DAILY
Qty: 30 TABLET | Refills: 3 | Status: CANCELLED | OUTPATIENT
Start: 2020-01-08

## 2020-01-08 RX ORDER — OXYCODONE AND ACETAMINOPHEN 7.5; 325 MG/1; MG/1
1 TABLET ORAL EVERY 6 HOURS PRN
Status: ON HOLD
Start: 2020-01-08 | End: 2020-11-10

## 2020-01-08 RX ORDER — OXYCODONE HYDROCHLORIDE 5 MG/1
5 TABLET ORAL EVERY 6 HOURS PRN
Qty: 50 TABLET | Refills: 0 | Status: SHIPPED | OUTPATIENT
Start: 2020-01-08 | End: 2020-01-08 | Stop reason: HOSPADM

## 2020-01-08 RX ORDER — SPIRONOLACTONE 25 MG/1
12.5 TABLET ORAL DAILY
Qty: 30 TABLET | Refills: 2 | Status: CANCELLED | OUTPATIENT
Start: 2020-01-08

## 2020-01-08 RX ORDER — BUMETANIDE 1 MG/1
0.5 TABLET ORAL DAILY
Status: DISCONTINUED | OUTPATIENT
Start: 2020-01-08 | End: 2020-01-08 | Stop reason: HOSPADM

## 2020-01-08 RX ORDER — BUMETANIDE 0.5 MG/1
0.5 TABLET ORAL DAILY
Qty: 30 TABLET | Refills: 1 | Status: SHIPPED | OUTPATIENT
Start: 2020-01-09 | End: 2020-05-22

## 2020-01-08 RX ORDER — ALLOPURINOL 100 MG/1
200 TABLET ORAL DAILY
Status: DISCONTINUED | OUTPATIENT
Start: 2020-01-09 | End: 2020-01-08 | Stop reason: HOSPADM

## 2020-01-08 RX ORDER — PSEUDOEPHEDRINE HCL 30 MG
100 TABLET ORAL 2 TIMES DAILY
Qty: 60 EACH | Refills: 1 | Status: ON HOLD | OUTPATIENT
Start: 2020-01-08 | End: 2020-11-10

## 2020-01-08 RX ADMIN — BUMETANIDE 0.5 MG: 1 TABLET ORAL at 08:09

## 2020-01-08 RX ADMIN — OXYCODONE HYDROCHLORIDE 5 MG: 5 TABLET ORAL at 08:17

## 2020-01-08 RX ADMIN — ALLOPURINOL 300 MG: 300 TABLET ORAL at 08:10

## 2020-01-08 RX ADMIN — DOCUSATE SODIUM 100 MG: 100 CAPSULE, LIQUID FILLED ORAL at 08:10

## 2020-01-08 RX ADMIN — ASPIRIN 325 MG ORAL TABLET 325 MG: 325 PILL ORAL at 08:10

## 2020-01-08 RX ADMIN — LEVOTHYROXINE SODIUM 200 MCG: 200 TABLET ORAL at 06:43

## 2020-01-08 RX ADMIN — AMIODARONE HYDROCHLORIDE 200 MG: 200 TABLET ORAL at 08:09

## 2020-01-08 RX ADMIN — FOLIC ACID 1 MG: 1 TABLET ORAL at 08:18

## 2020-01-08 RX ADMIN — CARVEDILOL 12.5 MG: 6.25 TABLET, FILM COATED ORAL at 08:09

## 2020-01-08 RX ADMIN — IPRATROPIUM BROMIDE AND ALBUTEROL SULFATE 3 ML: .5; 3 SOLUTION RESPIRATORY (INHALATION) at 11:00

## 2020-01-08 RX ADMIN — FERROUS SULFATE TAB EC 324 MG (65 MG FE EQUIVALENT) 324 MG: 324 (65 FE) TABLET DELAYED RESPONSE at 08:10

## 2020-01-08 RX ADMIN — METFORMIN HYDROCHLORIDE 500 MG: 500 TABLET ORAL at 08:10

## 2020-01-08 RX ADMIN — GUAIFENESIN 600 MG: 600 TABLET, EXTENDED RELEASE ORAL at 08:10

## 2020-01-08 RX ADMIN — PANTOPRAZOLE SODIUM 40 MG: 40 TABLET, DELAYED RELEASE ORAL at 06:44

## 2020-01-08 NOTE — PLAN OF CARE
Problem: Patient Care Overview  Goal: Plan of Care Review  Outcome: Ongoing (interventions implemented as appropriate)  Flowsheets  Taken 1/8/2020 0921  Progress: improving  Taken 1/8/2020 0926  Plan of Care Reviewed With: patient  Outcome Summary: pt transfering out of the chair and ambulating independently.  pt following sternal precautions well. pt amb up and down 16 steps this morning only with supervision.  pt confirmed with this PTA that she has a neice that is a cna that well be helping her at home.  Pt has met transfer and gait goal.  Will work with pt on bed mobility next tx session.  recommend home with family at d/c

## 2020-01-08 NOTE — PROGRESS NOTES
Continued Stay Note   [place]     Patient Name: Rafael Mccann  MRN: [id number]  Today's Date: 1/8/2020    Admit Date: 12/22/2019    Discharge Plan     Row Name 01/08/20 0909       Plan    Plan  DC Plan:  [facility] sub-acute at discharge.  Pre-cert approved 1/08.  No PASRR needed for [facility].         JEAN Wang, LSW    Office Phone:  [phone]  Office Fax:  [phone]  Email:  [email or web address]

## 2020-01-08 NOTE — PROGRESS NOTES
"NEPHROLOGY PROGRESS NOTE------[facility]    [facility]  [phone]  Anette Smalls MD      Patient Care Team:  Phani Adames MD as PCP - General (Internal Medicine)  Ashish Smalls MD as Consulting Physician (Nephrology)      Provider:  Anette Smalls MD  Patient Name: Rafael Mccann  :  1975    SUBJECTIVE:    Anxious to leave. No SOB, CP, dysuria    Medication:    allopurinol 300 mg Oral Daily   amiodarone 200 mg Oral Q24H   aspirin 325 mg Oral Daily   atorvastatin 40 mg Oral Nightly   bisacodyl 10 mg Rectal Daily   bumetanide 1 mg Oral Daily   carvedilol 12.5 mg Oral BID With Meals   docusate sodium 100 mg Oral BID   enoxaparin 40 mg Subcutaneous Daily   ferrous sulfate 324 mg Oral Daily With Breakfast   folic acid 1 mg Oral Daily   guaiFENesin 600 mg Oral Q12H   insulin lispro 0-9 Units Subcutaneous 4x Daily With Meals & Nightly   ipratropium-albuterol 3 mL Nebulization 4x Daily - RT   lactulose 30 g Oral Daily   levothyroxine 200 mcg Oral Q AM   lidocaine 2 patch Transdermal Q24H   metFORMIN 500 mg Oral BID With Meals   montelukast 10 mg Oral Nightly   pantoprazole 40 mg Oral Q AM   polyethylene glycol 17 g Oral BID   senna 1 tablet Oral BID       sodium chloride 30 mL/hr Last Rate: 30 mL/hr (19 1430)       OBJECTIVE    Vital Sign Min/Max for last 24 hours  Temp  Min: 97.6 °F (36.4 °C)  Max: 98.3 °F (36.8 °C)   BP  Min: 113/80  Max: 161/98   Pulse  Min: 75  Max: 81   Resp  Min: 16  Max: 20   SpO2  Min: 95 %  Max: 96 %   No data recorded   No data recorded     Flowsheet Rows      First Filed Value   Admission Height  170.2 cm (67\") Documented at 2019 2250   Admission Weight  91.8 kg (202 lb 6.1 oz) Documented at 2019 2250          I/O this shift:  In: -   Out: 550 [Urine:550]  I/O last 3 completed shifts:  In: 2400 [P.O.:2400]  Out: 800 [Urine:800]    Physical Exam:  General Appearance: alert, appears " stated age and cooperative. LIP edema  Head: normocephalic, without obvious abnormality and atraumatic  Eyes: conjunctivae and sclerae normal and no icterus  Neck: supple  Lungs: CTA bilaterally  Heart: regular rhythm & normal rate and normal S1, S2 +WALLY  Chest: S/P SURGICAL CHANGES  Abdomen: soft, NT/ND +BS  Extremities: moves extremities well, NO FURTHER EDEMA, no cyanosis and no redness  Skin: no bleeding, bruising or rash, turgor normal, color normal and no lesions noted  Neurologic: Alert, and oriented. No focal deficits    Labs:    WBC WBC   Date Value Ref Range Status   01/08/2020 11.90 (H) 3.40 - 10.80 10*3/mm3 Final   01/07/2020 12.30 (H) 3.40 - 10.80 10*3/mm3 Final   01/06/2020 10.00 3.40 - 10.80 10*3/mm3 Final      HGB Hemoglobin   Date Value Ref Range Status   01/08/2020 11.2 (L) 12.0 - 15.9 g/dL Final   01/07/2020 11.0 (L) 12.0 - 15.9 g/dL Final   01/06/2020 10.9 (L) 12.0 - 15.9 g/dL Final      HCT Hematocrit   Date Value Ref Range Status   01/08/2020 32.9 (L) 34.0 - 46.6 % Final   01/07/2020 32.6 (L) 34.0 - 46.6 % Final   01/06/2020 31.4 (L) 34.0 - 46.6 % Final      Platlets No results found for: LABPLAT   MCV MCV   Date Value Ref Range Status   01/08/2020 91.5 79.0 - 97.0 fL Final   01/07/2020 91.9 79.0 - 97.0 fL Final   01/06/2020 91.9 79.0 - 97.0 fL Final          Sodium Sodium   Date Value Ref Range Status   01/08/2020 137 136 - 145 mmol/L Final   01/07/2020 136 136 - 145 mmol/L Final   01/06/2020 137 136 - 145 mmol/L Final      Potassium Potassium   Date Value Ref Range Status   01/08/2020 4.1 3.5 - 5.2 mmol/L Final   01/07/2020 3.6 3.5 - 5.2 mmol/L Final   01/06/2020 3.8 3.5 - 5.2 mmol/L Final   01/05/2020 4.2 3.5 - 5.2 mmol/L Final      Chloride Chloride   Date Value Ref Range Status   01/08/2020 99 98 - 107 mmol/L Final   01/07/2020 97 (L) 98 - 107 mmol/L Final   01/06/2020 99 98 - 107 mmol/L Final      CO2 CO2   Date Value Ref Range Status   01/08/2020 24.0 22.0 - 29.0 mmol/L Final    01/07/2020 24.0 22.0 - 29.0 mmol/L Final   01/06/2020 25.0 22.0 - 29.0 mmol/L Final      BUN BUN   Date Value Ref Range Status   01/08/2020 14 6 - 20 mg/dL Final   01/07/2020 14 6 - 20 mg/dL Final   01/06/2020 16 6 - 20 mg/dL Final      Creatinine Creatinine   Date Value Ref Range Status   01/08/2020 1.28 (H) 0.57 - 1.00 mg/dL Final   01/07/2020 1.14 (H) 0.57 - 1.00 mg/dL Final   01/06/2020 1.14 (H) 0.57 - 1.00 mg/dL Final      Calcium Calcium   Date Value Ref Range Status   01/08/2020 9.3 8.6 - 10.5 mg/dL Final   01/07/2020 9.4 8.6 - 10.5 mg/dL Final   01/06/2020 9.2 8.6 - 10.5 mg/dL Final      PO4 No components found for: PO4   Albumin Albumin   Date Value Ref Range Status   01/08/2020 4.10 3.50 - 5.20 g/dL Final   01/07/2020 4.10 3.50 - 5.20 g/dL Final   01/06/2020 4.20 3.50 - 5.20 g/dL Final      Magnesium Magnesium   Date Value Ref Range Status   01/07/2020 2.0 1.6 - 2.6 mg/dL Final      Uric Acid No components found for: URIC ACID     Imaging Results (Last 72 Hours)     Procedure Component Value Units Date/Time    XR Chest PA & Lateral [[id number]] Collected:  01/06/20 1229     Updated:  01/06/20 1232    Narrative:       DATE OF EXAM:  1/6/2020 12:16 PM     PROCEDURE:  XR CHEST PA AND LATERAL-     INDICATIONS:  follow up bilateral pleural effusion; R07.9-Chest pain, unspecified;  I35.1-Nonrheumatic aortic (valve) insufficiency; I42.0-Dilated  cardiomyopathy; Z95.810-Presence of automatic (implantable) cardiac  defibrillator; I20.0-Unstable angina; I25.119-Atherosclerotic heart  disease of native coronary artery with unspecified angina pectoris;  I35.1-Nonrheumatic aortic (valve) insufficiency; J43.9-Emphysem       COMPARISON:  AP portable chest 1 2020.     TECHNIQUE:   Two radiologic views of the chest.     FINDINGS:  Low volume inspiration. Bibasilar airspace disease is present, with  improved aeration of the left lower lobe since prior study. There is  better visualization left hemidiaphragm. Small  bilateral pleural  effusions are present, and may be improved on the left. However, the  right basilar opacity appears increased which may represent worsening  right basilar consolidation and pleural fluid. Stable cardiac  enlargement with median sternotomy, valve replacement, and features of  CABG. The left chest wall pacemaker and leads appear unchanged. No  pneumothorax is visible. No acute osseous abnormalities are identified.  Right IJ introducer sheath has been removed.       Impression:       Interval improvement in left pleural effusion and left basilar airspace  disease since 01/01/2020. However, Right basilar airspace disease and  right pleural effusion appear slightly worsened.     Electronically Signed By-Dr. Pao Iglesias MD On:1/6/2020 12:30 PM  This report was finalized on [id number] by Dr. Pao Iglesias MD.          Results for orders placed during the hospital encounter of 12/22/19   XR Chest PA & Lateral    Narrative DATE OF EXAM:  1/6/2020 12:16 PM     PROCEDURE:  XR CHEST PA AND LATERAL-     INDICATIONS:  follow up bilateral pleural effusion; R07.9-Chest pain, unspecified;  I35.1-Nonrheumatic aortic (valve) insufficiency; I42.0-Dilated  cardiomyopathy; Z95.810-Presence of automatic (implantable) cardiac  defibrillator; I20.0-Unstable angina; I25.119-Atherosclerotic heart  disease of native coronary artery with unspecified angina pectoris;  I35.1-Nonrheumatic aortic (valve) insufficiency; J43.9-Emphysem       COMPARISON:  AP portable chest 1 2020.     TECHNIQUE:   Two radiologic views of the chest.     FINDINGS:  Low volume inspiration. Bibasilar airspace disease is present, with  improved aeration of the left lower lobe since prior study. There is  better visualization left hemidiaphragm. Small bilateral pleural  effusions are present, and may be improved on the left. However, the  right basilar opacity appears increased which may represent worsening  right basilar consolidation and pleural  fluid. Stable cardiac  enlargement with median sternotomy, valve replacement, and features of  CABG. The left chest wall pacemaker and leads appear unchanged. No  pneumothorax is visible. No acute osseous abnormalities are identified.  Right IJ introducer sheath has been removed.       Impression Interval improvement in left pleural effusion and left basilar airspace  disease since 01/01/2020. However, Right basilar airspace disease and  right pleural effusion appear slightly worsened.     Electronically Signed By-Dr. Pao Iglesias MD On:1/6/2020 12:30 PM  This report was finalized on [id number] by Dr. Pao Iglesias MD.   XR Chest 1 View    Narrative DATE OF EXAM:  1/1/2020 10:53 AM     PROCEDURE:  XR CHEST 1 VW-     INDICATIONS:  right sided sharp pain below ribs; R07.9-Chest pain, unspecified;  I35.1-Nonrheumatic aortic (valve) insufficiency; I42.0-Dilated  cardiomyopathy; Z95.810-Presence of automatic (implantable) cardiac  defibrillator; I20.0-Unstable angina; I25.119-Atherosclerotic heart  disease of native coronary artery with unspecified angina pectoris;  I35.1-Nonrheumatic aortic (valve) insufficiency; J43.9-Emphysema,      COMPARISON:  12/31/2019     TECHNIQUE:   Single radiographic AP view of the chest was obtained.     FINDINGS:  There is a right internal jugular [place]onding-Brad sheath in place with the tip  in the superior vena cava. There is a left subclavian pacemaker  device/AICD device with leads overlying the right atrium and right  ventricle. The heart is enlarged with changes of CABG. There are  bilateral pleural effusions left greater than right with bilateral  basilar airspace disease. Aeration is slightly worse in the interval.        Impression Mild worsening of bilateral basilar infiltrates with moderate left and  small-to-moderate right pleural effusions.     Electronically Signed By-Chidi Walton On:1/1/2020 11:01 AM  This report was finalized on [id number] by  Chidi Walton, .   XR Chest 1  View    Narrative DATE OF EXAM:  12/31/2019 10:48 AM     PROCEDURE:  XR CHEST 1 VW-     INDICATIONS:  Hypoxia; R07.9-Chest pain, unspecified; I35.1-Nonrheumatic aortic  (valve) insufficiency; I42.0-Dilated cardiomyopathy; Z95.810-Presence of  automatic (implantable) cardiac defibrillator; I20.0-Unstable angina;  I25.119-Atherosclerotic heart disease of native coronary artery with  unspecified angina pectoris; I35.1-Nonrheumatic aortic (valve)  insufficiency; J43.9-Emphysema, unspecified patient's a 44-year-old  female with hypoxia history of open heart surgery on December 26.  History of aortic regurg, former smoker     COMPARISON:  Comparison is made to study of 12/29/2019     TECHNIQUE:   Single radiographic AP view of the chest was obtained.     FINDINGS:  Today's portable erect study was obtained on 12/31/2019 at 10:50 AM.     Today's study demonstrates the left basilar opacity remaining stable  there is increasing right basilar opacity consistent with increasing  atelectasis and probable small pleural effusion. The right internal  jugular vascular sheath remains in good position the left-sided  dual-lead pacemaker defibrillator remains in good position changes of  median sternotomy coronary artery bypass grafting and aortic valvular  replacement are again seen.        Impression    1. Mild interval worsening from study of December 29  2. Increasing right basilar opacities felt to represent increasing  atelectasis  3. Stable moderate left basilar opacity consistent with atelectasis,  pneumonia and/or pleural effusion     Electronically Signed By-Ashwin Beavers Jr. On:12/31/2019 11:53 AM  This report was finalized on [id number] by  Ashwin Beavers Jr., .       Results for orders placed during the hospital encounter of 12/22/19   Duplex Venous Lower Extremity - Bilateral CAR    Narrative · Normal bilateral lower extremity venous duplex scan.            ASSESSMENT / PLAN      Chest pain    COPD (chronic obstructive  pulmonary disease) (CMS/HCC)    Hypertension    Cardiomyopathy, dilated (CMS/HCC)    Essential hypertension    Chronic renal impairment    ICD (implantable cardioverter-defibrillator), dual, in situ    GERD without esophagitis    Hypothyroidism (acquired)    Aortic valve regurgitation    Unstable angina pectoris (CMS/HCC)    Coronary artery disease involving native coronary artery of native heart with angina pectoris (CMS/HCC)    Nonrheumatic aortic valve insufficiency    S/P AVR (aortic valve replacement)    S/P CABG x 3      1. ARF/LINSEY/CRF/CKD STG 2------Nonoliguric. BUN/Cr up a little. Back down diuretic a little. ARF/LINSEY resolved. +Mild ARF/LINSEY on top of what appears to be CRF/CKD STG 2 with a baseline serum creatinine of 1.1. Unknown if patient has baseline proteinuria or hematuria. CRF/CKD STG 2 likely secondary to HTN NS/DM and chronic renal hypoperfusion from Cardiomyopathy. +Mild ARF/LINSEY appeared to be secondary to prerenal/hemodynamic fluctuation from MI S/P Cath/IV dye exposure with concomitant ACE-I and diuretic use. Keep off of Zestril for now.   Dose meds for CrCl 30-60 cc/min. No NSAIDs. No further IV dye exposure, unless emergently needed.     2. CAD S/P MI S/P CATH--------CABG done per , CT Surgery.     3. DILATED CARDIOMYOPATHY/VALVULAR HEART DISEASE--------No gross overload at present. Back down Bumex as stated above. Has PM/AICD. Per , Cardiology     4. HTN WITH CKD------BP okay. Avoid hypotension. No ACE/ARB/DRI for now. Temporarily holding diuretics     5. HYPERURICEMIA---------On Allopurinol.  Uric normal     6. HYPERLIPIDEMIA------On Statin.       7. GERD-------On H2 blocker     8. DMII------Resumed metformin. BS okay. On Glucometers, SSI     9. VITAMIN D DEFICIENCY     10. DM PERIPHERAL NEUROPATHY-------On Neurontin     11. HYPOTHYROIDISM------Increased Synthroid as TSH was up    12. ANEMIA-----H/H stable            Anette Smalls MD  [facility]  [phone]  01/08/20  6:28 AM

## 2020-01-08 NOTE — PROGRESS NOTES
Case Management Discharge Note           Provided post acute provider list?: Yes  Post Acute Provider Lists: Home Health  Post Acuite Provider List: Delivered  Delivered To: Patient  Method of Delivery: In person                 Final Discharge Disposition Code: 03 - skilled nursing facility (SNF)

## 2020-01-08 NOTE — PROGRESS NOTES
Pulmonary/ Critical Care progress Note        Patient Name:  Rafael Mccann    :  1975    Medical Record:  [id number]    Rafael Mccann is a 44 y.o. female who we were asked to see in consultation for decreased level of consciousness.   Patient is POD#3 CABG and AVR after presenting to the ER on 19 with complaints of chest pain, dyspnea and tachycardia.  Patient with a history of CAD being medically managed as well as cardiomyopathy.   Cardiac cath on 19 showed severe two vessel disease and 4+aortic regurg.       This evening patient was found to be very difficult to arouse in bed after being up to chair this morning and ambulating in the afternoon.   Patient was given Narcan with improvement in her mental status.  ABG was obtained as well which showed pCO2 of 49 and a pAO2 of 68.   Patient had been requiring Dilaudid 0.25 mg q2h IV and Oxycodone 5 mg q4h, for pain control in addition had Ultram and Benadryl earlier today.      SUBJECTIVE:   :  OOB to chair, awake and alert.  Complaints of right lower rib pain.    20:  OOB to chair,  Remains with complaints of pain  20:  Sitting on side of bed, remains with some pain to right side.    1/3: chest pain much better.  Her oxygen requirement stable on 2 L  - she is on roomair. Denies any pain, having BM and tolerating diet  - stable, on room air, walks in room without assistance  : Awake.  Currently on room air.  Complains of some shortness of breath with activity.  No nausea or vomiting.  Complains of some chest wall pain.  :  Awake and pain is controlled.  No new issues.  On RA  :  Awake and no new issues.  Walking around unit.  On RA    Allergies    Allergies   Allergen Reactions   • Hydrocodone Hives   • Penicillin G Unknown (See Comments)       Medications    Scheduled Meds:    allopurinol 300 mg Oral Daily   amiodarone 200 mg Oral Q24H   aspirin 325 mg Oral Daily   atorvastatin 40 mg Oral Nightly    bisacodyl 10 mg Rectal Daily   bumetanide 0.5 mg Oral Daily   carvedilol 12.5 mg Oral BID With Meals   docusate sodium 100 mg Oral BID   enoxaparin 40 mg Subcutaneous Daily   ferrous sulfate 324 mg Oral Daily With Breakfast   folic acid 1 mg Oral Daily   guaiFENesin 600 mg Oral Q12H   insulin lispro 0-9 Units Subcutaneous 4x Daily With Meals & Nightly   ipratropium-albuterol 3 mL Nebulization 4x Daily - RT   lactulose 30 g Oral Daily   levothyroxine 200 mcg Oral Q AM   lidocaine 2 patch Transdermal Q24H   metFORMIN 500 mg Oral BID With Meals   montelukast 10 mg Oral Nightly   pantoprazole 40 mg Oral Q AM   polyethylene glycol 17 g Oral BID   senna 1 tablet Oral BID     Continuous Infusions:    sodium chloride 30 mL/hr Last Rate: 30 mL/hr (19 1430)     PRN Meds:.•  acetaminophen  •  dextrose  •  dextrose  •  glucagon (human recombinant)  •  insulin lispro **AND** insulin lispro  •  ipratropium-albuterol  •  MOUTH KOTE  •  naloxone  •  ondansetron  •  oxyCODONE  •  potassium chloride **OR** potassium chloride  •  potassium chloride **OR** potassium chloride      Physical Exam    tMax 24 hrs:  Temp (24hrs), Av.8 °F (36.6 °C), Min:97.5 °F (36.4 °C), Max:98.1 °F (36.7 °C)    Vitals Ranges:  Temp:  [97.5 °F (36.4 °C)-98.1 °F (36.7 °C)] 97.5 °F (36.4 °C)  Heart Rate:  [71-97] 78  Resp:  [16-20] 16  BP: (113-157)/(75-97) 157/93  Intake and Output Last 3 Shifts:  I/O last 3 completed shifts:  In: 2280 [P.O.:2280]  Out: 1350 [Urine:1350]    General Appearance: Awake, no distress, appears stated age  Head:  Normocephalic, without obvious abnormality, atraumatic  Eyes: conjunctiva/corneas clear , EOMI  Neck:  Supple,  no adenopathy, no JVD or bruit      Lungs: Good air entry bilaterally, no crackles or wheezes  Chest wall:  No tenderness  Heart:  Regular rate and rhythm, S1 and S2 normal, no murmur, rub or gallop  Abdomen:  Soft, non-tender, bowel sounds active all four quadrants,  no masses, no hepatomegaly, no  splenomegaly  Extremities:  Extremities normal, no cyanosis or edema  Pulses: 2+ and symmetric all extremities  Skin:  No rashes or lesions  Neurologic: Awake, 5 x 5 power in all extremities.  Cranial nerves II through XII grossly intact.      labs    Lab Results (last 24 hours)     Procedure Component Value Units Date/Time    POC Glucose Once [[id number]]  (Abnormal) Collected:  01/08/20 0733    Specimen:  Blood Updated:  01/08/20 0734     Glucose 109 mg/dL      Comment: Serial Number: [id number]:  [id number]       Renal Function Panel [[id number]]  (Abnormal) Collected:  01/08/20 0334    Specimen:  Blood Updated:  01/08/20 0407     Glucose 89 mg/dL      BUN 14 mg/dL      Creatinine 1.28 mg/dL      Sodium 137 mmol/L      Potassium 4.1 mmol/L      Chloride 99 mmol/L      CO2 24.0 mmol/L      Calcium 9.3 mg/dL      Albumin 4.10 g/dL      Phosphorus 4.0 mg/dL      Anion Gap 14.0 mmol/L      BUN/Creatinine Ratio 10.9     eGFR  African Amer 54 mL/min/1.73     Narrative:       GFR Normal >60  Chronic Kidney Disease <60  Kidney Failure <15      CBC (No Diff) [[id number]]  (Abnormal) Collected:  01/08/20 0334    Specimen:  Blood Updated:  01/08/20 0346     WBC 11.90 10*3/mm3      RBC 3.59 10*6/mm3      Hemoglobin 11.2 g/dL      Hematocrit 32.9 %      MCV 91.5 fL      MCH 31.3 pg      MCHC 34.2 g/dL      RDW 14.1 %      RDW-SD 45.9 fl      MPV 6.7 fL      Platelets 507 10*3/mm3     Urinalysis With Culture If Indicated - Urine, Clean Catch [[id number]]  (Abnormal) Collected:  01/07/20 2311    Specimen:  Urine, Clean Catch Updated:  01/07/20 2327     Color, UA Yellow     Appearance, UA Clear     pH, UA 6.5     Specific Gravity, UA 1.013     Glucose, UA Negative     Ketones, UA Negative     Bilirubin, UA Negative     Blood, UA Small (1+)     Protein, UA Negative     Leuk Esterase, UA Negative     Nitrite, UA Negative     Urobilinogen, UA 0.2 E.U./dL    Urinalysis, Microscopic Only - Urine, Clean Catch [[id number]]  (Abnormal)  Collected:  01/07/20 2311    Specimen:  Urine, Clean Catch Updated:  01/07/20 2327     RBC, UA 3-5 /HPF      WBC, UA 0-2 /HPF      Bacteria, UA None Seen /HPF      Squamous Epithelial Cells, UA None Seen /HPF      Hyaline Casts, UA None Seen /LPF      Methodology Automated Microscopy    POC Glucose Once [[id number]]  (Abnormal) Collected:  01/07/20 2024    Specimen:  Blood Updated:  01/07/20 2025     Glucose 108 mg/dL      Comment: Serial Number: [id number]:  [id number]       POC Glucose Once [[id number]]  (Abnormal) Collected:  01/07/20 1813    Specimen:  Blood Updated:  01/07/20 1814     Glucose 120 mg/dL      Comment: Serial Number: [id number]:  [id number]       POC Glucose Once [[id number]]  (Abnormal) Collected:  01/07/20 1213    Specimen:  Blood Updated:  01/07/20 1233     Glucose 134 mg/dL      Comment: Serial Number: [id number]:  [id number]                  Imaging & Other Studies    Imaging Results (Last 72 Hours)     Procedure Component Value Units Date/Time    XR Chest PA & Lateral [[id number]] Collected:  01/06/20 1229     Updated:  01/06/20 1232    Narrative:       DATE OF EXAM:  1/6/2020 12:16 PM     PROCEDURE:  XR CHEST PA AND LATERAL-     INDICATIONS:  follow up bilateral pleural effusion; R07.9-Chest pain, unspecified;  I35.1-Nonrheumatic aortic (valve) insufficiency; I42.0-Dilated  cardiomyopathy; Z95.810-Presence of automatic (implantable) cardiac  defibrillator; I20.0-Unstable angina; I25.119-Atherosclerotic heart  disease of native coronary artery with unspecified angina pectoris;  I35.1-Nonrheumatic aortic (valve) insufficiency; J43.9-Emphysem       COMPARISON:  AP portable chest 1 2020.     TECHNIQUE:   Two radiologic views of the chest.     FINDINGS:  Low volume inspiration. Bibasilar airspace disease is present, with  improved aeration of the left lower lobe since prior study. There is  better visualization left hemidiaphragm. Small bilateral pleural  effusions are present, and  may be improved on the left. However, the  right basilar opacity appears increased which may represent worsening  right basilar consolidation and pleural fluid. Stable cardiac  enlargement with median sternotomy, valve replacement, and features of  CABG. The left chest wall pacemaker and leads appear unchanged. No  pneumothorax is visible. No acute osseous abnormalities are identified.  Right IJ introducer sheath has been removed.       Impression:       Interval improvement in left pleural effusion and left basilar airspace  disease since 01/01/2020. However, Right basilar airspace disease and  right pleural effusion appear slightly worsened.     Electronically Signed By-Dr. Pao Iglesias MD On:1/6/2020 12:30 PM  This report was finalized on [id number] by Dr. Pao Iglesias MD.            AssessmenT/PLAN  Decreased level of consciousness related to opioids  INDRA compliant with BiPAP use  POD CABG and AVR - aortic regurgitation and CAD  HFrEF - EF35%  AICD  CKD stage II  T2DM  Hypothyroidism on Synthroid  Anxiety on Xanax  Chronic pain follows outpatient pain management clinict  COPD without exacerbation  Cardiomyopathy    Plan:   -Currently on room air  -Continue efforts at pulmonary toilet.  Need for incentive spirometry reinforced.  -cont to use home PAP with sleep   -limit sedating medication   -Lidocaine patch  -duonebs and Mucinex   -on oral diuretics.  Nephrology managing  -OT recommends - rehab versus home with family     Dispo: Rehab - possibly today  Pulmonary status is unchanged.  We will continue with current plan of care.

## 2020-01-08 NOTE — SIGNIFICANT NOTE
is in the room and present during discharge instructions. Informed of ambulance scheduled time of pickup. Going to [facility] room 130 report given to YUE Pedroza.

## 2020-01-08 NOTE — THERAPY TREATMENT NOTE
Patient Name: Rafael Mccann  : 1975    MRN: [id number]                              Today's Date: 2020       Admit Date: 2019    Visit Dx:     ICD-10-CM ICD-9-CM   1. Chest pain, unspecified type R07.9 786.50   2. Aortic valve insufficiency, etiology of cardiac valve disease unspecified I35.1 424.1   3. Cardiomyopathy, dilated (CMS/[facility]) I42.0 425.4   4. ICD (implantable cardioverter-defibrillator), dual, in situ Z95.810 V45.02   5. Unstable angina pectoris (CMS/[facility]) I20.0 411.1   6. Coronary artery disease involving native coronary artery of native heart with angina pectoris (CMS/[facility]) I25.119 414.01     413.9   7. Nonrheumatic aortic valve insufficiency I35.1 424.1   8. Pulmonary emphysema, unspecified emphysema type (CMS/[facility]) J43.9 492.8   9. Essential hypertension I10 401.9     Patient Active Problem List   Diagnosis   • COPD (chronic obstructive pulmonary disease) (CMS/[facility])   • Hypertension   • Cardiomyopathy, dilated (CMS/[facility])   • Essential hypertension   • Chronic renal impairment   • ICD (implantable cardioverter-defibrillator), dual, in situ   • Chest pain   • GERD without esophagitis   • Hypothyroidism (acquired)   • Aortic valve regurgitation   • Unstable angina pectoris (CMS/[facility])   • Coronary artery disease involving native coronary artery of native heart with angina pectoris (CMS/[facility])   • Nonrheumatic aortic valve insufficiency   • S/P AVR (aortic valve replacement)   • S/P CABG x 3     Past Medical History:   Diagnosis Date   • CHF (congestive heart failure) (CMS/[facility])    • COPD (chronic obstructive pulmonary disease) (CMS/[facility])    • Diabetes (CMS/[facility])    • Hyperlipidemia    • Hypertension      Past Surgical History:   Procedure Laterality Date   • AORTIC VALVE REPAIR/REPLACEMENT N/A 2019    Procedure: AORTIC VALVE REPAIR/REPLACEMENT;  Surgeon: Lane Stock MD;  Location: [facility];  Service: Cardiothoracic   • BREAST LUMPECTOMY     • CARDIAC CATHETERIZATION N/A  12/24/2019    Procedure: Right and Left Heart Cath 12/24/19 @ 0900;  Surgeon: Wayne Luna MD;  Location: [facility] CATH INVASIVE LOCATION;  Service: Cardiovascular   • CARDIAC CATHETERIZATION N/A 12/24/2019    Procedure: Coronary angiography;  Surgeon: Wayne Luna MD;  Location: [facility] CATH INVASIVE LOCATION;  Service: Cardiovascular   • CARDIAC ELECTROPHYSIOLOGY PROCEDURE Left 6/28/2019    Procedure: Dual-chamber ICD insertion;  Surgeon: Héctor Eckert MD;  Location: [facility] CATH INVASIVE LOCATION;  Service: Cardiovascular   • CARDIAC ELECTROPHYSIOLOGY PROCEDURE Left 8/14/2019    Procedure: Lead Revision;  Surgeon: Héctor Eckert MD;  Location: [facility] CATH INVASIVE LOCATION;  Service: Cardiovascular   • CHOLECYSTECTOMY     • CORONARY ARTERY BYPASS GRAFT N/A 12/27/2019    Procedure: CORONARY ARTERY BYPASS GRAFTING;  Surgeon: Lane Stock MD;  Location: [facility] CVOR;  Service: Cardiothoracic   • HYSTERECTOMY     • INSERT / REPLACE / REMOVE PACEMAKER     • THYROID SURGERY       General Information     Row Name 01/08/20 0916          PT Evaluation Time/Intention    Document Type  therapy note (daily note)  -SC     Mode of Treatment  individual therapy;physical therapy  -SC     Row Name 01/08/20 0916          General Information    Existing Precautions/Restrictions  sternal;cardiac  -SC     Row Name 01/08/20 0916          Cognitive Assessment/Intervention- PT/OT    Orientation Status (Cognition)  oriented x 4  -SC       User Key  (r) = Recorded By, (t) = Taken By, (c) = Cosigned By    Initials Name Provider Type    SC Gayatri Torres PTA Physical Therapy Assistant        Mobility     Row Name 01/08/20 0917          Bed Mobility Assessment/Treatment    Comment (Bed Mobility)  pt already up in the chair  -SC     Row Name 01/08/20 0917          Bed-Chair Transfer    Bed-Chair [place] (Transfers)  independent  -SC     Row Name 01/08/20 0917          Sit-Stand Transfer    Sit-Stand  [place] (Transfers)  independent  -[facility] Name 01/08/20 0917          Gait/Stairs Assessment/Training    Gait/Stairs Assessment/Training  gait/ambulation independence  -SC     [place] Level (Gait)  independent  -SC     Assistive Device (Gait)  -- no ad needed  -SC     Distance in Feet (Gait)  '300  -SC     Pattern (Gait)  step-through  -SC     [place] Level (Stairs)  supervision  -SC     Handrail Location (Stairs)  right side (ascending)  -SC     Number of Steps (Stairs)  16,  pt stopped to rest appropriately when fatigued   -SC     Ascending Technique (Stairs)  step-to-step  -SC     Descending Technique (Stairs)  step-to-step  -SC     Comment (Gait/Stairs)  gait is steady without ad  -SC       User Key  (r) = Recorded By, (t) = Taken By, (c) = Cosigned By    Initials Name Provider Type    SC Gayatri Torres PTA Physical Therapy Assistant        Obj/Interventions     [facility] Name 01/08/20 0920          Static Standing Balance    Level of [place] (Supported Standing, Static Balance)  independent  -SC     Time Able to Maintain Position (Supported Standing, Static Balance)  3 to 4 minutes  -[facility] Name 01/08/20 0920          Dynamic Standing Balance    Level of [place], Reaches Outside Midline (Standing, Dynamic Balance)  independent  -SC     Time Able to Maintain Position, Reaches Outside Midline (Standing, Dynamic Balance)  more than 5 minutes  -SC     Comment, Reaches Outside Midline (Standing, Dynamic Balance)  gait steady and pt steady on steps  -SC       User Key  (r) = Recorded By, (t) = Taken By, (c) = Cosigned By    Initials Name Provider Type    Gayatri Montana PTA Physical Therapy Assistant        Goals/Plan     Row Name 01/08/20 0923          Transfer Goal 1 (PT)    Progress/Outcome (Transfer Goal 1, PT)  goal met  -[facility] Name 01/08/20 0923          Gait Training Goal 1 (PT)    Progress/Outcome (Gait Training Goal 1, PT)  goal met  -[facility] Name 01/08/20 0923           Stairs Goal 1 (PT)    Progress/Outcome (Stairs Goal 1, PT)  goal met  -SC     Row Name 01/08/20 0923          Patient Education Goal (PT)    Progress/Outcome (Patient Education Goal, PT)  goal met  -SC       User Key  (r) = Recorded By, (t) = Taken By, (c) = Cosigned By    Initials Name Provider Type    Gayatri Montana, ENEDINA Physical Therapy Assistant        Clinical Impression     Row Name 01/08/20 0921          Pain Assessment    Additional Documentation  Pain Scale: Numbers Pre/Post-Treatment (Group)  -[facility] Name 01/08/20 0921          Pain Scale: Numbers Pre/Post-Treatment    Pain Scale: Numbers, Pretreatment  0/10 - no pain  -SC     Pain Scale: Numbers, Post-Treatment  0/10 - no pain  -SC     Pre/Post Treatment Pain Comment  no pain reported during tx session  -[facility] Name 01/08/20 0921          Plan of Care Review    Progress  improving  -[facility] Name 01/08/20 0921          Vital Signs    O2 Delivery Pre Treatment  room air  -SC     O2 Delivery Intra Treatment  room air  -SC     O2 Delivery Post Treatment  room air  -SC     Recovery Time  pt slightly short of air on steps  -[facility] Name 01/08/20 0921          Positioning and Restraints    Pre-Treatment Position  sitting in chair/recliner  -SC     Post Treatment Position  other pt up in the room ad maria del rosario  -SC     In Chair  notified nsg up ad maria del rosario in the room.  pt walking into bathroom  -SC       User Key  (r) = Recorded By, (t) = Taken By, (c) = Cosigned By    Initials Name Provider Type    Gayatri Montana PTA Physical Therapy Assistant        Outcome Measures     Row Name 01/08/20 0924          How much help from another person do you currently need...    Turning from your back to your side while in flat bed without using bedrails?  3  -SC     Moving from lying on back to sitting on the side of a flat bed without bedrails?  3  -SC     Moving to and from a bed to a chair (including a wheelchair)?  4  -SC     Standing up from a chair  using your arms (e.g., wheelchair, bedside chair)?  4  -SC     Climbing 3-5 steps with a railing?  4  -SC     To walk in hospital room?  4  -SC     AM-PAC 6 Clicks Score (PT)  22  -SC     Row Name 01/08/20 0924          Modified [place] Scale    Modified [place] Scale  0 - No Symptoms at all.  -SC     Row Name 01/08/20 0924          Functional Assessment    Outcome Measure Options  AM-PAC 6 Clicks Basic Mobility (PT);Modified Rayna  -SC       User Key  (r) = Recorded By, (t) = Taken By, (c) = Cosigned By    Initials Name Provider Type    Gayatri Montana PTA Physical Therapy Assistant          PT Recommendation and Plan     Outcome Summary/Treatment Plan (PT)  Anticipated Discharge Disposition (PT): home with home health  Plan of Care Reviewed With: patient  Progress: improving  Outcome Summary: pt transfering out of the chair and ambulating independently.  pt following sternal precautions well. pt amb up and down 16 steps this morning only with supervision.  pt confirmed with this PTA that she has a neice that is a cna that well be helping her at home.  recommend home with family at d/c     Time Calculation:   PT Charges     Row Name 01/08/20 0930             Time Calculation    Start Time  0900  -SC      Stop Time  0920  -SC      Time Calculation (min)  20 min  -SC      PT Received On  01/08/20  -SC      PT - Next Appointment  01/10/20  -SC         Time Calculation- PT    Total Timed Code Minutes- PT  20 minute(s)  -SC         Timed Charges    42549 - Gait Training Minutes   12  -SC      25317 - PT Therapeutic Activity Minutes  8  -SC        User Key  (r) = Recorded By, (t) = Taken By, (c) = Cosigned By    Initials Name Provider Type    Gayatri Montana PTA Physical Therapy Assistant        Therapy Charges for Today     Code Description Service Date Service Provider Modifiers Qty    53848814684 HC GAIT TRAINING EA 15 MIN 1/8/2020 Gayatri Torres PTA GP 1    72605590208 HC PT THERAPEUTIC ACT EA 15 MIN  1/8/2020 Gayatri Torres, PTA GP 1          PT G-Codes  Outcome Measure Options: AM-PAC 6 Clicks Basic Mobility (PT), Modified [place]  AM-PAC 6 Clicks Score (PT): 22  Modified Rayna Scale: 0 - No Symptoms at all.    Gayatri Torres, PTA  1/8/2020

## 2020-01-08 NOTE — PAYOR COMM NOTE
"This is discharge notice for Rafael Downs, auth# [id number]  Pt discharged to skilled nsg facility on 1/8/20 .    Final inpt day pending--clinical update sent yesterday (1/7).  Please send determination for final inpt day and confirmation of all days covered.  Thank you.    OSCAR JEAN BAPTISTE RN  UTILIZATION REVIEW  [facility]  PH: [phone]  FX: [phone]      Rafael Dillon (44 y.o. Female)     Date of Birth Social Security Number Address Home Phone MRN    1975  [address] [facility] [place] [facility] [phone] [id number]    Holiness Marital Status          Orthodoxy        Admission Date Admission Type Admitting Provider Attending Provider Department, Room/Bed    12/22/19 Emergency Lane Stock MD  [facility] CARDIOVASCULAR CARE UNIT, 2216/1    Discharge Date Discharge Disposition Discharge Destination        1/8/2020 Rehab Facility or Unit (DC - External)              Attending Provider:  (none)   Allergies:  Hydrocodone, Penicillin G    Isolation:  None   Infection:  None   Code Status:  CPR    Ht:  170.2 cm (67\")   Wt:  85.8 kg (189 lb 2.5 oz)    Admission Cmt:  None   Principal Problem:  Chest pain [R07.9]                 Active Insurance as of 12/22/2019     Primary Coverage     Payor Plan Insurance Group Employer/Plan Group    ANTHEM BLUE CROSS ANTHEM BLUE CROSS BLUE SHIELD PPO [id number]     Payor Plan Address Payor Plan Phone Number Payor Plan Fax Number Effective Dates    [address] [phone]  5/6/2018 - None Entered    [facility] [place] [id number]       Subscriber Name Subscriber Birth Date Member ID       CATRACHO DOWNS 11/7/1970 [id number]           Secondary Coverage     Payor Plan Insurance Group Employer/Plan Group    ANTHEM MEDICARE REPLACEMENT ANTHEM MEDICARE ADVANTAGE [id number]     Payor Plan Address Payor Plan Phone Number Payor Plan Fax Number Effective Dates    [address] [phone]  1/1/2019 - None Entered    [facility] [place] " [id number]       Subscriber Name Subscriber Birth Date Member ID       CALVIN TERRY 1975 [id number]                 Emergency Contacts      (Rel.) Home Phone Work Phone Mobile Phone    celio leo (Daughter) -- -- [phone]               Discharge Summary      Kayla Burrell APRN at 01/07/20 1231          Date of Admission: 12/22/2019  Date of Discharge:  1/8/2020    Discharge Diagnosis:   Aortic regurgitation s/p AVR (tissue)  CAD s/p CABG  Postop hypotension  Postop expected ELLEN-- 1 PRBC 1/2/2020  HFrEF (EF35%)  AICD placement-- Biotronik  CKD stage 2  HTN  DM II, last A1c 6.2  Hypothyroidism  Anxiety  Chronic pain    Presenting Problem/History of Present Illness  Chest pain, unspecified type [R07.9]  Chest pain, unspecified type [R07.9]     Hospital Course  Patient is a 44 y.o. female with known aortic regurgitation presented to [facility] ED with complaints of chest pain. [facility] Cardiac Surgery was consulted for aortic valve disease. Patient underwent cardiac cath revealing 2V CAD. Nephrology was consulted for increased creatinine. On 12/27/19 she underwent CABGX3/ AVR (tissue) with Dr. Stock. She was transferred to the recovery unit in stable condition and extubated overnight. Creatinine increased to 1.52 but has returned to baseline at time of discharge. She required Levophed for BP support for several days. But her blood pressure has recovered and she was restarted on her home Coreg. She was slow to regain mobility and rehab at discharge was suggested. She passed an overnight oximetry prior to discharge and will not require home oxygen. She is now being discharged in stable condition to [facility] rehab. She is being discharged on aspirin, statin, and beta blocker. Her ace inhibitor and aldosterone antagonist were not restarted d/t rising creatinine. This should be readdressed as an outpatient.     Procedures Performed  Procedure(s):  12/27/2019-- CORONARY ARTERY BYPASS  GRAFTING  AORTIC VALVE REPAIR/REPLACEMENT       Consults:   Consults     Date and Time Order Name Status Description    12/30/2019 1838 Inpatient Pulmonology Consult Completed     12/27/2019 1225 Inpatient Cardiology Consult      12/25/2019 0903 Inpatient Nephrology Consult Completed     12/23/2019 0253 Inpatient Cardiology Consult Completed     12/23/2019 0118 Inpatient Hospitalist Consult Completed           Pertinent Test Results:    Lab Results   Component Value Date    WBC 11.90 (H) 01/08/2020    HGB 11.2 (L) 01/08/2020    HCT 32.9 (L) 01/08/2020    MCV 91.5 01/08/2020     (H) 01/08/2020      Lab Results   Component Value Date    GLUCOSE 89 01/08/2020    CALCIUM 9.3 01/08/2020     01/08/2020    K 4.1 01/08/2020    CO2 24.0 01/08/2020    CL 99 01/08/2020    BUN 14 01/08/2020    CREATININE 1.28 (H) 01/08/2020    EGFRIFAFRI 54 (L) 01/08/2020    BCR 10.9 01/08/2020    ANIONGAP 14.0 01/08/2020     Lab Results   Component Value Date    INR 1.20 (H) 12/27/2019    PROTIME 12.2 (H) 12/27/2019         Condition on Discharge:  Patient is being discharged to [facility] rehab in stable condition.    Vital Signs  Temp:  [97.5 °F (36.4 °C)-98.1 °F (36.7 °C)] 97.5 °F (36.4 °C)  Heart Rate:  [71-97] 85  Resp:  [16-20] 20  BP: (113-157)/(75-97) 157/93      Discharge Disposition  Rehab Facility or Unit (DC - External)    Discharge Medications     Discharge Medications      New Medications      Instructions Start Date   amiodarone 200 MG tablet  Commonly known as:  PACERONE   200 mg, Oral, Every 24 Hours Scheduled      aspirin 325 MG tablet  Replaces:  aspirin 81 MG chewable tablet   325 mg, Oral, Daily      bumetanide 0.5 MG tablet  Commonly known as:  BUMEX   0.5 mg, Oral, Daily   Start Date:  January 9, 2020     docusate sodium 100 MG capsule   100 mg, Oral, 2 Times Daily      traMADol 50 MG tablet  Commonly known as:  ULTRAM   25 mg, Oral, Every 6 Hours PRN         Changes to Medications      Instructions Start Date    allopurinol 100 MG tablet  Commonly known as:  ZYLOPRIM  What changed:    · medication strength  · how much to take   200 mg, Oral, Daily   Start Date:  January 9, 2020     oxyCODONE-acetaminophen 7.5-325 MG per tablet  Commonly known as:  PERCOCET  What changed:    · reasons to take this  · additional instructions   1 tablet, Oral, Every 6 Hours PRN, May resume after postop oxycodone prescription is completed.         Continue These Medications      Instructions Start Date   ALPRAZolam 1 MG tablet  Commonly known as:  XANAX   1 mg, Oral, 4 Times Daily PRN      atorvastatin 40 MG tablet  Commonly known as:  LIPITOR   40 mg, Oral, Nightly      carvedilol 12.5 MG tablet  Commonly known as:  COREG   12.5 mg, Oral, 2 Times Daily With Meals      cholecalciferol 25 MCG (1000 UT) tablet  Commonly known as:  VITAMIN D3   1,000 Units, Oral, Daily      ferrous sulfate 325 (65 FE) MG tablet   65 mg, Oral, Daily With Breakfast      folic acid 1 MG tablet  Commonly known as:  FOLVITE   1 mg, Oral, Daily      gabapentin 300 MG capsule  Commonly known as:  NEURONTIN   300 mg, Oral, 3 Times Daily      levothyroxine 200 MCG tablet  Commonly known as:  SYNTHROID, LEVOTHROID   200 mcg, Oral, Daily      metFORMIN 500 MG tablet  Commonly known as:  GLUCOPHAGE   500 mg, Oral, Daily With Breakfast      montelukast 10 MG tablet  Commonly known as:  SINGULAIR   10 mg, Oral, Nightly      pantoprazole 40 MG EC tablet  Commonly known as:  PROTONIX   40 mg, Oral, Daily         Stop These Medications    amLODIPine 5 MG tablet  Commonly known as:  NORVASC     aspirin 81 MG chewable tablet  Replaced by:  aspirin 325 MG tablet     clindamycin 300 MG capsule  Commonly known as:  CLEOCIN     famotidine 20 MG tablet  Commonly known as:  PEPCID     fluconazole 200 MG tablet  Commonly known as:  DIFLUCAN     furosemide 40 MG tablet  Commonly known as:  LASIX     hydrALAZINE 100 MG tablet  Commonly known as:  APRESOLINE     lisinopril 20 MG  tablet  Commonly known as:  PRINIVIL,ZESTRIL     spironolactone 25 MG tablet  Commonly known as:  ALDACTONE            Discharge Diet:   Diet Instructions     Diet: Cardiac      Discharge Diet:  Cardiac          Activity at Discharge:   Activity Instructions     Activity as Tolerated      Bathing Restrictions      Shower daily.    Type of Restriction:  Bathing    Bathing Restrictions:  No Tub Bath    Driving Restrictions      Do not resume driving while taking narcotic pain medication    Type of Restriction:  Driving    Driving Restrictions:  No Driving (Time Limited)    Length:  2 Weeks    Lifting Restrictions      Type of Restriction:  Lifting    Lifting Restrictions:  Lifting Restriction (Indicate Limit)    Weight Limit (Pounds):  10    Length of Lifting Restriction:  6 weeks          Follow-up Appointments  Future Appointments   Date Time Provider Department Center   2/13/2020  1:30 PM Lane Stock MD [facility] None   3/6/2020  2:[facility] None   3/6/2020  2:30 PM Héctor Eckert MD [facility] None   3/12/2020  3:15 PM Wayne Luna MD [facility] None     Additional Instructions for the Follow-ups that You Need to Schedule     Ambulatory Referral to Home Health   As directed      Face to Face Visit Date:  12/31/2019    Follow-up provider for Plan of Care?:  I will be treating the patient on an ongoing basis.  Please send me the Plan of Care for signature.    Follow-up provider:  LANE STOCK [[id number]]    Reason/Clinical Findings:  cabg    Describe mobility limitations that make leaving home difficult:  cant leave home withoyt taxing effort    Nursing/Therapeutic Services Requested:  Other (treat and eval)    Frequency:  1 Week 1         Discharge Follow-up with PCP   As directed       Currently Documented PCP:    Phani Adames MD    PCP Phone Number:    [phone]     Follow Up Details:  in one month         Discharge Follow-up with  Specialty: Cardiology; 2 Weeks   As directed      Specialty:  Cardiology    Follow Up:  2 Weeks    Follow Up Details:  Follow up with cardiologist Dr. Luna in 2 weeks.         Discharge Follow-up with Specialty: Pain Mangement; 1 Week   As directed      Specialty:  Pain Mangement    Follow Up:  1 Week    Follow Up Details:  Follow up with your pain management specialist 1 week after discharge.         Discharge Follow-up with Specified Provider: Cardiac Surgery; 6 Weeks   As directed      To:  Cardiac Surgery    Follow Up:  6 Weeks    Follow Up Details:  Follow up with cardiac surgeon Dr. Lane Stock on 2/13/2020 at 1:30pm         Discharge Follow-up with Specified Provider: Nephrology; 2 Weeks   As directed      To:  Nephrology    Follow Up:  2 Weeks    Follow Up Details:  Follow up with nephrologist Dr. Smalls in 2 weeks.         Call MD With Problems / Concerns   Jan 09, 2020      Instructions: Call for temp >101 or surgical wound drainage    Order Comments:  Instructions: Call for temp >101 or surgical wound drainage          Basic Metabolic Panel    Jan 13, 2020 (Approximate)      CBC & Differential    Jan 13, 2020 (Approximate)      Manual Differential:  No         Comprehensive Metabolic Panel    Jan 13, 2020 (Approximate)      TSH    Jan 13, 2020 (Approximate)            Test Results Pending at Discharge       ALEX WORKMAN  01/08/20  11:06 AM                Electronically signed by Kayla Burrell APRN at 01/08/20 1104

## 2020-01-11 ENCOUNTER — LAB REQUISITION (OUTPATIENT)
Dept: LAB | Facility: HOSPITAL | Age: 45
End: 2020-01-11

## 2020-01-11 DIAGNOSIS — Z00.00 ENCOUNTER FOR GENERAL ADULT MEDICAL EXAMINATION WITHOUT ABNORMAL FINDINGS: ICD-10-CM

## 2020-01-11 LAB
ALBUMIN SERPL-MCNC: 4.1 G/DL (ref 3.5–5.2)
ALBUMIN/GLOB SERPL: 1 G/DL
ALP SERPL-CCNC: 116 U/L (ref 39–117)
ALT SERPL W P-5'-P-CCNC: 27 U/L (ref 1–33)
ANION GAP SERPL CALCULATED.3IONS-SCNC: 16 MMOL/L (ref 5–15)
AST SERPL-CCNC: 25 U/L (ref 1–32)
BASOPHILS # BLD AUTO: 0.1 10*3/MM3 (ref 0–0.2)
BASOPHILS NFR BLD AUTO: 0.8 % (ref 0–1.5)
BILIRUB SERPL-MCNC: 0.3 MG/DL (ref 0.2–1.2)
BUN BLD-MCNC: 17 MG/DL (ref 6–20)
BUN/CREAT SERPL: 12.3 (ref 7–25)
CALCIUM SPEC-SCNC: 9.5 MG/DL (ref 8.6–10.5)
CHLORIDE SERPL-SCNC: 98 MMOL/L (ref 98–107)
CO2 SERPL-SCNC: 24 MMOL/L (ref 22–29)
CREAT BLD-MCNC: 1.38 MG/DL (ref 0.57–1)
DEPRECATED RDW RBC AUTO: 48.1 FL (ref 37–54)
EOSINOPHIL # BLD AUTO: 0.4 10*3/MM3 (ref 0–0.4)
EOSINOPHIL NFR BLD AUTO: 4.5 % (ref 0.3–6.2)
ERYTHROCYTE [DISTWIDTH] IN BLOOD BY AUTOMATED COUNT: 14.6 % (ref 12.3–15.4)
GFR SERPL CREATININE-BSD FRML MDRD: 50 ML/MIN/1.73
GLOBULIN UR ELPH-MCNC: 4 GM/DL
GLUCOSE BLD-MCNC: 92 MG/DL (ref 65–99)
HCT VFR BLD AUTO: 35.1 % (ref 34–46.6)
HGB BLD-MCNC: 11.7 G/DL (ref 12–15.9)
LYMPHOCYTES # BLD AUTO: 2.4 10*3/MM3 (ref 0.7–3.1)
LYMPHOCYTES NFR BLD AUTO: 25.2 % (ref 19.6–45.3)
MCH RBC QN AUTO: 31.4 PG (ref 26.6–33)
MCHC RBC AUTO-ENTMCNC: 33.4 G/DL (ref 31.5–35.7)
MCV RBC AUTO: 94.1 FL (ref 79–97)
MONOCYTES # BLD AUTO: 0.5 10*3/MM3 (ref 0.1–0.9)
MONOCYTES NFR BLD AUTO: 5.2 % (ref 5–12)
NEUTROPHILS # BLD AUTO: 6.2 10*3/MM3 (ref 1.7–7)
NEUTROPHILS NFR BLD AUTO: 64.3 % (ref 42.7–76)
NRBC BLD AUTO-RTO: 0.1 /100 WBC (ref 0–0.2)
PLATELET # BLD AUTO: 526 10*3/MM3 (ref 140–450)
PMV BLD AUTO: 6.6 FL (ref 6–12)
POTASSIUM BLD-SCNC: 4.2 MMOL/L (ref 3.5–5.2)
PROT SERPL-MCNC: 8.1 G/DL (ref 6–8.5)
RBC # BLD AUTO: 3.73 10*6/MM3 (ref 3.77–5.28)
SODIUM BLD-SCNC: 138 MMOL/L (ref 136–145)
TSH SERPL DL<=0.05 MIU/L-ACNC: 3.55 UIU/ML (ref 0.27–4.2)
WBC NRBC COR # BLD: 9.6 10*3/MM3 (ref 3.4–10.8)

## 2020-01-11 PROCEDURE — 80053 COMPREHEN METABOLIC PANEL: CPT

## 2020-01-11 PROCEDURE — 85025 COMPLETE CBC W/AUTO DIFF WBC: CPT

## 2020-01-11 PROCEDURE — 84443 ASSAY THYROID STIM HORMONE: CPT

## 2020-01-21 ENCOUNTER — TELEPHONE (OUTPATIENT)
Dept: CARDIOLOGY | Facility: CLINIC | Age: 45
End: 2020-01-21

## 2020-01-21 NOTE — TELEPHONE ENCOUNTER
Spoke with pt regarding home monitor she had it unplugged due to being in the hospital.. But we are now receiving transmissions.  Pt is aware and will call with concerns.

## 2020-01-28 ENCOUNTER — TELEPHONE (OUTPATIENT)
Dept: CARDIAC REHAB | Facility: HOSPITAL | Age: 45
End: 2020-01-28

## 2020-01-28 NOTE — TELEPHONE ENCOUNTER
01/28/2020-pt called and is interested in CR. Currently receiving home health services. Pt sees cardiologist DR Luna tomorrow and will discuss Cr with him. Pt has appt on 02/13/20 with surgeon DR Stock. CR staff will do insurance verification and notify pt of findings.  LOBO Garcia RN CCRP

## 2020-01-28 NOTE — TELEPHONE ENCOUNTER
Spoke with pt regarding cardiac rehab. [facility] is closer to her.  Will fax facesheet to them.

## 2020-01-29 ENCOUNTER — OFFICE VISIT (OUTPATIENT)
Dept: CARDIOLOGY | Facility: CLINIC | Age: 45
End: 2020-01-29

## 2020-01-29 VITALS
WEIGHT: 199 LBS | OXYGEN SATURATION: 99 % | DIASTOLIC BLOOD PRESSURE: 90 MMHG | HEART RATE: 72 BPM | BODY MASS INDEX: 31.17 KG/M2 | SYSTOLIC BLOOD PRESSURE: 133 MMHG

## 2020-01-29 DIAGNOSIS — I42.0 DILATED CARDIOMYOPATHY (HCC): ICD-10-CM

## 2020-01-29 DIAGNOSIS — I25.10 CORONARY ARTERY DISEASE INVOLVING NATIVE CORONARY ARTERY OF NATIVE HEART WITHOUT ANGINA PECTORIS: ICD-10-CM

## 2020-01-29 DIAGNOSIS — I35.1 NONRHEUMATIC AORTIC VALVE INSUFFICIENCY: Primary | ICD-10-CM

## 2020-01-29 DIAGNOSIS — I10 ESSENTIAL HYPERTENSION: ICD-10-CM

## 2020-01-29 PROCEDURE — 99214 OFFICE O/P EST MOD 30 MIN: CPT | Performed by: INTERNAL MEDICINE

## 2020-01-29 PROCEDURE — 93000 ELECTROCARDIOGRAM COMPLETE: CPT | Performed by: INTERNAL MEDICINE

## 2020-01-29 NOTE — PROGRESS NOTES
Cardiology Office Visit      Encounter Date:  01/29/2020    Patient ID:   Rafael Mccann is a 44 y.o. female.    Reason For Followup:  Cardiomyopathy  Hypertension  Hyperlipidemia  Valvular heart disease      Brief Clinical History:  Dear Dr. Adames, Phani Ennis MD    I had the pleasure of seeing Rafael Mccann today. As you are well aware, this is a 44 y.o. female with a past medical history that is significant for cardiomyopathy and valvular heart disease         Interval History:  Recent hospitalization patient underwent aortic valve replacement surgery with a bioprosthetic valve and also coronary artery bypass surgery  Patient is clinically doing better without any new cardiac complaints recovering from a coronary artery bypass surgery    Impressions: Catheterization  Severe two-vessel coronary artery disease  Moderate stenosis involving the distal LAD  4+ aortic regurgitation  Normal PA pressures  Normal filling pressures    Assessment & Plan    Impressions:  Essential hypertension  Chronic systolic heart failure  History of cardiomyopathy   Chronic renal insufficiency  Coronary artery disease   History of diabetes mellitus type 2  History of thyroid disease  Moderate to severe mitral regurgitation  Moderate to severe aortic regurgitation  Cardiomyopathy with LV ejection fraction of 35%  s/p AICD placement/normal device function   Patient is s/p urgent AVR (21 mm Magna), CABG x3 with SV to Diag1 and SV sequential to PDA and then OM3 12/27/2019  Status post coronary artery bypass surgery x3 and aortic valve replacement surgery  Patient is currently hemodynamically stable   Normal sinus rhythm      Recommendations:  Discontinue amiodarone after finishing the current prescription dose  Repeat echocardiogram to assess the LV systolic function and also valve function  Continue current medical therapy and close monitoring  Schedule patient for cardiac rehab  Schedule patient for a treadmill test for  cardiac rehab evaluation  Repeat labs to check CBC and chemistry  Follow-up in office in 2 months  Objective:    Vitals:  Vitals:    01/29/20 1456   BP: 133/90   Pulse: 72   SpO2: 99%   Weight: 90.3 kg (199 lb)       Physical Exam:    General: Alert, cooperative, no distress, appears stated age  Head:  Normocephalic, atraumatic, mucous membranes moist  Eyes:  Conjunctiva/corneas clear, EOM's intact     Neck:  Supple,  no adenopathy;      Lungs: Clear to auscultation bilaterally, no wheezes rhonchi rales are noted  Chest wall: Surgical site looks good with no infection or bleeding  Heart::  Regular rate and rhythm, S1 and S2 normal,  displaced LV apical impulse  Abdomen: Soft, non-tender, nondistended bowel sounds active  Extremities: No cyanosis, clubbing, or edema  Pulses: 2+ and symmetric all extremities  Skin:  No rashes or lesions  Neuro/psych: A&O x3. CN II through XII are grossly intact with appropriate affect      Allergies:  Allergies   Allergen Reactions   • Hydrocodone Hives   • Penicillin G Unknown (See Comments)       Medication Review:     Current Outpatient Medications:   •  allopurinol (ZYLOPRIM) 100 MG tablet, Take 2 tablets by mouth Daily., Disp: 30 tablet, Rfl: 1  •  ALPRAZolam (XANAX) 1 MG tablet, Take 1 mg by mouth 4 (Four) Times a Day As Needed for Anxiety., Disp: , Rfl:   •  amiodarone (PACERONE) 200 MG tablet, Take 1 tablet by mouth Daily., Disp: 30 tablet, Rfl: 3  •  aspirin 325 MG tablet, Take 1 tablet by mouth Daily., Disp: 30 tablet, Rfl: 3  •  atorvastatin (LIPITOR) 40 MG tablet, Take 1 tablet by mouth Every Night., Disp: 30 tablet, Rfl: 0  •  bumetanide (BUMEX) 0.5 MG tablet, Take 1 tablet by mouth Daily., Disp: 30 tablet, Rfl: 1  •  carvedilol (COREG) 12.5 MG tablet, Take 1 tablet by mouth 2 (Two) Times a Day With Meals., Disp: 60 tablet, Rfl: 0  •  cholecalciferol (VITAMIN D3) 1000 units tablet, Take 1,000 Units by mouth Daily., Disp: , Rfl:   •  Cyanocobalamin (VITAMIN B 12 PO), Take   by mouth., Disp: , Rfl:   •  docusate sodium 100 MG capsule, Take 100 mg by mouth 2 (Two) Times a Day., Disp: 60 each, Rfl: 1  •  ferrous sulfate 325 (65 FE) MG tablet, Take 65 mg by mouth Daily With Breakfast., Disp: , Rfl:   •  folic acid (FOLVITE) 1 MG tablet, Take 1 mg by mouth Daily., Disp: , Rfl:   •  gabapentin (NEURONTIN) 300 MG capsule, Take 300 mg by mouth 3 (Three) Times a Day., Disp: , Rfl:   •  levothyroxine (SYNTHROID, LEVOTHROID) 200 MCG tablet, Take 200 mcg by mouth Daily., Disp: , Rfl:   •  metFORMIN (GLUCOPHAGE) 500 MG tablet, Take 500 mg by mouth Daily With Breakfast., Disp: , Rfl:   •  montelukast (SINGULAIR) 10 MG tablet, Take 10 mg by mouth Every Night., Disp: , Rfl:   •  oxyCODONE-acetaminophen (PERCOCET) 7.5-325 MG per tablet, Take 1 tablet by mouth Every 6 (Six) Hours As Needed for Severe Pain . May resume after postop oxycodone prescription is completed., Disp: , Rfl:   •  pantoprazole (PROTONIX) 40 MG EC tablet, Take 40 mg by mouth Daily., Disp: , Rfl:   •  traMADol (ULTRAM) 50 MG tablet, Take 0.5 tablets by mouth Every 6 (Six) Hours As Needed for Moderate Pain ., Disp: 30 tablet, Rfl: 0    Family History:  Family History   Problem Relation Age of Onset   • Heart disease Father        Past Medical History:  Past Medical History:   Diagnosis Date   • CHF (congestive heart failure) (CMS/HCC)    • COPD (chronic obstructive pulmonary disease) (CMS/[facility])    • Diabetes (CMS/[facility])    • Hyperlipidemia    • Hypertension        Past surgical History:  Past Surgical History:   Procedure Laterality Date   • AORTIC VALVE REPAIR/REPLACEMENT N/A 12/27/2019    Procedure: AORTIC VALVE REPAIR/REPLACEMENT;  Surgeon: Lane Stock MD;  Location: [facility];  Service: Cardiothoracic   • BREAST LUMPECTOMY     • CARDIAC CATHETERIZATION N/A 12/24/2019    Procedure: Right and Left Heart Cath 12/24/19 @ 0900;  Surgeon: Wayne uLna MD;  Location: [facility] INVASIVE LOCATION;  Service:  Cardiovascular   • CARDIAC CATHETERIZATION N/A 2019    Procedure: Coronary angiography;  Surgeon: Wayne Luna MD;  Location: [facility] CATH INVASIVE LOCATION;  Service: Cardiovascular   • CARDIAC ELECTROPHYSIOLOGY PROCEDURE Left 2019    Procedure: Dual-chamber ICD insertion;  Surgeon: Héctor Eckert MD;  Location: [facility] CATH INVASIVE LOCATION;  Service: Cardiovascular   • CARDIAC ELECTROPHYSIOLOGY PROCEDURE Left 2019    Procedure: Lead Revision;  Surgeon: Héctor Eckert MD;  Location: [facility] CATH INVASIVE LOCATION;  Service: Cardiovascular   • CHOLECYSTECTOMY     • CORONARY ARTERY BYPASS GRAFT N/A 2019    Procedure: CORONARY ARTERY BYPASS GRAFTING;  Surgeon: Lane Stock MD;  Location: [facility] CVOR;  Service: Cardiothoracic   • HYSTERECTOMY     • INSERT / REPLACE / REMOVE PACEMAKER     • THYROID SURGERY         Social History:  Social History     Socioeconomic History   • Marital status:      Spouse name: Not on file   • Number of children: Not on file   • Years of education: Not on file   • Highest education level: Not on file   Tobacco Use   • Smoking status: Former Smoker     Last attempt to quit: 2019     Years since quittin.0   • Smokeless tobacco: Never Used   Substance and Sexual Activity   • Alcohol use: No     Frequency: Never   • Drug use: No   • Sexual activity: Defer       Review of Systems:  The following systems were reviewed as they relate to the cardiovascular system: Constitutional, Eyes, ENT, Cardiovascular, Respiratory, Gastrointestinal, Integumentary, Neurological, Psychiatric, Hematologic, Endocrine, Musculoskeletal, and Genitourinary. The pertinent cardiovascular findings are reported above with all other cardiovascular points within those systems being negative.    Diagnostic Study Review:     Current Electrocardiogram:    ECG 12 Lead  Date/Time: 2020 4:11 PM  Performed by: Wayne Luna MD  Authorized by: Cheryl  Wayne VILLANUEVA MD   Comparison: compared with previous ECG   Similar to previous ECG  Rhythm: sinus rhythm  Rate: normal  BPM: 74  Conduction: non-specific intraventricular conduction delay  QRS axis: normal  Other findings: non-specific ST-T wave changes    Clinical impression: abnormal EKG              NOTE: The following portions of the patient's history were reviewed and updated this visit as appropriate: allergies, current medications, past family history, past medical history, past social history, past surgical history and problem list.

## 2020-02-17 ENCOUNTER — TELEPHONE (OUTPATIENT)
Dept: CARDIAC REHAB | Facility: HOSPITAL | Age: 45
End: 2020-02-17

## 2020-02-17 NOTE — TELEPHONE ENCOUNTER
Attempted to reach pt to see if still interested in Cardiac Rehab. No way to leave voicemail.  Pt did not show up for entry TMT on 2/12.  JT in [facility] states has tried to reach pt and left message last week  MALICK

## 2020-02-24 ENCOUNTER — OFFICE VISIT (OUTPATIENT)
Dept: CARDIAC SURGERY | Facility: CLINIC | Age: 45
End: 2020-02-24

## 2020-02-24 VITALS
WEIGHT: 202.2 LBS | BODY MASS INDEX: 31.74 KG/M2 | HEART RATE: 81 BPM | SYSTOLIC BLOOD PRESSURE: 160 MMHG | OXYGEN SATURATION: 98 % | HEIGHT: 67 IN | DIASTOLIC BLOOD PRESSURE: 102 MMHG

## 2020-02-24 DIAGNOSIS — Z95.2 S/P AORTIC VALVE REPLACEMENT: Primary | ICD-10-CM

## 2020-02-24 DIAGNOSIS — Z95.1 S/P CABG (CORONARY ARTERY BYPASS GRAFT): ICD-10-CM

## 2020-02-24 PROCEDURE — 99024 POSTOP FOLLOW-UP VISIT: CPT | Performed by: THORACIC SURGERY (CARDIOTHORACIC VASCULAR SURGERY)

## 2020-02-24 RX ORDER — LEVOFLOXACIN 500 MG/1
500 TABLET, FILM COATED ORAL DAILY
Qty: 7 TABLET | Refills: 1 | Status: SHIPPED | OUTPATIENT
Start: 2020-02-24 | End: 2020-05-16

## 2020-02-25 ENCOUNTER — DOCUMENTATION (OUTPATIENT)
Dept: CARDIAC REHAB | Facility: HOSPITAL | Age: 45
End: 2020-02-25

## 2020-02-25 NOTE — PROGRESS NOTES
02/25/2020- pt has not scheduled entry TMT for CR even though pt given contact information and called numerous times. Too, the diagnostic  MIGUEL ÁNGEL [facility] has tried to reach pt as well.  LOBO Garcia RN CCRP

## 2020-03-01 NOTE — PROGRESS NOTES
Rafael Mccann is a 44 y.o. female s/p an urgent AVR (21 mm Magna)/CABG x3 on 12/27/2019. Her postoperative recovery was relatively slow, as anticipated, because of her multi-factorial cardiomyopathy, COPD, and preoperative LINSEY. She was eventually discharged to rehab. She denies any signs or symptoms of myocardial ischemia, cerebrovascular event, or prosthetic malfunction. She reports good exercise tolerance. She does report some blistering at the mid aspect of her incision, with popping and some minor clear drainage. She denies any sternal discomfort, popping, or clicking.    Sternum is stable, clean, dry and intact. The incision is slightly raised (keloid). There is no erythema, induration, or drainage.   RRR. Normal prosthetic heart sound with each beat.  CTA B/L.  No edema, cyanosis, clubbing.  GCS 15, no neurologic deficit.    She appears to be doing well. We will reassess her sternum in one month to ensure adequate healing.

## 2020-03-03 ENCOUNTER — HOSPITAL ENCOUNTER (OUTPATIENT)
Dept: CARDIOLOGY | Facility: HOSPITAL | Age: 45
Discharge: HOME | End: 2020-03-03
Admitting: INTERNAL MEDICINE

## 2020-03-03 DIAGNOSIS — I35.1 NONRHEUMATIC AORTIC VALVE INSUFFICIENCY: ICD-10-CM

## 2020-03-03 LAB
BH CV STRESS BP STAGE 1: NORMAL
BH CV STRESS DURATION MIN STAGE 1: 2
BH CV STRESS DURATION SEC STAGE 1: 9
BH CV STRESS GRADE STAGE 1: 10
BH CV STRESS HR STAGE 1: 112
BH CV STRESS METS STAGE 1: 4.6
BH CV STRESS PROTOCOL 1: NORMAL
BH CV STRESS RECOVERY BP: NORMAL MMHG
BH CV STRESS RECOVERY HR: 70 BPM
BH CV STRESS SPEED STAGE 1: 1.7
BH CV STRESS STAGE 1: 1
MAXIMAL PREDICTED HEART RATE: 173 BPM
PERCENT MAX PREDICTED HR: 64.74 %
STRESS BASELINE BP: NORMAL MMHG
STRESS BASELINE HR: 78 BPM
STRESS PERCENT HR: 76 %
STRESS POST ESTIMATED WORKLOAD: 4.6 METS
STRESS POST EXERCISE DUR MIN: 2 MIN
STRESS POST EXERCISE DUR SEC: 9 SEC
STRESS POST PEAK BP: NORMAL MMHG
STRESS POST PEAK HR: 112 BPM
STRESS TARGET HR: 147 BPM

## 2020-03-03 PROCEDURE — 93018 CV STRESS TEST I&R ONLY: CPT | Performed by: NURSE PRACTITIONER

## 2020-03-03 PROCEDURE — 93017 CV STRESS TEST TRACING ONLY: CPT

## 2020-03-06 ENCOUNTER — OFFICE VISIT (OUTPATIENT)
Dept: CARDIOLOGY | Facility: CLINIC | Age: 45
End: 2020-03-06

## 2020-03-06 ENCOUNTER — CLINICAL SUPPORT NO REQUIREMENTS (OUTPATIENT)
Dept: CARDIOLOGY | Facility: CLINIC | Age: 45
End: 2020-03-06

## 2020-03-06 VITALS
OXYGEN SATURATION: 100 % | HEART RATE: 63 BPM | DIASTOLIC BLOOD PRESSURE: 103 MMHG | WEIGHT: 206 LBS | SYSTOLIC BLOOD PRESSURE: 151 MMHG | BODY MASS INDEX: 32.26 KG/M2

## 2020-03-06 DIAGNOSIS — Z95.1 S/P CABG X 3: ICD-10-CM

## 2020-03-06 DIAGNOSIS — I42.0 CARDIOMYOPATHY, DILATED (HCC): Primary | ICD-10-CM

## 2020-03-06 DIAGNOSIS — Z95.810 ICD (IMPLANTABLE CARDIOVERTER-DEFIBRILLATOR), DUAL, IN SITU: ICD-10-CM

## 2020-03-06 DIAGNOSIS — I42.0 CARDIOMYOPATHY, DILATED (HCC): ICD-10-CM

## 2020-03-06 DIAGNOSIS — Z95.2 S/P AVR (AORTIC VALVE REPLACEMENT): ICD-10-CM

## 2020-03-06 DIAGNOSIS — I10 ESSENTIAL HYPERTENSION: Primary | ICD-10-CM

## 2020-03-06 PROCEDURE — 93000 ELECTROCARDIOGRAM COMPLETE: CPT | Performed by: NURSE PRACTITIONER

## 2020-03-06 PROCEDURE — 93283 PRGRMG EVAL IMPLANTABLE DFB: CPT | Performed by: INTERNAL MEDICINE

## 2020-03-06 PROCEDURE — 99214 OFFICE O/P EST MOD 30 MIN: CPT | Performed by: NURSE PRACTITIONER

## 2020-03-06 RX ORDER — CARVEDILOL 25 MG/1
25 TABLET ORAL 2 TIMES DAILY WITH MEALS
Qty: 30 TABLET | Refills: 2 | Status: SHIPPED | OUTPATIENT
Start: 2020-03-06 | End: 2020-03-20 | Stop reason: SDUPTHER

## 2020-03-06 NOTE — PROGRESS NOTES
Cardiology Office Follow Up Visit      Primary Care Provider:  Phani Adames MD    Reason for f/u: Cardiomyopathy, dual-chamber ICD in situ       Phani Adames MD    History of Present Illness     It was nice to see Rafael Mccann in follow-up for cardiomyopathy. She is a 45 y.o. female of Dr. Luna's with a history of dilated cardiomyopathy with dual-chamber ICD placed 6/2019 that underwent an RA lead revision on 8/14/2019, unfortunately the RA lead is able to sense but pacing thresholds were unable to elicit a capture after revision, and we have placed her in VDD for ongoing ICD function as she is not pacemaker dependent.  She had a worsening in her aortic valve regurgitation and recently underwent tissue AVR with CABG x3 on 12/27/2019.  Other chronic medical issues include COPD, chronic kidney disease, hypertension, CPAP compliant INDRA with nocturnal oxygen, diabetes mellitus type 2, and hypothyroidism.  She comes in today for routine follow-up for ICD surveillance.  The device was interrogated and has normal functionality, no arrhythmias, turned thoracic impedance on, see attached for specifics.  She denies shortness of breath, palpitations, chest/back pain, syncopal or near syncopal events since her surgery and she is steady recuperating.  She states that she is due to start cardiac rehab on Tuesday.    Social history: Lives with her 3 children, is disabled due to her cardiac issues.  Denies smoking, denies EtOH, denies herbal use.      Past Medical History:   Diagnosis Date   • CHF (congestive heart failure) (CMS/HCC)    • COPD (chronic obstructive pulmonary disease) (CMS/HCC)    • Diabetes (CMS/HCC)    • Hyperlipidemia    • Hypertension        Past Surgical History:   Procedure Laterality Date   • AORTIC VALVE REPAIR/REPLACEMENT N/A 12/27/2019    Procedure: AORTIC VALVE REPAIR/REPLACEMENT;  Surgeon: Lane Stock MD;  Location: [facility];  Service: Cardiothoracic   • BREAST  LUMPECTOMY     • CARDIAC CATHETERIZATION N/A 12/24/2019    Procedure: Right and Left Heart Cath 12/24/19 @ 0900;  Surgeon: Wayne Luna MD;  Location: [facility] CATH INVASIVE LOCATION;  Service: Cardiovascular   • CARDIAC CATHETERIZATION N/A 12/24/2019    Procedure: Coronary angiography;  Surgeon: Wayne Luna MD;  Location: [facility] CATH INVASIVE LOCATION;  Service: Cardiovascular   • CARDIAC ELECTROPHYSIOLOGY PROCEDURE Left 6/28/2019    Procedure: Dual-chamber ICD insertion;  Surgeon: Héctor Eckert MD;  Location: [facility] CATH INVASIVE LOCATION;  Service: Cardiovascular   • CARDIAC ELECTROPHYSIOLOGY PROCEDURE Left 8/14/2019    Procedure: Lead Revision;  Surgeon: Héctor Eckert MD;  Location: [facility] CATH INVASIVE LOCATION;  Service: Cardiovascular   • CHOLECYSTECTOMY     • CORONARY ARTERY BYPASS GRAFT N/A 12/27/2019    Procedure: CORONARY ARTERY BYPASS GRAFTING;  Surgeon: Lane Stock MD;  Location: [facility] CVOR;  Service: Cardiothoracic   • HYSTERECTOMY     • INSERT / REPLACE / REMOVE PACEMAKER     • THYROID SURGERY           Current Outpatient Medications:   •  allopurinol (ZYLOPRIM) 100 MG tablet, Take 2 tablets by mouth Daily., Disp: 30 tablet, Rfl: 1  •  ALPRAZolam (XANAX) 1 MG tablet, Take 1 mg by mouth 4 (Four) Times a Day As Needed for Anxiety., Disp: , Rfl:   •  aspirin 325 MG tablet, Take 1 tablet by mouth Daily., Disp: 30 tablet, Rfl: 3  •  atorvastatin (LIPITOR) 40 MG tablet, Take 1 tablet by mouth Every Night., Disp: 30 tablet, Rfl: 0  •  bumetanide (BUMEX) 0.5 MG tablet, Take 1 tablet by mouth Daily., Disp: 30 tablet, Rfl: 1  •  carvedilol (COREG) 12.5 MG tablet, Take 1 tablet by mouth 2 (Two) Times a Day With Meals., Disp: 60 tablet, Rfl: 0  •  cholecalciferol (VITAMIN D3) 1000 units tablet, Take 1,000 Units by mouth Daily., Disp: , Rfl:   •  Cyanocobalamin (VITAMIN B 12 PO), Take  by mouth., Disp: , Rfl:   •  docusate sodium 100 MG capsule, Take 100 mg by mouth 2 (Two)  Times a Day., Disp: 60 each, Rfl: 1  •  ferrous sulfate 325 (65 FE) MG tablet, Take 65 mg by mouth Daily With Breakfast., Disp: , Rfl:   •  folic acid (FOLVITE) 1 MG tablet, Take 1 mg by mouth Daily., Disp: , Rfl:   •  gabapentin (NEURONTIN) 300 MG capsule, Take 300 mg by mouth 3 (Three) Times a Day., Disp: , Rfl:   •  levoFLOXacin (LEVAQUIN) 500 MG tablet, Take 1 tablet by mouth Daily., Disp: 7 tablet, Rfl: 1  •  levothyroxine (SYNTHROID, LEVOTHROID) 200 MCG tablet, Take 200 mcg by mouth Daily., Disp: , Rfl:   •  metFORMIN (GLUCOPHAGE) 500 MG tablet, Take 500 mg by mouth Daily With Breakfast., Disp: , Rfl:   •  montelukast (SINGULAIR) 10 MG tablet, Take 10 mg by mouth Every Night., Disp: , Rfl:   •  oxyCODONE-acetaminophen (PERCOCET) 7.5-325 MG per tablet, Take 1 tablet by mouth Every 6 (Six) Hours As Needed for Severe Pain . May resume after postop oxycodone prescription is completed., Disp: , Rfl:   •  pantoprazole (PROTONIX) 40 MG EC tablet, Take 40 mg by mouth Daily., Disp: , Rfl:   •  traMADol (ULTRAM) 50 MG tablet, Take 0.5 tablets by mouth Every 6 (Six) Hours As Needed for Moderate Pain ., Disp: 30 tablet, Rfl: 0    Social History     Socioeconomic History   • Marital status:      Spouse name: Not on file   • Number of children: Not on file   • Years of education: Not on file   • Highest education level: Not on file   Tobacco Use   • Smoking status: Former Smoker     Last attempt to quit: 2019     Years since quittin.1   • Smokeless tobacco: Never Used   Substance and Sexual Activity   • Alcohol use: No     Frequency: Never   • Drug use: No   • Sexual activity: Defer       Family History   Problem Relation Age of Onset   • Heart disease Father        The following portions of the patient's history were reviewed and updated as appropriate: allergies, current medications, past family history, past medical history, past social history, past surgical history and problem list.        Review of  Systems   Constitution: Negative for diaphoresis, malaise/fatigue, weight gain and weight loss.   Cardiovascular: Negative for chest pain, dyspnea on exertion, leg swelling, near-syncope, orthopnea, palpitations and syncope.   Respiratory: Negative for hemoptysis, shortness of breath, sputum production and wheezing.    Skin: Negative for rash.   Gastrointestinal: Negative for abdominal pain, hematemesis, hematochezia, nausea and vomiting.   Neurological: Negative for dizziness, light-headedness, numbness and seizures.   Psychiatric/Behavioral: Negative.    All other systems reviewed and are negative.    BP (!) 151/103   Pulse 63   Wt 93.4 kg (206 lb)   SpO2 100%   BMI 32.26 kg/m² .  Objective     Physical Exam   Constitutional: She is oriented to person, place, and time. She appears well-developed and well-nourished. She is cooperative.   Neck: Normal carotid pulses and no JVD present. Carotid bruit is not present.   Cardiovascular: Normal rate, regular rhythm, normal heart sounds and intact distal pulses. Exam reveals no gallop and no friction rub.   No murmur heard.  Pulses:       Carotid pulses are 2+ on the right side, and 2+ on the left side.       Radial pulses are 2+ on the right side, and 2+ on the left side.        Posterior tibial pulses are 2+ on the right side, and 2+ on the left side.   Pulmonary/Chest: Effort normal and breath sounds normal. She has no decreased breath sounds. She has no wheezes. She has no rhonchi. She has no rales.   Abdominal: Soft. Bowel sounds are normal. She exhibits no distension and no mass. There is no hepatosplenomegaly. There is no tenderness. There is no guarding.   Musculoskeletal: She exhibits no edema.   Neurological: She is alert and oriented to person, place, and time.   Skin: Skin is warm and dry. No rash noted. No erythema. No pallor.   Left chest incision well approximated without erythema, edema, or drainage   Psychiatric: She has a normal mood and affect. Her  speech is normal and behavior is normal. Judgment and thought content normal.           ECG 12 Lead  Date/Time: 3/6/2020 5:08 PM  Performed by: Sara Park APRN  Authorized by: Sara Park APRN   Comparison: not compared with previous ECG   Rhythm: sinus rhythm  BPM: 63  QRS axis: right  Other findings: poor R wave progression        Assessment/Plan:    1.  Dilated and ischemic cardiomyopathy with dual-chamber ICD in situ----normal functionality with the exception of RA thresholds on today's interrogation, on appropriate medical therapy  2.  Severe aortic regurgitation and CAD status post CABG and tissue AVR 12/2019  3.  Hypertension-----elevated in the office today consistent on recheck with manual cuff, patient maintains that her blood pressure was 135/87 at home  4.  COPD----no acute exacerbation  5.  INDRA----CPAP compliant, nocturnal O2 bled into CPAP  6.  Diabetes mellitus type 2-----on oral hypoglycemics  7.  Chronic kidney disease  8.  Hypothyroidism----on levothyroxine    Increase Coreg to 25 mg twice daily, follow-up in the office in 6 months for continued surveillance.     ALEX Mayers  EP cardiology  **>25 minutes in face to face conversation regarding treatment plan, education, and answering any questions the patient may have had

## 2020-03-10 ENCOUNTER — DOCUMENTATION (OUTPATIENT)
Dept: CARDIAC REHAB | Facility: HOSPITAL | Age: 45
End: 2020-03-10

## 2020-03-10 NOTE — PROGRESS NOTES
"03/10/2020- Pt was a no show, no call for her second time to be scheduled  for her initial visit for CR phase 2. CR staff called and spoke to pts daughter yesterday to confirm appt for today. (NOTE- pt was suppose to have come on 03/05/2020 for her initial visit but did not come that day since she sts \"overslept etc. \" )  LOBO Garcia RN CCRP    "

## 2020-03-20 ENCOUNTER — TELEPHONE (OUTPATIENT)
Dept: CARDIAC SURGERY | Facility: CLINIC | Age: 45
End: 2020-03-20

## 2020-03-20 RX ORDER — CARVEDILOL 25 MG/1
25 TABLET ORAL 2 TIMES DAILY WITH MEALS
Qty: 30 TABLET | Refills: 2 | Status: SHIPPED | OUTPATIENT
Start: 2020-03-20 | End: 2020-03-20 | Stop reason: SDUPTHER

## 2020-03-20 RX ORDER — CARVEDILOL 25 MG/1
25 TABLET ORAL 2 TIMES DAILY WITH MEALS
Qty: 180 TABLET | Refills: 2 | Status: SHIPPED | OUTPATIENT
Start: 2020-03-20 | End: 2021-01-23 | Stop reason: HOSPADM

## 2020-03-20 NOTE — TELEPHONE ENCOUNTER
I called and spoke with patient letting her know we postponed her appointment for 3/23/20. She reports her sternum has not blistered or changed since last seen by Dr Stock. I let her know we will call to reschedule her appointment and she will call our office if her condition changes

## 2020-03-23 RX ORDER — BUMETANIDE 0.5 MG/1
TABLET ORAL
Qty: 30 TABLET | Refills: 1 | OUTPATIENT
Start: 2020-03-23

## 2020-04-13 RX ORDER — HYDRALAZINE HYDROCHLORIDE 100 MG/1
TABLET, FILM COATED ORAL
Qty: 270 TABLET | Refills: 1 | OUTPATIENT
Start: 2020-04-13

## 2020-04-23 ENCOUNTER — TELEMEDICINE (OUTPATIENT)
Dept: CARDIAC SURGERY | Facility: CLINIC | Age: 45
End: 2020-04-23

## 2020-04-23 VITALS
SYSTOLIC BLOOD PRESSURE: 157 MMHG | WEIGHT: 206 LBS | RESPIRATION RATE: 18 BRPM | OXYGEN SATURATION: 97 % | HEIGHT: 67 IN | BODY MASS INDEX: 32.33 KG/M2 | DIASTOLIC BLOOD PRESSURE: 110 MMHG | HEART RATE: 94 BPM

## 2020-04-23 DIAGNOSIS — Z95.1 S/P CABG X 3: Primary | ICD-10-CM

## 2020-04-23 DIAGNOSIS — Z95.2 S/P AVR (AORTIC VALVE REPLACEMENT): ICD-10-CM

## 2020-04-23 PROCEDURE — 99024 POSTOP FOLLOW-UP VISIT: CPT | Performed by: NURSE PRACTITIONER

## 2020-04-23 RX ORDER — AMLODIPINE BESYLATE 5 MG/1
5 TABLET ORAL DAILY
Qty: 30 TABLET | Refills: 3 | Status: SHIPPED | OUTPATIENT
Start: 2020-04-23 | End: 2020-05-04

## 2020-04-23 NOTE — PROGRESS NOTES
"CARDIOVASCULAR SURGERY FOLLOW-UP PROGRESS NOTE  Chief Complaint: Post-op Follow Up        HPI:   Dear Dr. Adames, Phani Ennis MD and colleagues:    It was nice to speak to  Rafael GILBERT Mccann in follow up today after cardiac surgery. Video was conducted by telephone through telehealth visit with patient consent after difficulties with video call. As you know, she is a 45 y.o. female with CAD, aortic regurgitation, cardiomyopathy, and CKD stage 2 who underwent CABG and AVR at [facility] by Dr. Lane Stock on 12/27/2019. She was seen by Dr. Stock in March 2020 for complaints of sternal wound drainage. She states today that the drainage has resolved and denies redness. She does admit to occasional itching. I advised her to take Benedryl PO 25mg as needed for the itching and avoid applying ointments to her incision. She also complained of HAN and increasing abdominal girth related to edema. She is currently on Bumex 0.5mg PO daily. I advised her to increase her dose to 1mg PO daily for 3 days. I advised her to call for symptoms of worsening edema or SOA. Her blood pressure is elevated at the time of today's visit at 157/110. She states this is where her blood pressure has been running for several days. I restarted her Norvasc at 5mg PO daily and sent a prescription to her preferred pharmacy. She is to continue to check her blood pressure 3 times daily. Her activity level has been fair. She is anxious to start cardiac rehab as it has been postponed due to the current Covid-19 pandemic. I advised her to keep active with daily walks as long as she is able to continue to practice social distancing. She is otherwise doing well and will be seen to an inpatient visit in the near future to evaluate wound healing.    Physical Exam:         BP (!) 157/110 (BP Location: Right arm, Patient Position: Sitting, Cuff Size: Adult)   Pulse 94   Resp 18   Ht 170.2 cm (67\")   Wt 93.4 kg (206 lb)   SpO2 97%   BMI 32.26 kg/m² "     Assessment/Plan:     S/P CABG and AVR. Overall, she is doing well.    No significant post-op complications    Keep incisions clean and dry  OK to drive if not taking narcotic pain medicine  Follow-up as scheduled with cardiology  Return to clinic in 2 week(s) with no new studies    No restrictions of activity.    Thank you for allowing me to participate in the care of your patient.    Regards,  ALEX WORKMAN

## 2020-05-04 ENCOUNTER — OFFICE VISIT (OUTPATIENT)
Dept: CARDIAC SURGERY | Facility: CLINIC | Age: 45
End: 2020-05-04

## 2020-05-04 VITALS
HEIGHT: 67 IN | SYSTOLIC BLOOD PRESSURE: 148 MMHG | BODY MASS INDEX: 32.83 KG/M2 | DIASTOLIC BLOOD PRESSURE: 101 MMHG | RESPIRATION RATE: 20 BRPM | HEART RATE: 79 BPM | WEIGHT: 209.2 LBS | TEMPERATURE: 98.2 F | OXYGEN SATURATION: 97 %

## 2020-05-04 DIAGNOSIS — Z95.2 S/P AVR (AORTIC VALVE REPLACEMENT): ICD-10-CM

## 2020-05-04 DIAGNOSIS — Z95.1 S/P CABG X 3: Primary | ICD-10-CM

## 2020-05-04 DIAGNOSIS — Z09 FOLLOW UP: ICD-10-CM

## 2020-05-04 PROCEDURE — 99024 POSTOP FOLLOW-UP VISIT: CPT | Performed by: NURSE PRACTITIONER

## 2020-05-04 RX ORDER — CLONIDINE HYDROCHLORIDE 0.1 MG/1
0.1 TABLET ORAL 2 TIMES DAILY
COMMUNITY
Start: 2020-04-20 | End: 2020-06-30 | Stop reason: SDUPTHER

## 2020-05-04 RX ORDER — DOXYCYCLINE 100 MG/1
100 TABLET ORAL DAILY
Qty: 5 TABLET | Refills: 0 | Status: SHIPPED | OUTPATIENT
Start: 2020-05-04 | End: 2020-07-21

## 2020-05-04 RX ORDER — LISINOPRIL 40 MG/1
40 TABLET ORAL DAILY
COMMUNITY
Start: 2020-04-23 | End: 2021-01-23 | Stop reason: HOSPADM

## 2020-05-04 RX ORDER — AMLODIPINE BESYLATE 5 MG/1
10 TABLET ORAL DAILY
Qty: 30 TABLET | Refills: 3 | Status: ON HOLD
Start: 2020-05-04 | End: 2020-11-10

## 2020-05-04 NOTE — PROGRESS NOTES
"CARDIOVASCULAR SURGERY FOLLOW-UP PROGRESS NOTE  Chief Complaint: Wound check        HPI:   Dear Dr. Adames, Phani Ennis MD and colleagues:    It was nice to see Rafael Mccann in follow up today after cardiac surgery.  As you know, she is a 45 y.o. female with CAD, aortic regurgitation, cardiomyopathy who underwent CABG and AVR at [facility] by Dr. Lane Stock on 12/27/2019. She did well postoperatively and continues to do well. She comes in today complaining of \"holding fluid.\" Breath sounds are clear and she exhibits no lower extremity edema. She has mild abdominal edema. I advised her to increase her Bumex to 1mg PO for 3 days. Her blood pressure was noted to be elevated at 148/101. I increased her Amlodipine dose to 10mg daily. She also complained of a right axilla pustule. Upon observation, there is a small nodule with a white pustule head. She states her PCP has been managing this for many years and has performed I&D's on previous boils. I prescribed her Doxycycline 100mg PO daily for 5 days and encouraged her to contact her PCP as soon as possible. She stated she would call him as soon as she returned home today. Additionally she is complaining of pain surrounding her PPM site. There is no redness, drainage, or fluctuance. I advised her to contact her cardiologist for further follow up. Her activity level has been fair.       Physical Exam:         BP (!) 148/101 (BP Location: Right arm, Patient Position: Sitting, Cuff Size: Adult)   Pulse 79   Temp 98.2 °F (36.8 °C) (Oral)   Resp 20   Ht 170.2 cm (67\")   Wt 94.9 kg (209 lb 3.2 oz)   SpO2 97%   BMI 32.77 kg/m²   Heart:  regular rate and rhythm, S1, S2 normal, no murmur, click, rub or gallop  Lungs:  clear to auscultation bilaterally  Extremities:  no edema  Incision(s):  mid chest healing well, no significant drainage, no dehiscence, no significant erythema    Assessment/Plan:     S/P CABG and AVR. Overall, she is doing well.    No significant " post-op complications    Keep incisions clean and dry  OK to drive if not taking narcotic pain medicine  OK to begin cardiac rehab  Follow-up as scheduled with cardiology  Follow-up as scheduled with PCP    No restrictions of activity.      Thank you for allowing me to participate in the care of your patient.    Regards,  TORRES LEE, APRN

## 2020-05-14 ENCOUNTER — TELEPHONE (OUTPATIENT)
Dept: CARDIAC REHAB | Facility: HOSPITAL | Age: 45
End: 2020-05-14

## 2020-05-14 ENCOUNTER — TELEPHONE (OUTPATIENT)
Dept: CARDIOLOGY | Facility: CLINIC | Age: 45
End: 2020-05-14

## 2020-05-14 NOTE — TELEPHONE ENCOUNTER
Called patient to troubleshoot why home monitor is not sending messages. Patient's VM was not set up so a message could not be left.

## 2020-05-14 NOTE — TELEPHONE ENCOUNTER
Pt to keep bp diary and notify her provider if it continues to remain elevated over next week or so. States just bought a new bp machine and asked to check it twice daily for next several weeks. TH

## 2020-05-16 ENCOUNTER — HOSPITAL ENCOUNTER (OUTPATIENT)
Facility: HOSPITAL | Age: 45
Setting detail: OBSERVATION
Discharge: HOME | End: 2020-05-18
Attending: INTERNAL MEDICINE | Admitting: INTERNAL MEDICINE

## 2020-05-16 ENCOUNTER — APPOINTMENT (OUTPATIENT)
Dept: NUCLEAR MEDICINE | Facility: HOSPITAL | Age: 45
End: 2020-05-16

## 2020-05-16 ENCOUNTER — APPOINTMENT (OUTPATIENT)
Dept: GENERAL RADIOLOGY | Facility: HOSPITAL | Age: 45
End: 2020-05-16

## 2020-05-16 DIAGNOSIS — E87.6 HYPOKALEMIA: ICD-10-CM

## 2020-05-16 DIAGNOSIS — I50.9 ACUTE ON CHRONIC CONGESTIVE HEART FAILURE, UNSPECIFIED HEART FAILURE TYPE (HCC): ICD-10-CM

## 2020-05-16 DIAGNOSIS — Z20.828 EXPOSURE TO SARS-ASSOCIATED CORONAVIRUS: ICD-10-CM

## 2020-05-16 DIAGNOSIS — R05.9 COUGH: ICD-10-CM

## 2020-05-16 DIAGNOSIS — R06.00 DYSPNEA, UNSPECIFIED TYPE: Primary | ICD-10-CM

## 2020-05-16 DIAGNOSIS — N18.9 CHRONIC RENAL IMPAIRMENT, UNSPECIFIED CKD STAGE: ICD-10-CM

## 2020-05-16 DIAGNOSIS — D72.829 LEUKOCYTOSIS, UNSPECIFIED TYPE: ICD-10-CM

## 2020-05-16 PROBLEM — N18.2 CKD (CHRONIC KIDNEY DISEASE) STAGE 2, GFR 60-89 ML/MIN: Chronic | Status: ACTIVE | Noted: 2020-05-16

## 2020-05-16 PROBLEM — I50.22 SYSTOLIC CHF, CHRONIC: Chronic | Status: ACTIVE | Noted: 2020-05-16

## 2020-05-16 LAB
ALBUMIN SERPL-MCNC: 4.7 G/DL (ref 3.5–5.2)
ALBUMIN/GLOB SERPL: 1.3 G/DL
ALP SERPL-CCNC: 94 U/L (ref 39–117)
ALT SERPL W P-5'-P-CCNC: 22 U/L (ref 1–33)
ANION GAP SERPL CALCULATED.3IONS-SCNC: 16 MMOL/L (ref 5–15)
ANION GAP SERPL CALCULATED.3IONS-SCNC: 17 MMOL/L (ref 5–15)
APTT PPP: 29.3 SECONDS (ref 24–31)
AST SERPL-CCNC: 34 U/L (ref 1–32)
BASOPHILS # BLD AUTO: 0 10*3/MM3 (ref 0–0.2)
BASOPHILS # BLD AUTO: 0.1 10*3/MM3 (ref 0–0.2)
BASOPHILS NFR BLD AUTO: 0.5 % (ref 0–1.5)
BASOPHILS NFR BLD AUTO: 0.5 % (ref 0–1.5)
BILIRUB SERPL-MCNC: 0.6 MG/DL (ref 0.2–1.2)
BUN BLD-MCNC: 10 MG/DL (ref 6–20)
BUN BLD-MCNC: 9 MG/DL (ref 6–20)
BUN/CREAT SERPL: 7.1 (ref 7–25)
BUN/CREAT SERPL: 7.9 (ref 7–25)
CALCIUM SPEC-SCNC: 9.2 MG/DL (ref 8.6–10.5)
CALCIUM SPEC-SCNC: 9.3 MG/DL (ref 8.6–10.5)
CHLORIDE SERPL-SCNC: 101 MMOL/L (ref 98–107)
CHLORIDE SERPL-SCNC: 97 MMOL/L (ref 98–107)
CK SERPL-CCNC: 829 U/L (ref 20–180)
CO2 SERPL-SCNC: 24 MMOL/L (ref 22–29)
CO2 SERPL-SCNC: 25 MMOL/L (ref 22–29)
CREAT BLD-MCNC: 1.27 MG/DL (ref 0.57–1)
CREAT BLD-MCNC: 1.27 MG/DL (ref 0.57–1)
CRP SERPL-MCNC: 2.31 MG/DL (ref 0–0.5)
D DIMER PPP FEU-MCNC: 0.69 MCGFEU/ML (ref 0.17–0.59)
DEPRECATED RDW RBC AUTO: 48.6 FL (ref 37–54)
DEPRECATED RDW RBC AUTO: 51.2 FL (ref 37–54)
EOSINOPHIL # BLD AUTO: 0.1 10*3/MM3 (ref 0–0.4)
EOSINOPHIL # BLD AUTO: 0.1 10*3/MM3 (ref 0–0.4)
EOSINOPHIL NFR BLD AUTO: 0.6 % (ref 0.3–6.2)
EOSINOPHIL NFR BLD AUTO: 0.7 % (ref 0.3–6.2)
ERYTHROCYTE [DISTWIDTH] IN BLOOD BY AUTOMATED COUNT: 16.1 % (ref 12.3–15.4)
ERYTHROCYTE [DISTWIDTH] IN BLOOD BY AUTOMATED COUNT: 16.2 % (ref 12.3–15.4)
FERRITIN SERPL-MCNC: 99.26 NG/ML (ref 13–150)
GFR SERPL CREATININE-BSD FRML MDRD: 55 ML/MIN/1.73
GFR SERPL CREATININE-BSD FRML MDRD: 55 ML/MIN/1.73
GLOBULIN UR ELPH-MCNC: 3.7 GM/DL
GLUCOSE BLD-MCNC: 128 MG/DL (ref 65–99)
GLUCOSE BLD-MCNC: 98 MG/DL (ref 65–99)
GLUCOSE BLDC GLUCOMTR-MCNC: 105 MG/DL (ref 70–105)
GLUCOSE BLDC GLUCOMTR-MCNC: 147 MG/DL (ref 70–105)
GLUCOSE BLDC GLUCOMTR-MCNC: 171 MG/DL (ref 70–105)
HCT VFR BLD AUTO: 43.8 % (ref 34–46.6)
HCT VFR BLD AUTO: 45.2 % (ref 34–46.6)
HGB BLD-MCNC: 15.8 G/DL (ref 12–15.9)
HGB BLD-MCNC: 15.9 G/DL (ref 12–15.9)
HOLD SPECIMEN: NORMAL
INR PPP: 0.98 (ref 0.9–1.1)
LDH SERPL-CCNC: 353 U/L (ref 135–214)
LYMPHOCYTES # BLD AUTO: 1.5 10*3/MM3 (ref 0.7–3.1)
LYMPHOCYTES # BLD AUTO: 1.7 10*3/MM3 (ref 0.7–3.1)
LYMPHOCYTES NFR BLD AUTO: 14.3 % (ref 19.6–45.3)
LYMPHOCYTES NFR BLD AUTO: 15.4 % (ref 19.6–45.3)
MAGNESIUM SERPL-MCNC: 2 MG/DL (ref 1.6–2.6)
MCH RBC QN AUTO: 31.4 PG (ref 26.6–33)
MCH RBC QN AUTO: 32.3 PG (ref 26.6–33)
MCHC RBC AUTO-ENTMCNC: 35.1 G/DL (ref 31.5–35.7)
MCHC RBC AUTO-ENTMCNC: 36.1 G/DL (ref 31.5–35.7)
MCV RBC AUTO: 89.3 FL (ref 79–97)
MCV RBC AUTO: 89.4 FL (ref 79–97)
MONOCYTES # BLD AUTO: 0.6 10*3/MM3 (ref 0.1–0.9)
MONOCYTES # BLD AUTO: 0.7 10*3/MM3 (ref 0.1–0.9)
MONOCYTES NFR BLD AUTO: 5.1 % (ref 5–12)
MONOCYTES NFR BLD AUTO: 6.1 % (ref 5–12)
NEUTROPHILS # BLD AUTO: 8.4 10*3/MM3 (ref 1.7–7)
NEUTROPHILS # BLD AUTO: 8.8 10*3/MM3 (ref 1.7–7)
NEUTROPHILS NFR BLD AUTO: 78.4 % (ref 42.7–76)
NEUTROPHILS NFR BLD AUTO: 78.4 % (ref 42.7–76)
NRBC BLD AUTO-RTO: 0.1 /100 WBC (ref 0–0.2)
NRBC BLD AUTO-RTO: 0.1 /100 WBC (ref 0–0.2)
NT-PROBNP SERPL-MCNC: 3167 PG/ML (ref 5–450)
PLATELET # BLD AUTO: 183 10*3/MM3 (ref 140–450)
PLATELET # BLD AUTO: 189 10*3/MM3 (ref 140–450)
PMV BLD AUTO: 8.2 FL (ref 6–12)
PMV BLD AUTO: 8.7 FL (ref 6–12)
POTASSIUM BLD-SCNC: 3.1 MMOL/L (ref 3.5–5.2)
POTASSIUM BLD-SCNC: 3.4 MMOL/L (ref 3.5–5.2)
PROCALCITONIN SERPL-MCNC: 0.03 NG/ML (ref 0.1–0.25)
PROT SERPL-MCNC: 8.4 G/DL (ref 6–8.5)
PROTHROMBIN TIME: 10.3 SECONDS (ref 9.6–11.7)
RBC # BLD AUTO: 4.9 10*6/MM3 (ref 3.77–5.28)
RBC # BLD AUTO: 5.06 10*6/MM3 (ref 3.77–5.28)
SARS-COV-2 RNA RESP QL NAA+PROBE: NOT DETECTED
SODIUM BLD-SCNC: 139 MMOL/L (ref 136–145)
SODIUM BLD-SCNC: 141 MMOL/L (ref 136–145)
TROPONIN T SERPL-MCNC: <0.01 NG/ML (ref 0–0.03)
WBC NRBC COR # BLD: 10.8 10*3/MM3 (ref 3.4–10.8)
WBC NRBC COR # BLD: 11.2 10*3/MM3 (ref 3.4–10.8)

## 2020-05-16 PROCEDURE — 99220 PR INITIAL OBSERVATION CARE/DAY 70 MINUTES: CPT | Performed by: INTERNAL MEDICINE

## 2020-05-16 PROCEDURE — 83880 ASSAY OF NATRIURETIC PEPTIDE: CPT | Performed by: NURSE PRACTITIONER

## 2020-05-16 PROCEDURE — 94799 UNLISTED PULMONARY SVC/PX: CPT

## 2020-05-16 PROCEDURE — 87635 SARS-COV-2 COVID-19 AMP PRB: CPT | Performed by: NURSE PRACTITIONER

## 2020-05-16 PROCEDURE — 96372 THER/PROPH/DIAG INJ SC/IM: CPT

## 2020-05-16 PROCEDURE — 96375 TX/PRO/DX INJ NEW DRUG ADDON: CPT

## 2020-05-16 PROCEDURE — 25010000002 FUROSEMIDE PER 20 MG: Performed by: INTERNAL MEDICINE

## 2020-05-16 PROCEDURE — A9540 TC99M MAA: HCPCS | Performed by: INTERNAL MEDICINE

## 2020-05-16 PROCEDURE — 82962 GLUCOSE BLOOD TEST: CPT

## 2020-05-16 PROCEDURE — 84145 PROCALCITONIN (PCT): CPT | Performed by: NURSE PRACTITIONER

## 2020-05-16 PROCEDURE — 85379 FIBRIN DEGRADATION QUANT: CPT | Performed by: NURSE PRACTITIONER

## 2020-05-16 PROCEDURE — 25010000003 POTASSIUM CHLORIDE 10 MEQ/100ML SOLUTION: Performed by: INTERNAL MEDICINE

## 2020-05-16 PROCEDURE — G0378 HOSPITAL OBSERVATION PER HR: HCPCS

## 2020-05-16 PROCEDURE — 86140 C-REACTIVE PROTEIN: CPT | Performed by: NURSE PRACTITIONER

## 2020-05-16 PROCEDURE — 25010000002 ENOXAPARIN PER 10 MG: Performed by: INTERNAL MEDICINE

## 2020-05-16 PROCEDURE — 94664 DEMO&/EVAL PT USE INHALER: CPT

## 2020-05-16 PROCEDURE — 94640 AIRWAY INHALATION TREATMENT: CPT

## 2020-05-16 PROCEDURE — 99284 EMERGENCY DEPT VISIT MOD MDM: CPT

## 2020-05-16 PROCEDURE — 80053 COMPREHEN METABOLIC PANEL: CPT | Performed by: NURSE PRACTITIONER

## 2020-05-16 PROCEDURE — 0 TECHNETIUM ALBUMIN AGGREGATED: Performed by: INTERNAL MEDICINE

## 2020-05-16 PROCEDURE — 78582 LUNG VENTILAT&PERFUS IMAGING: CPT

## 2020-05-16 PROCEDURE — 96365 THER/PROPH/DIAG IV INF INIT: CPT

## 2020-05-16 PROCEDURE — 80048 BASIC METABOLIC PNL TOTAL CA: CPT | Performed by: NURSE PRACTITIONER

## 2020-05-16 PROCEDURE — 0 TECHNETIUM TC99M PYROPHOSPHATE: Performed by: INTERNAL MEDICINE

## 2020-05-16 PROCEDURE — 63710000001 INSULIN LISPRO (HUMAN) PER 5 UNITS: Performed by: NURSE PRACTITIONER

## 2020-05-16 PROCEDURE — 83615 LACTATE (LD) (LDH) ENZYME: CPT | Performed by: NURSE PRACTITIONER

## 2020-05-16 PROCEDURE — 85610 PROTHROMBIN TIME: CPT | Performed by: NURSE PRACTITIONER

## 2020-05-16 PROCEDURE — A9538 TC99M PYROPHOSPHATE: HCPCS | Performed by: INTERNAL MEDICINE

## 2020-05-16 PROCEDURE — 25010000002 ONDANSETRON PER 1 MG: Performed by: NURSE PRACTITIONER

## 2020-05-16 PROCEDURE — 85025 COMPLETE CBC W/AUTO DIFF WBC: CPT | Performed by: NURSE PRACTITIONER

## 2020-05-16 PROCEDURE — 84484 ASSAY OF TROPONIN QUANT: CPT | Performed by: NURSE PRACTITIONER

## 2020-05-16 PROCEDURE — 82550 ASSAY OF CK (CPK): CPT | Performed by: NURSE PRACTITIONER

## 2020-05-16 PROCEDURE — 83735 ASSAY OF MAGNESIUM: CPT | Performed by: INTERNAL MEDICINE

## 2020-05-16 PROCEDURE — 93005 ELECTROCARDIOGRAM TRACING: CPT | Performed by: NURSE PRACTITIONER

## 2020-05-16 PROCEDURE — 82728 ASSAY OF FERRITIN: CPT | Performed by: NURSE PRACTITIONER

## 2020-05-16 PROCEDURE — 85730 THROMBOPLASTIN TIME PARTIAL: CPT | Performed by: NURSE PRACTITIONER

## 2020-05-16 PROCEDURE — 71045 X-RAY EXAM CHEST 1 VIEW: CPT

## 2020-05-16 RX ORDER — FERROUS SULFATE TAB EC 324 MG (65 MG FE EQUIVALENT) 324 (65 FE) MG
324 TABLET DELAYED RESPONSE ORAL
Status: DISCONTINUED | OUTPATIENT
Start: 2020-05-16 | End: 2020-05-18 | Stop reason: HOSPADM

## 2020-05-16 RX ORDER — ALBUTEROL SULFATE 90 UG/1
2 AEROSOL, METERED RESPIRATORY (INHALATION) EVERY 4 HOURS PRN
Status: DISCONTINUED | OUTPATIENT
Start: 2020-05-16 | End: 2020-05-18 | Stop reason: HOSPADM

## 2020-05-16 RX ORDER — HYDRALAZINE HYDROCHLORIDE 20 MG/ML
20 INJECTION INTRAMUSCULAR; INTRAVENOUS EVERY 6 HOURS PRN
Status: DISCONTINUED | OUTPATIENT
Start: 2020-05-16 | End: 2020-05-18 | Stop reason: HOSPADM

## 2020-05-16 RX ORDER — BISACODYL 5 MG/1
5 TABLET, DELAYED RELEASE ORAL DAILY PRN
Status: DISCONTINUED | OUTPATIENT
Start: 2020-05-16 | End: 2020-05-18 | Stop reason: HOSPADM

## 2020-05-16 RX ORDER — DEXTROSE MONOHYDRATE 25 G/50ML
25 INJECTION, SOLUTION INTRAVENOUS
Status: DISCONTINUED | OUTPATIENT
Start: 2020-05-16 | End: 2020-05-18 | Stop reason: HOSPADM

## 2020-05-16 RX ORDER — HYDRALAZINE HYDROCHLORIDE 50 MG/1
100 TABLET, FILM COATED ORAL 3 TIMES DAILY
COMMUNITY
End: 2021-01-23 | Stop reason: HOSPADM

## 2020-05-16 RX ORDER — POTASSIUM CHLORIDE 20 MEQ/1
40 TABLET, EXTENDED RELEASE ORAL ONCE
Status: COMPLETED | OUTPATIENT
Start: 2020-05-16 | End: 2020-05-16

## 2020-05-16 RX ORDER — BUMETANIDE 1 MG/1
0.5 TABLET ORAL DAILY
Status: DISCONTINUED | OUTPATIENT
Start: 2020-05-16 | End: 2020-05-18 | Stop reason: HOSPADM

## 2020-05-16 RX ORDER — CLONIDINE HYDROCHLORIDE 0.1 MG/1
0.1 TABLET ORAL 2 TIMES DAILY
Status: DISCONTINUED | OUTPATIENT
Start: 2020-05-16 | End: 2020-05-18 | Stop reason: HOSPADM

## 2020-05-16 RX ORDER — DOCUSATE SODIUM 100 MG/1
100 CAPSULE, LIQUID FILLED ORAL 2 TIMES DAILY PRN
Status: DISCONTINUED | OUTPATIENT
Start: 2020-05-16 | End: 2020-05-18 | Stop reason: HOSPADM

## 2020-05-16 RX ORDER — ACETAMINOPHEN 160 MG/5ML
650 SOLUTION ORAL EVERY 4 HOURS PRN
Status: DISCONTINUED | OUTPATIENT
Start: 2020-05-16 | End: 2020-05-18 | Stop reason: HOSPADM

## 2020-05-16 RX ORDER — ONDANSETRON 2 MG/ML
4 INJECTION INTRAMUSCULAR; INTRAVENOUS EVERY 6 HOURS PRN
Status: DISCONTINUED | OUTPATIENT
Start: 2020-05-16 | End: 2020-05-18 | Stop reason: HOSPADM

## 2020-05-16 RX ORDER — CHOLECALCIFEROL (VITAMIN D3) 125 MCG
5 CAPSULE ORAL NIGHTLY PRN
Status: DISCONTINUED | OUTPATIENT
Start: 2020-05-16 | End: 2020-05-18 | Stop reason: HOSPADM

## 2020-05-16 RX ORDER — POTASSIUM CHLORIDE 7.45 MG/ML
10 INJECTION INTRAVENOUS
Status: DISCONTINUED | OUTPATIENT
Start: 2020-05-16 | End: 2020-05-16

## 2020-05-16 RX ORDER — AMLODIPINE BESYLATE 5 MG/1
10 TABLET ORAL DAILY
Status: DISCONTINUED | OUTPATIENT
Start: 2020-05-16 | End: 2020-05-18 | Stop reason: HOSPADM

## 2020-05-16 RX ORDER — CARVEDILOL 25 MG/1
25 TABLET ORAL 2 TIMES DAILY WITH MEALS
Status: DISCONTINUED | OUTPATIENT
Start: 2020-05-16 | End: 2020-05-18 | Stop reason: HOSPADM

## 2020-05-16 RX ORDER — ACETAMINOPHEN 650 MG/1
650 SUPPOSITORY RECTAL EVERY 4 HOURS PRN
Status: DISCONTINUED | OUTPATIENT
Start: 2020-05-16 | End: 2020-05-18 | Stop reason: HOSPADM

## 2020-05-16 RX ORDER — MONTELUKAST SODIUM 10 MG/1
10 TABLET ORAL NIGHTLY
Status: DISCONTINUED | OUTPATIENT
Start: 2020-05-16 | End: 2020-05-18 | Stop reason: HOSPADM

## 2020-05-16 RX ORDER — BUMETANIDE 0.25 MG/ML
2 INJECTION INTRAMUSCULAR; INTRAVENOUS ONCE
Status: COMPLETED | OUTPATIENT
Start: 2020-05-16 | End: 2020-05-16

## 2020-05-16 RX ORDER — FOLIC ACID 1 MG/1
1 TABLET ORAL DAILY
Status: DISCONTINUED | OUTPATIENT
Start: 2020-05-16 | End: 2020-05-18 | Stop reason: HOSPADM

## 2020-05-16 RX ORDER — ASPIRIN 325 MG
325 TABLET ORAL DAILY
Status: DISCONTINUED | OUTPATIENT
Start: 2020-05-16 | End: 2020-05-18 | Stop reason: HOSPADM

## 2020-05-16 RX ORDER — ALBUTEROL SULFATE 90 UG/1
2 AEROSOL, METERED RESPIRATORY (INHALATION) ONCE
Status: COMPLETED | OUTPATIENT
Start: 2020-05-16 | End: 2020-05-16

## 2020-05-16 RX ORDER — PANTOPRAZOLE SODIUM 40 MG/1
40 TABLET, DELAYED RELEASE ORAL DAILY
Status: DISCONTINUED | OUTPATIENT
Start: 2020-05-16 | End: 2020-05-18 | Stop reason: HOSPADM

## 2020-05-16 RX ORDER — NICOTINE POLACRILEX 4 MG
15 LOZENGE BUCCAL
Status: DISCONTINUED | OUTPATIENT
Start: 2020-05-16 | End: 2020-05-18 | Stop reason: HOSPADM

## 2020-05-16 RX ORDER — BENZONATATE 100 MG/1
100 CAPSULE ORAL 3 TIMES DAILY PRN
Status: DISCONTINUED | OUTPATIENT
Start: 2020-05-16 | End: 2020-05-18 | Stop reason: HOSPADM

## 2020-05-16 RX ORDER — ACETAMINOPHEN 325 MG/1
650 TABLET ORAL EVERY 4 HOURS PRN
Status: DISCONTINUED | OUTPATIENT
Start: 2020-05-16 | End: 2020-05-18 | Stop reason: HOSPADM

## 2020-05-16 RX ORDER — MELATONIN
1000 DAILY
Status: DISCONTINUED | OUTPATIENT
Start: 2020-05-16 | End: 2020-05-18 | Stop reason: HOSPADM

## 2020-05-16 RX ORDER — ALBUTEROL SULFATE 2.5 MG/3ML
2.5 SOLUTION RESPIRATORY (INHALATION)
Status: DISCONTINUED | OUTPATIENT
Start: 2020-05-16 | End: 2020-05-18 | Stop reason: HOSPADM

## 2020-05-16 RX ORDER — LEVOTHYROXINE SODIUM 0.2 MG/1
200 TABLET ORAL DAILY
Status: DISCONTINUED | OUTPATIENT
Start: 2020-05-16 | End: 2020-05-18 | Stop reason: HOSPADM

## 2020-05-16 RX ORDER — ONDANSETRON 4 MG/1
4 TABLET, FILM COATED ORAL EVERY 6 HOURS PRN
Status: DISCONTINUED | OUTPATIENT
Start: 2020-05-16 | End: 2020-05-18 | Stop reason: HOSPADM

## 2020-05-16 RX ORDER — ALUMINA, MAGNESIA, AND SIMETHICONE 2400; 2400; 240 MG/30ML; MG/30ML; MG/30ML
15 SUSPENSION ORAL EVERY 6 HOURS PRN
Status: DISCONTINUED | OUTPATIENT
Start: 2020-05-16 | End: 2020-05-18 | Stop reason: HOSPADM

## 2020-05-16 RX ORDER — FUROSEMIDE 10 MG/ML
40 INJECTION INTRAMUSCULAR; INTRAVENOUS ONCE
Status: COMPLETED | OUTPATIENT
Start: 2020-05-16 | End: 2020-05-16

## 2020-05-16 RX ADMIN — ONDANSETRON 4 MG: 2 INJECTION INTRAMUSCULAR; INTRAVENOUS at 17:20

## 2020-05-16 RX ADMIN — ACETAMINOPHEN 650 MG: 325 TABLET, FILM COATED ORAL at 06:25

## 2020-05-16 RX ADMIN — FOLIC ACID 1 MG: 1 TABLET ORAL at 09:50

## 2020-05-16 RX ADMIN — ALBUTEROL SULFATE 2 PUFF: 90 AEROSOL, METERED RESPIRATORY (INHALATION) at 12:18

## 2020-05-16 RX ADMIN — CARVEDILOL 25 MG: 6.25 TABLET, FILM COATED ORAL at 09:50

## 2020-05-16 RX ADMIN — LEVOTHYROXINE SODIUM 200 MCG: 200 TABLET ORAL at 09:50

## 2020-05-16 RX ADMIN — BENZONATATE 100 MG: 100 CAPSULE ORAL at 11:08

## 2020-05-16 RX ADMIN — FERROUS SULFATE TAB EC 324 MG (65 MG FE EQUIVALENT) 324 MG: 324 (65 FE) TABLET DELAYED RESPONSE at 09:50

## 2020-05-16 RX ADMIN — BENZONATATE 100 MG: 100 CAPSULE ORAL at 04:04

## 2020-05-16 RX ADMIN — POTASSIUM CHLORIDE 10 MEQ: 7.46 INJECTION, SOLUTION INTRAVENOUS at 11:07

## 2020-05-16 RX ADMIN — MONTELUKAST SODIUM 10 MG: 10 TABLET, COATED ORAL at 20:56

## 2020-05-16 RX ADMIN — BENZONATATE 100 MG: 100 CAPSULE ORAL at 20:49

## 2020-05-16 RX ADMIN — Medication 1 DOSE: at 16:45

## 2020-05-16 RX ADMIN — TECHNETIUM TC99M PYROPHOSPHATE 1 DOSE: 12 INJECTION INTRAVENOUS at 16:30

## 2020-05-16 RX ADMIN — MELATONIN 1000 UNITS: at 09:50

## 2020-05-16 RX ADMIN — ALBUTEROL SULFATE 2.5 MG: 2.5 SOLUTION RESPIRATORY (INHALATION) at 23:32

## 2020-05-16 RX ADMIN — POTASSIUM CHLORIDE 40 MEQ: 1500 TABLET, EXTENDED RELEASE ORAL at 14:06

## 2020-05-16 RX ADMIN — ENOXAPARIN SODIUM 100 MG: 100 INJECTION SUBCUTANEOUS at 09:49

## 2020-05-16 RX ADMIN — ALBUTEROL SULFATE 2 PUFF: 90 AEROSOL, METERED RESPIRATORY (INHALATION) at 01:28

## 2020-05-16 RX ADMIN — AMLODIPINE BESYLATE 10 MG: 5 TABLET ORAL at 09:50

## 2020-05-16 RX ADMIN — BUMETANIDE 2 MG: 0.25 INJECTION INTRAMUSCULAR; INTRAVENOUS at 02:28

## 2020-05-16 RX ADMIN — CLONIDINE HYDROCHLORIDE 0.1 MG: 0.1 TABLET ORAL at 09:49

## 2020-05-16 RX ADMIN — ENOXAPARIN SODIUM 100 MG: 100 INJECTION SUBCUTANEOUS at 20:49

## 2020-05-16 RX ADMIN — INSULIN LISPRO 2 UNITS: 100 INJECTION, SOLUTION INTRAVENOUS; SUBCUTANEOUS at 13:01

## 2020-05-16 RX ADMIN — MONTELUKAST SODIUM 10 MG: 10 TABLET, COATED ORAL at 04:04

## 2020-05-16 RX ADMIN — FUROSEMIDE 40 MG: 10 INJECTION, SOLUTION INTRAMUSCULAR; INTRAVENOUS at 09:54

## 2020-05-16 RX ADMIN — ASPIRIN 325 MG ORAL TABLET 325 MG: 325 PILL ORAL at 09:50

## 2020-05-16 RX ADMIN — NITROGLYCERIN 1 INCH: 20 OINTMENT TOPICAL at 02:29

## 2020-05-16 RX ADMIN — BUMETANIDE 0.5 MG: 1 TABLET ORAL at 09:50

## 2020-05-16 RX ADMIN — PANTOPRAZOLE SODIUM 40 MG: 40 TABLET, DELAYED RELEASE ORAL at 09:50

## 2020-05-16 RX ADMIN — POTASSIUM CHLORIDE 10 MEQ: 7.46 INJECTION, SOLUTION INTRAVENOUS at 13:36

## 2020-05-16 RX ADMIN — CLONIDINE HYDROCHLORIDE 0.1 MG: 0.1 TABLET ORAL at 20:51

## 2020-05-16 RX ADMIN — POTASSIUM CHLORIDE 40 MEQ: 1500 TABLET, EXTENDED RELEASE ORAL at 02:27

## 2020-05-16 RX ADMIN — CARVEDILOL 25 MG: 6.25 TABLET, FILM COATED ORAL at 18:40

## 2020-05-16 NOTE — H&P
[facility] Medicine Services      Patient Name: Rafael Mccann  : 1975  MRN: [id number]  Primary Care Physician: Phani Adames MD  Date of admission: 2020    Patient Care Team:  Phani Adames MD as PCP - General (Internal Medicine)  Ashish Smalls MD as Consulting Physician (Nephrology)          Subjective   History Present Illness     Chief Complaint:   Chief Complaint   Patient presents with   • Cough       Ms. Demario Mccann is a 45 y.o. -American female with a history of hypertension, diabetes type 2, hypothyroidism, CAD, status post CABG with AVR (2019), congestive heart failure, presented to the [facility] ED on 2020 with a complaint of cough, congestion, shortness of breath.  Patient states her shortness of breath began about a week ago, worse with exertion and has had increased swelling in her abdomen.  She has also developed a cough with congestion within the last week.  Patient also states she has had some body aches.  Patient denies fever, chills, nausea, vomiting, diarrhea and ill contacts.  Patient states her CHF, the fluid collects in her abdomen and not her lower extremities.    In the ED, initial troponin negative, proBNP 3167.  , glucose 128, sodium 141, potassium 3.1, BUN 9, creatinine 1.27, AST 34.  Ferritin 99.26, C-reactive protein 2.31, procalcitonin 0.03.  WBC 11.2, Hgb 15.8, platelets 189.  EKG: Normal sinus rhythm.  Chest x-ray pending, COVID-19 test pending.  Patient received nitro glycerin ointment 1 inch to chest wall, 2 mg Bumex IV, 40 mEq potassium p.o. x1, albuterol inhaler 2 puffs.  Patient will be admitted for further evaluation and management.      Review of Systems   Constitution: Negative.   HENT: Negative.    Eyes: Negative.    Cardiovascular: Negative.    Respiratory: Positive for cough and shortness of breath.    Endocrine: Negative.    Hematologic/Lymphatic: Negative.     Skin: Negative.    Musculoskeletal: Negative.    Gastrointestinal: Negative.    Genitourinary: Negative.    Neurological: Negative.    Psychiatric/Behavioral: Negative.    Allergic/Immunologic: Negative.    All other systems reviewed and are negative.          Personal History     Past Medical History:   Past Medical History:   Diagnosis Date   • CHF (congestive heart failure) (CMS/Formerly [facility])    • COPD (chronic obstructive pulmonary disease) (CMS/Formerly [facility])    • Diabetes (CMS/Formerly [facility])    • Hyperlipidemia    • Hypertension        Surgical History:      Past Surgical History:   Procedure Laterality Date   • AORTIC VALVE REPAIR/REPLACEMENT N/A 12/27/2019    Procedure: AORTIC VALVE REPAIR/REPLACEMENT;  Surgeon: Lane Stock MD;  Location: [facility];  Service: Cardiothoracic   • BREAST LUMPECTOMY     • CARDIAC CATHETERIZATION N/A 12/24/2019    Procedure: Right and Left Heart Cath 12/24/19 @ 0900;  Surgeon: aWyne Luna MD;  Location: [facility] CATH INVASIVE LOCATION;  Service: Cardiovascular   • CARDIAC CATHETERIZATION N/A 12/24/2019    Procedure: Coronary angiography;  Surgeon: Wayne Luna MD;  Location: [facility] CATH INVASIVE LOCATION;  Service: Cardiovascular   • CARDIAC ELECTROPHYSIOLOGY PROCEDURE Left 6/28/2019    Procedure: Dual-chamber ICD insertion;  Surgeon: Héctor Eckert MD;  Location: [facility] CATH INVASIVE LOCATION;  Service: Cardiovascular   • CARDIAC ELECTROPHYSIOLOGY PROCEDURE Left 8/14/2019    Procedure: Lead Revision;  Surgeon: Héctor Eckert MD;  Location: [facility] CATH INVASIVE LOCATION;  Service: Cardiovascular   • CHOLECYSTECTOMY     • CORONARY ARTERY BYPASS GRAFT N/A 12/27/2019    Procedure: CORONARY ARTERY BYPASS GRAFTING;  Surgeon: Lane Stock MD;  Location: [facility];  Service: Cardiothoracic   • HYSTERECTOMY     • INSERT / REPLACE / REMOVE PACEMAKER     • THYROID SURGERY             Family History: family history includes Heart disease in her father. Otherwise  pertinent FHx was reviewed and unremarkable.     Social History:  reports that she quit smoking about 16 months ago. She has never used smokeless tobacco. She reports that she does not drink alcohol or use drugs.      Medications:  Prior to Admission medications    Medication Sig Start Date End Date Taking? Authorizing Provider   allopurinol (ZYLOPRIM) 100 MG tablet Take 2 tablets by mouth Daily. 1/9/20  Yes Kayla Burrell APRN   ALPRAZolam (XANAX) 1 MG tablet Take 1 mg by mouth 4 (Four) Times a Day As Needed for Anxiety.   Yes Dheeraj Alvarez MD   amLODIPine (NORVASC) 5 MG tablet Take 2 tablets by mouth Daily. 5/4/20  Yes Kayla Burrell APRN   aspirin 325 MG tablet Take 1 tablet by mouth Daily. 1/8/20  Yes Kayla Burrell APRN   bumetanide (BUMEX) 0.5 MG tablet Take 1 tablet by mouth Daily.  Patient taking differently: Take 1 mg by mouth Daily. 1/9/20  Yes Kayla Burrell APRN   carvedilol (COREG) 25 MG tablet Take 1 tablet by mouth 2 (Two) Times a Day With Meals. 3/20/20  Yes Sara Park APRN   cholecalciferol (VITAMIN D3) 1000 units tablet Take 1,000 Units by mouth Daily.   Yes Dheeraj Alvarez MD   cloNIDine (CATAPRES) 0.1 MG tablet Take 0.1 mg by mouth 2 (Two) Times a Day. 4/20/20  Yes Dheeraj Alvarez MD   Cyanocobalamin (VITAMIN B 12 PO) Take  by mouth.   Yes Dheeraj Alvarez MD   docusate sodium 100 MG capsule Take 100 mg by mouth 2 (Two) Times a Day.  Patient taking differently: Take 100 mg by mouth 2 (Two) Times a Day As Needed. 1/8/20  Yes Kayla Burrell APRN   doxycycline (ADOXA) 100 MG tablet Take 1 tablet by mouth Daily. 5/4/20  Yes Kayla Burrell APRN   ferrous sulfate 325 (65 FE) MG tablet Take 65 mg by mouth Daily With Breakfast.   Yes Dheeraj Alvarez MD   folic acid (FOLVITE) 1 MG tablet Take 1 mg by mouth Daily.   Yes Dheeraj Alvarez MD   gabapentin (NEURONTIN) 300 MG capsule Take 300 mg by mouth Daily As Needed.   Yes Provider, MD Dheeraj    hydrALAZINE (APRESOLINE) 50 MG tablet Take 100 mg by mouth 3 (Three) Times a Day.   Yes Dheeraj Alvarez MD   levothyroxine (SYNTHROID, LEVOTHROID) 200 MCG tablet Take 200 mcg by mouth Daily.   Yes Dheeraj Alvarez MD   lisinopril (PRINIVIL,ZESTRIL) 40 MG tablet Take 40 mg by mouth Daily. 4/23/20  Yes Dheeraj Alvarez MD   metFORMIN (GLUCOPHAGE) 500 MG tablet Take 500 mg by mouth Daily With Breakfast.   Yes Dheeraj Alvarez MD   montelukast (SINGULAIR) 10 MG tablet Take 10 mg by mouth Every Night.   Yes Dheeraj Alvarez MD   oxyCODONE-acetaminophen (PERCOCET) 7.5-325 MG per tablet Take 1 tablet by mouth Every 6 (Six) Hours As Needed for Severe Pain . May resume after postop oxycodone prescription is completed. 1/8/20  Yes Kayla Burrell APRN   pantoprazole (PROTONIX) 40 MG EC tablet Take 40 mg by mouth Daily.   Yes Dheeraj Alvarez MD   atorvastatin (LIPITOR) 40 MG tablet Take 1 tablet by mouth Every Night. 6/30/19 5/16/20  Hannah Wills MD   levoFLOXacin (LEVAQUIN) 500 MG tablet Take 1 tablet by mouth Daily. 2/24/20 5/16/20  Lane Stock MD   traMADol (ULTRAM) 50 MG tablet Take 0.5 tablets by mouth Every 6 (Six) Hours As Needed for Moderate Pain . 1/8/20 5/16/20  Kayla Burrell APRN       Allergies:    Allergies   Allergen Reactions   • Hydrocodone Hives   • Penicillin G Unknown (See Comments)       Objective   Objective     Vital Signs  Temp:  [98.2 °F (36.8 °C)] 98.2 °F (36.8 °C)  Heart Rate:  [] 89  Resp:  [18] 18  BP: (156-171)/(114-131) 156/114  SpO2:  [95 %-96 %] 96 %  on   ;   Device (Oxygen Therapy): room air  Body mass index is 33.04 kg/m².    Physical Exam   Constitutional: She is oriented to person, place, and time. She appears well-developed and well-nourished. No distress.   Eyes: Conjunctivae are normal.   Neck: Normal range of motion.   Cardiovascular: Normal rate, regular rhythm, normal heart sounds and intact distal pulses.   Pulmonary/Chest: Effort  normal. She has rales.   Abdominal: Bowel sounds are normal. She exhibits distension.   Musculoskeletal: Normal range of motion. She exhibits no edema.   Neurological: She is alert and oriented to person, place, and time.   Skin: Skin is warm and dry. Capillary refill takes less than 2 seconds.   Vitals reviewed.      Results Review:  I have personally reviewed most recent cardiac tracings, lab results, microbiology results and radiology images and interpretations and agree with findings.    Results from last 7 days   Lab Units 05/16/20 0119   WBC 10*3/mm3 11.20*   HEMOGLOBIN g/dL 15.8   HEMATOCRIT % 43.8   PLATELETS 10*3/mm3 189   INR  0.98     Results from last 7 days   Lab Units 05/16/20 0119   SODIUM mmol/L 141   POTASSIUM mmol/L 3.1*   CHLORIDE mmol/L 101   CO2 mmol/L 24.0   BUN mg/dL 9   CREATININE mg/dL 1.27*   GLUCOSE mg/dL 128*   CALCIUM mg/dL 9.3   ALT (SGPT) U/L 22   AST (SGOT) U/L 34*   TROPONIN T ng/mL <0.010   PROBNP pg/mL 3,167.0*   PROCALCITONIN ng/mL 0.03*     Estimated Creatinine Clearance: 65 mL/min (A) (by C-G formula based on SCr of 1.27 mg/dL (H)).  Brief Urine Lab Results  (Last result in the past 365 days)      Color   Clarity   Blood   Leuk Est   Nitrite   Protein   CREAT   Urine HCG        01/07/20 2311 Yellow Clear Small (1+) Negative Negative Negative               Microbiology Results (last 10 days)     ** No results found for the last 240 hours. **          ECG/EMG Results (most recent)     Procedure Component Value Units Date/Time    ECG 12 Lead [[id number]] Collected:  05/16/20 0109     Updated:  05/16/20 0111    Narrative:       HEART RATE= 97  bpm  RR Interval= 620  ms  OK Interval= 155  ms  P Horizontal Axis= -22  deg  P Front Axis= 65  deg  QRSD Interval= 97  ms  QT Interval= 380  ms  QRS Axis= -38  deg  T Wave Axis= 98  deg  - ABNORMAL ECG -  Sinus rhythm  Biatrial enlargement  Left ventricular hypertrophy  Nonspecific T abnormalities, lateral leads  Electronically Signed By:    Date and Time of Study: 2020-05-16 01:09:08    ECG 12 Lead [[id number]] Resulted:  05/16/20 0216     Updated:  05/16/20 0216          Results for orders placed during the hospital encounter of 12/22/19   Duplex Venous Lower Extremity - Bilateral CAR    Narrative · Normal bilateral lower extremity venous duplex scan.          Results for orders placed during the hospital encounter of 12/22/19   Adult Transthoracic Echo Limited W/ Cont if Necessary Per Protocol    Narrative · Left ventricular wall thickness is consistent with mild concentric   hypertrophy.  · Estimated EF = 40%.  · Right ventricular cavity is mildly dilated.  · Mildly reduced right ventricular systolic function noted.          No radiology results for the last 7 days      Estimated Creatinine Clearance: 65 mL/min (A) (by C-G formula based on SCr of 1.27 mg/dL (H)).    Assessment/Plan   Assessment/Plan       Active Hospital Problems    Diagnosis  POA   • **Systolic CHF, chronic (CMS/HCC) [I50.22]  Yes     Priority: High   • Chest pain [R07.9]  Yes     Priority: High   • GERD without esophagitis [K21.9]  Yes     Priority: Medium   • Hypothyroidism (acquired) [E03.9]  Yes     Priority: Medium   • ICD (implantable cardioverter-defibrillator), dual, in situ [Z95.810]  Yes     Priority: Medium   • COPD (chronic obstructive pulmonary disease) (CMS/HCC) [J44.9]  Yes     Priority: Medium   • Hypertension [I10]  Yes     Priority: Medium   • Essential hypertension [I10]  Yes     Priority: Medium   • Dyspnea [R06.00]  Yes   • CKD (chronic kidney disease) stage 2, GFR 60-89 ml/min [N18.2]  Yes   • S/P AVR (aortic valve replacement) [Z95.2]  Not Applicable   • S/P CABG x 3 [Z95.1]  Not Applicable      Resolved Hospital Problems   No resolved problems to display.     Acute CHF  -BNP 3167  -2 mg Bumex IV x1  -Continue p.o. Bumex, Coreg  -AICD in situ  -Abdomen with fluid    COVID-19 rule out  -In enhanced droplet/contact isolation  -Monitor oxygen  saturations  -Oxygen PRN to keep sats greater than 90%  -  -Ferritin 99.26  -C-reactive protein 2.31  -Procalcitonin 0.03  -Check d-dimer, CK  -Albuterol inhaler as needed    CKD stage II  -Creatinine 1.27  -Baseline 1.0-1.1  -Monitor creatinine  -BMP    Diabetes type 2  -Hold Glucophage  -Accu-Cheks AC  -Sliding scale insulin subcu as needed    Hypertension  -Continue clonidine, Norvasc, Coreg    COPD  -Not in acute exacerbation  -Continue Singulair, albuterol inhaler as needed    CAD  -Continue aspirin    GERD  -Continue PPI    Hypothyroidism  -Continue levothyroxine    Recent CABG with AVR (12/2019)          VTE Prophylaxis -   Mechanical Order History:      Ordered                Pharmalogical Order History:     Lovenox          CODE STATUS:    Code Status and Medical Interventions:   Ordered at: 05/16/20 0247     Level Of Support Discussed With:    Patient     Code Status:    CPR     Medical Interventions (Level of Support Prior to Arrest):    Full       This patient has been examined wearing appropriate Personal Protective Equipment 05/16/20      I discussed the patient's findings and my recommendations with patient.        Electronically signed by ALEX Morrison, 05/16/20, 3:00 AM.  [facility] Hospitalist Team

## 2020-05-16 NOTE — PLAN OF CARE
Admitted to [facility]. Covid negative. Complains of dyspnea. Room air oxygen 96%. Awaiting VQ scan results.

## 2020-05-16 NOTE — ED PROVIDER NOTES
Subjective   Chief complaint: shortness of breath,cough      Context: Patient is a 45-year-old female who comes in by private vehicle with complaints of cough and shortness of breath for the last week.  States her blood pressures have been elevated.  She is not taking any over-the-counter cough or cold medication.  Denies any fever nausea vomiting diarrhea urinary complaints or swelling in her legs or feet.  She states she does get intermittent swelling in her abdomen which has already been addressed by her surgeon.  She was on antibiotics approximately month ago for an MRSA staph infection.    Duration: 1 week    Timing: Waxes and wanes    Severity: Mild    Associated symptoms: Denies          PCP:brunilda myers    Hysterectomy  LNMP:            Review of Systems   Constitutional: Negative for fever.   HENT: Positive for congestion.    Eyes: Negative.    Respiratory: Positive for cough and shortness of breath.    Cardiovascular: Negative.    Gastrointestinal: Negative.    Genitourinary: Negative.    Musculoskeletal: Positive for myalgias.   Skin: Negative.    Neurological: Negative.    Hematological: Does not bruise/bleed easily.       Past Medical History:   Diagnosis Date   • CHF (congestive heart failure) (CMS/[facility])    • COPD (chronic obstructive pulmonary disease) (CMS/[facility])    • Diabetes (CMS/[facility])    • Hyperlipidemia    • Hypertension        Allergies   Allergen Reactions   • Hydrocodone Hives   • Penicillin G Unknown (See Comments)       Past Surgical History:   Procedure Laterality Date   • AORTIC VALVE REPAIR/REPLACEMENT N/A 12/27/2019    Procedure: AORTIC VALVE REPAIR/REPLACEMENT;  Surgeon: Lane Stock MD;  Location: [facility];  Service: Cardiothoracic   • BREAST LUMPECTOMY     • CARDIAC CATHETERIZATION N/A 12/24/2019    Procedure: Right and Left Heart Cath 12/24/19 @ 0900;  Surgeon: Wayne Luna MD;  Location: [facility] INVASIVE LOCATION;  Service: Cardiovascular   • CARDIAC  CATHETERIZATION N/A 2019    Procedure: Coronary angiography;  Surgeon: Wayne Luna MD;  Location: [facility] CATH INVASIVE LOCATION;  Service: Cardiovascular   • CARDIAC ELECTROPHYSIOLOGY PROCEDURE Left 2019    Procedure: Dual-chamber ICD insertion;  Surgeon: Héctor Eckert MD;  Location: [facility] CATH INVASIVE LOCATION;  Service: Cardiovascular   • CARDIAC ELECTROPHYSIOLOGY PROCEDURE Left 2019    Procedure: Lead Revision;  Surgeon: Héctor Eckert MD;  Location: [facility] CATH INVASIVE LOCATION;  Service: Cardiovascular   • CHOLECYSTECTOMY     • CORONARY ARTERY BYPASS GRAFT N/A 2019    Procedure: CORONARY ARTERY BYPASS GRAFTING;  Surgeon: Lane Stock MD;  Location: [facility] CVOR;  Service: Cardiothoracic   • HYSTERECTOMY     • INSERT / REPLACE / REMOVE PACEMAKER     • THYROID SURGERY         Family History   Problem Relation Age of Onset   • Heart disease Father        Social History     Socioeconomic History   • Marital status:      Spouse name: Not on file   • Number of children: Not on file   • Years of education: Not on file   • Highest education level: Not on file   Tobacco Use   • Smoking status: Former Smoker     Last attempt to quit: 2019     Years since quittin.3   • Smokeless tobacco: Never Used   Substance and Sexual Activity   • Alcohol use: No     Frequency: Never   • Drug use: No   • Sexual activity: Defer           Objective   Physical Exam     Vital signs and triage nurse note reviewed.   Constitutional: Awake, alert; well-developed and well-nourished. No acute distress is noted.   HEENT: Normocephalic, atraumatic; pupils are PERRL with intact EOM; oropharynx is pink and moist without exudate or erythema.   Neck: Supple, full range of motion without pain; no cervical lymphadenopathy.   Cardiovascular: Regular rate and rhythm, normal S1-S2. click   Pulmonary: Respiratory effort regular nonlabored, breath sounds expiratory wheezes bilateral lower  lobes   Abdomen: Soft, nontender nondistended with normoactive bowel sounds; no rebound or guarding.   Musculoskeletal: Independent range of motion of all extremities with no palpable tenderness or edema.   Neuro: Alert oriented x3, speech is clear and appropriate, GCS 15   Skin:  Fleshtone warm, dry, intact; no erythematous or petechial rash or lesion       ECG 12 Lead    Date/Time: 5/16/2020 1:09 AM  Performed by: Belén Mccann APRN  Authorized by: Belén Mccann APRN   Interpreted by physician (chantelle)  Comparison: compared with previous ECG from 12/29/2019  Comparison to previous ECG: Sinus rhythm LVH  Rhythm: sinus rhythm  BPM: 97  Clinical impression: non-specific ECG  Comments: Biatrial enlargement                 ED Course  ED Course as of May 16 0216   Sat May 16, 2020   0100 156/114 on initial evaluation    [JW]      ED Course User Index  [JW] Belén Mccann APRN           Labs Reviewed   COMPREHENSIVE METABOLIC PANEL - Abnormal; Notable for the following components:       Result Value    Glucose 128 (*)     Creatinine 1.27 (*)     Potassium 3.1 (*)     AST (SGOT) 34 (*)     eGFR   Amer 55 (*)     Anion Gap 16.0 (*)     All other components within normal limits    Narrative:     GFR Normal >60  Chronic Kidney Disease <60  Kidney Failure <15     LACTATE DEHYDROGENASE - Abnormal; Notable for the following components:     (*)     All other components within normal limits   PROCALCITONIN - Abnormal; Notable for the following components:    Procalcitonin 0.03 (*)     All other components within normal limits    Narrative:     As a Marker for Sepsis (Non-Neonates):   1. <0.5 ng/mL represents a low risk of severe sepsis and/or septic shock.  1. >2 ng/mL represents a high risk of severe sepsis and/or septic shock.    As a Marker for Lower Respiratory Tract Infections that require antibiotic therapy:  PCT on Admission     Antibiotic Therapy             6-12 Hrs later  > 0.5                 "Strongly Recommended            >0.25 - <0.5         Recommended  0.1 - 0.25           Discouraged                   Remeasure/reassess PCT  <0.1                 Strongly Discouraged          Remeasure/reassess PCT      As 28 day mortality risk marker: \"Change in Procalcitonin Result\" (> 80 % or <=80 %) if Day 0 (or Day 1) and Day 4 values are available. Refer to [email or web address]   Change in PCT <=80 %   A decrease of PCT levels below or equal to 80 % defines a positive change in PCT test result representing a higher risk for 28-day all-cause mortality of patients diagnosed with severe sepsis or septic shock.  Change in PCT > 80 %   A decrease of PCT levels of more than 80 % defines a negative change in PCT result representing a lower risk for 28-day all-cause mortality of patients diagnosed with severe sepsis or septic shock.                Results may be falsely decreased if patient taking Biotin.    C-REACTIVE PROTEIN - Abnormal; Notable for the following components:    C-Reactive Protein 2.31 (*)     All other components within normal limits   BNP (IN-HOUSE) - Abnormal; Notable for the following components:    proBNP 3,167.0 (*)     All other components within normal limits    Narrative:     Among patients with dyspnea, NT-proBNP is highly sensitive for the detection of acute congestive heart failure. In addition NT-proBNP of <300 pg/ml effectively rules out acute congestive heart failure with 99% negative predictive value.    Results may be falsely decreased if patient taking Biotin.     CBC WITH AUTO DIFFERENTIAL - Abnormal; Notable for the following components:    WBC 11.20 (*)     MCHC 36.1 (*)     RDW 16.1 (*)     Neutrophil % 78.4 (*)     Lymphocyte % 15.4 (*)     Neutrophils, Absolute 8.80 (*)     All other components within normal limits   FERRITIN - Normal    Narrative:     Results may be falsely decreased if patient taking Biotin.     PROTIME-INR - Normal   APTT - Normal   TROPONIN " (IN-HOUSE) - Normal    Narrative:     Troponin T Reference Range:  <= 0.03 ng/mL-   Negative for AMI  >0.03 ng/mL-     Abnormal for myocardial necrosis.  Clinicians would have to utilize clinical acumen, EKG, Troponin and serial changes to determine if it is an Acute Myocardial Infarction or myocardial injury due to an underlying chronic condition.       Results may be falsely decreased if patient taking Biotin.     SARS-COV-2 PCR ([place] IN-HOUSE PERFORMED), NP SWAB IN TRANSPORT MEDIA   CBC AND DIFFERENTIAL    Narrative:     The following orders were created for panel order CBC & Differential.  Procedure                               Abnormality         Status                     ---------                               -----------         ------                     CBC Auto Differential[[id number]]        Abnormal            Final result                 Please view results for these tests on the individual orders.   EXTRA TUBES    Narrative:     The following orders were created for panel order Extra Tubes.  Procedure                               Abnormality         Status                     ---------                               -----------         ------                     Gold Top - SST[[id number]]                                   In process                   Please view results for these tests on the individual orders.   GOLD TOP - SST     Medications   nitroglycerin (NITROSTAT) ointment 1 inch (has no administration in time range)   bumetanide (BUMEX) injection 2 mg (has no administration in time range)   potassium chloride (K-DUR,KLOR-CON) CR tablet 40 mEq (has no administration in time range)   albuterol sulfate HFA (PROVENTIL HFA;VENTOLIN HFA;PROAIR HFA) inhaler 2 puff (2 puffs Inhalation Given 5/16/20 0128)     No radiology results for the last day                                  MDM  Number of Diagnoses or Management Options  Acute on chronic congestive heart failure, unspecified heart failure  type (CMS/[facility]):   Chronic renal impairment, unspecified CKD stage:   Cough:   Dyspnea, unspecified type:   Exposure to SARS-associated coronavirus:   Leukocytosis, unspecified type:   Diagnosis management comments: Chart review: 12/22/19-1/8/2020: AVR with cabg, ckd II, htn, dm, chronic pain   4/23/2020: CT surgery telemedicine note: CAD, aortic regurgitation, cardiomyopathy, and CKD stage 2 who underwent CABG and AVR at [facility] by Dr. Lane Stock on 12/27/2019. She also complained of HAN and increasing abdominal girth related to edema; bp 157/111    Comorbidities:  has a past medical history of CHF (congestive heart failure) (CMS/[facility]), COPD (chronic obstructive pulmonary disease) (CMS/[facility]), Diabetes (CMS/[facility]), Hyperlipidemia, and Hypertension.  Differentials: CHF virus pneumonia not all inclusive of differentials considered  Discussion with provider: Hospitalist  Radiology interpretation:  X-rays reviewed by me and interpreted by Dr. Mcmahan, linear atelectasis bilaterally that appears unchanged from previous  Lab interpretation:  Labs viewed by me significant for, potassium 3.1, creatinine 1.2, glucose 128  CRP 2.3, white blood cell count 11.2.  COVID swab is pending bnp 3100    Appropriate PPE worn during exam.  Patient was given Nitropaste and Bumex    i discussed findings with patient who voices understanding of admission  abx will be deferred to admitting.      Final diagnoses:   Dyspnea, unspecified type   Cough   Acute on chronic congestive heart failure, unspecified heart failure type (CMS/[facility])   Chronic renal impairment, unspecified CKD stage   Leukocytosis, unspecified type   Exposure to SARS-associated coronavirus   Hypokalemia            Belén Mccann, APRN  05/16/20 0216

## 2020-05-16 NOTE — NURSING NOTE
Patient cannot tolerate IV potassium replacement. She is requesting oral potassium. Waiting to hear back from MD.

## 2020-05-17 ENCOUNTER — APPOINTMENT (OUTPATIENT)
Dept: GENERAL RADIOLOGY | Facility: HOSPITAL | Age: 45
End: 2020-05-17

## 2020-05-17 ENCOUNTER — APPOINTMENT (OUTPATIENT)
Dept: CARDIOLOGY | Facility: HOSPITAL | Age: 45
End: 2020-05-17

## 2020-05-17 LAB
BH CV LOWER VASCULAR LEFT COMMON FEMORAL AUGMENT: NORMAL
BH CV LOWER VASCULAR LEFT COMMON FEMORAL COMPRESS: NORMAL
BH CV LOWER VASCULAR LEFT COMMON FEMORAL PHASIC: NORMAL
BH CV LOWER VASCULAR LEFT COMMON FEMORAL SPONT: NORMAL
BH CV LOWER VASCULAR LEFT DISTAL FEMORAL COMPRESS: NORMAL
BH CV LOWER VASCULAR LEFT GASTRONEMIUS COMPRESS: NORMAL
BH CV LOWER VASCULAR LEFT GREATER SAPH AK COMPRESS: NORMAL
BH CV LOWER VASCULAR LEFT GREATER SAPH BK COMPRESS: NORMAL
BH CV LOWER VASCULAR LEFT LESSER SAPH COMPRESS: NORMAL
BH CV LOWER VASCULAR LEFT MID FEMORAL AUGMENT: NORMAL
BH CV LOWER VASCULAR LEFT MID FEMORAL COMPRESS: NORMAL
BH CV LOWER VASCULAR LEFT MID FEMORAL PHASIC: NORMAL
BH CV LOWER VASCULAR LEFT MID FEMORAL SPONT: NORMAL
BH CV LOWER VASCULAR LEFT PERONEAL COMPRESS: NORMAL
BH CV LOWER VASCULAR LEFT POPLITEAL AUGMENT: NORMAL
BH CV LOWER VASCULAR LEFT POPLITEAL COMPRESS: NORMAL
BH CV LOWER VASCULAR LEFT POPLITEAL PHASIC: NORMAL
BH CV LOWER VASCULAR LEFT POPLITEAL SPONT: NORMAL
BH CV LOWER VASCULAR LEFT POSTERIOR TIBIAL COMPRESS: NORMAL
BH CV LOWER VASCULAR LEFT PROXIMAL FEMORAL COMPRESS: NORMAL
BH CV LOWER VASCULAR LEFT SAPHENOFEMORAL JUNCTION AUGMENT: NORMAL
BH CV LOWER VASCULAR LEFT SAPHENOFEMORAL JUNCTION COMPRESS: NORMAL
BH CV LOWER VASCULAR LEFT SAPHENOFEMORAL JUNCTION PHASIC: NORMAL
BH CV LOWER VASCULAR LEFT SAPHENOFEMORAL JUNCTION SPONT: NORMAL
BH CV LOWER VASCULAR RIGHT COMMON FEMORAL AUGMENT: NORMAL
BH CV LOWER VASCULAR RIGHT COMMON FEMORAL COMPRESS: NORMAL
BH CV LOWER VASCULAR RIGHT COMMON FEMORAL PHASIC: NORMAL
BH CV LOWER VASCULAR RIGHT COMMON FEMORAL SPONT: NORMAL
BH CV LOWER VASCULAR RIGHT DISTAL FEMORAL COMPRESS: NORMAL
BH CV LOWER VASCULAR RIGHT GASTRONEMIUS COMPRESS: NORMAL
BH CV LOWER VASCULAR RIGHT GREATER SAPH AK COMPRESS: NORMAL
BH CV LOWER VASCULAR RIGHT LESSER SAPH COMPRESS: NORMAL
BH CV LOWER VASCULAR RIGHT MID FEMORAL AUGMENT: NORMAL
BH CV LOWER VASCULAR RIGHT MID FEMORAL COMPRESS: NORMAL
BH CV LOWER VASCULAR RIGHT MID FEMORAL PHASIC: NORMAL
BH CV LOWER VASCULAR RIGHT MID FEMORAL SPONT: NORMAL
BH CV LOWER VASCULAR RIGHT PERONEAL COMPRESS: NORMAL
BH CV LOWER VASCULAR RIGHT POPLITEAL AUGMENT: NORMAL
BH CV LOWER VASCULAR RIGHT POPLITEAL COMPRESS: NORMAL
BH CV LOWER VASCULAR RIGHT POPLITEAL PHASIC: NORMAL
BH CV LOWER VASCULAR RIGHT POPLITEAL SPONT: NORMAL
BH CV LOWER VASCULAR RIGHT POSTERIOR TIBIAL COMPRESS: NORMAL
BH CV LOWER VASCULAR RIGHT PROXIMAL FEMORAL COMPRESS: NORMAL
BH CV LOWER VASCULAR RIGHT SAPHENOFEMORAL JUNCTION AUGMENT: NORMAL
BH CV LOWER VASCULAR RIGHT SAPHENOFEMORAL JUNCTION COMPRESS: NORMAL
BH CV LOWER VASCULAR RIGHT SAPHENOFEMORAL JUNCTION PHASIC: NORMAL
BH CV LOWER VASCULAR RIGHT SAPHENOFEMORAL JUNCTION SPONT: NORMAL
GLUCOSE BLDC GLUCOMTR-MCNC: 104 MG/DL (ref 70–105)
GLUCOSE BLDC GLUCOMTR-MCNC: 117 MG/DL (ref 70–105)
GLUCOSE BLDC GLUCOMTR-MCNC: 132 MG/DL (ref 70–105)

## 2020-05-17 PROCEDURE — 82962 GLUCOSE BLOOD TEST: CPT

## 2020-05-17 PROCEDURE — 94799 UNLISTED PULMONARY SVC/PX: CPT

## 2020-05-17 PROCEDURE — 96372 THER/PROPH/DIAG INJ SC/IM: CPT

## 2020-05-17 PROCEDURE — 93970 EXTREMITY STUDY: CPT

## 2020-05-17 PROCEDURE — 25010000002 ENOXAPARIN PER 10 MG: Performed by: INTERNAL MEDICINE

## 2020-05-17 PROCEDURE — 96376 TX/PRO/DX INJ SAME DRUG ADON: CPT

## 2020-05-17 PROCEDURE — G0378 HOSPITAL OBSERVATION PER HR: HCPCS

## 2020-05-17 PROCEDURE — 71045 X-RAY EXAM CHEST 1 VIEW: CPT

## 2020-05-17 PROCEDURE — 99225 PR SBSQ OBSERVATION CARE/DAY 25 MINUTES: CPT | Performed by: INTERNAL MEDICINE

## 2020-05-17 PROCEDURE — 25010000002 ONDANSETRON PER 1 MG: Performed by: NURSE PRACTITIONER

## 2020-05-17 PROCEDURE — 25010000002 METHYLPREDNISOLONE PER 125 MG: Performed by: INTERNAL MEDICINE

## 2020-05-17 PROCEDURE — 96375 TX/PRO/DX INJ NEW DRUG ADDON: CPT

## 2020-05-17 RX ORDER — OXYCODONE HYDROCHLORIDE 5 MG/1
7.5 TABLET ORAL ONCE AS NEEDED
Status: COMPLETED | OUTPATIENT
Start: 2020-05-17 | End: 2020-05-17

## 2020-05-17 RX ORDER — METHYLPREDNISOLONE SODIUM SUCCINATE 125 MG/2ML
60 INJECTION, POWDER, LYOPHILIZED, FOR SOLUTION INTRAMUSCULAR; INTRAVENOUS EVERY 6 HOURS
Status: DISCONTINUED | OUTPATIENT
Start: 2020-05-17 | End: 2020-05-18 | Stop reason: HOSPADM

## 2020-05-17 RX ADMIN — METHYLPREDNISOLONE SODIUM SUCCINATE 60 MG: 125 INJECTION, POWDER, FOR SOLUTION INTRAMUSCULAR; INTRAVENOUS at 15:48

## 2020-05-17 RX ADMIN — MELATONIN 1000 UNITS: at 09:06

## 2020-05-17 RX ADMIN — CARVEDILOL 25 MG: 6.25 TABLET, FILM COATED ORAL at 18:30

## 2020-05-17 RX ADMIN — METHYLPREDNISOLONE SODIUM SUCCINATE 60 MG: 125 INJECTION, POWDER, FOR SOLUTION INTRAMUSCULAR; INTRAVENOUS at 21:55

## 2020-05-17 RX ADMIN — OXYCODONE HYDROCHLORIDE 7.5 MG: 5 TABLET ORAL at 05:42

## 2020-05-17 RX ADMIN — ENOXAPARIN SODIUM 100 MG: 100 INJECTION SUBCUTANEOUS at 09:06

## 2020-05-17 RX ADMIN — MONTELUKAST SODIUM 10 MG: 10 TABLET, COATED ORAL at 21:55

## 2020-05-17 RX ADMIN — LEVOTHYROXINE SODIUM 200 MCG: 200 TABLET ORAL at 09:05

## 2020-05-17 RX ADMIN — ONDANSETRON 4 MG: 2 INJECTION INTRAMUSCULAR; INTRAVENOUS at 18:30

## 2020-05-17 RX ADMIN — ALBUTEROL SULFATE 2.5 MG: 2.5 SOLUTION RESPIRATORY (INHALATION) at 07:21

## 2020-05-17 RX ADMIN — CLONIDINE HYDROCHLORIDE 0.1 MG: 0.1 TABLET ORAL at 09:06

## 2020-05-17 RX ADMIN — CARVEDILOL 25 MG: 6.25 TABLET, FILM COATED ORAL at 09:06

## 2020-05-17 RX ADMIN — ASPIRIN 325 MG ORAL TABLET 325 MG: 325 PILL ORAL at 09:06

## 2020-05-17 RX ADMIN — ALBUTEROL SULFATE 2.5 MG: 2.5 SOLUTION RESPIRATORY (INHALATION) at 19:49

## 2020-05-17 RX ADMIN — ENOXAPARIN SODIUM 100 MG: 100 INJECTION SUBCUTANEOUS at 21:55

## 2020-05-17 RX ADMIN — AMLODIPINE BESYLATE 10 MG: 5 TABLET ORAL at 09:05

## 2020-05-17 RX ADMIN — FOLIC ACID 1 MG: 1 TABLET ORAL at 09:05

## 2020-05-17 RX ADMIN — ALBUTEROL SULFATE 2.5 MG: 2.5 SOLUTION RESPIRATORY (INHALATION) at 11:06

## 2020-05-17 RX ADMIN — CLONIDINE HYDROCHLORIDE 0.1 MG: 0.1 TABLET ORAL at 21:55

## 2020-05-17 RX ADMIN — FERROUS SULFATE TAB EC 324 MG (65 MG FE EQUIVALENT) 324 MG: 324 (65 FE) TABLET DELAYED RESPONSE at 09:08

## 2020-05-17 RX ADMIN — PANTOPRAZOLE SODIUM 40 MG: 40 TABLET, DELAYED RELEASE ORAL at 09:06

## 2020-05-17 RX ADMIN — BENZONATATE 100 MG: 100 CAPSULE ORAL at 05:42

## 2020-05-17 RX ADMIN — ACETAMINOPHEN 650 MG: 325 TABLET, FILM COATED ORAL at 22:09

## 2020-05-17 RX ADMIN — BUMETANIDE 0.5 MG: 1 TABLET ORAL at 09:06

## 2020-05-17 NOTE — PROGRESS NOTES
Continued Stay Note  [facility]     Patient Name: Rafael Mccann  MRN: [id number]  Today's Date: 5/17/2020    Admit Date: 5/16/2020    Discharge Plan     Row Name 05/17/20 1319       Plan    Plan  Home with family . F/U with [facility]    Plan Comments  [facility] -therefore could have both -SW from [facility] gave her info/forms to start process for [facility]-she said she doesn'thave any info - I gave her [facility] phone# via nurse.              Salena Faye RN

## 2020-05-17 NOTE — PROGRESS NOTES
[facility] Medicine Services Daily Progress Note      Hospitalist Team  LOS 0 days      Patient Care Team:  Phani Adames MD as PCP - General (Internal Medicine)  Ashish Smalls MD as Consulting Physician (Nephrology)    Patient Location: 223/1      Subjective   Subjective     Chief Complaint / Subjective  Chief Complaint   Patient presents with   • Cough      Ms. Demario Mccann is a 45 y.o. -American female with a history of hypertension, diabetes type 2, hypothyroidism, CAD, status post CABG with AVR (12/2019), congestive heart failure, presented to the [facility] ED on 5/16/2020 with a complaint of cough, congestion, shortness of breath.  Patient states her shortness of breath began about a week ago, worse with exertion and has had increased swelling in her abdomen.  She has also developed a cough with congestion within the last week.  Patient also states she has had some body aches.  Patient denies fever, chills, nausea, vomiting, diarrhea and ill contacts.  Patient states her CHF, the fluid collects in her abdomen and not her lower extremities.     In the ED, initial troponin negative, proBNP 3167.  , glucose 128, sodium 141, potassium 3.1, BUN 9, creatinine 1.27, AST 34.  Ferritin 99.26, C-reactive protein 2.31, procalcitonin 0.03.  WBC 11.2, Hgb 15.8, platelets 189.  EKG: Normal sinus rhythm.  Chest x-ray pending, COVID-19 test pending.  Patient received nitro glycerin ointment 1 inch to chest wall, 2 mg Bumex IV, 40 mEq potassium p.o. x1, albuterol inhaler 2 puffs.  Patient will be admitted for further evaluation and management.    5/17/2020: She was diuresed and had symptom improvement. The patient had Covid-19 negative testing. She had elevated D Dimer. The VQ scan was indeterminate. The venous dopplers of bilateral legs were ordered. She was empirically started on treatment dose Lovenox. On the physical exam she had mild wheezing and  "was give IV steroids. Her Chest Xray showed no change. Physical and Occupational therapies were consulted.      Date::  5/17/2020    Constitution: Negative.   HENT: Negative.    Eyes: Negative.    Cardiovascular: Negative.    Respiratory: Positive for cough and shortness of breath.    Endocrine: Negative.    Hematologic/Lymphatic: Negative.    Skin: Negative.    Musculoskeletal: Negative.    Gastrointestinal: Negative.    Genitourinary: Negative.    Neurological: Negative.    Psychiatric/Behavioral: Negative.    Allergic/Immunologic: Negative.    All other systems reviewed and are negative.    ROS      Objective   Objective      Vital Signs  Temp:  [97.3 °F (36.3 °C)-98.5 °F (36.9 °C)] 97.3 °F (36.3 °C)  Heart Rate:  [64-95] 64  Resp:  [16-18] 18  BP: (102-147)/() 102/34  Oxygen Therapy  SpO2: 95 %  Pulse Oximetry Type: Intermittent  Device (Oxygen Therapy): room air  Flowsheet Rows      First Filed Value   Admission Height  170.2 cm (67\") Documented at 05/16/2020 0042   Admission Weight  95.7 kg (210 lb 15.7 oz) Documented at 05/16/2020 0042        Intake & Output (last 3 days)       05/14 0701 - 05/15 0700 05/15 0701 - 05/16 0700 05/16 0701 - 05/17 0700 05/17 0701 - 05/18 0700    P.O.   240     Total Intake(mL/kg)   240 (2.6)     Net   +240             Urine Unmeasured Occurrence  1 x 5 x         Lines, Drains & Airways    Active LDAs     Name:   Placement date:   Placement time:   Site:   Days:    Peripheral IV 05/16/20 0129 Left Antecubital   05/16/20    0129    Antecubital   1                  Physical Exam:  Constitutional: She is oriented to person, place, and time. She appears well-developed and well-nourished. No distress.   Eyes: Conjunctivae are normal.   Neck: Normal range of motion.   Cardiovascular: Normal rate, regular rhythm, normal heart sounds and intact distal pulses.   Pulmonary/Chest: Effort normal. She has wheezing  Abdominal: Bowel sounds are normal.   Musculoskeletal: Normal range of " motion. She exhibits no edema.   Neurological: She is alert and oriented to person, place, and time.   Skin: Skin is warm and dry. Capillary refill takes less than 2 seconds.   Vitals reviewed.  Physical Exam        Procedures:    Results Review:     I reviewed the patient's new clinical results.      Lab Results (last 24 hours)     Procedure Component Value Units Date/Time    POC Glucose Once [[id number]]  (Normal) Collected:  05/17/20 1150    Specimen:  Blood Updated:  05/17/20 1151     Glucose 104 mg/dL      Comment: Serial Number: [id number]:  [id number]       POC Glucose Once [[id number]]  (Abnormal) Collected:  05/17/20 0721    Specimen:  Blood Updated:  05/17/20 0723     Glucose 132 mg/dL      Comment: Serial Number: [id number]:  [id number]       POC Glucose Once [[id number]]  (Abnormal) Collected:  05/16/20 1739    Specimen:  Blood Updated:  05/16/20 1740     Glucose 147 mg/dL      Comment: Serial Number: [id number]:  [id number]           No results found for: HGBA1C  Results from last 7 days   Lab Units 05/16/20  0119   INR  0.98           No results found for: LIPASE  Lab Results   Component Value Date    CHOL 191 12/26/2019    TRIG 102 12/26/2019    HDL 73 (H) 12/26/2019    LDL 98 12/26/2019       Lab Results   Lab Value Date/Time    FINALDX  12/27/2019 1040     Aortic valve leaflets, valvuloplasty:    Benign endothelial lined fibrous tissue with degenerative changes, chronic inflammation and atheromatous plaque      consistent with clinical history      ELIF/cec         Microbiology Results (last 10 days)     Procedure Component Value - Date/Time    SARS-CoV-2 PCR ([place] IN-HOUSE PERFORMED), NP SWAB IN TRANSPORT MEDIA - Swab, Nasopharynx [[id number]]  (Normal) Collected:  05/16/20 0107    Lab Status:  Final result Specimen:  Swab from Nasopharynx Updated:  05/16/20 1216     COVID19 Not Detected          ECG/EMG Results (most recent)     Procedure Component Value Units Date/Time    ECG  12 Lead [[id number]] Resulted:  05/16/20 0216     Updated:  05/16/20 0216    ECG 12 Lead [[id number]] Collected:  05/16/20 0109     Updated:  05/17/20 0648    Narrative:       HEART RATE= 97  bpm  RR Interval= 620  ms  VA Interval= 155  ms  P Horizontal Axis= -22  deg  P Front Axis= 65  deg  QRSD Interval= 97  ms  QT Interval= 380  ms  QRS Axis= -38  deg  T Wave Axis= 98  deg  - ABNORMAL ECG -  Sinus rhythm  Biatrial enlargement  Left ventricular hypertrophy  Nonspecific T abnormalities, lateral leads  When compared with ECG of 29-Dec-2019 4:22:15,  Significant axis, voltage or hypertrophy change  Electronically Signed By: Mukesh Mcmahan ([place]) 17-May-2020 06:46:11  Date and Time of Study: 2020-05-16 01:09:08          Results for orders placed during the hospital encounter of 12/22/19   Duplex Venous Lower Extremity - Bilateral CAR    Narrative · Normal bilateral lower extremity venous duplex scan.          Results for orders placed during the hospital encounter of 12/22/19   Adult Transthoracic Echo Limited W/ Cont if Necessary Per Protocol    Narrative · Left ventricular wall thickness is consistent with mild concentric   hypertrophy.  · Estimated EF = 40%.  · Right ventricular cavity is mildly dilated.  · Mildly reduced right ventricular systolic function noted.          Nm Lung Ventilation Perfusion Aerosol    Result Date: 5/16/2020  Intermediate probability study for pulmonary embolism. Multiple wedge-shaped matched areas of both decreased ventilation and perfusion which appeared to correspond to the areas of atelectasis on the chest radiograph.  Electronically Signed By-Adilson Pruitt On:5/16/2020 6:12 PM This report was finalized on [id number] by  Adilson Pruitt, .    Xr Chest Ap    Result Date: 5/16/2020  Mild bilateral atelectasis is favored over bilateral infiltrates.  There is mild cardiomegaly.  Please see above comments for further detail.  Electronically Signed By-Dr. Asim Hilton MD On:5/16/2020  6:47 AM This report was finalized on [id number] by Dr. Asim Hliton MD.          Xrays, labs reviewed personally by physician.    Medication Review:   I have reviewed the patient's current medication list      Scheduled Meds    albuterol 2.5 mg Nebulization Q6H - RT   amLODIPine 10 mg Oral Daily   aspirin 325 mg Oral Daily   bumetanide 0.5 mg Oral Daily   carvedilol 25 mg Oral BID With Meals   cholecalciferol 1,000 Units Oral Daily   cloNIDine 0.1 mg Oral BID   enoxaparin 1 mg/kg Subcutaneous Q12H   ferrous sulfate 324 mg Oral Daily With Breakfast   folic acid 1 mg Oral Daily   insulin lispro 0-9 Units Subcutaneous TID AC   levothyroxine 200 mcg Oral Daily   methylPREDNISolone sodium succinate 60 mg Intravenous Q6H   montelukast 10 mg Oral Nightly   pantoprazole 40 mg Oral Daily       Meds Infusions       Meds PRN  •  acetaminophen **OR** acetaminophen **OR** acetaminophen  •  albuterol sulfate HFA  •  aluminum-magnesium hydroxide-simethicone  •  benzonatate  •  bisacodyl  •  dextrose  •  dextrose  •  docusate sodium  •  glucagon (human recombinant)  •  hydrALAZINE  •  insulin lispro **AND** insulin lispro  •  magnesium hydroxide  •  melatonin  •  ondansetron **OR** ondansetron    I personally reviewed patient's x-ray films    I personally reviewed patient's EKG strips     Assessment/Plan   Assessment/Plan     Active Hospital Problems    Diagnosis  POA   • **Systolic CHF, chronic (CMS/[facility]) [I50.22]  Yes   • Dyspnea [R06.00]  Yes   • CKD (chronic kidney disease) stage 2, GFR 60-89 ml/min [N18.2]  Yes   • S/P AVR (aortic valve replacement) [Z95.2]  Not Applicable   • S/P CABG x 3 [Z95.1]  Not Applicable   • GERD without esophagitis [K21.9]  Yes   • Hypothyroidism (acquired) [E03.9]  Yes   • Chest pain [R07.9]  Yes   • ICD (implantable cardioverter-defibrillator), dual, in situ [Z95.810]  Yes   • COPD (chronic obstructive pulmonary disease) (CMS/[facility]) [J44.9]  Yes   • Hypertension [I10]  Yes   • Essential  hypertension [I10]  Yes      Resolved Hospital Problems   No resolved problems to display.       MEDICAL DECISION MAKING COMPLEXITY BY PROBLEM:   Acute CHF  -BNP 3167  -CXR :No change from previous CXR  -2 mg Bumex IV x1  -Continue p.o. Bumex, Coreg  -AICD in situ  -Abdomen with fluid     COVID-19 -negative  -In enhanced droplet/contact isolation  -Monitor oxygen saturations  -Oxygen PRN to keep sats greater than 90%  -  -Ferritin 99.26  -C-reactive protein 2.31  -Procalcitonin 0.03  -Elevated D Dimer: V/Q scan: indeterminate  -B/L venous dopplers:  -Albuterol inhaler as needed     CKD stage II  -Creatinine 1.27  -Baseline 1.0-1.1  -Monitor creatinine  -BMP     Diabetes type 2  -Hold Glucophage  -Accu-Cheks AC  -Sliding scale insulin subcu as needed     Hypertension  -Continue clonidine, Norvasc, Coreg     COPD  -some wheezing on today's exam      Solumedrol  -Continue Singulair, albuterol inhaler as needed     CAD  -Continue aspirin     GERD  -Continue PPI     Hypothyroidism  -Continue levothyroxine     Recent CABG with AVR (12/2019)      VTE Prophylaxis -   Mechanical Order History:      Ordered        05/16/20 0247  Place Sequential Compression Device  Once         05/16/20 0247  Maintain Sequential Compression Device  Continuous                 Pharmalogical Order History:     Ordered     Dose Route Frequency Stop    05/16/20 0440  enoxaparin (LOVENOX) syringe 40 mg  Status:  Discontinued      40 mg SC Every 24 Hours 05/16/20 0758    05/16/20 0757  enoxaparin (LOVENOX) syringe 100 mg      1 mg/kg SC Every 12 Hours --            Code Status -   Code Status and Medical Interventions:   Ordered at: 05/16/20 0247     Level Of Support Discussed With:    Patient     Code Status:    CPR     Medical Interventions (Level of Support Prior to Arrest):    Full       This patient has been examined wearing appropriate Personal Protective Equipment and discussed with hospital infection control department.  05/17/20        Discharge Planning          Destination      Coordination has not been started for this encounter.      Durable Medical Equipment      Coordination has not been started for this encounter.      Dialysis/Infusion      Coordination has not been started for this encounter.      Home Medical Care      Coordination has not been started for this encounter.      Therapy      Coordination has not been started for this encounter.      Community Resources      Coordination has not been started for this encounter.            Electronically signed by Lydia Chin MD, 05/17/20, 13:37.  [facility]

## 2020-05-17 NOTE — PLAN OF CARE
Reports she feels better today. No complaints of chest pain. Continue current plan of care. Will continue to monitor.

## 2020-05-17 NOTE — PLAN OF CARE
Problem: Patient Care Overview  Goal: Individualization and Mutuality  Outcome: Ongoing (interventions implemented as appropriate)     Problem: Patient Care Overview  Goal: Plan of Care Review  Outcome: Ongoing (interventions implemented as appropriate)  Flowsheets (Taken 5/17/2020 0144)  Progress: no change  Plan of Care Reviewed With: patient  Note:   Pt adm with chf, has productive  cough. Pt rested through the night, no other complaint, awaiting on VQ scan . Will cont to monitor.

## 2020-05-18 ENCOUNTER — READMISSION MANAGEMENT (OUTPATIENT)
Dept: CALL CENTER | Facility: HOSPITAL | Age: 45
End: 2020-05-18

## 2020-05-18 ENCOUNTER — TELEPHONE (OUTPATIENT)
Dept: CARDIOLOGY | Facility: CLINIC | Age: 45
End: 2020-05-18

## 2020-05-18 VITALS
HEART RATE: 62 BPM | DIASTOLIC BLOOD PRESSURE: 82 MMHG | WEIGHT: 200.18 LBS | TEMPERATURE: 97.7 F | SYSTOLIC BLOOD PRESSURE: 128 MMHG | HEIGHT: 67 IN | BODY MASS INDEX: 31.42 KG/M2 | OXYGEN SATURATION: 96 % | RESPIRATION RATE: 20 BRPM

## 2020-05-18 LAB
ANION GAP SERPL CALCULATED.3IONS-SCNC: 17 MMOL/L (ref 5–15)
BUN BLD-MCNC: 16 MG/DL (ref 6–20)
BUN/CREAT SERPL: 12.1 (ref 7–25)
CALCIUM SPEC-SCNC: 9.5 MG/DL (ref 8.6–10.5)
CHLORIDE SERPL-SCNC: 96 MMOL/L (ref 98–107)
CO2 SERPL-SCNC: 23 MMOL/L (ref 22–29)
CREAT BLD-MCNC: 1.32 MG/DL (ref 0.57–1)
GFR SERPL CREATININE-BSD FRML MDRD: 52 ML/MIN/1.73
GLUCOSE BLD-MCNC: 137 MG/DL (ref 65–99)
GLUCOSE BLDC GLUCOMTR-MCNC: 161 MG/DL (ref 70–105)
GLUCOSE BLDC GLUCOMTR-MCNC: 165 MG/DL (ref 70–105)
POTASSIUM BLD-SCNC: 3.9 MMOL/L (ref 3.5–5.2)
SODIUM BLD-SCNC: 136 MMOL/L (ref 136–145)

## 2020-05-18 PROCEDURE — 25010000002 METHYLPREDNISOLONE PER 125 MG: Performed by: INTERNAL MEDICINE

## 2020-05-18 PROCEDURE — 82962 GLUCOSE BLOOD TEST: CPT

## 2020-05-18 PROCEDURE — 94799 UNLISTED PULMONARY SVC/PX: CPT

## 2020-05-18 PROCEDURE — 63710000001 INSULIN LISPRO (HUMAN) PER 5 UNITS: Performed by: NURSE PRACTITIONER

## 2020-05-18 PROCEDURE — 80048 BASIC METABOLIC PNL TOTAL CA: CPT | Performed by: INTERNAL MEDICINE

## 2020-05-18 PROCEDURE — 25010000002 ENOXAPARIN PER 10 MG: Performed by: INTERNAL MEDICINE

## 2020-05-18 PROCEDURE — 96372 THER/PROPH/DIAG INJ SC/IM: CPT

## 2020-05-18 PROCEDURE — 99217 PR OBSERVATION CARE DISCHARGE MANAGEMENT: CPT | Performed by: INTERNAL MEDICINE

## 2020-05-18 PROCEDURE — G0378 HOSPITAL OBSERVATION PER HR: HCPCS

## 2020-05-18 RX ADMIN — PANTOPRAZOLE SODIUM 40 MG: 40 TABLET, DELAYED RELEASE ORAL at 08:56

## 2020-05-18 RX ADMIN — ASPIRIN 325 MG ORAL TABLET 325 MG: 325 PILL ORAL at 08:56

## 2020-05-18 RX ADMIN — MELATONIN 1000 UNITS: at 08:55

## 2020-05-18 RX ADMIN — INSULIN LISPRO 2 UNITS: 100 INJECTION, SOLUTION INTRAVENOUS; SUBCUTANEOUS at 08:55

## 2020-05-18 RX ADMIN — ALBUTEROL SULFATE 2.5 MG: 2.5 SOLUTION RESPIRATORY (INHALATION) at 07:20

## 2020-05-18 RX ADMIN — ENOXAPARIN SODIUM 100 MG: 100 INJECTION SUBCUTANEOUS at 08:55

## 2020-05-18 RX ADMIN — BUMETANIDE 0.5 MG: 1 TABLET ORAL at 08:56

## 2020-05-18 RX ADMIN — CARVEDILOL 25 MG: 6.25 TABLET, FILM COATED ORAL at 08:55

## 2020-05-18 RX ADMIN — METHYLPREDNISOLONE SODIUM SUCCINATE 60 MG: 125 INJECTION, POWDER, FOR SOLUTION INTRAMUSCULAR; INTRAVENOUS at 03:31

## 2020-05-18 RX ADMIN — METHYLPREDNISOLONE SODIUM SUCCINATE 60 MG: 125 INJECTION, POWDER, FOR SOLUTION INTRAMUSCULAR; INTRAVENOUS at 08:55

## 2020-05-18 RX ADMIN — AMLODIPINE BESYLATE 10 MG: 5 TABLET ORAL at 08:55

## 2020-05-18 RX ADMIN — ALBUTEROL SULFATE 2.5 MG: 2.5 SOLUTION RESPIRATORY (INHALATION) at 11:00

## 2020-05-18 RX ADMIN — CLONIDINE HYDROCHLORIDE 0.1 MG: 0.1 TABLET ORAL at 08:56

## 2020-05-18 RX ADMIN — FOLIC ACID 1 MG: 1 TABLET ORAL at 08:55

## 2020-05-18 RX ADMIN — ALBUTEROL SULFATE 2.5 MG: 2.5 SOLUTION RESPIRATORY (INHALATION) at 01:12

## 2020-05-18 RX ADMIN — FERROUS SULFATE TAB EC 324 MG (65 MG FE EQUIVALENT) 324 MG: 324 (65 FE) TABLET DELAYED RESPONSE at 08:56

## 2020-05-18 RX ADMIN — LEVOTHYROXINE SODIUM 200 MCG: 200 TABLET ORAL at 08:55

## 2020-05-18 NOTE — NURSING NOTE
"Heart Failure Program  Nurse Navigator  Discharge Planning    Patient Name:Rafael Mccann  :1975  Cardiologist: Saji  Current Admission Date: 2020   Previous Admission:  2019  Admission frequency: 2 admissions in 6 months    Heart Failure history per record:    Symptoms on admission: SOA, congestion, cough, abd swelling       Admission Weight:  Flowsheet Rows      First Filed Value   Admission Height  170.2 cm (67\") Documented at 2020 0042   Admission Weight  95.7 kg (210 lb 15.7 oz) Documented at 2020 0042            Current Home Medications:  Prior to Admission medications    Medication Sig Start Date End Date Taking? Authorizing Provider   allopurinol (ZYLOPRIM) 100 MG tablet Take 2 tablets by mouth Daily. 20  Yes Kayla Burrell APRN   ALPRAZolam (XANAX) 1 MG tablet Take 1 mg by mouth 4 (Four) Times a Day As Needed for Anxiety.   Yes Dheeraj Alvarez MD   amLODIPine (NORVASC) 5 MG tablet Take 2 tablets by mouth Daily. 20  Yes Kayla Burrell APRN   aspirin 325 MG tablet Take 1 tablet by mouth Daily. 20  Yes Kayla Burrell APRN   bumetanide (BUMEX) 0.5 MG tablet Take 1 tablet by mouth Daily.  Patient taking differently: Take 1 mg by mouth Daily. 20  Yes Kayla Burrell APRN   carvedilol (COREG) 25 MG tablet Take 1 tablet by mouth 2 (Two) Times a Day With Meals. 3/20/20  Yes Sara Park APRN   cholecalciferol (VITAMIN D3) 1000 units tablet Take 1,000 Units by mouth Daily.   Yes Dheeraj Alvarez MD   cloNIDine (CATAPRES) 0.1 MG tablet Take 0.1 mg by mouth 2 (Two) Times a Day. 20  Yes Dheeraj Alvarez MD   Cyanocobalamin (VITAMIN B 12 PO) Take  by mouth.   Yes Dheeraj Alvarez MD   docusate sodium 100 MG capsule Take 100 mg by mouth 2 (Two) Times a Day.  Patient taking differently: Take 100 mg by mouth 2 (Two) Times a Day As Needed. 20  Yes Wernert, Kayla, APRN   doxycycline (ADOXA) 100 MG tablet Take 1 tablet by mouth " Daily. 5/4/20  Yes Kayla Burrell APRN   ferrous sulfate 325 (65 FE) MG tablet Take 65 mg by mouth Daily With Breakfast.   Yes Dheeraj Alvarez MD   folic acid (FOLVITE) 1 MG tablet Take 1 mg by mouth Daily.   Yes Dheeraj Alvarez MD   gabapentin (NEURONTIN) 300 MG capsule Take 300 mg by mouth Daily As Needed.   Yes Dheeraj Alvarez MD   hydrALAZINE (APRESOLINE) 50 MG tablet Take 100 mg by mouth 3 (Three) Times a Day.   Yes Dheeraj Alvarez MD   levothyroxine (SYNTHROID, LEVOTHROID) 200 MCG tablet Take 200 mcg by mouth Daily.   Yes Dheeraj Alvarez MD   lisinopril (PRINIVIL,ZESTRIL) 40 MG tablet Take 40 mg by mouth Daily. 4/23/20  Yes Dheeraj Alvarez MD   metFORMIN (GLUCOPHAGE) 500 MG tablet Take 500 mg by mouth Daily With Breakfast.   Yes Dheeraj Alvarez MD   montelukast (SINGULAIR) 10 MG tablet Take 10 mg by mouth Every Night.   Yes Dheeraj Alvarez MD   oxyCODONE-acetaminophen (PERCOCET) 7.5-325 MG per tablet Take 1 tablet by mouth Every 6 (Six) Hours As Needed for Severe Pain . May resume after postop oxycodone prescription is completed. 1/8/20  Yes Kayla Burrell APRN   pantoprazole (PROTONIX) 40 MG EC tablet Take 40 mg by mouth Daily.   Yes Dheeraj Alvarez MD       Social history:   Patient lives with her spouse, she reports that she has been using telehealth to see some of her MDs, she reports that she wants to change PCP so [phone] provided for her to call for a new PCP, no transportation issues or issues affording medications      Smoking status: NA     Diagnostics Testing:  proBNP level:  3167    Echocardiogram:  Results for orders placed during the hospital encounter of 12/22/19   Adult Transthoracic Echo Limited W/ Cont if Necessary Per Protocol    Narrative · Left ventricular wall thickness is consistent with mild concentric   hypertrophy.  · Estimated EF = 40%.  · Right ventricular cavity is mildly dilated.  · Mildly reduced right ventricular  systolic function noted.            Patient Assessment:   Patient was sitting in her bed no distress noted she reports that she will be d/c today but that she doesn't want to go until her pacemaker is checked      Current O2: RA   Home O2:RA    Education provided to patient:  yes- Heart Failure disease education  yes -Symptom identification/management  yes -Daily Weights  yes- Diet education  yes- Fluid restriction (if ordered)  yes- Medication education  NA - Smoking cessation    Acceptance of learning:  Patient was accepting of information provided     Identified needs/barriers:   Patient's acceptance of disease process, and understanding of information provided     Intervention:    education on fluid restriction provided and information for new PCP     Patient goal:

## 2020-05-18 NOTE — TELEPHONE ENCOUNTER
Spoke with patient regarding home monitor. Patient to call [employer] to trouble shoot why cardio [employer] won't send messages.

## 2020-05-18 NOTE — PROGRESS NOTES
Continued Stay Note   [place]     Patient Name: Rafael Mccann  MRN: [id number]  Today's Date: 5/18/2020    Admit Date: 5/16/2020    Discharge Plan     Row Name 05/18/20 1312       Plan    Plan Comments  Pt c/o [employer] and lack of correct supplies for cpap received from [facility] - continuing problem. CM  called Karissa at [facility] discussed problem. She said Elena should be able to go to home and look at equipment.  The pt said they always order the wrong supplies. Pt informed.                Expected Discharge Date and Time     Expected Discharge Date Expected Discharge Time    May 18, 2020             Hugo Cee RN

## 2020-05-18 NOTE — DISCHARGE SUMMARY
Date of Admission: 5/16/2020    Date of Discharge:  5/18/2020    Length of stay:  LOS: 0 days     Presenting Problem:   Cough [R05]  Exposure to SARS-associated coronavirus [Z20.828]  Hypokalemia [E87.6]  Dyspnea [R06.00]  Leukocytosis, unspecified type [D72.829]  Dyspnea, unspecified type [R06.00]  Chronic renal impairment, unspecified CKD stage [N18.9]  Acute on chronic congestive heart failure, unspecified heart failure type (CMS/HCC) [I50.9]      Principal and Active Diagnosis During Hospital Stay:     Active Hospital Problems    Diagnosis  POA   • **Systolic CHF, chronic (CMS/[facility]) [I50.22]  Yes   • Dyspnea [R06.00]  Yes   • CKD (chronic kidney disease) stage 2, GFR 60-89 ml/min [N18.2]  Yes   • S/P AVR (aortic valve replacement) [Z95.2]  Not Applicable   • S/P CABG x 3 [Z95.1]  Not Applicable   • GERD without esophagitis [K21.9]  Yes   • Hypothyroidism (acquired) [E03.9]  Yes   • Chest pain [R07.9]  Yes   • ICD (implantable cardioverter-defibrillator), dual, in situ [Z95.810]  Yes   • COPD (chronic obstructive pulmonary disease) (CMS/[facility]) [J44.9]  Yes   • Hypertension [I10]  Yes   • Essential hypertension [I10]  Yes      Resolved Hospital Problems   No resolved problems to display.       Acute on chronicHFrEF d/t ischemia CM and valvular disease   -CXR :No change from previous CXR  -Continue p.o. Bumex, Coreg, lisinopril  -AICD in situ  -given parameters and will take increased bumex dose over the next several days at home     COVID-19 -negative  -Elevated D Dimer: V/Q scan: indeterminate  -B/L venous dopplers:negative  -doubt PE and thus tx lovenox stopped      CKD stage II  -Creatinine 1.27  -Baseline 1.0-1.1  -Monitor creatinine  -BMP     Diabetes type 2  -Hold Glucophage  -Accu-Cheks AC  -Sliding scale insulin subcu as needed     Hypertension  -Continue clonidine, Norvasc, Coreg     COPD  -some wheezing on today's exam      Solumedrol  -Continue Singulair, albuterol inhaler as needed     CAD  -Continue  aspirin     GERD  -Continue PPI     Hypothyroidism  -Continue levothyroxine     Recent CABG with AVR (12/2019)      Hospital Course  Patient is a 45 y.o. female presented with above and was found to have acute on chronic heart failure as noted above. Was admitted and diuresed and did well. Was nearly back to baseline. She was felt stable to d/c home on slightly increased bumex dose and also would follow up with cardiology within the next few weeks. Was given parameters on how to take Extra bumex dose based on weight and swelling.         Procedures Performed:none         Consults:   Consults     Date and Time Order Name Status Description    5/16/2020 0205 Hospitalist (on-call MD unless specified) Completed           Pertinent Test Results:     Lab Results (last 72 hours)     Procedure Component Value Units Date/Time    POC Glucose Once [[id number]]  (Abnormal) Collected:  05/18/20 0717    Specimen:  Blood Updated:  05/18/20 0717     Glucose 161 mg/dL      Comment: Serial Number: [id number]:  [id number]       Basic Metabolic Panel [[id number]]  (Abnormal) Collected:  05/18/20 0336    Specimen:  Blood Updated:  05/18/20 0626     Glucose 137 mg/dL      BUN 16 mg/dL      Creatinine 1.32 mg/dL      Sodium 136 mmol/L      Potassium 3.9 mmol/L      Chloride 96 mmol/L      CO2 23.0 mmol/L      Calcium 9.5 mg/dL      eGFR  African Amer 52 mL/min/1.73      BUN/Creatinine Ratio 12.1     Anion Gap 17.0 mmol/L     Narrative:       GFR Normal >60  Chronic Kidney Disease <60  Kidney Failure <15      POC Glucose Once [[id number]]  (Abnormal) Collected:  05/17/20 1706    Specimen:  Blood Updated:  05/17/20 1708     Glucose 117 mg/dL      Comment: Serial Number: [id number]:  [id number]       POC Glucose Once [[id number]]  (Normal) Collected:  05/17/20 1150    Specimen:  Blood Updated:  05/17/20 1151     Glucose 104 mg/dL      Comment: Serial Number: [id number]:  [id number]       POC Glucose Once [[id number]]  (Abnormal)  Collected:  05/17/20 0721    Specimen:  Blood Updated:  05/17/20 0723     Glucose 132 mg/dL      Comment: Serial Number: [id number]:  [id number]       POC Glucose Once [[id number]]  (Abnormal) Collected:  05/16/20 1739    Specimen:  Blood Updated:  05/16/20 1740     Glucose 147 mg/dL      Comment: Serial Number: [id number]:  [id number]       SARS-CoV-2 PCR ([place] IN-HOU[facility] PERFORMED), NP SWAB IN TRANSPORT MEDIA - Swab, Nasopharynx [[id number]]  (Normal) Collected:  05/16/20 0107    Specimen:  Swab from Nasopharynx Updated:  05/16/20 1216     COVID19 Not Detected    POC Glucose Once [[id number]]  (Abnormal) Collected:  05/16/20 1112    Specimen:  Blood Updated:  05/16/20 1114     Glucose 171 mg/dL      Comment: Serial Number: [id number]:  [id number]       Basic Metabolic Panel [[id number]]  (Abnormal) Collected:  05/16/20 0741    Specimen:  Blood Updated:  05/16/20 0944     Glucose 98 mg/dL      BUN 10 mg/dL      Creatinine 1.27 mg/dL      Sodium 139 mmol/L      Potassium 3.4 mmol/L      Chloride 97 mmol/L      CO2 25.0 mmol/L      Calcium 9.2 mg/dL      eGFR  African Amer 55 mL/min/1.73      BUN/Creatinine Ratio 7.9     Anion Gap 17.0 mmol/L     Narrative:       GFR Normal >60  Chronic Kidney Disease <60  Kidney Failure <15      CK [[id number]]  (Abnormal) Collected:  05/16/20 0741    Specimen:  Blood Updated:  05/16/20 0944     Creatine Kinase 829 U/L     Magnesium [[id number]]  (Normal) Collected:  05/16/20 0741    Specimen:  Blood Updated:  05/16/20 0944     Magnesium 2.0 mg/dL     CBC Auto Differential [[id number]]  (Abnormal) Collected:  05/16/20 0741    Specimen:  Blood Updated:  05/16/20 0937     WBC 10.80 10*3/mm3      RBC 5.06 10*6/mm3      Hemoglobin 15.9 g/dL      Hematocrit 45.2 %      MCV 89.3 fL      MCH 31.4 pg      MCHC 35.1 g/dL      RDW 16.2 %      RDW-SD 51.2 fl      MPV 8.7 fL      Platelets 183 10*3/mm3      Neutrophil % 78.4 %      Lymphocyte % 14.3 %      Monocyte % 6.1 %       Eosinophil % 0.7 %      Basophil % 0.5 %      Neutrophils, Absolute 8.40 10*3/mm3      Lymphocytes, Absolute 1.50 10*3/mm3      Monocytes, Absolute 0.70 10*3/mm3      Eosinophils, Absolute 0.10 10*3/mm3      Basophils, Absolute 0.00 10*3/mm3      nRBC 0.1 /100 WBC     POC Glucose Once [[id number]]  (Normal) Collected:  05/16/20 0730    Specimen:  Blood Updated:  05/16/20 0730     Glucose 105 mg/dL      Comment: Serial Number: [id number]:  [id number]       D-dimer, Quantitative [[id number]]  (Abnormal) Collected:  05/16/20 0119    Specimen:  Blood from Arm, Right Updated:  05/16/20 0439     D-Dimer, Quantitative 0.69 MCGFEU/mL     Narrative:       Reference Range  --------------------------------------------------------------------     < 0.50   Negative Predictive Value  0.50-0.59   Indeterminate    >= 0.60   Probable VTE             A very low percentage of patients with DVT may yield D-Dimer results   below the cut-off of 0.50 MCGFEU/mL.  This is known to be more   prevalent in patients with distal DVT.             Results of this test should always be interpreted in conjunction with   the patient's medical history, clinical presentation and other   findings.  Clinical diagnosis should not be based on the result of   INNOVANCE D-Dimer alone.    Extra Tubes [[id number]] Collected:  05/16/20 0119    Specimen:  Blood from Arm, Right Updated:  05/16/20 0230    Narrative:       The following orders were created for panel order Extra Tubes.  Procedure                               Abnormality         Status                     ---------                               -----------         ------                     Gold Top - SST[[id number]]                                   Final result                 Please view results for these tests on the individual orders.    Gold Top - SST [[id number]] Collected:  05/16/20 0119    Specimen:  Blood from Arm, Right Updated:  05/16/20 0230     Extra Tube Hold for add-ons.      "Comment: Auto resulted.       Procalcitonin [[id number]]  (Abnormal) Collected:  05/16/20 0119    Specimen:  Blood from Arm, Right Updated:  05/16/20 0200     Procalcitonin 0.03 ng/mL     Narrative:       As a Marker for Sepsis (Non-Neonates):   1. <0.5 ng/mL represents a low risk of severe sepsis and/or septic shock.  1. >2 ng/mL represents a high risk of severe sepsis and/or septic shock.    As a Marker for Lower Respiratory Tract Infections that require antibiotic therapy:  PCT on Admission     Antibiotic Therapy             6-12 Hrs later  > 0.5                Strongly Recommended            >0.25 - <0.5         Recommended  0.1 - 0.25           Discouraged                   Remeasure/reassess PCT  <0.1                 Strongly Discouraged          Remeasure/reassess PCT      As 28 day mortality risk marker: \"Change in Procalcitonin Result\" (> 80 % or <=80 %) if Day 0 (or Day 1) and Day 4 values are available. Refer to http://[email or web address] [email or web address]pct-calculator.[email or web address]/   Change in PCT <=80 %   A decrease of PCT levels below or equal to 80 % defines a positive change in PCT test result representing a higher risk for 28-day all-cause mortality of patients diagnosed with severe sepsis or septic shock.  Change in PCT > 80 %   A decrease of PCT levels of more than 80 % defines a negative change in PCT result representing a lower risk for 28-day all-cause mortality of patients diagnosed with severe sepsis or septic shock.                Results may be falsely decreased if patient taking Biotin.     Ferritin [[id number]]  (Normal) Collected:  05/16/20 0119    Specimen:  Blood from Arm, Right Updated:  05/16/20 0159     Ferritin 99.26 ng/mL     Narrative:       Results may be falsely decreased if patient taking Biotin.      BNP [[id number]]  (Abnormal) Collected:  05/16/20 0119    Specimen:  Blood from Arm, Right Updated:  05/16/20 0159     proBNP 3,167.0 pg/mL     Narrative:       Among patients with dyspnea, NT-proBNP is highly " sensitive for the detection of acute congestive heart failure. In addition NT-proBNP of <300 pg/ml effectively rules out acute congestive heart failure with 99% negative predictive value.    Results may be falsely decreased if patient taking Biotin.      Troponin [[id number]]  (Normal) Collected:  05/16/20 0119    Specimen:  Blood from Arm, Right Updated:  05/16/20 0159     Troponin T <0.010 ng/mL     Narrative:       Troponin T Reference Range:  <= 0.03 ng/mL-   Negative for AMI  >0.03 ng/mL-     Abnormal for myocardial necrosis.  Clinicians would have to utilize clinical acumen, EKG, Troponin and serial changes to determine if it is an Acute Myocardial Infarction or myocardial injury due to an underlying chronic condition.       Results may be falsely decreased if patient taking Biotin.      Lactate Dehydrogenase [[id number]]  (Abnormal) Collected:  05/16/20 0119    Specimen:  Blood from Arm, Right Updated:  05/16/20 0158      U/L      Comment: Specimen hemolyzed.  Results may be affected.       Comprehensive Metabolic Panel [[id number]]  (Abnormal) Collected:  05/16/20 0119    Specimen:  Blood from Arm, Right Updated:  05/16/20 0157     Glucose 128 mg/dL      BUN 9 mg/dL      Creatinine 1.27 mg/dL      Sodium 141 mmol/L      Potassium 3.1 mmol/L      Chloride 101 mmol/L      CO2 24.0 mmol/L      Calcium 9.3 mg/dL      Total Protein 8.4 g/dL      Albumin 4.70 g/dL      ALT (SGPT) 22 U/L      AST (SGOT) 34 U/L      Alkaline Phosphatase 94 U/L      Total Bilirubin 0.6 mg/dL      eGFR  African Amer 55 mL/min/1.73      Globulin 3.7 gm/dL      A/G Ratio 1.3 g/dL      BUN/Creatinine Ratio 7.1     Anion Gap 16.0 mmol/L     Narrative:       GFR Normal >60  Chronic Kidney Disease <60  Kidney Failure <15      C-reactive Protein [[id number]]  (Abnormal) Collected:  05/16/20 0119    Specimen:  Blood from Arm, Right Updated:  05/16/20 0157     C-Reactive Protein 2.31 mg/dL     Protime-INR [[id number]]  (Normal) Collected:   05/16/20 0119    Specimen:  Blood from Arm, Right Updated:  05/16/20 0141     Protime 10.3 Seconds      INR 0.98    aPTT [[id number]]  (Normal) Collected:  05/16/20 0119    Specimen:  Blood from Arm, Right Updated:  05/16/20 0141     PTT 29.3 seconds     CBC & Differential [[id number]] Collected:  05/16/20 0119    Specimen:  Blood from Arm, Right Updated:  05/16/20 0133    Narrative:       The following orders were created for panel order CBC & Differential.  Procedure                               Abnormality         Status                     ---------                               -----------         ------                     CBC Auto Differential[[id number]]        Abnormal            Final result                 Please view results for these tests on the individual orders.    CBC Auto Differential [[id number]]  (Abnormal) Collected:  05/16/20 0119    Specimen:  Blood from Arm, Right Updated:  05/16/20 0133     WBC 11.20 10*3/mm3      RBC 4.90 10*6/mm3      Hemoglobin 15.8 g/dL      Hematocrit 43.8 %      MCV 89.4 fL      MCH 32.3 pg      MCHC 36.1 g/dL      RDW 16.1 %      RDW-SD 48.6 fl      MPV 8.2 fL      Platelets 189 10*3/mm3      Neutrophil % 78.4 %      Lymphocyte % 15.4 %      Monocyte % 5.1 %      Eosinophil % 0.6 %      Basophil % 0.5 %      Neutrophils, Absolute 8.80 10*3/mm3      Lymphocytes, Absolute 1.70 10*3/mm3      Monocytes, Absolute 0.60 10*3/mm3      Eosinophils, Absolute 0.10 10*3/mm3      Basophils, Absolute 0.10 10*3/mm3      nRBC 0.1 /100 WBC                Microbiology Results (last 10 days)     Procedure Component Value - Date/Time    SARS-CoV-2 PCR ([place] IN-HOU[facility] PERFORMED), NP SWAB IN TRANSPORT MEDIA - Swab, Nasopharynx [[id number]]  (Normal) Collected:  05/16/20 0107    Lab Status:  Final result Specimen:  Swab from Nasopharynx Updated:  05/16/20 1216     COVID19 Not Detected            Results for orders placed during the hospital encounter of 12/22/19   Adult Transthoracic Echo  Limited W/ Cont if Necessary Per Protocol    Narrative · Left ventricular wall thickness is consistent with mild concentric   hypertrophy.  · Estimated EF = 40%.  · Right ventricular cavity is mildly dilated.  · Mildly reduced right ventricular systolic function noted.          Imaging Results (All)     Procedure Component Value Units Date/Time    XR Chest 1 View [[id number]] Collected:  05/17/20 1404     Updated:  05/17/20 1407    Narrative:       XR CHEST 1 VW-     Date of Exam: 5/17/2020 1:59 PM     Indication: Chest tightness with cough.     Comparison Exams: 05/16/2020     Technique: Single AP chest radiograph     FINDINGS:  Median sternotomy wires appear intact. A left subclavian pacemaker is in  place. There is mild linear atelectasis within both mid lungs. The heart  and mediastinal contours appear stable. The pulmonary vasculature  appears normal.       Impression:       Mild linear atelectasis within both mid lungs, similar to prior exam.     Electronically Signed By-DR. Zak House MD On:5/17/2020 2:05 PM  This report was finalized on [id number] by DR. Zak House MD.    NM Lung Ventilation Perfusion Aerosol [[id number]] Collected:  05/16/20 1807     Updated:  05/16/20 1815    Narrative:       DATE OF EXAM:  5/16/2020 4:35 PM     PROCEDURE:  NM LUNG VENTILATION PERFUSION AEROSOL-     INDICATIONS:  Shortness of air, chest pain, elevated d-dimer     COMPARISON:  Chest radiograph dated 05/16/2020     TECHNIQUE:   The patient was ventilated with 40.3 mCi of technetium 99m pertechnetate  aerosol and ventilation images were acquired in multiple obliquities.  The patient then received 4.7 mCi of technetium 99m MAA intravenously  and perfusion images were acquired in multiple obliquities.     FINDINGS:  There is central deposition of ventilatory radiotracer within the  bilateral hilar regions, right greater than left. There is a small  amount of ingested radiotracer within the stomach. There is a  photopenic  defect on both the ventilation and perfusion images corresponding to the  left chest and patient's pacemaker. There are multiple wedge-shaped  areas of matched defects involving the anterior left lung and the  anterior and posterior aspect of the right midlung. These are best  visualized on the right lateral and left lateral views. These appear to  correspond to the same areas as patient's bandlike atelectasis on the  chest radiograph. There is enlargement of the cardiac silhouette.       Impression:       Intermediate probability study for pulmonary embolism. Multiple  wedge-shaped matched areas of both decreased ventilation and perfusion  which appeared to correspond to the areas of atelectasis on the chest  radiograph.     Electronically Signed By-Adilson Pruitt On:5/16/2020 6:12 PM  This report was finalized on [id number] by  Adilson Pruitt, .    XR Chest AP [[id number]] Collected:  05/16/20 0644     Updated:  05/16/20 0649    Narrative:       DATE OF EXAM:  5/16/2020 2:10 AM     PROCEDURE:  XR CHEST AP-     INDICATIONS:  COVID-19 Evaluation, Cough, Fever; R06.00-Dyspnea, unspecified;  R05-Cough; I50.9-Heart failure, unspecified; N18.9-Chronic kidney  disease, unspecified; D72.829-Elevated white blood cell count,  unspecified; Z20.828-Contact with and (suspected) exposure to other  viral communicable diseases.     COMPARISON:  01/06/2020.     TECHNIQUE:   A radiologic portable AP view of the chest was obtained.     FINDINGS:  A single AP upright portable chest radiograph reveals mild atelectasis  and/or infiltrate in the mid to basilar left lung with atelectasis  favored. There may be minimal linear atelectasis in the mid right lung.  Mild cardiomegaly is seen, as before. The patient has undergone median  sternotomy and suspected CABG surgery. An aortic valve replacement is  suspected, as well. There is a left-sided cardiac implantable electronic  device (CIED) in place, seen previously. No  pneumothorax is suggested.  There has been interval improvement in aeration of the lung bases with  decrease in (if not complete resolution of) the bilateral pleural  effusions since 01/06/2020.           Impression:       Mild bilateral atelectasis is favored over bilateral infiltrates.      There is mild cardiomegaly.      Please see above comments for further detail.     Electronically Signed By-Dr. Asim Hilton MD On:5/16/2020 6:47 AM  This report was finalized on [id number] by Dr. Asim Hilton MD.            Condition on Discharge:  Stable     Vital Signs  Temp:  [97.3 °F (36.3 °C)-98.4 °F (36.9 °C)] 98.4 °F (36.9 °C)  Heart Rate:  [60-81] 67  Resp:  [16-18] 18  BP: (102-156)/(34-94) 156/87    Physical Exam:  Physical Exam    Discharge Disposition  Home or Self Care    Discharge Medications     Discharge Medications      Changes to Medications      Instructions Start Date   bumetanide 0.5 MG tablet  Commonly known as:  BUMEX  What changed:  how much to take   0.5 mg, Oral, Daily      docusate sodium 100 MG capsule  What changed:    · when to take this  · reasons to take this   100 mg, Oral, 2 Times Daily         Continue These Medications      Instructions Start Date   allopurinol 100 MG tablet  Commonly known as:  ZYLOPRIM   200 mg, Oral, Daily      ALPRAZolam 1 MG tablet  Commonly known as:  XANAX   1 mg, Oral, 4 Times Daily PRN      amLODIPine 5 MG tablet  Commonly known as:  NORVASC   10 mg, Oral, Daily      aspirin 325 MG tablet   325 mg, Oral, Daily      carvedilol 25 MG tablet  Commonly known as:  COREG   25 mg, Oral, 2 Times Daily With Meals      cholecalciferol 25 MCG (1000 UT) tablet  Commonly known as:  VITAMIN D3   1,000 Units, Oral, Daily      cloNIDine 0.1 MG tablet  Commonly known as:  CATAPRES   0.1 mg, Oral, 2 Times Daily      doxycycline 100 MG tablet  Commonly known as:  ADOXA   100 mg, Oral, Daily      ferrous sulfate 325 (65 FE) MG tablet   65 mg, Oral, Daily With Breakfast       folic acid 1 MG tablet  Commonly known as:  FOLVITE   1 mg, Oral, Daily      gabapentin 300 MG capsule  Commonly known as:  NEURONTIN   300 mg, Oral, Daily PRN      hydrALAZINE 50 MG tablet  Commonly known as:  APRESOLINE   100 mg, Oral, 3 Times Daily      levothyroxine 200 MCG tablet  Commonly known as:  SYNTHROID, LEVOTHROID   200 mcg, Oral, Daily      lisinopril 40 MG tablet  Commonly known as:  PRINIVIL,ZESTRIL   40 mg, Oral, Daily      metFORMIN 500 MG tablet  Commonly known as:  GLUCOPHAGE   500 mg, Oral, Daily With Breakfast      montelukast 10 MG tablet  Commonly known as:  SINGULAIR   10 mg, Oral, Nightly      oxyCODONE-acetaminophen 7.5-325 MG per tablet  Commonly known as:  PERCOCET   1 tablet, Oral, Every 6 Hours PRN, May resume after postop oxycodone prescription is completed.      pantoprazole 40 MG EC tablet  Commonly known as:  PROTONIX   40 mg, Oral, Daily      VITAMIN B 12 PO   Oral             Discharge Diet:   Diet Instructions     Diet: Consistent Carbohydrate, Cardiac, Specialty Diet; Thin Liquids, No Restrictions; Low Sodium      Discharge Diet:   Consistent Carbohydrate  Cardiac  Specialty Diet       Fluid Consistency:  Thin Liquids, No Restrictions    Specialty Diets:  Low Sodium          Activity at Discharge:     Follow-up Appointments  Future Appointments   Date Time Provider Department Center   5/22/2020  9:15 AM Wayne Luna MD [facility] None   6/22/2020 11:15 AM Héctor Eckert MD [facility] None     Additional Instructions for the Follow-ups that You Need to Schedule     Discharge Follow-up with PCP   As directed       Currently Documented PCP:    Phani Adames MD    PCP Phone Number:    [phone]     Follow Up Details:  one week         Discharge Follow-up with Specified Provider: cardiology; 2 Weeks   As directed      To:  cardiology    Follow Up:  2 Weeks               Test Results Pending at Discharge       Risk for Readmission (LACE) Score: 10  (5/18/2020  6:00 AM)      This patient has been examined wearing appropriate Personal Protective Equipment . 05/18/20        Lion Mcmahan DO  05/18/20  09:47

## 2020-05-19 ENCOUNTER — READMISSION MANAGEMENT (OUTPATIENT)
Dept: CALL CENTER | Facility: HOSPITAL | Age: 45
End: 2020-05-19

## 2020-05-19 NOTE — OUTREACH NOTE
CHF Week 1 Survey      Responses   [facility] patient discharged from?  [facility]   Does the patient have one of the following disease processes/diagnoses(primary or secondary)?  CHF   Is there a successful TCM telephone encounter documented?  No   CHF Week 1 attempt successful?  No   Unsuccessful attempts  Attempt 1          Chelsey Sims RN

## 2020-05-19 NOTE — OUTREACH NOTE
Prep Survey      Responses   [facility]?  [facility]   Is LACE score < 7 ?  No   Eligibility  Readm Mgmt   Discharge diagnosis  A/C CHF, CKD, T2DM,   COVID-19 Test Status  Negative   Does the patient have one of the following disease processes/diagnoses(primary or secondary)?  CHF   Does the patient have Home health ordered?  No   Is there a DME ordered?  No   Prep survey completed?  Yes          Gilma Gaytan RN

## 2020-05-21 ENCOUNTER — READMISSION MANAGEMENT (OUTPATIENT)
Dept: CALL CENTER | Facility: HOSPITAL | Age: 45
End: 2020-05-21

## 2020-05-21 NOTE — OUTREACH NOTE
CHF Week 1 Survey      Responses   [facility] patient discharged from?  [place]   Does the patient have one of the following disease processes/diagnoses(primary or secondary)?  CHF   Is there a successful TCM telephone encounter documented?  No   CHF Week 1 attempt successful?  Yes   Call start time  1319   Call end time  1322   Meds reviewed with patient/caregiver?  Yes   Does the patient have all medications ordered at discharge?  N/A   Does the patient have an appointment with their PCP within 7 days of discharge?  Greater than 7 days   Comments regarding PCP  PATIENT HAS AN APPOINTMENT WITH DR. READ TOMORROW, AND DR. JACQUES IN 2 WEEKS.    What is preventing the patient from scheduling follow up appointments within 7 days of discharge?  Earlier appointment not available   Nursing Interventions  Verified appointment date/time/provider   Has the patient kept scheduled appointments due by today?  N/A   Has home health visited the patient within 72 hours of discharge?  N/A   Did the patient receive a copy of their discharge instructions?  Yes   Nursing interventions  Reviewed instructions with patient   What is the patient's perception of their health status since discharge?  Improving   Nursing interventions  Nurse provided patient education   Is the patient weighing daily?  Yes   Does the patient have scales?  Yes   Daily weight interventions  Education provided on importance of daily weight   Is the patient able to teach back Heart Failure diet management?  Yes   Is the patient able to teach back Heart Failure Zones?  Yes   Is the patient able to teach back signs and symptoms of worsening condition? (i.e. weight gain, shortness of air, etc.)  Yes   Additional teach back comments  PATIENT STATES HER DAUGHTER, THAT LIVES WITH HER, IS AN ESSENTIAL WORKER, AND IS NOW BEING TESTED FOR COVID 19. PATIENT STATES SHE IS QUARANTINED FROM HER DAUGHTER IN THE HOME. QUARANTINE ADVICE AND GOOD HAND WASHING REITERATED.      CHF Week 1 call completed?  Yes          Jacquelyn Varela LPN

## 2020-05-22 ENCOUNTER — OFFICE VISIT (OUTPATIENT)
Dept: CARDIOLOGY | Facility: CLINIC | Age: 45
End: 2020-05-22

## 2020-05-22 VITALS
WEIGHT: 200 LBS | RESPIRATION RATE: 18 BRPM | HEIGHT: 67 IN | BODY MASS INDEX: 31.39 KG/M2 | OXYGEN SATURATION: 99 % | DIASTOLIC BLOOD PRESSURE: 96 MMHG | HEART RATE: 79 BPM | SYSTOLIC BLOOD PRESSURE: 140 MMHG

## 2020-05-22 DIAGNOSIS — I25.119 CORONARY ARTERY DISEASE INVOLVING NATIVE CORONARY ARTERY OF NATIVE HEART WITH ANGINA PECTORIS (HCC): ICD-10-CM

## 2020-05-22 DIAGNOSIS — I35.1 NONRHEUMATIC AORTIC VALVE INSUFFICIENCY: ICD-10-CM

## 2020-05-22 DIAGNOSIS — I20.0 UNSTABLE ANGINA PECTORIS (HCC): ICD-10-CM

## 2020-05-22 DIAGNOSIS — Z95.2 S/P AVR (AORTIC VALVE REPLACEMENT): ICD-10-CM

## 2020-05-22 DIAGNOSIS — I10 ESSENTIAL HYPERTENSION: ICD-10-CM

## 2020-05-22 DIAGNOSIS — I50.22 SYSTOLIC CHF, CHRONIC (HCC): Primary | Chronic | ICD-10-CM

## 2020-05-22 PROCEDURE — 99214 OFFICE O/P EST MOD 30 MIN: CPT | Performed by: INTERNAL MEDICINE

## 2020-05-22 RX ORDER — BUMETANIDE 1 MG/1
1 TABLET ORAL DAILY
Qty: 90 TABLET | Refills: 3 | Status: SHIPPED | OUTPATIENT
Start: 2020-05-22 | End: 2021-01-23 | Stop reason: HOSPADM

## 2020-05-22 NOTE — PROGRESS NOTES
Cardiology Office Visit      Encounter Date:  05/22/2020    Patient ID:   Rafael Mccann is a 45 y.o. female.      Reason For Followup:  Cardiomyopathy  Hypertension  Hyperlipidemia  Valvular heart disease  Recent hospitalization for congestive heart failure      Brief Clinical History:  Dear Dr. Adames, Phani Ennis MD    I had the pleasure of seeing Rafael Mccann today. As you are well aware, this is a 45 y.o. female with a past medical history that is significant for cardiomyopathy and valvular heart disease         Interval History:  Recent hospitalization for congestive heart failure  Patient is clinically doing better without any new cardiac complaints   Denies any chest pain  Dose of breath is back to baseline    Catheterization  Severe two-vessel coronary artery disease  Moderate stenosis involving the distal LAD  4+ aortic regurgitation  Normal PA pressures  Normal filling pressures    Assessment & Plan    Impressions:  Essential hypertension  Chronic systolic heart failure  History of cardiomyopathy   Chronic renal insufficiency  Coronary artery disease   History of diabetes mellitus type 2  History of thyroid disease  Moderate to severe mitral regurgitation  Moderate to severe aortic regurgitation  Cardiomyopathy with LV ejection fraction of 35%  s/p AICD placement/normal device function  s/p urgent AVR (21 mm Magna), CABG x3 with SV to Diag1 and SV sequential to PDA and then OM3 12/27/2019  Status post coronary artery bypass surgery x3 and aortic valve replacement surgery  Congestive heart failure NYHA class II    Recommendations:  Repeat echocardiogram to reassess the LV systolic function   Increase the dose of Bumex to 1 mg twice a day for 2 days and then go back to 1 mg p.o. once a day   Need for close monitoring and follow-up discussed with the patient  Home blood pressure monitoring and daily weight  Medication refills  Labs and medications from recent hospitalization reviewed and  "discussed with the patient  Low-salt diet  Follow-up in office in 3 months    Objective:    Vitals:  Vitals:    05/22/20 0952   BP: 140/96   BP Location: Left arm   Pulse: 79   Resp: 18   SpO2: 99%   Weight: 90.7 kg (200 lb)   Height: 170.2 cm (67\")       Physical Exam:    General: Alert, cooperative, no distress, appears stated age  Head:  Normocephalic, atraumatic, mucous membranes moist  Eyes:  Conjunctiva/corneas clear, EOM's intact     Neck:  Supple,  no adenopathy;      Lungs: Clear to auscultation bilaterally, no wheezes rhonchi rales are noted  Chest wall: No tenderness  Heart::  Regular rate and rhythm, S1 and S2 normal, displaced LV apical impulse  Abdomen: Soft, non-tender, nondistended bowel sounds active  Extremities: No cyanosis, clubbing, or edema  Pulses: 2+ and symmetric all extremities  Skin:  No rashes or lesions  Neuro/psych: A&O x3. CN II through XII are grossly intact with appropriate affect      Allergies:  Allergies   Allergen Reactions   • Hydrocodone Hives   • Penicillin G Unknown (See Comments)       Medication Review:     Current Outpatient Medications:   •  allopurinol (ZYLOPRIM) 100 MG tablet, Take 2 tablets by mouth Daily., Disp: 30 tablet, Rfl: 1  •  ALPRAZolam (XANAX) 1 MG tablet, Take 1 mg by mouth 3 (Three) Times a Day As Needed for Anxiety., Disp: , Rfl:   •  amLODIPine (NORVASC) 5 MG tablet, Take 2 tablets by mouth Daily., Disp: 30 tablet, Rfl: 3  •  aspirin 325 MG tablet, Take 1 tablet by mouth Daily., Disp: 30 tablet, Rfl: 3  •  bumetanide (BUMEX) 0.5 MG tablet, Take 1 tablet by mouth Daily. (Patient taking differently: Take 1 mg by mouth Daily.), Disp: 30 tablet, Rfl: 1  •  carvedilol (COREG) 25 MG tablet, Take 1 tablet by mouth 2 (Two) Times a Day With Meals., Disp: 180 tablet, Rfl: 2  •  cholecalciferol (VITAMIN D3) 1000 units tablet, Take 1,000 Units by mouth Daily., Disp: , Rfl:   •  cloNIDine (CATAPRES) 0.1 MG tablet, Take 0.1 mg by mouth 2 (Two) Times a Day., Disp: , " Rfl:   •  Cyanocobalamin (VITAMIN B 12 PO), Take  by mouth., Disp: , Rfl:   •  docusate sodium 100 MG capsule, Take 100 mg by mouth 2 (Two) Times a Day. (Patient taking differently: Take 100 mg by mouth 2 (Two) Times a Day As Needed.), Disp: 60 each, Rfl: 1  •  doxycycline (ADOXA) 100 MG tablet, Take 1 tablet by mouth Daily., Disp: 5 tablet, Rfl: 0  •  ferrous sulfate 325 (65 FE) MG tablet, Take 65 mg by mouth Daily With Breakfast., Disp: , Rfl:   •  folic acid (FOLVITE) 1 MG tablet, Take 1 mg by mouth Daily., Disp: , Rfl:   •  hydrALAZINE (APRESOLINE) 50 MG tablet, Take 100 mg by mouth 3 (Three) Times a Day., Disp: , Rfl:   •  levothyroxine (SYNTHROID, LEVOTHROID) 200 MCG tablet, Take 200 mcg by mouth Daily., Disp: , Rfl:   •  lisinopril (PRINIVIL,ZESTRIL) 40 MG tablet, Take 40 mg by mouth Daily., Disp: , Rfl:   •  metFORMIN (GLUCOPHAGE) 500 MG tablet, Take 500 mg by mouth Daily With Breakfast., Disp: , Rfl:   •  montelukast (SINGULAIR) 10 MG tablet, Take 10 mg by mouth Every Night., Disp: , Rfl:   •  oxyCODONE-acetaminophen (PERCOCET) 7.5-325 MG per tablet, Take 1 tablet by mouth Every 6 (Six) Hours As Needed for Severe Pain . May resume after postop oxycodone prescription is completed., Disp: , Rfl:   •  pantoprazole (PROTONIX) 40 MG EC tablet, Take 40 mg by mouth Daily., Disp: , Rfl:   •  gabapentin (NEURONTIN) 300 MG capsule, Take 300 mg by mouth Daily As Needed., Disp: , Rfl:     Family History:  Family History   Problem Relation Age of Onset   • Heart disease Father        Past Medical History:  Past Medical History:   Diagnosis Date   • CHF (congestive heart failure) (CMS/HCC)    • COPD (chronic obstructive pulmonary disease) (CMS/HCC)    • Diabetes (CMS/HCC)    • Hyperlipidemia    • Hypertension        Past surgical History:  Past Surgical History:   Procedure Laterality Date   • AORTIC VALVE REPAIR/REPLACEMENT N/A 12/27/2019    Procedure: AORTIC VALVE REPAIR/REPLACEMENT;  Surgeon: Lane Stock MD;   Location: [facility];  Service: Cardiothoracic   • BREAST LUMPECTOMY     • CARDIAC CATHETERIZATION N/A 2019    Procedure: Right and Left Heart Cath 19 @ 0900;  Surgeon: Wayne Luna MD;  Location: [facility] CATH INVASIVE LOCATION;  Service: Cardiovascular   • CARDIAC CATHETERIZATION N/A 2019    Procedure: Coronary angiography;  Surgeon: Wayne Luna MD;  Location: [facility] CATH INVASIVE LOCATION;  Service: Cardiovascular   • CARDIAC ELECTROPHYSIOLOGY PROCEDURE Left 2019    Procedure: Dual-chamber ICD insertion;  Surgeon: Héctor Eckert MD;  Location: [facility] CATH INVASIVE LOCATION;  Service: Cardiovascular   • CARDIAC ELECTROPHYSIOLOGY PROCEDURE Left 2019    Procedure: Lead Revision;  Surgeon: Héctor Eckert MD;  Location: [facility] CATH INVASIVE LOCATION;  Service: Cardiovascular   • CHOLECYSTECTOMY     • CORONARY ARTERY BYPASS GRAFT N/A 2019    Procedure: CORONARY ARTERY BYPASS GRAFTING;  Surgeon: Lane Stock MD;  Location: [facility];  Service: Cardiothoracic   • HYSTERECTOMY     • INSERT / REPLACE / REMOVE PACEMAKER     • THYROID SURGERY         Social History:  Social History     Socioeconomic History   • Marital status:      Spouse name: Not on file   • Number of children: Not on file   • Years of education: Not on file   • Highest education level: Not on file   Tobacco Use   • Smoking status: Former Smoker     Last attempt to quit: 2019     Years since quittin.3   • Smokeless tobacco: Never Used   Substance and Sexual Activity   • Alcohol use: No     Frequency: Never   • Drug use: No   • Sexual activity: Defer       Review of Systems:  The following systems were reviewed as they relate to the cardiovascular system: Constitutional, Eyes, ENT, Cardiovascular, Respiratory, Gastrointestinal, Integumentary, Neurological, Psychiatric, Hematologic, Endocrine, Musculoskeletal, and Genitourinary. The pertinent cardiovascular findings are  reported above with all other cardiovascular points within those systems being negative.    Diagnostic Study Review:     Current Electrocardiogram:  Procedures      NOTE: The following portions of the patient's history were reviewed and updated this visit as appropriate: allergies, current medications, past family history, past medical history, past social history, past surgical history and problem list.

## 2020-05-27 ENCOUNTER — READMISSION MANAGEMENT (OUTPATIENT)
Dept: CALL CENTER | Facility: HOSPITAL | Age: 45
End: 2020-05-27

## 2020-05-27 NOTE — OUTREACH NOTE
CHF Week 2 Survey      Responses   [facility] patient discharged from?  [place]   Does the patient have one of the following disease processes/diagnoses(primary or secondary)?  CHF   Week 2 attempt successful?  No   Unsuccessful attempts  Attempt 1          Martha Mosquera RN

## 2020-05-29 ENCOUNTER — READMISSION MANAGEMENT (OUTPATIENT)
Dept: CALL CENTER | Facility: HOSPITAL | Age: 45
End: 2020-05-29

## 2020-05-29 NOTE — OUTREACH NOTE
CHF Week 2 Survey      Responses   [facility] patient discharged from?  [place]   Does the patient have one of the following disease processes/diagnoses(primary or secondary)?  CHF   Week 2 attempt successful?  No   Unsuccessful attempts  Attempt 2          Satya Olvera RN

## 2020-06-03 ENCOUNTER — READMISSION MANAGEMENT (OUTPATIENT)
Dept: CALL CENTER | Facility: HOSPITAL | Age: 45
End: 2020-06-03

## 2020-06-03 NOTE — OUTREACH NOTE
CHF Week 3 Survey      Responses   [facility] patient discharged from?  [place]   Does the patient have one of the following disease processes/diagnoses(primary or secondary)?  CHF   Week 3 attempt successful?  No   Unsuccessful attempts  Attempt 1          Veronica Negron RN

## 2020-06-05 ENCOUNTER — READMISSION MANAGEMENT (OUTPATIENT)
Dept: CALL CENTER | Facility: HOSPITAL | Age: 45
End: 2020-06-05

## 2020-06-05 NOTE — OUTREACH NOTE
CHF Week 3 Survey      Responses   [facility] patient discharged from?  [place]   Does the patient have one of the following disease processes/diagnoses(primary or secondary)?  CHF   Week 3 attempt successful?  No   Unsuccessful attempts  Attempt 2          Romina Harper RN

## 2020-06-30 ENCOUNTER — OFFICE VISIT (OUTPATIENT)
Dept: CARDIOLOGY | Facility: CLINIC | Age: 45
End: 2020-06-30

## 2020-06-30 VITALS
HEART RATE: 85 BPM | BODY MASS INDEX: 31.33 KG/M2 | HEIGHT: 67 IN | SYSTOLIC BLOOD PRESSURE: 165 MMHG | WEIGHT: 199.6 LBS | RESPIRATION RATE: 18 BRPM | OXYGEN SATURATION: 98 % | DIASTOLIC BLOOD PRESSURE: 125 MMHG

## 2020-06-30 DIAGNOSIS — I50.22 SYSTOLIC CHF, CHRONIC (HCC): Primary | Chronic | ICD-10-CM

## 2020-06-30 DIAGNOSIS — I25.119 CORONARY ARTERY DISEASE INVOLVING NATIVE CORONARY ARTERY OF NATIVE HEART WITH ANGINA PECTORIS (HCC): ICD-10-CM

## 2020-06-30 DIAGNOSIS — I20.0 UNSTABLE ANGINA PECTORIS (HCC): ICD-10-CM

## 2020-06-30 DIAGNOSIS — Z95.810 ICD (IMPLANTABLE CARDIOVERTER-DEFIBRILLATOR), DUAL, IN SITU: ICD-10-CM

## 2020-06-30 DIAGNOSIS — I35.1 NONRHEUMATIC AORTIC VALVE INSUFFICIENCY: ICD-10-CM

## 2020-06-30 PROCEDURE — 99214 OFFICE O/P EST MOD 30 MIN: CPT | Performed by: INTERNAL MEDICINE

## 2020-06-30 RX ORDER — CLONIDINE HYDROCHLORIDE 0.1 MG/1
0.2 TABLET ORAL 2 TIMES DAILY
Qty: 60 TABLET | Refills: 2 | Status: SHIPPED | OUTPATIENT
Start: 2020-06-30 | End: 2020-07-17 | Stop reason: SDUPTHER

## 2020-06-30 NOTE — PROGRESS NOTES
Cardiology Office Visit      Encounter Date:  06/30/2020    Patient ID:   Rafael Mccann is a 45 y.o. female.    Reason For Followup:  Cardiomyopathy  Hypertension  Hyperlipidemia  Valvular heart disease  Recent hospitalization for congestive heart failure      Brief Clinical History:  Dear Dr. Adames, Phani Ennis MD    I had the pleasure of seeing Rafael Mccann today. As you are well aware, this is a 45 y.o. female with a past medical history that is significant for cardiomyopathy and valvular heart disease         Interval History:  Complaining of increasing shortness of breath and weight gain  Blood pressure is elevated at home    Catheterization  Severe two-vessel coronary artery disease  Moderate stenosis involving the distal LAD  4+ aortic regurgitation  Normal PA pressures  Normal filling pressures    Assessment & Plan    Impressions:  Essential hypertension  Chronic systolic heart failure  History of cardiomyopathy   Chronic renal insufficiency  Coronary artery disease   History of diabetes mellitus type 2  History of thyroid disease  Moderate to severe mitral regurgitation  Moderate to severe aortic regurgitation  Cardiomyopathy with LV ejection fraction of 35%  s/p AICD placement/normal device function  s/p urgent AVR (21 mm Magna), CABG x3 with SV to Diag1 and SV sequential to PDA and then OM3 12/27/2019  Status post coronary artery bypass surgery x3 and aortic valve replacement surgery  Congestive heart failure NYHA class II    Recommendations:  Repeat echocardiogram to reassess the LV systolic function   Increase the dose of Bumex to 1 mg twice a day for 2 days and then go back to 1 mg p.o. once a day   Increase clonidine 0.2 mg p.o. twice daily  Close monitoring of blood pressure at home  Need for close monitoring and follow-up discussed with the patient  Home blood pressure monitoring and daily weight  Medication refills  Labs and medications from recent hospitalization reviewed and  "discussed with the patient  Low-salt diet  Follow-up in office in 3 months  Objective:    Vitals:  Vitals:    06/30/20 1323   BP: (!) 165/125   BP Location: Left arm   Pulse: 85   Resp: 18   SpO2: 98%   Weight: 90.5 kg (199 lb 9.6 oz)   Height: 170.2 cm (67\")       Physical Exam:    General: Alert, cooperative, no distress, appears stated age  Head:  Normocephalic, atraumatic, mucous membranes moist  Eyes:  Conjunctiva/corneas clear, EOM's intact     Neck:  Supple,  no adenopathy;      Lungs: Clear to auscultation bilaterally, no wheezes rhonchi rales are noted  Chest wall: No tenderness  Heart::  Regular rate and rhythm, S1 and S2 normal, displaced LV apical impulse 2 x 6 ejection systolic murmur  Abdomen: Soft, non-tender, nondistended bowel sounds active  Extremities: No cyanosis, clubbing, or edema  Pulses: 2+ and symmetric all extremities  Skin:  No rashes or lesions  Neuro/psych: A&O x3. CN II through XII are grossly intact with appropriate affect      Allergies:  Allergies   Allergen Reactions   • Hydrocodone Hives   • Penicillin G Unknown (See Comments)       Medication Review:     Current Outpatient Medications:   •  allopurinol (ZYLOPRIM) 100 MG tablet, Take 2 tablets by mouth Daily., Disp: 30 tablet, Rfl: 1  •  ALPRAZolam (XANAX) 1 MG tablet, Take 1 mg by mouth 3 (Three) Times a Day As Needed for Anxiety., Disp: , Rfl:   •  amLODIPine (NORVASC) 5 MG tablet, Take 2 tablets by mouth Daily., Disp: 30 tablet, Rfl: 3  •  aspirin 325 MG tablet, Take 1 tablet by mouth Daily., Disp: 30 tablet, Rfl: 3  •  bumetanide (BUMEX) 1 MG tablet, Take 1 tablet by mouth Daily., Disp: 90 tablet, Rfl: 3  •  carvedilol (COREG) 25 MG tablet, Take 1 tablet by mouth 2 (Two) Times a Day With Meals., Disp: 180 tablet, Rfl: 2  •  cholecalciferol (VITAMIN D3) 1000 units tablet, Take 1,000 Units by mouth Daily., Disp: , Rfl:   •  cloNIDine (CATAPRES) 0.1 MG tablet, Take 0.1 mg by mouth 2 (Two) Times a Day., Disp: , Rfl:   •  " Cyanocobalamin (VITAMIN B 12 PO), Take  by mouth., Disp: , Rfl:   •  docusate sodium 100 MG capsule, Take 100 mg by mouth 2 (Two) Times a Day. (Patient taking differently: Take 100 mg by mouth 2 (Two) Times a Day As Needed.), Disp: 60 each, Rfl: 1  •  doxycycline (ADOXA) 100 MG tablet, Take 1 tablet by mouth Daily., Disp: 5 tablet, Rfl: 0  •  ferrous sulfate 325 (65 FE) MG tablet, Take 65 mg by mouth Daily With Breakfast., Disp: , Rfl:   •  folic acid (FOLVITE) 1 MG tablet, Take 1 mg by mouth Daily., Disp: , Rfl:   •  gabapentin (NEURONTIN) 300 MG capsule, Take 300 mg by mouth Daily As Needed., Disp: , Rfl:   •  hydrALAZINE (APRESOLINE) 50 MG tablet, Take 100 mg by mouth 3 (Three) Times a Day., Disp: , Rfl:   •  levothyroxine (SYNTHROID, LEVOTHROID) 200 MCG tablet, Take 200 mcg by mouth Daily., Disp: , Rfl:   •  lisinopril (PRINIVIL,ZESTRIL) 40 MG tablet, Take 40 mg by mouth Daily., Disp: , Rfl:   •  metFORMIN (GLUCOPHAGE) 500 MG tablet, Take 500 mg by mouth Daily With Breakfast., Disp: , Rfl:   •  montelukast (SINGULAIR) 10 MG tablet, Take 10 mg by mouth Every Night., Disp: , Rfl:   •  oxyCODONE-acetaminophen (PERCOCET) 7.5-325 MG per tablet, Take 1 tablet by mouth Every 6 (Six) Hours As Needed for Severe Pain . May resume after postop oxycodone prescription is completed., Disp: , Rfl:   •  pantoprazole (PROTONIX) 40 MG EC tablet, Take 40 mg by mouth Daily., Disp: , Rfl:     Family History:  Family History   Problem Relation Age of Onset   • Heart disease Father        Past Medical History:  Past Medical History:   Diagnosis Date   • CHF (congestive heart failure) (CMS/HCC)    • COPD (chronic obstructive pulmonary disease) (CMS/HCC)    • Diabetes (CMS/HCC)    • Hyperlipidemia    • Hypertension        Past surgical History:  Past Surgical History:   Procedure Laterality Date   • AORTIC VALVE REPAIR/REPLACEMENT N/A 12/27/2019    Procedure: AORTIC VALVE REPAIR/REPLACEMENT;  Surgeon: Lane Stock MD;  Location:  [facility] CVOR;  Service: Cardiothoracic   • BREAST LUMPECTOMY     • CARDIAC CATHETERIZATION N/A 2019    Procedure: Right and Left Heart Cath 19 @ 0900;  Surgeon: Wayne Luna MD;  Location: [facility] CATH INVASIVE LOCATION;  Service: Cardiovascular   • CARDIAC CATHETERIZATION N/A 2019    Procedure: Coronary angiography;  Surgeon: Wayne Luna MD;  Location: [facility] CATH INVASIVE LOCATION;  Service: Cardiovascular   • CARDIAC ELECTROPHYSIOLOGY PROCEDURE Left 2019    Procedure: Dual-chamber ICD insertion;  Surgeon: Héctor Eckert MD;  Location: [facility] CATH INVASIVE LOCATION;  Service: Cardiovascular   • CARDIAC ELECTROPHYSIOLOGY PROCEDURE Left 2019    Procedure: Lead Revision;  Surgeon: Héctor Eckert MD;  Location: [facility] CATH INVASIVE LOCATION;  Service: Cardiovascular   • CHOLECYSTECTOMY     • CORONARY ARTERY BYPASS GRAFT N/A 2019    Procedure: CORONARY ARTERY BYPASS GRAFTING;  Surgeon: Lane Stock MD;  Location: [facility] CVOR;  Service: Cardiothoracic   • HYSTERECTOMY     • INSERT / REPLACE / REMOVE PACEMAKER     • THYROID SURGERY         Social History:  Social History     Socioeconomic History   • Marital status:      Spouse name: Not on file   • Number of children: Not on file   • Years of education: Not on file   • Highest education level: Not on file   Tobacco Use   • Smoking status: Former Smoker     Last attempt to quit: 2019     Years since quittin.4   • Smokeless tobacco: Never Used   Substance and Sexual Activity   • Alcohol use: No     Frequency: Never   • Drug use: No   • Sexual activity: Defer       Review of Systems:  The following systems were reviewed as they relate to the cardiovascular system: Constitutional, Eyes, ENT, Cardiovascular, Respiratory, Gastrointestinal, Integumentary, Neurological, Psychiatric, Hematologic, Endocrine, Musculoskeletal, and Genitourinary. The pertinent cardiovascular findings are reported  above with all other cardiovascular points within those systems being negative.    Diagnostic Study Review:     Current Electrocardiogram:  Procedures      NOTE: The following portions of the patient's history were reviewed and updated this visit as appropriate: allergies, current medications, past family history, past medical history, past social history, past surgical history and problem list.

## 2020-07-02 ENCOUNTER — TELEPHONE (OUTPATIENT)
Dept: CARDIAC REHAB | Facility: HOSPITAL | Age: 45
End: 2020-07-02

## 2020-07-02 NOTE — TELEPHONE ENCOUNTER
07/02/2020- attempting to reach pt to discuss CR since [facility] has been closed from 03/13/2020 secondary to the covid 19 pandemic . Unable to reach pt on the only phone number working- line keeps ringing...( [facility] will reopen on July 6th). Pt had expressed an interest previously, but had been scheduled twice previously and did not come. TH

## 2020-07-15 ENCOUNTER — TELEPHONE (OUTPATIENT)
Dept: CARDIAC REHAB | Facility: HOSPITAL | Age: 45
End: 2020-07-15

## 2020-07-17 RX ORDER — CLONIDINE HYDROCHLORIDE 0.1 MG/1
0.2 TABLET ORAL 2 TIMES DAILY
Qty: 360 TABLET | Refills: 2 | Status: SHIPPED | OUTPATIENT
Start: 2020-07-17 | End: 2021-01-23 | Stop reason: HOSPADM

## 2020-07-21 ENCOUNTER — APPOINTMENT (OUTPATIENT)
Dept: CT IMAGING | Facility: HOSPITAL | Age: 45
End: 2020-07-21

## 2020-07-21 ENCOUNTER — HOSPITAL ENCOUNTER (EMERGENCY)
Facility: HOSPITAL | Age: 45
Discharge: HOME | End: 2020-07-21
Attending: EMERGENCY MEDICINE | Admitting: EMERGENCY MEDICINE

## 2020-07-21 ENCOUNTER — APPOINTMENT (OUTPATIENT)
Dept: GENERAL RADIOLOGY | Facility: HOSPITAL | Age: 45
End: 2020-07-21

## 2020-07-21 VITALS
HEIGHT: 67 IN | TEMPERATURE: 98.2 F | OXYGEN SATURATION: 99 % | SYSTOLIC BLOOD PRESSURE: 141 MMHG | HEART RATE: 71 BPM | WEIGHT: 198.63 LBS | BODY MASS INDEX: 31.18 KG/M2 | DIASTOLIC BLOOD PRESSURE: 89 MMHG | RESPIRATION RATE: 19 BRPM

## 2020-07-21 DIAGNOSIS — R11.2 NON-INTRACTABLE VOMITING WITH NAUSEA, UNSPECIFIED VOMITING TYPE: ICD-10-CM

## 2020-07-21 DIAGNOSIS — I10 ESSENTIAL HYPERTENSION: ICD-10-CM

## 2020-07-21 DIAGNOSIS — N20.1 URETEROLITHIASIS: Primary | ICD-10-CM

## 2020-07-21 LAB
ANION GAP SERPL CALCULATED.3IONS-SCNC: 16 MMOL/L (ref 5–15)
BACTERIA UR QL AUTO: ABNORMAL /HPF
BASOPHILS # BLD AUTO: 0.1 10*3/MM3 (ref 0–0.2)
BASOPHILS NFR BLD AUTO: 0.8 % (ref 0–1.5)
BILIRUB UR QL STRIP: ABNORMAL
BUN SERPL-MCNC: 12 MG/DL (ref 6–20)
BUN SERPL-MCNC: ABNORMAL MG/DL
BUN/CREAT SERPL: ABNORMAL
CALCIUM SPEC-SCNC: 9.2 MG/DL (ref 8.6–10.5)
CHLORIDE SERPL-SCNC: 102 MMOL/L (ref 98–107)
CLARITY UR: CLEAR
CO2 SERPL-SCNC: 22 MMOL/L (ref 22–29)
COLOR UR: YELLOW
CREAT SERPL-MCNC: 1.27 MG/DL (ref 0.57–1)
DEPRECATED RDW RBC AUTO: 51.6 FL (ref 37–54)
EOSINOPHIL # BLD AUTO: 0.1 10*3/MM3 (ref 0–0.4)
EOSINOPHIL NFR BLD AUTO: 0.7 % (ref 0.3–6.2)
ERYTHROCYTE [DISTWIDTH] IN BLOOD BY AUTOMATED COUNT: 15.3 % (ref 12.3–15.4)
GFR SERPL CREATININE-BSD FRML MDRD: 55 ML/MIN/1.73
GLUCOSE SERPL-MCNC: 107 MG/DL (ref 65–99)
GLUCOSE UR STRIP-MCNC: NEGATIVE MG/DL
HCT VFR BLD AUTO: 47.3 % (ref 34–46.6)
HGB BLD-MCNC: 16 G/DL (ref 12–15.9)
HGB UR QL STRIP.AUTO: ABNORMAL
HYALINE CASTS UR QL AUTO: ABNORMAL /LPF
KETONES UR QL STRIP: ABNORMAL
LEUKOCYTE ESTERASE UR QL STRIP.AUTO: NEGATIVE
LYMPHOCYTES # BLD AUTO: 1.8 10*3/MM3 (ref 0.7–3.1)
LYMPHOCYTES NFR BLD AUTO: 19.1 % (ref 19.6–45.3)
MCH RBC QN AUTO: 32.5 PG (ref 26.6–33)
MCHC RBC AUTO-ENTMCNC: 33.8 G/DL (ref 31.5–35.7)
MCV RBC AUTO: 96.1 FL (ref 79–97)
MONOCYTES # BLD AUTO: 0.4 10*3/MM3 (ref 0.1–0.9)
MONOCYTES NFR BLD AUTO: 4.7 % (ref 5–12)
NEUTROPHILS NFR BLD AUTO: 7.1 10*3/MM3 (ref 1.7–7)
NEUTROPHILS NFR BLD AUTO: 74.7 % (ref 42.7–76)
NITRITE UR QL STRIP: NEGATIVE
NRBC BLD AUTO-RTO: 0 /100 WBC (ref 0–0.2)
PH UR STRIP.AUTO: 6 [PH] (ref 5–8)
PLATELET # BLD AUTO: 239 10*3/MM3 (ref 140–450)
PMV BLD AUTO: 7.9 FL (ref 6–12)
POTASSIUM SERPL-SCNC: 3.3 MMOL/L (ref 3.5–5.2)
PROT UR QL STRIP: ABNORMAL
RBC # BLD AUTO: 4.93 10*6/MM3 (ref 3.77–5.28)
RBC # UR: ABNORMAL /HPF
REF LAB TEST METHOD: ABNORMAL
SODIUM SERPL-SCNC: 140 MMOL/L (ref 136–145)
SP GR UR STRIP: 1.02 (ref 1–1.03)
SQUAMOUS #/AREA URNS HPF: ABNORMAL /HPF
UROBILINOGEN UR QL STRIP: ABNORMAL
WBC # BLD AUTO: 9.5 10*3/MM3 (ref 3.4–10.8)
WBC UR QL AUTO: ABNORMAL /HPF

## 2020-07-21 PROCEDURE — 25010000002 ONDANSETRON PER 1 MG: Performed by: EMERGENCY MEDICINE

## 2020-07-21 PROCEDURE — 80048 BASIC METABOLIC PNL TOTAL CA: CPT | Performed by: EMERGENCY MEDICINE

## 2020-07-21 PROCEDURE — 96374 THER/PROPH/DIAG INJ IV PUSH: CPT

## 2020-07-21 PROCEDURE — 85025 COMPLETE CBC W/AUTO DIFF WBC: CPT | Performed by: EMERGENCY MEDICINE

## 2020-07-21 PROCEDURE — 99284 EMERGENCY DEPT VISIT MOD MDM: CPT

## 2020-07-21 PROCEDURE — 74176 CT ABD & PELVIS W/O CONTRAST: CPT

## 2020-07-21 PROCEDURE — 71045 X-RAY EXAM CHEST 1 VIEW: CPT

## 2020-07-21 PROCEDURE — 81001 URINALYSIS AUTO W/SCOPE: CPT | Performed by: EMERGENCY MEDICINE

## 2020-07-21 PROCEDURE — 25010000002 HYDRALAZINE PER 20 MG: Performed by: EMERGENCY MEDICINE

## 2020-07-21 PROCEDURE — 96375 TX/PRO/DX INJ NEW DRUG ADDON: CPT

## 2020-07-21 PROCEDURE — 25010000002 DIPHENHYDRAMINE PER 50 MG: Performed by: EMERGENCY MEDICINE

## 2020-07-21 PROCEDURE — 25010000002 HYDROMORPHONE PER 4 MG: Performed by: EMERGENCY MEDICINE

## 2020-07-21 RX ORDER — POTASSIUM CHLORIDE 750 MG/1
20 TABLET, FILM COATED, EXTENDED RELEASE ORAL 2 TIMES DAILY
Status: ON HOLD | COMMUNITY
End: 2022-08-18

## 2020-07-21 RX ORDER — CLONIDINE HYDROCHLORIDE 0.1 MG/1
0.1 TABLET ORAL ONCE
Status: COMPLETED | OUTPATIENT
Start: 2020-07-21 | End: 2020-07-21

## 2020-07-21 RX ORDER — HYDROMORPHONE HCL 110MG/55ML
0.5 PATIENT CONTROLLED ANALGESIA SYRINGE INTRAVENOUS ONCE
Status: COMPLETED | OUTPATIENT
Start: 2020-07-21 | End: 2020-07-21

## 2020-07-21 RX ORDER — DIPHENHYDRAMINE HYDROCHLORIDE 50 MG/ML
25 INJECTION INTRAMUSCULAR; INTRAVENOUS ONCE
Status: COMPLETED | OUTPATIENT
Start: 2020-07-21 | End: 2020-07-21

## 2020-07-21 RX ORDER — FUROSEMIDE 40 MG/1
40 TABLET ORAL DAILY
COMMUNITY
End: 2020-10-20

## 2020-07-21 RX ORDER — OXYCODONE AND ACETAMINOPHEN 10; 325 MG/1; MG/1
1 TABLET ORAL EVERY 6 HOURS PRN
Qty: 12 TABLET | Refills: 0 | Status: SHIPPED | OUTPATIENT
Start: 2020-07-21 | End: 2021-01-18

## 2020-07-21 RX ORDER — HYDRALAZINE HYDROCHLORIDE 20 MG/ML
20 INJECTION INTRAMUSCULAR; INTRAVENOUS ONCE
Status: COMPLETED | OUTPATIENT
Start: 2020-07-21 | End: 2020-07-21

## 2020-07-21 RX ORDER — ONDANSETRON 4 MG/1
4 TABLET, ORALLY DISINTEGRATING ORAL 4 TIMES DAILY PRN
Qty: 12 TABLET | Refills: 0 | Status: SHIPPED | OUTPATIENT
Start: 2020-07-21 | End: 2021-01-18

## 2020-07-21 RX ORDER — ONDANSETRON 2 MG/ML
4 INJECTION INTRAMUSCULAR; INTRAVENOUS ONCE
Status: COMPLETED | OUTPATIENT
Start: 2020-07-21 | End: 2020-07-21

## 2020-07-21 RX ORDER — SODIUM CHLORIDE 0.9 % (FLUSH) 0.9 %
10 SYRINGE (ML) INJECTION AS NEEDED
Status: DISCONTINUED | OUTPATIENT
Start: 2020-07-21 | End: 2020-07-21 | Stop reason: HOSPADM

## 2020-07-21 RX ADMIN — CLONIDINE HYDROCHLORIDE 0.1 MG: 0.1 TABLET ORAL at 09:56

## 2020-07-21 RX ADMIN — DIPHENHYDRAMINE HYDROCHLORIDE 25 MG: 50 INJECTION, SOLUTION INTRAMUSCULAR; INTRAVENOUS at 08:08

## 2020-07-21 RX ADMIN — HYDROMORPHONE HYDROCHLORIDE 0.5 MG: 2 INJECTION, SOLUTION INTRAMUSCULAR; INTRAVENOUS; SUBCUTANEOUS at 08:09

## 2020-07-21 RX ADMIN — ONDANSETRON 4 MG: 2 INJECTION INTRAMUSCULAR; INTRAVENOUS at 08:08

## 2020-07-21 RX ADMIN — HYDRALAZINE HYDROCHLORIDE 20 MG: 20 INJECTION INTRAMUSCULAR; INTRAVENOUS at 09:56

## 2020-07-21 NOTE — ED PROVIDER NOTES
Subjective   History of Present Illness  45-year-old female who has had a previous  complains of a one-week history of intermittent pain in the right flank area which has gotten worse and is associated with urinary urgency frequency and some hematuria.  Patient has nausea.  There is been no vomiting.  She denies any high fever cough or congestion.  There is been no diarrhea.  She has a history of congestive heart failure COPD diabetes hypertension and has had an aortic valve repair the patient has a pacemaker as well is a previous bypass as well as a hysterectomy.  Review of Systems    Past Medical History:   Diagnosis Date   • CHF (congestive heart failure) (CMS/Formerly [facility])    • COPD (chronic obstructive pulmonary disease) (CMS/HCC)    • Diabetes (CMS/Formerly [facility])    • Hyperlipidemia    • Hypertension        Allergies   Allergen Reactions   • Hydrocodone Hives   • Penicillin G Unknown (See Comments)       Past Surgical History:   Procedure Laterality Date   • AORTIC VALVE REPAIR/REPLACEMENT N/A 2019    Procedure: AORTIC VALVE REPAIR/REPLACEMENT;  Surgeon: Lane Stock MD;  Location: [facility];  Service: Cardiothoracic   • BREAST LUMPECTOMY     • CARDIAC CATHETERIZATION N/A 2019    Procedure: Right and Left Heart Cath 19 @ 0900;  Surgeon: Wayne Luna MD;  Location: [facility] CATH INVASIVE LOCATION;  Service: Cardiovascular   • CARDIAC CATHETERIZATION N/A 2019    Procedure: Coronary angiography;  Surgeon: Wayne Luna MD;  Location: [facility] CATH INVASIVE LOCATION;  Service: Cardiovascular   • CARDIAC ELECTROPHYSIOLOGY PROCEDURE Left 2019    Procedure: Dual-chamber ICD insertion;  Surgeon: Héctor Eckert MD;  Location: [facility] CATH INVASIVE LOCATION;  Service: Cardiovascular   • CARDIAC ELECTROPHYSIOLOGY PROCEDURE Left 2019    Procedure: Lead Revision;  Surgeon: Héctor Eckert MD;  Location: [facility] CATH INVASIVE LOCATION;  Service: Cardiovascular   •  CARDIAC SURGERY     • CHOLECYSTECTOMY     • CORONARY ARTERY BYPASS GRAFT N/A 2019    Procedure: CORONARY ARTERY BYPASS GRAFTING;  Surgeon: Lane Stock MD;  Location: [facility];  Service: Cardiothoracic   • HYSTERECTOMY     • INSERT / REPLACE / REMOVE PACEMAKER     • THYROID SURGERY         Family History   Problem Relation Age of Onset   • Heart disease Father        Social History     Socioeconomic History   • Marital status:      Spouse name: Not on file   • Number of children: Not on file   • Years of education: Not on file   • Highest education level: Not on file   Tobacco Use   • Smoking status: Former Smoker     Last attempt to quit: 2019     Years since quittin.5   • Smokeless tobacco: Never Used   Substance and Sexual Activity   • Alcohol use: No     Frequency: Never   • Drug use: No   • Sexual activity: Defer           Objective   Physical Exam  Patient is awake and alert she is afebrile her vital signs are stable the HEENT exam is unremarkable chest is clear cardiovascular exam reveals a paced rhythm the patient has no murmur the abdomen reveals some tenderness to the right lateral abdomen and flank area.  There is no guarding or rebound no hernia was noted she has no cyanosis clubbing or edema  Procedures           ED Course            Results for orders placed or performed during the hospital encounter of 20   Basic Metabolic Panel   Result Value Ref Range    Glucose 107 (H) 65 - 99 mg/dL    BUN      Creatinine 1.27 (H) 0.57 - 1.00 mg/dL    Sodium 140 136 - 145 mmol/L    Potassium 3.3 (L) 3.5 - 5.2 mmol/L    Chloride 102 98 - 107 mmol/L    CO2 22.0 22.0 - 29.0 mmol/L    Calcium 9.2 8.6 - 10.5 mg/dL    eGFR  African Amer 55 (L) >60 mL/min/1.73    BUN/Creatinine Ratio      Anion Gap 16.0 (H) 5.0 - 15.0 mmol/L   Urinalysis With Culture If Indicated - Urine, Clean Catch   Result Value Ref Range    Color, UA Yellow Yellow, Straw    Appearance, UA Clear Clear    pH, UA 6.0  5.0 - 8.0    Specific Gravity, UA 1.022 1.005 - 1.030    Glucose, UA Negative Negative    Ketones, UA Trace (A) Negative    Bilirubin, UA Small (1+) (A) Negative    Blood, UA Moderate (2+) (A) Negative    Protein, UA >=300 mg/dL (3+) (A) Negative    Leuk Esterase, UA Negative Negative    Nitrite, UA Negative Negative    Urobilinogen, UA 1.0 E.U./dL 0.2 - 1.0 E.U./dL   CBC Auto Differential   Result Value Ref Range    WBC 9.50 3.40 - 10.80 10*3/mm3    RBC 4.93 3.77 - 5.28 10*6/mm3    Hemoglobin 16.0 (H) 12.0 - 15.9 g/dL    Hematocrit 47.3 (H) 34.0 - 46.6 %    MCV 96.1 79.0 - 97.0 fL    MCH 32.5 26.6 - 33.0 pg    MCHC 33.8 31.5 - 35.7 g/dL    RDW 15.3 12.3 - 15.4 %    RDW-SD 51.6 37.0 - 54.0 fl    MPV 7.9 6.0 - 12.0 fL    Platelets 239 140 - 450 10*3/mm3    Neutrophil % 74.7 42.7 - 76.0 %    Lymphocyte % 19.1 (L) 19.6 - 45.3 %    Monocyte % 4.7 (L) 5.0 - 12.0 %    Eosinophil % 0.7 0.3 - 6.2 %    Basophil % 0.8 0.0 - 1.5 %    Neutrophils, Absolute 7.10 (H) 1.70 - 7.00 10*3/mm3    Lymphocytes, Absolute 1.80 0.70 - 3.10 10*3/mm3    Monocytes, Absolute 0.40 0.10 - 0.90 10*3/mm3    Eosinophils, Absolute 0.10 0.00 - 0.40 10*3/mm3    Basophils, Absolute 0.10 0.00 - 0.20 10*3/mm3    nRBC 0.0 0.0 - 0.2 /100 WBC   BUN   Result Value Ref Range    BUN 12 6 - 20 mg/dL   Urinalysis, Microscopic Only - Urine, Clean Catch   Result Value Ref Range    RBC, UA 6-12 (A) None Seen /HPF    WBC, UA 0-2 (A) None Seen /HPF    Bacteria, UA None Seen None Seen /HPF    Squamous Epithelial Cells, UA None Seen None Seen, 0-2 /HPF    Hyaline Casts, UA 3-6 None Seen /LPF    Methodology Automated Microscopy      Medications   sodium chloride 0.9 % flush 10 mL (has no administration in time range)   HYDROmorphone (DILAUDID) injection 0.5 mg (0.5 mg Intravenous Given 7/21/20 0809)   ondansetron (ZOFRAN) injection 4 mg (4 mg Intravenous Given 7/21/20 0808)   diphenhydrAMINE (BENADRYL) injection 25 mg (25 mg Intravenous Given 7/21/20 0808)   hydrALAZINE  (APRESOLINE) injection 20 mg (20 mg Intravenous Given 7/21/20 0956)   cloNIDine (CATAPRES) tablet 0.1 mg (0.1 mg Oral Given 7/21/20 0956)     Ct Abdomen Pelvis Without Contrast    Result Date: 7/21/2020   1. A 2 mm calcification is seen in the vicinity of the right ureteropelvic junction (image 72). It is unclear  whether this is actually a tree UPJ stone or a small vascular calcification adjacent to the UPJ. There is no right hydronephrosis. 2. Normal appendix. 3. Cholecystectomy. 4. Moderate to marked cardiac enlargement, which has developed since the 2009 CT abdomen comparison. 5. Bibasilar linear subsegmental atelectasis.    Electronically Signed By-Dr. Pao Iglesias MD On:7/21/2020 8:50 AM This report was finalized on [id number] by Dr. Pao Iglesias MD.    Xr Chest 1 View    Result Date: 7/21/2020  No acute process.  Electronically Signed By-Carlos Royal On:7/21/2020 9:25 AM This report was finalized on [id number] by  Carlos Royal, .                                      MDM  There was some question as to whether the calcification on the CT scan at the right UPJ represented a stone or a calcification.  The patient's history and pain is consistent with a stone in the urine analysis shows red cells but no evidence of any bacteria and only 2 white cells.  I think that this does represent a small stone.  The patient also had an elevation of her blood pressure which at 1 point was 160/111 and she was given intravenous hydralazine and p.o. clonidine and her pressure decreased to 138/91.  The patient will be discharged with a prescription for hydrocodone for pain and Zofran for nausea.  She is to drink plenty of fluids and hopefully the stone will pass spontaneously.  She also is to take her blood pressure medication.  Final diagnoses:   Ureterolithiasis   Non-intractable vomiting with nausea, unspecified vomiting type   Essential hypertension            Soren Boland MD  07/21/20 1035

## 2020-07-21 NOTE — DISCHARGE INSTRUCTIONS
Percocet if needed for pain.  Zofran if needed for nausea.  Drink plenty of water over the next 3 days.  Follow-up with your doctor if not improved in 3 days

## 2020-07-21 NOTE — ED NOTES
Pt reports that her right side of her abd and around to her flank have been hurting for about a week. C/o n/v has some trouble voiding with frequency and as a hx of UTIs.      Logan Garcia LPN  07/21/20 0756       Logan Garcia LPN  07/21/20 0756

## 2020-10-07 ENCOUNTER — OFFICE VISIT (OUTPATIENT)
Dept: CARDIOLOGY | Facility: CLINIC | Age: 45
End: 2020-10-07

## 2020-10-07 VITALS
WEIGHT: 197.4 LBS | SYSTOLIC BLOOD PRESSURE: 147 MMHG | RESPIRATION RATE: 18 BRPM | HEART RATE: 76 BPM | HEIGHT: 67 IN | OXYGEN SATURATION: 98 % | DIASTOLIC BLOOD PRESSURE: 106 MMHG | BODY MASS INDEX: 30.98 KG/M2

## 2020-10-07 DIAGNOSIS — I42.0 CARDIOMYOPATHY, DILATED (HCC): ICD-10-CM

## 2020-10-07 DIAGNOSIS — I10 ESSENTIAL HYPERTENSION: ICD-10-CM

## 2020-10-07 DIAGNOSIS — I50.22 SYSTOLIC CHF, CHRONIC (HCC): ICD-10-CM

## 2020-10-07 DIAGNOSIS — I35.1 NONRHEUMATIC AORTIC VALVE INSUFFICIENCY: Primary | ICD-10-CM

## 2020-10-07 DIAGNOSIS — I25.119 CORONARY ARTERY DISEASE INVOLVING NATIVE CORONARY ARTERY OF NATIVE HEART WITH ANGINA PECTORIS (HCC): ICD-10-CM

## 2020-10-07 DIAGNOSIS — Z95.810 ICD (IMPLANTABLE CARDIOVERTER-DEFIBRILLATOR), DUAL, IN SITU: ICD-10-CM

## 2020-10-07 PROCEDURE — 93000 ELECTROCARDIOGRAM COMPLETE: CPT | Performed by: INTERNAL MEDICINE

## 2020-10-07 PROCEDURE — 99214 OFFICE O/P EST MOD 30 MIN: CPT | Performed by: INTERNAL MEDICINE

## 2020-10-07 NOTE — PROGRESS NOTES
Cardiology Office Visit      Encounter Date:  10/07/2020    Patient ID:   Rafael Mccann is a 45 y.o. female.    Reason For Followup:  Cardiomyopathy  Hypertension  Hyperlipidemia  Valvular heart disease  Recent hospitalization for congestive heart failure      Brief Clinical History:  Dear Dr. Adames, Phani Ennis MD    I had the pleasure of seeing Rafael Mccann today. As you are well aware, this is a 45 y.o. female with a past medical history that is significant for cardiomyopathy and valvular heart disease         Interval History:  Denies any cardiac symptoms of chest pain shortness of breath dizziness or syncope  Blood pressure is elevated at home    Catheterization  Severe two-vessel coronary artery disease  Moderate stenosis involving the distal LAD  4+ aortic regurgitation  Normal PA pressures  Normal filling pressures    Assessment & Plan    Impressions:  Essential hypertension  Chronic systolic heart failure  History of cardiomyopathy   Chronic renal insufficiency  Coronary artery disease   History of diabetes mellitus type 2  History of thyroid disease  Moderate to severe mitral regurgitation  Moderate to severe aortic regurgitation  Cardiomyopathy with LV ejection fraction of 35%  s/p AICD placement/normal device function  s/p urgent AVR (21 mm Magna), CABG x3 with SV to Diag1 and SV sequential to PDA and then OM3 12/27/2019  Status post coronary artery bypass surgery x3 and aortic valve replacement surgery  Congestive heart failure NYHA class II    Recommendations:  Repeat echocardiogram to reassess the LV systolic function and valve function  Recent adjustments in the medications by the primary care physician for elevated blood pressure and also volume overload  Close monitoring of blood pressure at home  Patient is advised to call back the office in 2 weeks if there is no improvement in the blood pressure  Need for close monitoring and follow-up discussed with the patient  Home blood  "pressure monitoring and daily weight  Patient is getting some labs and a chest x-ray patient is advised to send a copy    Low-salt diet  Follow-up in office in 3 months    Objective:    Vitals:  Vitals:    10/07/20 1048   BP: (!) 147/106   Pulse: 76   Resp: 18   SpO2: 98%   Weight: 89.5 kg (197 lb 6.4 oz)   Height: 170.2 cm (67\")       Physical Exam:    General: Alert, cooperative, no distress, appears stated age  Head:  Normocephalic, atraumatic, mucous membranes moist  Eyes:  Conjunctiva/corneas clear, EOM's intact     Neck:  Supple,  no adenopathy;      Lungs: Clear to auscultation bilaterally, no wheezes rhonchi rales are noted  Chest wall: No tenderness  Heart::  Regular rate and rhythm, S1 and S2 normal, displaced LV apical impulse 2 x 6 systolic murmur  Abdomen: Soft, non-tender, nondistended bowel sounds active  Extremities: No cyanosis, clubbing, or edema  Pulses: 2+ and symmetric all extremities  Skin:  No rashes or lesions  Neuro/psych: A&O x3. CN II through XII are grossly intact with appropriate affect      Allergies:  Allergies   Allergen Reactions   • Hydrocodone Hives   • Penicillin G Unknown (See Comments)       Medication Review:     Current Outpatient Medications:   •  allopurinol (ZYLOPRIM) 100 MG tablet, Take 2 tablets by mouth Daily. (Patient taking differently: Take 100 mg by mouth Daily.), Disp: 30 tablet, Rfl: 1  •  ALPRAZolam (XANAX) 1 MG tablet, Take 1 mg by mouth 4 (Four) Times a Day As Needed for Anxiety., Disp: , Rfl:   •  amLODIPine (NORVASC) 5 MG tablet, Take 2 tablets by mouth Daily. (Patient taking differently: Take 5 mg by mouth Daily.), Disp: 30 tablet, Rfl: 3  •  aspirin 325 MG tablet, Take 1 tablet by mouth Daily., Disp: 30 tablet, Rfl: 3  •  bumetanide (BUMEX) 1 MG tablet, Take 1 tablet by mouth Daily., Disp: 90 tablet, Rfl: 3  •  carvedilol (COREG) 25 MG tablet, Take 1 tablet by mouth 2 (Two) Times a Day With Meals., Disp: 180 tablet, Rfl: 2  •  cholecalciferol (VITAMIN D3) " 1000 units tablet, Take 1,000 Units by mouth Daily., Disp: , Rfl:   •  cloNIDine (CATAPRES) 0.1 MG tablet, Take 2 tablets by mouth 2 (Two) Times a Day., Disp: 360 tablet, Rfl: 2  •  Cyanocobalamin (VITAMIN B 12 PO), Take 1 tablet by mouth Daily., Disp: , Rfl:   •  Diclofenac Sodium 1.5 % solution, Place 40 drops on the skin as directed by provider Daily., Disp: , Rfl:   •  docusate sodium 100 MG capsule, Take 100 mg by mouth 2 (Two) Times a Day. (Patient taking differently: Take 100 mg by mouth 2 (Two) Times a Day As Needed.), Disp: 60 each, Rfl: 1  •  ferrous sulfate 325 (65 FE) MG tablet, Take 65 mg by mouth Daily With Breakfast., Disp: , Rfl:   •  folic acid (FOLVITE) 1 MG tablet, Take 1 mg by mouth Daily., Disp: , Rfl:   •  furosemide (LASIX) 40 MG tablet, Take 40 mg by mouth Daily., Disp: , Rfl:   •  gabapentin (NEURONTIN) 300 MG capsule, Take 300 mg by mouth Daily As Needed., Disp: , Rfl:   •  hydrALAZINE (APRESOLINE) 50 MG tablet, Take 100 mg by mouth 3 (Three) Times a Day., Disp: , Rfl:   •  levothyroxine (SYNTHROID, LEVOTHROID) 200 MCG tablet, Take 200 mcg by mouth Daily., Disp: , Rfl:   •  lisinopril (PRINIVIL,ZESTRIL) 40 MG tablet, Take 40 mg by mouth Daily., Disp: , Rfl:   •  metFORMIN (GLUCOPHAGE) 500 MG tablet, Take 500 mg by mouth Daily With Breakfast., Disp: , Rfl:   •  montelukast (SINGULAIR) 10 MG tablet, Take 10 mg by mouth Every Night., Disp: , Rfl:   •  ondansetron ODT (ZOFRAN-ODT) 4 MG disintegrating tablet, Place 1 tablet on the tongue 4 (Four) Times a Day As Needed for Nausea or Vomiting for up to 12 doses., Disp: 12 tablet, Rfl: 0  •  oxyCODONE-acetaminophen (PERCOCET)  MG per tablet, Take 1 tablet by mouth Every 6 (Six) Hours As Needed for Moderate Pain  for up to 12 doses., Disp: 12 tablet, Rfl: 0  •  oxyCODONE-acetaminophen (PERCOCET) 7.5-325 MG per tablet, Take 1 tablet by mouth Every 6 (Six) Hours As Needed for Severe Pain . May resume after postop oxycodone prescription is  completed., Disp: , Rfl:   •  pantoprazole (PROTONIX) 40 MG EC tablet, Take 40 mg by mouth Daily., Disp: , Rfl:   •  potassium chloride (K-DUR) 10 MEQ CR tablet, Take 20 mEq by mouth 2 (Two) Times a Day., Disp: , Rfl:     Family History:  Family History   Problem Relation Age of Onset   • Heart disease Father        Past Medical History:  Past Medical History:   Diagnosis Date   • CHF (congestive heart failure) (CMS/Formerly [facility])    • COPD (chronic obstructive pulmonary disease) (CMS/Formerly [facility])    • Diabetes (CMS/Formerly [facility])    • Hyperlipidemia    • Hypertension        Past surgical History:  Past Surgical History:   Procedure Laterality Date   • AORTIC VALVE REPAIR/REPLACEMENT N/A 12/27/2019    Procedure: AORTIC VALVE REPAIR/REPLACEMENT;  Surgeon: Lane Stock MD;  Location: [facility];  Service: Cardiothoracic   • BREAST LUMPECTOMY     • CARDIAC CATHETERIZATION N/A 12/24/2019    Procedure: Right and Left Heart Cath 12/24/19 @ 0900;  Surgeon: Wayne Luna MD;  Location: [facility] CATH INVASIVE LOCATION;  Service: Cardiovascular   • CARDIAC CATHETERIZATION N/A 12/24/2019    Procedure: Coronary angiography;  Surgeon: Wayne Luna MD;  Location: [facility] CATH INVASIVE LOCATION;  Service: Cardiovascular   • CARDIAC ELECTROPHYSIOLOGY PROCEDURE Left 6/28/2019    Procedure: Dual-chamber ICD insertion;  Surgeon: Héctor Eckert MD;  Location: [facility] CATH INVASIVE LOCATION;  Service: Cardiovascular   • CARDIAC ELECTROPHYSIOLOGY PROCEDURE Left 8/14/2019    Procedure: Lead Revision;  Surgeon: Héctor Eckert MD;  Location: [facility] CATH INVASIVE LOCATION;  Service: Cardiovascular   • CARDIAC SURGERY     • CHOLECYSTECTOMY     • CORONARY ARTERY BYPASS GRAFT N/A 12/27/2019    Procedure: CORONARY ARTERY BYPASS GRAFTING;  Surgeon: Lane Stock MD;  Location: [facility];  Service: Cardiothoracic   • HYSTERECTOMY     • INSERT / REPLACE / REMOVE PACEMAKER     • THYROID SURGERY         Social History:  Social History      Socioeconomic History   • Marital status:      Spouse name: Not on file   • Number of children: Not on file   • Years of education: Not on file   • Highest education level: Not on file   Tobacco Use   • Smoking status: Former Smoker     Quit date: 2019     Years since quittin.7   • Smokeless tobacco: Never Used   Substance and Sexual Activity   • Alcohol use: No     Frequency: Never   • Drug use: No   • Sexual activity: Defer       Review of Systems:  The following systems were reviewed as they relate to the cardiovascular system: Constitutional, Eyes, ENT, Cardiovascular, Respiratory, Gastrointestinal, Integumentary, Neurological, Psychiatric, Hematologic, Endocrine, Musculoskeletal, and Genitourinary. The pertinent cardiovascular findings are reported above with all other cardiovascular points within those systems being negative.    Diagnostic Study Review:     Current Electrocardiogram:    ECG 12 Lead    Date/Time: 10/7/2020 11:13 AM  Performed by: Wayne Luna MD  Authorized by: Wayne Luna MD   Comparison: compared with previous ECG   Similar to previous ECG  Rhythm: sinus rhythm  Rate: normal  Conduction: conduction normal  QRS axis: normal  Other findings: non-specific ST-T wave changes, left atrial abnormality and right atrial abnormality    Clinical impression: abnormal EKG              NOTE: The following portions of the patient's history were reviewed and updated this visit as appropriate: allergies, current medications, past family history, past medical history, past social history, past surgical history and problem list.

## 2020-10-16 ENCOUNTER — HOSPITAL ENCOUNTER (OUTPATIENT)
Dept: CARDIOLOGY | Facility: HOSPITAL | Age: 45
Discharge: HOME | End: 2020-10-16
Admitting: INTERNAL MEDICINE

## 2020-10-16 VITALS
HEART RATE: 78 BPM | BODY MASS INDEX: 30.92 KG/M2 | HEIGHT: 67 IN | WEIGHT: 197 LBS | DIASTOLIC BLOOD PRESSURE: 117 MMHG | SYSTOLIC BLOOD PRESSURE: 159 MMHG

## 2020-10-16 DIAGNOSIS — I50.22 SYSTOLIC CHF, CHRONIC (HCC): ICD-10-CM

## 2020-10-16 DIAGNOSIS — I35.1 NONRHEUMATIC AORTIC VALVE INSUFFICIENCY: ICD-10-CM

## 2020-10-16 LAB
ASCENDING AORTA: 3.2 CM
BH CV ECHO MEAS - ACS: 1.5 CM
BH CV ECHO MEAS - AO MAX PG (FULL): 24.3 MMHG
BH CV ECHO MEAS - AO MAX PG: 28.4 MMHG
BH CV ECHO MEAS - AO MEAN PG (FULL): 13.5 MMHG
BH CV ECHO MEAS - AO MEAN PG: 15.6 MMHG
BH CV ECHO MEAS - AO ROOT AREA (BSA CORRECTED): 1.3
BH CV ECHO MEAS - AO ROOT AREA: 5.6 CM^2
BH CV ECHO MEAS - AO ROOT DIAM: 2.7 CM
BH CV ECHO MEAS - AO V2 MAX: 266.4 CM/SEC
BH CV ECHO MEAS - AO V2 MEAN: 188 CM/SEC
BH CV ECHO MEAS - AO V2 VTI: 46.2 CM
BH CV ECHO MEAS - ASC AORTA: 3.2 CM
BH CV ECHO MEAS - AVA PLANIMETRY TRACED: 1.2 CM2
BH CV ECHO MEAS - AVA(I,A): 0.62 CM^2
BH CV ECHO MEAS - AVA(I,D): 0.62 CM^2
BH CV ECHO MEAS - AVA(V,A): 0.75 CM^2
BH CV ECHO MEAS - AVA(V,D): 0.75 CM^2
BH CV ECHO MEAS - BSA(HAYCOCK): 2.1 M^2
BH CV ECHO MEAS - BSA: 2 M^2
BH CV ECHO MEAS - BZI_BMI: 30.9 KILOGRAMS/M^2
BH CV ECHO MEAS - BZI_METRIC_HEIGHT: 170.2 CM
BH CV ECHO MEAS - BZI_METRIC_WEIGHT: 89.4 KG
BH CV ECHO MEAS - EDV(CUBED): 272.4 ML
BH CV ECHO MEAS - EDV(MOD-SP2): 227 ML
BH CV ECHO MEAS - EDV(MOD-SP4): 229 ML
BH CV ECHO MEAS - EDV(TEICH): 214.7 ML
BH CV ECHO MEAS - EF(CUBED): 33.7 %
BH CV ECHO MEAS - EF(MOD-BP): 31 %
BH CV ECHO MEAS - EF(MOD-SP2): 33.4 %
BH CV ECHO MEAS - EF(MOD-SP4): 26.6 %
BH CV ECHO MEAS - EF(TEICH): 26.9 %
BH CV ECHO MEAS - ESV(CUBED): 180.7 ML
BH CV ECHO MEAS - ESV(MOD-SP2): 151.2 ML
BH CV ECHO MEAS - ESV(MOD-SP4): 168.2 ML
BH CV ECHO MEAS - ESV(TEICH): 157 ML
BH CV ECHO MEAS - FS: 12.8 %
BH CV ECHO MEAS - IVS/LVPW: 0.93
BH CV ECHO MEAS - IVSD: 1 CM
BH CV ECHO MEAS - LA DIMENSION(2D): 4.5 CM
BH CV ECHO MEAS - LA DIMENSION: 4.9 CM
BH CV ECHO MEAS - LA/AO: 1.9
BH CV ECHO MEAS - LAT PEAK E' VEL: 4 CM/SEC
BH CV ECHO MEAS - LV DIASTOLIC VOL/BSA (35-75): 114 ML/M^2
BH CV ECHO MEAS - LV IVRT: 0.07 SEC
BH CV ECHO MEAS - LV MASS(C)D: 298.3 GRAMS
BH CV ECHO MEAS - LV MASS(C)DI: 148.5 GRAMS/M^2
BH CV ECHO MEAS - LV MAX PG: 4.1 MMHG
BH CV ECHO MEAS - LV MEAN PG: 2.1 MMHG
BH CV ECHO MEAS - LV SYSTOLIC VOL/BSA (12-30): 83.7 ML/M^2
BH CV ECHO MEAS - LV V1 MAX: 101.4 CM/SEC
BH CV ECHO MEAS - LV V1 MEAN: 66.9 CM/SEC
BH CV ECHO MEAS - LV V1 VTI: 14.6 CM
BH CV ECHO MEAS - LVIDD: 6.5 CM
BH CV ECHO MEAS - LVIDS: 5.7 CM
BH CV ECHO MEAS - LVOT AREA: 2 CM^2
BH CV ECHO MEAS - LVOT DIAM: 1.6 CM
BH CV ECHO MEAS - LVPWD: 1.1 CM
BH CV ECHO MEAS - MED PEAK E' VEL: 5 CM/SEC
BH CV ECHO MEAS - MR MAX VEL: 568 CM/SEC
BH CV ECHO MEAS - MR VTI: 198 CM
BH CV ECHO MEAS - MV A MAX VEL: 79.3 CM/SEC
BH CV ECHO MEAS - MV DEC SLOPE: 796.8 CM/SEC^2
BH CV ECHO MEAS - MV DEC TIME: 0.21 SEC
BH CV ECHO MEAS - MV E MAX VEL: 166.1 CM/SEC
BH CV ECHO MEAS - MV E/A: 2.1
BH CV ECHO MEAS - MV MAX PG: 18.7 MMHG
BH CV ECHO MEAS - MV MEAN PG: 5.4 MMHG
BH CV ECHO MEAS - MV P1/2T: 57 MSEC
BH CV ECHO MEAS - MV V2 MAX: 215.7 CM/SEC
BH CV ECHO MEAS - MV V2 MEAN: 103 CM/SEC
BH CV ECHO MEAS - MV V2 VTI: 42.2 CM
BH CV ECHO MEAS - MVA(P1/2T): 3.9 CM2
BH CV ECHO MEAS - MVA(TRACED): 4.4 CM^2
BH CV ECHO MEAS - MVA(VTI): 0.68 CM^2
BH CV ECHO MEAS - PA ACC SLOPE: 586 CM/SEC2
BH CV ECHO MEAS - PA ACC TIME: 0.08 SEC
BH CV ECHO MEAS - PA MAX PG (FULL): 0.25 MMHG
BH CV ECHO MEAS - PA MAX PG: 1.3 MMHG
BH CV ECHO MEAS - PA MEAN PG (FULL): 0.24 MMHG
BH CV ECHO MEAS - PA MEAN PG: 0.73 MMHG
BH CV ECHO MEAS - PA PR(ACCEL): 44.5 MMHG
BH CV ECHO MEAS - PA V2 MAX: 57.5 CM/SEC
BH CV ECHO MEAS - PA V2 MEAN: 40.8 CM/SEC
BH CV ECHO MEAS - PA V2 VTI: 12 CM
BH CV ECHO MEAS - PAPD(PI EDV): 20 MMHG
BH CV ECHO MEAS - PI END-D VEL: 142.2 CM/SEC
BH CV ECHO MEAS - PULM A REVS DUR: 0.09 SEC
BH CV ECHO MEAS - PULM A REVS VEL: 19.6 CM/SEC
BH CV ECHO MEAS - PULM DIAS VEL: 81.6 CM/SEC
BH CV ECHO MEAS - PULM S/D: 0.43
BH CV ECHO MEAS - PULM SYS VEL: 35.1 CM/SEC
BH CV ECHO MEAS - RAP SYSTOLE: 12 MMHG
BH CV ECHO MEAS - RV MAX PG: 1.1 MMHG
BH CV ECHO MEAS - RV MEAN PG: 0.49 MMHG
BH CV ECHO MEAS - RV V1 MAX: 51.8 CM/SEC
BH CV ECHO MEAS - RV V1 MEAN: 33.7 CM/SEC
BH CV ECHO MEAS - RV V1 VTI: 10.3 CM
BH CV ECHO MEAS - RVSP: 51.6 MMHG
BH CV ECHO MEAS - SI(AO): 128.2 ML/M^2
BH CV ECHO MEAS - SI(CUBED): 45.7 ML/M^2
BH CV ECHO MEAS - SI(LVOT): 14.2 ML/M^2
BH CV ECHO MEAS - SI(MOD-SP2): 37.7 ML/M^2
BH CV ECHO MEAS - SI(MOD-SP4): 30.3 ML/M^2
BH CV ECHO MEAS - SI(TEICH): 28.7 ML/M^2
BH CV ECHO MEAS - SUP REN AO DIAM: 2.2 CM
BH CV ECHO MEAS - SV(AO): 257.5 ML
BH CV ECHO MEAS - SV(CUBED): 91.8 ML
BH CV ECHO MEAS - SV(LVOT): 28.6 ML
BH CV ECHO MEAS - SV(MOD-SP2): 75.8 ML
BH CV ECHO MEAS - SV(MOD-SP4): 60.9 ML
BH CV ECHO MEAS - SV(TEICH): 57.7 ML
BH CV ECHO MEAS - TAPSE (>1.6): 1 CM
BH CV ECHO MEAS - TR MAX PG: 40 MMHG
BH CV ECHO MEAS - TR MAX VEL: 314.6 CM/SEC
BH CV ECHO MEASUREMENTS AVERAGE E/E' RATIO: 36.91
BH CV XLRA - RV BASE: 3.6 CM
BH CV XLRA - RV MID: 2.5 CM
BH CV XLRA - TDI S': 5 CM/SEC
IVRT: 75 MSEC
LEFT ATRIUM VOLUME INDEX: 44 ML/M2
LEFT ATRIUM VOLUME: 89 CM3
LV DP/DT: 881 MMHG/SEC
LV EF 2D ECHO EST: 30 %
MR PISA EROA: 0.3 CM2
MV REGURGITANT VOLUME: 55 CC
MV VALVE AREA BY PLANIMETRY: 4.4 CM2
PISA ALIASING VEL: 0.34 M/S
PISA RADIUS: 0.9 CM

## 2020-10-16 PROCEDURE — 93306 TTE W/DOPPLER COMPLETE: CPT

## 2020-10-16 PROCEDURE — 93306 TTE W/DOPPLER COMPLETE: CPT | Performed by: INTERNAL MEDICINE

## 2020-10-19 ENCOUNTER — TELEPHONE (OUTPATIENT)
Dept: CARDIOLOGY | Facility: CLINIC | Age: 45
End: 2020-10-19

## 2020-10-19 NOTE — TELEPHONE ENCOUNTER
Called and informed the Pt per Dr. Lennon that Dr. Lennon would like to see her in the office this week due to her echo showing severe mitral regurgitation and worsening LV dysfunction. The Pt was agreeable to this and appt was made for 10/20/20 at 10:45am with Dr. Lennon at the [place] office.

## 2020-10-20 ENCOUNTER — OFFICE VISIT (OUTPATIENT)
Dept: CARDIOLOGY | Facility: CLINIC | Age: 45
End: 2020-10-20

## 2020-10-20 VITALS
SYSTOLIC BLOOD PRESSURE: 135 MMHG | OXYGEN SATURATION: 98 % | HEIGHT: 67 IN | DIASTOLIC BLOOD PRESSURE: 93 MMHG | HEART RATE: 77 BPM | BODY MASS INDEX: 30.85 KG/M2 | RESPIRATION RATE: 18 BRPM

## 2020-10-20 DIAGNOSIS — Z95.1 S/P CABG X 3: ICD-10-CM

## 2020-10-20 DIAGNOSIS — I10 ESSENTIAL HYPERTENSION: ICD-10-CM

## 2020-10-20 DIAGNOSIS — Z95.2 S/P AVR (AORTIC VALVE REPLACEMENT): ICD-10-CM

## 2020-10-20 DIAGNOSIS — I42.0 CARDIOMYOPATHY, DILATED (HCC): ICD-10-CM

## 2020-10-20 DIAGNOSIS — I50.22 SYSTOLIC CHF, CHRONIC (HCC): Primary | ICD-10-CM

## 2020-10-20 DIAGNOSIS — I35.1 NONRHEUMATIC AORTIC VALVE INSUFFICIENCY: ICD-10-CM

## 2020-10-20 DIAGNOSIS — I34.0 NONRHEUMATIC MITRAL VALVE REGURGITATION: ICD-10-CM

## 2020-10-20 DIAGNOSIS — Z95.810 ICD (IMPLANTABLE CARDIOVERTER-DEFIBRILLATOR), DUAL, IN SITU: ICD-10-CM

## 2020-10-20 PROCEDURE — 99214 OFFICE O/P EST MOD 30 MIN: CPT | Performed by: INTERNAL MEDICINE

## 2020-10-20 RX ORDER — METOLAZONE 2.5 MG/1
2.5 TABLET ORAL 2 TIMES WEEKLY
Qty: 15 TABLET | Refills: 2 | Status: SHIPPED | OUTPATIENT
Start: 2020-10-22 | End: 2020-12-18 | Stop reason: SDUPTHER

## 2020-10-20 NOTE — PROGRESS NOTES
Cardiology Office Visit      Encounter Date:  10/20/2020    Patient ID:   Rafael Mccann is a 45 y.o. female.    Reason For Followup:  Cardiomyopathy  Hypertension  Hyperlipidemia  Valvular heart disease  Recent hospitalization for congestive heart failure      Brief Clinical History:  Dear Dr. Adames, Phani Ennis MD    I had the pleasure of seeing Rafael Mccann today. As you are well aware, this is a 45 y.o. female with a past medical history that is significant for cardiomyopathy and valvular heart disease         Interval History:  Worsening shortness of breath  Significant weight gain and lower extremity edema  Dyspnea on exertion which is progressively getting worse  Weight gain in spite of use of Bumex      Interpretation Summary    · The left ventricular cavity is moderate to severely dilated.  · Left ventricular wall thickness is consistent with concentric hypertrophy.  · Estimated left ventricular EF = 30% Estimated left ventricular EF was in agreement with the calculated left ventricular EF. Left ventricular systolic function is severely decreased.  · Severe mitral valve regurgitation is present.  · The following left ventricular wall segments are hypokinetic: mid anterior, apical anterior, basal anterolateral, mid anterolateral, apical lateral, basal inferolateral, mid inferolateral, apical inferior, mid inferior, apical septal, basal inferoseptal, mid inferoseptal, apex hypokinetic, mid anteroseptal, basal anterior, basal inferior and basal inferoseptal.  · Mild tricuspid valve regurgitation is present.  · Estimated right ventricular systolic pressure from tricuspid regurgitation is moderately elevated (45-55 mmHg).  · Moderate pulmonary hypertension is present.  · Mildly reduced right ventricular systolic function noted.  · The left atrial cavity is moderate to severely dilated.  · There is a bioprosthetic aortic valve present.                   Assessment &  "Plan    Impressions:  Essential hypertension  Chronic systolic heart failure  History of cardiomyopathy   Chronic renal insufficiency  Coronary artery disease   History of diabetes mellitus type 2  History of thyroid disease  Moderate to severe mitral regurgitation  Moderate to severe aortic regurgitation  Cardiomyopathy with LV ejection fraction of 35%  s/p AICD placement/normal device function  s/p urgent AVR (21 mm Magna), CABG x3 with SV to Diag1 and SV sequential to PDA and then OM3 12/27/2019  Status post coronary artery bypass surgery x3 and aortic valve replacement surgery  Congestive heart failure NYHA class 4  Newly diagnosed severe mitral regurgitation  Worsening cardiomyopathy with most recent LV ejection fraction of 30%      Recommendations:  Schedule for left and right heart catheterization to further evaluate the etiology for worsening cardiomyopathy and also worsening mitral regurgitation and severe mitral regurgitation  Left and right heart cath  Schedule for a FELICIA to identify etiology for mitral regurgitation  Add metolazone 2.5 mg p.o. twice a week to help with the diuresis  Low-salt diet  Not sure about etiology for mitral regurgitation  Risk benefits and alternatives discussed and reviewed with the patient  We will schedule patient for right and left heart catheterization and FELICIA to further evaluate the etiology for worsening cardiomyopathy and mitral regurgitation      Objective:    Vitals:  Vitals:    10/20/20 1051   BP: 135/93   BP Location: Left arm   Pulse: 77   Resp: 18   SpO2: 98%   Height: 170.2 cm (67\")       Physical Exam:    General: Alert, cooperative, no distress, appears stated age  Head:  Normocephalic, atraumatic, mucous membranes moist  Eyes:  Conjunctiva/corneas clear, EOM's intact     Neck:  Supple,  no adenopathy;      Lungs: Clear to auscultation bilaterally, no wheezes rhonchi rales are noted  Chest wall: No tenderness  Heart::  Regular rate and rhythm, S1 and S2 normal, " displaced LV apical impulse 3 x 6 pansystolic murmur left sternal border radiating to axilla  Abdomen: Soft, non-tender, nondistended bowel sounds active  Extremities: No cyanosis, clubbing, or edema  Pulses: 2+ and symmetric all extremities  Skin:  No rashes or lesions  Neuro/psych: A&O x3. CN II through XII are grossly intact with appropriate affect      Allergies:  Allergies   Allergen Reactions   • Hydrocodone Hives   • Penicillin G Unknown (See Comments)       Medication Review:     Current Outpatient Medications:   •  allopurinol (ZYLOPRIM) 100 MG tablet, Take 2 tablets by mouth Daily. (Patient taking differently: Take 100 mg by mouth Daily.), Disp: 30 tablet, Rfl: 1  •  ALPRAZolam (XANAX) 1 MG tablet, Take 1 mg by mouth 4 (Four) Times a Day As Needed for Anxiety., Disp: , Rfl:   •  amLODIPine (NORVASC) 5 MG tablet, Take 2 tablets by mouth Daily. (Patient taking differently: Take 5 mg by mouth Daily.), Disp: 30 tablet, Rfl: 3  •  aspirin 325 MG tablet, Take 1 tablet by mouth Daily., Disp: 30 tablet, Rfl: 3  •  bumetanide (BUMEX) 1 MG tablet, Take 1 tablet by mouth Daily., Disp: 90 tablet, Rfl: 3  •  carvedilol (COREG) 25 MG tablet, Take 1 tablet by mouth 2 (Two) Times a Day With Meals., Disp: 180 tablet, Rfl: 2  •  cholecalciferol (VITAMIN D3) 1000 units tablet, Take 1,000 Units by mouth Daily., Disp: , Rfl:   •  cloNIDine (CATAPRES) 0.1 MG tablet, Take 2 tablets by mouth 2 (Two) Times a Day., Disp: 360 tablet, Rfl: 2  •  Cyanocobalamin (VITAMIN B 12 PO), Take 1 tablet by mouth Daily., Disp: , Rfl:   •  Diclofenac Sodium 1.5 % solution, Place 40 drops on the skin as directed by provider Daily., Disp: , Rfl:   •  docusate sodium 100 MG capsule, Take 100 mg by mouth 2 (Two) Times a Day. (Patient taking differently: Take 100 mg by mouth 2 (Two) Times a Day As Needed.), Disp: 60 each, Rfl: 1  •  ferrous sulfate 325 (65 FE) MG tablet, Take 65 mg by mouth Daily With Breakfast., Disp: , Rfl:   •  folic acid (FOLVITE)  1 MG tablet, Take 1 mg by mouth Daily., Disp: , Rfl:   •  furosemide (LASIX) 40 MG tablet, Take 40 mg by mouth Daily., Disp: , Rfl:   •  gabapentin (NEURONTIN) 300 MG capsule, Take 300 mg by mouth Daily As Needed., Disp: , Rfl:   •  hydrALAZINE (APRESOLINE) 50 MG tablet, Take 100 mg by mouth 3 (Three) Times a Day., Disp: , Rfl:   •  levothyroxine (SYNTHROID, LEVOTHROID) 200 MCG tablet, Take 200 mcg by mouth Daily., Disp: , Rfl:   •  lisinopril (PRINIVIL,ZESTRIL) 40 MG tablet, Take 40 mg by mouth Daily., Disp: , Rfl:   •  metFORMIN (GLUCOPHAGE) 500 MG tablet, Take 500 mg by mouth Daily With Breakfast., Disp: , Rfl:   •  montelukast (SINGULAIR) 10 MG tablet, Take 10 mg by mouth Every Night., Disp: , Rfl:   •  ondansetron ODT (ZOFRAN-ODT) 4 MG disintegrating tablet, Place 1 tablet on the tongue 4 (Four) Times a Day As Needed for Nausea or Vomiting for up to 12 doses., Disp: 12 tablet, Rfl: 0  •  oxyCODONE-acetaminophen (PERCOCET)  MG per tablet, Take 1 tablet by mouth Every 6 (Six) Hours As Needed for Moderate Pain  for up to 12 doses., Disp: 12 tablet, Rfl: 0  •  oxyCODONE-acetaminophen (PERCOCET) 7.5-325 MG per tablet, Take 1 tablet by mouth Every 6 (Six) Hours As Needed for Severe Pain . May resume after postop oxycodone prescription is completed., Disp: , Rfl:   •  pantoprazole (PROTONIX) 40 MG EC tablet, Take 40 mg by mouth Daily., Disp: , Rfl:   •  potassium chloride (K-DUR) 10 MEQ CR tablet, Take 20 mEq by mouth 2 (Two) Times a Day., Disp: , Rfl:     Family History:  Family History   Problem Relation Age of Onset   • Heart disease Father        Past Medical History:  Past Medical History:   Diagnosis Date   • CHF (congestive heart failure) (CMS/HCC)    • COPD (chronic obstructive pulmonary disease) (CMS/HCC)    • Diabetes (CMS/HCC)    • Hyperlipidemia    • Hypertension        Past surgical History:  Past Surgical History:   Procedure Laterality Date   • AORTIC VALVE REPAIR/REPLACEMENT N/A 12/27/2019     Procedure: AORTIC VALVE REPAIR/REPLACEMENT;  Surgeon: Lane Stock MD;  Location: [facility] CVOR;  Service: Cardiothoracic   • BREAST LUMPECTOMY     • CARDIAC CATHETERIZATION N/A 2019    Procedure: Right and Left Heart Cath 19 @ 0900;  Surgeon: Wayne Luna MD;  Location: [facility] CATH INVASIVE LOCATION;  Service: Cardiovascular   • CARDIAC CATHETERIZATION N/A 2019    Procedure: Coronary angiography;  Surgeon: Wayne Luna MD;  Location: [facility] CATH INVASIVE LOCATION;  Service: Cardiovascular   • CARDIAC ELECTROPHYSIOLOGY PROCEDURE Left 2019    Procedure: Dual-chamber ICD insertion;  Surgeon: Héctor Eckert MD;  Location: [facility] CATH INVASIVE LOCATION;  Service: Cardiovascular   • CARDIAC ELECTROPHYSIOLOGY PROCEDURE Left 2019    Procedure: Lead Revision;  Surgeon: Héctor Eckert MD;  Location: [facility] CATH INVASIVE LOCATION;  Service: Cardiovascular   • CARDIAC SURGERY     • CHOLECYSTECTOMY     • CORONARY ARTERY BYPASS GRAFT N/A 2019    Procedure: CORONARY ARTERY BYPASS GRAFTING;  Surgeon: Lane Stock MD;  Location: [facility];  Service: Cardiothoracic   • HYSTERECTOMY     • INSERT / REPLACE / REMOVE PACEMAKER     • THYROID SURGERY         Social History:  Social History     Socioeconomic History   • Marital status:      Spouse name: Not on file   • Number of children: Not on file   • Years of education: Not on file   • Highest education level: Not on file   Tobacco Use   • Smoking status: Former Smoker     Quit date: 2019     Years since quittin.8   • Smokeless tobacco: Never Used   Substance and Sexual Activity   • Alcohol use: No     Frequency: Never   • Drug use: No   • Sexual activity: Defer       Review of Systems:  The following systems were reviewed as they relate to the cardiovascular system: Constitutional, Eyes, ENT, Cardiovascular, Respiratory, Gastrointestinal, Integumentary, Neurological, Psychiatric, Hematologic,  Endocrine, Musculoskeletal, and Genitourinary. The pertinent cardiovascular findings are reported above with all other cardiovascular points within those systems being negative.    Diagnostic Study Review:     Current Electrocardiogram:  Procedures      NOTE: The following portions of the patient's history were reviewed and updated this visit as appropriate: allergies, current medications, past family history, past medical history, past social history, past surgical history and problem list.

## 2020-10-24 ENCOUNTER — HOSPITAL ENCOUNTER (OUTPATIENT)
Dept: GENERAL RADIOLOGY | Facility: HOSPITAL | Age: 45
Discharge: HOME | End: 2020-10-24

## 2020-10-24 ENCOUNTER — LAB (OUTPATIENT)
Dept: LAB | Facility: HOSPITAL | Age: 45
End: 2020-10-24

## 2020-10-24 DIAGNOSIS — I35.1 NONRHEUMATIC AORTIC VALVE INSUFFICIENCY: ICD-10-CM

## 2020-10-24 DIAGNOSIS — Z95.2 S/P AVR (AORTIC VALVE REPLACEMENT): ICD-10-CM

## 2020-10-24 DIAGNOSIS — I34.0 NONRHEUMATIC MITRAL VALVE REGURGITATION: ICD-10-CM

## 2020-10-24 DIAGNOSIS — Z95.1 S/P CABG X 3: ICD-10-CM

## 2020-10-24 DIAGNOSIS — I42.0 CARDIOMYOPATHY, DILATED (HCC): ICD-10-CM

## 2020-10-24 DIAGNOSIS — I50.22 SYSTOLIC CHF, CHRONIC (HCC): ICD-10-CM

## 2020-10-24 DIAGNOSIS — Z01.818 PRE-OP EVALUATION: Primary | ICD-10-CM

## 2020-10-24 LAB
ANION GAP SERPL CALCULATED.3IONS-SCNC: 10.5 MMOL/L (ref 5–15)
APTT PPP: 30.4 SECONDS (ref 24–31)
BASOPHILS # BLD AUTO: 0.04 10*3/MM3 (ref 0–0.2)
BASOPHILS NFR BLD AUTO: 0.5 % (ref 0–1.5)
BUN SERPL-MCNC: 14 MG/DL (ref 6–20)
BUN/CREAT SERPL: 11.4 (ref 7–25)
CALCIUM SPEC-SCNC: 9.8 MG/DL (ref 8.6–10.5)
CHLORIDE SERPL-SCNC: 102 MMOL/L (ref 98–107)
CO2 SERPL-SCNC: 25.5 MMOL/L (ref 22–29)
CREAT SERPL-MCNC: 1.23 MG/DL (ref 0.57–1)
DEPRECATED RDW RBC AUTO: 48.7 FL (ref 37–54)
EOSINOPHIL # BLD AUTO: 0.07 10*3/MM3 (ref 0–0.4)
EOSINOPHIL NFR BLD AUTO: 0.9 % (ref 0.3–6.2)
ERYTHROCYTE [DISTWIDTH] IN BLOOD BY AUTOMATED COUNT: 13.6 % (ref 12.3–15.4)
GFR SERPL CREATININE-BSD FRML MDRD: 57 ML/MIN/1.73
GLUCOSE SERPL-MCNC: 95 MG/DL (ref 65–99)
HCT VFR BLD AUTO: 47.1 % (ref 34–46.6)
HGB BLD-MCNC: 15.5 G/DL (ref 12–15.9)
IMM GRANULOCYTES # BLD AUTO: 0.03 10*3/MM3 (ref 0–0.05)
IMM GRANULOCYTES NFR BLD AUTO: 0.4 % (ref 0–0.5)
INR PPP: 1.04 (ref 0.93–1.1)
LYMPHOCYTES # BLD AUTO: 2.33 10*3/MM3 (ref 0.7–3.1)
LYMPHOCYTES NFR BLD AUTO: 28.9 % (ref 19.6–45.3)
MCH RBC QN AUTO: 31.6 PG (ref 26.6–33)
MCHC RBC AUTO-ENTMCNC: 32.9 G/DL (ref 31.5–35.7)
MCV RBC AUTO: 95.9 FL (ref 79–97)
MONOCYTES # BLD AUTO: 0.46 10*3/MM3 (ref 0.1–0.9)
MONOCYTES NFR BLD AUTO: 5.7 % (ref 5–12)
NEUTROPHILS NFR BLD AUTO: 5.13 10*3/MM3 (ref 1.7–7)
NEUTROPHILS NFR BLD AUTO: 63.6 % (ref 42.7–76)
NRBC BLD AUTO-RTO: 0 /100 WBC (ref 0–0.2)
PLATELET # BLD AUTO: 212 10*3/MM3 (ref 140–450)
PMV BLD AUTO: 10.8 FL (ref 6–12)
POTASSIUM SERPL-SCNC: 4 MMOL/L (ref 3.5–5.2)
PROTHROMBIN TIME: 11.4 SECONDS (ref 9.6–11.7)
RBC # BLD AUTO: 4.91 10*6/MM3 (ref 3.77–5.28)
SODIUM SERPL-SCNC: 138 MMOL/L (ref 136–145)
WBC # BLD AUTO: 8.06 10*3/MM3 (ref 3.4–10.8)

## 2020-10-24 PROCEDURE — 71046 X-RAY EXAM CHEST 2 VIEWS: CPT

## 2020-10-24 PROCEDURE — 85610 PROTHROMBIN TIME: CPT

## 2020-10-24 PROCEDURE — C9803 HOPD COVID-19 SPEC COLLECT: HCPCS

## 2020-10-24 PROCEDURE — 85730 THROMBOPLASTIN TIME PARTIAL: CPT

## 2020-10-24 PROCEDURE — 85025 COMPLETE CBC W/AUTO DIFF WBC: CPT

## 2020-10-24 PROCEDURE — U0004 COV-19 TEST NON-CDC HGH THRU: HCPCS

## 2020-10-24 PROCEDURE — 36415 COLL VENOUS BLD VENIPUNCTURE: CPT

## 2020-10-24 PROCEDURE — 80048 BASIC METABOLIC PNL TOTAL CA: CPT

## 2020-10-25 LAB — SARS-COV-2 RNA RESP QL NAA+PROBE: NOT DETECTED

## 2020-10-26 ENCOUNTER — HOSPITAL ENCOUNTER (OUTPATIENT)
Dept: INFUSION THERAPY | Facility: HOSPITAL | Age: 45
Discharge: HOME | End: 2020-10-26
Admitting: INTERNAL MEDICINE

## 2020-10-26 ENCOUNTER — ANESTHESIA EVENT (OUTPATIENT)
Dept: CARDIOLOGY | Facility: HOSPITAL | Age: 45
End: 2020-10-26

## 2020-10-26 VITALS
SYSTOLIC BLOOD PRESSURE: 138 MMHG | WEIGHT: 189 LBS | DIASTOLIC BLOOD PRESSURE: 93 MMHG | OXYGEN SATURATION: 97 % | RESPIRATION RATE: 17 BRPM | HEART RATE: 98 BPM | BODY MASS INDEX: 29.6 KG/M2

## 2020-10-26 DIAGNOSIS — L03.111 CELLULITIS OF RIGHT AXILLA: ICD-10-CM

## 2020-10-26 DIAGNOSIS — Z95.2 S/P AVR (AORTIC VALVE REPLACEMENT): Primary | ICD-10-CM

## 2020-10-26 DIAGNOSIS — L03.112 CELLULITIS OF LEFT AXILLA: ICD-10-CM

## 2020-10-26 DIAGNOSIS — L73.2 HIDRADENITIS: ICD-10-CM

## 2020-10-26 PROCEDURE — 25010000002 DALBAVANCIN 500 MG RECONSTITUTED SOLUTION 1 EACH VIAL: Performed by: INTERNAL MEDICINE

## 2020-10-26 PROCEDURE — 96365 THER/PROPH/DIAG IV INF INIT: CPT

## 2020-10-26 RX ORDER — SODIUM CHLORIDE 0.9 % (FLUSH) 0.9 %
10 SYRINGE (ML) INJECTION EVERY 12 HOURS SCHEDULED
Status: CANCELLED | OUTPATIENT
Start: 2020-10-26

## 2020-10-26 RX ORDER — DEXTROSE MONOHYDRATE 50 MG/ML
250 INJECTION, SOLUTION INTRAVENOUS ONCE
Status: DISCONTINUED | OUTPATIENT
Start: 2020-10-26 | End: 2020-10-28 | Stop reason: HOSPADM

## 2020-10-26 RX ORDER — SODIUM CHLORIDE 9 MG/ML
9 INJECTION, SOLUTION INTRAVENOUS CONTINUOUS PRN
Status: CANCELLED | OUTPATIENT
Start: 2020-10-26

## 2020-10-26 RX ORDER — SODIUM CHLORIDE 0.9 % (FLUSH) 0.9 %
10 SYRINGE (ML) INJECTION AS NEEDED
Status: CANCELLED | OUTPATIENT
Start: 2020-10-26

## 2020-10-26 RX ORDER — DEXTROSE MONOHYDRATE 50 MG/ML
250 INJECTION, SOLUTION INTRAVENOUS ONCE
Status: CANCELLED | OUTPATIENT
Start: 2020-10-26

## 2020-10-26 RX ORDER — DEXTROSE MONOHYDRATE 50 MG/ML
250 INJECTION, SOLUTION INTRAVENOUS ONCE
OUTPATIENT
Start: 2020-10-26

## 2020-10-26 RX ADMIN — DALBAVANCIN 1500 MG: 500 INJECTION, POWDER, FOR SOLUTION INTRAVENOUS at 14:17

## 2020-10-27 ENCOUNTER — ANESTHESIA (OUTPATIENT)
Dept: CARDIOLOGY | Facility: HOSPITAL | Age: 45
End: 2020-10-27

## 2020-10-27 ENCOUNTER — HOSPITAL ENCOUNTER (OUTPATIENT)
Dept: CARDIOLOGY | Facility: HOSPITAL | Age: 45
Discharge: HOME | End: 2020-10-27

## 2020-11-02 RX ORDER — BUMETANIDE 0.5 MG/1
TABLET ORAL
Qty: 30 TABLET | Refills: 1 | OUTPATIENT
Start: 2020-11-02

## 2020-11-03 RX ORDER — SODIUM CHLORIDE 0.9 % (FLUSH) 0.9 %
10 SYRINGE (ML) INJECTION AS NEEDED
Status: DISCONTINUED | OUTPATIENT
Start: 2020-11-03 | End: 2020-11-10 | Stop reason: HOSPADM

## 2020-11-03 RX ORDER — SODIUM CHLORIDE 9 MG/ML
9 INJECTION, SOLUTION INTRAVENOUS CONTINUOUS PRN
Status: DISCONTINUED | OUTPATIENT
Start: 2020-11-03 | End: 2020-11-10 | Stop reason: HOSPADM

## 2020-11-03 RX ORDER — SODIUM CHLORIDE 0.9 % (FLUSH) 0.9 %
10 SYRINGE (ML) INJECTION EVERY 12 HOURS SCHEDULED
Status: DISCONTINUED | OUTPATIENT
Start: 2020-11-03 | End: 2020-11-10 | Stop reason: HOSPADM

## 2020-11-07 ENCOUNTER — APPOINTMENT (OUTPATIENT)
Dept: GENERAL RADIOLOGY | Facility: HOSPITAL | Age: 45
End: 2020-11-07

## 2020-11-07 ENCOUNTER — HOSPITAL ENCOUNTER (EMERGENCY)
Facility: HOSPITAL | Age: 45
Discharge: HOME | End: 2020-11-07
Admitting: EMERGENCY MEDICINE

## 2020-11-07 VITALS
HEART RATE: 80 BPM | DIASTOLIC BLOOD PRESSURE: 70 MMHG | HEIGHT: 67 IN | OXYGEN SATURATION: 100 % | BODY MASS INDEX: 30.8 KG/M2 | SYSTOLIC BLOOD PRESSURE: 106 MMHG | TEMPERATURE: 98.7 F | RESPIRATION RATE: 16 BRPM | WEIGHT: 196.21 LBS

## 2020-11-07 DIAGNOSIS — R06.00 DYSPNEA, UNSPECIFIED TYPE: Primary | ICD-10-CM

## 2020-11-07 LAB
ALBUMIN SERPL-MCNC: 4.1 G/DL (ref 3.5–5.2)
ALBUMIN/GLOB SERPL: 1.6 G/DL
ALP SERPL-CCNC: 74 U/L (ref 39–117)
ALT SERPL W P-5'-P-CCNC: 28 U/L (ref 1–33)
ANION GAP SERPL CALCULATED.3IONS-SCNC: 9 MMOL/L (ref 5–15)
AST SERPL-CCNC: 25 U/L (ref 1–32)
BASOPHILS # BLD AUTO: 0 10*3/MM3 (ref 0–0.2)
BASOPHILS NFR BLD AUTO: 0.6 % (ref 0–1.5)
BILIRUB SERPL-MCNC: 0.6 MG/DL (ref 0–1.2)
BUN SERPL-MCNC: 16 MG/DL (ref 6–20)
BUN/CREAT SERPL: 10.7 (ref 7–25)
CALCIUM SPEC-SCNC: 8.9 MG/DL (ref 8.6–10.5)
CHLORIDE SERPL-SCNC: 102 MMOL/L (ref 98–107)
CO2 SERPL-SCNC: 28 MMOL/L (ref 22–29)
CREAT SERPL-MCNC: 1.5 MG/DL (ref 0.57–1)
DEPRECATED RDW RBC AUTO: 47.3 FL (ref 37–54)
EOSINOPHIL # BLD AUTO: 0.1 10*3/MM3 (ref 0–0.4)
EOSINOPHIL NFR BLD AUTO: 0.8 % (ref 0.3–6.2)
ERYTHROCYTE [DISTWIDTH] IN BLOOD BY AUTOMATED COUNT: 14.5 % (ref 12.3–15.4)
GFR SERPL CREATININE-BSD FRML MDRD: 45 ML/MIN/1.73
GLOBULIN UR ELPH-MCNC: 2.6 GM/DL
GLUCOSE SERPL-MCNC: 90 MG/DL (ref 65–99)
HCT VFR BLD AUTO: 45.3 % (ref 34–46.6)
HGB BLD-MCNC: 15.2 G/DL (ref 12–15.9)
LYMPHOCYTES # BLD AUTO: 2.7 10*3/MM3 (ref 0.7–3.1)
LYMPHOCYTES NFR BLD AUTO: 40.3 % (ref 19.6–45.3)
MAGNESIUM SERPL-MCNC: 2.1 MG/DL (ref 1.6–2.6)
MCH RBC QN AUTO: 32 PG (ref 26.6–33)
MCHC RBC AUTO-ENTMCNC: 33.6 G/DL (ref 31.5–35.7)
MCV RBC AUTO: 95.1 FL (ref 79–97)
MONOCYTES # BLD AUTO: 0.5 10*3/MM3 (ref 0.1–0.9)
MONOCYTES NFR BLD AUTO: 7 % (ref 5–12)
NEUTROPHILS NFR BLD AUTO: 3.5 10*3/MM3 (ref 1.7–7)
NEUTROPHILS NFR BLD AUTO: 51.3 % (ref 42.7–76)
NRBC BLD AUTO-RTO: 0.1 /100 WBC (ref 0–0.2)
NT-PROBNP SERPL-MCNC: 834.7 PG/ML (ref 0–450)
PLATELET # BLD AUTO: 229 10*3/MM3 (ref 140–450)
PMV BLD AUTO: 7.8 FL (ref 6–12)
POTASSIUM SERPL-SCNC: 3.2 MMOL/L (ref 3.5–5.2)
PROT SERPL-MCNC: 6.7 G/DL (ref 6–8.5)
QT INTERVAL: 420 MS
RBC # BLD AUTO: 4.76 10*6/MM3 (ref 3.77–5.28)
SODIUM SERPL-SCNC: 139 MMOL/L (ref 136–145)
TROPONIN T SERPL-MCNC: <0.01 NG/ML (ref 0–0.03)
WBC # BLD AUTO: 6.8 10*3/MM3 (ref 3.4–10.8)

## 2020-11-07 PROCEDURE — 83735 ASSAY OF MAGNESIUM: CPT | Performed by: NURSE PRACTITIONER

## 2020-11-07 PROCEDURE — 93005 ELECTROCARDIOGRAM TRACING: CPT

## 2020-11-07 PROCEDURE — 83880 ASSAY OF NATRIURETIC PEPTIDE: CPT | Performed by: NURSE PRACTITIONER

## 2020-11-07 PROCEDURE — 99283 EMERGENCY DEPT VISIT LOW MDM: CPT

## 2020-11-07 PROCEDURE — 84484 ASSAY OF TROPONIN QUANT: CPT | Performed by: NURSE PRACTITIONER

## 2020-11-07 PROCEDURE — 80053 COMPREHEN METABOLIC PANEL: CPT | Performed by: NURSE PRACTITIONER

## 2020-11-07 PROCEDURE — 99284 EMERGENCY DEPT VISIT MOD MDM: CPT

## 2020-11-07 PROCEDURE — 71045 X-RAY EXAM CHEST 1 VIEW: CPT

## 2020-11-07 PROCEDURE — 85025 COMPLETE CBC W/AUTO DIFF WBC: CPT | Performed by: NURSE PRACTITIONER

## 2020-11-07 RX ORDER — SODIUM CHLORIDE 0.9 % (FLUSH) 0.9 %
10 SYRINGE (ML) INJECTION AS NEEDED
Status: DISCONTINUED | OUTPATIENT
Start: 2020-11-07 | End: 2020-11-07 | Stop reason: HOSPADM

## 2020-11-07 NOTE — ED PROVIDER NOTES
Subjective   45-year-old -American female presents to the emergency room with complaint of shortness of breath and fluid retention.  She states that she has a history of triple bypass in December 2019 with heart valve replacement earlier this year and fluid retention for admission in May of this year.  Patient reports no fever but states she has generalized malaise and nausea.  Patient reports 2 days ago she started with hot sweats that is been coming and going.  Patient states she was seen at her pain doctor yesterday for a regular checkup visit and was told that her blood pressure was elevated at that time and to continue monitoring.  Patient presents to the ER today because she is worried about her fluid retention and shortness of breath.  Onset: 2 days ago   location: Generalized trunk  Duration: 2 days  Character: Progressively worsening  Aggravating/Alleviating Factors: Movement and exertion/none  Radiation: None  Severity: Moderate            Review of Systems   Constitutional: Positive for activity change and fatigue. Negative for fever.   HENT: Negative.    Eyes: Negative for visual disturbance.   Respiratory: Positive for cough and shortness of breath.    Cardiovascular: Negative for palpitations and leg swelling.   Gastrointestinal: Positive for abdominal distention and nausea. Negative for abdominal pain, blood in stool, constipation, diarrhea and vomiting.   Endocrine: Negative.    Genitourinary: Negative.    Musculoskeletal: Positive for myalgias.   Skin: Negative.    Neurological: Negative for dizziness, tremors and seizures.   Hematological: Negative.    Psychiatric/Behavioral: Negative.        Past Medical History:   Diagnosis Date   • Arrhythmia    • Asthma    • CHF (congestive heart failure) (CMS/HCC)    • COPD (chronic obstructive pulmonary disease) (CMS/HCC)    • Diabetes (CMS/[facility])    • Disease of thyroid gland    • Heart murmur    • Hyperlipidemia    • Hypertension        Allergies    Allergen Reactions   • Hydrocodone Hives   • Penicillin G Unknown (See Comments)   • Contrast Dye GI Intolerance     She is pretty sure it's the contrast dye that makes her super sick and vomiting after heart cath       Past Surgical History:   Procedure Laterality Date   • AORTIC VALVE REPAIR/REPLACEMENT N/A 12/27/2019    Procedure: AORTIC VALVE REPAIR/REPLACEMENT;  Surgeon: Lane Stock MD;  Location: [facility] CVOR;  Service: Cardiothoracic   • BREAST LUMPECTOMY     • CARDIAC CATHETERIZATION N/A 12/24/2019    Procedure: Right and Left Heart Cath 12/24/19 @ 0900;  Surgeon: Wayne Luna MD;  Location: [facility] CATH INVASIVE LOCATION;  Service: Cardiovascular   • CARDIAC CATHETERIZATION N/A 12/24/2019    Procedure: Coronary angiography;  Surgeon: Wayne Luna MD;  Location: [facility] CATH INVASIVE LOCATION;  Service: Cardiovascular   • CARDIAC ELECTROPHYSIOLOGY PROCEDURE Left 6/28/2019    Procedure: Dual-chamber ICD insertion;  Surgeon: Héctor Eckert MD;  Location: [facility] CATH INVASIVE LOCATION;  Service: Cardiovascular   • CARDIAC ELECTROPHYSIOLOGY PROCEDURE Left 8/14/2019    Procedure: Lead Revision;  Surgeon: Héctor Eckert MD;  Location: [facility] CATH INVASIVE LOCATION;  Service: Cardiovascular   • CARDIAC SURGERY     • CHOLECYSTECTOMY     • CORONARY ARTERY BYPASS GRAFT N/A 12/27/2019    Procedure: CORONARY ARTERY BYPASS GRAFTING;  Surgeon: Lane Stock MD;  Location: [facility] CVOR;  Service: Cardiothoracic   • HYSTERECTOMY     • INSERT / REPLACE / REMOVE PACEMAKER     • LYMPHADENECTOMY Bilateral    • THYROID SURGERY         Family History   Problem Relation Age of Onset   • Heart disease Father        Social History     Socioeconomic History   • Marital status:      Spouse name: Not on file   • Number of children: Not on file   • Years of education: Not on file   • Highest education level: Not on file   Tobacco Use   • Smoking status: Former Smoker     Quit date:  2019     Years since quittin.8   • Smokeless tobacco: Never Used   Substance and Sexual Activity   • Alcohol use: No     Frequency: Never   • Drug use: No   • Sexual activity: Defer           Objective   Physical Exam  Vitals signs and nursing note reviewed.   Constitutional:       General: She is not in acute distress.     Appearance: She is not ill-appearing, toxic-appearing or diaphoretic.   HENT:      Head: Normocephalic and atraumatic.      Right Ear: Tympanic membrane, ear canal and external ear normal.      Left Ear: Tympanic membrane, ear canal and external ear normal.      Mouth/Throat:      Mouth: Mucous membranes are moist.      Pharynx: Oropharynx is clear.   Eyes:      Extraocular Movements: Extraocular movements intact.      Pupils: Pupils are equal, round, and reactive to light.   Neck:      Musculoskeletal: Normal range of motion and neck supple.   Cardiovascular:      Rate and Rhythm: Normal rate.      Pulses: Normal pulses.   Pulmonary:      Effort: Pulmonary effort is normal.   Abdominal:      General: Bowel sounds are normal.   Musculoskeletal: Normal range of motion.   Skin:     General: Skin is warm and dry.      Capillary Refill: Capillary refill takes 2 to 3 seconds.   Neurological:      General: No focal deficit present.      Mental Status: She is alert and oriented to person, place, and time.   Psychiatric:         Mood and Affect: Mood normal.         Behavior: Behavior normal.         Procedures           ED Course  ED Course as of 1801   Sat 2020   1755 Discussed case with Dr Monteiro and agrees with management and workup and advises to d/c.    [CT]      ED Course User Index  [CT] Ethel Torres, ALEX      Xr Chest 1 View    Result Date: 2020  No acute cardiopulmonary process.  Electronically Signed By-Althea Valladares On:2020 4:38 PM This report was finalized on [id number] by  Althea Valladares, .    Labs Reviewed   COMPREHENSIVE METABOLIC PANEL - Abnormal;  Notable for the following components:       Result Value    Creatinine 1.50 (*)     Potassium 3.2 (*)     eGFR   Amer 45 (*)     All other components within normal limits    Narrative:     GFR Normal >60  Chronic Kidney Disease <60  Kidney Failure <15     BNP (IN-HOUSE) - Abnormal; Notable for the following components:    proBNP 834.7 (*)     All other components within normal limits    Narrative:     Among patients with dyspnea, NT-proBNP is highly sensitive for the detection of acute congestive heart failure. In addition NT-proBNP of <300 pg/ml effectively rules out acute congestive heart failure with 99% negative predictive value.    Results may be falsely decreased if patient taking Biotin.     TROPONIN (IN-HOUSE) - Normal    Narrative:     Troponin T Reference Range:  <= 0.03 ng/mL-   Negative for AMI  >0.03 ng/mL-     Abnormal for myocardial necrosis.  Clinicians would have to utilize clinical acumen, EKG, Troponin and serial changes to determine if it is an Acute Myocardial Infarction or myocardial injury due to an underlying chronic condition.       Results may be falsely decreased if patient taking Biotin.     MAGNESIUM - Normal   CBC WITH AUTO DIFFERENTIAL - Normal   TROPONIN (IN-HOUSE)   CBC AND DIFFERENTIAL    Narrative:     The following orders were created for panel order CBC & Differential.  Procedure                               Abnormality         Status                     ---------                               -----------         ------                     CBC Auto Differential[[id number]]        Normal              Final result                 Please view results for these tests on the individual orders.       Medications   sodium chloride 0.9 % flush 10 mL (has no administration in time range)     Blood work obtained and chest x-ray obtained and neither 1 show any acute abnormality.  Patient's vital signs are within normal limits.  Discussed results of work-up with patient and patient  request Covid test due to she arrived late for her outpatient Covid testing.  Advised patient to call primary care physician on Monday morning on further advisement as to how to proceed for her outpatient Covid testing.                                     [facility]    Final diagnoses:   Dyspnea, unspecified type            Ethel Torres, APRN  11/07/20 1807

## 2020-11-07 NOTE — DISCHARGE INSTRUCTIONS
Please call your primary care physician or the physician that we will be performing your test on Tuesday for advisement on to where to obtain a Covid test for prior to outpatient procedure.  Return to ER if you feel worse with shortness of breath, cough, fever, or losing consciousness.

## 2020-11-10 ENCOUNTER — HOSPITAL ENCOUNTER (OUTPATIENT)
Dept: CARDIOLOGY | Facility: HOSPITAL | Age: 45
Discharge: HOME | End: 2020-11-10

## 2020-11-10 ENCOUNTER — HOSPITAL ENCOUNTER (OUTPATIENT)
Facility: HOSPITAL | Age: 45
Setting detail: HOSPITAL OUTPATIENT SURGERY
Discharge: HOME | End: 2020-11-10
Attending: INTERNAL MEDICINE | Admitting: INTERNAL MEDICINE

## 2020-11-10 ENCOUNTER — TRANSCRIBE ORDERS (OUTPATIENT)
Dept: ADMINISTRATIVE | Facility: HOSPITAL | Age: 45
End: 2020-11-10

## 2020-11-10 ENCOUNTER — LAB (OUTPATIENT)
Dept: LAB | Facility: HOSPITAL | Age: 45
End: 2020-11-10

## 2020-11-10 ENCOUNTER — ANESTHESIA (OUTPATIENT)
Dept: CARDIOLOGY | Facility: HOSPITAL | Age: 45
End: 2020-11-10

## 2020-11-10 ENCOUNTER — ANESTHESIA EVENT (OUTPATIENT)
Dept: CARDIOLOGY | Facility: HOSPITAL | Age: 45
End: 2020-11-10

## 2020-11-10 VITALS
DIASTOLIC BLOOD PRESSURE: 92 MMHG | RESPIRATION RATE: 13 BRPM | OXYGEN SATURATION: 96 % | BODY MASS INDEX: 28.34 KG/M2 | TEMPERATURE: 97.3 F | SYSTOLIC BLOOD PRESSURE: 139 MMHG | HEIGHT: 69 IN | HEART RATE: 79 BPM | WEIGHT: 191.36 LBS

## 2020-11-10 VITALS
OXYGEN SATURATION: 98 % | SYSTOLIC BLOOD PRESSURE: 109 MMHG | OXYGEN SATURATION: 99 % | DIASTOLIC BLOOD PRESSURE: 71 MMHG | DIASTOLIC BLOOD PRESSURE: 90 MMHG | RESPIRATION RATE: 24 BRPM | SYSTOLIC BLOOD PRESSURE: 134 MMHG | HEART RATE: 81 BPM

## 2020-11-10 DIAGNOSIS — Z20.822 COVID-19 RULED OUT: Primary | ICD-10-CM

## 2020-11-10 DIAGNOSIS — Z95.1 S/P CABG X 3: ICD-10-CM

## 2020-11-10 DIAGNOSIS — Z95.2 S/P AVR (AORTIC VALVE REPLACEMENT): ICD-10-CM

## 2020-11-10 DIAGNOSIS — I35.1 NONRHEUMATIC AORTIC VALVE INSUFFICIENCY: ICD-10-CM

## 2020-11-10 DIAGNOSIS — I50.22 SYSTOLIC CHF, CHRONIC (HCC): Primary | ICD-10-CM

## 2020-11-10 DIAGNOSIS — I25.119 CORONARY ARTERY DISEASE INVOLVING NATIVE CORONARY ARTERY OF NATIVE HEART WITH ANGINA PECTORIS (HCC): ICD-10-CM

## 2020-11-10 DIAGNOSIS — I34.0 NONRHEUMATIC MITRAL VALVE REGURGITATION: ICD-10-CM

## 2020-11-10 DIAGNOSIS — I50.22 SYSTOLIC CHF, CHRONIC (HCC): ICD-10-CM

## 2020-11-10 DIAGNOSIS — I42.0 CARDIOMYOPATHY, DILATED (HCC): ICD-10-CM

## 2020-11-10 DIAGNOSIS — Z20.822 COVID-19 RULED OUT: ICD-10-CM

## 2020-11-10 LAB
APTT PPP: 29.4 SECONDS (ref 24–31)
GLUCOSE BLDC GLUCOMTR-MCNC: 93 MG/DL (ref 70–105)
INR PPP: 1.03 (ref 0.93–1.1)
PROTHROMBIN TIME: 11.3 SECONDS (ref 9.6–11.7)
SARS-COV-2 RNA PNL SPEC NAA+PROBE: NOT DETECTED

## 2020-11-10 PROCEDURE — 85610 PROTHROMBIN TIME: CPT

## 2020-11-10 PROCEDURE — 99153 MOD SED SAME PHYS/QHP EA: CPT | Performed by: INTERNAL MEDICINE

## 2020-11-10 PROCEDURE — 93461 R&L HRT ART/VENTRICLE ANGIO: CPT | Performed by: INTERNAL MEDICINE

## 2020-11-10 PROCEDURE — 87635 SARS-COV-2 COVID-19 AMP PRB: CPT

## 2020-11-10 PROCEDURE — 93325 DOPPLER ECHO COLOR FLOW MAPG: CPT

## 2020-11-10 PROCEDURE — 25010000002 PROPOFOL 10 MG/ML EMULSION: Performed by: NURSE ANESTHETIST, CERTIFIED REGISTERED

## 2020-11-10 PROCEDURE — 25010000002 MIDAZOLAM PER 1 MG: Performed by: INTERNAL MEDICINE

## 2020-11-10 PROCEDURE — 25010000002 METHYLPREDNISOLONE PER 125 MG: Performed by: INTERNAL MEDICINE

## 2020-11-10 PROCEDURE — 25010000002 DIPHENHYDRAMINE PER 50 MG: Performed by: INTERNAL MEDICINE

## 2020-11-10 PROCEDURE — 93312 ECHO TRANSESOPHAGEAL: CPT

## 2020-11-10 PROCEDURE — 85730 THROMBOPLASTIN TIME PARTIAL: CPT | Performed by: INTERNAL MEDICINE

## 2020-11-10 PROCEDURE — C1894 INTRO/SHEATH, NON-LASER: HCPCS | Performed by: INTERNAL MEDICINE

## 2020-11-10 PROCEDURE — 93325 DOPPLER ECHO COLOR FLOW MAPG: CPT | Performed by: INTERNAL MEDICINE

## 2020-11-10 PROCEDURE — 93320 DOPPLER ECHO COMPLETE: CPT | Performed by: INTERNAL MEDICINE

## 2020-11-10 PROCEDURE — 25010000002 FENTANYL CITRATE (PF) 100 MCG/2ML SOLUTION: Performed by: INTERNAL MEDICINE

## 2020-11-10 PROCEDURE — 99152 MOD SED SAME PHYS/QHP 5/>YRS: CPT | Performed by: INTERNAL MEDICINE

## 2020-11-10 PROCEDURE — 82962 GLUCOSE BLOOD TEST: CPT

## 2020-11-10 PROCEDURE — 36415 COLL VENOUS BLD VENIPUNCTURE: CPT | Performed by: INTERNAL MEDICINE

## 2020-11-10 PROCEDURE — 25010000002 ONDANSETRON PER 1 MG: Performed by: INTERNAL MEDICINE

## 2020-11-10 PROCEDURE — 93320 DOPPLER ECHO COMPLETE: CPT

## 2020-11-10 PROCEDURE — C1769 GUIDE WIRE: HCPCS | Performed by: INTERNAL MEDICINE

## 2020-11-10 PROCEDURE — 93312 ECHO TRANSESOPHAGEAL: CPT | Performed by: INTERNAL MEDICINE

## 2020-11-10 PROCEDURE — C9803 HOPD COVID-19 SPEC COLLECT: HCPCS

## 2020-11-10 PROCEDURE — 0 IOPAMIDOL PER 1 ML: Performed by: INTERNAL MEDICINE

## 2020-11-10 RX ORDER — ONDANSETRON 2 MG/ML
INJECTION INTRAMUSCULAR; INTRAVENOUS AS NEEDED
Status: DISCONTINUED | OUTPATIENT
Start: 2020-11-10 | End: 2020-11-10 | Stop reason: HOSPADM

## 2020-11-10 RX ORDER — SODIUM CHLORIDE 9 MG/ML
INJECTION, SOLUTION INTRAVENOUS CONTINUOUS PRN
Status: DISCONTINUED | OUTPATIENT
Start: 2020-11-10 | End: 2020-11-10 | Stop reason: SURG

## 2020-11-10 RX ORDER — DIPHENHYDRAMINE HCL 25 MG
25 CAPSULE ORAL EVERY 6 HOURS PRN
Status: DISCONTINUED | OUTPATIENT
Start: 2020-11-10 | End: 2020-11-10 | Stop reason: HOSPADM

## 2020-11-10 RX ORDER — METHYLPREDNISOLONE SODIUM SUCCINATE 125 MG/2ML
125 INJECTION, POWDER, LYOPHILIZED, FOR SOLUTION INTRAMUSCULAR; INTRAVENOUS ONCE
Status: COMPLETED | OUTPATIENT
Start: 2020-11-10 | End: 2020-11-10

## 2020-11-10 RX ORDER — ONDANSETRON 2 MG/ML
4 INJECTION INTRAMUSCULAR; INTRAVENOUS EVERY 6 HOURS PRN
Status: DISCONTINUED | OUTPATIENT
Start: 2020-11-10 | End: 2020-11-10 | Stop reason: HOSPADM

## 2020-11-10 RX ORDER — ACETAMINOPHEN 325 MG/1
650 TABLET ORAL EVERY 4 HOURS PRN
Status: DISCONTINUED | OUTPATIENT
Start: 2020-11-10 | End: 2020-11-10 | Stop reason: HOSPADM

## 2020-11-10 RX ORDER — FENTANYL CITRATE 50 UG/ML
INJECTION, SOLUTION INTRAMUSCULAR; INTRAVENOUS AS NEEDED
Status: DISCONTINUED | OUTPATIENT
Start: 2020-11-10 | End: 2020-11-10 | Stop reason: HOSPADM

## 2020-11-10 RX ORDER — ONDANSETRON 2 MG/ML
4 INJECTION INTRAMUSCULAR; INTRAVENOUS ONCE AS NEEDED
Status: DISCONTINUED | OUTPATIENT
Start: 2020-11-10 | End: 2020-11-10 | Stop reason: HOSPADM

## 2020-11-10 RX ORDER — LIDOCAINE HYDROCHLORIDE 20 MG/ML
INJECTION, SOLUTION INFILTRATION; PERINEURAL AS NEEDED
Status: DISCONTINUED | OUTPATIENT
Start: 2020-11-10 | End: 2020-11-10 | Stop reason: HOSPADM

## 2020-11-10 RX ORDER — ALLOPURINOL 300 MG/1
300 TABLET ORAL DAILY
COMMUNITY

## 2020-11-10 RX ORDER — MIDAZOLAM HYDROCHLORIDE 1 MG/ML
INJECTION INTRAMUSCULAR; INTRAVENOUS AS NEEDED
Status: DISCONTINUED | OUTPATIENT
Start: 2020-11-10 | End: 2020-11-10 | Stop reason: HOSPADM

## 2020-11-10 RX ORDER — PROPOFOL 10 MG/ML
VIAL (ML) INTRAVENOUS AS NEEDED
Status: DISCONTINUED | OUTPATIENT
Start: 2020-11-10 | End: 2020-11-10 | Stop reason: SURG

## 2020-11-10 RX ORDER — AMLODIPINE BESYLATE 5 MG/1
10 TABLET ORAL DAILY
COMMUNITY
End: 2020-12-14 | Stop reason: SDUPTHER

## 2020-11-10 RX ORDER — SODIUM CHLORIDE 9 MG/ML
250 INJECTION, SOLUTION INTRAVENOUS ONCE AS NEEDED
Status: CANCELLED | OUTPATIENT
Start: 2020-11-10

## 2020-11-10 RX ORDER — ONDANSETRON 4 MG/1
4 TABLET, FILM COATED ORAL EVERY 6 HOURS PRN
Status: DISCONTINUED | OUTPATIENT
Start: 2020-11-10 | End: 2020-11-10 | Stop reason: HOSPADM

## 2020-11-10 RX ORDER — ONDANSETRON 2 MG/ML
4 INJECTION INTRAMUSCULAR; INTRAVENOUS ONCE AS NEEDED
Status: CANCELLED | OUTPATIENT
Start: 2020-11-10

## 2020-11-10 RX ORDER — DIPHENHYDRAMINE HYDROCHLORIDE 50 MG/ML
25 INJECTION INTRAMUSCULAR; INTRAVENOUS ONCE
Status: COMPLETED | OUTPATIENT
Start: 2020-11-10 | End: 2020-11-10

## 2020-11-10 RX ORDER — DOCUSATE SODIUM 100 MG/1
100 CAPSULE, LIQUID FILLED ORAL 2 TIMES DAILY PRN
COMMUNITY

## 2020-11-10 RX ADMIN — PROPOFOL 50 MG: 10 INJECTION, EMULSION INTRAVENOUS at 10:19

## 2020-11-10 RX ADMIN — PROPOFOL 50 MG: 10 INJECTION, EMULSION INTRAVENOUS at 10:25

## 2020-11-10 RX ADMIN — PROPOFOL 20 MG: 10 INJECTION, EMULSION INTRAVENOUS at 10:38

## 2020-11-10 RX ADMIN — METHYLPREDNISOLONE SODIUM SUCCINATE 125 MG: 125 INJECTION, POWDER, FOR SOLUTION INTRAMUSCULAR; INTRAVENOUS at 10:42

## 2020-11-10 RX ADMIN — PROPOFOL 50 MG: 10 INJECTION, EMULSION INTRAVENOUS at 10:23

## 2020-11-10 RX ADMIN — SODIUM CHLORIDE: 0.9 INJECTION, SOLUTION INTRAVENOUS at 10:13

## 2020-11-10 RX ADMIN — PROPOFOL 50 MG: 10 INJECTION, EMULSION INTRAVENOUS at 10:21

## 2020-11-10 RX ADMIN — SODIUM CHLORIDE 9 ML/HR: 9 INJECTION, SOLUTION INTRAVENOUS at 09:30

## 2020-11-10 RX ADMIN — PROPOFOL 30 MG: 10 INJECTION, EMULSION INTRAVENOUS at 10:34

## 2020-11-10 RX ADMIN — PROPOFOL 30 MG: 10 INJECTION, EMULSION INTRAVENOUS at 10:36

## 2020-11-10 RX ADMIN — PROPOFOL 30 MG: 10 INJECTION, EMULSION INTRAVENOUS at 10:27

## 2020-11-10 RX ADMIN — DIPHENHYDRAMINE HYDROCHLORIDE 25 MG: 50 INJECTION, SOLUTION INTRAMUSCULAR; INTRAVENOUS at 10:42

## 2020-11-10 RX ADMIN — PROPOFOL 30 MG: 10 INJECTION, EMULSION INTRAVENOUS at 10:32

## 2020-11-10 RX ADMIN — PROPOFOL 30 MG: 10 INJECTION, EMULSION INTRAVENOUS at 10:30

## 2020-11-10 NOTE — ANESTHESIA PREPROCEDURE EVALUATION
Anesthesia Evaluation     Patient summary reviewed and Nursing notes reviewed   NPO Solid Status: > 6 hours  NPO Liquid Status: > 6 hours           Airway   Mallampati: III  TM distance: >3 FB  Neck ROM: full  No difficulty expected and Possible difficult intubation  Dental - normal exam     Pulmonary - normal exam    breath sounds clear to auscultation  (+) COPD, asthma,shortness of breath,   Cardiovascular - normal exam    ECG reviewed  Rhythm: regular  Rate: normal    (+) hypertension, valvular problems/murmurs, CAD, CABG, angina, CHF , hyperlipidemia,       Neuro/Psych- negative ROS  GI/Hepatic/Renal/Endo    (+)  GERD,  renal disease, diabetes mellitus,     Musculoskeletal (-) negative ROS    Abdominal  - normal exam    Abdomen: soft.  Bowel sounds: normal.   Substance History - negative use     OB/GYN negative ob/gyn ROS         Other - negative ROS                       Anesthesia Plan    ASA 3     MAC     intravenous induction     Anesthetic plan, all risks, benefits, and alternatives have been provided, discussed and informed consent has been obtained with: patient.  Use of blood products discussed with patient .   Plan discussed with CRNA.

## 2020-11-10 NOTE — ANESTHESIA POSTPROCEDURE EVALUATION
Patient: Rafael Mccann    Procedure Summary     Date: 11/10/20 Room / Location: [facility]    Anesthesia Start: 1013 Anesthesia Stop: 1048    Procedure: ADULT TRANSESOPHAGEAL ECHO (FELICIA) W/ CONT IF NECESSARY PER PROTOCOL Diagnosis:       Systolic CHF, chronic (CMS/HCC)      Nonrheumatic aortic valve insufficiency      Cardiomyopathy, dilated (CMS/HCC)      S/P AVR (aortic valve replacement)      S/P CABG x 3      Nonrheumatic mitral valve regurgitation      (Valvular Disease)      (Mitral Valve Assessment)    Scheduled Providers: Wayne Luna MD; López Romano MD Provider: Nitin Barros MD    Anesthesia Type: MAC ASA Status: 3          Anesthesia Type: MAC    Vitals  Vitals Value Taken Time   /71 11/10/20 1046   Temp     Pulse 81 11/10/20 1048   Resp 24 11/10/20 1048   SpO2 99 % 11/10/20 1048           Post Anesthesia Care and Evaluation    Patient location during evaluation: bedside  Patient participation: complete - patient participated  Level of consciousness: awake and alert  Pain score: 1  Pain management: adequate  Airway patency: patent  Anesthetic complications: No anesthetic complications  PONV Status: none  Cardiovascular status: acceptable  Respiratory status: acceptable  Hydration status: acceptable  Post Neuraxial Block status: Motor and sensory function returned to baseline

## 2020-11-11 NOTE — DISCHARGE INSTRUCTIONS
Post Cath Instructions    Call Dr. Louis’s office to schedule a follow up appointment in 1 week at [phone].  Specific Physician Instructions:     1) Drink plenty of fluids for the next 24 hours.  This helps to eliminate the dye used in your procedure through urination.  You may resume a normal diet; however, try to avoid foods that would cause gas or constipation.    2) Sedative medication given to you during your catheterization may decrease your judgement and reaction time for up to 24-48 hours.  Therefore:  a. DO NOT drive or operate hazardous machinery (48 hours)  b. DO NOT consume alcoholic beverages  c. DO NOT make any important/legal decisions  d. Have someone stay with you for at least 24 hours    3) To allow proper healing and prevent bleeding, the following activities are to be strictly avoided for the next 24-48 hours:  a. Excessive bending at wound site  b. Straining (anything that would tense up muscles around the affected puncture site)  c. Lifting objects greater than 5 pounds, pushing, or pulling for 5 days  i. For Groin Cases:  1. Refrain from sexual activity  2. Refrain from running or vigorous walking  3. No prolonged sitting or standing  4. Limit stair climbing as much as possible    4) Keep the puncture site clean and dry.  You may remove the dressing tomorrow and replace it with a band-aid for at least one additional day.  Gently clean the site with mild soap and water.  No scrubbing/rubbing and lightly pat the area dry.  Showers are acceptable; however, avoid submerging in water (tub baths, hot tubs, swimming pools, dishwater, etc…) for at least one week.  The site should be completely healed before resuming these activities to reduce the risk of infection.  Check the site often.  Watch for signs and symptoms of infection and notify your physician if any of the following occur:  a. Bleeding or an increase in swelling at the puncture site  b. Fever  c. Increased soreness around puncture  site  d. Foul odor or significant drainage from the puncture site  e. Swelling, redness, or warmth at the puncture site    **A bruise or small “pea sized” lump under the skin at the puncture site is not unusual.  This should disappear within 3-4 weeks.**  5) CONTACT YOUR PHYSICIAN OR CALL 911 IF YOU EXPERIENCE ANY OF THE FOLLOWING:  a. Increased angina (chest pain) or frequent sensations of pressure, burning, pain, or other discomfort in the chest, arm, jaws, or stomach  b. Lightheadedness, dizziness, faint feeling, sweating, or difficulty breathing  c. Odd sensation changes like numbness, tingling, coldness, or pain in the arm or leg in which the catheter was inserted  d. Limb in which the catheter was inserted becomes pale/bluish in color    IMPORTANT:  Although this occurs very rarely, if you should develop bright red or excessive bleeding, feel a “pop” inside at the insertion site, or notice a sudden increase in swelling larger than a walnut, you should call 911.  Hold continuous firm pressure to the access site until emergency personnel arrive.  It is best if someone else can do this for you.

## 2020-11-19 ENCOUNTER — OFFICE VISIT (OUTPATIENT)
Dept: CARDIOLOGY | Facility: CLINIC | Age: 45
End: 2020-11-19

## 2020-11-19 VITALS
WEIGHT: 196 LBS | HEART RATE: 98 BPM | OXYGEN SATURATION: 95 % | HEIGHT: 69 IN | SYSTOLIC BLOOD PRESSURE: 136 MMHG | BODY MASS INDEX: 29.03 KG/M2 | DIASTOLIC BLOOD PRESSURE: 101 MMHG | RESPIRATION RATE: 18 BRPM

## 2020-11-19 DIAGNOSIS — I42.0 CARDIOMYOPATHY, DILATED (HCC): ICD-10-CM

## 2020-11-19 DIAGNOSIS — I50.22 SYSTOLIC CHF, CHRONIC (HCC): Primary | Chronic | ICD-10-CM

## 2020-11-19 DIAGNOSIS — I10 ESSENTIAL HYPERTENSION: ICD-10-CM

## 2020-11-19 DIAGNOSIS — I34.0 NONRHEUMATIC MITRAL VALVE REGURGITATION: ICD-10-CM

## 2020-11-19 DIAGNOSIS — I35.1 NONRHEUMATIC AORTIC VALVE INSUFFICIENCY: ICD-10-CM

## 2020-11-19 DIAGNOSIS — Z95.810 ICD (IMPLANTABLE CARDIOVERTER-DEFIBRILLATOR), DUAL, IN SITU: ICD-10-CM

## 2020-11-19 PROCEDURE — 93000 ELECTROCARDIOGRAM COMPLETE: CPT | Performed by: INTERNAL MEDICINE

## 2020-11-19 PROCEDURE — 99214 OFFICE O/P EST MOD 30 MIN: CPT | Performed by: INTERNAL MEDICINE

## 2020-11-19 NOTE — PROGRESS NOTES
Cardiology Office Visit      Encounter Date:  11/19/2020    Patient ID:   Rafael Mccann is a 45 y.o. female.    Reason For Followup:  Cardiomyopathy  Hypertension  Hyperlipidemia  Valvular heart disease  Recent hospitalization for congestive heart failure      Brief Clinical History:  Dear Dr. Adames, Phani Ennis MD    I had the pleasure of seeing Rafael Mccann today. As you are well aware, this is a 45 y.o. female with a past medical history that is significant for cardiomyopathy and valvular heart disease         Interval History:  Recent cardiac catheterization showing failed graft to the PDA and also jump graft to circumflex  Native significant stenosis involving the native right coronary artery and native left circumflex  Still continued to have symptoms of shortness of breath and dyspnea on exertion      Interpretation Summary    · The left ventricular cavity is moderate to severely dilated.  · Left ventricular wall thickness is consistent with concentric hypertrophy.  · Estimated left ventricular EF = 30% Estimated left ventricular EF was in agreement with the calculated left ventricular EF. Left ventricular systolic function is severely decreased.  · Severe mitral valve regurgitation is present.  · The following left ventricular wall segments are hypokinetic: mid anterior, apical anterior, basal anterolateral, mid anterolateral, apical lateral, basal inferolateral, mid inferolateral, apical inferior, mid inferior, apical septal, basal inferoseptal, mid inferoseptal, apex hypokinetic, mid anteroseptal, basal anterior, basal inferior and basal inferoseptal.  · Mild tricuspid valve regurgitation is present.  · Estimated right ventricular systolic pressure from tricuspid regurgitation is moderately elevated (45-55 mmHg).  · Moderate pulmonary hypertension is present.  · Mildly reduced right ventricular systolic function noted.  · The left atrial cavity is moderate to severely dilated.  · There  is a bioprosthetic aortic valve present.       Cardiac catheterization findings  Severe native three-vessel coronary artery disease with 100% occlusion of the diagonal branch of the LAD significant stenosis involving the proximal and mid portions of the left circumflex artery and diffuse disease involving the right coronary artery  Patent vein graft to the diagonal  Vein graft to the PDA is atretic  There is a bridging graft between the PDA branch of the right coronary artery and third marginal branch of the circumflex but there is a compromise of the flow secondary to stenosis involving proximal to both these grafts      Interpretation Summary    · Left ventricular systolic function is severely decreased.  · Left ventricular ejection fraction is 20 to 25%  · Akinetic septum, apex and distal anterior wall is noted.  · The left ventricular cavity is mildly dilated.  · There is a bioprosthetic aortic valve present.  · Severe mitral valve regurgitation is present.  · Length of posterior leaflet of mitral valve is 1.1 cm which is adequate for MitraClip  · Effective regurgitant orifice by Pisa method for mitral regurgitation was 0.4 cm² which is consistent with severe mitral regurgitation  · Mitral regurgitation regurgitation volume is 56 cc  · Mitral annulus diameter is 3.6 cm which is dilated  · Coaptation length is 0.6 cm.  · Saline test results are negative.           Assessment & Plan    Impressions:  Essential hypertension  Chronic systolic heart failure  History of cardiomyopathy   Chronic renal insufficiency  Coronary artery disease   History of diabetes mellitus type 2  History of thyroid disease  Moderate to severe mitral regurgitation  Moderate to severe aortic regurgitation  Cardiomyopathy with LV ejection fraction of 35%  s/p AICD placement/normal device function  s/p urgent AVR (21 mm Magna), CABG x3 with SV to Diag1 and SV sequential to PDA and then OM3 12/27/2019  Status post coronary artery bypass  "surgery x3 and aortic valve replacement surgery  Congestive heart failure NYHA class 4  Newly diagnosed severe mitral regurgitation  Worsening cardiomyopathy with most recent LV ejection fraction of 30%  Severe mitral regurgitation    Recommendations:  Recent cardiac catheterization showing failed graft to the PDA and also jump graft to circumflex  Native significant stenosis involving the native right coronary artery and native left circumflex  Discussed the films with surgical team most likely proceed with interventional therapy to the left circumflex artery and possibly consider a mitral clip procedure for mitral regurgitation if mitral regurgitation is persistent after the coronary intervention  Line to proceed with PCI and stenting of the left circumflex artery and reevaluate the mitral regurgitation if there is no improvement in the mitral regurgitation consider mitral clip  Risk benefits and alternatives discussed with patient and family      Objective:    Vitals:  Vitals:    11/19/20 1129   BP: (!) 136/101   BP Location: Left arm   Pulse: 98   Resp: 18   SpO2: 95%   Weight: 88.9 kg (196 lb)   Height: 174.5 cm (68.7\")       Physical Exam:    General: Alert, cooperative, no distress, appears stated age  Head:  Normocephalic, atraumatic, mucous membranes moist  Eyes:  Conjunctiva/corneas clear, EOM's intact     Neck:  Supple,  no adenopathy;      Lungs: Clear to auscultation bilaterally, no wheezes rhonchi rales are noted  Chest wall: No tenderness  Heart::  Regular rate and rhythm, S1 and S2 normal, displaced LV apical impulse 3 x 6 pansystolic murmur left sternal border  Abdomen: Soft, non-tender, nondistended bowel sounds active  Extremities: No cyanosis, clubbing, or edema  Pulses: 2+ and symmetric all extremities  Skin:  No rashes or lesions  Neuro/psych: A&O x3. CN II through XII are grossly intact with appropriate affect      Allergies:  Allergies   Allergen Reactions   • Hydrocodone Hives   • Penicillin " G Unknown (See Comments)   • Contrast Dye GI Intolerance     She is pretty sure it's the contrast dye that makes her super sick and vomiting after heart cath       Medication Review:     Current Outpatient Medications:   •  allopurinol (ZYLOPRIM) 100 MG tablet, Take 100 mg by mouth Daily., Disp: , Rfl:   •  ALPRAZolam (XANAX) 1 MG tablet, Take 1 mg by mouth 4 (Four) Times a Day As Needed for Anxiety., Disp: , Rfl:   •  amLODIPine (NORVASC) 5 MG tablet, Take 5 mg by mouth Daily., Disp: , Rfl:   •  aspirin 325 MG tablet, Take 1 tablet by mouth Daily., Disp: 30 tablet, Rfl: 3  •  bumetanide (BUMEX) 1 MG tablet, Take 1 tablet by mouth Daily., Disp: 90 tablet, Rfl: 3  •  carvedilol (COREG) 25 MG tablet, Take 1 tablet by mouth 2 (Two) Times a Day With Meals., Disp: 180 tablet, Rfl: 2  •  cholecalciferol (VITAMIN D3) 1000 units tablet, Take 1,000 Units by mouth Daily., Disp: , Rfl:   •  cloNIDine (CATAPRES) 0.1 MG tablet, Take 2 tablets by mouth 2 (Two) Times a Day., Disp: 360 tablet, Rfl: 2  •  Cyanocobalamin (VITAMIN B 12 PO), Take 1 tablet by mouth Daily., Disp: , Rfl:   •  Diclofenac Sodium 1.5 % solution, Place 40 drops on the skin as directed by provider Daily., Disp: , Rfl:   •  docusate sodium (COLACE) 100 MG capsule, Take 100 mg by mouth 2 (Two) Times a Day As Needed for Constipation., Disp: , Rfl:   •  ferrous sulfate 325 (65 FE) MG tablet, Take 65 mg by mouth Daily With Breakfast., Disp: , Rfl:   •  folic acid (FOLVITE) 1 MG tablet, Take 1 mg by mouth Daily., Disp: , Rfl:   •  hydrALAZINE (APRESOLINE) 50 MG tablet, Take 100 mg by mouth 3 (Three) Times a Day., Disp: , Rfl:   •  levothyroxine (SYNTHROID, LEVOTHROID) 200 MCG tablet, Take 200 mcg by mouth Daily., Disp: , Rfl:   •  lisinopril (PRINIVIL,ZESTRIL) 40 MG tablet, Take 40 mg by mouth Daily., Disp: , Rfl:   •  metFORMIN (GLUCOPHAGE) 500 MG tablet, Take 500 mg by mouth Daily With Breakfast., Disp: , Rfl:   •  metOLazone (ZAROXOLYN) 2.5 MG tablet, Take 1  tablet by mouth 2 (Two) Times a Week., Disp: 15 tablet, Rfl: 2  •  montelukast (SINGULAIR) 10 MG tablet, Take 10 mg by mouth Every Night., Disp: , Rfl:   •  ondansetron ODT (ZOFRAN-ODT) 4 MG disintegrating tablet, Place 1 tablet on the tongue 4 (Four) Times a Day As Needed for Nausea or Vomiting for up to 12 doses., Disp: 12 tablet, Rfl: 0  •  oxyCODONE-acetaminophen (PERCOCET)  MG per tablet, Take 1 tablet by mouth Every 6 (Six) Hours As Needed for Moderate Pain  for up to 12 doses., Disp: 12 tablet, Rfl: 0  •  pantoprazole (PROTONIX) 40 MG EC tablet, Take 40 mg by mouth Daily., Disp: , Rfl:   •  potassium chloride (K-DUR) 10 MEQ CR tablet, Take 20 mEq by mouth 2 (Two) Times a Day., Disp: , Rfl:     Family History:  Family History   Problem Relation Age of Onset   • Heart disease Father        Past Medical History:  Past Medical History:   Diagnosis Date   • Arrhythmia    • Asthma    • CHF (congestive heart failure) (CMS/[facility], located [facility])    • COPD (chronic obstructive pulmonary disease) (CMS/[facility], located within [facility])    • Diabetes (CMS/[facility], located within [facility])    • Disease of thyroid gland    • Heart murmur    • Hyperlipidemia    • Hypertension        Past surgical History:  Past Surgical History:   Procedure Laterality Date   • AORTIC VALVE REPAIR/REPLACEMENT N/A 12/27/2019    Procedure: AORTIC VALVE REPAIR/REPLACEMENT;  Surgeon: Lane Stock MD;  Location: [facility];  Service: Cardiothoracic   • BREAST LUMPECTOMY     • CARDIAC CATHETERIZATION N/A 12/24/2019    Procedure: Right and Left Heart Cath 12/24/19 @ 0900;  Surgeon: Wayne Luna MD;  Location: [facility] CATH INVASIVE LOCATION;  Service: Cardiovascular   • CARDIAC CATHETERIZATION N/A 12/24/2019    Procedure: Coronary angiography;  Surgeon: Wayne Luna MD;  Location: [facility] CATH INVASIVE LOCATION;  Service: Cardiovascular   • CARDIAC CATHETERIZATION N/A 11/10/2020    Procedure: Left and right Heart Cath;  Surgeon: Wayne Luna MD;  Location: [facility] CATH INVASIVE  LOCATION;  Service: Cardiovascular;  Laterality: N/A;   • CARDIAC CATHETERIZATION N/A 11/10/2020    Procedure: Coronary angiography;  Surgeon: Wayne Luna MD;  Location: [facility] CATH INVASIVE LOCATION;  Service: Cardiovascular;  Laterality: N/A;   • CARDIAC CATHETERIZATION N/A 11/10/2020    Procedure: Right Heart Cath;  Surgeon: Wayne Luna MD;  Location: [facility] CATH INVASIVE LOCATION;  Service: Cardiovascular;  Laterality: N/A;   • CARDIAC ELECTROPHYSIOLOGY PROCEDURE Left 2019    Procedure: Dual-chamber ICD insertion;  Surgeon: Héctor Eckert MD;  Location: [facility] CATH INVASIVE LOCATION;  Service: Cardiovascular   • CARDIAC ELECTROPHYSIOLOGY PROCEDURE Left 2019    Procedure: Lead Revision;  Surgeon: Héctor Eckert MD;  Location: [facility] CATH INVASIVE LOCATION;  Service: Cardiovascular   • CARDIAC SURGERY     • CHOLECYSTECTOMY     • CORONARY ARTERY BYPASS GRAFT N/A 2019    Procedure: CORONARY ARTERY BYPASS GRAFTING;  Surgeon: Lane Stock MD;  Location: [facility] CVOR;  Service: Cardiothoracic   • HYSTERECTOMY     • INSERT / REPLACE / REMOVE PACEMAKER     • LYMPHADENECTOMY Bilateral    • THYROID SURGERY         Social History:  Social History     Socioeconomic History   • Marital status:      Spouse name: Not on file   • Number of children: Not on file   • Years of education: Not on file   • Highest education level: Not on file   Tobacco Use   • Smoking status: Former Smoker     Quit date: 2019     Years since quittin.8   • Smokeless tobacco: Never Used   Substance and Sexual Activity   • Alcohol use: No     Frequency: Never   • Drug use: No   • Sexual activity: Defer       Review of Systems:  The following systems were reviewed as they relate to the cardiovascular system: Constitutional, Eyes, ENT, Cardiovascular, Respiratory, Gastrointestinal, Integumentary, Neurological, Psychiatric, Hematologic, Endocrine, Musculoskeletal, and Genitourinary. The  pertinent cardiovascular findings are reported above with all other cardiovascular points within those systems being negative.    Diagnostic Study Review:     Current Electrocardiogram:    ECG 12 Lead    Date/Time: 11/19/2020 12:18 PM  Performed by: Wayne Luna MD  Authorized by: Wayne Luna MD   Comparison: compared with previous ECG   Similar to previous ECG  Rhythm: sinus rhythm  Rate: normal  BPM: 98  Conduction: non-specific intraventricular conduction delay  QRS axis: normal  Other findings: non-specific ST-T wave changes    Clinical impression: abnormal EKG              NOTE: The following portions of the patient's history were reviewed and updated this visit as appropriate: allergies, current medications, past family history, past medical history, past social history, past surgical history and problem list.

## 2020-11-23 ENCOUNTER — LAB (OUTPATIENT)
Dept: LAB | Facility: HOSPITAL | Age: 45
End: 2020-11-23
Attending: INTERNAL MEDICINE

## 2020-11-23 DIAGNOSIS — I10 ESSENTIAL HYPERTENSION: ICD-10-CM

## 2020-11-23 DIAGNOSIS — I50.22 SYSTOLIC CHF, CHRONIC (HCC): ICD-10-CM

## 2020-11-23 DIAGNOSIS — I35.1 NONRHEUMATIC AORTIC VALVE INSUFFICIENCY: ICD-10-CM

## 2020-11-23 DIAGNOSIS — Z95.810 ICD (IMPLANTABLE CARDIOVERTER-DEFIBRILLATOR), DUAL, IN SITU: ICD-10-CM

## 2020-11-23 DIAGNOSIS — I42.0 CARDIOMYOPATHY, DILATED (HCC): ICD-10-CM

## 2020-11-23 DIAGNOSIS — I34.0 NONRHEUMATIC MITRAL VALVE REGURGITATION: ICD-10-CM

## 2020-11-23 LAB
ANION GAP SERPL CALCULATED.3IONS-SCNC: 7.5 MMOL/L (ref 5–15)
BASOPHILS # BLD AUTO: 0.03 10*3/MM3 (ref 0–0.2)
BASOPHILS NFR BLD AUTO: 0.4 % (ref 0–1.5)
BUN SERPL-MCNC: 13 MG/DL (ref 6–20)
BUN/CREAT SERPL: 10 (ref 7–25)
CALCIUM SPEC-SCNC: 9.8 MG/DL (ref 8.6–10.5)
CHLORIDE SERPL-SCNC: 108 MMOL/L (ref 98–107)
CO2 SERPL-SCNC: 29.5 MMOL/L (ref 22–29)
CREAT SERPL-MCNC: 1.3 MG/DL (ref 0.57–1)
DEPRECATED RDW RBC AUTO: 47.2 FL (ref 37–54)
EOSINOPHIL # BLD AUTO: 0.07 10*3/MM3 (ref 0–0.4)
EOSINOPHIL NFR BLD AUTO: 0.9 % (ref 0.3–6.2)
ERYTHROCYTE [DISTWIDTH] IN BLOOD BY AUTOMATED COUNT: 13.6 % (ref 12.3–15.4)
GFR SERPL CREATININE-BSD FRML MDRD: 53 ML/MIN/1.73
GLUCOSE SERPL-MCNC: 90 MG/DL (ref 65–99)
HCT VFR BLD AUTO: 43.3 % (ref 34–46.6)
HGB BLD-MCNC: 14.6 G/DL (ref 12–15.9)
IMM GRANULOCYTES # BLD AUTO: 0.02 10*3/MM3 (ref 0–0.05)
IMM GRANULOCYTES NFR BLD AUTO: 0.3 % (ref 0–0.5)
INR PPP: 1.04 (ref 0.93–1.1)
LYMPHOCYTES # BLD AUTO: 2.48 10*3/MM3 (ref 0.7–3.1)
LYMPHOCYTES NFR BLD AUTO: 31.6 % (ref 19.6–45.3)
MCH RBC QN AUTO: 31.9 PG (ref 26.6–33)
MCHC RBC AUTO-ENTMCNC: 33.7 G/DL (ref 31.5–35.7)
MCV RBC AUTO: 94.5 FL (ref 79–97)
MONOCYTES # BLD AUTO: 0.41 10*3/MM3 (ref 0.1–0.9)
MONOCYTES NFR BLD AUTO: 5.2 % (ref 5–12)
NEUTROPHILS NFR BLD AUTO: 4.84 10*3/MM3 (ref 1.7–7)
NEUTROPHILS NFR BLD AUTO: 61.6 % (ref 42.7–76)
NRBC BLD AUTO-RTO: 0 /100 WBC (ref 0–0.2)
PLATELET # BLD AUTO: 191 10*3/MM3 (ref 140–450)
PMV BLD AUTO: 10.4 FL (ref 6–12)
POTASSIUM SERPL-SCNC: 4.2 MMOL/L (ref 3.5–5.2)
PROTHROMBIN TIME: 11.4 SECONDS (ref 9.6–11.7)
RBC # BLD AUTO: 4.58 10*6/MM3 (ref 3.77–5.28)
SODIUM SERPL-SCNC: 145 MMOL/L (ref 136–145)
WBC # BLD AUTO: 7.85 10*3/MM3 (ref 3.4–10.8)

## 2020-11-23 PROCEDURE — 85610 PROTHROMBIN TIME: CPT

## 2020-11-23 PROCEDURE — 80048 BASIC METABOLIC PNL TOTAL CA: CPT

## 2020-11-23 PROCEDURE — 85025 COMPLETE CBC W/AUTO DIFF WBC: CPT

## 2020-11-23 PROCEDURE — C9803 HOPD COVID-19 SPEC COLLECT: HCPCS | Performed by: INTERNAL MEDICINE

## 2020-11-23 PROCEDURE — 36415 COLL VENOUS BLD VENIPUNCTURE: CPT

## 2020-11-23 PROCEDURE — U0004 COV-19 TEST NON-CDC HGH THRU: HCPCS | Performed by: INTERNAL MEDICINE

## 2020-11-24 LAB — SARS-COV-2 RNA RESP QL NAA+PROBE: NOT DETECTED

## 2020-11-25 ENCOUNTER — HOSPITAL ENCOUNTER (OUTPATIENT)
Facility: HOSPITAL | Age: 45
Discharge: HOME | End: 2020-11-26
Attending: INTERNAL MEDICINE | Admitting: INTERNAL MEDICINE

## 2020-11-25 ENCOUNTER — TRANSCRIBE ORDERS (OUTPATIENT)
Dept: CARDIAC REHAB | Facility: HOSPITAL | Age: 45
End: 2020-11-25

## 2020-11-25 DIAGNOSIS — I35.1 NONRHEUMATIC AORTIC VALVE INSUFFICIENCY: ICD-10-CM

## 2020-11-25 DIAGNOSIS — Z95.5 STATUS POST CORONARY ARTERY STENT PLACEMENT: Primary | ICD-10-CM

## 2020-11-25 DIAGNOSIS — I50.22 SYSTOLIC CHF, CHRONIC (HCC): ICD-10-CM

## 2020-11-25 DIAGNOSIS — I34.0 NONRHEUMATIC MITRAL VALVE REGURGITATION: ICD-10-CM

## 2020-11-25 DIAGNOSIS — I10 ESSENTIAL HYPERTENSION: ICD-10-CM

## 2020-11-25 DIAGNOSIS — I42.0 CARDIOMYOPATHY, DILATED (HCC): ICD-10-CM

## 2020-11-25 DIAGNOSIS — Z95.810 ICD (IMPLANTABLE CARDIOVERTER-DEFIBRILLATOR), DUAL, IN SITU: ICD-10-CM

## 2020-11-25 LAB
ACT BLD: 158 SECONDS (ref 89–137)
ACT BLD: 164 SECONDS (ref 89–137)
ACT BLD: 186 SECONDS (ref 89–137)
ACT BLD: 191 SECONDS (ref 89–137)
GLUCOSE BLDC GLUCOMTR-MCNC: 101 MG/DL (ref 70–105)
GLUCOSE BLDC GLUCOMTR-MCNC: 217 MG/DL (ref 70–105)

## 2020-11-25 PROCEDURE — C1769 GUIDE WIRE: HCPCS | Performed by: INTERNAL MEDICINE

## 2020-11-25 PROCEDURE — 25010000002 ONDANSETRON PER 1 MG: Performed by: INTERNAL MEDICINE

## 2020-11-25 PROCEDURE — C1887 CATHETER, GUIDING: HCPCS | Performed by: INTERNAL MEDICINE

## 2020-11-25 PROCEDURE — 0 IOPAMIDOL PER 1 ML: Performed by: INTERNAL MEDICINE

## 2020-11-25 PROCEDURE — 25010000002 FUROSEMIDE PER 20 MG: Performed by: INTERNAL MEDICINE

## 2020-11-25 PROCEDURE — C1874 STENT, COATED/COV W/DEL SYS: HCPCS | Performed by: INTERNAL MEDICINE

## 2020-11-25 PROCEDURE — C9601 PERC DRUG-EL COR STENT BRAN: HCPCS | Performed by: INTERNAL MEDICINE

## 2020-11-25 PROCEDURE — 99152 MOD SED SAME PHYS/QHP 5/>YRS: CPT | Performed by: INTERNAL MEDICINE

## 2020-11-25 PROCEDURE — 25010000002 DIPHENHYDRAMINE PER 50 MG: Performed by: INTERNAL MEDICINE

## 2020-11-25 PROCEDURE — C9600 PERC DRUG-EL COR STENT SING: HCPCS | Performed by: INTERNAL MEDICINE

## 2020-11-25 PROCEDURE — 85347 COAGULATION TIME ACTIVATED: CPT

## 2020-11-25 PROCEDURE — 25010000002 METHYLPREDNISOLONE PER 125 MG: Performed by: INTERNAL MEDICINE

## 2020-11-25 PROCEDURE — 25010000002 MIDAZOLAM PER 1 MG: Performed by: INTERNAL MEDICINE

## 2020-11-25 PROCEDURE — C1894 INTRO/SHEATH, NON-LASER: HCPCS | Performed by: INTERNAL MEDICINE

## 2020-11-25 PROCEDURE — 82962 GLUCOSE BLOOD TEST: CPT

## 2020-11-25 PROCEDURE — 99153 MOD SED SAME PHYS/QHP EA: CPT | Performed by: INTERNAL MEDICINE

## 2020-11-25 PROCEDURE — 25010000002 FENTANYL CITRATE (PF) 100 MCG/2ML SOLUTION: Performed by: INTERNAL MEDICINE

## 2020-11-25 PROCEDURE — 92928 PRQ TCAT PLMT NTRAC ST 1 LES: CPT | Performed by: INTERNAL MEDICINE

## 2020-11-25 PROCEDURE — G0378 HOSPITAL OBSERVATION PER HR: HCPCS

## 2020-11-25 PROCEDURE — 25010000002 HEPARIN (PORCINE) PER 1000 UNITS: Performed by: INTERNAL MEDICINE

## 2020-11-25 DEVICE — XIENCE SIERRA™ EVEROLIMUS ELUTING CORONARY STENT SYSTEM 3.25 MM X 18 MM / RAPID-EXCHANGE
Type: IMPLANTABLE DEVICE | Site: CHEST | Status: FUNCTIONAL
Brand: XIENCE SIERRA™

## 2020-11-25 DEVICE — XIENCE SIERRA™ EVEROLIMUS ELUTING CORONARY STENT SYSTEM 2.25 MM X 23 MM / RAPID-EXCHANGE
Type: IMPLANTABLE DEVICE | Site: CHEST | Status: FUNCTIONAL
Brand: XIENCE SIERRA™

## 2020-11-25 RX ORDER — OXYCODONE HYDROCHLORIDE 5 MG/1
5 TABLET ORAL EVERY 4 HOURS PRN
Status: DISCONTINUED | OUTPATIENT
Start: 2020-11-25 | End: 2020-11-26 | Stop reason: HOSPADM

## 2020-11-25 RX ORDER — METOLAZONE 2.5 MG/1
2.5 TABLET ORAL 2 TIMES WEEKLY
Status: DISCONTINUED | OUTPATIENT
Start: 2020-11-26 | End: 2020-11-26 | Stop reason: HOSPADM

## 2020-11-25 RX ORDER — ALLOPURINOL 100 MG/1
100 TABLET ORAL DAILY
Status: DISCONTINUED | OUTPATIENT
Start: 2020-11-25 | End: 2020-11-26 | Stop reason: HOSPADM

## 2020-11-25 RX ORDER — ACETAMINOPHEN 325 MG/1
650 TABLET ORAL EVERY 4 HOURS PRN
Status: DISCONTINUED | OUTPATIENT
Start: 2020-11-25 | End: 2020-11-26 | Stop reason: HOSPADM

## 2020-11-25 RX ORDER — ONDANSETRON 4 MG/1
4 TABLET, FILM COATED ORAL EVERY 6 HOURS PRN
Status: DISCONTINUED | OUTPATIENT
Start: 2020-11-25 | End: 2020-11-26 | Stop reason: HOSPADM

## 2020-11-25 RX ORDER — ONDANSETRON 2 MG/ML
4 INJECTION INTRAMUSCULAR; INTRAVENOUS EVERY 6 HOURS PRN
Status: DISCONTINUED | OUTPATIENT
Start: 2020-11-25 | End: 2020-11-26 | Stop reason: HOSPADM

## 2020-11-25 RX ORDER — HYDRALAZINE HYDROCHLORIDE 25 MG/1
100 TABLET, FILM COATED ORAL 3 TIMES DAILY
Status: DISCONTINUED | OUTPATIENT
Start: 2020-11-25 | End: 2020-11-26 | Stop reason: HOSPADM

## 2020-11-25 RX ORDER — PANTOPRAZOLE SODIUM 40 MG/1
40 TABLET, DELAYED RELEASE ORAL DAILY
Status: DISCONTINUED | OUTPATIENT
Start: 2020-11-25 | End: 2020-11-26 | Stop reason: HOSPADM

## 2020-11-25 RX ORDER — NITROGLYCERIN 20 MG/100ML
10-50 INJECTION INTRAVENOUS
Status: DISCONTINUED | OUTPATIENT
Start: 2020-11-25 | End: 2020-11-26 | Stop reason: HOSPADM

## 2020-11-25 RX ORDER — HYDRALAZINE HYDROCHLORIDE 20 MG/ML
10 INJECTION INTRAMUSCULAR; INTRAVENOUS EVERY 6 HOURS PRN
Status: DISCONTINUED | OUTPATIENT
Start: 2020-11-25 | End: 2020-11-25

## 2020-11-25 RX ORDER — CARVEDILOL 25 MG/1
25 TABLET ORAL 2 TIMES DAILY WITH MEALS
Status: DISCONTINUED | OUTPATIENT
Start: 2020-11-25 | End: 2020-11-26 | Stop reason: HOSPADM

## 2020-11-25 RX ORDER — CLOPIDOGREL 300 MG/1
TABLET, FILM COATED ORAL AS NEEDED
Status: DISCONTINUED | OUTPATIENT
Start: 2020-11-25 | End: 2020-11-25 | Stop reason: HOSPADM

## 2020-11-25 RX ORDER — FUROSEMIDE 10 MG/ML
40 INJECTION INTRAMUSCULAR; INTRAVENOUS ONCE
Status: COMPLETED | OUTPATIENT
Start: 2020-11-25 | End: 2020-11-25

## 2020-11-25 RX ORDER — LEVOTHYROXINE SODIUM 0.1 MG/1
200 TABLET ORAL
Status: DISCONTINUED | OUTPATIENT
Start: 2020-11-25 | End: 2020-11-26 | Stop reason: HOSPADM

## 2020-11-25 RX ORDER — FENTANYL CITRATE 50 UG/ML
INJECTION, SOLUTION INTRAMUSCULAR; INTRAVENOUS AS NEEDED
Status: DISCONTINUED | OUTPATIENT
Start: 2020-11-25 | End: 2020-11-25 | Stop reason: HOSPADM

## 2020-11-25 RX ORDER — NITROGLYCERIN 5 MG/ML
INJECTION, SOLUTION INTRAVENOUS AS NEEDED
Status: DISCONTINUED | OUTPATIENT
Start: 2020-11-25 | End: 2020-11-25 | Stop reason: HOSPADM

## 2020-11-25 RX ORDER — LISINOPRIL 20 MG/1
40 TABLET ORAL DAILY
Status: DISCONTINUED | OUTPATIENT
Start: 2020-11-25 | End: 2020-11-26 | Stop reason: HOSPADM

## 2020-11-25 RX ORDER — MELATONIN
1000 DAILY
Status: DISCONTINUED | OUTPATIENT
Start: 2020-11-25 | End: 2020-11-26 | Stop reason: HOSPADM

## 2020-11-25 RX ORDER — METHYLPREDNISOLONE SODIUM SUCCINATE 125 MG/2ML
125 INJECTION, POWDER, LYOPHILIZED, FOR SOLUTION INTRAMUSCULAR; INTRAVENOUS ONCE
Status: CANCELLED | OUTPATIENT
Start: 2020-11-25

## 2020-11-25 RX ORDER — AMLODIPINE BESYLATE 5 MG/1
5 TABLET ORAL DAILY
Status: DISCONTINUED | OUTPATIENT
Start: 2020-11-26 | End: 2020-11-26 | Stop reason: HOSPADM

## 2020-11-25 RX ORDER — CLOPIDOGREL BISULFATE 75 MG/1
75 TABLET ORAL DAILY
Status: DISCONTINUED | OUTPATIENT
Start: 2020-11-26 | End: 2020-11-26 | Stop reason: HOSPADM

## 2020-11-25 RX ORDER — ONDANSETRON 2 MG/ML
INJECTION INTRAMUSCULAR; INTRAVENOUS AS NEEDED
Status: DISCONTINUED | OUTPATIENT
Start: 2020-11-25 | End: 2020-11-25 | Stop reason: HOSPADM

## 2020-11-25 RX ORDER — ALPRAZOLAM 0.25 MG/1
1 TABLET ORAL 4 TIMES DAILY PRN
Status: DISCONTINUED | OUTPATIENT
Start: 2020-11-25 | End: 2020-11-26 | Stop reason: HOSPADM

## 2020-11-25 RX ORDER — HEPARIN SODIUM 1000 [USP'U]/ML
INJECTION, SOLUTION INTRAVENOUS; SUBCUTANEOUS AS NEEDED
Status: DISCONTINUED | OUTPATIENT
Start: 2020-11-25 | End: 2020-11-25 | Stop reason: HOSPADM

## 2020-11-25 RX ORDER — MIDAZOLAM HYDROCHLORIDE 1 MG/ML
INJECTION INTRAMUSCULAR; INTRAVENOUS AS NEEDED
Status: DISCONTINUED | OUTPATIENT
Start: 2020-11-25 | End: 2020-11-25 | Stop reason: HOSPADM

## 2020-11-25 RX ORDER — DIAZEPAM 5 MG/1
5 TABLET ORAL ONCE
Status: COMPLETED | OUTPATIENT
Start: 2020-11-25 | End: 2020-11-25

## 2020-11-25 RX ORDER — CLONIDINE HYDROCHLORIDE 0.1 MG/1
0.2 TABLET ORAL 2 TIMES DAILY
Status: DISCONTINUED | OUTPATIENT
Start: 2020-11-25 | End: 2020-11-26 | Stop reason: HOSPADM

## 2020-11-25 RX ORDER — ALUMINA, MAGNESIA, AND SIMETHICONE 2400; 2400; 240 MG/30ML; MG/30ML; MG/30ML
15 SUSPENSION ORAL EVERY 6 HOURS PRN
Status: DISCONTINUED | OUTPATIENT
Start: 2020-11-25 | End: 2020-11-26 | Stop reason: HOSPADM

## 2020-11-25 RX ORDER — ASPIRIN 325 MG
TABLET ORAL AS NEEDED
Status: DISCONTINUED | OUTPATIENT
Start: 2020-11-25 | End: 2020-11-25 | Stop reason: HOSPADM

## 2020-11-25 RX ORDER — DIPHENHYDRAMINE HYDROCHLORIDE 50 MG/ML
25 INJECTION INTRAMUSCULAR; INTRAVENOUS ONCE
Status: COMPLETED | OUTPATIENT
Start: 2020-11-25 | End: 2020-11-25

## 2020-11-25 RX ORDER — NICARDIPINE HYDROCHLORIDE 2.5 MG/ML
INJECTION INTRAVENOUS AS NEEDED
Status: DISCONTINUED | OUTPATIENT
Start: 2020-11-25 | End: 2020-11-25 | Stop reason: HOSPADM

## 2020-11-25 RX ORDER — ASPIRIN 81 MG/1
81 TABLET ORAL DAILY
Status: DISCONTINUED | OUTPATIENT
Start: 2020-11-26 | End: 2020-11-26 | Stop reason: HOSPADM

## 2020-11-25 RX ORDER — ONDANSETRON 4 MG/1
4 TABLET, ORALLY DISINTEGRATING ORAL EVERY 8 HOURS PRN
Status: DISCONTINUED | OUTPATIENT
Start: 2020-11-25 | End: 2020-11-26 | Stop reason: HOSPADM

## 2020-11-25 RX ORDER — DIPHENOXYLATE HYDROCHLORIDE AND ATROPINE SULFATE 2.5; .025 MG/1; MG/1
1 TABLET ORAL DAILY
COMMUNITY

## 2020-11-25 RX ORDER — METHYLPREDNISOLONE SODIUM SUCCINATE 125 MG/2ML
125 INJECTION, POWDER, LYOPHILIZED, FOR SOLUTION INTRAMUSCULAR; INTRAVENOUS ONCE
Status: COMPLETED | OUTPATIENT
Start: 2020-11-25 | End: 2020-11-25

## 2020-11-25 RX ORDER — BUMETANIDE 1 MG/1
1 TABLET ORAL DAILY
Status: DISCONTINUED | OUTPATIENT
Start: 2020-11-25 | End: 2020-11-26 | Stop reason: HOSPADM

## 2020-11-25 RX ORDER — DOCUSATE SODIUM 100 MG/1
100 CAPSULE, LIQUID FILLED ORAL 2 TIMES DAILY PRN
Status: DISCONTINUED | OUTPATIENT
Start: 2020-11-25 | End: 2020-11-26 | Stop reason: HOSPADM

## 2020-11-25 RX ORDER — LIDOCAINE HYDROCHLORIDE 20 MG/ML
INJECTION, SOLUTION INFILTRATION; PERINEURAL AS NEEDED
Status: DISCONTINUED | OUTPATIENT
Start: 2020-11-25 | End: 2020-11-25 | Stop reason: HOSPADM

## 2020-11-25 RX ORDER — DIPHENOXYLATE HYDROCHLORIDE AND ATROPINE SULFATE 2.5; .025 MG/1; MG/1
1 TABLET ORAL DAILY
Status: DISCONTINUED | OUTPATIENT
Start: 2020-11-25 | End: 2020-11-26 | Stop reason: HOSPADM

## 2020-11-25 RX ORDER — FOLIC ACID 1 MG/1
1 TABLET ORAL DAILY
Status: DISCONTINUED | OUTPATIENT
Start: 2020-11-25 | End: 2020-11-26 | Stop reason: HOSPADM

## 2020-11-25 RX ORDER — MONTELUKAST SODIUM 10 MG/1
10 TABLET ORAL NIGHTLY
Status: DISCONTINUED | OUTPATIENT
Start: 2020-11-25 | End: 2020-11-26 | Stop reason: HOSPADM

## 2020-11-25 RX ORDER — OXYCODONE HYDROCHLORIDE 5 MG/1
7.5 TABLET ORAL EVERY 4 HOURS PRN
Status: DISCONTINUED | OUTPATIENT
Start: 2020-11-25 | End: 2020-11-26 | Stop reason: HOSPADM

## 2020-11-25 RX ORDER — SODIUM CHLORIDE 9 MG/ML
250 INJECTION, SOLUTION INTRAVENOUS ONCE AS NEEDED
Status: DISCONTINUED | OUTPATIENT
Start: 2020-11-25 | End: 2020-11-26 | Stop reason: HOSPADM

## 2020-11-25 RX ADMIN — FUROSEMIDE 40 MG: 10 INJECTION, SOLUTION INTRAMUSCULAR; INTRAVENOUS at 13:17

## 2020-11-25 RX ADMIN — DIPHENHYDRAMINE HYDROCHLORIDE 25 MG: 50 INJECTION, SOLUTION INTRAMUSCULAR; INTRAVENOUS at 11:01

## 2020-11-25 RX ADMIN — ONDANSETRON 4 MG: 2 INJECTION INTRAMUSCULAR; INTRAVENOUS at 14:11

## 2020-11-25 RX ADMIN — NITROGLYCERIN 10 MCG/MIN: 20 INJECTION INTRAVENOUS at 13:17

## 2020-11-25 RX ADMIN — DIAZEPAM 5 MG: 5 TABLET ORAL at 10:38

## 2020-11-25 RX ADMIN — METHYLPREDNISOLONE SODIUM SUCCINATE 125 MG: 125 INJECTION, POWDER, FOR SOLUTION INTRAMUSCULAR; INTRAVENOUS at 11:01

## 2020-11-25 RX ADMIN — HYDRALAZINE HYDROCHLORIDE 100 MG: 25 TABLET, FILM COATED ORAL at 14:19

## 2020-11-25 RX ADMIN — CLONIDINE HYDROCHLORIDE 0.2 MG: 0.1 TABLET ORAL at 14:11

## 2020-11-25 RX ADMIN — LISINOPRIL 40 MG: 20 TABLET ORAL at 14:11

## 2020-11-26 VITALS
OXYGEN SATURATION: 96 % | HEIGHT: 68 IN | SYSTOLIC BLOOD PRESSURE: 136 MMHG | RESPIRATION RATE: 18 BRPM | DIASTOLIC BLOOD PRESSURE: 82 MMHG | WEIGHT: 198.41 LBS | BODY MASS INDEX: 30.07 KG/M2 | HEART RATE: 62 BPM | TEMPERATURE: 97.3 F

## 2020-11-26 LAB
ANION GAP SERPL CALCULATED.3IONS-SCNC: 14 MMOL/L (ref 5–15)
BUN SERPL-MCNC: 21 MG/DL (ref 6–20)
BUN/CREAT SERPL: 15.4 (ref 7–25)
CALCIUM SPEC-SCNC: 9.5 MG/DL (ref 8.6–10.5)
CHLORIDE SERPL-SCNC: 100 MMOL/L (ref 98–107)
CHOLEST SERPL-MCNC: 278 MG/DL (ref 0–200)
CO2 SERPL-SCNC: 25 MMOL/L (ref 22–29)
CREAT SERPL-MCNC: 1.36 MG/DL (ref 0.57–1)
DEPRECATED RDW RBC AUTO: 48.1 FL (ref 37–54)
ERYTHROCYTE [DISTWIDTH] IN BLOOD BY AUTOMATED COUNT: 14.5 % (ref 12.3–15.4)
GFR SERPL CREATININE-BSD FRML MDRD: 51 ML/MIN/1.73
GLUCOSE SERPL-MCNC: 161 MG/DL (ref 65–99)
HCT VFR BLD AUTO: 44.5 % (ref 34–46.6)
HDLC SERPL-MCNC: 66 MG/DL (ref 40–60)
HGB BLD-MCNC: 14.9 G/DL (ref 12–15.9)
LDLC SERPL CALC-MCNC: 179 MG/DL (ref 0–100)
LDLC/HDLC SERPL: 2.67 {RATIO}
MCH RBC QN AUTO: 31.5 PG (ref 26.6–33)
MCHC RBC AUTO-ENTMCNC: 33.4 G/DL (ref 31.5–35.7)
MCV RBC AUTO: 94.2 FL (ref 79–97)
PLATELET # BLD AUTO: 199 10*3/MM3 (ref 140–450)
PMV BLD AUTO: 8.6 FL (ref 6–12)
POTASSIUM SERPL-SCNC: 3.6 MMOL/L (ref 3.5–5.2)
RBC # BLD AUTO: 4.72 10*6/MM3 (ref 3.77–5.28)
SODIUM SERPL-SCNC: 139 MMOL/L (ref 136–145)
TRIGL SERPL-MCNC: 178 MG/DL (ref 0–150)
VLDLC SERPL-MCNC: 33 MG/DL (ref 5–40)
WBC # BLD AUTO: 12.2 10*3/MM3 (ref 3.4–10.8)

## 2020-11-26 PROCEDURE — 25010000002 ONDANSETRON PER 1 MG: Performed by: INTERNAL MEDICINE

## 2020-11-26 PROCEDURE — 99217 PR OBSERVATION CARE DISCHARGE MANAGEMENT: CPT | Performed by: INTERNAL MEDICINE

## 2020-11-26 PROCEDURE — 80061 LIPID PANEL: CPT | Performed by: INTERNAL MEDICINE

## 2020-11-26 PROCEDURE — 85027 COMPLETE CBC AUTOMATED: CPT | Performed by: INTERNAL MEDICINE

## 2020-11-26 PROCEDURE — G0378 HOSPITAL OBSERVATION PER HR: HCPCS

## 2020-11-26 PROCEDURE — 80048 BASIC METABOLIC PNL TOTAL CA: CPT | Performed by: INTERNAL MEDICINE

## 2020-11-26 PROCEDURE — 93010 ELECTROCARDIOGRAM REPORT: CPT | Performed by: INTERNAL MEDICINE

## 2020-11-26 PROCEDURE — 93005 ELECTROCARDIOGRAM TRACING: CPT | Performed by: INTERNAL MEDICINE

## 2020-11-26 RX ORDER — ASPIRIN 81 MG/1
81 TABLET ORAL DAILY
Qty: 30 TABLET | Refills: 6 | Status: SHIPPED | OUTPATIENT
Start: 2020-11-26

## 2020-11-26 RX ORDER — CLOPIDOGREL BISULFATE 75 MG/1
75 TABLET ORAL DAILY
Qty: 30 TABLET | Refills: 6 | Status: SHIPPED | OUTPATIENT
Start: 2020-11-26 | End: 2020-12-21 | Stop reason: SDUPTHER

## 2020-11-26 RX ADMIN — CLOPIDOGREL BISULFATE 75 MG: 75 TABLET ORAL at 08:30

## 2020-11-26 RX ADMIN — THERA TABS 1 TABLET: TAB at 08:31

## 2020-11-26 RX ADMIN — CARVEDILOL 25 MG: 25 TABLET, FILM COATED ORAL at 08:31

## 2020-11-26 RX ADMIN — HYDRALAZINE HYDROCHLORIDE 100 MG: 25 TABLET, FILM COATED ORAL at 00:28

## 2020-11-26 RX ADMIN — LISINOPRIL 40 MG: 20 TABLET ORAL at 08:31

## 2020-11-26 RX ADMIN — OXYCODONE 5 MG: 5 TABLET ORAL at 00:30

## 2020-11-26 RX ADMIN — LEVOTHYROXINE SODIUM 200 MCG: 0.1 TABLET ORAL at 08:28

## 2020-11-26 RX ADMIN — ASPIRIN 81 MG: 81 TABLET, COATED ORAL at 08:33

## 2020-11-26 RX ADMIN — CLONIDINE HYDROCHLORIDE 0.2 MG: 0.1 TABLET ORAL at 08:32

## 2020-11-26 RX ADMIN — AMLODIPINE BESYLATE 5 MG: 5 TABLET ORAL at 08:30

## 2020-11-26 RX ADMIN — CLONIDINE HYDROCHLORIDE 0.2 MG: 0.1 TABLET ORAL at 00:28

## 2020-11-26 RX ADMIN — FOLIC ACID 1 MG: 1 TABLET ORAL at 08:31

## 2020-11-26 RX ADMIN — ONDANSETRON 4 MG: 2 INJECTION INTRAMUSCULAR; INTRAVENOUS at 06:45

## 2020-11-26 RX ADMIN — PANTOPRAZOLE SODIUM 40 MG: 40 TABLET, DELAYED RELEASE ORAL at 08:30

## 2020-11-26 RX ADMIN — Medication 1000 UNITS: at 08:28

## 2020-11-26 RX ADMIN — HYDRALAZINE HYDROCHLORIDE 100 MG: 25 TABLET, FILM COATED ORAL at 08:32

## 2020-11-27 ENCOUNTER — READMISSION MANAGEMENT (OUTPATIENT)
Dept: CALL CENTER | Facility: HOSPITAL | Age: 45
End: 2020-11-27

## 2020-11-27 NOTE — OUTREACH NOTE
Prep Survey      Responses   [facility] [place] facility patient discharged from?  [facility]   Is LACE score < 7 ?  No   Eligibility  Readm Mgmt   Discharge diagnosis  CAD, cardiomyopathy, valvular heart disease [s/p successful PCI and stenting of the marginal branch of circumflex with a 2.25 x 23 mm Xience drug-eluting stent]   Does the patient have one of the following disease processes/diagnoses(primary or secondary)?  Other   Does the patient have Home health ordered?  No   Is there a DME ordered?  No   Comments regarding appointments  Needs f/u scheduled    Medication alerts for this patient  start aspirin and plavix   Prep survey completed?  Yes          Kaya Cole RN

## 2020-11-30 LAB — ACT BLD: 307 SECONDS (ref 89–137)

## 2020-12-01 ENCOUNTER — TELEPHONE (OUTPATIENT)
Dept: CARDIAC REHAB | Facility: HOSPITAL | Age: 45
End: 2020-12-01

## 2020-12-01 ENCOUNTER — READMISSION MANAGEMENT (OUTPATIENT)
Dept: CALL CENTER | Facility: HOSPITAL | Age: 45
End: 2020-12-01

## 2020-12-01 NOTE — TELEPHONE ENCOUNTER
Regarding cardiac rehab. Insurance information. Informed her of what we were told regarding insurance. States she has 3 different insurance coverage. Informed her we will look into more to verify the info we have, call other insurance. Get back with her tomorrow.

## 2020-12-01 NOTE — OUTREACH NOTE
Medical Week 1 Survey      Responses   [facility] patient discharged from?  [facility]   Does the patient have one of the following disease processes/diagnoses(primary or secondary)?  Other   Week 1 attempt successful?  No   Unsuccessful attempts  Attempt 1          Jacquelyn Varela LPN

## 2020-12-02 ENCOUNTER — TELEPHONE (OUTPATIENT)
Dept: CARDIAC REHAB | Facility: HOSPITAL | Age: 45
End: 2020-12-02

## 2020-12-02 NOTE — TELEPHONE ENCOUNTER
Called pt to inform of insurance coverage and got her scheduled for initial visit for Monday 12/7 @ 12pm. Instructed her to go to patient registration first.

## 2020-12-03 LAB — QT INTERVAL: 517 MS

## 2020-12-03 RX ORDER — CALCIUM CARBONATE/VITAMIN D3 600 MG-20
TABLET ORAL
Qty: 90 TABLET | Refills: 1 | OUTPATIENT
Start: 2020-12-03

## 2020-12-07 ENCOUNTER — OFFICE VISIT (OUTPATIENT)
Dept: CARDIAC REHAB | Facility: HOSPITAL | Age: 45
End: 2020-12-07

## 2020-12-07 DIAGNOSIS — Z95.5 S/P CORONARY ARTERY STENT PLACEMENT: Primary | ICD-10-CM

## 2020-12-07 PROCEDURE — 93798 PHYS/QHP OP CAR RHAB W/ECG: CPT

## 2020-12-09 ENCOUNTER — APPOINTMENT (OUTPATIENT)
Dept: CARDIAC REHAB | Facility: HOSPITAL | Age: 45
End: 2020-12-09

## 2020-12-09 ENCOUNTER — TREATMENT (OUTPATIENT)
Dept: CARDIAC REHAB | Facility: HOSPITAL | Age: 45
End: 2020-12-09

## 2020-12-09 DIAGNOSIS — Z95.5 S/P CORONARY ARTERY STENT PLACEMENT: Primary | ICD-10-CM

## 2020-12-09 PROCEDURE — 93798 PHYS/QHP OP CAR RHAB W/ECG: CPT

## 2020-12-11 ENCOUNTER — APPOINTMENT (OUTPATIENT)
Dept: CARDIAC REHAB | Facility: HOSPITAL | Age: 45
End: 2020-12-11

## 2020-12-14 ENCOUNTER — READMISSION MANAGEMENT (OUTPATIENT)
Dept: CALL CENTER | Facility: HOSPITAL | Age: 45
End: 2020-12-14

## 2020-12-14 ENCOUNTER — OFFICE VISIT (OUTPATIENT)
Dept: CARDIOLOGY | Facility: CLINIC | Age: 45
End: 2020-12-14

## 2020-12-14 ENCOUNTER — APPOINTMENT (OUTPATIENT)
Dept: CARDIAC REHAB | Facility: HOSPITAL | Age: 45
End: 2020-12-14

## 2020-12-14 VITALS
HEART RATE: 69 BPM | BODY MASS INDEX: 30.11 KG/M2 | WEIGHT: 198 LBS | DIASTOLIC BLOOD PRESSURE: 90 MMHG | SYSTOLIC BLOOD PRESSURE: 130 MMHG

## 2020-12-14 DIAGNOSIS — Z95.810 ICD (IMPLANTABLE CARDIOVERTER-DEFIBRILLATOR), DUAL, IN SITU: ICD-10-CM

## 2020-12-14 DIAGNOSIS — I34.0 NONRHEUMATIC MITRAL VALVE REGURGITATION: ICD-10-CM

## 2020-12-14 DIAGNOSIS — I50.22 SYSTOLIC CHF, CHRONIC (HCC): Primary | Chronic | ICD-10-CM

## 2020-12-14 DIAGNOSIS — Z95.2 S/P AVR (AORTIC VALVE REPLACEMENT): ICD-10-CM

## 2020-12-14 DIAGNOSIS — Z95.1 S/P CABG X 3: ICD-10-CM

## 2020-12-14 DIAGNOSIS — I10 ESSENTIAL HYPERTENSION: ICD-10-CM

## 2020-12-14 DIAGNOSIS — I25.119 CORONARY ARTERY DISEASE INVOLVING NATIVE CORONARY ARTERY OF NATIVE HEART WITH ANGINA PECTORIS (HCC): ICD-10-CM

## 2020-12-14 DIAGNOSIS — I42.0 CARDIOMYOPATHY, DILATED (HCC): ICD-10-CM

## 2020-12-14 DIAGNOSIS — I20.8 STABLE ANGINA PECTORIS (HCC): ICD-10-CM

## 2020-12-14 DIAGNOSIS — I35.1 NONRHEUMATIC AORTIC VALVE INSUFFICIENCY: ICD-10-CM

## 2020-12-14 DIAGNOSIS — N18.2 CKD (CHRONIC KIDNEY DISEASE) STAGE 2, GFR 60-89 ML/MIN: Chronic | ICD-10-CM

## 2020-12-14 PROCEDURE — 99214 OFFICE O/P EST MOD 30 MIN: CPT | Performed by: NURSE PRACTITIONER

## 2020-12-14 PROCEDURE — 93000 ELECTROCARDIOGRAM COMPLETE: CPT | Performed by: NURSE PRACTITIONER

## 2020-12-14 RX ORDER — AMLODIPINE BESYLATE 5 MG/1
10 TABLET ORAL DAILY
Qty: 30 TABLET | Refills: 5 | Status: ON HOLD | OUTPATIENT
Start: 2020-12-14 | End: 2021-01-23 | Stop reason: SDUPTHER

## 2020-12-16 ENCOUNTER — APPOINTMENT (OUTPATIENT)
Dept: CARDIAC REHAB | Facility: HOSPITAL | Age: 45
End: 2020-12-16

## 2020-12-16 ENCOUNTER — READMISSION MANAGEMENT (OUTPATIENT)
Dept: CALL CENTER | Facility: HOSPITAL | Age: 45
End: 2020-12-16

## 2020-12-16 NOTE — OUTREACH NOTE
Medical Week 3 Survey      Responses   [facility] patient discharged from?  [facility]   Does the patient have one of the following disease processes/diagnoses(primary or secondary)?  Other   Week 3 attempt successful?  No   Unsuccessful attempts  Attempt 2          Jacquelyn Varela LPN

## 2020-12-18 ENCOUNTER — APPOINTMENT (OUTPATIENT)
Dept: CARDIAC REHAB | Facility: HOSPITAL | Age: 45
End: 2020-12-18

## 2020-12-18 RX ORDER — METOLAZONE 2.5 MG/1
2.5 TABLET ORAL 2 TIMES WEEKLY
Qty: 15 TABLET | Refills: 2 | Status: SHIPPED | OUTPATIENT
Start: 2020-12-21 | End: 2020-12-23 | Stop reason: SDUPTHER

## 2020-12-21 ENCOUNTER — OFFICE VISIT (OUTPATIENT)
Dept: CARDIOLOGY | Facility: CLINIC | Age: 45
End: 2020-12-21

## 2020-12-21 ENCOUNTER — APPOINTMENT (OUTPATIENT)
Dept: CARDIAC REHAB | Facility: HOSPITAL | Age: 45
End: 2020-12-21

## 2020-12-21 VITALS
OXYGEN SATURATION: 100 % | WEIGHT: 199 LBS | BODY MASS INDEX: 30.16 KG/M2 | DIASTOLIC BLOOD PRESSURE: 94 MMHG | HEIGHT: 68 IN | RESPIRATION RATE: 18 BRPM | HEART RATE: 68 BPM | SYSTOLIC BLOOD PRESSURE: 137 MMHG

## 2020-12-21 DIAGNOSIS — Z95.810 ICD (IMPLANTABLE CARDIOVERTER-DEFIBRILLATOR), DUAL, IN SITU: ICD-10-CM

## 2020-12-21 DIAGNOSIS — I34.0 NONRHEUMATIC MITRAL VALVE REGURGITATION: ICD-10-CM

## 2020-12-21 DIAGNOSIS — I50.22 SYSTOLIC CHF, CHRONIC (HCC): Chronic | ICD-10-CM

## 2020-12-21 DIAGNOSIS — I35.1 NONRHEUMATIC AORTIC VALVE INSUFFICIENCY: Primary | ICD-10-CM

## 2020-12-21 DIAGNOSIS — I42.0 CARDIOMYOPATHY, DILATED (HCC): ICD-10-CM

## 2020-12-21 DIAGNOSIS — I10 ESSENTIAL HYPERTENSION: ICD-10-CM

## 2020-12-21 PROCEDURE — 99214 OFFICE O/P EST MOD 30 MIN: CPT | Performed by: INTERNAL MEDICINE

## 2020-12-21 RX ORDER — CLOPIDOGREL BISULFATE 75 MG/1
75 TABLET ORAL DAILY
Qty: 30 TABLET | Refills: 6 | Status: ON HOLD | OUTPATIENT
Start: 2020-12-21 | End: 2022-08-18

## 2020-12-21 NOTE — PROGRESS NOTES
Cardiology Office Visit      Encounter Date:  12/21/2020    Patient ID:   Rafael Singh is a 45 y.o. female.    Reason For Followup:  Cardiomyopathy  Hypertension  Hyperlipidemia  Valvular heart disease  Recent hospitalization for congestive heart failure      Brief Clinical History:  Dear Dr. Adames, Phani Ennis MD    I had the pleasure of seeing Rafael Mccann today. As you are well aware, this is a 45 y.o. female with a past medical history that is significant for cardiomyopathy and valvular heart disease         Interval History:  Recent cardiac catheterization showing failed graft to the PDA and also jump graft to circumflex  Native significant stenosis involving the native right coronary artery and native left circumflex  Still continued to have symptoms of shortness of breath and dyspnea on exertion      Interpretation Summary    · The left ventricular cavity is moderate to severely dilated.  · Left ventricular wall thickness is consistent with concentric hypertrophy.  · Estimated left ventricular EF = 30% Estimated left ventricular EF was in agreement with the calculated left ventricular EF. Left ventricular systolic function is severely decreased.  · Severe mitral valve regurgitation is present.  · The following left ventricular wall segments are hypokinetic: mid anterior, apical anterior, basal anterolateral, mid anterolateral, apical lateral, basal inferolateral, mid inferolateral, apical inferior, mid inferior, apical septal, basal inferoseptal, mid inferoseptal, apex hypokinetic, mid anteroseptal, basal anterior, basal inferior and basal inferoseptal.  · Mild tricuspid valve regurgitation is present.  · Estimated right ventricular systolic pressure from tricuspid regurgitation is moderately elevated (45-55 mmHg).  · Moderate pulmonary hypertension is present.  · Mildly reduced right ventricular systolic function noted.  · The left atrial cavity is moderate to severely dilated.  · There  is a bioprosthetic aortic valve present.           Interpretation Summary    · Left ventricular systolic function is severely decreased.  · Left ventricular ejection fraction is 20 to 25%  · Akinetic septum, apex and distal anterior wall is noted.  · The left ventricular cavity is mildly dilated.  · There is a bioprosthetic aortic valve present.  · Severe mitral valve regurgitation is present.  · Length of posterior leaflet of mitral valve is 1.1 cm which is adequate for MitraClip  · Effective regurgitant orifice by Pisa method for mitral regurgitation was 0.4 cm² which is consistent with severe mitral regurgitation  · Mitral regurgitation regurgitation volume is 56 cc  · Mitral annulus diameter is 3.6 cm which is dilated  · Coaptation length is 0.6 cm.  · Saline test results are negative.         Impressions:/PCI procedure in November  Successful PCI of the mid circumflex artery with placement of a drug-eluting stent  Successful PCI of the marginal branch of circumflex with placement of drug-eluting stent           Assessment & Plan    Impressions:  Essential hypertension  Chronic systolic heart failure  History of cardiomyopathy   Chronic renal insufficiency  Coronary artery disease   History of diabetes mellitus type 2  History of thyroid disease  Moderate to severe mitral regurgitation  Moderate to severe aortic regurgitation  Cardiomyopathy with LV ejection fraction of 35%  s/p AICD placement/normal device function  s/p urgent AVR (21 mm Magna), CABG x3 with SV to Diag1 and SV sequential to PDA and then OM3 12/27/2019  Status post coronary artery bypass surgery x3 and aortic valve replacement surgery  Congestive heart failure NYHA class 4  Newly diagnosed severe mitral regurgitation  Worsening cardiomyopathy with most recent LV ejection fraction of 30%  Severe mitral regurgitation    Recommendations:  Continue dual antiplatelet therapy with aspirin Plavix  Repeat echocardiogram in 3 weeks to see the severity of  "the mitral regurgitation if there is no significant improvement in the mitral regurgitation patient might benefit from a mitral clip procedure  Patient got started on high-dose statin for hyperlipidemia  Medications reviewed with the patient  Follow-up in office in 1 month    Objective:    Vitals:  Vitals:    12/21/20 1304   BP: 137/94   BP Location: Left arm   Pulse: 68   Resp: 18   SpO2: 100%   Weight: 90.3 kg (199 lb)   Height: 172.7 cm (68\")       Physical Exam:    General: Alert, cooperative, no distress, appears stated age  Head:  Normocephalic, atraumatic, mucous membranes moist  Eyes:  Conjunctiva/corneas clear, EOM's intact     Neck:  Supple,  no adenopathy;      Lungs: Clear to auscultation bilaterally, no wheezes rhonchi rales are noted  Chest wall: No tenderness  Heart::  Regular rate and rhythm, S1 and S2 normal, displaced LV apical impulse 2 x 6 pansystolic murmur left sternal border radiating to the left axilla  Abdomen: Soft, non-tender, nondistended bowel sounds active  Extremities: No cyanosis, clubbing, or edema  Pulses: 2+ and symmetric all extremities  Skin:  No rashes or lesions  Neuro/psych: A&O x3. CN II through XII are grossly intact with appropriate affect      Allergies:  Allergies   Allergen Reactions   • Hydrocodone Hives   • Penicillin G Unknown (See Comments)   • Contrast Dye GI Intolerance     She is pretty sure it's the contrast dye that makes her super sick and vomiting after heart cath       Medication Review:     Current Outpatient Medications:   •  allopurinol (ZYLOPRIM) 100 MG tablet, Take 100 mg by mouth Daily., Disp: , Rfl:   •  ALPRAZolam (XANAX) 1 MG tablet, Take 1 mg by mouth 4 (Four) Times a Day As Needed for Anxiety., Disp: , Rfl:   •  amLODIPine (NORVASC) 5 MG tablet, Take 2 tablets by mouth Daily., Disp: 30 tablet, Rfl: 5  •  aspirin 81 MG EC tablet, Take 1 tablet by mouth Daily., Disp: 30 tablet, Rfl: 6  •  bumetanide (BUMEX) 1 MG tablet, Take 1 tablet by mouth Daily., " Disp: 90 tablet, Rfl: 3  •  carvedilol (COREG) 25 MG tablet, Take 1 tablet by mouth 2 (Two) Times a Day With Meals., Disp: 180 tablet, Rfl: 2  •  cholecalciferol (VITAMIN D3) 1000 units tablet, Take 1,000 Units by mouth Daily., Disp: , Rfl:   •  cloNIDine (CATAPRES) 0.1 MG tablet, Take 2 tablets by mouth 2 (Two) Times a Day., Disp: 360 tablet, Rfl: 2  •  clopidogrel (PLAVIX) 75 MG tablet, Take 1 tablet by mouth Daily., Disp: 30 tablet, Rfl: 6  •  Cyanocobalamin (VITAMIN B 12 PO), Take 1 tablet by mouth Daily., Disp: , Rfl:   •  Diclofenac Sodium 1.5 % solution, Place 40 drops on the skin as directed by provider Daily., Disp: , Rfl:   •  docusate sodium (COLACE) 100 MG capsule, Take 100 mg by mouth 2 (Two) Times a Day As Needed for Constipation., Disp: , Rfl:   •  ferrous sulfate 325 (65 FE) MG tablet, Take 65 mg by mouth Daily With Breakfast., Disp: , Rfl:   •  folic acid (FOLVITE) 1 MG tablet, Take 1 mg by mouth Daily., Disp: , Rfl:   •  hydrALAZINE (APRESOLINE) 50 MG tablet, Take 100 mg by mouth 3 (Three) Times a Day., Disp: , Rfl:   •  levothyroxine (SYNTHROID, LEVOTHROID) 200 MCG tablet, Take 200 mcg by mouth Daily., Disp: , Rfl:   •  lisinopril (PRINIVIL,ZESTRIL) 40 MG tablet, Take 40 mg by mouth Daily., Disp: , Rfl:   •  metFORMIN (GLUCOPHAGE) 500 MG tablet, Take 500 mg by mouth Daily With Breakfast., Disp: , Rfl:   •  metOLazone (ZAROXOLYN) 2.5 MG tablet, Take 1 tablet by mouth 2 (Two) Times a Week., Disp: 15 tablet, Rfl: 2  •  montelukast (SINGULAIR) 10 MG tablet, Take 10 mg by mouth Every Night., Disp: , Rfl:   •  multivitamin (MULTI-DAY PO), Take 1 tablet by mouth Daily., Disp: , Rfl:   •  ondansetron ODT (ZOFRAN-ODT) 4 MG disintegrating tablet, Place 1 tablet on the tongue 4 (Four) Times a Day As Needed for Nausea or Vomiting for up to 12 doses., Disp: 12 tablet, Rfl: 0  •  oxyCODONE-acetaminophen (PERCOCET)  MG per tablet, Take 1 tablet by mouth Every 6 (Six) Hours As Needed for Moderate Pain  for  up to 12 doses., Disp: 12 tablet, Rfl: 0  •  pantoprazole (PROTONIX) 40 MG EC tablet, Take 40 mg by mouth Daily., Disp: , Rfl:   •  potassium chloride (K-DUR) 10 MEQ CR tablet, Take 20 mEq by mouth 2 (Two) Times a Day., Disp: , Rfl:     Family History:  Family History   Problem Relation Age of Onset   • Heart disease Father        Past Medical History:  Past Medical History:   Diagnosis Date   • Arrhythmia    • Asthma    • CHF (congestive heart failure) (CMS/[facility]ly [facility])    • COPD (chronic obstructive pulmonary disease) (CMS/Formerly [facility])    • Diabetes (CMS/[facility])    • Disease of thyroid gland    • Heart murmur    • Hyperlipidemia    • Hypertension        Past surgical History:  Past Surgical History:   Procedure Laterality Date   • AORTIC VALVE REPAIR/REPLACEMENT N/A 12/27/2019    Procedure: AORTIC VALVE REPAIR/REPLACEMENT;  Surgeon: Lane Stock MD;  Location: [facility] CVOR;  Service: Cardiothoracic   • BREAST LUMPECTOMY     • CARDIAC CATHETERIZATION N/A 12/24/2019    Procedure: Right and Left Heart Cath 12/24/19 @ 0900;  Surgeon: Wayne Luna MD;  Location: [facility] CATH INVASIVE LOCATION;  Service: Cardiovascular   • CARDIAC CATHETERIZATION N/A 12/24/2019    Procedure: Coronary angiography;  Surgeon: Wayne Luna MD;  Location: [facility] CATH INVASIVE LOCATION;  Service: Cardiovascular   • CARDIAC CATHETERIZATION N/A 11/10/2020    Procedure: Left and right Heart Cath;  Surgeon: Wayne Luna MD;  Location: [facility] CATH INVASIVE LOCATION;  Service: Cardiovascular;  Laterality: N/A;   • CARDIAC CATHETERIZATION N/A 11/10/2020    Procedure: Coronary angiography;  Surgeon: Wayne Luna MD;  Location: [facility] CATH INVASIVE LOCATION;  Service: Cardiovascular;  Laterality: N/A;   • CARDIAC CATHETERIZATION N/A 11/10/2020    Procedure: Right Heart Cath;  Surgeon: Wayne Luna MD;  Location: [facility] CATH INVASIVE LOCATION;  Service: Cardiovascular;  Laterality: N/A;   • CARDIAC  CATHETERIZATION N/A 2020    Procedure: Percutaneous coronary intervention of the left circumflex artery;  Surgeon: Wayne Luna MD;  Location: [facility] CATH INVASIVE LOCATION;  Service: Cardiovascular;  Laterality: N/A;   • CARDIAC ELECTROPHYSIOLOGY PROCEDURE Left 2019    Procedure: Dual-chamber ICD insertion;  Surgeon: Héctor Eckert MD;  Location: [facility] CATH INVASIVE LOCATION;  Service: Cardiovascular   • CARDIAC ELECTROPHYSIOLOGY PROCEDURE Left 2019    Procedure: Lead Revision;  Surgeon: Héctor Eckert MD;  Location: [facility] CATH INVASIVE LOCATION;  Service: Cardiovascular   • CARDIAC SURGERY     • CHOLECYSTECTOMY     • CORONARY ARTERY BYPASS GRAFT N/A 2019    Procedure: CORONARY ARTERY BYPASS GRAFTING;  Surgeon: Lane Stock MD;  Location: [facility] CVOR;  Service: Cardiothoracic   • HYSTERECTOMY     • INSERT / REPLACE / REMOVE PACEMAKER     • LYMPHADENECTOMY Bilateral    • THYROID SURGERY         Social History:  Social History     Socioeconomic History   • Marital status:      Spouse name: Not on file   • Number of children: Not on file   • Years of education: Not on file   • Highest education level: Not on file   Tobacco Use   • Smoking status: Former Smoker     Quit date: 2019     Years since quittin.9   • Smokeless tobacco: Never Used   Substance and Sexual Activity   • Alcohol use: No     Frequency: Never   • Drug use: No   • Sexual activity: Defer       Review of Systems:  The following systems were reviewed as they relate to the cardiovascular system: Constitutional, Eyes, ENT, Cardiovascular, Respiratory, Gastrointestinal, Integumentary, Neurological, Psychiatric, Hematologic, Endocrine, Musculoskeletal, and Genitourinary. The pertinent cardiovascular findings are reported above with all other cardiovascular points within those systems being negative.    Diagnostic Study Review:     Current Electrocardiogram:  Procedures      NOTE: The following  portions of the patient's history were reviewed and updated this visit as appropriate: allergies, current medications, past family history, past medical history, past social history, past surgical history and problem list.

## 2020-12-23 ENCOUNTER — APPOINTMENT (OUTPATIENT)
Dept: CARDIAC REHAB | Facility: HOSPITAL | Age: 45
End: 2020-12-23

## 2020-12-23 RX ORDER — METOLAZONE 2.5 MG/1
2.5 TABLET ORAL 2 TIMES WEEKLY
Qty: 45 TABLET | Refills: 1 | Status: SHIPPED | OUTPATIENT
Start: 2020-12-24 | End: 2021-01-23 | Stop reason: HOSPADM

## 2020-12-28 ENCOUNTER — APPOINTMENT (OUTPATIENT)
Dept: CARDIAC REHAB | Facility: HOSPITAL | Age: 45
End: 2020-12-28

## 2020-12-28 ENCOUNTER — TELEPHONE (OUTPATIENT)
Dept: CARDIOLOGY | Facility: HOSPITAL | Age: 45
End: 2020-12-28

## 2020-12-28 NOTE — TELEPHONE ENCOUNTER
Called pt to see if she'd be intersted in coming back to finish rehab program since we are opening back up with week. No answer and immediately went to . Voicemailbox not setup yet so could no leave VM.

## 2020-12-30 ENCOUNTER — APPOINTMENT (OUTPATIENT)
Dept: CARDIAC REHAB | Facility: HOSPITAL | Age: 45
End: 2020-12-30

## 2020-12-31 RX ORDER — BUMETANIDE 0.5 MG/1
TABLET ORAL
Qty: 30 TABLET | Refills: 1 | OUTPATIENT
Start: 2020-12-31

## 2021-01-01 ENCOUNTER — APPOINTMENT (OUTPATIENT)
Dept: CARDIAC REHAB | Facility: HOSPITAL | Age: 46
End: 2021-01-01

## 2021-01-04 ENCOUNTER — APPOINTMENT (OUTPATIENT)
Dept: CARDIAC REHAB | Facility: HOSPITAL | Age: 46
End: 2021-01-04

## 2021-01-06 ENCOUNTER — APPOINTMENT (OUTPATIENT)
Dept: CARDIAC REHAB | Facility: HOSPITAL | Age: 46
End: 2021-01-06

## 2021-01-08 ENCOUNTER — TELEPHONE (OUTPATIENT)
Dept: CARDIAC REHAB | Facility: HOSPITAL | Age: 46
End: 2021-01-08

## 2021-01-08 ENCOUNTER — APPOINTMENT (OUTPATIENT)
Dept: CARDIAC REHAB | Facility: HOSPITAL | Age: 46
End: 2021-01-08

## 2021-01-11 ENCOUNTER — APPOINTMENT (OUTPATIENT)
Dept: CARDIAC REHAB | Facility: HOSPITAL | Age: 46
End: 2021-01-11

## 2021-01-13 ENCOUNTER — APPOINTMENT (OUTPATIENT)
Dept: CARDIAC REHAB | Facility: HOSPITAL | Age: 46
End: 2021-01-13

## 2021-01-15 ENCOUNTER — APPOINTMENT (OUTPATIENT)
Dept: CARDIAC REHAB | Facility: HOSPITAL | Age: 46
End: 2021-01-15

## 2021-01-18 ENCOUNTER — APPOINTMENT (OUTPATIENT)
Dept: CARDIAC REHAB | Facility: HOSPITAL | Age: 46
End: 2021-01-18

## 2021-01-18 ENCOUNTER — TELEPHONE (OUTPATIENT)
Dept: CARDIAC REHAB | Facility: HOSPITAL | Age: 46
End: 2021-01-18

## 2021-01-18 ENCOUNTER — APPOINTMENT (OUTPATIENT)
Dept: GENERAL RADIOLOGY | Facility: HOSPITAL | Age: 46
End: 2021-01-18

## 2021-01-18 ENCOUNTER — HOSPITAL ENCOUNTER (INPATIENT)
Facility: HOSPITAL | Age: 46
LOS: 1 days | Discharge: HOME | End: 2021-01-23
Attending: EMERGENCY MEDICINE | Admitting: INTERNAL MEDICINE

## 2021-01-18 DIAGNOSIS — I25.119 CORONARY ARTERY DISEASE INVOLVING NATIVE CORONARY ARTERY OF NATIVE HEART WITH ANGINA PECTORIS (HCC): ICD-10-CM

## 2021-01-18 DIAGNOSIS — I42.0 CARDIOMYOPATHY, DILATED (HCC): ICD-10-CM

## 2021-01-18 DIAGNOSIS — I25.119 CHEST PAIN DUE TO CAD (HCC): Primary | ICD-10-CM

## 2021-01-18 LAB
ALBUMIN SERPL-MCNC: 4.4 G/DL (ref 3.5–5.2)
ALBUMIN/GLOB SERPL: 1.4 G/DL
ALP SERPL-CCNC: 76 U/L (ref 39–117)
ALT SERPL W P-5'-P-CCNC: 14 U/L (ref 1–33)
ANION GAP SERPL CALCULATED.3IONS-SCNC: 12 MMOL/L (ref 5–15)
APTT PPP: 29 SECONDS (ref 24–31)
AST SERPL-CCNC: 22 U/L (ref 1–32)
BASOPHILS # BLD AUTO: 0 10*3/MM3 (ref 0–0.2)
BASOPHILS NFR BLD AUTO: 0.4 % (ref 0–1.5)
BILIRUB SERPL-MCNC: 0.4 MG/DL (ref 0–1.2)
BUN SERPL-MCNC: 18 MG/DL (ref 6–20)
BUN/CREAT SERPL: 14.5 (ref 7–25)
CALCIUM SPEC-SCNC: 9.7 MG/DL (ref 8.6–10.5)
CHLORIDE SERPL-SCNC: 104 MMOL/L (ref 98–107)
CO2 SERPL-SCNC: 26 MMOL/L (ref 22–29)
CREAT SERPL-MCNC: 1.24 MG/DL (ref 0.57–1)
D DIMER PPP FEU-MCNC: 0.51 MG/L (FEU) (ref 0–0.59)
DEPRECATED RDW RBC AUTO: 48.1 FL (ref 37–54)
EOSINOPHIL # BLD AUTO: 0.1 10*3/MM3 (ref 0–0.4)
EOSINOPHIL NFR BLD AUTO: 1.2 % (ref 0.3–6.2)
ERYTHROCYTE [DISTWIDTH] IN BLOOD BY AUTOMATED COUNT: 14.5 % (ref 12.3–15.4)
GFR SERPL CREATININE-BSD FRML MDRD: 56 ML/MIN/1.73
GLOBULIN UR ELPH-MCNC: 3.2 GM/DL
GLUCOSE SERPL-MCNC: 88 MG/DL (ref 65–99)
HCT VFR BLD AUTO: 46.8 % (ref 34–46.6)
HGB BLD-MCNC: 15.8 G/DL (ref 12–15.9)
HOLD SPECIMEN: NORMAL
HOLD SPECIMEN: NORMAL
INR PPP: 0.99 (ref 0.93–1.1)
LYMPHOCYTES # BLD AUTO: 2.7 10*3/MM3 (ref 0.7–3.1)
LYMPHOCYTES NFR BLD AUTO: 41.6 % (ref 19.6–45.3)
MCH RBC QN AUTO: 32.2 PG (ref 26.6–33)
MCHC RBC AUTO-ENTMCNC: 33.8 G/DL (ref 31.5–35.7)
MCV RBC AUTO: 95.2 FL (ref 79–97)
MONOCYTES # BLD AUTO: 0.5 10*3/MM3 (ref 0.1–0.9)
MONOCYTES NFR BLD AUTO: 8.2 % (ref 5–12)
NEUTROPHILS NFR BLD AUTO: 3.2 10*3/MM3 (ref 1.7–7)
NEUTROPHILS NFR BLD AUTO: 48.6 % (ref 42.7–76)
NRBC BLD AUTO-RTO: 0.1 /100 WBC (ref 0–0.2)
NT-PROBNP SERPL-MCNC: 1890 PG/ML (ref 0–450)
PLATELET # BLD AUTO: 226 10*3/MM3 (ref 140–450)
PMV BLD AUTO: 7.9 FL (ref 6–12)
POTASSIUM SERPL-SCNC: 4.1 MMOL/L (ref 3.5–5.2)
PROT SERPL-MCNC: 7.6 G/DL (ref 6–8.5)
PROTHROMBIN TIME: 10.9 SECONDS (ref 9.6–11.7)
QT INTERVAL: 415 MS
RBC # BLD AUTO: 4.91 10*6/MM3 (ref 3.77–5.28)
SODIUM SERPL-SCNC: 142 MMOL/L (ref 136–145)
TROPONIN T SERPL-MCNC: <0.01 NG/ML (ref 0–0.03)
WBC # BLD AUTO: 6.5 10*3/MM3 (ref 3.4–10.8)
WHOLE BLOOD HOLD SPECIMEN: NORMAL
WHOLE BLOOD HOLD SPECIMEN: NORMAL

## 2021-01-18 PROCEDURE — G0378 HOSPITAL OBSERVATION PER HR: HCPCS

## 2021-01-18 PROCEDURE — 85379 FIBRIN DEGRADATION QUANT: CPT | Performed by: EMERGENCY MEDICINE

## 2021-01-18 PROCEDURE — 83036 HEMOGLOBIN GLYCOSYLATED A1C: CPT | Performed by: NURSE PRACTITIONER

## 2021-01-18 PROCEDURE — 71045 X-RAY EXAM CHEST 1 VIEW: CPT

## 2021-01-18 PROCEDURE — 80053 COMPREHEN METABOLIC PANEL: CPT | Performed by: EMERGENCY MEDICINE

## 2021-01-18 PROCEDURE — 99223 1ST HOSP IP/OBS HIGH 75: CPT | Performed by: NURSE PRACTITIONER

## 2021-01-18 PROCEDURE — 93005 ELECTROCARDIOGRAM TRACING: CPT

## 2021-01-18 PROCEDURE — 85610 PROTHROMBIN TIME: CPT | Performed by: EMERGENCY MEDICINE

## 2021-01-18 PROCEDURE — 85730 THROMBOPLASTIN TIME PARTIAL: CPT | Performed by: EMERGENCY MEDICINE

## 2021-01-18 PROCEDURE — 84484 ASSAY OF TROPONIN QUANT: CPT | Performed by: EMERGENCY MEDICINE

## 2021-01-18 PROCEDURE — 99285 EMERGENCY DEPT VISIT HI MDM: CPT

## 2021-01-18 PROCEDURE — 93005 ELECTROCARDIOGRAM TRACING: CPT | Performed by: EMERGENCY MEDICINE

## 2021-01-18 PROCEDURE — 85025 COMPLETE CBC W/AUTO DIFF WBC: CPT | Performed by: EMERGENCY MEDICINE

## 2021-01-18 PROCEDURE — 83880 ASSAY OF NATRIURETIC PEPTIDE: CPT | Performed by: EMERGENCY MEDICINE

## 2021-01-18 PROCEDURE — 25010000002 HEPARIN (PORCINE) 25000-0.45 UT/250ML-% SOLUTION: Performed by: EMERGENCY MEDICINE

## 2021-01-18 RX ORDER — ACETAMINOPHEN 160 MG/5ML
650 SOLUTION ORAL EVERY 4 HOURS PRN
Status: DISCONTINUED | OUTPATIENT
Start: 2021-01-18 | End: 2021-01-23 | Stop reason: HOSPADM

## 2021-01-18 RX ORDER — ACETAMINOPHEN 325 MG/1
650 TABLET ORAL EVERY 4 HOURS PRN
Status: DISCONTINUED | OUTPATIENT
Start: 2021-01-18 | End: 2021-01-23 | Stop reason: HOSPADM

## 2021-01-18 RX ORDER — HEPARIN SODIUM 1000 [USP'U]/ML
60 INJECTION, SOLUTION INTRAVENOUS; SUBCUTANEOUS ONCE
Status: DISCONTINUED | OUTPATIENT
Start: 2021-01-18 | End: 2021-01-18

## 2021-01-18 RX ORDER — SODIUM CHLORIDE 0.9 % (FLUSH) 0.9 %
10 SYRINGE (ML) INJECTION AS NEEDED
Status: DISCONTINUED | OUTPATIENT
Start: 2021-01-18 | End: 2021-01-23 | Stop reason: HOSPADM

## 2021-01-18 RX ORDER — ACETAMINOPHEN 650 MG/1
650 SUPPOSITORY RECTAL EVERY 4 HOURS PRN
Status: DISCONTINUED | OUTPATIENT
Start: 2021-01-18 | End: 2021-01-23 | Stop reason: HOSPADM

## 2021-01-18 RX ORDER — SODIUM CHLORIDE 0.9 % (FLUSH) 0.9 %
10 SYRINGE (ML) INJECTION EVERY 12 HOURS SCHEDULED
Status: DISCONTINUED | OUTPATIENT
Start: 2021-01-18 | End: 2021-01-23 | Stop reason: HOSPADM

## 2021-01-18 RX ORDER — ONDANSETRON 2 MG/ML
4 INJECTION INTRAMUSCULAR; INTRAVENOUS EVERY 6 HOURS PRN
Status: DISCONTINUED | OUTPATIENT
Start: 2021-01-18 | End: 2021-01-22

## 2021-01-18 RX ORDER — HEPARIN SODIUM 10000 [USP'U]/100ML
11.2 INJECTION, SOLUTION INTRAVENOUS
Status: DISCONTINUED | OUTPATIENT
Start: 2021-01-18 | End: 2021-01-19

## 2021-01-18 RX ORDER — OXYCODONE AND ACETAMINOPHEN 7.5; 325 MG/1; MG/1
1 TABLET ORAL EVERY 6 HOURS PRN
COMMUNITY

## 2021-01-18 RX ORDER — ASPIRIN 325 MG
325 TABLET ORAL ONCE
Status: COMPLETED | OUTPATIENT
Start: 2021-01-18 | End: 2021-01-18

## 2021-01-18 RX ORDER — NITROGLYCERIN 0.4 MG/1
0.4 TABLET SUBLINGUAL
Status: COMPLETED | OUTPATIENT
Start: 2021-01-18 | End: 2021-01-20

## 2021-01-18 RX ADMIN — ASPIRIN 325 MG ORAL TABLET 325 MG: 325 PILL ORAL at 17:38

## 2021-01-18 RX ADMIN — HEPARIN SODIUM 11.2 UNITS/KG/HR: 10000 INJECTION, SOLUTION INTRAVENOUS at 20:17

## 2021-01-18 RX ADMIN — NITROGLYCERIN 0.4 MG: 0.4 TABLET, ORALLY DISINTEGRATING SUBLINGUAL at 17:51

## 2021-01-18 RX ADMIN — NITROGLYCERIN 0.4 MG: 0.4 TABLET, ORALLY DISINTEGRATING SUBLINGUAL at 17:41

## 2021-01-18 NOTE — TELEPHONE ENCOUNTER
Finally got a hold of patient regarding cardiac rehab department being back open. Patient would like to return. Will be back in this week.

## 2021-01-19 ENCOUNTER — APPOINTMENT (OUTPATIENT)
Dept: CARDIOLOGY | Facility: HOSPITAL | Age: 46
End: 2021-01-19

## 2021-01-19 PROBLEM — I10 ESSENTIAL HYPERTENSION: Chronic | Status: ACTIVE | Noted: 2019-06-22

## 2021-01-19 PROBLEM — R07.89 OTHER CHEST PAIN: Chronic | Status: ACTIVE | Noted: 2021-01-18

## 2021-01-19 PROBLEM — E11.9 TYPE 2 DIABETES MELLITUS WITHOUT COMPLICATION, WITHOUT LONG-TERM CURRENT USE OF INSULIN (HCC): Chronic | Status: ACTIVE | Noted: 2021-01-19

## 2021-01-19 LAB
ANION GAP SERPL CALCULATED.3IONS-SCNC: 10 MMOL/L (ref 5–15)
APTT PPP: 106.2 SECONDS (ref 61–76.5)
BH CV ECHO MEAS - ACS: 0.81 CM
BH CV ECHO MEAS - AO MAX PG (FULL): 23.7 MMHG
BH CV ECHO MEAS - AO MAX PG: 28.5 MMHG
BH CV ECHO MEAS - AO MEAN PG (FULL): 16.8 MMHG
BH CV ECHO MEAS - AO MEAN PG: 19.3 MMHG
BH CV ECHO MEAS - AO ROOT AREA (BSA CORRECTED): 1.2
BH CV ECHO MEAS - AO ROOT AREA: 4.6 CM^2
BH CV ECHO MEAS - AO ROOT DIAM: 2.4 CM
BH CV ECHO MEAS - AO V2 MAX: 265.9 CM/SEC
BH CV ECHO MEAS - AO V2 MEAN: 210.8 CM/SEC
BH CV ECHO MEAS - AO V2 VTI: 54.9 CM
BH CV ECHO MEAS - ASC AORTA: 3.1 CM
BH CV ECHO MEAS - AVA(I,A): 0.9 CM^2
BH CV ECHO MEAS - AVA(I,D): 0.9 CM^2
BH CV ECHO MEAS - AVA(V,A): 0.9 CM^2
BH CV ECHO MEAS - AVA(V,D): 0.9 CM^2
BH CV ECHO MEAS - BSA(HAYCOCK): 2.1 M^2
BH CV ECHO MEAS - BSA: 2 M^2
BH CV ECHO MEAS - BZI_BMI: 30.4 KILOGRAMS/M^2
BH CV ECHO MEAS - BZI_METRIC_HEIGHT: 170.2 CM
BH CV ECHO MEAS - BZI_METRIC_WEIGHT: 88 KG
BH CV ECHO MEAS - EDV(CUBED): 188.5 ML
BH CV ECHO MEAS - EDV(MOD-SP2): 156.1 ML
BH CV ECHO MEAS - EDV(MOD-SP4): 146.8 ML
BH CV ECHO MEAS - EDV(TEICH): 162.3 ML
BH CV ECHO MEAS - EF(CUBED): 25 %
BH CV ECHO MEAS - EF(MOD-SP2): 33.9 %
BH CV ECHO MEAS - EF(TEICH): 19.9 %
BH CV ECHO MEAS - ESV(CUBED): 141.3 ML
BH CV ECHO MEAS - ESV(MOD-SP2): 103.1 ML
BH CV ECHO MEAS - ESV(MOD-SP4): 101.1 ML
BH CV ECHO MEAS - ESV(TEICH): 130 ML
BH CV ECHO MEAS - FS: 9.2 %
BH CV ECHO MEAS - IVS/LVPW: 0.71
BH CV ECHO MEAS - IVSD: 0.73 CM
BH CV ECHO MEAS - LA DIMENSION(2D): 3.9 CM
BH CV ECHO MEAS - LV DIASTOLIC VOL/BSA (35-75): 73.6 ML/M^2
BH CV ECHO MEAS - LV MASS(C)D: 191.6 GRAMS
BH CV ECHO MEAS - LV MASS(C)DI: 96 GRAMS/M^2
BH CV ECHO MEAS - LV MAX PG: 4.8 MMHG
BH CV ECHO MEAS - LV MEAN PG: 2.5 MMHG
BH CV ECHO MEAS - LV SYSTOLIC VOL/BSA (12-30): 50.7 ML/M^2
BH CV ECHO MEAS - LV V1 MAX: 109.7 CM/SEC
BH CV ECHO MEAS - LV V1 MEAN: 73.3 CM/SEC
BH CV ECHO MEAS - LV V1 VTI: 22.7 CM
BH CV ECHO MEAS - LVIDD: 5.7 CM
BH CV ECHO MEAS - LVIDS: 5.2 CM
BH CV ECHO MEAS - LVOT AREA: 2.2 CM^2
BH CV ECHO MEAS - LVOT DIAM: 1.7 CM
BH CV ECHO MEAS - LVPWD: 1 CM
BH CV ECHO MEAS - MR MAX PG: 124.1 MMHG
BH CV ECHO MEAS - MR MAX VEL: 557 CM/SEC
BH CV ECHO MEAS - MR MEAN PG: 76.4 MMHG
BH CV ECHO MEAS - MR MEAN VEL: 405.7 CM/SEC
BH CV ECHO MEAS - MR VTI: 228.3 CM
BH CV ECHO MEAS - MV A MAX VEL: 102.9 CM/SEC
BH CV ECHO MEAS - MV DEC SLOPE: 304.5 CM/SEC^2
BH CV ECHO MEAS - MV DEC TIME: 0.36 SEC
BH CV ECHO MEAS - MV E MAX VEL: 108.3 CM/SEC
BH CV ECHO MEAS - MV E/A: 1.1
BH CV ECHO MEAS - MV MAX PG: 9.5 MMHG
BH CV ECHO MEAS - MV MEAN PG: 3.7 MMHG
BH CV ECHO MEAS - MV V2 MAX: 154.4 CM/SEC
BH CV ECHO MEAS - MV V2 MEAN: 89.8 CM/SEC
BH CV ECHO MEAS - MV V2 VTI: 38.1 CM
BH CV ECHO MEAS - MVA(VTI): 1.3 CM^2
BH CV ECHO MEAS - PA MAX PG: 1.7 MMHG
BH CV ECHO MEAS - PA MEAN PG: 1.1 MMHG
BH CV ECHO MEAS - PA V2 MAX: 64.5 CM/SEC
BH CV ECHO MEAS - PA V2 MEAN: 51.1 CM/SEC
BH CV ECHO MEAS - PA V2 VTI: 14.9 CM
BH CV ECHO MEAS - PI END-D VEL: 139.1 CM/SEC
BH CV ECHO MEAS - PI MAX PG: 12.9 MMHG
BH CV ECHO MEAS - PI MAX VEL: 179.6 CM/SEC
BH CV ECHO MEAS - PULM DIAS VEL: 56.5 CM/SEC
BH CV ECHO MEAS - PULM S/D: 1
BH CV ECHO MEAS - PULM SYS VEL: 58.8 CM/SEC
BH CV ECHO MEAS - RAP SYSTOLE: 3 MMHG
BH CV ECHO MEAS - RVDD: 2 CM
BH CV ECHO MEAS - RVOT AREA: 3.8 CM^2
BH CV ECHO MEAS - RVOT DIAM: 2.2 CM
BH CV ECHO MEAS - RVSP: 26.8 MMHG
BH CV ECHO MEAS - SI(AO): 127.7 ML/M^2
BH CV ECHO MEAS - SI(CUBED): 23.6 ML/M^2
BH CV ECHO MEAS - SI(LVOT): 24.8 ML/M^2
BH CV ECHO MEAS - SI(MOD-SP2): 26.5 ML/M^2
BH CV ECHO MEAS - SI(MOD-SP4): 22.9 ML/M^2
BH CV ECHO MEAS - SI(TEICH): 16.1 ML/M^2
BH CV ECHO MEAS - SV(AO): 254.8 ML
BH CV ECHO MEAS - SV(CUBED): 47.2 ML
BH CV ECHO MEAS - SV(LVOT): 49.6 ML
BH CV ECHO MEAS - SV(MOD-SP2): 53 ML
BH CV ECHO MEAS - SV(MOD-SP4): 45.7 ML
BH CV ECHO MEAS - SV(TEICH): 32.2 ML
BH CV ECHO MEAS - TR MAX VEL: 237.5 CM/SEC
BUN SERPL-MCNC: 16 MG/DL (ref 6–20)
BUN/CREAT SERPL: 14.2 (ref 7–25)
CALCIUM SPEC-SCNC: 9.4 MG/DL (ref 8.6–10.5)
CHLORIDE SERPL-SCNC: 106 MMOL/L (ref 98–107)
CO2 SERPL-SCNC: 25 MMOL/L (ref 22–29)
CREAT SERPL-MCNC: 1.13 MG/DL (ref 0.57–1)
GFR SERPL CREATININE-BSD FRML MDRD: 63 ML/MIN/1.73
GLUCOSE BLDC GLUCOMTR-MCNC: 100 MG/DL (ref 70–105)
GLUCOSE BLDC GLUCOMTR-MCNC: 103 MG/DL (ref 70–105)
GLUCOSE BLDC GLUCOMTR-MCNC: 115 MG/DL (ref 70–105)
GLUCOSE BLDC GLUCOMTR-MCNC: 119 MG/DL (ref 70–105)
GLUCOSE SERPL-MCNC: 97 MG/DL (ref 65–99)
HBA1C MFR BLD: 6 % (ref 3.5–5.6)
LV EF 2D ECHO EST: 20 %
POTASSIUM SERPL-SCNC: 3.7 MMOL/L (ref 3.5–5.2)
SODIUM SERPL-SCNC: 141 MMOL/L (ref 136–145)
TROPONIN T SERPL-MCNC: <0.01 NG/ML (ref 0–0.03)
TSH SERPL DL<=0.05 MIU/L-ACNC: 24.6 UIU/ML (ref 0.27–4.2)

## 2021-01-19 PROCEDURE — 25010000002 ONDANSETRON PER 1 MG: Performed by: NURSE PRACTITIONER

## 2021-01-19 PROCEDURE — 82962 GLUCOSE BLOOD TEST: CPT

## 2021-01-19 PROCEDURE — G0378 HOSPITAL OBSERVATION PER HR: HCPCS

## 2021-01-19 PROCEDURE — 93306 TTE W/DOPPLER COMPLETE: CPT

## 2021-01-19 PROCEDURE — 80048 BASIC METABOLIC PNL TOTAL CA: CPT | Performed by: NURSE PRACTITIONER

## 2021-01-19 PROCEDURE — 84443 ASSAY THYROID STIM HORMONE: CPT | Performed by: NURSE PRACTITIONER

## 2021-01-19 PROCEDURE — 25010000002 SULFUR HEXAFLUORIDE MICROSPH 60.7-25 MG RECONSTITUTED SUSPENSION: Performed by: INTERNAL MEDICINE

## 2021-01-19 PROCEDURE — 93306 TTE W/DOPPLER COMPLETE: CPT | Performed by: INTERNAL MEDICINE

## 2021-01-19 PROCEDURE — 84484 ASSAY OF TROPONIN QUANT: CPT | Performed by: NURSE PRACTITIONER

## 2021-01-19 PROCEDURE — 99233 SBSQ HOSP IP/OBS HIGH 50: CPT | Performed by: INTERNAL MEDICINE

## 2021-01-19 PROCEDURE — 85730 THROMBOPLASTIN TIME PARTIAL: CPT | Performed by: EMERGENCY MEDICINE

## 2021-01-19 PROCEDURE — 25010000002 FUROSEMIDE PER 20 MG: Performed by: INTERNAL MEDICINE

## 2021-01-19 PROCEDURE — 99222 1ST HOSP IP/OBS MODERATE 55: CPT | Performed by: INTERNAL MEDICINE

## 2021-01-19 RX ORDER — INSULIN LISPRO 100 [IU]/ML
0-7 INJECTION, SOLUTION INTRAVENOUS; SUBCUTANEOUS
Status: DISCONTINUED | OUTPATIENT
Start: 2021-01-19 | End: 2021-01-23 | Stop reason: HOSPADM

## 2021-01-19 RX ORDER — FUROSEMIDE 10 MG/ML
40 INJECTION INTRAMUSCULAR; INTRAVENOUS EVERY 12 HOURS SCHEDULED
Status: DISCONTINUED | OUTPATIENT
Start: 2021-01-19 | End: 2021-01-20

## 2021-01-19 RX ORDER — PANTOPRAZOLE SODIUM 40 MG/1
40 TABLET, DELAYED RELEASE ORAL
Status: DISCONTINUED | OUTPATIENT
Start: 2021-01-19 | End: 2021-01-23 | Stop reason: HOSPADM

## 2021-01-19 RX ORDER — CLONIDINE HYDROCHLORIDE 0.1 MG/1
0.2 TABLET ORAL 2 TIMES DAILY
Status: DISCONTINUED | OUTPATIENT
Start: 2021-01-19 | End: 2021-01-20

## 2021-01-19 RX ORDER — METOLAZONE 2.5 MG/1
2.5 TABLET ORAL 2 TIMES WEEKLY
Status: DISCONTINUED | OUTPATIENT
Start: 2021-01-21 | End: 2021-01-20

## 2021-01-19 RX ORDER — AMLODIPINE BESYLATE 5 MG/1
10 TABLET ORAL DAILY
Status: DISCONTINUED | OUTPATIENT
Start: 2021-01-19 | End: 2021-01-20

## 2021-01-19 RX ORDER — DOCUSATE SODIUM 100 MG/1
100 CAPSULE, LIQUID FILLED ORAL 2 TIMES DAILY PRN
Status: DISCONTINUED | OUTPATIENT
Start: 2021-01-19 | End: 2021-01-23 | Stop reason: HOSPADM

## 2021-01-19 RX ORDER — LEVOTHYROXINE SODIUM 0.2 MG/1
200 TABLET ORAL
Status: DISCONTINUED | OUTPATIENT
Start: 2021-01-19 | End: 2021-01-20

## 2021-01-19 RX ORDER — FOLIC ACID 1 MG/1
1 TABLET ORAL DAILY
Status: DISCONTINUED | OUTPATIENT
Start: 2021-01-19 | End: 2021-01-23 | Stop reason: HOSPADM

## 2021-01-19 RX ORDER — FERROUS SULFATE TAB EC 324 MG (65 MG FE EQUIVALENT) 324 (65 FE) MG
324 TABLET DELAYED RESPONSE ORAL
Status: DISCONTINUED | OUTPATIENT
Start: 2021-01-19 | End: 2021-01-23 | Stop reason: HOSPADM

## 2021-01-19 RX ORDER — CARVEDILOL 25 MG/1
25 TABLET ORAL 2 TIMES DAILY WITH MEALS
Status: DISCONTINUED | OUTPATIENT
Start: 2021-01-19 | End: 2021-01-20

## 2021-01-19 RX ORDER — MONTELUKAST SODIUM 10 MG/1
10 TABLET ORAL NIGHTLY
Status: DISCONTINUED | OUTPATIENT
Start: 2021-01-19 | End: 2021-01-23 | Stop reason: HOSPADM

## 2021-01-19 RX ORDER — OXYCODONE HYDROCHLORIDE 5 MG/1
7.5 TABLET ORAL EVERY 6 HOURS PRN
Status: DISCONTINUED | OUTPATIENT
Start: 2021-01-19 | End: 2021-01-20

## 2021-01-19 RX ORDER — LISINOPRIL 20 MG/1
40 TABLET ORAL DAILY
Status: DISCONTINUED | OUTPATIENT
Start: 2021-01-19 | End: 2021-01-20

## 2021-01-19 RX ORDER — ASPIRIN 81 MG/1
81 TABLET ORAL DAILY
Status: DISCONTINUED | OUTPATIENT
Start: 2021-01-19 | End: 2021-01-23 | Stop reason: HOSPADM

## 2021-01-19 RX ORDER — LIDOCAINE 50 MG/G
1 PATCH TOPICAL
Status: DISCONTINUED | OUTPATIENT
Start: 2021-01-19 | End: 2021-01-23 | Stop reason: HOSPADM

## 2021-01-19 RX ORDER — INSULIN LISPRO 100 [IU]/ML
0-7 INJECTION, SOLUTION INTRAVENOUS; SUBCUTANEOUS AS NEEDED
Status: DISCONTINUED | OUTPATIENT
Start: 2021-01-19 | End: 2021-01-23 | Stop reason: HOSPADM

## 2021-01-19 RX ORDER — NICOTINE POLACRILEX 4 MG
15 LOZENGE BUCCAL
Status: DISCONTINUED | OUTPATIENT
Start: 2021-01-19 | End: 2021-01-23 | Stop reason: HOSPADM

## 2021-01-19 RX ORDER — DIPHENOXYLATE HYDROCHLORIDE AND ATROPINE SULFATE 2.5; .025 MG/1; MG/1
1 TABLET ORAL DAILY
Status: DISCONTINUED | OUTPATIENT
Start: 2021-01-19 | End: 2021-01-23 | Stop reason: HOSPADM

## 2021-01-19 RX ORDER — ALLOPURINOL 100 MG/1
100 TABLET ORAL DAILY
Status: DISCONTINUED | OUTPATIENT
Start: 2021-01-19 | End: 2021-01-23 | Stop reason: HOSPADM

## 2021-01-19 RX ORDER — DEXTROSE MONOHYDRATE 25 G/50ML
25 INJECTION, SOLUTION INTRAVENOUS
Status: DISCONTINUED | OUTPATIENT
Start: 2021-01-19 | End: 2021-01-23 | Stop reason: HOSPADM

## 2021-01-19 RX ORDER — RANOLAZINE 500 MG/1
500 TABLET, EXTENDED RELEASE ORAL EVERY 12 HOURS SCHEDULED
Status: DISCONTINUED | OUTPATIENT
Start: 2021-01-19 | End: 2021-01-23 | Stop reason: HOSPADM

## 2021-01-19 RX ORDER — HYDRALAZINE HYDROCHLORIDE 25 MG/1
100 TABLET, FILM COATED ORAL 3 TIMES DAILY
Status: DISCONTINUED | OUTPATIENT
Start: 2021-01-19 | End: 2021-01-20

## 2021-01-19 RX ORDER — LANOLIN ALCOHOL/MO/W.PET/CERES
1000 CREAM (GRAM) TOPICAL DAILY
Status: DISCONTINUED | OUTPATIENT
Start: 2021-01-19 | End: 2021-01-23 | Stop reason: HOSPADM

## 2021-01-19 RX ORDER — CLOPIDOGREL BISULFATE 75 MG/1
75 TABLET ORAL DAILY
Status: DISCONTINUED | OUTPATIENT
Start: 2021-01-19 | End: 2021-01-23 | Stop reason: HOSPADM

## 2021-01-19 RX ADMIN — Medication 10 ML: at 09:05

## 2021-01-19 RX ADMIN — CYANOCOBALAMIN TAB 1000 MCG 1000 MCG: 1000 TAB at 09:03

## 2021-01-19 RX ADMIN — ALLOPURINOL 100 MG: 100 TABLET ORAL at 09:03

## 2021-01-19 RX ADMIN — AMLODIPINE BESYLATE 10 MG: 5 TABLET ORAL at 09:04

## 2021-01-19 RX ADMIN — FOLIC ACID 1 MG: 1 TABLET ORAL at 09:03

## 2021-01-19 RX ADMIN — CARVEDILOL 25 MG: 25 TABLET, FILM COATED ORAL at 17:04

## 2021-01-19 RX ADMIN — FUROSEMIDE 40 MG: 10 INJECTION, SOLUTION INTRAMUSCULAR; INTRAVENOUS at 19:46

## 2021-01-19 RX ADMIN — CARVEDILOL 25 MG: 25 TABLET, FILM COATED ORAL at 09:04

## 2021-01-19 RX ADMIN — FERROUS SULFATE TAB EC 324 MG (65 MG FE EQUIVALENT) 324 MG: 324 (65 FE) TABLET DELAYED RESPONSE at 09:03

## 2021-01-19 RX ADMIN — RANOLAZINE 500 MG: 500 TABLET, FILM COATED, EXTENDED RELEASE ORAL at 13:41

## 2021-01-19 RX ADMIN — ONDANSETRON 4 MG: 2 INJECTION, SOLUTION INTRAMUSCULAR; INTRAVENOUS at 20:28

## 2021-01-19 RX ADMIN — SULFUR HEXAFLUORIDE 1 ML: KIT at 10:31

## 2021-01-19 RX ADMIN — THERA TABS 1 TABLET: TAB at 09:05

## 2021-01-19 RX ADMIN — FUROSEMIDE 40 MG: 10 INJECTION, SOLUTION INTRAMUSCULAR; INTRAVENOUS at 13:41

## 2021-01-19 RX ADMIN — CLONIDINE HYDROCHLORIDE 0.2 MG: 0.1 TABLET ORAL at 20:28

## 2021-01-19 RX ADMIN — RANOLAZINE 500 MG: 500 TABLET, FILM COATED, EXTENDED RELEASE ORAL at 21:02

## 2021-01-19 RX ADMIN — LIDOCAINE 1 PATCH: 50 PATCH TOPICAL at 20:27

## 2021-01-19 RX ADMIN — OXYCODONE 7.5 MG: 5 TABLET ORAL at 09:10

## 2021-01-19 RX ADMIN — Medication 10 ML: at 20:28

## 2021-01-19 RX ADMIN — ASPIRIN 81 MG: 81 TABLET, COATED ORAL at 09:03

## 2021-01-19 RX ADMIN — CLONIDINE HYDROCHLORIDE 0.2 MG: 0.1 TABLET ORAL at 09:04

## 2021-01-19 RX ADMIN — OXYCODONE 7.5 MG: 5 TABLET ORAL at 19:05

## 2021-01-19 RX ADMIN — CLOPIDOGREL BISULFATE 75 MG: 75 TABLET ORAL at 09:03

## 2021-01-19 RX ADMIN — HYDRALAZINE HYDROCHLORIDE 100 MG: 25 TABLET, FILM COATED ORAL at 09:04

## 2021-01-19 RX ADMIN — MONTELUKAST 10 MG: 10 TABLET, FILM COATED ORAL at 20:28

## 2021-01-19 RX ADMIN — PANTOPRAZOLE SODIUM 40 MG: 40 TABLET, DELAYED RELEASE ORAL at 09:04

## 2021-01-19 RX ADMIN — LISINOPRIL 40 MG: 20 TABLET ORAL at 09:04

## 2021-01-20 ENCOUNTER — APPOINTMENT (OUTPATIENT)
Dept: CARDIAC REHAB | Facility: HOSPITAL | Age: 46
End: 2021-01-20

## 2021-01-20 ENCOUNTER — HOSPITAL ENCOUNTER (OUTPATIENT)
Dept: CARDIOLOGY | Facility: HOSPITAL | Age: 46
End: 2021-01-20

## 2021-01-20 PROBLEM — R07.89 OTHER CHEST PAIN: Status: ACTIVE | Noted: 2021-01-18

## 2021-01-20 PROBLEM — Z95.810 ICD (IMPLANTABLE CARDIOVERTER-DEFIBRILLATOR), DUAL, IN SITU: Chronic | Status: ACTIVE | Noted: 2019-07-22

## 2021-01-20 PROBLEM — J44.9 COPD (CHRONIC OBSTRUCTIVE PULMONARY DISEASE) (HCC): Chronic | Status: ACTIVE | Noted: 2019-06-22

## 2021-01-20 PROBLEM — I42.0 CARDIOMYOPATHY, DILATED: Chronic | Status: ACTIVE | Noted: 2019-06-22

## 2021-01-20 PROBLEM — I25.119 CORONARY ARTERY DISEASE INVOLVING NATIVE CORONARY ARTERY OF NATIVE HEART WITH ANGINA PECTORIS (HCC): Chronic | Status: ACTIVE | Noted: 2019-12-22

## 2021-01-20 LAB
ANION GAP SERPL CALCULATED.3IONS-SCNC: 13 MMOL/L (ref 5–15)
APTT PPP: 28.8 SECONDS (ref 61–76.5)
BASOPHILS # BLD AUTO: 0 10*3/MM3 (ref 0–0.2)
BASOPHILS NFR BLD AUTO: 0.4 % (ref 0–1.5)
BUN SERPL-MCNC: 15 MG/DL (ref 6–20)
BUN/CREAT SERPL: 12.9 (ref 7–25)
CALCIUM SPEC-SCNC: 9.9 MG/DL (ref 8.6–10.5)
CHLORIDE SERPL-SCNC: 98 MMOL/L (ref 98–107)
CO2 SERPL-SCNC: 25 MMOL/L (ref 22–29)
CREAT SERPL-MCNC: 1.16 MG/DL (ref 0.57–1)
DEPRECATED RDW RBC AUTO: 46.4 FL (ref 37–54)
EOSINOPHIL # BLD AUTO: 0.1 10*3/MM3 (ref 0–0.4)
EOSINOPHIL NFR BLD AUTO: 0.9 % (ref 0.3–6.2)
ERYTHROCYTE [DISTWIDTH] IN BLOOD BY AUTOMATED COUNT: 14 % (ref 12.3–15.4)
GFR SERPL CREATININE-BSD FRML MDRD: 61 ML/MIN/1.73
GLUCOSE BLDC GLUCOMTR-MCNC: 119 MG/DL (ref 70–105)
GLUCOSE BLDC GLUCOMTR-MCNC: 143 MG/DL (ref 70–105)
GLUCOSE BLDC GLUCOMTR-MCNC: 96 MG/DL (ref 70–105)
GLUCOSE BLDC GLUCOMTR-MCNC: 99 MG/DL (ref 70–105)
GLUCOSE SERPL-MCNC: 149 MG/DL (ref 65–99)
HCT VFR BLD AUTO: 48 % (ref 34–46.6)
HGB BLD-MCNC: 16.2 G/DL (ref 12–15.9)
LYMPHOCYTES # BLD AUTO: 1.2 10*3/MM3 (ref 0.7–3.1)
LYMPHOCYTES NFR BLD AUTO: 15.5 % (ref 19.6–45.3)
MCH RBC QN AUTO: 31.8 PG (ref 26.6–33)
MCHC RBC AUTO-ENTMCNC: 33.8 G/DL (ref 31.5–35.7)
MCV RBC AUTO: 94.2 FL (ref 79–97)
MONOCYTES # BLD AUTO: 0.5 10*3/MM3 (ref 0.1–0.9)
MONOCYTES NFR BLD AUTO: 6.5 % (ref 5–12)
NEUTROPHILS NFR BLD AUTO: 5.9 10*3/MM3 (ref 1.7–7)
NEUTROPHILS NFR BLD AUTO: 76.7 % (ref 42.7–76)
NRBC BLD AUTO-RTO: 0.1 /100 WBC (ref 0–0.2)
PLATELET # BLD AUTO: 232 10*3/MM3 (ref 140–450)
PMV BLD AUTO: 7.7 FL (ref 6–12)
POTASSIUM SERPL-SCNC: 3.8 MMOL/L (ref 3.5–5.2)
RBC # BLD AUTO: 5.1 10*6/MM3 (ref 3.77–5.28)
SODIUM SERPL-SCNC: 136 MMOL/L (ref 136–145)
WBC # BLD AUTO: 7.7 10*3/MM3 (ref 3.4–10.8)

## 2021-01-20 PROCEDURE — 25010000002 FUROSEMIDE PER 20 MG: Performed by: INTERNAL MEDICINE

## 2021-01-20 PROCEDURE — 80048 BASIC METABOLIC PNL TOTAL CA: CPT | Performed by: INTERNAL MEDICINE

## 2021-01-20 PROCEDURE — 99233 SBSQ HOSP IP/OBS HIGH 50: CPT | Performed by: INTERNAL MEDICINE

## 2021-01-20 PROCEDURE — 85025 COMPLETE CBC W/AUTO DIFF WBC: CPT | Performed by: EMERGENCY MEDICINE

## 2021-01-20 PROCEDURE — G0378 HOSPITAL OBSERVATION PER HR: HCPCS

## 2021-01-20 PROCEDURE — 82962 GLUCOSE BLOOD TEST: CPT

## 2021-01-20 PROCEDURE — 85730 THROMBOPLASTIN TIME PARTIAL: CPT | Performed by: EMERGENCY MEDICINE

## 2021-01-20 PROCEDURE — 25010000002 ONDANSETRON PER 1 MG: Performed by: NURSE PRACTITIONER

## 2021-01-20 RX ORDER — LEVOTHYROXINE SODIUM 0.12 MG/1
250 TABLET ORAL
Status: DISCONTINUED | OUTPATIENT
Start: 2021-01-21 | End: 2021-01-23 | Stop reason: HOSPADM

## 2021-01-20 RX ORDER — AMLODIPINE BESYLATE 5 MG/1
5 TABLET ORAL DAILY
Status: DISCONTINUED | OUTPATIENT
Start: 2021-01-20 | End: 2021-01-20

## 2021-01-20 RX ORDER — OXYCODONE HYDROCHLORIDE 5 MG/1
5 TABLET ORAL EVERY 8 HOURS PRN
Status: DISCONTINUED | OUTPATIENT
Start: 2021-01-20 | End: 2021-01-23 | Stop reason: HOSPADM

## 2021-01-20 RX ORDER — LISINOPRIL 5 MG/1
10 TABLET ORAL DAILY
Status: DISCONTINUED | OUTPATIENT
Start: 2021-01-21 | End: 2021-01-22

## 2021-01-20 RX ORDER — CARVEDILOL 6.25 MG/1
6.25 TABLET ORAL 2 TIMES DAILY WITH MEALS
Status: DISCONTINUED | OUTPATIENT
Start: 2021-01-20 | End: 2021-01-23 | Stop reason: HOSPADM

## 2021-01-20 RX ORDER — HYDRALAZINE HYDROCHLORIDE 25 MG/1
25 TABLET, FILM COATED ORAL 3 TIMES DAILY
Status: DISCONTINUED | OUTPATIENT
Start: 2021-01-20 | End: 2021-01-22

## 2021-01-20 RX ORDER — RANOLAZINE 500 MG/1
500 TABLET, EXTENDED RELEASE ORAL EVERY 12 HOURS SCHEDULED
Qty: 60 TABLET | Refills: 0 | Status: CANCELLED | OUTPATIENT
Start: 2021-01-20

## 2021-01-20 RX ADMIN — CLOPIDOGREL BISULFATE 75 MG: 75 TABLET ORAL at 09:04

## 2021-01-20 RX ADMIN — Medication 10 ML: at 22:42

## 2021-01-20 RX ADMIN — LEVOTHYROXINE SODIUM 200 MCG: 0.2 TABLET ORAL at 06:30

## 2021-01-20 RX ADMIN — CARVEDILOL 6.25 MG: 6.25 TABLET, FILM COATED ORAL at 17:11

## 2021-01-20 RX ADMIN — ALLOPURINOL 100 MG: 100 TABLET ORAL at 09:04

## 2021-01-20 RX ADMIN — CYANOCOBALAMIN TAB 1000 MCG 1000 MCG: 1000 TAB at 09:05

## 2021-01-20 RX ADMIN — OXYCODONE 7.5 MG: 5 TABLET ORAL at 09:12

## 2021-01-20 RX ADMIN — THERA TABS 1 TABLET: TAB at 09:05

## 2021-01-20 RX ADMIN — CARVEDILOL 25 MG: 25 TABLET, FILM COATED ORAL at 09:05

## 2021-01-20 RX ADMIN — FUROSEMIDE 40 MG: 10 INJECTION, SOLUTION INTRAMUSCULAR; INTRAVENOUS at 06:30

## 2021-01-20 RX ADMIN — FERROUS SULFATE TAB EC 324 MG (65 MG FE EQUIVALENT) 324 MG: 324 (65 FE) TABLET DELAYED RESPONSE at 09:05

## 2021-01-20 RX ADMIN — NITROGLYCERIN 0.4 MG: 0.4 TABLET, ORALLY DISINTEGRATING SUBLINGUAL at 22:09

## 2021-01-20 RX ADMIN — ONDANSETRON 4 MG: 2 INJECTION, SOLUTION INTRAMUSCULAR; INTRAVENOUS at 14:41

## 2021-01-20 RX ADMIN — RANOLAZINE 500 MG: 500 TABLET, FILM COATED, EXTENDED RELEASE ORAL at 22:41

## 2021-01-20 RX ADMIN — FOLIC ACID 1 MG: 1 TABLET ORAL at 09:04

## 2021-01-20 RX ADMIN — ASPIRIN 81 MG: 81 TABLET, COATED ORAL at 09:04

## 2021-01-20 RX ADMIN — MONTELUKAST 10 MG: 10 TABLET, FILM COATED ORAL at 22:41

## 2021-01-20 RX ADMIN — PANTOPRAZOLE SODIUM 40 MG: 40 TABLET, DELAYED RELEASE ORAL at 09:04

## 2021-01-20 RX ADMIN — OXYCODONE 5 MG: 5 TABLET ORAL at 22:42

## 2021-01-20 RX ADMIN — RANOLAZINE 500 MG: 500 TABLET, FILM COATED, EXTENDED RELEASE ORAL at 09:05

## 2021-01-20 RX ADMIN — Medication 10 ML: at 09:04

## 2021-01-20 RX ADMIN — LIDOCAINE 1 PATCH: 50 PATCH TOPICAL at 22:41

## 2021-01-21 PROBLEM — I25.119 CHEST PAIN DUE TO CAD (HCC): Status: ACTIVE | Noted: 2021-01-18

## 2021-01-21 LAB
ANION GAP SERPL CALCULATED.3IONS-SCNC: 13 MMOL/L (ref 5–15)
APTT PPP: 23.5 SECONDS (ref 61–76.5)
BUN SERPL-MCNC: 16 MG/DL (ref 6–20)
BUN/CREAT SERPL: 11.9 (ref 7–25)
CALCIUM SPEC-SCNC: 9.4 MG/DL (ref 8.6–10.5)
CHLORIDE SERPL-SCNC: 98 MMOL/L (ref 98–107)
CO2 SERPL-SCNC: 23 MMOL/L (ref 22–29)
CREAT SERPL-MCNC: 1.35 MG/DL (ref 0.57–1)
DEPRECATED RDW RBC AUTO: 45.9 FL (ref 37–54)
ERYTHROCYTE [DISTWIDTH] IN BLOOD BY AUTOMATED COUNT: 13.9 % (ref 12.3–15.4)
GFR SERPL CREATININE-BSD FRML MDRD: 51 ML/MIN/1.73
GLUCOSE BLDC GLUCOMTR-MCNC: 125 MG/DL (ref 70–105)
GLUCOSE BLDC GLUCOMTR-MCNC: 131 MG/DL (ref 70–105)
GLUCOSE BLDC GLUCOMTR-MCNC: 138 MG/DL (ref 70–105)
GLUCOSE BLDC GLUCOMTR-MCNC: 99 MG/DL (ref 70–105)
GLUCOSE SERPL-MCNC: 84 MG/DL (ref 65–99)
HCT VFR BLD AUTO: 46.7 % (ref 34–46.6)
HGB BLD-MCNC: 15.9 G/DL (ref 12–15.9)
MCH RBC QN AUTO: 32.2 PG (ref 26.6–33)
MCHC RBC AUTO-ENTMCNC: 34 G/DL (ref 31.5–35.7)
MCV RBC AUTO: 94.9 FL (ref 79–97)
PLATELET # BLD AUTO: 213 10*3/MM3 (ref 140–450)
PMV BLD AUTO: 8 FL (ref 6–12)
POTASSIUM SERPL-SCNC: 3.7 MMOL/L (ref 3.5–5.2)
RBC # BLD AUTO: 4.92 10*6/MM3 (ref 3.77–5.28)
SODIUM SERPL-SCNC: 134 MMOL/L (ref 136–145)
WBC # BLD AUTO: 8.8 10*3/MM3 (ref 3.4–10.8)

## 2021-01-21 PROCEDURE — G0378 HOSPITAL OBSERVATION PER HR: HCPCS

## 2021-01-21 PROCEDURE — 99233 SBSQ HOSP IP/OBS HIGH 50: CPT | Performed by: INTERNAL MEDICINE

## 2021-01-21 PROCEDURE — 82962 GLUCOSE BLOOD TEST: CPT

## 2021-01-21 PROCEDURE — 85027 COMPLETE CBC AUTOMATED: CPT | Performed by: INTERNAL MEDICINE

## 2021-01-21 PROCEDURE — 25010000002 ONDANSETRON PER 1 MG: Performed by: NURSE PRACTITIONER

## 2021-01-21 PROCEDURE — 80048 BASIC METABOLIC PNL TOTAL CA: CPT | Performed by: INTERNAL MEDICINE

## 2021-01-21 PROCEDURE — 85730 THROMBOPLASTIN TIME PARTIAL: CPT | Performed by: EMERGENCY MEDICINE

## 2021-01-21 RX ORDER — SODIUM CHLORIDE 9 MG/ML
50 INJECTION, SOLUTION INTRAVENOUS CONTINUOUS
Status: DISPENSED | OUTPATIENT
Start: 2021-01-21 | End: 2021-01-22

## 2021-01-21 RX ADMIN — DOCUSATE SODIUM 100 MG: 100 CAPSULE, LIQUID FILLED ORAL at 12:57

## 2021-01-21 RX ADMIN — RANOLAZINE 500 MG: 500 TABLET, FILM COATED, EXTENDED RELEASE ORAL at 22:17

## 2021-01-21 RX ADMIN — THERA TABS 1 TABLET: TAB at 08:21

## 2021-01-21 RX ADMIN — LEVOTHYROXINE SODIUM 250 MCG: 0.12 TABLET ORAL at 05:27

## 2021-01-21 RX ADMIN — Medication 10 ML: at 22:18

## 2021-01-21 RX ADMIN — DOCUSATE SODIUM 100 MG: 100 CAPSULE, LIQUID FILLED ORAL at 22:27

## 2021-01-21 RX ADMIN — Medication 10 ML: at 08:24

## 2021-01-21 RX ADMIN — FERROUS SULFATE TAB EC 324 MG (65 MG FE EQUIVALENT) 324 MG: 324 (65 FE) TABLET DELAYED RESPONSE at 08:22

## 2021-01-21 RX ADMIN — ONDANSETRON 4 MG: 2 INJECTION, SOLUTION INTRAMUSCULAR; INTRAVENOUS at 08:23

## 2021-01-21 RX ADMIN — FOLIC ACID 1 MG: 1 TABLET ORAL at 08:21

## 2021-01-21 RX ADMIN — CYANOCOBALAMIN TAB 1000 MCG 1000 MCG: 1000 TAB at 08:22

## 2021-01-21 RX ADMIN — CLOPIDOGREL BISULFATE 75 MG: 75 TABLET ORAL at 08:22

## 2021-01-21 RX ADMIN — PANTOPRAZOLE SODIUM 40 MG: 40 TABLET, DELAYED RELEASE ORAL at 08:21

## 2021-01-21 RX ADMIN — MONTELUKAST 10 MG: 10 TABLET, FILM COATED ORAL at 22:18

## 2021-01-21 RX ADMIN — LIDOCAINE 1 PATCH: 50 PATCH TOPICAL at 22:17

## 2021-01-21 RX ADMIN — OXYCODONE 5 MG: 5 TABLET ORAL at 22:35

## 2021-01-21 RX ADMIN — ALLOPURINOL 100 MG: 100 TABLET ORAL at 08:22

## 2021-01-21 RX ADMIN — SODIUM CHLORIDE 50 ML/HR: 9 INJECTION, SOLUTION INTRAVENOUS at 19:03

## 2021-01-21 RX ADMIN — ASPIRIN 81 MG: 81 TABLET, COATED ORAL at 08:21

## 2021-01-21 RX ADMIN — OXYCODONE 5 MG: 5 TABLET ORAL at 08:21

## 2021-01-22 ENCOUNTER — APPOINTMENT (OUTPATIENT)
Dept: CARDIAC REHAB | Facility: HOSPITAL | Age: 46
End: 2021-01-22

## 2021-01-22 LAB
APTT PPP: 30.5 SECONDS (ref 61–76.5)
BASOPHILS # BLD AUTO: 0 10*3/MM3 (ref 0–0.2)
BASOPHILS NFR BLD AUTO: 0.6 % (ref 0–1.5)
DEPRECATED RDW RBC AUTO: 46.8 FL (ref 37–54)
EOSINOPHIL # BLD AUTO: 0.1 10*3/MM3 (ref 0–0.4)
EOSINOPHIL NFR BLD AUTO: 1.3 % (ref 0.3–6.2)
ERYTHROCYTE [DISTWIDTH] IN BLOOD BY AUTOMATED COUNT: 14.2 % (ref 12.3–15.4)
GLUCOSE BLDC GLUCOMTR-MCNC: 136 MG/DL (ref 70–105)
GLUCOSE BLDC GLUCOMTR-MCNC: 147 MG/DL (ref 70–105)
GLUCOSE BLDC GLUCOMTR-MCNC: 162 MG/DL (ref 70–105)
GLUCOSE BLDC GLUCOMTR-MCNC: 98 MG/DL (ref 70–105)
HCT VFR BLD AUTO: 48.2 % (ref 34–46.6)
HGB BLD-MCNC: 16.4 G/DL (ref 12–15.9)
LYMPHOCYTES # BLD AUTO: 1.9 10*3/MM3 (ref 0.7–3.1)
LYMPHOCYTES NFR BLD AUTO: 27.1 % (ref 19.6–45.3)
MCH RBC QN AUTO: 31.9 PG (ref 26.6–33)
MCHC RBC AUTO-ENTMCNC: 33.9 G/DL (ref 31.5–35.7)
MCV RBC AUTO: 94 FL (ref 79–97)
MONOCYTES # BLD AUTO: 0.5 10*3/MM3 (ref 0.1–0.9)
MONOCYTES NFR BLD AUTO: 7.8 % (ref 5–12)
NEUTROPHILS NFR BLD AUTO: 4.4 10*3/MM3 (ref 1.7–7)
NEUTROPHILS NFR BLD AUTO: 63.2 % (ref 42.7–76)
NRBC BLD AUTO-RTO: 0.1 /100 WBC (ref 0–0.2)
PLATELET # BLD AUTO: 222 10*3/MM3 (ref 140–450)
PMV BLD AUTO: 7.9 FL (ref 6–12)
RBC # BLD AUTO: 5.13 10*6/MM3 (ref 3.77–5.28)
SARS-COV-2 ORF1AB RESP QL NAA+PROBE: NOT DETECTED
WBC # BLD AUTO: 6.9 10*3/MM3 (ref 3.4–10.8)

## 2021-01-22 PROCEDURE — 25010000002 DIPHENHYDRAMINE PER 50 MG: Performed by: INTERNAL MEDICINE

## 2021-01-22 PROCEDURE — 93455 CORONARY ART/GRFT ANGIO S&I: CPT | Performed by: INTERNAL MEDICINE

## 2021-01-22 PROCEDURE — 99153 MOD SED SAME PHYS/QHP EA: CPT | Performed by: INTERNAL MEDICINE

## 2021-01-22 PROCEDURE — 99232 SBSQ HOSP IP/OBS MODERATE 35: CPT | Performed by: INTERNAL MEDICINE

## 2021-01-22 PROCEDURE — U0004 COV-19 TEST NON-CDC HGH THRU: HCPCS | Performed by: EMERGENCY MEDICINE

## 2021-01-22 PROCEDURE — 0 IOPAMIDOL PER 1 ML: Performed by: INTERNAL MEDICINE

## 2021-01-22 PROCEDURE — B2111ZZ FLUOROSCOPY OF MULTIPLE CORONARY ARTERIES USING LOW OSMOLAR CONTRAST: ICD-10-PCS | Performed by: INTERNAL MEDICINE

## 2021-01-22 PROCEDURE — C1769 GUIDE WIRE: HCPCS | Performed by: INTERNAL MEDICINE

## 2021-01-22 PROCEDURE — 4A023N7 MEASUREMENT OF CARDIAC SAMPLING AND PRESSURE, LEFT HEART, PERCUTANEOUS APPROACH: ICD-10-PCS | Performed by: INTERNAL MEDICINE

## 2021-01-22 PROCEDURE — C1894 INTRO/SHEATH, NON-LASER: HCPCS | Performed by: INTERNAL MEDICINE

## 2021-01-22 PROCEDURE — 85025 COMPLETE CBC W/AUTO DIFF WBC: CPT | Performed by: EMERGENCY MEDICINE

## 2021-01-22 PROCEDURE — 25010000002 MIDAZOLAM PER 1 MG: Performed by: INTERNAL MEDICINE

## 2021-01-22 PROCEDURE — 99152 MOD SED SAME PHYS/QHP 5/>YRS: CPT | Performed by: INTERNAL MEDICINE

## 2021-01-22 PROCEDURE — 25010000002 METHYLPREDNISOLONE PER 125 MG: Performed by: INTERNAL MEDICINE

## 2021-01-22 PROCEDURE — 82962 GLUCOSE BLOOD TEST: CPT

## 2021-01-22 PROCEDURE — B2121ZZ FLUOROSCOPY OF SINGLE CORONARY ARTERY BYPASS GRAFT USING LOW OSMOLAR CONTRAST: ICD-10-PCS | Performed by: INTERNAL MEDICINE

## 2021-01-22 PROCEDURE — 85730 THROMBOPLASTIN TIME PARTIAL: CPT | Performed by: EMERGENCY MEDICINE

## 2021-01-22 PROCEDURE — 25010000002 FENTANYL CITRATE (PF) 100 MCG/2ML SOLUTION: Performed by: INTERNAL MEDICINE

## 2021-01-22 PROCEDURE — 99233 SBSQ HOSP IP/OBS HIGH 50: CPT | Performed by: INTERNAL MEDICINE

## 2021-01-22 RX ORDER — FENTANYL CITRATE 50 UG/ML
INJECTION, SOLUTION INTRAMUSCULAR; INTRAVENOUS AS NEEDED
Status: DISCONTINUED | OUTPATIENT
Start: 2021-01-22 | End: 2021-01-22 | Stop reason: HOSPADM

## 2021-01-22 RX ORDER — LIDOCAINE HYDROCHLORIDE 20 MG/ML
INJECTION, SOLUTION INFILTRATION; PERINEURAL AS NEEDED
Status: DISCONTINUED | OUTPATIENT
Start: 2021-01-22 | End: 2021-01-22 | Stop reason: HOSPADM

## 2021-01-22 RX ORDER — ONDANSETRON 2 MG/ML
4 INJECTION INTRAMUSCULAR; INTRAVENOUS EVERY 6 HOURS PRN
Status: DISCONTINUED | OUTPATIENT
Start: 2021-01-22 | End: 2021-01-23 | Stop reason: HOSPADM

## 2021-01-22 RX ORDER — MIDAZOLAM HYDROCHLORIDE 1 MG/ML
INJECTION INTRAMUSCULAR; INTRAVENOUS AS NEEDED
Status: DISCONTINUED | OUTPATIENT
Start: 2021-01-22 | End: 2021-01-22 | Stop reason: HOSPADM

## 2021-01-22 RX ORDER — LISINOPRIL 5 MG/1
5 TABLET ORAL DAILY
Status: DISCONTINUED | OUTPATIENT
Start: 2021-01-23 | End: 2021-01-23 | Stop reason: HOSPADM

## 2021-01-22 RX ORDER — SODIUM CHLORIDE 9 MG/ML
250 INJECTION, SOLUTION INTRAVENOUS ONCE AS NEEDED
Status: DISCONTINUED | OUTPATIENT
Start: 2021-01-22 | End: 2021-01-23 | Stop reason: HOSPADM

## 2021-01-22 RX ORDER — SODIUM CHLORIDE 9 MG/ML
INJECTION, SOLUTION INTRAVENOUS CONTINUOUS PRN
Status: COMPLETED | OUTPATIENT
Start: 2021-01-22 | End: 2021-01-22

## 2021-01-22 RX ORDER — DIPHENHYDRAMINE HYDROCHLORIDE 50 MG/ML
INJECTION INTRAMUSCULAR; INTRAVENOUS AS NEEDED
Status: DISCONTINUED | OUTPATIENT
Start: 2021-01-22 | End: 2021-01-22 | Stop reason: HOSPADM

## 2021-01-22 RX ORDER — METHYLPREDNISOLONE SODIUM SUCCINATE 125 MG/2ML
INJECTION, POWDER, LYOPHILIZED, FOR SOLUTION INTRAMUSCULAR; INTRAVENOUS AS NEEDED
Status: DISCONTINUED | OUTPATIENT
Start: 2021-01-22 | End: 2021-01-22 | Stop reason: HOSPADM

## 2021-01-22 RX ORDER — ACETAMINOPHEN 325 MG/1
650 TABLET ORAL EVERY 4 HOURS PRN
Status: DISCONTINUED | OUTPATIENT
Start: 2021-01-22 | End: 2021-01-22

## 2021-01-22 RX ORDER — DIPHENHYDRAMINE HYDROCHLORIDE 50 MG/ML
25 INJECTION INTRAMUSCULAR; INTRAVENOUS ONCE
Status: DISCONTINUED | OUTPATIENT
Start: 2021-01-22 | End: 2021-01-22 | Stop reason: HOSPADM

## 2021-01-22 RX ORDER — METHYLPREDNISOLONE SODIUM SUCCINATE 125 MG/2ML
125 INJECTION, POWDER, LYOPHILIZED, FOR SOLUTION INTRAMUSCULAR; INTRAVENOUS ONCE
Status: COMPLETED | OUTPATIENT
Start: 2021-01-22 | End: 2021-01-22

## 2021-01-22 RX ORDER — ONDANSETRON 4 MG/1
4 TABLET, FILM COATED ORAL EVERY 6 HOURS PRN
Status: DISCONTINUED | OUTPATIENT
Start: 2021-01-22 | End: 2021-01-23 | Stop reason: HOSPADM

## 2021-01-22 RX ADMIN — CYANOCOBALAMIN TAB 1000 MCG 1000 MCG: 1000 TAB at 08:30

## 2021-01-22 RX ADMIN — THERA TABS 1 TABLET: TAB at 08:30

## 2021-01-22 RX ADMIN — MONTELUKAST 10 MG: 10 TABLET, FILM COATED ORAL at 21:18

## 2021-01-22 RX ADMIN — FOLIC ACID 1 MG: 1 TABLET ORAL at 08:30

## 2021-01-22 RX ADMIN — PANTOPRAZOLE SODIUM 40 MG: 40 TABLET, DELAYED RELEASE ORAL at 08:30

## 2021-01-22 RX ADMIN — CLOPIDOGREL BISULFATE 75 MG: 75 TABLET ORAL at 08:30

## 2021-01-22 RX ADMIN — RANOLAZINE 500 MG: 500 TABLET, FILM COATED, EXTENDED RELEASE ORAL at 08:30

## 2021-01-22 RX ADMIN — CANAGLIFLOZIN 100 MG: 300 TABLET, FILM COATED ORAL at 21:18

## 2021-01-22 RX ADMIN — LEVOTHYROXINE SODIUM 250 MCG: 0.12 TABLET ORAL at 05:13

## 2021-01-22 RX ADMIN — OXYCODONE 5 MG: 5 TABLET ORAL at 18:24

## 2021-01-22 RX ADMIN — Medication 10 ML: at 08:31

## 2021-01-22 RX ADMIN — CARVEDILOL 6.25 MG: 6.25 TABLET, FILM COATED ORAL at 18:02

## 2021-01-22 RX ADMIN — METHYLPREDNISOLONE SODIUM SUCCINATE 125 MG: 125 INJECTION, POWDER, FOR SOLUTION INTRAMUSCULAR; INTRAVENOUS at 08:37

## 2021-01-22 RX ADMIN — Medication 10 ML: at 21:19

## 2021-01-22 RX ADMIN — FERROUS SULFATE TAB EC 324 MG (65 MG FE EQUIVALENT) 324 MG: 324 (65 FE) TABLET DELAYED RESPONSE at 08:30

## 2021-01-22 RX ADMIN — ASPIRIN 81 MG: 81 TABLET, COATED ORAL at 08:30

## 2021-01-22 RX ADMIN — LIDOCAINE 1 PATCH: 50 PATCH TOPICAL at 21:18

## 2021-01-22 RX ADMIN — RANOLAZINE 500 MG: 500 TABLET, FILM COATED, EXTENDED RELEASE ORAL at 21:18

## 2021-01-22 RX ADMIN — ALLOPURINOL 100 MG: 100 TABLET ORAL at 08:30

## 2021-01-22 RX ADMIN — CARVEDILOL 6.25 MG: 6.25 TABLET, FILM COATED ORAL at 08:30

## 2021-01-23 VITALS
HEIGHT: 68 IN | RESPIRATION RATE: 16 BRPM | OXYGEN SATURATION: 98 % | SYSTOLIC BLOOD PRESSURE: 160 MMHG | BODY MASS INDEX: 28.5 KG/M2 | DIASTOLIC BLOOD PRESSURE: 99 MMHG | WEIGHT: 188.05 LBS | HEART RATE: 78 BPM | TEMPERATURE: 97.7 F

## 2021-01-23 LAB
ANION GAP SERPL CALCULATED.3IONS-SCNC: 12 MMOL/L (ref 5–15)
APTT PPP: 29 SECONDS (ref 61–76.5)
BUN SERPL-MCNC: 16 MG/DL (ref 6–20)
BUN/CREAT SERPL: 12.6 (ref 7–25)
CALCIUM SPEC-SCNC: 9.9 MG/DL (ref 8.6–10.5)
CHLORIDE SERPL-SCNC: 100 MMOL/L (ref 98–107)
CO2 SERPL-SCNC: 25 MMOL/L (ref 22–29)
CREAT SERPL-MCNC: 1.27 MG/DL (ref 0.57–1)
GFR SERPL CREATININE-BSD FRML MDRD: 55 ML/MIN/1.73
GLUCOSE BLDC GLUCOMTR-MCNC: 119 MG/DL (ref 70–105)
GLUCOSE BLDC GLUCOMTR-MCNC: 124 MG/DL (ref 70–105)
GLUCOSE SERPL-MCNC: 116 MG/DL (ref 65–99)
POTASSIUM SERPL-SCNC: 3.6 MMOL/L (ref 3.5–5.2)
SODIUM SERPL-SCNC: 137 MMOL/L (ref 136–145)

## 2021-01-23 PROCEDURE — 99233 SBSQ HOSP IP/OBS HIGH 50: CPT | Performed by: INTERNAL MEDICINE

## 2021-01-23 PROCEDURE — 82962 GLUCOSE BLOOD TEST: CPT

## 2021-01-23 PROCEDURE — 85730 THROMBOPLASTIN TIME PARTIAL: CPT | Performed by: INTERNAL MEDICINE

## 2021-01-23 PROCEDURE — 99239 HOSP IP/OBS DSCHRG MGMT >30: CPT | Performed by: INTERNAL MEDICINE

## 2021-01-23 PROCEDURE — 80048 BASIC METABOLIC PNL TOTAL CA: CPT | Performed by: INTERNAL MEDICINE

## 2021-01-23 RX ORDER — LEVOTHYROXINE SODIUM 0.12 MG/1
250 TABLET ORAL DAILY
Qty: 60 TABLET | Refills: 0 | Status: SHIPPED | OUTPATIENT
Start: 2021-01-23 | End: 2021-05-31

## 2021-01-23 RX ORDER — AMLODIPINE BESYLATE 5 MG/1
5 TABLET ORAL DAILY
Qty: 30 TABLET | Refills: 0 | Status: SHIPPED | OUTPATIENT
Start: 2021-01-23 | End: 2021-05-10

## 2021-01-23 RX ORDER — CARVEDILOL 6.25 MG/1
6.25 TABLET ORAL 2 TIMES DAILY WITH MEALS
Qty: 60 TABLET | Refills: 0 | Status: SHIPPED | OUTPATIENT
Start: 2021-01-23 | End: 2021-05-31

## 2021-01-23 RX ORDER — LISINOPRIL 5 MG/1
5 TABLET ORAL DAILY
Qty: 30 TABLET | Refills: 0 | Status: SHIPPED | OUTPATIENT
Start: 2021-01-24 | End: 2021-05-05

## 2021-01-23 RX ORDER — RANOLAZINE 500 MG/1
500 TABLET, EXTENDED RELEASE ORAL EVERY 12 HOURS SCHEDULED
Qty: 60 TABLET | Refills: 0 | Status: SHIPPED | OUTPATIENT
Start: 2021-01-23 | End: 2021-05-31

## 2021-01-23 RX ADMIN — DOCUSATE SODIUM 100 MG: 100 CAPSULE, LIQUID FILLED ORAL at 08:49

## 2021-01-23 RX ADMIN — Medication 10 ML: at 08:43

## 2021-01-23 RX ADMIN — CYANOCOBALAMIN TAB 1000 MCG 1000 MCG: 1000 TAB at 08:43

## 2021-01-23 RX ADMIN — FERROUS SULFATE TAB EC 324 MG (65 MG FE EQUIVALENT) 324 MG: 324 (65 FE) TABLET DELAYED RESPONSE at 08:42

## 2021-01-23 RX ADMIN — RANOLAZINE 500 MG: 500 TABLET, FILM COATED, EXTENDED RELEASE ORAL at 08:43

## 2021-01-23 RX ADMIN — ACETAMINOPHEN 650 MG: 325 TABLET, FILM COATED ORAL at 08:42

## 2021-01-23 RX ADMIN — THERA TABS 1 TABLET: TAB at 08:42

## 2021-01-23 RX ADMIN — LISINOPRIL 5 MG: 5 TABLET ORAL at 08:43

## 2021-01-23 RX ADMIN — FOLIC ACID 1 MG: 1 TABLET ORAL at 08:43

## 2021-01-23 RX ADMIN — PANTOPRAZOLE SODIUM 40 MG: 40 TABLET, DELAYED RELEASE ORAL at 08:43

## 2021-01-23 RX ADMIN — CARVEDILOL 6.25 MG: 6.25 TABLET, FILM COATED ORAL at 08:43

## 2021-01-23 RX ADMIN — LEVOTHYROXINE SODIUM 250 MCG: 0.12 TABLET ORAL at 05:54

## 2021-01-23 RX ADMIN — ASPIRIN 81 MG: 81 TABLET, COATED ORAL at 08:43

## 2021-01-23 RX ADMIN — ALLOPURINOL 100 MG: 100 TABLET ORAL at 08:43

## 2021-01-23 RX ADMIN — CLOPIDOGREL BISULFATE 75 MG: 75 TABLET ORAL at 08:43

## 2021-01-23 RX ADMIN — OXYCODONE 5 MG: 5 TABLET ORAL at 05:57

## 2021-01-24 ENCOUNTER — READMISSION MANAGEMENT (OUTPATIENT)
Dept: CALL CENTER | Facility: HOSPITAL | Age: 46
End: 2021-01-24

## 2021-01-24 NOTE — OUTREACH NOTE
Prep Survey      Responses   [facility] facility patient discharged from?  [facility]   Is LACE score < 7 ?  No   Emergency Room discharge w/ pulse ox?  No   Eligibility  Readm Mgmt   Discharge diagnosis  Chest pain due to CAD    Does the patient have one of the following disease processes/diagnoses(primary or secondary)?  Other   Does the patient have Home health ordered?  No   Is there a DME ordered?  No   Prep survey completed?  Yes          Aide Curry RN

## 2021-01-25 ENCOUNTER — APPOINTMENT (OUTPATIENT)
Dept: GENERAL RADIOLOGY | Facility: HOSPITAL | Age: 46
End: 2021-01-25

## 2021-01-25 ENCOUNTER — HOSPITAL ENCOUNTER (EMERGENCY)
Facility: HOSPITAL | Age: 46
Discharge: HOME | End: 2021-01-25
Admitting: EMERGENCY MEDICINE

## 2021-01-25 ENCOUNTER — APPOINTMENT (OUTPATIENT)
Dept: CARDIAC REHAB | Facility: HOSPITAL | Age: 46
End: 2021-01-25

## 2021-01-25 ENCOUNTER — TELEPHONE (OUTPATIENT)
Dept: CARDIAC REHAB | Facility: HOSPITAL | Age: 46
End: 2021-01-25

## 2021-01-25 VITALS
RESPIRATION RATE: 16 BRPM | BODY MASS INDEX: 29.19 KG/M2 | TEMPERATURE: 98.2 F | HEART RATE: 75 BPM | OXYGEN SATURATION: 99 % | SYSTOLIC BLOOD PRESSURE: 150 MMHG | DIASTOLIC BLOOD PRESSURE: 97 MMHG | WEIGHT: 186 LBS | HEIGHT: 67 IN

## 2021-01-25 DIAGNOSIS — S60.222A CONTUSION OF LEFT HAND, INITIAL ENCOUNTER: ICD-10-CM

## 2021-01-25 DIAGNOSIS — S60.212A CONTUSION OF LEFT WRIST, INITIAL ENCOUNTER: ICD-10-CM

## 2021-01-25 DIAGNOSIS — S50.12XA CONTUSION OF LEFT FOREARM, INITIAL ENCOUNTER: ICD-10-CM

## 2021-01-25 DIAGNOSIS — M54.50 ACUTE BILATERAL LOW BACK PAIN WITHOUT SCIATICA: ICD-10-CM
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
## 2021-01-25 PROCEDURE — 72110 X-RAY EXAM L-2 SPINE 4/>VWS: CPT

## 2021-01-25 PROCEDURE — 99283 EMERGENCY DEPT VISIT LOW MDM: CPT

## 2021-01-25 PROCEDURE — 73090 X-RAY EXAM OF FOREARM: CPT

## 2021-01-25 PROCEDURE — 73110 X-RAY EXAM OF WRIST: CPT

## 2021-01-25 PROCEDURE — 73130 X-RAY EXAM OF HAND: CPT

## 2021-01-25 RX ORDER — CYCLOBENZAPRINE HCL 10 MG
10 TABLET ORAL ONCE
Status: COMPLETED | OUTPATIENT
Start: 2021-01-25 | End: 2021-01-25

## 2021-01-25 RX ORDER — CYCLOBENZAPRINE HCL 10 MG
10 TABLET ORAL 3 TIMES DAILY PRN
Qty: 10 TABLET | Refills: 0 | Status: SHIPPED | OUTPATIENT
Start: 2021-01-25 | End: 2021-07-19

## 2021-01-25 RX ADMIN — CYCLOBENZAPRINE HYDROCHLORIDE 10 MG: 10 TABLET, FILM COATED ORAL at 16:28

## 2021-01-25 NOTE — DISCHARGE INSTRUCTIONS
Please follow up with your primary care provider: if you do not have one, one has been provided above and you may call for an appointment for primary care.  Please call for an appointment tomorrow  Return to the ED for new or worsening symptoms  Please continue your home pain medication

## 2021-01-25 NOTE — TELEPHONE ENCOUNTER
Called pt to follow up with since going home from hospital and see if she plans to come back to Rehab. No answer. Unable to leave  due to it not being set up yet.

## 2021-01-25 NOTE — ED PROVIDER NOTES
Subjective   Patient is a 45-year-old female who presents emergency department after a mechanical fall.  She reports she was going down the steps, and slipped landing on her buttocks, going down the steps further.  She complains of pain in her low back, and left forearm wrist and hand.  She denies any head injury or neck pain.  No loss of consciousness.  No abdominal pain or chest pain.          Review of Systems   Constitutional: Negative for chills and fever.   Respiratory: Negative for cough, chest tightness and shortness of breath.    Cardiovascular: Negative for chest pain, palpitations and leg swelling.   Gastrointestinal: Negative for abdominal pain, diarrhea, nausea and vomiting.   Musculoskeletal: Positive for back pain. Negative for neck pain.        Left forearm pain, left wrist pain, left hand pain c/o fall   Skin: Negative for color change and rash.       Past Medical History:   Diagnosis Date   • Arrhythmia    • Asthma    • CHF (congestive heart failure) (CMS/[facility])    • COPD (chronic obstructive pulmonary disease) (CMS/[facility])    • Diabetes (CMS/[facility])    • Disease of thyroid gland    • Heart murmur    • Hyperlipidemia    • Hypertension        Allergies   Allergen Reactions   • Hydrocodone Hives   • Penicillin G Unknown (See Comments)   • Contrast Dye GI Intolerance     She is pretty sure it's the contrast dye that makes her super sick and vomiting after heart cath       Past Surgical History:   Procedure Laterality Date   • AORTIC VALVE REPAIR/REPLACEMENT N/A 12/27/2019    Procedure: AORTIC VALVE REPAIR/REPLACEMENT;  Surgeon: Lane Stock MD;  [facility]  Service: Cardiothoracic   • BREAST LUMPECTOMY     • CARDIAC CATHETERIZATION N/A 12/24/2019    Procedure: Right and Left Heart Cath 12/24/19 @ 0900;  Surgeon: Wayne Luna MD;  Location: [facility] INVASIVE LOCATION;  Service: Cardiovascular   • CARDIAC CATHETERIZATION N/A 12/24/2019    Procedure: Coronary angiography;   Surgeon: Wayne Luna MD;  Location: [facility] CATH INVASIVE LOCATION;  Service: Cardiovascular   • CARDIAC CATHETERIZATION N/A 11/10/2020    Procedure: Left and right Heart Cath;  Surgeon: Wayne Luna MD;  Location: [facility] CATH INVASIVE LOCATION;  Service: Cardiovascular;  Laterality: N/A;   • CARDIAC CATHETERIZATION N/A 11/10/2020    Procedure: Coronary angiography;  Surgeon: Wayne Luna MD;  Location: [facility] CATH INVASIVE LOCATION;  Service: Cardiovascular;  Laterality: N/A;   • CARDIAC CATHETERIZATION N/A 11/10/2020    Procedure: Right Heart Cath;  Surgeon: Wayne Luna MD;  Location: [facility] CATH INVASIVE LOCATION;  Service: Cardiovascular;  Laterality: N/A;   • CARDIAC CATHETERIZATION N/A 11/25/2020    Procedure: Percutaneous coronary intervention of the left circumflex artery;  Surgeon: Wayne Luna MD;  Location: [facility] CATH INVASIVE LOCATION;  Service: Cardiovascular;  Laterality: N/A;   • CARDIAC CATHETERIZATION N/A 1/22/2021    Procedure: LEFT HEART CATH with possible PCI;  Surgeon: Wayne Luna MD;  Location: [facility] CATH INVASIVE LOCATION;  Service: Cardiovascular;  Laterality: N/A;  Local and IV sedation   • CARDIAC CATHETERIZATION N/A 1/22/2021    Procedure: Coronary angiography;  Surgeon: Wayne Luna MD;  Location: [facility] CATH INVASIVE LOCATION;  Service: Cardiovascular;  Laterality: N/A;   • CARDIAC CATHETERIZATION N/A 1/22/2021    Procedure: Saphenous Vein Graft;  Surgeon: Wayne Luna MD;  Location: [facility] CATH INVASIVE LOCATION;  Service: Cardiovascular;  Laterality: N/A;   • CARDIAC ELECTROPHYSIOLOGY PROCEDURE Left 6/28/2019    Procedure: Dual-chamber ICD insertion;  Surgeon: Héctor Eckert MD;  Location: [facility] CATH INVASIVE LOCATION;  Service: Cardiovascular   • CARDIAC ELECTROPHYSIOLOGY PROCEDURE Left 8/14/2019    Procedure: Lead Revision;  Surgeon: Héctor Eckert MD;  Location: [facility] CATH  INVASIVE LOCATION;  Service: Cardiovascular   • CARDIAC SURGERY     • CHOLECYSTECTOMY     • CORONARY ARTERY BYPASS GRAFT N/A 2019    Procedure: CORONARY ARTERY BYPASS GRAFTING;  Surgeon: Lane Stock MD;  Location: [facility];  Service: Cardiothoracic   • HYSTERECTOMY     • INSERT / REPLACE / REMOVE PACEMAKER     • LYMPHADENECTOMY Bilateral    • THYROID SURGERY         Family History   Problem Relation Age of Onset   • Heart disease Father        Social History     Socioeconomic History   • Marital status:      Spouse name: Not on file   • Number of children: Not on file   • Years of education: Not on file   • Highest education level: Not on file   Tobacco Use   • Smoking status: Former Smoker     Quit date: 2019     Years since quittin.0   • Smokeless tobacco: Never Used   Substance and Sexual Activity   • Alcohol use: No     Frequency: Never   • Drug use: No   • Sexual activity: Defer           Objective   Physical Exam  Vitals signs and nursing note reviewed.   Constitutional:       General: She is not in acute distress.     Appearance: Normal appearance. She is not ill-appearing, toxic-appearing or diaphoretic.   HENT:      Head: Normocephalic and atraumatic.      Nose: Nose normal.      Mouth/Throat:      Mouth: Mucous membranes are moist.      Pharynx: Oropharynx is clear.   Eyes:      Extraocular Movements: Extraocular movements intact.      Conjunctiva/sclera: Conjunctivae normal.      Pupils: Pupils are equal, round, and reactive to light.   Neck:      Musculoskeletal: Full passive range of motion without pain, normal range of motion and neck supple. No spinous process tenderness or muscular tenderness.      Trachea: Trachea and phonation normal.      Comments: C-spine cleared per Nexus criteria  Cardiovascular:      Rate and Rhythm: Normal rate and regular rhythm.      Pulses: Normal pulses.      Heart sounds: Normal heart sounds.   Pulmonary:      Effort: Pulmonary effort is  "normal.      Breath sounds: Normal breath sounds.   Abdominal:      General: Bowel sounds are normal.      Palpations: Abdomen is soft.   Musculoskeletal:         General: No deformity.      Left wrist: She exhibits tenderness. She exhibits normal range of motion, no bony tenderness, no swelling, no effusion, no crepitus, no deformity and no laceration.      Left forearm: She exhibits tenderness and swelling. She exhibits no bony tenderness, no edema, no deformity and no laceration.      Left hand: She exhibits decreased range of motion and tenderness. She exhibits no bony tenderness, normal two-point discrimination, normal capillary refill, no deformity, no laceration and no swelling. Normal sensation noted. Normal strength noted.      Right lower leg: No edema.      Left lower leg: No edema.      Comments: No bony or point tenderness noted to the forearm wrist or hand.  Soft tissue tenderness of the forearm.  Full range of motion of all the digits noted.  Cap refill brisk.  Radial pulse 2+.  Extremity is otherwise pink warm and dry   Skin:     General: Skin is warm and dry.      Capillary Refill: Capillary refill takes less than 2 seconds.      Findings: No erythema or rash.   Neurological:      General: No focal deficit present.      Mental Status: She is alert and oriented to person, place, and time.      GCS: GCS eye subscore is 4. GCS verbal subscore is 5. GCS motor subscore is 6.      Cranial Nerves: No dysarthria or facial asymmetry.      Sensory: Sensation is intact.      Deep Tendon Reflexes:      Reflex Scores:       Patellar reflexes are 2+ on the right side and 2+ on the left side.     Comments: No saddle anesthesia   Psychiatric:         Mood and Affect: Mood normal.         Behavior: Behavior normal.         Procedures           ED Course  BP (!) 168/109 (BP Location: Left arm, Patient Position: Sitting)   Pulse 85   Temp 98 °F (36.7 °C) (Oral)   Resp 20   Ht 170.2 cm (67\")   Wt 84.4 kg (186 lb)  "  LMP  (LMP Unknown)   SpO2 98%   BMI 29.13 kg/m²   Labs Reviewed - No data to display  Medications   cyclobenzaprine (FLEXERIL) tablet 10 mg (has no administration in time range)     Xr Forearm 2 View Left    Result Date: 1/25/2021  Normal 2 views of the left forearm.  Electronically Signed By-Pao Iglesias MD On:1/25/2021 3:55 PM This report was finalized on [id number] by  Pao Iglesias MD.    Xr Wrist 3+ View Left    Result Date: 1/25/2021  Normal 3 views of the left wrist.  Electronically Signed By-Pao Iglesias MD On:1/25/2021 3:55 PM This report was finalized on [id number] by  Pao Iglesias MD.    Xr Hand 3+ View Left    Result Date: 1/25/2021  Normal 3 views of the left hand.  Electronically Signed By-Pao Iglesias MD On:1/25/2021 3:55 PM This report was finalized on [id number] by  Pao Iglesias MD.    Xr Spine Lumbar Complete 4+vw    Result Date: 1/25/2021   1. No acute lumbar spine findings. 2. Suspected advanced right L4-5 facet arthropathy.  Electronically Signed By-Pao Iglesias MD On:1/25/2021 3:54 PM This report was finalized on [id number] by  Pao Iglesias MD.          Appropriate PPE was worn during the duration of the care for this patient while in the emergency department per [facility] guidelines                                     MDM  Number of Diagnoses or Management Options  Acute bilateral low back pain without sciatica:   Contusion of left forearm, initial encounter:   Contusion of left hand, initial encounter:   Contusion of left wrist, initial encounter:   Fall, initial encounter:   Diagnosis management comments: Differentials: Fracture, contusion, sprain  This list is not all inclusive and does not constitute the entireity of considered causes.     Labs reviewed by me and significant for the following: Not warranted    Imaging, Interpreted per radiologist, independently viewed by myself: Xr Forearm 2 View Left    Result Date: 1/25/2021  Normal 2 views  of the left forearm.  Electronically Signed By-Pao Iglesias MD On:1/25/2021 3:55 PM This report was finalized on [id number] by  Pao Iglesias MD.    Xr Wrist 3+ View Left    Result Date: 1/25/2021  Normal 3 views of the left wrist.  Electronically Signed By-Pao Iglesias MD On:1/25/2021 3:55 PM This report was finalized on [id number] by  Pao Iglesias MD.    Xr Hand 3+ View Left    Result Date: 1/25/2021  Normal 3 views of the left hand.  Electronically Signed By-Pao Iglesias MD On:1/25/2021 3:55 PM This report was finalized on [id number] by  Pao Iglesias MD.    Xr Spine Lumbar Complete 4+vw    Result Date: 1/25/2021   1. No acute lumbar spine findings. 2. Suspected advanced right L4-5 facet arthropathy.  Electronically Signed By-Pao Iglesias MD On:1/25/2021 3:54 PM This report was finalized on [id number] by  Pao Iglesias MD.        Patient was brought back to the emergency department room for evaluation and  placed on appropriate monitoring.  Vital signs have  been reviewed. Patient is afebrile.  Nontoxic in appearance.  Her x-rays show no acute fractures today.  She has no bony tenderness or point tenderness noted to her forearm wrist or hand.  She be placed in Ace wrap, I have advised her to continue her home pain medications, and I will give her a muscle relaxant to go home.    Plan and Disposition: I spoke with the patient at the bedside regarding their plan of care, discharge instruction, home care, prescriptions, and importance follow-up.  We discussed test results at the bedside.  Patient was made aware of indications to return to the emergency department.  Patient agrees with the current plan of care for discharge, verbalized understanding of all instructions    Pt is aware that discharge does not mean that nothing is wrong but it indicates no emergency is present and they must continue care with follow-up as given below or physician of their choice           Amount and/or  Complexity of Data Reviewed  Tests in the radiology section of CPT®: reviewed    Patient Progress  Patient progress: stable      Final diagnoses:   Fall, initial encounter   Contusion of left forearm, initial encounter   Contusion of left wrist, initial encounter   Contusion of left hand, initial encounter   Acute bilateral low back pain without sciatica            Nina Pittman, APRN  01/25/21 1622

## 2021-01-26 ENCOUNTER — READMISSION MANAGEMENT (OUTPATIENT)
Dept: CALL CENTER | Facility: HOSPITAL | Age: 46
End: 2021-01-26

## 2021-01-26 NOTE — OUTREACH NOTE
Medical Week 1 Survey      Responses   [facility] patient discharged from?  [place]   Does the patient have one of the following disease processes/diagnoses(primary or secondary)?  Other   Week 1 attempt successful?  Yes   Call start time  1614   Call end time  1617   Meds reviewed with patient/caregiver?  Yes   Does the patient have all medications ordered at discharge?  No   Prescription comments  PATIENT STATES HER PRESCRIPTIONS ARE BEING MAILED TO HER.    Does the patient have a primary care provider?   Yes   Does the patient have an appointment with their PCP within 7 days of discharge?  Yes   Comments regarding PCP  PATIENT SAW HER PCP TODAY   Has the patient kept scheduled appointments due by today?  Yes   Has home health visited the patient within 72 hours of discharge?  N/A   Did the patient receive a copy of their discharge instructions?  Yes   Nursing interventions  Reviewed instructions with patient   What is the patient's perception of their health status since discharge?  New symptoms unrelated to diagnosis [PATIENT HAD A FALL DOWN SOME STAIRS WHEN SHE GOT HOME FROM THIS HOSPITAL VISIT, AND WAS SEEN IN THE ED. XRAYS REVEALED NO INJURY, BUT PATIENT HAS SOME BACK PAIN FROM HER FALL. ]   Is the patient/caregiver able to teach back signs and symptoms related to disease process for when to call PCP?  Yes   Is the patient/caregiver able to teach back signs and symptoms related to disease process for when to call 911?  Yes   Is the patient/caregiver able to teach back the hierarchy of who to call/visit for symptoms/problems? PCP, Specialist, Home health nurse, Urgent Care, ED, 911  Yes   If the patient is a current smoker, are they able to teach back resources for cessation?  Not a smoker   Week 1 call completed?  Yes          Jacquelyn Varela LPN

## 2021-01-27 ENCOUNTER — APPOINTMENT (OUTPATIENT)
Dept: CARDIAC REHAB | Facility: HOSPITAL | Age: 46
End: 2021-01-27

## 2021-01-27 ENCOUNTER — OFFICE VISIT (OUTPATIENT)
Dept: CARDIOLOGY | Facility: CLINIC | Age: 46
End: 2021-01-27

## 2021-01-27 VITALS
SYSTOLIC BLOOD PRESSURE: 125 MMHG | DIASTOLIC BLOOD PRESSURE: 86 MMHG | WEIGHT: 197 LBS | HEART RATE: 80 BPM | OXYGEN SATURATION: 99 % | BODY MASS INDEX: 30.85 KG/M2

## 2021-01-27 DIAGNOSIS — I10 ESSENTIAL HYPERTENSION: Primary | Chronic | ICD-10-CM

## 2021-01-27 DIAGNOSIS — N18.2 CKD (CHRONIC KIDNEY DISEASE) STAGE 2, GFR 60-89 ML/MIN: Chronic | ICD-10-CM

## 2021-01-27 DIAGNOSIS — Z95.810 ICD (IMPLANTABLE CARDIOVERTER-DEFIBRILLATOR), DUAL, IN SITU: Chronic | ICD-10-CM

## 2021-01-27 DIAGNOSIS — I50.22 SYSTOLIC CHF, CHRONIC (HCC): Chronic | ICD-10-CM

## 2021-01-27 DIAGNOSIS — I25.119 CORONARY ARTERY DISEASE INVOLVING NATIVE CORONARY ARTERY OF NATIVE HEART WITH ANGINA PECTORIS (HCC): Chronic | ICD-10-CM

## 2021-01-27 DIAGNOSIS — I42.0 CARDIOMYOPATHY, DILATED (HCC): Chronic | ICD-10-CM

## 2021-01-27 PROCEDURE — 99214 OFFICE O/P EST MOD 30 MIN: CPT | Performed by: INTERNAL MEDICINE

## 2021-01-29 ENCOUNTER — APPOINTMENT (OUTPATIENT)
Dept: CARDIAC REHAB | Facility: HOSPITAL | Age: 46
End: 2021-01-29

## 2021-02-01 ENCOUNTER — APPOINTMENT (OUTPATIENT)
Dept: CARDIAC REHAB | Facility: HOSPITAL | Age: 46
End: 2021-02-01

## 2021-02-01 ENCOUNTER — READMISSION MANAGEMENT (OUTPATIENT)
Dept: CALL CENTER | Facility: HOSPITAL | Age: 46
End: 2021-02-01

## 2021-02-01 RX ORDER — AMLODIPINE BESYLATE 5 MG/1
TABLET ORAL
Qty: 90 TABLET | Refills: 1 | OUTPATIENT
Start: 2021-02-01

## 2021-02-01 NOTE — OUTREACH NOTE
Medical Week 2 Survey      Responses   [facility] patient discharged from?  [place]   Does the patient have one of the following disease processes/diagnoses(primary or secondary)?  Other   Week 2 attempt successful?  No   Unsuccessful attempts  Attempt 1          Erinn Orr LPN

## 2021-02-02 ENCOUNTER — READMISSION MANAGEMENT (OUTPATIENT)
Dept: CALL CENTER | Facility: HOSPITAL | Age: 46
End: 2021-02-02

## 2021-02-02 NOTE — OUTREACH NOTE
Medical Week 2 Survey      Responses   [facility] patient discharged from?  [place]   Does the patient have one of the following disease processes/diagnoses(primary or secondary)?  Other   Week 2 attempt successful?  No   Unsuccessful attempts  Attempt 2          Jacquelyn Varela LPN

## 2021-02-03 ENCOUNTER — TELEPHONE (OUTPATIENT)
Dept: CARDIAC REHAB | Facility: HOSPITAL | Age: 46
End: 2021-02-03

## 2021-02-03 ENCOUNTER — APPOINTMENT (OUTPATIENT)
Dept: CARDIAC REHAB | Facility: HOSPITAL | Age: 46
End: 2021-02-03

## 2021-02-03 DIAGNOSIS — I50.22 CHRONIC SYSTOLIC CONGESTIVE HEART FAILURE (HCC): Primary | ICD-10-CM

## 2021-02-03 NOTE — TELEPHONE ENCOUNTER
Received call from patient yesterday stating she was ready to start cardiac rehab. She explained she had been hospitalized for heart failure and falling and could not start last year as planned. I told patient I would make sure we had all the neccessary info and recheck her insurance and call her back. I called the patient back at the number she gave me yesterday and left a voice mail. I tried to call patient again today at same number as yesterday and also the listed home number. I could not leave message on home number as the voice mailbox had not been set up yet. Callback requested on VM left yesterday on first number patient gave me

## 2021-02-05 ENCOUNTER — APPOINTMENT (OUTPATIENT)
Dept: CARDIAC REHAB | Facility: HOSPITAL | Age: 46
End: 2021-02-05

## 2021-02-08 ENCOUNTER — APPOINTMENT (OUTPATIENT)
Dept: CARDIAC REHAB | Facility: HOSPITAL | Age: 46
End: 2021-02-08

## 2021-02-08 RX ORDER — AMIODARONE HYDROCHLORIDE 200 MG/1
TABLET ORAL
Qty: 90 TABLET | Refills: 1 | OUTPATIENT
Start: 2021-02-08

## 2021-02-09 ENCOUNTER — OFFICE VISIT (OUTPATIENT)
Dept: CARDIAC REHAB | Facility: HOSPITAL | Age: 46
End: 2021-02-09

## 2021-02-09 DIAGNOSIS — Z95.5 S/P CORONARY ARTERY STENT PLACEMENT: ICD-10-CM

## 2021-02-09 DIAGNOSIS — I50.23 ACUTE ON CHRONIC SYSTOLIC CONGESTIVE HEART FAILURE (HCC): Primary | ICD-10-CM

## 2021-02-09 PROCEDURE — 93798 PHYS/QHP OP CAR RHAB W/ECG: CPT

## 2021-02-10 ENCOUNTER — APPOINTMENT (OUTPATIENT)
Dept: CARDIAC REHAB | Facility: HOSPITAL | Age: 46
End: 2021-02-10

## 2021-02-10 ENCOUNTER — OFFICE VISIT (OUTPATIENT)
Dept: CARDIOLOGY | Facility: CLINIC | Age: 46
End: 2021-02-10

## 2021-02-10 VITALS
BODY MASS INDEX: 31.14 KG/M2 | HEIGHT: 67 IN | HEART RATE: 77 BPM | WEIGHT: 198.4 LBS | DIASTOLIC BLOOD PRESSURE: 84 MMHG | RESPIRATION RATE: 18 BRPM | SYSTOLIC BLOOD PRESSURE: 126 MMHG

## 2021-02-10 DIAGNOSIS — I34.0 NONRHEUMATIC MITRAL VALVE REGURGITATION: Primary | ICD-10-CM

## 2021-02-10 DIAGNOSIS — I25.119 CORONARY ARTERY DISEASE INVOLVING NATIVE CORONARY ARTERY OF NATIVE HEART WITH ANGINA PECTORIS (HCC): Chronic | ICD-10-CM

## 2021-02-10 DIAGNOSIS — I42.0 CARDIOMYOPATHY, DILATED (HCC): Chronic | ICD-10-CM

## 2021-02-10 DIAGNOSIS — I50.22 SYSTOLIC CHF, CHRONIC (HCC): Primary | Chronic | ICD-10-CM

## 2021-02-10 DIAGNOSIS — I34.0 NONRHEUMATIC MITRAL VALVE REGURGITATION: ICD-10-CM

## 2021-02-10 DIAGNOSIS — I50.22 CHRONIC SYSTOLIC CONGESTIVE HEART FAILURE (HCC): ICD-10-CM

## 2021-02-10 PROCEDURE — 99204 OFFICE O/P NEW MOD 45 MIN: CPT | Performed by: INTERNAL MEDICINE

## 2021-02-10 NOTE — PROGRESS NOTES
Patient referred to [facility] for eval of MR. Dr Alvarado saw pt in office today, need surgical opinion by Dr Stock.  I spoke with pt & introduced her to program. She states it has been approximately a year since she saw Dr Stock last, will arrange an appt at [place] office. Request has been sent to his office. Pt did not have any questions, she has my contact information should she have questions or concerns.  Christina Jones RN

## 2021-02-10 NOTE — PROGRESS NOTES
Subjective:     Encounter Date:02/10/2021      Patient ID: Rafael Singh is a 45 y.o. female.    Chief Complaint:  Chief Complaint   Patient presents with   • Cardiac Valve Problem     Severe MR       HPI:  Rafael is a very pleasant 45-year-old female patient of Dr. Luna.  Unfortunately she had undergo an urgent AVR (21 mm Magna)/CABG x3 on 12/27/2019 with Dr Stock. Her postoperative recovery was relatively slow, as anticipated, because of her multi-factorial cardiomyopathy, COPD, and preoperative LINSEY.  She had an echo follow-up both FELICIA and transthoracic.  I personally reviewed the transesophageal echo from 11/10/2020 which showed severe LV systolic dysfunction with an ejection fraction of 20 to 25% and akinetic septum apex and distal anterior wall.  Of note she had severe mitral regurgitation with a mitral valve anatomy that would be amenable to a MitraClip.  Having said this she underwent reevaluation by Dr. Stock who determined that she would not be a reoperative surgical candidate at this time.  Her most recent echocardiogram I personally reviewed from 1/19/2021 showed persistent LV systolic function that was severely decreased with an ejection fraction of 20% continued severe mitral regurgitation and multiple hypokinetic segments diffusely.    Her [facility] Q 12 questionnaire shows a severe decrease in her overall quality of life.  She was not able to do her 6-minute walk test.  Her past medical history is also significant for diabetes mellitus type 2, atherogenic dyslipidemia, hypertension and as I said above CAD with severe mitral regurgitation.  Her New York Heart Association classification currently is 3-4.  Her STS mortality risk is 15.62% for redo sternotomy and mitral valve repair and 16.75% for mitral valve replacement.    We had a long discussion regarding the utility of MitraClip mitral valve repair as a means to improve her LV systolic function and least decrease her symptoms.  She is  agreeable.    The following portions of the patient's history were reviewed and updated as appropriate: allergies, current medications, past family history, past medical history, past social history, past surgical history and problem list.    Problem List:  Patient Active Problem List   Diagnosis   • COPD (chronic obstructive pulmonary disease) (CMS/[facility])   • Essential hypertension   • Cardiomyopathy, dilated (CMS/[facility])   • Chronic renal impairment   • ICD (implantable cardioverter-defibrillator), dual, in situ   • Chest pain   • GERD without esophagitis   • Hypothyroidism (acquired)   • Aortic valve regurgitation   • Unstable angina pectoris (CMS/[facility])   • Coronary artery disease involving native coronary artery of native heart with angina pectoris (CMS/[facility])   • Nonrheumatic aortic valve insufficiency   • S/P AVR (aortic valve replacement)   • S/P CABG x 3   • Dyspnea   • Systolic CHF, chronic (CMS/[facility])   • CKD (chronic kidney disease) stage 2, GFR 60-89 ml/min   • Nonrheumatic mitral valve regurgitation   • Cellulitis of left axilla   • Cellulitis of right axilla   • Hidradenitis   • Type 2 diabetes mellitus without complication, without long-term current use of insulin (CMS/[facility])   • Chest pain due to CAD (CMS/[facility])       Past Medical History:  Past Medical History:   Diagnosis Date   • Arrhythmia    • Asthma    • CHF (congestive heart failure) (CMS/[facility])    • COPD (chronic obstructive pulmonary disease) (CMS/[facility])    • Diabetes (CMS/[facility])    • Disease of thyroid gland    • Heart murmur    • Hyperlipidemia    • Hypertension        Past Surgical History:  Past Surgical History:   Procedure Laterality Date   • AORTIC VALVE REPAIR/REPLACEMENT N/A 12/27/2019    Procedure: AORTIC VALVE REPAIR/REPLACEMENT;  Surgeon: Lane Stock MD;  Location: [facility];  Service: Cardiothoracic   • BREAST LUMPECTOMY     • CARDIAC CATHETERIZATION N/A 12/24/2019    Procedure: Right and Left Heart Cath 12/24/19 @ 0900;  Surgeon:  Wayne Luna MD;  Location: [facility] CATH INVASIVE LOCATION;  Service: Cardiovascular   • CARDIAC CATHETERIZATION N/A 12/24/2019    Procedure: Coronary angiography;  Surgeon: Wayne Luna MD;  Location:  [facility] CATH INVASIVE LOCATION;  Service: Cardiovascular   • CARDIAC CATHETERIZATION N/A 11/10/2020    Procedure: Left and right Heart Cath;  Surgeon: Wayne Luna MD;  Location: [facility] CATH INVASIVE LOCATION;  Service: Cardiovascular;  Laterality: N/A;   • CARDIAC CATHETERIZATION N/A 11/10/2020    Procedure: Coronary angiography;  Surgeon: Wayne Luna MD;  Location: [facility] CATH INVASIVE LOCATION;  Service: Cardiovascular;  Laterality: N/A;   • CARDIAC CATHETERIZATION N/A 11/10/2020    Procedure: Right Heart Cath;  Surgeon: Wayne Luna MD;  Location: [facility] CATH INVASIVE LOCATION;  Service: Cardiovascular;  Laterality: N/A;   • CARDIAC CATHETERIZATION N/A 11/25/2020    Procedure: Percutaneous coronary intervention of the left circumflex artery;  Surgeon: Wayne Luna MD;  Location: [facility] CATH INVASIVE LOCATION;  Service: Cardiovascular;  Laterality: N/A;   • CARDIAC CATHETERIZATION N/A 1/22/2021    Procedure: LEFT HEART CATH with possible PCI;  Surgeon: Wayne Luna MD;  Location: [facility] CATH INVASIVE LOCATION;  Service: Cardiovascular;  Laterality: N/A;  Local and IV sedation   • CARDIAC CATHETERIZATION N/A 1/22/2021    Procedure: Coronary angiography;  Surgeon: Wayne Luna MD;  Location: [facility] CATH INVASIVE LOCATION;  Service: Cardiovascular;  Laterality: N/A;   • CARDIAC CATHETERIZATION N/A 1/22/2021    Procedure: Saphenous Vein Graft;  Surgeon: Wayne Luna MD;  Location: [facility] CATH INVASIVE LOCATION;  Service: Cardiovascular;  Laterality: N/A;   • CARDIAC ELECTROPHYSIOLOGY PROCEDURE Left 6/28/2019    Procedure: Dual-chamber ICD insertion;  Surgeon: Héctor Eckert MD;  Location: [facility] CATH INVASIVE  "LOCATION;  Service: Cardiovascular   • CARDIAC ELECTROPHYSIOLOGY PROCEDURE Left 2019    Procedure: Lead Revision;  Surgeon: Héctor Eckert MD;  Location: [facility] CATH INVASIVE LOCATION;  Service: Cardiovascular   • CARDIAC SURGERY     • CHOLECYSTECTOMY     • CORONARY ARTERY BYPASS GRAFT N/A 2019    Procedure: CORONARY ARTERY BYPASS GRAFTING;  Surgeon: Lane Stock MD;  Location: [facility] CVOR;  Service: Cardiothoracic   • HYSTERECTOMY     • INSERT / REPLACE / REMOVE PACEMAKER     • LYMPHADENECTOMY Bilateral    • THYROID SURGERY         Social History:  Social History     Socioeconomic History   • Marital status:      Spouse name: Not on file   • Number of children: Not on file   • Years of education: Not on file   • Highest education level: Not on file   Tobacco Use   • Smoking status: Former Smoker     Quit date: 2019     Years since quittin.1   • Smokeless tobacco: Never Used   Substance and Sexual Activity   • Alcohol use: No     Frequency: Never   • Drug use: No   • Sexual activity: Defer       Allergies:  Allergies   Allergen Reactions   • Hydrocodone Hives   • Penicillin G Unknown (See Comments)   • Contrast Dye GI Intolerance     She is pretty sure it's the contrast dye that makes her super sick and vomiting after heart cath         Review of Symptoms:  Constitutional: Patient afebrile no chills or unexpected weight changes  Respiratory: No cough, no wheezing with significant dyspnea  Cardiovascular: Today the patient complains of no chest pain, palpitations, with significant dyspnea, orthopnea and edema  Gastrointestinal: No nausea, vomiting, constipation or diarrhea.  No melena or dark stools    All other systems reviewed and are negative save overall fatigue and malaise             Objective:         /84 (BP Location: Left arm, Patient Position: Sitting)   Pulse 77   Resp 18   Ht 170.2 cm (67\")   Wt 90 kg (198 lb 6.4 oz)   LMP  (LMP Unknown)   BMI 31.07 kg/m² "     Physical exam  Constitutional: well-nourished, and appears stated age in no acute distress  PERRL: Conjunctiva clear, no pallor, anicteric  HENMT: normocephalic, normal dentition, no cyanosis or pallor  Neck:no bruits, or thrills and bilateral normal carotid upstroke.  Elevated jugular venous pressure  Cardiovascular: No parasternal heaves an non-displaced focal PMI. Normal rate and rhythm: no rub, gallop, 2 out of 6 systolic murmur to the axilla and normal S1 and S2; pitting lower extremity bilateral edema.   Lungs: unlabored, no wheezing with no rales or rhonchi on auscultation.  Extremities: Warm, no clubbing, cyanosis. Full and equal peripheral pulses in extremities with no bruits appreciated.   Abdomen: soft, non-tender, non-distended  Musculoskeletal: no joint tenderness or swelling and no erythema  Skin: Warm and dry, non-erythematous   Neuro:alert and normal affect. Oriented to time, place and person.           In-Office Procedure(s):  Procedures    ASCVD RIsk Score::  The 10-year ASCVD risk score (Ritikakatherin CARLTON Jr., et al., 2013) is: 6%    Values used to calculate the score:      Age: 45 years      Sex: Female      Is Non- : Yes      Diabetic: Yes      Tobacco smoker: No      Systolic Blood Pressure: 126 mmHg      Is BP treated: Yes      HDL Cholesterol: 66 mg/dL      Total Cholesterol: 278 mg/dL    Recent Radiology:  Imaging Results (Most Recent)     None          Lab Review:   No results displayed because visit has over 200 results.                 Invalid input(s): ALKPO4                        Invalid input(s): LDLCALC                Assessment:          Diagnosis Plan   1. Systolic CHF, chronic (CMS/HCC)     2. Nonrheumatic mitral valve regurgitation     3. Cardiomyopathy, dilated (CMS/HCC)     4. Coronary artery disease involving native coronary artery of native heart with angina pectoris (CMS/HCC)            Plan:         1. Systolic CHF, chronic (CMS/HCC)  Currently class III  near card association symptoms.  Continue optimize medical therapy    2. Nonrheumatic mitral valve regurgitation  Anatomically, her valve would be amenable to MitraClip mitral valve repair.  Whether this would improve her LV systolic function or only improve her symptoms is to be determined.  She is surgically inoperable at this time as per Dr. Gale.    3. Cardiomyopathy, dilated (CMS/HCC)  See above.  Multifactorial likely ischemic with load and volume dependent LV systolic dysfunction.  However we will get an echo to look at her current LV systolic function given that it has been approximately a month since her last echo.    4. Coronary artery disease involving native coronary artery of native heart with angina pectoris (CMS/HCC)  Clinically silent post coronary artery bypass grafting.            Manish Alvarado MD  02/10/21  .

## 2021-02-11 ENCOUNTER — READMISSION MANAGEMENT (OUTPATIENT)
Dept: CALL CENTER | Facility: HOSPITAL | Age: 46
End: 2021-02-11

## 2021-02-11 NOTE — OUTREACH NOTE
Medical Week 3 Survey      Responses   [facility] patient discharged from?  [place]   Does the patient have one of the following disease processes/diagnoses(primary or secondary)?  Other   Week 3 attempt successful?  Yes   Call start time  1126   Call end time  1134   Meds reviewed with patient/caregiver?  Yes   Is the patient having any side effects they believe may be caused by any medication additions or changes?  No   Does the patient have all medications ordered at discharge?  Yes   Is the patient taking all medications as directed (includes completed medication regime)?  Yes   Does the patient have a primary care provider?   Yes   Has the patient kept scheduled appointments due by today?  Yes   Has home health visited the patient within 72 hours of discharge?  N/A   Did the patient receive a copy of their discharge instructions?  Yes   Nursing interventions  Reviewed instructions with patient   What is the patient's perception of their health status since discharge?  Improving   Is the patient/caregiver able to teach back signs and symptoms related to disease process for when to call PCP?  Yes   Is the patient/caregiver able to teach back signs and symptoms related to disease process for when to call 911?  Yes   Is the patient/caregiver able to teach back the hierarchy of who to call/visit for symptoms/problems? PCP, Specialist, Home health nurse, Urgent Care, ED, 911  Yes   If the patient is a current smoker, are they able to teach back resources for cessation?  Not a smoker          Jacquelyn Varela, KALLIN

## 2021-02-12 ENCOUNTER — TELEPHONE (OUTPATIENT)
Dept: CARDIAC SURGERY | Facility: CLINIC | Age: 46
End: 2021-02-12

## 2021-02-12 ENCOUNTER — APPOINTMENT (OUTPATIENT)
Dept: CARDIAC REHAB | Facility: HOSPITAL | Age: 46
End: 2021-02-12

## 2021-02-15 ENCOUNTER — APPOINTMENT (OUTPATIENT)
Dept: CARDIAC REHAB | Facility: HOSPITAL | Age: 46
End: 2021-02-15

## 2021-02-17 ENCOUNTER — APPOINTMENT (OUTPATIENT)
Dept: CARDIAC REHAB | Facility: HOSPITAL | Age: 46
End: 2021-02-17

## 2021-02-19 ENCOUNTER — APPOINTMENT (OUTPATIENT)
Dept: CARDIAC REHAB | Facility: HOSPITAL | Age: 46
End: 2021-02-19

## 2021-02-22 ENCOUNTER — HOSPITAL ENCOUNTER (OUTPATIENT)
Dept: CARDIOLOGY | Facility: HOSPITAL | Age: 46
Discharge: HOME | End: 2021-02-22
Admitting: INTERNAL MEDICINE

## 2021-02-22 ENCOUNTER — APPOINTMENT (OUTPATIENT)
Dept: CARDIAC REHAB | Facility: HOSPITAL | Age: 46
End: 2021-02-22

## 2021-02-22 VITALS
WEIGHT: 198 LBS | SYSTOLIC BLOOD PRESSURE: 137 MMHG | DIASTOLIC BLOOD PRESSURE: 86 MMHG | BODY MASS INDEX: 31.08 KG/M2 | HEIGHT: 67 IN

## 2021-02-22 DIAGNOSIS — I50.22 SYSTOLIC CHF, CHRONIC (HCC): ICD-10-CM

## 2021-02-22 LAB
BH CV ECHO MEAS - ACS: 1.1 CM
BH CV ECHO MEAS - AO MAX PG (FULL): 30.3 MMHG
BH CV ECHO MEAS - AO MAX PG: 32.8 MMHG
BH CV ECHO MEAS - AO MEAN PG (FULL): 18.2 MMHG
BH CV ECHO MEAS - AO MEAN PG: 19.4 MMHG
BH CV ECHO MEAS - AO ROOT AREA (BSA CORRECTED): 1
BH CV ECHO MEAS - AO ROOT AREA: 3.2 CM^2
BH CV ECHO MEAS - AO ROOT DIAM: 2 CM
BH CV ECHO MEAS - AO V2 MAX: 286.1 CM/SEC
BH CV ECHO MEAS - AO V2 MEAN: 206.2 CM/SEC
BH CV ECHO MEAS - AO V2 VTI: 61.8 CM
BH CV ECHO MEAS - ASC AORTA: 3.1 CM
BH CV ECHO MEAS - AVA(I,A): 0.4 CM^2
BH CV ECHO MEAS - AVA(I,D): 0.4 CM^2
BH CV ECHO MEAS - AVA(V,A): 0.45 CM^2
BH CV ECHO MEAS - AVA(V,D): 0.45 CM^2
BH CV ECHO MEAS - BSA(HAYCOCK): 2 M^2
BH CV ECHO MEAS - BSA: 2 M^2
BH CV ECHO MEAS - BZI_BMI: 29.8 KILOGRAMS/M^2
BH CV ECHO MEAS - BZI_METRIC_HEIGHT: 170.2 CM
BH CV ECHO MEAS - BZI_METRIC_WEIGHT: 86.2 KG
BH CV ECHO MEAS - EDV(CUBED): 172.4 ML
BH CV ECHO MEAS - EDV(MOD-SP4): 146.2 ML
BH CV ECHO MEAS - EDV(TEICH): 151.5 ML
BH CV ECHO MEAS - EF(CUBED): 35.2 %
BH CV ECHO MEAS - EF(MOD-BP): 27 %
BH CV ECHO MEAS - EF(MOD-SP4): 27.3 %
BH CV ECHO MEAS - EF(TEICH): 28.4 %
BH CV ECHO MEAS - ESV(CUBED): 111.8 ML
BH CV ECHO MEAS - ESV(MOD-SP4): 106.4 ML
BH CV ECHO MEAS - ESV(TEICH): 108.4 ML
BH CV ECHO MEAS - FS: 13.4 %
BH CV ECHO MEAS - IVS/LVPW: 0.41
BH CV ECHO MEAS - IVSD: 0.7 CM
BH CV ECHO MEAS - LA DIMENSION(2D): 4.4 CM
BH CV ECHO MEAS - LA DIMENSION: 1.2 CM
BH CV ECHO MEAS - LA/AO: 0.58
BH CV ECHO MEAS - LV DIASTOLIC VOL/BSA (35-75): 73.9 ML/M^2
BH CV ECHO MEAS - LV MASS(C)D: 282.2 GRAMS
BH CV ECHO MEAS - LV MASS(C)DI: 142.7 GRAMS/M^2
BH CV ECHO MEAS - LV MAX PG: 2.4 MMHG
BH CV ECHO MEAS - LV MEAN PG: 1.2 MMHG
BH CV ECHO MEAS - LV SYSTOLIC VOL/BSA (12-30): 53.8 ML/M^2
BH CV ECHO MEAS - LV V1 MAX: 77.5 CM/SEC
BH CV ECHO MEAS - LV V1 MEAN: 52.4 CM/SEC
BH CV ECHO MEAS - LV V1 VTI: 14.7 CM
BH CV ECHO MEAS - LVIDD: 5.6 CM
BH CV ECHO MEAS - LVIDS: 4.8 CM
BH CV ECHO MEAS - LVOT AREA: 1.7 CM^2
BH CV ECHO MEAS - LVOT DIAM: 1.5 CM
BH CV ECHO MEAS - LVPWD: 1.7 CM
BH CV ECHO MEAS - MV A MAX VEL: 88.7 CM/SEC
BH CV ECHO MEAS - MV DEC SLOPE: 338.8 CM/SEC^2
BH CV ECHO MEAS - MV DEC TIME: 0.28 SEC
BH CV ECHO MEAS - MV E MAX VEL: 95.4 CM/SEC
BH CV ECHO MEAS - MV E/A: 1.1
BH CV ECHO MEAS - MV MAX PG: 5 MMHG
BH CV ECHO MEAS - MV MEAN PG: 2.1 MMHG
BH CV ECHO MEAS - MV V2 MAX: 111.8 CM/SEC
BH CV ECHO MEAS - MV V2 MEAN: 69.3 CM/SEC
BH CV ECHO MEAS - MV V2 VTI: 35.2 CM
BH CV ECHO MEAS - MVA(VTI): 0.7 CM^2
BH CV ECHO MEAS - PA MAX PG: 2 MMHG
BH CV ECHO MEAS - PA MEAN PG: 1.2 MMHG
BH CV ECHO MEAS - PA V2 MAX: 71.4 CM/SEC
BH CV ECHO MEAS - PA V2 MEAN: 50 CM/SEC
BH CV ECHO MEAS - PA V2 VTI: 16.6 CM
BH CV ECHO MEAS - PI END-D VEL: 101.1 CM/SEC
BH CV ECHO MEAS - PULM DIAS VEL: 32.8 CM/SEC
BH CV ECHO MEAS - PULM S/D: 1.8
BH CV ECHO MEAS - PULM SYS VEL: 58.8 CM/SEC
BH CV ECHO MEAS - RVDD: 3.3 CM
BH CV ECHO MEAS - SI(AO): 99.1 ML/M^2
BH CV ECHO MEAS - SI(CUBED): 30.6 ML/M^2
BH CV ECHO MEAS - SI(LVOT): 12.5 ML/M^2
BH CV ECHO MEAS - SI(MOD-SP4): 20.1 ML/M^2
BH CV ECHO MEAS - SI(TEICH): 21.8 ML/M^2
BH CV ECHO MEAS - SV(AO): 196.1 ML
BH CV ECHO MEAS - SV(CUBED): 60.6 ML
BH CV ECHO MEAS - SV(LVOT): 24.7 ML
BH CV ECHO MEAS - SV(MOD-SP4): 39.9 ML
BH CV ECHO MEAS - SV(TEICH): 43.1 ML
MR PISA EROA: 0.2 CM2

## 2021-02-22 PROCEDURE — 93306 TTE W/DOPPLER COMPLETE: CPT

## 2021-02-22 PROCEDURE — 93306 TTE W/DOPPLER COMPLETE: CPT | Performed by: INTERNAL MEDICINE

## 2021-02-23 ENCOUNTER — TREATMENT (OUTPATIENT)
Dept: CARDIAC REHAB | Facility: HOSPITAL | Age: 46
End: 2021-02-23

## 2021-02-23 ENCOUNTER — HOSPITAL ENCOUNTER (OUTPATIENT)
Facility: HOSPITAL | Age: 46
Setting detail: HOSPITAL OUTPATIENT SURGERY
End: 2021-02-23
Attending: INTERNAL MEDICINE | Admitting: INTERNAL MEDICINE

## 2021-02-23 DIAGNOSIS — I50.22 CHRONIC SYSTOLIC CONGESTIVE HEART FAILURE (HCC): ICD-10-CM

## 2021-02-23 DIAGNOSIS — I34.0 NONRHEUMATIC MITRAL VALVE REGURGITATION: ICD-10-CM

## 2021-02-23 DIAGNOSIS — I50.23 ACUTE ON CHRONIC SYSTOLIC CONGESTIVE HEART FAILURE (HCC): Primary | ICD-10-CM

## 2021-02-23 DIAGNOSIS — I34.0 NONRHEUMATIC MITRAL VALVE REGURGITATION: Primary | ICD-10-CM

## 2021-02-23 DIAGNOSIS — Z95.5 S/P CORONARY ARTERY STENT PLACEMENT: ICD-10-CM

## 2021-02-23 PROCEDURE — 93798 PHYS/QHP OP CAR RHAB W/ECG: CPT

## 2021-02-24 ENCOUNTER — APPOINTMENT (OUTPATIENT)
Dept: CARDIAC REHAB | Facility: HOSPITAL | Age: 46
End: 2021-02-24

## 2021-02-25 ENCOUNTER — TREATMENT (OUTPATIENT)
Dept: CARDIAC REHAB | Facility: HOSPITAL | Age: 46
End: 2021-02-25

## 2021-02-25 DIAGNOSIS — Z95.5 S/P CORONARY ARTERY STENT PLACEMENT: ICD-10-CM

## 2021-02-25 DIAGNOSIS — I50.23 ACUTE ON CHRONIC SYSTOLIC CONGESTIVE HEART FAILURE (HCC): Primary | ICD-10-CM

## 2021-02-25 PROCEDURE — 93798 PHYS/QHP OP CAR RHAB W/ECG: CPT

## 2021-02-26 ENCOUNTER — TRANSCRIBE ORDERS (OUTPATIENT)
Dept: PULMONOLOGY | Facility: HOSPITAL | Age: 46
End: 2021-02-26

## 2021-02-26 ENCOUNTER — TRANSCRIBE ORDERS (OUTPATIENT)
Dept: SLEEP MEDICINE | Facility: HOSPITAL | Age: 46
End: 2021-02-26

## 2021-02-26 ENCOUNTER — APPOINTMENT (OUTPATIENT)
Dept: CARDIAC REHAB | Facility: HOSPITAL | Age: 46
End: 2021-02-26

## 2021-02-26 DIAGNOSIS — Z01.818 OTHER SPECIFIED PRE-OPERATIVE EXAMINATION: Primary | ICD-10-CM

## 2021-03-01 ENCOUNTER — APPOINTMENT (OUTPATIENT)
Dept: CARDIAC REHAB | Facility: HOSPITAL | Age: 46
End: 2021-03-01

## 2021-03-02 ENCOUNTER — APPOINTMENT (OUTPATIENT)
Dept: CARDIAC REHAB | Facility: HOSPITAL | Age: 46
End: 2021-03-02

## 2021-03-03 DIAGNOSIS — I34.0 NONRHEUMATIC MITRAL VALVE REGURGITATION: ICD-10-CM

## 2021-03-03 DIAGNOSIS — Z01.818 PRE-OP TESTING: Primary | ICD-10-CM

## 2021-03-04 ENCOUNTER — APPOINTMENT (OUTPATIENT)
Dept: CARDIAC REHAB | Facility: HOSPITAL | Age: 46
End: 2021-03-04

## 2021-03-10 ENCOUNTER — APPOINTMENT (OUTPATIENT)
Dept: CARDIOLOGY | Facility: HOSPITAL | Age: 46
End: 2021-03-10

## 2021-03-15 ENCOUNTER — TREATMENT (OUTPATIENT)
Dept: CARDIAC REHAB | Facility: HOSPITAL | Age: 46
End: 2021-03-15

## 2021-03-15 DIAGNOSIS — Z95.5 S/P CORONARY ARTERY STENT PLACEMENT: ICD-10-CM

## 2021-03-15 DIAGNOSIS — I50.23 ACUTE ON CHRONIC SYSTOLIC CONGESTIVE HEART FAILURE (HCC): Primary | ICD-10-CM

## 2021-03-15 PROCEDURE — 93798 PHYS/QHP OP CAR RHAB W/ECG: CPT

## 2021-03-16 ENCOUNTER — APPOINTMENT (OUTPATIENT)
Dept: CARDIAC REHAB | Facility: HOSPITAL | Age: 46
End: 2021-03-16

## 2021-03-18 ENCOUNTER — TREATMENT (OUTPATIENT)
Dept: CARDIAC REHAB | Facility: HOSPITAL | Age: 46
End: 2021-03-18

## 2021-03-18 DIAGNOSIS — I50.23 ACUTE ON CHRONIC SYSTOLIC CONGESTIVE HEART FAILURE (HCC): Primary | ICD-10-CM

## 2021-03-18 DIAGNOSIS — Z95.5 S/P CORONARY ARTERY STENT PLACEMENT: ICD-10-CM

## 2021-03-18 PROCEDURE — 93798 PHYS/QHP OP CAR RHAB W/ECG: CPT

## 2021-03-22 ENCOUNTER — TREATMENT (OUTPATIENT)
Dept: CARDIAC REHAB | Facility: HOSPITAL | Age: 46
End: 2021-03-22

## 2021-03-22 DIAGNOSIS — Z95.5 S/P CORONARY ARTERY STENT PLACEMENT: ICD-10-CM

## 2021-03-22 DIAGNOSIS — I50.23 ACUTE ON CHRONIC SYSTOLIC CONGESTIVE HEART FAILURE (HCC): Primary | ICD-10-CM

## 2021-03-22 PROCEDURE — 93798 PHYS/QHP OP CAR RHAB W/ECG: CPT

## 2021-03-22 NOTE — PROGRESS NOTES
[facility] CARDIOLOGY      REASON FOR FOLLOW-UP:  Cardiomyopathy  Hypertension  Hyperlipidemia  Valvular heart disease  Recent hospitalization for congestive heart failure          Chief Complaint   Patient presents with   • Hypertension     f/u heart cath   • Hyperlipidemia         Dear Dr. Adames,        History of Present Illness     It was my pleasure to see Ms. Nunes in the office today.  As you are aware, she is a very pleasant 45-year-old female with known history of multivessel coronary artery disease and severe aortic valve disease status post CABG and bioprosthetic valve replacement in 2019.  Additional history of ischemic cardiomyopathy status post AICD in situ, chronic systolic heart failure, essential hypertension, chronic renal insufficiency, diabetes mellitus 2, thyroid disorder, valvular heart disease.  The patient's most recent echocardiogram demonstrated worsening LV systolic function with EF of 30%.  She subsequently underwent heart catheterization 11/23/2020 with percutaneous coronary intervention/stent deployment to mid circumflex and marginal branch of the circumflex.  She presents today in follow-up from that procedure.    Today, the patient reports intermittent episodes of mild chest discomfort.  She reports some chronic dyspnea with exertion and abdominal distention.  She denies any lower extremity edema, dizziness or lightheadedness.  She denies any orthopnea or PND.  She denies any pain at cath site, no hematoma.  Her blood pressure in the office is today is 130/90.  She is able to check pressures at home with consistently elevated diastolic reading.      Assessment:  Severe multivessel coronary artery disease  Severe aortic valve regurgitation  Status post CABG  Status post bioprosthetic aortic valve replacement  Valvular heart disease  Ischemic cardiomyopathy  AICD in situ  Severe dilated cardiomyopathy  Hypertension-uncontrolled        Plan:  Increase  amlodipine to 10 mg once daily  CHF education including fluid/salt restriction, daily weights, warning signs of fluid overload, when to notify office versus go to ER  Patient has scheduled follow-up with Dr. Luna next week        The following portions of the patient's history were reviewed and updated as appropriate: allergies, current medications, past family history, past medical history, past social history, past surgical history and problem list.    REVIEW OF SYSTEMS:    Review of Systems   Constitution: Positive for malaise/fatigue.   Cardiovascular: Positive for chest pain and dyspnea on exertion.   Gastrointestinal: Positive for bloating.   All other systems reviewed and are negative.      Vitals:    12/14/20 1520   BP: 130/90   Pulse: 69         PHYSICAL EXAM:    General: Alert, cooperative, no distress, appears stated age  Head:  Normocephalic, atraumatic, mucous membranes moist  Eyes:  Conjunctiva/corneas clear, EOM's intact     Neck:  Supple,  no JVD or bruit     Lungs: Clear to auscultation bilaterally, no wheezes rhonchi rales are noted  Chest wall: No tenderness  Musculoskeletal:   Ambulates freely without assistance  Heart::  Regular rate and rhythm, S1 and S2 normal, 2/6 holosystolic murmur  Abdomen: Soft, non-tender, nondistended, bowel sounds active, no abdominal bruit  Extremities: No cyanosis, clubbing, or edema   Pulses: 2+ and symmetric all extremities  Skin:  No rashes or lesions  Neuro/psych: A&O x3. CN II through XII are grossly intact with appropriate affect        Past Medical History:   Diagnosis Date   • Arrhythmia    • Asthma    • CHF (congestive heart failure) (CMS/[facility])    • COPD (chronic obstructive pulmonary disease) (CMS/[facility])    • Diabetes (CMS/[facility])    • Disease of thyroid gland    • Heart murmur    • Hyperlipidemia    • Hypertension        Past Surgical History:   Procedure Laterality Date   • AORTIC VALVE REPAIR/REPLACEMENT N/A 12/27/2019    Procedure: AORTIC VALVE  REPAIR/REPLACEMENT;  Surgeon: Lane Stock MD;  Location: [facility] CVOR;  Service: Cardiothoracic   • BREAST LUMPECTOMY     • CARDIAC CATHETERIZATION N/A 12/24/2019    Procedure: Right and Left Heart Cath 12/24/19 @ 0900;  Surgeon: Wayne Luna MD;  Location:  [facility] CATH INVASIVE LOCATION;  Service: Cardiovascular   • CARDIAC CATHETERIZATION N/A 12/24/2019    Procedure: Coronary angiography;  Surgeon: Wayne Luna MD;  Location: [facility] CATH INVASIVE LOCATION;  Service: Cardiovascular   • CARDIAC CATHETERIZATION N/A 11/10/2020    Procedure: Left and right Heart Cath;  Surgeon: Wayne Luna MD;  Location: [facility] CATH INVASIVE LOCATION;  Service: Cardiovascular;  Laterality: N/A;   • CARDIAC CATHETERIZATION N/A 11/10/2020    Procedure: Coronary angiography;  Surgeon: Wayne Luna MD;  Location: [facility] CATH INVASIVE LOCATION;  Service: Cardiovascular;  Laterality: N/A;   • CARDIAC CATHETERIZATION N/A 11/10/2020    Procedure: Right Heart Cath;  Surgeon: Wayne Luna MD;  Location: [facility] CATH INVASIVE LOCATION;  Service: Cardiovascular;  Laterality: N/A;   • CARDIAC CATHETERIZATION N/A 11/25/2020    Procedure: Percutaneous coronary intervention of the left circumflex artery;  Surgeon: Wayne Luna MD;  Location: [facility] CATH INVASIVE LOCATION;  Service: Cardiovascular;  Laterality: N/A;   • CARDIAC ELECTROPHYSIOLOGY PROCEDURE Left 6/28/2019    Procedure: Dual-chamber ICD insertion;  Surgeon: Héctor Eckert MD;  Location: [facility] CATH INVASIVE LOCATION;  Service: Cardiovascular   • CARDIAC ELECTROPHYSIOLOGY PROCEDURE Left 8/14/2019    Procedure: Lead Revision;  Surgeon: Héctor Eckert MD;  Location: [facility] CATH INVASIVE LOCATION;  Service: Cardiovascular   • CARDIAC SURGERY     • CHOLECYSTECTOMY     • CORONARY ARTERY BYPASS GRAFT N/A 12/27/2019    Procedure: CORONARY ARTERY BYPASS GRAFTING;  Surgeon: Lane Stock MD;  Location: [facility]  CVOR;  Service: Cardiothoracic   • HYSTERECTOMY     • INSERT / REPLACE / REMOVE PACEMAKER     • LYMPHADENECTOMY Bilateral    • THYROID SURGERY           Current Outpatient Medications:   •  allopurinol (ZYLOPRIM) 100 MG tablet, Take 100 mg by mouth Daily., Disp: , Rfl:   •  ALPRAZolam (XANAX) 1 MG tablet, Take 1 mg by mouth 4 (Four) Times a Day As Needed for Anxiety., Disp: , Rfl:   •  amLODIPine (NORVASC) 5 MG tablet, Take 2 tablets by mouth Daily., Disp: 30 tablet, Rfl: 5  •  aspirin 81 MG EC tablet, Take 1 tablet by mouth Daily., Disp: 30 tablet, Rfl: 6  •  bumetanide (BUMEX) 1 MG tablet, Take 1 tablet by mouth Daily., Disp: 90 tablet, Rfl: 3  •  carvedilol (COREG) 25 MG tablet, Take 1 tablet by mouth 2 (Two) Times a Day With Meals., Disp: 180 tablet, Rfl: 2  •  cholecalciferol (VITAMIN D3) 1000 units tablet, Take 1,000 Units by mouth Daily., Disp: , Rfl:   •  cloNIDine (CATAPRES) 0.1 MG tablet, Take 2 tablets by mouth 2 (Two) Times a Day., Disp: 360 tablet, Rfl: 2  •  clopidogrel (PLAVIX) 75 MG tablet, Take 1 tablet by mouth Daily., Disp: 30 tablet, Rfl: 6  •  Cyanocobalamin (VITAMIN B 12 PO), Take 1 tablet by mouth Daily., Disp: , Rfl:   •  Diclofenac Sodium 1.5 % solution, Place 40 drops on the skin as directed by provider Daily., Disp: , Rfl:   •  docusate sodium (COLACE) 100 MG capsule, Take 100 mg by mouth 2 (Two) Times a Day As Needed for Constipation., Disp: , Rfl:   •  ferrous sulfate 325 (65 FE) MG tablet, Take 65 mg by mouth Daily With Breakfast., Disp: , Rfl:   •  folic acid (FOLVITE) 1 MG tablet, Take 1 mg by mouth Daily., Disp: , Rfl:   •  hydrALAZINE (APRESOLINE) 50 MG tablet, Take 100 mg by mouth 3 (Three) Times a Day., Disp: , Rfl:   •  levothyroxine (SYNTHROID, LEVOTHROID) 200 MCG tablet, Take 200 mcg by mouth Daily., Disp: , Rfl:   •  lisinopril (PRINIVIL,ZESTRIL) 40 MG tablet, Take 40 mg by mouth Daily., Disp: , Rfl:   •  metFORMIN (GLUCOPHAGE) 500 MG tablet, Take 500 mg by mouth Daily With  "Breakfast., Disp: , Rfl:   •  metOLazone (ZAROXOLYN) 2.5 MG tablet, Take 1 tablet by mouth 2 (Two) Times a Week., Disp: 15 tablet, Rfl: 2  •  montelukast (SINGULAIR) 10 MG tablet, Take 10 mg by mouth Every Night., Disp: , Rfl:   •  multivitamin (MULTI-DAY PO), Take 1 tablet by mouth Daily., Disp: , Rfl:   •  ondansetron ODT (ZOFRAN-ODT) 4 MG disintegrating tablet, Place 1 tablet on the tongue 4 (Four) Times a Day As Needed for Nausea or Vomiting for up to 12 doses., Disp: 12 tablet, Rfl: 0  •  oxyCODONE-acetaminophen (PERCOCET)  MG per tablet, Take 1 tablet by mouth Every 6 (Six) Hours As Needed for Moderate Pain  for up to 12 doses., Disp: 12 tablet, Rfl: 0  •  pantoprazole (PROTONIX) 40 MG EC tablet, Take 40 mg by mouth Daily., Disp: , Rfl:   •  potassium chloride (K-DUR) 10 MEQ CR tablet, Take 20 mEq by mouth 2 (Two) Times a Day., Disp: , Rfl:     Allergies   Allergen Reactions   • Hydrocodone Hives   • Penicillin G Unknown (See Comments)   • Contrast Dye GI Intolerance     She is pretty sure it's the contrast dye that makes her super sick and vomiting after heart cath       Family History   Problem Relation Age of Onset   • Heart disease Father        Social History     Tobacco Use   • Smoking status: Former Smoker     Quit date: 2019     Years since quittin.9   • Smokeless tobacco: Never Used   Substance Use Topics   • Alcohol use: No     Frequency: Never           Current Electrocardiogram:    ECG 12 Lead    Date/Time: 2020 4:07 PM  Performed by: Ursula Ann APRN  Authorized by: Ursula Ann APRN   Comparison: not compared with previous ECG   Rhythm: sinus rhythm  BPM: 69  Conduction: left anterior fascicular block                EMR Dragon/Transcription:   \"Dictated utilizing Dragon dictation\".     Disclaimer: Please note that areas of this note were completed with computer voice recognition software.  Quite often unanticipated grammatical, syntax, homophones, and other " interpretive errors are inadvertently transcribed by the computer software. Please excuse any errors that have escaped final proofreading     [facility]

## 2021-03-23 ENCOUNTER — TREATMENT (OUTPATIENT)
Dept: CARDIAC REHAB | Facility: HOSPITAL | Age: 46
End: 2021-03-23

## 2021-03-23 DIAGNOSIS — Z95.5 S/P CORONARY ARTERY STENT PLACEMENT: ICD-10-CM

## 2021-03-23 DIAGNOSIS — I50.23 ACUTE ON CHRONIC SYSTOLIC CONGESTIVE HEART FAILURE (HCC): Primary | ICD-10-CM

## 2021-03-23 PROCEDURE — 93798 PHYS/QHP OP CAR RHAB W/ECG: CPT

## 2021-03-25 ENCOUNTER — TREATMENT (OUTPATIENT)
Dept: CARDIAC REHAB | Facility: HOSPITAL | Age: 46
End: 2021-03-25

## 2021-03-25 DIAGNOSIS — Z95.5 S/P CORONARY ARTERY STENT PLACEMENT: ICD-10-CM

## 2021-03-25 DIAGNOSIS — I50.23 ACUTE ON CHRONIC SYSTOLIC CONGESTIVE HEART FAILURE (HCC): Primary | ICD-10-CM

## 2021-03-25 PROCEDURE — 93798 PHYS/QHP OP CAR RHAB W/ECG: CPT

## 2021-03-30 ENCOUNTER — APPOINTMENT (OUTPATIENT)
Dept: CARDIAC REHAB | Facility: HOSPITAL | Age: 46
End: 2021-03-30

## 2021-04-06 ENCOUNTER — APPOINTMENT (OUTPATIENT)
Dept: CARDIAC REHAB | Facility: HOSPITAL | Age: 46
End: 2021-04-06

## 2021-04-08 ENCOUNTER — APPOINTMENT (OUTPATIENT)
Dept: CARDIAC REHAB | Facility: HOSPITAL | Age: 46
End: 2021-04-08

## 2021-04-12 ENCOUNTER — TREATMENT (OUTPATIENT)
Dept: CARDIAC REHAB | Facility: HOSPITAL | Age: 46
End: 2021-04-12

## 2021-04-12 DIAGNOSIS — Z95.5 S/P CORONARY ARTERY STENT PLACEMENT: ICD-10-CM

## 2021-04-12 DIAGNOSIS — I50.23 ACUTE ON CHRONIC SYSTOLIC CONGESTIVE HEART FAILURE (HCC): Primary | ICD-10-CM

## 2021-04-12 PROCEDURE — 93798 PHYS/QHP OP CAR RHAB W/ECG: CPT

## 2021-04-13 ENCOUNTER — TREATMENT (OUTPATIENT)
Dept: CARDIAC REHAB | Facility: HOSPITAL | Age: 46
End: 2021-04-13

## 2021-04-13 DIAGNOSIS — I50.23 ACUTE ON CHRONIC SYSTOLIC CONGESTIVE HEART FAILURE (HCC): Primary | ICD-10-CM

## 2021-04-13 DIAGNOSIS — Z95.5 S/P CORONARY ARTERY STENT PLACEMENT: ICD-10-CM

## 2021-04-13 PROCEDURE — 93798 PHYS/QHP OP CAR RHAB W/ECG: CPT

## 2021-04-15 ENCOUNTER — TREATMENT (OUTPATIENT)
Dept: CARDIAC REHAB | Facility: HOSPITAL | Age: 46
End: 2021-04-15

## 2021-04-15 DIAGNOSIS — Z95.5 S/P CORONARY ARTERY STENT PLACEMENT: ICD-10-CM

## 2021-04-15 DIAGNOSIS — I50.23 ACUTE ON CHRONIC SYSTOLIC CONGESTIVE HEART FAILURE (HCC): Primary | ICD-10-CM

## 2021-04-15 PROCEDURE — 93798 PHYS/QHP OP CAR RHAB W/ECG: CPT

## 2021-04-19 ENCOUNTER — APPOINTMENT (OUTPATIENT)
Dept: CARDIAC REHAB | Facility: HOSPITAL | Age: 46
End: 2021-04-19

## 2021-04-20 ENCOUNTER — TREATMENT (OUTPATIENT)
Dept: CARDIAC REHAB | Facility: HOSPITAL | Age: 46
End: 2021-04-20

## 2021-04-20 DIAGNOSIS — Z95.5 S/P CORONARY ARTERY STENT PLACEMENT: ICD-10-CM

## 2021-04-20 DIAGNOSIS — I50.22 CHRONIC SYSTOLIC CONGESTIVE HEART FAILURE (HCC): Primary | ICD-10-CM

## 2021-04-20 PROCEDURE — 93798 PHYS/QHP OP CAR RHAB W/ECG: CPT

## 2021-04-22 ENCOUNTER — TREATMENT (OUTPATIENT)
Dept: CARDIAC REHAB | Facility: HOSPITAL | Age: 46
End: 2021-04-22

## 2021-04-22 DIAGNOSIS — I50.22 CHRONIC SYSTOLIC CONGESTIVE HEART FAILURE (HCC): Primary | ICD-10-CM

## 2021-04-22 DIAGNOSIS — Z95.5 S/P CORONARY ARTERY STENT PLACEMENT: ICD-10-CM

## 2021-04-22 PROCEDURE — 93798 PHYS/QHP OP CAR RHAB W/ECG: CPT

## 2021-04-27 ENCOUNTER — APPOINTMENT (OUTPATIENT)
Dept: CARDIAC REHAB | Facility: HOSPITAL | Age: 46
End: 2021-04-27

## 2021-04-27 ENCOUNTER — TELEPHONE (OUTPATIENT)
Dept: CARDIAC REHAB | Facility: HOSPITAL | Age: 46
End: 2021-04-27

## 2021-04-27 NOTE — TELEPHONE ENCOUNTER
Patient arrived to cardiac rehab and reported that her weight is up 5 lbs. Missed yesterdays appt due to breathing difficulties. Patient reports taking extra diuretic and some weight has come off, but still up 5 lbs. Cardiac rehab session cancelled today by RN staff due to pt c/o SOA with minimal activity-walking into facility. Patient reports she is going to f/u with cardiologist today for guidance.

## 2021-04-29 ENCOUNTER — APPOINTMENT (OUTPATIENT)
Dept: CARDIAC REHAB | Facility: HOSPITAL | Age: 46
End: 2021-04-29

## 2021-05-03 ENCOUNTER — APPOINTMENT (OUTPATIENT)
Dept: CARDIAC REHAB | Facility: HOSPITAL | Age: 46
End: 2021-05-03

## 2021-05-04 ENCOUNTER — TREATMENT (OUTPATIENT)
Dept: CARDIAC REHAB | Facility: HOSPITAL | Age: 46
End: 2021-05-04

## 2021-05-04 DIAGNOSIS — Z95.5 S/P CORONARY ARTERY STENT PLACEMENT: ICD-10-CM

## 2021-05-04 DIAGNOSIS — I50.22 CHRONIC SYSTOLIC CONGESTIVE HEART FAILURE (HCC): Primary | ICD-10-CM

## 2021-05-04 PROCEDURE — 93798 PHYS/QHP OP CAR RHAB W/ECG: CPT

## 2021-05-05 ENCOUNTER — OFFICE VISIT (OUTPATIENT)
Dept: CARDIOLOGY | Facility: CLINIC | Age: 46
End: 2021-05-05

## 2021-05-05 VITALS
WEIGHT: 195 LBS | OXYGEN SATURATION: 99 % | HEART RATE: 58 BPM | SYSTOLIC BLOOD PRESSURE: 133 MMHG | DIASTOLIC BLOOD PRESSURE: 84 MMHG | BODY MASS INDEX: 30.54 KG/M2

## 2021-05-05 DIAGNOSIS — I50.22 SYSTOLIC CHF, CHRONIC (HCC): Chronic | ICD-10-CM

## 2021-05-05 DIAGNOSIS — I34.0 NONRHEUMATIC MITRAL VALVE REGURGITATION: ICD-10-CM

## 2021-05-05 DIAGNOSIS — I42.0 CARDIOMYOPATHY, DILATED (HCC): Primary | Chronic | ICD-10-CM

## 2021-05-05 DIAGNOSIS — I25.119 CORONARY ARTERY DISEASE INVOLVING NATIVE CORONARY ARTERY OF NATIVE HEART WITH ANGINA PECTORIS (HCC): Chronic | ICD-10-CM

## 2021-05-05 DIAGNOSIS — I10 ESSENTIAL HYPERTENSION: Chronic | ICD-10-CM

## 2021-05-05 DIAGNOSIS — Z95.810 ICD (IMPLANTABLE CARDIOVERTER-DEFIBRILLATOR), DUAL, IN SITU: Chronic | ICD-10-CM

## 2021-05-05 PROCEDURE — 99214 OFFICE O/P EST MOD 30 MIN: CPT | Performed by: INTERNAL MEDICINE

## 2021-05-05 RX ORDER — DAPAGLIFLOZIN 10 MG/1
10 TABLET, FILM COATED ORAL DAILY
Status: ON HOLD | COMMUNITY
End: 2022-08-17

## 2021-05-05 RX ORDER — EZETIMIBE 10 MG/1
10 TABLET ORAL DAILY
COMMUNITY
End: 2021-05-28

## 2021-05-05 RX ORDER — NITROGLYCERIN 0.4 MG/1
0.4 TABLET SUBLINGUAL
Status: ON HOLD | COMMUNITY
End: 2022-08-20 | Stop reason: SDUPTHER

## 2021-05-05 RX ORDER — TIZANIDINE HYDROCHLORIDE 4 MG/1
4 CAPSULE, GELATIN COATED ORAL 3 TIMES DAILY
Status: ON HOLD | COMMUNITY
End: 2022-08-18

## 2021-05-05 RX ORDER — FLUCONAZOLE 150 MG/1
150 TABLET ORAL ONCE AS NEEDED
Status: ON HOLD | COMMUNITY
End: 2022-08-18

## 2021-05-05 RX ORDER — DIPHENHYDRAMINE HCL 25 MG
25 CAPSULE ORAL EVERY 6 HOURS PRN
COMMUNITY

## 2021-05-05 RX ORDER — TORSEMIDE 20 MG/1
60 TABLET ORAL DAILY
Status: ON HOLD | COMMUNITY
End: 2021-06-02 | Stop reason: SDUPTHER

## 2021-05-05 RX ORDER — ROSUVASTATIN CALCIUM 40 MG/1
40 TABLET, COATED ORAL DAILY
Status: ON HOLD | COMMUNITY
End: 2022-08-18

## 2021-05-05 RX ORDER — SPIRONOLACTONE 25 MG/1
25 TABLET ORAL DAILY
Status: ON HOLD | COMMUNITY
End: 2021-06-02 | Stop reason: SDUPTHER

## 2021-05-05 NOTE — PROGRESS NOTES
Cardiology Office Visit      Encounter Date:  05/05/2021    Patient ID:   Rafael Nunes is a 46 y.o. female.    Reason For Followup:  Cardiomyopathy  Hypertension  Hyperlipidemia  Valvular heart disease  Recent hospitalization for congestive heart failure      Brief Clinical History:  Dear Dr. Adames, Phani Ennis MD    I had the pleasure of seeing Rafael Mccann today. As you are well aware, this is a 46 y.o. female with a past medical history that is significant for cardiomyopathy and valvular heart disease         Interval History:  Recent evaluation mucopurulent clinic with extensive work-up for mitral regurgitation  Still having some issues with heart failure exacerbation and volume overload        Impressions:/Cardiac catheterization January 2021  Native severe two-vessel coronary artery disease  No significant obstructive disease involving the LAD system  Patent vein graft to the diagonal  Patent stent in the left circumflex and marginal branch of the circumflex  The circumflex coronary artery provides collaterals to the PDA branch of the right coronary artery  Diffuse disease involving the right coronary artery unchanged from before     Interpretation Summary/January 2021    · The left ventricular cavity is moderately dilated.  · Estimated left ventricular EF = 20% Left ventricular systolic function is severely decreased.  · The right ventricular cavity is mildly dilated.  · Mildly reduced right ventricular systolic function noted.  · There is a bioprosthetic aortic valve present.  · Moderate to severe mitral valve regurgitation is present.  · Estimated right ventricular systolic pressure from tricuspid regurgitation is normal (<35 mmHg).  · The following left ventricular wall segments are hypokinetic: mid anterior, apical anterior, basal anterolateral, mid anterolateral, apical lateral, basal inferolateral, mid inferolateral, apical inferior, mid inferior, apical septal, basal inferoseptal,  mid inferoseptal, apex hypokinetic, mid anteroseptal, basal anterior, basal inferior and basal inferoseptal.           Assessment & Plan    Impressions:  Essential hypertension  Chronic systolic heart failure  History of cardiomyopathy   Chronic renal insufficiency  Coronary artery disease   History of diabetes mellitus type 2  History of thyroid disease  Moderate to severe mitral regurgitation  Moderate to severe aortic regurgitation  Cardiomyopathy with LV ejection fraction of 35%  s/p AICD placement/normal device function  s/p urgent AVR (21 mm Magna), CABG x3 with SV to Diag1 and SV sequential to PDA and then OM3 12/27/2019  Status post coronary artery bypass surgery x3 and aortic valve replacement surgery  Congestive heart failure NYHA class 4  Newly diagnosed severe mitral regurgitation  Worsening cardiomyopathy with most recent LV ejection fraction of 20%  Moderate to severe mitral regurgitation  NYHA Functional Class: III  Stage: C heart failure      Recommendations:  Continue dual antiplatelet therapy with aspirin Plavix  Medical management for obstructive coronary artery disease  Most likely blood pressure is low secondary to severe cardiomyopathy  Stress cardiomyopathy versus burned-out heart  Optimize medical therapy and discharge patient home on medical therapy for cardiomyopathy   Continue follow-up with advanced heart failure  Recent evaluation at [facility] with a repeat echocardiogram with improvement in the mitral regurgitation  Recent labs reviewed and discussed with the patient  Lipids are still high  Continue current dose high statin and Zetia and add a PCSK9 inhibitor  Risk benefits and alternatives reviewed and discussed with the patient  Continue dual antiplatelet therapy with aspirin and Plavix  Medications reviewed and discussed with the patient  continue to optimize her HF therapies before we consider advanced therapies    Close monitoring of blood pressure at home  Follow-up in  office in 3 months    Objective:    Vitals:  Vitals:    05/05/21 1519   BP: 133/84   Pulse: 58   SpO2: 99%   Weight: 88.5 kg (195 lb)       Physical Exam:    General: Alert, cooperative, no distress, appears stated age  Head:  Normocephalic, atraumatic, mucous membranes moist  Eyes:  Conjunctiva/corneas clear, EOM's intact     Neck:  Supple,  no adenopathy;      Lungs: Clear to auscultation bilaterally, no wheezes rhonchi rales are noted  Chest wall: No tenderness  Heart::  Regular rate and rhythm, S1 and S2 normal, displaced LV apical impulse loud P2 2 x 6 pansystolic murmur left sternal border  Abdomen: Soft, non-tender, nondistended bowel sounds active  Extremities: No cyanosis, clubbing, or edema  Pulses: 2+ and symmetric all extremities  Skin:  No rashes or lesions  Neuro/psych: A&O x3. CN II through XII are grossly intact with appropriate affect      Allergies:  Allergies   Allergen Reactions   • Hydrocodone Hives   • Penicillin G Unknown (See Comments)   • Contrast Dye GI Intolerance     She is pretty sure it's the contrast dye that makes her super sick and vomiting after heart cath       Medication Review:     Current Outpatient Medications:   •  allopurinol (ZYLOPRIM) 100 MG tablet, Take 100 mg by mouth Daily., Disp: , Rfl:   •  amLODIPine (NORVASC) 5 MG tablet, Take 1 tablet by mouth Daily., Disp: 30 tablet, Rfl: 0  •  aspirin 81 MG EC tablet, Take 1 tablet by mouth Daily., Disp: 30 tablet, Rfl: 6  •  carvedilol (COREG) 6.25 MG tablet, Take 1 tablet by mouth 2 (Two) Times a Day With Meals for 30 days., Disp: 60 tablet, Rfl: 0  •  cholecalciferol (VITAMIN D3) 1000 units tablet, Take 1,000 Units by mouth Daily., Disp: , Rfl:   •  clopidogrel (PLAVIX) 75 MG tablet, Take 1 tablet by mouth Daily., Disp: 30 tablet, Rfl: 6  •  Cyanocobalamin (VITAMIN B 12 PO), Take 1 tablet by mouth Daily., Disp: , Rfl:   •  cyclobenzaprine (FLEXERIL) 10 MG tablet, Take 1 tablet by mouth 3 (Three) Times a Day As Needed for Muscle  Spasms., Disp: 10 tablet, Rfl: 0  •  Dapagliflozin Propanediol (Farxiga) 10 MG tablet, Take  by mouth., Disp: , Rfl:   •  diphenhydrAMINE (Benadryl Allergy) 25 mg capsule, Take 25 mg by mouth Every 6 (Six) Hours As Needed for Itching., Disp: , Rfl:   •  docusate sodium (COLACE) 100 MG capsule, Take 100 mg by mouth 2 (Two) Times a Day As Needed for Constipation., Disp: , Rfl:   •  ezetimibe (ZETIA) 10 MG tablet, Take 10 mg by mouth Daily., Disp: , Rfl:   •  ferrous sulfate 325 (65 FE) MG tablet, Take 65 mg by mouth Daily With Breakfast., Disp: , Rfl:   •  fluconazole (DIFLUCAN) 150 MG tablet, Take 150 mg by mouth 1 (One) Time., Disp: , Rfl:   •  folic acid (FOLVITE) 1 MG tablet, Take 1 mg by mouth Daily., Disp: , Rfl:   •  metFORMIN (GLUCOPHAGE) 500 MG tablet, Take 500 mg by mouth Daily With Breakfast., Disp: , Rfl:   •  montelukast (SINGULAIR) 10 MG tablet, Take 10 mg by mouth Every Night., Disp: , Rfl:   •  multivitamin (MULTI-DAY PO), Take 1 tablet by mouth Daily., Disp: , Rfl:   •  nitroglycerin (NITROSTAT) 0.4 MG SL tablet, Place 0.4 mg under the tongue Every 5 (Five) Minutes As Needed for Chest Pain. Take no more than 3 doses in 15 minutes., Disp: , Rfl:   •  oxyCODONE-acetaminophen (PERCOCET) 7.5-325 MG per tablet, Take 1 tablet by mouth Every 6 (Six) Hours As Needed for Moderate Pain ., Disp: , Rfl:   •  pantoprazole (PROTONIX) 40 MG EC tablet, Take 40 mg by mouth Daily., Disp: , Rfl:   •  potassium chloride (K-DUR) 10 MEQ CR tablet, Take 20 mEq by mouth 2 (Two) Times a Day., Disp: , Rfl:   •  rosuvastatin (CRESTOR) 40 MG tablet, Take 40 mg by mouth Daily., Disp: , Rfl:   •  sacubitril-valsartan (Entresto) 49-51 MG tablet, Take 1 tablet by mouth 2 (Two) Times a Day., Disp: , Rfl:   •  spironolactone (ALDACTONE) 25 MG tablet, Take 25 mg by mouth Daily., Disp: , Rfl:   •  TiZANidine (Zanaflex) 4 MG capsule, Take 4 mg by mouth 3 (Three) Times a Day., Disp: , Rfl:   •  torsemide (DEMADEX) 20 MG tablet, Take 40 mg  by mouth Daily. qd, Disp: , Rfl:   •  Canagliflozin (INVOKANA) 300 MG tablet tablet, Take 100 mg by mouth Daily for 30 days., Disp: 10 tablet, Rfl: 0  •  levothyroxine (SYNTHROID, LEVOTHROID) 125 MCG tablet, Take 2 tablets by mouth Daily for 30 days. (Patient taking differently: Take 200 mcg by mouth Daily.), Disp: 60 tablet, Rfl: 0  •  lisinopril (PRINIVIL,ZESTRIL) 5 MG tablet, Take 1 tablet by mouth Daily for 30 days., Disp: 30 tablet, Rfl: 0  •  ranolazine (RANEXA) 500 MG 12 hr tablet, Take 1 tablet by mouth Every 12 (Twelve) Hours for 30 days., Disp: 60 tablet, Rfl: 0    Family History:  Family History   Problem Relation Age of Onset   • Heart disease Father        Past Medical History:  Past Medical History:   Diagnosis Date   • Arrhythmia    • Asthma    • CHF (congestive heart failure) (CMS/[facility])    • COPD (chronic obstructive pulmonary disease) (CMS/[facility])    • Diabetes (CMS/[facility])    • Disease of thyroid gland    • Heart murmur    • Hyperlipidemia    • Hypertension        Past surgical History:  Past Surgical History:   Procedure Laterality Date   • AORTIC VALVE REPAIR/REPLACEMENT N/A 12/27/2019    Procedure: AORTIC VALVE REPAIR/REPLACEMENT;  Surgeon: Lane Stock MD;  [facility]  Service: Cardiothoracic   • BREAST LUMPECTOMY     • CARDIAC CATHETERIZATION N/A 12/24/2019    Procedure: Right and Left Heart Cath 12/24/19 @ 0900;  Surgeon: Wayne Luna MD;  Location: [facility] INVASIVE LOCATION;  Service: Cardiovascular   • CARDIAC CATHETERIZATION N/A 12/24/2019    Procedure: Coronary angiography;  Surgeon: Wayne Luna MD;  Location: [facility] INVASIVE LOCATION;  Service: Cardiovascular   • CARDIAC CATHETERIZATION N/A 11/10/2020    Procedure: Left and right Heart Cath;  Surgeon: Wayne Luna MD;  Location: [facility] INVASIVE LOCATION;  Service: Cardiovascular;  Laterality: N/A;   • CARDIAC CATHETERIZATION N/A 11/10/2020    Procedure: Coronary angiography;   Surgeon: Wayne Luna MD;  Location: [facility] CATH INVASIVE LOCATION;  Service: Cardiovascular;  Laterality: N/A;   • CARDIAC CATHETERIZATION N/A 11/10/2020    Procedure: Right Heart Cath;  Surgeon: Wayne Luna MD;  Location: [facility] CATH INVASIVE LOCATION;  Service: Cardiovascular;  Laterality: N/A;   • CARDIAC CATHETERIZATION N/A 11/25/2020    Procedure: Percutaneous coronary intervention of the left circumflex artery;  Surgeon: Wayne Luna MD;  Location: [facility] CATH INVASIVE LOCATION;  Service: Cardiovascular;  Laterality: N/A;   • CARDIAC CATHETERIZATION N/A 1/22/2021    Procedure: LEFT HEART CATH with possible PCI;  Surgeon: Wayne Luna MD;  Location: [facility] CATH INVASIVE LOCATION;  Service: Cardiovascular;  Laterality: N/A;  Local and IV sedation   • CARDIAC CATHETERIZATION N/A 1/22/2021    Procedure: Coronary angiography;  Surgeon: Wayne Luna MD;  Location: [facility] CATH INVASIVE LOCATION;  Service: Cardiovascular;  Laterality: N/A;   • CARDIAC CATHETERIZATION N/A 1/22/2021    Procedure: Saphenous Vein Graft;  Surgeon: Wayne Luna MD;  Location: [facility] CATH INVASIVE LOCATION;  Service: Cardiovascular;  Laterality: N/A;   • CARDIAC ELECTROPHYSIOLOGY PROCEDURE Left 6/28/2019    Procedure: Dual-chamber ICD insertion;  Surgeon: Héctor Eckert MD;  Location: [facility] CATH INVASIVE LOCATION;  Service: Cardiovascular   • CARDIAC ELECTROPHYSIOLOGY PROCEDURE Left 8/14/2019    Procedure: Lead Revision;  Surgeon: Héctor Eckert MD;  Location: [facility] CATH INVASIVE LOCATION;  Service: Cardiovascular   • CARDIAC SURGERY     • CHOLECYSTECTOMY     • CORONARY ARTERY BYPASS GRAFT N/A 12/27/2019    Procedure: CORONARY ARTERY BYPASS GRAFTING;  Surgeon: Lane Stock MD;  Location: [facility];  Service: Cardiothoracic   • HYSTERECTOMY     • INSERT / REPLACE / REMOVE PACEMAKER     • LYMPHADENECTOMY Bilateral    • THYROID SURGERY         Social  History:  Social History     Socioeconomic History   • Marital status:      Spouse name: Not on file   • Number of children: Not on file   • Years of education: Not on file   • Highest education level: Not on file   Tobacco Use   • Smoking status: Former Smoker     Quit date: 2019     Years since quittin.3   • Smokeless tobacco: Never Used   Substance and Sexual Activity   • Alcohol use: No   • Drug use: No   • Sexual activity: Defer       Review of Systems:  The following systems were reviewed as they relate to the cardiovascular system: Constitutional, Eyes, ENT, Cardiovascular, Respiratory, Gastrointestinal, Integumentary, Neurological, Psychiatric, Hematologic, Endocrine, Musculoskeletal, and Genitourinary. The pertinent cardiovascular findings are reported above with all other cardiovascular points within those systems being negative.    Diagnostic Study Review:     Current Electrocardiogram:  Procedures      NOTE: The following portions of the patient's history were reviewed and updated this visit as appropriate: allergies, current medications, past family history, past medical history, past social history, past surgical history and problem list.

## 2021-05-06 ENCOUNTER — TREATMENT (OUTPATIENT)
Dept: CARDIAC REHAB | Facility: HOSPITAL | Age: 46
End: 2021-05-06

## 2021-05-06 DIAGNOSIS — Z95.5 S/P CORONARY ARTERY STENT PLACEMENT: ICD-10-CM

## 2021-05-06 DIAGNOSIS — I50.22 CHRONIC SYSTOLIC CONGESTIVE HEART FAILURE (HCC): Primary | ICD-10-CM

## 2021-05-06 PROCEDURE — 93798 PHYS/QHP OP CAR RHAB W/ECG: CPT

## 2021-05-10 ENCOUNTER — TREATMENT (OUTPATIENT)
Dept: CARDIAC REHAB | Facility: HOSPITAL | Age: 46
End: 2021-05-10

## 2021-05-10 ENCOUNTER — OFFICE VISIT (OUTPATIENT)
Dept: CARDIAC SURGERY | Facility: CLINIC | Age: 46
End: 2021-05-10

## 2021-05-10 VITALS
HEART RATE: 70 BPM | SYSTOLIC BLOOD PRESSURE: 128 MMHG | HEIGHT: 68 IN | BODY MASS INDEX: 28.95 KG/M2 | WEIGHT: 191 LBS | DIASTOLIC BLOOD PRESSURE: 94 MMHG | OXYGEN SATURATION: 97 % | RESPIRATION RATE: 20 BRPM | TEMPERATURE: 97.3 F

## 2021-05-10 DIAGNOSIS — Z95.1 S/P CABG X 3: ICD-10-CM

## 2021-05-10 DIAGNOSIS — Z95.2 S/P AVR (AORTIC VALVE REPLACEMENT): ICD-10-CM

## 2021-05-10 DIAGNOSIS — E03.9 HYPOTHYROIDISM (ACQUIRED): Chronic | ICD-10-CM

## 2021-05-10 DIAGNOSIS — E11.9 TYPE 2 DIABETES MELLITUS WITHOUT COMPLICATION, WITHOUT LONG-TERM CURRENT USE OF INSULIN (HCC): Chronic | ICD-10-CM

## 2021-05-10 DIAGNOSIS — Z95.810 ICD (IMPLANTABLE CARDIOVERTER-DEFIBRILLATOR), DUAL, IN SITU: Chronic | ICD-10-CM

## 2021-05-10 DIAGNOSIS — I50.22 CHRONIC SYSTOLIC CONGESTIVE HEART FAILURE (HCC): Primary | ICD-10-CM

## 2021-05-10 DIAGNOSIS — J43.9 PULMONARY EMPHYSEMA, UNSPECIFIED EMPHYSEMA TYPE (HCC): Chronic | ICD-10-CM

## 2021-05-10 DIAGNOSIS — N18.2 CKD (CHRONIC KIDNEY DISEASE) STAGE 2, GFR 60-89 ML/MIN: Chronic | ICD-10-CM

## 2021-05-10 DIAGNOSIS — I25.119 CORONARY ARTERY DISEASE INVOLVING NATIVE CORONARY ARTERY OF NATIVE HEART WITH ANGINA PECTORIS (HCC): Chronic | ICD-10-CM

## 2021-05-10 DIAGNOSIS — Z95.5 S/P CORONARY ARTERY STENT PLACEMENT: ICD-10-CM

## 2021-05-10 DIAGNOSIS — I42.0 CARDIOMYOPATHY, DILATED (HCC): Primary | Chronic | ICD-10-CM

## 2021-05-10 PROCEDURE — 99213 OFFICE O/P EST LOW 20 MIN: CPT | Performed by: THORACIC SURGERY (CARDIOTHORACIC VASCULAR SURGERY)

## 2021-05-10 PROCEDURE — 93798 PHYS/QHP OP CAR RHAB W/ECG: CPT

## 2021-05-10 RX ORDER — FLUTICASONE PROPIONATE 50 MCG
2 SPRAY, SUSPENSION (ML) NASAL DAILY
Status: ON HOLD | COMMUNITY
End: 2022-08-18

## 2021-05-11 ENCOUNTER — TREATMENT (OUTPATIENT)
Dept: CARDIAC REHAB | Facility: HOSPITAL | Age: 46
End: 2021-05-11

## 2021-05-11 DIAGNOSIS — I50.22 CHRONIC SYSTOLIC CONGESTIVE HEART FAILURE (HCC): Primary | ICD-10-CM

## 2021-05-11 DIAGNOSIS — Z95.5 S/P CORONARY ARTERY STENT PLACEMENT: ICD-10-CM

## 2021-05-11 PROCEDURE — 93798 PHYS/QHP OP CAR RHAB W/ECG: CPT

## 2021-05-13 ENCOUNTER — TRANSCRIBE ORDERS (OUTPATIENT)
Dept: ADMINISTRATIVE | Facility: HOSPITAL | Age: 46
End: 2021-05-13

## 2021-05-13 ENCOUNTER — TREATMENT (OUTPATIENT)
Dept: CARDIAC REHAB | Facility: HOSPITAL | Age: 46
End: 2021-05-13

## 2021-05-13 ENCOUNTER — LAB (OUTPATIENT)
Dept: LAB | Facility: HOSPITAL | Age: 46
End: 2021-05-13

## 2021-05-13 DIAGNOSIS — Z95.5 S/P CORONARY ARTERY STENT PLACEMENT: ICD-10-CM

## 2021-05-13 DIAGNOSIS — I50.22 CHRONIC SYSTOLIC HEART FAILURE (HCC): Primary | ICD-10-CM

## 2021-05-13 DIAGNOSIS — I50.22 CHRONIC SYSTOLIC HEART FAILURE (HCC): ICD-10-CM

## 2021-05-13 DIAGNOSIS — I50.22 CHRONIC SYSTOLIC CONGESTIVE HEART FAILURE (HCC): Primary | ICD-10-CM

## 2021-05-13 LAB
ANION GAP SERPL CALCULATED.3IONS-SCNC: 11.2 MMOL/L (ref 5–15)
BUN SERPL-MCNC: 20 MG/DL (ref 6–20)
BUN/CREAT SERPL: 17.2 (ref 7–25)
CALCIUM SPEC-SCNC: 9.3 MG/DL (ref 8.6–10.5)
CHLORIDE SERPL-SCNC: 103 MMOL/L (ref 98–107)
CO2 SERPL-SCNC: 26.8 MMOL/L (ref 22–29)
CREAT SERPL-MCNC: 1.16 MG/DL (ref 0.57–1)
GFR SERPL CREATININE-BSD FRML MDRD: 60 ML/MIN/1.73
GLUCOSE SERPL-MCNC: 87 MG/DL (ref 65–99)
NT-PROBNP SERPL-MCNC: 589.9 PG/ML (ref 0–450)
POTASSIUM SERPL-SCNC: 3.9 MMOL/L (ref 3.5–5.2)
SODIUM SERPL-SCNC: 141 MMOL/L (ref 136–145)

## 2021-05-13 PROCEDURE — 36415 COLL VENOUS BLD VENIPUNCTURE: CPT

## 2021-05-13 PROCEDURE — 80048 BASIC METABOLIC PNL TOTAL CA: CPT

## 2021-05-13 PROCEDURE — 93798 PHYS/QHP OP CAR RHAB W/ECG: CPT

## 2021-05-13 PROCEDURE — 83880 ASSAY OF NATRIURETIC PEPTIDE: CPT

## 2021-05-17 ENCOUNTER — TREATMENT (OUTPATIENT)
Dept: CARDIAC REHAB | Facility: HOSPITAL | Age: 46
End: 2021-05-17

## 2021-05-17 DIAGNOSIS — Z95.5 S/P CORONARY ARTERY STENT PLACEMENT: ICD-10-CM

## 2021-05-17 DIAGNOSIS — I50.22 CHRONIC SYSTOLIC CONGESTIVE HEART FAILURE (HCC): Primary | ICD-10-CM

## 2021-05-17 PROCEDURE — 93798 PHYS/QHP OP CAR RHAB W/ECG: CPT

## 2021-05-17 NOTE — PROGRESS NOTES
This is a very pleasant 46 year old known to our service from the recent past. She has severe dilated cardiomyopathy and has had an AICD for several years. She was originally introduced to our service in late 2019. She was found to have severe aortic valve regurgitation, mild mitral valve regurgitation, and multivessel CAD.     On 12/27/1029 she underwent an urgent AVR (only 21 mm prosthesis used because of small LVOT, small aortic annulus, small aortic root, and small ascending aorta)/CABG x3 . Her recovery was uneventful. She appeared to be doing well on follow up outpatient evaluation as well.     In late 2020 a repeat C (I have reviewed these images) showed that her sequential SV to her PDA and OM was compromised secondary to competitive flow (string sign) and the SV to her Diag branch was patent. PCI was performed to her Cx system.    A transthoracic echocardiogram earlier this year (I have reviewed these images) show good function of the aortic valve prosthesis, a persistently poor LVEF, and now severe mitral valve regurgitation. There appears to be a posteriorly directed jet. The anterior leaflet appears to be relatively stiff and no mobile. The dilated ventricle is clearly affecting the subvalvular apparatus on TTE.    She has been referred back to me if anything can be done for her severe MR.    T 97.3 HR 70 /94 RR 12 O2sat 97% on room air    NCAT  Sternum healed. 4/6 systolic murmur.  B/L minor rales at bases.  Obese.  Minimal B/L pedal edema.  GCS 15, alert and oriented x3    The patient has been seen by our structural heart team for possible mitraclip application. The patient herself recently sought another opinon the [facility]; they have decided on medical optimization as a first strategy.    Our rapport with the patient is still quite good; should she seek out help her in the [employer] system we will re-evaluate her.

## 2021-05-18 ENCOUNTER — TREATMENT (OUTPATIENT)
Dept: CARDIAC REHAB | Facility: HOSPITAL | Age: 46
End: 2021-05-18

## 2021-05-18 DIAGNOSIS — Z95.5 S/P CORONARY ARTERY STENT PLACEMENT: ICD-10-CM

## 2021-05-18 DIAGNOSIS — I50.22 CHRONIC SYSTOLIC CONGESTIVE HEART FAILURE (HCC): Primary | ICD-10-CM

## 2021-05-18 PROCEDURE — 93798 PHYS/QHP OP CAR RHAB W/ECG: CPT

## 2021-05-24 ENCOUNTER — TREATMENT (OUTPATIENT)
Dept: CARDIAC REHAB | Facility: HOSPITAL | Age: 46
End: 2021-05-24

## 2021-05-24 DIAGNOSIS — Z95.5 S/P CORONARY ARTERY STENT PLACEMENT: ICD-10-CM

## 2021-05-24 DIAGNOSIS — I50.22 CHRONIC SYSTOLIC CONGESTIVE HEART FAILURE (HCC): Primary | ICD-10-CM

## 2021-05-24 PROCEDURE — 93798 PHYS/QHP OP CAR RHAB W/ECG: CPT

## 2021-05-25 ENCOUNTER — APPOINTMENT (OUTPATIENT)
Dept: CARDIAC REHAB | Facility: HOSPITAL | Age: 46
End: 2021-05-25

## 2021-05-27 ENCOUNTER — APPOINTMENT (OUTPATIENT)
Dept: CARDIAC REHAB | Facility: HOSPITAL | Age: 46
End: 2021-05-27

## 2021-05-28 RX ORDER — ALIROCUMAB 75 MG/ML
75 INJECTION, SOLUTION SUBCUTANEOUS
Qty: 2 PEN | Refills: 3 | Status: SHIPPED | OUTPATIENT
Start: 2021-05-28 | End: 2022-05-05 | Stop reason: SDUPTHER

## 2021-05-31 ENCOUNTER — APPOINTMENT (OUTPATIENT)
Dept: GENERAL RADIOLOGY | Facility: HOSPITAL | Age: 46
End: 2021-05-31

## 2021-05-31 ENCOUNTER — APPOINTMENT (OUTPATIENT)
Dept: CARDIAC REHAB | Facility: HOSPITAL | Age: 46
End: 2021-05-31

## 2021-05-31 ENCOUNTER — HOSPITAL ENCOUNTER (OUTPATIENT)
Facility: HOSPITAL | Age: 46
Setting detail: OBSERVATION
Discharge: HOME | End: 2021-06-02
Attending: INTERNAL MEDICINE | Admitting: INTERNAL MEDICINE

## 2021-05-31 DIAGNOSIS — I50.22 CHRONIC SYSTOLIC CONGESTIVE HEART FAILURE (HCC): ICD-10-CM

## 2021-05-31 DIAGNOSIS — E86.0 DEHYDRATION: ICD-10-CM

## 2021-05-31 DIAGNOSIS — E87.6 HYPOKALEMIA: Primary | ICD-10-CM

## 2021-05-31 DIAGNOSIS — R53.1 WEAKNESS: ICD-10-CM

## 2021-05-31 DIAGNOSIS — R19.7 DIARRHEA, UNSPECIFIED TYPE: ICD-10-CM

## 2021-05-31 DIAGNOSIS — I42.0 CARDIOMYOPATHY, DILATED (HCC): ICD-10-CM

## 2021-05-31 PROBLEM — G89.4 CHRONIC PAIN SYNDROME: Chronic | Status: ACTIVE | Noted: 2021-05-31

## 2021-05-31 PROBLEM — G47.33 OSA (OBSTRUCTIVE SLEEP APNEA): Status: ACTIVE | Noted: 2021-04-08

## 2021-05-31 PROBLEM — R07.9 CHEST PAIN: Status: RESOLVED | Noted: 2019-12-23 | Resolved: 2021-05-31

## 2021-05-31 PROBLEM — I35.1 AORTIC VALVE REGURGITATION: Status: RESOLVED | Noted: 2019-12-22 | Resolved: 2021-05-31

## 2021-05-31 PROBLEM — I25.119 CHEST PAIN DUE TO CAD (HCC): Status: RESOLVED | Noted: 2021-01-18 | Resolved: 2021-05-31

## 2021-05-31 PROBLEM — I25.10 CORONARY ARTERY DISEASE INVOLVING NATIVE CORONARY ARTERY OF NATIVE HEART WITHOUT ANGINA PECTORIS: Chronic | Status: ACTIVE | Noted: 2019-12-22

## 2021-05-31 PROBLEM — I20.0 UNSTABLE ANGINA PECTORIS (HCC): Status: RESOLVED | Noted: 2019-12-22 | Resolved: 2021-05-31

## 2021-05-31 PROBLEM — E78.2 MIXED HYPERLIPIDEMIA: Status: ACTIVE | Noted: 2021-04-08

## 2021-05-31 PROBLEM — E78.2 MIXED HYPERLIPIDEMIA: Chronic | Status: ACTIVE | Noted: 2021-04-08

## 2021-05-31 PROBLEM — I25.118 CORONARY ARTERY DISEASE OF NATIVE ARTERY OF NATIVE HEART WITH STABLE ANGINA PECTORIS: Chronic | Status: ACTIVE | Noted: 2019-12-22

## 2021-05-31 PROBLEM — I25.10 CORONARY ARTERY DISEASE INVOLVING NATIVE CORONARY ARTERY OF NATIVE HEART WITHOUT ANGINA PECTORIS: Status: ACTIVE | Noted: 2019-12-22

## 2021-05-31 PROBLEM — I34.0 NONRHEUMATIC MITRAL VALVE REGURGITATION: Chronic | Status: ACTIVE | Noted: 2020-10-20

## 2021-05-31 PROBLEM — R42 DIZZINESS: Status: ACTIVE | Noted: 2021-05-31

## 2021-05-31 PROBLEM — I35.1 NONRHEUMATIC AORTIC VALVE INSUFFICIENCY: Status: RESOLVED | Noted: 2019-12-22 | Resolved: 2021-05-31

## 2021-05-31 LAB
ADV 40+41 DNA STL QL NAA+NON-PROBE: NOT DETECTED
ALBUMIN SERPL-MCNC: 4.7 G/DL (ref 3.5–5.2)
ALBUMIN/GLOB SERPL: 1.5 G/DL
ALP SERPL-CCNC: 76 U/L (ref 39–117)
ALT SERPL W P-5'-P-CCNC: 19 U/L (ref 1–33)
ANION GAP SERPL CALCULATED.3IONS-SCNC: 12 MMOL/L (ref 5–15)
ANION GAP SERPL CALCULATED.3IONS-SCNC: 18 MMOL/L (ref 5–15)
AST SERPL-CCNC: 35 U/L (ref 1–32)
ASTRO TYP 1-8 RNA STL QL NAA+NON-PROBE: NOT DETECTED
BASOPHILS # BLD AUTO: 0 10*3/MM3 (ref 0–0.2)
BASOPHILS NFR BLD AUTO: 0.4 % (ref 0–1.5)
BILIRUB SERPL-MCNC: 1.1 MG/DL (ref 0–1.2)
BUN SERPL-MCNC: 26 MG/DL (ref 6–20)
BUN SERPL-MCNC: 29 MG/DL (ref 6–20)
BUN/CREAT SERPL: 18.1 (ref 7–25)
BUN/CREAT SERPL: 18.3 (ref 7–25)
C CAYETANENSIS DNA STL QL NAA+NON-PROBE: NOT DETECTED
C COLI+JEJ+UPSA DNA STL QL NAA+NON-PROBE: NOT DETECTED
CALCIUM SPEC-SCNC: 10 MG/DL (ref 8.6–10.5)
CALCIUM SPEC-SCNC: 10.7 MG/DL (ref 8.6–10.5)
CHLORIDE SERPL-SCNC: 94 MMOL/L (ref 98–107)
CHLORIDE SERPL-SCNC: 96 MMOL/L (ref 98–107)
CO2 SERPL-SCNC: 23 MMOL/L (ref 22–29)
CO2 SERPL-SCNC: 30 MMOL/L (ref 22–29)
CREAT SERPL-MCNC: 1.42 MG/DL (ref 0.57–1)
CREAT SERPL-MCNC: 1.6 MG/DL (ref 0.57–1)
CRP SERPL-MCNC: 0.33 MG/DL (ref 0–0.5)
CRYPTOSP DNA STL QL NAA+NON-PROBE: NOT DETECTED
D-LACTATE SERPL-SCNC: 0.6 MMOL/L (ref 0.5–2)
DEPRECATED RDW RBC AUTO: 43.3 FL (ref 37–54)
E HISTOLYT DNA STL QL NAA+NON-PROBE: NOT DETECTED
EAEC PAA PLAS AGGR+AATA ST NAA+NON-PRB: NOT DETECTED
EC STX1+STX2 GENES STL QL NAA+NON-PROBE: NOT DETECTED
EOSINOPHIL # BLD AUTO: 0.1 10*3/MM3 (ref 0–0.4)
EOSINOPHIL NFR BLD AUTO: 1.1 % (ref 0.3–6.2)
EPEC EAE GENE STL QL NAA+NON-PROBE: NOT DETECTED
ERYTHROCYTE [DISTWIDTH] IN BLOOD BY AUTOMATED COUNT: 13.6 % (ref 12.3–15.4)
ETEC LTA+ST1A+ST1B TOX ST NAA+NON-PROBE: NOT DETECTED
G LAMBLIA DNA STL QL NAA+NON-PROBE: NOT DETECTED
GFR SERPL CREATININE-BSD FRML MDRD: 42 ML/MIN/1.73
GFR SERPL CREATININE-BSD FRML MDRD: 48 ML/MIN/1.73
GLOBULIN UR ELPH-MCNC: 3.2 GM/DL
GLUCOSE BLDC GLUCOMTR-MCNC: 141 MG/DL (ref 70–105)
GLUCOSE BLDC GLUCOMTR-MCNC: 77 MG/DL (ref 70–105)
GLUCOSE SERPL-MCNC: 114 MG/DL (ref 65–99)
GLUCOSE SERPL-MCNC: 126 MG/DL (ref 65–99)
HCT VFR BLD AUTO: 48.5 % (ref 34–46.6)
HGB BLD-MCNC: 16.8 G/DL (ref 12–15.9)
HOLD SPECIMEN: NORMAL
LYMPHOCYTES # BLD AUTO: 1.5 10*3/MM3 (ref 0.7–3.1)
LYMPHOCYTES NFR BLD AUTO: 23.1 % (ref 19.6–45.3)
MAGNESIUM SERPL-MCNC: 2.2 MG/DL (ref 1.6–2.6)
MAGNESIUM SERPL-MCNC: 2.4 MG/DL (ref 1.6–2.6)
MCH RBC QN AUTO: 31.7 PG (ref 26.6–33)
MCHC RBC AUTO-ENTMCNC: 34.7 G/DL (ref 31.5–35.7)
MCV RBC AUTO: 91.3 FL (ref 79–97)
MONOCYTES # BLD AUTO: 0.5 10*3/MM3 (ref 0.1–0.9)
MONOCYTES NFR BLD AUTO: 8.4 % (ref 5–12)
NEUTROPHILS NFR BLD AUTO: 4.3 10*3/MM3 (ref 1.7–7)
NEUTROPHILS NFR BLD AUTO: 67 % (ref 42.7–76)
NOROVIRUS GI+II RNA STL QL NAA+NON-PROBE: NOT DETECTED
NRBC BLD AUTO-RTO: 0.1 /100 WBC (ref 0–0.2)
NT-PROBNP SERPL-MCNC: 98.8 PG/ML (ref 0–450)
P SHIGELLOIDES DNA STL QL NAA+NON-PROBE: NOT DETECTED
PLATELET # BLD AUTO: 200 10*3/MM3 (ref 140–450)
PMV BLD AUTO: 7.9 FL (ref 6–12)
POTASSIUM SERPL-SCNC: 2.6 MMOL/L (ref 3.5–5.2)
POTASSIUM SERPL-SCNC: 3.3 MMOL/L (ref 3.5–5.2)
POTASSIUM SERPL-SCNC: 3.6 MMOL/L (ref 3.5–5.2)
PROT SERPL-MCNC: 7.9 G/DL (ref 6–8.5)
RBC # BLD AUTO: 5.31 10*6/MM3 (ref 3.77–5.28)
RVA RNA STL QL NAA+NON-PROBE: NOT DETECTED
S ENT+BONG DNA STL QL NAA+NON-PROBE: NOT DETECTED
SAPO I+II+IV+V RNA STL QL NAA+NON-PROBE: NOT DETECTED
SARS-COV-2 RNA PNL SPEC NAA+PROBE: NORMAL
SHIGELLA SP+EIEC IPAH ST NAA+NON-PROBE: NOT DETECTED
SODIUM SERPL-SCNC: 136 MMOL/L (ref 136–145)
SODIUM SERPL-SCNC: 137 MMOL/L (ref 136–145)
TROPONIN T SERPL-MCNC: <0.01 NG/ML (ref 0–0.03)
TSH SERPL DL<=0.05 MIU/L-ACNC: 28.09 UIU/ML (ref 0.27–4.2)
V CHOL+PARA+VUL DNA STL QL NAA+NON-PROBE: NOT DETECTED
V CHOLERAE DNA STL QL NAA+NON-PROBE: NOT DETECTED
VIT B12 BLD-MCNC: >2000 PG/ML (ref 211–946)
WBC # BLD AUTO: 6.4 10*3/MM3 (ref 3.4–10.8)
WHOLE BLOOD HOLD SPECIMEN: NORMAL
Y ENTEROCOL DNA STL QL NAA+NON-PROBE: NOT DETECTED

## 2021-05-31 PROCEDURE — 80053 COMPREHEN METABOLIC PANEL: CPT | Performed by: NURSE PRACTITIONER

## 2021-05-31 PROCEDURE — 83036 HEMOGLOBIN GLYCOSYLATED A1C: CPT | Performed by: NURSE PRACTITIONER

## 2021-05-31 PROCEDURE — 83735 ASSAY OF MAGNESIUM: CPT | Performed by: INTERNAL MEDICINE

## 2021-05-31 PROCEDURE — G0378 HOSPITAL OBSERVATION PER HR: HCPCS

## 2021-05-31 PROCEDURE — 80048 BASIC METABOLIC PNL TOTAL CA: CPT | Performed by: INTERNAL MEDICINE

## 2021-05-31 PROCEDURE — 87635 SARS-COV-2 COVID-19 AMP PRB: CPT | Performed by: NURSE PRACTITIONER

## 2021-05-31 PROCEDURE — 83735 ASSAY OF MAGNESIUM: CPT | Performed by: NURSE PRACTITIONER

## 2021-05-31 PROCEDURE — 84484 ASSAY OF TROPONIN QUANT: CPT | Performed by: NURSE PRACTITIONER

## 2021-05-31 PROCEDURE — 25810000003 SODIUM CHLORIDE 0.9 % WITH KCL 20 MEQ 20-0.9 MEQ/L-% SOLUTION: Performed by: INTERNAL MEDICINE

## 2021-05-31 PROCEDURE — C9803 HOPD COVID-19 SPEC COLLECT: HCPCS

## 2021-05-31 PROCEDURE — 96374 THER/PROPH/DIAG INJ IV PUSH: CPT

## 2021-05-31 PROCEDURE — 0097U HC BIOFIRE FILMARRAY GI PANEL: CPT | Performed by: INTERNAL MEDICINE

## 2021-05-31 PROCEDURE — 99220 PR INITIAL OBSERVATION CARE/DAY 70 MINUTES: CPT | Performed by: INTERNAL MEDICINE

## 2021-05-31 PROCEDURE — 93005 ELECTROCARDIOGRAM TRACING: CPT | Performed by: INTERNAL MEDICINE

## 2021-05-31 PROCEDURE — 99214 OFFICE O/P EST MOD 30 MIN: CPT | Performed by: INTERNAL MEDICINE

## 2021-05-31 PROCEDURE — 82607 VITAMIN B-12: CPT | Performed by: NURSE PRACTITIONER

## 2021-05-31 PROCEDURE — 93005 ELECTROCARDIOGRAM TRACING: CPT | Performed by: NURSE PRACTITIONER

## 2021-05-31 PROCEDURE — 84132 ASSAY OF SERUM POTASSIUM: CPT | Performed by: INTERNAL MEDICINE

## 2021-05-31 PROCEDURE — 82962 GLUCOSE BLOOD TEST: CPT

## 2021-05-31 PROCEDURE — 71045 X-RAY EXAM CHEST 1 VIEW: CPT

## 2021-05-31 PROCEDURE — 85025 COMPLETE CBC W/AUTO DIFF WBC: CPT | Performed by: NURSE PRACTITIONER

## 2021-05-31 PROCEDURE — 84443 ASSAY THYROID STIM HORMONE: CPT | Performed by: NURSE PRACTITIONER

## 2021-05-31 PROCEDURE — 83880 ASSAY OF NATRIURETIC PEPTIDE: CPT | Performed by: NURSE PRACTITIONER

## 2021-05-31 PROCEDURE — 83605 ASSAY OF LACTIC ACID: CPT

## 2021-05-31 PROCEDURE — 25010000002 ONDANSETRON PER 1 MG: Performed by: NURSE PRACTITIONER

## 2021-05-31 PROCEDURE — 25010000003 POTASSIUM CHLORIDE 10 MEQ/100ML SOLUTION: Performed by: NURSE PRACTITIONER

## 2021-05-31 PROCEDURE — 87324 CLOSTRIDIUM AG IA: CPT | Performed by: NURSE PRACTITIONER

## 2021-05-31 PROCEDURE — 25010000002 ENOXAPARIN PER 10 MG: Performed by: NURSE PRACTITIONER

## 2021-05-31 PROCEDURE — 87449 NOS EACH ORGANISM AG IA: CPT | Performed by: NURSE PRACTITIONER

## 2021-05-31 PROCEDURE — 96372 THER/PROPH/DIAG INJ SC/IM: CPT

## 2021-05-31 PROCEDURE — 86140 C-REACTIVE PROTEIN: CPT | Performed by: NURSE PRACTITIONER

## 2021-05-31 PROCEDURE — 99284 EMERGENCY DEPT VISIT MOD MDM: CPT

## 2021-05-31 PROCEDURE — 87040 BLOOD CULTURE FOR BACTERIA: CPT | Performed by: NURSE PRACTITIONER

## 2021-05-31 RX ORDER — POTASSIUM CHLORIDE 7.45 MG/ML
10 INJECTION INTRAVENOUS
Status: DISCONTINUED | OUTPATIENT
Start: 2021-05-31 | End: 2021-06-02 | Stop reason: HOSPADM

## 2021-05-31 RX ORDER — OXYCODONE HYDROCHLORIDE 5 MG/1
7.5 TABLET ORAL EVERY 6 HOURS PRN
Status: DISCONTINUED | OUTPATIENT
Start: 2021-05-31 | End: 2021-06-02 | Stop reason: HOSPADM

## 2021-05-31 RX ORDER — CARVEDILOL 6.25 MG/1
6.25 TABLET ORAL 2 TIMES DAILY WITH MEALS
Status: ON HOLD | COMMUNITY
End: 2022-08-18

## 2021-05-31 RX ORDER — SODIUM CHLORIDE 0.9 % (FLUSH) 0.9 %
10 SYRINGE (ML) INJECTION EVERY 12 HOURS SCHEDULED
Status: DISCONTINUED | OUTPATIENT
Start: 2021-05-31 | End: 2021-06-02 | Stop reason: HOSPADM

## 2021-05-31 RX ORDER — NITROGLYCERIN 0.4 MG/1
0.4 TABLET SUBLINGUAL
Status: DISCONTINUED | OUTPATIENT
Start: 2021-05-31 | End: 2021-06-02 | Stop reason: HOSPADM

## 2021-05-31 RX ORDER — POTASSIUM CHLORIDE 7.45 MG/ML
10 INJECTION INTRAVENOUS ONCE
Status: COMPLETED | OUTPATIENT
Start: 2021-05-31 | End: 2021-05-31

## 2021-05-31 RX ORDER — LANOLIN ALCOHOL/MO/W.PET/CERES
1000 CREAM (GRAM) TOPICAL DAILY
COMMUNITY

## 2021-05-31 RX ORDER — MONTELUKAST SODIUM 10 MG/1
10 TABLET ORAL NIGHTLY
Status: DISCONTINUED | OUTPATIENT
Start: 2021-05-31 | End: 2021-06-02 | Stop reason: HOSPADM

## 2021-05-31 RX ORDER — DIPHENHYDRAMINE HCL 25 MG
25 CAPSULE ORAL EVERY 6 HOURS PRN
Status: DISCONTINUED | OUTPATIENT
Start: 2021-05-31 | End: 2021-06-02 | Stop reason: HOSPADM

## 2021-05-31 RX ORDER — ROSUVASTATIN CALCIUM 10 MG/1
40 TABLET, COATED ORAL NIGHTLY
Status: DISCONTINUED | OUTPATIENT
Start: 2021-05-31 | End: 2021-06-02 | Stop reason: HOSPADM

## 2021-05-31 RX ORDER — ONDANSETRON 4 MG/1
4 TABLET, FILM COATED ORAL EVERY 6 HOURS PRN
Status: DISCONTINUED | OUTPATIENT
Start: 2021-05-31 | End: 2021-06-02 | Stop reason: HOSPADM

## 2021-05-31 RX ORDER — SODIUM CHLORIDE 0.9 % (FLUSH) 0.9 %
10 SYRINGE (ML) INJECTION AS NEEDED
Status: DISCONTINUED | OUTPATIENT
Start: 2021-05-31 | End: 2021-06-02 | Stop reason: HOSPADM

## 2021-05-31 RX ORDER — LANOLIN ALCOHOL/MO/W.PET/CERES
1000 CREAM (GRAM) TOPICAL DAILY
Status: DISCONTINUED | OUTPATIENT
Start: 2021-05-31 | End: 2021-06-02 | Stop reason: HOSPADM

## 2021-05-31 RX ORDER — ALUMINA, MAGNESIA, AND SIMETHICONE 2400; 2400; 240 MG/30ML; MG/30ML; MG/30ML
15 SUSPENSION ORAL EVERY 6 HOURS PRN
Status: DISCONTINUED | OUTPATIENT
Start: 2021-05-31 | End: 2021-06-02 | Stop reason: HOSPADM

## 2021-05-31 RX ORDER — INSULIN LISPRO 100 [IU]/ML
0-9 INJECTION, SOLUTION INTRAVENOUS; SUBCUTANEOUS AS NEEDED
Status: DISCONTINUED | OUTPATIENT
Start: 2021-05-31 | End: 2021-06-02 | Stop reason: HOSPADM

## 2021-05-31 RX ORDER — ONDANSETRON 2 MG/ML
4 INJECTION INTRAMUSCULAR; INTRAVENOUS ONCE
Status: COMPLETED | OUTPATIENT
Start: 2021-05-31 | End: 2021-05-31

## 2021-05-31 RX ORDER — KETOROLAC TROMETHAMINE 15 MG/ML
15 INJECTION, SOLUTION INTRAMUSCULAR; INTRAVENOUS EVERY 6 HOURS PRN
Status: ACTIVE | OUTPATIENT
Start: 2021-05-31 | End: 2021-06-02

## 2021-05-31 RX ORDER — ALLOPURINOL 100 MG/1
100 TABLET ORAL DAILY
Status: DISCONTINUED | OUTPATIENT
Start: 2021-05-31 | End: 2021-06-02 | Stop reason: HOSPADM

## 2021-05-31 RX ORDER — TIZANIDINE 4 MG/1
4 TABLET ORAL 3 TIMES DAILY
Status: DISCONTINUED | OUTPATIENT
Start: 2021-05-31 | End: 2021-06-02 | Stop reason: HOSPADM

## 2021-05-31 RX ORDER — POTASSIUM CHLORIDE 7.45 MG/ML
10 INJECTION INTRAVENOUS ONCE
Status: DISCONTINUED | OUTPATIENT
Start: 2021-05-31 | End: 2021-06-02 | Stop reason: HOSPADM

## 2021-05-31 RX ORDER — RANOLAZINE 500 MG/1
500 TABLET, EXTENDED RELEASE ORAL 2 TIMES DAILY
Status: ON HOLD | COMMUNITY
End: 2022-08-18

## 2021-05-31 RX ORDER — LEVOTHYROXINE SODIUM 0.2 MG/1
200 TABLET ORAL DAILY
COMMUNITY
End: 2021-06-02 | Stop reason: HOSPADM

## 2021-05-31 RX ORDER — SODIUM CHLORIDE AND POTASSIUM CHLORIDE 150; 900 MG/100ML; MG/100ML
50 INJECTION, SOLUTION INTRAVENOUS CONTINUOUS
Status: DISPENSED | OUTPATIENT
Start: 2021-05-31 | End: 2021-05-31

## 2021-05-31 RX ORDER — NICOTINE POLACRILEX 4 MG
15 LOZENGE BUCCAL
Status: DISCONTINUED | OUTPATIENT
Start: 2021-05-31 | End: 2021-06-02 | Stop reason: HOSPADM

## 2021-05-31 RX ORDER — INSULIN LISPRO 100 [IU]/ML
0-9 INJECTION, SOLUTION INTRAVENOUS; SUBCUTANEOUS
Status: DISCONTINUED | OUTPATIENT
Start: 2021-05-31 | End: 2021-06-02 | Stop reason: HOSPADM

## 2021-05-31 RX ORDER — LEVOTHYROXINE SODIUM 0.2 MG/1
200 TABLET ORAL DAILY
Status: DISCONTINUED | OUTPATIENT
Start: 2021-06-01 | End: 2021-05-31

## 2021-05-31 RX ORDER — POTASSIUM CHLORIDE 20 MEQ/1
20 TABLET, EXTENDED RELEASE ORAL 2 TIMES DAILY WITH MEALS
Status: DISCONTINUED | OUTPATIENT
Start: 2021-05-31 | End: 2021-06-02 | Stop reason: HOSPADM

## 2021-05-31 RX ORDER — CYCLOBENZAPRINE HCL 10 MG
10 TABLET ORAL 3 TIMES DAILY PRN
Status: DISCONTINUED | OUTPATIENT
Start: 2021-05-31 | End: 2021-06-02 | Stop reason: HOSPADM

## 2021-05-31 RX ORDER — ASPIRIN 81 MG/1
81 TABLET ORAL DAILY
Status: DISCONTINUED | OUTPATIENT
Start: 2021-05-31 | End: 2021-06-02 | Stop reason: HOSPADM

## 2021-05-31 RX ORDER — MAGNESIUM SULFATE 1 G/100ML
1 INJECTION INTRAVENOUS AS NEEDED
Status: DISCONTINUED | OUTPATIENT
Start: 2021-05-31 | End: 2021-06-02 | Stop reason: HOSPADM

## 2021-05-31 RX ORDER — CLOPIDOGREL BISULFATE 75 MG/1
75 TABLET ORAL DAILY
Status: DISCONTINUED | OUTPATIENT
Start: 2021-05-31 | End: 2021-06-02 | Stop reason: HOSPADM

## 2021-05-31 RX ORDER — ONDANSETRON 2 MG/ML
4 INJECTION INTRAMUSCULAR; INTRAVENOUS EVERY 6 HOURS PRN
Status: DISCONTINUED | OUTPATIENT
Start: 2021-05-31 | End: 2021-06-02 | Stop reason: HOSPADM

## 2021-05-31 RX ORDER — PANTOPRAZOLE SODIUM 40 MG/1
40 TABLET, DELAYED RELEASE ORAL DAILY
Status: DISCONTINUED | OUTPATIENT
Start: 2021-06-01 | End: 2021-06-02 | Stop reason: HOSPADM

## 2021-05-31 RX ORDER — IPRATROPIUM BROMIDE AND ALBUTEROL SULFATE 2.5; .5 MG/3ML; MG/3ML
3 SOLUTION RESPIRATORY (INHALATION) EVERY 4 HOURS PRN
Status: DISCONTINUED | OUTPATIENT
Start: 2021-05-31 | End: 2021-06-02 | Stop reason: HOSPADM

## 2021-05-31 RX ORDER — DOCUSATE SODIUM 100 MG/1
100 CAPSULE, LIQUID FILLED ORAL 2 TIMES DAILY PRN
Status: DISCONTINUED | OUTPATIENT
Start: 2021-05-31 | End: 2021-06-02 | Stop reason: HOSPADM

## 2021-05-31 RX ORDER — DIPHENOXYLATE HYDROCHLORIDE AND ATROPINE SULFATE 2.5; .025 MG/1; MG/1
1 TABLET ORAL DAILY
Status: DISCONTINUED | OUTPATIENT
Start: 2021-05-31 | End: 2021-06-02 | Stop reason: HOSPADM

## 2021-05-31 RX ORDER — ACETAMINOPHEN 325 MG/1
650 TABLET ORAL EVERY 4 HOURS PRN
Status: DISCONTINUED | OUTPATIENT
Start: 2021-05-31 | End: 2021-06-02 | Stop reason: HOSPADM

## 2021-05-31 RX ORDER — MAGNESIUM SULFATE HEPTAHYDRATE 40 MG/ML
2 INJECTION, SOLUTION INTRAVENOUS AS NEEDED
Status: DISCONTINUED | OUTPATIENT
Start: 2021-05-31 | End: 2021-06-02 | Stop reason: HOSPADM

## 2021-05-31 RX ORDER — MELATONIN
1000 DAILY
Status: DISCONTINUED | OUTPATIENT
Start: 2021-05-31 | End: 2021-06-02 | Stop reason: HOSPADM

## 2021-05-31 RX ORDER — FERROUS SULFATE TAB EC 324 MG (65 MG FE EQUIVALENT) 324 (65 FE) MG
324 TABLET DELAYED RESPONSE ORAL
Status: DISCONTINUED | OUTPATIENT
Start: 2021-06-01 | End: 2021-06-02 | Stop reason: HOSPADM

## 2021-05-31 RX ORDER — ACETAMINOPHEN 650 MG/1
650 SUPPOSITORY RECTAL EVERY 4 HOURS PRN
Status: DISCONTINUED | OUTPATIENT
Start: 2021-05-31 | End: 2021-06-02 | Stop reason: HOSPADM

## 2021-05-31 RX ORDER — POTASSIUM CHLORIDE 20 MEQ/1
40 TABLET, EXTENDED RELEASE ORAL AS NEEDED
Status: DISCONTINUED | OUTPATIENT
Start: 2021-05-31 | End: 2021-06-02 | Stop reason: HOSPADM

## 2021-05-31 RX ORDER — RANOLAZINE 500 MG/1
500 TABLET, EXTENDED RELEASE ORAL EVERY 12 HOURS SCHEDULED
Status: DISCONTINUED | OUTPATIENT
Start: 2021-05-31 | End: 2021-06-02 | Stop reason: HOSPADM

## 2021-05-31 RX ORDER — POTASSIUM CHLORIDE 1.5 G/1.77G
40 POWDER, FOR SOLUTION ORAL AS NEEDED
Status: DISCONTINUED | OUTPATIENT
Start: 2021-05-31 | End: 2021-06-02 | Stop reason: HOSPADM

## 2021-05-31 RX ORDER — CHOLECALCIFEROL (VITAMIN D3) 125 MCG
5 CAPSULE ORAL NIGHTLY PRN
Status: DISCONTINUED | OUTPATIENT
Start: 2021-05-31 | End: 2021-06-02 | Stop reason: HOSPADM

## 2021-05-31 RX ORDER — ACETAMINOPHEN 160 MG/5ML
650 SOLUTION ORAL EVERY 4 HOURS PRN
Status: DISCONTINUED | OUTPATIENT
Start: 2021-05-31 | End: 2021-06-02 | Stop reason: HOSPADM

## 2021-05-31 RX ORDER — FOLIC ACID 1 MG/1
1 TABLET ORAL DAILY
Status: DISCONTINUED | OUTPATIENT
Start: 2021-05-31 | End: 2021-06-02 | Stop reason: HOSPADM

## 2021-05-31 RX ORDER — DEXTROSE MONOHYDRATE 25 G/50ML
25 INJECTION, SOLUTION INTRAVENOUS
Status: DISCONTINUED | OUTPATIENT
Start: 2021-05-31 | End: 2021-06-02 | Stop reason: HOSPADM

## 2021-05-31 RX ADMIN — ROSUVASTATIN CALCIUM 40 MG: 10 TABLET, FILM COATED ORAL at 21:22

## 2021-05-31 RX ADMIN — Medication 1000 UNITS: at 14:57

## 2021-05-31 RX ADMIN — POTASSIUM CHLORIDE 10 MEQ: 7.46 INJECTION, SOLUTION INTRAVENOUS at 09:55

## 2021-05-31 RX ADMIN — TIZANIDINE 4 MG: 4 TABLET ORAL at 15:09

## 2021-05-31 RX ADMIN — Medication 10 ML: at 14:02

## 2021-05-31 RX ADMIN — Medication 10 ML: at 21:22

## 2021-05-31 RX ADMIN — TIZANIDINE 4 MG: 4 TABLET ORAL at 21:22

## 2021-05-31 RX ADMIN — RANOLAZINE 500 MG: 500 TABLET, FILM COATED, EXTENDED RELEASE ORAL at 14:56

## 2021-05-31 RX ADMIN — SODIUM CHLORIDE 1000 ML: 9 INJECTION, SOLUTION INTRAVENOUS at 09:55

## 2021-05-31 RX ADMIN — RANOLAZINE 500 MG: 500 TABLET, FILM COATED, EXTENDED RELEASE ORAL at 21:22

## 2021-05-31 RX ADMIN — FOLIC ACID 1 MG: 1 TABLET ORAL at 14:57

## 2021-05-31 RX ADMIN — POTASSIUM CHLORIDE 40 MEQ: 1500 TABLET, EXTENDED RELEASE ORAL at 13:58

## 2021-05-31 RX ADMIN — CLOPIDOGREL BISULFATE 75 MG: 75 TABLET ORAL at 14:56

## 2021-05-31 RX ADMIN — POTASSIUM CHLORIDE 40 MEQ: 1500 TABLET, EXTENDED RELEASE ORAL at 18:10

## 2021-05-31 RX ADMIN — ENOXAPARIN SODIUM 40 MG: 40 INJECTION SUBCUTANEOUS at 15:08

## 2021-05-31 RX ADMIN — ALLOPURINOL 100 MG: 100 TABLET ORAL at 14:57

## 2021-05-31 RX ADMIN — MONTELUKAST 10 MG: 10 TABLET, FILM COATED ORAL at 21:22

## 2021-05-31 RX ADMIN — THERA TABS 1 TABLET: TAB at 14:57

## 2021-05-31 RX ADMIN — ONDANSETRON 4 MG: 2 INJECTION INTRAMUSCULAR; INTRAVENOUS at 09:55

## 2021-05-31 RX ADMIN — CYANOCOBALAMIN TAB 1000 MCG 1000 MCG: 1000 TAB at 14:57

## 2021-05-31 RX ADMIN — POTASSIUM CHLORIDE 10 MEQ: 7.46 INJECTION, SOLUTION INTRAVENOUS at 11:45

## 2021-05-31 RX ADMIN — ASPIRIN 81 MG: 81 TABLET, COATED ORAL at 14:56

## 2021-05-31 RX ADMIN — LEVOTHYROXINE SODIUM 225 MCG: 175 TABLET ORAL at 18:09

## 2021-05-31 RX ADMIN — SODIUM CHLORIDE AND POTASSIUM CHLORIDE 50 ML/HR: 9; 1.49 INJECTION, SOLUTION INTRAVENOUS at 16:04

## 2021-06-01 ENCOUNTER — APPOINTMENT (OUTPATIENT)
Dept: CARDIAC REHAB | Facility: HOSPITAL | Age: 46
End: 2021-06-01

## 2021-06-01 ENCOUNTER — APPOINTMENT (OUTPATIENT)
Dept: CT IMAGING | Facility: HOSPITAL | Age: 46
End: 2021-06-01

## 2021-06-01 LAB
ANION GAP SERPL CALCULATED.3IONS-SCNC: 11 MMOL/L (ref 5–15)
BASOPHILS # BLD AUTO: 0 10*3/MM3 (ref 0–0.2)
BASOPHILS NFR BLD AUTO: 0.5 % (ref 0–1.5)
BUN SERPL-MCNC: 16 MG/DL (ref 6–20)
BUN/CREAT SERPL: 12.7 (ref 7–25)
C DIFF GDH STL QL: NEGATIVE
CALCIUM SPEC-SCNC: 9.6 MG/DL (ref 8.6–10.5)
CHLORIDE SERPL-SCNC: 101 MMOL/L (ref 98–107)
CO2 SERPL-SCNC: 26 MMOL/L (ref 22–29)
CREAT SERPL-MCNC: 1.26 MG/DL (ref 0.57–1)
DEPRECATED RDW RBC AUTO: 44.2 FL (ref 37–54)
EOSINOPHIL # BLD AUTO: 0.1 10*3/MM3 (ref 0–0.4)
EOSINOPHIL NFR BLD AUTO: 0.9 % (ref 0.3–6.2)
ERYTHROCYTE [DISTWIDTH] IN BLOOD BY AUTOMATED COUNT: 13.7 % (ref 12.3–15.4)
GFR SERPL CREATININE-BSD FRML MDRD: 55 ML/MIN/1.73
GLUCOSE BLDC GLUCOMTR-MCNC: 103 MG/DL (ref 70–105)
GLUCOSE BLDC GLUCOMTR-MCNC: 103 MG/DL (ref 70–105)
GLUCOSE BLDC GLUCOMTR-MCNC: 111 MG/DL (ref 70–105)
GLUCOSE BLDC GLUCOMTR-MCNC: 73 MG/DL (ref 70–105)
GLUCOSE SERPL-MCNC: 119 MG/DL (ref 65–99)
HBA1C MFR BLD: 6.2 % (ref 3.5–5.6)
HCT VFR BLD AUTO: 40.1 % (ref 34–46.6)
HGB BLD-MCNC: 14.2 G/DL (ref 12–15.9)
LYMPHOCYTES # BLD AUTO: 1.9 10*3/MM3 (ref 0.7–3.1)
LYMPHOCYTES NFR BLD AUTO: 32.8 % (ref 19.6–45.3)
MAGNESIUM SERPL-MCNC: 2 MG/DL (ref 1.6–2.6)
MCH RBC QN AUTO: 32 PG (ref 26.6–33)
MCHC RBC AUTO-ENTMCNC: 35.3 G/DL (ref 31.5–35.7)
MCV RBC AUTO: 90.4 FL (ref 79–97)
MONOCYTES # BLD AUTO: 0.5 10*3/MM3 (ref 0.1–0.9)
MONOCYTES NFR BLD AUTO: 9.2 % (ref 5–12)
NEUTROPHILS NFR BLD AUTO: 3.4 10*3/MM3 (ref 1.7–7)
NEUTROPHILS NFR BLD AUTO: 56.6 % (ref 42.7–76)
NRBC BLD AUTO-RTO: 0.1 /100 WBC (ref 0–0.2)
PLATELET # BLD AUTO: 171 10*3/MM3 (ref 140–450)
PMV BLD AUTO: 7.7 FL (ref 6–12)
POTASSIUM SERPL-SCNC: 3.6 MMOL/L (ref 3.5–5.2)
RBC # BLD AUTO: 4.44 10*6/MM3 (ref 3.77–5.28)
SODIUM SERPL-SCNC: 138 MMOL/L (ref 136–145)
WBC # BLD AUTO: 5.9 10*3/MM3 (ref 3.4–10.8)

## 2021-06-01 PROCEDURE — 74176 CT ABD & PELVIS W/O CONTRAST: CPT

## 2021-06-01 PROCEDURE — 96372 THER/PROPH/DIAG INJ SC/IM: CPT

## 2021-06-01 PROCEDURE — 93010 ELECTROCARDIOGRAM REPORT: CPT | Performed by: INTERNAL MEDICINE

## 2021-06-01 PROCEDURE — 96376 TX/PRO/DX INJ SAME DRUG ADON: CPT

## 2021-06-01 PROCEDURE — 25010000003 POTASSIUM CHLORIDE PER 2 MEQ: Performed by: INTERNAL MEDICINE

## 2021-06-01 PROCEDURE — 99226 PR SBSQ OBSERVATION CARE/DAY 35 MINUTES: CPT | Performed by: INTERNAL MEDICINE

## 2021-06-01 PROCEDURE — 25010000002 ONDANSETRON PER 1 MG: Performed by: NURSE PRACTITIONER

## 2021-06-01 PROCEDURE — 25010000002 ENOXAPARIN PER 10 MG: Performed by: NURSE PRACTITIONER

## 2021-06-01 PROCEDURE — 93005 ELECTROCARDIOGRAM TRACING: CPT | Performed by: INTERNAL MEDICINE

## 2021-06-01 PROCEDURE — 82962 GLUCOSE BLOOD TEST: CPT

## 2021-06-01 PROCEDURE — 99214 OFFICE O/P EST MOD 30 MIN: CPT | Performed by: INTERNAL MEDICINE

## 2021-06-01 PROCEDURE — G0378 HOSPITAL OBSERVATION PER HR: HCPCS

## 2021-06-01 PROCEDURE — 85025 COMPLETE CBC W/AUTO DIFF WBC: CPT | Performed by: NURSE PRACTITIONER

## 2021-06-01 PROCEDURE — 97162 PT EVAL MOD COMPLEX 30 MIN: CPT

## 2021-06-01 PROCEDURE — 83735 ASSAY OF MAGNESIUM: CPT | Performed by: NURSE PRACTITIONER

## 2021-06-01 PROCEDURE — 80048 BASIC METABOLIC PNL TOTAL CA: CPT | Performed by: NURSE PRACTITIONER

## 2021-06-01 RX ORDER — CHOLESTYRAMINE LIGHT 4 G/5.7G
1 POWDER, FOR SUSPENSION ORAL EVERY 12 HOURS SCHEDULED
Status: DISCONTINUED | OUTPATIENT
Start: 2021-06-01 | End: 2021-06-02 | Stop reason: HOSPADM

## 2021-06-01 RX ORDER — SODIUM CHLORIDE AND POTASSIUM CHLORIDE 150; 450 MG/100ML; MG/100ML
50 INJECTION, SOLUTION INTRAVENOUS CONTINUOUS
Status: DISCONTINUED | OUTPATIENT
Start: 2021-06-01 | End: 2021-06-02

## 2021-06-01 RX ORDER — SODIUM CHLORIDE 450 MG/100ML
50 INJECTION, SOLUTION INTRAVENOUS CONTINUOUS
Status: DISCONTINUED | OUTPATIENT
Start: 2021-06-01 | End: 2021-06-01

## 2021-06-01 RX ADMIN — ASPIRIN 81 MG: 81 TABLET, COATED ORAL at 08:37

## 2021-06-01 RX ADMIN — CLOPIDOGREL BISULFATE 75 MG: 75 TABLET ORAL at 08:38

## 2021-06-01 RX ADMIN — ALLOPURINOL 100 MG: 100 TABLET ORAL at 08:38

## 2021-06-01 RX ADMIN — CHOLESTYRAMINE 4 G: 4 POWDER, FOR SUSPENSION ORAL at 14:02

## 2021-06-01 RX ADMIN — PANTOPRAZOLE SODIUM 40 MG: 40 TABLET, DELAYED RELEASE ORAL at 08:37

## 2021-06-01 RX ADMIN — ONDANSETRON 4 MG: 2 INJECTION INTRAMUSCULAR; INTRAVENOUS at 20:01

## 2021-06-01 RX ADMIN — CYANOCOBALAMIN TAB 1000 MCG 1000 MCG: 1000 TAB at 08:49

## 2021-06-01 RX ADMIN — MONTELUKAST 10 MG: 10 TABLET, FILM COATED ORAL at 21:01

## 2021-06-01 RX ADMIN — OXYCODONE 7.5 MG: 5 TABLET ORAL at 03:22

## 2021-06-01 RX ADMIN — RANOLAZINE 500 MG: 500 TABLET, FILM COATED, EXTENDED RELEASE ORAL at 21:01

## 2021-06-01 RX ADMIN — TIZANIDINE 4 MG: 4 TABLET ORAL at 08:38

## 2021-06-01 RX ADMIN — POTASSIUM CHLORIDE 20 MEQ: 1500 TABLET, EXTENDED RELEASE ORAL at 18:40

## 2021-06-01 RX ADMIN — POTASSIUM CHLORIDE 20 MEQ: 1500 TABLET, EXTENDED RELEASE ORAL at 08:38

## 2021-06-01 RX ADMIN — TIZANIDINE 4 MG: 4 TABLET ORAL at 15:47

## 2021-06-01 RX ADMIN — Medication 10 ML: at 21:00

## 2021-06-01 RX ADMIN — Medication 1000 UNITS: at 08:37

## 2021-06-01 RX ADMIN — RANOLAZINE 500 MG: 500 TABLET, FILM COATED, EXTENDED RELEASE ORAL at 08:37

## 2021-06-01 RX ADMIN — TIZANIDINE 4 MG: 4 TABLET ORAL at 21:01

## 2021-06-01 RX ADMIN — ONDANSETRON 4 MG: 2 INJECTION INTRAMUSCULAR; INTRAVENOUS at 05:01

## 2021-06-01 RX ADMIN — THERA TABS 1 TABLET: TAB at 08:38

## 2021-06-01 RX ADMIN — ENOXAPARIN SODIUM 40 MG: 40 INJECTION SUBCUTANEOUS at 15:47

## 2021-06-01 RX ADMIN — OXYCODONE 7.5 MG: 5 TABLET ORAL at 18:40

## 2021-06-01 RX ADMIN — ROSUVASTATIN CALCIUM 40 MG: 10 TABLET, FILM COATED ORAL at 21:02

## 2021-06-01 RX ADMIN — FOLIC ACID 1 MG: 1 TABLET ORAL at 08:38

## 2021-06-01 RX ADMIN — Medication 10 ML: at 08:38

## 2021-06-01 RX ADMIN — FERROUS SULFATE TAB EC 324 MG (65 MG FE EQUIVALENT) 324 MG: 324 (65 FE) TABLET DELAYED RESPONSE at 08:38

## 2021-06-01 RX ADMIN — POTASSIUM CHLORIDE AND SODIUM CHLORIDE 50 ML/HR: 450; 150 INJECTION, SOLUTION INTRAVENOUS at 14:02

## 2021-06-01 RX ADMIN — LEVOTHYROXINE SODIUM 225 MCG: 175 TABLET ORAL at 08:37

## 2021-06-01 NOTE — DISCHARGE PLACEMENT REQUEST
"Rafael Nunes (46 y.o. Female)     Date of Birth Social Security Number Address Home Phone MRN    1975  [id number] [phone] [id number]    Congregational Marital Status          [employer]        Admission Date Admission Type Admitting Provider Attending Provider Department, Room/Bed    5/31/21 Emergency Lee Barry MD Gad, George Fayez Labib Youssief, MD [address] PEDIATRICS, 207/1    Discharge Date Discharge Disposition Discharge Destination                       Attending Provider: Lee Barry MD    Allergies: Hydrocodone, Penicillin G, Contrast Dye    Isolation: Spore   Infection: COVID Screen (preop/placement) (02/26/21), C.difficile (rule out) (05/31/21)   Code Status: CPR    Ht: 170.2 cm (67\")   Wt: 86.4 kg (190 lb 7.6 oz)    Admission Cmt: None   Principal Problem: Dizziness with near syncope [R42]                 Active Insurance as of 5/31/2021     Primary Coverage     Payor Plan Insurance Group Employer/Plan Group    ANTHEM BLUE CROSS ANTHEM BLUE CROSS BLUE SHIELD PPO [id number]     Payor Plan Address Payor Plan Phone Number Payor Plan Fax Number Effective Dates    [address] [phone]  5/6/2018 - None Entered    [facility] [address]       Subscriber Name Subscriber Birth Date Member ID       CATRACHO DOWNS 11/7/1970 [id number]           Secondary Coverage     Payor Plan Insurance Group Employer/Plan Group    ANTHEM MEDICARE REPLACEMENT ANTHEM MEDICARE ADVANTAGE [id number]     Payor Plan Address Payor Plan Phone Number Payor Plan Fax Number Effective Dates    [address] [phone]  1/1/2019 - None Entered    [facility] [address]       Subscriber Name Subscriber Birth Date Member ID       RAFAEL NUNES 1975 [id number]           Tertiary Coverage     Payor Plan Insurance Group Employer/Plan Group    [place]      Payor Plan Address Payor Plan Phone " Number Payor Plan Fax Number Effective Dates    [address]   1/1/2020 - None Entered    [place] [address]       Subscriber Name Subscriber Birth Date Member ID       ANNETTACALVIN GILBERT 1975 [id number]                 Emergency Contacts      (Rel.) Home Phone Work Phone Mobile Phone    YARELIBHAKTIE (Spouse) -- -- [phone]            Insurance Information                ANTHEM BLUE CROSS/ANTHEM BLUE CROSS BLUE SHIELD PPO Phone: [phone]    Subscriber: Catracho Mccann Subscriber#: [id number]    Group#: [id number] Precert#:         ANTHEM MEDICARE REPLACEMENT/ANTHEM MEDICARE ADVANTAGE Phone: [phone]    Subscriber: Calvin Nunes Subscriber#: [id number]    Group#: [id number] Precert#:         [place] Phone:     Subscriber: Calvin Nunes Subscriber#: [id number]    Group#:  Precert#:           Referral Orders (last 24 hours) (24h ago, onward)     Start     Ordered    06/01/21 0000  Ambulatory Referral to Home Health     Question Answer Comment   Face to Face Visit Date: 6/1/2021    Follow-up provider for Plan of Care? I treated the patient in an acute care facility and will not continue treatment after discharge.    Follow-up provider: GLENDA JACQUES    Reason/Clinical Findings dizziness with syncope    Describe mobility limitations that make leaving home difficult: weakness    Nursing/Therapeutic Services Requested Skilled Nursing C eval and treat   Skilled nursing orders: Other [facility] eval and treat   Frequency: 1 Week 1        06/01/21 1700

## 2021-06-01 NOTE — PROGRESS NOTES
Cardiology Progress Note      Admiting Physician:  Lee Barry *   LOS: 0 days       Reason For Followup:  Valvular heart disease  Coronary artery disease  Cardiomyopathy  Bradycardia      Subjective:    Interval History:  Seen and examined.  Chart and labs reviewed.  Patient denies any chest pain pressure heaviness or tightness.  Denies any shortness of breath out of character.  Reports feeling better.  Still with some nausea and loose stool.  Discussed with admitting service.  Recommend continuing to gently rehydrate patient.  Will have device interrogated secondary to concerns of bradycardia.    Review of Systems:  A complete review of systems was undertaken with the pertinent cardiovascular findings listed in history of present illness and all other systems being negative.     Assessment & Plan    Impressions:  Heart failure with reduced ejection fraction-chronic  Valvular heart disease with severe mitral insufficiency and history of AVR  AICD in situ  Cardiomyopathy  Coronary artery disease with history of CABG  Electrolyte derangement  Volume depletion from diarrhea  Bradycardia    Recommendations:  Continue with gentle hydration mindful of patient's cardiomyopathy  Monitor electrolytes and rhythm  Interrogate ICD due to bradycardia    Objective:    Medication Review:   Scheduled Meds:allopurinol, 100 mg, Oral, Daily  aspirin, 81 mg, Oral, Daily  cholecalciferol, 1,000 Units, Oral, Daily  clopidogrel, 75 mg, Oral, Daily  enoxaparin, 40 mg, Subcutaneous, Q24H  ferrous sulfate, 324 mg, Oral, Daily With Breakfast  folic acid, 1 mg, Oral, Daily  insulin lispro, 0-9 Units, Subcutaneous, TID AC  levothyroxine, 225 mcg, Oral, Daily  montelukast, 10 mg, Oral, Nightly  multivitamin, 1 tablet, Oral, Daily  pantoprazole, 40 mg, Oral, Daily  potassium chloride, 20 mEq, Oral, BID With Meals  potassium chloride, 10 mEq, Intravenous, Once   And  potassium chloride, 10 mEq, Intravenous, Once   And  potassium  chloride, 10 mEq, Intravenous, Once   And  potassium chloride, 10 mEq, Intravenous, Once  ranolazine, 500 mg, Oral, Q12H  rosuvastatin, 40 mg, Oral, Nightly  sodium chloride, 10 mL, Intravenous, Q12H  tiZANidine, 4 mg, Oral, TID  vitamin B-12, 1,000 mcg, Oral, Daily      Continuous Infusions:sodium chloride 0.45 % with KCl 20 mEq, 50 mL/hr      PRN Meds:.•  acetaminophen **OR** acetaminophen **OR** acetaminophen  •  aluminum-magnesium hydroxide-simethicone  •  cyclobenzaprine  •  dextrose  •  dextrose  •  diphenhydrAMINE  •  docusate sodium  •  glucagon (human recombinant)  •  insulin lispro **AND** insulin lispro  •  ipratropium-albuterol  •  ketorolac  •  magnesium sulfate **OR** magnesium sulfate in D5W 1g/100mL (PREMIX)  •  melatonin  •  nitroglycerin  •  ondansetron **OR** ondansetron  •  oxyCODONE  •  potassium chloride **OR** potassium chloride **OR** potassium chloride  •  [COMPLETED] Insert peripheral IV **AND** sodium chloride  •  sodium chloride    Patient Active Problem List   Diagnosis   • COPD (chronic obstructive pulmonary disease) (CMS/[facility])   • Essential hypertension   • Cardiomyopathy, dilated (CMS/[facility])   • ICD (implantable cardioverter-defibrillator), dual, in situ   • GERD without esophagitis   • Hypothyroidism (acquired)   • Coronary artery disease of native artery of native heart with stable angina pectoris (CMS/[facility])   • S/P AVR (aortic valve replacement)   • S/P CABG x 3   • Dyspnea   • CKD (chronic kidney disease) stage 2, GFR 60-89 ml/min   • Nonrheumatic mitral valve regurgitation   • Cellulitis of left axilla   • Cellulitis of right axilla   • Hidradenitis   • Type 2 diabetes mellitus without complication, without long-term current use of insulin (CMS/[facility])   • Chronic systolic congestive heart failure (CMS/[facility])   • Hypokalemia   • Dizziness with near syncope   • Acute diarrhea   • Mixed hyperlipidemia   • INDRA (obstructive sleep apnea)   • Chronic pain syndrome         Physical  Exam:    General: Alert, cooperative, no distress, appears stated age  Head:  Normocephalic, atraumatic, mucous membranes moist  Eyes:  Conjunctiva/corneas clear, EOM's intact     Neck:  Supple,  no bruit  Lungs: Coarse and diminished bilaterally.  Chest wall: No tenderness  Heart::  Regular rate and rhythm, S1 and S2 normal, 2/6 holosystolic murmur.  No rub or gallop  Abdomen: Soft, non-tender, nondistended bowel sounds active  Extremities: No cyanosis, clubbing, or edema  Pulses: 2+ and symmetric all extremities  Skin:  No rashes or lesions  Neuro/psych: A&O x3. CN II through XII are grossly intact with appropriate affect    Vital Signs:  Vitals:    06/01/21 0444 06/01/21 0508 06/01/21 0829 06/01/21 1221   BP:   132/89 120/76   BP Location:   Right arm Right arm   Patient Position:   Sitting Lying   Pulse: 51 (!) 48 59 60   Resp: 16  17 16   Temp:   97.6 °F (36.4 °C)    TempSrc:   Oral    SpO2:   99% 98%   Weight:       Height:         Wt Readings from Last 1 Encounters:   06/01/21 86.4 kg (190 lb 7.6 oz)       Intake/Output Summary (Last 24 hours) at 6/1/2021 1229  Last data filed at 5/31/2021 1535  Gross per 24 hour   Intake 120 ml   Output --   Net 120 ml         Results Review:     CBC    Results from last 7 days   Lab Units 06/01/21  0539 05/31/21  0848   WBC 10*3/mm3 5.90 6.40   HEMOGLOBIN g/dL 14.2 16.8*   PLATELETS 10*3/mm3 171 200     Cr Clearance Estimated Creatinine Clearance: 61.6 mL/min (A) (by C-G formula based on SCr of 1.26 mg/dL (H)).  Coag     HbA1C   Lab Results   Component Value Date    HGBA1C 6.2 (H) 05/31/2021    HGBA1C 6.0 (H) 01/18/2021    HGBA1C 5.3 12/26/2019     Blood Glucose   Glucose   Date/Time Value Ref Range Status   06/01/2021 1220 73 70 - 105 mg/dL Final     Comment:     Serial Number: [id number]:  [id number]   06/01/2021 0738 111 (H) 70 - 105 mg/dL Final     Comment:     Serial Number: [id number]:  [id number]   05/31/2021 2040 141 (H) 70 - 105 mg/dL Final     Comment:      Serial Number: [id number]:  [id number]   05/31/2021 1746 77 70 - 105 mg/dL Final     Comment:     Serial Number: [id number]:  [id number]     Infection   Results from last 7 days   Lab Units 05/31/21  0848   BLOODCX  No growth at 24 hours  No growth at 24 hours     CMP   Results from last 7 days   Lab Units 06/01/21  0539 05/31/21  1949 05/31/21  1417 05/31/21  0848   SODIUM mmol/L 138  --  137 136   POTASSIUM mmol/L 3.6 3.6 3.3* 2.6*   CHLORIDE mmol/L 101  --  96* 94*   CO2 mmol/L 26.0  --  23.0 30.0*   BUN mg/dL 16  --  26* 29*   CREATININE mg/dL 1.26*  --  1.42* 1.60*   GLUCOSE mg/dL 119*  --  126* 114*   ALBUMIN g/dL  --   --   --  4.70   BILIRUBIN mg/dL  --   --   --  1.1   ALK PHOS U/L  --   --   --  76   AST (SGOT) U/L  --   --   --  35*   ALT (SGPT) U/L  --   --   --  19     ABG      UA      SEMAJ  No results found for: POCMETH, POCAMPHET, POCBARBITUR, POCBENZO, POCCOCAINE, POCOPIATES, POCOXYCODO, POCPHENCYC, POCPROPOXY, POCTHC, POCTRICYC  Lysis Labs   Results from last 7 days   Lab Units 06/01/21  0539 05/31/21 1417 05/31/21  0848   HEMOGLOBIN g/dL 14.2  --  16.8*   PLATELETS 10*3/mm3 171  --  200   CREATININE mg/dL 1.26* 1.42* 1.60*     Radiology(recent) XR Chest 1 View    Result Date: 5/31/2021  No acute cardiopulmonary process.  Electronically Signed By-Chidi Walton MD On:5/31/2021 9:13 AM This report was finalized on [id number] by  Chidi Walton MD.        Results from last 7 days   Lab Units 05/31/21 1949   TROPONIN T ng/mL <0.010       Imaging Results (Last 24 Hours)     ** No results found for the last 24 hours. **          Cardiac Studies:  Echo- Results for orders placed during the hospital encounter of 02/22/21    Adult Transthoracic Echo Complete w/ Color, Spectral and Contrast if Necessary Per Protocol    Interpretation Summary  · The left ventricular cavity is mildly dilated.  · Estimated left ventricular EF was in agreement with the calculated left ventricular EF. Left  ventricular ejection fraction appears to be 26 - 30%. Left ventricular systolic function is severely decreased.  · Left ventricular diastolic function is consistent with (grade II w/high LAP) pseudonormalization.  · The right ventricular cavity is mildly dilated.  · Mildly reduced right ventricular systolic function noted.  · Left atrial volume is mildly increased.  · There is a bioprosthetic aortic valve present.  · Mild to moderate aortic valve stenosis is present.  · Abnormal mitral valve structure consistent with dilated annulus.  · Moderate to severe mitral valve regurgitation is present with a posteriorly-directed jet noted.    Stress Myoview-  Cath-        Marcello Garrido DO  06/01/21  12:29 EDT

## 2021-06-01 NOTE — THERAPY EVALUATION
Patient Name: Rafael Nunes  : 1975    MRN: [id number]                              Today's Date: 2021       Admit Date: 2021    Visit Dx:     ICD-10-CM ICD-9-CM   1. Hypokalemia  E87.6 276.8   2. Weakness  R53.1 780.79   3. Dehydration  E86.0 276.51   4. Diarrhea, unspecified type  R19.7 787.91     Patient Active Problem List   Diagnosis   • COPD (chronic obstructive pulmonary disease) (CMS/[facility])   • Essential hypertension   • Cardiomyopathy, dilated (CMS/[facility])   • ICD (implantable cardioverter-defibrillator), dual, in situ   • GERD without esophagitis   • Hypothyroidism (acquired)   • Coronary artery disease of native artery of native heart with stable angina pectoris (CMS/[facility])   • S/P AVR (aortic valve replacement)   • S/P CABG x 3   • Dyspnea   • CKD (chronic kidney disease) stage 2, GFR 60-89 ml/min   • Nonrheumatic mitral valve regurgitation   • Cellulitis of left axilla   • Cellulitis of right axilla   • Hidradenitis   • Type 2 diabetes mellitus without complication, without long-term current use of insulin (CMS/H[facility])   • Chronic systolic congestive heart failure (CMS/HCC)   • Hypokalemia   • Dizziness with near syncope   • Acute diarrhea   • Mixed hyperlipidemia   • INDRA (obstructive sleep apnea)   • Chronic pain syndrome     Past Medical History:   Diagnosis Date   • Arrhythmia    • Asthma    • CAD (coronary artery disease)    • Cardiomyopathy, dilated (CMS/HCC)    • Chronic systolic congestive heart failure (CMS/HCC)    • CKD (chronic kidney disease) stage 2, GFR 60-89 ml/min    • COPD (chronic obstructive pulmonary disease) (CMS/HCC)    • Essential hypertension    • GERD without esophagitis    • Hidradenitis 10/26/2020   • Hyperlipidemia    • Hypothyroidism (acquired)    • ICD (implantable cardioverter-defibrillator), dual, in situ 2019   • Nonrheumatic aortic valve insufficiency    • Nonrheumatic mitral valve regurgitation    • S/P AVR (aortic valve replacement)    • S/P CABG x  3    • Type 2 diabetes mellitus without complication, without long-term current use of insulin (CMS/[facility])      Past Surgical History:   Procedure Laterality Date   • AORTIC VALVE REPAIR/REPLACEMENT N/A 12/27/2019    Procedure: AORTIC VALVE REPAIR/REPLACEMENT;  Surgeon: Lane Stock MD;  Location: [facility] CVOR;  Service: Cardiothoracic   • BREAST LUMPECTOMY     • CARDIAC CATHETERIZATION N/A 12/24/2019    Procedure: Right and Left Heart Cath 12/24/19 @ 0900;  Surgeon: Wayne Luna MD;  Location: [facility] CATH INVASIVE LOCATION;  Service: Cardiovascular   • CARDIAC CATHETERIZATION N/A 12/24/2019    Procedure: Coronary angiography;  Surgeon: Wayne Luna MD;  Location: [facility] CATH INVASIVE LOCATION;  Service: Cardiovascular   • CARDIAC CATHETERIZATION N/A 11/10/2020    Procedure: Left and right Heart Cath;  Surgeon: Wayne Luna MD;  Location: [facility] CATH INVASIVE LOCATION;  Service: Cardiovascular;  Laterality: N/A;   • CARDIAC CATHETERIZATION N/A 11/10/2020    Procedure: Coronary angiography;  Surgeon: Wayne Luna MD;  Location: [facility] CATH INVASIVE LOCATION;  Service: Cardiovascular;  Laterality: N/A;   • CARDIAC CATHETERIZATION N/A 11/10/2020    Procedure: Right Heart Cath;  Surgeon: Wayne Luna MD;  Location: [facility] CATH INVASIVE LOCATION;  Service: Cardiovascular;  Laterality: N/A;   • CARDIAC CATHETERIZATION N/A 11/25/2020    Procedure: Percutaneous coronary intervention of the left circumflex artery;  Surgeon: Wayne Luna MD;  Location: [facility] CATH INVASIVE LOCATION;  Service: Cardiovascular;  Laterality: N/A;   • CARDIAC CATHETERIZATION N/A 1/22/2021    Procedure: LEFT HEART CATH with possible PCI;  Surgeon: Wayne Luna MD;  Location: [facility] CATH INVASIVE LOCATION;  Service: Cardiovascular;  Laterality: N/A;  Local and IV sedation   • CARDIAC CATHETERIZATION N/A 1/22/2021    Procedure: Coronary angiography;  Surgeon: Cheryl  Wayne VILLANUEVA MD;  Location: [facility] CATH INVASIVE LOCATION;  Service: Cardiovascular;  Laterality: N/A;   • CARDIAC CATHETERIZATION N/A 1/22/2021    Procedure: Saphenous Vein Graft;  Surgeon: Wayne Luna MD;  Location: [facility] CATH INVASIVE LOCATION;  Service: Cardiovascular;  Laterality: N/A;   • CARDIAC ELECTROPHYSIOLOGY PROCEDURE Left 6/28/2019    Procedure: Dual-chamber ICD insertion;  Surgeon: Héctor Eckert MD;  Location: [facility] CATH INVASIVE LOCATION;  Service: Cardiovascular   • CARDIAC ELECTROPHYSIOLOGY PROCEDURE Left 8/14/2019    Procedure: Lead Revision;  Surgeon: Héctor Eckert MD;  Location: [facility] CATH INVASIVE LOCATION;  Service: Cardiovascular   • CARDIAC SURGERY     • CHOLECYSTECTOMY     • CORONARY ARTERY BYPASS GRAFT N/A 12/27/2019    Procedure: CORONARY ARTERY BYPASS GRAFTING;  Surgeon: Lane Stock MD;  Location: [facility] CVOR;  Service: Cardiothoracic   • HYSTERECTOMY     • INSERT / REPLACE / REMOVE PACEMAKER     • LYMPHADENECTOMY Bilateral    • THYROID SURGERY       General Information     Row Name 06/01/21 1638          Physical Therapy Time and Intention    Document Type  evaluation  -EL     Mode of Treatment  individual therapy;physical therapy  -EL     Row Name 06/01/21 1638          General Information    Patient Profile Reviewed  yes  -EL     Prior Level of Function  independent:;all household mobility;ADL's  -EL     Row Name 06/01/21 1638          Living Environment    Lives With  child(jasbir), dependent;spouse Children are 17,20,22  -EL     Row Name 06/01/21 1638          Home Main Entrance    Number of Stairs, Main Entrance  one  -EL     Row Name 06/01/21 1638          Stairs Within Home, Primary    Number of Stairs, Within Home, Primary  ten  -EL     Row Name 06/01/21 1638          Cognition    Orientation Status (Cognition)  oriented x 4  -EL     Row Name 06/01/21 1638          Safety Issues, Functional Mobility    Impairments Affecting Function (Mobility)   strength;endurance/activity tolerance;sensation/sensory awareness  -EL       User Key  (r) = Recorded By, (t) = Taken By, (c) = Cosigned By    Initials Name Provider Type    Isaiah Fontanez PT Physical Therapist        Mobility     Row Name 06/01/21 1639          Bed Mobility    Bed Mobility  sit-supine;supine-sit  -EL     Supine-Sit Emery (Bed Mobility)  modified independence  -EL     Sit-Supine Emery (Bed Mobility)  modified independence  -EL     Row Name 06/01/21 1639          Sit-Stand Transfer    Sit-Stand Emery (Transfers)  contact guard  -EL     Row Name 06/01/21 1639          Gait/Stairs (Locomotion)    Emery Level (Gait)  contact guard  -EL     Assistive Device (Gait)  cane, straight  -EL     Distance in Feet (Gait)  80  -EL     Deviations/Abnormal Patterns (Gait)  gait speed decreased;stride length decreased  -EL       User Key  (r) = Recorded By, (t) = Taken By, (c) = Cosigned By    Initials Name Provider Type    Isaiah Fontanez, SHAHANA Physical Therapist        Obj/Interventions     Row Name 06/01/21 1640          Range of Motion Comprehensive    General Range of Motion  bilateral lower extremity ROM WFL  -EL     Row Name 06/01/21 1640          Strength Comprehensive (MMT)    General Manual Muscle Testing (MMT) Assessment  lower extremity strength deficits identified  -EL     Comment, General Manual Muscle Testing (MMT) Assessment  Vega hips 3+/5, remainder 4-/5 gross  -EL     Row Name 06/01/21 1640          Sensory Assessment (Somatosensory)    Sensory Assessment (Somatosensory)  other (see comments) LLE with minor sensation decrease  -EL       User Key  (r) = Recorded By, (t) = Taken By, (c) = Cosigned By    Initials Name Provider Type    Isaiah Fontanez, SHAHANA Physical Therapist        Goals/Plan     Row Name 06/01/21 1644          Bed Mobility Goal 1 (PT)    Activity/Assistive Device (Bed Mobility Goal 1, PT)  bed mobility activities, all  -EL     Emery Level/Cues Needed (Bed  Mobility Goal 1, PT)  modified independence  -EL     Time Frame (Bed Mobility Goal 1, PT)  long term goal (LTG);2 weeks  -EL     Row Name 06/01/21 1644          Transfer Goal 1 (PT)    Activity/Assistive Device (Transfer Goal 1, PT)  transfers, all;cane, straight  -EL     [place] Level/Cues Needed (Transfer Goal 1, PT)  modified independence  -EL     Time Frame (Transfer Goal 1, PT)  long term goal (LTG);2 weeks  -EL     Row Name 06/01/21 1644          Gait Training Goal 1 (PT)    Activity/Assistive Device (Gait Training Goal 1, PT)  gait (walking locomotion);cane, straight  -EL     [place] Level (Gait Training Goal 1, PT)  modified independence  -EL     Distance (Gait Training Goal 1, PT)  300  -EL     Time Frame (Gait Training Goal 1, PT)  long term goal (LTG);2 weeks  -EL       User Key  (r) = Recorded By, (t) = Taken By, (c) = Cosigned By    Initials Name Provider Type    Isaiah Fontanez, PT Physical Therapist        Clinical Impression     Row Name 06/01/21 1641          Pain    Additional Documentation  Pain Scale: FACES Pre/Post-Treatment (Group)  -     Row Name 06/01/21 1641          Pain Scale: FACES Pre/Post-Treatment    Pain: FACES Scale, Pretreatment  0-->no hurt  -EL     Posttreatment Pain Rating  0-->no hurt  -EL     Row Name 06/01/21 1641          Plan of Care Review    Plan of Care Reviewed With  patient  -EL     Outcome Summary  Pt is a 45 YO F who reports living at home with children and spouse. Pt typically performs all ambulation, ADLs with cane and reports no recent falls. This date pt demonstrates significant weakness in BLE, and minor gait disturbances. Pt appears safe to d/c home with family assist however, but will recommend HHPT for improvments in strength and safe environment navigation. PT will continue to see 3x/week while admitted. PPE worn includes gloves and mask with goggles.  -     Row Name 06/01/21 1641          Therapy Assessment/Plan (PT)    Rehab Potential (PT)   good, to achieve stated therapy goals  -EL     Criteria for Skilled Interventions Met (PT)  yes  -EL     Predicted Duration of Therapy Intervention (PT)  Until d/c  -EL     Row Name 06/01/21 1641          Vital Signs    O2 Delivery Pre Treatment  room air  -EL     O2 Delivery Intra Treatment  room air  -EL     O2 Delivery Post Treatment  room air  -EL     Pre Patient Position  Supine  -EL     Intra Patient Position  Standing  -EL     Post Patient Position  Supine  -EL     Row Name 06/01/21 1641          Positioning and Restraints    Pre-Treatment Position  in bed  -EL     Post Treatment Position  bed  -EL     In Bed  notified nsg;supine;call light within reach;encouraged to call for assist Pt up in room [place], so bed alarm not armed  -EL       User Key  (r) = Recorded By, (t) = Taken By, (c) = Cosigned By    Initials Name Provider Type    Isaiah Fontanez PT Physical Therapist        Outcome Measures     Row Name 06/01/21 1645          How much help from another person do you currently need...    Turning from your back to your side while in flat bed without using bedrails?  4  -EL     Moving from lying on back to sitting on the side of a flat bed without bedrails?  4  -EL     Moving to and from a bed to a chair (including a wheelchair)?  3  -EL     Standing up from a chair using your arms (e.g., wheelchair, bedside chair)?  3  -EL     Climbing 3-5 steps with a railing?  3  -EL     To walk in hospital room?  3  -EL     AM-PAC 6 Clicks Score (PT)  20  -EL     Row Name 06/01/21 1645          Functional Assessment    Outcome Measure Options  AM-PAC 6 Clicks Basic Mobility (PT)  -EL       User Key  (r) = Recorded By, (t) = Taken By, (c) = Cosigned By    Initials Name Provider Type    Isaiah Fontanez PT Physical Therapist        Physical Therapy Education                 Title: PT OT SLP Therapies (Done)     Topic: Physical Therapy (Done)     Point: Mobility training (Done)     Learning Progress Summary           Patient  Acceptance, E,TB, VU by  at 6/1/2021 1648                   Point: Precautions (Done)     Learning Progress Summary           Patient Acceptance, E,TB, VU by  at 6/1/2021 1648                               User Key     Initials Effective Dates Name Provider Type Discipline     06/23/20 -  Isaiah Seymour, PT Physical Therapist PT              PT Recommendation and Plan  Planned Therapy Interventions (PT): balance training, neuromuscular re-education, bed mobility training, transfer training, gait training, patient/family education, strengthening, stair training  Plan of Care Reviewed With: patient  Outcome Summary: Pt is a 47 YO F who reports living at home with children and spouse. Pt typically performs all ambulation, ADLs with cane and reports no recent falls. This date pt demonstrates significant weakness in BLE, and minor gait disturbances. Pt appears safe to d/c home with family assist however, but will recommend HHPT for improvments in strength and safe environment navigation. PT will continue to see 3x/week while admitted. PPE worn includes gloves and mask with goggles.     Time Calculation:   PT Charges     Row Name 06/01/21 1648             Time Calculation    Start Time  1459  -EL      Stop Time  1515  -EL      Time Calculation (min)  16 min  -EL      PT Received On  06/01/21  -EL      PT - Next Appointment  06/03/21  -EL      PT Goal Re-Cert Due Date  06/15/21  -        User Key  (r) = Recorded By, (t) = Taken By, (c) = Cosigned By    Initials Name Provider Type    Isaiah Fontanez PT Physical Therapist        Therapy Charges for Today     Code Description Service Date Service Provider Modifiers Qty    33509690705 HC PT EVAL MOD COMPLEXITY 3 6/1/2021 Isaiah Seymour, PT GP 1          PT G-Codes  Outcome Measure Options: AM-PAC 6 Clicks Basic Mobility (PT)  AM-PAC 6 Clicks Score (PT): 20    Isaiah Seymour PT  6/1/2021

## 2021-06-01 NOTE — PLAN OF CARE
Goal Outcome Evaluation:  Plan of Care Reviewed With: patient     Outcome Summary: patient has rested most of the night, a stool sample was sent for stool studies and to test for Cdiff, Cdiff precautions have been maintained, will continue to monitor patient

## 2021-06-01 NOTE — PLAN OF CARE
Goal Outcome Evaluation:  Plan of Care Reviewed With: patient     Outcome Summary: pt been in bed resting today. pt on clear diet- was nauseous this morning but tolerating well this afternoon. no complaints today. will continue to monitor

## 2021-06-01 NOTE — PROGRESS NOTES
[facility] Medicine Services Daily Progress Note      Hospitalist Team  LOS 0 days      Patient Care Team:  Phani Adames MD as PCP - General (Internal Medicine)  sAhish Smalls MD as Consulting Physician (Nephrology)  Wayne Luna MD as Consulting Physician (Cardiology)  Héctor Eckert MD as Consulting Physician (Cardiac Electrophysiology)  Lane Stock MD as Surgeon (Cardiothoracic Surgery)    Patient Location: 207/1      Subjective   Subjective     Chief Complaint / Subjective  Chief Complaint   Patient presents with   • Dizziness         Brief Synopsis of Hospital Course/HPI  Ms. Nunes is a 46 y.o. female with a history of CAD s/p CABG, HTN, HLD, dilated cardiomyopathy, chronic systolic CHF s/p AICD placement, aortic valve regurgitation s/p AVR, mitral valve regurgitation, Type 2 DM, and chronic pain who presents to [facility] ED on 05/31/2021 complaining of dizziness. The patient reports a 3-day history of diarrhea. She reports associated nausea, but denies vomiting. She states her abdomen cramps up when she has diarrhea, but denies any current abdominal pain. She denies fever or chills. She states yesterday she was very tired and slept most of the day. She reports dizziness with near syncope, which is worse with ambulation. She reports chronic chest pain. She denies any other complaints. She denies any other exacerbating or alleviating factors.      Workup in the ER revealed acute hypokalemia with K 2.6. The patient was started on IV potassium runs. Her blood pressure was borderline low at 98/70. Other labs significant for hgb 16.8 and calcium 10.7. She was given 1 liter normal saline. Her troponin and proBNP were within normal limits. Her EKG showed sinus rhythm. Her COVID-19 was negative. Her CXR showed no acute findings. Cardiology was consulted. She was admitted to the Hospitalist team for further evaluation and  "treatment      Date::      6/1  Still having diarrhea  Most recent episode this morning  She reports nausea.  She does not want to upgrade her diet  CT abdomen pelvis ordered  Stool studies negative/cardiologist been here and saw the patient  Denies shortness of breath  We will continue gentle hydration.  Discussed with Dr. Gordon  Monitor for fluid overload    ROS  All other systems reviewed and negative    Objective   Objective      Vital Signs  Temp:  [96.2 °F (35.7 °C)-98 °F (36.7 °C)] 97.6 °F (36.4 °C)  Heart Rate:  [48-73] 60  Resp:  [11-17] 16  BP: (111-132)/(75-89) 120/76  Oxygen Therapy  SpO2: 98 %  Pulse Oximetry Type: Intermittent  Device (Oxygen Therapy): room air  Flowsheet Rows      First Filed Value   Admission Height  170.2 cm (67\") Documented at 05/31/2021 0808   Admission Weight  83.7 kg (184 lb 8.4 oz) Documented at 05/31/2021 0808        Intake & Output (last 3 days)       05/29 0701 - 05/30 0700 05/30 0701 - 05/31 0700 05/31 0701 - 06/01 0700 06/01 0701 - 06/02 0700    P.O.   120     Total Intake(mL/kg)   120 (1.4)     Net   +120             Urine Unmeasured Occurrence   1 x     Stool Unmeasured Occurrence   1 x         Lines, Drains & Airways    Active LDAs     Name:   Placement date:   Placement time:   Site:   Days:    Peripheral IV 05/31/21 0848 Right Antecubital   05/31/21    0848    Antecubital   1                  Physical Exam:    Physical Exam  Vitals and nursing note reviewed.   Constitutional:       General: She is not in acute distress.     Appearance: Normal appearance. She is well-developed. She is not ill-appearing, toxic-appearing or diaphoretic.   HENT:      Head: Normocephalic and atraumatic.      Right Ear: Ear canal and external ear normal.      Left Ear: Ear canal and external ear normal.      Nose: Nose normal. No congestion or rhinorrhea.      Mouth/Throat:      Mouth: Mucous membranes are moist.      Pharynx: No oropharyngeal exudate.   Eyes:      General: No scleral " icterus.        Right eye: No discharge.         Left eye: No discharge.      Extraocular Movements: Extraocular movements intact.      Conjunctiva/sclera: Conjunctivae normal.      Pupils: Pupils are equal, round, and reactive to light.   Neck:      Thyroid: No thyromegaly.      Vascular: No carotid bruit or JVD.      Trachea: No tracheal deviation.   Cardiovascular:      Rate and Rhythm: Normal rate and regular rhythm.      Pulses: Normal pulses.      Heart sounds: Normal heart sounds. No murmur heard.   No friction rub. No gallop.    Pulmonary:      Effort: Pulmonary effort is normal. No respiratory distress.      Breath sounds: Normal breath sounds. No stridor. No wheezing, rhonchi or rales.   Chest:      Chest wall: No tenderness.   Abdominal:      General: Bowel sounds are normal. There is no distension.      Palpations: Abdomen is soft. There is no mass.      Tenderness: There is no abdominal tenderness. There is no guarding or rebound.      Hernia: No hernia is present.   Musculoskeletal:         General: No swelling, tenderness, deformity or signs of injury. Normal range of motion.      Cervical back: Normal range of motion and neck supple. No rigidity. No muscular tenderness.      Right lower leg: No edema.      Left lower leg: No edema.   Lymphadenopathy:      Cervical: No cervical adenopathy.   Skin:     General: Skin is warm and dry.      Coloration: Skin is not jaundiced or pale.      Findings: No bruising, erythema or rash.   Neurological:      General: No focal deficit present.      Mental Status: She is alert and oriented to person, place, and time. Mental status is at baseline.      Cranial Nerves: No cranial nerve deficit.      Sensory: No sensory deficit.      Motor: No weakness or abnormal muscle tone.      Coordination: Coordination normal.   Psychiatric:         Mood and Affect: Mood normal.         Behavior: Behavior normal.         Thought Content: Thought content normal.         Judgment:  Judgment normal.               Procedures:              Results Review:     I reviewed the patient's new clinical results.      Lab Results (last 24 hours)     Procedure Component Value Units Date/Time    POC Glucose Once [[id number]]  (Normal) Collected: 06/01/21 1220    Specimen: Blood Updated: 06/01/21 1221     Glucose 73 mg/dL      Comment: Serial Number: [id number]:  [id number]       Hemoglobin A1c [[id number]]  (Abnormal) Collected: 05/31/21 0848    Specimen: Blood Updated: 06/01/21 0943     Hemoglobin A1C 6.2 %     Narrative:      Hemoglobin A1C Reference Range:    <5.7 %        Normal  5.7-6.4 %     Increased risk for diabetes  > 6.4 %        Diabetes       These guidelines have been recommended by the American Diabetic Association for Hgb A1c.      The following 2010 guidelines have been recommended by the American Diabetes Association for Hemoglobin A1c.    HBA1c 5.7-6.4% Increased risk for future diabetes (pre-diabetes)  HBA1c     >6.4% Diabetes      Blood Culture - Blood, Arm, Right [[id number]] Collected: 05/31/21 0848    Specimen: Blood from Arm, Right Updated: 06/01/21 0901     Blood Culture No growth at 24 hours    Blood Culture - Blood, Arm, Left [[id number]] Collected: 05/31/21 0848    Specimen: Blood from Arm, Left Updated: 06/01/21 0901     Blood Culture No growth at 24 hours    Clostridium Difficile Toxin - Stool, Per Rectum [[id number]]  (Normal) Collected: 05/31/21 2134    Specimen: Stool from Per Rectum Updated: 06/01/21 0746    Narrative:      The following orders were created for panel order Clostridium Difficile Toxin - Stool, Per Rectum.  Procedure                               Abnormality         Status                     ---------                               -----------         ------                     Clostridium Difficile EI...[[id number]]  Normal              Final result                 Please view results for these tests on the individual orders.    Clostridium Difficile EIA -  Stool, Per Rectum [[id number]]  (Normal) Collected: 05/31/21 2134    Specimen: Stool from Per Rectum Updated: 06/01/21 0746     C Diff GDH / Toxin Negative    POC Glucose Once [[id number]]  (Abnormal) Collected: 06/01/21 0738    Specimen: Blood Updated: 06/01/21 0739     Glucose 111 mg/dL      Comment: Serial Number: [id number]:  [id number]       Basic Metabolic Panel [[id number]]  (Abnormal) Collected: 06/01/21 0539    Specimen: Blood Updated: 06/01/21 0648     Glucose 119 mg/dL      BUN 16 mg/dL      Creatinine 1.26 mg/dL      Sodium 138 mmol/L      Potassium 3.6 mmol/L      Chloride 101 mmol/L      CO2 26.0 mmol/L      Calcium 9.6 mg/dL      eGFR  African Amer 55 mL/min/1.73      BUN/Creatinine Ratio 12.7     Anion Gap 11.0 mmol/L     Narrative:      GFR Normal >60  Chronic Kidney Disease <60  Kidney Failure <15      Magnesium [[id number]]  (Normal) Collected: 06/01/21 0539    Specimen: Blood Updated: 06/01/21 0648     Magnesium 2.0 mg/dL     CBC Auto Differential [[id number]]  (Normal) Collected: 06/01/21 0539    Specimen: Blood Updated: 06/01/21 0626     WBC 5.90 10*3/mm3      RBC 4.44 10*6/mm3      Hemoglobin 14.2 g/dL      Comment: Result checked        Hematocrit 40.1 %      MCV 90.4 fL      MCH 32.0 pg      MCHC 35.3 g/dL      RDW 13.7 %      RDW-SD 44.2 fl      MPV 7.7 fL      Platelets 171 10*3/mm3      Neutrophil % 56.6 %      Lymphocyte % 32.8 %      Monocyte % 9.2 %      Eosinophil % 0.9 %      Basophil % 0.5 %      Neutrophils, Absolute 3.40 10*3/mm3      Lymphocytes, Absolute 1.90 10*3/mm3      Monocytes, Absolute 0.50 10*3/mm3      Eosinophils, Absolute 0.10 10*3/mm3      Basophils, Absolute 0.00 10*3/mm3      nRBC 0.1 /100 WBC     Gastrointestinal Panel, PCR - Stool, Per Rectum [[id number]]  (Normal) Collected: 05/31/21 2134    Specimen: Stool from Per Rectum Updated: 05/31/21 2320     Campylobacter Not Detected     Plesiomonas shigelloides Not Detected     Salmonella Not Detected     Vibrio Not  Detected     Vibrio cholerae Not Detected     Yersinia enterocolitica Not Detected     Enteroaggregative E. coli (EAEC) Not Detected     Enteropathogenic E. coli (EPEC) Not Detected     Enterotoxigenic E. coli (ETEC) lt/st Not Detected     Shiga-like toxin-producing E. coli (STEC) stx1/stx2 Not Detected     Shigella/Enteroinvasive E. coli (EIEC) Not Detected     Cryptosporidium Not Detected     Cyclospora cayetanensis Not Detected     Entamoeba histolytica Not Detected     Giardia lamblia Not Detected     Adenovirus F40/41 Not Detected     Astrovirus Not Detected     Norovirus GI/GII Not Detected     Rotavirus A Not Detected     Sapovirus (I, II, IV or V) Not Detected    Narrative:      If Aeromonas, Staphylococcus aureus or Bacillus cereus are suspected, please order [id number]: Stool Culture, Aeromonas, S aureus, B Cereus.    Potassium [[id number]]  (Normal) Collected: 05/31/21 1949    Specimen: Blood Updated: 05/31/21 2206     Potassium 3.6 mmol/L     Troponin [[id number]]  (Normal) Collected: 05/31/21 1949    Specimen: Blood Updated: 05/31/21 2108     Troponin T <0.010 ng/mL     Narrative:      Troponin T Reference Range:  <= 0.03 ng/mL-   Negative for AMI  >0.03 ng/mL-     Abnormal for myocardial necrosis.  Clinicians would have to utilize clinical acumen, EKG, Troponin and serial changes to determine if it is an Acute Myocardial Infarction or myocardial injury due to an underlying chronic condition.       Results may be falsely decreased if patient taking Biotin.      POC Glucose Once [[id number]]  (Abnormal) Collected: 05/31/21 2040    Specimen: Blood Updated: 05/31/21 2041     Glucose 141 mg/dL      Comment: Serial Number: [id number]:  [id number]       Vitamin B12 [[id number]]  (Abnormal) Collected: 05/31/21 0848    Specimen: Blood Updated: 05/31/21 1803     Vitamin B-12 >2,000 pg/mL     Narrative:      Results may be falsely increased if patient taking Biotin.      POC Glucose Once [[id number]]  (Normal)  Collected: 05/31/21 1746    Specimen: Blood Updated: 05/31/21 1747     Glucose 77 mg/dL      Comment: Serial Number: [id number]:  [id number]           Hemoglobin A1C   Date Value Ref Range Status   05/31/2021 6.2 (H) 3.5 - 5.6 % Final               No results found for: LIPASE  Lab Results   Component Value Date    CHOL 278 (H) 11/26/2020    TRIG 178 (H) 11/26/2020    HDL 66 (H) 11/26/2020     (H) 11/26/2020       Lab Results   Lab Value Date/Time    FINALDX  12/27/2019 1040     Aortic valve leaflets, valvuloplasty:    Benign endothelial lined fibrous tissue with degenerative changes, chronic inflammation and atheromatous plaque      consistent with clinical history      ELIF/cec         Microbiology Results (last 10 days)     Procedure Component Value - Date/Time    Clostridium Difficile Toxin - Stool, Per Rectum [[id number]]  (Normal) Collected: 05/31/21 2134    Lab Status: Final result Specimen: Stool from Per Rectum Updated: 06/01/21 0746    Narrative:      The following orders were created for panel order Clostridium Difficile Toxin - Stool, Per Rectum.  Procedure                               Abnormality         Status                     ---------                               -----------         ------                     Clostridium Difficile EI...[[id number]]  Normal              Final result                 Please view results for these tests on the individual orders.    Clostridium Difficile EIA - Stool, Per Rectum [[id number]]  (Normal) Collected: 05/31/21 2134    Lab Status: Final result Specimen: Stool from Per Rectum Updated: 06/01/21 0746     C Diff GDH / Toxin Negative    Gastrointestinal Panel, PCR - Stool, Per Rectum [[id number]]  (Normal) Collected: 05/31/21 2134    Lab Status: Final result Specimen: Stool from Per Rectum Updated: 05/31/21 2320     Campylobacter Not Detected     Plesiomonas shigelloides Not Detected     Salmonella Not Detected     Vibrio Not Detected     Vibrio cholerae  Not Detected     Yersinia enterocolitica Not Detected     Enteroaggregative E. coli (EAEC) Not Detected     Enteropathogenic E. coli (EPEC) Not Detected     Enterotoxigenic E. coli (ETEC) lt/st Not Detected     Shiga-like toxin-producing E. coli (STEC) stx1/stx2 Not Detected     Shigella/Enteroinvasive E. coli (EIEC) Not Detected     Cryptosporidium Not Detected     Cyclospora cayetanensis Not Detected     Entamoeba histolytica Not Detected     Giardia lamblia Not Detected     Adenovirus F40/41 Not Detected     Astrovirus Not Detected     Norovirus GI/GII Not Detected     Rotavirus A Not Detected     Sapovirus (I, II, IV or V) Not Detected    Narrative:      If Aeromonas, Staphylococcus aureus or Bacillus cereus are suspected, please order [id number]: Stool Culture, Aeromonas, S aureus, B Cereus.    COVID-19, ABBOTT IN-HOUSE,NASAL Swab (NO TRANSPORT MEDIA) 2 HR TAT - Swab, Nasopharynx [[id number]]  (Normal) Collected: 05/31/21 0903    Lab Status: Final result Specimen: Swab from Nasopharynx Updated: 05/31/21 0919     COVID19 Presumptive Negative    Narrative:      Fact sheet for providers: https://www.fda.gov/media/327725/download     Fact sheet for patients: https://www.fda.gov/media/525209/download    Test performed by PCR.  If inconsistent with clinical signs and symptoms patient should be tested with different authorized molecular test.    Blood Culture - Blood, Arm, Right [[id number]] Collected: 05/31/21 0848    Lab Status: Preliminary result Specimen: Blood from Arm, Right Updated: 06/01/21 0901     Blood Culture No growth at 24 hours    Blood Culture - Blood, Arm, Left [[id number]] Collected: 05/31/21 0848    Lab Status: Preliminary result Specimen: Blood from Arm, Left Updated: 06/01/21 0901     Blood Culture No growth at 24 hours          ECG/EMG Results (most recent)     Procedure Component Value Units Date/Time    ECG 12 Lead [[id number]] Collected: 05/31/21 0824     Updated: 05/31/21 0825     QT Interval 464 ms      Narrative:      HEART RATE= 62  bpm  RR Interval= 964  ms  VA Interval= 164  ms  P Horizontal Axis= -4  deg  P Front Axis= 50  deg  QRSD Interval= 103  ms  QT Interval= 464  ms  QRS Axis= -47  deg  T Wave Axis= 140  deg  - ABNORMAL ECG -  Sinus rhythm  Probable left atrial enlargement  Left ventricular hypertrophy  Anterior Q waves, possibly due to LVH  Abnormal T, consider ischemia, lateral leads  When compared with ECG of 18-Jan-2021 14:48:20,  No significant change  Electronically Signed By:   Date and Time of Study: 2021-05-31 08:24:49    ECG 12 Lead [[id number]] Collected: 06/01/21 0515     Updated: 06/01/21 0517     QT Interval 494 ms     Narrative:      HEART RATE= 48  bpm  RR Interval= 1261  ms  VA Interval= 62  ms  P Horizontal Axis=   deg  P Front Axis= 180  deg  QRSD Interval= 110  ms  QT Interval= 494  ms  QRS Axis= -44  deg  T Wave Axis= 108  deg  - ABNORMAL ECG -  Atrial-sensed ventricular-paced rhythm  Electronically Signed By:   Date and Time of Study: 2021-06-01 05:15:48          Results for orders placed during the hospital encounter of 05/16/20    Duplex Venous Lower Extremity - Bilateral CAR    Interpretation Summary  · Normal bilateral lower extremity venous duplex scan.      Results for orders placed during the hospital encounter of 02/22/21    Adult Transthoracic Echo Complete w/ Color, Spectral and Contrast if Necessary Per Protocol    Interpretation Summary  · The left ventricular cavity is mildly dilated.  · Estimated left ventricular EF was in agreement with the calculated left ventricular EF. Left ventricular ejection fraction appears to be 26 - 30%. Left ventricular systolic function is severely decreased.  · Left ventricular diastolic function is consistent with (grade II w/high LAP) pseudonormalization.  · The right ventricular cavity is mildly dilated.  · Mildly reduced right ventricular systolic function noted.  · Left atrial volume is mildly increased.  · There is a bioprosthetic  aortic valve present.  · Mild to moderate aortic valve stenosis is present.  · Abnormal mitral valve structure consistent with dilated annulus.  · Moderate to severe mitral valve regurgitation is present with a posteriorly-directed jet noted.      XR Chest 1 View    Result Date: 5/31/2021  No acute cardiopulmonary process.  Electronically Signed By-Chidi Walton MD On:5/31/2021 9:13 AM This report was finalized on [id number] by  Chidi Walton MD.          Xrays, labs reviewed personally by physician.    Medication Review:   I have reviewed the patient's current medication list      Scheduled Meds  allopurinol, 100 mg, Oral, Daily  aspirin, 81 mg, Oral, Daily  cholecalciferol, 1,000 Units, Oral, Daily  clopidogrel, 75 mg, Oral, Daily  enoxaparin, 40 mg, Subcutaneous, Q24H  ferrous sulfate, 324 mg, Oral, Daily With Breakfast  folic acid, 1 mg, Oral, Daily  insulin lispro, 0-9 Units, Subcutaneous, TID AC  levothyroxine, 225 mcg, Oral, Daily  montelukast, 10 mg, Oral, Nightly  multivitamin, 1 tablet, Oral, Daily  pantoprazole, 40 mg, Oral, Daily  potassium chloride, 20 mEq, Oral, BID With Meals  potassium chloride, 10 mEq, Intravenous, Once   And  potassium chloride, 10 mEq, Intravenous, Once   And  potassium chloride, 10 mEq, Intravenous, Once   And  potassium chloride, 10 mEq, Intravenous, Once  ranolazine, 500 mg, Oral, Q12H  rosuvastatin, 40 mg, Oral, Nightly  sodium chloride, 10 mL, Intravenous, Q12H  tiZANidine, 4 mg, Oral, TID  vitamin B-12, 1,000 mcg, Oral, Daily        Meds Infusions  sodium chloride 0.45 % with KCl 20 mEq, 50 mL/hr        Meds PRN  •  acetaminophen **OR** acetaminophen **OR** acetaminophen  •  aluminum-magnesium hydroxide-simethicone  •  cyclobenzaprine  •  dextrose  •  dextrose  •  diphenhydrAMINE  •  docusate sodium  •  glucagon (human recombinant)  •  insulin lispro **AND** insulin lispro  •  ipratropium-albuterol  •  ketorolac  •  magnesium sulfate **OR** magnesium sulfate in D5W  1g/100mL (PREMIX)  •  melatonin  •  nitroglycerin  •  ondansetron **OR** ondansetron  •  oxyCODONE  •  potassium chloride **OR** potassium chloride **OR** potassium chloride  •  [COMPLETED] Insert peripheral IV **AND** sodium chloride  •  sodium chloride        Assessment/Plan   Assessment/Plan     Active Hospital Problems    Diagnosis  POA   • **Dizziness with near syncope [R42]  Yes   • Hypokalemia [E87.6]  Yes   • Acute diarrhea [R19.7]  Yes   • Chronic pain syndrome [G89.4]  Yes   • Mixed hyperlipidemia [E78.2]  Yes   • Chronic systolic congestive heart failure (CMS/[facility]) [I50.22]  Yes   • Type 2 diabetes mellitus without complication, without long-term current use of insulin (CMS/[facility]) [E11.9]  Yes   • Nonrheumatic mitral valve regurgitation [I34.0]  Yes   • CKD (chronic kidney disease) stage 2, GFR 60-89 ml/min [N18.2]  Yes   • GERD without esophagitis [K21.9]  Yes   • Hypothyroidism (acquired) [E03.9]  Yes   • Coronary artery disease of native artery of native heart with stable angina pectoris (CMS/[facility]) [I25.118]  Yes   • ICD (implantable cardioverter-defibrillator), dual, in situ [Z95.810]  Yes   • COPD (chronic obstructive pulmonary disease) (CMS/[facility]) [J44.9]  Yes   • Essential hypertension [I10]  Yes   • Cardiomyopathy, dilated (CMS/[facility]) [I42.0]  Yes      Resolved Hospital Problems   No resolved problems to display.       MEDICAL DECISION MAKING COMPLEXITY BY PROBLEM:            Dizziness with near syncope  -suspect secondary to dehydration from acute diarrhea  -gentle IV hydration, hold diuretics  -check serial troponin  -check orthostatic vitals  -PT to eval and treat  -cardiology consulted   -continuous cardiac monitoring      Acute diarrhea  -possible acute gastroenteritis  -Negative stool for GI panel and C. diff  -clear liquid diet, advance as tolerated  -Check CT abdomen pelvis  If not improving will ask GI for evaluation    Hypokalemia  -secondary to diuretics and diarrhea  -replace per protocol and  monitor      Chronic systolic congestive heart failure   Cardiomyopathy, dilated  ICD (implantable cardioverter-defibrillator), dual, in situ  -hold carvedilol, Entresto, spironolactone, and torsemide for now d/t borderline low BP and dehydration; restart as BP allows  -monitor I&Os     Nonrheumatic mitral valve regurgitation, severe  H/o aortic valve regurgitation, s/p AVR  -CTS previously recommended possible mitraclip application, but patient got 2nd opinion from [facility] and opted for medical optimization     Coronary artery disease of native artery of native heart with stable angina pectoris / Mixed hyperlipidemia / Essential hypertension, chronic  -continue aspirin, Plavix, Ranexa, and statin   -antihypertensives on hold as above  -monitor BP     COPD (chronic obstructive pulmonary disease), former smoker  -stable without exacerbation  -continue Singulair   -PRN DuoNebs     GERD without esophagitis  -continue PPI     Hypothyroidism (acquired)  -continue levothyroxine   -TSH pending     CKD (chronic kidney disease) stage 2, GFR 60-89 ml/min  -stable     Type 2 diabetes mellitus without complication, without long-term current use of insulin   -check A1c  -accu checks AC&HS with SSI  -hold Farxiga and metformin for now; restart once patient tolerating regular diet     Chronic pain syndrome  -continue oxycodone (INSPECT reviewed), tizanidine, and Flexeril                             VTE Prophylaxis -   Mechanical Order History:     None      Pharmalogical Order History:      Ordered     Dose Route Frequency Stop    05/31/21 1124  enoxaparin (LOVENOX) syringe 40 mg      40 mg SC Every 24 Hours --                  Code Status -   Code Status and Medical Interventions:   Ordered at: 05/31/21 1124     Code Status:    CPR     Medical Interventions (Level of Support Prior to Arrest):    Full       This patient has been examined wearing appropriate Personal Protective Equipment and discussed with hospital  infection control department. 06/01/21        Discharge Planning  Home        Electronically signed by Lee Barry MD, 06/01/21, 13:08 EDT.  [facility]ist Team

## 2021-06-01 NOTE — CASE MANAGEMENT/SOCIAL WORK
Discharge Planning Assessment   [place]     Patient Name: Rafael Nunes  MRN: [id number]  Today's Date: 6/1/2021    Admit Date: 5/31/2021    Discharge Needs Assessment     Row Name 06/01/21 1651       Living Environment    Lives With  child(jasbir), dependent    Name(s) of Who Lives With Patient  18 yo son Eligio helps pt at home.    Current Living Arrangements  home/apartment/condo    Primary Care Provided by  self    Provides Primary Care For  no one    Able to Return to Prior Arrangements  yes       Resource/Environmental Concerns    Resource/Environmental Concerns  none       Transition Planning    Patient/Family Anticipates Transition to  home with family    Patient/Family Anticipated Services at Transition  none    Transportation Anticipated  family or friend will provide       Discharge Needs Assessment    Readmission Within the Last 30 Days  no previous admission in last 30 days    Equipment Currently Used at Home  cane, straight    Concerns to be Addressed  discharge planning    Equipment Needed After Discharge  none    Provided Post Acute Provider List?  Yes    Post Acute Provider List  Home Health    Provided Post Acute Provider Quality & Resource List?  Yes    Post Acute Provider Quality and Resource List  Home Health Provided list verbally to pt.    Delivered To  Patient    Method of Delivery  In person    Patient's Choice of Community Agency(s)  [facility]    Discharge Coordination/Progress  DC Plan: Home w/family. Referral to [facility], pending acceptance.        Discharge Plan     Row Name 06/01/21 9283       Plan    Plan  DC Plan: Home w/family. Referral to [facility], pending acceptance.    Patient/Family in Agreement with Plan  yes    Plan Comments  Spoke to pt in room. EF 20%. Pt states her family helps her at home including her 18 yo son. GI consult. Pharmacy [facility] on [address]        Continued Care and Services - Admitted Since 5/31/2021     Coordination has not been started for this encounter.         Demographic Summary     Row Name 06/01/21 1649       General Information    Admission Type  observation    Arrived From  emergency department    Required Notices Provided  Observation Status Notice    Referral Source  admission list    Reason for Consult  discharge planning    Preferred Language  English     Used During This Interaction  no    General Information Comments  spoke to pt in room.        Functional Status     Row Name 06/01/21 165       Functional Status    Usual Activity Tolerance  moderate    Current Activity Tolerance  moderate       Functional Status, IADL    Medications  independent    Meal Preparation  assistive person    Housekeeping  assistive person    Laundry  assistive person    Shopping  assistive person       Mental Status    General Appearance WDL  WDL       Mental Status Summary    Recent Changes in Mental Status/Cognitive Functioning  no changes        Met with patient in room wearing PPE: mask, goggles.      Maintained distance greater than six feet and spent less than 15 minutes in the room.               Hugo Cee RN

## 2021-06-01 NOTE — PLAN OF CARE
Goal Outcome Evaluation:  Plan of Care Reviewed With: patient     Outcome Summary: Pt is a 45 YO F who reports living at home with children and spouse. Pt typically performs all ambulation, ADLs with cane and reports no recent falls. This date pt demonstrates significant weakness in BLE, and minor gait disturbances. Pt appears safe to d/c home with family assist however, but will recommend HHPT for improvments in strength and safe environment navigation. PT will continue to see 3x/week while admitted. PPE worn includes gloves and mask with goggles.

## 2021-06-02 VITALS
HEIGHT: 67 IN | HEART RATE: 61 BPM | SYSTOLIC BLOOD PRESSURE: 159 MMHG | OXYGEN SATURATION: 100 % | WEIGHT: 190.48 LBS | TEMPERATURE: 97.7 F | BODY MASS INDEX: 29.9 KG/M2 | RESPIRATION RATE: 20 BRPM | DIASTOLIC BLOOD PRESSURE: 105 MMHG

## 2021-06-02 LAB
ALBUMIN SERPL-MCNC: 3.9 G/DL (ref 3.5–5.2)
ALBUMIN/GLOB SERPL: 1.4 G/DL
ALP SERPL-CCNC: 60 U/L (ref 39–117)
ALT SERPL W P-5'-P-CCNC: 14 U/L (ref 1–33)
ANION GAP SERPL CALCULATED.3IONS-SCNC: 10 MMOL/L (ref 5–15)
AST SERPL-CCNC: 25 U/L (ref 1–32)
BASOPHILS # BLD AUTO: 0 10*3/MM3 (ref 0–0.2)
BASOPHILS NFR BLD AUTO: 0.7 % (ref 0–1.5)
BILIRUB SERPL-MCNC: 0.6 MG/DL (ref 0–1.2)
BUN SERPL-MCNC: 8 MG/DL (ref 6–20)
BUN/CREAT SERPL: 7.1 (ref 7–25)
CALCIUM SPEC-SCNC: 9.1 MG/DL (ref 8.6–10.5)
CHLORIDE SERPL-SCNC: 105 MMOL/L (ref 98–107)
CO2 SERPL-SCNC: 22 MMOL/L (ref 22–29)
CREAT SERPL-MCNC: 1.13 MG/DL (ref 0.57–1)
DEPRECATED RDW RBC AUTO: 44.2 FL (ref 37–54)
EOSINOPHIL # BLD AUTO: 0.1 10*3/MM3 (ref 0–0.4)
EOSINOPHIL NFR BLD AUTO: 1.3 % (ref 0.3–6.2)
ERYTHROCYTE [DISTWIDTH] IN BLOOD BY AUTOMATED COUNT: 13.8 % (ref 12.3–15.4)
GFR SERPL CREATININE-BSD FRML MDRD: 62 ML/MIN/1.73
GLOBULIN UR ELPH-MCNC: 2.7 GM/DL
GLUCOSE BLDC GLUCOMTR-MCNC: 104 MG/DL (ref 70–105)
GLUCOSE BLDC GLUCOMTR-MCNC: 80 MG/DL (ref 70–105)
GLUCOSE BLDC GLUCOMTR-MCNC: 82 MG/DL (ref 70–105)
GLUCOSE SERPL-MCNC: 79 MG/DL (ref 65–99)
HCT VFR BLD AUTO: 41.3 % (ref 34–46.6)
HGB BLD-MCNC: 14.2 G/DL (ref 12–15.9)
LYMPHOCYTES # BLD AUTO: 2.3 10*3/MM3 (ref 0.7–3.1)
LYMPHOCYTES NFR BLD AUTO: 41.2 % (ref 19.6–45.3)
MAGNESIUM SERPL-MCNC: 1.8 MG/DL (ref 1.6–2.6)
MCH RBC QN AUTO: 31.1 PG (ref 26.6–33)
MCHC RBC AUTO-ENTMCNC: 34.3 G/DL (ref 31.5–35.7)
MCV RBC AUTO: 90.6 FL (ref 79–97)
MONOCYTES # BLD AUTO: 0.5 10*3/MM3 (ref 0.1–0.9)
MONOCYTES NFR BLD AUTO: 8.8 % (ref 5–12)
NEUTROPHILS NFR BLD AUTO: 2.6 10*3/MM3 (ref 1.7–7)
NEUTROPHILS NFR BLD AUTO: 48 % (ref 42.7–76)
NRBC BLD AUTO-RTO: 0.2 /100 WBC (ref 0–0.2)
PLATELET # BLD AUTO: 160 10*3/MM3 (ref 140–450)
PMV BLD AUTO: 7.6 FL (ref 6–12)
POTASSIUM SERPL-SCNC: 4.5 MMOL/L (ref 3.5–5.2)
PROT SERPL-MCNC: 6.6 G/DL (ref 6–8.5)
RBC # BLD AUTO: 4.56 10*6/MM3 (ref 3.77–5.28)
SODIUM SERPL-SCNC: 137 MMOL/L (ref 136–145)
WBC # BLD AUTO: 5.5 10*3/MM3 (ref 3.4–10.8)

## 2021-06-02 PROCEDURE — 96372 THER/PROPH/DIAG INJ SC/IM: CPT

## 2021-06-02 PROCEDURE — 99214 OFFICE O/P EST MOD 30 MIN: CPT | Performed by: INTERNAL MEDICINE

## 2021-06-02 PROCEDURE — 99217 PR OBSERVATION CARE DISCHARGE MANAGEMENT: CPT | Performed by: INTERNAL MEDICINE

## 2021-06-02 PROCEDURE — 82962 GLUCOSE BLOOD TEST: CPT

## 2021-06-02 PROCEDURE — G0378 HOSPITAL OBSERVATION PER HR: HCPCS

## 2021-06-02 PROCEDURE — 80053 COMPREHEN METABOLIC PANEL: CPT | Performed by: INTERNAL MEDICINE

## 2021-06-02 PROCEDURE — 25010000002 ENOXAPARIN PER 10 MG: Performed by: NURSE PRACTITIONER

## 2021-06-02 PROCEDURE — 85025 COMPLETE CBC W/AUTO DIFF WBC: CPT | Performed by: INTERNAL MEDICINE

## 2021-06-02 PROCEDURE — 83735 ASSAY OF MAGNESIUM: CPT | Performed by: NURSE PRACTITIONER

## 2021-06-02 RX ORDER — TORSEMIDE 20 MG/1
60 TABLET ORAL DAILY
Status: ON HOLD
Start: 2021-06-02 | End: 2022-08-18

## 2021-06-02 RX ORDER — SPIRONOLACTONE 25 MG/1
25 TABLET ORAL DAILY
Status: ON HOLD
Start: 2021-06-02 | End: 2022-08-18

## 2021-06-02 RX ORDER — CHOLESTYRAMINE LIGHT 4 G/5.7G
1 POWDER, FOR SUSPENSION ORAL EVERY 12 HOURS PRN
Qty: 10 PACKET | Refills: 1 | Status: ON HOLD | OUTPATIENT
Start: 2021-06-02 | End: 2022-08-18

## 2021-06-02 RX ORDER — LEVOTHYROXINE SODIUM 0.07 MG/1
225 TABLET ORAL DAILY
Qty: 90 TABLET | Refills: 0 | Status: ON HOLD | OUTPATIENT
Start: 2021-06-03 | End: 2022-08-18

## 2021-06-02 RX ADMIN — ASPIRIN 81 MG: 81 TABLET, COATED ORAL at 09:23

## 2021-06-02 RX ADMIN — POTASSIUM CHLORIDE 20 MEQ: 1500 TABLET, EXTENDED RELEASE ORAL at 17:13

## 2021-06-02 RX ADMIN — RANOLAZINE 500 MG: 500 TABLET, FILM COATED, EXTENDED RELEASE ORAL at 09:30

## 2021-06-02 RX ADMIN — ALLOPURINOL 100 MG: 100 TABLET ORAL at 09:23

## 2021-06-02 RX ADMIN — TIZANIDINE 4 MG: 4 TABLET ORAL at 16:42

## 2021-06-02 RX ADMIN — ENOXAPARIN SODIUM 40 MG: 40 INJECTION SUBCUTANEOUS at 16:42

## 2021-06-02 RX ADMIN — FERROUS SULFATE TAB EC 324 MG (65 MG FE EQUIVALENT) 324 MG: 324 (65 FE) TABLET DELAYED RESPONSE at 07:52

## 2021-06-02 RX ADMIN — CLOPIDOGREL BISULFATE 75 MG: 75 TABLET ORAL at 09:23

## 2021-06-02 RX ADMIN — LEVOTHYROXINE SODIUM 225 MCG: 0.17 TABLET ORAL at 06:09

## 2021-06-02 RX ADMIN — CYANOCOBALAMIN TAB 1000 MCG 1000 MCG: 1000 TAB at 09:30

## 2021-06-02 RX ADMIN — CHOLESTYRAMINE 4 G: 4 POWDER, FOR SUSPENSION ORAL at 09:23

## 2021-06-02 RX ADMIN — PANTOPRAZOLE SODIUM 40 MG: 40 TABLET, DELAYED RELEASE ORAL at 09:29

## 2021-06-02 RX ADMIN — POTASSIUM CHLORIDE 20 MEQ: 1500 TABLET, EXTENDED RELEASE ORAL at 07:52

## 2021-06-02 RX ADMIN — Medication 1000 UNITS: at 09:23

## 2021-06-02 RX ADMIN — FOLIC ACID 1 MG: 1 TABLET ORAL at 09:23

## 2021-06-02 RX ADMIN — THERA TABS 1 TABLET: TAB at 09:23

## 2021-06-02 RX ADMIN — TIZANIDINE 4 MG: 4 TABLET ORAL at 09:29

## 2021-06-02 RX ADMIN — Medication 10 ML: at 09:30

## 2021-06-02 NOTE — CASE MANAGEMENT/SOCIAL WORK
Continued Stay Note  MIRIAM Olivares     Patient Name: Rafael Nunes  MRN: [id number]  Today's Date: 6/2/2021    Admit Date: 5/31/2021    Discharge Plan     Row Name 06/02/21 1042       Plan    Plan  DC Plan: Home w/family. Referral to [facility], accepted.        Chart review only.       Expected Discharge Date and Time     Expected Discharge Date Expected Discharge Time    Jun 2, 2021             Hugo Cee RN

## 2021-06-02 NOTE — PLAN OF CARE
Goal Outcome Evaluation:  Plan of Care Reviewed With: patient     Outcome Summary: pt been in bed resting throughout day. pt has had no complaints today. pt increased to regular diet and tolerated well after lunch- no complaints of nausea. pt still having diarrhea this morning but nothing this afternoon. will continue to momitor

## 2021-06-02 NOTE — PROGRESS NOTES
[facility] Medicine Services Daily Progress Note      Hospitalist Team  LOS 0 days      Patient Care Team:  Phani Adames MD as PCP - General (Internal Medicine)  Ashish Smalls MD as Consulting Physician (Nephrology)  Wayne Luna MD as Consulting Physician (Cardiology)  Héctor Eckert MD as Consulting Physician (Cardiac Electrophysiology)  Lane Stock MD as Surgeon (Cardiothoracic Surgery)    Patient Location: 207/1      Subjective   Subjective     Chief Complaint / Subjective  Chief Complaint   Patient presents with   • Dizziness         Brief Synopsis of Hospital Course/HPI  Ms. Nunes is a 46 y.o. female with a history of CAD s/p CABG, HTN, HLD, dilated cardiomyopathy, chronic systolic CHF s/p AICD placement, aortic valve regurgitation s/p AVR, mitral valve regurgitation, Type 2 DM, and chronic pain who presents to [facility] ED on 05/31/2021 complaining of dizziness. The patient reports a 3-day history of diarrhea. She reports associated nausea, but denies vomiting. She states her abdomen cramps up when she has diarrhea, but denies any current abdominal pain. She denies fever or chills. She states yesterday she was very tired and slept most of the day. She reports dizziness with near syncope, which is worse with ambulation. She reports chronic chest pain. She denies any other complaints. She denies any other exacerbating or alleviating factors.      Workup in the ER revealed acute hypokalemia with K 2.6. The patient was started on IV potassium runs. Her blood pressure was borderline low at 98/70. Other labs significant for hgb 16.8 and calcium 10.7. She was given 1 liter normal saline. Her troponin and proBNP were within normal limits. Her EKG showed sinus rhythm. Her COVID-19 was negative. Her CXR showed no acute findings. Cardiology was consulted. She was admitted to the Hospitalist team for further evaluation and  "treatment      Date::      6/1  Still having diarrhea  Most recent episode this morning  She reports nausea.  She does not want to upgrade her diet  CT abdomen pelvis ordered  Stool studies negative/cardiologist been here and saw the patient  Denies shortness of breath  We will continue gentle hydration.  Discussed with Dr. Gordon  Monitor for fluid overload    6/2  Based on patient's information, patient probably improving now regarding her diarrhea  We will start diabetic diet  May resume diuretics/cardiac medications once okay with cardiology      ROS  All other systems reviewed and negative    Objective   Objective      Vital Signs  Temp:  [97.7 °F (36.5 °C)-97.8 °F (36.6 °C)] 97.7 °F (36.5 °C)  Heart Rate:  [59-67] 64  Resp:  [14-16] 16  BP: (120-155)/() 155/101  Oxygen Therapy  SpO2: 100 %  Pulse Oximetry Type: Intermittent  Device (Oxygen Therapy): room air  Flowsheet Rows      First Filed Value   Admission Height  170.2 cm (67\") Documented at 05/31/2021 0808   Admission Weight  83.7 kg (184 lb 8.4 oz) Documented at 05/31/2021 0808        Intake & Output (last 3 days)       05/30 0701 - 05/31 0700 05/31 0701 - 06/01 0700 06/01 0701 - 06/02 0700 06/02 0701 - 06/03 0700    P.O.  120 120     Total Intake(mL/kg)  120 (1.4) 120 (1.4)     Net  +120 +120             Urine Unmeasured Occurrence  1 x      Stool Unmeasured Occurrence  1 x          Lines, Drains & Airways    Active LDAs     Name:   Placement date:   Placement time:   Site:   Days:    Peripheral IV 05/31/21 0848 Right Antecubital   05/31/21    0848    Antecubital   1                  Physical Exam:    Physical Exam  Vitals and nursing note reviewed.   Constitutional:       General: She is not in acute distress.     Appearance: Normal appearance. She is well-developed. She is not ill-appearing, toxic-appearing or diaphoretic.   HENT:      Head: Normocephalic and atraumatic.      Right Ear: Ear canal and external ear normal.      Left Ear: Ear canal " and external ear normal.      Nose: Nose normal. No congestion or rhinorrhea.      Mouth/Throat:      Mouth: Mucous membranes are moist.      Pharynx: No oropharyngeal exudate.   Eyes:      General: No scleral icterus.        Right eye: No discharge.         Left eye: No discharge.      Extraocular Movements: Extraocular movements intact.      Conjunctiva/sclera: Conjunctivae normal.      Pupils: Pupils are equal, round, and reactive to light.   Neck:      Thyroid: No thyromegaly.      Vascular: No carotid bruit or JVD.      Trachea: No tracheal deviation.   Cardiovascular:      Rate and Rhythm: Normal rate and regular rhythm.      Pulses: Normal pulses.      Heart sounds: Normal heart sounds. No murmur heard.   No friction rub. No gallop.    Pulmonary:      Effort: Pulmonary effort is normal. No respiratory distress.      Breath sounds: Normal breath sounds. No stridor. No wheezing, rhonchi or rales.   Chest:      Chest wall: No tenderness.   Abdominal:      General: Bowel sounds are normal. There is no distension.      Palpations: Abdomen is soft. There is no mass.      Tenderness: There is no abdominal tenderness. There is no guarding or rebound.      Hernia: No hernia is present.   Musculoskeletal:         General: No swelling, tenderness, deformity or signs of injury. Normal range of motion.      Cervical back: Normal range of motion and neck supple. No rigidity. No muscular tenderness.      Right lower leg: No edema.      Left lower leg: No edema.   Lymphadenopathy:      Cervical: No cervical adenopathy.   Skin:     General: Skin is warm and dry.      Coloration: Skin is not jaundiced or pale.      Findings: No bruising, erythema or rash.   Neurological:      General: No focal deficit present.      Mental Status: She is alert and oriented to person, place, and time. Mental status is at baseline.      Cranial Nerves: No cranial nerve deficit.      Sensory: No sensory deficit.      Motor: No weakness or abnormal  muscle tone.      Coordination: Coordination normal.   Psychiatric:         Mood and Affect: Mood normal.         Behavior: Behavior normal.         Thought Content: Thought content normal.         Judgment: Judgment normal.               Procedures:              Results Review:     I reviewed the patient's new clinical results.      Lab Results (last 24 hours)     Procedure Component Value Units Date/Time    Blood Culture - Blood, Arm, Right [[id number]] Collected: 05/31/21 0848    Specimen: Blood from Arm, Right Updated: 06/02/21 0901     Blood Culture No growth at 2 days    Blood Culture - Blood, Arm, Left [[id number]] Collected: 05/31/21 0848    Specimen: Blood from Arm, Left Updated: 06/02/21 0901     Blood Culture No growth at 2 days    POC Glucose Once [[id number]]  (Normal) Collected: 06/02/21 0758    Specimen: Blood Updated: 06/02/21 0759     Glucose 82 mg/dL      Comment: Serial Number: [id number]:  [id number]       Comprehensive Metabolic Panel [[id number]]  (Abnormal) Collected: 06/02/21 0528    Specimen: Blood Updated: 06/02/21 0620     Glucose 79 mg/dL      BUN 8 mg/dL      Creatinine 1.13 mg/dL      Sodium 137 mmol/L      Potassium 4.5 mmol/L      Chloride 105 mmol/L      CO2 22.0 mmol/L      Calcium 9.1 mg/dL      Total Protein 6.6 g/dL      Albumin 3.90 g/dL      ALT (SGPT) 14 U/L      AST (SGOT) 25 U/L      Alkaline Phosphatase 60 U/L      Total Bilirubin 0.6 mg/dL      eGFR   Amer 62 mL/min/1.73      Globulin 2.7 gm/dL      A/G Ratio 1.4 g/dL      BUN/Creatinine Ratio 7.1     Anion Gap 10.0 mmol/L     Narrative:      GFR Normal >60  Chronic Kidney Disease <60  Kidney Failure <15      Magnesium [[id number]]  (Normal) Collected: 06/02/21 0528    Specimen: Blood Updated: 06/02/21 0619     Magnesium 1.8 mg/dL     CBC & Differential [[id number]]  (Normal) Collected: 06/02/21 0528    Specimen: Blood Updated: 06/02/21 0550    Narrative:      The following orders were created for panel order CBC &  Differential.  Procedure                               Abnormality         Status                     ---------                               -----------         ------                     CBC Auto Differential[[id number]]        Normal              Final result                 Please view results for these tests on the individual orders.    CBC Auto Differential [[id number]]  (Normal) Collected: 06/02/21 0528    Specimen: Blood Updated: 06/02/21 0550     WBC 5.50 10*3/mm3      RBC 4.56 10*6/mm3      Hemoglobin 14.2 g/dL      Hematocrit 41.3 %      MCV 90.6 fL      MCH 31.1 pg      MCHC 34.3 g/dL      RDW 13.8 %      RDW-SD 44.2 fl      MPV 7.6 fL      Platelets 160 10*3/mm3      Neutrophil % 48.0 %      Lymphocyte % 41.2 %      Monocyte % 8.8 %      Eosinophil % 1.3 %      Basophil % 0.7 %      Neutrophils, Absolute 2.60 10*3/mm3      Lymphocytes, Absolute 2.30 10*3/mm3      Monocytes, Absolute 0.50 10*3/mm3      Eosinophils, Absolute 0.10 10*3/mm3      Basophils, Absolute 0.00 10*3/mm3      nRBC 0.2 /100 WBC     POC Glucose Once [[id number]]  (Normal) Collected: 06/01/21 2025    Specimen: Blood Updated: 06/01/21 2026     Glucose 103 mg/dL      Comment: Serial Number: [id number]:  [id number]       POC Glucose Once [[id number]]  (Normal) Collected: 06/01/21 1653    Specimen: Blood Updated: 06/01/21 1655     Glucose 103 mg/dL      Comment: Serial Number: [id number]:  [id number]           Hemoglobin A1C   Date Value Ref Range Status   05/31/2021 6.2 (H) 3.5 - 5.6 % Final               No results found for: LIPASE  Lab Results   Component Value Date    CHOL 278 (H) 11/26/2020    TRIG 178 (H) 11/26/2020    HDL 66 (H) 11/26/2020     (H) 11/26/2020       Lab Results   Lab Value Date/Time    FINALDX  12/27/2019 1040     Aortic valve leaflets, valvuloplasty:    Benign endothelial lined fibrous tissue with degenerative changes, chronic inflammation and atheromatous plaque      consistent with clinical  history      ELIF/cec         Microbiology Results (last 10 days)     Procedure Component Value - Date/Time    Clostridium Difficile Toxin - Stool, Per Rectum [[id number]]  (Normal) Collected: 05/31/21 2134    Lab Status: Final result Specimen: Stool from Per Rectum Updated: 06/01/21 0746    Narrative:      The following orders were created for panel order Clostridium Difficile Toxin - Stool, Per Rectum.  Procedure                               Abnormality         Status                     ---------                               -----------         ------                     Clostridium Difficile EI...[[id number]]  Normal              Final result                 Please view results for these tests on the individual orders.    Clostridium Difficile EIA - Stool, Per Rectum [[id number]]  (Normal) Collected: 05/31/21 2134    Lab Status: Final result Specimen: Stool from Per Rectum Updated: 06/01/21 0746     C Diff GDH / Toxin Negative    Gastrointestinal Panel, PCR - Stool, Per Rectum [[id number]]  (Normal) Collected: 05/31/21 2134    Lab Status: Final result Specimen: Stool from Per Rectum Updated: 05/31/21 2320     Campylobacter Not Detected     Plesiomonas shigelloides Not Detected     Salmonella Not Detected     Vibrio Not Detected     Vibrio cholerae Not Detected     Yersinia enterocolitica Not Detected     Enteroaggregative E. coli (EAEC) Not Detected     Enteropathogenic E. coli (EPEC) Not Detected     Enterotoxigenic E. coli (ETEC) lt/st Not Detected     Shiga-like toxin-producing E. coli (STEC) stx1/stx2 Not Detected     Shigella/Enteroinvasive E. coli (EIEC) Not Detected     Cryptosporidium Not Detected     Cyclospora cayetanensis Not Detected     Entamoeba histolytica Not Detected     Giardia lamblia Not Detected     Adenovirus F40/41 Not Detected     Astrovirus Not Detected     Norovirus GI/GII Not Detected     Rotavirus A Not Detected     Sapovirus (I, II, IV or V) Not Detected    Narrative:      If Aeromonas,  Staphylococcus aureus or Bacillus cereus are suspected, please order [id number]: Stool Culture, Aeromonas, S aureus, B Cereus.    COVID-19, [facility]OTT IN-HOUSE,NASAL Swab (NO TRANSPORT MEDIA) 2 HR TAT - Swab, Nasopharynx [[id number]]  (Normal) Collected: 05/31/21 0903    Lab Status: Final result Specimen: Swab from Nasopharynx Updated: 05/31/21 0919     COVID19 Presumptive Negative    Narrative:      Fact sheet for providers: https://www.fda.gov/media/903540/download     Fact sheet for patients: https://www.fda.gov/media/523020/download    Test performed by PCR.  If inconsistent with clinical signs and symptoms patient should be tested with different authorized molecular test.    Blood Culture - Blood, Arm, Right [[id number]] Collected: 05/31/21 0848    Lab Status: Preliminary result Specimen: Blood from Arm, Right Updated: 06/02/21 0901     Blood Culture No growth at 2 days    Blood Culture - Blood, Arm, Left [[id number]] Collected: 05/31/21 0848    Lab Status: Preliminary result Specimen: Blood from Arm, Left Updated: 06/02/21 0901     Blood Culture No growth at 2 days          ECG/EMG Results (most recent)     Procedure Component Value Units Date/Time    ECG 12 Lead [[id number]] Collected: 05/31/21 0824     Updated: 05/31/21 0825     QT Interval 464 ms     Narrative:      HEART RATE= 62  bpm  RR Interval= 964  ms  NC Interval= 164  ms  P Horizontal Axis= -4  deg  P Front Axis= 50  deg  QRSD Interval= 103  ms  QT Interval= 464  ms  QRS Axis= -47  deg  T Wave Axis= 140  deg  - ABNORMAL ECG -  Sinus rhythm  Probable left atrial enlargement  Left ventricular hypertrophy  Anterior Q waves, possibly due to LVH  Abnormal T, consider ischemia, lateral leads  When compared with ECG of 18-Jan-2021 14:48:20,  No significant change  Electronically Signed By:   Date and Time of Study: 2021-05-31 08:24:49    ECG 12 Lead [[id number]] Collected: 06/01/21 0515     Updated: 06/01/21 0517     QT Interval 494 ms     Narrative:      HEART RATE=  48  bpm  RR Interval= 1261  ms  NH Interval= 62  ms  P Horizontal Axis=   deg  P Front Axis= 180  deg  QRSD Interval= 110  ms  QT Interval= 494  ms  QRS Axis= -44  deg  T Wave Axis= 108  deg  - ABNORMAL ECG -  Atrial-sensed ventricular-paced rhythm  Electronically Signed By:   Date and Time of Study: 2021-06-01 05:15:48          Results for orders placed during the hospital encounter of 05/16/20    Duplex Venous Lower Extremity - Bilateral CAR    Interpretation Summary  · Normal bilateral lower extremity venous duplex scan.      Results for orders placed during the hospital encounter of 02/22/21    Adult Transthoracic Echo Complete w/ Color, Spectral and Contrast if Necessary Per Protocol    Interpretation Summary  · The left ventricular cavity is mildly dilated.  · Estimated left ventricular EF was in agreement with the calculated left ventricular EF. Left ventricular ejection fraction appears to be 26 - 30%. Left ventricular systolic function is severely decreased.  · Left ventricular diastolic function is consistent with (grade II w/high LAP) pseudonormalization.  · The right ventricular cavity is mildly dilated.  · Mildly reduced right ventricular systolic function noted.  · Left atrial volume is mildly increased.  · There is a bioprosthetic aortic valve present.  · Mild to moderate aortic valve stenosis is present.  · Abnormal mitral valve structure consistent with dilated annulus.  · Moderate to severe mitral valve regurgitation is present with a posteriorly-directed jet noted.      CT Abdomen Pelvis Without Contrast    Result Date: 6/1/2021  1. There are 2 calcifications near the right upper-mid ureter. I think these are vascular. No definite ureteral stone. Negative for hydronephrosis. 2. Mild chronic changes in the lower lungs. 3. Mild cardiomegaly. 4. Additional findings as reported above.  Electronically Signed By-Veronica Le MD On:6/1/2021 8:08 PM This report was finalized on [id number] by  Veronica  MD Rey.    XR Chest 1 View    Result Date: 5/31/2021  No acute cardiopulmonary process.  Electronically Signed By-Chidi Walton MD On:5/31/2021 9:13 AM This report was finalized on [id number] by  Chidi Walton MD.          Xrays, labs reviewed personally by physician.    Medication Review:   I have reviewed the patient's current medication list      Scheduled Meds  allopurinol, 100 mg, Oral, Daily  aspirin, 81 mg, Oral, Daily  cholecalciferol, 1,000 Units, Oral, Daily  cholestyramine light, 1 packet, Oral, Q12H  clopidogrel, 75 mg, Oral, Daily  enoxaparin, 40 mg, Subcutaneous, Q24H  ferrous sulfate, 324 mg, Oral, Daily With Breakfast  folic acid, 1 mg, Oral, Daily  insulin lispro, 0-9 Units, Subcutaneous, TID AC  levothyroxine, 225 mcg, Oral, Daily  montelukast, 10 mg, Oral, Nightly  multivitamin, 1 tablet, Oral, Daily  pantoprazole, 40 mg, Oral, Daily  potassium chloride, 20 mEq, Oral, BID With Meals  potassium chloride, 10 mEq, Intravenous, Once   And  potassium chloride, 10 mEq, Intravenous, Once   And  potassium chloride, 10 mEq, Intravenous, Once   And  potassium chloride, 10 mEq, Intravenous, Once  ranolazine, 500 mg, Oral, Q12H  rosuvastatin, 40 mg, Oral, Nightly  sodium chloride, 10 mL, Intravenous, Q12H  tiZANidine, 4 mg, Oral, TID  vitamin B-12, 1,000 mcg, Oral, Daily        Meds Infusions       Meds PRN  •  acetaminophen **OR** acetaminophen **OR** acetaminophen  •  aluminum-magnesium hydroxide-simethicone  •  cyclobenzaprine  •  dextrose  •  dextrose  •  diphenhydrAMINE  •  docusate sodium  •  glucagon (human recombinant)  •  insulin lispro **AND** insulin lispro  •  ipratropium-albuterol  •  ketorolac  •  magnesium sulfate **OR** magnesium sulfate in D5W 1g/100mL (PREMIX)  •  melatonin  •  nitroglycerin  •  ondansetron **OR** ondansetron  •  oxyCODONE  •  potassium chloride **OR** potassium chloride **OR** potassium chloride  •  [COMPLETED] Insert peripheral IV **AND** sodium chloride  •  sodium  chloride        Assessment/Plan   Assessment/Plan     Active Hospital Problems    Diagnosis  POA   • **Dizziness with near syncope [R42]  Yes   • Hypokalemia [E87.6]  Yes   • Acute diarrhea [R19.7]  Yes   • Chronic pain syndrome [G89.4]  Yes   • Mixed hyperlipidemia [E78.2]  Yes   • Chronic systolic congestive heart failure (CMS/[facility]) [I50.22]  Yes   • Type 2 diabetes mellitus without complication, without long-term current use of insulin (CMS/[facility]) [E11.9]  Yes   • Nonrheumatic mitral valve regurgitation [I34.0]  Yes   • CKD (chronic kidney disease) stage 2, GFR 60-89 ml/min [N18.2]  Yes   • GERD without esophagitis [K21.9]  Yes   • Hypothyroidism (acquired) [E03.9]  Yes   • Coronary artery disease of native artery of native heart with stable angina pectoris (CMS/[facility]) [I25.118]  Yes   • ICD (implantable cardioverter-defibrillator), dual, in situ [Z95.810]  Yes   • COPD (chronic obstructive pulmonary disease) (CMS/[facility]) [J44.9]  Yes   • Essential hypertension [I10]  Yes   • Cardiomyopathy, dilated (CMS/[facility]) [I42.0]  Yes      Resolved Hospital Problems   No resolved problems to display.       MEDICAL DECISION MAKING COMPLEXITY BY PROBLEM:            Dizziness with near syncope  -suspect secondary to dehydration from acute diarrhea  -gentle IV hydration, hold diuretics  -check serial troponin  -check orthostatic vitals  -PT to eval and treat  -cardiology consulted   -continuous cardiac monitoring      Acute diarrhea  -possible acute gastroenteritis  -Negative stool for GI panel and C. diff  -clear liquid diet, advance as tolerated  -Check CT abdomen pelvis  If not improving will ask GI for evaluation    Hypokalemia  -secondary to diuretics and diarrhea  -replace per protocol and monitor      Chronic systolic congestive heart failure   Cardiomyopathy, dilated  ICD (implantable cardioverter-defibrillator), dual, in situ  -hold carvedilol, Entresto, spironolactone, and torsemide for now d/t borderline low BP and  dehydration; restart as BP allows  -monitor I&Os     Nonrheumatic mitral valve regurgitation, severe  H/o aortic valve regurgitation, s/p AVR  -CTS previously recommended possible mitraclip application, but patient got 2nd opinion from [facility] and opted for medical optimization     Coronary artery disease of native artery of native heart with stable angina pectoris / Mixed hyperlipidemia / Essential hypertension, chronic  -continue aspirin, Plavix, Ranexa, and statin   -antihypertensives on hold as above  -monitor BP     COPD (chronic obstructive pulmonary disease), former smoker  -stable without exacerbation  -continue Singulair   -PRN DuoNebs     GERD without esophagitis  -continue PPI     Hypothyroidism (acquired)  -continue levothyroxine   -TSH pending     CKD (chronic kidney disease) stage 2, GFR 60-89 ml/min  -stable     Type 2 diabetes mellitus without complication, without long-term current use of insulin   -check A1c  -accu checks AC&HS with SSI  -hold Farxiga and metformin for now; restart once patient tolerating regular diet     Chronic pain syndrome  -continue oxycodone (INSPECT reviewed), tizanidine, and Flexeril                             VTE Prophylaxis -   Mechanical Order History:     None      Pharmalogical Order History:      Ordered     Dose Route Frequency Stop    05/31/21 1124  enoxaparin (LOVENOX) syringe 40 mg      40 mg SC Every 24 Hours --                  Code Status -   Code Status and Medical Interventions:   Ordered at: 05/31/21 1124     Code Status:    CPR     Medical Interventions (Level of Support Prior to Arrest):    Full       This patient has been examined wearing appropriate Personal Protective Equipment and discussed with hospital infection control department. 06/02/21        Discharge Planning  Home        Electronically signed by Lee Barry MD, 06/02/21, 10:44 EDT.  [facility] Hospitalist Team

## 2021-06-02 NOTE — PLAN OF CARE
Goal Outcome Evaluation:   Pt resting comfortably throughout the night. Denies complications. Discussed potential treatment plan awaiting abd CT results. Tolerating fluids and PO intake well.

## 2021-06-02 NOTE — DISCHARGE SUMMARY
[facility] Medicine Services  DISCHARGE SUMMARY        Prepared For PCP:  Phani Adames MD    Patient Name: Rafael Nunes  : 1975  MRN: [id number]      Date of Admission:   2021    Date of Discharge:  2021    Length of stay:  LOS: 0 days     Hospital Course     Presenting Problem:   Dehydration [E86.0]  Hypokalemia [E87.6]  Weakness [R53.1]  Diarrhea, unspecified type [R19.7]      Active Hospital Problems    Diagnosis  POA   • **Dizziness with near syncope [R42]  Yes   • Hypokalemia [E87.6]  Yes   • Acute diarrhea [R19.7]  Yes   • Chronic pain syndrome [G89.4]  Yes   • Mixed hyperlipidemia [E78.2]  Yes   • Chronic systolic congestive heart failure (CMS/HCC) [I50.22]  Yes   • Type 2 diabetes mellitus without complication, without long-term current use of insulin (CMS/[facility]) [E11.9]  Yes   • Nonrheumatic mitral valve regurgitation [I34.0]  Yes   • CKD (chronic kidney disease) stage 2, GFR 60-89 ml/min [N18.2]  Yes   • GERD without esophagitis [K21.9]  Yes   • Hypothyroidism (acquired) [E03.9]  Yes   • Coronary artery disease of native artery of native heart with stable angina pectoris (CMS/[facility]) [I25.118]  Yes   • ICD (implantable cardioverter-defibrillator), dual, in situ [Z95.810]  Yes   • COPD (chronic obstructive pulmonary disease) (CMS/[facility]) [J44.9]  Yes   • Essential hypertension [I10]  Yes   • Cardiomyopathy, dilated (CMS/[facility]) [I42.0]  Yes      Resolved Hospital Problems   No resolved problems to display.       Dizziness with near syncope  -suspect secondary to dehydration from acute diarrhea  -gentle IV hydration, hold diuretics  -check serial troponin  -check orthostatic vitals  -PT to eval and treat  -cardiology consulted   -continuous cardiac monitoring      Acute diarrhea  -possible acute gastroenteritis  -Negative stool for GI panel and C. diff  -clear liquid diet, advance as tolerated  -Check CT abdomen pelvis  If not improving will ask GI for  evaluation     Hypokalemia  -secondary to diuretics and diarrhea  -replace per protocol and monitor      Chronic systolic congestive heart failure   Cardiomyopathy, dilated  ICD (implantable cardioverter-defibrillator), dual, in situ  -hold carvedilol, Entresto, spironolactone, and torsemide for now d/t borderline low BP and dehydration; restart as BP allows  -monitor I&Os     Nonrheumatic mitral valve regurgitation, severe  H/o aortic valve regurgitation, s/p AVR  -CTS previously recommended possible mitraclip application, but patient got 2nd opinion from [facility] and opted for medical optimization     Coronary artery disease of native artery of native heart with stable angina pectoris / Mixed hyperlipidemia / Essential hypertension, chronic  -continue aspirin, Plavix, Ranexa, and statin   -antihypertensives on hold as above  -monitor BP     COPD (chronic obstructive pulmonary disease), former smoker  -stable without exacerbation  -continue Singulair   -PRN DuoNebs     GERD without esophagitis  -continue PPI     Hypothyroidism (acquired)  -continue levothyroxine   -TSH pending     CKD (chronic kidney disease) stage 2, GFR 60-89 ml/min  -stable     Type 2 diabetes mellitus without complication, without long-term current use of insulin   -check A1c  -accu checks AC&HS with SSI  -hold Farxiga and metformin for now; restart once patient tolerating regular diet     Chronic pain syndrome  -continue oxycodone (INSPECT reviewed), tizanidine, and Flexeril           Hospital Course:  Rafael Nunes is a 46 y.o. female       Brief Synopsis of Hospital Course/HPI  Ms. Nunes is a 46 y.o. female with a history of CAD s/p CABG, HTN, HLD, dilated cardiomyopathy, chronic systolic CHF s/p AICD placement, aortic valve regurgitation s/p AVR, mitral valve regurgitation, Type 2 DM, and chronic pain who presents to [facility] ED on 05/31/2021 complaining of dizziness. The patient reports a 3-day  history of diarrhea. She reports associated nausea, but denies vomiting. She states her abdomen cramps up when she has diarrhea, but denies any current abdominal pain. She denies fever or chills. She states yesterday she was very tired and slept most of the day. She reports dizziness with near syncope, which is worse with ambulation. She reports chronic chest pain. She denies any other complaints. She denies any other exacerbating or alleviating factors.      Workup in the ER revealed acute hypokalemia with K 2.6. The patient was started on IV potassium runs. Her blood pressure was borderline low at 98/70. Other labs significant for hgb 16.8 and calcium 10.7. She was given 1 liter normal saline. Her troponin and proBNP were within normal limits. Her EKG showed sinus rhythm. Her COVID-19 was negative. Her CXR showed no acute findings. Cardiology was consulted. She was admitted to the Hospitalist team for further evaluation and treatment        Date::       6/1  Still having diarrhea  Most recent episode this morning  She reports nausea.  She does not want to upgrade her diet  CT abdomen pelvis ordered  Stool studies negative/cardiologist been here and saw the patient  Denies shortness of breath  We will continue gentle hydration.  Discussed with Dr. Knapp  Monitor for fluid overload    6/2  Patient reports diarrhea yesterday x2.  She denies any further bowel movement this morning slime she is agreeable on advancing her diet to diabetic regular diet.  I would prefer to be lactose-free for now at least 10 to 14 days  If she is able to tolerate diet and cleared by cardiology may discharge home later today  I would be able to resume her torsemide and Aldactone Coreg and Entresto on discharge if no contraindications from the cardiology standpoint.  Will defer details to cardiology service.  Patient will need to monitor her blood sugar blood pressure urine output or body weight and follow-up with heart failure clinic as  planned.  We will add cholestyramine as needed.  CT abdomen pelvis shows no acute intra-abdominal abnormality explain her diarrhea  No culprit medications also that can explain her diarrhea.  Suspect probably viral and should improve within the next 7 days.  Patient advised to follow-up with GI if persistent symptoms or recurrent abdominal discomfort and diarrhea beyond 2 weeks.  Of note the patient had uncontrolled hypothyroidism and TSH was elevated and her Synthroid increased to 225 mcg daily  We will need reassessment of her thyroid function in 3 to 6 weeks.      Recommendation for Outpatient Providers:       Monitor blood glucose AC at bedtime  Monitor blood pressure and heart rate  Daily weight or every other day  Follow-up with heart failure clinic  Follow-up TSH in 3 to 6 weeks  Follow-up with primary care physician in 1 to 2 weeks.  Preferably with repeat labs protein chemistry and magnesium    Reasons For Change In Medications and Indications for New Medications:        Day of Discharge     HPI:       Vital Signs:   Temp:  [97.7 °F (36.5 °C)-97.8 °F (36.6 °C)] 97.7 °F (36.5 °C)  Heart Rate:  [59-67] 64  Resp:  [14-16] 16  BP: (120-155)/() 155/101     Physical Exam:  Physical Exam  Abdomen soft nontender  Cardiopulmonary exam unremarkable    Pertinent  and/or Most Recent Results     Results from last 7 days   Lab Units 06/02/21  0528 06/01/21  0539 05/31/21  1949 05/31/21  1417 05/31/21  0848   WBC 10*3/mm3 5.50 5.90  --   --  6.40   HEMOGLOBIN g/dL 14.2 14.2  --   --  16.8*   HEMATOCRIT % 41.3 40.1  --   --  48.5*   PLATELETS 10*3/mm3 160 171  --   --  200   SODIUM mmol/L 137 138  --  137 136   POTASSIUM mmol/L 4.5 3.6 3.6 3.3* 2.6*   CHLORIDE mmol/L 105 101  --  96* 94*   CO2 mmol/L 22.0 26.0  --  23.0 30.0*   BUN mg/dL 8 16  --  26* 29*   CREATININE mg/dL 1.13* 1.26*  --  1.42* 1.60*   GLUCOSE mg/dL 79 119*  --  126* 114*   CALCIUM mg/dL 9.1 9.6  --  10.0 10.7*     Results from last 7 days   Lab  Units 06/02/21  0528 05/31/21  0848   BILIRUBIN mg/dL 0.6 1.1   ALK PHOS U/L 60 76   ALT (SGPT) U/L 14 19   AST (SGOT) U/L 25 35*           Invalid input(s): TG, LDLCALC, LDLREALC  Results from last 7 days   Lab Units 05/31/21  1949 05/31/21  1417 05/31/21  0850 05/31/21  0848   TSH uIU/mL  --   --   --  28.090*   HEMOGLOBIN A1C %  --   --   --  6.2*   PROBNP pg/mL  --   --   --  98.8   TROPONIN T ng/mL <0.010 <0.010  --  <0.010   LACTATE mmol/L  --   --  0.6  --        Brief Urine Lab Results  (Last result in the past 365 days)      Color   Clarity   Blood   Leuk Est   Nitrite   Protein   CREAT   Urine HCG        07/21/20 0919 Yellow Clear Moderate (2+) Negative Negative >=300 mg/dL (3+)               Microbiology Results Abnormal     Procedure Component Value - Date/Time    Blood Culture - Blood, Arm, Right [[id number]] Collected: 05/31/21 0848    Lab Status: Preliminary result Specimen: Blood from Arm, Right Updated: 06/02/21 0901     Blood Culture No growth at 2 days    Blood Culture - Blood, Arm, Left [[id number]] Collected: 05/31/21 0848    Lab Status: Preliminary result Specimen: Blood from Arm, Left Updated: 06/02/21 0901     Blood Culture No growth at 2 days    Clostridium Difficile Toxin - Stool, Per Rectum [[id number]]  (Normal) Collected: 05/31/21 2134    Lab Status: Final result Specimen: Stool from Per Rectum Updated: 06/01/21 0746    Narrative:      The following orders were created for panel order Clostridium Difficile Toxin - Stool, Per Rectum.  Procedure                               Abnormality         Status                     ---------                               -----------         ------                     Clostridium Difficile EI...[[id number]]  Normal              Final result                 Please view results for these tests on the individual orders.    Clostridium Difficile EIA - Stool, Per Rectum [[id number]]  (Normal) Collected: 05/31/21 2134    Lab Status: Final result Specimen: Stool  from Per Rectum Updated: 06/01/21 0746     C Diff GDH / Toxin Negative    Gastrointestinal Panel, PCR - Stool, Per Rectum [[id number]]  (Normal) Collected: 05/31/21 2134    Lab Status: Final result Specimen: Stool from Per Rectum Updated: 05/31/21 2320     Campylobacter Not Detected     Plesiomonas shigelloides Not Detected     Salmonella Not Detected     Vibrio Not Detected     Vibrio cholerae Not Detected     Yersinia enterocolitica Not Detected     Enteroaggregative E. coli (EAEC) Not Detected     Enteropathogenic E. coli (EPEC) Not Detected     Enterotoxigenic E. coli (ETEC) lt/st Not Detected     Shiga-like toxin-producing E. coli (STEC) stx1/stx2 Not Detected     Shigella/Enteroinvasive E. coli (EIEC) Not Detected     Cryptosporidium Not Detected     Cyclospora cayetanensis Not Detected     Entamoeba histolytica Not Detected     Giardia lamblia Not Detected     Adenovirus F40/41 Not Detected     Astrovirus Not Detected     Norovirus GI/GII Not Detected     Rotavirus A Not Detected     Sapovirus (I, II, IV or V) Not Detected    Narrative:      If Aeromonas, Staphylococcus aureus or Bacillus cereus are suspected, please order [id number]: Stool Culture, Aeromonas, S aureus, B Cereus.    COVID-19, [facility]BBOTT IN-HOUSE,NASAL Swab (NO TRANSPORT MEDIA) 2 HR TAT - Swab, Nasopharynx [[id number]]  (Normal) Collected: 05/31/21 0903    Lab Status: Final result Specimen: Swab from Nasopharynx Updated: 05/31/21 0919     COVID19 Presumptive Negative    Narrative:      Fact sheet for providers: https://www.fda.gov/media/876273/download     Fact sheet for patients: https://www.fda.gov/media/588506/download    Test performed by PCR.  If inconsistent with clinical signs and symptoms patient should be tested with different authorized molecular test.          CT Abdomen Pelvis Without Contrast    Result Date: 6/1/2021  Impression: 1. There are 2 calcifications near the right upper-mid ureter. I think these are vascular. No definite ureteral  stone. Negative for hydronephrosis. 2. Mild chronic changes in the lower lungs. 3. Mild cardiomegaly. 4. Additional findings as reported above.  Electronically Signed By-Veronica Le MD On:6/1/2021 8:08 PM This report was finalized on [id number] by  Veronica Le MD.    XR Chest 1 View    Result Date: 5/31/2021  Impression: No acute cardiopulmonary process.  Electronically Signed By-Chidi Walton MD On:5/31/2021 9:13 AM This report was finalized on [id number] by  Chidi Walton MD.      Results for orders placed during the hospital encounter of 05/16/20    Duplex Venous Lower Extremity - Bilateral CAR    Interpretation Summary  · Normal bilateral lower extremity venous duplex scan.      Results for orders placed during the hospital encounter of 05/16/20    Duplex Venous Lower Extremity - Bilateral CAR    Interpretation Summary  · Normal bilateral lower extremity venous duplex scan.      Results for orders placed during the hospital encounter of 02/22/21    Adult Transthoracic Echo Complete w/ Color, Spectral and Contrast if Necessary Per Protocol    Interpretation Summary  · The left ventricular cavity is mildly dilated.  · Estimated left ventricular EF was in agreement with the calculated left ventricular EF. Left ventricular ejection fraction appears to be 26 - 30%. Left ventricular systolic function is severely decreased.  · Left ventricular diastolic function is consistent with (grade II w/high LAP) pseudonormalization.  · The right ventricular cavity is mildly dilated.  · Mildly reduced right ventricular systolic function noted.  · Left atrial volume is mildly increased.  · There is a bioprosthetic aortic valve present.  · Mild to moderate aortic valve stenosis is present.  · Abnormal mitral valve structure consistent with dilated annulus.  · Moderate to severe mitral valve regurgitation is present with a posteriorly-directed jet noted.              Test Results Pending at Discharge  Pending Labs      Order Current Status    Blood Culture - Blood, Arm, Left Preliminary result    Blood Culture - Blood, Arm, Right Preliminary result            Procedures Performed           Consults:   Consults     Date and Time Order Name Status Description    5/31/2021 10:30 AM Cardiology (on-call MD unless specified) Completed     5/31/2021 10:30 AM Hospitalist (on-call MD unless specified) Completed             Discharge Details        Discharge Medications      New Medications      Instructions Start Date   cholestyramine light 4 g packet   4 g, Oral, Every 12 Hours PRN         Changes to Medications      Instructions Start Date   levothyroxine 75 MCG tablet  Commonly known as: SYNTHROID, LEVOTHROID  What changed:   medication strength  how much to take   225 mcg, Oral, Daily   Start Date: Kassandra 3, 2021     spironolactone 25 MG tablet  Commonly known as: ALDACTONE  What changed: additional instructions   25 mg, Oral, Daily, Hold if persistent diarrhea.      torsemide 20 MG tablet  Commonly known as: DEMADEX  What changed: additional instructions   60 mg, Oral, Daily, Hold if persistent diarrhea         Continue These Medications      Instructions Start Date   allopurinol 100 MG tablet  Commonly known as: ZYLOPRIM   100 mg, Oral, Daily      aspirin 81 MG EC tablet   81 mg, Oral, Daily      Benadryl Allergy 25 mg capsule  Generic drug: diphenhydrAMINE   25 mg, Oral, Every 6 Hours PRN      carvedilol 6.25 MG tablet  Commonly known as: COREG   6.25 mg, Oral, 2 Times Daily With Meals      cholecalciferol 25 MCG (1000 UT) tablet  Commonly known as: VITAMIN D3   1,000 Units, Oral, Daily      clopidogrel 75 MG tablet  Commonly known as: PLAVIX   75 mg, Oral, Daily      cyclobenzaprine 10 MG tablet  Commonly known as: FLEXERIL   10 mg, Oral, 3 Times Daily PRN      docusate sodium 100 MG capsule  Commonly known as: COLACE   100 mg, Oral, 2 Times Daily PRN      Entresto  MG tablet  Generic drug: sacubitril-valsartan   1 tablet,  Oral, 2 Times Daily      Evolocumab solution prefilled syringe injection  Commonly known as: REPATHA   140 mg, Subcutaneous, Every 14 Days      Farxiga 10 MG tablet  Generic drug: Dapagliflozin Propanediol   10 mg, Oral, Daily      ferrous sulfate 325 (65 FE) MG tablet   65 mg, Oral, Daily With Breakfast      fluconazole 150 MG tablet  Commonly known as: DIFLUCAN   150 mg, Oral, Once As Needed      fluticasone 50 MCG/ACT nasal spray  Commonly known as: FLONASE   2 sprays, Nasal, Daily      folic acid 1 MG tablet  Commonly known as: FOLVITE   1 mg, Oral, Daily      metFORMIN 500 MG tablet  Commonly known as: GLUCOPHAGE   500 mg, Oral, Daily With Breakfast      montelukast 10 MG tablet  Commonly known as: SINGULAIR   10 mg, Oral, Nightly      multivitamin tablet tablet   1 tablet, Oral, Daily      nitroglycerin 0.4 MG SL tablet  Commonly known as: NITROSTAT   0.4 mg, Sublingual, Every 5 Minutes PRN, Take no more than 3 doses in 15 minutes.       oxyCODONE-acetaminophen 7.5-325 MG per tablet  Commonly known as: PERCOCET   1 tablet, Oral, Every 6 Hours PRN      pantoprazole 40 MG EC tablet  Commonly known as: PROTONIX   40 mg, Oral, Daily      potassium chloride 10 MEQ CR tablet   20 mEq, Oral, 2 Times Daily      Praluent 75 MG/ML solution auto-injector  Generic drug: Alirocumab   75 mg, Subcutaneous, Every 14 Days      ranolazine 500 MG 12 hr tablet  Commonly known as: RANEXA   500 mg, Oral, 2 Times Daily      rosuvastatin 40 MG tablet  Commonly known as: CRESTOR   40 mg, Oral, Daily      vitamin B-12 1000 MCG tablet  Commonly known as: CYANOCOBALAMIN   1,000 mcg, Oral, Daily      Zanaflex 4 MG capsule  Generic drug: TiZANidine   4 mg, Oral, 3 Times Daily             Allergies   Allergen Reactions   • Hydrocodone Hives   • Penicillin G Unknown (See Comments)   • Contrast Dye GI Intolerance     She is pretty sure it's the contrast dye that makes her super sick and vomiting after heart cath         Discharge  Disposition:  Home or Self Care    Diet:  Hospital:  Diet Order   Procedures   • Diet Regular, Diabetic/Consistent Carbs; Diabetic - Consistent Carb         Discharge Activity:         CODE STATUS:    Code Status and Medical Interventions:   Ordered at: 05/31/21 1124     Code Status:    CPR     Medical Interventions (Level of Support Prior to Arrest):    Full         Follow-up Appointments  Future Appointments   Date Time Provider Department Center   8/11/2021  9:30 AM Wayne Luna MD [facility]       Additional Instructions for the Follow-ups that You Need to Schedule     Ambulatory Referral to Home Health   As directed      Face to Face Visit Date: 6/1/2021    Follow-up provider for Plan of Care?: I treated the patient in an acute care facility and will not continue treatment after discharge.    Follow-up provider: PHANI JACQUES [[id number]]    Reason/Clinical Findings: dizziness with syncope    Describe mobility limitations that make leaving home difficult: weakness    Nursing/Therapeutic Services Requested: Skilled Nursing ([facility] eval and treat)    Skilled nursing orders: Other ([facility] eval and treat)    Frequency: 1 Week 1         Discharge Follow-up with PCP   As directed       Currently Documented PCP:    Phani Jacques MD    PCP Phone Number:    [phone]     Follow Up Details: 1 week         Discharge Follow-up with Specialty: GI as needed if recurrent diarrhea; 2 Weeks   As directed      Specialty: GI as needed if recurrent diarrhea    Follow Up: 2 Weeks                 Condition on Discharge:      Stable      This patient has been examined wearing appropriate Personal Protective Equipment and discussed with hospital infection control department. 06/02/21      Electronically signed by Lee Barry MD, 06/02/21, 10:38 AM EDT.      Time: I spent  33  minutes on this discharge activity which included face-to-face encounter with the patient/reviewing the data in the system/coordination  of the care with the nursing staff as well as consultants/documentation/entering orders.

## 2021-06-02 NOTE — PROGRESS NOTES
Cardiology Progress Note      Admiting Physician:  Lee aBrry *   LOS: 0 days       Reason For Followup:  Valvular heart disease  Coronary artery disease  Cardiomyopathy  Bradycardia      Subjective:    Interval History:  Seen and examined.  Chart and labs reviewed.  Patient denies any chest pain pressure heaviness or tightness.  Denies any shortness of breath out of character.  Reports feeling better.  No further nausea.  Discussed with admitting service.  Device has been interrogated and adjustments made for improved heart rate.  Labs reviewed-unremarkable    Review of Systems:  A complete review of systems was undertaken with the pertinent cardiovascular findings listed in history of present illness and all other systems being negative.     Assessment & Plan    Impressions:  Heart failure with reduced ejection fraction-chronic  Valvular heart disease with severe mitral insufficiency and history of AVR  AICD in situ  Cardiomyopathy  Coronary artery disease with history of CABG  Electrolyte derangement  Volume depletion from diarrhea  Bradycardia    Recommendations:  Patient feels much better.  Device interrogated and pacer settings adjusted  Clinically much improved, no further nausea  CV status appears appropriate for discharge  Follow-up our office 2-3 weeks        Objective:    Medication Review:   Scheduled Meds:allopurinol, 100 mg, Oral, Daily  aspirin, 81 mg, Oral, Daily  cholecalciferol, 1,000 Units, Oral, Daily  cholestyramine light, 1 packet, Oral, Q12H  clopidogrel, 75 mg, Oral, Daily  enoxaparin, 40 mg, Subcutaneous, Q24H  ferrous sulfate, 324 mg, Oral, Daily With Breakfast  folic acid, 1 mg, Oral, Daily  insulin lispro, 0-9 Units, Subcutaneous, TID AC  levothyroxine, 225 mcg, Oral, Daily  montelukast, 10 mg, Oral, Nightly  multivitamin, 1 tablet, Oral, Daily  pantoprazole, 40 mg, Oral, Daily  potassium chloride, 20 mEq, Oral, BID With Meals  potassium chloride, 10 mEq, Intravenous, Once    And  potassium chloride, 10 mEq, Intravenous, Once   And  potassium chloride, 10 mEq, Intravenous, Once   And  potassium chloride, 10 mEq, Intravenous, Once  ranolazine, 500 mg, Oral, Q12H  rosuvastatin, 40 mg, Oral, Nightly  sodium chloride, 10 mL, Intravenous, Q12H  tiZANidine, 4 mg, Oral, TID  vitamin B-12, 1,000 mcg, Oral, Daily      Continuous Infusions:   PRN Meds:.  acetaminophen **OR** acetaminophen **OR** acetaminophen    aluminum-magnesium hydroxide-simethicone    cyclobenzaprine    dextrose    dextrose    diphenhydrAMINE    docusate sodium    glucagon (human recombinant)    insulin lispro **AND** insulin lispro    ipratropium-albuterol    magnesium sulfate **OR** magnesium sulfate in D5W 1g/100mL (PREMIX)    melatonin    nitroglycerin    ondansetron **OR** ondansetron    oxyCODONE    potassium chloride **OR** potassium chloride **OR** potassium chloride    [COMPLETED] Insert peripheral IV **AND** sodium chloride    sodium chloride    Patient Active Problem List   Diagnosis    COPD (chronic obstructive pulmonary disease) (CMS/[facility])    Essential hypertension    Cardiomyopathy, dilated (CMS/[facility])    ICD (implantable cardioverter-defibrillator), dual, in situ    GERD without esophagitis    Hypothyroidism (acquired)    Coronary artery disease of native artery of native heart with stable angina pectoris (CMS/HCC)    S/P AVR (aortic valve replacement)    S/P CABG x 3    Dyspnea    CKD (chronic kidney disease) stage 2, GFR 60-89 ml/min    Nonrheumatic mitral valve regurgitation    Cellulitis of left axilla    Cellulitis of right axilla    Hidradenitis    Type 2 diabetes mellitus without complication, without long-term current use of insulin (CMS/[facility])    Chronic systolic congestive heart failure (CMS/[facility])    Hypokalemia    Dizziness with near syncope    Acute diarrhea    Mixed hyperlipidemia    INDRA (obstructive sleep apnea)    Chronic pain syndrome         Physical Exam:    General: Alert, cooperative, no distress,  appears stated age  Head:  Normocephalic, atraumatic, mucous membranes moist  Eyes:  Conjunctiva/corneas clear, EOM's intact     Neck:  Supple,  no bruit  Lungs: Coarse and diminished bilaterally.  Chest wall: No tenderness.  Cardiac device noted  Heart::  Regular rate and rhythm, S1 and S2 normal, 2/6 holosystolic murmur.  No rub or gallop  Abdomen: Soft, non-tender, nondistended bowel sounds active  Extremities: No cyanosis, clubbing, or edema  Pulses: 2+ and symmetric all extremities  Skin:  No rashes or lesions  Neuro/psych: A&O x3. CN II through XII are grossly intact with appropriate affect    Vital Signs:  Vitals:    06/01/21 1951 06/02/21 0402 06/02/21 0747 06/02/21 1239   BP: 131/89 137/79 (!) 155/101 133/80   BP Location: Right arm Right arm Right arm Right arm   Patient Position: Sitting Lying Lying Sitting   Pulse: 67 59 64 58   Resp: 14 16 16 13   Temp: 97.8 °F (36.6 °C) 97.7 °F (36.5 °C) 97.7 °F (36.5 °C)    TempSrc: Oral Oral Oral    SpO2: 99% 99% 100%    Weight:       Height:         Wt Readings from Last 1 Encounters:   06/01/21 86.4 kg (190 lb 7.6 oz)       Intake/Output Summary (Last 24 hours) at 6/2/2021 1258  Last data filed at 6/1/2021 1951  Gross per 24 hour   Intake 120 ml   Output --   Net 120 ml         Results Review:     CBC    Results from last 7 days   Lab Units 06/02/21  0528 06/01/21  0539 05/31/21  0848   WBC 10*3/mm3 5.50 5.90 6.40   HEMOGLOBIN g/dL 14.2 14.2 16.8*   PLATELETS 10*3/mm3 160 171 200     Cr Clearance Estimated Creatinine Clearance: 68.7 mL/min (A) (by C-G formula based on SCr of 1.13 mg/dL (H)).  Coag     HbA1C   Lab Results   Component Value Date    HGBA1C 6.2 (H) 05/31/2021    HGBA1C 6.0 (H) 01/18/2021    HGBA1C 5.3 12/26/2019     Blood Glucose   Glucose   Date/Time Value Ref Range Status   06/02/2021 1142 80 70 - 105 mg/dL Final     Comment:     Serial Number: [id number]:  [id number]   06/02/2021 0758 82 70 - 105 mg/dL Final     Comment:     Serial Number:  [id number]:  [id number]   06/01/2021 2025 103 70 - 105 mg/dL Final     Comment:     Serial Number: [id number]:  [id number]   06/01/2021 1653 103 70 - 105 mg/dL Final     Comment:     Serial Number: [id number]:  [id number]   06/01/2021 1220 73 70 - 105 mg/dL Final     Comment:     Serial Number: [id number]:  [id number]   06/01/2021 0738 111 (H) 70 - 105 mg/dL Final     Comment:     Serial Number: [id number]:  [id number]   05/31/2021 2040 141 (H) 70 - 105 mg/dL Final     Comment:     Serial Number: [id number]:  [id number]   05/31/2021 1746 77 70 - 105 mg/dL Final     Comment:     Serial Number: [id number]:  [id number]     Infection   Results from last 7 days   Lab Units 05/31/21  0848   BLOODCX  No growth at 2 days  No growth at 2 days     CMP   Results from last 7 days   Lab Units 06/02/21  0528 06/01/21  0539 05/31/21  1949 05/31/21  1417 05/31/21  0848   SODIUM mmol/L 137 138  --  137 136   POTASSIUM mmol/L 4.5 3.6 3.6 3.3* 2.6*   CHLORIDE mmol/L 105 101  --  96* 94*   CO2 mmol/L 22.0 26.0  --  23.0 30.0*   BUN mg/dL 8 16  --  26* 29*   CREATININE mg/dL 1.13* 1.26*  --  1.42* 1.60*   GLUCOSE mg/dL 79 119*  --  126* 114*   ALBUMIN g/dL 3.90  --   --   --  4.70   BILIRUBIN mg/dL 0.6  --   --   --  1.1   ALK PHOS U/L 60  --   --   --  76   AST (SGOT) U/L 25  --   --   --  35*   ALT (SGPT) U/L 14  --   --   --  19     ABG      UA      SEMAJ  No results found for: POCMETH, POCAMPHET, POCBARBITUR, POCBENZO, POCCOCAINE, POCOPIATES, POCOXYCODO, POCPHENCYC, POCPROPOXY, POCTHC, POCTRICYC  Lysis Labs   Results from last 7 days   Lab Units 06/02/21  0528 06/01/21  0539 05/31/21  1417 05/31/21  0848   HEMOGLOBIN g/dL 14.2 14.2  --  16.8*   PLATELETS 10*3/mm3 160 171  --  200   CREATININE mg/dL 1.13* 1.26* 1.42* 1.60*     Radiology(recent) CT Abdomen Pelvis Without Contrast    Result Date: 6/1/2021  1. There are 2 calcifications near the right upper-mid ureter. I think these are vascular.  No definite ureteral stone. Negative for hydronephrosis. 2. Mild chronic changes in the lower lungs. 3. Mild cardiomegaly. 4. Additional findings as reported above.  Electronically Signed By-Veronica Le MD On:6/1/2021 8:08 PM This report was finalized on [id number] by  Veronica Le MD.        Results from last 7 days   Lab Units 05/31/21 1949   TROPONIN T ng/mL <0.010       Imaging Results (Last 24 Hours)       Procedure Component Value Units Date/Time    CT Abdomen Pelvis Without Contrast [[id number]] Collected: 06/01/21 1955     Updated: 06/01/21 2010    Narrative:      CT ABDOMEN PELVIS WO CONTRAST-     Date of Exam: 6/1/2021 7:00 PM     Indication: severe diarhea, jessica; E87.6-Hypokalemia; R53.1-Weakness;  E86.0-Dehydration; R19.7-Diarrhea, unspecified; I42.0-Dilated  cardiomyopathy; I50.22-Chronic systolic (congestive) heart failure.     Comparison: 07/21/2020     Technique: Contiguous axial CT images were obtained from the lung bases  to the pubic symphysis without contrast. Sagittal and coronal  reconstructions were performed.  Automated exposure control and  iterative reconstruction methods were used.     FINDINGS:  There are bandlike opacities and minor groundglass opacities in the  lower lungs, similar to prior. Mild cardiomegaly. No abnormality is seen  in the liver, adrenals,  spleen, or pancreas. There is moderate  atherosclerotic vascular calcification.     There is no kidney stone and no hydronephrosis. There is a 2 mm  calcification near the right ureteral pelvic junction, unchanged. A 3 mm  calcification anterior to the right psoas muscle on image 88 represents  a change from the prior and is probably lateral to the ureter. There is  no left ureteral stone. No gross bladder abnormality.     No abnormality is seen in the stomach or small bowel. The appendix  appears normal. There is minor sigmoid diverticulosis. The uterus is  present. There is no free fluid or free air.     Bones: There is  degenerative change of some lower lumbar facet joints.  There is a small soft tissue opacity and some gas in the subcutaneous  fat of the left anterior pelvic wall, compatible with prior injection.       Impression:      1. There are 2 calcifications near the right upper-mid ureter. I think  these are vascular. No definite ureteral stone. Negative for  hydronephrosis.  2. Mild chronic changes in the lower lungs.  3. Mild cardiomegaly.  4. Additional findings as reported above.     Electronically Signed By-Veronica Le MD On:6/1/2021 8:08 PM  This report was finalized on [id number] by  Veronica Le MD.            Cardiac Studies:  Echo- Results for orders placed during the hospital encounter of 02/22/21    Adult Transthoracic Echo Complete w/ Color, Spectral and Contrast if Necessary Per Protocol    Interpretation Summary  · The left ventricular cavity is mildly dilated.  · Estimated left ventricular EF was in agreement with the calculated left ventricular EF. Left ventricular ejection fraction appears to be 26 - 30%. Left ventricular systolic function is severely decreased.  · Left ventricular diastolic function is consistent with (grade II w/high LAP) pseudonormalization.  · The right ventricular cavity is mildly dilated.  · Mildly reduced right ventricular systolic function noted.  · Left atrial volume is mildly increased.  · There is a bioprosthetic aortic valve present.  · Mild to moderate aortic valve stenosis is present.  · Abnormal mitral valve structure consistent with dilated annulus.  · Moderate to severe mitral valve regurgitation is present with a posteriorly-directed jet noted.    Stress Myoview-  Cath-        ALEX Hua  06/02/21  12:58 EDT   Electronically signed by ALEX Hua, 06/02/21, 12:58 PM EDT.    Cardiology attending:  As above.  Agree with assessment plan.  Seen and examined.  Chart labs reviewed.  Note scribed by APC and reviewed for accuracy with above  changes noted.  Patient denies chest pain pressure heaviness or tightness.  Denies shortness of breath.  Has not had any loose stools today but has not had a meal.  Patient to have a meal and see if any diarrhea returns.  Cardiac status appears appropriate for discharge when okay with other services.  Follow-up in 2 to 4 weeks.  Heart regular.

## 2021-06-03 ENCOUNTER — READMISSION MANAGEMENT (OUTPATIENT)
Dept: CALL CENTER | Facility: HOSPITAL | Age: 46
End: 2021-06-03

## 2021-06-03 ENCOUNTER — APPOINTMENT (OUTPATIENT)
Dept: CARDIAC REHAB | Facility: HOSPITAL | Age: 46
End: 2021-06-03

## 2021-06-03 LAB — QT INTERVAL: 464 MS

## 2021-06-03 NOTE — SIGNIFICANT NOTE
Case Management Discharge Note      Final Note: (P) d/c home with [facility]    Provided Post Acute Provider List?: Yes  Post Acute Provider List: Home Health  Provided Post Acute Provider Quality & Resource List?: Yes  Post Acute Provider Quality and Resource List: Home Health (Provided list verbally to pt.)  Delivered To: Patient  Method of Delivery: In person    Selected Continued Care - Discharged on 6/2/2021 Admission date: 5/31/2021 - Discharge disposition: Home or Self Care          Home Medical Care Coordination complete      Service Provider Selected Services Address Phone Fax Patient Preferred    [facility] [address] [place] [address] [place] [phone] -- --                      Final Discharge Disposition Code: (P) 06 - home with home health care

## 2021-06-03 NOTE — OUTREACH NOTE
Prep Survey      Responses   [facility] facility patient discharged from?  [facility]   Is LACE score < 7 ?  No   Emergency Room discharge w/ pulse ox?  No   Eligibility  Readm Mgmt   Discharge diagnosis  dizziness with near syncope,  gastroenteritis   Does the patient have one of the following disease processes/diagnoses(primary or secondary)?  Other   Does the patient have Home health ordered?  Yes   What is the Home health agency?   [facility]   Is there a DME ordered?  No   Comments regarding appointments  call for apmts   Medication alerts for this patient  see AVS for changes   General alerts for this patient  HX CHF, COPD   Prep survey completed?  Yes          Anny Gaytan RN

## 2021-06-05 LAB
BACTERIA SPEC AEROBE CULT: NORMAL
BACTERIA SPEC AEROBE CULT: NORMAL

## 2021-06-07 ENCOUNTER — APPOINTMENT (OUTPATIENT)
Dept: CARDIAC REHAB | Facility: HOSPITAL | Age: 46
End: 2021-06-07

## 2021-06-08 ENCOUNTER — APPOINTMENT (OUTPATIENT)
Dept: CARDIAC REHAB | Facility: HOSPITAL | Age: 46
End: 2021-06-08

## 2021-06-09 ENCOUNTER — READMISSION MANAGEMENT (OUTPATIENT)
Dept: CALL CENTER | Facility: HOSPITAL | Age: 46
End: 2021-06-09

## 2021-06-09 LAB — QT INTERVAL: 494 MS

## 2021-06-09 NOTE — OUTREACH NOTE
Medical Week 1 Survey      Responses   [facility] patient discharged from?  [facility]   Does the patient have one of the following disease processes/diagnoses(primary or secondary)?  Other   Week 1 attempt successful?  Yes   Call start time  0951   Call end time  0954   General alerts for this patient  HX CHF, COPD   Meds reviewed with patient/caregiver?  Yes   Is the patient having any side effects they believe may be caused by any medication additions or changes?  No   Does the patient have all medications ordered at discharge?  Yes   Is the patient taking all medications as directed (includes completed medication regime)?  Yes   Does the patient have a primary care provider?   Yes   Does the patient have an appointment with their PCP within 7 days of discharge?  No   What is preventing the patient from scheduling follow up appointments within 7 days of discharge?  Haven't had time   Nursing Interventions  Educated patient on importance of making appointment, Advised patient to make appointment   Has the patient kept scheduled appointments due by today?  N/A   What is the Home health agency?   [facility]   Has home health visited the patient within 72 hours of discharge?  Call prior to 72 hours   Home health comments  PATIENT RECEIVED A CALL TO BEGIN HOME HEALTH, BUT DECIDED TO PUT IT OFF UNTIL AFTER HER SON'S GRADUATION. SHE STATES SHE IS GOING TO CALL TODAY TO GET HER SERVICES STARTED. NUMBER FOR [facility] PROVIDED   Psychosocial issues?  No   Did the patient receive a copy of their discharge instructions?  Yes   Nursing interventions  Reviewed instructions with patient   What is the patient's perception of their health status since discharge?  Improving   Is the patient/caregiver able to teach back signs and symptoms related to disease process for when to call PCP?  Yes   Is the patient/caregiver able to teach back signs and symptoms related to disease process for when to call 911?  Yes   Is the  patient/caregiver able to teach back the hierarchy of who to call/visit for symptoms/problems? PCP, Specialist, Home health nurse, Urgent Care, ED, 911  Yes   If the patient is a current smoker, are they able to teach back resources for cessation?  Not a smoker   Week 1 call completed?  Yes          Jacquelyn Varela LPN

## 2021-06-10 ENCOUNTER — APPOINTMENT (OUTPATIENT)
Dept: CARDIAC REHAB | Facility: HOSPITAL | Age: 46
End: 2021-06-10

## 2021-06-14 ENCOUNTER — APPOINTMENT (OUTPATIENT)
Dept: CARDIAC REHAB | Facility: HOSPITAL | Age: 46
End: 2021-06-14

## 2021-06-15 ENCOUNTER — APPOINTMENT (OUTPATIENT)
Dept: CARDIAC REHAB | Facility: HOSPITAL | Age: 46
End: 2021-06-15

## 2021-06-17 ENCOUNTER — APPOINTMENT (OUTPATIENT)
Dept: CARDIAC REHAB | Facility: HOSPITAL | Age: 46
End: 2021-06-17

## 2021-06-18 ENCOUNTER — READMISSION MANAGEMENT (OUTPATIENT)
Dept: CALL CENTER | Facility: HOSPITAL | Age: 46
End: 2021-06-18

## 2021-06-18 NOTE — OUTREACH NOTE
Medical Week 3 Survey      Responses   [facility] patient discharged from?  [facility]   Does the patient have one of the following disease processes/diagnoses(primary or secondary)?  Other   Call start time  0926   Call end time  0931   Meds reviewed with patient/caregiver?  Yes   Is the patient taking all medications as directed (includes completed medication regime)?  Yes   Medication comments  no medication changes   Has the patient kept scheduled appointments due by today?  Yes   Comments  seeing provider today and cardio next week   What is the patient's perception of their health status since discharge?  Improving [feels she is improving]   Wrap up additional comments  Requesting a new provider for her oxygen. having trouble getting supplies from the provider, Under impression was to be taken  care of by CM on discharge, will be sending an  email          Patricia Torres RN

## 2021-06-21 ENCOUNTER — APPOINTMENT (OUTPATIENT)
Dept: CARDIAC REHAB | Facility: HOSPITAL | Age: 46
End: 2021-06-21

## 2021-06-22 ENCOUNTER — APPOINTMENT (OUTPATIENT)
Dept: CARDIAC REHAB | Facility: HOSPITAL | Age: 46
End: 2021-06-22

## 2021-06-24 ENCOUNTER — APPOINTMENT (OUTPATIENT)
Dept: CARDIAC REHAB | Facility: HOSPITAL | Age: 46
End: 2021-06-24

## 2021-06-25 ENCOUNTER — READMISSION MANAGEMENT (OUTPATIENT)
Dept: CALL CENTER | Facility: HOSPITAL | Age: 46
End: 2021-06-25

## 2021-06-25 NOTE — OUTREACH NOTE
Medical Week 3 Survey      Responses   [facility] patient discharged from?  [facility]   Does the patient have one of the following disease processes/diagnoses(primary or secondary)?  Other   Week 3 attempt successful?  Yes   Call start time  1228   Call end time  1237   Meds reviewed with patient/caregiver?  Yes   Is the patient having any side effects they believe may be caused by any medication additions or changes?  No   Does the patient have all medications ordered at discharge?  Yes   Is the patient taking all medications as directed (includes completed medication regime)?  Yes   Does the patient have a primary care provider?   Yes   Comments regarding PCP  PATIENT HAS HAD APPOINTMENTS AT THE [facility]   Has the patient kept scheduled appointments due by today?  Yes   What is the Home health agency?   [facility]    Has home health visited the patient within 72 hours of discharge?  Yes   Psychosocial issues?  No   Did the patient receive a copy of their discharge instructions?  Yes   Nursing interventions  Reviewed instructions with patient   What is the patient's perception of their health status since discharge?  Improving   Is the patient/caregiver able to teach back signs and symptoms related to disease process for when to call PCP?  Yes   Is the patient/caregiver able to teach back signs and symptoms related to disease process for when to call 911?  Yes   Is the patient/caregiver able to teach back the hierarchy of who to call/visit for symptoms/problems? PCP, Specialist, Home health nurse, Urgent Care, ED, 911  Yes   If the patient is a current smoker, are they able to teach back resources for cessation?  Not a smoker   Week 3 Call Completed?  Yes          Jacquelyn Varela LPN

## 2021-06-28 ENCOUNTER — APPOINTMENT (OUTPATIENT)
Dept: CARDIAC REHAB | Facility: HOSPITAL | Age: 46
End: 2021-06-28

## 2021-06-29 ENCOUNTER — APPOINTMENT (OUTPATIENT)
Dept: CARDIAC REHAB | Facility: HOSPITAL | Age: 46
End: 2021-06-29

## 2021-07-02 ENCOUNTER — READMISSION MANAGEMENT (OUTPATIENT)
Dept: CALL CENTER | Facility: HOSPITAL | Age: 46
End: 2021-07-02

## 2021-07-02 NOTE — OUTREACH NOTE
Medical Week 4 Survey      Responses   [facility] patient discharged from?  [facility]   Does the patient have one of the following disease processes/diagnoses(primary or secondary)?  Other   Week 4 attempt successful?  No          Jacquelyn Varela LPN

## 2021-07-15 ENCOUNTER — HOSPITAL ENCOUNTER (EMERGENCY)
Facility: HOSPITAL | Age: 46
Discharge: HOME | End: 2021-07-15
Attending: EMERGENCY MEDICINE | Admitting: EMERGENCY MEDICINE

## 2021-07-15 VITALS
BODY MASS INDEX: 29.65 KG/M2 | RESPIRATION RATE: 16 BRPM | HEIGHT: 67 IN | HEART RATE: 61 BPM | SYSTOLIC BLOOD PRESSURE: 122 MMHG | TEMPERATURE: 97.7 F | DIASTOLIC BLOOD PRESSURE: 84 MMHG | WEIGHT: 188.93 LBS | OXYGEN SATURATION: 100 %

## 2021-07-15 DIAGNOSIS — T78.40XA ALLERGIC REACTION, INITIAL ENCOUNTER: Primary | ICD-10-CM

## 2021-07-15 PROCEDURE — 99282 EMERGENCY DEPT VISIT SF MDM: CPT

## 2021-07-15 PROCEDURE — 96374 THER/PROPH/DIAG INJ IV PUSH: CPT

## 2021-07-15 PROCEDURE — 25010000002 METHYLPREDNISOLONE PER 125 MG

## 2021-07-15 RX ORDER — PREDNISONE 20 MG/1
20 TABLET ORAL DAILY
Qty: 5 TABLET | Refills: 0 | Status: SHIPPED | OUTPATIENT
Start: 2021-07-15 | End: 2022-04-10

## 2021-07-15 RX ORDER — METHYLPREDNISOLONE SODIUM SUCCINATE 125 MG/2ML
INJECTION, POWDER, LYOPHILIZED, FOR SOLUTION INTRAMUSCULAR; INTRAVENOUS
Status: COMPLETED
Start: 2021-07-15 | End: 2021-07-15

## 2021-07-15 RX ORDER — METHYLPREDNISOLONE SODIUM SUCCINATE 125 MG/2ML
125 INJECTION, POWDER, LYOPHILIZED, FOR SOLUTION INTRAMUSCULAR; INTRAVENOUS ONCE
Status: COMPLETED | OUTPATIENT
Start: 2021-07-15 | End: 2021-07-15

## 2021-07-15 RX ADMIN — METHYLPREDNISOLONE SODIUM SUCCINATE 125 MG: 125 INJECTION, POWDER, LYOPHILIZED, FOR SOLUTION INTRAMUSCULAR; INTRAVENOUS at 06:07

## 2021-07-15 RX ADMIN — METHYLPREDNISOLONE SODIUM SUCCINATE 125 MG: 125 INJECTION, POWDER, FOR SOLUTION INTRAMUSCULAR; INTRAVENOUS at 06:07

## 2021-07-15 NOTE — ED PROVIDER NOTES
Subjective   Patient is a 46-year-old female who states she awoke in the middle the night tonight with swelling and pain to her upper and lower eyelids bilaterally.  She complains of itching as well.  She knows of no inciting events or other complaints.          Review of Systems  Negative for visual acuity changes eye pain earache sore throat cough fever or other complaint  Past Medical History:   Diagnosis Date   • Arrhythmia    • Asthma    • CAD (coronary artery disease)    • Cardiomyopathy, dilated (CMS/[facility])    • Chronic systolic congestive heart failure (CMS/[facility])    • CKD (chronic kidney disease) stage 2, GFR 60-89 ml/min    • COPD (chronic obstructive pulmonary disease) (CMS/[facility])    • Essential hypertension    • GERD without esophagitis    • Hidradenitis 10/26/2020   • Hyperlipidemia    • Hypothyroidism (acquired)    • ICD (implantable cardioverter-defibrillator), dual, in situ 7/22/2019   • Nonrheumatic aortic valve insufficiency    • Nonrheumatic mitral valve regurgitation    • S/P AVR (aortic valve replacement)    • S/P CABG x 3    • Type 2 diabetes mellitus without complication, without long-term current use of insulin (CMS/[facility])        Allergies   Allergen Reactions   • Hydrocodone Hives   • Penicillin G Unknown (See Comments)   • Contrast Dye GI Intolerance     She is pretty sure it's the contrast dye that makes her super sick and vomiting after heart cath       Past Surgical History:   Procedure Laterality Date   • AORTIC VALVE REPAIR/REPLACEMENT N/A 12/27/2019    Procedure: AORTIC VALVE REPAIR/REPLACEMENT;  Surgeon: Lane Stock MD;  Location: [facility];  Service: Cardiothoracic   • BREAST LUMPECTOMY     • CARDIAC CATHETERIZATION N/A 12/24/2019    Procedure: Right and Left Heart Cath 12/24/19 @ 0900;  Surgeon: Wayne Luna MD;  Location: [facility] CATH INVASIVE LOCATION;  Service: Cardiovascular   • CARDIAC CATHETERIZATION N/A 12/24/2019    Procedure: Coronary angiography;  Surgeon:  Wayne Luna MD;  Location: [facility] CATH INVASIVE LOCATION;  Service: Cardiovascular   • CARDIAC CATHETERIZATION N/A 11/10/2020    Procedure: Left and right Heart Cath;  Surgeon: Wayne Luna MD;  Location: [facility] CATH INVASIVE LOCATION;  Service: Cardiovascular;  Laterality: N/A;   • CARDIAC CATHETERIZATION N/A 11/10/2020    Procedure: Coronary angiography;  Surgeon: Wayne Luna MD;  Location: [facility] CATH INVASIVE LOCATION;  Service: Cardiovascular;  Laterality: N/A;   • CARDIAC CATHETERIZATION N/A 11/10/2020    Procedure: Right Heart Cath;  Surgeon: Wayne Luna MD;  Location: [facility] CATH INVASIVE LOCATION;  Service: Cardiovascular;  Laterality: N/A;   • CARDIAC CATHETERIZATION N/A 11/25/2020    Procedure: Percutaneous coronary intervention of the left circumflex artery;  Surgeon: Wayne Luna MD;  Location: [facility] CATH INVASIVE LOCATION;  Service: Cardiovascular;  Laterality: N/A;   • CARDIAC CATHETERIZATION N/A 1/22/2021    Procedure: LEFT HEART CATH with possible PCI;  Surgeon: Wayne Luna MD;  Location: [facility] CATH INVASIVE LOCATION;  Service: Cardiovascular;  Laterality: N/A;  Local and IV sedation   • CARDIAC CATHETERIZATION N/A 1/22/2021    Procedure: Coronary angiography;  Surgeon: Wayne Luna MD;  Location: [facility] CATH INVASIVE LOCATION;  Service: Cardiovascular;  Laterality: N/A;   • CARDIAC CATHETERIZATION N/A 1/22/2021    Procedure: Saphenous Vein Graft;  Surgeon: Wayne Luna MD;  Location: [facility] CATH INVASIVE LOCATION;  Service: Cardiovascular;  Laterality: N/A;   • CARDIAC ELECTROPHYSIOLOGY PROCEDURE Left 6/28/2019    Procedure: Dual-chamber ICD insertion;  Surgeon: Héctor Eckert MD;  Location: [facility] CATH INVASIVE LOCATION;  Service: Cardiovascular   • CARDIAC ELECTROPHYSIOLOGY PROCEDURE Left 8/14/2019    Procedure: Lead Revision;  Surgeon: Héctor Eckert MD;  Location: [facility] CATH INVASIVE  LOCATION;  Service: Cardiovascular   • CARDIAC SURGERY     • CHOLECYSTECTOMY     • CORONARY ARTERY BYPASS GRAFT N/A 2019    Procedure: CORONARY ARTERY BYPASS GRAFTING;  Surgeon: Lane Stock MD;  Location: [facility];  Service: Cardiothoracic   • HYSTERECTOMY     • INSERT / REPLACE / REMOVE PACEMAKER     • LYMPHADENECTOMY Bilateral    • THYROID SURGERY         Family History   Problem Relation Age of Onset   • Heart disease Father        Social History     Socioeconomic History   • Marital status:      Spouse name: Not on file   • Number of children: Not on file   • Years of education: Not on file   • Highest education level: Not on file   Tobacco Use   • Smoking status: Former Smoker     Quit date: 2019     Years since quittin.5   • Smokeless tobacco: Never Used   Vaping Use   • Vaping Use: Never used   Substance and Sexual Activity   • Alcohol use: No   • Drug use: No   • Sexual activity: Defer           Objective   Physical Exam  Patient exam shows patient have swelling to her upper and lower eyelids bilaterally.  There is some irritation with erythema and excoriation noted.  Pupils are equal and reactive to light conjunctiva is within normal limits.  Procedures           ED Course                                           MDM  Number of Diagnoses or Management Options  Diagnosis management comments: Patient is finding consistent with allergic reaction.  She was given sign Medrol IV.  She will be discharged with a prescription of prednisone.  She will wash her eyes thoroughly with soap and water when she arrives home.  She will follow with MD for recheck as needed.    Risk of Complications, Morbidity, and/or Mortality  Presenting problems: moderate  Diagnostic procedures: moderate  Management options: moderate    Patient Progress  Patient progress: stable      Final diagnoses:   Allergic reaction, initial encounter       ED Disposition  ED Disposition     ED Disposition Condition  Comment    Discharge Stable           No follow-up provider specified.       Medication List      New Prescriptions    predniSONE 20 MG tablet  Commonly known as: DELTASONE  Take 1 tablet by mouth Daily.           Where to Get Your Medications      These medications were sent to [facility]/pharmacy #[id number] - [place], [place] - [address] - [phone]  - [phone] FX  [address] [place] [address] [place] [address]    Hours: 24-hours Phone: [phone]   · predniSONE 20 MG tablet          Chidi Moses MD  07/15/21 0620

## 2021-07-19 ENCOUNTER — OFFICE VISIT (OUTPATIENT)
Dept: CARDIOLOGY | Facility: CLINIC | Age: 46
End: 2021-07-19

## 2021-07-19 VITALS
HEART RATE: 74 BPM | BODY MASS INDEX: 28.51 KG/M2 | DIASTOLIC BLOOD PRESSURE: 71 MMHG | WEIGHT: 182 LBS | OXYGEN SATURATION: 99 % | SYSTOLIC BLOOD PRESSURE: 102 MMHG

## 2021-07-19 DIAGNOSIS — I25.119 CORONARY ARTERY DISEASE INVOLVING NATIVE CORONARY ARTERY OF NATIVE HEART WITH ANGINA PECTORIS (HCC): ICD-10-CM

## 2021-07-19 DIAGNOSIS — I10 ESSENTIAL HYPERTENSION: ICD-10-CM

## 2021-07-19 DIAGNOSIS — Z95.810 ICD (IMPLANTABLE CARDIOVERTER-DEFIBRILLATOR), DUAL, IN SITU: Primary | ICD-10-CM

## 2021-07-19 DIAGNOSIS — I42.0 CARDIOMYOPATHY, DILATED (HCC): ICD-10-CM

## 2021-07-19 DIAGNOSIS — I35.1 NONRHEUMATIC AORTIC VALVE INSUFFICIENCY: ICD-10-CM

## 2021-07-19 DIAGNOSIS — I50.22 SYSTOLIC CHF, CHRONIC (HCC): ICD-10-CM

## 2021-07-19 PROCEDURE — 99214 OFFICE O/P EST MOD 30 MIN: CPT | Performed by: INTERNAL MEDICINE

## 2021-07-19 PROCEDURE — 93283 PRGRMG EVAL IMPLANTABLE DFB: CPT | Performed by: INTERNAL MEDICINE

## 2021-07-19 NOTE — PROGRESS NOTES
CC--- ICD in situ with cardiomyopathy    Sub--- 46-year-old very pleasant patient was seen several years ago--last seen in August 2019 and lost to follow-up and came back to establish practice with our EP clinic  Patient has dual-chamber ICD and had a right atrial lead revision done in 2019 and since then appropriate sensing with no capture noted.  Patient has been programmed to VDD mode and needs minimal pacing  In the interim patient out of significant ischemic cardiomyopathy with native two-vessel coronary artery disease and underwent bypass surgery and most recent evaluation showed patent vein graft to the diagonal patent stent in the circumflex artery and marginal branch of the circumflex and the circumflex artery provides collaterals to the PDA branch of the RCA.  Patient has diffuse disease in the right coronary artery.  She underwent urgent AVR and bypass surgery back in December 2019.  Her ejection fraction was below 30% and most recent evaluation in [facility] showed ejection fraction of 42%.  She also has additional history of hypertension and chronic systolic heart failure and history of diabetes and history of thyroid disease and currently compensated to functional class II to class III with current medical therapy.        Past Medical History:   Diagnosis Date   • Arrhythmia    • Asthma    • CAD (coronary artery disease)    • Cardiomyopathy, dilated (CMS/HCC)    • Chronic systolic congestive heart failure (CMS/HCC)    • CKD (chronic kidney disease) stage 2, GFR 60-89 ml/min    • COPD (chronic obstructive pulmonary disease) (CMS/HCC)    • Essential hypertension    • GERD without esophagitis    • Hidradenitis 10/26/2020   • Hyperlipidemia    • Hypothyroidism (acquired)    • ICD (implantable cardioverter-defibrillator), dual, in situ 7/22/2019   • Nonrheumatic aortic valve insufficiency    • Nonrheumatic mitral valve regurgitation    • S/P AVR (aortic valve replacement)    • S/P CABG x 3    • Type 2  diabetes mellitus without complication, without long-term current use of insulin (CMS/[facility])      Past Surgical History:   Procedure Laterality Date   • AORTIC VALVE REPAIR/REPLACEMENT N/A 12/27/2019    Procedure: AORTIC VALVE REPAIR/REPLACEMENT;  Surgeon: Lane Stock MD;  Location: [facility];  Service: Cardiothoracic   • BREAST LUMPECTOMY     • CARDIAC CATHETERIZATION N/A 12/24/2019    Procedure: Right and Left Heart Cath 12/24/19 @ 0900;  Surgeon: Wayne Luna MD;  Location: [facility] CATH INVASIVE LOCATION;  Service: Cardiovascular   • CARDIAC CATHETERIZATION N/A 12/24/2019    Procedure: Coronary angiography;  Surgeon: Wayne Luna MD;  Location: [facility] CATH INVASIVE LOCATION;  Service: Cardiovascular   • CARDIAC CATHETERIZATION N/A 11/10/2020    Procedure: Left and right Heart Cath;  Surgeon: Wayne Luna MD;  Location: [facility] CATH INVASIVE LOCATION;  Service: Cardiovascular;  Laterality: N/A;   • CARDIAC CATHETERIZATION N/A 11/10/2020    Procedure: Coronary angiography;  Surgeon: Wayne Luna MD;  Location: [facility] CATH INVASIVE LOCATION;  Service: Cardiovascular;  Laterality: N/A;   • CARDIAC CATHETERIZATION N/A 11/10/2020    Procedure: Right Heart Cath;  Surgeon: Wayne Luna MD;  Location: [facility] CATH INVASIVE LOCATION;  Service: Cardiovascular;  Laterality: N/A;   • CARDIAC CATHETERIZATION N/A 11/25/2020    Procedure: Percutaneous coronary intervention of the left circumflex artery;  Surgeon: Wayne Luna MD;  Location: [facility] CATH INVASIVE LOCATION;  Service: Cardiovascular;  Laterality: N/A;   • CARDIAC CATHETERIZATION N/A 1/22/2021    Procedure: LEFT HEART CATH with possible PCI;  Surgeon: Wayne Luna MD;  Location: [facility] CATH INVASIVE LOCATION;  Service: Cardiovascular;  Laterality: N/A;  Local and IV sedation   • CARDIAC CATHETERIZATION N/A 1/22/2021    Procedure: Coronary angiography;  Surgeon: Wayne Luna  MD;  Location: [facility] CATH INVASIVE LOCATION;  Service: Cardiovascular;  Laterality: N/A;   • CARDIAC CATHETERIZATION N/A 1/22/2021    Procedure: Saphenous Vein Graft;  Surgeon: Wayne Luna MD;  Location: [facility] CATH INVASIVE LOCATION;  Service: Cardiovascular;  Laterality: N/A;   • CARDIAC ELECTROPHYSIOLOGY PROCEDURE Left 6/28/2019    Procedure: Dual-chamber ICD insertion;  Surgeon: Héctor Eckert MD;  Location: [facility] CATH INVASIVE LOCATION;  Service: Cardiovascular   • CARDIAC ELECTROPHYSIOLOGY PROCEDURE Left 8/14/2019    Procedure: Lead Revision;  Surgeon: Héctor Eckert MD;  Location: [facility] CATH INVASIVE LOCATION;  Service: Cardiovascular   • CARDIAC SURGERY     • CHOLECYSTECTOMY     • CORONARY ARTERY BYPASS GRAFT N/A 12/27/2019    Procedure: CORONARY ARTERY BYPASS GRAFTING;  Surgeon: Lane Stock MD;  Location: [facility];  Service: Cardiothoracic   • HYSTERECTOMY     • INSERT / REPLACE / REMOVE PACEMAKER     • LYMPHADENECTOMY Bilateral    • THYROID SURGERY       Review of Systems   General:  positive for fatigue and tiredness  Eyes: No redness  Cardiovascular: No chest pain, no palpitations  Respiratory:   positive for class 2 shortness of breath        Physical Exam  VITALS REVIEWED    General:      well developed, well nourished, in no acute distress.    Head:      normocephalic and atraumatic.    Eyes:      PERRL/EOM intact, conjunctiva and sclera clear with out nystagmus.    Neck:      no masses, thyromegaly,  trachea central with normal respiratory effort and PMI displaced laterally  Lungs:      clear bilaterally to auscultation.    Heart:       Sinus rhythm without any gallops with presence of ejection systolic murmur at the base of the heart    ICD site is clean          Assessment and plan  Notes from recent emergency room, notes from [facility], echo reports reviewed on this patient  Most recent BNP within normal limits, creatinine of 1.13, hemoglobin is  normal    Dual-chamber ICD in situ manufactured by [employer] in VDD format--no atrial capture with proper sensing and lack of needing for pacing with current programming  No need for atrial lead revision since patient had appropriate sensing and she does not pace at this point  Interrogation of the device attached to the chart  Patient to have home monitoring reevaluated for her device since she is having some problem with home monitoring transmission to her office  Ischemic cardiomyopathy  Chronic systolic heart failure compensated currently in class II  History of AVR  Most recent evaluation by echocardiography in an outlying hospital showed EF of 42% with slightly increased gradients of 19 mm of mean gradient across bioprosthetic aortic valve   Medications reviewed  Follow-up in 6 months          Electronically signed by Héctor Eckert MD, 07/19/21, 12:22 PM EDT.

## 2021-08-03 ENCOUNTER — TELEPHONE (OUTPATIENT)
Dept: CARDIOLOGY | Facility: CLINIC | Age: 46
End: 2021-08-03

## 2021-08-11 ENCOUNTER — OFFICE VISIT (OUTPATIENT)
Dept: CARDIOLOGY | Facility: CLINIC | Age: 46
End: 2021-08-11

## 2021-08-11 VITALS
OXYGEN SATURATION: 98 % | WEIGHT: 182 LBS | SYSTOLIC BLOOD PRESSURE: 135 MMHG | BODY MASS INDEX: 28.51 KG/M2 | HEART RATE: 61 BPM | DIASTOLIC BLOOD PRESSURE: 84 MMHG

## 2021-08-11 DIAGNOSIS — Z95.2 S/P AVR (AORTIC VALVE REPLACEMENT): ICD-10-CM

## 2021-08-11 DIAGNOSIS — Z95.1 S/P CABG X 3: Primary | ICD-10-CM

## 2021-08-11 DIAGNOSIS — Z95.810 ICD (IMPLANTABLE CARDIOVERTER-DEFIBRILLATOR), DUAL, IN SITU: Chronic | ICD-10-CM

## 2021-08-11 DIAGNOSIS — E78.2 MIXED HYPERLIPIDEMIA: Chronic | ICD-10-CM

## 2021-08-11 DIAGNOSIS — I34.0 NONRHEUMATIC MITRAL VALVE REGURGITATION: Chronic | ICD-10-CM

## 2021-08-11 DIAGNOSIS — I35.1 NONRHEUMATIC AORTIC VALVE INSUFFICIENCY: ICD-10-CM

## 2021-08-11 DIAGNOSIS — I10 ESSENTIAL HYPERTENSION: Chronic | ICD-10-CM

## 2021-08-11 PROCEDURE — 99214 OFFICE O/P EST MOD 30 MIN: CPT | Performed by: INTERNAL MEDICINE

## 2021-08-11 NOTE — PROGRESS NOTES
Cardiology Office Visit      Encounter Date:  08/11/2021    Patient ID:   Rafael Nunes is a 46 y.o. female.    Reason For Followup:  Cardiomyopathy  Hypertension  Hyperlipidemia  Valvular heart disease  Recent hospitalization for congestive heart failure      Brief Clinical History:  Dear Dr. Adames, Phani Ennis MD    I had the pleasure of seeing Rafael Mccann today. As you are well aware, this is a 46 y.o. female with a past medical history that is significant for cardiomyopathy and valvular heart disease         Interval History:  Volume status is much better  Denies any further symptoms of chest pain shortness of breath dizziness or syncope  Compliant with medical therapy  Tolerating medications very well        Impressions:/Cardiac catheterization January 2021  Native severe two-vessel coronary artery disease  No significant obstructive disease involving the LAD system  Patent vein graft to the diagonal  Patent stent in the left circumflex and marginal branch of the circumflex  The circumflex coronary artery provides collaterals to the PDA branch of the right coronary artery  Diffuse disease involving the right coronary artery unchanged from before     Interpretation Summary/January 2021    · The left ventricular cavity is moderately dilated.  · Estimated left ventricular EF = 20% Left ventricular systolic function is severely decreased.  · The right ventricular cavity is mildly dilated.  · Mildly reduced right ventricular systolic function noted.  · There is a bioprosthetic aortic valve present.  · Moderate to severe mitral valve regurgitation is present.  · Estimated right ventricular systolic pressure from tricuspid regurgitation is normal (<35 mmHg)..       Assessment & Plan    Impressions:  Essential hypertension  Chronic systolic heart failure  History of cardiomyopathy   Chronic renal insufficiency  Coronary artery disease   History of diabetes mellitus type 2  History of thyroid  disease  Moderate to severe mitral regurgitation  Moderate to severe aortic regurgitation  Cardiomyopathy with LV ejection fraction of 35%  s/p AICD placement/normal device function  s/p urgent AVR (21 mm Magna), CABG x3 with SV to Diag1 and SV sequential to PDA and then OM3 12/27/2019  Status post coronary artery bypass surgery x3 and aortic valve replacement surgery  Congestive heart failure NYHA class 4  Newly diagnosed severe mitral regurgitation  Worsening cardiomyopathy with most recent LV ejection fraction of 20%  Moderate mitral regurgitation  NYHA Functional Class: III  Stage: C heart failure  No atrial capture anterior center device interrogation was seen and evaluated by EP    Recommendations:  Continue dual antiplatelet therapy with aspirin Plavix  Medical management for obstructive coronary artery disease  Recent medical records from the visit to the emergency room and also [facility] reviewed and discussed with the patient  Optimize medical therapy for cardiomyopathy   Continue follow-up with advanced heart failure  Recent evaluation at [facility] with a repeat echocardiogram with improvement in the mitral regurgitation  Recent labs reviewed and discussed with the patient  Continue current dose high statin and Zetia and add a PCSK9 inhibitor  Risk benefits and alternatives reviewed and discussed with the patient  Continue dual antiplatelet therapy with aspirin and Plavix  Medications reviewed and discussed with the patient  continue to optimize her HF therapies before we consider advanced therapies    Close monitoring of blood pressure at home  Follow-up in office in 3 months    Objective:    Vitals:  Vitals:    08/11/21 0957   BP: 135/84   Pulse: 61   SpO2: 98%   Weight: 82.6 kg (182 lb)       Physical Exam:    General: Alert, cooperative, no distress, appears stated age  Head:  Normocephalic, atraumatic, mucous membranes moist  Eyes:  Conjunctiva/corneas clear, EOM's intact      Neck:  Supple,  no adenopathy;      Lungs: Clear to auscultation bilaterally, no wheezes rhonchi rales are noted  Chest wall: No tenderness  Heart::  Regular rate and rhythm, S1 and S2 normal, no murmur, displaced LV apical impulse 2 x 6 pansystolic murmur left sternal border  Abdomen: Soft, non-tender, nondistended bowel sounds active  Extremities: No cyanosis, clubbing, or edema  Pulses: 2+ and symmetric all extremities  Skin:  No rashes or lesions  Neuro/psych: A&O x3. CN II through XII are grossly intact with appropriate affect      Allergies:  Allergies   Allergen Reactions   • Hydrocodone Hives   • Penicillin G Unknown (See Comments)   • Contrast Dye GI Intolerance     She is pretty sure it's the contrast dye that makes her super sick and vomiting after heart cath       Medication Review:     Current Outpatient Medications:   •  Alirocumab (Praluent) 75 MG/ML solution auto-injector, Inject 75 mg under the skin into the appropriate area as directed Every 14 (Fourteen) Days., Disp: 2 pen, Rfl: 3  •  allopurinol (ZYLOPRIM) 100 MG tablet, Take 100 mg by mouth Daily., Disp: , Rfl:   •  aspirin 81 MG EC tablet, Take 1 tablet by mouth Daily., Disp: 30 tablet, Rfl: 6  •  carvedilol (COREG) 6.25 MG tablet, Take 6.25 mg by mouth 2 (Two) Times a Day With Meals., Disp: , Rfl:   •  cholecalciferol (VITAMIN D3) 1000 units tablet, Take 1,000 Units by mouth Daily., Disp: , Rfl:   •  cholestyramine light 4 g packet, Take 1 packet by mouth Every 12 (Twelve) Hours As Needed (Diarrhea)., Disp: 10 packet, Rfl: 1  •  clopidogrel (PLAVIX) 75 MG tablet, Take 1 tablet by mouth Daily., Disp: 30 tablet, Rfl: 6  •  Dapagliflozin Propanediol (Farxiga) 10 MG tablet, Take 10 mg by mouth Daily., Disp: , Rfl:   •  diphenhydrAMINE (Benadryl Allergy) 25 mg capsule, Take 25 mg by mouth Every 6 (Six) Hours As Needed for Itching., Disp: , Rfl:   •  docusate sodium (COLACE) 100 MG capsule, Take 100 mg by mouth 2 (Two) Times a Day As Needed for  Constipation., Disp: , Rfl:   •  Evolocumab (REPATHA) solution prefilled syringe injection, Inject 1 mL under the skin into the appropriate area as directed Every 14 (Fourteen) Days., Disp: 1 mL, Rfl: 6  •  ferrous sulfate 325 (65 FE) MG tablet, Take 65 mg by mouth Daily With Breakfast., Disp: , Rfl:   •  fluconazole (DIFLUCAN) 150 MG tablet, Take 150 mg by mouth 1 (One) Time As Needed., Disp: , Rfl:   •  fluticasone (FLONASE) 50 MCG/ACT nasal spray, 2 sprays into the nostril(s) as directed by provider Daily., Disp: , Rfl:   •  folic acid (FOLVITE) 1 MG tablet, Take 1 mg by mouth Daily., Disp: , Rfl:   •  metFORMIN (GLUCOPHAGE) 500 MG tablet, Take 500 mg by mouth Daily With Breakfast., Disp: , Rfl:   •  montelukast (SINGULAIR) 10 MG tablet, Take 10 mg by mouth Every Night., Disp: , Rfl:   •  multivitamin (MULTI-DAY PO), Take 1 tablet by mouth Daily., Disp: , Rfl:   •  nitroglycerin (NITROSTAT) 0.4 MG SL tablet, Place 0.4 mg under the tongue Every 5 (Five) Minutes As Needed for Chest Pain. Take no more than 3 doses in 15 minutes., Disp: , Rfl:   •  oxyCODONE-acetaminophen (PERCOCET) 7.5-325 MG per tablet, Take 1 tablet by mouth Every 6 (Six) Hours As Needed for Moderate Pain ., Disp: , Rfl:   •  pantoprazole (PROTONIX) 40 MG EC tablet, Take 40 mg by mouth Daily., Disp: , Rfl:   •  potassium chloride (K-DUR) 10 MEQ CR tablet, Take 20 mEq by mouth 2 (Two) Times a Day., Disp: , Rfl:   •  predniSONE (DELTASONE) 20 MG tablet, Take 1 tablet by mouth Daily., Disp: 5 tablet, Rfl: 0  •  ranolazine (RANEXA) 500 MG 12 hr tablet, Take 500 mg by mouth 2 (Two) Times a Day., Disp: , Rfl:   •  rosuvastatin (CRESTOR) 40 MG tablet, Take 40 mg by mouth Daily., Disp: , Rfl:   •  sacubitril-valsartan (Entresto)  MG tablet, Take 1 tablet by mouth 2 (Two) Times a Day., Disp: , Rfl:   •  spironolactone (ALDACTONE) 25 MG tablet, Take 1 tablet by mouth Daily. Hold if persistent diarrhea., Disp: , Rfl:   •  TiZANidine (Zanaflex) 4 MG  capsule, Take 4 mg by mouth 3 (Three) Times a Day., Disp: , Rfl:   •  torsemide (DEMADEX) 20 MG tablet, Take 3 tablets by mouth Daily. Hold if persistent diarrhea, Disp: , Rfl:   •  vitamin B-12 (CYANOCOBALAMIN) 1000 MCG tablet, Take 1,000 mcg by mouth Daily., Disp: , Rfl:   •  levothyroxine (SYNTHROID, LEVOTHROID) 75 MCG tablet, Take 3 tablets by mouth Daily for 30 days., Disp: 90 tablet, Rfl: 0    Family History:  Family History   Problem Relation Age of Onset   • Heart disease Father        Past Medical History:  Past Medical History:   Diagnosis Date   • Arrhythmia    • Asthma    • CAD (coronary artery disease)    • Cardiomyopathy, dilated (CMS/HCC)    • Chronic systolic congestive heart failure (CMS/HCC)    • CKD (chronic kidney disease) stage 2, GFR 60-89 ml/min    • COPD (chronic obstructive pulmonary disease) (CMS/HCC)    • Essential hypertension    • GERD without esophagitis    • Hidradenitis 10/26/2020   • Hyperlipidemia    • Hypothyroidism (acquired)    • ICD (implantable cardioverter-defibrillator), dual, in situ 7/22/2019   • Nonrheumatic aortic valve insufficiency    • Nonrheumatic mitral valve regurgitation    • S/P AVR (aortic valve replacement)    • S/P CABG x 3    • Type 2 diabetes mellitus without complication, without long-term current use of insulin (CMS/[facility])        Past surgical History:  Past Surgical History:   Procedure Laterality Date   • AORTIC VALVE REPAIR/REPLACEMENT N/A 12/27/2019    Procedure: AORTIC VALVE REPAIR/REPLACEMENT;  Surgeon: Lane Stock MD;  Location: [facility];  Service: Cardiothoracic   • BREAST LUMPECTOMY     • CARDIAC CATHETERIZATION N/A 12/24/2019    Procedure: Right and Left Heart Cath 12/24/19 @ 0900;  Surgeon: Wayne Luna MD;  Location: [facility] CATH INVASIVE LOCATION;  Service: Cardiovascular   • CARDIAC CATHETERIZATION N/A 12/24/2019    Procedure: Coronary angiography;  Surgeon: Wayne Luna MD;  Location: [facility] CATH INVASIVE  LOCATION;  Service: Cardiovascular   • CARDIAC CATHETERIZATION N/A 11/10/2020    Procedure: Left and right Heart Cath;  Surgeon: Wayne Luna MD;  Location: [facility] CATH INVASIVE LOCATION;  Service: Cardiovascular;  Laterality: N/A;   • CARDIAC CATHETERIZATION N/A 11/10/2020    Procedure: Coronary angiography;  Surgeon: Wayne Luna MD;  Location: [facility] CATH INVASIVE LOCATION;  Service: Cardiovascular;  Laterality: N/A;   • CARDIAC CATHETERIZATION N/A 11/10/2020    Procedure: Right Heart Cath;  Surgeon: Wayne Luna MD;  Location: [facility] CATH INVASIVE LOCATION;  Service: Cardiovascular;  Laterality: N/A;   • CARDIAC CATHETERIZATION N/A 11/25/2020    Procedure: Percutaneous coronary intervention of the left circumflex artery;  Surgeon: Wayne Luna MD;  Location: [facility] CATH INVASIVE LOCATION;  Service: Cardiovascular;  Laterality: N/A;   • CARDIAC CATHETERIZATION N/A 1/22/2021    Procedure: LEFT HEART CATH with possible PCI;  Surgeon: Wayne Luna MD;  Location: [facility] CATH INVASIVE LOCATION;  Service: Cardiovascular;  Laterality: N/A;  Local and IV sedation   • CARDIAC CATHETERIZATION N/A 1/22/2021    Procedure: Coronary angiography;  Surgeon: Wayne Luna MD;  Location: [facility] CATH INVASIVE LOCATION;  Service: Cardiovascular;  Laterality: N/A;   • CARDIAC CATHETERIZATION N/A 1/22/2021    Procedure: Saphenous Vein Graft;  Surgeon: Wayne Luna MD;  Location: [facility] CATH INVASIVE LOCATION;  Service: Cardiovascular;  Laterality: N/A;   • CARDIAC ELECTROPHYSIOLOGY PROCEDURE Left 6/28/2019    Procedure: Dual-chamber ICD insertion;  Surgeon: Héctor Eckert MD;  Location: [facility] CATH INVASIVE LOCATION;  Service: Cardiovascular   • CARDIAC ELECTROPHYSIOLOGY PROCEDURE Left 8/14/2019    Procedure: Lead Revision;  Surgeon: Héctor Eckert MD;  Location: [facility] CATH INVASIVE LOCATION;  Service: Cardiovascular   • CARDIAC SURGERY     •  CHOLECYSTECTOMY     • CORONARY ARTERY BYPASS GRAFT N/A 2019    Procedure: CORONARY ARTERY BYPASS GRAFTING;  Surgeon: Lane Stock MD;  Location: [facility];  Service: Cardiothoracic   • HYSTERECTOMY     • INSERT / REPLACE / REMOVE PACEMAKER     • LYMPHADENECTOMY Bilateral    • THYROID SURGERY         Social History:  Social History     Socioeconomic History   • Marital status:      Spouse name: Not on file   • Number of children: Not on file   • Years of education: Not on file   • Highest education level: Not on file   Tobacco Use   • Smoking status: Former Smoker     Quit date: 2019     Years since quittin.6   • Smokeless tobacco: Never Used   Vaping Use   • Vaping Use: Never used   Substance and Sexual Activity   • Alcohol use: No   • Drug use: No   • Sexual activity: Defer       Review of Systems:  The following systems were reviewed as they relate to the cardiovascular system: Constitutional, Eyes, ENT, Cardiovascular, Respiratory, Gastrointestinal, Integumentary, Neurological, Psychiatric, Hematologic, Endocrine, Musculoskeletal, and Genitourinary. The pertinent cardiovascular findings are reported above with all other cardiovascular points within those systems being negative.    Diagnostic Study Review:     Current Electrocardiogram:  Procedures      NOTE: The following portions of the patient's history were reviewed and updated this visit as appropriate: allergies, current medications, past family history, past medical history, past social history, past surgical history and problem list.

## 2021-08-14 NOTE — PROGRESS NOTES
Cardiology Office Visit      Encounter Date:  01/27/2021    Patient ID:   Rafael Singh is a 45 y.o. female.    Reason For Followup:  Cardiomyopathy  Hypertension  Hyperlipidemia  Valvular heart disease  Recent hospitalization for congestive heart failure      Brief Clinical History:  Dear Dr. Adames, Phani Ennis MD    I had the pleasure of seeing Rafael Mccann today. As you are well aware, this is a 45 y.o. female with a past medical history that is significant for cardiomyopathy and valvular heart disease         Interval History:  Recent hospitalization for heart failure symptoms chest pain and worsening cardiomyopathy  Significantly low blood pressure compared to baseline        Impressions:/Cardiac catheterization January 2021  Native severe two-vessel coronary artery disease  No significant obstructive disease involving the LAD system  Patent vein graft to the diagonal  Patent stent in the left circumflex and marginal branch of the circumflex  The circumflex coronary artery provides collaterals to the PDA branch of the right coronary artery  Diffuse disease involving the right coronary artery unchanged from before     Interpretation Summary/January 2021    · The left ventricular cavity is moderately dilated.  · Estimated left ventricular EF = 20% Left ventricular systolic function is severely decreased.  · The right ventricular cavity is mildly dilated.  · Mildly reduced right ventricular systolic function noted.  · There is a bioprosthetic aortic valve present.  · Moderate to severe mitral valve regurgitation is present.  · Estimated right ventricular systolic pressure from tricuspid regurgitation is normal (<35 mmHg).  · The following left ventricular wall segments are hypokinetic: mid anterior, apical anterior, basal anterolateral, mid anterolateral, apical lateral, basal inferolateral, mid inferolateral, apical inferior, mid inferior, apical septal, basal inferoseptal, mid inferoseptal,  apex hypokinetic, mid anteroseptal, basal anterior, basal inferior and basal inferoseptal.           Assessment & Plan    Impressions:  Essential hypertension  Chronic systolic heart failure  History of cardiomyopathy   Chronic renal insufficiency  Coronary artery disease   History of diabetes mellitus type 2  History of thyroid disease  Moderate to severe mitral regurgitation  Moderate to severe aortic regurgitation  Cardiomyopathy with LV ejection fraction of 35%  s/p AICD placement/normal device function  s/p urgent AVR (21 mm Magna), CABG x3 with SV to Diag1 and SV sequential to PDA and then OM3 12/27/2019  Status post coronary artery bypass surgery x3 and aortic valve replacement surgery  Congestive heart failure NYHA class 4  Newly diagnosed severe mitral regurgitation  Worsening cardiomyopathy with most recent LV ejection fraction of 20%  Severe mitral regurgitation    Recommendations:  Continue dual antiplatelet therapy with aspirin Plavix  Medical management for obstructive coronary artery disease  Most likely blood pressure is low secondary to severe cardiomyopathy  Stress cardiomyopathy versus burned-out heart  Optimize medical therapy and discharge patient home on medical therapy for cardiomyopathy   Outpatient evaluation by advanced heart failure and transplant   Likely will benefit from outpatient transplant evaluation  Likely will benefit from outpatient evaluation for possible mitral clip  Discontinue clonidine  Discontinue hydralazine  Discontinue Norvasc  Continue low-dose Coreg  Continue current dose of lisinopril  Continue dual antiplatelet therapy with aspirin and Plavix  Consider low-dose diuretics as needed as needed for volume overload  Okay to discharge home with the above medications  Close monitoring of blood pressure at home  Follow-up in office in 1 month      Objective:    Vitals:  Vitals:    01/27/21 1446   BP: 125/86   Pulse: 80   SpO2: 99%   Weight: 89.4 kg (197 lb)       Physical  Exam:    General: Alert, cooperative, no distress, appears stated age  Head:  Normocephalic, atraumatic, mucous membranes moist  Eyes:  Conjunctiva/corneas clear, EOM's intact     Neck:  Supple,  no adenopathy;      Lungs: Clear to auscultation bilaterally, no wheezes rhonchi rales are noted  Chest wall: No tenderness  Heart::  Regular rate and rhythm, S1 and S2 normal, displaced LV apical impulse 3 x 6 pansystolic murmur left sternal border radiating to the left axilla  Abdomen: Soft, non-tender, nondistended bowel sounds active  Extremities: No cyanosis, clubbing, or edema  Pulses: 2+ and symmetric all extremities  Skin:  No rashes or lesions  Neuro/psych: A&O x3. CN II through XII are grossly intact with appropriate affect      Allergies:  Allergies   Allergen Reactions   • Hydrocodone Hives   • Penicillin G Unknown (See Comments)   • Contrast Dye GI Intolerance     She is pretty sure it's the contrast dye that makes her super sick and vomiting after heart cath       Medication Review:     Current Outpatient Medications:   •  allopurinol (ZYLOPRIM) 100 MG tablet, Take 100 mg by mouth Daily., Disp: , Rfl:   •  amLODIPine (NORVASC) 5 MG tablet, Take 1 tablet by mouth Daily., Disp: 30 tablet, Rfl: 0  •  aspirin 81 MG EC tablet, Take 1 tablet by mouth Daily., Disp: 30 tablet, Rfl: 6  •  Canagliflozin (INVOKANA) 300 MG tablet tablet, Take 100 mg by mouth Daily for 30 days., Disp: 10 tablet, Rfl: 0  •  carvedilol (COREG) 6.25 MG tablet, Take 1 tablet by mouth 2 (Two) Times a Day With Meals for 30 days., Disp: 60 tablet, Rfl: 0  •  cholecalciferol (VITAMIN D3) 1000 units tablet, Take 1,000 Units by mouth Daily., Disp: , Rfl:   •  clopidogrel (PLAVIX) 75 MG tablet, Take 1 tablet by mouth Daily., Disp: 30 tablet, Rfl: 6  •  Cyanocobalamin (VITAMIN B 12 PO), Take 1 tablet by mouth Daily., Disp: , Rfl:   •  cyclobenzaprine (FLEXERIL) 10 MG tablet, Take 1 tablet by mouth 3 (Three) Times a Day As Needed for Muscle Spasms.,  Disp: 10 tablet, Rfl: 0  •  docusate sodium (COLACE) 100 MG capsule, Take 100 mg by mouth 2 (Two) Times a Day As Needed for Constipation., Disp: , Rfl:   •  ferrous sulfate 325 (65 FE) MG tablet, Take 65 mg by mouth Daily With Breakfast., Disp: , Rfl:   •  folic acid (FOLVITE) 1 MG tablet, Take 1 mg by mouth Daily., Disp: , Rfl:   •  levothyroxine (SYNTHROID, LEVOTHROID) 125 MCG tablet, Take 2 tablets by mouth Daily for 30 days., Disp: 60 tablet, Rfl: 0  •  lisinopril (PRINIVIL,ZESTRIL) 5 MG tablet, Take 1 tablet by mouth Daily for 30 days., Disp: 30 tablet, Rfl: 0  •  metFORMIN (GLUCOPHAGE) 500 MG tablet, Take 500 mg by mouth Daily With Breakfast., Disp: , Rfl:   •  montelukast (SINGULAIR) 10 MG tablet, Take 10 mg by mouth Every Night., Disp: , Rfl:   •  multivitamin (MULTI-DAY PO), Take 1 tablet by mouth Daily., Disp: , Rfl:   •  oxyCODONE-acetaminophen (PERCOCET) 7.5-325 MG per tablet, Take 1 tablet by mouth Every 6 (Six) Hours As Needed for Moderate Pain ., Disp: , Rfl:   •  pantoprazole (PROTONIX) 40 MG EC tablet, Take 40 mg by mouth Daily., Disp: , Rfl:   •  potassium chloride (K-DUR) 10 MEQ CR tablet, Take 20 mEq by mouth 2 (Two) Times a Day., Disp: , Rfl:   •  ranolazine (RANEXA) 500 MG 12 hr tablet, Take 1 tablet by mouth Every 12 (Twelve) Hours for 30 days., Disp: 60 tablet, Rfl: 0    Family History:  Family History   Problem Relation Age of Onset   • Heart disease Father        Past Medical History:  Past Medical History:   Diagnosis Date   • Arrhythmia    • Asthma    • CHF (congestive heart failure) (CMS/HCC)    • COPD (chronic obstructive pulmonary disease) (CMS/HCC)    • Diabetes (CMS/HCC)    • Disease of thyroid gland    • Heart murmur    • Hyperlipidemia    • Hypertension        Past surgical History:  Past Surgical History:   Procedure Laterality Date   • AORTIC VALVE REPAIR/REPLACEMENT N/A 12/27/2019    Procedure: AORTIC VALVE REPAIR/REPLACEMENT;  Surgeon: Lane Stock MD;  [facility];  Service: Cardiothoracic   • BREAST LUMPECTOMY     • CARDIAC CATHETERIZATION N/A 12/24/2019    Procedure: Right and Left Heart Cath 12/24/19 @ 0900;  Surgeon: Wayne Luna MD;  Location: [facility] CATH INVASIVE LOCATION;  Service: Cardiovascular   • CARDIAC CATHETERIZATION N/A 12/24/2019    Procedure: Coronary angiography;  Surgeon: Wayne Luna MD;  Location: [facility] CATH INVASIVE LOCATION;  Service: Cardiovascular   • CARDIAC CATHETERIZATION N/A 11/10/2020    Procedure: Left and right Heart Cath;  Surgeon: Wayne Luna MD;  Location: [facility] CATH INVASIVE LOCATION;  Service: Cardiovascular;  Laterality: N/A;   • CARDIAC CATHETERIZATION N/A 11/10/2020    Procedure: Coronary angiography;  Surgeon: Wayne Luna MD;  Location: [facility] CATH INVASIVE LOCATION;  Service: Cardiovascular;  Laterality: N/A;   • CARDIAC CATHETERIZATION N/A 11/10/2020    Procedure: Right Heart Cath;  Surgeon: Wayne Luna MD;  Location: [facility] CATH INVASIVE LOCATION;  Service: Cardiovascular;  Laterality: N/A;   • CARDIAC CATHETERIZATION N/A 11/25/2020    Procedure: Percutaneous coronary intervention of the left circumflex artery;  Surgeon: Wayne Luna MD;  Location: [facility] CATH INVASIVE LOCATION;  Service: Cardiovascular;  Laterality: N/A;   • CARDIAC CATHETERIZATION N/A 1/22/2021    Procedure: LEFT HEART CATH with possible PCI;  Surgeon: Wayne Luna MD;  Location: [facility] CATH INVASIVE LOCATION;  Service: Cardiovascular;  Laterality: N/A;  Local and IV sedation   • CARDIAC CATHETERIZATION N/A 1/22/2021    Procedure: Coronary angiography;  Surgeon: Wayne Luna MD;  Location: [facility] CATH INVASIVE LOCATION;  Service: Cardiovascular;  Laterality: N/A;   • CARDIAC CATHETERIZATION N/A 1/22/2021    Procedure: Saphenous Vein Graft;  Surgeon: Wayne Luna MD;  Location: [facility] CATH INVASIVE LOCATION;  Service: Cardiovascular;  Laterality: N/A;   •  CARDIAC ELECTROPHYSIOLOGY PROCEDURE Left 2019    Procedure: Dual-chamber ICD insertion;  Surgeon: Héctor Eckert MD;  Location: [facility] CATH INVASIVE LOCATION;  Service: Cardiovascular   • CARDIAC ELECTROPHYSIOLOGY PROCEDURE Left 2019    Procedure: Lead Revision;  Surgeon: Héctor Eckert MD;  Location: [facility] CATH INVASIVE LOCATION;  Service: Cardiovascular   • CARDIAC SURGERY     • CHOLECYSTECTOMY     • CORONARY ARTERY BYPASS GRAFT N/A 2019    Procedure: CORONARY ARTERY BYPASS GRAFTING;  Surgeon: Lane Stock MD;  Location: [facility];  Service: Cardiothoracic   • HYSTERECTOMY     • INSERT / REPLACE / REMOVE PACEMAKER     • LYMPHADENECTOMY Bilateral    • THYROID SURGERY         Social History:  Social History     Socioeconomic History   • Marital status:      Spouse name: Not on file   • Number of children: Not on file   • Years of education: Not on file   • Highest education level: Not on file   Tobacco Use   • Smoking status: Former Smoker     Quit date: 2019     Years since quittin.0   • Smokeless tobacco: Never Used   Substance and Sexual Activity   • Alcohol use: No     Frequency: Never   • Drug use: No   • Sexual activity: Defer       Review of Systems:  The following systems were reviewed as they relate to the cardiovascular system: Constitutional, Eyes, ENT, Cardiovascular, Respiratory, Gastrointestinal, Integumentary, Neurological, Psychiatric, Hematologic, Endocrine, Musculoskeletal, and Genitourinary. The pertinent cardiovascular findings are reported above with all other cardiovascular points within those systems being negative.    Diagnostic Study Review:     Current Electrocardiogram:  Procedures      NOTE: The following portions of the patient's history were reviewed and updated this visit as appropriate: allergies, current medications, past family history, past medical history, past social history, past surgical history and problem list.   Shoulder Immobilizer

## 2021-08-17 NOTE — ED NOTES
Patient c/o rash sporadic on all of body. Tongue swelling. Denies any difficulty breathing. States rash appeared on Sunday. Unsure of what could have caused the rash.      Shell Barfield, RN  09/17/19 [id number]     allergic reaction

## 2021-08-20 ENCOUNTER — TELEPHONE (OUTPATIENT)
Dept: CARDIOLOGY | Facility: CLINIC | Age: 46
End: 2021-08-20

## 2021-08-20 NOTE — TELEPHONE ENCOUNTER
Called pt regardng home monitor. Pt states she does not think monitor is staying charged.  Gave number for [employer] technical support.  Pt going to speak with them to see if she needs a new cord or device.

## 2021-08-26 ENCOUNTER — TRANSCRIBE ORDERS (OUTPATIENT)
Dept: ADMINISTRATIVE | Facility: HOSPITAL | Age: 46
End: 2021-08-26

## 2021-08-26 DIAGNOSIS — Z12.31 ENCOUNTER FOR SCREENING MAMMOGRAM FOR MALIGNANT NEOPLASM OF BREAST: Primary | ICD-10-CM

## 2021-09-07 ENCOUNTER — TELEPHONE (OUTPATIENT)
Dept: CARDIOLOGY | Facility: CLINIC | Age: 46
End: 2021-09-07

## 2021-09-07 NOTE — TELEPHONE ENCOUNTER
Called in regards to patient being shocked by ICD.  Unable to leave message on patients phone d/t no voicemail set up. Left message on spouses number to call the office.

## 2021-10-01 ENCOUNTER — APPOINTMENT (OUTPATIENT)
Dept: MAMMOGRAPHY | Facility: HOSPITAL | Age: 46
End: 2021-10-01

## 2021-10-04 ENCOUNTER — HOSPITAL ENCOUNTER (OUTPATIENT)
Dept: MAMMOGRAPHY | Facility: HOSPITAL | Age: 46
Discharge: HOME | End: 2021-10-04
Admitting: OBSTETRICS & GYNECOLOGY

## 2021-10-04 DIAGNOSIS — Z12.31 ENCOUNTER FOR SCREENING MAMMOGRAM FOR MALIGNANT NEOPLASM OF BREAST: ICD-10-CM

## 2021-10-04 PROCEDURE — 77063 BREAST TOMOSYNTHESIS BI: CPT

## 2021-10-04 PROCEDURE — 77067 SCR MAMMO BI INCL CAD: CPT

## 2021-10-25 ENCOUNTER — OFFICE VISIT (OUTPATIENT)
Dept: CARDIOLOGY | Facility: CLINIC | Age: 46
End: 2021-10-25

## 2021-10-25 VITALS
WEIGHT: 188 LBS | SYSTOLIC BLOOD PRESSURE: 112 MMHG | DIASTOLIC BLOOD PRESSURE: 81 MMHG | HEART RATE: 60 BPM | OXYGEN SATURATION: 99 % | BODY MASS INDEX: 29.51 KG/M2 | HEIGHT: 67 IN

## 2021-10-25 DIAGNOSIS — Z95.2 S/P AVR (AORTIC VALVE REPLACEMENT): ICD-10-CM

## 2021-10-25 DIAGNOSIS — E78.2 MIXED HYPERLIPIDEMIA: ICD-10-CM

## 2021-10-25 DIAGNOSIS — Z95.1 S/P CABG X 3: ICD-10-CM

## 2021-10-25 DIAGNOSIS — Z95.810 ICD (IMPLANTABLE CARDIOVERTER-DEFIBRILLATOR), DUAL, IN SITU: Primary | ICD-10-CM

## 2021-10-25 DIAGNOSIS — I34.0 NONRHEUMATIC MITRAL VALVE REGURGITATION: ICD-10-CM

## 2021-10-25 DIAGNOSIS — I10 ESSENTIAL HYPERTENSION: ICD-10-CM

## 2021-10-25 DIAGNOSIS — I35.1 NONRHEUMATIC AORTIC VALVE INSUFFICIENCY: ICD-10-CM

## 2021-10-25 PROCEDURE — 93000 ELECTROCARDIOGRAM COMPLETE: CPT | Performed by: INTERNAL MEDICINE

## 2021-10-25 PROCEDURE — 99213 OFFICE O/P EST LOW 20 MIN: CPT | Performed by: INTERNAL MEDICINE

## 2021-10-25 NOTE — PROGRESS NOTES
CC--- ICD in situ with cardiomyopathy    Sub--- 46-year-old very pleasant patient was seen several years ago--last seen in August 2019 and lost to follow-up and came back to establish practice with our EP clinic  Patient has dual-chamber ICD and had a right atrial lead revision done in 2019 and since then appropriate sensing with no capture noted.  Patient has been programmed to VDD mode and needs minimal pacing  In the interim patient out of significant ischemic cardiomyopathy with native two-vessel coronary artery disease and underwent bypass surgery and most recent evaluation showed patent vein graft to the diagonal patent stent in the circumflex artery and marginal branch of the circumflex and the circumflex artery provides collaterals to the PDA branch of the RCA.  Patient has diffuse disease in the right coronary artery.  She underwent urgent AVR and bypass surgery back in December 2019.  Her ejection fraction was below 30% and most recent evaluation in [facility] showed ejection fraction of 42%.  She also has additional history of hypertension and chronic systolic heart failure and history of diabetes and history of thyroid disease and currently compensated to functional class II to class III with current medical therapy.  Since last seen she had RA lead revision and feels better        Past Medical History:   Diagnosis Date   • Arrhythmia    • Asthma    • CAD (coronary artery disease)    • Cardiomyopathy, dilated (HCC)    • Chronic systolic congestive heart failure (HCC)    • CKD (chronic kidney disease) stage 2, GFR 60-89 ml/min    • COPD (chronic obstructive pulmonary disease) (HCC)    • Essential hypertension    • GERD without esophagitis    • Hidradenitis 10/26/2020   • Hyperlipidemia    • Hypothyroidism (acquired)    • ICD (implantable cardioverter-defibrillator), dual, in situ 7/22/2019   • Nonrheumatic aortic valve insufficiency    • Nonrheumatic mitral valve regurgitation    • S/P AVR (aortic  valve replacement)    • S/P CABG x 3    • Type 2 diabetes mellitus without complication, without long-term current use of insulin (HCC)      Past Surgical History:   Procedure Laterality Date   • AORTIC VALVE REPAIR/REPLACEMENT N/A 12/27/2019    Procedure: AORTIC VALVE REPAIR/REPLACEMENT;  Surgeon: Lane Stock MD;  Location: [facility];  Service: Cardiothoracic   • BREAST LUMPECTOMY Right     BENIGN   • CARDIAC CATHETERIZATION N/A 12/24/2019    Procedure: Right and Left Heart Cath 12/24/19 @ 0900;  Surgeon: Wayne Luna MD;  Location: [facility] CATH INVASIVE LOCATION;  Service: Cardiovascular   • CARDIAC CATHETERIZATION N/A 12/24/2019    Procedure: Coronary angiography;  Surgeon: Wayne Luna MD;  Location: [facility] CATH INVASIVE LOCATION;  Service: Cardiovascular   • CARDIAC CATHETERIZATION N/A 11/10/2020    Procedure: Left and right Heart Cath;  Surgeon: Wayne Luna MD;  Location: [facility] CATH INVASIVE LOCATION;  Service: Cardiovascular;  Laterality: N/A;   • CARDIAC CATHETERIZATION N/A 11/10/2020    Procedure: Coronary angiography;  Surgeon: Wayne Luna MD;  Location: [facility] CATH INVASIVE LOCATION;  Service: Cardiovascular;  Laterality: N/A;   • CARDIAC CATHETERIZATION N/A 11/10/2020    Procedure: Right Heart Cath;  Surgeon: Wayne Luna MD;  Location: [facility] CATH INVASIVE LOCATION;  Service: Cardiovascular;  Laterality: N/A;   • CARDIAC CATHETERIZATION N/A 11/25/2020    Procedure: Percutaneous coronary intervention of the left circumflex artery;  Surgeon: Wayne Luna MD;  Location: [facility] CATH INVASIVE LOCATION;  Service: Cardiovascular;  Laterality: N/A;   • CARDIAC CATHETERIZATION N/A 1/22/2021    Procedure: LEFT HEART CATH with possible PCI;  Surgeon: Wayne Luna MD;  Location: [facility] CATH INVASIVE LOCATION;  Service: Cardiovascular;  Laterality: N/A;  Local and IV sedation   • CARDIAC CATHETERIZATION N/A 1/22/2021     Procedure: Coronary angiography;  Surgeon: Wayne Luna MD;  Location: [facility] CATH INVASIVE LOCATION;  Service: Cardiovascular;  Laterality: N/A;   • CARDIAC CATHETERIZATION N/A 1/22/2021    Procedure: Saphenous Vein Graft;  Surgeon: Wayne Luna MD;  Location: [facility] CATH INVASIVE LOCATION;  Service: Cardiovascular;  Laterality: N/A;   • CARDIAC ELECTROPHYSIOLOGY PROCEDURE Left 6/28/2019    Procedure: Dual-chamber ICD insertion;  Surgeon: Héctor Eckert MD;  Location: [facility] CATH INVASIVE LOCATION;  Service: Cardiovascular   • CARDIAC ELECTROPHYSIOLOGY PROCEDURE Left 8/14/2019    Procedure: Lead Revision;  Surgeon: Héctor Eckert MD;  Location: [facility] CATH INVASIVE LOCATION;  Service: Cardiovascular   • CARDIAC SURGERY     • CHOLECYSTECTOMY     • CORONARY ARTERY BYPASS GRAFT N/A 12/27/2019    Procedure: CORONARY ARTERY BYPASS GRAFTING;  Surgeon: Lane Stock MD;  Location: [facility];  Service: Cardiothoracic   • HYSTERECTOMY     • INSERT / REPLACE / REMOVE PACEMAKER     • LYMPHADENECTOMY Bilateral    • THYROID SURGERY       Review of Systems   General:  positive for fatigue and tiredness  Eyes: No redness  Cardiovascular: No chest pain, no palpitations  Respiratory:   positive for class 2 shortness of breath        Physical Exam  VITALS REVIEWED    General:      well developed, well nourished, in no acute distress.    Head:      normocephalic and atraumatic.    Eyes:      PERRL/EOM intact, conjunctiva and sclera clear with out nystagmus.    Neck:      no masses, thyromegaly,  trachea central with normal respiratory effort and PMI displaced laterally  Lungs:      clear bilaterally to auscultation.    Heart:       Sinus rhythm without any gallops with presence of ejection systolic murmur at the base of the heart    ICD site is clean          Assessment and plan  Notes from recent emergency room, notes from [facility], echo reports reviewed on this patient  Most recent BNP  within normal limits, creatinine is ok, hemoglobin is normal    Dual-chamber ICD in situ manufactured by [employer] company in VDD format--post RA lead revision  No need for atrial lead revision since patient had appropriate sensing and she does not pace at this point  Ischemic cardiomyopathy  Chronic systolic heart failure compensated currently in class II  History of AVR  Most recent evaluation by echocardiography in an [facility] hospital showed EF of 42% with slightly increased gradients of 19 mm of mean gradient across bioprosthetic aortic valve   Medications reviewed  Follow-up in 12 months      ECG 12 Lead    Date/Time: 10/25/2021 10:45 AM  Performed by: Héctor Eckert MD  Authorized by: Héctor Eckert MD   Comparison: compared with previous ECG   Similar to previous ECG  Rhythm: sinus rhythm and paced  Rate: normal  QRS axis: normal            Electronically signed by Héctor Eckert MD, 10/25/21, 10:44 AM EDT.

## 2021-12-03 PROCEDURE — 93296 REM INTERROG EVL PM/IDS: CPT | Performed by: INTERNAL MEDICINE

## 2021-12-03 PROCEDURE — 93295 DEV INTERROG REMOTE 1/2/MLT: CPT | Performed by: INTERNAL MEDICINE

## 2022-01-31 RX ORDER — ASPIRIN 81 MG/1
TABLET, COATED ORAL
Qty: 90 TABLET | Refills: 2 | OUTPATIENT
Start: 2022-01-31

## 2022-03-24 ENCOUNTER — HOSPITAL ENCOUNTER (EMERGENCY)
Facility: HOSPITAL | Age: 47
Discharge: HOME | End: 2022-03-24
Attending: EMERGENCY MEDICINE | Admitting: EMERGENCY MEDICINE

## 2022-03-24 VITALS
HEART RATE: 69 BPM | WEIGHT: 190 LBS | TEMPERATURE: 98 F | HEIGHT: 68 IN | DIASTOLIC BLOOD PRESSURE: 68 MMHG | BODY MASS INDEX: 28.79 KG/M2 | RESPIRATION RATE: 12 BRPM | OXYGEN SATURATION: 100 % | SYSTOLIC BLOOD PRESSURE: 138 MMHG

## 2022-03-24 DIAGNOSIS — L05.91 PILONIDAL CYST: Primary | ICD-10-CM

## 2022-03-24 LAB — GLUCOSE BLDC GLUCOMTR-MCNC: 105 MG/DL (ref 70–105)

## 2022-03-24 PROCEDURE — 63710000001 ONDANSETRON ODT 4 MG TABLET DISPERSIBLE: Performed by: EMERGENCY MEDICINE

## 2022-03-24 PROCEDURE — 96372 THER/PROPH/DIAG INJ SC/IM: CPT

## 2022-03-24 PROCEDURE — 87070 CULTURE OTHR SPECIMN AEROBIC: CPT | Performed by: EMERGENCY MEDICINE

## 2022-03-24 PROCEDURE — 99283 EMERGENCY DEPT VISIT LOW MDM: CPT

## 2022-03-24 PROCEDURE — 82962 GLUCOSE BLOOD TEST: CPT

## 2022-03-24 PROCEDURE — 87205 SMEAR GRAM STAIN: CPT | Performed by: EMERGENCY MEDICINE

## 2022-03-24 PROCEDURE — 25010000002 HYDROMORPHONE 1 MG/ML SOLUTION: Performed by: EMERGENCY MEDICINE

## 2022-03-24 PROCEDURE — 87077 CULTURE AEROBIC IDENTIFY: CPT | Performed by: EMERGENCY MEDICINE

## 2022-03-24 PROCEDURE — 87186 SC STD MICRODIL/AGAR DIL: CPT | Performed by: EMERGENCY MEDICINE

## 2022-03-24 RX ORDER — CEPHALEXIN 500 MG/1
500 CAPSULE ORAL 4 TIMES DAILY
Qty: 28 CAPSULE | Refills: 0 | Status: ON HOLD | OUTPATIENT
Start: 2022-03-24 | End: 2022-08-17

## 2022-03-24 RX ORDER — ONDANSETRON 4 MG/1
4 TABLET, ORALLY DISINTEGRATING ORAL ONCE
Status: COMPLETED | OUTPATIENT
Start: 2022-03-24 | End: 2022-03-24

## 2022-03-24 RX ORDER — SULFAMETHOXAZOLE AND TRIMETHOPRIM 800; 160 MG/1; MG/1
1 TABLET ORAL 2 TIMES DAILY
Qty: 20 TABLET | Refills: 0 | Status: ON HOLD | OUTPATIENT
Start: 2022-03-24 | End: 2022-08-17

## 2022-03-24 RX ORDER — CEPHALEXIN 500 MG/1
500 CAPSULE ORAL ONCE
Status: COMPLETED | OUTPATIENT
Start: 2022-03-24 | End: 2022-03-24

## 2022-03-24 RX ORDER — SULFAMETHOXAZOLE AND TRIMETHOPRIM 800; 160 MG/1; MG/1
1 TABLET ORAL ONCE
Status: COMPLETED | OUTPATIENT
Start: 2022-03-24 | End: 2022-03-24

## 2022-03-24 RX ADMIN — ONDANSETRON 4 MG: 4 TABLET, ORALLY DISINTEGRATING ORAL at 19:12

## 2022-03-24 RX ADMIN — HYDROMORPHONE HYDROCHLORIDE 1 MG: 1 INJECTION, SOLUTION INTRAMUSCULAR; INTRAVENOUS; SUBCUTANEOUS at 19:12

## 2022-03-24 RX ADMIN — SULFAMETHOXAZOLE AND TRIMETHOPRIM 1 TABLET: 800; 160 TABLET ORAL at 21:14

## 2022-03-24 RX ADMIN — CEPHALEXIN 500 MG: 500 CAPSULE ORAL at 21:14

## 2022-03-24 NOTE — ED PROVIDER NOTES
"Subjective   Chief complaint: Patient is a pleasant 47-year-old female.  She is noted for the last few days that she has felt the increasing sensation of lower tailbone cyst.  She has had these multiple times and drained in the past.  She is also had multiple histories of recurrent abscesses and hidradenitis suppurativa.  She has never had surgery to fix her recurrent tailbone abscesses.  Today she bumped it against her car door and increased in pain.  She noticed a \"head\" to the infection and came here for evaluation and drainage.  No fever.  Pain in the area of the tailbone.    Context: As above    Duration: Last few days    Timing: Persistent and worsening    Severity: Pain is severe    Associated Symptoms: Negative except as noted above.        PCP:  LMP:          Review of Systems   Constitutional: Negative for fever.   Cardiovascular: Negative.    Gastrointestinal: Negative for abdominal pain.   Musculoskeletal:        Tailbone pain.   Skin: Positive for color change and wound.       Past Medical History:   Diagnosis Date   • Arrhythmia    • Asthma    • CAD (coronary artery disease)    • Cardiomyopathy, dilated ([facility])    • Chronic systolic congestive heart failure ([facility])    • CKD (chronic kidney disease) stage 2, GFR 60-89 ml/min    • COPD (chronic obstructive pulmonary disease) ([facility])    • Essential hypertension    • GERD without esophagitis    • Hidradenitis 10/26/2020   • Hyperlipidemia    • Hypothyroidism (acquired)    • ICD (implantable cardioverter-defibrillator), dual, in situ 7/22/2019   • Nonrheumatic aortic valve insufficiency    • Nonrheumatic mitral valve regurgitation    • S/P AVR (aortic valve replacement)    • S/P CABG x 3    • Type 2 diabetes mellitus without complication, without long-term current use of insulin ([facility])        Allergies   Allergen Reactions   • Hydrocodone Hives   • Penicillin G Unknown (See Comments)   • Contrast Dye GI Intolerance     She is pretty sure it's the contrast dye that " makes her super sick and vomiting after heart cath       Past Surgical History:   Procedure Laterality Date   • AORTIC VALVE REPAIR/REPLACEMENT N/A 12/27/2019    Procedure: AORTIC VALVE REPAIR/REPLACEMENT;  Surgeon: Lane Stock MD;  Location: [facility] CVOR;  Service: Cardiothoracic   • BREAST LUMPECTOMY Right     BENIGN   • CARDIAC CATHETERIZATION N/A 12/24/2019    Procedure: Right and Left Heart Cath 12/24/19 @ 0900;  Surgeon: Wayne Luna MD;  Location:  [facility] CATH INVASIVE [facility];  Service: Cardiovascular   • CARDIAC CATHETERIZATION N/A 12/24/2019    Procedure: Coronary angiography;  Surgeon: Wayne Luna MD;  Location:  [facility]IVE [facility];  Service: Cardiovascular   • CARDIAC CATHETERIZATION N/A 11/10/2020    Procedure: Left and right Heart Cath;  Surgeon: Wayne Luna MD;  Location:  [facility]ATH [facility];  Service: Cardiovascular;  Laterality: N/A;   • CARDIAC CATHETERIZATION N/A 11/10/2020    Procedure: Coronary angiography;  Surgeon: Wayne Luna MD;  Location: [facility] CATH INVASIVE LOCATION;  Service: Cardiovascular;  Laterality: N/A;   • CARDIAC CATHETERIZATION N/A 11/10/2020    Procedure: Right Heart Cath;  Surgeon: Wayne Luna MD;  Location: [facility] CATH INVASIVE LOCATION;  Service: Cardiovascular;  Laterality: N/A;   • CARDIAC CATHETERIZATION N/A 11/25/2020    Procedure: Percutaneous coronary intervention of the left circumflex artery;  Surgeon: Wayne Luna MD;  Location: [facility] CATH INVASIVE LOCATION;  Service: Cardiovascular;  Laterality: N/A;   • CARDIAC CATHETERIZATION N/A 1/22/2021    Procedure: LEFT HEART CATH with possible PCI;  Surgeon: Wayne Luna MD;  Location:  [facility]IVE [facility];  Service: Cardiovascular;  Laterality: N/A;  Local and IV sedation   • CARDIAC CATHETERIZATION N/A 1/22/2021    Procedure: Coronary angiography;  Surgeon: Wayne Luna MD;  Location: [facility] CATH  INVASIVE LOCATION;  Service: Cardiovascular;  Laterality: N/A;   • CARDIAC CATHETERIZATION N/A 1/22/2021    Procedure: Saphenous Vein Graft;  Surgeon: Wayne Luna MD;  Location: [facility] CATH INVASIVE LOCATION;  Service: Cardiovascular;  Laterality: N/A;   • CARDIAC ELECTROPHYSIOLOGY PROCEDURE Left 6/28/2019    Procedure: Dual-chamber ICD insertion;  Surgeon: Héctor Eckert MD;  Location: [facility] CATH INVASIVE LOCATION;  Service: Cardiovascular   • CARDIAC ELECTROPHYSIOLOGY PROCEDURE Left 8/14/2019    Procedure: Lead Revision;  Surgeon: Héctor Eckert MD;  Location: [facility] CATH INVASIVE LOCATION;  Service: Cardiovascular   • CARDIAC SURGERY     • CHOLECYSTECTOMY     • CORONARY ARTERY BYPASS GRAFT N/A 12/27/2019    Procedure: CORONARY ARTERY BYPASS GRAFTING;  Surgeon: Lane Stock MD;  Location: [facility] CV[facility];  Service: Cardiothoracic   • HYSTERECTOMY     • INSERT / REPLACE / REMOVE PACEMAKER     • LYMPHADENECTOMY Bilateral    • THYROID SURGERY         Family History   Problem Relation Age of Onset   • Heart disease Father        Social History     Socioeconomic History   • Marital status:    Tobacco Use   • Smoking status: Former Smoker     Quit date: 01/2019     Years since quitting: 3.2   • Smokeless tobacco: Never Used   Vaping Use   • Vaping Use: Never used   Substance and Sexual Activity   • Alcohol use: No   • Drug use: No   • Sexual activity: Defer           Objective   Physical Exam  Vitals and nursing note reviewed. Exam conducted with a chaperone present.   Constitutional:       Appearance: Normal appearance.   HENT:      Head: Normocephalic.   Cardiovascular:      Rate and Rhythm: Normal rate.   Pulmonary:      Effort: Pulmonary effort is normal.   Abdominal:      Tenderness: There is no abdominal tenderness.   Skin:            Comments: Small fluctuant area definitely 2 cm.  No significant surrounding erythema or cellulitis.  Area of pilonidal cyst.   Neurological:       "General: No focal deficit present.      Mental Status: She is alert and oriented to person, place, and time.   Psychiatric:         Mood and Affect: Mood normal.         Behavior: Behavior normal.         Incision & Drainage    Date/Time: 3/24/2022 8:00 PM  Performed by: Luis A Crenshaw DO  Authorized by: Luis A Crenshaw DO     Consent:     Consent obtained:  Verbal    Consent given by:  Patient    Risks discussed:  Bleeding, incomplete drainage, infection, damage to other organs and pain    Alternatives discussed:  No treatment and delayed treatment  Universal protocol:     Patient identity confirmed:  Verbally with patient  Location:     Type:  Pilonidal cyst    Size:  2.5    Location: tailbone.  Pre-procedure details:     Skin preparation:  Povidone-iodine  Sedation:     Sedation type: Hydromorphone.  Anesthesia:     Anesthesia method:  Local infiltration    Local anesthetic:  Lidocaine 1% WITH epi  Procedure type:     Complexity:  Simple  Procedure details:     Ultrasound guidance: no      Incision types:  Stab incision    Incision depth:  Submucosal    Wound management:  Probed and deloculated and extensive cleaning    Drainage:  Purulent    Drainage amount:  Copious    Wound treatment:  Drain placed    Packing materials:  1/4 in gauze    Amount 1/4\":  2  Post-procedure details:     Procedure completion:  Tolerated well, no immediate complications               ED Course           Results for orders placed or performed during the hospital encounter of 03/24/22   POC Glucose Once    Specimen: Blood   Result Value Ref Range    Glucose 105 70 - 105 mg/dL                                           MDM  Number of Diagnoses or Management Options  Diagnosis management comments: Patient did have significant drainage.  Did culture the wound.  And will cover with antibiotic.  She has a history of MRSA.  She will return for worsening symptoms signs or concerns.  Glucose is within normal notes here " 103.    Patient Progress  Patient progress: improved      Final diagnoses:   None     Pilonidal cyst status post I&D  ED Disposition  ED Disposition     None          No follow-up provider specified.       Medication List      No changes were made to your prescriptions during this visit.          Luis A Crenshaw,   03/24/22 5753

## 2022-03-24 NOTE — ED NOTES
Patient has raised painful area to coccyx and right side of buttocks. Has been painful for several days but hit it on car door today and now pain is worse

## 2022-03-27 LAB
BACTERIA SPEC AEROBE CULT: ABNORMAL
BACTERIA SPEC AEROBE CULT: ABNORMAL
GRAM STN SPEC: ABNORMAL
GRAM STN SPEC: ABNORMAL

## 2022-03-27 NOTE — PHARMACY RECOMMENDATION
Patient wound culture resulted with  Staph lugdunensis . Susceptible to Sulfonamides. Patient was given Rx for cephalexin and sulfamethoxazole/ trimethoprim. Therapy is appropriate coverage. No further follow-up required..    Microbiology Results (last 10 days)       Procedure Component Value - Date/Time    Wound Culture - Wound, Back, Lower [[id number]]  (Abnormal)  (Susceptibility) Collected: 03/24/22 2049    Lab Status: Final result Specimen: Wound from Back, Lower Updated: 03/27/22 1027     Wound Culture Scant growth (1+) Normal Skin Yessenia      Scant growth (1+) Staphylococcus lugdunensis     Gram Stain Many (4+) WBCs per low power field      No organisms seen    Susceptibility        Staphylococcus lugdunensis      CHLOE      Clindamycin Resistant      Erythromycin Resistant      Inducible Clindamycin Resistance Negative      Oxacillin Susceptible      Rifampin Susceptible      Tetracycline Susceptible      Trimethoprim + Sulfamethoxazole Susceptible      Vancomycin Susceptible                                 Keith Reese RPH  3/27/2022 10:32 EDT

## 2022-04-10 ENCOUNTER — HOSPITAL ENCOUNTER (EMERGENCY)
Facility: HOSPITAL | Age: 47
Discharge: HOME | End: 2022-04-10
Attending: EMERGENCY MEDICINE | Admitting: EMERGENCY MEDICINE

## 2022-04-10 ENCOUNTER — APPOINTMENT (OUTPATIENT)
Dept: GENERAL RADIOLOGY | Facility: HOSPITAL | Age: 47
End: 2022-04-10

## 2022-04-10 VITALS
WEIGHT: 198 LBS | SYSTOLIC BLOOD PRESSURE: 130 MMHG | HEART RATE: 91 BPM | HEIGHT: 67 IN | TEMPERATURE: 98 F | OXYGEN SATURATION: 98 % | RESPIRATION RATE: 18 BRPM | BODY MASS INDEX: 31.08 KG/M2 | DIASTOLIC BLOOD PRESSURE: 82 MMHG

## 2022-04-10 DIAGNOSIS — J45.901 EXACERBATION OF ASTHMA, UNSPECIFIED ASTHMA SEVERITY, UNSPECIFIED WHETHER PERSISTENT: Primary | ICD-10-CM

## 2022-04-10 DIAGNOSIS — J20.6 ACUTE BRONCHITIS DUE TO RHINOVIRUS: ICD-10-CM

## 2022-04-10 LAB
B PARAPERT DNA SPEC QL NAA+PROBE: NOT DETECTED
B PERT DNA SPEC QL NAA+PROBE: NOT DETECTED
C PNEUM DNA NPH QL NAA+NON-PROBE: NOT DETECTED
FLUAV SUBTYP SPEC NAA+PROBE: NOT DETECTED
FLUBV RNA ISLT QL NAA+PROBE: NOT DETECTED
HADV DNA SPEC NAA+PROBE: NOT DETECTED
HCOV 229E RNA SPEC QL NAA+PROBE: NOT DETECTED
HCOV HKU1 RNA SPEC QL NAA+PROBE: NOT DETECTED
HCOV NL63 RNA SPEC QL NAA+PROBE: NOT DETECTED
HCOV OC43 RNA SPEC QL NAA+PROBE: NOT DETECTED
HMPV RNA NPH QL NAA+NON-PROBE: NOT DETECTED
HPIV1 RNA ISLT QL NAA+PROBE: NOT DETECTED
HPIV2 RNA SPEC QL NAA+PROBE: NOT DETECTED
HPIV3 RNA NPH QL NAA+PROBE: NOT DETECTED
HPIV4 P GENE NPH QL NAA+PROBE: NOT DETECTED
M PNEUMO IGG SER IA-ACNC: NOT DETECTED
RHINOVIRUS RNA SPEC NAA+PROBE: DETECTED
RSV RNA NPH QL NAA+NON-PROBE: NOT DETECTED
SARS-COV-2 RNA NPH QL NAA+NON-PROBE: NOT DETECTED

## 2022-04-10 PROCEDURE — 94760 N-INVAS EAR/PLS OXIMETRY 1: CPT

## 2022-04-10 PROCEDURE — 71045 X-RAY EXAM CHEST 1 VIEW: CPT

## 2022-04-10 PROCEDURE — 63710000001 ONDANSETRON ODT 4 MG TABLET DISPERSIBLE: Performed by: NURSE PRACTITIONER

## 2022-04-10 PROCEDURE — 94799 UNLISTED PULMONARY SVC/PX: CPT

## 2022-04-10 PROCEDURE — 94640 AIRWAY INHALATION TREATMENT: CPT

## 2022-04-10 PROCEDURE — 0202U NFCT DS 22 TRGT SARS-COV-2: CPT | Performed by: NURSE PRACTITIONER

## 2022-04-10 PROCEDURE — 99283 EMERGENCY DEPT VISIT LOW MDM: CPT

## 2022-04-10 RX ORDER — METHYLPREDNISOLONE 4 MG/1
TABLET ORAL
Qty: 21 TABLET | Refills: 0 | Status: SHIPPED | OUTPATIENT
Start: 2022-04-10 | End: 2022-08-14

## 2022-04-10 RX ORDER — ALBUTEROL SULFATE 2.5 MG/3ML
2.5 SOLUTION RESPIRATORY (INHALATION) EVERY 4 HOURS PRN
Qty: 3 ML | Refills: 0 | Status: ON HOLD | OUTPATIENT
Start: 2022-04-10 | End: 2022-08-18

## 2022-04-10 RX ORDER — BENZONATATE 200 MG/1
200 CAPSULE ORAL 3 TIMES DAILY PRN
Qty: 15 CAPSULE | Refills: 0 | Status: ON HOLD | OUTPATIENT
Start: 2022-04-10 | End: 2022-08-18

## 2022-04-10 RX ORDER — ONDANSETRON 4 MG/1
4 TABLET, ORALLY DISINTEGRATING ORAL ONCE
Status: COMPLETED | OUTPATIENT
Start: 2022-04-10 | End: 2022-04-10

## 2022-04-10 RX ORDER — ALBUTEROL SULFATE 90 UG/1
2 AEROSOL, METERED RESPIRATORY (INHALATION) ONCE
Status: COMPLETED | OUTPATIENT
Start: 2022-04-10 | End: 2022-04-10

## 2022-04-10 RX ADMIN — ALBUTEROL SULFATE 2 PUFF: 108 INHALANT RESPIRATORY (INHALATION) at 10:21

## 2022-04-10 RX ADMIN — ONDANSETRON 4 MG: 4 TABLET, ORALLY DISINTEGRATING ORAL at 09:50

## 2022-04-10 NOTE — DISCHARGE INSTRUCTIONS
Take Medrol as prescribed.  Use albuterol Nebules as prescribed in your machine.  May use Mucinex for congestion.  Take Tessalon as prescribed.  Drink plenty of fluids.  Treat your body aches with Tylenol or ibuprofen.  Follow-up with your primary care provider.  Return for new or worsening symptoms.

## 2022-04-10 NOTE — ED PROVIDER NOTES
Subjective   Patient is a 47-year-old -American female with history of hypertension, high cholesterol, cardiomyopathy, CHF with pacemaker.  She has a history of asthma and COPD.  She presents today with complaints of cough and congestion for the last couple days.  She states she has had chills and body aches.  She states at times she coughs so hard that she become short of breath.  She reports her cough has been productive of sputum.  She denies any chest pain but reports some chest tightness when she gets to coughing.  She states she felt like she has been wheezing.  She denies fever.  She denies any ill contacts or recent travel.  She denies leg pain or swelling.  She does report some posttussive nausea and gagging but she denies vomiting diarrhea black or bloody stools or other complaint.          Review of Systems   Constitutional: Positive for chills. Negative for fever.   HENT: Positive for congestion, postnasal drip and rhinorrhea. Negative for sore throat.    Respiratory: Positive for cough, chest tightness, shortness of breath and wheezing.    Cardiovascular: Negative for chest pain and leg swelling.   Gastrointestinal: Negative for blood in stool, diarrhea, nausea and vomiting.   Genitourinary: Negative for decreased urine volume.   Musculoskeletal: Negative for neck pain and neck stiffness.   Skin: Negative for rash.       Past Medical History:   Diagnosis Date   • Arrhythmia    • Asthma    • CAD (coronary artery disease)    • Cardiomyopathy, dilated ([facility])    • Chronic systolic congestive heart failure ([facility])    • CKD (chronic kidney disease) stage 2, GFR 60-89 ml/min    • COPD (chronic obstructive pulmonary disease) ([facility])    • Essential hypertension    • GERD without esophagitis    • Hidradenitis 10/26/2020   • Hyperlipidemia    • Hypothyroidism (acquired)    • ICD (implantable cardioverter-defibrillator), dual, in situ 7/22/2019   • Nonrheumatic aortic valve insufficiency    • Nonrheumatic mitral  valve regurgitation    • S/P AVR (aortic valve replacement)    • S/P CABG x 3    • Type 2 diabetes mellitus without complication, without long-term current use of insulin ([facility])        Allergies   Allergen Reactions   • Hydrocodone Hives   • Penicillin G Unknown (See Comments)   • Contrast Dye GI Intolerance     She is pretty sure it's the contrast dye that makes her super sick and vomiting after heart cath       Past Surgical History:   Procedure Laterality Date   • AORTIC VALVE REPAIR/REPLACEMENT N/A 12/27/2019    Procedure: AORTIC VALVE REPAIR/REPLACEMENT;  Surgeon: Lane Stock MD;  Location: [facility];  Service: Cardiothoracic   • BREAST LUMPECTOMY Right     BENIGN   • CARDIAC CATHETERIZATION N/A 12/24/2019    Procedure: Right and Left Heart Cath 12/24/19 @ 0900;  Surgeon: Wayne Luna MD;  Location: [facility] CATH INVASIVE LOCATION;  Service: Cardiovascular   • CARDIAC CATHETERIZATION N/A 12/24/2019    Procedure: Coronary angiography;  Surgeon: Wayne Luna MD;  Location: [facility] CATH INVASIVE LOCATION;  Service: Cardiovascular   • CARDIAC CATHETERIZATION N/A 11/10/2020    Procedure: Left and right Heart Cath;  Surgeon: Wayne Luna MD;  Location: [facility] CATH INVASIVE LOCATION;  Service: Cardiovascular;  Laterality: N/A;   • CARDIAC CATHETERIZATION N/A 11/10/2020    Procedure: Coronary angiography;  Surgeon: Wayne Luna MD;  Location: [facility] CATH INVASIVE LOCATION;  Service: Cardiovascular;  Laterality: N/A;   • CARDIAC CATHETERIZATION N/A 11/10/2020    Procedure: Right Heart Cath;  Surgeon: Wayne Luna MD;  Location: [facility] CATH INVASIVE LOCATION;  Service: Cardiovascular;  Laterality: N/A;   • CARDIAC CATHETERIZATION N/A 11/25/2020    Procedure: Percutaneous coronary intervention of the left circumflex artery;  Surgeon: Wayne Luna MD;  Location: [facility] CATH INVASIVE LOCATION;  Service: Cardiovascular;  Laterality: N/A;   • CARDIAC  CATHETERIZATION N/A 1/22/2021    Procedure: LEFT HEART CATH with possible PCI;  Surgeon: Wayne Luna MD;  Location: [facility] CATH INVASIVE LOCATION;  Service: Cardiovascular;  Laterality: N/A;  Local and IV sedation   • CARDIAC CATHETERIZATION N/A 1/22/2021    Procedure: Coronary angiography;  Surgeon: Wayne Luna MD;  Location: [facility] CATH INVASIVE LOCATION;  Service: Cardiovascular;  Laterality: N/A;   • CARDIAC CATHETERIZATION N/A 1/22/2021    Procedure: Saphenous Vein Graft;  Surgeon: Wayne Luna MD;  Location: [facility] CATH INVASIVE LOCATION;  Service: Cardiovascular;  Laterality: N/A;   • CARDIAC ELECTROPHYSIOLOGY PROCEDURE Left 6/28/2019    Procedure: Dual-chamber ICD insertion;  Surgeon: Héctor Eckert MD;  Location: [facility] CATH INVASIVE LOCATION;  Service: Cardiovascular   • CARDIAC ELECTROPHYSIOLOGY PROCEDURE Left 8/14/2019    Procedure: Lead Revision;  Surgeon: Héctor Eckert MD;  Location: [facility] CATH INVASIVE LOCATION;  Service: Cardiovascular   • CARDIAC SURGERY     • CHOLECYSTECTOMY     • CORONARY ARTERY BYPASS GRAFT N/A 12/27/2019    Procedure: CORONARY ARTERY BYPASS GRAFTING;  Surgeon: Lane Stock MD;  Location: [facility] CV[facility];  Service: Cardiothoracic   • HYSTERECTOMY     • INSERT / REPLACE / REMOVE PACEMAKER     • LYMPHADENECTOMY Bilateral    • THYROID SURGERY         Family History   Problem Relation Age of Onset   • Heart disease Father        Social History     Socioeconomic History   • Marital status:    Tobacco Use   • Smoking status: Former Smoker     Quit date: 01/2019     Years since quitting: 3.2   • Smokeless tobacco: Never Used   Vaping Use   • Vaping Use: Never used   Substance and Sexual Activity   • Alcohol use: No   • Drug use: No   • Sexual activity: Defer           Objective   Physical Exam  Vital signs and triage nurse note reviewed.  Constitutional: Awake, alert; well-developed and well-nourished. No acute distress is  noted.  HEENT: Normocephalic, atraumatic; pupils are PERRL with intact EOM; oropharynx is pink and moist without exudate or erythema.  No drooling or pooling of oral secretions.  Neck: Supple, full range of motion without pain; no cervical lymphadenopathy. Normal phonation.  Cardiovascular: Regular rate and rhythm, normal S1-S2.  No murmur noted.  Pulmonary: Respiratory effort regular nonlabored, breath sounds few scattered expiratory wheezes.  No rhonchi or rales noted.  Abdomen: Soft, nontender, nondistended with normoactive bowel sounds; no rebound or guarding.  Musculoskeletal: Independent range of motion of all extremities with no palpable tenderness or edema.  Neuro: Alert oriented x3, speech is clear and appropriate, GCS 15.    Skin: Flesh tone, warm, dry, intact; no erythematous or petechial rash or lesion.      Procedures           ED Course      Labs Reviewed   RESPIRATORY PANEL PCR W/ COVID-19 (SARS-COV-2) [facility] IN-HOUSE, NP SWAB IN UTM/VTP, 3-4 HR TAT - Abnormal; Notable for the following components:       Result Value    Human Rhinovirus/Enterovirus Detected (*)     All other components within normal limits    Narrative:     In the setting of a positive respiratory panel with a viral infection PLUS a negative procalcitonin without other underlying concern for bacterial infection, consider observing off antibiotics or discontinuation of antibiotics and continue supportive care. If the respiratory panel is positive for atypical bacterial infection (Bordetella pertussis, Chlamydophila pneumoniae, or Mycoplasma pneumoniae), consider antibiotic de-escalation to target atypical bacterial infection.     XR Chest AP    Result Date: 4/10/2022  No acute cardiopulmonary process identified.  Electronically Signed By-Zak House MD On:4/10/2022 10:00 AM This report was finalized on [id number] by  Zak House MD.    Medications   albuterol sulfate HFA (PROVENTIL HFA;VENTOLIN  HFA;PROAIR HFA) inhaler 2 puff (2 puffs Inhalation Given 4/10/22 1021)   ondansetron ODT (ZOFRAN-ODT) disintegrating tablet 4 mg (4 mg Oral Given 4/10/22 6078)                                                MDM  Number of Diagnoses or Management Options  Acute bronchitis due to Rhinovirus  Exacerbation of asthma, unspecified asthma severity, unspecified whether persistent  Diagnosis management comments: Comorbidities: Hypertension, cardiomyopathy with AICD, asthma, COPD  Differentials: Influenza, Covid, viral respiratory infection, pneumonia, bronchospasm, asthma exacerbation;this list is not all inclusive and does not constitute the entirety of considered causes  Discussion with provider:  Radiology interpretation: X-rays reviewed by me and interpreted by radiologist: As above  Lab interpretation: Labs viewed by me significant for: As above    Patient had a nasal swab obtained as well as a chest x-ray.  She was given puffs on albuterol inhaler.  She was given Zofran for nausea.    Work-up: Chest x-ray shows no acute abnormality.  Viral panel positive for rhinovirus.    On reexamination patient is resting comfortably and in no distress.  She has no new complaints.  She is well-appearing.  She hemodynamically stable.  She is not hypoxic.  She is ambulatory without difficulty on room air.    Diagnosis and treatment plan discussed with patient.  Patient agreeable to plan.   I discussed findings with patient who voices understanding of discharge instructions, signs and symptoms requiring return to ED; discharged improved and in stable condition with follow up for re-evaluation.  Prescriptions for Medrol Dosepak and albuterol Nebules.  Tessalon Perles.       Amount and/or Complexity of Data Reviewed  Clinical lab tests: ordered and reviewed  Tests in the radiology section of CPT®: reviewed and ordered    Patient Progress  Patient progress: stable      Final diagnoses:   Exacerbation of asthma, unspecified asthma  severity, unspecified whether persistent   Acute bronchitis due to Rhinovirus       ED Disposition  ED Disposition     ED Disposition   Discharge    Condition   Stable    Comment   --             Phani Adames MD  [address] [facility] [address]  [place] [facility]  [phone]               Medication List      New Prescriptions    albuterol (2.5 MG/3ML) 0.083% nebulizer solution  Commonly known as: PROVENTIL  Take 2.5 mg by nebulization Every 4 (Four) Hours As Needed for Wheezing.     benzonatate 200 MG capsule  Commonly known as: TESSALON  Take 1 capsule by mouth 3 (Three) Times a Day As Needed for Cough.     methylPREDNISolone 4 MG dose pack  Commonly known as: MEDROL  Take as directed on package instructions.           Where to Get Your Medications      These medications were sent to [facility]/pharmacy #[id number] - [place], [place] - [address] [place] [address] - [phone]  - [phone] [address] [place] [address]    Hours: 24-hours Phone: [phone]   · albuterol (2.5 MG/3ML) 0.083% nebulizer solution  · benzonatate 200 MG capsule  · methylPREDNISolone 4 MG dose pack          Nida Peters APRN  04/10/22 1304

## 2022-05-05 RX ORDER — ALIROCUMAB 75 MG/ML
75 INJECTION, SOLUTION SUBCUTANEOUS
Qty: 2 PEN | Refills: 10 | Status: ON HOLD | OUTPATIENT
Start: 2022-05-05 | End: 2022-08-18

## 2022-08-14 ENCOUNTER — HOSPITAL ENCOUNTER (EMERGENCY)
Facility: HOSPITAL | Age: 47
Discharge: HOME | End: 2022-08-14
Attending: EMERGENCY MEDICINE | Admitting: EMERGENCY MEDICINE

## 2022-08-14 VITALS
DIASTOLIC BLOOD PRESSURE: 106 MMHG | WEIGHT: 190 LBS | TEMPERATURE: 98 F | OXYGEN SATURATION: 95 % | RESPIRATION RATE: 18 BRPM | HEART RATE: 77 BPM | BODY MASS INDEX: 29.82 KG/M2 | HEIGHT: 67 IN | SYSTOLIC BLOOD PRESSURE: 148 MMHG

## 2022-08-14 DIAGNOSIS — M54.16 LUMBAR RADICULOPATHY, ACUTE: Primary | ICD-10-CM

## 2022-08-14 PROCEDURE — 96372 THER/PROPH/DIAG INJ SC/IM: CPT

## 2022-08-14 PROCEDURE — 99283 EMERGENCY DEPT VISIT LOW MDM: CPT

## 2022-08-14 PROCEDURE — 25010000002 KETOROLAC TROMETHAMINE PER 15 MG: Performed by: EMERGENCY MEDICINE

## 2022-08-14 PROCEDURE — 25010000002 DEXAMETHASONE PER 1 MG: Performed by: EMERGENCY MEDICINE

## 2022-08-14 RX ORDER — PREDNISONE 20 MG/1
40 TABLET ORAL DAILY
Qty: 8 TABLET | Refills: 0 | Status: ON HOLD | OUTPATIENT
Start: 2022-08-15 | End: 2022-08-20

## 2022-08-14 RX ORDER — OXYCODONE HYDROCHLORIDE 5 MG/1
5 TABLET ORAL ONCE
Status: COMPLETED | OUTPATIENT
Start: 2022-08-14 | End: 2022-08-14

## 2022-08-14 RX ORDER — DEXAMETHASONE SODIUM PHOSPHATE 4 MG/ML
10 INJECTION, SOLUTION INTRA-ARTICULAR; INTRALESIONAL; INTRAMUSCULAR; INTRAVENOUS; SOFT TISSUE ONCE
Status: COMPLETED | OUTPATIENT
Start: 2022-08-14 | End: 2022-08-14

## 2022-08-14 RX ORDER — KETOROLAC TROMETHAMINE 30 MG/ML
30 INJECTION, SOLUTION INTRAMUSCULAR; INTRAVENOUS ONCE
Status: COMPLETED | OUTPATIENT
Start: 2022-08-14 | End: 2022-08-14

## 2022-08-14 RX ADMIN — DEXAMETHASONE SODIUM PHOSPHATE 10 MG: 4 INJECTION, SOLUTION INTRAMUSCULAR; INTRAVENOUS at 16:17

## 2022-08-14 RX ADMIN — KETOROLAC TROMETHAMINE 30 MG: 30 INJECTION, SOLUTION INTRAMUSCULAR at 16:13

## 2022-08-14 RX ADMIN — OXYCODONE 5 MG: 5 TABLET ORAL at 16:12

## 2022-08-14 NOTE — ED PROVIDER NOTES
Subjective   History of Present Illness  History Provided By: Patient    Chief Complaint: Right low back pain, radiates down outside of right leg into the foot.  Has happened once before.  States she carries a diagnosis of spinal stenosis.  No bowel or bladder incontinence or retention.  Onset: 2 days ago  Timing: Not improving  Location: Right low back rating down the right leg  Quality: Sharp  Severity: Moderate  Modifying Factors: Worse with ambulating and bending over extending leg    Other: Patient states she is having some numbness down leg.    Review of Systems   Constitutional: Negative for chills and fever.   Musculoskeletal: Positive for back pain.   Neurological: Positive for numbness. Negative for weakness.   All other systems reviewed and are negative.      Past Medical History:   Diagnosis Date   • Arrhythmia    • Asthma    • CAD (coronary artery disease)    • Cardiomyopathy, dilated ([facility])    • Chronic systolic congestive heart failure ([facility])    • CKD (chronic kidney disease) stage 2, GFR 60-89 ml/min    • COPD (chronic obstructive pulmonary disease) ([facility])    • Essential hypertension    • GERD without esophagitis    • Hidradenitis 10/26/2020   • Hyperlipidemia    • Hypothyroidism (acquired)    • ICD (implantable cardioverter-defibrillator), dual, in situ 7/22/2019   • Nonrheumatic aortic valve insufficiency    • Nonrheumatic mitral valve regurgitation    • S/P AVR (aortic valve replacement)    • S/P CABG x 3    • Type 2 diabetes mellitus without complication, without long-term current use of insulin ([facility])        Allergies   Allergen Reactions   • Hydrocodone Hives   • Penicillin G Unknown (See Comments)   • Contrast Dye GI Intolerance     She is pretty sure it's the contrast dye that makes her super sick and vomiting after heart cath       Past Surgical History:   Procedure Laterality Date   • AORTIC VALVE REPAIR/REPLACEMENT N/A 12/27/2019    Procedure: AORTIC VALVE REPAIR/REPLACEMENT;  Surgeon: Dayami  Lane Jang MD;  Location: [facility] CVOR;  Service: Cardiothoracic   • BREAST LUMPECTOMY Right     BENIGN   • CARDIAC CATHETERIZATION N/A 12/24/2019    Procedure: Right and Left Heart Cath 12/24/19 @ 0900;  Surgeon: Wayne Luna MD;  Location:  [facility] CATH INVASIVE LOCATION;  Service: Cardiovascular   • CARDIAC CATHETERIZATION N/A 12/24/2019    Procedure: Coronary angiography;  Surgeon: Wayne Luna MD;  Location: [facility] CATH INVASIVE LOCATION;  Service: Cardiovascular   • CARDIAC CATHETERIZATION N/A 11/10/2020    Procedure: Left and right Heart Cath;  Surgeon: Wayne Luna MD;  Location: [facility] CATH INVASIVE LOCATION;  Service: Cardiovascular;  Laterality: N/A;   • CARDIAC CATHETERIZATION N/A 11/10/2020    Procedure: Coronary angiography;  Surgeon: Wayne Luna MD;  Location: [facility] CATH INVASIVE LOCATION;  Service: Cardiovascular;  Laterality: N/A;   • CARDIAC CATHETERIZATION N/A 11/10/2020    Procedure: Right Heart Cath;  Surgeon: Wayne Luna MD;  Location: [facility] CATH INVASIVE LOCATION;  Service: Cardiovascular;  Laterality: N/A;   • CARDIAC CATHETERIZATION N/A 11/25/2020    Procedure: Percutaneous coronary intervention of the left circumflex artery;  Surgeon: Wayne Luna MD;  Location: [facility] CATH INVASIVE LOCATION;  Service: Cardiovascular;  Laterality: N/A;   • CARDIAC CATHETERIZATION N/A 1/22/2021    Procedure: LEFT HEART CATH with possible PCI;  Surgeon: Wayne Luna MD;  Location: [facility] CATH INVASIVE LOCATION;  Service: Cardiovascular;  Laterality: N/A;  Local and IV sedation   • CARDIAC CATHETERIZATION N/A 1/22/2021    Procedure: Coronary angiography;  Surgeon: Wayne Luna MD;  Location: [facility] CATH INVASIVE LOCATION;  Service: Cardiovascular;  Laterality: N/A;   • CARDIAC CATHETERIZATION N/A 1/22/2021    Procedure: Saphenous Vein Graft;  Surgeon: Wayne Luna MD;  Location: [facility] CATH INVASIVE LOCATION;   Service: Cardiovascular;  Laterality: N/A;   • CARDIAC ELECTROPHYSIOLOGY PROCEDURE Left 6/28/2019    Procedure: Dual-chamber ICD insertion;  Surgeon: Héctor Eckert MD;  Location: [facility] CATH INVASIVE LOCATION;  Service: Cardiovascular   • CARDIAC ELECTROPHYSIOLOGY PROCEDURE Left 8/14/2019    Procedure: Lead Revision;  Surgeon: Héctor Eckert MD;  Location: [facility] CATH INVASIVE LOCATION;  Service: Cardiovascular   • CARDIAC SURGERY     • CHOLECYSTECTOMY     • CORONARY ARTERY BYPASS GRAFT N/A 12/27/2019    Procedure: CORONARY ARTERY BYPASS GRAFTING;  Surgeon: Lane Stock MD;  Location: [facility] CVOR;  Service: Cardiothoracic   • HYSTERECTOMY     • INSERT / REPLACE / REMOVE PACEMAKER     • LYMPHADENECTOMY Bilateral    • THYROID SURGERY         Family History   Problem Relation Age of Onset   • Heart disease Father        Social History     Socioeconomic History   • Marital status:    Tobacco Use   • Smoking status: Former Smoker     Quit date: 01/2019     Years since quitting: 3.6   • Smokeless tobacco: Never Used   Vaping Use   • Vaping Use: Never used   Substance and Sexual Activity   • Alcohol use: No   • Drug use: No   • Sexual activity: Defer           Objective   Physical Exam  Constitutional:  No acute distress.  Head:  Atraumatic.  Normocephalic.   Eyes:  No scleral icterus. Normal conjunctiva  ENT:  Moist mucosa.  No nasal discharge present.  Cardiovascular:  Well perfused.  Palpable and equal PT and DP pulses.  Regular rate.  Normal capillary refill.    Pulmonary/Chest:  No respiratory distress.  Airway patent.  No tachypnea.  No accessory muscle usage.    Abdominal:  Non-distended. Non-tender.   Extremities:  No peripheral edema.  No Deformity  Skin:  Warm, dry  Neurological:  Alert, awake, and appropriate.  Normal speech.  Normal strength, normal sensation bilateral lower extremities.  Positive straight leg test on right.    Procedures           ED Course      BP (!) 148/106 (BP  "Location: Right arm, Patient Position: Sitting)   Pulse 77   Temp 98 °F (36.7 °C) (Oral)   Resp 16   Ht 170.2 cm (67\")   Wt 86.2 kg (190 lb)   LMP  (LMP Unknown)   SpO2 95%   BMI 29.76 kg/m²   Labs Reviewed - No data to display  Medications   ketorolac (TORADOL) injection 30 mg (30 mg Intramuscular Given 8/14/22 1613)   dexamethasone (DECADRON) injection 10 mg (10 mg Intramuscular Given 8/14/22 1617)   oxyCODONE (ROXICODONE) immediate release tablet 5 mg (5 mg Oral Given 8/14/22 1612)     No radiology results for the last day                                       MDM  No red flag symptoms.  Pain consistent with lumbar radiculopathy.  Will treat with course of steroids given patient's renal history.  Return ER precautions discussed in detail with patient and family.  Final diagnoses:   Lumbar radiculopathy, acute       ED Disposition  ED Disposition     ED Disposition   Discharge    Condition   Stable    Comment   --             No follow-up provider specified.       Medication List      New Prescriptions    predniSONE 20 MG tablet  Commonly known as: DELTASONE  Take 2 tablets by mouth Daily for 4 days.  Start taking on: August 15, 2022           Where to Get Your Medications      These medications were sent to [facility]/pharmacy #[id number] - [place] - [address] [address] - [phone] [phone]   [address] [place] [address]    Phone: [phone]   · predniSONE 20 MG tablet          Ascencion Marquez MD  08/15/22 0050    "

## 2022-08-16 ENCOUNTER — APPOINTMENT (OUTPATIENT)
Dept: CT IMAGING | Facility: HOSPITAL | Age: 47
End: 2022-08-16

## 2022-08-16 ENCOUNTER — HOSPITAL ENCOUNTER (INPATIENT)
Facility: HOSPITAL | Age: 47
LOS: 2 days | Discharge: HOME | End: 2022-08-20
Attending: EMERGENCY MEDICINE | Admitting: HOSPITALIST

## 2022-08-16 DIAGNOSIS — M54.59 INTRACTABLE LOW BACK PAIN: Primary | ICD-10-CM

## 2022-08-16 DIAGNOSIS — M54.16 LUMBAR RADICULOPATHY: ICD-10-CM

## 2022-08-16 DIAGNOSIS — G89.4 CHRONIC PAIN SYNDROME: Chronic | ICD-10-CM

## 2022-08-16 LAB — SARS-COV-2 RNA RESP QL NAA+PROBE: NOT DETECTED

## 2022-08-16 PROCEDURE — G0378 HOSPITAL OBSERVATION PER HR: HCPCS

## 2022-08-16 PROCEDURE — 25010000002 HYDROMORPHONE 1 MG/ML SOLUTION: Performed by: PHYSICIAN ASSISTANT

## 2022-08-16 PROCEDURE — U0003 INFECTIOUS AGENT DETECTION BY NUCLEIC ACID (DNA OR RNA); SEVERE ACUTE RESPIRATORY SYNDROME CORONAVIRUS 2 (SARS-COV-2) (CORONAVIRUS DISEASE [COVID-19]), AMPLIFIED PROBE TECHNIQUE, MAKING USE OF HIGH THROUGHPUT TECHNOLOGIES AS DESCRIBED BY CMS-2020-01-R: HCPCS | Performed by: EMERGENCY MEDICINE

## 2022-08-16 PROCEDURE — 99283 EMERGENCY DEPT VISIT LOW MDM: CPT

## 2022-08-16 PROCEDURE — 72131 CT LUMBAR SPINE W/O DYE: CPT

## 2022-08-16 RX ORDER — LIDOCAINE 50 MG/G
1 PATCH TOPICAL
Status: COMPLETED | OUTPATIENT
Start: 2022-08-16 | End: 2022-08-17

## 2022-08-16 RX ORDER — OXYCODONE HYDROCHLORIDE 5 MG/1
5 TABLET ORAL ONCE
Status: COMPLETED | OUTPATIENT
Start: 2022-08-16 | End: 2022-08-16

## 2022-08-16 RX ORDER — METHOCARBAMOL 750 MG/1
750 TABLET, FILM COATED ORAL ONCE
Status: COMPLETED | OUTPATIENT
Start: 2022-08-16 | End: 2022-08-16

## 2022-08-16 RX ADMIN — HYDROMORPHONE HYDROCHLORIDE 1 MG: 1 INJECTION, SOLUTION INTRAMUSCULAR; INTRAVENOUS; SUBCUTANEOUS at 18:19

## 2022-08-16 RX ADMIN — METHOCARBAMOL 750 MG: 750 TABLET ORAL at 19:35

## 2022-08-16 RX ADMIN — OXYCODONE 5 MG: 5 TABLET ORAL at 22:53

## 2022-08-16 RX ADMIN — LIDOCAINE 1 PATCH: 50 PATCH CUTANEOUS at 18:21

## 2022-08-16 NOTE — ED PROVIDER NOTES
Subjective   Chief Complaint: Back pain    Patient is a 47-year-old -American female history of CAD, cardiomyopathy, CKD, COPD, hypertension, GERD, has defibrillator and pacemaker placed presents to the ER complaints of lower back pain and sciatica pain for 5 days.  Patient was seen in the ER 2 days ago for similar complaints, states that her pain seems to be worse.  She denies any fall trauma or injury.  Patient reports pain in lower back that radiates into her hips and down her legs.  Patient states she started to develop some numbness and tingling in her legs that is worse than her normal.  Patient also states that she has difficulty urinating but reports no incontinence.  No bowel dysfunction.  No saddle anesthesia.  Patient denies abdominal pain, nausea vomiting or diarrhea.  Patient states she been taking her Norco at home with no relief.  No chest pain shortness of breath or headache.  Patient currently sees pain management.  Patient currently has pacemaker defibrillator, MRI conditional.    PCP: Phani Adames      History provided by:  Patient      Review of Systems   Constitutional: Negative for chills and fever.   HENT: Negative for sore throat and trouble swallowing.    Eyes: Negative.    Respiratory: Negative for shortness of breath and wheezing.    Cardiovascular: Negative for chest pain.   Gastrointestinal: Negative for abdominal pain, diarrhea, nausea and vomiting.   Endocrine: Negative.    Genitourinary: Negative for dysuria.   Musculoskeletal: Positive for back pain. Negative for myalgias.   Skin: Negative for rash.   Allergic/Immunologic: Negative.    Neurological: Positive for weakness and numbness. Negative for headaches.   Psychiatric/Behavioral: Negative for behavioral problems.   All other systems reviewed and are negative.      Past Medical History:   Diagnosis Date   • Arrhythmia    • Asthma    • CAD (coronary artery disease)    • Cardiomyopathy, dilated ([facility])    • Chronic systolic  congestive heart failure ([facility])    • CKD (chronic kidney disease) stage 2, GFR 60-89 ml/min    • COPD (chronic obstructive pulmonary disease) ([facility])    • Essential hypertension    • GERD without esophagitis    • Hidradenitis 10/26/2020   • Hyperlipidemia    • Hypothyroidism (acquired)    • ICD (implantable cardioverter-defibrillator), dual, in situ 7/22/2019   • Nonrheumatic aortic valve insufficiency    • Nonrheumatic mitral valve regurgitation    • S/P AVR (aortic valve replacement)    • S/P CABG x 3    • Type 2 diabetes mellitus without complication, without long-term current use of insulin ([facility])        Allergies   Allergen Reactions   • Hydrocodone Hives   • Penicillin G Unknown (See Comments)   • Contrast Dye GI Intolerance     She is pretty sure it's the contrast dye that makes her super sick and vomiting after heart cath       Past Surgical History:   Procedure Laterality Date   • AORTIC VALVE REPAIR/REPLACEMENT N/A 12/27/2019    Procedure: AORTIC VALVE REPAIR/REPLACEMENT;  Surgeon: Lane Stock MD;  Location: [facility] CVOR;  Service: Cardiothoracic   • BREAST LUMPECTOMY Right     BENIGN   • CARDIAC CATHETERIZATION N/A 12/24/2019    Procedure: Right and Left Heart Cath 12/24/19 @ 0900;  Surgeon: Wayne Luna MD;  Location: [facility] CATH INVASIVE LOCATION;  Service: Cardiovascular   • CARDIAC CATHETERIZATION N/A 12/24/2019    Procedure: Coronary angiography;  Surgeon: Wayne Luna MD;  Location: [facility] CATH INVASIVE LOCATION;  Service: Cardiovascular   • CARDIAC CATHETERIZATION N/A 11/10/2020    Procedure: Left and right Heart Cath;  Surgeon: Wayne Luna MD;  Location: [facility] CATH INVASIVE LOCATION;  Service: Cardiovascular;  Laterality: N/A;   • CARDIAC CATHETERIZATION N/A 11/10/2020    Procedure: Coronary angiography;  Surgeon: Wayne Luna MD;  Location: [facility] CATH INVASIVE LOCATION;  Service: Cardiovascular;  Laterality: N/A;   • CARDIAC CATHETERIZATION N/A  11/10/2020    Procedure: Right Heart Cath;  Surgeon: Wayne Luna MD;  Location: [facility] CATH INVASIVE LOCATION;  Service: Cardiovascular;  Laterality: N/A;   • CARDIAC CATHETERIZATION N/A 11/25/2020    Procedure: Percutaneous coronary intervention of the left circumflex artery;  Surgeon: Wayne Luna MD;  Location: [facility] CATH INVASIVE LOCATION;  Service: Cardiovascular;  Laterality: N/A;   • CARDIAC CATHETERIZATION N/A 1/22/2021    Procedure: LEFT HEART CATH with possible PCI;  Surgeon: Wayne Luna MD;  Location: [facility] CATH INVASIVE LOCATION;  Service: Cardiovascular;  Laterality: N/A;  Local and IV sedation   • CARDIAC CATHETERIZATION N/A 1/22/2021    Procedure: Coronary angiography;  Surgeon: Wayne Luna MD;  Location: [facility] CATH INVASIVE LOCATION;  Service: Cardiovascular;  Laterality: N/A;   • CARDIAC CATHETERIZATION N/A 1/22/2021    Procedure: Saphenous Vein Graft;  Surgeon: Wayne Luna MD;  Location: [facility] CATH INVASIVE LOCATION;  Service: Cardiovascular;  Laterality: N/A;   • CARDIAC ELECTROPHYSIOLOGY PROCEDURE Left 6/28/2019    Procedure: Dual-chamber ICD insertion;  Surgeon: Héctor Eckert MD;  Location: [facility] CATH INVASIVE LOCATION;  Service: Cardiovascular   • CARDIAC ELECTROPHYSIOLOGY PROCEDURE Left 8/14/2019    Procedure: Lead Revision;  Surgeon: Héctor Eckert MD;  Location: [facility] CATH INVASIVE LOCATION;  Service: Cardiovascular   • CARDIAC SURGERY     • CHOLECYSTECTOMY     • CORONARY ARTERY BYPASS GRAFT N/A 12/27/2019    Procedure: CORONARY ARTERY BYPASS GRAFTING;  Surgeon: Lane Stock MD;  [facility]  Service: Cardiothoracic   • HYSTERECTOMY     • INSERT / REPLACE / REMOVE PACEMAKER     • LYMPHADENECTOMY Bilateral    • THYROID SURGERY         Family History   Problem Relation Age of Onset   • Heart disease Father        Social History     Socioeconomic History   • Marital status:    Tobacco Use   •  Smoking status: Former Smoker     Quit date: 01/2019     Years since quitting: 3.6   • Smokeless tobacco: Never Used   Vaping Use   • Vaping Use: Never used   Substance and Sexual Activity   • Alcohol use: No   • Drug use: No   • Sexual activity: Defer           Objective   Physical Exam  Vitals and nursing note reviewed.   Constitutional:       Appearance: Normal appearance. She is well-developed. She is obese. She is not ill-appearing or toxic-appearing.   HENT:      Head: Normocephalic and atraumatic.      Nose: Nose normal.      Mouth/Throat:      Mouth: Mucous membranes are moist.   Eyes:      Pupils: Pupils are equal, round, and reactive to light.   Cardiovascular:      Rate and Rhythm: Normal rate and regular rhythm.      Pulses: Normal pulses.      Heart sounds: Normal heart sounds. No murmur heard.  Pulmonary:      Effort: Pulmonary effort is normal. No respiratory distress.      Breath sounds: Normal breath sounds. No wheezing.   Abdominal:      General: Bowel sounds are normal. There is no distension.      Palpations: Abdomen is soft.      Tenderness: There is no abdominal tenderness. There is no right CVA tenderness or left CVA tenderness.   Musculoskeletal:         General: Tenderness present. No signs of injury.      Cervical back: Normal range of motion.      Right lower leg: No edema.      Left lower leg: No edema.      Comments: Lumbar spine: No step-offs or deformities, mild midline tenderness to palpation.  Bilateral lower extremities: Positive straight leg raise.  5 out of 5 strength bilaterally.  Sensation intact to light touch.  2+ reflexes.  No clonus.  Negative Babinski sign.   Skin:     General: Skin is warm and dry.      Capillary Refill: Capillary refill takes less than 2 seconds.      Findings: No rash.   Neurological:      General: No focal deficit present.      Mental Status: She is alert and oriented to person, place, and time.      Cranial Nerves: No cranial nerve deficit.       "Sensory: Sensory deficit present.      Motor: No weakness.      Comments: Reports decreased sensation bilateral lower extremities   Psychiatric:         Mood and Affect: Mood normal.         Behavior: Behavior normal.         Procedures           ED Course  ED Course as of 08/17/22 0315   Tue Aug 16, 2022   1929 I oswaldokoe with CT Tech, patient was unable to tolerate CT imaging secondary to pain. Imaging not adequate. Will give additional pain medication and try to obtain another image   [MC]      ED Course User Index  [MC] Cristian Radha, PA    /98 (BP Location: Right arm, Patient Position: Lying)   Pulse 70   Temp 97.6 °F (36.4 °C) (Oral)   Resp 16   Ht 170.2 cm (67\")   Wt 86.8 kg (191 lb 5.8 oz)   LMP  (LMP Unknown)   SpO2 98%   BMI 29.97 kg/m²   Labs Reviewed   COVID-19,CEPHEID/CHELLE,COR/[facility] IN-HOUSE,NP SWAB IN TRANSPORT MEDIA 3-4 HR TAT, RT-PCR - Normal    Narrative:     Fact sheet for providers: https://www.fda.gov/media/004229/download     Fact sheet for patients: https://www.fda.gov/media/944967/download  Fact sheet for providers: https://www.fda.gov/media/403705/download     Fact sheet for patients: https://www.fda.gov/media/104626/download   COVID PRE-OP / PRE-PROCEDURE SCREENING ORDER (NO ISOLATION)    Narrative:     The following orders were created for panel order COVID PRE-OP / PRE-PROCEDURE SCREENING ORDER (NO ISOLATION) - Swab, Nasopharynx.  Procedure                               Abnormality         Status                     ---------                               -----------         ------                     COVID-19,CEPHEID/CHELLE,CO...[[id number]]  Normal              Final result                 Please view results for these tests on the individual orders.     Medications   lidocaine (LIDODERM) 5 % 1 patch (1 patch Transdermal Medication Applied 8/16/22 1821)   HYDROmorphone (DILAUDID) injection 1 mg (1 mg Subcutaneous Given 8/16/22 1819)   methocarbamol (ROBAXIN) tablet 750 mg " (750 mg Oral Given 8/16/22 1935)   oxyCODONE (ROXICODONE) immediate release tablet 5 mg (5 mg Oral Given 8/16/22 2073)   HYDROmorphone (DILAUDID) injection 0.5 mg (0.5 mg Intravenous Given 8/17/22 0040)     CT Lumbar Spine Without Contrast    Result Date: 8/16/2022  1.     Degenerative disc disease lumbar spine. There is moderate to severe central narrowing suspected L4-L5 due to degenerative disc disease and severe facet degenerative change. There is moderate right mild left neural foraminal narrowing at this location. 2.     There is mild to moderate central canal narrowing L5-S1 with moderate left and severe right neural foraminal narrowing at this level. 3.     Please see findings for details other levels.  Electronically Signed By-Althea Kaba MD On:8/16/2022 9:36 PM This report was finalized on [id number] by  Althea Kaba MD.                                           MDM  Number of Diagnoses or Management Options  Intractable low back pain  Lumbar radiculopathy  Diagnosis management comments: MEDICAL DECISION  Epic Chart Review: Patient was seen in the ER 2 days ago, no recent lumbar spine imaging  Comorbidities: Asthma, CAD, cardiomyopathy, CKD, COPD, GERD, hypertension  Differentials: Sciatica, cauda equina, spinal stenosis, disc herniation; this list is not all inclusive and does not constitute the entirety of considered causes  Radiology interpretation:  Images reviewed by me and interpreted by radiologist, as above  Lab interpretation:  Labs viewed by me significant for, not warranted    While in the ED appropriate PPE was worn during exam and throughout all encounters with the patient.  Patient had the above evaluation.  Patient given Dilaudid subcu and lidocaine patch for pain.  Patient also given Robaxin.  Patient has pacemaker defibrillator in place, MRI conditional.  Discussed imaging with Dr. Monteiro, will obtain CT at this time.  This is significant for degenerative disc disease in the lumbar  spine with moderate to severe central narrowing suspected L4-L5 due to degenerative disc disease and severe facet degenerative changes.  Moderate central canal narrowing at L5-S1 as well with severe right foraminal narrowing at this level.  Patient unable to sit up on the side of the bed or ambulate without severe pain.  Patient will be placed in the ED observation unit for further evaluation, pain control for intractable back pain, neurologic consultation and possible MRI if pacemaker is compatible.  Patient is agreeable.  Patient had otherwise unremarkable ER course.         Amount and/or Complexity of Data Reviewed  Tests in the radiology section of CPT®: reviewed and ordered    Patient Progress  Patient progress: stable      Final diagnoses:   Intractable low back pain   Lumbar radiculopathy       ED Disposition  ED Disposition     ED Disposition   Decision to Admit    Condition   --    Comment   --             No follow-up provider specified.       Medication List      No changes were made to your prescriptions during this visit.          Dina Oliveros PA  08/17/22 0316

## 2022-08-17 ENCOUNTER — APPOINTMENT (OUTPATIENT)
Dept: MRI IMAGING | Facility: HOSPITAL | Age: 47
End: 2022-08-17

## 2022-08-17 LAB
ANION GAP SERPL CALCULATED.3IONS-SCNC: 10 MMOL/L (ref 5–15)
BASOPHILS # BLD AUTO: 0 10*3/MM3 (ref 0–0.2)
BASOPHILS NFR BLD AUTO: 0.3 % (ref 0–1.5)
BUN SERPL-MCNC: 15 MG/DL (ref 6–20)
BUN/CREAT SERPL: 13.4 (ref 7–25)
CALCIUM SPEC-SCNC: 10 MG/DL (ref 8.6–10.5)
CHLORIDE SERPL-SCNC: 100 MMOL/L (ref 98–107)
CO2 SERPL-SCNC: 30 MMOL/L (ref 22–29)
CREAT SERPL-MCNC: 1.12 MG/DL (ref 0.57–1)
DEPRECATED RDW RBC AUTO: 45.1 FL (ref 37–54)
EGFRCR SERPLBLD CKD-EPI 2021: 60.4 ML/MIN/1.73
EOSINOPHIL # BLD AUTO: 0 10*3/MM3 (ref 0–0.4)
EOSINOPHIL NFR BLD AUTO: 0.5 % (ref 0.3–6.2)
ERYTHROCYTE [DISTWIDTH] IN BLOOD BY AUTOMATED COUNT: 13.9 % (ref 12.3–15.4)
GLUCOSE BLDC GLUCOMTR-MCNC: 144 MG/DL (ref 70–105)
GLUCOSE BLDC GLUCOMTR-MCNC: 98 MG/DL (ref 70–105)
GLUCOSE SERPL-MCNC: 106 MG/DL (ref 65–99)
HCT VFR BLD AUTO: 48.8 % (ref 34–46.6)
HGB BLD-MCNC: 16.3 G/DL (ref 12–15.9)
LYMPHOCYTES # BLD AUTO: 2.4 10*3/MM3 (ref 0.7–3.1)
LYMPHOCYTES NFR BLD AUTO: 23.4 % (ref 19.6–45.3)
MCH RBC QN AUTO: 30.7 PG (ref 26.6–33)
MCHC RBC AUTO-ENTMCNC: 33.5 G/DL (ref 31.5–35.7)
MCV RBC AUTO: 91.7 FL (ref 79–97)
MONOCYTES # BLD AUTO: 0.9 10*3/MM3 (ref 0.1–0.9)
MONOCYTES NFR BLD AUTO: 8.4 % (ref 5–12)
NEUTROPHILS NFR BLD AUTO: 6.9 10*3/MM3 (ref 1.7–7)
NEUTROPHILS NFR BLD AUTO: 67.4 % (ref 42.7–76)
NRBC BLD AUTO-RTO: 0 /100 WBC (ref 0–0.2)
PLATELET # BLD AUTO: 240 10*3/MM3 (ref 140–450)
PMV BLD AUTO: 8 FL (ref 6–12)
POTASSIUM SERPL-SCNC: 4.1 MMOL/L (ref 3.5–5.2)
RBC # BLD AUTO: 5.32 10*6/MM3 (ref 3.77–5.28)
SODIUM SERPL-SCNC: 140 MMOL/L (ref 136–145)
WBC NRBC COR # BLD: 10.3 10*3/MM3 (ref 3.4–10.8)

## 2022-08-17 PROCEDURE — 25010000002 HYDROMORPHONE 1 MG/ML SOLUTION: Performed by: PHYSICIAN ASSISTANT

## 2022-08-17 PROCEDURE — 25010000002 MIDAZOLAM PER 1 MG

## 2022-08-17 PROCEDURE — 80048 BASIC METABOLIC PNL TOTAL CA: CPT | Performed by: PHYSICIAN ASSISTANT

## 2022-08-17 PROCEDURE — 25010000002 HYDROMORPHONE 1 MG/ML SOLUTION: Performed by: EMERGENCY MEDICINE

## 2022-08-17 PROCEDURE — 85025 COMPLETE CBC W/AUTO DIFF WBC: CPT | Performed by: PHYSICIAN ASSISTANT

## 2022-08-17 PROCEDURE — 82962 GLUCOSE BLOOD TEST: CPT

## 2022-08-17 PROCEDURE — 25010000002 MIDAZOLAM PER 1 MG: Performed by: PHYSICIAN ASSISTANT

## 2022-08-17 PROCEDURE — 99214 OFFICE O/P EST MOD 30 MIN: CPT

## 2022-08-17 PROCEDURE — G0378 HOSPITAL OBSERVATION PER HR: HCPCS

## 2022-08-17 RX ORDER — CHOLECALCIFEROL (VITAMIN D3) 125 MCG
5 CAPSULE ORAL NIGHTLY PRN
Status: DISCONTINUED | OUTPATIENT
Start: 2022-08-17 | End: 2022-08-20 | Stop reason: HOSPADM

## 2022-08-17 RX ORDER — DEXTROSE MONOHYDRATE 25 G/50ML
25 INJECTION, SOLUTION INTRAVENOUS
Status: DISCONTINUED | OUTPATIENT
Start: 2022-08-17 | End: 2022-08-20 | Stop reason: HOSPADM

## 2022-08-17 RX ORDER — NICOTINE POLACRILEX 4 MG
15 LOZENGE BUCCAL
Status: DISCONTINUED | OUTPATIENT
Start: 2022-08-17 | End: 2022-08-20 | Stop reason: HOSPADM

## 2022-08-17 RX ORDER — SODIUM CHLORIDE 0.9 % (FLUSH) 0.9 %
10 SYRINGE (ML) INJECTION AS NEEDED
Status: DISCONTINUED | OUTPATIENT
Start: 2022-08-17 | End: 2022-08-20 | Stop reason: HOSPADM

## 2022-08-17 RX ORDER — METHOCARBAMOL 500 MG/1
500 TABLET, FILM COATED ORAL EVERY 8 HOURS PRN
Status: DISCONTINUED | OUTPATIENT
Start: 2022-08-17 | End: 2022-08-20 | Stop reason: HOSPADM

## 2022-08-17 RX ORDER — ACETAMINOPHEN 325 MG/1
650 TABLET ORAL EVERY 4 HOURS PRN
Status: DISCONTINUED | OUTPATIENT
Start: 2022-08-17 | End: 2022-08-20 | Stop reason: HOSPADM

## 2022-08-17 RX ORDER — ALBUTEROL SULFATE 2.5 MG/3ML
2.5 SOLUTION RESPIRATORY (INHALATION) EVERY 4 HOURS PRN
Status: DISCONTINUED | OUTPATIENT
Start: 2022-08-17 | End: 2022-08-20 | Stop reason: HOSPADM

## 2022-08-17 RX ORDER — FERROUS SULFATE TAB EC 324 MG (65 MG FE EQUIVALENT) 324 (65 FE) MG
324 TABLET DELAYED RESPONSE ORAL
Status: DISCONTINUED | OUTPATIENT
Start: 2022-08-17 | End: 2022-08-20 | Stop reason: HOSPADM

## 2022-08-17 RX ORDER — ONDANSETRON 2 MG/ML
4 INJECTION INTRAMUSCULAR; INTRAVENOUS EVERY 6 HOURS PRN
Status: DISCONTINUED | OUTPATIENT
Start: 2022-08-17 | End: 2022-08-20 | Stop reason: HOSPADM

## 2022-08-17 RX ORDER — MIDAZOLAM HYDROCHLORIDE 1 MG/ML
1 INJECTION INTRAMUSCULAR; INTRAVENOUS ONCE
Status: COMPLETED | OUTPATIENT
Start: 2022-08-17 | End: 2022-08-17

## 2022-08-17 RX ORDER — PANTOPRAZOLE SODIUM 40 MG/1
40 TABLET, DELAYED RELEASE ORAL
Status: DISCONTINUED | OUTPATIENT
Start: 2022-08-17 | End: 2022-08-20 | Stop reason: HOSPADM

## 2022-08-17 RX ORDER — SPIRONOLACTONE 25 MG/1
25 TABLET ORAL DAILY
Status: DISCONTINUED | OUTPATIENT
Start: 2022-08-17 | End: 2022-08-20 | Stop reason: HOSPADM

## 2022-08-17 RX ORDER — BENZONATATE 100 MG/1
200 CAPSULE ORAL 3 TIMES DAILY PRN
Status: DISCONTINUED | OUTPATIENT
Start: 2022-08-17 | End: 2022-08-20 | Stop reason: HOSPADM

## 2022-08-17 RX ORDER — INSULIN LISPRO 100 [IU]/ML
0-9 INJECTION, SOLUTION INTRAVENOUS; SUBCUTANEOUS
Status: DISCONTINUED | OUTPATIENT
Start: 2022-08-17 | End: 2022-08-20 | Stop reason: HOSPADM

## 2022-08-17 RX ORDER — OLANZAPINE 10 MG/2ML
1 INJECTION, POWDER, LYOPHILIZED, FOR SOLUTION INTRAMUSCULAR
Status: DISCONTINUED | OUTPATIENT
Start: 2022-08-17 | End: 2022-08-20 | Stop reason: HOSPADM

## 2022-08-17 RX ORDER — ASPIRIN 81 MG/1
81 TABLET ORAL DAILY
Status: DISCONTINUED | OUTPATIENT
Start: 2022-08-17 | End: 2022-08-20 | Stop reason: HOSPADM

## 2022-08-17 RX ORDER — SODIUM CHLORIDE 0.9 % (FLUSH) 0.9 %
10 SYRINGE (ML) INJECTION EVERY 12 HOURS SCHEDULED
Status: DISCONTINUED | OUTPATIENT
Start: 2022-08-17 | End: 2022-08-20 | Stop reason: HOSPADM

## 2022-08-17 RX ORDER — MELATONIN
1000 DAILY
Status: DISCONTINUED | OUTPATIENT
Start: 2022-08-17 | End: 2022-08-20 | Stop reason: HOSPADM

## 2022-08-17 RX ORDER — MIDAZOLAM HYDROCHLORIDE 1 MG/ML
INJECTION INTRAMUSCULAR; INTRAVENOUS
Status: COMPLETED
Start: 2022-08-17 | End: 2022-08-17

## 2022-08-17 RX ORDER — POTASSIUM CHLORIDE 20 MEQ/1
20 TABLET, EXTENDED RELEASE ORAL 2 TIMES DAILY WITH MEALS
Status: DISCONTINUED | OUTPATIENT
Start: 2022-08-17 | End: 2022-08-20 | Stop reason: HOSPADM

## 2022-08-17 RX ORDER — CARVEDILOL 6.25 MG/1
6.25 TABLET ORAL 2 TIMES DAILY WITH MEALS
Status: DISCONTINUED | OUTPATIENT
Start: 2022-08-17 | End: 2022-08-20 | Stop reason: HOSPADM

## 2022-08-17 RX ORDER — DIPHENOXYLATE HYDROCHLORIDE AND ATROPINE SULFATE 2.5; .025 MG/1; MG/1
1 TABLET ORAL DAILY
Status: DISCONTINUED | OUTPATIENT
Start: 2022-08-17 | End: 2022-08-20 | Stop reason: HOSPADM

## 2022-08-17 RX ORDER — OXYCODONE HYDROCHLORIDE 5 MG/1
7.5 TABLET ORAL EVERY 4 HOURS PRN
Status: DISCONTINUED | OUTPATIENT
Start: 2022-08-17 | End: 2022-08-20 | Stop reason: HOSPADM

## 2022-08-17 RX ORDER — CLOPIDOGREL BISULFATE 75 MG/1
75 TABLET ORAL DAILY
Status: DISCONTINUED | OUTPATIENT
Start: 2022-08-17 | End: 2022-08-20 | Stop reason: HOSPADM

## 2022-08-17 RX ORDER — DOCUSATE SODIUM 100 MG/1
100 CAPSULE, LIQUID FILLED ORAL 2 TIMES DAILY PRN
Status: DISCONTINUED | OUTPATIENT
Start: 2022-08-17 | End: 2022-08-20 | Stop reason: HOSPADM

## 2022-08-17 RX ADMIN — HYDROMORPHONE HYDROCHLORIDE 0.5 MG: 1 INJECTION, SOLUTION INTRAMUSCULAR; INTRAVENOUS; SUBCUTANEOUS at 13:30

## 2022-08-17 RX ADMIN — Medication 10 ML: at 16:09

## 2022-08-17 RX ADMIN — HYDROMORPHONE HYDROCHLORIDE 0.5 MG: 1 INJECTION, SOLUTION INTRAMUSCULAR; INTRAVENOUS; SUBCUTANEOUS at 20:39

## 2022-08-17 RX ADMIN — HYDROMORPHONE HYDROCHLORIDE 0.5 MG: 1 INJECTION, SOLUTION INTRAMUSCULAR; INTRAVENOUS; SUBCUTANEOUS at 06:29

## 2022-08-17 RX ADMIN — HYDROMORPHONE HYDROCHLORIDE 0.5 MG: 1 INJECTION, SOLUTION INTRAMUSCULAR; INTRAVENOUS; SUBCUTANEOUS at 22:41

## 2022-08-17 RX ADMIN — CARVEDILOL 6.25 MG: 6.25 TABLET, FILM COATED ORAL at 08:38

## 2022-08-17 RX ADMIN — CARVEDILOL 6.25 MG: 6.25 TABLET, FILM COATED ORAL at 18:36

## 2022-08-17 RX ADMIN — FERROUS SULFATE TAB EC 324 MG (65 MG FE EQUIVALENT) 324 MG: 324 (65 FE) TABLET DELAYED RESPONSE at 08:38

## 2022-08-17 RX ADMIN — MIDAZOLAM 1 MG: 1 INJECTION INTRAMUSCULAR; INTRAVENOUS at 14:24

## 2022-08-17 RX ADMIN — THERA TABS 1 TABLET: TAB at 08:37

## 2022-08-17 RX ADMIN — HYDROMORPHONE HYDROCHLORIDE 0.5 MG: 1 INJECTION, SOLUTION INTRAMUSCULAR; INTRAVENOUS; SUBCUTANEOUS at 08:36

## 2022-08-17 RX ADMIN — Medication 10 ML: at 20:39

## 2022-08-17 RX ADMIN — CLOPIDOGREL BISULFATE 75 MG: 75 TABLET ORAL at 08:38

## 2022-08-17 RX ADMIN — TORSEMIDE 60 MG: 100 TABLET ORAL at 08:37

## 2022-08-17 RX ADMIN — ASPIRIN 81 MG: 81 TABLET, COATED ORAL at 08:37

## 2022-08-17 RX ADMIN — METHOCARBAMOL 500 MG: 500 TABLET ORAL at 16:09

## 2022-08-17 RX ADMIN — MIDAZOLAM HYDROCHLORIDE 1 MG: 1 INJECTION, SOLUTION INTRAMUSCULAR; INTRAVENOUS at 14:24

## 2022-08-17 RX ADMIN — PANTOPRAZOLE SODIUM 40 MG: 40 TABLET, DELAYED RELEASE ORAL at 08:37

## 2022-08-17 RX ADMIN — HYDROMORPHONE HYDROCHLORIDE 0.5 MG: 1 INJECTION, SOLUTION INTRAMUSCULAR; INTRAVENOUS; SUBCUTANEOUS at 00:40

## 2022-08-17 RX ADMIN — POTASSIUM CHLORIDE 20 MEQ: 1500 TABLET, EXTENDED RELEASE ORAL at 18:36

## 2022-08-17 RX ADMIN — POTASSIUM CHLORIDE 20 MEQ: 1500 TABLET, EXTENDED RELEASE ORAL at 08:37

## 2022-08-17 RX ADMIN — LEVOTHYROXINE SODIUM 225 MCG: 0.2 TABLET ORAL at 05:49

## 2022-08-17 RX ADMIN — Medication 1000 UNITS: at 08:37

## 2022-08-17 RX ADMIN — HYDROMORPHONE HYDROCHLORIDE 0.5 MG: 1 INJECTION, SOLUTION INTRAMUSCULAR; INTRAVENOUS; SUBCUTANEOUS at 11:01

## 2022-08-17 RX ADMIN — HYDROMORPHONE HYDROCHLORIDE 0.5 MG: 1 INJECTION, SOLUTION INTRAMUSCULAR; INTRAVENOUS; SUBCUTANEOUS at 15:12

## 2022-08-17 RX ADMIN — HYDROMORPHONE HYDROCHLORIDE 0.5 MG: 1 INJECTION, SOLUTION INTRAMUSCULAR; INTRAVENOUS; SUBCUTANEOUS at 18:36

## 2022-08-17 RX ADMIN — HYDROMORPHONE HYDROCHLORIDE 0.5 MG: 1 INJECTION, SOLUTION INTRAMUSCULAR; INTRAVENOUS; SUBCUTANEOUS at 16:09

## 2022-08-17 RX ADMIN — SPIRONOLACTONE 25 MG: 25 TABLET ORAL at 08:38

## 2022-08-17 RX ADMIN — Medication 10 ML: at 08:36

## 2022-08-17 NOTE — CONSULTS
Neurosurgery Consult Note      Patient: Rafael Nunes    YOB: 1975    Date of Admission: 8/16/2022  5:33 PM    Admitting Dx: Lumbar radiculopathy [M54.16]  Intractable low back pain [M54.59]    Reason for Consult: Severe back pain and radiculopathy      Subjective     HPI:  Patient is a 47 y.o. female with COPD, CKD-2, DM2, CAD with hx of three-vessel CABG, CHF w/ hx of aortic valve replacement, HTN, and presence of implanted cardioverter defibrillator.  She presented to the ED yesterday with severe back and leg pain x5 days.  She describes many years of chronic low back pain that has radiated down her right leg intermittently.  Patient was bent over cleaning her bathtub this past Friday when her back and leg pain severely worsened.  Since then her pain has continued to increase and now she is experiencing symptoms down her left lower extremity as well.  This pain radiates down the posterior aspect of her thighs and calves, into the bottom of her feet and in 2 her third fourth and fifth digits bilaterally.  This pain is associated with severe numbness and tingling.  Patient denies saddle anesthesia and bowel dysfunction.  She does report recent urinary retention issues.  She takes diuretic at home and usually urinates several times a day.  She is uncertain how long this has been going on.  She was able to urinate freely this morning.  She is currently able to bear weight only for a few steps before her legs give out.  Her blood pressure is significantly increased today and yesterday.      Current Medications:  Scheduled Meds:aspirin, 81 mg, Oral, Daily  carvedilol, 6.25 mg, Oral, BID With Meals  cholecalciferol, 1,000 Units, Oral, Daily  clopidogrel, 75 mg, Oral, Daily  ferrous sulfate, 324 mg, Oral, Daily With Breakfast  insulin lispro, 0-9 Units, Subcutaneous, TID AC  levothyroxine, 225 mcg, Oral, Q AM  multivitamin, 1 tablet, Oral, Daily  pantoprazole, 40 mg, Oral, QAM AC  potassium  "chloride, 20 mEq, Oral, BID With Meals  sodium chloride, 10 mL, Intravenous, Q12H  spironolactone, 25 mg, Oral, Daily  torsemide, 60 mg, Oral, Daily      Continuous Infusions:   PRN Meds: •  acetaminophen  •  albuterol  •  benzonatate  •  dextrose  •  dextrose  •  docusate sodium  •  glucagon (human recombinant)  •  HYDROmorphone  •  melatonin  •  methocarbamol  •  ondansetron  •  oxyCODONE  •  sodium chloride    14 point review of systems was completed and is negative except for what is noted in HPI      Objective     Vitals:    08/16/22 1407 08/17/22 0018 08/17/22 0138 08/17/22 0530   BP: (!) 173/106 (!) 204/122 173/98 (!) 208/108   BP Location: Left arm Right arm Right arm Right arm   Patient Position: Sitting Lying Lying Lying   Pulse: 70 68 70 65   Resp: 16 16  16   Temp: 98.5 °F (36.9 °C) 97.6 °F (36.4 °C)  98.1 °F (36.7 °C)   TempSrc: Oral Oral  Oral   SpO2: 96% 98%  99%   Weight: 86.2 kg (190 lb) 86.8 kg (191 lb 5.8 oz)     Height: 170.2 cm (67\") 170.2 cm (67\")         Physical exam    General  - WD/WN female, appears their stated age, awake, cooperative, laying in bed in obvious discomfort but no acute distress  HEENT  - Normocephalic, atraumatic, PERRLA, EOM intact  Respiratory  - Normal respiratory rate and effort, no audible wheezes  Musculoskeletal  - Moves extremities symmetrically, no joint swelling/tenderness  Skin  - Warm and dry, no bleeding, bruising, or rash  NEUROLOGIC  - A/O x3  - 4-/5 plantar flexion bilaterally  - 5/5 dorsiflexion bilaterally  - Positive SLR 20 degrees bilaterally, R>L  - Sensation intact throughout        RESULTS REVIEW:  Results from last 7 days   Lab Units 08/17/22  0428   WBC 10*3/mm3 10.30   HEMOGLOBIN g/dL 16.3*   HEMATOCRIT % 48.8*   PLATELETS 10*3/mm3 240        Results from last 7 days   Lab Units 08/17/22  0428   SODIUM mmol/L 140   POTASSIUM mmol/L 4.1   CHLORIDE mmol/L 100   CO2 mmol/L 30.0*   BUN mg/dL 15   CREATININE mg/dL 1.12*   CALCIUM mg/dL 10.0   GLUCOSE " mg/dL 106*        Imaging Results (Last 48 Hours)     Procedure Component Value Units Date/Time    CT Lumbar Spine Without Contrast [[id number]] Collected: 08/16/22 2116     Updated: 08/16/22 2139    Narrative:         DATE OF EXAM:  8/16/2022 6:55 PM     PROCEDURE:  CT LUMBAR SPINE WO CONTRAST-      INDICATIONS:   back pain; has pacemaker, defib, unsure if MRI compatible     COMPARISON:   CT the abdomen pelvis 06/01/2021     TECHNIQUE:  Contiguous axial images through the lumbar spine. Coronal and sagittal  reconstructions were performed. Automated exposure control and iterative  reconstruction methods were used.      FINDINGS:     There is motion which limits evaluation particularly at the L2 level.  The bone mineral density is normal. The alignment anatomic. There is  mild disc height loss at L3 L4, L4 L5, and L5 S1. There is severe facet  degenerative change at the L4-L5 level. MRI is more sensitive to  evaluate for central canal or neural foraminal narrowing. There are no  lumbar spine fractures. The vertebral body height is maintained. There  is moderate atherosclerotic calcination abdominal aorta.      T12-L1: There is no significant central canal or neural foraminal  narrowing. No significant facet degenerative change. No ligamentum  flavum hypertrophy.     L1-L2: There is no significant central canal or neural foraminal  narrowing. Moderate facet degenerative change. No ligamentum flavum  hypertrophy.     L2-L3: There is no significant central canal or neural foraminal  narrowing. Moderate facet degenerative change. No ligamentum flavum  hypertrophy.     L3-L4: Mild disc bulge. Moderate facet degenerative change. Mild central  canal narrowing suspected. Mild bilateral neural foraminal narrowing  suspected.     L4-L5: Mild disc bulge. Severe facet degenerative change with ligamentum  flavum upper tree. Moderate to severe central canal narrowing suspected  right side greater than left. Moderate right and mild  left neural  foraminal narrowing.     L5-S1: Mild diffuse broad-based disc bulge. Mild facet degenerative  change. Mild/moderate central canal narrowing. Moderate left and severe  right neural foraminal narrowing.       Impression:      1.     Degenerative disc disease lumbar spine. There is moderate to  severe central narrowing suspected L4-L5 due to degenerative disc  disease and severe facet degenerative change. There is moderate right  mild left neural foraminal narrowing at this location.  2.     There is mild to moderate central canal narrowing L5-S1 with  moderate left and severe right neural foraminal narrowing at this level.  3.     Please see findings for details other levels.     Electronically Signed By-Althea Kaba MD On:8/16/2022 9:36 PM  This report was finalized on [id number] by  Althea Kaba MD.             Assessment     MDM: This is a 47 y.o. female with several cardiac comorbidities including implanted ICD who is currently in the hospital with acute worsening of chronic low back pain and radiculopathy that began approximately 5 days ago while bent over cleaning a bathtub.  She describes an S1 dermatomal distribution bilaterally which is worse on the right.  Her left sided symptoms are new since Friday.  She is struggling to ambulate and bear weight due to immense pain.  She does not have any saddle anesthesia or bowel dysfunction.  She does describe some mild intermittent urinary retention that has been going on for an indeterminate amount of time.  She was able to urinate freely this morning.  I do not think she has cauda equina syndrome at this time, but will continue to monitor her status until imaging can be completed.  Her CT lumbar spine shows severe stenosis at L4-5 and L5-S1.  Patient will need an MRI of her lumbar spine without contrast.  Her implanted cardiac device is supposedly MRI conditional. Call has been placed to confirm this and work towards getting her an MRI.  Once that is  completed neurosurgery will review and follow-up with further recommendations.        Plan   - MRI lumbar spine without contrast  - Neurosurgery will review images once completed and follow-up with further recommendations  - Medical management and pain control per primary team  - Call with any questions or concerns    I discussed my assessment and recommendations with Dr. Rivera and the nursing staff      Curt Martin PA-C  8/17/2022  10:33 EDT      Part of this note may be an electronic transcription/translation of spoken language to printed text using the Dragon Dictation System.

## 2022-08-17 NOTE — CASE MANAGEMENT/SOCIAL WORK
Discharge Planning Assessment  [facility]     Patient Name: Rafael Nunes  MRN: [id number]  Today's Date: 8/17/2022    Admit Date: 8/16/2022     Discharge Needs Assessment     Row Name 08/17/22 1543       Discharge Needs Assessment    Equipment Currently Used at Home cane, straight;walker, rolling;shower chair    Row Name 08/17/22 1538       Living Environment    People in Home child(jasbir), adult    Current Living Arrangements home    Primary Care Provided by self    Provides Primary Care For no one    Family Caregiver if Needed child(jasbir), adult    Quality of Family Relationships helpful;involved;supportive    Able to Return to Prior Arrangements yes       Resource/Environmental Concerns    Resource/Environmental Concerns none       Transition Planning    Patient/Family Anticipates Transition to home with family    Patient/Family Anticipated Services at Transition none    Transportation Anticipated family or friend will provide       Discharge Needs Assessment    Readmission Within the Last 30 Days no previous admission in last 30 days               Discharge Plan     Row Name 08/17/22 5183       Plan    Plan D/C Plan: Anticipate routine home.    Patient/Family in Agreement with Plan yes    Plan Comments Met with patient at bedside.  Patient lives at home with son. IADL, with min assist from son.  Denies need for DME/PT/HH.  Family will provide ride on discharge.  No issues affording medications.  Pharmacy and PMD verified.  Anticipate routine home pending clinical course. Barriers to discharge: Neurosurgery consult.  MRI planned.  Sever back pain               Demographic Summary     Row Name 08/17/22 1536       General Information    Admission Type observation    Arrived From emergency department    Referral Source admission list    Preferred Language English               Functional Status     Row Name 08/17/22 1537       Functional Status    Usual Activity Tolerance moderate    Current Activity Tolerance  moderate       Functional Status, IADL    Medications independent    Meal Preparation assistive person;independent    Housekeeping independent;assistive person    Laundry independent;assistive person    Shopping independent;assistive person       Mental Status    General Appearance WDL WDL       Mental Status Summary    Recent Changes in Mental Status/Cognitive Functioning no changes            Met with patient in room wearing PPE: mask.    Maintained distance greater than six feet and spent less than 15 minutes in the room.    Carey Wade RN     Office Phone [phone]  Office Cell [phone]

## 2022-08-17 NOTE — SIGNIFICANT NOTE
08/17/22 1315   OTHER   Discipline physical therapist   Rehab Time/Intention   Session Not Performed other (see comments)  (Neurosx consulted, awaiting MRI. PT to f/u tomorrow)   Therapy Assessment/Plan (PT)   Criteria for Skilled Interventions Met (PT) yes   Recommendation   PT - Next Appointment 08/18/22

## 2022-08-17 NOTE — CONSULTS
"Diabetes Education  Assessment/Teaching    Patient Name:  Rafael Nunes  YOB: 1975  MRN: [id number]  Admit Date:  8/16/2022      Assessment Date:  8/17/2022  Flowsheet Row Most Recent Value   General Information     Referral From: Database   Height 170.2 cm (67\")   Height Method Stated   Weight 86.8 kg (191 lb 5.8 oz)   Weight Method Bed scale   Pregnancy Assessment    Diabetes History    What type of diabetes do you have? Type 2   Current DM knowledge fair   Do you test your blood sugar at home? no   Education Preferences    What areas of diabetes would you like to learn about? testing my blood sugar at home   Nutrition Information    Assessment Topics    Monitoring - Assessment Needs education   DM Goals    Monitoring - Goal Today          Flowsheet Row Most Recent Value   DM Education Needs    Meter Meter provided  [Patient stated she has a Freestyle glucometer at home that is about 3 years old and has never used because she was never shown how to use it.]   Meter Type One Touch  [One Touch Verio Reflect given to patient with patient doing return demonstration using the lancing device, insetitng the test strip into the meter, and applying blood to the test strip. Blood sugar was 138.]   Frequency of Testing Daily  [Log book given to patient with discussion on checking blood sugar daily varying the times before meals and at bedtime and healthy blood sugar range.]   Medication Oral  [At home patient stated that she takes Metfromin 500 mg daily.]   Discharge Plan Home   Motivation Moderate   Teaching Method Explanation, Discussion, Demonstration, Handouts, Teach back   Patient Response Demonstrates adequately, Verbalized understanding            Other Comments:  Patient stated she planned on downloading the gurvinder for the glucometer on her phone.  Prescriptions started in discharge orders for lancets, test strips, and alcohol wipes. Patient has no further questions or concerns related to " diabetes at this time.          Electronically signed by:  Dina Grant RN  08/17/22 17:03 EDT

## 2022-08-17 NOTE — PLAN OF CARE
Goal Outcome Evaluation:  Plan of Care Reviewed With: patient        Progress: improving  Outcome Evaluation: Pt complaining of 9/10 pain. Getting IV dilaudid q2 hours. MRI was attempted earlier today but not successful. Will attempt again tomorrow. RN will continue to monitor and follow plan of care.

## 2022-08-17 NOTE — PLAN OF CARE
Goal Outcome Evaluation:  Plan of Care Reviewed With: patient        Progress: no change  Outcome Evaluation: New admit to room 104 from the ed in stable condition with dx of intractable back pain. Pt B/P 204/122 in significant amt of pain call placed to MD with new orders recieved. will conntinue to follow the plan of care

## 2022-08-17 NOTE — H&P
[facility] Observation Unit H&P    Patient Name: Rafael Nunes  : 1975  MRN: [id number]  Primary Care Physician: Phani Adames MD  Date of admission: 2022     Patient Care Team:  Phani Adames MD as PCP - General (Internal Medicine)  Ashish Smalls MD as Consulting Physician (Nephrology)  Wayne Luna MD as Consulting Physician (Cardiology)  Héctor Eckert MD as Consulting Physician (Cardiac Electrophysiology)  Lane Stock MD as Surgeon (Cardiothoracic Surgery)          Subjective   History Present Illness     Chief Complaint:   Chief Complaint   Patient presents with   • Back Pain     Pt reports worsening back pain that radiates to RLE since being seen here on Friday for same symptoms.          Obtained from ED provider HPI on 2022:  Patient is a 47-year-old -American female history of CAD, cardiomyopathy, CKD, COPD, hypertension, GERD, has defibrillator and pacemaker placed presents to the ER complaints of lower back pain and sciatica pain for 5 days.  Patient was seen in the ER 2 days ago for similar complaints, states that her pain seems to be worse.  She denies any fall trauma or injury.  Patient reports pain in lower back that radiates into her hips and down her legs.  Patient states she started to develop some numbness and tingling in her legs that is worse than her normal.  Patient also states that she has difficulty urinating but reports no incontinence.  No bowel dysfunction.  No saddle anesthesia.  Patient denies abdominal pain, nausea vomiting or diarrhea.  Patient states she been taking her Norco at home with no relief.  No chest pain shortness of breath or headache.  Patient currently sees pain management.  Patient currently has pacemaker defibrillator, MRI conditional.    2022:  Patient confirms the HPI noted above including approximately 5-day history of worsening bilateral low back pain which for the past 3 days  has been radiating down her right leg with some associated right leg weakness and now appears to be affecting her left leg as well.  She notes some urinary retention along with her symptoms but denies any bowel or bladder incontinence, fever, recent falls or other trauma, peripheral edema, nausea, vomiting, dyspnea or chest pain.  She notes moderate pain control with Dilaudid given in the ED but continues to experience severe pain when this wears off which is made worse with movement.        Review of Systems   Constitutional: Negative.   HENT: Negative.    Eyes: Negative.    Cardiovascular: Negative.    Respiratory: Negative.    Skin: Negative.    Musculoskeletal: Positive for back pain.   Gastrointestinal: Negative.    Genitourinary: Negative.    Neurological: Positive for numbness.   Psychiatric/Behavioral: Negative.            Personal History     Past Medical History:   Past Medical History:   Diagnosis Date   • Arrhythmia    • Asthma    • CAD (coronary artery disease)    • Cardiomyopathy, dilated (HCC)    • Chronic systolic congestive heart failure (HCC)    • CKD (chronic kidney disease) stage 2, GFR 60-89 ml/min    • COPD (chronic obstructive pulmonary disease) (HCC)    • Essential hypertension    • GERD without esophagitis    • Hidradenitis 10/26/2020   • Hyperlipidemia    • Hypothyroidism (acquired)    • ICD (implantable cardioverter-defibrillator), dual, in situ 7/22/2019   • Nonrheumatic aortic valve insufficiency    • Nonrheumatic mitral valve regurgitation    • S/P AVR (aortic valve replacement)    • S/P CABG x 3    • Type 2 diabetes mellitus without complication, without long-term current use of insulin ([facility])        Surgical History:      Past Surgical History:   Procedure Laterality Date   • AORTIC VALVE REPAIR/REPLACEMENT N/A 12/27/2019    Procedure: AORTIC VALVE REPAIR/REPLACEMENT;  Surgeon: Lane Stock MD;  [facility];  Service: Cardiothoracic   • BREAST LUMPECTOMY Right      BENIGN   • CARDIAC CATHETERIZATION N/A 12/24/2019    Procedure: Right and Left Heart Cath 12/24/19 @ 0900;  Surgeon: Wayne Luna MD;  Location: [facility] CATH INVASIVE LOCATION;  Service: Cardiovascular   • CARDIAC CATHETERIZATION N/A 12/24/2019    Procedure: Coronary angiography;  Surgeon: Wayne Luna MD;  Location: [facility] CATH INVASIVE LOCATION;  Service: Cardiovascular   • CARDIAC CATHETERIZATION N/A 11/10/2020    Procedure: Left and right Heart Cath;  Surgeon: Wayne Luna MD;  Location: [facility] CATH INVASIVE LOCATION;  Service: Cardiovascular;  Laterality: N/A;   • CARDIAC CATHETERIZATION N/A 11/10/2020    Procedure: Coronary angiography;  Surgeon: Wanye Luna MD;  Location: [facility] CATH INVASIVE LOCATION;  Service: Cardiovascular;  Laterality: N/A;   • CARDIAC CATHETERIZATION N/A 11/10/2020    Procedure: Right Heart Cath;  Surgeon: Wayne Luna MD;  Location: [facility] CATH INVASIVE LOCATION;  Service: Cardiovascular;  Laterality: N/A;   • CARDIAC CATHETERIZATION N/A 11/25/2020    Procedure: Percutaneous coronary intervention of the left circumflex artery;  Surgeon: Wayne Luna MD;  Location: [facility] CATH INVASIVE LOCATION;  Service: Cardiovascular;  Laterality: N/A;   • CARDIAC CATHETERIZATION N/A 1/22/2021    Procedure: LEFT HEART CATH with possible PCI;  Surgeon: Wayne Luna MD;  Location: [facility] CATH INVASIVE LOCATION;  Service: Cardiovascular;  Laterality: N/A;  Local and IV sedation   • CARDIAC CATHETERIZATION N/A 1/22/2021    Procedure: Coronary angiography;  Surgeon: Wayne Luna MD;  Location: [facility] CATH INVASIVE LOCATION;  Service: Cardiovascular;  Laterality: N/A;   • CARDIAC CATHETERIZATION N/A 1/22/2021    Procedure: Saphenous Vein Graft;  Surgeon: Wayne Luna MD;  Location: [facility] CATH INVASIVE LOCATION;  Service: Cardiovascular;  Laterality: N/A;   • CARDIAC ELECTROPHYSIOLOGY PROCEDURE Left 6/28/2019     Procedure: Dual-chamber ICD insertion;  Surgeon: Héctor Eckert MD;  Location: [facility] CATH INVASIVE LOCATION;  Service: Cardiovascular   • CARDIAC ELECTROPHYSIOLOGY PROCEDURE Left 8/14/2019    Procedure: Lead Revision;  Surgeon: Héctor Eckert MD;  Location: [facility] CATH INVASIVE LOCATION;  Service: Cardiovascular   • CARDIAC SURGERY     • CHOLECYSTECTOMY     • CORONARY ARTERY BYPASS GRAFT N/A 12/27/2019    Procedure: CORONARY ARTERY BYPASS GRAFTING;  Surgeon: Lane Stock MD;  Location: [facility] CVOR;  Service: Cardiothoracic   • HYSTERECTOMY     • INSERT / REPLACE / REMOVE PACEMAKER     • LYMPHADENECTOMY Bilateral    • THYROID SURGERY             Family History: family history includes Heart disease in her father. Otherwise pertinent FHx was reviewed and unremarkable.     Social History:  reports that she quit smoking about 3 years ago. She has never used smokeless tobacco. She reports that she does not drink alcohol and does not use drugs.      Medications:  Prior to Admission medications    Medication Sig Start Date End Date Taking? Authorizing Provider   allopurinol (ZYLOPRIM) 100 MG tablet Take 100 mg by mouth Daily.   Yes Dheeraj Alvarez MD   aspirin 81 MG EC tablet Take 1 tablet by mouth Daily. 11/26/20  Yes Fabby Jarrett APRN   benzonatate (TESSALON) 200 MG capsule Take 1 capsule by mouth 3 (Three) Times a Day As Needed for Cough. 4/10/22  Yes Nida Peters APRN   carvedilol (COREG) 6.25 MG tablet Take 6.25 mg by mouth 2 (Two) Times a Day With Meals.   Yes Dheeraj Alvarez MD   cholecalciferol (VITAMIN D3) 1000 units tablet Take 1,000 Units by mouth Daily.   Yes Dheeraj Alvarez MD   cholestyramine light 4 g packet Take 1 packet by mouth Every 12 (Twelve) Hours As Needed (Diarrhea). 6/2/21  Yes Lee Barry MD   clopidogrel (PLAVIX) 75 MG tablet Take 1 tablet by mouth Daily. 12/21/20  Yes Wayne Luna MD   diphenhydrAMINE (BENADRYL) 25 mg  capsule Take 25 mg by mouth Every 6 (Six) Hours As Needed for Itching.   Yes Dheeraj Alvarez MD   docusate sodium (COLACE) 100 MG capsule Take 100 mg by mouth 2 (Two) Times a Day As Needed for Constipation.   Yes Dheeraj Alvarez MD   ferrous sulfate 325 (65 FE) MG tablet Take 65 mg by mouth Daily With Breakfast.   Yes Dheeraj Alvarez MD   fluconazole (DIFLUCAN) 150 MG tablet Take 150 mg by mouth 1 (One) Time As Needed.   Yes Dheeraj Alvaerz MD   fluticasone (FLONASE) 50 MCG/ACT nasal spray 2 sprays into the nostril(s) as directed by provider Daily.   Yes Dheeraj Alvarez MD   folic acid (FOLVITE) 1 MG tablet Take 1 mg by mouth Daily.   Yes Dheeraj Alvarez MD   metFORMIN (GLUCOPHAGE) 500 MG tablet Take 500 mg by mouth Daily With Breakfast.   Yes Dheeraj Alvarez MD   montelukast (SINGULAIR) 10 MG tablet Take 10 mg by mouth Every Night.   Yes Dheeraj Alvarez MD   multivitamin (THERAGRAN) tablet tablet Take 1 tablet by mouth Daily.   Yes Dheeraj Alvarez MD   oxyCODONE-acetaminophen (PERCOCET) 7.5-325 MG per tablet Take 1 tablet by mouth Every 6 (Six) Hours As Needed for Moderate Pain .   Yes Dheeraj Alvarez MD   pantoprazole (PROTONIX) 40 MG EC tablet Take 40 mg by mouth Daily.   Yes Dheeraj Alvarez MD   potassium chloride (K-DUR) 10 MEQ CR tablet Take 20 mEq by mouth 2 (Two) Times a Day.   Yes Dheeraj Alvarez MD   predniSONE (DELTASONE) 20 MG tablet Take 2 tablets by mouth Daily for 4 days. 8/15/22 8/19/22 Yes Ascencion Marquez MD   ranolazine (RANEXA) 500 MG 12 hr tablet Take 500 mg by mouth 2 (Two) Times a Day.   Yes Dheeraj Alvarez MD   rosuvastatin (CRESTOR) 40 MG tablet Take 40 mg by mouth Daily.   Yes Dheeraj Alvarez MD   sacubitril-valsartan (ENTRESTO)  MG tablet Take 1 tablet by mouth 2 (Two) Times a Day.   Yes Dheeraj Alvarez MD   spironolactone (ALDACTONE) 25 MG tablet Take 1 tablet by mouth Daily. Hold if persistent  diarrhea. 6/2/21  Yes Lee Barry MD   TiZANidine (ZANAFLEX) 4 MG capsule Take 4 mg by mouth 3 (Three) Times a Day.   Yes Dheeraj Alvarez MD   torsemide (DEMADEX) 20 MG tablet Take 3 tablets by mouth Daily. Hold if persistent diarrhea 6/2/21  Yes Lee Barry MD   vitamin B-12 (CYANOCOBALAMIN) 1000 MCG tablet Take 1,000 mcg by mouth Daily.   Yes Dheeraj Alvarez MD   albuterol (PROVENTIL) (2.5 MG/3ML) 0.083% nebulizer solution Take 2.5 mg by nebulization Every 4 (Four) Hours As Needed for Wheezing. 4/10/22   Nida Peters APRN   Alirocumab (Praluent) 75 MG/ML solution auto-injector Inject 1 mL under the skin into the appropriate area as directed Every 14 (Fourteen) Days. 5/5/22   Wayne Luna MD   levothyroxine (SYNTHROID, LEVOTHROID) 75 MCG tablet Take 3 tablets by mouth Daily for 30 days. 6/3/21 7/3/21  Lee Barry MD   nitroglycerin (NITROSTAT) 0.4 MG SL tablet Place 0.4 mg under the tongue Every 5 (Five) Minutes As Needed for Chest Pain. Take no more than 3 doses in 15 minutes.    Dheeraj Alvarez MD   cephalexin (KEFLEX) 500 MG capsule Take 1 capsule by mouth 4 (Four) Times a Day. 3/24/22 8/17/22  Luis A Crenshaw DO   Dapagliflozin Propanediol (Farxiga) 10 MG tablet Take 10 mg by mouth Daily.  8/17/22  Dheeraj Alvarez MD   sulfamethoxazole-trimethoprim (Bactrim DS) 800-160 MG per tablet Take 1 tablet by mouth 2 (Two) Times a Day. 3/24/22 8/17/22  Luis A Crenshaw DO       Allergies:    Allergies   Allergen Reactions   • Hydrocodone Hives   • Penicillin G Unknown (See Comments)   • Contrast Dye GI Intolerance     She is pretty sure it's the contrast dye that makes her super sick and vomiting after heart cath       Objective   Objective     Vital Signs  Temp:  [97.6 °F (36.4 °C)-98.4 °F (36.9 °C)] 98.4 °F (36.9 °C)  Heart Rate:  [65-70] 66  Resp:  [16-17] 17  BP: (173-208)/() 195/94  SpO2:  [95  %-99 %] 95 %  on   ;   Device (Oxygen Therapy): room air  Body mass index is 29.97 kg/m².    Physical Exam  Constitutional:       General: She is not in acute distress.     Appearance: Normal appearance. She is not ill-appearing, toxic-appearing or diaphoretic.   HENT:      Head: Normocephalic.      Right Ear: External ear normal.      Left Ear: External ear normal.      Nose: Nose normal.      Mouth/Throat:      Mouth: Mucous membranes are moist.   Eyes:      Extraocular Movements: Extraocular movements intact.   Cardiovascular:      Rate and Rhythm: Normal rate and regular rhythm.      Pulses: Normal pulses.   Pulmonary:      Effort: Pulmonary effort is normal.      Breath sounds: Normal breath sounds.   Abdominal:      General: Bowel sounds are normal.      Palpations: Abdomen is soft.   Musculoskeletal:         General: Normal range of motion.      Cervical back: Normal range of motion.      Right lower leg: No edema.      Left lower leg: No edema.   Skin:     General: Skin is warm and dry.      Capillary Refill: Capillary refill takes less than 2 seconds.   Neurological:      General: No focal deficit present.      Mental Status: She is alert.      Motor: Weakness present.   Psychiatric:         Mood and Affect: Mood normal.         Behavior: Behavior normal.         Thought Content: Thought content normal.         Judgment: Judgment normal.           Results Review:  I have personally reviewed most recent lab results and radiology images and interpretations and agree with findings, most notably: BMP, CBC, chest x-ray and CT of lumbar spine.    Results from last 7 days   Lab Units 08/17/22  0428   WBC 10*3/mm3 10.30   HEMOGLOBIN g/dL 16.3*   HEMATOCRIT % 48.8*   PLATELETS 10*3/mm3 240     Results from last 7 days   Lab Units 08/17/22  0428   SODIUM mmol/L 140   POTASSIUM mmol/L 4.1   CHLORIDE mmol/L 100   CO2 mmol/L 30.0*   BUN mg/dL 15   CREATININE mg/dL 1.12*   GLUCOSE mg/dL 106*   CALCIUM mg/dL 10.0      Estimated Creatinine Clearance: 68.8 mL/min (A) (by C-G formula based on SCr of 1.12 mg/dL (H)).  Brief Urine Lab Results     None          Microbiology Results (last 10 days)     Procedure Component Value - Date/Time    COVID PRE-OP / PRE-PROCEDURE SCREENING ORDER (NO ISOLATION) - Swab, Nasopharynx [[id number]]  (Normal) Collected: 08/16/22 2238    Lab Status: Final result Specimen: Swab from Nasopharynx Updated: 08/16/22 2318    Narrative:      The following orders were created for panel order COVID PRE-OP / PRE-PROCEDURE SCREENING ORDER (NO ISOLATION) - Swab, Nasopharynx.  Procedure                               Abnormality         Status                     ---------                               -----------         ------                     COVID-19,CEPHEID/CHELLE,CO...[[id number]]  Normal              Final result                 Please view results for these tests on the individual orders.    COVID-19,CEPHEID/CHELLE,COR/JAY JAY/PAD/SOFIE IN-HOUSE(OR EMERGENT/ADD-ON),NP SWAB IN TRANSPORT MEDIA 3-4 HR TAT, RT-PCR - Swab, Nasopharynx [[id number]]  (Normal) Collected: 08/16/22 2238    Lab Status: Final result Specimen: Swab from Nasopharynx Updated: 08/16/22 2318     COVID19 Not Detected    Narrative:      Fact sheet for providers: https://www.fda.gov/media/583930/download     Fact sheet for patients: https://www.fda.gov/media/355666/download  Fact sheet for providers: https://www.fda.gov/media/507998/download     Fact sheet for patients: https://www.fda.gov/media/109254/download          ECG/EMG Results (most recent)     None          Results for orders placed during the hospital encounter of 05/16/20    Duplex Venous Lower Extremity - Bilateral CAR    Interpretation Summary  · Normal bilateral lower extremity venous duplex scan.      Results for orders placed during the hospital encounter of 02/22/21    Adult Transthoracic Echo Complete w/ Color, Spectral and Contrast if Necessary Per Protocol    Interpretation  Summary  · The left ventricular cavity is mildly dilated.  · Estimated left ventricular EF was in agreement with the calculated left ventricular EF. Left ventricular ejection fraction appears to be 26 - 30%. Left ventricular systolic function is severely decreased.  · Left ventricular diastolic function is consistent with (grade II w/high LAP) pseudonormalization.  · The right ventricular cavity is mildly dilated.  · Mildly reduced right ventricular systolic function noted.  · Left atrial volume is mildly increased.  · There is a bioprosthetic aortic valve present.  · Mild to moderate aortic valve stenosis is present.  · Abnormal mitral valve structure consistent with dilated annulus.  · Moderate to severe mitral valve regurgitation is present with a posteriorly-directed jet noted.      CT Lumbar Spine Without Contrast    Result Date: 8/16/2022  1.     Degenerative disc disease lumbar spine. There is moderate to severe central narrowing suspected L4-L5 due to degenerative disc disease and severe facet degenerative change. There is moderate right mild left neural foraminal narrowing at this location. 2.     There is mild to moderate central canal narrowing L5-S1 with moderate left and severe right neural foraminal narrowing at this level. 3.     Please see findings for details other levels.  Electronically Signed By-Althea Kaba MD On:8/16/2022 9:36 PM This report was finalized on [id number] by  Althea Kaba MD.        Estimated Creatinine Clearance: 68.8 mL/min (A) (by C-G formula based on SCr of 1.12 mg/dL (H)).    Assessment & Plan   Assessment/Plan       Active Hospital Problems    Diagnosis  POA   • Intractable low back pain [M54.59]  Yes      Resolved Hospital Problems   No resolved problems to display.     Low back pain with radiculopathy  -CT lumbar spine showed degenerative disc disease of the lumbar spine with moderate to severe central canal narrowing suspected L4-5 due to degenerative disease and severe  facet degenerative changes with moderate right and mild left neuroforaminal narrowing at that location.  Mild to moderate central canal narrowing at L5-S1 is present along with moderate left and severe right neuroforaminal narrowing at that level  -Patient given Dilaudid and Zofran in the ED, continue  -MRI ordered pending clearance of patient's implanted defibrillator  -Neurosurgery consulted who recommended continuing pain management as well as plans for imaging if possible with further recommendations based on results and clinical course  -Versed ordered x1 pending acceptance for MRI  -Continue telemetry  -Fall precautions    Diabetes mellitus  -Well controlled with a most recent glucose of 98  -Hold metformin  -Sliding scale insulin ordered  -Diabetic diet  -Monitor AC and HS    CKD  -Creatinine: 1.12 with and eGFR: 60.4  -Continue Entresto  -Avoid nephrotic if medication and IV dye unless urgently needed  -Monitor BMP and I´s and O´s    CAD status post CABG  -Continue aspirin, Plavix, beta-blocker and statin    Heart failure with reduced ejection fraction  -Echocardiogram from February 2021 showed an EF of 26-30%  -Continue Entresto, beta-blocker  -Patient has AICD in place    Hypothyroidism  -Synthroid    Hyperlipidemia  -Statin            VTE Prophylaxis -   Mechanical Order History:      Ordered        08/17/22 0332  Place Sequential Compression Device  Once            08/17/22 0332  Maintain Sequential Compression Device  Continuous                    Pharmalogical Order History:     None          CODE STATUS:    Code Status and Medical Interventions:   Ordered at: 08/17/22 0332     Level Of Support Discussed With:    Patient     Code Status (Patient has no pulse and is not breathing):    CPR (Attempt to Resuscitate)     Medical Interventions (Patient has pulse or is breathing):    Full Support       This patient has been examined wearing personal protective equipment.     I discussed the patient's findings  and my recommendations with patient and nursing staff.      Signature:Electronically signed by Marcello Pulliam PA-C, 08/17/22, 3:21 PM EDT.

## 2022-08-18 ENCOUNTER — APPOINTMENT (OUTPATIENT)
Dept: MRI IMAGING | Facility: HOSPITAL | Age: 47
End: 2022-08-18

## 2022-08-18 PROBLEM — N18.31 STAGE 3A CHRONIC KIDNEY DISEASE: Status: ACTIVE | Noted: 2020-05-16

## 2022-08-18 LAB
GLUCOSE BLDC GLUCOMTR-MCNC: 118 MG/DL (ref 70–105)
GLUCOSE BLDC GLUCOMTR-MCNC: 126 MG/DL (ref 70–105)
GLUCOSE BLDC GLUCOMTR-MCNC: 129 MG/DL (ref 70–105)
GLUCOSE BLDC GLUCOMTR-MCNC: 146 MG/DL (ref 70–105)

## 2022-08-18 PROCEDURE — 99232 SBSQ HOSP IP/OBS MODERATE 35: CPT | Performed by: NURSE PRACTITIONER

## 2022-08-18 PROCEDURE — 25010000002 ONDANSETRON PER 1 MG: Performed by: PHYSICIAN ASSISTANT

## 2022-08-18 PROCEDURE — 25010000002 HYDROMORPHONE 1 MG/ML SOLUTION: Performed by: PHYSICIAN ASSISTANT

## 2022-08-18 PROCEDURE — 82962 GLUCOSE BLOOD TEST: CPT

## 2022-08-18 RX ORDER — POLYETHYLENE GLYCOL 3350 17 G/17G
17 POWDER, FOR SOLUTION ORAL DAILY PRN
Status: DISCONTINUED | OUTPATIENT
Start: 2022-08-18 | End: 2022-08-20 | Stop reason: HOSPADM

## 2022-08-18 RX ORDER — LEVOTHYROXINE SODIUM 112 UG/1
112 TABLET ORAL DAILY
Status: ON HOLD | COMMUNITY
End: 2022-08-18

## 2022-08-18 RX ORDER — FEBUXOSTAT 80 MG/1
80 TABLET, FILM COATED ORAL DAILY
COMMUNITY

## 2022-08-18 RX ORDER — BISACODYL 5 MG/1
5 TABLET, DELAYED RELEASE ORAL DAILY PRN
Status: DISCONTINUED | OUTPATIENT
Start: 2022-08-18 | End: 2022-08-20 | Stop reason: HOSPADM

## 2022-08-18 RX ORDER — AMOXICILLIN 250 MG
2 CAPSULE ORAL 2 TIMES DAILY
Status: DISCONTINUED | OUTPATIENT
Start: 2022-08-18 | End: 2022-08-20 | Stop reason: HOSPADM

## 2022-08-18 RX ORDER — TORSEMIDE 20 MG/1
60 TABLET ORAL 2 TIMES DAILY
COMMUNITY
End: 2023-03-07 | Stop reason: SDUPTHER

## 2022-08-18 RX ORDER — BISACODYL 10 MG
10 SUPPOSITORY, RECTAL RECTAL DAILY PRN
Status: DISCONTINUED | OUTPATIENT
Start: 2022-08-18 | End: 2022-08-20 | Stop reason: HOSPADM

## 2022-08-18 RX ADMIN — POLYETHYLENE GLYCOL 3350 17 G: 17 POWDER, FOR SOLUTION ORAL at 17:27

## 2022-08-18 RX ADMIN — Medication 1000 UNITS: at 09:05

## 2022-08-18 RX ADMIN — SENNOSIDES AND DOCUSATE SODIUM 2 TABLET: 50; 8.6 TABLET ORAL at 20:59

## 2022-08-18 RX ADMIN — SPIRONOLACTONE 25 MG: 25 TABLET ORAL at 09:06

## 2022-08-18 RX ADMIN — OXYCODONE 7.5 MG: 5 TABLET ORAL at 13:39

## 2022-08-18 RX ADMIN — HYDROMORPHONE HYDROCHLORIDE 0.5 MG: 1 INJECTION, SOLUTION INTRAMUSCULAR; INTRAVENOUS; SUBCUTANEOUS at 01:19

## 2022-08-18 RX ADMIN — ASPIRIN 81 MG: 81 TABLET, COATED ORAL at 09:05

## 2022-08-18 RX ADMIN — CARVEDILOL 6.25 MG: 6.25 TABLET, FILM COATED ORAL at 17:27

## 2022-08-18 RX ADMIN — FERROUS SULFATE TAB EC 324 MG (65 MG FE EQUIVALENT) 324 MG: 324 (65 FE) TABLET DELAYED RESPONSE at 09:05

## 2022-08-18 RX ADMIN — HYDROMORPHONE HYDROCHLORIDE 0.5 MG: 1 INJECTION, SOLUTION INTRAMUSCULAR; INTRAVENOUS; SUBCUTANEOUS at 21:04

## 2022-08-18 RX ADMIN — CLOPIDOGREL BISULFATE 75 MG: 75 TABLET ORAL at 09:06

## 2022-08-18 RX ADMIN — METHOCARBAMOL 500 MG: 500 TABLET ORAL at 09:41

## 2022-08-18 RX ADMIN — HYDROMORPHONE HYDROCHLORIDE 0.5 MG: 1 INJECTION, SOLUTION INTRAMUSCULAR; INTRAVENOUS; SUBCUTANEOUS at 17:27

## 2022-08-18 RX ADMIN — HYDROMORPHONE HYDROCHLORIDE 0.5 MG: 1 INJECTION, SOLUTION INTRAMUSCULAR; INTRAVENOUS; SUBCUTANEOUS at 10:04

## 2022-08-18 RX ADMIN — OXYCODONE 7.5 MG: 5 TABLET ORAL at 17:27

## 2022-08-18 RX ADMIN — CARVEDILOL 6.25 MG: 6.25 TABLET, FILM COATED ORAL at 09:06

## 2022-08-18 RX ADMIN — LEVOTHYROXINE SODIUM 225 MCG: 0.2 TABLET ORAL at 05:49

## 2022-08-18 RX ADMIN — HYDROMORPHONE HYDROCHLORIDE 0.5 MG: 1 INJECTION, SOLUTION INTRAMUSCULAR; INTRAVENOUS; SUBCUTANEOUS at 13:39

## 2022-08-18 RX ADMIN — PANTOPRAZOLE SODIUM 40 MG: 40 TABLET, DELAYED RELEASE ORAL at 09:06

## 2022-08-18 RX ADMIN — THERA TABS 1 TABLET: TAB at 09:05

## 2022-08-18 RX ADMIN — POTASSIUM CHLORIDE 20 MEQ: 1500 TABLET, EXTENDED RELEASE ORAL at 09:06

## 2022-08-18 RX ADMIN — Medication 10 ML: at 21:00

## 2022-08-18 RX ADMIN — HYDROMORPHONE HYDROCHLORIDE 0.5 MG: 1 INJECTION, SOLUTION INTRAMUSCULAR; INTRAVENOUS; SUBCUTANEOUS at 05:49

## 2022-08-18 RX ADMIN — Medication 10 ML: at 09:10

## 2022-08-18 RX ADMIN — HYDROMORPHONE HYDROCHLORIDE 0.5 MG: 1 INJECTION, SOLUTION INTRAMUSCULAR; INTRAVENOUS; SUBCUTANEOUS at 10:26

## 2022-08-18 RX ADMIN — METHOCARBAMOL 500 MG: 500 TABLET ORAL at 01:23

## 2022-08-18 RX ADMIN — ONDANSETRON 4 MG: 2 INJECTION INTRAMUSCULAR; INTRAVENOUS at 18:44

## 2022-08-18 RX ADMIN — POTASSIUM CHLORIDE 20 MEQ: 1500 TABLET, EXTENDED RELEASE ORAL at 17:27

## 2022-08-18 NOTE — PLAN OF CARE
Problem: Adult Inpatient Plan of Care  Goal: Plan of Care Review  Outcome: Ongoing, Progressing  Flowsheets (Taken 8/18/2022 1444)  Progress: improving  Plan of Care Reviewed With: patient  Outcome Evaluation: MRI today but pt was not able to tolerate it. Notified Neurologist team via secured chat. Will f/u. Managed pain with dilaudid and oxycodode. Pt still c/o severe pain. Safety maintained, will follow plan of care.  Goal: Patient-Specific Goal (Individualized)  Outcome: Ongoing, Progressing  Goal: Absence of Hospital-Acquired Illness or Injury  Outcome: Ongoing, Progressing  Intervention: Identify and Manage Fall Risk  Recent Flowsheet Documentation  Taken 8/18/2022 1409 by Kandace Walters RN  Safety Promotion/Fall Prevention: safety round/check completed  Taken 8/18/2022 1213 by Kandace Walters RN  Safety Promotion/Fall Prevention: safety round/check completed  Taken 8/18/2022 1100 by Kandace Walters RN  Safety Promotion/Fall Prevention: safety round/check completed  Taken 8/18/2022 1006 by Kandace Walters RN  Safety Promotion/Fall Prevention: safety round/check completed  Taken 8/18/2022 0840 by Kandace Walters RN  Safety Promotion/Fall Prevention: safety round/check completed  Intervention: Prevent Skin Injury  Recent Flowsheet Documentation  Taken 8/18/2022 1409 by Kandace Walters RN  Body Position:   position changed independently   sitting up in bed  Taken 8/18/2022 1213 by Kandace Walters RN  Body Position:   position changed independently   side-lying   left  Taken 8/18/2022 1006 by Kandace Walters RN  Body Position:   position changed independently   left  Taken 8/18/2022 0840 by Kandace Walters RN  Body Position:   position changed independently   side-lying   left  Intervention: Prevent and Manage VTE (Venous Thromboembolism) Risk  Recent Flowsheet Documentation  Taken 8/18/2022 1409 by Kandace Walters RN  Activity Management: activity adjusted per  tolerance  Taken 8/18/2022 1213 by Kandace Walters RN  Activity Management: activity adjusted per tolerance  Taken 8/18/2022 1006 by Kandace Walters RN  Activity Management: activity adjusted per tolerance  Taken 8/18/2022 0840 by Kandace Walters RN  Activity Management: activity adjusted per tolerance  VTE Prevention/Management: (pt ambulates) other (see comments)  Range of Motion: active ROM (range of motion) encouraged  Intervention: Prevent Infection  Recent Flowsheet Documentation  Taken 8/18/2022 0840 by Kandace Walters RN  Infection Prevention: hand hygiene promoted  Goal: Optimal Comfort and Wellbeing  Outcome: Ongoing, Progressing  Intervention: Monitor Pain and Promote Comfort  Recent Flowsheet Documentation  Taken 8/18/2022 1213 by Kandace Walters RN  Pain Management Interventions:   see MAR   quiet environment facilitated  Taken 8/18/2022 0840 by Kandace Walters RN  Pain Management Interventions:   see MAR   quiet environment facilitated   pain management plan reviewed with patient/caregiver  Intervention: Provide Person-Centered Care  Recent Flowsheet Documentation  Taken 8/18/2022 1409 by Kandace Walters RN  Trust Relationship/Rapport: care explained  Taken 8/18/2022 1006 by Kandace Walters RN  Trust Relationship/Rapport: care explained  Taken 8/18/2022 0840 by Kandace Walters RN  Trust Relationship/Rapport: care explained  Goal: Readiness for Transition of Care  Outcome: Ongoing, Progressing   Goal Outcome Evaluation:  Plan of Care Reviewed With: patient        Progress: improving  Outcome Evaluation: MRI today but pt was not able to tolerate it. Notified Neurologist team via secured chat. Will f/u. Managed pain with dilaudid and oxycodode. Pt still c/o severe pain. Safety maintained, will follow plan of care.

## 2022-08-18 NOTE — PROGRESS NOTES
[facility] Medicine Services Daily Progress Note    Patient Name: Rafael Nunes  : 1975  MRN: [id number]  Primary Care Physician:  Phani Adames MD  Date of admission: 2022      Subjective      Chief Complaint: Back pain      Patient Reports back pain remains intractable when lying supine.  Back pain is fairly well controlled with analgesics when sitting up or lying on her side.  Patient continues to have pain radiating down the right leg.  She denies loss of control of bowel or bladder.  The patient reports a similar episode of back pain 8 years ago and was being considered for surgery but she had three-vessel CABG which delayed surgery and her back pain resolved at that time.    Review of Systems   Constitutional: Negative for chills and fever.   Musculoskeletal: Positive for back pain.        Low back pain with radiation down the left leg   Gastrointestinal: Negative for nausea.   Genitourinary: Negative for bladder incontinence.   All other systems reviewed and are negative.        Objective      Vitals:   Temp:  [97.5 °F (36.4 °C)-98.4 °F (36.9 °C)] 97.9 °F (36.6 °C)  Heart Rate:  [51-69] 60  Resp:  [11-16] 16  BP: (152-174)/() 164/102    Physical Exam  Vitals and nursing note reviewed.   Constitutional:       Appearance: Normal appearance.   HENT:      Head: Normocephalic and atraumatic.      Right Ear: External ear normal.      Left Ear: External ear normal.      Nose: Nose normal.      Mouth/Throat:      Mouth: Mucous membranes are moist.   Eyes:      General: No scleral icterus.        Right eye: No discharge.         Left eye: No discharge.      Extraocular Movements: Extraocular movements intact.      Conjunctiva/sclera: Conjunctivae normal.      Pupils: Pupils are equal, round, and reactive to light.   Cardiovascular:      Rate and Rhythm: Normal rate and regular rhythm.      Pulses: Normal pulses.      Heart sounds: Normal heart sounds. No  murmur heard.  Pulmonary:      Effort: Pulmonary effort is normal.      Breath sounds: Normal breath sounds.   Abdominal:      General: Bowel sounds are normal.      Palpations: Abdomen is soft.   Musculoskeletal:         General: Normal range of motion.      Cervical back: Normal range of motion and neck supple.      Right lower leg: No edema.      Left lower leg: No edema.   Skin:     General: Skin is warm and dry.      Capillary Refill: Capillary refill takes less than 2 seconds.   Neurological:      General: No focal deficit present.      Mental Status: She is alert and oriented to person, place, and time.      Motor: No weakness.   Psychiatric:         Mood and Affect: Mood normal.         Behavior: Behavior normal.         Thought Content: Thought content normal.         Judgment: Judgment normal.         Result Review    Result Review:  I have personally reviewed the results from the time of this admission to 8/18/2022 17:04 EDT and agree with these findings:  [x]  Laboratory  []  Microbiology  [x]  Radiology  [x]  EKG/Telemetry   []  Cardiology/Vascular   []  Pathology  [x]  Old records  []  Other:  Most notable findings include: Abnormal CT lumbar spine          Assessment & Plan      Brief Patient Summary:  Rafael Nunes is a 47 y.o. female who low back pain with radiation down the right leg.  Pain has been controlled on Dilaudid.  2 attempts have been made to do MRI but have been unsuccessful secondary to patient unable to lie supine.  Patient was seen by neurosurgery due to CT abnormalities.  Neurosurgery really wants the patient to have an MRI before best treatment option and consideration for surgical intervention can be optimized.  Neurosurgery request consult for pain management to assist with pain control so that MRI can be completed.  Order has been placed but pain management will not be in until tomorrow morning.  Therefore the patient was transferred from ER observation to hospitalist  care.    Documentation for observation admission:   Patient is a 47-year-old -American female history of CAD, cardiomyopathy, CKD, COPD, hypertension, GERD, has defibrillator and pacemaker placed presents to the ER complaints of lower back pain and sciatica pain for 5 days.  Patient was seen in the ER 2 days ago for similar complaints, states that her pain seems to be worse.  She denies any fall trauma or injury.  Patient reports pain in lower back that radiates into her hips and down her legs.  Patient states she started to develop some numbness and tingling in her legs that is worse than her normal.  Patient also states that she has difficulty urinating but reports no incontinence.  No bowel dysfunction.  No saddle anesthesia.  Patient denies abdominal pain, nausea vomiting or diarrhea.  Patient states she been taking her Norco at home with no relief.  No chest pain shortness of breath or headache.  Patient currently sees pain management.  Patient currently has pacemaker defibrillator, MRI conditional.     8/17/2022:  Patient confirms the HPI noted above including approximately 5-day history of worsening bilateral low back pain which for the past 3 days has been radiating down her right leg with some associated right leg weakness and now appears to be affecting her left leg as well.  She notes some urinary retention along with her symptoms but denies any bowel or bladder incontinence, fever, recent falls or other trauma, peripheral edema, nausea, vomiting, dyspnea or chest pain.  She notes moderate pain control with Dilaudid given in the ED but continues to experience severe pain when this wears off which is made worse with movement.      8/18/2022: Patient reports continued severe back pain which is moderately controlled with Dilaudid.  She also notes that she is willing to reattempt MRI today       aspirin, 81 mg, Oral, Daily  carvedilol, 6.25 mg, Oral, BID With Meals  cholecalciferol, 1,000 Units, Oral,  Daily  clopidogrel, 75 mg, Oral, Daily  ferrous sulfate, 324 mg, Oral, Daily With Breakfast  insulin lispro, 0-9 Units, Subcutaneous, TID AC  levothyroxine, 225 mcg, Oral, Q AM  multivitamin, 1 tablet, Oral, Daily  pantoprazole, 40 mg, Oral, QAM AC  potassium chloride, 20 mEq, Oral, BID With Meals  senna-docusate sodium, 2 tablet, Oral, BID  sodium chloride, 10 mL, Intravenous, Q12H  spironolactone, 25 mg, Oral, Daily  torsemide, 60 mg, Oral, Daily             Active Hospital Problems:  Active Hospital Problems    Diagnosis    • **Intractable low back pain    • Coronary artery disease of native artery of native heart with stable angina pectoris (HCC)    • Essential hypertension      Plan:   Low back pain with radiculopathy: Pain management consult per recommendation of neurosurgery; hopefully MRI will be able to be completed tomorrow 8/19/2022  -CT lumbar spine showed degenerative disc disease of the lumbar spine with moderate to severe central canal narrowing suspected L4-5 due to degenerative disease and severe facet degenerative changes with moderate right and mild left neuroforaminal narrowing at that location.  Mild to moderate central canal narrowing at L5-S1 is present along with moderate left and severe right neuroforaminal narrowing at that level  -Patient given Dilaudid and Zofran in the ED, continue  -MRI ordered pending clearance of patient's implanted defibrillator  -Neurosurgery consulted who recommended continuing pain management as well as plans for imaging if possible with further recommendations based on results and clinical course  -MRI attempted x2 without success due to inability to tolerate lying flat  -Neurosurgery to discuss case with pain management for possible inpatient injection on 8/19/2022  -Continue telemetry  -Fall precautions  -Hospitalist consulted     Diabetes mellitus  -Well controlled with a most recent glucose of 98  -Hold metformin  -Sliding scale insulin ordered  -Diabetic  diet  -Monitor AC and HS     CKD  -Creatinine: 1.12 with and eGFR: 60.4  -Continue Entresto  -Avoid nephrotic if medication and IV dye unless urgently needed  -Monitor BMP and I´s and O´s     CAD status post CABG  -Continue aspirin, Plavix, beta-blocker and statin     Heart failure with reduced ejection fraction  -Echocardiogram from February 2021 showed an EF of 26-30%; with grade 2 diastolic dysfunction  -Continue Entresto, beta-blocker  -Patient has AICD in place     Hypothyroidism  -Synthroid     Hyperlipidemia  -Statin    DVT prophylaxis:  Mechanical DVT prophylaxis orders are present.    CODE STATUS:    Level Of Support Discussed With: Patient  Code Status (Patient has no pulse and is not breathing): CPR (Attempt to Resuscitate)  Medical Interventions (Patient has pulse or is breathing): Full Support      Disposition:  I expect patient to be discharged once MRI complete and neurosurgery complete evaluation with best course of treatment.    This patient has been examined wearing appropriate Personal Protective Equipment. 08/18/22      Electronically signed by ALEX Archibald, 08/18/22, 17:04 EDT.  [facility]

## 2022-08-18 NOTE — NURSING NOTE
Pt unable to tolerate MRI after extra dilaudid was given. Pt verbalizes thst she does not want to do it again until her pain subsided.

## 2022-08-18 NOTE — PLAN OF CARE
Goal Outcome Evaluation:  Plan of Care Reviewed With: patient        Progress: no change  Outcome Evaluation: Pt continues to get IV Dilaudid every 2 hours for pain 8-9/10  Pt scheduled to have MRI completed today and neurosurgery to advise after reading MRI

## 2022-08-18 NOTE — PROGRESS NOTES
[facility]    Patient Name: Rafael Nunes  : 1975  MRN: [id number]  Primary Care Physician: Phani Adames MD  Date of admission: 2022     Patient Care Team:  Phani Adames MD as PCP - General (Internal Medicine)  Ashish Smalls MD as Consulting Physician (Nephrology)  Wayne Luna MD as Consulting Physician (Cardiology)  Héctor Eckert MD as Consulting Physician (Cardiac Electrophysiology)  Lane Stock MD as Surgeon (Cardiothoracic Surgery)          Subjective   History Present Illness     Chief Complaint:   Chief Complaint   Patient presents with   • Back Pain     Pt reports worsening back pain that radiates to RLE since being seen here on Friday for same symptoms.      Obtained from ED provider HPI on 2022:  Patient is a 47-year-old -American female history of CAD, cardiomyopathy, CKD, COPD, hypertension, GERD, has defibrillator and pacemaker placed presents to the ER complaints of lower back pain and sciatica pain for 5 days.  Patient was seen in the ER 2 days ago for similar complaints, states that her pain seems to be worse.  She denies any fall trauma or injury.  Patient reports pain in lower back that radiates into her hips and down her legs.  Patient states she started to develop some numbness and tingling in her legs that is worse than her normal.  Patient also states that she has difficulty urinating but reports no incontinence.  No bowel dysfunction.  No saddle anesthesia.  Patient denies abdominal pain, nausea vomiting or diarrhea.  Patient states she been taking her Norco at home with no relief.  No chest pain shortness of breath or headache.  Patient currently sees pain management.  Patient currently has pacemaker defibrillator, MRI conditional.     2022:  Patient confirms the HPI noted above including approximately 5-day history of worsening bilateral low back pain which for the past 3 days has  been radiating down her right leg with some associated right leg weakness and now appears to be affecting her left leg as well.  She notes some urinary retention along with her symptoms but denies any bowel or bladder incontinence, fever, recent falls or other trauma, peripheral edema, nausea, vomiting, dyspnea or chest pain.  She notes moderate pain control with Dilaudid given in the ED but continues to experience severe pain when this wears off which is made worse with movement.     8/18/2022: Patient reports continued severe back pain which is moderately controlled with Dilaudid.  She also notes that she is willing to reattempt MRI today         Review of Systems   Constitutional: Negative.   HENT: Negative.    Eyes: Negative.    Cardiovascular: Negative.    Respiratory: Negative.    Skin: Negative.    Musculoskeletal: Positive for back pain.   Gastrointestinal: Negative.    Genitourinary: Negative.    Neurological: Positive for numbness.   Psychiatric/Behavioral: Negative.          Personal History     Past Medical History:   Past Medical History:   Diagnosis Date   • Arrhythmia    • Asthma    • CAD (coronary artery disease)    • Cardiomyopathy, dilated ([facility])    • Chronic systolic congestive heart failure ([facility])    • CKD (chronic kidney disease) stage 2, GFR 60-89 ml/min    • COPD (chronic obstructive pulmonary disease) ([facility])    • Essential hypertension    • GERD without esophagitis    • Hidradenitis 10/26/2020   • Hyperlipidemia    • Hypothyroidism (acquired)    • ICD (implantable cardioverter-defibrillator), dual, in situ 7/22/2019   • Nonrheumatic aortic valve insufficiency    • Nonrheumatic mitral valve regurgitation    • S/P AVR (aortic valve replacement)    • S/P CABG x 3    • Type 2 diabetes mellitus without complication, without long-term current use of insulin ([facility])        Surgical History:      Past Surgical History:   Procedure Laterality Date   • AORTIC VALVE REPAIR/REPLACEMENT N/A 12/27/2019     Procedure: AORTIC VALVE REPAIR/REPLACEMENT;  Surgeon: Lane Stock MD;  Location: [facility] CVOR;  Service: Cardiothoracic   • BREAST LUMPECTOMY Right     BENIGN   • CARDIAC CATHETERIZATION N/A 12/24/2019    Procedure: Right and Left Heart Cath 12/24/19 @ 0900;  Surgeon: Wayne Luna MD;  Location: [facility] CATH INVASIVE LOCATION;  Service: Cardiovascular   • CARDIAC CATHETERIZATION N/A 12/24/2019    Procedure: Coronary angiography;  Surgeon: Wayne Luna MD;  Location:  [facility] CATH INVASIVE LOCATION;  Service: Cardiovascular   • CARDIAC CATHETERIZATION N/A 11/10/2020    Procedure: Left and right Heart Cath;  Surgeon: Wayne Luna MD;  Location: [facility] CATH INVASIVE LOCATION;  Service: Cardiovascular;  Laterality: N/A;   • CARDIAC CATHETERIZATION N/A 11/10/2020    Procedure: Coronary angiography;  Surgeon: Wayne Luna MD;  Location: [facility] CATH INVASIVE LOCATION;  Service: Cardiovascular;  Laterality: N/A;   • CARDIAC CATHETERIZATION N/A 11/10/2020    Procedure: Right Heart Cath;  Surgeon: Wayne Luna MD;  Location: [facility] CATH INVASIVE LOCATION;  Service: Cardiovascular;  Laterality: N/A;   • CARDIAC CATHETERIZATION N/A 11/25/2020    Procedure: Percutaneous coronary intervention of the left circumflex artery;  Surgeon: Wayne Luna MD;  Location: [facility] CATH INVASIVE LOCATION;  Service: Cardiovascular;  Laterality: N/A;   • CARDIAC CATHETERIZATION N/A 1/22/2021    Procedure: LEFT HEART CATH with possible PCI;  Surgeon: Wayne Luna MD;  Location: [facility] CATH INVASIVE LOCATION;  Service: Cardiovascular;  Laterality: N/A;  Local and IV sedation   • CARDIAC CATHETERIZATION N/A 1/22/2021    Procedure: Coronary angiography;  Surgeon: Wayne Luna MD;  Location: [facility] CATH INVASIVE LOCATION;  Service: Cardiovascular;  Laterality: N/A;   • CARDIAC CATHETERIZATION N/A 1/22/2021    Procedure: Saphenous Vein Graft;  Surgeon: Cheryl  Wayne VILLANUEVA MD;  Location: [facility] CATH INVASIVE LOCATION;  Service: Cardiovascular;  Laterality: N/A;   • CARDIAC ELECTROPHYSIOLOGY PROCEDURE Left 6/28/2019    Procedure: Dual-chamber ICD insertion;  Surgeon: Héctor Eckert MD;  Location: [facility] CATH INVASIVE LOCATION;  Service: Cardiovascular   • CARDIAC ELECTROPHYSIOLOGY PROCEDURE Left 8/14/2019    Procedure: Lead Revision;  Surgeon: Héctor Eckert MD;  Location: [facility] CATH INVASIVE LOCATION;  Service: Cardiovascular   • CARDIAC SURGERY     • CHOLECYSTECTOMY     • CORONARY ARTERY BYPASS GRAFT N/A 12/27/2019    Procedure: CORONARY ARTERY BYPASS GRAFTING;  Surgeon: Lane Stock MD;  Location: [facility] CVOR;  Service: Cardiothoracic   • HYSTERECTOMY     • INSERT / REPLACE / REMOVE PACEMAKER     • LYMPHADENECTOMY Bilateral    • THYROID SURGERY             Family History: family history includes Heart disease in her father. Otherwise pertinent FHx was reviewed and unremarkable.     Social History:  reports that she quit smoking about 3 years ago. She has never used smokeless tobacco. She reports that she does not drink alcohol and does not use drugs.      Medications:  Prior to Admission medications    Medication Sig Start Date End Date Taking? Authorizing Provider   allopurinol (ZYLOPRIM) 100 MG tablet Take 100 mg by mouth Daily.   Yes Dheeraj Alvarez MD   aspirin 81 MG EC tablet Take 1 tablet by mouth Daily. 11/26/20  Yes Fabby Jarrett APRN   benzonatate (TESSALON) 200 MG capsule Take 1 capsule by mouth 3 (Three) Times a Day As Needed for Cough. 4/10/22  Yes Nida Peters APRN   carvedilol (COREG) 6.25 MG tablet Take 6.25 mg by mouth 2 (Two) Times a Day With Meals.   Yes Dheeraj Alvarez MD   cholecalciferol (VITAMIN D3) 1000 units tablet Take 1,000 Units by mouth Daily.   Yes Dheeraj Alvarez MD   cholestyramine light 4 g packet Take 1 packet by mouth Every 12 (Twelve) Hours As Needed (Diarrhea). 6/2/21  Yes Lee Barry  MD Sharon   clopidogrel (PLAVIX) 75 MG tablet Take 1 tablet by mouth Daily. 12/21/20  Yes Wayne Luna MD   diphenhydrAMINE (BENADRYL) 25 mg capsule Take 25 mg by mouth Every 6 (Six) Hours As Needed for Itching.   Yes Dheeraj Alvarez MD   docusate sodium (COLACE) 100 MG capsule Take 100 mg by mouth 2 (Two) Times a Day As Needed for Constipation.   Yes ProviderDheeraj MD   ferrous sulfate 325 (65 FE) MG tablet Take 65 mg by mouth Daily With Breakfast.   Yes ProviderDheeraj MD   fluconazole (DIFLUCAN) 150 MG tablet Take 150 mg by mouth 1 (One) Time As Needed.   Yes Dheeraj Alvarez MD   fluticasone (FLONASE) 50 MCG/ACT nasal spray 2 sprays into the nostril(s) as directed by provider Daily.   Yes Dheeraj Alvarez MD   folic acid (FOLVITE) 1 MG tablet Take 1 mg by mouth Daily.   Yes ProviderDheeraj MD   metFORMIN (GLUCOPHAGE) 500 MG tablet Take 500 mg by mouth Daily With Breakfast.   Yes ProviderDheeraj MD   montelukast (SINGULAIR) 10 MG tablet Take 10 mg by mouth Every Night.   Yes ProviderDheeraj MD   multivitamin (THERAGRAN) tablet tablet Take 1 tablet by mouth Daily.   Yes ProviderDheeraj MD   oxyCODONE-acetaminophen (PERCOCET) 7.5-325 MG per tablet Take 1 tablet by mouth Every 6 (Six) Hours As Needed for Moderate Pain .   Yes Dheeraj Alvarez MD   pantoprazole (PROTONIX) 40 MG EC tablet Take 40 mg by mouth Daily.   Yes ProviderDheeraj MD   potassium chloride (K-DUR) 10 MEQ CR tablet Take 20 mEq by mouth 2 (Two) Times a Day.   Yes ProviderDheeraj MD   predniSONE (DELTASONE) 20 MG tablet Take 2 tablets by mouth Daily for 4 days. 8/15/22 8/19/22 Yes Ascencion Marquez MD   ranolazine (RANEXA) 500 MG 12 hr tablet Take 500 mg by mouth 2 (Two) Times a Day.   Yes Dheeraj Alvarez MD   rosuvastatin (CRESTOR) 40 MG tablet Take 40 mg by mouth Daily.   Yes ProviderDheeraj MD   sacubitril-valsartan (ENTRESTO)  MG tablet Take 1  tablet by mouth 2 (Two) Times a Day.   Yes Dheeraj Alvarez MD   spironolactone (ALDACTONE) 25 MG tablet Take 1 tablet by mouth Daily. Hold if persistent diarrhea. 6/2/21  Yes Lee Barry MD   TiZANidine (ZANAFLEX) 4 MG capsule Take 4 mg by mouth 3 (Three) Times a Day.   Yes Dheeraj Alvarez MD   torsemide (DEMADEX) 20 MG tablet Take 3 tablets by mouth Daily. Hold if persistent diarrhea 6/2/21  Yes Lee Barry MD   vitamin B-12 (CYANOCOBALAMIN) 1000 MCG tablet Take 1,000 mcg by mouth Daily.   Yes Dheeraj Alvarez MD   albuterol (PROVENTIL) (2.5 MG/3ML) 0.083% nebulizer solution Take 2.5 mg by nebulization Every 4 (Four) Hours As Needed for Wheezing. 4/10/22   Nida Peters APRN   Alirocumab (Praluent) 75 MG/ML solution auto-injector Inject 1 mL under the skin into the appropriate area as directed Every 14 (Fourteen) Days. 5/5/22   Wayne Luna MD   levothyroxine (SYNTHROID, LEVOTHROID) 75 MCG tablet Take 3 tablets by mouth Daily for 30 days. 6/3/21 7/3/21  Lee Barry MD   nitroglycerin (NITROSTAT) 0.4 MG SL tablet Place 0.4 mg under the tongue Every 5 (Five) Minutes As Needed for Chest Pain. Take no more than 3 doses in 15 minutes.    ProviderDheeraj MD       Allergies:    Allergies   Allergen Reactions   • Hydrocodone Hives   • Penicillin G Unknown (See Comments)   • Contrast Dye GI Intolerance     She is pretty sure it's the contrast dye that makes her super sick and vomiting after heart cath       Objective   Objective     Vital Signs  Temp:  [97.5 °F (36.4 °C)-98.4 °F (36.9 °C)] 97.9 °F (36.6 °C)  Heart Rate:  [51-69] 60  Resp:  [11-16] 16  BP: (152-174)/() 164/102  SpO2:  [95 %-99 %] 98 %  on   ;   Device (Oxygen Therapy): room air  Body mass index is 29.97 kg/m².    Physical Exam  Constitutional:       General: She is not in acute distress.     Appearance: Normal appearance. She is not ill-appearing,  toxic-appearing or diaphoretic.   HENT:      Head: Normocephalic.      Right Ear: External ear normal.      Left Ear: External ear normal.      Nose: Nose normal.      Mouth/Throat:      Mouth: Mucous membranes are moist.   Eyes:      Extraocular Movements: Extraocular movements intact.   Cardiovascular:      Rate and Rhythm: Normal rate and regular rhythm.      Pulses: Normal pulses.   Pulmonary:      Effort: Pulmonary effort is normal.      Breath sounds: Normal breath sounds.   Abdominal:      General: Bowel sounds are normal.      Palpations: Abdomen is soft.   Musculoskeletal:      Cervical back: Normal range of motion.      Right lower leg: No edema.      Left lower leg: No edema.   Skin:     General: Skin is warm and dry.      Capillary Refill: Capillary refill takes less than 2 seconds.   Neurological:      General: No focal deficit present.      Mental Status: She is alert.      Motor: Weakness present.   Psychiatric:         Mood and Affect: Mood normal.         Behavior: Behavior normal.         Thought Content: Thought content normal.         Judgment: Judgment normal.           Results Review:  I have personally reviewed most recent lab results and radiology images and interpretations and agree with findings, most notably: BMP, CBC, chest x-ray and CT of lumbar spine.    Results from last 7 days   Lab Units 08/17/22  0428   WBC 10*3/mm3 10.30   HEMOGLOBIN g/dL 16.3*   HEMATOCRIT % 48.8*   PLATELETS 10*3/mm3 240     Results from last 7 days   Lab Units 08/17/22  0428   SODIUM mmol/L 140   POTASSIUM mmol/L 4.1   CHLORIDE mmol/L 100   CO2 mmol/L 30.0*   BUN mg/dL 15   CREATININE mg/dL 1.12*   GLUCOSE mg/dL 106*   CALCIUM mg/dL 10.0     Estimated Creatinine Clearance: 68.8 mL/min (A) (by C-G formula based on SCr of 1.12 mg/dL (H)).  Brief Urine Lab Results     None          Microbiology Results (last 10 days)     Procedure Component Value - Date/Time    COVID PRE-OP / PRE-PROCEDURE SCREENING ORDER (NO  ISOLATION) - Swab, Nasopharynx [[id number]]  (Normal) Collected: 08/16/22 2238    Lab Status: Final result Specimen: Swab from Nasopharynx Updated: 08/16/22 2318    Narrative:      The following orders were created for panel order COVID PRE-OP / PRE-PROCEDURE SCREENING ORDER (NO ISOLATION) - Swab, Nasopharynx.  Procedure                               Abnormality         Status                     ---------                               -----------         ------                     COVID-19,CEPHEID/CHELLE,CO...[[id number]]  Normal              Final result                 Please view results for these tests on the individual orders.    COVID-19,CEPHEID/CHELLE,COR/[facility]/PAD/[facility] IN-HOUSE(OR EMERGENT/ADD-ON),NP SWAB IN TRANSPORT MEDIA 3-4 HR TAT, RT-PCR - Swab, Nasopharynx [[id number]]  (Normal) Collected: 08/16/22 2238    Lab Status: Final result Specimen: Swab from Nasopharynx Updated: 08/16/22 2318     COVID19 Not Detected    Narrative:      Fact sheet for providers: https://www.fda.gov/media/882093/download     Fact sheet for patients: https://www.fda.gov/media/784756/download  Fact sheet for providers: https://www.fda.gov/media/777659/download     Fact sheet for patients: https://www.fda.gov/media/246636/download          ECG/EMG Results (most recent)     None          Results for orders placed during the hospital encounter of 05/16/20    Duplex Venous Lower Extremity - Bilateral CAR    Interpretation Summary  · Normal bilateral lower extremity venous duplex scan.      Results for orders placed during the hospital encounter of 02/22/21    Adult Transthoracic Echo Complete w/ Color, Spectral and Contrast if Necessary Per Protocol    Interpretation Summary  · The left ventricular cavity is mildly dilated.  · Estimated left ventricular EF was in agreement with the calculated left ventricular EF. Left ventricular ejection fraction appears to be 26 - 30%. Left ventricular systolic function is severely decreased.  · Left  ventricular diastolic function is consistent with (grade II w/high LAP) pseudonormalization.  · The right ventricular cavity is mildly dilated.  · Mildly reduced right ventricular systolic function noted.  · Left atrial volume is mildly increased.  · There is a bioprosthetic aortic valve present.  · Mild to moderate aortic valve stenosis is present.  · Abnormal mitral valve structure consistent with dilated annulus.  · Moderate to severe mitral valve regurgitation is present with a posteriorly-directed jet noted.      CT Lumbar Spine Without Contrast    Result Date: 8/16/2022  1.     Degenerative disc disease lumbar spine. There is moderate to severe central narrowing suspected L4-L5 due to degenerative disc disease and severe facet degenerative change. There is moderate right mild left neural foraminal narrowing at this location. 2.     There is mild to moderate central canal narrowing L5-S1 with moderate left and severe right neural foraminal narrowing at this level. 3.     Please see findings for details other levels.  Electronically Signed By-Althea Kaba MD On:8/16/2022 9:36 PM This report was finalized on [id number] by  Althea Kaba MD.        Estimated Creatinine Clearance: 68.8 mL/min (A) (by C-G formula based on SCr of 1.12 mg/dL (H)).    Assessment & Plan   Assessment/Plan       Active Hospital Problems    Diagnosis  POA   • Intractable low back pain [M54.59]  Yes      Resolved Hospital Problems   No resolved problems to display.     Low back pain with radiculopathy  -CT lumbar spine showed degenerative disc disease of the lumbar spine with moderate to severe central canal narrowing suspected L4-5 due to degenerative disease and severe facet degenerative changes with moderate right and mild left neuroforaminal narrowing at that location.  Mild to moderate central canal narrowing at L5-S1 is present along with moderate left and severe right neuroforaminal narrowing at that level  -Patient given Dilaudid  and Zofran in the ED, continue  -MRI ordered pending clearance of patient's implanted defibrillator  -Neurosurgery consulted who recommended continuing pain management as well as plans for imaging if possible with further recommendations based on results and clinical course  -MRI attempted x2 without success due to inability to tolerate lying flat  -Neurosurgery to discuss case with pain management for possible inpatient injection on 8/19/2022  -Continue telemetry  -Fall precautions  -Hospitalist consulted     Diabetes mellitus  -Well controlled with a most recent glucose of 98  -Hold metformin  -Sliding scale insulin ordered  -Diabetic diet  -Monitor AC and HS     CKD  -Creatinine: 1.12 with and eGFR: 60.4  -Continue Entresto  -Avoid nephrotic if medication and IV dye unless urgently needed  -Monitor BMP and I´s and O´s     CAD status post CABG  -Continue aspirin, Plavix, beta-blocker and statin     Heart failure with reduced ejection fraction  -Echocardiogram from February 2021 showed an EF of 26-30%  -Continue Entresto, beta-blocker  -Patient has AICD in place     Hypothyroidism  -Synthroid     Hyperlipidemia  -Statin            VTE Prophylaxis -   Mechanical Order History:      Ordered        08/17/22 0332  Place Sequential Compression Device  Once            08/17/22 0332  Maintain Sequential Compression Device  Continuous                    Pharmalogical Order History:     None          CODE STATUS:    Code Status and Medical Interventions:   Ordered at: 08/17/22 0332     Level Of Support Discussed With:    Patient     Code Status (Patient has no pulse and is not breathing):    CPR (Attempt to Resuscitate)     Medical Interventions (Patient has pulse or is breathing):    Full Support       This patient has been examined wearing personal protective equipment.     I discussed the patient's findings and my recommendations with patient and nursing staff.      Signature:Electronically signed by Marcello Pulliam  DELMER, 08/18/22, 4:41 PM EDT.

## 2022-08-18 NOTE — SIGNIFICANT NOTE
08/18/22 1152   OTHER   Discipline physical therapist   Rehab Time/Intention   Session Not Performed other (see comments)  (Pt in severe pain.  Neurosx consulted.  MRI ordered and pt to have today; however, pain was too high for her to receive.  Will f/u tomorrow.)   Therapy Assessment/Plan (PT)   Criteria for Skilled Interventions Met (PT) yes   Recommendation   PT - Next Appointment 08/19/22

## 2022-08-19 LAB
ANION GAP SERPL CALCULATED.3IONS-SCNC: 13 MMOL/L (ref 5–15)
BASOPHILS # BLD AUTO: 0.1 10*3/MM3 (ref 0–0.2)
BASOPHILS NFR BLD AUTO: 0.6 % (ref 0–1.5)
BUN SERPL-MCNC: 18 MG/DL (ref 6–20)
BUN/CREAT SERPL: 15.3 (ref 7–25)
CALCIUM SPEC-SCNC: 9.8 MG/DL (ref 8.6–10.5)
CHLORIDE SERPL-SCNC: 93 MMOL/L (ref 98–107)
CO2 SERPL-SCNC: 29 MMOL/L (ref 22–29)
CREAT SERPL-MCNC: 1.18 MG/DL (ref 0.57–1)
DEPRECATED RDW RBC AUTO: 42 FL (ref 37–54)
EGFRCR SERPLBLD CKD-EPI 2021: 56.7 ML/MIN/1.73
EOSINOPHIL # BLD AUTO: 0.1 10*3/MM3 (ref 0–0.4)
EOSINOPHIL NFR BLD AUTO: 1.6 % (ref 0.3–6.2)
ERYTHROCYTE [DISTWIDTH] IN BLOOD BY AUTOMATED COUNT: 13.4 % (ref 12.3–15.4)
GLUCOSE BLDC GLUCOMTR-MCNC: 120 MG/DL (ref 70–105)
GLUCOSE BLDC GLUCOMTR-MCNC: 141 MG/DL (ref 70–105)
GLUCOSE BLDC GLUCOMTR-MCNC: 143 MG/DL (ref 70–105)
GLUCOSE SERPL-MCNC: 104 MG/DL (ref 65–99)
HCT VFR BLD AUTO: 50 % (ref 34–46.6)
HGB BLD-MCNC: 16.7 G/DL (ref 12–15.9)
LYMPHOCYTES # BLD AUTO: 2.6 10*3/MM3 (ref 0.7–3.1)
LYMPHOCYTES NFR BLD AUTO: 29 % (ref 19.6–45.3)
MCH RBC QN AUTO: 30.2 PG (ref 26.6–33)
MCHC RBC AUTO-ENTMCNC: 33.4 G/DL (ref 31.5–35.7)
MCV RBC AUTO: 90.3 FL (ref 79–97)
MONOCYTES # BLD AUTO: 1 10*3/MM3 (ref 0.1–0.9)
MONOCYTES NFR BLD AUTO: 11.1 % (ref 5–12)
NEUTROPHILS NFR BLD AUTO: 5.2 10*3/MM3 (ref 1.7–7)
NEUTROPHILS NFR BLD AUTO: 57.7 % (ref 42.7–76)
NRBC BLD AUTO-RTO: 0.1 /100 WBC (ref 0–0.2)
PLATELET # BLD AUTO: 259 10*3/MM3 (ref 140–450)
PMV BLD AUTO: 7.6 FL (ref 6–12)
POTASSIUM SERPL-SCNC: 3.7 MMOL/L (ref 3.5–5.2)
RBC # BLD AUTO: 5.53 10*6/MM3 (ref 3.77–5.28)
SODIUM SERPL-SCNC: 135 MMOL/L (ref 136–145)
WBC NRBC COR # BLD: 9 10*3/MM3 (ref 3.4–10.8)

## 2022-08-19 PROCEDURE — 82962 GLUCOSE BLOOD TEST: CPT

## 2022-08-19 PROCEDURE — 85025 COMPLETE CBC W/AUTO DIFF WBC: CPT | Performed by: PHYSICIAN ASSISTANT

## 2022-08-19 PROCEDURE — 80048 BASIC METABOLIC PNL TOTAL CA: CPT | Performed by: PHYSICIAN ASSISTANT

## 2022-08-19 PROCEDURE — 97162 PT EVAL MOD COMPLEX 30 MIN: CPT

## 2022-08-19 PROCEDURE — 99232 SBSQ HOSP IP/OBS MODERATE 35: CPT | Performed by: HOSPITALIST

## 2022-08-19 PROCEDURE — 25010000002 ONDANSETRON PER 1 MG: Performed by: PHYSICIAN ASSISTANT

## 2022-08-19 PROCEDURE — 25010000002 DEXAMETHASONE PER 1 MG: Performed by: NURSE PRACTITIONER

## 2022-08-19 PROCEDURE — 25010000002 HYDROMORPHONE 1 MG/ML SOLUTION: Performed by: PHYSICIAN ASSISTANT

## 2022-08-19 RX ORDER — DEXAMETHASONE SODIUM PHOSPHATE 4 MG/ML
10 INJECTION, SOLUTION INTRA-ARTICULAR; INTRALESIONAL; INTRAMUSCULAR; INTRAVENOUS; SOFT TISSUE ONCE
Status: COMPLETED | OUTPATIENT
Start: 2022-08-19 | End: 2022-08-19

## 2022-08-19 RX ADMIN — Medication 10 ML: at 09:26

## 2022-08-19 RX ADMIN — OXYCODONE 7.5 MG: 5 TABLET ORAL at 11:20

## 2022-08-19 RX ADMIN — DEXAMETHASONE SODIUM PHOSPHATE 10 MG: 4 INJECTION, SOLUTION INTRAMUSCULAR; INTRAVENOUS at 14:39

## 2022-08-19 RX ADMIN — Medication 1000 UNITS: at 09:26

## 2022-08-19 RX ADMIN — HYDROMORPHONE HYDROCHLORIDE 0.5 MG: 1 INJECTION, SOLUTION INTRAMUSCULAR; INTRAVENOUS; SUBCUTANEOUS at 03:59

## 2022-08-19 RX ADMIN — POTASSIUM CHLORIDE 20 MEQ: 1500 TABLET, EXTENDED RELEASE ORAL at 17:21

## 2022-08-19 RX ADMIN — FERROUS SULFATE TAB EC 324 MG (65 MG FE EQUIVALENT) 324 MG: 324 (65 FE) TABLET DELAYED RESPONSE at 07:46

## 2022-08-19 RX ADMIN — SENNOSIDES AND DOCUSATE SODIUM 2 TABLET: 50; 8.6 TABLET ORAL at 09:26

## 2022-08-19 RX ADMIN — HYDROMORPHONE HYDROCHLORIDE 0.5 MG: 1 INJECTION, SOLUTION INTRAMUSCULAR; INTRAVENOUS; SUBCUTANEOUS at 07:46

## 2022-08-19 RX ADMIN — OXYCODONE 7.5 MG: 5 TABLET ORAL at 17:21

## 2022-08-19 RX ADMIN — ASPIRIN 81 MG: 81 TABLET, COATED ORAL at 09:26

## 2022-08-19 RX ADMIN — SENNOSIDES AND DOCUSATE SODIUM 2 TABLET: 50; 8.6 TABLET ORAL at 20:27

## 2022-08-19 RX ADMIN — PANTOPRAZOLE SODIUM 40 MG: 40 TABLET, DELAYED RELEASE ORAL at 07:46

## 2022-08-19 RX ADMIN — CARVEDILOL 6.25 MG: 6.25 TABLET, FILM COATED ORAL at 07:46

## 2022-08-19 RX ADMIN — HYDROMORPHONE HYDROCHLORIDE 0.5 MG: 1 INJECTION, SOLUTION INTRAMUSCULAR; INTRAVENOUS; SUBCUTANEOUS at 20:27

## 2022-08-19 RX ADMIN — Medication 10 ML: at 20:27

## 2022-08-19 RX ADMIN — CLOPIDOGREL BISULFATE 75 MG: 75 TABLET ORAL at 09:26

## 2022-08-19 RX ADMIN — ONDANSETRON 4 MG: 2 INJECTION INTRAMUSCULAR; INTRAVENOUS at 03:59

## 2022-08-19 RX ADMIN — CARVEDILOL 6.25 MG: 6.25 TABLET, FILM COATED ORAL at 17:21

## 2022-08-19 RX ADMIN — OXYCODONE 7.5 MG: 5 TABLET ORAL at 02:39

## 2022-08-19 RX ADMIN — SPIRONOLACTONE 25 MG: 25 TABLET ORAL at 09:26

## 2022-08-19 RX ADMIN — LEVOTHYROXINE SODIUM 225 MCG: 0.2 TABLET ORAL at 05:59

## 2022-08-19 RX ADMIN — METHOCARBAMOL 500 MG: 500 TABLET ORAL at 20:27

## 2022-08-19 RX ADMIN — METHOCARBAMOL 500 MG: 500 TABLET ORAL at 11:20

## 2022-08-19 RX ADMIN — THERA TABS 1 TABLET: TAB at 09:26

## 2022-08-19 RX ADMIN — POTASSIUM CHLORIDE 20 MEQ: 1500 TABLET, EXTENDED RELEASE ORAL at 07:47

## 2022-08-19 RX ADMIN — METHOCARBAMOL 500 MG: 500 TABLET ORAL at 02:39

## 2022-08-19 RX ADMIN — TORSEMIDE 60 MG: 100 TABLET ORAL at 09:27

## 2022-08-19 NOTE — PLAN OF CARE
Problem: Adult Inpatient Plan of Care  Goal: Plan of Care Review  Outcome: Ongoing, Not Progressing  Flowsheets (Taken 8/19/2022 0633)  Progress: no change  Plan of Care Reviewed With: patient  Outcome Evaluation: patient to have pain management consult this morning, neurosurgery following, PRN pain meds, will monitor, VSS, paced/SR BBB on the monitor, pt stable  Goal: Patient-Specific Goal (Individualized)  Outcome: Ongoing, Not Progressing  Goal: Absence of Hospital-Acquired Illness or Injury  Outcome: Ongoing, Not Progressing  Intervention: Identify and Manage Fall Risk  Recent Flowsheet Documentation  Taken 8/19/2022 0550 by Abby Tellez, RN  Safety Promotion/Fall Prevention: safety round/check completed  Taken 8/19/2022 0359 by Abby Tellez RN  Safety Promotion/Fall Prevention: safety round/check completed  Taken 8/19/2022 0239 by Abby Tellez, RN  Safety Promotion/Fall Prevention: safety round/check completed  Taken 8/19/2022 0005 by Abby Tellez RN  Safety Promotion/Fall Prevention: safety round/check completed  Taken 8/18/2022 2206 by Abby Tellez, RN  Safety Promotion/Fall Prevention: safety round/check completed  Taken 8/18/2022 2030 by Abby Tellez RN  Safety Promotion/Fall Prevention:   safety round/check completed   assistive device/personal items within reach   clutter free environment maintained   lighting adjusted   nonskid shoes/slippers when out of bed   room organization consistent  Intervention: Prevent Skin Injury  Recent Flowsheet Documentation  Taken 8/18/2022 2030 by Abby Tellez RN  Body Position:   supine   sitting up in bed   position changed independently  Intervention: Prevent and Manage VTE (Venous Thromboembolism) Risk  Recent Flowsheet Documentation  Taken 8/18/2022 2030 by Abby Tellez, RN  Activity Management:   up ad maria del rosario   activity adjusted per tolerance  Goal: Optimal Comfort and Wellbeing  Outcome: Ongoing, Not Progressing  Intervention: Monitor Pain  and Promote Comfort  Recent Flowsheet Documentation  Taken 8/19/2022 0359 by Abby Tellez, RN  Pain Management Interventions: see MAR  Taken 8/19/2022 0239 by Abby Tellez RN  Pain Management Interventions: see MAR  Taken 8/18/2022 2104 by Abby Tellez RN  Pain Management Interventions: see MAR  Taken 8/18/2022 2030 by Abby Tellez RN  Pain Management Interventions:   see MAR   cold applied   position adjusted  Intervention: Provide Person-Centered Care  Recent Flowsheet Documentation  Taken 8/18/2022 2030 by Abby Tellez RN  Trust Relationship/Rapport:   care explained   choices provided   emotional support provided   empathic listening provided   questions answered   questions encouraged   reassurance provided   thoughts/feelings acknowledged  Goal: Readiness for Transition of Care  Outcome: Ongoing, Not Progressing     Problem: Pain Acute  Goal: Acceptable Pain Control and Functional Ability  Outcome: Ongoing, Not Progressing  Intervention: Prevent or Manage Pain  Recent Flowsheet Documentation  Taken 8/18/2022 2030 by Abby Tellez RN  Sensory Stimulation Regulation:   care clustered   auditory stimulation minimized  Sleep/Rest Enhancement: awakenings minimized  Medication Review/Management: medications reviewed  Intervention: Develop Pain Management Plan  Recent Flowsheet Documentation  Taken 8/19/2022 0359 by Abby Tellez RN  Pain Management Interventions: see MAR  Taken 8/19/2022 0239 by Abby Tellez RN  Pain Management Interventions: see MAR  Taken 8/18/2022 2104 by Abby Tellez RN  Pain Management Interventions: see MAR  Taken 8/18/2022 2030 by Abby Tellez RN  Pain Management Interventions:   see MAR   cold applied   position adjusted  Intervention: Optimize Psychosocial Wellbeing  Recent Flowsheet Documentation  Taken 8/18/2022 2030 by Abby Tellez RN  Supportive Measures: active listening utilized  Diversional Activities:   television   smartphone   Goal Outcome  Evaluation:  Plan of Care Reviewed With: patient        Progress: no change  Outcome Evaluation: patient to have pain management consult this morning, neurosurgery following, PRN pain meds, will monitor, VSS, paced/SR BBB on the monitor, pt stable

## 2022-08-19 NOTE — PLAN OF CARE
Problem: Adult Inpatient Plan of Care  Goal: Plan of Care Review  Outcome: Ongoing, Progressing  Flowsheets  Taken 8/19/2022 1644 by Veronica Ferrari RN  Plan of Care Reviewed With: patient  Taken 8/19/2022 0633 by Abby Tellez RN  Progress: no change  Goal: Patient-Specific Goal (Individualized)  Outcome: Ongoing, Progressing  Goal: Absence of Hospital-Acquired Illness or Injury  Outcome: Ongoing, Progressing  Intervention: Identify and Manage Fall Risk  Recent Flowsheet Documentation  Taken 8/19/2022 1415 by Veronica Ferrari RN  Safety Promotion/Fall Prevention:   safety round/check completed   room organization consistent   nonskid shoes/slippers when out of bed   lighting adjusted   clutter free environment maintained   assistive device/personal items within reach   activity supervised  Taken 8/19/2022 1253 by Veronica Ferrari RN  Safety Promotion/Fall Prevention:   room organization consistent   safety round/check completed   nonskid shoes/slippers when out of bed   lighting adjusted   clutter free environment maintained   assistive device/personal items within reach   activity supervised  Intervention: Prevent Skin Injury  Recent Flowsheet Documentation  Taken 8/19/2022 1253 by Veronica Ferrari RN  Body Position: position changed independently  Intervention: Prevent and Manage VTE (Venous Thromboembolism) Risk  Recent Flowsheet Documentation  Taken 8/19/2022 1253 by Veronica Ferrari RN  Activity Management: activity adjusted per tolerance  Intervention: Prevent Infection  Recent Flowsheet Documentation  Taken 8/19/2022 1415 by Veronica Ferrari RN  Infection Prevention:   single patient room provided   rest/sleep promoted   personal protective equipment utilized   hand hygiene promoted  Taken 8/19/2022 1253 by Veronica Ferrari RN  Infection Prevention:   single patient room provided   rest/sleep promoted   personal protective equipment utilized   hand hygiene promoted  Goal:  Optimal Comfort and Wellbeing  Outcome: Ongoing, Progressing  Intervention: Monitor Pain and Promote Comfort  Recent Flowsheet Documentation  Taken 8/19/2022 1253 by Veronica Ferrari, RN  Pain Management Interventions: see MAR  Intervention: Provide Person-Centered Care  Recent Flowsheet Documentation  Taken 8/19/2022 1253 by Veronica Ferrari, RN  Trust Relationship/Rapport:   care explained   choices provided   questions answered   questions encouraged   reassurance provided   thoughts/feelings acknowledged  Goal: Readiness for Transition of Care  Outcome: Ongoing, Progressing     Problem: Pain Acute  Goal: Acceptable Pain Control and Functional Ability  Outcome: Ongoing, Progressing  Intervention: Prevent or Manage Pain  Recent Flowsheet Documentation  Taken 8/19/2022 1253 by Veronica Ferrari, RN  Medication Review/Management: medications reviewed  Intervention: Develop Pain Management Plan  Recent Flowsheet Documentation  Taken 8/19/2022 1253 by Veronica Ferrari, RN  Pain Management Interventions: see MAR  Intervention: Optimize Psychosocial Wellbeing  Recent Flowsheet Documentation  Taken 8/19/2022 1253 by Veronica Ferrari, RN  Supportive Measures: active listening utilized  Diversional Activities:   television   smartphone   Goal Outcome Evaluation:  Plan of Care Reviewed With: patient

## 2022-08-19 NOTE — NURSING NOTE
Multiple attempts to reach pain management. Unable to speak with anyone or leave a voicemail. Notified YUE Martin.

## 2022-08-19 NOTE — DISCHARGE PLACEMENT REQUEST
"Rafael Nunes (47 y.o. Female)             Date of Birth   1975    Social Security Number       Address   [address] [place] [address] [place] [facility]    Home Phone   [phone]    MRN   [id number]       Voodoo   Confucianist    Marital Status                               Admission Date   8/16/22    Admission Type   Emergency    Admitting Provider   Prieto Velasco DO    Attending Provider   Prieto Velasco DO    Department, Room/Bed   [facility] OBSERVATION, 104/1       Discharge Date       Discharge Disposition       Discharge Destination                               Attending Provider: Prieto Velasco DO    Allergies: Hydrocodone, Penicillin G, Contrast Dye    Isolation: None   Infection: None   Code Status: CPR   Advance Care Planning Activity    Ht: 170.2 cm (67\")   Wt: 86.8 kg (191 lb 5.8 oz)    Admission Cmt: None   Principal Problem: Intractable low back pain [M54.59]                 Active Insurance as of 8/16/2022     Primary Coverage     Payor Plan Insurance Group Employer/Plan Group    ANTHEM BLUE CROSS ANTHEM BLUE CROSS BLUE SHIELD PPO [id number]     Payor Plan Address Payor Plan Phone Number Payor Plan Fax Number Effective Dates    [address] [phone]  5/6/2018 - None Entered    [facility] [id number]       Subscriber Name Subscriber Birth Date Member ID       CATRACHO DOWNS 11/7/1970 [id number]           Secondary Coverage     Payor Plan Insurance Group Employer/Plan Group    ANTHEM MEDICARE REPLACEMENT ANTHEM MEDICARE ADVANTAGE [id number]     Payor Plan Address Payor Plan Phone Number Payor Plan Fax Number Effective Dates    [address] [phone]  1/1/2019 - None Entered    [facility] [address]       Subscriber Name Subscriber Birth Date Member ID       RAFAEL NUNES 1975 [id number]           Tertiary Coverage     Payor Plan Insurance Group Employer/Plan Group    [place]      Payor Plan " Address Payor Plan Phone Number Payor Plan Fax Number Effective Dates    [address]   1/1/2020 - None Entered    [place] [address]       Subscriber Name Subscriber Birth Date Member ID       CALVIN LITTLEJOHN 1975 [id number]                 Emergency Contacts      (Rel.) Home Phone Work Phone Mobile Phone    CATRACHO DOWNS (Spouse) -- -- [phone]

## 2022-08-19 NOTE — PLAN OF CARE
Goal Outcome Evaluation:  Plan of Care Reviewed With: patient            46 y/o F who presented with severe back pain. Diagnosed with low back pain with radiculopathy. Patient was scrubbing a tub- twisting and reaching when the pain onset. She works at [employer]. 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 Patient lives with her children. Pt is agreeable with max encouragement to sit edge of bed but not to stand. States she is able to [place] [facility] [place] needs to. Pain extends to foot. It does centralize in certain positions. Educated about peripheralizing and centralizing back pain and what to expect as pain centralizes(pain to come up the leg and be more intense but that centralization is good). Patient meets criteria for prone positioning to treat lumbar radiculopathy. She attempts to lie on her stomach 2 times but not able to complete or only able to maintain less than 5 seconds due to intensified but centralized pain which is what is expected as pain centralizes. Due to fear/avoidance, patient with decreased tolerance to conservative treatment. Patient will need OPPT at d/c to treat acute episode of lumbar radiculopathy.

## 2022-08-19 NOTE — CASE MANAGEMENT/SOCIAL WORK
Continued Stay Note  MIRIAM Olivares     Patient Name: Rafael Nunes  MRN: [id number]  Today's Date: 8/19/2022    Admit Date: 8/16/2022     Discharge Plan     Row Name 08/19/22 1411       Plan    Plan D/C Plan : Home with family , OP PT set up with [facility] FOR sEPT 2 AT 10:00 (Pt aware ) pt will be on a wait list to get in sooner    Patient/Family in Agreement with Plan yes    Provided Post Acute Provider Quality & Resource List? Yes    Plan Comments Asked Dr paz to place a outpt PT order               Discharge Codes    No documentation.               Expected Discharge Date and Time     Expected Discharge Date Expected Discharge Time    Aug 19, 2022             Elina Taylor RN

## 2022-08-19 NOTE — PROGRESS NOTES
[facility] Medicine Services Daily Progress Note    Patient Name: Rafael Nunes  : 1975  MRN: [id number]  Primary Care Physician:  Phani Adames MD  Date of admission: 2022      Subjective      Chief Complaint: back pain      Patient Reports     22: back pain.  Patient has pain when she extends her lumbar back back    Review of Systems   All other systems reviewed and are negative.         Objective      Vitals:   Temp:  [97.5 °F (36.4 °C)-98.1 °F (36.7 °C)] 97.5 °F (36.4 °C)  Heart Rate:  [0-107] 66  Resp:  [16] 16  BP: (129-166)/() 153/105    Physical Exam  HENT:      Head: Normocephalic.      Nose: Nose normal.   Eyes:      Extraocular Movements: Extraocular movements intact.      Pupils: Pupils are equal, round, and reactive to light.   Cardiovascular:      Rate and Rhythm: Normal rate and regular rhythm.   Pulmonary:      Effort: Pulmonary effort is normal.      Breath sounds: Normal breath sounds.   Abdominal:      General: Bowel sounds are normal.      Tenderness: There is no abdominal tenderness.   Musculoskeletal:      Cervical back: Normal range of motion. No muscular tenderness.      Comments: Back pain elicited with extension of thoracic back   Lymphadenopathy:      Cervical: No cervical adenopathy.   Neurological:      Mental Status: She is alert and oriented to person, place, and time.   Psychiatric:         Behavior: Behavior is cooperative.           Result Review    Result Review:  I have personally reviewed the results from the time of this admission to 2022 11:54 EDT and agree with these findings:  [x]  Laboratory  []  Microbiology  [x]  Radiology  []  EKG/Telemetry   []  Cardiology/Vascular   []  Pathology  [x]  Old records  []  Other:            Assessment & Plan      Brief Patient Summary:    aspirin, 81 mg, Oral, Daily  carvedilol, 6.25 mg, Oral, BID With Meals  cholecalciferol, 1,000 Units, Oral, Daily  clopidogrel, 75 mg,  Oral, Daily  ferrous sulfate, 324 mg, Oral, Daily With Breakfast  insulin lispro, 0-9 Units, Subcutaneous, TID AC  levothyroxine, 225 mcg, Oral, Q AM  multivitamin, 1 tablet, Oral, Daily  pantoprazole, 40 mg, Oral, QAM AC  potassium chloride, 20 mEq, Oral, BID With Meals  senna-docusate sodium, 2 tablet, Oral, BID  sodium chloride, 10 mL, Intravenous, Q12H  spironolactone, 25 mg, Oral, Daily  torsemide, 60 mg, Oral, Daily             Active Hospital Problems:  Active Hospital Problems    Diagnosis    • **Intractable low back pain    • Coronary artery disease of native artery of native heart with stable angina pectoris (HCC)    • Essential hypertension      Intractable back pain:  -s/p CT of lumbar back  -MRI ordered but patient unable to lay flat  -Patient able to ambulate to bathroom     CAD s/p CABG:  -Denies chest pain  -Continue aspirin, Plavix, beta-blocker and statin    HFrEF LVEF 26-30% s/p AICD  -Continue aspirin, Entresto, beta-blocker    Diabetes mellitus:  -Hold home metformin  -Continue ISS    Hypothyroidism:  -Continue Synthroid    Hyperlipidemia:  -Continue statin    DVT prophylaxis:  Mechanical DVT prophylaxis orders are present.    CODE STATUS:    Level Of Support Discussed With: Patient  Code Status (Patient has no pulse and is not breathing): CPR (Attempt to Resuscitate)  Medical Interventions (Patient has pulse or is breathing): Full Support      Disposition:  I expect patient to be discharged nursing.    This patient has been examined wearing appropriate Personal Protective Equipment and discussed with nursing. 08/19/22      Electronically signed by Prieto Velasco DO, 08/19/22, 11:54 EDT.  [facility] Hospitalist Team

## 2022-08-19 NOTE — CASE MANAGEMENT/SOCIAL WORK
Continued Stay Note  MIRIAM Olivares     Patient Name: Rafael Nunes  MRN: [id number]  Today's Date: 8/19/2022    Admit Date: 8/16/2022     Discharge Plan     Row Name 08/19/22 1033       Plan    Plan D/C Plan : Anticipate routine to home    Plan Comments Barrier to D/C : Pt to have MRI of back if able to do  and pain management to see .               Discharge Codes    No documentation.               Expected Discharge Date and Time     Expected Discharge Date Expected Discharge Time    Aug 19, 2022             Elina Taylor RN

## 2022-08-19 NOTE — THERAPY EVALUATION
Patient Name: Rafael Nunes  : 1975    MRN: [id number]                              Today's Date: 2022       Admit Date: 2022    Visit Dx:     ICD-10-CM ICD-9-CM   1. Intractable low back pain  M54.59 724.2   2. Lumbar radiculopathy  M54.16 724.4   3. Chronic pain syndrome  G89.4 338.4     Patient Active Problem List   Diagnosis   • COPD (chronic obstructive pulmonary disease) ([facility])   • Essential hypertension   • Cardiomyopathy, dilated ([facility])   • ICD (implantable cardioverter-defibrillator), dual, in situ   • GERD without esophagitis   • Hypothyroidism (acquired)   • Coronary artery disease of native artery of native heart with stable angina pectoris ([facility])   • S/P AVR (aortic valve replacement)   • S/P CABG x 3   • Dyspnea   • CKD (chronic kidney disease) stage 2, GFR 60-89 ml/min   • Nonrheumatic mitral valve regurgitation   • Cellulitis of left axilla   • Cellulitis of right axilla   • Hidradenitis   • Type 2 diabetes mellitus without complication, without long-term current use of insulin ([facility])   • Chronic systolic congestive heart failure ([facility])   • Hypokalemia   • Dizziness with near syncope   • Acute diarrhea   • Mixed hyperlipidemia   • INDRA (obstructive sleep apnea)   • Chronic pain syndrome   • Intractable low back pain   • Stage 3a chronic kidney disease ([facility])     Past Medical History:   Diagnosis Date   • Arrhythmia    • Asthma    • CAD (coronary artery disease)    • Cardiomyopathy, dilated ([facility])    • Chronic systolic congestive heart failure ([facility])    • CKD (chronic kidney disease) stage 2, GFR 60-89 ml/min    • COPD (chronic obstructive pulmonary disease) ([facility])    • Essential hypertension    • GERD without esophagitis    • Hidradenitis 10/26/2020   • Hyperlipidemia    • Hypothyroidism (acquired)    • ICD (implantable cardioverter-defibrillator), dual, in situ 2019   • Nonrheumatic aortic valve insufficiency    • Nonrheumatic mitral valve regurgitation    • S/P AVR (aortic  valve replacement)    • S/P CABG x 3    • Type 2 diabetes mellitus without complication, without long-term current use of insulin ([facility])      Past Surgical History:   Procedure Laterality Date   • AORTIC VALVE REPAIR/REPLACEMENT N/A 12/27/2019    Procedure: AORTIC VALVE REPAIR/REPLACEMENT;  Surgeon: Lane Stock MD;  Location: [facility] CVOR;  Service: Cardiothoracic   • BREAST LUMPECTOMY Right     BENIGN   • CARDIAC CATHETERIZATION N/A 12/24/2019    Procedure: Right and Left Heart Cath 12/24/19 @ 0900;  Surgeon: Wayne Luna MD;  Location: [facility] CATH INVASIVE LOCATION;  Service: Cardiovascular   • CARDIAC CATHETERIZATION N/A 12/24/2019    Procedure: Coronary angiography;  Surgeon: Wayne Luna MD;  Location: [facility] CATH INVASIVE LOCATION;  Service: Cardiovascular   • CARDIAC CATHETERIZATION N/A 11/10/2020    Procedure: Left and right Heart Cath;  Surgeon: Wayne Luna MD;  Location: [facility] CATH INVASIVE LOCATION;  Service: Cardiovascular;  Laterality: N/A;   • CARDIAC CATHETERIZATION N/A 11/10/2020    Procedure: Coronary angiography;  Surgeon: Wayne Luna MD;  Location: [facility] CATH INVASIVE LOCATION;  Service: Cardiovascular;  Laterality: N/A;   • CARDIAC CATHETERIZATION N/A 11/10/2020    Procedure: Right Heart Cath;  Surgeon: Wayne Luna MD;  Location: [facility] CATH INVASIVE LOCATION;  Service: Cardiovascular;  Laterality: N/A;   • CARDIAC CATHETERIZATION N/A 11/25/2020    Procedure: Percutaneous coronary intervention of the left circumflex artery;  Surgeon: Wayne Luna MD;  Location: [facility] CATH INVASIVE LOCATION;  Service: Cardiovascular;  Laterality: N/A;   • CARDIAC CATHETERIZATION N/A 1/22/2021    Procedure: LEFT HEART CATH with possible PCI;  Surgeon: Wayne Luna MD;  Location: [facility] CATH INVASIVE LOCATION;  Service: Cardiovascular;  Laterality: N/A;  Local and IV sedation   • CARDIAC CATHETERIZATION N/A 1/22/2021     Procedure: Coronary angiography;  Surgeon: Wayne Luna MD;  Location: [facility] CATH INVASIVE LOCATION;  Service: Cardiovascular;  Laterality: N/A;   • CARDIAC CATHETERIZATION N/A 1/22/2021    Procedure: Saphenous Vein Graft;  Surgeon: Wayne Luna MD;  Location: [facility] CATH INVASIVE LOCATION;  Service: Cardiovascular;  Laterality: N/A;   • CARDIAC ELECTROPHYSIOLOGY PROCEDURE Left 6/28/2019    Procedure: Dual-chamber ICD insertion;  Surgeon: Héctor Eckert MD;  Location: [facility] CATH INVASIVE LOCATION;  Service: Cardiovascular   • CARDIAC ELECTROPHYSIOLOGY PROCEDURE Left 8/14/2019    Procedure: Lead Revision;  Surgeon: Héctor Eckert MD;  Location: [facility] CATH INVASIVE LOCATION;  Service: Cardiovascular   • CARDIAC SURGERY     • CHOLECYSTECTOMY     • CORONARY ARTERY BYPASS GRAFT N/A 12/27/2019    Procedure: CORONARY ARTERY BYPASS GRAFTING;  Surgeon: Lane Stock MD;  Location: [facility] CVOR;  Service: Cardiothoracic   • HYSTERECTOMY     • INSERT / REPLACE / REMOVE PACEMAKER     • LYMPHADENECTOMY Bilateral    • THYROID SURGERY        General Information     Row Name 08/19/22 1340          Physical Therapy Time and Intention    Document Type evaluation  -SS     Mode of Treatment physical therapy  -SS     Row Name 08/19/22 1340          General Information    Patient Profile Reviewed yes  -SS     Prior Level of Function independent:  -SS     Barriers to Rehab ineffective coping  -SS     Row Name 08/19/22 1340          Living Environment    People in Home child(jasbir), adult  -SS     Row Name 08/19/22 1340          Cognition    Orientation Status (Cognition) oriented x 4  -SS     Row Name 08/19/22 1340          Safety Issues, Functional Mobility    Impairments Affecting Function (Mobility) pain  -SS           User Key  (r) = Recorded By, (t) = Taken By, (c) = Cosigned By    Initials Name Provider Type    SS Venice Varela PT Physical Therapist               Mobility     Row Name  08/19/22 1340          Bed Mobility    Bed Mobility bed mobility (all) activities  -     All Activities, [place] (Bed Mobility) modified independence  -     Row Name 08/19/22 1340          Transfers    Comment, (Transfers) refuses to attempt  -     Row Name 08/19/22 1340          Gait/Stairs (Locomotion)    Comment, (Gait/Stairs) refuses to attempt  -           User Key  (r) = Recorded By, (t) = Taken By, (c) = Cosigned By    Initials Name Provider Type     Venice Varela PT Physical Therapist               Obj/Interventions     Row Name 08/19/22 1341          Range of Motion Comprehensive    Comment, General Range of Motion B LE WFL  -     Row Name 08/19/22 1341          Strength Comprehensive (MMT)    Comment, General Manual Muscle Testing (MMT) Assessment B LE >3/5  -     Row Name 08/19/22 1341          Balance    Balance Assessment sitting static balance  -     Static Sitting Balance independent  -SS     Row Name 08/19/22 1341          Sensory Assessment (Somatosensory)    Sensory Assessment (Somatosensory) sensation intact  -           User Key  (r) = Recorded By, (t) = Taken By, (c) = Cosigned By    Initials Name Provider Type     Venice Varela PT Physical Therapist               Goals/Plan     Row Name 08/19/22 1342          Bed Mobility Goal 1 (PT)    Activity/Assistive Device (Bed Mobility Goal 1, PT) bed mobility activities, all  -SS     [place] Level/Cues Needed (Bed Mobility Goal 1, PT) independent  -SS     Time Frame (Bed Mobility Goal 1, PT) long term goal (LTG);2 weeks  -     Row Name 08/19/22 1342          Transfer Goal 1 (PT)    Activity/Assistive Device (Transfer Goal 1, PT) transfers, all  -SS     [place] Level/Cues Needed (Transfer Goal 1, PT) independent  -SS     Time Frame (Transfer Goal 1, PT) long term goal (LTG);2 weeks  -     Row Name 08/19/22 1342          Gait Training Goal 1 (PT)    Activity/Assistive Device (Gait Training Goal 1, PT)  gait (walking locomotion)  -     [place] Level (Gait Training Goal 1, PT) independent  -SS     Distance (Gait Training Goal 1, PT) 100'  -SS     Time Frame (Gait Training Goal 1, PT) long term goal (LTG);2 weeks  -     Row Name 08/19/22 1342          Therapy Assessment/Plan (PT)    Planned Therapy Interventions (PT) balance training;bed mobility training;gait training;home exercise program;transfer training;patient/family education;strengthening  -           User Key  (r) = Recorded By, (t) = Taken By, (c) = Cosigned By    Initials Name Provider Type     Venice Varela, PT Physical Therapist               Clinical Impression     Row Name 08/19/22 1341          Pain    Additional Documentation Pain Scale: FACES Pre/Post-Treatment (Group)  -     Row Name 08/19/22 1341          Pain Scale: FACES Pre/Post-Treatment    Pain: FACES Scale, Pretreatment 8-->hurts whole lot  -SS     Posttreatment Pain Rating 8-->hurts whole lot  -SS     Row Name 08/19/22 1353 08/19/22 1341       Plan of Care Review    Plan of Care Reviewed With -- patient  -    Outcome Evaluation 48 y/o F who presented with severe back pain. Diagnosed with low back pain with radiculopathy. Patient was scrubbing a tub- twisting and reaching when the pain onset. She works at [employer] 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 Patient lives with her children. Pt is agreeable with max encouragement to sit edge of bed but not to stand. States she is able to walk over to the [facility] when she needs to. Pain extends to foot. It does centralize in certain positions. Educated about peripheralizing and centralizing back pain and what to expect as pain centralizes(pain to come up the leg and be more intense but that centralization is good). Patient meets criteria for prone positioning to treat lumbar radiculopathy. She attempts to lie on her stomach 2 times but not able to complete or only able to  maintain less than 5 seconds due to intensified but centralized pain which is what is expected as pain centralizes. Due to fear/avoidance, patient with decreased tolerance to conservative treatment. Patient will need OPPT at d/c to treat acute episode of lumbar radiculopathy.  - --    Row Name 08/19/22 1341          Therapy Assessment/Plan (PT)    Rehab Potential (PT) good, to achieve stated therapy goals  -     Criteria for Skilled Interventions Met (PT) yes;meets criteria  -     Therapy Frequency (PT) 3 times/wk  -     Predicted Duration of Therapy Intervention (PT) until d/c  -     Row Name 08/19/22 1341          Positioning and Restraints    Pre-Treatment Position in bed  -SS     Post Treatment Position bed  -           User Key  (r) = Recorded By, (t) = Taken By, (c) = Cosigned By    Initials Name Provider Type    SS Venice Varela, PT Physical Therapist               Outcome Measures     Row Name 08/19/22 0816          How much help from another person do you currently need...    Turning from your back to your side while in flat bed without using bedrails? 4  -CG     Moving from lying on back to sitting on the side of a flat bed without bedrails? 4  -CG     Moving to and from a bed to a chair (including a wheelchair)? 3  -CG     Standing up from a chair using your arms (e.g., wheelchair, bedside chair)? 3  -CG     Climbing 3-5 steps with a railing? 3  -CG     To walk in hospital room? 3  -CG     AM-PAC 6 Clicks Score (PT) 20  -CG     Highest level of mobility 6 --> Walked 10 steps or more  -CG           User Key  (r) = Recorded By, (t) = Taken By, (c) = Cosigned By    Initials Name Provider Type    Katerin Donis, RN Registered Nurse                             Physical Therapy Education                 Title: PT OT SLP Therapies (Done)     Topic: Physical Therapy (Done)     Point: Mobility training (Done)     Learning Progress Summary           Patient Acceptance, E, VU by  at 8/19/2022  1344                               User Key     Initials Effective Dates Name Provider Type Discipline     06/16/21 -  Venice Varela, PT Physical Therapist PT              PT Recommendation and Plan  Planned Therapy Interventions (PT): balance training, bed mobility training, gait training, home exercise program, transfer training, patient/family education, strengthening  Plan of Care Reviewed With: patient  Outcome Evaluation: 48 y/o F who presented with severe back pain. Diagnosed with low back pain with radiculopathy. Patient was scrubbing a tub- twisting and reaching when the pain onset. She works at [employer]. 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 Patient lives with her children. Pt is agreeable with max encouragement to sit edge of bed but not to stand. States she is able to walk over to the [facility] when she needs to. Pain extends to foot. It does centralize in certain positions. Educated about peripheralizing and centralizing back pain and what to expect as pain centralizes(pain to come up the leg and be more intense but that centralization is good). Patient meets criteria for prone positioning to treat lumbar radiculopathy. She attempts to lie on her stomach 2 times but not able to complete or only able to maintain less than 5 seconds due to intensified but centralized pain which is what is expected as pain centralizes. Due to fear/avoidance, patient with decreased tolerance to conservative treatment. Patient will need OPPT at d/c to treat acute episode of lumbar radiculopathy.     Time Calculation:    PT Charges     Row Name 08/19/22 1344             Time Calculation    Start Time 1029  -SS      Stop Time 1054  -      Time Calculation (min) 25 min  -      PT Received On 08/19/22  -      PT - Next Appointment 08/21/22  -      PT Goal Re-Cert Due Date 09/02/22  -              Time Calculation- PT    Total Timed Code Minutes- PT 0 minute(s)  -             User Key  (r) = Recorded By, (t) = Taken By, (c) = Cosigned By    Initials Name Provider Type    SS Venice Varela, PT Physical Therapist              Therapy Charges for Today     Code Description Service Date Service Provider Modifiers Qty    [id number]  PT EVAL MOD COMPLEXITY 4 8/19/2022 Venice Varela, PT GP 1          PT G-Codes  AM-PAC 6 Clicks Score (PT): 20    Venice Varela PT  8/19/2022

## 2022-08-19 NOTE — DISCHARGE PLACEMENT REQUEST
"Rafael Nunes (47 y.o. Female)             Date of Birth   1975    Social Security Number       Address   [address] [place] [address] [place] [facility]    Home Phone   [phone]    MRN   [id number]       Pentecostal   Lutheran    Marital Status                               Admission Date   8/16/22    Admission Type   Emergency    Admitting Provider   Prieto Velasco DO    Attending Provider   Prieto Velasco DO    Department, Room/Bed   [facility] OBSERVATION, 104/1       Discharge Date       Discharge Disposition       Discharge Destination                               Attending Provider: Prieto Velasco DO    Allergies: Hydrocodone, Penicillin G, Contrast Dye    Isolation: None   Infection: None   Code Status: CPR   Advance Care Planning Activity    Ht: 170.2 cm (67\")   Wt: 86.8 kg (191 lb 5.8 oz)    Admission Cmt: None   Principal Problem: Intractable low back pain [M54.59]                 Active Insurance as of 8/16/2022     Primary Coverage     Payor Plan Insurance Group Employer/Plan Group    ANTHEM BLUE CROSS ANTHEM BLUE CROSS BLUE SHIELD PPO [id number]     Payor Plan Address Payor Plan Phone Number Payor Plan Fax Number Effective Dates    [address] [phone]  5/6/2018 - None Entered    [facility] [id number]       Subscriber Name Subscriber Birth Date Member ID       CATRACHO DOWNS 11/7/1970 [id number]           Secondary Coverage     Payor Plan Insurance Group Employer/Plan Group    ANTHEM MEDICARE REPLACEMENT ANTHEM MEDICARE ADVANTAGE [id number]     Payor Plan Address Payor Plan Phone Number Payor Plan Fax Number Effective Dates    [address] [phone]  1/1/2019 - None Entered    [facility] [address]       Subscriber Name Subscriber Birth Date Member ID       RAFAEL NUNES 1975 [id number]           Tertiary Coverage     Payor Plan Insurance Group Employer/Plan Group    [place]      Payor Plan " Address Payor Plan Phone Number Payor Plan Fax Number Effective Dates    [address]   1/1/2020 - None Entered    [place] [address]       Subscriber Name Subscriber Birth Date Member ID       CALVIN LITTLEJOHN 1975 [id number]                 Emergency Contacts      (Rel.) Home Phone Work Phone Mobile Phone    CATRACHO DOWNS (Spouse) -- -- [phone]

## 2022-08-19 NOTE — PLAN OF CARE
Neurosurgery initially consulted on patient several days ago with recommendations for MRI imaging.  Patient was unable to withstand MRI imaging due to her back pain.  She was recommended then to undergo an JOSE injection and order was placed.  This did not occur.  I did stop by and speak with patient and significant other at bedside.  I told her since there was no MRI completed that neurosurgery is unable to provide any further recommendations from the neurosurgical standpoint.  I told her that there was no emergent neurosurgical intervention warranted and recommended that she undergo intensive nonoperative treatment including physical therapy, and epidural steroid injections for at least 3 months.  She should also attempt to complete her MRI.  Once her MRI is complete and she has undergone recommended conservative therapy, if she still was experiencing her pain she should follow-up in the neurosurgical clinic for further recommendations.  She should obtain her chronic pain medications per her primary provider.  Neurosurgery does recommend initiation of steroid therapy including a dose of IV steroid therapy while in the hospital with transition to oral steroid therapy at home.

## 2022-08-20 ENCOUNTER — READMISSION MANAGEMENT (OUTPATIENT)
Dept: CALL CENTER | Facility: HOSPITAL | Age: 47
End: 2022-08-20

## 2022-08-20 VITALS
BODY MASS INDEX: 30.03 KG/M2 | OXYGEN SATURATION: 94 % | HEART RATE: 82 BPM | SYSTOLIC BLOOD PRESSURE: 144 MMHG | DIASTOLIC BLOOD PRESSURE: 92 MMHG | TEMPERATURE: 97.7 F | RESPIRATION RATE: 15 BRPM | WEIGHT: 191.36 LBS | HEIGHT: 67 IN

## 2022-08-20 LAB
ANION GAP SERPL CALCULATED.3IONS-SCNC: 14 MMOL/L (ref 5–15)
BASOPHILS # BLD AUTO: 0 10*3/MM3 (ref 0–0.2)
BASOPHILS NFR BLD AUTO: 0.3 % (ref 0–1.5)
BUN SERPL-MCNC: 25 MG/DL (ref 6–20)
BUN/CREAT SERPL: 16.3 (ref 7–25)
CALCIUM SPEC-SCNC: 9.5 MG/DL (ref 8.6–10.5)
CHLORIDE SERPL-SCNC: 89 MMOL/L (ref 98–107)
CO2 SERPL-SCNC: 26 MMOL/L (ref 22–29)
CREAT SERPL-MCNC: 1.53 MG/DL (ref 0.57–1)
DEPRECATED RDW RBC AUTO: 42 FL (ref 37–54)
EGFRCR SERPLBLD CKD-EPI 2021: 41.5 ML/MIN/1.73
EOSINOPHIL # BLD AUTO: 0 10*3/MM3 (ref 0–0.4)
EOSINOPHIL NFR BLD AUTO: 0.1 % (ref 0.3–6.2)
ERYTHROCYTE [DISTWIDTH] IN BLOOD BY AUTOMATED COUNT: 13.5 % (ref 12.3–15.4)
GLUCOSE BLDC GLUCOMTR-MCNC: 157 MG/DL (ref 70–105)
GLUCOSE BLDC GLUCOMTR-MCNC: 170 MG/DL (ref 70–105)
GLUCOSE SERPL-MCNC: 150 MG/DL (ref 65–99)
HCT VFR BLD AUTO: 48.9 % (ref 34–46.6)
HGB BLD-MCNC: 16.4 G/DL (ref 12–15.9)
LYMPHOCYTES # BLD AUTO: 1.6 10*3/MM3 (ref 0.7–3.1)
LYMPHOCYTES NFR BLD AUTO: 12 % (ref 19.6–45.3)
MCH RBC QN AUTO: 30.3 PG (ref 26.6–33)
MCHC RBC AUTO-ENTMCNC: 33.5 G/DL (ref 31.5–35.7)
MCV RBC AUTO: 90.2 FL (ref 79–97)
MONOCYTES # BLD AUTO: 0.7 10*3/MM3 (ref 0.1–0.9)
MONOCYTES NFR BLD AUTO: 5.4 % (ref 5–12)
NEUTROPHILS NFR BLD AUTO: 10.8 10*3/MM3 (ref 1.7–7)
NEUTROPHILS NFR BLD AUTO: 82.2 % (ref 42.7–76)
NRBC BLD AUTO-RTO: 0 /100 WBC (ref 0–0.2)
PLATELET # BLD AUTO: 270 10*3/MM3 (ref 140–450)
PMV BLD AUTO: 7.7 FL (ref 6–12)
POTASSIUM SERPL-SCNC: 4 MMOL/L (ref 3.5–5.2)
RBC # BLD AUTO: 5.42 10*6/MM3 (ref 3.77–5.28)
SODIUM SERPL-SCNC: 129 MMOL/L (ref 136–145)
WBC NRBC COR # BLD: 13.2 10*3/MM3 (ref 3.4–10.8)

## 2022-08-20 PROCEDURE — 80048 BASIC METABOLIC PNL TOTAL CA: CPT | Performed by: HOSPITALIST

## 2022-08-20 PROCEDURE — 85025 COMPLETE CBC W/AUTO DIFF WBC: CPT | Performed by: PHYSICIAN ASSISTANT

## 2022-08-20 PROCEDURE — 63710000001 INSULIN LISPRO (HUMAN) PER 5 UNITS: Performed by: PHYSICIAN ASSISTANT

## 2022-08-20 PROCEDURE — 99238 HOSP IP/OBS DSCHRG MGMT 30/<: CPT | Performed by: HOSPITALIST

## 2022-08-20 PROCEDURE — 82962 GLUCOSE BLOOD TEST: CPT

## 2022-08-20 RX ORDER — SACCHAROMYCES BOULARDII 250 MG
250 CAPSULE ORAL 2 TIMES DAILY
Qty: 60 CAPSULE | Refills: 0 | Status: SHIPPED | OUTPATIENT
Start: 2022-08-20 | End: 2022-08-20 | Stop reason: SDUPTHER

## 2022-08-20 RX ORDER — CLOPIDOGREL BISULFATE 75 MG/1
75 TABLET ORAL DAILY
Qty: 30 TABLET | Refills: 6 | Status: SHIPPED | OUTPATIENT
Start: 2022-08-20

## 2022-08-20 RX ORDER — SACCHAROMYCES BOULARDII 250 MG
250 CAPSULE ORAL 2 TIMES DAILY
Qty: 60 CAPSULE | Refills: 0 | Status: SHIPPED | OUTPATIENT
Start: 2022-08-20 | End: 2022-09-19

## 2022-08-20 RX ORDER — BENZONATATE 200 MG/1
200 CAPSULE ORAL 3 TIMES DAILY PRN
Qty: 15 CAPSULE | Refills: 0 | Status: SHIPPED | OUTPATIENT
Start: 2022-08-20 | End: 2023-03-07

## 2022-08-20 RX ORDER — TORSEMIDE 20 MG/1
60 TABLET ORAL DAILY
Start: 2022-08-20

## 2022-08-20 RX ORDER — OXYCODONE HYDROCHLORIDE 5 MG/1
5 TABLET ORAL EVERY 8 HOURS PRN
Qty: 10 TABLET | Refills: 0 | Status: SHIPPED | OUTPATIENT
Start: 2022-08-20 | End: 2023-03-07 | Stop reason: SDUPTHER

## 2022-08-20 RX ORDER — LEVOTHYROXINE SODIUM 0.07 MG/1
225 TABLET ORAL DAILY
Qty: 90 TABLET | Refills: 0 | Status: SHIPPED | OUTPATIENT
Start: 2022-08-20 | End: 2023-03-07 | Stop reason: SDUPTHER

## 2022-08-20 RX ORDER — PREDNISONE 10 MG/1
TABLET ORAL
Qty: 30 TABLET | Refills: 0 | Status: SHIPPED | OUTPATIENT
Start: 2022-08-20 | End: 2022-09-01

## 2022-08-20 RX ORDER — SPIRONOLACTONE 25 MG/1
25 TABLET ORAL DAILY
Start: 2022-08-20 | End: 2023-03-07 | Stop reason: ALTCHOICE

## 2022-08-20 RX ORDER — LANCETS 30 GAUGE
1 EACH MISCELLANEOUS DAILY
Qty: 100 EACH | Refills: 2 | Status: SHIPPED | OUTPATIENT
Start: 2022-08-20

## 2022-08-20 RX ORDER — PANTOPRAZOLE SODIUM 40 MG/1
40 TABLET, DELAYED RELEASE ORAL DAILY
Start: 2022-08-20

## 2022-08-20 RX ORDER — CARVEDILOL 6.25 MG/1
6.25 TABLET ORAL 2 TIMES DAILY WITH MEALS
Start: 2022-08-20

## 2022-08-20 RX ORDER — ALBUTEROL SULFATE 2.5 MG/3ML
2.5 SOLUTION RESPIRATORY (INHALATION) EVERY 4 HOURS PRN
Qty: 90 ML | Refills: 0 | Status: SHIPPED | OUTPATIENT
Start: 2022-08-20

## 2022-08-20 RX ORDER — METHOCARBAMOL 500 MG/1
500 TABLET, FILM COATED ORAL EVERY 8 HOURS PRN
Qty: 30 TABLET | Refills: 0 | Status: SHIPPED | OUTPATIENT
Start: 2022-08-20

## 2022-08-20 RX ORDER — PREDNISONE 20 MG/1
40 TABLET ORAL DAILY
Qty: 8 TABLET | Refills: 0 | Status: SHIPPED | OUTPATIENT
Start: 2022-08-20 | End: 2022-08-20 | Stop reason: HOSPADM

## 2022-08-20 RX ORDER — UBIQUINOL 100 MG
1 CAPSULE ORAL DAILY
Qty: 100 EACH | Refills: 2 | Status: SHIPPED | OUTPATIENT
Start: 2022-08-20

## 2022-08-20 RX ORDER — NITROGLYCERIN 0.4 MG/1
0.4 TABLET SUBLINGUAL
Qty: 25 TABLET | Refills: 0 | Status: SHIPPED | OUTPATIENT
Start: 2022-08-20

## 2022-08-20 RX ORDER — POTASSIUM CHLORIDE 750 MG/1
20 TABLET, FILM COATED, EXTENDED RELEASE ORAL 2 TIMES DAILY
Start: 2022-08-20

## 2022-08-20 RX ADMIN — CLOPIDOGREL BISULFATE 75 MG: 75 TABLET ORAL at 09:25

## 2022-08-20 RX ADMIN — OXYCODONE 7.5 MG: 5 TABLET ORAL at 12:15

## 2022-08-20 RX ADMIN — INSULIN LISPRO 2 UNITS: 100 INJECTION, SOLUTION INTRAVENOUS; SUBCUTANEOUS at 09:24

## 2022-08-20 RX ADMIN — FERROUS SULFATE TAB EC 324 MG (65 MG FE EQUIVALENT) 324 MG: 324 (65 FE) TABLET DELAYED RESPONSE at 09:25

## 2022-08-20 RX ADMIN — TORSEMIDE 60 MG: 100 TABLET ORAL at 09:26

## 2022-08-20 RX ADMIN — OXYCODONE 7.5 MG: 5 TABLET ORAL at 04:58

## 2022-08-20 RX ADMIN — SPIRONOLACTONE 25 MG: 25 TABLET ORAL at 09:26

## 2022-08-20 RX ADMIN — LEVOTHYROXINE SODIUM 225 MCG: 0.2 TABLET ORAL at 05:00

## 2022-08-20 RX ADMIN — CARVEDILOL 6.25 MG: 6.25 TABLET, FILM COATED ORAL at 09:25

## 2022-08-20 RX ADMIN — Medication 10 ML: at 09:24

## 2022-08-20 RX ADMIN — INSULIN LISPRO 2 UNITS: 100 INJECTION, SOLUTION INTRAVENOUS; SUBCUTANEOUS at 12:15

## 2022-08-20 RX ADMIN — POTASSIUM CHLORIDE 20 MEQ: 1500 TABLET, EXTENDED RELEASE ORAL at 09:25

## 2022-08-20 RX ADMIN — PANTOPRAZOLE SODIUM 40 MG: 40 TABLET, DELAYED RELEASE ORAL at 09:24

## 2022-08-20 RX ADMIN — Medication 1000 UNITS: at 09:25

## 2022-08-20 RX ADMIN — ASPIRIN 81 MG: 81 TABLET, COATED ORAL at 09:25

## 2022-08-20 RX ADMIN — THERA TABS 1 TABLET: TAB at 09:25

## 2022-08-20 NOTE — DISCHARGE SUMMARY
[facility] Medicine Services  DISCHARGE SUMMARY    Patient Name: Rafael Nunes  : 1975  MRN: [id number]    Date of Admission: 2022  Date of Discharge:  2022  Primary Care Physician: Phani Adames MD      Presenting Problem:   Lumbar radiculopathy [M54.16]  Intractable low back pain [M54.59]    Active and Resolved Hospital Problems:  Active Hospital Problems    Diagnosis POA   • **Intractable low back pain [M54.59] Yes   • Coronary artery disease of native artery of native heart with stable angina pectoris (HCC) [I25.118] Yes   • Essential hypertension [I10] Yes      Resolved Hospital Problems   No resolved problems to display.         Hospital Course     Hospital Course:    The patient is a 47-year-old female with history of CAD s/p CABG, hypertension, hyperlipidemia, dilated cardiomyopathy s/p AICD, aortic valve regurgitations/p AVR, mitral valve regurgitation, type 2 diabetes mellitus and chronic lower back pain.      The patient presented to the emergency room on 2022 complaining of 5 days of lower back pain that is worse with extension of her lower back.  The patient had been cleaning her bathtub flexed at her hip several days ago before pain started.  The back pain was associated with paresthesias of lower extremities but denied any urinary/fecal incontinence or trauma to the back.    The patient underwent CT of lumbar spine with contrast in the ED on 2022.  It showed DJD with moderate to severe canal narrowing suspected at L4-L5 and moderate right mid to left neural foraminal narrowing.  There was also mild to moderate central canal narrowing L5-S1 with moderate left and severe right neural foraminal narrowing at this level.    The patient was initially placed in the observation unit and was given muscle relaxers and pain medications.  She was evaluated by the neurosurgery team and recommended MRI of the lumbar spine to evaluate  patient's back pain.  Unfortunately the patient was unable to lay flat in order to get MRI despite 2 attempts.  She has been able to ambulate within her room and her pain has been controlled.  She will be discharged home and needs to follow-up with the neurosurgery office in the future for further work-up of back pain.  She has been given prednisone taper and muscle relaxer.  She should follow-up with her PCP in order to get clearance to go back to work.      DISCHARGE Follow Up Recommendations for labs and diagnostics:       Reasons For Change In Medications and Indications for New Medications:      Day of Discharge     Vital Signs:  Temp:  [97.5 °F (36.4 °C)-98.4 °F (36.9 °C)] 98.4 °F (36.9 °C)  Heart Rate:  [] 69  Resp:  [16-17] 17  BP: (110-153)/() 110/81    Physical Exam:  Physical Exam  HENT:      Head: Normocephalic.      Nose: Nose normal.   Eyes:      Extraocular Movements: Extraocular movements intact.      Pupils: Pupils are equal, round, and reactive to light.   Cardiovascular:      Rate and Rhythm: Normal rate and regular rhythm.   Pulmonary:      Effort: Pulmonary effort is normal.   Abdominal:      General: Bowel sounds are normal.   Musculoskeletal:      Cervical back: Normal range of motion.      Comments: Moving all extremities   Skin:     General: Skin is warm.   Neurological:      Mental Status: She is alert. Mental status is at baseline.   Psychiatric:         Mood and Affect: Mood normal.           Pertinent  and/or Most Recent Results     LAB RESULTS:      Lab 08/20/22 0522 08/19/22 0353 08/17/22  0428   WBC 13.20* 9.00 10.30   HEMOGLOBIN 16.4* 16.7* 16.3*   HEMATOCRIT 48.9* 50.0* 48.8*   PLATELETS 270 259 240   NEUTROS ABS 10.80* 5.20 6.90   LYMPHS ABS 1.60 2.60 2.40   MONOS ABS 0.70 1.00* 0.90   EOS ABS 0.00 0.10 0.00   MCV 90.2 90.3 91.7         Lab 08/20/22 0522 08/19/22  0353 08/17/22  0428   SODIUM 129* 135* 140   POTASSIUM 4.0 3.7 4.1   CHLORIDE 89* 93* 100   CO2 26.0  29.0 30.0*   ANION GAP 14.0 13.0 10.0   BUN 25* 18 15   CREATININE 1.53* 1.18* 1.12*   EGFR 41.5* 56.7* 60.4   GLUCOSE 150* 104* 106*   CALCIUM 9.5 9.8 10.0                         Brief Urine Lab Results     None        Microbiology Results (last 10 days)     Procedure Component Value - Date/Time    COVID PRE-OP / PRE-PROCEDURE SCREENING ORDER (NO ISOLATION) - Swab, Nasopharynx [[id number]]  (Normal) Collected: 08/16/22 2238    Lab Status: Final result Specimen: Swab from Nasopharynx Updated: 08/16/22 2318    Narrative:      The following orders were created for panel order COVID PRE-OP / PRE-PROCEDURE SCREENING ORDER (NO ISOLATION) - Swab, Nasopharynx.  Procedure                               Abnormality         Status                     ---------                               -----------         ------                     COVID-19,CEPHEID/CHELLE,CO...[[id number]]  Normal              Final result                 Please view results for these tests on the individual orders.    COVID-19,CEPHEID/CHELLE,COR/[facility]/PAD/SOFIE IN-HOUSE(OR EMERGENT/ADD-ON),NP SWAB IN TRANSPORT MEDIA 3-4 HR TAT, RT-PCR - Swab, Nasopharynx [[id number]]  (Normal) Collected: 08/16/22 2238    Lab Status: Final result Specimen: Swab from Nasopharynx Updated: 08/16/22 2318     COVID19 Not Detected    Narrative:      Fact sheet for providers: https://www.fda.gov/media/291826/download     Fact sheet for patients: https://www.fda.gov/media/069716/download  Fact sheet for providers: https://www.fda.gov/media/800275/download     Fact sheet for patients: https://www.fda.gov/media/223041/download          CT Lumbar Spine Without Contrast    Result Date: 8/16/2022  Impression: 1.     Degenerative disc disease lumbar spine. There is moderate to severe central narrowing suspected L4-L5 due to degenerative disc disease and severe facet degenerative change. There is moderate right mild left neural foraminal narrowing at this location. 2.     There is mild to moderate  central canal narrowing L5-S1 with moderate left and severe right neural foraminal narrowing at this level. 3.     Please see findings for details other levels.  Electronically Signed By-Althea Kaba MD On:8/16/2022 9:36 PM This report was finalized on [id number] by  Althea Kaba MD.      Results for orders placed during the hospital encounter of 05/16/20    Duplex Venous Lower Extremity - Bilateral CAR    Interpretation Summary  · Normal bilateral lower extremity venous duplex scan.      Results for orders placed during the hospital encounter of 05/16/20    Duplex Venous Lower Extremity - Bilateral CAR    Interpretation Summary  · Normal bilateral lower extremity venous duplex scan.      Results for orders placed during the hospital encounter of 02/22/21    Adult Transthoracic Echo Complete w/ Color, Spectral and Contrast if Necessary Per Protocol    Interpretation Summary  · The left ventricular cavity is mildly dilated.  · Estimated left ventricular EF was in agreement with the calculated left ventricular EF. Left ventricular ejection fraction appears to be 26 - 30%. Left ventricular systolic function is severely decreased.  · Left ventricular diastolic function is consistent with (grade II w/high LAP) pseudonormalization.  · The right ventricular cavity is mildly dilated.  · Mildly reduced right ventricular systolic function noted.  · Left atrial volume is mildly increased.  · There is a bioprosthetic aortic valve present.  · Mild to moderate aortic valve stenosis is present.  · Abnormal mitral valve structure consistent with dilated annulus.  · Moderate to severe mitral valve regurgitation is present with a posteriorly-directed jet noted.      Labs Pending at Discharge:      Procedures Performed           Consults:   Consults     Date and Time Order Name Status Description    8/18/2022  4:36 PM Inpatient Hospitalist Consult      8/17/2022  3:32 AM Inpatient Neurosurgery Consult Completed             Discharge  Details        Discharge Medications      New Medications      Instructions Start Date   Alcohol Prep 70 % pads   1 each, Apply externally, Daily      glucose blood test strip  Commonly known as: OneTouch Verio   1 each, Other, Daily, Use as instructed Dx code: E11.9      methocarbamol 500 MG tablet  Commonly known as: ROBAXIN   500 mg, Oral, Every 8 Hours PRN      OneTouch Delica Plus [id number] misc   1 each, Does not apply, Daily, Dx code: E11.9      oxyCODONE 5 MG immediate release tablet  Commonly known as: Roxicodone   5 mg, Oral, Every 8 Hours PRN         Changes to Medications      Instructions Start Date   predniSONE 10 MG tablet  Commonly known as: DELTASONE  What changed:   · medication strength  · See the new instructions.   Take 4 tablets by mouth Daily for 3 days, THEN 3 tablets Daily for 3 days, THEN 2 tablets Daily for 3 days, THEN 1 tablet Daily for 3 days.   Start Date: August 20, 2022        Continue These Medications      Instructions Start Date   albuterol (2.5 MG/3ML) 0.083% nebulizer solution  Commonly known as: PROVENTIL   2.5 mg, Nebulization, Every 4 Hours PRN      allopurinol 300 MG tablet  Commonly known as: ZYLOPRIM   300 mg, Oral, Daily      aspirin 81 MG EC tablet   81 mg, Oral, Daily      benzonatate 200 MG capsule  Commonly known as: TESSALON   200 mg, Oral, 3 Times Daily PRN      carvedilol 6.25 MG tablet  Commonly known as: COREG   6.25 mg, Oral, 2 Times Daily With Meals      cholecalciferol 25 MCG (1000 UT) tablet  Commonly known as: VITAMIN D3   1,000 Units, Oral, Daily      clopidogrel 75 MG tablet  Commonly known as: PLAVIX   75 mg, Oral, Daily      diphenhydrAMINE 25 mg capsule  Commonly known as: BENADRYL   25 mg, Oral, Every 6 Hours PRN      docusate sodium 100 MG capsule  Commonly known as: COLACE   100 mg, Oral, 2 Times Daily PRN      febuxostat 80 MG tablet tablet  Commonly known as: ULORIC   80 mg, Oral, Daily      ferrous sulfate 325 (65 FE) MG tablet   325 mg, Oral,  Daily With Breakfast      folic acid 1 MG tablet  Commonly known as: FOLVITE   1 mg, Oral, Daily      levothyroxine 75 MCG tablet  Commonly known as: SYNTHROID, LEVOTHROID   225 mcg, Oral, Daily      multivitamin tablet tablet   1 tablet, Oral, Daily      nitroglycerin 0.4 MG SL tablet  Commonly known as: NITROSTAT   0.4 mg, Sublingual, Every 5 Minutes PRN, Take no more than 3 doses in 15 minutes.       oxyCODONE-acetaminophen 7.5-325 MG per tablet  Commonly known as: PERCOCET   1 tablet, Oral, Every 6 Hours PRN      pantoprazole 40 MG EC tablet  Commonly known as: PROTONIX   40 mg, Oral, Daily      potassium chloride 10 MEQ CR tablet   20 mEq, Oral, 2 Times Daily      spironolactone 25 MG tablet  Commonly known as: ALDACTONE   25 mg, Oral, Daily, Hold if persistent diarrhea.      torsemide 20 MG tablet  Commonly known as: DEMADEX   60 mg, Oral, 2 Times Daily      torsemide 20 MG tablet  Commonly known as: DEMADEX   60 mg, Oral, Daily, Hold if persistent diarrhea      vitamin B-12 1000 MCG tablet  Commonly known as: CYANOCOBALAMIN   1,000 mcg, Oral, Daily             Allergies   Allergen Reactions   • Hydrocodone Hives   • Penicillin G Unknown (See Comments)     Reaction occurred as a baby.      Penicillin interview complete 08/18/2022.   • Contrast Dye GI Intolerance     She is pretty sure it's the contrast dye that makes her super sick and vomiting after heart cath         Discharge Disposition:   Home or Self Care    Diet:  Hospital:  Diet Order   Procedures   • Diet Regular, Diabetic/Consistent Carbs; Diabetic - Consistent Carb         Discharge Activity: As tolerated      Discharge Condition: stable      CODE STATUS:  Code Status and Medical Interventions:   Ordered at: 08/17/22 0332     Level Of Support Discussed With:    Patient     Code Status (Patient has no pulse and is not breathing):    CPR (Attempt to Resuscitate)     Medical Interventions (Patient has pulse or is breathing):    Full Support          Future Appointments   Date Time Provider Department Center   9/2/2022 10:00 AM Shavonne Whitlock PT [facility]   10/28/2022 10:00 AM Héctor Eckert MD [facility]       Additional Instructions for the Follow-ups that You Need to Schedule     Ambulatory Referral to Physical Therapy Evaluate and treat   As directed      Specialty needed: Evaluate and treat               Time spent on Discharge including face to face service:  15 minutes    This patient has been examined wearing appropriate Personal Protective Equipment and discussed with nursing. 08/20/22      Signature: Electronically signed by Prieto Velasco DO, 08/20/22, 10:45 AM EDT.

## 2022-08-20 NOTE — PLAN OF CARE
Goal Outcome Evaluation:   Patient continues to c/o continous back pain, tolerable with PO pain medication.

## 2022-08-20 NOTE — PLAN OF CARE
Goal Outcome Evaluation:  Plan of Care Reviewed With: patient        Progress: improving  Outcome Evaluation: Patient to discharge home this afternoon and follow up with outpatient PT and OT for pain management.

## 2022-08-21 NOTE — OUTREACH NOTE
Prep Survey    Flowsheet Row Responses   [facility] patient discharged from? [facility]   Is LACE score < 7 ? No   Emergency Room discharge w/ pulse ox? No   Eligibility Readm Mgmt   Discharge diagnosis Intractable low back pain   Does the patient have one of the following disease processes/diagnoses(primary or secondary)? Other   Does the patient have Home health ordered? No   Is there a DME ordered? No   Prep survey completed? Yes          MITA MANZO - Registered Nurse

## 2022-08-22 NOTE — PAYOR COMM NOTE
"This is discharge notification for Rafael Nunes  Reference/Auth # [id number]  Pt discharged on 8/20/22    Kassandra Herrera, RN, BSN  Utilization Review Nurse  [facility]  Direct & confidential phone # [phone]  Fax # [phone]      Rafael Nunes (47 y.o. Female)             Date of Birth   1975    Social Security Number       Address   [address] [place] [address] [place] [facility]    Home Phone   [phone]    MRN   [id number]       Islam   Presybeterian    Marital Status                               Admission Date   8/16/22    Admission Type   Emergency    Admitting Provider   Prieto Velasco DO    Attending Provider       Department, Room/Bed   [facility] OBSERVATION, 104/1       Discharge Date   8/20/2022    Discharge Disposition   Home or Self Care    Discharge Destination   Home                            Attending Provider: (none)   Allergies: Hydrocodone, Penicillin G, Contrast Dye    Isolation: None   Infection: None   Code Status: Prior   Advance Care Planning Activity    Ht: 170.2 cm (67\")   Wt: 86.8 kg (191 lb 5.8 oz)    Admission Cmt: None   Principal Problem: Intractable low back pain [M54.59]                 Active Insurance as of 8/16/2022     Primary Coverage     Payor Plan Insurance Group Employer/Plan Group    ANTHEM BLUE CROSS ANTHEM BLUE CROSS BLUE SHIELD PPO [id number]     Payor Plan Address Payor Plan Phone Number Payor Plan Fax Number Effective Dates    [address] [phone]  5/6/2018 - None Entered    [facility] [id number]       Subscriber Name Subscriber Birth Date Member ID       CATRACHO DOWNS 11/7/1970 [id number]           Secondary Coverage     Payor Plan Insurance Group Employer/Plan Group    ANTHEM MEDICARE REPLACEMENT ANTHEM MEDICARE ADVANTAGE [id number]     Payor Plan Address Payor Plan Phone Number Payor Plan Fax Number Effective Dates    [address] [phone]  1/1/2019 - None Entered    [facility] " [id number]       Subscriber Name Subscriber Birth Date Member ID       CALVIN LITTLEJOHN 1975 [id number]           Tertiary Coverage     Payor Plan Insurance Group Employer/Plan Group    [place]      Payor Plan Address Payor Plan Phone Number Payor Plan Fax Number Effective Dates    [address]   2020 - None Entered    [place] [address]       Subscriber Name Subscriber Birth Date Member ID       CALVIN LITTLEJOHN 1975 [id number]                 Emergency Contacts      (Rel.) Home Phone Work Phone Mobile Phone    CATRACHO DOWNS (Spouse) -- -- [phone]               Discharge Summary      Prieto Velasco DO at [address] [facility]                       [facility] Medicine Services  DISCHARGE SUMMARY    Patient Name: Calvin Littlejohn  : 1975  MRN: [id number]    Date of Admission: 2022  Date of Discharge:  2022  Primary Care Physician: Phani Adames MD      Presenting Problem:   Lumbar radiculopathy [M54.16]  Intractable low back pain [M54.59]    Active and Resolved Hospital Problems:  Active Hospital Problems    Diagnosis POA   • **Intractable low back pain [M54.59] Yes   • Coronary artery disease of native artery of native heart with stable angina pectoris (HCC) [I25.118] Yes   • Essential hypertension [I10] Yes      Resolved Hospital Problems   No resolved problems to display.         Hospital Course     Hospital Course:    The patient is a 47-year-old female with history of CAD s/p CABG, hypertension, hyperlipidemia, dilated cardiomyopathy s/p AICD, aortic valve regurgitations/p AVR, mitral valve regurgitation, type 2 diabetes mellitus and chronic lower back pain.      The patient presented to the emergency room on 2022 complaining of 5 days of lower back pain that is worse with extension of her lower back.  The patient had been cleaning her bathtub flexed at her hip several days ago  before pain started.  The back pain was associated with paresthesias of lower extremities but denied any urinary/fecal incontinence or trauma to the back.    The patient underwent CT of lumbar spine with contrast in the ED on 8/16/2022.  It showed DJD with moderate to severe canal narrowing suspected at L4-L5 and moderate right mid to left neural foraminal narrowing.  There was also mild to moderate central canal narrowing L5-S1 with moderate left and severe right neural foraminal narrowing at this level.    The patient was initially placed in the observation unit and was given muscle relaxers and pain medications.  She was evaluated by the neurosurgery team and recommended MRI of the lumbar spine to evaluate patient's back pain.  Unfortunately the patient was unable to lay flat in order to get MRI despite 2 attempts.  She has been able to ambulate within her room and her pain has been controlled.  She will be discharged home and needs to follow-up with the neurosurgery office in the future for further work-up of back pain.  She has been given prednisone taper and muscle relaxer.  She should follow-up with her PCP in order to get clearance to go back to work.      DISCHARGE Follow Up Recommendations for labs and diagnostics:       Reasons For Change In Medications and Indications for New Medications:      Day of Discharge     Vital Signs:  Temp:  [97.5 °F (36.4 °C)-98.4 °F (36.9 °C)] 98.4 °F (36.9 °C)  Heart Rate:  [] 69  Resp:  [16-17] 17  BP: (110-153)/() 110/81    Physical Exam:  Physical Exam  HENT:      Head: Normocephalic.      Nose: Nose normal.   Eyes:      Extraocular Movements: Extraocular movements intact.      Pupils: Pupils are equal, round, and reactive to light.   Cardiovascular:      Rate and Rhythm: Normal rate and regular rhythm.   Pulmonary:      Effort: Pulmonary effort is normal.   Abdominal:      General: Bowel sounds are normal.   Musculoskeletal:      Cervical back: Normal range  of motion.      Comments: Moving all extremities   Skin:     General: Skin is warm.   Neurological:      Mental Status: She is alert. Mental status is at baseline.   Psychiatric:         Mood and Affect: Mood normal.           Pertinent  and/or Most Recent Results     LAB RESULTS:      Lab 08/20/22 0522 08/19/22 0353 08/17/22 0428   WBC 13.20* 9.00 10.30   HEMOGLOBIN 16.4* 16.7* 16.3*   HEMATOCRIT 48.9* 50.0* 48.8*   PLATELETS 270 259 240   NEUTROS ABS 10.80* 5.20 6.90   LYMPHS ABS 1.60 2.60 2.40   MONOS ABS 0.70 1.00* 0.90   EOS ABS 0.00 0.10 0.00   MCV 90.2 90.3 91.7         Lab 08/20/22 0522 08/19/22 0353 08/17/22 0428   SODIUM 129* 135* 140   POTASSIUM 4.0 3.7 4.1   CHLORIDE 89* 93* 100   CO2 26.0 29.0 30.0*   ANION GAP 14.0 13.0 10.0   BUN 25* 18 15   CREATININE 1.53* 1.18* 1.12*   EGFR 41.5* 56.7* 60.4   GLUCOSE 150* 104* 106*   CALCIUM 9.5 9.8 10.0                         Brief Urine Lab Results     None        Microbiology Results (last 10 days)     Procedure Component Value - Date/Time    COVID PRE-OP / PRE-PROCEDURE SCREENING ORDER (NO ISOLATION) - Swab, Nasopharynx [[id number]]  (Normal) Collected: 08/16/22 2238    Lab Status: Final result Specimen: Swab from Nasopharynx Updated: 08/16/22 2318    Narrative:      The following orders were created for panel order COVID PRE-OP / PRE-PROCEDURE SCREENING ORDER (NO ISOLATION) - Swab, Nasopharynx.  Procedure                               Abnormality         Status                     ---------                               -----------         ------                     COVID-19,CEPHEID/CHELLE,CO...[[id number]]  Normal              Final result                 Please view results for these tests on the individual orders.    COVID-19,CEPHEID/CHELLE,COR/[facility]/PAD/SOFIE IN-HOUSE(OR EMERGENT/ADD-ON),NP SWAB IN TRANSPORT MEDIA 3-4 HR TAT, RT-PCR - Swab, Nasopharynx [[id number]]  (Normal) Collected: 08/16/22 3913    Lab Status: Final result Specimen: Swab from Nasopharynx  Updated: 08/16/22 2318     COVID19 Not Detected    Narrative:      Fact sheet for providers: https://www.fda.gov/media/579263/download     Fact sheet for patients: https://www.fda.gov/media/540649/download  Fact sheet for providers: https://www.fda.gov/media/440059/download     Fact sheet for patients: https://www.fda.gov/media/610470/download          CT Lumbar Spine Without Contrast    Result Date: 8/16/2022  Impression: 1.     Degenerative disc disease lumbar spine. There is moderate to severe central narrowing suspected L4-L5 due to degenerative disc disease and severe facet degenerative change. There is moderate right mild left neural foraminal narrowing at this location. 2.     There is mild to moderate central canal narrowing L5-S1 with moderate left and severe right neural foraminal narrowing at this level. 3.     Please see findings for details other levels.  Electronically Signed By-Althea Kaba MD On:8/16/2022 9:36 PM This report was finalized on [id number] by  Althea Kaba MD.      Results for orders placed during the hospital encounter of 05/16/20    Duplex Venous Lower Extremity - Bilateral CAR    Interpretation Summary  · Normal bilateral lower extremity venous duplex scan.      Results for orders placed during the hospital encounter of 05/16/20    Duplex Venous Lower Extremity - Bilateral CAR    Interpretation Summary  · Normal bilateral lower extremity venous duplex scan.      Results for orders placed during the hospital encounter of 02/22/21    Adult Transthoracic Echo Complete w/ Color, Spectral and Contrast if Necessary Per Protocol    Interpretation Summary  · The left ventricular cavity is mildly dilated.  · Estimated left ventricular EF was in agreement with the calculated left ventricular EF. Left ventricular ejection fraction appears to be 26 - 30%. Left ventricular systolic function is severely decreased.  · Left ventricular diastolic function is consistent with (grade II w/high LAP)  pseudonormalization.  · The right ventricular cavity is mildly dilated.  · Mildly reduced right ventricular systolic function noted.  · Left atrial volume is mildly increased.  · There is a bioprosthetic aortic valve present.  · Mild to moderate aortic valve stenosis is present.  · Abnormal mitral valve structure consistent with dilated annulus.  · Moderate to severe mitral valve regurgitation is present with a posteriorly-directed jet noted.      Labs Pending at Discharge:      Procedures Performed           Consults:   Consults     Date and Time Order Name Status Description    8/18/2022  4:36 PM Inpatient Hospitalist Consult      8/17/2022  3:32 AM Inpatient Neurosurgery Consult Completed             Discharge Details        Discharge Medications      New Medications      Instructions Start Date   Alcohol Prep 70 % pads   1 each, Apply externally, Daily      glucose blood test strip  Commonly known as: OneTouch Verio   1 each, Other, Daily, Use as instructed Dx code: E11.9      methocarbamol 500 MG tablet  Commonly known as: ROBAXIN   500 mg, Oral, Every 8 Hours PRN      OneTouch Delica Plus [id number] misc   1 each, Does not apply, Daily, Dx code: E11.9      oxyCODONE 5 MG immediate release tablet  Commonly known as: Roxicodone   5 mg, Oral, Every 8 Hours PRN         Changes to Medications      Instructions Start Date   predniSONE 10 MG tablet  Commonly known as: DELTASONE  What changed:   · medication strength  · See the new instructions.   Take 4 tablets by mouth Daily for 3 days, THEN 3 tablets Daily for 3 days, THEN 2 tablets Daily for 3 days, THEN 1 tablet Daily for 3 days.   Start Date: August 20, 2022        Continue These Medications      Instructions Start Date   albuterol (2.5 MG/3ML) 0.083% nebulizer solution  Commonly known as: PROVENTIL   2.5 mg, Nebulization, Every 4 Hours PRN      allopurinol 300 MG tablet  Commonly known as: ZYLOPRIM   300 mg, Oral, Daily      aspirin 81 MG EC tablet   81 mg,  Oral, Daily      benzonatate 200 MG capsule  Commonly known as: TESSALON   200 mg, Oral, 3 Times Daily PRN      carvedilol 6.25 MG tablet  Commonly known as: COREG   6.25 mg, Oral, 2 Times Daily With Meals      cholecalciferol 25 MCG (1000 UT) tablet  Commonly known as: VITAMIN D3   1,000 Units, Oral, Daily      clopidogrel 75 MG tablet  Commonly known as: PLAVIX   75 mg, Oral, Daily      diphenhydrAMINE 25 mg capsule  Commonly known as: BENADRYL   25 mg, Oral, Every 6 Hours PRN      docusate sodium 100 MG capsule  Commonly known as: COLACE   100 mg, Oral, 2 Times Daily PRN      febuxostat 80 MG tablet tablet  Commonly known as: ULORIC   80 mg, Oral, Daily      ferrous sulfate 325 (65 FE) MG tablet   325 mg, Oral, Daily With Breakfast      folic acid 1 MG tablet  Commonly known as: FOLVITE   1 mg, Oral, Daily      levothyroxine 75 MCG tablet  Commonly known as: SYNTHROID, LEVOTHROID   225 mcg, Oral, Daily      multivitamin tablet tablet   1 tablet, Oral, Daily      nitroglycerin 0.4 MG SL tablet  Commonly known as: NITROSTAT   0.4 mg, Sublingual, Every 5 Minutes PRN, Take no more than 3 doses in 15 minutes.       oxyCODONE-acetaminophen 7.5-325 MG per tablet  Commonly known as: PERCOCET   1 tablet, Oral, Every 6 Hours PRN      pantoprazole 40 MG EC tablet  Commonly known as: PROTONIX   40 mg, Oral, Daily      potassium chloride 10 MEQ CR tablet   20 mEq, Oral, 2 Times Daily      spironolactone 25 MG tablet  Commonly known as: ALDACTONE   25 mg, Oral, Daily, Hold if persistent diarrhea.      torsemide 20 MG tablet  Commonly known as: DEMADEX   60 mg, Oral, 2 Times Daily      torsemide 20 MG tablet  Commonly known as: DEMADEX   60 mg, Oral, Daily, Hold if persistent diarrhea      vitamin B-12 1000 MCG tablet  Commonly known as: CYANOCOBALAMIN   1,000 mcg, Oral, Daily             Allergies   Allergen Reactions   • Hydrocodone Hives   • Penicillin G Unknown (See Comments)     Reaction occurred as a baby.      Penicillin  interview complete 08/18/2022.   • Contrast Dye GI Intolerance     She is pretty sure it's the contrast dye that makes her super sick and vomiting after heart cath         Discharge Disposition:   Home or Self Care    Diet:  Hospital:  Diet Order   Procedures   • Diet Regular, Diabetic/Consistent Carbs; Diabetic - Consistent Carb         Discharge Activity: As tolerated      Discharge Condition: stable      CODE STATUS:  Code Status and Medical Interventions:   Ordered at: 08/17/22 0332     Level Of Support Discussed With:    Patient     Code Status (Patient has no pulse and is not breathing):    CPR (Attempt to Resuscitate)     Medical Interventions (Patient has pulse or is breathing):    Full Support         Future Appointments   Date Time Provider Department Center   9/2/2022 10:00 AM Shavonne Whitlock PT [facility]   10/28/2022 10:00 AM Héctor Eckert MD [facility]       Additional Instructions for the Follow-ups that You Need to Schedule     Ambulatory Referral to Physical Therapy Evaluate and treat   As directed      Specialty needed: Evaluate and treat               Time spent on Discharge including face to face service:  15 minutes    This patient has been examined wearing appropriate Personal Protective Equipment and discussed with nursing. 08/20/22      Signature: Electronically signed by Prieto Mccollum DO, 08/20/22, 10:45 AM EDT.      Electronically signed by Prieto Mccollum DO at 08/20/22 1110       Discharge Order (From admission, onward)     Start     Ordered    08/20/22 0703  Discharge patient  Once        Expected Discharge Date: 08/20/22    Expected Discharge Time: Morning    Discharge Disposition: Home or Self Care    Physician of Record for Attribution - Please select from Treatment Team: PRIETO MCCOLLUM [[id number]]    Review needed by CMO to determine Physician of Record: No       Question Answer Comment   Physician of Record for Attribution - Please  select from Treatment Team JERMAINE MCCOLLUM    Review needed by CMO to determine Physician of Record No        08/20/22 0705

## 2022-08-22 NOTE — CASE MANAGEMENT/SOCIAL WORK
Case Management Discharge Note      Final Note: Home with OP PT [employer]    Provided Post Acute Provider Quality & Resource List?: Yes        Therapy Coordination complete.    Service Provider Selected Services Address Phone Fax Patient Preferred    [facility] PHYSICAL THERAPY [facility]  Outpatient Rehabilitation [address] [place] [id number] [phone] --                  Transportation Services  Private: Car    Final Discharge Disposition Code: 01 - home or self-care

## 2022-08-24 ENCOUNTER — READMISSION MANAGEMENT (OUTPATIENT)
Dept: CALL CENTER | Facility: HOSPITAL | Age: 47
End: 2022-08-24

## 2022-08-24 NOTE — OUTREACH NOTE
Medical Week 1 Survey    Flowsheet Row Responses   [facility] facility patient discharged from? [facility]   Does the patient have one of the following disease processes/diagnoses(primary or secondary)? Other   Week 1 attempt successful? No   Unsuccessful attempts Attempt 1          MALVIN NOBLE - Licensed Nurse

## 2022-08-30 ENCOUNTER — READMISSION MANAGEMENT (OUTPATIENT)
Dept: CALL CENTER | Facility: HOSPITAL | Age: 47
End: 2022-08-30

## 2022-08-30 NOTE — OUTREACH NOTE
Medical Week 2 Survey    Flowsheet Row Responses   [facility] facility patient discharged from? [facility]   Does the patient have one of the following disease processes/diagnoses(primary or secondary)? Other   Week 2 attempt successful? No   Unsuccessful attempts Attempt 1          GENEVA KHAN - Registered Nurse

## 2022-09-13 ENCOUNTER — READMISSION MANAGEMENT (OUTPATIENT)
Dept: CALL CENTER | Facility: HOSPITAL | Age: 47
End: 2022-09-13

## 2022-09-13 NOTE — OUTREACH NOTE
Medical Week 4 Survey    Flowsheet Row Responses   [facility] [place] facility patient discharged from? [facility]   Does the patient have one of the following disease processes/diagnoses(primary or secondary)? Other   Week 4 attempt successful? Yoanna THAYER - Registered Nurse

## 2022-09-21 ENCOUNTER — TELEPHONE (OUTPATIENT)
Dept: CARDIOLOGY | Facility: CLINIC | Age: 47
End: 2022-09-21

## 2022-09-21 NOTE — TELEPHONE ENCOUNTER
Caller: Rafael Nunes    Relationship: Self    Best call back number: [phone]    What is the best time to reach you: ANY    Who are you requesting to speak with (clinical staff, provider,  specific staff member): ANY    Do you know the name of the person who called: ANY    What was the call regarding: PT WAS TRANSFERRED OVER TO THE NURSE AND GOT DISCONNECTED. PT STATES THAT SHE WAS SPEAKING WITH CLIFF ABOUT SCHEDULING AN APPT SO THAT SHE CAN CONTINUE THERAPY. HUB WAS UNABLE TO TRANSFER. IF UNABLE TO REACH PT PLEASE LEAVE A VM.    Do you require a callback: YES

## 2022-09-23 ENCOUNTER — OFFICE VISIT (OUTPATIENT)
Dept: CARDIOLOGY | Facility: CLINIC | Age: 47
End: 2022-09-23

## 2022-09-23 VITALS
OXYGEN SATURATION: 97 % | SYSTOLIC BLOOD PRESSURE: 137 MMHG | DIASTOLIC BLOOD PRESSURE: 99 MMHG | HEIGHT: 67 IN | WEIGHT: 187 LBS | BODY MASS INDEX: 29.35 KG/M2 | HEART RATE: 77 BPM | RESPIRATION RATE: 18 BRPM

## 2022-09-23 DIAGNOSIS — E11.9 TYPE 2 DIABETES MELLITUS WITHOUT COMPLICATION, WITHOUT LONG-TERM CURRENT USE OF INSULIN: Chronic | ICD-10-CM

## 2022-09-23 DIAGNOSIS — E78.2 MIXED HYPERLIPIDEMIA: Chronic | ICD-10-CM

## 2022-09-23 DIAGNOSIS — Z95.810 ICD (IMPLANTABLE CARDIOVERTER-DEFIBRILLATOR), DUAL, IN SITU: Chronic | ICD-10-CM

## 2022-09-23 DIAGNOSIS — Z95.2 S/P AVR (AORTIC VALVE REPLACEMENT): ICD-10-CM

## 2022-09-23 DIAGNOSIS — I50.22 CHRONIC SYSTOLIC CONGESTIVE HEART FAILURE: Chronic | ICD-10-CM

## 2022-09-23 DIAGNOSIS — I35.1 NONRHEUMATIC AORTIC VALVE INSUFFICIENCY: ICD-10-CM

## 2022-09-23 DIAGNOSIS — I34.0 NONRHEUMATIC MITRAL VALVE REGURGITATION: ICD-10-CM

## 2022-09-23 DIAGNOSIS — I25.118 CORONARY ARTERY DISEASE OF NATIVE ARTERY OF NATIVE HEART WITH STABLE ANGINA PECTORIS: Chronic | ICD-10-CM

## 2022-09-23 DIAGNOSIS — I42.0 CARDIOMYOPATHY, DILATED: Chronic | ICD-10-CM

## 2022-09-23 DIAGNOSIS — Z95.1 S/P CABG X 3: Primary | ICD-10-CM

## 2022-09-23 DIAGNOSIS — I10 ESSENTIAL HYPERTENSION: Chronic | ICD-10-CM

## 2022-09-23 PROCEDURE — 99214 OFFICE O/P EST MOD 30 MIN: CPT | Performed by: INTERNAL MEDICINE

## 2022-09-23 PROCEDURE — 93000 ELECTROCARDIOGRAM COMPLETE: CPT | Performed by: INTERNAL MEDICINE

## 2022-09-23 RX ORDER — GABAPENTIN 300 MG/1
CAPSULE ORAL AS NEEDED
COMMUNITY
Start: 2022-08-30

## 2022-09-23 RX ORDER — LOSARTAN POTASSIUM 25 MG/1
25 TABLET ORAL DAILY
Qty: 30 TABLET | Refills: 2 | Status: SHIPPED | OUTPATIENT
Start: 2022-09-23 | End: 2022-09-23

## 2022-09-23 RX ORDER — HYDRALAZINE HYDROCHLORIDE 25 MG/1
25 TABLET, FILM COATED ORAL 3 TIMES DAILY
Qty: 90 TABLET | Refills: 2 | Status: SHIPPED | OUTPATIENT
Start: 2022-09-23 | End: 2022-10-13 | Stop reason: SDUPTHER

## 2022-09-23 RX ORDER — LIDOCAINE 50 MG/G
PATCH TOPICAL
COMMUNITY
Start: 2022-09-01

## 2022-09-23 RX ORDER — METHOCARBAMOL 500 MG/1
1 TABLET, FILM COATED ORAL
COMMUNITY
Start: 2022-08-20 | End: 2023-03-07 | Stop reason: SDUPTHER

## 2022-09-23 RX ORDER — AMLODIPINE BESYLATE 10 MG/1
TABLET ORAL
COMMUNITY
Start: 2022-08-30

## 2022-09-23 RX ORDER — ATORVASTATIN CALCIUM 40 MG/1
TABLET, FILM COATED ORAL
COMMUNITY
Start: 2022-08-30

## 2022-09-23 RX ORDER — FLUCONAZOLE 50 MG/1
50 TABLET ORAL
COMMUNITY

## 2022-09-23 RX ORDER — ALIROCUMAB 75 MG/ML
75 INJECTION, SOLUTION SUBCUTANEOUS
COMMUNITY
Start: 2022-03-28

## 2022-09-23 RX ORDER — PREDNISONE 20 MG/1
40 TABLET ORAL DAILY
COMMUNITY
Start: 2022-08-14

## 2022-09-23 NOTE — PROGRESS NOTES
Cardiology Office Visit      Encounter Date:  09/23/2022    Patient ID:   Rafael Nunes is a 47 y.o. female.    Reason For Followup:  Cardiomyopathy  Hypertension  Hyperlipidemia  Valvular heart disease  Recent hospitalization for back pain and noted to have elevated blood pressure      Brief Clinical History:  Dear Dr. Adames, Phani Ennis MD    I had the pleasure of seeing Rafael Mccann today. As you are well aware, this is a 47 y.o. female with a past medical history that is significant for cardiomyopathy and valvular heart disease         Interval History:  Elevated blood pressure part of it is secondary to back pain   Significant increase in the blood pressure at home sometimes   volume status is much better  Denies any further symptoms of chest pain shortness of breath dizziness or syncope  Compliant with medical therapy  Tolerating medications very well        Impressions:/Cardiac catheterization January 2021  Native severe two-vessel coronary artery disease  No significant obstructive disease involving the LAD system  Patent vein graft to the diagonal  Patent stent in the left circumflex and marginal branch of the circumflex  The circumflex coronary artery provides collaterals to the PDA branch of the right coronary artery  Diffuse disease involving the right coronary artery unchanged from before     Interpretation Summary/January 2021    · The left ventricular cavity is moderately dilated.  · Estimated left ventricular EF = 20% Left ventricular systolic function is severely decreased.  · The right ventricular cavity is mildly dilated.  · Mildly reduced right ventricular systolic function noted.  · There is a bioprosthetic aortic valve present.  · Moderate to severe mitral valve regurgitation is present.  · Estimated right ventricular systolic pressure from tricuspid regurgitation is normal (<35 mmHg)..       Assessment & Plan    Impressions:  Essential hypertension  Chronic systolic heart  failure  History of cardiomyopathy   Chronic renal insufficiency  Coronary artery disease   History of diabetes mellitus type 2  History of thyroid disease  Moderate to severe mitral regurgitation  Moderate to severe aortic regurgitation  Cardiomyopathy with LV ejection fraction of 35%  s/p AICD placement/normal device function  s/p urgent AVR (21 mm Magna), CABG x3 with SV to Diag1 and SV sequential to PDA and then OM3 12/27/2019  Status post coronary artery bypass surgery x3 and aortic valve replacement surgery  Congestive heart failure NYHA class 4  Newly diagnosed severe mitral regurgitation  Worsening cardiomyopathy with most recent LV ejection fraction of 20%  Moderate mitral regurgitation  NYHA Functional Class: III  Stage: C heart failure  No atrial capture [facility] device interrogation was seen and evaluated by EP    Recommendations:  Continue dual antiplatelet therapy with aspirin Plavix  Medical management for obstructive coronary artery disease  Recent medical records from the visit to the emergency room and also [facility] reviewed and discussed with the patient  Optimize medical therapy for cardiomyopathy   Continue follow-up with advanced heart failure  Recent evaluation at [facility] with a repeat echocardiogram with improvement in the mitral regurgitation  Recent labs reviewed and discussed with the patient  Continue current dose high statin and Zetia and add a PCSK9 inhibitor  Risk benefits and alternatives reviewed and discussed with the patient  Continue dual antiplatelet therapy with aspirin and Plavix  Medications reviewed and discussed with the patient  continue to optimize her HF therapies before we consider advanced therapies  Add hydralazine 25 mg p.o. 3 times a day  Repeat echocardiogram to assess the mitral regurgitation  Close monitoring of blood pressure at home  Follow-up in office in 3 months    Objective:    Vitals:  Vitals:    09/23/22 0936   BP: 137/99   BP  "Location: Left arm   Patient Position: Sitting   Cuff Size: Large Adult   Pulse: 77   Resp: 18   SpO2: 97%   Weight: 84.8 kg (187 lb)   Height: 170.2 cm (67\")       Physical Exam:    General: Alert, cooperative, no distress, appears stated age  Head:  Normocephalic, atraumatic, mucous membranes moist  Eyes:  Conjunctiva/corneas clear, EOM's intact     Neck:  Supple,  no adenopathy;      Lungs: Clear to auscultation bilaterally, no wheezes rhonchi rales are noted  Chest wall: No tenderness  Heart::  Regular rate and rhythm, S1 and S2 normal, no murmur, rub or gallop  Abdomen: Soft, non-tender, nondistended bowel sounds active  Extremities: No cyanosis, clubbing, or edema  Pulses: 2+ and symmetric all extremities  Skin:  No rashes or lesions  Neuro/psych: A&O x3. CN II through XII are grossly intact with appropriate affect      Allergies:  Allergies   Allergen Reactions   • Hydrocodone Hives   • Penicillin G Unknown (See Comments)     Reaction occurred as a baby.      Penicillin interview complete 08/18/2022.   • Contrast Dye GI Intolerance     She is pretty sure it's the contrast dye that makes her super sick and vomiting after heart cath   • Gadolinium Derivatives Itching       Medication Review:     Current Outpatient Medications:   •  albuterol (PROVENTIL) (2.5 MG/3ML) 0.083% nebulizer solution, Take 2.5 mg by nebulization Every 4 (Four) Hours As Needed for Wheezing., Disp: 90 mL, Rfl: 0  •  Alcohol Swabs (Alcohol Prep) 70 % pads, Apply 1 each topically Daily., Disp: 100 each, Rfl: 2  •  Alirocumab (Praluent) 75 MG/ML solution auto-injector, Inject 75 mg under the skin into the appropriate area as directed., Disp: , Rfl:   •  allopurinol (ZYLOPRIM) 300 MG tablet, Take 300 mg by mouth Daily., Disp: , Rfl:   •  amLODIPine (NORVASC) 10 MG tablet, , Disp: , Rfl:   •  aspirin 81 MG EC tablet, Take 1 tablet by mouth Daily., Disp: 30 tablet, Rfl: 6  •  atorvastatin (LIPITOR) 40 MG tablet, , Disp: , Rfl:   •  benzonatate " (TESSALON) 200 MG capsule, Take 1 capsule by mouth 3 (Three) Times a Day As Needed for Cough., Disp: 15 capsule, Rfl: 0  •  carvedilol (COREG) 6.25 MG tablet, Take 1 tablet by mouth 2 (Two) Times a Day With Meals., Disp: , Rfl:   •  cholecalciferol (VITAMIN D3) 1000 units tablet, Take 1,000 Units by mouth Daily., Disp: , Rfl:   •  clopidogrel (PLAVIX) 75 MG tablet, Take 1 tablet by mouth Daily., Disp: 30 tablet, Rfl: 6  •  diphenhydrAMINE (BENADRYL) 25 mg capsule, Take 25 mg by mouth Every 6 (Six) Hours As Needed for Itching., Disp: , Rfl:   •  docusate sodium (COLACE) 100 MG capsule, Take 100 mg by mouth 2 (Two) Times a Day As Needed for Constipation., Disp: , Rfl:   •  febuxostat (ULORIC) 80 MG tablet tablet, Take 80 mg by mouth Daily., Disp: , Rfl:   •  ferrous sulfate 325 (65 FE) MG tablet, Take 325 mg by mouth Daily With Breakfast., Disp: , Rfl:   •  fluconazole (DIFLUCAN) 50 MG tablet, Take 50 mg by mouth., Disp: , Rfl:   •  folic acid (FOLVITE) 1 MG tablet, Take 1 mg by mouth Daily., Disp: , Rfl:   •  gabapentin (NEURONTIN) 400 MG capsule, , Disp: , Rfl:   •  glucose blood (OneTouch Verio) test strip, Use one strip to test blood sugar once daily as directed.  Dx code: E11.9, Disp: 100 each, Rfl: 2  •  Lancets (OneTouch Delica Plus Oanrgm18M) misc, 1 each Daily. Dx code: E11.9, Disp: 100 each, Rfl: 2  •  lidocaine (LIDODERM) 5 %, , Disp: , Rfl:   •  methocarbamol (ROBAXIN) 500 MG tablet, Take 1 tablet by mouth Every 8 (Eight) Hours As Needed for Muscle Spasms., Disp: 30 tablet, Rfl: 0  •  methocarbamol (ROBAXIN) 500 MG tablet, Take 1 tablet by mouth., Disp: , Rfl:   •  multivitamin (THERAGRAN) tablet tablet, Take 1 tablet by mouth Daily., Disp: , Rfl:   •  nitroglycerin (NITROSTAT) 0.4 MG SL tablet, Place 1 tablet under the tongue Every 5 (Five) Minutes As Needed for Chest Pain. Take no more than 3 doses in 15 minutes. Call 911 if more than 3 doses needed. , Disp: 25 tablet, Rfl: 0  •  oxyCODONE (Roxicodone) 5  MG immediate release tablet, Take 1 tablet by mouth Every 8 (Eight) Hours As Needed for Severe Pain ., Disp: 10 tablet, Rfl: 0  •  oxyCODONE-acetaminophen (PERCOCET) 7.5-325 MG per tablet, Take 1 tablet by mouth Every 6 (Six) Hours As Needed for Moderate Pain ., Disp: , Rfl:   •  pantoprazole (PROTONIX) 40 MG EC tablet, Take 1 tablet by mouth Daily., Disp: , Rfl:   •  potassium chloride 10 MEQ CR tablet, Take 2 tablets by mouth 2 (Two) Times a Day., Disp: , Rfl:   •  predniSONE (DELTASONE) 20 MG tablet, Take 40 mg by mouth Daily., Disp: , Rfl:   •  spironolactone (ALDACTONE) 25 MG tablet, Take 1 tablet by mouth Daily. Hold if persistent diarrhea., Disp: , Rfl:   •  torsemide (DEMADEX) 20 MG tablet, Take 60 mg by mouth 2 (Two) Times a Day., Disp: , Rfl:   •  torsemide (DEMADEX) 20 MG tablet, Take 3 tablets by mouth Daily. Hold if persistent diarrhea, Disp: , Rfl:   •  vitamin B-12 (CYANOCOBALAMIN) 1000 MCG tablet, Take 1,000 mcg by mouth Daily., Disp: , Rfl:   •  hydrALAZINE (APRESOLINE) 25 MG tablet, Take 1 tablet by mouth 3 (Three) Times a Day., Disp: 90 tablet, Rfl: 2  •  levothyroxine (SYNTHROID, LEVOTHROID) 75 MCG tablet, Take 3 tablets by mouth Daily for 30 days., Disp: 90 tablet, Rfl: 0    Family History:  Family History   Problem Relation Age of Onset   • Heart disease Father        Past Medical History:  Past Medical History:   Diagnosis Date   • Arrhythmia    • Asthma    • CAD (coronary artery disease)    • Cardiomyopathy, dilated (HCC)    • Chronic systolic congestive heart failure (HCC)    • CKD (chronic kidney disease) stage 2, GFR 60-89 ml/min    • COPD (chronic obstructive pulmonary disease) (HCC)    • Essential hypertension    • GERD without esophagitis    • Hidradenitis 10/26/2020   • Hyperlipidemia    • Hypothyroidism (acquired)    • ICD (implantable cardioverter-defibrillator), dual, in situ 7/22/2019   • Nonrheumatic aortic valve insufficiency    • Nonrheumatic mitral valve regurgitation    • S/P  AVR (aortic valve replacement)    • S/P CABG x 3    • Type 2 diabetes mellitus without complication, without long-term current use of insulin (HCC)        Past surgical History:  Past Surgical History:   Procedure Laterality Date   • AORTIC VALVE REPAIR/REPLACEMENT N/A 12/27/2019    Procedure: AORTIC VALVE REPAIR/REPLACEMENT;  Surgeon: Lane Stock MD;  Location: [facility] CVOR;  Service: Cardiothoracic   • BREAST LUMPECTOMY Right     BENIGN   • CARDIAC CATHETERIZATION N/A 12/24/2019    Procedure: Right and Left Heart Cath 12/24/19 @ 0900;  Surgeon: Wayne Luna MD;  Location: [facility] CATH INVASIVE LOCATION;  Service: Cardiovascular   • CARDIAC CATHETERIZATION N/A 12/24/2019    Procedure: Coronary angiography;  Surgeon: Wayne Luna MD;  Location: [facility] CATH INVASIVE LOCATION;  Service: Cardiovascular   • CARDIAC CATHETERIZATION N/A 11/10/2020    Procedure: Left and right Heart Cath;  Surgeon: Wayne Luna MD;  Location: [facility] CATH INVASIVE LOCATION;  Service: Cardiovascular;  Laterality: N/A;   • CARDIAC CATHETERIZATION N/A 11/10/2020    Procedure: Coronary angiography;  Surgeon: Wayne Luna MD;  Location: [facility] CATH INVASIVE LOCATION;  Service: Cardiovascular;  Laterality: N/A;   • CARDIAC CATHETERIZATION N/A 11/10/2020    Procedure: Right Heart Cath;  Surgeon: Wayne Luna MD;  Location: [facility] CATH INVASIVE LOCATION;  Service: Cardiovascular;  Laterality: N/A;   • CARDIAC CATHETERIZATION N/A 11/25/2020    Procedure: Percutaneous coronary intervention of the left circumflex artery;  Surgeon: Wayne Luna MD;  Location: [facility] CATH INVASIVE LOCATION;  Service: Cardiovascular;  Laterality: N/A;   • CARDIAC CATHETERIZATION N/A 1/22/2021    Procedure: LEFT HEART CATH with possible PCI;  Surgeon: Wayne Luna MD;  Location: [facility] CATH INVASIVE LOCATION;  Service: Cardiovascular;  Laterality: N/A;  Local and IV sedation   • CARDIAC  CATHETERIZATION N/A 1/22/2021    Procedure: Coronary angiography;  Surgeon: Wayne Luna MD;  Location: [facility] CATH INVASIVE LOCATION;  Service: Cardiovascular;  Laterality: N/A;   • CARDIAC CATHETERIZATION N/A 1/22/2021    Procedure: Saphenous Vein Graft;  Surgeon: Wayne Luna MD;  Location: [facility] CATH INVASIVE LOCATION;  Service: Cardiovascular;  Laterality: N/A;   • CARDIAC ELECTROPHYSIOLOGY PROCEDURE Left 6/28/2019    Procedure: Dual-chamber ICD insertion;  Surgeon: Héctor Eckert MD;  Location: [facility] CATH INVASIVE LOCATION;  Service: Cardiovascular   • CARDIAC ELECTROPHYSIOLOGY PROCEDURE Left 8/14/2019    Procedure: Lead Revision;  Surgeon: Héctor Eckert MD;  Location: [facility] CATH INVASIVE LOCATION;  Service: Cardiovascular   • CARDIAC SURGERY     • CHOLECYSTECTOMY     • CORONARY ARTERY BYPASS GRAFT N/A 12/27/2019    Procedure: CORONARY ARTERY BYPASS GRAFTING;  Surgeon: Lane Stock MD;  Location: [facility] CVOR;  Service: Cardiothoracic   • HYSTERECTOMY     • INSERT / REPLACE / REMOVE PACEMAKER     • LYMPHADENECTOMY Bilateral    • THYROID SURGERY         Social History:  Social History     Socioeconomic History   • Marital status:    Tobacco Use   • Smoking status: Former Smoker     Quit date: 01/2019     Years since quitting: 3.7   • Smokeless tobacco: Never Used   Vaping Use   • Vaping Use: Never used   Substance and Sexual Activity   • Alcohol use: No   • Drug use: No   • Sexual activity: Defer       Review of Systems:  The following systems were reviewed as they relate to the cardiovascular system: Constitutional, Eyes, ENT, Cardiovascular, Respiratory, Gastrointestinal, Integumentary, Neurological, Psychiatric, Hematologic, Endocrine, Musculoskeletal, and Genitourinary. The pertinent cardiovascular findings are reported above with all other cardiovascular points within those systems being negative.    Diagnostic Study Review:     Current  Electrocardiogram:    ECG 12 Lead    Date/Time: 9/23/2022 11:53 AM  Performed by: Wayne Luna MD  Authorized by: Wayne Luna MD   Comparison: compared with previous ECG   Similar to previous ECG  Rhythm: sinus rhythm  Rate: normal  BPM: 77  Conduction: conduction normal  QRS axis: normal  Other findings: non-specific ST-T wave changes    Clinical impression: abnormal EKG              NOTE: The following portions of the patient's history were reviewed and updated this visit as appropriate: allergies, current medications, past family history, past medical history, past social history, past surgical history and problem list.

## 2022-10-13 RX ORDER — HYDRALAZINE HYDROCHLORIDE 25 MG/1
25 TABLET, FILM COATED ORAL 3 TIMES DAILY
Qty: 270 TABLET | Refills: 0 | Status: SHIPPED | OUTPATIENT
Start: 2022-10-13 | End: 2023-01-18

## 2022-11-22 RX ORDER — HYDROXYZINE PAMOATE 50 MG/1
CAPSULE ORAL
COMMUNITY
Start: 2022-10-06

## 2022-11-22 RX ORDER — FENOFIBRATE 160 MG/1
TABLET ORAL
COMMUNITY
Start: 2022-10-11

## 2022-11-22 RX ORDER — LEVOTHYROXINE SODIUM 0.15 MG/1
TABLET ORAL
COMMUNITY
Start: 2022-10-11

## 2022-11-29 ENCOUNTER — OFFICE VISIT (OUTPATIENT)
Dept: CARDIOLOGY | Facility: CLINIC | Age: 47
End: 2022-11-29

## 2022-11-29 VITALS
RESPIRATION RATE: 18 BRPM | DIASTOLIC BLOOD PRESSURE: 82 MMHG | OXYGEN SATURATION: 99 % | WEIGHT: 189 LBS | SYSTOLIC BLOOD PRESSURE: 131 MMHG | HEART RATE: 73 BPM | BODY MASS INDEX: 29.66 KG/M2 | HEIGHT: 67 IN

## 2022-11-29 DIAGNOSIS — I10 ESSENTIAL HYPERTENSION: Chronic | ICD-10-CM

## 2022-11-29 DIAGNOSIS — I34.0 NONRHEUMATIC MITRAL VALVE REGURGITATION: Primary | Chronic | ICD-10-CM

## 2022-11-29 DIAGNOSIS — E78.2 MIXED HYPERLIPIDEMIA: Chronic | ICD-10-CM

## 2022-11-29 DIAGNOSIS — Z95.810 ICD (IMPLANTABLE CARDIOVERTER-DEFIBRILLATOR), DUAL, IN SITU: Chronic | ICD-10-CM

## 2022-11-29 DIAGNOSIS — Z95.1 S/P CABG X 3: ICD-10-CM

## 2022-11-29 DIAGNOSIS — I42.0 CARDIOMYOPATHY, DILATED: Chronic | ICD-10-CM

## 2022-11-29 DIAGNOSIS — I50.22 CHRONIC SYSTOLIC CONGESTIVE HEART FAILURE: Chronic | ICD-10-CM

## 2022-11-29 DIAGNOSIS — I25.118 CORONARY ARTERY DISEASE OF NATIVE ARTERY OF NATIVE HEART WITH STABLE ANGINA PECTORIS: Chronic | ICD-10-CM

## 2022-11-29 DIAGNOSIS — I25.119 CHEST PAIN DUE TO CAD: ICD-10-CM

## 2022-11-29 DIAGNOSIS — Z95.2 S/P AVR (AORTIC VALVE REPLACEMENT): ICD-10-CM

## 2022-11-29 PROCEDURE — 99214 OFFICE O/P EST MOD 30 MIN: CPT | Performed by: INTERNAL MEDICINE

## 2022-11-29 RX ORDER — DAPAGLIFLOZIN 10 MG/1
TABLET, FILM COATED ORAL
COMMUNITY
Start: 2022-11-10

## 2022-11-29 NOTE — PROGRESS NOTES
Cardiology Office Visit      Encounter Date:  11/29/2022    Patient ID:   Rafael Nunes is a 47 y.o. female.    Reason For Followup:  Cardiomyopathy  Hypertension  Hyperlipidemia  Valvular heart disease      Brief Clinical History:  Dear Phani Schneider MD    I had the pleasure of seeing Rafael Mccann today. As you are well aware, this is a 47 y.o. female with a past medical history that is significant for cardiomyopathy and valvular heart disease         Interval History:  Patient clinically feels better denies any new cardiac symptoms  Denies any further symptoms of chest pain shortness of breath dizziness or syncope  Compliant with medical therapy  Tolerating medications very well        Impressions:/Cardiac catheterization January 2021  Native severe two-vessel coronary artery disease  No significant obstructive disease involving the LAD system  Patent vein graft to the diagonal  Patent stent in the left circumflex and marginal branch of the circumflex  The circumflex coronary artery provides collaterals to the PDA branch of the right coronary artery  Diffuse disease involving the right coronary artery unchanged from before     Interpretation Summary/January 2021    · The left ventricular cavity is moderately dilated.  · Estimated left ventricular EF = 20% Left ventricular systolic function is severely decreased.  · The right ventricular cavity is mildly dilated.  · Mildly reduced right ventricular systolic function noted.  · There is a bioprosthetic aortic valve present.  · Moderate to severe mitral valve regurgitation is present.  · Estimated right ventricular systolic pressure from tricuspid regurgitation is normal (<35 mmHg)..       Assessment & Plan    Impressions:  Essential hypertension  Chronic systolic heart failure  History of cardiomyopathy   Chronic renal insufficiency  Coronary artery disease   History of diabetes mellitus type 2  History of thyroid disease  Moderate to  severe mitral regurgitation  Moderate to severe aortic regurgitation  Cardiomyopathy with LV ejection fraction of 35%  s/p AICD placement/normal device function  s/p urgent AVR (21 mm Magna), CABG x3 with SV to Diag1 and SV sequential to PDA and then OM3 12/27/2019  Status post coronary artery bypass surgery x3 and aortic valve replacement surgery  Congestive heart failure NYHA class 4  Mild to moderate mitral regurgitation  NYHA Functional Class: III  Stage: C heart failure  Chronic renal insufficiency    Recommendations:  Continue dual antiplatelet therapy with aspirin Plavix  Medical management for obstructive coronary artery disease  Optimize medical therapy for cardiomyopathy   Continue follow-up with advanced heart failure  Recent evaluation at [facility] with a repeat echocardiogram with improvement in the mitral regurgitation  Recent labs reviewed and discussed with the patient  Continue current dose high statin and Zetia and add a PCSK9 inhibitor  Risk benefits and alternatives reviewed and discussed with the patient  Continue dual antiplatelet therapy with aspirin and Plavix  Medications reviewed and discussed with the patient  continue to optimize her HF therapies before we consider advanced therapies  Continue hydralazine 25 mg p.o. 3 times a day  Repeat echocardiogram to assess the mitral regurgitation  Patient current medications include Praluent 75 mg subcutaneous every 2 weeks patient is on Norvasc 10 mg p.o. once a day aspirin 81 mg p.o. once a day Lipitor 40 mg p.o. once a day Coreg 6.25 mg p.o. twice daily Plavix 75 mg p.o. once a day Farxiga 10 mg p.o. once a day patient is on spironolactone 25 mg p.o. once a day patient is on Demadex for diuretic therapy patient is on hydralazine 25 mg p.o. 3 times a day patient is currently on Entresto 97 x 100 and 1/2 a tablet by mouth twice a day  Close monitoring of blood pressure at home  Chronic renal failure with stable renal function  Follow-up in  "office in 3 months    Objective:    Vitals:  Vitals:    11/29/22 0940   BP: 131/82   BP Location: Left arm   Patient Position: Sitting   Cuff Size: Large Adult   Pulse: 73   Resp: 18   SpO2: 99%   Weight: 85.7 kg (189 lb)   Height: 170.2 cm (67\")       Physical Exam:    General: Alert, cooperative, no distress, appears stated age  Head:  Normocephalic, atraumatic, mucous membranes moist  Eyes:  Conjunctiva/corneas clear, EOM's intact     Neck:  Supple,  no adenopathy;      Lungs: Clear to auscultation bilaterally, no wheezes rhonchi rales are noted  Chest wall: No tenderness  Heart::  Regular rate and rhythm, S1 and S2 normal, displaced LV apical impulse  Abdomen: Soft, non-tender, nondistended bowel sounds active  Extremities: No cyanosis, clubbing, or edema  Pulses: 2+ and symmetric all extremities  Skin:  No rashes or lesions  Neuro/psych: A&O x3. CN II through XII are grossly intact with appropriate affect      Allergies:  Allergies   Allergen Reactions   • Hydrocodone Hives   • Penicillin G Unknown (See Comments)     Reaction occurred as a baby.      Penicillin interview complete 08/18/2022.   • Contrast Dye GI Intolerance     She is pretty sure it's the contrast dye that makes her super sick and vomiting after heart cath   • Gadolinium Derivatives Itching       Medication Review:     Current Outpatient Medications:   •  albuterol (PROVENTIL) (2.5 MG/3ML) 0.083% nebulizer solution, Take 2.5 mg by nebulization Every 4 (Four) Hours As Needed for Wheezing., Disp: 90 mL, Rfl: 0  •  Alcohol Swabs (Alcohol Prep) 70 % pads, Apply 1 each topically Daily., Disp: 100 each, Rfl: 2  •  Alirocumab (Praluent) 75 MG/ML solution auto-injector, Inject 75 mg under the skin into the appropriate area as directed., Disp: , Rfl:   •  allopurinol (ZYLOPRIM) 300 MG tablet, Take 300 mg by mouth Daily., Disp: , Rfl:   •  amLODIPine (NORVASC) 10 MG tablet, , Disp: , Rfl:   •  aspirin 81 MG EC tablet, Take 1 tablet by mouth Daily., Disp: " 30 tablet, Rfl: 6  •  atorvastatin (LIPITOR) 40 MG tablet, , Disp: , Rfl:   •  benzonatate (TESSALON) 200 MG capsule, Take 1 capsule by mouth 3 (Three) Times a Day As Needed for Cough., Disp: 15 capsule, Rfl: 0  •  carvedilol (COREG) 6.25 MG tablet, Take 1 tablet by mouth 2 (Two) Times a Day With Meals., Disp: , Rfl:   •  cholecalciferol (VITAMIN D3) 1000 units tablet, Take 1,000 Units by mouth Daily., Disp: , Rfl:   •  clopidogrel (PLAVIX) 75 MG tablet, Take 1 tablet by mouth Daily., Disp: 30 tablet, Rfl: 6  •  diphenhydrAMINE (BENADRYL) 25 mg capsule, Take 25 mg by mouth Every 6 (Six) Hours As Needed for Itching., Disp: , Rfl:   •  docusate sodium (COLACE) 100 MG capsule, Take 100 mg by mouth 2 (Two) Times a Day As Needed for Constipation., Disp: , Rfl:   •  Farxiga 10 MG tablet, , Disp: , Rfl:   •  febuxostat (ULORIC) 80 MG tablet tablet, Take 80 mg by mouth Daily., Disp: , Rfl:   •  fenofibrate 160 MG tablet, , Disp: , Rfl:   •  ferrous sulfate 325 (65 FE) MG tablet, Take 325 mg by mouth Daily With Breakfast., Disp: , Rfl:   •  fluconazole (DIFLUCAN) 50 MG tablet, Take 50 mg by mouth., Disp: , Rfl:   •  folic acid (FOLVITE) 1 MG tablet, Take 1 mg by mouth Daily., Disp: , Rfl:   •  gabapentin (NEURONTIN) 400 MG capsule, , Disp: , Rfl:   •  glucose blood (OneTouch Verio) test strip, Use one strip to test blood sugar once daily as directed.  Dx code: E11.9, Disp: 100 each, Rfl: 2  •  hydrALAZINE (APRESOLINE) 25 MG tablet, Take 1 tablet by mouth 3 (Three) Times a Day., Disp: 270 tablet, Rfl: 0  •  hydrOXYzine pamoate (VISTARIL) 50 MG capsule, , Disp: , Rfl:   •  Lancets (OneTouch Delica Plus [id number]) misc, 1 each Daily. Dx code: E11.9, Disp: 100 each, Rfl: 2  •  levothyroxine (SYNTHROID, LEVOTHROID) 150 MCG tablet, , Disp: , Rfl:   •  lidocaine (LIDODERM) 5 %, , Disp: , Rfl:   •  methocarbamol (ROBAXIN) 500 MG tablet, Take 1 tablet by mouth Every 8 (Eight) Hours As Needed for Muscle Spasms., Disp: 30 tablet, Rfl:  0  •  methocarbamol (ROBAXIN) 500 MG tablet, Take 1 tablet by mouth., Disp: , Rfl:   •  multivitamin (THERAGRAN) tablet tablet, Take 1 tablet by mouth Daily., Disp: , Rfl:   •  nitroglycerin (NITROSTAT) 0.4 MG SL tablet, Place 1 tablet under the tongue Every 5 (Five) Minutes As Needed for Chest Pain. Take no more than 3 doses in 15 minutes. Call 911 if more than 3 doses needed. , Disp: 25 tablet, Rfl: 0  •  oxyCODONE (Roxicodone) 5 MG immediate release tablet, Take 1 tablet by mouth Every 8 (Eight) Hours As Needed for Severe Pain ., Disp: 10 tablet, Rfl: 0  •  oxyCODONE-acetaminophen (PERCOCET) 7.5-325 MG per tablet, Take 1 tablet by mouth Every 6 (Six) Hours As Needed for Moderate Pain ., Disp: , Rfl:   •  pantoprazole (PROTONIX) 40 MG EC tablet, Take 1 tablet by mouth Daily., Disp: , Rfl:   •  potassium chloride 10 MEQ CR tablet, Take 2 tablets by mouth 2 (Two) Times a Day., Disp: , Rfl:   •  predniSONE (DELTASONE) 20 MG tablet, Take 40 mg by mouth Daily., Disp: , Rfl:   •  spironolactone (ALDACTONE) 25 MG tablet, Take 1 tablet by mouth Daily. Hold if persistent diarrhea., Disp: , Rfl:   •  torsemide (DEMADEX) 20 MG tablet, Take 60 mg by mouth 2 (Two) Times a Day., Disp: , Rfl:   •  torsemide (DEMADEX) 20 MG tablet, Take 3 tablets by mouth Daily. Hold if persistent diarrhea, Disp: , Rfl:   •  vitamin B-12 (CYANOCOBALAMIN) 1000 MCG tablet, Take 1,000 mcg by mouth Daily., Disp: , Rfl:   •  levothyroxine (SYNTHROID, LEVOTHROID) 75 MCG tablet, Take 3 tablets by mouth Daily for 30 days., Disp: 90 tablet, Rfl: 0    Family History:  Family History   Problem Relation Age of Onset   • Heart disease Father        Past Medical History:  Past Medical History:   Diagnosis Date   • Arrhythmia    • Asthma    • CAD (coronary artery disease)    • Cardiomyopathy, dilated (HCC)    • Chronic systolic congestive heart failure (HCC)    • CKD (chronic kidney disease) stage 2, GFR 60-89 ml/min    • COPD (chronic obstructive pulmonary  disease) (HCC)    • Essential hypertension    • GERD without esophagitis    • Hidradenitis 10/26/2020   • Hyperlipidemia    • Hypothyroidism (acquired)    • ICD (implantable cardioverter-defibrillator), dual, in situ 7/22/2019   • Nonrheumatic aortic valve insufficiency    • Nonrheumatic mitral valve regurgitation    • S/P AVR (aortic valve replacement)    • S/P CABG x 3    • Type 2 diabetes mellitus without complication, without long-term current use of insulin ([facility])        Past surgical History:  Past Surgical History:   Procedure Laterality Date   • AORTIC VALVE REPAIR/REPLACEMENT N/A 12/27/2019    Procedure: AORTIC VALVE REPAIR/REPLACEMENT;  Surgeon: Lane Stock MD;  Location: [facility] CVOR;  Service: Cardiothoracic   • BREAST LUMPECTOMY Right     BENIGN   • CARDIAC CATHETERIZATION N/A 12/24/2019    Procedure: Right and Left Heart Cath 12/24/19 @ 0900;  Surgeon: Wayne Luna MD;  Location: [facility] CATH INVASIVE LOCATION;  Service: Cardiovascular   • CARDIAC CATHETERIZATION N/A 12/24/2019    Procedure: Coronary angiography;  Surgeon: Wayne Luna MD;  Location: [facility] CATH INVASIVE LOCATION;  Service: Cardiovascular   • CARDIAC CATHETERIZATION N/A 11/10/2020    Procedure: Left and right Heart Cath;  Surgeon: Wayne Luna MD;  Location: [facility] CATH INVASIVE LOCATION;  Service: Cardiovascular;  Laterality: N/A;   • CARDIAC CATHETERIZATION N/A 11/10/2020    Procedure: Coronary angiography;  Surgeon: Wayne Luna MD;  Location: [facility] CATH INVASIVE LOCATION;  Service: Cardiovascular;  Laterality: N/A;   • CARDIAC CATHETERIZATION N/A 11/10/2020    Procedure: Right Heart Cath;  Surgeon: Wayne Luna MD;  Location: [facility] CATH INVASIVE LOCATION;  Service: Cardiovascular;  Laterality: N/A;   • CARDIAC CATHETERIZATION N/A 11/25/2020    Procedure: Percutaneous coronary intervention of the left circumflex artery;  Surgeon: Wayne Luna MD;  Location:   [facility] CATH INVASIVE LOCATION;  Service: Cardiovascular;  Laterality: N/A;   • CARDIAC CATHETERIZATION N/A 1/22/2021    Procedure: LEFT HEART CATH with possible PCI;  Surgeon: Wayne Luna MD;  Location: [facility] CATH INVASIVE LOCATION;  Service: Cardiovascular;  Laterality: N/A;  Local and IV sedation   • CARDIAC CATHETERIZATION N/A 1/22/2021    Procedure: Coronary angiography;  Surgeon: Wayne Luna MD;  Location: [facility] CATH INVASIVE LOCATION;  Service: Cardiovascular;  Laterality: N/A;   • CARDIAC CATHETERIZATION N/A 1/22/2021    Procedure: Saphenous Vein Graft;  Surgeon: Wayne Luna MD;  Location: [facility] CATH INVASIVE LOCATION;  Service: Cardiovascular;  Laterality: N/A;   • CARDIAC ELECTROPHYSIOLOGY PROCEDURE Left 6/28/2019    Procedure: Dual-chamber ICD insertion;  Surgeon: Héctor Eckert MD;  Location: [facility] CATH INVASIVE LOCATION;  Service: Cardiovascular   • CARDIAC ELECTROPHYSIOLOGY PROCEDURE Left 8/14/2019    Procedure: Lead Revision;  Surgeon: Héctor Eckert MD;  Location: [facility] CATH INVASIVE LOCATION;  Service: Cardiovascular   • CARDIAC SURGERY     • CHOLECYSTECTOMY     • CORONARY ARTERY BYPASS GRAFT N/A 12/27/2019    Procedure: CORONARY ARTERY BYPASS GRAFTING;  Surgeon: Lane Stock MD;  [facility];  Service: Cardiothoracic   • HYSTERECTOMY     • INSERT / REPLACE / REMOVE PACEMAKER     • LYMPHADENECTOMY Bilateral    • THYROID SURGERY         Social History:  Social History     Socioeconomic History   • Marital status:    Tobacco Use   • Smoking status: Former     Types: Cigarettes     Quit date: 01/2019     Years since quitting: 3.9   • Smokeless tobacco: Never   Vaping Use   • Vaping Use: Never used   Substance and Sexual Activity   • Alcohol use: No   • Drug use: No   • Sexual activity: Defer       Review of Systems:  The following systems were reviewed as they relate to the cardiovascular system: Constitutional, Eyes, ENT,  Cardiovascular, Respiratory, Gastrointestinal, Integumentary, Neurological, Psychiatric, Hematologic, Endocrine, Musculoskeletal, and Genitourinary. The pertinent cardiovascular findings are reported above with all other cardiovascular points within those systems being negative.    Diagnostic Study Review:     Current Electrocardiogram:  Procedures      NOTE: The following portions of the patient's history were reviewed and updated this visit as appropriate: allergies, current medications, past family history, past medical history, past social history, past surgical history and problem list.

## 2022-11-30 ENCOUNTER — HOSPITAL ENCOUNTER (OUTPATIENT)
Dept: CARDIOLOGY | Facility: HOSPITAL | Age: 47
Discharge: HOME | End: 2022-11-30
Admitting: INTERNAL MEDICINE

## 2022-11-30 VITALS
SYSTOLIC BLOOD PRESSURE: 131 MMHG | HEART RATE: 86 BPM | DIASTOLIC BLOOD PRESSURE: 85 MMHG | WEIGHT: 189 LBS | BODY MASS INDEX: 29.66 KG/M2 | HEIGHT: 67 IN

## 2022-11-30 DIAGNOSIS — I34.0 NONRHEUMATIC MITRAL VALVE REGURGITATION: ICD-10-CM

## 2022-11-30 DIAGNOSIS — Z95.2 S/P AVR (AORTIC VALVE REPLACEMENT): ICD-10-CM

## 2022-11-30 DIAGNOSIS — Z95.1 S/P CABG X 3: ICD-10-CM

## 2022-11-30 DIAGNOSIS — I35.1 NONRHEUMATIC AORTIC VALVE INSUFFICIENCY: ICD-10-CM

## 2022-11-30 PROCEDURE — 93306 TTE W/DOPPLER COMPLETE: CPT

## 2022-11-30 PROCEDURE — 93306 TTE W/DOPPLER COMPLETE: CPT | Performed by: INTERNAL MEDICINE

## 2022-12-02 LAB
BH CV ECHO MEAS - AO MAX PG: 31.7 MMHG
BH CV ECHO MEAS - AO MEAN PG: 19.3 MMHG
BH CV ECHO MEAS - AO V2 MAX: 281.5 CM/SEC
BH CV ECHO MEAS - AO V2 VTI: 56.2 CM
BH CV ECHO MEAS - AVA(I,D): 0.64 CM2
BH CV ECHO MEAS - EDV(CUBED): 65 ML
BH CV ECHO MEAS - EDV(MOD-SP4): 60.2 ML
BH CV ECHO MEAS - EF(MOD-BP): 55 %
BH CV ECHO MEAS - EF(MOD-SP4): 55.8 %
BH CV ECHO MEAS - ESV(CUBED): 18.1 ML
BH CV ECHO MEAS - ESV(MOD-SP4): 26.6 ML
BH CV ECHO MEAS - FS: 34.7 %
BH CV ECHO MEAS - IVS/LVPW: 1.03 CM
BH CV ECHO MEAS - IVSD: 1.41 CM
BH CV ECHO MEAS - LA DIMENSION: 4.2 CM
BH CV ECHO MEAS - LV DIASTOLIC VOL/BSA (35-75): 30.5 CM2
BH CV ECHO MEAS - LV MASS(C)D: 208.8 GRAMS
BH CV ECHO MEAS - LV MAX PG: 1.49 MMHG
BH CV ECHO MEAS - LV MEAN PG: 0.94 MMHG
BH CV ECHO MEAS - LV SYSTOLIC VOL/BSA (12-30): 13.5 CM2
BH CV ECHO MEAS - LV V1 MAX: 61.1 CM/SEC
BH CV ECHO MEAS - LV V1 VTI: 14.1 CM
BH CV ECHO MEAS - LVIDD: 4 CM
BH CV ECHO MEAS - LVIDS: 2.6 CM
BH CV ECHO MEAS - LVOT AREA: 2.6 CM2
BH CV ECHO MEAS - LVOT DIAM: 1.8 CM
BH CV ECHO MEAS - LVPWD: 1.37 CM
BH CV ECHO MEAS - MR MAX PG: 132.8 MMHG
BH CV ECHO MEAS - MR MAX VEL: 576.2 CM/SEC
BH CV ECHO MEAS - MV A MAX VEL: 88.3 CM/SEC
BH CV ECHO MEAS - MV DEC SLOPE: 260.9 CM/SEC2
BH CV ECHO MEAS - MV DEC TIME: 0.35 MSEC
BH CV ECHO MEAS - MV E MAX VEL: 90.6 CM/SEC
BH CV ECHO MEAS - MV E/A: 1.03
BH CV ECHO MEAS - MV MAX PG: 3.3 MMHG
BH CV ECHO MEAS - MV MEAN PG: 1.77 MMHG
BH CV ECHO MEAS - MV V2 VTI: 34.1 CM
BH CV ECHO MEAS - MVA(VTI): 1.06 CM2
BH CV ECHO MEAS - PA V2 MAX: 76.8 CM/SEC
BH CV ECHO MEAS - PI END-D VEL: 109 CM/SEC
BH CV ECHO MEAS - RV MAX PG: 0.94 MMHG
BH CV ECHO MEAS - RV V1 MAX: 48.3 CM/SEC
BH CV ECHO MEAS - RV V1 VTI: 10.3 CM
BH CV ECHO MEAS - RVDD: 2.01 CM
BH CV ECHO MEAS - SI(MOD-SP4): 17 ML/M2
BH CV ECHO MEAS - SV(LVOT): 36.1 ML
BH CV ECHO MEAS - SV(MOD-SP4): 33.6 ML
BH CV ECHO MEAS - TR MAX PG: 22 MMHG
BH CV ECHO MEAS - TR MAX VEL: 233.1 CM/SEC
LV EF 2D ECHO EST: 50 %
MAXIMAL PREDICTED HEART RATE: 171 BPM
STRESS TARGET HR: 145 BPM

## 2022-12-16 ENCOUNTER — HOSPITAL ENCOUNTER (EMERGENCY)
Facility: HOSPITAL | Age: 47
Discharge: HOME | End: 2022-12-16
Attending: EMERGENCY MEDICINE | Admitting: EMERGENCY MEDICINE

## 2022-12-16 ENCOUNTER — APPOINTMENT (OUTPATIENT)
Dept: GENERAL RADIOLOGY | Facility: HOSPITAL | Age: 47
End: 2022-12-16

## 2022-12-16 VITALS
RESPIRATION RATE: 18 BRPM | TEMPERATURE: 98.2 F | SYSTOLIC BLOOD PRESSURE: 135 MMHG | HEART RATE: 70 BPM | BODY MASS INDEX: 28.55 KG/M2 | DIASTOLIC BLOOD PRESSURE: 65 MMHG | OXYGEN SATURATION: 100 % | WEIGHT: 181.88 LBS | HEIGHT: 67 IN

## 2022-12-16 DIAGNOSIS — R51.9 NONINTRACTABLE HEADACHE, UNSPECIFIED CHRONICITY PATTERN, UNSPECIFIED HEADACHE TYPE: ICD-10-CM

## 2022-12-16 DIAGNOSIS — I10 HYPERTENSION, UNSPECIFIED TYPE: Primary | ICD-10-CM

## 2022-12-16 LAB
ALBUMIN SERPL-MCNC: 4.7 G/DL (ref 3.5–5.2)
ALBUMIN/GLOB SERPL: 1.5 G/DL
ALP SERPL-CCNC: 89 U/L (ref 39–117)
ALT SERPL W P-5'-P-CCNC: 17 U/L (ref 1–33)
ANION GAP SERPL CALCULATED.3IONS-SCNC: 16 MMOL/L (ref 5–15)
AST SERPL-CCNC: 28 U/L (ref 1–32)
BASOPHILS # BLD AUTO: 0 10*3/MM3 (ref 0–0.2)
BASOPHILS NFR BLD AUTO: 0.2 % (ref 0–1.5)
BILIRUB SERPL-MCNC: 0.6 MG/DL (ref 0–1.2)
BUN SERPL-MCNC: 15 MG/DL (ref 6–20)
BUN/CREAT SERPL: 13.8 (ref 7–25)
CALCIUM SPEC-SCNC: 9.6 MG/DL (ref 8.6–10.5)
CHLORIDE SERPL-SCNC: 100 MMOL/L (ref 98–107)
CO2 SERPL-SCNC: 21 MMOL/L (ref 22–29)
CREAT SERPL-MCNC: 1.09 MG/DL (ref 0.57–1)
DEPRECATED RDW RBC AUTO: 45.1 FL (ref 37–54)
EGFRCR SERPLBLD CKD-EPI 2021: 62.4 ML/MIN/1.73
EOSINOPHIL # BLD AUTO: 0 10*3/MM3 (ref 0–0.4)
EOSINOPHIL NFR BLD AUTO: 0.3 % (ref 0.3–6.2)
ERYTHROCYTE [DISTWIDTH] IN BLOOD BY AUTOMATED COUNT: 13.4 % (ref 12.3–15.4)
GLOBULIN UR ELPH-MCNC: 3.2 GM/DL
GLUCOSE SERPL-MCNC: 262 MG/DL (ref 65–99)
HCT VFR BLD AUTO: 45.3 % (ref 34–46.6)
HGB BLD-MCNC: 15.2 G/DL (ref 12–15.9)
HOLD SPECIMEN: NORMAL
LYMPHOCYTES # BLD AUTO: 0.8 10*3/MM3 (ref 0.7–3.1)
LYMPHOCYTES NFR BLD AUTO: 11.3 % (ref 19.6–45.3)
MCH RBC QN AUTO: 30.6 PG (ref 26.6–33)
MCHC RBC AUTO-ENTMCNC: 33.6 G/DL (ref 31.5–35.7)
MCV RBC AUTO: 91 FL (ref 79–97)
MONOCYTES # BLD AUTO: 0.1 10*3/MM3 (ref 0.1–0.9)
MONOCYTES NFR BLD AUTO: 1.8 % (ref 5–12)
NEUTROPHILS NFR BLD AUTO: 6.4 10*3/MM3 (ref 1.7–7)
NEUTROPHILS NFR BLD AUTO: 86.4 % (ref 42.7–76)
NRBC BLD AUTO-RTO: 0.1 /100 WBC (ref 0–0.2)
PLATELET # BLD AUTO: 231 10*3/MM3 (ref 140–450)
PMV BLD AUTO: 7.6 FL (ref 6–12)
POTASSIUM SERPL-SCNC: 3.8 MMOL/L (ref 3.5–5.2)
PROT SERPL-MCNC: 7.9 G/DL (ref 6–8.5)
RBC # BLD AUTO: 4.97 10*6/MM3 (ref 3.77–5.28)
SODIUM SERPL-SCNC: 137 MMOL/L (ref 136–145)
TROPONIN T SERPL-MCNC: <0.01 NG/ML (ref 0–0.03)
WBC NRBC COR # BLD: 7.4 10*3/MM3 (ref 3.4–10.8)
WHOLE BLOOD HOLD COAG: NORMAL

## 2022-12-16 PROCEDURE — 99283 EMERGENCY DEPT VISIT LOW MDM: CPT

## 2022-12-16 PROCEDURE — 84484 ASSAY OF TROPONIN QUANT: CPT | Performed by: NURSE PRACTITIONER

## 2022-12-16 PROCEDURE — 85025 COMPLETE CBC W/AUTO DIFF WBC: CPT | Performed by: NURSE PRACTITIONER

## 2022-12-16 PROCEDURE — 93005 ELECTROCARDIOGRAM TRACING: CPT | Performed by: NURSE PRACTITIONER

## 2022-12-16 PROCEDURE — 80053 COMPREHEN METABOLIC PANEL: CPT | Performed by: NURSE PRACTITIONER

## 2022-12-16 PROCEDURE — 71046 X-RAY EXAM CHEST 2 VIEWS: CPT

## 2022-12-16 RX ORDER — ASPIRIN 81 MG/1
324 TABLET, CHEWABLE ORAL ONCE
Status: COMPLETED | OUTPATIENT
Start: 2022-12-16 | End: 2022-12-16

## 2022-12-16 RX ORDER — SODIUM CHLORIDE 0.9 % (FLUSH) 0.9 %
10 SYRINGE (ML) INJECTION AS NEEDED
Status: DISCONTINUED | OUTPATIENT
Start: 2022-12-16 | End: 2022-12-16 | Stop reason: HOSPADM

## 2022-12-16 RX ORDER — AMLODIPINE BESYLATE 5 MG/1
5 TABLET ORAL
Status: DISCONTINUED | OUTPATIENT
Start: 2022-12-16 | End: 2022-12-16 | Stop reason: HOSPADM

## 2022-12-16 RX ORDER — CLONIDINE HYDROCHLORIDE 0.1 MG/1
0.1 TABLET ORAL ONCE
Status: COMPLETED | OUTPATIENT
Start: 2022-12-16 | End: 2022-12-16

## 2022-12-16 RX ADMIN — ASPIRIN 324 MG: 81 TABLET, CHEWABLE ORAL at 16:27

## 2022-12-16 RX ADMIN — AMLODIPINE BESYLATE 5 MG: 5 TABLET ORAL at 18:18

## 2022-12-16 RX ADMIN — CLONIDINE HYDROCHLORIDE 0.1 MG: 0.1 TABLET ORAL at 16:27

## 2022-12-16 NOTE — ED NOTES
Pt was at office for pain injection and had episode of hypertension   Pt hx of hypertension and is compliant with medication

## 2022-12-16 NOTE — ED PROVIDER NOTES
Subjective      Provider in Triage Note  47-year-old female presents with elevated blood pressure reading at the pain clinic.  She has a known history of hypertension.  She also complains of headache.  Denies any focal deficits.  She does report compliance with her blood pressure medication.    History of Present Illness  I interviewed the patient for HPI and agree with the nurse practitioner providing triage note as noted above  Review of Systems   Constitutional: Negative for chills and fever.   HENT: Negative for congestion and sore throat.    Eyes: Negative for visual disturbance.   Respiratory: Negative for cough and shortness of breath.    Cardiovascular: Negative for chest pain.   Gastrointestinal: Negative for abdominal pain, diarrhea and vomiting.   Endocrine: Negative for polyuria.   Genitourinary: Negative for dysuria and flank pain.   Musculoskeletal: Negative for back pain.   Neurological: Positive for headaches. Negative for dizziness.       Past Medical History:   Diagnosis Date   • Arrhythmia    • Asthma    • CAD (coronary artery disease)    • Cardiomyopathy, dilated (HCC)    • Chronic systolic congestive heart failure (HCC)    • CKD (chronic kidney disease) stage 2, GFR 60-89 ml/min    • COPD (chronic obstructive pulmonary disease) ([facility])    • Essential hypertension    • GERD without esophagitis    • Hidradenitis 10/26/2020   • Hyperlipidemia    • Hypothyroidism (acquired)    • ICD (implantable cardioverter-defibrillator), dual, in situ 7/22/2019   • Nonrheumatic aortic valve insufficiency    • Nonrheumatic mitral valve regurgitation    • S/P AVR (aortic valve replacement)    • S/P CABG x 3    • Type 2 diabetes mellitus without complication, without long-term current use of insulin ([facility])        Allergies   Allergen Reactions   • Hydrocodone Hives   • Penicillin G Unknown (See Comments)     Reaction occurred as a baby.      Penicillin interview complete 08/18/2022.   • Contrast Dye GI Intolerance      She is pretty sure it's the contrast dye that makes her super sick and vomiting after heart cath   • Gadolinium Derivatives Itching       Past Surgical History:   Procedure Laterality Date   • AORTIC VALVE REPAIR/REPLACEMENT N/A 12/27/2019    Procedure: AORTIC VALVE REPAIR/REPLACEMENT;  Surgeon: Lane Stock MD;  Location: [facility] CVOR;  Service: Cardiothoracic   • BREAST LUMPECTOMY Right     BENIGN   • CARDIAC CATHETERIZATION N/A 12/24/2019    Procedure: Right and Left Heart Cath 12/24/19 @ 0900;  Surgeon: Wayne Luna MD;  Location: [facility] CATH INVASIVE LOCATION;  Service: Cardiovascular   • CARDIAC CATHETERIZATION N/A 12/24/2019    Procedure: Coronary angiography;  Surgeon: Wayne Luna MD;  Location: [facility] CATH INVASIVE LOCATION;  Service: Cardiovascular   • CARDIAC CATHETERIZATION N/A 11/10/2020    Procedure: Left and right Heart Cath;  Surgeon: Wayne Luna MD;  Location: [facility] CATH INVASIVE LOCATION;  Service: Cardiovascular;  Laterality: N/A;   • CARDIAC CATHETERIZATION N/A 11/10/2020    Procedure: Coronary angiography;  Surgeon: Wayne Luna MD;  Location: [facility] CATH INVASIVE LOCATION;  Service: Cardiovascular;  Laterality: N/A;   • CARDIAC CATHETERIZATION N/A 11/10/2020    Procedure: Right Heart Cath;  Surgeon: Wayne Luna MD;  Location: [facility] CATH INVASIVE LOCATION;  Service: Cardiovascular;  Laterality: N/A;   • CARDIAC CATHETERIZATION N/A 11/25/2020    Procedure: Percutaneous coronary intervention of the left circumflex artery;  Surgeon: Wayne Luna MD;  Location: [facility] CATH INVASIVE LOCATION;  Service: Cardiovascular;  Laterality: N/A;   • CARDIAC CATHETERIZATION N/A 1/22/2021    Procedure: LEFT HEART CATH with possible PCI;  Surgeon: Wayne Luna MD;  Location: [facility] CATH INVASIVE LOCATION;  Service: Cardiovascular;  Laterality: N/A;  Local and IV sedation   • CARDIAC CATHETERIZATION N/A 1/22/2021    Procedure:  Coronary angiography;  Surgeon: Wayne Luna MD;  Location: [facility] CATH INVASIVE LOCATION;  Service: Cardiovascular;  Laterality: N/A;   • CARDIAC CATHETERIZATION N/A 1/22/2021    Procedure: Saphenous Vein Graft;  Surgeon: Wayne Luna MD;  Location: [facility] CATH INVASIVE LOCATION;  Service: Cardiovascular;  Laterality: N/A;   • CARDIAC ELECTROPHYSIOLOGY PROCEDURE Left 6/28/2019    Procedure: Dual-chamber ICD insertion;  Surgeon: Héctor Eckert MD;  Location: [facility] CATH INVASIVE LOCATION;  Service: Cardiovascular   • CARDIAC ELECTROPHYSIOLOGY PROCEDURE Left 8/14/2019    Procedure: Lead Revision;  Surgeon: Héctor Eckert MD;  Location: [facility] CATH INVASIVE LOCATION;  Service: Cardiovascular   • CARDIAC SURGERY     • CHOLECYSTECTOMY     • CORONARY ARTERY BYPASS GRAFT N/A 12/27/2019    Procedure: CORONARY ARTERY BYPASS GRAFTING;  Surgeon: Lane Stock MD;  Location: [facility] CVOR;  Service: Cardiothoracic   • HYSTERECTOMY     • INSERT / REPLACE / REMOVE PACEMAKER     • LYMPHADENECTOMY Bilateral    • THYROID SURGERY         Family History   Problem Relation Age of Onset   • Heart disease Father        Social History     Socioeconomic History   • Marital status:    Tobacco Use   • Smoking status: Former     Types: Cigarettes     Quit date: 01/2019     Years since quitting: 3.9   • Smokeless tobacco: Never   Vaping Use   • Vaping Use: Never used   Substance and Sexual Activity   • Alcohol use: No   • Drug use: No   • Sexual activity: Defer           Objective   Physical Exam  Neurologic exam is nonfocal.  HEENT exam shows TMs to be clear.  Oropharynx clear.  Neck has no adenopathy.  Lungs are clear.  Heart has regular rhythm without murmur.  Chest nontender.  Abdomen soft nontender.  Extremity exam is no cyanosis or edema.  Procedures       My EKG interpretation shows normal sinus rhythm at a rate of 70 with no acute ST change    ED Course      .tis  XR Chest 2 View    Result  Date: 12/16/2022  No change from the previous study with no evidence for acute cardiopulmonary process.  Electronically Signed By-Zak Han MD On:12/16/2022 4:08 PM This report was finalized on [id number] by  Zak Han MD.                                         MDM  Number of Diagnoses or Management Options  Diagnosis management comments: Chest x-ray interpretation shows no cardiomegaly fusion or infiltrate.  Metabolic panel is at baseline but there is no acute infectious process.  Patient was pain-free on reexamination.  Current blood pressure is 150/90 patient will be discharged to follow with MD for further outpatient evaluation.       Amount and/or Complexity of Data Reviewed  Clinical lab tests: reviewed  Tests in the radiology section of CPT®: reviewed  Tests in the medicine section of CPT®: reviewed    Risk of Complications, Morbidity, and/or Mortality  Presenting problems: high  Diagnostic procedures: high  Management options: high    Patient Progress  Patient progress: stable      Final diagnoses:   Hypertension, unspecified type   Nonintractable headache, unspecified chronicity pattern, unspecified headache type       ED Disposition  ED Disposition     ED Disposition   Discharge    Condition   Stable    Comment   --             No follow-up provider specified.       Medication List      No changes were made to your prescriptions during this visit.          Chidi Moses MD  12/16/22 [id number]

## 2022-12-18 LAB — QT INTERVAL: 423 MS

## 2023-01-18 RX ORDER — HYDRALAZINE HYDROCHLORIDE 25 MG/1
TABLET, FILM COATED ORAL
Qty: 270 TABLET | Refills: 0 | Status: SHIPPED | OUTPATIENT
Start: 2023-01-18

## 2023-03-07 ENCOUNTER — OFFICE VISIT (OUTPATIENT)
Dept: CARDIOLOGY | Facility: CLINIC | Age: 48
End: 2023-03-07
Payer: COMMERCIAL

## 2023-03-07 VITALS
BODY MASS INDEX: 29.98 KG/M2 | WEIGHT: 191 LBS | HEART RATE: 78 BPM | HEIGHT: 67 IN | SYSTOLIC BLOOD PRESSURE: 140 MMHG | OXYGEN SATURATION: 98 % | DIASTOLIC BLOOD PRESSURE: 96 MMHG

## 2023-03-07 DIAGNOSIS — I42.0 CARDIOMYOPATHY, DILATED: Chronic | ICD-10-CM

## 2023-03-07 DIAGNOSIS — I10 ESSENTIAL HYPERTENSION: Chronic | ICD-10-CM

## 2023-03-07 DIAGNOSIS — Z95.2 S/P AVR (AORTIC VALVE REPLACEMENT): ICD-10-CM

## 2023-03-07 DIAGNOSIS — I25.118 CORONARY ARTERY DISEASE OF NATIVE ARTERY OF NATIVE HEART WITH STABLE ANGINA PECTORIS: Chronic | ICD-10-CM

## 2023-03-07 DIAGNOSIS — Z95.810 ICD (IMPLANTABLE CARDIOVERTER-DEFIBRILLATOR), DUAL, IN SITU: Primary | Chronic | ICD-10-CM

## 2023-03-07 DIAGNOSIS — I34.0 NONRHEUMATIC MITRAL VALVE REGURGITATION: Chronic | ICD-10-CM

## 2023-03-07 PROCEDURE — 99214 OFFICE O/P EST MOD 30 MIN: CPT | Performed by: INTERNAL MEDICINE

## 2023-03-07 NOTE — PROGRESS NOTES
Cardiology Office Visit      Encounter Date:  03/07/2023    Patient ID:   Rafael Nunes is a 48 y.o. female.      Reason For Followup:  Cardiomyopathy  Hypertension  Hyperlipidemia  Valvular heart disease      Brief Clinical History:  Dear Dr. Adames, Phani Ennis MD    I had the pleasure of seeing Rafael Mccann today. As you are well aware, this is a 48 y.o. female with a past medical history that is significant for cardiomyopathy and valvular heart disease         Interval History:  Patient clinically feels better denies any new cardiac symptoms  Denies any further symptoms of chest pain shortness of breath dizziness or syncope  Compliant with medical therapy  Tolerating medications very well        Impressions:/Cardiac catheterization January 2021  Native severe two-vessel coronary artery disease  No significant obstructive disease involving the LAD system  Patent vein graft to the diagonal  Patent stent in the left circumflex and marginal branch of the circumflex  The circumflex coronary artery provides collaterals to the PDA branch of the right coronary artery  Diffuse disease involving the right coronary artery unchanged from before     Interpretation Summary/January 2021    · The left ventricular cavity is moderately dilated.  · Estimated left ventricular EF = 20% Left ventricular systolic function is severely decreased.  · The right ventricular cavity is mildly dilated.  · Mildly reduced right ventricular systolic function noted.  · There is a bioprosthetic aortic valve present.  · Moderate to severe mitral valve regurgitation is present.  · Estimated right ventricular systolic pressure from tricuspid regurgitation is normal (<35 mmHg)..     Results for orders placed during the hospital encounter of 11/30/22    Adult Transthoracic Echo Complete W/ Cont if Necessary Per Protocol    Interpretation Summary  •  Left ventricular systolic function is normal. Left ventricular ejection fraction  appears to be 46 - 50%.  •  Left ventricular wall thickness is consistent with mild to moderate concentric hypertrophy.  •  Left ventricular diastolic function is consistent with (grade I) impaired relaxation.  •  The right ventricular cavity is mildly dilated.  •  There is a bioprosthetic aortic valve present.  •  Estimated right ventricular systolic pressure from tricuspid regurgitation is normal (<35 mmHg).  •  Mild dilation of the aortic root is present.    Assessment & Plan    Impressions:  Essential hypertension  Chronic systolic heart failure  History of cardiomyopathy   Chronic renal insufficiency  Coronary artery disease   History of diabetes mellitus type 2  History of thyroid disease  Moderate to severe mitral regurgitation  Moderate to severe aortic regurgitation  Cardiomyopathy with recovery of LV systolic function  s/p AICD placement/normal device function  s/p urgent AVR (21 mm Magna), CABG x3 with SV to Diag1 and SV sequential to PDA and then OM3 12/27/2019  Status post coronary artery bypass surgery x3 and aortic valve replacement surgery  Congestive heart failure NYHA class 4  Mild to moderate mitral regurgitation  NYHA Functional Class: III  Stage: C heart failure  Chronic renal insufficiency    Recommendations:  Continue dual antiplatelet therapy with aspirin Plavix  Medical management for obstructive coronary artery disease  Optimize medical therapy for cardiomyopathy   Continue follow-up with advanced heart failure  Recent evaluation at [facility] with a repeat echocardiogram with improvement in the mitral regurgitation  Recent labs reviewed and discussed with the patient  Continue current dose high statin and Zetia and add a PCSK9 inhibitor  Risk benefits and alternatives reviewed and discussed with the patient  Continue dual antiplatelet therapy with aspirin and Plavix  Medications reviewed and discussed with the patient  continue to optimize her HF therapies before we consider advanced  "therapies  Continue hydralazine 25 mg p.o. 3 times a day  Repeat echocardiogram to assess the mitral regurgitation  Patient current medications include Praluent 75 mg subcutaneous every 2 weeks patient is on Norvasc 10 mg p.o. once a day aspirin 81 mg p.o. once a day Lipitor 40 mg p.o. once a day Coreg 6.25 mg p.o. twice daily Plavix 75 mg p.o. once a day Farxiga 10 mg p.o. once a day patient is on spironolactone 25 mg p.o. once a day patient is on Demadex for diuretic therapy patient is on hydralazine 25 mg p.o. 3 times a day patient is currently on Entresto 97 x 100 and 1/2 a tablet by mouth twice a day  Close monitoring of blood pressure at home   Chronic renal failure with stable renal function  Most recent echocardiogram with near normal LV systolic function  Normal functioning of the bioprosthetic arctic valve  Only mild to moderate mitral regurgitation  She is having her device checked through [facility] at this time  Follow-up in office in 6 months    Objective:    Vitals:  Vitals:    03/07/23 1552   BP: 140/96   Pulse: 78   SpO2: 98%   Weight: 86.6 kg (191 lb)   Height: 170.2 cm (67\")       Physical Exam:    General: Alert, cooperative, no distress, appears stated age  Head:  Normocephalic, atraumatic, mucous membranes moist  Eyes:  Conjunctiva/corneas clear, EOM's intact     Neck:  Supple,  no adenopathy;      Lungs: Clear to auscultation bilaterally, no wheezes rhonchi rales are noted  Chest wall: No tenderness  Heart::  Regular rate and rhythm, S1 and S2 normal, no murmur, rub or gallop  Abdomen: Soft, non-tender, nondistended bowel sounds active  Extremities: No cyanosis, clubbing, or edema  Pulses: 2+ and symmetric all extremities  Skin:  No rashes or lesions  Neuro/psych: A&O x3. CN II through XII are grossly intact with appropriate affect      Allergies:  Allergies   Allergen Reactions   • Hydrocodone Hives   • Penicillin G Unknown (See Comments)     Reaction occurred as a baby.      Penicillin " interview complete 08/18/2022.   • Contrast Dye (Echo Or Unknown Ct/Mr) GI Intolerance     She is pretty sure it's the contrast dye that makes her super sick and vomiting after heart cath   • Gadolinium Derivatives Itching       Medication Review:     Current Outpatient Medications:   •  albuterol (PROVENTIL) (2.5 MG/3ML) 0.083% nebulizer solution, Take 2.5 mg by nebulization Every 4 (Four) Hours As Needed for Wheezing., Disp: 90 mL, Rfl: 0  •  Alirocumab (Praluent) 75 MG/ML solution auto-injector, Inject 1 mL under the skin into the appropriate area as directed., Disp: , Rfl:   •  allopurinol (ZYLOPRIM) 300 MG tablet, Take 1 tablet by mouth Daily., Disp: , Rfl:   •  amLODIPine (NORVASC) 10 MG tablet, , Disp: , Rfl:   •  aspirin 81 MG EC tablet, Take 1 tablet by mouth Daily., Disp: 30 tablet, Rfl: 6  •  atorvastatin (LIPITOR) 40 MG tablet, , Disp: , Rfl:   •  carvedilol (COREG) 6.25 MG tablet, Take 1 tablet by mouth 2 (Two) Times a Day With Meals., Disp: , Rfl:   •  cholecalciferol (VITAMIN D3) 1000 units tablet, Take 1 tablet by mouth Daily., Disp: , Rfl:   •  clopidogrel (PLAVIX) 75 MG tablet, Take 1 tablet by mouth Daily., Disp: 30 tablet, Rfl: 6  •  diphenhydrAMINE (BENADRYL) 25 mg capsule, Take 1 capsule by mouth Every 6 (Six) Hours As Needed for Itching., Disp: , Rfl:   •  docusate sodium (COLACE) 100 MG capsule, Take 1 capsule by mouth 2 (Two) Times a Day As Needed for Constipation., Disp: , Rfl:   •  Farxiga 10 MG tablet, , Disp: , Rfl:   •  febuxostat (ULORIC) 80 MG tablet tablet, Take 1 tablet by mouth Daily., Disp: , Rfl:   •  fenofibrate 160 MG tablet, , Disp: , Rfl:   •  ferrous sulfate 325 (65 FE) MG tablet, Take 1 tablet by mouth Daily With Breakfast., Disp: , Rfl:   •  fluconazole (DIFLUCAN) 50 MG tablet, Take 1 tablet by mouth., Disp: , Rfl:   •  folic acid (FOLVITE) 1 MG tablet, Take 1 tablet by mouth Daily., Disp: , Rfl:   •  gabapentin (NEURONTIN) 300 MG capsule, As Needed., Disp: , Rfl:   •   hydrALAZINE (APRESOLINE) 25 MG tablet, TAKE 1 TABLET BY MOUTH THREE TIMES A DAY, Disp: 270 tablet, Rfl: 0  •  hydrOXYzine pamoate (VISTARIL) 50 MG capsule, , Disp: , Rfl:   •  levothyroxine (SYNTHROID, LEVOTHROID) 150 MCG tablet, , Disp: , Rfl:   •  lidocaine (LIDODERM) 5 %, , Disp: , Rfl:   •  methocarbamol (ROBAXIN) 500 MG tablet, Take 1 tablet by mouth Every 8 (Eight) Hours As Needed for Muscle Spasms., Disp: 30 tablet, Rfl: 0  •  multivitamin (THERAGRAN) tablet tablet, Take 1 tablet by mouth Daily., Disp: , Rfl:   •  oxyCODONE-acetaminophen (PERCOCET) 7.5-325 MG per tablet, Take 1 tablet by mouth Every 6 (Six) Hours As Needed for Moderate Pain., Disp: , Rfl:   •  pantoprazole (PROTONIX) 40 MG EC tablet, Take 1 tablet by mouth Daily., Disp: , Rfl:   •  potassium chloride 10 MEQ CR tablet, Take 2 tablets by mouth 2 (Two) Times a Day., Disp: , Rfl:   •  predniSONE (DELTASONE) 20 MG tablet, Take 2 tablets by mouth Daily., Disp: , Rfl:   •  torsemide (DEMADEX) 20 MG tablet, Take 3 tablets by mouth Daily. Hold if persistent diarrhea, Disp: , Rfl:   •  vitamin B-12 (CYANOCOBALAMIN) 1000 MCG tablet, Take 1 tablet by mouth Daily., Disp: , Rfl:   •  Alcohol Swabs (Alcohol Prep) 70 % pads, Apply 1 each topically Daily., Disp: 100 each, Rfl: 2  •  glucose blood (OneTouch Verio) test strip, Use one strip to test blood sugar once daily as directed.  Dx code: E11.9, Disp: 100 each, Rfl: 2  •  Lancets (OneTouch Delica Plus [id number]) misc, 1 each Daily. Dx code: E11.9, Disp: 100 each, Rfl: 2  •  nitroglycerin (NITROSTAT) 0.4 MG SL tablet, Place 1 tablet under the tongue Every 5 (Five) Minutes As Needed for Chest Pain. Take no more than 3 doses in 15 minutes. Call 911 if more than 3 doses needed. , Disp: 25 tablet, Rfl: 0    Family History:  Family History   Problem Relation Age of Onset   • Heart disease Father        Past Medical History:  Past Medical History:   Diagnosis Date   • Arrhythmia    • Asthma    • CAD (coronary  artery disease)    • Cardiomyopathy, dilated ([facility])    • Chronic systolic congestive heart failure ([facility])    • CKD (chronic kidney disease) stage 2, GFR 60-89 ml/min    • COPD (chronic obstructive pulmonary disease) ([facility])    • Essential hypertension    • GERD without esophagitis    • Hidradenitis 10/26/2020   • Hyperlipidemia    • Hypothyroidism (acquired)    • ICD (implantable cardioverter-defibrillator), dual, in situ 7/22/2019   • Nonrheumatic aortic valve insufficiency    • Nonrheumatic mitral valve regurgitation    • S/P AVR (aortic valve replacement)    • S/P CABG x 3    • Type 2 diabetes mellitus without complication, without long-term current use of insulin ([facility])        Past surgical History:  Past Surgical History:   Procedure Laterality Date   • AORTIC VALVE REPAIR/REPLACEMENT N/A 12/27/2019    Procedure: AORTIC VALVE REPAIR/REPLACEMENT;  Surgeon: Lane Stock MD;  Location: [facility] CVOR;  Service: Cardiothoracic   • BREAST LUMPECTOMY Right     BENIGN   • CARDIAC CATHETERIZATION N/A 12/24/2019    Procedure: Right and Left Heart Cath 12/24/19 @ 0900;  Surgeon: Wayne Luna MD;  Location: [facility] CATH INVASIVE LOCATION;  Service: Cardiovascular   • CARDIAC CATHETERIZATION N/A 12/24/2019    Procedure: Coronary angiography;  Surgeon: Wayne Luna MD;  Location: [facility] CATH INVASIVE LOCATION;  Service: Cardiovascular   • CARDIAC CATHETERIZATION N/A 11/10/2020    Procedure: Left and right Heart Cath;  Surgeon: Wayne Luna MD;  Location: [facility] CATH INVASIVE LOCATION;  Service: Cardiovascular;  Laterality: N/A;   • CARDIAC CATHETERIZATION N/A 11/10/2020    Procedure: Coronary angiography;  Surgeon: Wayne Luna MD;  Location: [facility] CATH INVASIVE LOCATION;  Service: Cardiovascular;  Laterality: N/A;   • CARDIAC CATHETERIZATION N/A 11/10/2020    Procedure: Right Heart Cath;  Surgeon: Wayne Luna MD;  Location: [facility] CATH INVASIVE LOCATION;  Service:  Cardiovascular;  Laterality: N/A;   • CARDIAC CATHETERIZATION N/A 2020    Procedure: Percutaneous coronary intervention of the left circumflex artery;  Surgeon: Wayne Luna MD;  Location: [facility] CATH INVASIVE LOCATION;  Service: Cardiovascular;  Laterality: N/A;   • CARDIAC CATHETERIZATION N/A 2021    Procedure: LEFT HEART CATH with possible PCI;  Surgeon: Wyane Luna MD;  Location: [facility] CATH INVASIVE LOCATION;  Service: Cardiovascular;  Laterality: N/A;  Local and IV sedation   • CARDIAC CATHETERIZATION N/A 2021    Procedure: Coronary angiography;  Surgeon: Wayne Luna MD;  Location: [facility] CATH INVASIVE LOCATION;  Service: Cardiovascular;  Laterality: N/A;   • CARDIAC CATHETERIZATION N/A 2021    Procedure: Saphenous Vein Graft;  Surgeon: Wayne Luna MD;  Location: [facility] CATH INVASIVE LOCATION;  Service: Cardiovascular;  Laterality: N/A;   • CARDIAC ELECTROPHYSIOLOGY PROCEDURE Left 2019    Procedure: Dual-chamber ICD insertion;  Surgeon: Héctor Eckert MD;  Location: [facility] CATH INVASIVE LOCATION;  Service: Cardiovascular   • CARDIAC ELECTROPHYSIOLOGY PROCEDURE Left 2019    Procedure: Lead Revision;  Surgeon: Héctor Eckert MD;  Location: [facility] CATH INVASIVE LOCATION;  Service: Cardiovascular   • CARDIAC SURGERY     • CHOLECYSTECTOMY     • CORONARY ARTERY BYPASS GRAFT N/A 2019    Procedure: CORONARY ARTERY BYPASS GRAFTING;  Surgeon: Lane Stock MD;  [facility];  Service: Cardiothoracic   • HYSTERECTOMY     • INSERT / REPLACE / REMOVE PACEMAKER     • LYMPHADENECTOMY Bilateral    • THYROID SURGERY         Social History:  Social History     Socioeconomic History   • Marital status:    Tobacco Use   • Smoking status: Former     Types: Cigarettes     Quit date: 2019     Years since quittin.1   • Smokeless tobacco: Never   Vaping Use   • Vaping Use: Never used   Substance and Sexual Activity    • Alcohol use: No   • Drug use: No   • Sexual activity: Defer       Review of Systems:  The following systems were reviewed as they relate to the cardiovascular system: Constitutional, Eyes, ENT, Cardiovascular, Respiratory, Gastrointestinal, Integumentary, Neurological, Psychiatric, Hematologic, Endocrine, Musculoskeletal, and Genitourinary. The pertinent cardiovascular findings are reported above with all other cardiovascular points within those systems being negative.    Diagnostic Study Review:     Current Electrocardiogram:  Procedures      NOTE: The following portions of the patient's history were reviewed and updated this visit as appropriate: allergies, current medications, past family history, past medical history, past social history, past surgical history and problem list.

## 2023-12-05 ENCOUNTER — APPOINTMENT (OUTPATIENT)
Dept: GENERAL RADIOLOGY | Facility: HOSPITAL | Age: 48
End: 2023-12-05
Payer: MEDICARE

## 2023-12-05 ENCOUNTER — HOSPITAL ENCOUNTER (OUTPATIENT)
Facility: HOSPITAL | Age: 48
Discharge: HOME | End: 2023-12-05
Attending: EMERGENCY MEDICINE | Admitting: EMERGENCY MEDICINE
Payer: COMMERCIAL

## 2023-12-05 VITALS
TEMPERATURE: 98 F | HEIGHT: 67 IN | OXYGEN SATURATION: 98 % | RESPIRATION RATE: 16 BRPM | WEIGHT: 195.4 LBS | BODY MASS INDEX: 30.67 KG/M2 | HEART RATE: 88 BPM | DIASTOLIC BLOOD PRESSURE: 98 MMHG | SYSTOLIC BLOOD PRESSURE: 188 MMHG

## 2023-12-05 DIAGNOSIS — J40 BRONCHITIS: Primary | ICD-10-CM

## 2023-12-05 LAB
FLUAV SUBTYP SPEC NAA+PROBE: NOT DETECTED
FLUBV RNA ISLT QL NAA+PROBE: NOT DETECTED
SARS-COV-2 RNA RESP QL NAA+PROBE: NOT DETECTED
STREP A PCR: NOT DETECTED

## 2023-12-05 PROCEDURE — 71046 X-RAY EXAM CHEST 2 VIEWS: CPT

## 2023-12-05 PROCEDURE — 71046 X-RAY EXAM CHEST 2 VIEWS: CPT | Performed by: EMERGENCY MEDICINE

## 2023-12-05 PROCEDURE — G0463 HOSPITAL OUTPT CLINIC VISIT: HCPCS | Performed by: NURSE PRACTITIONER

## 2023-12-05 PROCEDURE — 99203 OFFICE O/P NEW LOW 30 MIN: CPT | Performed by: NURSE PRACTITIONER

## 2023-12-05 PROCEDURE — EDLOS: Performed by: NURSE PRACTITIONER

## 2023-12-05 PROCEDURE — 87651 STREP A DNA AMP PROBE: CPT | Performed by: EMERGENCY MEDICINE

## 2023-12-05 PROCEDURE — 87636 SARSCOV2 & INF A&B AMP PRB: CPT | Performed by: EMERGENCY MEDICINE

## 2023-12-05 RX ORDER — METHYLPREDNISOLONE 4 MG/1
TABLET ORAL
Qty: 21 TABLET | Refills: 0 | Status: SHIPPED | OUTPATIENT
Start: 2023-12-05

## 2023-12-05 RX ORDER — BENZONATATE 100 MG/1
100 CAPSULE ORAL 3 TIMES DAILY PRN
Qty: 30 CAPSULE | Refills: 0 | Status: SHIPPED | OUTPATIENT
Start: 2023-12-05

## 2023-12-05 RX ORDER — ALBUTEROL SULFATE 90 UG/1
2 AEROSOL, METERED RESPIRATORY (INHALATION) EVERY 4 HOURS PRN
Qty: 1 G | Refills: 0 | Status: SHIPPED | OUTPATIENT
Start: 2023-12-05

## 2023-12-05 NOTE — FSED PROVIDER NOTE
EMERGENCY DEPARTMENT ENCOUNTER    Room Number:    Date seen:  2023  Time seen: 14:44 EST  PCP: Phani Adames MD  Historian:     HPI:  Chief complaint:cough x 1 week  Context:Rafael Nunes is a 48 y.o. female who presents to the ED with c/o cough x 1 week, reports the cough has worsened over the last 3 days, denies any fever, n/v/d, abdominal pain, difficulty breathing, pt denies any other complaints or concerns, reports she has a family member at home who has RSV    Timin week  Duration: constant  Location:cough  Radiation:nonradiating  Quality:aching  Intensity/Severity:2 out of 10  Associated Symptoms:none  Aggravating Factors:none  Alleviating Factors:none  Previous Episodes:none  Treatment before arrival:none    The patient was placed in a mask in triage, hand hygiene was performed before and after my interaction with the patient.  I wore a mask, safety glasses and gloves during my entire interaction with the patient.    MEDICAL RECORD REVIEW  yes    ALLERGIES  Hydrocodone, Penicillin g, Contrast dye (echo or unknown ct/mr), and Gadolinium derivatives    PAST MEDICAL HISTORY  Active Ambulatory Problems     Diagnosis Date Noted    COPD (chronic obstructive pulmonary disease) 2019    Essential hypertension 2019    Cardiomyopathy, dilated 2019    ICD (implantable cardioverter-defibrillator), dual, in situ 2019    GERD without esophagitis 2019    Hypothyroidism (acquired) 2019    Coronary artery disease of native artery of native heart with stable angina pectoris 2019    S/P AVR (aortic valve replacement) 2020    S/P CABG x 3 2020    Dyspnea 2020    CKD (chronic kidney disease) stage 2, GFR 60-89 ml/min 2020    Nonrheumatic mitral valve regurgitation 10/20/2020    Cellulitis of left axilla 10/26/2020    Cellulitis of right axilla 10/26/2020    Hidradenitis 10/26/2020    Type 2 diabetes mellitus without complication,  without long-term current use of insulin 01/19/2021    Chronic systolic congestive heart failure 02/23/2021    Hypokalemia 05/31/2021    Dizziness with near syncope 05/31/2021    Acute diarrhea 05/31/2021    Mixed hyperlipidemia 04/08/2021    INDRA (obstructive sleep apnea) 04/08/2021    Chronic pain syndrome 05/31/2021    Intractable low back pain 08/16/2022    Stage 3a chronic kidney disease 05/16/2020     Resolved Ambulatory Problems     Diagnosis Date Noted    Acute on chronic systolic congestive heart failure 06/22/2019    Displacement of atrial pacemaker leads 07/22/2019    Palpitations 08/13/2019    Chest pain 12/23/2019    Aortic valve regurgitation 12/22/2019    Unstable angina pectoris 12/22/2019    Nonrheumatic aortic valve insufficiency 12/22/2019    Chest pain due to CAD 01/18/2021     Past Medical History:   Diagnosis Date    Arrhythmia     Asthma     CAD (coronary artery disease)     Hyperlipidemia        PAST SURGICAL HISTORY  Past Surgical History:   Procedure Laterality Date    AORTIC VALVE REPAIR/REPLACEMENT N/A 12/27/2019    Procedure: AORTIC VALVE REPAIR/REPLACEMENT;  Surgeon: Lane Stock MD;  Location: [facility];  Service: Cardiothoracic    BREAST LUMPECTOMY Right     BENIGN    CARDIAC CATHETERIZATION N/A 12/24/2019    Procedure: Right and Left Heart Cath 12/24/19 @ 0900;  Surgeon: Wayne Luna MD;  Location: [facility] CATH INVASIVE LOCATION;  Service: Cardiovascular    CARDIAC CATHETERIZATION N/A 12/24/2019    Procedure: Coronary angiography;  Surgeon: Wayne Luna MD;  Location: [facility] CATH INVASIVE LOCATION;  Service: Cardiovascular    CARDIAC CATHETERIZATION N/A 11/10/2020    Procedure: Left and right Heart Cath;  Surgeon: Wayne Luna MD;  Location: [facility] CATH INVASIVE LOCATION;  Service: Cardiovascular;  Laterality: N/A;    CARDIAC CATHETERIZATION N/A 11/10/2020    Procedure: Coronary angiography;  Surgeon: Wayne Luna MD;  Location:   [facility] CATH INVASIVE LOCATION;  Service: Cardiovascular;  Laterality: N/A;    CARDIAC CATHETERIZATION N/A 11/10/2020    Procedure: Right Heart Cath;  Surgeon: Wayne Luna MD;  Location: [facility] CATH INVASIVE LOCATION;  Service: Cardiovascular;  Laterality: N/A;    CARDIAC CATHETERIZATION N/A 11/25/2020    Procedure: Percutaneous coronary intervention of the left circumflex artery;  Surgeon: Wayne Luna MD;  Location: [facility] CATH INVASIVE LOCATION;  Service: Cardiovascular;  Laterality: N/A;    CARDIAC CATHETERIZATION N/A 1/22/2021    Procedure: LEFT HEART CATH with possible PCI;  Surgeon: Wayne Luna MD;  Location: [facility] CATH INVASIVE LOCATION;  Service: Cardiovascular;  Laterality: N/A;  Local and IV sedation    CARDIAC CATHETERIZATION N/A 1/22/2021    Procedure: Coronary angiography;  Surgeon: Wayne Luna MD;  Location: [facility] CATH INVASIVE LOCATION;  Service: Cardiovascular;  Laterality: N/A;    CARDIAC CATHETERIZATION N/A 1/22/2021    Procedure: Saphenous Vein Graft;  Surgeon: Wayne Luna MD;  Location: [facility] CATH INVASIVE LOCATION;  Service: Cardiovascular;  Laterality: N/A;    CARDIAC ELECTROPHYSIOLOGY PROCEDURE Left 6/28/2019    Procedure: Dual-chamber ICD insertion;  Surgeon: Héctor Eckert MD;  Location: [facility] CATH INVASIVE LOCATION;  Service: Cardiovascular    CARDIAC ELECTROPHYSIOLOGY PROCEDURE Left 8/14/2019    Procedure: Lead Revision;  Surgeon: Héctor Eckert MD;  Location: [facility] CATH INVASIVE LOCATION;  Service: Cardiovascular    CARDIAC SURGERY      CHOLECYSTECTOMY      CORONARY ARTERY BYPASS GRAFT N/A 12/27/2019    Procedure: CORONARY ARTERY BYPASS GRAFTING;  Surgeon: Lane Stock MD;  Location: [facility] CVOR;  Service: Cardiothoracic    HYSTERECTOMY      INSERT / REPLACE / REMOVE PACEMAKER      LYMPHADENECTOMY Bilateral     THYROID SURGERY         FAMILY HISTORY  Family History   Problem Relation Age of Onset    Heart disease  Father        SOCIAL HISTORY  Social History     Socioeconomic History    Marital status:    Tobacco Use    Smoking status: Former     Types: Cigarettes     Quit date: 2019     Years since quittin.9    Smokeless tobacco: Never   Vaping Use    Vaping Use: Never used   Substance and Sexual Activity    Alcohol use: No    Drug use: No    Sexual activity: Defer       REVIEW OF SYSTEMS  Review of Systems    All systems reviewed and negative except for those discussed in HPI.     PHYSICAL EXAM    I have reviewed the triage vital signs and nursing notes.    ED Triage Vitals [23 1344]   Temp Heart Rate Resp BP SpO2   97.9 °F (36.6 °C) 82 16 (!) 180/105 98 %      Temp src Heart Rate Source Patient Position BP Location FiO2 (%)   Tympanic Monitor Sitting Left arm --       Physical Exam  Constitutional:       Appearance: Normal appearance.   HENT:      Head: Normocephalic and atraumatic.      Nose: Nose normal. No congestion or rhinorrhea.      Mouth/Throat:      Mouth: Mucous membranes are moist.      Pharynx: Oropharynx is clear. No oropharyngeal exudate or posterior oropharyngeal erythema.   Eyes:      Extraocular Movements: Extraocular movements intact.      Conjunctiva/sclera: Conjunctivae normal.      Pupils: Pupils are equal, round, and reactive to light.   Neck:      Vascular: No carotid bruit.   Cardiovascular:      Rate and Rhythm: Normal rate.      Pulses: Normal pulses.      Heart sounds: Normal heart sounds. No murmur heard.     No friction rub. No gallop.   Pulmonary:      Effort: Pulmonary effort is normal. No respiratory distress.      Breath sounds: Normal breath sounds. No stridor. No wheezing, rhonchi or rales.   Chest:      Chest wall: No tenderness.   Abdominal:      General: Bowel sounds are normal.      Palpations: Abdomen is soft.   Musculoskeletal:         General: Normal range of motion.      Cervical back: Normal range of motion and neck supple. No rigidity or tenderness.    Lymphadenopathy:      Cervical: No cervical adenopathy.   Skin:     General: Skin is warm and dry.      Capillary Refill: Capillary refill takes less than 2 seconds.   Neurological:      General: No focal deficit present.      Mental Status: She is alert and oriented to person, place, and time.   Psychiatric:         Mood and Affect: Mood normal.         Behavior: Behavior normal.         Thought Content: Thought content normal.         Judgment: Judgment normal.         Vital signs and nursing notes reviewed.        LAB RESULTS  Recent Results (from the past 24 hour(s))   COVID-19 and FLU A/B PCR, 1 HR TAT - Swab, Nasopharynx    Collection Time: 12/05/23  1:46 PM    Specimen: Nasopharynx; Swab   Result Value Ref Range    COVID19 Not Detected Not Detected - Ref. Range    Influenza A PCR Not Detected Not Detected    Influenza B PCR Not Detected Not Detected   Rapid Strep A Screen - Swab, Throat    Collection Time: 12/05/23  1:46 PM    Specimen: Throat; Swab   Result Value Ref Range    STREP A PCR Not Detected Not Detected       Ordered the above labs and independently reviewed the results.      RADIOLOGY RESULTS  XR Chest PA & Lateral    Result Date: 12/5/2023  XR CHEST PA AND LATERAL Date of Exam: 12/5/2023 2:28 PM EST Indication: cough x 1 wk Comparison: 12/16/2022. Findings: There is stable scar in the left lower lobe. There are sternal suture wires. Heart and pulmonary vessels appear within normal limits. Left transvenous pacemaker is again noted. There are no acute infiltrates or effusions.     Impression: Mild chronic findings. No acute process. Electronically Signed: Martha Browne MD  12/5/2023 2:37 PM EST  Workstation ID: [id number]        I ordered the above noted radiological studies. Independently reviewed by me and discussed with radiologist.  See dictation above for official radiology interpretation.      Orders placed during this visit:  Orders Placed This Encounter   Procedures    COVID-19 and FLU  A/B PCR, 1 HR TAT - Swab, Nasopharynx    Rapid Strep A Screen - Swab, Throat    XR Chest PA & Lateral              Medical Decision Making  Will get chest x-ray and swabs    Covid, flu and strep swabs are negative  Chest x-ray as interpreted by radiology  Impression:  Mild chronic findings. No acute process.     Will discharge pt with bronchitis, will give Rx for tesalon perles, albuterol inhaler and prednisone  Discussed results and plan of care with pt, she is agreeable with plan of care  All questions answered at this time      Amount and/or Complexity of Data Reviewed  Radiology: ordered.        PROCEDURES    Procedures        MEDICATIONS GIVEN IN ER    Medications - No data to display      PROGRESS, DATA ANALYSIS, CONSULTS, AND MEDICAL DECISION MAKING    All labs have been independently reviewed by me.  All radiology studies have been reviewed by me.   EKG's independently reviewed by me.  Discussion below represents my analysis of pertinent findings related to patient's condition, differential diagnosis, treatment plan and final disposition.    DIFFERENTIAL DIAGNOSIS INCLUDE BUT NOT LIMITED TO: bronchitis, URI, viral illness         AS OF 14:53 EST VITALS:    BP - (!) 180/105  HR - 82  TEMP - 97.9 °F (36.6 °C) (Tympanic)  02 SATS - 98%    I rechecked the patient.  I discussed the patient's labs, radiology findings (including all incidental findings), diagnosis, and plan for discharge.  A repeat exam reveals no new worrisome changes from my initial exam findings.  The patient understands that the fact that they are being discharged does not denote that nothing is abnormal, it indicates that no clinical emergency is present and that they must follow-up as directed in order to properly maintain their health.  Follow-up instructions (specifically listed below) and return to ER precautions were given at this time.  I specifically instructed the patient to follow-up with their PCP.  The patient understands and agrees  with the plan, and is ready for discharge.  All questions answered.    Critical care:  Total critical care time of 0 minutes is exclusive of any other billable procedures and includes time spent with direct patient care and observation, retrospective chart review, management of acute condition, and consultation with other medical providers.    DIAGNOSIS  Final diagnoses:   Bronchitis         DISPOSITION  Discharge home    Pt masked in first look. I wore a surgical mask throughout my encounters with the pt. I performed hand hygiene on entry into the pt room and upon exit.     Dictated utilizing Dragon dictation     Note Disclaimer: At [facility], we believe that sharing information builds trust and better relationships. You are receiving this note because you recently visited [facility]. It is possible you will see health information before a provider has talked with you about it. This kind of information can be easy to misunderstand. To help you fully understand what it means for your health, we urge you to discuss this note with your provider.

## 2023-12-05 NOTE — Clinical Note
[facility] FSED [place] [id number] [place] [address] [place] [address]  Phone: [phone]    Rafael Des was seen and treated in our emergency department on 12/5/2023.  She may return to work on 12/11/2023.         Thank you for choosing [facility].    Julian Hernandez MD

## 2023-12-08 ENCOUNTER — HOSPITAL ENCOUNTER (OUTPATIENT)
Facility: HOSPITAL | Age: 48
Setting detail: OBSERVATION
Discharge: HOME | End: 2023-12-11
Attending: EMERGENCY MEDICINE | Admitting: STUDENT IN AN ORGANIZED HEALTH CARE EDUCATION/TRAINING PROGRAM
Payer: COMMERCIAL

## 2023-12-08 ENCOUNTER — APPOINTMENT (OUTPATIENT)
Dept: GENERAL RADIOLOGY | Facility: HOSPITAL | Age: 48
End: 2023-12-08
Payer: COMMERCIAL

## 2023-12-08 DIAGNOSIS — R07.9 CHEST PAIN, UNSPECIFIED TYPE: Primary | ICD-10-CM

## 2023-12-08 DIAGNOSIS — I10 HYPERTENSION, UNSPECIFIED TYPE: ICD-10-CM

## 2023-12-08 LAB
ALBUMIN SERPL-MCNC: 4.1 G/DL (ref 3.5–5.2)
ALBUMIN/GLOB SERPL: 1.4 G/DL
ALP SERPL-CCNC: 78 U/L (ref 39–117)
ALT SERPL W P-5'-P-CCNC: 14 U/L (ref 1–33)
ANION GAP SERPL CALCULATED.3IONS-SCNC: 7.4 MMOL/L (ref 5–15)
AST SERPL-CCNC: 18 U/L (ref 1–32)
BASOPHILS # BLD AUTO: 0.02 10*3/MM3 (ref 0–0.2)
BASOPHILS NFR BLD AUTO: 0.2 % (ref 0–1.5)
BILIRUB SERPL-MCNC: 0.3 MG/DL (ref 0–1.2)
BUN SERPL-MCNC: 11 MG/DL (ref 6–20)
BUN/CREAT SERPL: 9.8 (ref 7–25)
CALCIUM SPEC-SCNC: 9.1 MG/DL (ref 8.6–10.5)
CHLORIDE SERPL-SCNC: 103 MMOL/L (ref 98–107)
CO2 SERPL-SCNC: 30.6 MMOL/L (ref 22–29)
CREAT SERPL-MCNC: 1.12 MG/DL (ref 0.57–1)
D DIMER PPP FEU-MCNC: 0.54 MCGFEU/ML (ref 0–0.5)
DEPRECATED RDW RBC AUTO: 43.8 FL (ref 37–54)
EGFRCR SERPLBLD CKD-EPI 2021: 60 ML/MIN/1.73
EOSINOPHIL # BLD AUTO: 0.07 10*3/MM3 (ref 0–0.4)
EOSINOPHIL NFR BLD AUTO: 0.7 % (ref 0.3–6.2)
ERYTHROCYTE [DISTWIDTH] IN BLOOD BY AUTOMATED COUNT: 12.9 % (ref 12.3–15.4)
FLUAV SUBTYP SPEC NAA+PROBE: NOT DETECTED
FLUBV RNA ISLT QL NAA+PROBE: NOT DETECTED
GEN 5 2HR TROPONIN T REFLEX: 15 NG/L
GLOBULIN UR ELPH-MCNC: 3 GM/DL
GLUCOSE SERPL-MCNC: 115 MG/DL (ref 65–99)
HCT VFR BLD AUTO: 43.1 % (ref 34–46.6)
HGB BLD-MCNC: 13.8 G/DL (ref 12–15.9)
IMM GRANULOCYTES # BLD AUTO: 0.02 10*3/MM3 (ref 0–0.05)
IMM GRANULOCYTES NFR BLD AUTO: 0.2 % (ref 0–0.5)
LYMPHOCYTES # BLD AUTO: 1.45 10*3/MM3 (ref 0.7–3.1)
LYMPHOCYTES NFR BLD AUTO: 14.3 % (ref 19.6–45.3)
MCH RBC QN AUTO: 29.6 PG (ref 26.6–33)
MCHC RBC AUTO-ENTMCNC: 32 G/DL (ref 31.5–35.7)
MCV RBC AUTO: 92.5 FL (ref 79–97)
MONOCYTES # BLD AUTO: 0.56 10*3/MM3 (ref 0.1–0.9)
MONOCYTES NFR BLD AUTO: 5.5 % (ref 5–12)
NEUTROPHILS NFR BLD AUTO: 79.1 % (ref 42.7–76)
NEUTROPHILS NFR BLD AUTO: 8 10*3/MM3 (ref 1.7–7)
PLATELET # BLD AUTO: 220 10*3/MM3 (ref 140–450)
PMV BLD AUTO: 8.9 FL (ref 6–12)
POTASSIUM SERPL-SCNC: 3.3 MMOL/L (ref 3.5–5.2)
PROT SERPL-MCNC: 7.1 G/DL (ref 6–8.5)
QT INTERVAL: 393 MS
QTC INTERVAL: 443 MS
RBC # BLD AUTO: 4.66 10*6/MM3 (ref 3.77–5.28)
RSV RNA NPH QL NAA+NON-PROBE: NOT DETECTED
SARS-COV-2 RNA RESP QL NAA+PROBE: NOT DETECTED
SODIUM SERPL-SCNC: 141 MMOL/L (ref 136–145)
TROPONIN T DELTA: -1 NG/L
TROPONIN T SERPL HS-MCNC: 16 NG/L
WBC NRBC COR # BLD AUTO: 10.12 10*3/MM3 (ref 3.4–10.8)

## 2023-12-08 PROCEDURE — 99284 EMERGENCY DEPT VISIT MOD MDM: CPT

## 2023-12-08 PROCEDURE — 85025 COMPLETE CBC W/AUTO DIFF WBC: CPT | Performed by: NURSE PRACTITIONER

## 2023-12-08 PROCEDURE — 25010000002 HYDRALAZINE PER 20 MG: Performed by: NURSE PRACTITIONER

## 2023-12-08 PROCEDURE — 71045 X-RAY EXAM CHEST 1 VIEW: CPT

## 2023-12-08 PROCEDURE — 85379 FIBRIN DEGRADATION QUANT: CPT | Performed by: NURSE PRACTITIONER

## 2023-12-08 PROCEDURE — 96374 THER/PROPH/DIAG INJ IV PUSH: CPT

## 2023-12-08 PROCEDURE — 99284 EMERGENCY DEPT VISIT MOD MDM: CPT | Performed by: NURSE PRACTITIONER

## 2023-12-08 PROCEDURE — 96372 THER/PROPH/DIAG INJ SC/IM: CPT

## 2023-12-08 PROCEDURE — 93010 ELECTROCARDIOGRAM REPORT: CPT | Performed by: NURSE PRACTITIONER

## 2023-12-08 PROCEDURE — 93005 ELECTROCARDIOGRAM TRACING: CPT | Performed by: EMERGENCY MEDICINE

## 2023-12-08 PROCEDURE — 96375 TX/PRO/DX INJ NEW DRUG ADDON: CPT

## 2023-12-08 PROCEDURE — 84484 ASSAY OF TROPONIN QUANT: CPT | Performed by: NURSE PRACTITIONER

## 2023-12-08 PROCEDURE — 87634 RSV DNA/RNA AMP PROBE: CPT | Performed by: EMERGENCY MEDICINE

## 2023-12-08 PROCEDURE — 25010000002 ONDANSETRON PER 1 MG: Performed by: NURSE PRACTITIONER

## 2023-12-08 PROCEDURE — 80053 COMPREHEN METABOLIC PANEL: CPT | Performed by: NURSE PRACTITIONER

## 2023-12-08 PROCEDURE — 25010000002 ENOXAPARIN PER 10 MG: Performed by: NURSE PRACTITIONER

## 2023-12-08 PROCEDURE — 25010000002 MORPHINE PER 10 MG: Performed by: NURSE PRACTITIONER

## 2023-12-08 PROCEDURE — 36415 COLL VENOUS BLD VENIPUNCTURE: CPT

## 2023-12-08 PROCEDURE — 87636 SARSCOV2 & INF A&B AMP PRB: CPT | Performed by: EMERGENCY MEDICINE

## 2023-12-08 RX ORDER — HYDRALAZINE HYDROCHLORIDE 20 MG/ML
20 INJECTION INTRAMUSCULAR; INTRAVENOUS ONCE
Status: COMPLETED | OUTPATIENT
Start: 2023-12-08 | End: 2023-12-08

## 2023-12-08 RX ORDER — NITROGLYCERIN 0.4 MG/1
0.4 TABLET SUBLINGUAL
Status: DISCONTINUED | OUTPATIENT
Start: 2023-12-08 | End: 2023-12-11 | Stop reason: HOSPADM

## 2023-12-08 RX ORDER — ONDANSETRON 2 MG/ML
4 INJECTION INTRAMUSCULAR; INTRAVENOUS ONCE
Status: COMPLETED | OUTPATIENT
Start: 2023-12-08 | End: 2023-12-08

## 2023-12-08 RX ORDER — SODIUM CHLORIDE 0.9 % (FLUSH) 0.9 %
10 SYRINGE (ML) INJECTION AS NEEDED
Status: DISCONTINUED | OUTPATIENT
Start: 2023-12-08 | End: 2023-12-11 | Stop reason: HOSPADM

## 2023-12-08 RX ORDER — IPRATROPIUM BROMIDE AND ALBUTEROL SULFATE 2.5; .5 MG/3ML; MG/3ML
3 SOLUTION RESPIRATORY (INHALATION) ONCE
Status: COMPLETED | OUTPATIENT
Start: 2023-12-08 | End: 2023-12-08

## 2023-12-08 RX ORDER — ENOXAPARIN SODIUM 100 MG/ML
1 INJECTION SUBCUTANEOUS ONCE
Status: COMPLETED | OUTPATIENT
Start: 2023-12-08 | End: 2023-12-08

## 2023-12-08 RX ADMIN — IPRATROPIUM BROMIDE AND ALBUTEROL SULFATE 3 ML: .5; 3 SOLUTION RESPIRATORY (INHALATION) at 23:20

## 2023-12-08 RX ADMIN — NITROGLYCERIN 0.4 MG: 0.4 TABLET SUBLINGUAL at 21:12

## 2023-12-08 RX ADMIN — NITROGLYCERIN 1 INCH: 20 OINTMENT TOPICAL at 21:53

## 2023-12-08 RX ADMIN — HYDRALAZINE HYDROCHLORIDE 20 MG: 20 INJECTION INTRAMUSCULAR; INTRAVENOUS at 21:53

## 2023-12-08 RX ADMIN — ONDANSETRON 4 MG: 2 INJECTION INTRAMUSCULAR; INTRAVENOUS at 19:36

## 2023-12-08 RX ADMIN — ENOXAPARIN SODIUM 90 MG: 100 INJECTION SUBCUTANEOUS at 21:53

## 2023-12-08 RX ADMIN — MORPHINE SULFATE 4 MG: 4 INJECTION, SOLUTION INTRAMUSCULAR; INTRAVENOUS at 19:36

## 2023-12-08 NOTE — FSED PROVIDER NOTE
Subjective   History of Present Illness  The patient is a 48-year-old female who presents to the ER with chest pain and shortness of breath that started approximate 30 minutes prior recommended the emergency room. Patient reports that pain is under her left breast area. Patient grandson was seen within the last week and dx with RSV and [facility].  Patient was also seen here on 12/5 and dx with bronchitis, had elevated BP at that time as well. Patient has also been noted to have missed multiple office visits with cardiology per old records.     History provided by:  Patient   used: No        Review of Systems   Respiratory:  Positive for shortness of breath.    Cardiovascular:  Positive for chest pain.       Past Medical History:   Diagnosis Date    Arrhythmia     Asthma     CAD (coronary artery disease)     Cardiomyopathy, dilated     Chronic systolic congestive heart failure     CKD (chronic kidney disease) stage 2, GFR 60-89 ml/min     COPD (chronic obstructive pulmonary disease)     Essential hypertension     GERD without esophagitis     Hidradenitis 10/26/2020    Hyperlipidemia     Hypothyroidism (acquired)     ICD (implantable cardioverter-defibrillator), dual, in situ 7/22/2019    Nonrheumatic aortic valve insufficiency     Nonrheumatic mitral valve regurgitation     S/P AVR (aortic valve replacement)     S/P CABG x 3     Type 2 diabetes mellitus without complication, without long-term current use of insulin        Allergies   Allergen Reactions    Hydrocodone Hives    Penicillin G Unknown (See Comments)     Reaction occurred as a baby.      Penicillin interview complete 08/18/2022.    Contrast Dye (Echo Or Unknown Ct/Mr) GI Intolerance     She is pretty sure it's the contrast dye that makes her super sick and vomiting after heart cath    Gadolinium Derivatives Itching       Past Surgical History:   Procedure Laterality Date    AORTIC VALVE REPAIR/REPLACEMENT N/A 12/27/2019     Procedure: AORTIC VALVE REPAIR/REPLACEMENT;  Surgeon: Lane Stock MD;  Location: [facility];  Service: Cardiothoracic    BREAST LUMPECTOMY Right     BENIGN    CARDIAC CATHETERIZATION N/A 12/24/2019    Procedure: Right and Left Heart Cath 12/24/19 @ 0900;  Surgeon: Wayne Luna MD;  Location: [facility] CATH INVASIVE LOCATION;  Service: Cardiovascular    CARDIAC CATHETERIZATION N/A 12/24/2019    Procedure: Coronary angiography;  Surgeon: Wayne Luna MD;  Location: [facility] CATH INVASIVE LOCATION;  Service: Cardiovascular    CARDIAC CATHETERIZATION N/A 11/10/2020    Procedure: Left and right Heart Cath;  Surgeon: Wayne Luna MD;  Location: [facility] CATH INVASIVE LOCATION;  Service: Cardiovascular;  Laterality: N/A;    CARDIAC CATHETERIZATION N/A 11/10/2020    Procedure: Coronary angiography;  Surgeon: Wayne Luna MD;  Location: [facility] CATH INVASIVE LOCATION;  Service: Cardiovascular;  Laterality: N/A;    CARDIAC CATHETERIZATION N/A 11/10/2020    Procedure: Right Heart Cath;  Surgeon: Wayne Luna MD;  Location: [facility] CATH INVASIVE LOCATION;  Service: Cardiovascular;  Laterality: N/A;    CARDIAC CATHETERIZATION N/A 11/25/2020    Procedure: Percutaneous coronary intervention of the left circumflex artery;  Surgeon: Wayne Luna MD;  Location: [facility] CATH INVASIVE LOCATION;  Service: Cardiovascular;  Laterality: N/A;    CARDIAC CATHETERIZATION N/A 1/22/2021    Procedure: LEFT HEART CATH with possible PCI;  Surgeon: Wayne Luna MD;  Location: [facility] CATH INVASIVE LOCATION;  Service: Cardiovascular;  Laterality: N/A;  Local and IV sedation    CARDIAC CATHETERIZATION N/A 1/22/2021    Procedure: Coronary angiography;  Surgeon: Wayne Luna MD;  Location: [facility] CATH INVASIVE LOCATION;  Service: Cardiovascular;  Laterality: N/A;    CARDIAC CATHETERIZATION N/A 1/22/2021    Procedure: Saphenous Vein Graft;  Surgeon: Wayne Luna  MD KAY;  Location: [facility] CATH INVASIVE LOCATION;  Service: Cardiovascular;  Laterality: N/A;    CARDIAC ELECTROPHYSIOLOGY PROCEDURE Left 2019    Procedure: Dual-chamber ICD insertion;  Surgeon: Héctor Eckert MD;  Location: [facility] CATH INVASIVE LOCATION;  Service: Cardiovascular    CARDIAC ELECTROPHYSIOLOGY PROCEDURE Left 2019    Procedure: Lead Revision;  Surgeon: Héctor Eckert MD;  Location: [facility] CATH INVASIVE LOCATION;  Service: Cardiovascular    CARDIAC SURGERY      CHOLECYSTECTOMY      CORONARY ARTERY BYPASS GRAFT N/A 2019    Procedure: CORONARY ARTERY BYPASS GRAFTING;  Surgeon: Lane Stock MD;  Location: [facility];  Service: Cardiothoracic    HYSTERECTOMY      INSERT / REPLACE / REMOVE PACEMAKER      LYMPHADENECTOMY Bilateral     THYROID SURGERY         Family History   Problem Relation Age of Onset    Heart disease Father        Social History     Socioeconomic History    Marital status:    Tobacco Use    Smoking status: Former     Types: Cigarettes     Quit date: 2019     Years since quittin.9    Smokeless tobacco: Never   Vaping Use    Vaping Use: Never used   Substance and Sexual Activity    Alcohol use: No    Drug use: No    Sexual activity: Defer           Objective   Physical Exam  Vitals and nursing note reviewed.   Constitutional:       Appearance: Normal appearance. She is well-developed.   HENT:      Head: Normocephalic.      Right Ear: Tympanic membrane, ear canal and external ear normal.      Left Ear: Tympanic membrane, ear canal and external ear normal.      Nose: Nose normal.      Mouth/Throat:      Lips: Pink.      Mouth: Mucous membranes are moist.      Pharynx: Oropharynx is clear. Uvula midline.   Eyes:      Extraocular Movements: Extraocular movements intact.      Conjunctiva/sclera: Conjunctivae normal.      Pupils: Pupils are equal, round, and reactive to light.   Cardiovascular:      Rate and Rhythm: Normal rate and regular rhythm.       Pulses: Normal pulses.      Heart sounds: Normal heart sounds.   Pulmonary:      Effort: Pulmonary effort is normal.      Breath sounds: Normal breath sounds and air entry.   Chest:       Abdominal:      General: Bowel sounds are normal.      Palpations: Abdomen is soft.   Musculoskeletal:         General: Normal range of motion.      Cervical back: Full passive range of motion without pain, normal range of motion and neck supple.      Right lower leg: No edema.      Left lower leg: No edema.   Skin:     General: Skin is warm and dry.   Neurological:      General: No focal deficit present.      Mental Status: She is alert and oriented to person, place, and time.   Psychiatric:         Mood and Affect: Mood normal.         Behavior: Behavior normal. Behavior is cooperative.         Procedures           ED Course  ED Course as of 12/09/23 1542   Fri Dec 08, 2023   1956 Influenza B PCR: Not Detected [DS]   1956 Influenza A PCR: Not Detected [DS]   1956 COVID19: Not Detected [DS]   1956 RSV, PCR: Not Detected [DS]   1956 Glucose(!): 115 [DS]   1956 BUN: 11 [DS]   1956 Creatinine(!): 1.12 [DS]   1956 Sodium: 141 [DS]   1956 Potassium(!): 3.3 [DS]   1956 CO2(!): 30.6 [DS]   1956 WBC: 10.12 [DS]   1956 Hemoglobin: 13.8 [DS]   1956 Hematocrit: 43.1 [DS]   1956 D-Dimer, Quant(!): 0.54 [DS]   1956 HS Troponin T(!): 16 [DS]   2017 XR CHEST 1 VW     Date of Exam: 12/8/2023 6:40 PM EST     Indication: chest pain, shortness of breath     Comparison: 12/5/2023     Findings:  The cardiomediastinal silhouette is within normal limits. Lungs are clear. No focal consolidation, pneumothorax, or significant pleural effusion. Osseous structures grossly intact. Status post anatomy. Left-sided AICD noted.     IMPRESSION:  Impression:  No acute process.   [DS]   2018 HEART RATE= 76  bpm  RR Interval= 788  ms  NC Interval= 146  ms  P Horizontal Axis= 6  deg  P Front Axis= 44  deg  QRSD Interval= 98  ms  QT Interval= 393  ms  QTcB= 443   ms  QRS Axis= -43  deg  T Wave Axis= 113  deg  - ABNORMAL ECG -  Sinus rhythm  Probable left atrial enlargement  Left anterior fascicular block  LVH with secondary repolarization abnormality  When compared with ECG of 16-Dec-2022 16:14:54,  Significant change in rhythm  Electronically Signed By: Juan Augustine ([facility]) 08-Dec-2023 18:33:55  Date and Time of Study: 2023-12-08 18:32:32     [DS]   2118 Patient reports she is still having pain, awaiting second trop, nitro given, blood pressure remains elevated as well.  [DS]   2119 Call placed to hospitalist for possible admission.  [DS]   2126 HS Troponin T(!): 15 [DS]   2151 Spoke with SHANITA Oliveira with Hospitalist, will accept in transfer to [facility] for admission.  [DS]   2308 Troponin T Delta: -1 [LR]   2309 HS Troponin T(!): 15 [LR]   2309 HS Troponin T(!): 16 [LR]      ED Course User Index  [DS] Chio Hampton APRN  [LR] Joseaf Velasco MD                HEART Score: 4                            Medical Decision Making  Exam without evidence of volume overload so doubt heart failure. EKG without signs of active ischemia. Given the timing of pain to ER presentation, delta troponin positive 16 and 15.  Presentation not consistent with acute PE, although ddimer is slightly elevated at 0.54, patient is allergic to contrast, no pneumothorax (not visualized on chest xr), thoracic aortic dissection, pericarditis, tamponade, pneumonia (no infectious symptoms, clear chest xr), myocarditis.     Spoke with SHANITA Oliveira will admit for further evaluation and treatment.     Problems Addressed:  Chest pain, unspecified type: complicated acute illness or injury  Hypertension, unspecified type: complicated acute illness or injury    Amount and/or Complexity of Data Reviewed  Labs: ordered. Decision-making details documented in ED Course.  Radiology: ordered. Decision-making details documented in ED Course.  ECG/medicine tests: ordered.  Discussion of  management or test interpretation with external provider(s): Spoke with SHANITA Oliveira will admit to for further evaluation and treatment.     Risk  Prescription drug management.  Decision regarding hospitalization.        Final diagnoses:   Chest pain, unspecified type   Hypertension, unspecified type       ED Disposition  ED Disposition       ED Disposition   Decision to Admit    Condition   --    Comment   Level of Care: Telemetry [5]   Diagnosis: Chest pain [840727]   Admitting Physician: CRUZ BULLOCK [[id number]]   Attending Physician: CRUZ BULLOCK [[id number]]                 No follow-up provider specified.       Medication List      No changes were made to your prescriptions during this visit.

## 2023-12-09 ENCOUNTER — APPOINTMENT (OUTPATIENT)
Dept: NUCLEAR MEDICINE | Facility: HOSPITAL | Age: 48
End: 2023-12-09
Payer: COMMERCIAL

## 2023-12-09 ENCOUNTER — APPOINTMENT (OUTPATIENT)
Dept: GENERAL RADIOLOGY | Facility: HOSPITAL | Age: 48
End: 2023-12-09
Payer: COMMERCIAL

## 2023-12-09 PROBLEM — R79.89 ELEVATED TROPONIN: Status: ACTIVE | Noted: 2023-12-09

## 2023-12-09 LAB
ANION GAP SERPL CALCULATED.3IONS-SCNC: 14 MMOL/L (ref 5–15)
BUN SERPL-MCNC: 12 MG/DL (ref 6–20)
BUN/CREAT SERPL: 8.5 (ref 7–25)
CALCIUM SPEC-SCNC: 10.2 MG/DL (ref 8.6–10.5)
CHLORIDE SERPL-SCNC: 97 MMOL/L (ref 98–107)
CO2 SERPL-SCNC: 29 MMOL/L (ref 22–29)
CREAT SERPL-MCNC: 1.42 MG/DL (ref 0.57–1)
EGFRCR SERPLBLD CKD-EPI 2021: 45.2 ML/MIN/1.73
GLUCOSE BLDC GLUCOMTR-MCNC: 128 MG/DL (ref 70–105)
GLUCOSE BLDC GLUCOMTR-MCNC: 128 MG/DL (ref 70–105)
GLUCOSE BLDC GLUCOMTR-MCNC: 143 MG/DL (ref 70–105)
GLUCOSE BLDC GLUCOMTR-MCNC: 146 MG/DL (ref 70–105)
GLUCOSE SERPL-MCNC: 95 MG/DL (ref 65–99)
MAGNESIUM SERPL-MCNC: 2 MG/DL (ref 1.6–2.6)
POTASSIUM SERPL-SCNC: 3.7 MMOL/L (ref 3.5–5.2)
POTASSIUM SERPL-SCNC: 3.7 MMOL/L (ref 3.5–5.2)
SODIUM SERPL-SCNC: 140 MMOL/L (ref 136–145)

## 2023-12-09 PROCEDURE — 71046 X-RAY EXAM CHEST 2 VIEWS: CPT

## 2023-12-09 PROCEDURE — A9540 TC99M MAA: HCPCS | Performed by: INTERNAL MEDICINE

## 2023-12-09 PROCEDURE — 25010000002 ONDANSETRON PER 1 MG: Performed by: INTERNAL MEDICINE

## 2023-12-09 PROCEDURE — 25810000003 SODIUM CHLORIDE 0.9 % SOLUTION: Performed by: INTERNAL MEDICINE

## 2023-12-09 PROCEDURE — G0378 HOSPITAL OBSERVATION PER HR: HCPCS

## 2023-12-09 PROCEDURE — 0 TECHNETIUM ALBUMIN AGGREGATED: Performed by: INTERNAL MEDICINE

## 2023-12-09 PROCEDURE — 84132 ASSAY OF SERUM POTASSIUM: CPT | Performed by: STUDENT IN AN ORGANIZED HEALTH CARE EDUCATION/TRAINING PROGRAM

## 2023-12-09 PROCEDURE — A9538 TC99M PYROPHOSPHATE: HCPCS | Performed by: INTERNAL MEDICINE

## 2023-12-09 PROCEDURE — 83735 ASSAY OF MAGNESIUM: CPT | Performed by: NURSE PRACTITIONER

## 2023-12-09 PROCEDURE — 96376 TX/PRO/DX INJ SAME DRUG ADON: CPT

## 2023-12-09 PROCEDURE — 63710000001 METHYLPREDNISOLONE PER 4 MG: Performed by: NURSE PRACTITIONER

## 2023-12-09 PROCEDURE — 99214 OFFICE O/P EST MOD 30 MIN: CPT | Performed by: INTERNAL MEDICINE

## 2023-12-09 PROCEDURE — 78582 LUNG VENTILAT&PERFUS IMAGING: CPT

## 2023-12-09 PROCEDURE — 80048 BASIC METABOLIC PNL TOTAL CA: CPT | Performed by: NURSE PRACTITIONER

## 2023-12-09 PROCEDURE — 0 TECHNETIUM TC99M PYROPHOSPHATE: Performed by: INTERNAL MEDICINE

## 2023-12-09 PROCEDURE — 82948 REAGENT STRIP/BLOOD GLUCOSE: CPT

## 2023-12-09 RX ORDER — DEXTROSE MONOHYDRATE 25 G/50ML
25 INJECTION, SOLUTION INTRAVENOUS
Status: DISCONTINUED | OUTPATIENT
Start: 2023-12-09 | End: 2023-12-11 | Stop reason: HOSPADM

## 2023-12-09 RX ORDER — ALBUTEROL SULFATE 2.5 MG/3ML
2.5 SOLUTION RESPIRATORY (INHALATION) EVERY 6 HOURS PRN
Status: DISCONTINUED | OUTPATIENT
Start: 2023-12-09 | End: 2023-12-11 | Stop reason: HOSPADM

## 2023-12-09 RX ORDER — FENOFIBRATE 145 MG/1
145 TABLET, COATED ORAL DAILY
Status: DISCONTINUED | OUTPATIENT
Start: 2023-12-09 | End: 2023-12-11 | Stop reason: HOSPADM

## 2023-12-09 RX ORDER — INSULIN LISPRO 100 [IU]/ML
2-9 INJECTION, SOLUTION INTRAVENOUS; SUBCUTANEOUS
Status: DISCONTINUED | OUTPATIENT
Start: 2023-12-09 | End: 2023-12-11 | Stop reason: HOSPADM

## 2023-12-09 RX ORDER — POTASSIUM CHLORIDE 1.5 G/1.58G
40 POWDER, FOR SOLUTION ORAL EVERY 4 HOURS
Qty: 4 PACKET | Refills: 0 | Status: COMPLETED | OUTPATIENT
Start: 2023-12-09 | End: 2023-12-09

## 2023-12-09 RX ORDER — FERROUS SULFATE 324(65)MG
324 TABLET, DELAYED RELEASE (ENTERIC COATED) ORAL
Status: DISCONTINUED | OUTPATIENT
Start: 2023-12-09 | End: 2023-12-11 | Stop reason: HOSPADM

## 2023-12-09 RX ORDER — DIPHENOXYLATE HYDROCHLORIDE AND ATROPINE SULFATE 2.5; .025 MG/1; MG/1
1 TABLET ORAL DAILY
Status: DISCONTINUED | OUTPATIENT
Start: 2023-12-09 | End: 2023-12-11 | Stop reason: HOSPADM

## 2023-12-09 RX ORDER — OXYCODONE HYDROCHLORIDE 5 MG/1
10 TABLET ORAL ONCE AS NEEDED
Status: COMPLETED | OUTPATIENT
Start: 2023-12-09 | End: 2023-12-09

## 2023-12-09 RX ORDER — ASPIRIN 81 MG/1
81 TABLET ORAL DAILY
Status: DISCONTINUED | OUTPATIENT
Start: 2023-12-09 | End: 2023-12-11 | Stop reason: HOSPADM

## 2023-12-09 RX ORDER — HYDRALAZINE HYDROCHLORIDE 25 MG/1
25 TABLET, FILM COATED ORAL 3 TIMES DAILY
Status: DISCONTINUED | OUTPATIENT
Start: 2023-12-09 | End: 2023-12-11 | Stop reason: HOSPADM

## 2023-12-09 RX ORDER — OXYCODONE HYDROCHLORIDE 5 MG/1
7.5 TABLET ORAL EVERY 4 HOURS PRN
Status: DISCONTINUED | OUTPATIENT
Start: 2023-12-09 | End: 2023-12-11 | Stop reason: HOSPADM

## 2023-12-09 RX ORDER — PANTOPRAZOLE SODIUM 40 MG/1
40 TABLET, DELAYED RELEASE ORAL DAILY
Status: DISCONTINUED | OUTPATIENT
Start: 2023-12-09 | End: 2023-12-11 | Stop reason: HOSPADM

## 2023-12-09 RX ORDER — IBUPROFEN 600 MG/1
1 TABLET ORAL
Status: DISCONTINUED | OUTPATIENT
Start: 2023-12-09 | End: 2023-12-11 | Stop reason: HOSPADM

## 2023-12-09 RX ORDER — DOCUSATE SODIUM 100 MG/1
100 CAPSULE, LIQUID FILLED ORAL 2 TIMES DAILY PRN
Status: DISCONTINUED | OUTPATIENT
Start: 2023-12-09 | End: 2023-12-11 | Stop reason: HOSPADM

## 2023-12-09 RX ORDER — NICOTINE POLACRILEX 4 MG
15 LOZENGE BUCCAL
Status: DISCONTINUED | OUTPATIENT
Start: 2023-12-09 | End: 2023-12-11 | Stop reason: HOSPADM

## 2023-12-09 RX ORDER — MELATONIN
1000 DAILY
Status: DISCONTINUED | OUTPATIENT
Start: 2023-12-09 | End: 2023-12-11 | Stop reason: HOSPADM

## 2023-12-09 RX ORDER — FOLIC ACID 1 MG/1
1 TABLET ORAL DAILY
Status: DISCONTINUED | OUTPATIENT
Start: 2023-12-09 | End: 2023-12-11 | Stop reason: HOSPADM

## 2023-12-09 RX ORDER — ONDANSETRON 2 MG/ML
4 INJECTION INTRAMUSCULAR; INTRAVENOUS EVERY 6 HOURS PRN
Status: DISCONTINUED | OUTPATIENT
Start: 2023-12-09 | End: 2023-12-11 | Stop reason: HOSPADM

## 2023-12-09 RX ORDER — ATORVASTATIN CALCIUM 40 MG/1
40 TABLET, FILM COATED ORAL DAILY
Status: DISCONTINUED | OUTPATIENT
Start: 2023-12-09 | End: 2023-12-11 | Stop reason: HOSPADM

## 2023-12-09 RX ORDER — CLOPIDOGREL BISULFATE 75 MG/1
75 TABLET ORAL DAILY
Status: DISCONTINUED | OUTPATIENT
Start: 2023-12-09 | End: 2023-12-11 | Stop reason: HOSPADM

## 2023-12-09 RX ORDER — ALLOPURINOL 300 MG/1
300 TABLET ORAL DAILY
Status: DISCONTINUED | OUTPATIENT
Start: 2023-12-09 | End: 2023-12-11 | Stop reason: HOSPADM

## 2023-12-09 RX ORDER — METHYLPREDNISOLONE 4 MG/1
4 TABLET ORAL
Status: COMPLETED | OUTPATIENT
Start: 2023-12-09 | End: 2023-12-11

## 2023-12-09 RX ORDER — LANOLIN ALCOHOL/MO/W.PET/CERES
1000 CREAM (GRAM) TOPICAL DAILY
Status: DISCONTINUED | OUTPATIENT
Start: 2023-12-09 | End: 2023-12-11 | Stop reason: HOSPADM

## 2023-12-09 RX ORDER — METHOCARBAMOL 500 MG/1
500 TABLET, FILM COATED ORAL EVERY 8 HOURS PRN
Status: DISCONTINUED | OUTPATIENT
Start: 2023-12-09 | End: 2023-12-11 | Stop reason: HOSPADM

## 2023-12-09 RX ORDER — LEVOTHYROXINE SODIUM 0.15 MG/1
150 TABLET ORAL
Status: DISCONTINUED | OUTPATIENT
Start: 2023-12-09 | End: 2023-12-11 | Stop reason: HOSPADM

## 2023-12-09 RX ORDER — LIDOCAINE 50 MG/G
1 PATCH TOPICAL
Status: DISCONTINUED | OUTPATIENT
Start: 2023-12-09 | End: 2023-12-11 | Stop reason: HOSPADM

## 2023-12-09 RX ORDER — CARVEDILOL 6.25 MG/1
6.25 TABLET ORAL 2 TIMES DAILY WITH MEALS
Status: DISCONTINUED | OUTPATIENT
Start: 2023-12-09 | End: 2023-12-11 | Stop reason: HOSPADM

## 2023-12-09 RX ORDER — SODIUM CHLORIDE 9 MG/ML
75 INJECTION, SOLUTION INTRAVENOUS CONTINUOUS
Status: DISCONTINUED | OUTPATIENT
Start: 2023-12-09 | End: 2023-12-11 | Stop reason: HOSPADM

## 2023-12-09 RX ADMIN — PANTOPRAZOLE SODIUM 40 MG: 40 TABLET, DELAYED RELEASE ORAL at 09:40

## 2023-12-09 RX ADMIN — ATORVASTATIN CALCIUM 40 MG: 40 TABLET, FILM COATED ORAL at 09:40

## 2023-12-09 RX ADMIN — ALLOPURINOL 300 MG: 300 TABLET ORAL at 09:40

## 2023-12-09 RX ADMIN — CYANOCOBALAMIN TAB 1000 MCG 1000 MCG: 1000 TAB at 09:40

## 2023-12-09 RX ADMIN — POTASSIUM CHLORIDE 40 MEQ: 1.5 FOR SOLUTION ORAL at 02:08

## 2023-12-09 RX ADMIN — METHYLPREDNISOLONE 4 MG: 4 TABLET ORAL at 09:39

## 2023-12-09 RX ADMIN — OXYCODONE 10 MG: 5 TABLET ORAL at 02:08

## 2023-12-09 RX ADMIN — Medication 10 ML: at 09:40

## 2023-12-09 RX ADMIN — SODIUM CHLORIDE 75 ML/HR: 9 INJECTION, SOLUTION INTRAVENOUS at 17:17

## 2023-12-09 RX ADMIN — CLOPIDOGREL BISULFATE 75 MG: 75 TABLET ORAL at 09:39

## 2023-12-09 RX ADMIN — HYDRALAZINE HYDROCHLORIDE 25 MG: 25 TABLET, FILM COATED ORAL at 09:40

## 2023-12-09 RX ADMIN — KIT FOR THE PREPARATION OF TECHNETIUM TC 99M ALBUMIN AGGREGATED 1 DOSE: 2.5 INJECTION, POWDER, FOR SOLUTION INTRAVENOUS at 11:16

## 2023-12-09 RX ADMIN — POTASSIUM CHLORIDE 40 MEQ: 1.5 FOR SOLUTION ORAL at 05:31

## 2023-12-09 RX ADMIN — CARVEDILOL 6.25 MG: 6.25 TABLET, FILM COATED ORAL at 17:12

## 2023-12-09 RX ADMIN — LIDOCAINE 1 PATCH: 50 PATCH CUTANEOUS at 09:43

## 2023-12-09 RX ADMIN — FENOFIBRATE 145 MG: 145 TABLET ORAL at 09:52

## 2023-12-09 RX ADMIN — ONDANSETRON 4 MG: 2 INJECTION INTRAMUSCULAR; INTRAVENOUS at 09:38

## 2023-12-09 RX ADMIN — HYDRALAZINE HYDROCHLORIDE 25 MG: 25 TABLET, FILM COATED ORAL at 17:12

## 2023-12-09 RX ADMIN — HYDRALAZINE HYDROCHLORIDE 25 MG: 25 TABLET, FILM COATED ORAL at 21:00

## 2023-12-09 RX ADMIN — FERROUS SULFATE TAB EC 324 MG (65 MG FE EQUIVALENT) 324 MG: 324 (65 FE) TABLET DELAYED RESPONSE at 09:40

## 2023-12-09 RX ADMIN — EMPAGLIFLOZIN 25 MG: 25 TABLET, FILM COATED ORAL at 09:39

## 2023-12-09 RX ADMIN — LEVOTHYROXINE SODIUM 150 MCG: 0.15 TABLET ORAL at 05:31

## 2023-12-09 RX ADMIN — TORSEMIDE 60 MG: 100 TABLET ORAL at 09:39

## 2023-12-09 RX ADMIN — TECHNETIUM TC99M PYROPHOSPHATE 1 DOSE: 12 INJECTION INTRAVENOUS at 11:16

## 2023-12-09 RX ADMIN — THERA TABS 1 TABLET: TAB at 09:40

## 2023-12-09 RX ADMIN — Medication 1000 UNITS: at 09:39

## 2023-12-09 RX ADMIN — CARVEDILOL 6.25 MG: 6.25 TABLET, FILM COATED ORAL at 09:39

## 2023-12-09 RX ADMIN — FOLIC ACID 1 MG: 1 TABLET ORAL at 09:40

## 2023-12-09 RX ADMIN — ASPIRIN 81 MG: 81 TABLET, COATED ORAL at 09:40

## 2023-12-09 NOTE — CONSULTS
Cardiology Consult Note      REQUESTING PHYSICIAN    Thien Degroot MD    PATIENT IDENTIFICATION  Name: Rafael Nunes  Age: 48 y.o.  Sex: female  :  1975  MRN: [id number]               Cardiology assessment and plan    Chest pain likely pleuritic  Coronary artery disease  Prior coronary artery bypass surgery  Valvular heart disease status post surgical repair and replacement  Hypertension  At goal replacement surgery with a bioprosthetic aortic valve  Cardiomyopathy  ICD in situ  Diabetes mellitus  Chronic kidney disease      Complaining of some pleuritic chest pain  No syncope  No orthopnea no PND no weight gain  Blood pressure is somewhat elevated at home  Tmax is 98.2 pulse is 72 respirations are 16 blood pressure is 112/74 sats are 97%  Sodium is 140 potassium is 3.7 creatinine is 1.4  Nuclear medicine lung scan with low probability for pulmonary embolism  Chest x-ray with no acute finding  Normal troponin  Normal proBNP  Current medications include aspirin 81 mg p.o. once a day Lipitor 40 mg p.o. once a day Coreg 6.25 mg p.o. twice daily fenofibrate 145 mg p.o. once a day hydralazine 25 mg p.o. 3 times a day torsemide 60 mg p.o. once a day Plavix 75 mg p.o. once a day  Recommend DVT prophylaxis  Echocardiogram to assess LV systolic function and valve pathology  Test results reviewed and discussed with patient        REASON FOR CONSULTATION:   48 y.o. female with a past medical history that is significant for cardiomyopathy and valvular heart disease       Impressions:/Cardiac catheterization 2021  Native severe two-vessel coronary artery disease  No significant obstructive disease involving the LAD system  Patent vein graft to the diagonal  Patent stent in the left circumflex and marginal branch of the circumflex  The circumflex coronary artery provides collaterals to the PDA branch of the right coronary artery  Diffuse disease involving the right coronary artery unchanged  "from before      CC:  Chest pain  Shortness of breath    HISTORY OF PRESENT ILLNESS:   Patient presented to the emergency department at [facility] initially with complaint as above.  She described pain under her left breast with associated shortness of breath that began approximately 30 minutes prior to arrival.  She was hypertensive on presentation with blood pressure 187/107.  She was given Nitropaste and 10 mg of IV hydralazine.  DuoNeb treatment was also given secondary to wheezing.  D-dimer elevated at 0.54, however CT chest could not be performed due to contrast dye allergy.  She was given one-time dose Lovenox and VQ scan ordered.  Due to her significant cardiac history, cardiology was asked to evaluate the patient.  Patient is pain-free at present.  She has been down for VQ scan.  Shortness of breath is improved.  She denies any lower extremity edema, dizziness or lightheadedness      REVIEW OF SYSTEMS:  Pertinent items are noted in HPI, all other systems reviewed and negative    OBJECTIVE   High-sensitivity troponin 16, 15  Potassium 3.3  Creatinine 1.2    ASSESSMENT  Chest pain-atypical  Primary hypertension-uncontrolled  Abnormal high-sensitivity troponin-nontrending  Coronary artery disease status post CABG x 3  Aortic valve disease status post AVR  History of prior percutaneous coronary intervention  Dilated cardiomyopathy, ischemic  ICD in situ  Diabetes mellitus 2  Chronic kidney disease stage II  COPD    PLAN  Patient has very slight nontrending troponin elevation in the setting of atypical pleuritic chest pain.  No EKG changes suggestive of acute coronary syndrome.  VQ scan ordered  Blood pressure quite stable, rhythm sinus  Further recommendations per cardiologist        Vital Signs  Visit Vitals  /65 (BP Location: Right arm, Patient Position: Lying)   Pulse 104   Temp 97.8 °F (36.6 °C) (Oral)   Resp 14   Ht 170.2 cm (67\")   Wt 88.7 kg (195 lb 8.8 oz)   LMP  (LMP Unknown)   SpO2 97% " "  BMI 30.63 kg/m²     Oxygen Therapy  SpO2: 97 %  Device (Oxygen Therapy): room air  Flowsheet Rows      Flowsheet Row First Filed Value   Admission Height 170.2 cm (67\") Documented at 12/08/2023 1831   Admission Weight 88.5 kg (195 lb) Documented at 12/08/2023 1831          Intake & Output (last 3 days)         12/06 0701 12/07 0700 12/07 0701  12/08 0700 12/08 0701  12/09 0700 12/09 0701  12/10 0700            Urine Unmeasured Occurrence   1 x           Lines, Drains & Airways       Active LDAs       Name Placement date Placement time Site Days    Peripheral IV 12/08/23 1853 Left Antecubital 12/08/23 1853  Antecubital  less than 1                    MEDICAL HISTORY    Past Medical History:   Diagnosis Date    Arrhythmia     Asthma     CAD (coronary artery disease)     Cardiomyopathy, dilated     Chronic systolic congestive heart failure     CKD (chronic kidney disease) stage 2, GFR 60-89 ml/min     COPD (chronic obstructive pulmonary disease)     Essential hypertension     GERD without esophagitis     Hidradenitis 10/26/2020    Hyperlipidemia     Hypothyroidism (acquired)     ICD (implantable cardioverter-defibrillator), dual, in situ 7/22/2019    Nonrheumatic aortic valve insufficiency     Nonrheumatic mitral valve regurgitation     S/P AVR (aortic valve replacement)     S/P CABG x 3     Type 2 diabetes mellitus without complication, without long-term current use of insulin         SURGICAL HISTORY    Past Surgical History:   Procedure Laterality Date    AORTIC VALVE REPAIR/REPLACEMENT N/A 12/27/2019    Procedure: AORTIC VALVE REPAIR/REPLACEMENT;  Surgeon: Lane Stock MD;  Location: [facility] CVOR;  Service: Cardiothoracic    BREAST LUMPECTOMY Right     BENIGN    CARDIAC CATHETERIZATION N/A 12/24/2019    Procedure: Right and Left Heart Cath 12/24/19 @ 0900;  Surgeon: Wayne Luna MD;  Location: [facility] CATH INVASIVE LOCATION;  Service: Cardiovascular    CARDIAC CATHETERIZATION N/A 12/24/2019    " Procedure: Coronary angiography;  Surgeon: Wayne Luna MD;  Location: [facility] CATH INVASIVE LOCATION;  Service: Cardiovascular    CARDIAC CATHETERIZATION N/A 11/10/2020    Procedure: Left and right Heart Cath;  Surgeon: Wayne Luna MD;  Location: [facility] CATH INVASIVE LOCATION;  Service: Cardiovascular;  Laterality: N/A;    CARDIAC CATHETERIZATION N/A 11/10/2020    Procedure: Coronary angiography;  Surgeon: Wayne Luna MD;  Location: [facility] CATH INVASIVE LOCATION;  Service: Cardiovascular;  Laterality: N/A;    CARDIAC CATHETERIZATION N/A 11/10/2020    Procedure: Right Heart Cath;  Surgeon: Wayne Luna MD;  Location: [facility] CATH INVASIVE LOCATION;  Service: Cardiovascular;  Laterality: N/A;    CARDIAC CATHETERIZATION N/A 11/25/2020    Procedure: Percutaneous coronary intervention of the left circumflex artery;  Surgeon: Wayne Luna MD;  Location: [facility] CATH INVASIVE LOCATION;  Service: Cardiovascular;  Laterality: N/A;    CARDIAC CATHETERIZATION N/A 1/22/2021    Procedure: LEFT HEART CATH with possible PCI;  Surgeon: Wayne Luna MD;  Location: [facility] CATH INVASIVE LOCATION;  Service: Cardiovascular;  Laterality: N/A;  Local and IV sedation    CARDIAC CATHETERIZATION N/A 1/22/2021    Procedure: Coronary angiography;  Surgeon: Wayne Luna MD;  Location: [facility] CATH INVASIVE LOCATION;  Service: Cardiovascular;  Laterality: N/A;    CARDIAC CATHETERIZATION N/A 1/22/2021    Procedure: Saphenous Vein Graft;  Surgeon: Wayne Luna MD;  Location: [facility] CATH INVASIVE LOCATION;  Service: Cardiovascular;  Laterality: N/A;    CARDIAC ELECTROPHYSIOLOGY PROCEDURE Left 6/28/2019    Procedure: Dual-chamber ICD insertion;  Surgeon: Héctor Eckert MD;  Location: [facility] CATH INVASIVE LOCATION;  Service: Cardiovascular    CARDIAC ELECTROPHYSIOLOGY PROCEDURE Left 8/14/2019    Procedure: Lead Revision;  Surgeon: Héctor Eckert MD;   "Location: [facility] CATH INVASIVE LOCATION;  Service: Cardiovascular    CARDIAC SURGERY      CHOLECYSTECTOMY      CORONARY ARTERY BYPASS GRAFT N/A 2019    Procedure: CORONARY ARTERY BYPASS GRAFTING;  Surgeon: Lane Stock MD;  Location: [facility] CVOR;  Service: Cardiothoracic    HYSTERECTOMY      INSERT / REPLACE / REMOVE PACEMAKER      LYMPHADENECTOMY Bilateral     THYROID SURGERY          FAMILY HISTORY    Family History   Problem Relation Age of Onset    Heart disease Father        SOCIAL HISTORY    Social History     Tobacco Use    Smoking status: Former     Types: Cigarettes     Quit date: 2019     Years since quittin.9    Smokeless tobacco: Never   Substance Use Topics    Alcohol use: No        ALLERGIES    Allergies   Allergen Reactions    Hydrocodone Hives    Penicillin G Unknown (See Comments)     Reaction occurred as a baby.      Penicillin interview complete 2022.    Contrast Dye (Echo Or Unknown Ct/Mr) GI Intolerance     She is pretty sure it's the contrast dye that makes her super sick and vomiting after heart cath    Gadolinium Derivatives Itching              /65 (BP Location: Right arm, Patient Position: Lying)   Pulse 104   Temp 97.8 °F (36.6 °C) (Oral)   Resp 14   Ht 170.2 cm (67\")   Wt 88.7 kg (195 lb 8.8 oz)   LMP  (LMP Unknown)   SpO2 97%   BMI 30.63 kg/m²   Intake/Output last 3 shifts:  No intake/output data recorded.  Intake/Output this shift:  No intake/output data recorded.    PHYSICAL EXAM:    General: Alert, cooperative, no distress, appears stated age  Head:  Normocephalic, atraumatic, mucous membranes moist  Eyes:  Conjunctivae/corneas clear, EOM's intact     Neck:  Supple,  no adenopathy; no JVD or bruit  Lungs: Clear to auscultation bilaterally, no wheezes, rhonchi or rales are noted  Chest wall: No tenderness  Heart::  Regular rate and rhythm, S1 and S2 normal, no murmur, rub or gallop  Abdomen: Soft, nontender, nondistended, bowel sounds " active  Extremities: No cyanosis, clubbing, or edema   Pulses: 2+ and symmetric all extremities  Skin:  No rashes or lesions  Neuro/psych: A&O x3. CN II through XII are grossly intact with appropriate affect      Scheduled Meds:      allopurinol, 300 mg, Oral, Daily  aspirin, 81 mg, Oral, Daily  atorvastatin, 40 mg, Oral, Daily  carvedilol, 6.25 mg, Oral, BID With Meals  cholecalciferol, 1,000 Units, Oral, Daily  clopidogrel, 75 mg, Oral, Daily  empagliflozin, 25 mg, Oral, Daily  fenofibrate, 145 mg, Oral, Daily  ferrous sulfate, 324 mg, Oral, Daily With Breakfast  folic acid, 1 mg, Oral, Daily  hydrALAZINE, 25 mg, Oral, TID  insulin lispro, 2-9 Units, Subcutaneous, 4x Daily AC & at Bedtime  levothyroxine, 150 mcg, Oral, Q AM  lidocaine, 1 patch, Transdermal, Q24H  methylPREDNISolone, 4 mg, Oral, Daily With Breakfast  multivitamin, 1 tablet, Oral, Daily  pantoprazole, 40 mg, Oral, Daily  torsemide, 60 mg, Oral, Daily  vitamin B-12, 1,000 mcg, Oral, Daily        Continuous Infusions:         PRN Meds:      albuterol    dextrose    dextrose    docusate sodium    glucagon (human recombinant)    methocarbamol    nitroglycerin    oxyCODONE    Potassium Replacement - Follow Nurse / BPA Driven Protocol    [COMPLETED] Insert peripheral IV **AND** sodium chloride        Results Review:     I reviewed the patient's new clinical results.    CBC    Results from last 7 days   Lab Units 12/08/23  1853   WBC 10*3/mm3 10.12   HEMOGLOBIN g/dL 13.8   PLATELETS 10*3/mm3 220     Cr Clearance Estimated Creatinine Clearance: 68.7 mL/min (A) (by C-G formula based on SCr of 1.12 mg/dL (H)).  Coag     HbA1C   Lab Results   Component Value Date    HGBA1C 6.0 (H) 08/22/2022    HGBA1C 6.2 (H) 05/31/2021    HGBA1C 6.0 (H) 01/18/2021     Blood Glucose   Glucose   Date/Time Value Ref Range Status   12/09/2023 0708 146 (H) 70 - 105 mg/dL Final     Comment:     Serial Number: [id number]:  [id number]     Infection     CMP   Results from last 7  "days   Lab Units 12/08/23 1853   SODIUM mmol/L 141   POTASSIUM mmol/L 3.3*   CHLORIDE mmol/L 103   CO2 mmol/L 30.6*   BUN mg/dL 11   CREATININE mg/dL 1.12*   GLUCOSE mg/dL 115*   ALBUMIN g/dL 4.1   BILIRUBIN mg/dL 0.3   ALK PHOS U/L 78   AST (SGOT) U/L 18   ALT (SGPT) U/L 14     ABG      UA      SEMAJ  No results found for: \"POCMETH\", \"POCAMPHET\", \"POCBARBITUR\", \"POCBENZO\", \"POCCOCAINE\", \"POCOPIATES\", \"POCOXYCODO\", \"POCPHENCYC\", \"POCPROPOXY\", \"POCTHC\", \"POCTRICYC\"  Lysis Labs   Results from last 7 days   Lab Units 12/08/23 1853   HEMOGLOBIN g/dL 13.8   PLATELETS 10*3/mm3 220   CREATININE mg/dL 1.12*     Radiology(recent) XR Chest 1 View    Result Date: 12/8/2023  Impression: No acute process. Electronically Signed: Carlos Royal MD  12/8/2023 7:25 PM EST  Workstation ID: [id number]       Results from last 7 days   Lab Units 12/08/23  2102   HSTROP T ng/L 15*       X-rays, labs reviewed personally by physician.    ECG/EMG Results (most recent)       Procedure Component Value Units Date/Time    ECG 12 Lead Chest Pain [[id number]] Collected: 12/08/23 1832     Updated: 12/08/23 1834     QT Interval 393 ms      QTC Interval 443 ms     Narrative:      HEART RATE= 76  bpm  RR Interval= 788  ms  KS Interval= 146  ms  P Horizontal Axis= 6  deg  P Front Axis= 44  deg  QRSD Interval= 98  ms  QT Interval= 393  ms  QTcB= 443  ms  QRS Axis= -43  deg  T Wave Axis= 113  deg  - ABNORMAL ECG -  Sinus rhythm  Probable left atrial enlargement  Left anterior fascicular block  LVH with secondary repolarization abnormality  When compared with ECG of 16-Dec-2022 16:14:54,  Significant change in rhythm  Electronically Signed By: Juan Augustine (JAY JAY) 08-Dec-2023 18:33:55  Date and Time of Study: 2023-12-08 18:32:32              Medication Review:   I have reviewed the patient's current medication list  Scheduled Meds:allopurinol, 300 mg, Oral, Daily  aspirin, 81 mg, Oral, Daily  atorvastatin, 40 mg, Oral, Daily  carvedilol, 6.25 mg, " "Oral, BID With Meals  cholecalciferol, 1,000 Units, Oral, Daily  clopidogrel, 75 mg, Oral, Daily  empagliflozin, 25 mg, Oral, Daily  fenofibrate, 145 mg, Oral, Daily  ferrous sulfate, 324 mg, Oral, Daily With Breakfast  folic acid, 1 mg, Oral, Daily  hydrALAZINE, 25 mg, Oral, TID  insulin lispro, 2-9 Units, Subcutaneous, 4x Daily AC & at Bedtime  levothyroxine, 150 mcg, Oral, Q AM  lidocaine, 1 patch, Transdermal, Q24H  methylPREDNISolone, 4 mg, Oral, Daily With Breakfast  multivitamin, 1 tablet, Oral, Daily  pantoprazole, 40 mg, Oral, Daily  torsemide, 60 mg, Oral, Daily  vitamin B-12, 1,000 mcg, Oral, Daily      Continuous Infusions:   PRN Meds:.  albuterol    dextrose    dextrose    docusate sodium    glucagon (human recombinant)    methocarbamol    nitroglycerin    oxyCODONE    Potassium Replacement - Follow Nurse / BPA Driven Protocol    [COMPLETED] Insert peripheral IV **AND** sodium chloride    Imaging:  Imaging Results (Last 72 Hours)       Procedure Component Value Units Date/Time    XR Chest 1 View [[id number]] Collected: 12/08/23 1925     Updated: 12/08/23 1928    Narrative:      XR CHEST 1 VW    Date of Exam: 12/8/2023 6:40 PM EST    Indication: chest pain, shortness of breath    Comparison: 12/5/2023    Findings:  The cardiomediastinal silhouette is within normal limits. Lungs are clear. No focal consolidation, pneumothorax, or significant pleural effusion. Osseous structures grossly intact. Status post anatomy. Left-sided AICD noted.      Impression:      Impression:  No acute process.            Electronically Signed: Carlos Royal MD    12/8/2023 7:25 PM EST    Workstation ID: [id number]              ALEX Hua  12/09/23  08:28 EST       EMR Dragon/Transcription:   \"Dictated utilizing Dragon dictation\".                 Electronically signed by ALEX Hua, 12/09/23, 8:28 AM EST.  Copied text in this note has been reviewed by me and is accurate as of 12/09/23.    "

## 2023-12-09 NOTE — H&P
"[facility] Medicine Services  History & Physical    Patient Name: Rafeal Nunes  : 1975  MRN: [id number]  Primary Care Physician:  Phani Adames MD  Date of admission: 2023  Date and Time of Service: 2023 at 1:34    Subjective      Chief Complaint: chest pain     History of Present Illness: Rafael Nunes is a  very pleasant 48 y.o. female with a PMH of cardiomyopathy, valvular disease status post bioprosthetic aortic valve, EF 2021 was 20%, , ICD in situ, coronary artery disease status post CABG, last cardiac catheterization 2021 native severe two-vessel coronary artery disease, essential hypertension, chronic renal insufficiency, diabetes mellitus type 2, hypothyroidism, lumbar radiculopathy, GERD without esophagitis, COPD, who presented to [facility] on 2023 to [facility] emergency department with complaints of chest pain under her left breast and shortness of breath about 30 minutes prior to arrival in the ED.  She was hypertensive on admission to the ED with /107.  She was given Nitropaste and 10 mg IV hydralazine in ED.  She was apparently wheezing and given DuoNeb treatment emergency department.  She reported she was diagnosed with bronchitis several days ago.  WBC is not elevated and she is afebrile.  She was not placed on antibiotics. Potassium was 3.3.  D-dimer was elevated at 0.54, however she is unable to have CT with contrast due to allergy to contrast dye.  She was given one-time dose treatment Lovenox in ED and VQ scan has been ordered here.  Troponins came back at 16 and repeated at 15.  Creatinine is 1.12 which appears to be close to patient's baseline.  Glucose was 115.  COVID-19 negative flu a negative flu B-, chest x-ray per radiology showed \"no acute process\".  EKG showed sinus rhythm heart rate of 76 no acute ST elevation or depression.  She was transferred here for further evaluation and treatment.  " Given extensive cardiac history cardiology has been consulted.She is awake and alert and answering appropriately, rate chest pain 5 out of 10 and stable on room air.      Review of Systems   HENT: Negative.     Eyes: Negative.    Respiratory:  Positive for shortness of breath.    Cardiovascular:  Positive for chest pain.   Gastrointestinal: Negative.    Endocrine: Negative.    Genitourinary: Negative.    Musculoskeletal: Negative.    Skin: Negative.    Allergic/Immunologic: Negative.    Neurological: Negative.    Hematological: Negative.    Psychiatric/Behavioral: Negative.     All other systems reviewed and are negative.      Personal History     Past Medical History:   Diagnosis Date    Arrhythmia     Asthma     CAD (coronary artery disease)     Cardiomyopathy, dilated     Chronic systolic congestive heart failure     CKD (chronic kidney disease) stage 2, GFR 60-89 ml/min     COPD (chronic obstructive pulmonary disease)     Essential hypertension     GERD without esophagitis     Hidradenitis 10/26/2020    Hyperlipidemia     Hypothyroidism (acquired)     ICD (implantable cardioverter-defibrillator), dual, in situ 7/22/2019    Nonrheumatic aortic valve insufficiency     Nonrheumatic mitral valve regurgitation     S/P AVR (aortic valve replacement)     S/P CABG x 3     Type 2 diabetes mellitus without complication, without long-term current use of insulin        Past Surgical History:   Procedure Laterality Date    AORTIC VALVE REPAIR/REPLACEMENT N/A 12/27/2019    Procedure: AORTIC VALVE REPAIR/REPLACEMENT;  Surgeon: Lane Stock MD;  Location: [facility];  Service: Cardiothoracic    BREAST LUMPECTOMY Right     BENIGN    CARDIAC CATHETERIZATION N/A 12/24/2019    Procedure: Right and Left Heart Cath 12/24/19 @ 0900;  Surgeon: Wayne Luna MD;  Location: [facility] INVASIVE LOCATION;  Service: Cardiovascular    CARDIAC CATHETERIZATION N/A 12/24/2019    Procedure: Coronary angiography;  Surgeon:  Wayne Luna MD;  Location: [facility] CATH INVASIVE LOCATION;  Service: Cardiovascular    CARDIAC CATHETERIZATION N/A 11/10/2020    Procedure: Left and right Heart Cath;  Surgeon: Wayne Luna MD;  Location: [facility] CATH INVASIVE LOCATION;  Service: Cardiovascular;  Laterality: N/A;    CARDIAC CATHETERIZATION N/A 11/10/2020    Procedure: Coronary angiography;  Surgeon: Wayne Luna MD;  Location: [facility] CATH INVASIVE LOCATION;  Service: Cardiovascular;  Laterality: N/A;    CARDIAC CATHETERIZATION N/A 11/10/2020    Procedure: Right Heart Cath;  Surgeon: Wayne Luna MD;  Location: [facility] CATH INVASIVE LOCATION;  Service: Cardiovascular;  Laterality: N/A;    CARDIAC CATHETERIZATION N/A 11/25/2020    Procedure: Percutaneous coronary intervention of the left circumflex artery;  Surgeon: Wayne Luna MD;  Location: [facility] CATH INVASIVE LOCATION;  Service: Cardiovascular;  Laterality: N/A;    CARDIAC CATHETERIZATION N/A 1/22/2021    Procedure: LEFT HEART CATH with possible PCI;  Surgeon: Wayne Luna MD;  Location: [facility] CATH INVASIVE LOCATION;  Service: Cardiovascular;  Laterality: N/A;  Local and IV sedation    CARDIAC CATHETERIZATION N/A 1/22/2021    Procedure: Coronary angiography;  Surgeon: Wayne Luna MD;  Location: [facility] CATH INVASIVE LOCATION;  Service: Cardiovascular;  Laterality: N/A;    CARDIAC CATHETERIZATION N/A 1/22/2021    Procedure: Saphenous Vein Graft;  Surgeon: Wayne Luna MD;  Location: [facility] CATH INVASIVE LOCATION;  Service: Cardiovascular;  Laterality: N/A;    CARDIAC ELECTROPHYSIOLOGY PROCEDURE Left 6/28/2019    Procedure: Dual-chamber ICD insertion;  Surgeon: Héctor Eckert MD;  Location: [facility] CATH INVASIVE LOCATION;  Service: Cardiovascular    CARDIAC ELECTROPHYSIOLOGY PROCEDURE Left 8/14/2019    Procedure: Lead Revision;  Surgeon: Héctor Eckert MD;  Location: [facility] CATH INVASIVE LOCATION;   Service: Cardiovascular    CARDIAC SURGERY      CHOLECYSTECTOMY      CORONARY ARTERY BYPASS GRAFT N/A 12/27/2019    Procedure: CORONARY ARTERY BYPASS GRAFTING;  Surgeon: Lane Stock MD;  Location: [facility];  Service: Cardiothoracic    HYSTERECTOMY      INSERT / REPLACE / REMOVE PACEMAKER      LYMPHADENECTOMY Bilateral     THYROID SURGERY         Family History: family history includes Heart disease in her father. Otherwise pertinent FHx was reviewed and not pertinent to current issue.    Social History:  reports that she quit smoking about 4 years ago. Her smoking use included cigarettes. She has never used smokeless tobacco. She reports that she does not drink alcohol and does not use drugs.    Home Medications:  Prior to Admission Medications       Prescriptions Last Dose Informant Patient Reported? Taking?    albuterol (PROVENTIL) (2.5 MG/3ML) 0.083% nebulizer solution   No No    Take 2.5 mg by nebulization Every 4 (Four) Hours As Needed for Wheezing.    albuterol sulfate  (90 Base) MCG/ACT inhaler   No No    Inhale 2 puffs Every 4 (Four) Hours As Needed for Wheezing.    Alcohol Swabs (Alcohol Prep) 70 % pads   No No    Apply 1 each topically Daily.    Alirocumab (Praluent) 75 MG/ML solution auto-injector   Yes No    Inject 1 mL under the skin into the appropriate area as directed.    allopurinol (ZYLOPRIM) 300 MG tablet   Yes No    Take 1 tablet by mouth Daily.    amLODIPine (NORVASC) 10 MG tablet   Yes No    aspirin 81 MG EC tablet   No No    Take 1 tablet by mouth Daily.    atorvastatin (LIPITOR) 40 MG tablet   Yes No    benzonatate (TESSALON) 100 MG capsule   No No    Take 1 capsule by mouth 3 (Three) Times a Day As Needed for Cough for up to 30 doses.    carvedilol (COREG) 6.25 MG tablet   No No    Take 1 tablet by mouth 2 (Two) Times a Day With Meals.    cholecalciferol (VITAMIN D3) 1000 units tablet  Self Yes No    Take 1 tablet by mouth Daily.    clopidogrel (PLAVIX) 75 MG tablet   No No     Take 1 tablet by mouth Daily.    diphenhydrAMINE (BENADRYL) 25 mg capsule   Yes No    Take 1 capsule by mouth Every 6 (Six) Hours As Needed for Itching.    docusate sodium (COLACE) 100 MG capsule   Yes No    Take 1 capsule by mouth 2 (Two) Times a Day As Needed for Constipation.    Farxiga 10 MG tablet   Yes No    febuxostat (ULORIC) 80 MG tablet tablet   Yes No    Take 1 tablet by mouth Daily.    fenofibrate 160 MG tablet   Yes No    ferrous sulfate 325 (65 FE) MG tablet  Self Yes No    Take 1 tablet by mouth Daily With Breakfast.    fluconazole (DIFLUCAN) 50 MG tablet   Yes No    Take 1 tablet by mouth.    folic acid (FOLVITE) 1 MG tablet  Self Yes No    Take 1 tablet by mouth Daily.    gabapentin (NEURONTIN) 300 MG capsule   Yes No    As Needed.    glucose blood (OneTouch Verio) test strip   No No    Use one strip to test blood sugar once daily as directed.   Dx code: E11.9    hydrALAZINE (APRESOLINE) 25 MG tablet   No No    TAKE 1 TABLET BY MOUTH THREE TIMES A DAY    hydrOXYzine pamoate (VISTARIL) 50 MG capsule   Yes No    Lancets (OneTouch Delica Plus Hofglv27H) misc   No No    1 each Daily. Dx code: E11.9    levothyroxine (SYNTHROID, LEVOTHROID) 150 MCG tablet   Yes No    lidocaine (LIDODERM) 5 %   Yes No    methocarbamol (ROBAXIN) 500 MG tablet   No No    Take 1 tablet by mouth Every 8 (Eight) Hours As Needed for Muscle Spasms.    methylPREDNISolone (MEDROL) 4 MG dose pack   No No    Take as directed on package instructions.    multivitamin (THERAGRAN) tablet tablet   Yes No    Take 1 tablet by mouth Daily.    nitroglycerin (NITROSTAT) 0.4 MG SL tablet   No No    Place 1 tablet under the tongue Every 5 (Five) Minutes As Needed for Chest Pain. Take no more than 3 doses in 15 minutes. Call 911 if more than 3 doses needed.     oxyCODONE-acetaminophen (PERCOCET) 7.5-325 MG per tablet   Yes No    Take 1 tablet by mouth Every 6 (Six) Hours As Needed for Moderate Pain.    pantoprazole (PROTONIX) 40 MG EC tablet    No No    Take 1 tablet by mouth Daily.    potassium chloride 10 MEQ CR tablet   No No    Take 2 tablets by mouth 2 (Two) Times a Day.    predniSONE (DELTASONE) 20 MG tablet   Yes No    Take 2 tablets by mouth Daily.    torsemide (DEMADEX) 20 MG tablet   No No    Take 3 tablets by mouth Daily. Hold if persistent diarrhea    vitamin B-12 (CYANOCOBALAMIN) 1000 MCG tablet   Yes No    Take 1 tablet by mouth Daily.              Allergies:  Allergies   Allergen Reactions    Hydrocodone Hives    Penicillin G Unknown (See Comments)     Reaction occurred as a baby.      Penicillin interview complete 08/18/2022.    Contrast Dye (Echo Or Unknown Ct/Mr) GI Intolerance     She is pretty sure it's the contrast dye that makes her super sick and vomiting after heart cath    Gadolinium Derivatives Itching       Objective      Vitals:   Temp:  [97.9 °F (36.6 °C)-98.3 °F (36.8 °C)] 97.9 °F (36.6 °C)  Heart Rate:  [] 84  Resp:  [14-20] 14  BP: (122-187)/() 122/76  Body mass index is 30.63 kg/m².  Physical Exam  Vitals reviewed.   Constitutional:       Appearance: Normal appearance. She is obese.   HENT:      Head: Normocephalic and atraumatic.      Right Ear: External ear normal.      Left Ear: External ear normal.      Nose: Nose normal.      Mouth/Throat:      Mouth: Mucous membranes are moist.   Eyes:      Extraocular Movements: Extraocular movements intact.   Cardiovascular:      Rate and Rhythm: Normal rate and regular rhythm.      Pulses: Normal pulses.      Heart sounds: Normal heart sounds.   Pulmonary:      Effort: Pulmonary effort is normal.      Breath sounds: Normal breath sounds.   Abdominal:      Palpations: Abdomen is soft.   Genitourinary:     Comments: deferred  Musculoskeletal:         General: Normal range of motion.      Cervical back: Normal range of motion and neck supple.   Skin:     General: Skin is warm and dry.   Neurological:      General: No focal deficit present.      Mental Status: She is alert  and oriented to person, place, and time.   Psychiatric:         Mood and Affect: Mood normal.         Behavior: Behavior normal.         Thought Content: Thought content normal.         Judgment: Judgment normal.         Diagnostic Data:  Lab Results (last 24 hours)       Procedure Component Value Units Date/Time    High Sensitivity Troponin T 2Hr [[id number]]  (Abnormal) Collected: 12/08/23 2102    Specimen: Blood Updated: 12/08/23 2123     HS Troponin T 15 ng/L      Troponin T Delta -1 ng/L     Narrative:      High Sensitive Troponin T Reference Range:  <14.0 ng/L- Negative Female for AMI  <22.0 ng/L- Negative Male for AMI  >=14 - Abnormal Female indicating possible myocardial injury.  >=22 - Abnormal Male indicating possible myocardial injury.   Clinicians would have to utilize clinical acumen, EKG, Troponin, and serial changes to determine if it is an Acute Myocardial Infarction or myocardial injury due to an underlying chronic condition.         Comprehensive Metabolic Panel [[id number]]  (Abnormal) Collected: 12/08/23 1853    Specimen: Blood Updated: 12/08/23 1934     Glucose 115 mg/dL      BUN 11 mg/dL      Creatinine 1.12 mg/dL      Sodium 141 mmol/L      Potassium 3.3 mmol/L      Chloride 103 mmol/L      CO2 30.6 mmol/L      Calcium 9.1 mg/dL      Total Protein 7.1 g/dL      Albumin 4.1 g/dL      ALT (SGPT) 14 U/L      AST (SGOT) 18 U/L      Alkaline Phosphatase 78 U/L      Total Bilirubin 0.3 mg/dL      Globulin 3.0 gm/dL      A/G Ratio 1.4 g/dL      BUN/Creatinine Ratio 9.8     Anion Gap 7.4 mmol/L      eGFR 60.0 mL/min/1.73     Narrative:      GFR Normal >60  Chronic Kidney Disease <60  Kidney Failure <15      RSV PCR - Swab, Nasopharynx [[id number]]  (Normal) Collected: 12/08/23 1836    Specimen: Swab from Nasopharynx Updated: 12/08/23 1930     RSV, PCR Not Detected    High Sensitivity Troponin T [[id number]]  (Abnormal) Collected: 12/08/23 1853    Specimen: Blood Updated: 12/08/23 1918     HS Troponin T 16  "ng/L     Narrative:      High Sensitive Troponin T Reference Range:  <14.0 ng/L- Negative Female for AMI  <22.0 ng/L- Negative Male for AMI  >=14 - Abnormal Female indicating possible myocardial injury.  >=22 - Abnormal Male indicating possible myocardial injury.   Clinicians would have to utilize clinical acumen, EKG, Troponin, and serial changes to determine if it is an Acute Myocardial Infarction or myocardial injury due to an underlying chronic condition.         D-dimer, Quantitative [[id number]]  (Abnormal) Collected: 12/08/23 1853    Specimen: Blood Updated: 12/08/23 1918     D-Dimer, Quantitative 0.54 MCGFEU/mL     Narrative:      According to the assay 's published package insert, a normal (<0.50 MCGFEU/mL) D-dimer result in conjunction with a non-high clinical probability assessment, excludes deep vein thrombosis (DVT) and pulmonary embolism (PE) with high sensitivity.    D-dimer values increase with age and this can make VTE exclusion of an older population difficult. To address this, the [employer], based on best available evidence and recent guidelines, recommends that clinicians use age-adjusted D-dimer thresholds in patients greater than 50 years of age with: a) a low probability of PE who do not meet all Pulmonary Embolism Rule Out Criteria, or b) in those with intermediate probability of PE.   The formula for an age-adjusted D-dimer cut-off is \"age/100\".  For example, a 60 year old patient would have an age-adjusted cut-off of 0.60 MCGFEU/mL and an 80 year old 0.80 MCGFEU/mL.    COVID-19 and FLU A/B PCR, 1 HR TAT - Swab, Nasopharynx [[id number]]  (Normal) Collected: 12/08/23 1836    Specimen: Swab from Nasopharynx Updated: 12/08/23 1905     COVID19 Not Detected     Influenza A PCR Not Detected     Influenza B PCR Not Detected    Narrative:      Fact sheet for providers: https://www.fda.gov/media/959634/download    Fact sheet for patients: " https://www.fda.gov/media/255509/download    Test performed by PCR.    CBC & Differential [[id number]]  (Abnormal) Collected: 12/08/23 1853    Specimen: Blood Updated: 12/08/23 1900    Narrative:      The following orders were created for panel order CBC & Differential.  Procedure                               Abnormality         Status                     ---------                               -----------         ------                     CBC Auto Differential[[id number]]        Abnormal            Final result                 Please view results for these tests on the individual orders.    CBC Auto Differential [[id number]]  (Abnormal) Collected: 12/08/23 1853    Specimen: Blood Updated: 12/08/23 1900     WBC 10.12 10*3/mm3      RBC 4.66 10*6/mm3      Hemoglobin 13.8 g/dL      Hematocrit 43.1 %      MCV 92.5 fL      MCH 29.6 pg      MCHC 32.0 g/dL      RDW 12.9 %      RDW-SD 43.8 fl      MPV 8.9 fL      Platelets 220 10*3/mm3      Neutrophil % 79.1 %      Lymphocyte % 14.3 %      Monocyte % 5.5 %      Eosinophil % 0.7 %      Basophil % 0.2 %      Immature Grans % 0.2 %      Neutrophils, Absolute 8.00 10*3/mm3      Lymphocytes, Absolute 1.45 10*3/mm3      Monocytes, Absolute 0.56 10*3/mm3      Eosinophils, Absolute 0.07 10*3/mm3      Basophils, Absolute 0.02 10*3/mm3      Immature Grans, Absolute 0.02 10*3/mm3              Imaging Results (Last 24 Hours)       Procedure Component Value Units Date/Time    XR Chest 1 View [[id number]] Collected: 12/08/23 1925     Updated: 12/08/23 1928    Narrative:      XR CHEST 1 VW    Date of Exam: 12/8/2023 6:40 PM EST    Indication: chest pain, shortness of breath    Comparison: 12/5/2023    Findings:  The cardiomediastinal silhouette is within normal limits. Lungs are clear. No focal consolidation, pneumothorax, or significant pleural effusion. Osseous structures grossly intact. Status post anatomy. Left-sided AICD noted.      Impression:      Impression:  No acute  process.            Electronically Signed: Carlos Royal MD    12/8/2023 7:25 PM EST    Workstation ID: [id number]              Assessment & Plan        This is a 48 y.o. female with:    Active and Resolved Problems  Active Hospital Problems    Diagnosis  POA    Chest pain [R07.9]  Yes     Priority: High    Elevated troponin [R79.89]  Yes     Priority: Medium    S/P AVR (aortic valve replacement) [Z95.2]  Not Applicable     Priority: Medium    S/P CABG x 3 [Z95.1]  Not Applicable     Priority: Medium    Coronary artery disease of native artery of native heart with stable angina pectoris [I25.118]  Yes     Priority: Medium    ICD (implantable cardioverter-defibrillator), dual, in situ [Z95.810]  Yes     Priority: Medium    Cardiomyopathy, dilated [I42.0]  Yes     Priority: Medium    Intractable low back pain [M54.59]  Yes    Chronic pain syndrome [G89.4]  Yes    Hypokalemia [E87.6]  Yes    INDRA (obstructive sleep apnea) [G47.33]  Yes    Type 2 diabetes mellitus without complication, without long-term current use of insulin [E11.9]  Yes    CKD (chronic kidney disease) stage 2, GFR 60-89 ml/min [N18.2]  Yes    GERD without esophagitis [K21.9]  Yes    Hypothyroidism (acquired) [E03.9]  Yes    COPD (chronic obstructive pulmonary disease) [J44.9]  Yes    Essential hypertension [I10]  Yes      Resolved Hospital Problems   No resolved problems to display.     Chest pain, troponin 16 then 15, past medical history of CABG, PCI, remaining coronary artery disease, cardiomyopathy, systolic heart failure ICD in place, Nitropaste in ED, cardiology consulted given extensive past medical history continuous cardiac monitoring, elevated D-dimer 0.54, one-time dose treatment Lovenox in ED, VQ scan in a.m. allergy to contrast, stable on room air, on home aspirin, statin, Plavix    Chronic kidney disease creatinine 1.12, stable avoid nephrotoxic meds trend renal function    Chronic pain syndrome chronic back pain, on home Norco reorder  pending verification    Hypokalemia potassium 3.3, potassium protocol in place magnesium in a.m.    Obstructive sleep apnea, CPAP at home, 2 L oxygen via nasal cannula while hospitalized with continuous cardiac monitoring    Type 2 diabetes mellitus, was on home Farxiga consistent carb heart healthy diet add SSI as needed with Accu-Cheks ACHS    GERD without esophagitis, reorder home PPI    Hypothyroidism, reorder home levothyroxine    COPD, stable on room air, home albuterol reordered    Essential hypertension, reordered home Coreg, hydralazine, Demadex, amlodipine , monitor BP     DVT prophylaxis:  No DVT prophylaxis order currently exists.    The patient desires to be as follows:    CODE STATUS:  Full Code         Admission Status:  I believe this patient meets observation  status.    Expected Length of Stay: 1-2 days     PDMP and Medication Dispenses via Sidebar reviewed and consistent with patient reported medications.    I discussed the patient's findings and my recommendations with patient.      Signature:     This document has been electronically signed by ALEX Butler on December 9, 2023 02:16 [facility] Hospitalist Team

## 2023-12-09 NOTE — FSED PROVIDER NOTE
Patient seen and reevaluated reassessed by myself.  Patient states that she has been coughing was diagnosed with pneumonia few days ago.  History of triple bypass as well as defibrillator placement.  Patient has a history of asthma and on reexamination she has expiratory wheezing especially when coughing.  She says she typically does have nebulizer treatments previously.  Patient being transferred with a diagnosis of chest pains and hypertensive urgency.  Chest pain does seem to be atypical and that there is some reproducible component to her chest pain.  Patient says that the coughing has been going on for few days.  Is likely possible that some of this chest pain can be musculoskeletal however given patient's medical history she is excepted in transfer for further evaluation.  We will give her nebulizer treatment as she is wheezing with a history of asthma and reevaluate for improvement of her symptoms.

## 2023-12-09 NOTE — PROGRESS NOTES
"[facility] MEDICINE SERVICE  DAILY PROGRESS NOTE      Patient Name: Rafael Nunes  Date of Admission: 12/8/2023  Today's Date: 12/09/23  Length of Stay: 0  Primary Care Physician: Phani Adames MD    Subjective   Patient is doing well today.  She has no complaints at this time.  No overnight events.       Objective    Temp:  [97.8 °F (36.6 °C)-98.3 °F (36.8 °C)] 98.1 °F (36.7 °C)  Heart Rate:  [] 73  Resp:  [14-20] 16  BP: (112-187)/() 112/74  Physical Exam  General: NAD, AAOx3  HEENT: AT NC, EOMI  Neck: No JVD  CVS: S1/S2 present, RRR, no M/R/G  Lungs: CTA B/L  Abdomen: soft, NT, ND, BS+  Ext: no edema  Skin: no rashes, bruises or discolorations  Neuro: no focal deficits  Psych: Not agitated         Results Review:  I have reviewed the labs, radiology results, and diagnostic studies.    Laboratory Data:   Results from last 7 days   Lab Units 12/08/23  1853   WBC 10*3/mm3 10.12   HEMOGLOBIN g/dL 13.8   HEMATOCRIT % 43.1   PLATELETS 10*3/mm3 220        Results from last 7 days   Lab Units 12/09/23  1406 12/08/23  1853   SODIUM mmol/L 140 141   POTASSIUM mmol/L 3.7  3.7 3.3*   CHLORIDE mmol/L 97* 103   CO2 mmol/L 29.0 30.6*   BUN mg/dL 12 11   CREATININE mg/dL 1.42* 1.12*   CALCIUM mg/dL 10.2 9.1   BILIRUBIN mg/dL  --  0.3   ALK PHOS U/L  --  78   ALT (SGPT) U/L  --  14   AST (SGOT) U/L  --  18   GLUCOSE mg/dL 95 115*       Culture Data:   No results found for: \"BLOODCX\"  No results found for: \"URINECX\"  No results found for: \"RESPCX\"  No results found for: \"WOUNDCX\"  No results found for: \"STOOLCX\"  No components found for: \"BODYFLD\"    Radiology Data:   Imaging Results (Last 24 Hours)       Procedure Component Value Units Date/Time    NM Lung Ventilation Perfusion [[id number]] Collected: 12/09/23 1545     Updated: 12/09/23 1548    Narrative:      NM LUNG VENTILATION PERFUSION    Date of Exam: 12/9/2023 10:15 AM CST    Indication: Chest pain, acute, PE suspected, intermed prob, " positive D-dimer.    Comparison: None available.    Technique:  The patient was ventilated with 47.4 mCi of technetium 99m pyrophosphate aerosol and ventilation images were acquired in multiple obliquities. The patient then received 5.9 mCi of technetium 99m MAA intravenously and perfusion images were   acquired in multiple obliquities.      Findings:  Normal symmetric uptake of radiotracer throughout the lungs on ventilation and perfusion imaging. No mismatches to suggest pulmonary embolism.      Impression:      Impression:  Low probability for pulmonary embolism.        Electronically Signed: Inocencio Rodriguez MD    12/9/2023 2:46 PM CST    Workstation ID: [id number]    XR Chest PA & Lateral [[id number]] Collected: 12/09/23 1544     Updated: 12/09/23 1547    Narrative:      XR CHEST PA AND LATERAL    Date of Exam: 12/9/2023 10:42 AM CST    Indication: elevated d dimer chest pain allery to CT contrast    Comparison: None available.    Findings:  Cardiomediastinal silhouette is unremarkable. There is a 2-lead left-sided pacemaker/AICD. There is minimal left mid to lower lung scarring. No airspace disease, pneumothorax, nor pleural effusion. No acute osseous abnormality identified.      Impression:      Impression:  No acute process identified.      Electronically Signed: Inocencio Rodriguez MD    12/9/2023 2:45 PM CST    Workstation ID: [id number]    XR Chest 1 View [[id number]] Collected: 12/08/23 1925     Updated: 12/08/23 1928    Narrative:      XR CHEST 1 VW    Date of Exam: 12/8/2023 6:40 PM EST    Indication: chest pain, shortness of breath    Comparison: 12/5/2023    Findings:  The cardiomediastinal silhouette is within normal limits. Lungs are clear. No focal consolidation, pneumothorax, or significant pleural effusion. Osseous structures grossly intact. Status post anatomy. Left-sided AICD noted.      Impression:      Impression:  No acute process.            Electronically Signed: Carlos Royal MD    12/8/2023 7:25 PM EST     Workstation ID: [id number]            I have reviewed the patient's current medications.     Assessment/Plan     1) chest pain  - resolved  - troponin stable  - cardiology consulted, appreciate recs  - TTE ordered  - monitor on telemetry    2) elevated D-dimer  - VQ scan shows low probability of PE    3) LINSEY  - worse today  - IV fluids  - daily labs    4) HTN  - home meds    5) DM  - ISS, accuchecks, diet    6) HLD  - lipitor, fenofibrate    7) hypothyroidism  - synthroid    8) CAD  - home meds    9) COPD  - albuterol PRN    10) INDRA  - CPAP    11) GERD  - protonix    12) DVT ppx  - SCDs        Electronically signed by Thien Degroot MD, 12/09/23, 16:25 EST.

## 2023-12-09 NOTE — PLAN OF CARE
Goal Outcome Evaluation:           Progress: improving        Outcome Evaluation: assumed care of pt at about 0100; pt was a direct admit from the [place] ER for chest pain; upon initial assessment pt rated chest pain as a 5/10 sharp pressure located under left breast (slightly relieved after nitro past applied and ordered pain meds given); pt has INDRA so was placed on 2L NC overnight; potassium was low so replacement was initiated; VSS overnight                 Problem: Adult Inpatient Plan of Care  Goal: Plan of Care Review  Outcome: Ongoing, Progressing  Flowsheets (Taken 12/9/2023 0406)  Progress: improving  Outcome Evaluation:   assumed care of pt at about 0100   pt was a direct admit from the [place] ER for chest pain   upon initial assessment pt rated chest pain as a 5/10 sharp pressure located under left breast (slightly relieved after nitro past applied and ordered pain meds given)   pt has INDRA so was placed on 2L NC overnight   potassium was low so replacement was initiated   VSS overnight  Goal: Patient-Specific Goal (Individualized)  Outcome: Ongoing, Progressing  Goal: Absence of Hospital-Acquired Illness or Injury  Outcome: Ongoing, Progressing  Intervention: Identify and Manage Fall Risk  Recent Flowsheet Documentation  Taken 12/9/2023 0200 by Rickey Ferris, RN  Safety Promotion/Fall Prevention:   safety round/check completed   room organization consistent   nonskid shoes/slippers when out of bed   lighting adjusted   assistive device/personal items within reach   clutter free environment maintained   fall prevention program maintained  Taken 12/9/2023 0100 by Rickey Ferris, RN  Safety Promotion/Fall Prevention:   safety round/check completed   room organization consistent   nonskid shoes/slippers when out of bed   lighting adjusted   assistive device/personal items within reach   clutter free environment maintained   fall prevention program maintained  Intervention: Prevent Skin Injury  Recent Flowsheet  Documentation  Taken 12/9/2023 0100 by Rickey Ferris RN  Body Position: position changed independently  Skin Protection:   adhesive use limited   incontinence pads utilized   tubing/devices free from skin contact  Intervention: Prevent and Manage VTE (Venous Thromboembolism) Risk  Recent Flowsheet Documentation  Taken 12/9/2023 0100 by Rickey Ferris RN  Range of Motion: active ROM (range of motion) encouraged  Intervention: Prevent Infection  Recent Flowsheet Documentation  Taken 12/9/2023 0200 by Rickey Ferris RN  Infection Prevention:   equipment surfaces disinfected   hand hygiene promoted   personal protective equipment utilized   rest/sleep promoted   single patient room provided  Taken 12/9/2023 0100 by Rickey Ferris RN  Infection Prevention:   equipment surfaces disinfected   hand hygiene promoted   personal protective equipment utilized   rest/sleep promoted   single patient room provided  Goal: Optimal Comfort and Wellbeing  Outcome: Ongoing, Progressing  Intervention: Monitor Pain and Promote Comfort  Recent Flowsheet Documentation  Taken 12/9/2023 0208 by Riceky Ferris RN  Pain Management Interventions:   see MAR   quiet environment facilitated   care clustered  Intervention: Provide Person-Centered Care  Recent Flowsheet Documentation  Taken 12/9/2023 0100 by Rickey Ferris RN  Trust Relationship/Rapport:   care explained   choices provided   emotional support provided   empathic listening provided   questions answered   questions encouraged   reassurance provided   thoughts/feelings acknowledged  Goal: Readiness for Transition of Care  Outcome: Ongoing, Progressing  Intervention: Mutually Develop Transition Plan  Recent Flowsheet Documentation  Taken 12/9/2023 0051 by Rickey Ferris RN  Transportation Anticipated: family or friend will provide  Patient/Family Anticipated Services at Transition:   Patient/Family Anticipates Transition to: home with family  Taken 12/9/2023  0049 by Rickey Ferris RN  Equipment Currently Used at Home:   walker, rolling   cpap   commode   glucometer   bp cuff     Problem: Asthma Comorbidity  Goal: Maintenance of Asthma Control  Outcome: Ongoing, Progressing  Intervention: Maintain Asthma Symptom Control  Recent Flowsheet Documentation  Taken 12/9/2023 0200 by Rickey Ferris RN  Medication Review/Management: medications reviewed  Taken 12/9/2023 0100 by Rickey Ferris RN  Medication Review/Management: medications reviewed     Problem: Behavioral Health Comorbidity  Goal: Maintenance of Behavioral Health Symptom Control  Outcome: Ongoing, Progressing  Intervention: Maintain Behavioral Health Symptom Control  Recent Flowsheet Documentation  Taken 12/9/2023 0200 by Rickey Ferris RN  Medication Review/Management: medications reviewed  Taken 12/9/2023 0100 by Rickey Ferris RN  Medication Review/Management: medications reviewed     Problem: COPD (Chronic Obstructive Pulmonary Disease) Comorbidity  Goal: Maintenance of COPD Symptom Control  Outcome: Ongoing, Progressing  Intervention: Maintain COPD-Symptom Control  Recent Flowsheet Documentation  Taken 12/9/2023 0200 by Rickey Ferris RN  Medication Review/Management: medications reviewed  Taken 12/9/2023 0100 by Rickey Ferris RN  Supportive Measures:   active listening utilized   self-care encouraged  Medication Review/Management: medications reviewed     Problem: Diabetes Comorbidity  Goal: Blood Glucose Level Within Targeted Range  Outcome: Ongoing, Progressing     Problem: Heart Failure Comorbidity  Goal: Maintenance of Heart Failure Symptom Control  Outcome: Ongoing, Progressing  Intervention: Maintain Heart Failure-Management  Recent Flowsheet Documentation  Taken 12/9/2023 0200 by Rickey Ferris RN  Medication Review/Management: medications reviewed  Taken 12/9/2023 0100 by Rickey Ferris RN  Medication Review/Management: medications reviewed     Problem: Hypertension  Comorbidity  Goal: Blood Pressure in Desired Range  Outcome: Ongoing, Progressing  Intervention: Maintain Blood Pressure Management  Recent Flowsheet Documentation  Taken 12/9/2023 0200 by Rickey Ferris RN  Medication Review/Management: medications reviewed  Taken 12/9/2023 0100 by Rickey Ferris RN  Medication Review/Management: medications reviewed     Problem: Obstructive Sleep Apnea Risk or Actual Comorbidity Management  Goal: Unobstructed Breathing During Sleep  Outcome: Ongoing, Progressing     Problem: Osteoarthritis Comorbidity  Goal: Maintenance of Osteoarthritis Symptom Control  Outcome: Ongoing, Progressing  Intervention: Maintain Osteoarthritis Symptom Control  Recent Flowsheet Documentation  Taken 12/9/2023 0200 by Rickey Ferris RN  Medication Review/Management: medications reviewed  Taken 12/9/2023 0100 by Rikcey Ferris RN  Medication Review/Management: medications reviewed     Problem: Pain Chronic (Persistent) (Comorbidity Management)  Goal: Acceptable Pain Control and Functional Ability  Outcome: Ongoing, Progressing  Intervention: Manage Persistent Pain  Recent Flowsheet Documentation  Taken 12/9/2023 0200 by Rickey Ferris RN  Medication Review/Management: medications reviewed  Taken 12/9/2023 0100 by Rickey Ferris RN  Sleep/Rest Enhancement: awakenings minimized  Medication Review/Management: medications reviewed  Intervention: Develop Pain Management Plan  Recent Flowsheet Documentation  Taken 12/9/2023 0208 by Rickey Ferris RN  Pain Management Interventions:   see MAR   quiet environment facilitated   care clustered  Intervention: Optimize Psychosocial Wellbeing  Recent Flowsheet Documentation  Taken 12/9/2023 0100 by Rickey Ferris RN  Supportive Measures:   active listening utilized   self-care encouraged  Diversional Activities:   television   smartphone     Problem: Seizure Disorder Comorbidity  Goal: Maintenance of Seizure Control  Outcome: Ongoing, Progressing

## 2023-12-09 NOTE — PLAN OF CARE
Problem: Adult Inpatient Plan of Care  Goal: Plan of Care Review  Outcome: Ongoing, Progressing  Flowsheets (Taken 12/9/2023 8719)  Outcome Evaluation: Pt had VQ scan today due to elevated D-dimer.  awaiting results.  Pt cont on RA but had to wear 2L at night due to apnea and refused hospital bipapa. plan is to return home with fm. Denies c/p Pt does have chronic lower back pain. Pt c/o of some nausea this am and zofran given per MAR.  Pt states she take zofran at home and has alot of nausea at times. K+ also treated today. will cont to monitor   Goal Outcome Evaluation:              Outcome Evaluation: Pt had VQ scan today due to elevated D-dimer.  awaiting results.  Pt cont on RA but had to wear 2L at night due to apnea and refused hospital bipapa. plan is to return home with fm. Denies c/p Pt does have chronic lower back pain. Pt c/o of some nausea this am and zofran given per MAR.  Pt states she take zofran at home and has alot of nausea at times. K+ also treated today. will cont to monitor

## 2023-12-10 ENCOUNTER — APPOINTMENT (OUTPATIENT)
Dept: CT IMAGING | Facility: HOSPITAL | Age: 48
End: 2023-12-10
Payer: COMMERCIAL

## 2023-12-10 ENCOUNTER — APPOINTMENT (OUTPATIENT)
Dept: CARDIOLOGY | Facility: HOSPITAL | Age: 48
End: 2023-12-10
Payer: COMMERCIAL

## 2023-12-10 LAB
BH CV ECHO MEAS - ACS: 1.4 CM
BH CV ECHO MEAS - AO MAX PG: 27.2 MMHG
BH CV ECHO MEAS - AO MEAN PG: 15 MMHG
BH CV ECHO MEAS - AO ROOT DIAM: 2.6 CM
BH CV ECHO MEAS - AO V2 MAX: 261 CM/SEC
BH CV ECHO MEAS - AO V2 VTI: 41.8 CM
BH CV ECHO MEAS - AVA(I,D): 0.59 CM2
BH CV ECHO MEAS - EDV(CUBED): 64 ML
BH CV ECHO MEAS - EDV(MOD-SP2): 87.8 ML
BH CV ECHO MEAS - EDV(MOD-SP4): 70.7 ML
BH CV ECHO MEAS - EF(MOD-BP): 46.3 %
BH CV ECHO MEAS - EF(MOD-SP2): 49.8 %
BH CV ECHO MEAS - EF(MOD-SP4): 46.8 %
BH CV ECHO MEAS - ESV(CUBED): 29.8 ML
BH CV ECHO MEAS - ESV(MOD-SP2): 44.1 ML
BH CV ECHO MEAS - ESV(MOD-SP4): 37.6 ML
BH CV ECHO MEAS - FS: 22.5 %
BH CV ECHO MEAS - IVS/LVPW: 1 CM
BH CV ECHO MEAS - IVSD: 1.3 CM
BH CV ECHO MEAS - LA DIMENSION: 3.7 CM
BH CV ECHO MEAS - LAT PEAK E' VEL: 8.9 CM/SEC
BH CV ECHO MEAS - LV DIASTOLIC VOL/BSA (35-75): 35.3 CM2
BH CV ECHO MEAS - LV MASS(C)D: 186.5 GRAMS
BH CV ECHO MEAS - LV MAX PG: 1.64 MMHG
BH CV ECHO MEAS - LV MEAN PG: 1 MMHG
BH CV ECHO MEAS - LV SYSTOLIC VOL/BSA (12-30): 18.8 CM2
BH CV ECHO MEAS - LV V1 MAX: 64 CM/SEC
BH CV ECHO MEAS - LV V1 VTI: 10.9 CM
BH CV ECHO MEAS - LVIDD: 4 CM
BH CV ECHO MEAS - LVIDS: 3.1 CM
BH CV ECHO MEAS - LVOT AREA: 2.27 CM2
BH CV ECHO MEAS - LVOT DIAM: 1.7 CM
BH CV ECHO MEAS - LVPWD: 1.3 CM
BH CV ECHO MEAS - MED PEAK E' VEL: 5.3 CM/SEC
BH CV ECHO MEAS - MR MAX PG: 53.3 MMHG
BH CV ECHO MEAS - MR MAX VEL: 365 CM/SEC
BH CV ECHO MEAS - MV A MAX VEL: 53.8 CM/SEC
BH CV ECHO MEAS - MV DEC SLOPE: 432 CM/SEC2
BH CV ECHO MEAS - MV DEC TIME: 0.17 SEC
BH CV ECHO MEAS - MV E MAX VEL: 51.3 CM/SEC
BH CV ECHO MEAS - MV E/A: 0.95
BH CV ECHO MEAS - MV MAX PG: 3 MMHG
BH CV ECHO MEAS - MV MEAN PG: 1 MMHG
BH CV ECHO MEAS - MV P1/2T: 63.1 MSEC
BH CV ECHO MEAS - MV V2 VTI: 18.7 CM
BH CV ECHO MEAS - MVA(P1/2T): 3.5 CM2
BH CV ECHO MEAS - MVA(VTI): 1.32 CM2
BH CV ECHO MEAS - PA V2 MAX: 69.2 CM/SEC
BH CV ECHO MEAS - PULM A REVS DUR: 0.16 SEC
BH CV ECHO MEAS - PULM A REVS VEL: 58.3 CM/SEC
BH CV ECHO MEAS - PULM DIAS VEL: 43.1 CM/SEC
BH CV ECHO MEAS - PULM S/D: 1.05
BH CV ECHO MEAS - PULM SYS VEL: 45.2 CM/SEC
BH CV ECHO MEAS - RV MAX PG: 0.92 MMHG
BH CV ECHO MEAS - RV V1 MAX: 47.9 CM/SEC
BH CV ECHO MEAS - RV V1 VTI: 8.6 CM
BH CV ECHO MEAS - RVDD: 2.1 CM
BH CV ECHO MEAS - SI(MOD-SP2): 21.8 ML/M2
BH CV ECHO MEAS - SI(MOD-SP4): 16.5 ML/M2
BH CV ECHO MEAS - SV(LVOT): 24.7 ML
BH CV ECHO MEAS - SV(MOD-SP2): 43.7 ML
BH CV ECHO MEAS - SV(MOD-SP4): 33.1 ML
BH CV ECHO MEAS - TAPSE (>1.6): 1.13 CM
BH CV ECHO MEAS - TR MAX PG: 14.9 MMHG
BH CV ECHO MEAS - TR MAX VEL: 193 CM/SEC
BH CV ECHO MEASUREMENTS AVERAGE E/E' RATIO: 7.23
BH CV XLRA - RV BASE: 3.2 CM
BH CV XLRA - RV LENGTH: 6.7 CM
BH CV XLRA - RV MID: 2.2 CM
BH CV XLRA - TDI S': 7.2 CM/SEC
GLUCOSE BLDC GLUCOMTR-MCNC: 102 MG/DL (ref 70–105)
GLUCOSE BLDC GLUCOMTR-MCNC: 106 MG/DL (ref 70–105)
GLUCOSE BLDC GLUCOMTR-MCNC: 125 MG/DL (ref 70–105)
GLUCOSE BLDC GLUCOMTR-MCNC: 133 MG/DL (ref 70–105)
LEFT ATRIUM VOLUME INDEX: 18.6 ML/M2

## 2023-12-10 PROCEDURE — G0378 HOSPITAL OBSERVATION PER HR: HCPCS

## 2023-12-10 PROCEDURE — 63710000001 METHYLPREDNISOLONE PER 4 MG: Performed by: NURSE PRACTITIONER

## 2023-12-10 PROCEDURE — 99214 OFFICE O/P EST MOD 30 MIN: CPT | Performed by: INTERNAL MEDICINE

## 2023-12-10 PROCEDURE — 82948 REAGENT STRIP/BLOOD GLUCOSE: CPT

## 2023-12-10 PROCEDURE — 93306 TTE W/DOPPLER COMPLETE: CPT | Performed by: INTERNAL MEDICINE

## 2023-12-10 PROCEDURE — 70490 CT SOFT TISSUE NECK W/O DYE: CPT

## 2023-12-10 PROCEDURE — 93306 TTE W/DOPPLER COMPLETE: CPT

## 2023-12-10 RX ORDER — BENZONATATE 100 MG/1
100 CAPSULE ORAL 3 TIMES DAILY PRN
Status: DISCONTINUED | OUTPATIENT
Start: 2023-12-10 | End: 2023-12-11 | Stop reason: HOSPADM

## 2023-12-10 RX ADMIN — TORSEMIDE 60 MG: 100 TABLET ORAL at 09:28

## 2023-12-10 RX ADMIN — BENZONATATE 100 MG: 100 CAPSULE ORAL at 22:41

## 2023-12-10 RX ADMIN — EMPAGLIFLOZIN 25 MG: 25 TABLET, FILM COATED ORAL at 09:29

## 2023-12-10 RX ADMIN — FERROUS SULFATE TAB EC 324 MG (65 MG FE EQUIVALENT) 324 MG: 324 (65 FE) TABLET DELAYED RESPONSE at 09:28

## 2023-12-10 RX ADMIN — ALLOPURINOL 300 MG: 300 TABLET ORAL at 09:28

## 2023-12-10 RX ADMIN — CARVEDILOL 6.25 MG: 6.25 TABLET, FILM COATED ORAL at 18:05

## 2023-12-10 RX ADMIN — CARVEDILOL 6.25 MG: 6.25 TABLET, FILM COATED ORAL at 09:28

## 2023-12-10 RX ADMIN — LEVOTHYROXINE SODIUM 150 MCG: 0.15 TABLET ORAL at 06:00

## 2023-12-10 RX ADMIN — LIDOCAINE 1 PATCH: 50 PATCH CUTANEOUS at 09:30

## 2023-12-10 RX ADMIN — FENOFIBRATE 145 MG: 145 TABLET ORAL at 09:38

## 2023-12-10 RX ADMIN — HYDRALAZINE HYDROCHLORIDE 25 MG: 25 TABLET, FILM COATED ORAL at 21:32

## 2023-12-10 RX ADMIN — METHYLPREDNISOLONE 4 MG: 4 TABLET ORAL at 09:28

## 2023-12-10 RX ADMIN — ASPIRIN 81 MG: 81 TABLET, COATED ORAL at 09:28

## 2023-12-10 RX ADMIN — THERA TABS 1 TABLET: TAB at 09:29

## 2023-12-10 RX ADMIN — PANTOPRAZOLE SODIUM 40 MG: 40 TABLET, DELAYED RELEASE ORAL at 09:29

## 2023-12-10 RX ADMIN — CYANOCOBALAMIN TAB 1000 MCG 1000 MCG: 1000 TAB at 09:29

## 2023-12-10 RX ADMIN — ATORVASTATIN CALCIUM 40 MG: 40 TABLET, FILM COATED ORAL at 09:29

## 2023-12-10 RX ADMIN — HYDRALAZINE HYDROCHLORIDE 25 MG: 25 TABLET, FILM COATED ORAL at 09:29

## 2023-12-10 RX ADMIN — HYDRALAZINE HYDROCHLORIDE 25 MG: 25 TABLET, FILM COATED ORAL at 16:01

## 2023-12-10 RX ADMIN — FOLIC ACID 1 MG: 1 TABLET ORAL at 09:29

## 2023-12-10 RX ADMIN — CLOPIDOGREL BISULFATE 75 MG: 75 TABLET ORAL at 09:29

## 2023-12-10 RX ADMIN — Medication 1000 UNITS: at 09:28

## 2023-12-10 NOTE — PROGRESS NOTES
"[facility] MEDICINE SERVICE  DAILY PROGRESS NOTE      Patient Name: Rafael Nnues  Date of Admission: 12/8/2023  Today's Date: 12/10/23  Length of Stay: 0  Primary Care Physician: Phani Adames MD    Subjective   Patient is stable today.  She has no complaints at this time.  No overnight events.       Objective    Temp:  [97.3 °F (36.3 °C)-98.8 °F (37.1 °C)] 98.8 °F (37.1 °C)  Heart Rate:  [68-85] 79  Resp:  [9-18] 12  BP: (124-154)/(85-98) 124/93  Physical Exam  General: NAD, AAOx3  HEENT: AT NC, EOMI  Neck: No JVD  CVS: S1/S2 present, RRR, no M/R/G  Lungs: CTA B/L  Abdomen: soft, NT, ND, BS+  Ext: no edema  Skin: no rashes, bruises or discolorations  Neuro: no focal deficits  Psych: Not agitated         Results Review:  I have reviewed the labs, radiology results, and diagnostic studies.    Laboratory Data:   Results from last 7 days   Lab Units 12/08/23  1853   WBC 10*3/mm3 10.12   HEMOGLOBIN g/dL 13.8   HEMATOCRIT % 43.1   PLATELETS 10*3/mm3 220        Results from last 7 days   Lab Units 12/09/23  1406 12/08/23  1853   SODIUM mmol/L 140 141   POTASSIUM mmol/L 3.7  3.7 3.3*   CHLORIDE mmol/L 97* 103   CO2 mmol/L 29.0 30.6*   BUN mg/dL 12 11   CREATININE mg/dL 1.42* 1.12*   CALCIUM mg/dL 10.2 9.1   BILIRUBIN mg/dL  --  0.3   ALK PHOS U/L  --  78   ALT (SGPT) U/L  --  14   AST (SGOT) U/L  --  18   GLUCOSE mg/dL 95 115*       Culture Data:   No results found for: \"BLOODCX\"  No results found for: \"URINECX\"  No results found for: \"RESPCX\"  No results found for: \"WOUNDCX\"  No results found for: \"STOOLCX\"  No components found for: \"BODYFLD\"    Radiology Data:   Imaging Results (Last 24 Hours)       Procedure Component Value Units Date/Time    CT Soft Tissue Neck Without Contrast [[id number]] Collected: 12/10/23 1020     Updated: 12/10/23 1032    Narrative:      CT SOFT TISSUE NECK WO CONTRAST    Date of Exam: 12/10/2023 10:06 AM EST    Indication: Neck mass, nonpulsatile.    Comparison: CT " November 4, 2019 from [facility]    Technique: Axial CT images were obtained of the neck without contrast administration.  Sagittal and coronal reconstructions were performed.  Automated exposure control and iterative reconstruction methods were used.      Findings:  Marker placed at the location of patient's neck mass appears to overlie a lymph node measuring 10 x 11 x 14 mm at the posterior-deep margin of the right sternocleidomastoid muscle on axial image 36 and sagittal image 15. There are additional adjacent   prominent lymph nodes, slightly smaller in size extending into the supraclavicular region. There are also prominent level 2 lymph nodes and mildly prominent left cervical lymph nodes. No significant submandibular lymphadenopathy is seen.    There is prominence of the adenoid, palatine, and lingual tonsils. No well-defined collection or mass is identified on this limited noncontrast study. Epiglottic contour appears grossly normal. Prevertebral soft tissue contour appears within normal   limits. No definite thyroid, submandibular, or parotid gland abnormality is seen on this exam. The visualized intracranial structures appear grossly unremarkable. Globes and orbits appear unremarkable. Status post median sternotomy. Pacemaker is present.   No suspicious abnormality is seen in the visualized lung apices.    Mastoid air cells and paranasal sinuses appear clear. There are degenerative changes in the thoracic spine. No acute osseous abnormality is identified.        Impression:      Impression:  1.Marker placed at the location of patient's neck mass appears to overlie a mildly enlarged lymph node at the posterior-deep margin of the right sternocleidomastoid muscle, indeterminate.   2.Additional prominent bilateral cervical lymph nodes and prominence of the adenoid, palatine, and lingual tonsils. These findings are also nonspecific but could be due to an infectious or inflammatory process. Clinical  follow-up is recommended with   further evaluation with lymph node biopsy if adenopathy does not improve as expected for reactive adenopathy.  3.No other definite acute abnormality is identified on this limited noncontrast exam.  4.Degenerative changes in the cervical spine.        Electronically Signed: Willi Ofelia    12/10/2023 10:30 AM EST    Workstation ID: [id number]            I have reviewed the patient's current medications.     Assessment/Plan     1) chest pain  - resolved  - troponin stable  - cardiology consulted, appreciate recs  - TTE ordered, results pending  - monitor on telemetry    2) elevated D-dimer  - VQ scan shows low probability of PE    3) LINSEY  - IV fluids  - daily labs    4) HTN  - home meds    5) DM  - ISS, accuchecks, diet    6) HLD  - lipitor, fenofibrate    7) hypothyroidism  - synthroid    8) CAD  - home meds    9) COPD  - albuterol PRN    10) INDRA  - CPAP    11) GERD  - protonix    12) DVT ppx  - SCDs        Electronically signed by Thien Degroot MD, 12/10/23, 18:17 EST.

## 2023-12-10 NOTE — PROGRESS NOTES
"Cardiology [facility]      Patient Care Team:  Phani Adames MD as PCP - General (Internal Medicine)  Ashish Smalls MD as Consulting Physician (Nephrology)  Wayne Luna MD as Consulting Physician (Cardiology)  Héctor Eckert MD as Consulting Physician (Cardiac Electrophysiology)  Lane Stock MD as Surgeon (Cardiothoracic Surgery)      Cardiology assessment and plan     Chest pain likely pleuritic  Coronary artery disease  Prior coronary artery bypass surgery  Valvular heart disease status post surgical repair and replacement  Hypertension  At goal replacement surgery with a bioprosthetic aortic valve  Cardiomyopathy  ICD in situ  Diabetes mellitus  Chronic kidney disease        Denies any new symptoms  No syncope  No orthopnea no PND no weight gain  Tmax is 97.8 pulse is 68 respirations are 18 blood pressure is 148/98 sats are 96%  Nuclear medicine lung scan with low probability for pulmonary embolism  Chest x-ray with no acute finding  Normal troponin  Normal proBNP  Current medications include aspirin 81 mg p.o. once a day Lipitor 40 mg p.o. once a day Coreg 6.25 mg p.o. twice daily fenofibrate 145 mg p.o. once a day hydralazine 25 mg p.o. 3 times a day torsemide 60 mg p.o. once a day Plavix 75 mg p.o. once a day  Recommend DVT prophylaxis  Echocardiogram to assess LV systolic function and valve pathology  Test results reviewed and discussed with patient  Okay to discharge home from cardiac standpoint if the echocardiogram is no acute findings  Follow-up in office in 2 weeks            Chief Complaint: Pleuritic chest pain    Subjective no new complaints    Interval History: No significant change in the overall status    History taken from: patient    Review of Systems:  ROS    Objective    Vital Signs  Visit Vitals  /98 (BP Location: Right arm, Patient Position: Sitting)   Pulse 68   Temp 97.8 °F (36.6 °C) (Oral)   Resp 18   Ht 170.2 cm (67\")   Wt 88.5 kg (195 lb) " "  LMP  (LMP Unknown)   SpO2 96%   BMI 30.54 kg/m²     Oxygen Therapy  SpO2: 96 %  Device (Oxygen Therapy): room air  Flowsheet Rows      Flowsheet Row First Filed Value   Admission Height 170.2 cm (67\") Documented at 12/08/2023 1831   Admission Weight 88.5 kg (195 lb) Documented at 12/08/2023 1831          Intake & Output (last 3 days)         12/07 0701  12/08 0700 12/08 0701  12/09 0700 12/09 0701  12/10 0700 12/10 0701  12/11 0700    P.O.   75     I.V. (mL/kg)   966.3 (10.9) 33.8 (0.4)    Total Intake(mL/kg)   1041.3 (11.8) 33.8 (0.4)    Net   +1041.3 +33.8            Urine Unmeasured Occurrence  1 x 2 x           Lines, Drains & Airways       Active LDAs       Name Placement date Placement time Site Days    Peripheral IV 12/08/23 1853 Left Antecubital 12/08/23 1853  Antecubital  1    Peripheral IV 12/09/23 2320 Left;Posterior Hand 12/09/23  2320  Hand  less than 1                    Physical Exam:  Physical Exam    Results Review:     I reviewed the patient's new clinical results.    Lab Results (last 24 hours)       Procedure Component Value Units Date/Time    POC Glucose Once [[id number]]  (Normal) Collected: 12/10/23 1126    Specimen: Blood Updated: 12/10/23 1128     Glucose 102 mg/dL      Comment: Serial Number: [id number]:  [id number]       POC Glucose Once [[id number]]  (Abnormal) Collected: 12/10/23 0708    Specimen: Blood Updated: 12/10/23 0710     Glucose 106 mg/dL      Comment: Serial Number: [id number]:  [id number]       POC Glucose Once [[id number]]  (Abnormal) Collected: 12/09/23 2008    Specimen: Blood Updated: 12/09/23 2010     Glucose 143 mg/dL      Comment: Serial Number: [id number]:  [id number]       POC Glucose Once [[id number]]  (Abnormal) Collected: 12/09/23 1629    Specimen: Blood Updated: 12/09/23 1631     Glucose 128 mg/dL      Comment: Serial Number: [id number]:  [id number]       Magnesium [[id number]]  (Normal) Collected: 12/09/23 1406    Specimen: Blood Updated: " 12/09/23 1448     Magnesium 2.0 mg/dL     Basic Metabolic Panel [[id number]]  (Abnormal) Collected: 12/09/23 1406    Specimen: Blood Updated: 12/09/23 1448     Glucose 95 mg/dL      BUN 12 mg/dL      Creatinine 1.42 mg/dL      Sodium 140 mmol/L      Potassium 3.7 mmol/L      Comment: Slight hemolysis detected by analyzer. Result may be falsely elevated.        Chloride 97 mmol/L      CO2 29.0 mmol/L      Calcium 10.2 mg/dL      BUN/Creatinine Ratio 8.5     Anion Gap 14.0 mmol/L      eGFR 45.2 mL/min/1.73     Narrative:      GFR Normal >60  Chronic Kidney Disease <60  Kidney Failure <15      Potassium [[id number]]  (Normal) Collected: 12/09/23 1406    Specimen: Blood Updated: 12/09/23 1448     Potassium 3.7 mmol/L      Comment: Slight hemolysis detected by analyzer. Result may be falsely elevated.             Results for orders placed during the hospital encounter of 11/30/22    Adult Transthoracic Echo Complete W/ Cont if Necessary Per Protocol    Interpretation Summary    Left ventricular systolic function is normal. Left ventricular ejection fraction appears to be 46 - 50%.    Left ventricular wall thickness is consistent with mild to moderate concentric hypertrophy.    Left ventricular diastolic function is consistent with (grade I) impaired relaxation.    The right ventricular cavity is mildly dilated.    There is a bioprosthetic aortic valve present.    Estimated right ventricular systolic pressure from tricuspid regurgitation is normal (<35 mmHg).    Mild dilation of the aortic root is present.        Medication Review:   I have reviewed the patient's current medication list  Scheduled Meds:allopurinol, 300 mg, Oral, Daily  aspirin, 81 mg, Oral, Daily  atorvastatin, 40 mg, Oral, Daily  carvedilol, 6.25 mg, Oral, BID With Meals  cholecalciferol, 1,000 Units, Oral, Daily  clopidogrel, 75 mg, Oral, Daily  empagliflozin, 25 mg, Oral, Daily  fenofibrate, 145 mg, Oral, Daily  ferrous sulfate, 324 mg, Oral, Daily With  Breakfast  folic acid, 1 mg, Oral, Daily  hydrALAZINE, 25 mg, Oral, TID  insulin lispro, 2-9 Units, Subcutaneous, 4x Daily AC & at Bedtime  levothyroxine, 150 mcg, Oral, Q AM  lidocaine, 1 patch, Transdermal, Q24H  methylPREDNISolone, 4 mg, Oral, Daily With Breakfast  multivitamin, 1 tablet, Oral, Daily  pantoprazole, 40 mg, Oral, Daily  torsemide, 60 mg, Oral, Daily  vitamin B-12, 1,000 mcg, Oral, Daily      Continuous Infusions:sodium chloride, 75 mL/hr, Last Rate: Stopped (12/10/23 1002)      PRN Meds:.  albuterol    dextrose    dextrose    docusate sodium    glucagon (human recombinant)    methocarbamol    nitroglycerin    ondansetron    oxyCODONE    Potassium Replacement - Follow Nurse / BPA Driven Protocol    [COMPLETED] Insert peripheral IV **AND** sodium chloride    ECG/EMG Results (last 24 hours)       Procedure Component Value Units Date/Time    SCANNED EKG [[id number]] Resulted: 12/08/23     Updated: 12/09/23 1303    SCANNED - TELEMETRY   [[id number]] Resulted: 12/08/23     Updated: 12/09/23 1732    SCANNED - TELEMETRY   [[id number]] Resulted: 12/08/23     Updated: 12/09/23 1732    SCANNED - TELEMETRY   [[id number]] Resulted: 12/08/23     Updated: 12/09/23 1732    SCANNED - TELEMETRY   [[id number]] Resulted: 12/08/23     Updated: 12/09/23 1902    SCANNED - TELEMETRY   [[id number]] Resulted: 12/08/23     Updated: 12/09/23 2029    SCANNED - TELEMETRY   [[id number]] Resulted: 12/08/23     Updated: 12/09/23 2140    Adult Transthoracic Echo Complete W/ Cont if Necessary Per Protocol [[id number]] Resulted: 12/10/23 0840     Updated: 12/10/23 0850            Imaging Results (Last 24 Hours)       Procedure Component Value Units Date/Time    CT Soft Tissue Neck Without Contrast [[id number]] Collected: 12/10/23 1020     Updated: 12/10/23 1032    Narrative:      CT SOFT TISSUE NECK WO CONTRAST    Date of Exam: 12/10/2023 10:06 AM EST    Indication: Neck mass, nonpulsatile.    Comparison: CT November 4, 2019 from [facility]    Technique: Axial CT images were obtained of the neck without contrast administration.  Sagittal and coronal reconstructions were performed.  Automated exposure control and iterative reconstruction methods were used.      Findings:  Marker placed at the location of patient's neck mass appears to overlie a lymph node measuring 10 x 11 x 14 mm at the posterior-deep margin of the right sternocleidomastoid muscle on axial image 36 and sagittal image 15. There are additional adjacent   prominent lymph nodes, slightly smaller in size extending into the supraclavicular region. There are also prominent level 2 lymph nodes and mildly prominent left cervical lymph nodes. No significant submandibular lymphadenopathy is seen.    There is prominence of the adenoid, palatine, and lingual tonsils. No well-defined collection or mass is identified on this limited noncontrast study. Epiglottic contour appears grossly normal. Prevertebral soft tissue contour appears within normal   limits. No definite thyroid, submandibular, or parotid gland abnormality is seen on this exam. The visualized intracranial structures appear grossly unremarkable. Globes and orbits appear unremarkable. Status post median sternotomy. Pacemaker is present.   No suspicious abnormality is seen in the visualized lung apices.    Mastoid air cells and paranasal sinuses appear clear. There are degenerative changes in the thoracic spine. No acute osseous abnormality is identified.        Impression:      Impression:  1.Marker placed at the location of patient's neck mass appears to overlie a mildly enlarged lymph node at the posterior-deep margin of the right sternocleidomastoid muscle, indeterminate.   2.Additional prominent bilateral cervical lymph nodes and prominence of the adenoid, palatine, and lingual tonsils. These findings are also nonspecific but could be due to an infectious or inflammatory process. Clinical follow-up is recommended with    further evaluation with lymph node biopsy if adenopathy does not improve as expected for reactive adenopathy.  3.No other definite acute abnormality is identified on this limited noncontrast exam.  4.Degenerative changes in the cervical spine.        Electronically Signed: Willi Gilbert    12/10/2023 10:30 AM EST    Workstation ID: [id number]    NM Lung Ventilation Perfusion [[id number]] Collected: 12/09/23 1545     Updated: 12/09/23 1548    Narrative:      NM LUNG VENTILATION PERFUSION    Date of Exam: 12/9/2023 10:15 AM CST    Indication: Chest pain, acute, PE suspected, intermed prob, positive D-dimer.    Comparison: None available.    Technique:  The patient was ventilated with 47.4 mCi of technetium 99m pyrophosphate aerosol and ventilation images were acquired in multiple obliquities. The patient then received 5.9 mCi of technetium 99m MAA intravenously and perfusion images were   acquired in multiple obliquities.      Findings:  Normal symmetric uptake of radiotracer throughout the lungs on ventilation and perfusion imaging. No mismatches to suggest pulmonary embolism.      Impression:      Impression:  Low probability for pulmonary embolism.        Electronically Signed: Inocencio Rodriguez MD    12/9/2023 2:46 PM CST    Workstation ID: [id number]    XR Chest PA & Lateral [[id number]] Collected: 12/09/23 1544     Updated: 12/09/23 1547    Narrative:      XR CHEST PA AND LATERAL    Date of Exam: 12/9/2023 10:42 AM CST    Indication: elevated d dimer chest pain allery to CT contrast    Comparison: None available.    Findings:  Cardiomediastinal silhouette is unremarkable. There is a 2-lead left-sided pacemaker/AICD. There is minimal left mid to lower lung scarring. No airspace disease, pneumothorax, nor pleural effusion. No acute osseous abnormality identified.      Impression:      Impression:  No acute process identified.      Electronically Signed: Inocencio Rodriguez MD    12/9/2023 2:45 PM CST    Workstation ID: [id number]           Results for orders placed during the hospital encounter of 01/18/21    Cardiac Catheterization/Vascular Study    Narrative  Table formatting from the original result was not included.  Cardiac Catheterization Operative Report    Rafael Singh  [id number]  1/22/2021  @PCP@    She underwent cardiac catheterization.    Indications for the procedure include: chest pain.    Procedure Details:  The risks, benefits, complications, treatment options, and expected outcomes were discussed with the patient. The patient and/or family concurred with the proposed plan, giving informed consent.    After informed consent the patient was brought to the cath lab after appropriate IV hydration was begun and oral premedication was given. She was further sedated with fentanyl. She was prepped and draped in the usual manner. Using the modified Seldinger access technique, a 6 [place] sheath was placed in the femoral artery. A left heart catheterization with coronary arteriography was performed. Findings are discussed below.    After the procedure was completed, sedation was stopped and the sheaths and catheters were all removed. Hemostasis was achieved per established hospital protocols.    Conscious sedation:  Conscious sedation was performed according to protocol.  I supervised and directed an independent trained observer with the assistance of monitoring the patient's level of consciousness and physiologic status throughout the procedure.  Intraoperative service time was 30 minutes.    Findings:    Hemodynamics  [place] pressure systolic 112 diastolic 70 with a mean pressure of 88 mmHg  Left Main  left main coronary artery is a large-caliber vessel angiographically normal bifurcates into left anterior descending and left circumflex artery  RCA  codominant  Small sized vessel with diffuse disease unchanged from before  All triple areas of angiographic 90 to 95% stenosis  LAD  large-caliber vessel with mid 40%  stenosis  Diagonal branches 100% occluded  Circ  patent stent in the midportion with no evidence of any in-stent restenosis  Patent stent in the first marginal branch without any significant IntraStent restenosis.  Distal portion of the marginal branch is angiographic 50% stenosis unchanged from before  Distal circumflex has another angiographic area of 60% stenosis unchanged from before  SVG(s)  vein graft to the diagonal is patent without any significant stenosis involving ostium/body or the anastomotic site  LORI  LIMA is not utilized for coronary artery bypass surgery  LV  not done  Coronary Dominance  codominant    Estimated Blood Loss:  Minimal    Specimens: None    Complications:  None; patient tolerated the procedure well.    Disposition: PACU - hemodynamically stable.    Condition: stable    Impressions:  Native severe two-vessel coronary artery disease  No significant obstructive disease involving the LAD system  Patent vein graft to the diagonal  Patent stent in the left circumflex and marginal branch of the circumflex  The circumflex coronary artery provides collaterals to the PDA branch of the right coronary artery  Diffuse disease involving the right coronary artery unchanged from before    Recommendations:  Medical management for obstructive coronary artery disease  Most likely blood pressure is low secondary to severe cardiomyopathy  Stress cardiomyopathy versus burned-out heart  Optimize medical therapy and discharge patient home on medical therapy for cardiomyopathy  Outpatient evaluation by advanced heart failure and transplant team     Results for orders placed during the hospital encounter of 11/30/22    Adult Transthoracic Echo Complete W/ Cont if Necessary Per Protocol    Interpretation Summary    Left ventricular systolic function is normal. Left ventricular ejection fraction appears to be 46 - 50%.    Left ventricular wall thickness is consistent with mild to moderate concentric  hypertrophy.    Left ventricular diastolic function is consistent with (grade I) impaired relaxation.    The right ventricular cavity is mildly dilated.    There is a bioprosthetic aortic valve present.    Estimated right ventricular systolic pressure from tricuspid regurgitation is normal (<35 mmHg).    Mild dilation of the aortic root is present.     Lab Results   Component Value Date    GLUCOSE 95 12/09/2023    BUN 12 12/09/2023    CREATININE 1.42 (H) 12/09/2023    EGFR 45.2 (L) 12/09/2023    BCR 8.5 12/09/2023    K 3.7 12/09/2023    K 3.7 12/09/2023    CO2 29.0 12/09/2023    CALCIUM 10.2 12/09/2023    ALBUMIN 4.1 12/08/2023    BILITOT 0.3 12/08/2023    AST 18 12/08/2023    ALT 14 12/08/2023      Lab Results   Component Value Date    CHOL 278 (H) 11/26/2020    TRIG 178 (H) 11/26/2020    HDL 66 (H) 11/26/2020     (H) 11/26/2020      Lab Results   Component Value Date    CKTOTAL 829 (H) 05/16/2020    TROPONINI <0.030 09/25/2019    TROPONINT 15 (H) 12/08/2023        Assessment & Plan       COPD (chronic obstructive pulmonary disease)    Essential hypertension    Cardiomyopathy, dilated    ICD (implantable cardioverter-defibrillator), dual, in situ    GERD without esophagitis    Hypothyroidism (acquired)    Coronary artery disease of native artery of native heart with stable angina pectoris    S/P AVR (aortic valve replacement)    S/P CABG x 3    CKD (chronic kidney disease) stage 2, GFR 60-89 ml/min    Type 2 diabetes mellitus without complication, without long-term current use of insulin    Hypokalemia    INDRA (obstructive sleep apnea)    Chronic pain syndrome    Intractable low back pain    Chest pain    Elevated troponin        Wayne Luna MD  12/10/23  12:35 EST

## 2023-12-10 NOTE — PLAN OF CARE
Goal Outcome Evaluation:    Patient came in here with a diagnosis of chest pain. Currently, she has an ongoing IV of NS 75cc/hr. VS has been stable for now.  No voiced complaint as of this  time. Care plan ongoing.

## 2023-12-10 NOTE — PLAN OF CARE
Goal Outcome Evaluation:  Pt calm and cooperative with care plan. Has been resting throughout shift. BP occasionally hypertensive. Meds given. Other VSS on RA.    Problem: Adult Inpatient Plan of Care  Goal: Plan of Care Review  Outcome: Ongoing, Progressing

## 2023-12-10 NOTE — CASE MANAGEMENT/SOCIAL WORK
Continued Stay Note  MIRIAM Olivares     Patient Name: Rafael Nunes  MRN: [id number]  Today's Date: 12/10/2023    Admit Date: 12/8/2023        Discharge Plan       Row Name 12/10/23 0828       Plan    Plan Comments DC barriers: IV fluids, chest pain, cardiology consult.

## 2023-12-11 ENCOUNTER — READMISSION MANAGEMENT (OUTPATIENT)
Dept: CALL CENTER | Facility: HOSPITAL | Age: 48
End: 2023-12-11
Payer: COMMERCIAL

## 2023-12-11 VITALS
HEART RATE: 75 BPM | DIASTOLIC BLOOD PRESSURE: 88 MMHG | RESPIRATION RATE: 18 BRPM | HEIGHT: 67 IN | OXYGEN SATURATION: 93 % | TEMPERATURE: 98 F | SYSTOLIC BLOOD PRESSURE: 125 MMHG | WEIGHT: 195 LBS | BODY MASS INDEX: 30.61 KG/M2

## 2023-12-11 LAB — GLUCOSE BLDC GLUCOMTR-MCNC: 105 MG/DL (ref 70–105)

## 2023-12-11 PROCEDURE — 82948 REAGENT STRIP/BLOOD GLUCOSE: CPT

## 2023-12-11 PROCEDURE — 63710000001 METHYLPREDNISOLONE PER 4 MG: Performed by: NURSE PRACTITIONER

## 2023-12-11 PROCEDURE — G0378 HOSPITAL OBSERVATION PER HR: HCPCS

## 2023-12-11 RX ADMIN — CYANOCOBALAMIN TAB 1000 MCG 1000 MCG: 1000 TAB at 09:33

## 2023-12-11 RX ADMIN — LIDOCAINE 1 PATCH: 50 PATCH CUTANEOUS at 09:32

## 2023-12-11 RX ADMIN — EMPAGLIFLOZIN 25 MG: 25 TABLET, FILM COATED ORAL at 09:34

## 2023-12-11 RX ADMIN — ASPIRIN 81 MG: 81 TABLET, COATED ORAL at 09:34

## 2023-12-11 RX ADMIN — PANTOPRAZOLE SODIUM 40 MG: 40 TABLET, DELAYED RELEASE ORAL at 09:34

## 2023-12-11 RX ADMIN — Medication 1000 UNITS: at 09:33

## 2023-12-11 RX ADMIN — ALLOPURINOL 300 MG: 300 TABLET ORAL at 09:33

## 2023-12-11 RX ADMIN — HYDRALAZINE HYDROCHLORIDE 25 MG: 25 TABLET, FILM COATED ORAL at 09:34

## 2023-12-11 RX ADMIN — ATORVASTATIN CALCIUM 40 MG: 40 TABLET, FILM COATED ORAL at 09:33

## 2023-12-11 RX ADMIN — FOLIC ACID 1 MG: 1 TABLET ORAL at 09:34

## 2023-12-11 RX ADMIN — FENOFIBRATE 145 MG: 145 TABLET ORAL at 09:33

## 2023-12-11 RX ADMIN — CLOPIDOGREL BISULFATE 75 MG: 75 TABLET ORAL at 09:34

## 2023-12-11 RX ADMIN — METHYLPREDNISOLONE 4 MG: 4 TABLET ORAL at 09:33

## 2023-12-11 RX ADMIN — LEVOTHYROXINE SODIUM 150 MCG: 0.15 TABLET ORAL at 06:17

## 2023-12-11 RX ADMIN — FERROUS SULFATE TAB EC 324 MG (65 MG FE EQUIVALENT) 324 MG: 324 (65 FE) TABLET DELAYED RESPONSE at 09:34

## 2023-12-11 RX ADMIN — CARVEDILOL 6.25 MG: 6.25 TABLET, FILM COATED ORAL at 09:33

## 2023-12-11 RX ADMIN — THERA TABS 1 TABLET: TAB at 09:33

## 2023-12-11 NOTE — NURSING NOTE
Monitor alarming that pt is apenic. This nurse entered pt's room & visualized shallow respirations. Pt is in fact breathing at this time. Primary nurse notified.

## 2023-12-11 NOTE — DISCHARGE SUMMARY
[facility]  Discharge Summary      Date of Admission: 12/8/2023  Date of Discharge:  12/11/2023  Primary Care Physician: Phani Adames MD    Presenting Problem/History of Present Illness:  Chest pain [R07.9]  Chest pain, unspecified type [R07.9]  Hypertension, unspecified type [I10]       Final Discharge Diagnoses:  Active Hospital Problems    Diagnosis     Elevated troponin     Chest pain     Intractable low back pain     Chronic pain syndrome     Hypokalemia     INDRA (obstructive sleep apnea)     Type 2 diabetes mellitus without complication, without long-term current use of insulin     CKD (chronic kidney disease) stage 2, GFR 60-89 ml/min     S/P AVR (aortic valve replacement)     S/P CABG x 3     GERD without esophagitis     Hypothyroidism (acquired)     Coronary artery disease of native artery of native heart with stable angina pectoris     ICD (implantable cardioverter-defibrillator), dual, in situ     Cardiomyopathy, dilated     COPD (chronic obstructive pulmonary disease)     Essential hypertension        Consults:   Consults       Date and Time Order Name Status Description    12/9/2023  1:25 AM Inpatient Cardiology Consult Completed             Procedures Performed:                 Pertinent Test Results:   Lab Results (most recent)       Procedure Component Value Units Date/Time    POC Glucose Once [[id number]]  (Normal) Collected: 12/11/23 0712    Specimen: Blood Updated: 12/11/23 0714     Glucose 105 mg/dL      Comment: Serial Number: [id number]:  [id number]       POC Glucose Once [[id number]]  (Abnormal) Collected: 12/10/23 2118    Specimen: Blood Updated: 12/10/23 2120     Glucose 125 mg/dL      Comment: Serial Number: [id number]:  [id number]       Magnesium [[id number]]  (Normal) Collected: 12/09/23 1406    Specimen: Blood Updated: 12/09/23 1448     Magnesium 2.0 mg/dL     Basic Metabolic Panel [[id number]]  (Abnormal) Collected: 12/09/23 1406     Specimen: Blood Updated: 12/09/23 1448     Glucose 95 mg/dL      BUN 12 mg/dL      Creatinine 1.42 mg/dL      Sodium 140 mmol/L      Potassium 3.7 mmol/L      Comment: Slight hemolysis detected by analyzer. Result may be falsely elevated.        Chloride 97 mmol/L      CO2 29.0 mmol/L      Calcium 10.2 mg/dL      BUN/Creatinine Ratio 8.5     Anion Gap 14.0 mmol/L      eGFR 45.2 mL/min/1.73     Narrative:      GFR Normal >60  Chronic Kidney Disease <60  Kidney Failure <15      Potassium [[id number]]  (Normal) Collected: 12/09/23 1406    Specimen: Blood Updated: 12/09/23 1448     Potassium 3.7 mmol/L      Comment: Slight hemolysis detected by analyzer. Result may be falsely elevated.       High Sensitivity Troponin T 2Hr [[id number]]  (Abnormal) Collected: 12/08/23 2102    Specimen: Blood Updated: 12/08/23 2123     HS Troponin T 15 ng/L      Troponin T Delta -1 ng/L     Narrative:      High Sensitive Troponin T Reference Range:  <14.0 ng/L- Negative Female for AMI  <22.0 ng/L- Negative Male for AMI  >=14 - Abnormal Female indicating possible myocardial injury.  >=22 - Abnormal Male indicating possible myocardial injury.   Clinicians would have to utilize clinical acumen, EKG, Troponin, and serial changes to determine if it is an Acute Myocardial Infarction or myocardial injury due to an underlying chronic condition.         Comprehensive Metabolic Panel [[id number]]  (Abnormal) Collected: 12/08/23 1853    Specimen: Blood Updated: 12/08/23 1934     Glucose 115 mg/dL      BUN 11 mg/dL      Creatinine 1.12 mg/dL      Sodium 141 mmol/L      Potassium 3.3 mmol/L      Chloride 103 mmol/L      CO2 30.6 mmol/L      Calcium 9.1 mg/dL      Total Protein 7.1 g/dL      Albumin 4.1 g/dL      ALT (SGPT) 14 U/L      AST (SGOT) 18 U/L      Alkaline Phosphatase 78 U/L      Total Bilirubin 0.3 mg/dL      Globulin 3.0 gm/dL      A/G Ratio 1.4 g/dL      BUN/Creatinine Ratio 9.8     Anion Gap 7.4 mmol/L      eGFR 60.0 mL/min/1.73      "Narrative:      GFR Normal >60  Chronic Kidney Disease <60  Kidney Failure <15      RSV PCR - Swab, Nasopharynx [[id number]]  (Normal) Collected: 12/08/23 1836    Specimen: Swab from Nasopharynx Updated: 12/08/23 1930     RSV, PCR Not Detected    High Sensitivity Troponin T [[id number]]  (Abnormal) Collected: 12/08/23 1853    Specimen: Blood Updated: 12/08/23 1918     HS Troponin T 16 ng/L     Narrative:      High Sensitive Troponin T Reference Range:  <14.0 ng/L- Negative Female for AMI  <22.0 ng/L- Negative Male for AMI  >=14 - Abnormal Female indicating possible myocardial injury.  >=22 - Abnormal Male indicating possible myocardial injury.   Clinicians would have to utilize clinical acumen, EKG, Troponin, and serial changes to determine if it is an Acute Myocardial Infarction or myocardial injury due to an underlying chronic condition.         D-dimer, Quantitative [[id number]]  (Abnormal) Collected: 12/08/23 1853    Specimen: Blood Updated: 12/08/23 1918     D-Dimer, Quantitative 0.54 MCGFEU/mL     Narrative:      According to the assay 's published package insert, a normal (<0.50 MCGFEU/mL) D-dimer result in conjunction with a non-high clinical probability assessment, excludes deep vein thrombosis (DVT) and pulmonary embolism (PE) with high sensitivity.    D-dimer values increase with age and this can make VTE exclusion of an older population difficult. To address this, the [employer], based on best available evidence and recent guidelines, recommends that clinicians use age-adjusted D-dimer thresholds in patients greater than 50 years of age with: a) a low probability of PE who do not meet all Pulmonary Embolism Rule Out Criteria, or b) in those with intermediate probability of PE.   The formula for an age-adjusted D-dimer cut-off is \"age/100\".  For example, a 60 year old patient would have an age-adjusted cut-off of 0.60 MCGFEU/mL and an 80 year old 0.80 MCGFEU/mL.    COVID-19 " and FLU A/B PCR, 1 HR TAT - Swab, Nasopharynx [[id number]]  (Normal) Collected: 12/08/23 1836    Specimen: Swab from Nasopharynx Updated: 12/08/23 1905     COVID19 Not Detected     Influenza A PCR Not Detected     Influenza B PCR Not Detected    Narrative:      Fact sheet for providers: https://www.fda.gov/media/671491/download    Fact sheet for patients: https://www.fda.gov/media/577585/download    Test performed by PCR.    CBC & Differential [[id number]]  (Abnormal) Collected: 12/08/23 1853    Specimen: Blood Updated: 12/08/23 1900    Narrative:      The following orders were created for panel order CBC & Differential.  Procedure                               Abnormality         Status                     ---------                               -----------         ------                     CBC Auto Differential[[id number]]        Abnormal            Final result                 Please view results for these tests on the individual orders.    CBC Auto Differential [[id number]]  (Abnormal) Collected: 12/08/23 1853    Specimen: Blood Updated: 12/08/23 1900     WBC 10.12 10*3/mm3      RBC 4.66 10*6/mm3      Hemoglobin 13.8 g/dL      Hematocrit 43.1 %      MCV 92.5 fL      MCH 29.6 pg      MCHC 32.0 g/dL      RDW 12.9 %      RDW-SD 43.8 fl      MPV 8.9 fL      Platelets 220 10*3/mm3      Neutrophil % 79.1 %      Lymphocyte % 14.3 %      Monocyte % 5.5 %      Eosinophil % 0.7 %      Basophil % 0.2 %      Immature Grans % 0.2 %      Neutrophils, Absolute 8.00 10*3/mm3      Lymphocytes, Absolute 1.45 10*3/mm3      Monocytes, Absolute 0.56 10*3/mm3      Eosinophils, Absolute 0.07 10*3/mm3      Basophils, Absolute 0.02 10*3/mm3      Immature Grans, Absolute 0.02 10*3/mm3           Imaging Results (Most Recent)       Procedure Component Value Units Date/Time    CT Soft Tissue Neck Without Contrast [[id number]] Collected: 12/10/23 1020     Updated: 12/10/23 1032    Narrative:      CT SOFT TISSUE NECK WO CONTRAST    Date of Exam:  12/10/2023 10:06 AM EST    Indication: Neck mass, nonpulsatile.    Comparison: CT November 4, 2019 from [facility]    Technique: Axial CT images were obtained of the neck without contrast administration.  Sagittal and coronal reconstructions were performed.  Automated exposure control and iterative reconstruction methods were used.      Findings:  Marker placed at the location of patient's neck mass appears to overlie a lymph node measuring 10 x 11 x 14 mm at the posterior-deep margin of the right sternocleidomastoid muscle on axial image 36 and sagittal image 15. There are additional adjacent   prominent lymph nodes, slightly smaller in size extending into the supraclavicular region. There are also prominent level 2 lymph nodes and mildly prominent left cervical lymph nodes. No significant submandibular lymphadenopathy is seen.    There is prominence of the adenoid, palatine, and lingual tonsils. No well-defined collection or mass is identified on this limited noncontrast study. Epiglottic contour appears grossly normal. Prevertebral soft tissue contour appears within normal   limits. No definite thyroid, submandibular, or parotid gland abnormality is seen on this exam. The visualized intracranial structures appear grossly unremarkable. Globes and orbits appear unremarkable. Status post median sternotomy. Pacemaker is present.   No suspicious abnormality is seen in the visualized lung apices.    Mastoid air cells and paranasal sinuses appear clear. There are degenerative changes in the thoracic spine. No acute osseous abnormality is identified.        Impression:      Impression:  1.Marker placed at the location of patient's neck mass appears to overlie a mildly enlarged lymph node at the posterior-deep margin of the right sternocleidomastoid muscle, indeterminate.   2.Additional prominent bilateral cervical lymph nodes and prominence of the adenoid, palatine, and lingual tonsils. These findings are also  nonspecific but could be due to an infectious or inflammatory process. Clinical follow-up is recommended with   further evaluation with lymph node biopsy if adenopathy does not improve as expected for reactive adenopathy.  3.No other definite acute abnormality is identified on this limited noncontrast exam.  4.Degenerative changes in the cervical spine.        Electronically Signed: Willi Gilbert    12/10/2023 10:30 AM EST    Workstation ID: [id number]    NM Lung Ventilation Perfusion [[id number]] Collected: 12/09/23 1545     Updated: 12/09/23 1548    Narrative:      NM LUNG VENTILATION PERFUSION    Date of Exam: 12/9/2023 10:15 AM CST    Indication: Chest pain, acute, PE suspected, intermed prob, positive D-dimer.    Comparison: None available.    Technique:  The patient was ventilated with 47.4 mCi of technetium 99m pyrophosphate aerosol and ventilation images were acquired in multiple obliquities. The patient then received 5.9 mCi of technetium 99m MAA intravenously and perfusion images were   acquired in multiple obliquities.      Findings:  Normal symmetric uptake of radiotracer throughout the lungs on ventilation and perfusion imaging. No mismatches to suggest pulmonary embolism.      Impression:      Impression:  Low probability for pulmonary embolism.        Electronically Signed: Inocencio Rodriguez MD    12/9/2023 2:46 PM CST    Workstation ID: [id number]    XR Chest PA & Lateral [[id number]] Collected: 12/09/23 1544     Updated: 12/09/23 1547    Narrative:      XR CHEST PA AND LATERAL    Date of Exam: 12/9/2023 10:42 AM CST    Indication: elevated d dimer chest pain allery to CT contrast    Comparison: None available.    Findings:  Cardiomediastinal silhouette is unremarkable. There is a 2-lead left-sided pacemaker/AICD. There is minimal left mid to lower lung scarring. No airspace disease, pneumothorax, nor pleural effusion. No acute osseous abnormality identified.      Impression:      Impression:  No acute process  identified.      Electronically Signed: Inocencio Rodriguez MD    12/9/2023 2:45 PM CST    Workstation ID: [id number]    XR Chest 1 View [[id number]] Collected: 12/08/23 1925     Updated: 12/08/23 1928    Narrative:      XR CHEST 1 VW    Date of Exam: 12/8/2023 6:40 PM EST    Indication: chest pain, shortness of breath    Comparison: 12/5/2023    Findings:  The cardiomediastinal silhouette is within normal limits. Lungs are clear. No focal consolidation, pneumothorax, or significant pleural effusion. Osseous structures grossly intact. Status post anatomy. Left-sided AICD noted.      Impression:      Impression:  No acute process.            Electronically Signed: Carlos Royal MD    12/8/2023 7:25 PM EST    Workstation ID: [id number]            Chief Complaint on Day of Discharge: chest pain    Hospital Course:  Rafael Nunes is a  very pleasant 48 y.o. female with a PMH of cardiomyopathy, valvular disease status post bioprosthetic aortic valve, EF January 2021 was 20%, , ICD in situ, coronary artery disease status post CABG, last cardiac catheterization January 2021 native severe two-vessel coronary artery disease, essential hypertension, chronic renal insufficiency, diabetes mellitus type 2, hypothyroidism, lumbar radiculopathy, GERD without esophagitis, COPD, who presented to [facility] on 12/8/2023 to [facility] emergency department with complaints of chest pain under her left breast and shortness of breath about 30 minutes prior to arrival in the ED.  She was hypertensive on admission to the ED with /107.  She was given Nitropaste and 10 mg IV hydralazine in ED.  She was apparently wheezing and given DuoNeb treatment emergency department.  She reported she was diagnosed with bronchitis several days ago.  WBC is not elevated and she is afebrile.  She was not placed on antibiotics. Potassium was 3.3.  D-dimer was elevated at 0.54, however she is unable to have CT with contrast due to allergy to  "contrast dye.  She was given one-time dose treatment Lovenox in ED and VQ scan has been ordered here.  Troponins came back at 16 and repeated at 15.  Creatinine is 1.12 which appears to be close to patient's baseline.  Glucose was 115.  COVID-19 negative flu a negative flu B-, chest x-ray per radiology showed \"no acute process\".  EKG showed sinus rhythm heart rate of 76 no acute ST elevation or depression.  She was transferred here for further evaluation and treatment.  Given extensive cardiac history cardiology has been consulted.She is awake and alert and answering appropriately, rate chest pain 5 out of 10 and stable on room air.    The patient had a VQ scan for her elevated d-dimer, which showed low probability for a PE.  She was evaluated by cardiology for her chest pain, and had a TTE which was consistent with her TTE from last year, with an EF of 36-40% and diastolic dysfunction of the left ventricle.  She was deemed stable for d/c home on 12/11 with instructions to f/u with her PCP in 1 week and cardiology in 1-2 weeks.       Condition on Discharge:  stable    Physical Exam on Discharge:  /88 (BP Location: Left arm, Patient Position: Lying)   Pulse 75   Temp 98 °F (36.7 °C) (Oral)   Resp 18   Ht 170.2 cm (67\")   Wt 88.5 kg (195 lb)   LMP  (LMP Unknown)   SpO2 93%   BMI 30.54 kg/m²   Physical Exam  General: NAD, AAOx3  HEENT: AT NC, EOMI  Neck: No JVD  CVS: S1/S2 present, RRR, no M/R/G  Lungs: CTA B/L  Abdomen: soft, NT, ND, BS+  Ext: no edema  Skin: no rashes, bruises or discolorations  Neuro: no focal deficits  Psych: Not agitated     Discharge Disposition:  Home or Self Care    Discharge Medications:     Discharge Medications        Continue These Medications        Instructions Start Date   albuterol (2.5 MG/3ML) 0.083% nebulizer solution  Commonly known as: PROVENTIL   2.5 mg, Nebulization, Every 4 Hours PRN      albuterol sulfate  (90 Base) MCG/ACT inhaler  Commonly known as: " PROVENTIL HFA;VENTOLIN HFA;PROAIR HFA   2 puffs, Inhalation, Every 4 Hours PRN      Alcohol Prep 70 % pads   1 each, Apply externally, Daily      allopurinol 300 MG tablet  Commonly known as: ZYLOPRIM   300 mg, Oral, Daily      amLODIPine 10 MG tablet  Commonly known as: NORVASC   No dose, route, or frequency recorded.      aspirin 81 MG EC tablet   81 mg, Oral, Daily      atorvastatin 40 MG tablet  Commonly known as: LIPITOR   No dose, route, or frequency recorded.      benzonatate 100 MG capsule  Commonly known as: TESSALON   100 mg, Oral, 3 Times Daily PRN      carvedilol 6.25 MG tablet  Commonly known as: COREG   6.25 mg, Oral, 2 Times Daily With Meals      cholecalciferol 25 MCG (1000 UT) tablet  Commonly known as: VITAMIN D3   1,000 Units, Oral, Daily      clopidogrel 75 MG tablet  Commonly known as: PLAVIX   75 mg, Oral, Daily      diphenhydrAMINE 25 mg capsule  Commonly known as: BENADRYL   25 mg, Oral, Every 6 Hours PRN      docusate sodium 100 MG capsule  Commonly known as: COLACE   100 mg, Oral, 2 Times Daily PRN      Farxiga 10 MG tablet  Generic drug: dapagliflozin Propanediol   No dose, route, or frequency recorded.      febuxostat 80 MG tablet tablet  Commonly known as: ULORIC   80 mg, Oral, Daily      fenofibrate 160 MG tablet   No dose, route, or frequency recorded.      ferrous sulfate 325 (65 FE) MG tablet   325 mg, Oral, Daily With Breakfast      fluconazole 50 MG tablet  Commonly known as: DIFLUCAN   50 mg, Oral      folic acid 1 MG tablet  Commonly known as: FOLVITE   1 mg, Oral, Daily      gabapentin 300 MG capsule  Commonly known as: NEURONTIN   As Needed      hydrALAZINE 25 MG tablet  Commonly known as: APRESOLINE   TAKE 1 TABLET BY MOUTH THREE TIMES A DAY      hydrOXYzine pamoate 50 MG capsule  Commonly known as: VISTARIL   No dose, route, or frequency recorded.      levothyroxine 150 MCG tablet  Commonly known as: SYNTHROID, LEVOTHROID   No dose, route, or frequency recorded.       lidocaine 5 %  Commonly known as: LIDODERM   No dose, route, or frequency recorded.      methocarbamol 500 MG tablet  Commonly known as: ROBAXIN   500 mg, Oral, Every 8 Hours PRN      methylPREDNISolone 4 MG dose pack  Commonly known as: MEDROL   Take as directed on package instructions.      multivitamin tablet tablet   1 tablet, Oral, Daily      nitroglycerin 0.4 MG SL tablet  Commonly known as: NITROSTAT   Place 1 tablet under the tongue Every 5 (Five) Minutes As Needed for Chest Pain. Take no more than 3 doses in 15 minutes. Call 911 if more than 3 doses needed.       OneTouch Delica Plus [id number] misc   1 each, Does not apply, Daily, Dx code: E11.9      OneTouch Verio test strip  Generic drug: glucose blood   Use one strip to test blood sugar once daily as directed.   Dx code: E11.9      oxyCODONE-acetaminophen 7.5-325 MG per tablet  Commonly known as: PERCOCET   1 tablet, Oral, Every 6 Hours PRN      pantoprazole 40 MG EC tablet  Commonly known as: PROTONIX   40 mg, Oral, Daily      potassium chloride 10 MEQ CR tablet   20 mEq, Oral, 2 Times Daily      Praluent 75 MG/ML solution auto-injector  Generic drug: Alirocumab   75 mg, Subcutaneous      predniSONE 20 MG tablet  Commonly known as: DELTASONE   40 mg, Oral, Daily      torsemide 20 MG tablet  Commonly known as: DEMADEX   60 mg, Oral, Daily, Hold if persistent diarrhea      vitamin B-12 1000 MCG tablet  Commonly known as: CYANOCOBALAMIN   1,000 mcg, Oral, Daily               Discharge Diet:  heart healthy, low salt, diabetic    Activity at Discharge:  as tolerated    Discharge Care Plan/Instructions: f/u PCP 1 week, cardiology 1-2 weeks      Time: less than 30 minutes

## 2023-12-11 NOTE — CASE MANAGEMENT/SOCIAL WORK
Case Management Discharge Note      Final Note: DC home prior to CM assessment.         Selected Continued Care - Discharged on 12/11/2023 Admission date: 12/8/2023 - Discharge disposition: Home or Self Care            Transportation Services  Private: Car    Final Discharge Disposition Code: 01 - home or self-care

## 2023-12-11 NOTE — PLAN OF CARE
Goal Outcome Evaluation:      Patient is A&Ox4 and on room air during the day and 2LO2 at night. C/o chest discomfort and congestion so nurse applied humidification. Requested tesslon pearls for cough. Provider put order in and it helped alleviate patients cough. Call light is within reach and patient is currently resting.

## 2023-12-12 NOTE — OUTREACH NOTE
Prep Survey      Flowsheet Row Responses   [facility] facility patient discharged from? [facility]   Is LACE score < 7 ? No   Eligibility Readm Mgmt   Discharge diagnosis chest pain   Does the patient have one of the following disease processes/diagnoses(primary or secondary)? Other   Does the patient have Home health ordered? No   Is there a DME ordered? No   Prep survey completed? Yes            Gilma MANZO - Registered Nurse

## 2023-12-14 ENCOUNTER — READMISSION MANAGEMENT (OUTPATIENT)
Dept: CALL CENTER | Facility: HOSPITAL | Age: 48
End: 2023-12-14
Payer: COMMERCIAL

## 2023-12-14 NOTE — OUTREACH NOTE
Medical Week 1 Survey      Flowsheet Row Responses   [facility] facility patient discharged from? [facility]   Does the patient have one of the following disease processes/diagnoses(primary or secondary)? Other   Week 1 attempt successful? No   Unsuccessful attempts Attempt 1            MICKI MANCINI - Registered Nurse

## 2023-12-19 ENCOUNTER — READMISSION MANAGEMENT (OUTPATIENT)
Dept: CALL CENTER | Facility: HOSPITAL | Age: 48
End: 2023-12-19
Payer: COMMERCIAL

## 2023-12-19 NOTE — OUTREACH NOTE
Medical Week 1 Survey      Flowsheet Row Responses   [facility] facility patient discharged from? [facility]   Does the patient have one of the following disease processes/diagnoses(primary or secondary)? Other   Week 1 attempt successful? No   Unsuccessful attempts Attempt 2            Anabella MANZO - Registered Nurse

## 2023-12-22 ENCOUNTER — READMISSION MANAGEMENT (OUTPATIENT)
Dept: CALL CENTER | Facility: HOSPITAL | Age: 48
End: 2023-12-22
Payer: COMMERCIAL

## 2023-12-22 NOTE — OUTREACH NOTE
Medical Week 1 Survey      Flowsheet Row Responses   [facility] facility patient discharged from? [facility]   Does the patient have one of the following disease processes/diagnoses(primary or secondary)? Other   Week 1 attempt successful? No   Unsuccessful attempts Attempt 3            Anabella MANZO - Registered Nurse

## 2024-03-22 ENCOUNTER — APPOINTMENT (OUTPATIENT)
Dept: CT IMAGING | Facility: HOSPITAL | Age: 49
End: 2024-03-22
Payer: MEDICARE

## 2024-03-22 ENCOUNTER — HOSPITAL ENCOUNTER (EMERGENCY)
Facility: HOSPITAL | Age: 49
Discharge: HOME | End: 2024-03-22
Attending: EMERGENCY MEDICINE
Payer: MEDICARE

## 2024-03-22 VITALS
BODY MASS INDEX: 31.91 KG/M2 | HEIGHT: 67 IN | HEART RATE: 77 BPM | RESPIRATION RATE: 18 BRPM | DIASTOLIC BLOOD PRESSURE: 94 MMHG | WEIGHT: 203.3 LBS | TEMPERATURE: 97.4 F | SYSTOLIC BLOOD PRESSURE: 156 MMHG | OXYGEN SATURATION: 94 %

## 2024-03-22 DIAGNOSIS — R20.2 NUMBNESS AND TINGLING IN RIGHT HAND: Primary | ICD-10-CM

## 2024-03-22 DIAGNOSIS — I10 PRIMARY HYPERTENSION: ICD-10-CM

## 2024-03-22 DIAGNOSIS — R20.0 NUMBNESS AND TINGLING IN RIGHT HAND: Primary | ICD-10-CM

## 2024-03-22 DIAGNOSIS — N18.2 CHRONIC RENAL IMPAIRMENT, STAGE 2 (MILD): ICD-10-CM

## 2024-03-22 LAB
ANION GAP SERPL CALCULATED.3IONS-SCNC: 11.7 MMOL/L (ref 5–15)
BASOPHILS # BLD AUTO: 0.02 10*3/MM3 (ref 0–0.2)
BASOPHILS NFR BLD AUTO: 0.4 % (ref 0–1.5)
BUN SERPL-MCNC: 12 MG/DL (ref 6–20)
BUN/CREAT SERPL: 8.8 (ref 7–25)
CALCIUM SPEC-SCNC: 9.6 MG/DL (ref 8.6–10.5)
CHLORIDE SERPL-SCNC: 102 MMOL/L (ref 98–107)
CO2 SERPL-SCNC: 24.3 MMOL/L (ref 22–29)
CREAT SERPL-MCNC: 1.37 MG/DL (ref 0.57–1)
DEPRECATED RDW RBC AUTO: 43.1 FL (ref 37–54)
EGFRCR SERPLBLD CKD-EPI 2021: 46.8 ML/MIN/1.73
EOSINOPHIL # BLD AUTO: 0.05 10*3/MM3 (ref 0–0.4)
EOSINOPHIL NFR BLD AUTO: 0.9 % (ref 0.3–6.2)
ERYTHROCYTE [DISTWIDTH] IN BLOOD BY AUTOMATED COUNT: 12.9 % (ref 12.3–15.4)
GLUCOSE SERPL-MCNC: 113 MG/DL (ref 65–99)
HCT VFR BLD AUTO: 47.3 % (ref 34–46.6)
HGB BLD-MCNC: 15.5 G/DL (ref 12–15.9)
IMM GRANULOCYTES # BLD AUTO: 0.01 10*3/MM3 (ref 0–0.05)
IMM GRANULOCYTES NFR BLD AUTO: 0.2 % (ref 0–0.5)
LYMPHOCYTES # BLD AUTO: 1.29 10*3/MM3 (ref 0.7–3.1)
LYMPHOCYTES NFR BLD AUTO: 23.4 % (ref 19.6–45.3)
MCH RBC QN AUTO: 29.9 PG (ref 26.6–33)
MCHC RBC AUTO-ENTMCNC: 32.8 G/DL (ref 31.5–35.7)
MCV RBC AUTO: 91.1 FL (ref 79–97)
MONOCYTES # BLD AUTO: 0.35 10*3/MM3 (ref 0.1–0.9)
MONOCYTES NFR BLD AUTO: 6.4 % (ref 5–12)
NEUTROPHILS NFR BLD AUTO: 3.79 10*3/MM3 (ref 1.7–7)
NEUTROPHILS NFR BLD AUTO: 68.7 % (ref 42.7–76)
PLATELET # BLD AUTO: 215 10*3/MM3 (ref 140–450)
PMV BLD AUTO: 9.4 FL (ref 6–12)
POTASSIUM SERPL-SCNC: 3.6 MMOL/L (ref 3.5–5.2)
RBC # BLD AUTO: 5.19 10*6/MM3 (ref 3.77–5.28)
SODIUM SERPL-SCNC: 138 MMOL/L (ref 136–145)
WBC NRBC COR # BLD AUTO: 5.51 10*3/MM3 (ref 3.4–10.8)

## 2024-03-22 PROCEDURE — 85025 COMPLETE CBC W/AUTO DIFF WBC: CPT | Performed by: EMERGENCY MEDICINE

## 2024-03-22 PROCEDURE — 99284 EMERGENCY DEPT VISIT MOD MDM: CPT | Performed by: EMERGENCY MEDICINE

## 2024-03-22 PROCEDURE — 99284 EMERGENCY DEPT VISIT MOD MDM: CPT

## 2024-03-22 PROCEDURE — 80048 BASIC METABOLIC PNL TOTAL CA: CPT | Performed by: EMERGENCY MEDICINE

## 2024-03-22 PROCEDURE — 70450 CT HEAD/BRAIN W/O DYE: CPT

## 2024-03-22 RX ORDER — ASPIRIN 81 MG/1
81 TABLET ORAL DAILY
Qty: 30 TABLET | Refills: 0 | Status: SHIPPED | OUTPATIENT
Start: 2024-03-22 | End: 2024-04-21

## 2024-03-22 NOTE — Clinical Note
[facility] [place] [address] [place] [address] [place] [address]  Phone: [phone]    Rafael Des was seen and treated in our emergency department on 3/22/2024.  She may return to work on 03/26/2024.         Thank you for choosing [facility].    Marc Arrieta MD

## 2024-03-22 NOTE — FSED PROVIDER NOTE
"Subjective   History of Present Illness  Patient with complaint of numbness, tingling and weakness to right arm. Occurred a couple of hours ago and resolved. Symptoms returned again several minutes ago. Patient states she took her blood pressure medication this morning. No headache but eye \"twitching\".  No precipitating or relieving factors. No pattern to symptoms. No chest pain, shortness of breath, abdominal pain, back pain, leg pain or swelling.     History provided by:  Patient   used: No        Review of Systems   All other systems reviewed and are negative.      Past Medical History:   Diagnosis Date    Arrhythmia     Asthma     CAD (coronary artery disease)     Cardiomyopathy, dilated     Chronic systolic congestive heart failure     CKD (chronic kidney disease) stage 2, GFR 60-89 ml/min     COPD (chronic obstructive pulmonary disease)     Essential hypertension     GERD without esophagitis     Hidradenitis 10/26/2020    Hyperlipidemia     Hypothyroidism (acquired)     ICD (implantable cardioverter-defibrillator), dual, in situ 7/22/2019    Nonrheumatic aortic valve insufficiency     Nonrheumatic mitral valve regurgitation     S/P AVR (aortic valve replacement)     S/P CABG x 3     Type 2 diabetes mellitus without complication, without long-term current use of insulin        Allergies   Allergen Reactions    Hydrocodone Hives    Penicillin G Unknown (See Comments)     Reaction occurred as a baby.      Penicillin interview complete 08/18/2022.    Contrast Dye (Echo Or Unknown Ct/Mr) GI Intolerance     She is pretty sure it's the contrast dye that makes her super sick and vomiting after heart cath    Gadolinium Derivatives Itching       Past Surgical History:   Procedure Laterality Date    AORTIC VALVE REPAIR/REPLACEMENT N/A 12/27/2019    Procedure: AORTIC VALVE REPAIR/REPLACEMENT;  Surgeon: Lane Stock MD;  Location: [facility];  Service: Cardiothoracic    BREAST LUMPECTOMY Right  "    BENIGN    CARDIAC CATHETERIZATION N/A 12/24/2019    Procedure: Right and Left Heart Cath 12/24/19 @ 0900;  Surgeon: Wayne Luna MD;  Location: [facility] CATH INVASIVE LOCATION;  Service: Cardiovascular    CARDIAC CATHETERIZATION N/A 12/24/2019    Procedure: Coronary angiography;  Surgeon: Wayne Luna MD;  Location: [facility] CATH INVASIVE LOCATION;  Service: Cardiovascular    CARDIAC CATHETERIZATION N/A 11/10/2020    Procedure: Left and right Heart Cath;  Surgeon: Wayne Luna MD;  Location: [facility] CATH INVASIVE LOCATION;  Service: Cardiovascular;  Laterality: N/A;    CARDIAC CATHETERIZATION N/A 11/10/2020    Procedure: Coronary angiography;  Surgeon: Wayne Luna MD;  Location: [facility] CATH INVASIVE LOCATION;  Service: Cardiovascular;  Laterality: N/A;    CARDIAC CATHETERIZATION N/A 11/10/2020    Procedure: Right Heart Cath;  Surgeon: Wayne Luna MD;  Location: [facility] CATH INVASIVE LOCATION;  Service: Cardiovascular;  Laterality: N/A;    CARDIAC CATHETERIZATION N/A 11/25/2020    Procedure: Percutaneous coronary intervention of the left circumflex artery;  Surgeon: Wayne Luna MD;  Location: [facility] CATH INVASIVE LOCATION;  Service: Cardiovascular;  Laterality: N/A;    CARDIAC CATHETERIZATION N/A 1/22/2021    Procedure: LEFT HEART CATH with possible PCI;  Surgeon: Wayne Luna MD;  Location: [facility] CATH INVASIVE LOCATION;  Service: Cardiovascular;  Laterality: N/A;  Local and IV sedation    CARDIAC CATHETERIZATION N/A 1/22/2021    Procedure: Coronary angiography;  Surgeon: Wayne Luna MD;  Location: [facility] CATH INVASIVE LOCATION;  Service: Cardiovascular;  Laterality: N/A;    CARDIAC CATHETERIZATION N/A 1/22/2021    Procedure: Saphenous Vein Graft;  Surgeon: Wayne Luna MD;  Location: [facility] CATH INVASIVE LOCATION;  Service: Cardiovascular;  Laterality: N/A;    CARDIAC ELECTROPHYSIOLOGY PROCEDURE Left 6/28/2019     Procedure: Dual-chamber ICD insertion;  Surgeon: Héctor Eckert MD;  Location: [facility] CATH INVASIVE LOCATION;  Service: Cardiovascular    CARDIAC ELECTROPHYSIOLOGY PROCEDURE Left 2019    Procedure: Lead Revision;  Surgeon: Héctor Eckert MD;  Location: [facility] CATH INVASIVE LOCATION;  Service: Cardiovascular    CARDIAC SURGERY      CHOLECYSTECTOMY      CORONARY ARTERY BYPASS GRAFT N/A 2019    Procedure: CORONARY ARTERY BYPASS GRAFTING;  Surgeon: Lane Stock MD;  Location: [facility] CVOR;  Service: Cardiothoracic    HYSTERECTOMY      INSERT / REPLACE / REMOVE PACEMAKER      LYMPHADENECTOMY Bilateral     THYROID SURGERY         Family History   Problem Relation Age of Onset    Heart disease Father        Social History     Socioeconomic History    Marital status:    Tobacco Use    Smoking status: Former     Current packs/day: 0.00     Types: Cigarettes     Quit date: 2019     Years since quittin.2    Smokeless tobacco: Never   Vaping Use    Vaping status: Never Used   Substance and Sexual Activity    Alcohol use: No    Drug use: No    Sexual activity: Defer           Objective   Physical Exam  Vitals and nursing note reviewed.   Constitutional:       Appearance: Normal appearance.   HENT:      Head: Normocephalic and atraumatic.      Right Ear: Tympanic membrane normal.      Left Ear: Tympanic membrane normal.      Nose: Nose normal.      Mouth/Throat:      Mouth: Mucous membranes are moist.      Pharynx: Oropharynx is clear.   Eyes:      Extraocular Movements: Extraocular movements intact.      Conjunctiva/sclera: Conjunctivae normal.      Pupils: Pupils are equal, round, and reactive to light.   Cardiovascular:      Rate and Rhythm: Normal rate and regular rhythm.      Pulses: Normal pulses.      Heart sounds: Normal heart sounds.   Pulmonary:      Effort: Pulmonary effort is normal.      Breath sounds: Normal breath sounds.   Abdominal:      General: Abdomen is flat.       Palpations: Abdomen is soft.   Musculoskeletal:         General: Normal range of motion.      Cervical back: Normal range of motion and neck supple.   Skin:     General: Skin is warm.      Capillary Refill: Capillary refill takes less than 2 seconds.   Neurological:      Mental Status: She is alert and oriented to person, place, and time.      Sensory: No sensory deficit.      Comments: NIHSS Score: 1         Procedures           ED Course                                           Medical Decision Making  Concern for hypertensive emergency,  d/w patietne. Agrees with plan. Symptoms resolved.     Problems Addressed:  Chronic renal impairment, stage 2 (mild): complicated acute illness or injury  Numbness and tingling in right hand: complicated acute illness or injury  Primary hypertension: complicated acute illness or injury    Amount and/or Complexity of Data Reviewed  Labs: ordered.  Radiology: ordered.    Risk  OTC drugs.        Final diagnoses:   Numbness and tingling in right hand   Primary hypertension   Chronic renal impairment, stage 2 (mild)       ED Disposition  ED Disposition       ED Disposition   Discharge    Condition   Stable    Comment   --               Phani Adames MD  [address] [place] [address] [phone]    Schedule an appointment as soon as possible for a visit            Where to Get Your Medications        These medications were sent to [facility][id number] - [facility] [place] - [address] [place] [address] AT [facility] & [address] [phone] [facility] [phone] [address] [place] [address]      Phone: [phone]   aspirin 81 MG EC tablet          Medication List      No changes were made to your prescriptions during this visit.

## 2024-07-07 ENCOUNTER — APPOINTMENT (OUTPATIENT)
Dept: GENERAL RADIOLOGY | Facility: HOSPITAL | Age: 49
End: 2024-07-07
Payer: MEDICARE

## 2024-07-07 ENCOUNTER — HOSPITAL ENCOUNTER (EMERGENCY)
Facility: HOSPITAL | Age: 49
Discharge: HOME | End: 2024-07-07
Attending: EMERGENCY MEDICINE | Admitting: EMERGENCY MEDICINE
Payer: MEDICARE

## 2024-07-07 VITALS
DIASTOLIC BLOOD PRESSURE: 109 MMHG | SYSTOLIC BLOOD PRESSURE: 160 MMHG | BODY MASS INDEX: 32.65 KG/M2 | TEMPERATURE: 98.1 F | HEIGHT: 67 IN | WEIGHT: 208 LBS | RESPIRATION RATE: 16 BRPM | HEART RATE: 79 BPM | OXYGEN SATURATION: 100 %

## 2024-07-07 DIAGNOSIS — I10 HYPERTENSION, ACCELERATED: ICD-10-CM

## 2024-07-07 DIAGNOSIS — I50.43 ACUTE ON CHRONIC COMBINED SYSTOLIC AND DIASTOLIC CONGESTIVE HEART FAILURE: ICD-10-CM

## 2024-07-07 DIAGNOSIS — B30.9 VIRAL CONJUNCTIVITIS, RIGHT EYE: Primary | ICD-10-CM

## 2024-07-07 LAB
ANION GAP SERPL CALCULATED.3IONS-SCNC: 12.4 MMOL/L (ref 5–15)
BASOPHILS # BLD AUTO: 0.03 10*3/MM3 (ref 0–0.2)
BASOPHILS NFR BLD AUTO: 0.4 % (ref 0–1.5)
BUN SERPL-MCNC: 20 MG/DL (ref 6–20)
BUN/CREAT SERPL: 12.3 (ref 7–25)
CALCIUM SPEC-SCNC: 9.6 MG/DL (ref 8.6–10.5)
CHLORIDE SERPL-SCNC: 99 MMOL/L (ref 98–107)
CO2 SERPL-SCNC: 26.6 MMOL/L (ref 22–29)
CREAT SERPL-MCNC: 1.62 MG/DL (ref 0.57–1)
DEPRECATED RDW RBC AUTO: 49.8 FL (ref 37–54)
EGFRCR SERPLBLD CKD-EPI 2021: 38.3 ML/MIN/1.73
EOSINOPHIL # BLD AUTO: 0.06 10*3/MM3 (ref 0–0.4)
EOSINOPHIL NFR BLD AUTO: 0.8 % (ref 0.3–6.2)
ERYTHROCYTE [DISTWIDTH] IN BLOOD BY AUTOMATED COUNT: 14.4 % (ref 12.3–15.4)
GLUCOSE SERPL-MCNC: 99 MG/DL (ref 65–99)
HCT VFR BLD AUTO: 46.2 % (ref 34–46.6)
HGB BLD-MCNC: 14.9 G/DL (ref 12–15.9)
IMM GRANULOCYTES # BLD AUTO: 0.04 10*3/MM3 (ref 0–0.05)
IMM GRANULOCYTES NFR BLD AUTO: 0.6 % (ref 0–0.5)
LYMPHOCYTES # BLD AUTO: 1.86 10*3/MM3 (ref 0.7–3.1)
LYMPHOCYTES NFR BLD AUTO: 25.7 % (ref 19.6–45.3)
MCH RBC QN AUTO: 30.5 PG (ref 26.6–33)
MCHC RBC AUTO-ENTMCNC: 32.3 G/DL (ref 31.5–35.7)
MCV RBC AUTO: 94.7 FL (ref 79–97)
MONOCYTES # BLD AUTO: 0.53 10*3/MM3 (ref 0.1–0.9)
MONOCYTES NFR BLD AUTO: 7.3 % (ref 5–12)
NEUTROPHILS NFR BLD AUTO: 4.73 10*3/MM3 (ref 1.7–7)
NEUTROPHILS NFR BLD AUTO: 65.2 % (ref 42.7–76)
PLATELET # BLD AUTO: 243 10*3/MM3 (ref 140–450)
PMV BLD AUTO: 10 FL (ref 6–12)
POTASSIUM SERPL-SCNC: 3.2 MMOL/L (ref 3.5–5.2)
QT INTERVAL: 445 MS
QTC INTERVAL: 512 MS
RBC # BLD AUTO: 4.88 10*6/MM3 (ref 3.77–5.28)
SODIUM SERPL-SCNC: 138 MMOL/L (ref 136–145)
TROPONIN T SERPL HS-MCNC: 28 NG/L
TROPONIN T SERPL HS-MCNC: 28 NG/L
WBC NRBC COR # BLD AUTO: 7.25 10*3/MM3 (ref 3.4–10.8)

## 2024-07-07 PROCEDURE — 93005 ELECTROCARDIOGRAM TRACING: CPT | Performed by: EMERGENCY MEDICINE

## 2024-07-07 PROCEDURE — 99284 EMERGENCY DEPT VISIT MOD MDM: CPT

## 2024-07-07 PROCEDURE — 84484 ASSAY OF TROPONIN QUANT: CPT | Performed by: EMERGENCY MEDICINE

## 2024-07-07 PROCEDURE — 80048 BASIC METABOLIC PNL TOTAL CA: CPT | Performed by: EMERGENCY MEDICINE

## 2024-07-07 PROCEDURE — 93010 ELECTROCARDIOGRAM REPORT: CPT | Performed by: EMERGENCY MEDICINE

## 2024-07-07 PROCEDURE — 85025 COMPLETE CBC W/AUTO DIFF WBC: CPT | Performed by: EMERGENCY MEDICINE

## 2024-07-07 PROCEDURE — 71046 X-RAY EXAM CHEST 2 VIEWS: CPT

## 2024-07-07 PROCEDURE — 99284 EMERGENCY DEPT VISIT MOD MDM: CPT | Performed by: EMERGENCY MEDICINE

## 2024-07-07 PROCEDURE — 36415 COLL VENOUS BLD VENIPUNCTURE: CPT

## 2024-07-07 RX ORDER — NITROGLYCERIN 0.4 MG/1
0.4 TABLET SUBLINGUAL ONCE
Status: COMPLETED | OUTPATIENT
Start: 2024-07-07 | End: 2024-07-07

## 2024-07-07 RX ORDER — ERYTHROMYCIN 5 MG/G
1 OINTMENT OPHTHALMIC ONCE
Status: COMPLETED | OUTPATIENT
Start: 2024-07-07 | End: 2024-07-07

## 2024-07-07 RX ADMIN — ERYTHROMYCIN 1 APPLICATION: 5 OINTMENT OPHTHALMIC at 14:50

## 2024-07-07 RX ADMIN — NITROGLYCERIN 1 INCH: 20 OINTMENT TOPICAL at 14:29

## 2024-07-07 RX ADMIN — NITROGLYCERIN 0.4 MG: 0.4 TABLET SUBLINGUAL at 14:28

## 2024-07-07 NOTE — FSED PROVIDER NOTE
HPI: 49-year-old female cardiac patient with a history of aortic valve replacement, chronic systolic heart failure, coronary artery disease, cardiac bypass, COPD, dyslipidemia, implantable defibrillator and more, now comes with the sensation of weight gain and swelling of her feet and ankles and some dyspnea as well.  She also has conjunctivitis of her right eye that began yesterday on her way home.  She wears fake lashes and thinks she might of irritated her eye with the glue or her grandchildren might have given her pinkeye.  She took her torsemide this morning.  She thinks that her feet are getting better since she took her diuretic.    PMFSH: see problem list... Former smoker    ROS: As above.  All other systems are reviewed and negative.    PHYSICAL EXAM: Pleasant deconditioned obese female who is sitting up in stretcher and breathing with no difficulty.    ENT moist mucous membranes    Neck supple without JVD    Lungs clear to auscultation bilaterally    Heart regular rate and rhythm with no S3    Abdomen is soft and nontender    Lower extremities have symmetrical 1+ pitting edema which the patient says is in resolution    Neurologic awake alert and oriented that focal deficits gait not    MDM: Symptoms of CHF with mild exacerbation signs of the same.  She may benefit from preload and afterload reduction, diuresis, we will check and see if there is evidence of acute coronary ischemia or electrolyte abnormalities or anemia which can worsen congestive heart failure symptoms.  She may have progressively worsening renal insufficiency.    ED COURSE: Initial troponin is 28 which is consistent with previous troponins.  Second troponin is 28.  EKG show sinus rhythm with nonspecific ST-T wave segment changes which are consistent with previous EKGs.  There were no dynamic EKG changes between the first and second ones that we obtained.  CBC with differential is normal with hemoglobin 14.9, BMP is normal except creatinine  1.6 and potassium 3.2, chest x-ray shows unchanged cardiomediastinal silhouette without evidence of acutely worsening pulmonary edema or congestive heart failure when compared with previous exam of December 9.  Patient was observed in the ER and received sublingual and transdermal nitroglycerin for her elevated blood pressure and was advised to take double dose of torsemide for the next 3 days and follow-up with cardiology and/or primary care.  She was eupneic upon discharge.    DIAGNOSIS: Acute congestive heart failure exacerbation, stable elevated troponin level, renal insufficiency, hypokalemia    DISPOSITION: Patient is discharged from the ED in good condition.

## 2024-07-07 NOTE — DISCHARGE INSTRUCTIONS
Double your torsemide for the next 3 days, follow-up with primary care or cardiology.  Wipe Nitropaste off in the morning.  Use erythromycin ophthalmic ointment 3 times a day for 3 days.

## 2024-07-07 NOTE — Clinical Note
[facility] FSED [place] [id number] [place] [address] [place] [address]  Phone: [phone]    Rafael Des was seen and treated in our emergency department on 7/7/2024.  She may return to work on 07/11/2024.         Thank you for choosing [facility].    Asim Mclaughlin MD

## 2024-07-08 LAB
QT INTERVAL: 463 MS
QTC INTERVAL: 504 MS

## 2024-09-18 ENCOUNTER — HOSPITAL ENCOUNTER (EMERGENCY)
Facility: HOSPITAL | Age: 49
Discharge: HOME | End: 2024-09-18
Attending: EMERGENCY MEDICINE
Payer: MEDICARE

## 2024-09-18 VITALS
BODY MASS INDEX: 32.63 KG/M2 | WEIGHT: 207.89 LBS | OXYGEN SATURATION: 97 % | HEIGHT: 67 IN | DIASTOLIC BLOOD PRESSURE: 94 MMHG | TEMPERATURE: 98 F | RESPIRATION RATE: 16 BRPM | HEART RATE: 100 BPM | SYSTOLIC BLOOD PRESSURE: 138 MMHG

## 2024-09-18 DIAGNOSIS — R19.7 NAUSEA VOMITING AND DIARRHEA: Primary | ICD-10-CM

## 2024-09-18 DIAGNOSIS — R11.2 NAUSEA VOMITING AND DIARRHEA: Primary | ICD-10-CM

## 2024-09-18 DIAGNOSIS — U07.1 COVID-19 VIRUS INFECTION: ICD-10-CM

## 2024-09-18 LAB
FLUAV SUBTYP SPEC NAA+PROBE: NOT DETECTED
FLUBV RNA ISLT QL NAA+PROBE: NOT DETECTED
SARS-COV-2 RNA RESP QL NAA+PROBE: DETECTED

## 2024-09-18 PROCEDURE — 99283 EMERGENCY DEPT VISIT LOW MDM: CPT

## 2024-09-18 PROCEDURE — 87636 SARSCOV2 & INF A&B AMP PRB: CPT | Performed by: EMERGENCY MEDICINE

## 2024-09-18 PROCEDURE — 99283 EMERGENCY DEPT VISIT LOW MDM: CPT | Performed by: EMERGENCY MEDICINE

## 2024-09-18 RX ORDER — METHYLPREDNISOLONE 4 MG
TABLET, DOSE PACK ORAL
Qty: 21 TABLET | Refills: 0 | Status: CANCELLED | OUTPATIENT
Start: 2024-09-18

## 2024-09-18 RX ORDER — DEXTROMETHORPHAN HYDROBROMIDE AND PROMETHAZINE HYDROCHLORIDE 15; 6.25 MG/5ML; MG/5ML
5 SYRUP ORAL 4 TIMES DAILY PRN
Qty: 118 ML | Refills: 0 | Status: SHIPPED | OUTPATIENT
Start: 2024-09-18

## 2024-09-18 RX ORDER — AZITHROMYCIN 250 MG/1
TABLET, FILM COATED ORAL
Qty: 6 TABLET | Refills: 0 | Status: CANCELLED | OUTPATIENT
Start: 2024-09-18

## 2024-09-18 RX ORDER — DEXTROMETHORPHAN HYDROBROMIDE AND PROMETHAZINE HYDROCHLORIDE 15; 6.25 MG/5ML; MG/5ML
5 SYRUP ORAL 4 TIMES DAILY PRN
Qty: 118 ML | Refills: 0 | Status: CANCELLED | OUTPATIENT
Start: 2024-09-18

## 2025-02-10 ENCOUNTER — APPOINTMENT (OUTPATIENT)
Dept: GENERAL RADIOLOGY | Facility: HOSPITAL | Age: 50
DRG: 193 | End: 2025-02-10
Payer: MEDICARE

## 2025-02-10 ENCOUNTER — HOSPITAL ENCOUNTER (INPATIENT)
Facility: HOSPITAL | Age: 50
LOS: 6 days | Discharge: HOME | DRG: 193 | End: 2025-02-18
Attending: EMERGENCY MEDICINE | Admitting: STUDENT IN AN ORGANIZED HEALTH CARE EDUCATION/TRAINING PROGRAM
Payer: MEDICARE

## 2025-02-10 DIAGNOSIS — R06.00 DYSPNEA, UNSPECIFIED TYPE: ICD-10-CM

## 2025-02-10 DIAGNOSIS — R79.89 ELEVATED TROPONIN: ICD-10-CM

## 2025-02-10 DIAGNOSIS — R07.9 CHEST PAIN, UNSPECIFIED TYPE: ICD-10-CM

## 2025-02-10 DIAGNOSIS — J18.9 PNEUMONIA DUE TO INFECTIOUS ORGANISM, UNSPECIFIED LATERALITY, UNSPECIFIED PART OF LUNG: Primary | ICD-10-CM

## 2025-02-10 DIAGNOSIS — R79.89 ELEVATED BRAIN NATRIURETIC PEPTIDE (BNP) LEVEL: ICD-10-CM

## 2025-02-10 DIAGNOSIS — Z86.79 HISTORY OF CHF (CONGESTIVE HEART FAILURE): ICD-10-CM

## 2025-02-10 LAB
FLUAV SUBTYP SPEC NAA+PROBE: NOT DETECTED
FLUBV RNA ISLT QL NAA+PROBE: NOT DETECTED
SARS-COV-2 RNA RESP QL NAA+PROBE: NOT DETECTED

## 2025-02-10 PROCEDURE — 99285 EMERGENCY DEPT VISIT HI MDM: CPT

## 2025-02-10 PROCEDURE — 36415 COLL VENOUS BLD VENIPUNCTURE: CPT

## 2025-02-10 PROCEDURE — 83880 ASSAY OF NATRIURETIC PEPTIDE: CPT

## 2025-02-10 PROCEDURE — 85025 COMPLETE CBC W/AUTO DIFF WBC: CPT

## 2025-02-10 PROCEDURE — 84484 ASSAY OF TROPONIN QUANT: CPT

## 2025-02-10 PROCEDURE — 93010 ELECTROCARDIOGRAM REPORT: CPT | Performed by: EMERGENCY MEDICINE

## 2025-02-10 PROCEDURE — 80053 COMPREHEN METABOLIC PANEL: CPT

## 2025-02-10 PROCEDURE — 93005 ELECTROCARDIOGRAM TRACING: CPT

## 2025-02-10 PROCEDURE — 71046 X-RAY EXAM CHEST 2 VIEWS: CPT

## 2025-02-10 PROCEDURE — 87636 SARSCOV2 & INF A&B AMP PRB: CPT | Performed by: EMERGENCY MEDICINE

## 2025-02-10 RX ORDER — SODIUM CHLORIDE 0.9 % (FLUSH) 0.9 %
10 SYRINGE (ML) INJECTION AS NEEDED
Status: DISCONTINUED | OUTPATIENT
Start: 2025-02-10 | End: 2025-02-18 | Stop reason: HOSPADM

## 2025-02-10 NOTE — LETTER
February 18, 2025     Patient: Rafael Nunes   YOB: 1975   Date of Visit: 2/10/2025       To Whom It May Concern:    It is my medical opinion that Rafael Nunes has been under my care from 2/10/25-2/18/25, and may return to work on Sunday February 23,2025.  .           Sincerely,        Kelby Tolbert MD    CC: No Recipients

## 2025-02-11 PROBLEM — J18.9 PNEUMONIA: Status: ACTIVE | Noted: 2025-02-11

## 2025-02-11 LAB
ALBUMIN SERPL-MCNC: 4.3 G/DL (ref 3.5–5.2)
ALBUMIN/GLOB SERPL: 1.2 G/DL
ALP SERPL-CCNC: 94 U/L (ref 39–117)
ALT SERPL W P-5'-P-CCNC: 17 U/L (ref 1–33)
ANION GAP SERPL CALCULATED.3IONS-SCNC: 11.5 MMOL/L (ref 5–15)
AST SERPL-CCNC: 26 U/L (ref 1–32)
BASOPHILS # BLD AUTO: 0.03 10*3/MM3 (ref 0–0.2)
BASOPHILS NFR BLD AUTO: 0.4 % (ref 0–1.5)
BILIRUB SERPL-MCNC: 0.9 MG/DL (ref 0–1.2)
BUN SERPL-MCNC: 18 MG/DL (ref 6–20)
BUN/CREAT SERPL: 14 (ref 7–25)
CALCIUM SPEC-SCNC: 9.2 MG/DL (ref 8.6–10.5)
CHLORIDE SERPL-SCNC: 103 MMOL/L (ref 98–107)
CHOLEST SERPL-MCNC: 242 MG/DL (ref 0–200)
CO2 SERPL-SCNC: 23.5 MMOL/L (ref 22–29)
CREAT SERPL-MCNC: 1.29 MG/DL (ref 0.57–1)
DEPRECATED RDW RBC AUTO: 49 FL (ref 37–54)
EGFRCR SERPLBLD CKD-EPI 2021: 50.4 ML/MIN/1.73
EOSINOPHIL # BLD AUTO: 0.15 10*3/MM3 (ref 0–0.4)
EOSINOPHIL NFR BLD AUTO: 2 % (ref 0.3–6.2)
ERYTHROCYTE [DISTWIDTH] IN BLOOD BY AUTOMATED COUNT: 14.8 % (ref 12.3–15.4)
GEN 5 1HR TROPONIN T REFLEX: 29 NG/L
GLOBULIN UR ELPH-MCNC: 3.6 GM/DL
GLUCOSE BLDC GLUCOMTR-MCNC: 113 MG/DL (ref 70–105)
GLUCOSE BLDC GLUCOMTR-MCNC: 120 MG/DL (ref 70–105)
GLUCOSE BLDC GLUCOMTR-MCNC: 152 MG/DL (ref 70–105)
GLUCOSE SERPL-MCNC: 126 MG/DL (ref 65–99)
HCT VFR BLD AUTO: 41.9 % (ref 34–46.6)
HDLC SERPL-MCNC: 58 MG/DL (ref 40–60)
HGB BLD-MCNC: 13.4 G/DL (ref 12–15.9)
IMM GRANULOCYTES # BLD AUTO: 0.01 10*3/MM3 (ref 0–0.05)
IMM GRANULOCYTES NFR BLD AUTO: 0.1 % (ref 0–0.5)
LDLC SERPL CALC-MCNC: 157 MG/DL (ref 0–100)
LDLC/HDLC SERPL: 2.66 {RATIO}
LYMPHOCYTES # BLD AUTO: 2.2 10*3/MM3 (ref 0.7–3.1)
LYMPHOCYTES NFR BLD AUTO: 28.9 % (ref 19.6–45.3)
MCH RBC QN AUTO: 28.5 PG (ref 26.6–33)
MCHC RBC AUTO-ENTMCNC: 32 G/DL (ref 31.5–35.7)
MCV RBC AUTO: 89.1 FL (ref 79–97)
MONOCYTES # BLD AUTO: 0.53 10*3/MM3 (ref 0.1–0.9)
MONOCYTES NFR BLD AUTO: 7 % (ref 5–12)
NEUTROPHILS NFR BLD AUTO: 4.69 10*3/MM3 (ref 1.7–7)
NEUTROPHILS NFR BLD AUTO: 61.6 % (ref 42.7–76)
NT-PROBNP SERPL-MCNC: 7479 PG/ML (ref 0–900)
PLATELET # BLD AUTO: 225 10*3/MM3 (ref 140–450)
PMV BLD AUTO: 9.5 FL (ref 6–12)
POTASSIUM SERPL-SCNC: 3.3 MMOL/L (ref 3.5–5.2)
PROCALCITONIN SERPL-MCNC: 0.06 NG/ML (ref 0–0.25)
PROT SERPL-MCNC: 7.9 G/DL (ref 6–8.5)
QT INTERVAL: 391 MS
QTC INTERVAL: 492 MS
RBC # BLD AUTO: 4.7 10*6/MM3 (ref 3.77–5.28)
SODIUM SERPL-SCNC: 138 MMOL/L (ref 136–145)
TRIGL SERPL-MCNC: 148 MG/DL (ref 0–150)
TROPONIN T % DELTA: -3
TROPONIN T NUMERIC DELTA: -1 NG/L
TROPONIN T SERPL HS-MCNC: 30 NG/L
TSH SERPL DL<=0.05 MIU/L-ACNC: 68.4 UIU/ML (ref 0.27–4.2)
VLDLC SERPL-MCNC: 27 MG/DL (ref 5–40)
WBC NRBC COR # BLD AUTO: 7.61 10*3/MM3 (ref 3.4–10.8)

## 2025-02-11 PROCEDURE — 94761 N-INVAS EAR/PLS OXIMETRY MLT: CPT

## 2025-02-11 PROCEDURE — 82948 REAGENT STRIP/BLOOD GLUCOSE: CPT

## 2025-02-11 PROCEDURE — 25010000002 CEFTRIAXONE PER 250 MG: Performed by: NURSE PRACTITIONER

## 2025-02-11 PROCEDURE — 94640 AIRWAY INHALATION TREATMENT: CPT

## 2025-02-11 PROCEDURE — G0378 HOSPITAL OBSERVATION PER HR: HCPCS

## 2025-02-11 PROCEDURE — 99222 1ST HOSP IP/OBS MODERATE 55: CPT | Performed by: INTERNAL MEDICINE

## 2025-02-11 PROCEDURE — 84484 ASSAY OF TROPONIN QUANT: CPT

## 2025-02-11 PROCEDURE — 94799 UNLISTED PULMONARY SVC/PX: CPT

## 2025-02-11 PROCEDURE — 93005 ELECTROCARDIOGRAM TRACING: CPT | Performed by: NURSE PRACTITIONER

## 2025-02-11 PROCEDURE — 80061 LIPID PANEL: CPT | Performed by: NURSE PRACTITIONER

## 2025-02-11 PROCEDURE — 25010000002 CEFTRIAXONE PER 250 MG

## 2025-02-11 PROCEDURE — 25010000002 FUROSEMIDE PER 20 MG: Performed by: NURSE PRACTITIONER

## 2025-02-11 PROCEDURE — 84443 ASSAY THYROID STIM HORMONE: CPT | Performed by: NURSE PRACTITIONER

## 2025-02-11 PROCEDURE — 93010 ELECTROCARDIOGRAM REPORT: CPT | Performed by: INTERNAL MEDICINE

## 2025-02-11 PROCEDURE — 99215 OFFICE O/P EST HI 40 MIN: CPT | Performed by: INTERNAL MEDICINE

## 2025-02-11 PROCEDURE — 84145 PROCALCITONIN (PCT): CPT | Performed by: STUDENT IN AN ORGANIZED HEALTH CARE EDUCATION/TRAINING PROGRAM

## 2025-02-11 PROCEDURE — 25010000002 FUROSEMIDE PER 20 MG

## 2025-02-11 RX ORDER — METHOCARBAMOL 500 MG/1
500 TABLET, FILM COATED ORAL EVERY 8 HOURS PRN
Status: DISCONTINUED | OUTPATIENT
Start: 2025-02-11 | End: 2025-02-11

## 2025-02-11 RX ORDER — ROSUVASTATIN CALCIUM 10 MG/1
10 TABLET, COATED ORAL NIGHTLY
Status: DISCONTINUED | OUTPATIENT
Start: 2025-02-11 | End: 2025-02-18 | Stop reason: HOSPADM

## 2025-02-11 RX ORDER — POTASSIUM CHLORIDE 1500 MG/1
40 TABLET, EXTENDED RELEASE ORAL ONCE
Status: COMPLETED | OUTPATIENT
Start: 2025-02-11 | End: 2025-02-11

## 2025-02-11 RX ORDER — AZITHROMYCIN 250 MG/1
500 TABLET, FILM COATED ORAL
Status: DISCONTINUED | OUTPATIENT
Start: 2025-02-12 | End: 2025-02-12

## 2025-02-11 RX ORDER — GABAPENTIN 300 MG/1
300 CAPSULE ORAL EVERY 8 HOURS PRN
Status: DISCONTINUED | OUTPATIENT
Start: 2025-02-11 | End: 2025-02-11

## 2025-02-11 RX ORDER — IPRATROPIUM BROMIDE AND ALBUTEROL SULFATE 2.5; .5 MG/3ML; MG/3ML
3 SOLUTION RESPIRATORY (INHALATION)
Status: DISCONTINUED | OUTPATIENT
Start: 2025-02-11 | End: 2025-02-18 | Stop reason: HOSPADM

## 2025-02-11 RX ORDER — CARVEDILOL 6.25 MG/1
6.25 TABLET ORAL 2 TIMES DAILY WITH MEALS
Status: DISCONTINUED | OUTPATIENT
Start: 2025-02-11 | End: 2025-02-11

## 2025-02-11 RX ORDER — IPRATROPIUM BROMIDE AND ALBUTEROL SULFATE 2.5; .5 MG/3ML; MG/3ML
3 SOLUTION RESPIRATORY (INHALATION) ONCE
Status: COMPLETED | OUTPATIENT
Start: 2025-02-11 | End: 2025-02-11

## 2025-02-11 RX ORDER — FENOFIBRATE 145 MG/1
145 TABLET, COATED ORAL DAILY
Status: DISCONTINUED | OUTPATIENT
Start: 2025-02-11 | End: 2025-02-11

## 2025-02-11 RX ORDER — OXYCODONE HYDROCHLORIDE 5 MG/1
7.5 TABLET ORAL EVERY 4 HOURS PRN
Status: DISCONTINUED | OUTPATIENT
Start: 2025-02-11 | End: 2025-02-18 | Stop reason: HOSPADM

## 2025-02-11 RX ORDER — DEXTROSE MONOHYDRATE 25 G/50ML
25 INJECTION, SOLUTION INTRAVENOUS
Status: DISCONTINUED | OUTPATIENT
Start: 2025-02-11 | End: 2025-02-18 | Stop reason: HOSPADM

## 2025-02-11 RX ORDER — IBUPROFEN 600 MG/1
1 TABLET ORAL
Status: DISCONTINUED | OUTPATIENT
Start: 2025-02-11 | End: 2025-02-18 | Stop reason: HOSPADM

## 2025-02-11 RX ORDER — FOLIC ACID 1 MG/1
1 TABLET ORAL DAILY
Status: DISCONTINUED | OUTPATIENT
Start: 2025-02-11 | End: 2025-02-11

## 2025-02-11 RX ORDER — AZITHROMYCIN 250 MG/1
500 TABLET, FILM COATED ORAL ONCE
Status: COMPLETED | OUTPATIENT
Start: 2025-02-11 | End: 2025-02-11

## 2025-02-11 RX ORDER — DIPHENHYDRAMINE HCL 25 MG
25 CAPSULE ORAL EVERY 6 HOURS PRN
Status: DISCONTINUED | OUTPATIENT
Start: 2025-02-11 | End: 2025-02-18 | Stop reason: HOSPADM

## 2025-02-11 RX ORDER — FUROSEMIDE 10 MG/ML
20 INJECTION INTRAMUSCULAR; INTRAVENOUS ONCE
Status: COMPLETED | OUTPATIENT
Start: 2025-02-11 | End: 2025-02-11

## 2025-02-11 RX ORDER — FUROSEMIDE 10 MG/ML
40 INJECTION INTRAMUSCULAR; INTRAVENOUS DAILY
Status: DISCONTINUED | OUTPATIENT
Start: 2025-02-11 | End: 2025-02-11

## 2025-02-11 RX ORDER — ENOXAPARIN SODIUM 100 MG/ML
40 INJECTION SUBCUTANEOUS ONCE
Status: DISCONTINUED | OUTPATIENT
Start: 2025-02-11 | End: 2025-02-11

## 2025-02-11 RX ORDER — INSULIN LISPRO 100 [IU]/ML
2-7 INJECTION, SOLUTION INTRAVENOUS; SUBCUTANEOUS
Status: DISCONTINUED | OUTPATIENT
Start: 2025-02-11 | End: 2025-02-11

## 2025-02-11 RX ORDER — LEVOTHYROXINE SODIUM 150 UG/1
150 TABLET ORAL
Status: DISCONTINUED | OUTPATIENT
Start: 2025-02-11 | End: 2025-02-11

## 2025-02-11 RX ORDER — ASPIRIN 81 MG/1
81 TABLET ORAL DAILY
Status: DISCONTINUED | OUTPATIENT
Start: 2025-02-11 | End: 2025-02-18 | Stop reason: HOSPADM

## 2025-02-11 RX ORDER — NITROGLYCERIN 0.4 MG/1
0.4 TABLET SUBLINGUAL
Status: DISCONTINUED | OUTPATIENT
Start: 2025-02-11 | End: 2025-02-18 | Stop reason: HOSPADM

## 2025-02-11 RX ORDER — HYDRALAZINE HYDROCHLORIDE 25 MG/1
25 TABLET, FILM COATED ORAL 3 TIMES DAILY
Status: DISCONTINUED | OUTPATIENT
Start: 2025-02-11 | End: 2025-02-17

## 2025-02-11 RX ORDER — ALLOPURINOL 300 MG/1
300 TABLET ORAL DAILY
Status: DISCONTINUED | OUTPATIENT
Start: 2025-02-11 | End: 2025-02-11

## 2025-02-11 RX ORDER — AMLODIPINE BESYLATE 5 MG/1
10 TABLET ORAL
Status: DISCONTINUED | OUTPATIENT
Start: 2025-02-12 | End: 2025-02-12

## 2025-02-11 RX ORDER — ALBUTEROL SULFATE 0.83 MG/ML
2.5 SOLUTION RESPIRATORY (INHALATION) EVERY 4 HOURS PRN
Status: DISCONTINUED | OUTPATIENT
Start: 2025-02-11 | End: 2025-02-18 | Stop reason: HOSPADM

## 2025-02-11 RX ORDER — BENZONATATE 100 MG/1
100 CAPSULE ORAL 3 TIMES DAILY PRN
Status: DISCONTINUED | OUTPATIENT
Start: 2025-02-11 | End: 2025-02-18 | Stop reason: HOSPADM

## 2025-02-11 RX ORDER — CLOPIDOGREL BISULFATE 75 MG/1
75 TABLET ORAL DAILY
Status: DISCONTINUED | OUTPATIENT
Start: 2025-02-11 | End: 2025-02-11

## 2025-02-11 RX ORDER — PANTOPRAZOLE SODIUM 40 MG/1
40 TABLET, DELAYED RELEASE ORAL DAILY
Status: DISCONTINUED | OUTPATIENT
Start: 2025-02-11 | End: 2025-02-11

## 2025-02-11 RX ORDER — ATORVASTATIN CALCIUM 20 MG/1
40 TABLET, FILM COATED ORAL DAILY
Status: DISCONTINUED | OUTPATIENT
Start: 2025-02-11 | End: 2025-02-11

## 2025-02-11 RX ORDER — FERROUS SULFATE 325(65) MG
325 TABLET ORAL
Status: DISCONTINUED | OUTPATIENT
Start: 2025-02-11 | End: 2025-02-18 | Stop reason: HOSPADM

## 2025-02-11 RX ORDER — BUDESONIDE 0.5 MG/2ML
0.5 INHALANT ORAL
Status: DISCONTINUED | OUTPATIENT
Start: 2025-02-11 | End: 2025-02-11

## 2025-02-11 RX ORDER — NICOTINE POLACRILEX 4 MG
15 LOZENGE BUCCAL
Status: DISCONTINUED | OUTPATIENT
Start: 2025-02-11 | End: 2025-02-18 | Stop reason: HOSPADM

## 2025-02-11 RX ORDER — LEVOTHYROXINE SODIUM 100 UG/1
100 TABLET ORAL
Status: DISCONTINUED | OUTPATIENT
Start: 2025-02-12 | End: 2025-02-18 | Stop reason: HOSPADM

## 2025-02-11 RX ORDER — FUROSEMIDE 10 MG/ML
20 INJECTION INTRAMUSCULAR; INTRAVENOUS
Status: DISCONTINUED | OUTPATIENT
Start: 2025-02-11 | End: 2025-02-12

## 2025-02-11 RX ADMIN — AZITHROMYCIN DIHYDRATE 500 MG: 250 TABLET ORAL at 01:06

## 2025-02-11 RX ADMIN — IPRATROPIUM BROMIDE AND ALBUTEROL SULFATE 3 ML: .5; 3 SOLUTION RESPIRATORY (INHALATION) at 18:40

## 2025-02-11 RX ADMIN — IPRATROPIUM BROMIDE AND ALBUTEROL SULFATE 3 ML: .5; 3 SOLUTION RESPIRATORY (INHALATION) at 14:00

## 2025-02-11 RX ADMIN — CEFTRIAXONE SODIUM 1000 MG: 1 INJECTION, POWDER, FOR SOLUTION INTRAMUSCULAR; INTRAVENOUS at 01:06

## 2025-02-11 RX ADMIN — FERROUS SULFATE TAB 325 MG (65 MG ELEMENTAL FE) 325 MG: 325 (65 FE) TAB at 13:21

## 2025-02-11 RX ADMIN — FUROSEMIDE 20 MG: 10 INJECTION, SOLUTION INTRAMUSCULAR; INTRAVENOUS at 17:26

## 2025-02-11 RX ADMIN — IPRATROPIUM BROMIDE AND ALBUTEROL SULFATE 3 ML: .5; 3 SOLUTION RESPIRATORY (INHALATION) at 04:52

## 2025-02-11 RX ADMIN — FUROSEMIDE 20 MG: 10 INJECTION, SOLUTION INTRAMUSCULAR; INTRAVENOUS at 01:06

## 2025-02-11 RX ADMIN — HYDRALAZINE HYDROCHLORIDE 25 MG: 25 TABLET ORAL at 17:26

## 2025-02-11 RX ADMIN — CEFTRIAXONE SODIUM 1000 MG: 1 INJECTION, POWDER, FOR SOLUTION INTRAMUSCULAR; INTRAVENOUS at 13:21

## 2025-02-11 RX ADMIN — ROSUVASTATIN CALCIUM 10 MG: 10 TABLET, FILM COATED ORAL at 20:18

## 2025-02-11 RX ADMIN — ASPIRIN 81 MG: 81 TABLET, COATED ORAL at 20:18

## 2025-02-11 RX ADMIN — POTASSIUM CHLORIDE 40 MEQ: 1500 TABLET, EXTENDED RELEASE ORAL at 01:06

## 2025-02-11 RX ADMIN — HYDRALAZINE HYDROCHLORIDE 25 MG: 25 TABLET ORAL at 20:18

## 2025-02-11 NOTE — FSED PROVIDER NOTE
"     [facility] ED / URGENT CARE    EMERGENCY DEPARTMENT ENCOUNTER    Room Number:  02/02  Date seen:  2/11/2025  Time seen: 23:54 EST  PCP: Phani Adames MD  Historian: Patient    HPI:  Chief complaint: Shortness of breath  Context:Rafael Nunes is a 49 y.o. female who presents to the ED with c/o shortness of breath.  Patient reports that she has been having shortness of breath and chest discomfort over the last 2 days.  She reports that she has been having cough and congestion.  She reports that her daughter and grandson tested positive for the flu and is concerned that she may also have the flu.  She reports that she feels like she \"is retaining fluid\".  She reports that she typically retains fluid in her abdomen.     Timing: Constant  Duration: 2 days  Intensity/Severity: Moderate  Associated Symptoms: Shortness of breath, cough, chest pain      MEDICAL RECORD REVIEW  COPD, asthma, type 2 diabetes, hypertension, GERD, hyperlipidemia, hypothyroidism, cardiomyopathy, CAD, CKD    ALLERGIES  Hydrocodone, Penicillin g, Contrast dye (echo or unknown ct/mr), Gadolinium derivatives, and Varenicline    PAST MEDICAL HISTORY  Active Ambulatory Problems     Diagnosis Date Noted    COPD (chronic obstructive pulmonary disease) 06/22/2019    Essential hypertension 06/22/2019    Cardiomyopathy, dilated 06/22/2019    ICD (implantable cardioverter-defibrillator), dual, in situ 07/22/2019    GERD without esophagitis 12/23/2019    Hypothyroidism (acquired) 12/23/2019    Coronary artery disease of native artery of native heart with stable angina pectoris 12/22/2019    S/P AVR (aortic valve replacement) 01/07/2020    S/P CABG x 3 01/07/2020    Dyspnea 05/16/2020    CKD (chronic kidney disease) stage 2, GFR 60-89 ml/min 05/16/2020    Nonrheumatic mitral valve regurgitation 10/20/2020    Cellulitis of left axilla 10/26/2020    Cellulitis of right axilla 10/26/2020    Hidradenitis 10/26/2020    Type " 2 diabetes mellitus without complication, without long-term current use of insulin 01/19/2021    Chronic systolic congestive heart failure 02/23/2021    Hypokalemia 05/31/2021    Dizziness with near syncope 05/31/2021    Acute diarrhea 05/31/2021    Mixed hyperlipidemia 04/08/2021    INDRA (obstructive sleep apnea) 04/08/2021    Chronic pain syndrome 05/31/2021    Intractable low back pain 08/16/2022    Stage 3a chronic kidney disease 05/16/2020    Chest pain 12/08/2023    Elevated troponin 12/09/2023     Resolved Ambulatory Problems     Diagnosis Date Noted    Acute on chronic systolic congestive heart failure 06/22/2019    Displacement of atrial pacemaker leads 07/22/2019    Palpitations 08/13/2019    Chest pain 12/23/2019    Aortic valve regurgitation 12/22/2019    Unstable angina pectoris 12/22/2019    Nonrheumatic aortic valve insufficiency 12/22/2019    Chest pain due to CAD 01/18/2021     Past Medical History:   Diagnosis Date    Arrhythmia     Asthma     CAD (coronary artery disease)     Hyperlipidemia        PAST SURGICAL HISTORY  Past Surgical History:   Procedure Laterality Date    AORTIC VALVE REPAIR/REPLACEMENT N/A 12/27/2019    Procedure: AORTIC VALVE REPAIR/REPLACEMENT;  Surgeon: Lane Stock MD;  Location: [facility] CVOR;  Service: Cardiothoracic    BREAST LUMPECTOMY Right     BENIGN    CARDIAC CATHETERIZATION N/A 12/24/2019    Procedure: Right and Left Heart Cath 12/24/19 @ 0900;  Surgeon: Wayne Luna MD;  Location: [facility] CATH INVASIVE LOCATION;  Service: Cardiovascular    CARDIAC CATHETERIZATION N/A 12/24/2019    Procedure: Coronary angiography;  Surgeon: Wayne Luna MD;  Location: [facility] CATH INVASIVE LOCATION;  Service: Cardiovascular    CARDIAC CATHETERIZATION N/A 11/10/2020    Procedure: Left and right Heart Cath;  Surgeon: Wayne Luna MD;  Location: [facility] CATH INVASIVE LOCATION;  Service: Cardiovascular;  Laterality: N/A;    CARDIAC CATHETERIZATION N/A  11/10/2020    Procedure: Coronary angiography;  Surgeon: Wayne Luna MD;  Location: [facility] CATH INVASIVE LOCATION;  Service: Cardiovascular;  Laterality: N/A;    CARDIAC CATHETERIZATION N/A 11/10/2020    Procedure: Right Heart Cath;  Surgeon: Wayne Luna MD;  Location: [facility] CATH INVASIVE LOCATION;  Service: Cardiovascular;  Laterality: N/A;    CARDIAC CATHETERIZATION N/A 11/25/2020    Procedure: Percutaneous coronary intervention of the left circumflex artery;  Surgeon: Wayne Luna MD;  Location: [facility] CATH INVASIVE LOCATION;  Service: Cardiovascular;  Laterality: N/A;    CARDIAC CATHETERIZATION N/A 1/22/2021    Procedure: LEFT HEART CATH with possible PCI;  Surgeon: Wayne Luna MD;  Location: [facility] CATH INVASIVE LOCATION;  Service: Cardiovascular;  Laterality: N/A;  Local and IV sedation    CARDIAC CATHETERIZATION N/A 1/22/2021    Procedure: Coronary angiography;  Surgeon: Wayne Luna MD;  Location: [facility] CATH INVASIVE LOCATION;  Service: Cardiovascular;  Laterality: N/A;    CARDIAC CATHETERIZATION N/A 1/22/2021    Procedure: Saphenous Vein Graft;  Surgeon: Wayne Luna MD;  Location: [facility] CATH INVASIVE LOCATION;  Service: Cardiovascular;  Laterality: N/A;    CARDIAC ELECTROPHYSIOLOGY PROCEDURE Left 6/28/2019    Procedure: Dual-chamber ICD insertion;  Surgeon: Héctor Eckert MD;  Location: [facility] CATH INVASIVE LOCATION;  Service: Cardiovascular    CARDIAC ELECTROPHYSIOLOGY PROCEDURE Left 8/14/2019    Procedure: Lead Revision;  Surgeon: Héctor Eckert MD;  Location: [facility] CATH INVASIVE LOCATION;  Service: Cardiovascular    CARDIAC SURGERY      CHOLECYSTECTOMY      CORONARY ARTERY BYPASS GRAFT N/A 12/27/2019    Procedure: CORONARY ARTERY BYPASS GRAFTING;  Surgeon: Lane Stock MD;  Location: [facility];  Service: Cardiothoracic    HYSTERECTOMY      INSERT / REPLACE / REMOVE PACEMAKER      LYMPHADENECTOMY Bilateral      THYROID SURGERY         FAMILY HISTORY  Family History   Problem Relation Age of Onset    Heart disease Father        SOCIAL HISTORY  Social History     Socioeconomic History    Marital status:    Tobacco Use    Smoking status: Former     Current packs/day: 0.00     Types: Cigarettes     Quit date: 2019     Years since quittin.1    Smokeless tobacco: Never   Vaping Use    Vaping status: Never Used   Substance and Sexual Activity    Alcohol use: No    Drug use: No    Sexual activity: Defer       REVIEW OF SYSTEMS  Review of Systems    All systems reviewed and negative except for those discussed in HPI.     PHYSICAL EXAM    I have reviewed the triage vital signs and nursing notes.    ED Triage Vitals [02/10/25 2326]   Temp Heart Rate Resp BP SpO2   98.6 °F (37 °C) 97 16 (!) 156/116 98 %      Temp src Heart Rate Source Patient Position BP Location FiO2 (%)   Oral Monitor Sitting Right arm --       Physical Exam  Constitutional:       Appearance: Normal appearance. She is not toxic-appearing.   HENT:      Nose: Nose normal.      Mouth/Throat:      Mouth: Mucous membranes are moist.   Eyes:      Pupils: Pupils are equal, round, and reactive to light.   Cardiovascular:      Rate and Rhythm: Normal rate and regular rhythm.      Pulses: Normal pulses.   Musculoskeletal:         General: Normal range of motion.   Skin:     General: Skin is warm.   Neurological:      General: No focal deficit present.      Mental Status: She is alert.   Psychiatric:         Mood and Affect: Mood normal.         Behavior: Behavior normal.         Vital signs and nursing notes reviewed.        LAB RESULTS  Recent Results (from the past 24 hours)   COVID-19 and FLU A/B PCR, 1 HR TAT - Swab, Nasopharynx    Collection Time: 02/10/25 11:28 PM    Specimen: Nasopharynx; Swab   Result Value Ref Range    COVID19 Not Detected Not Detected - Ref. Range    Influenza A PCR Not Detected Not Detected    Influenza B PCR Not Detected Not Detected    Comprehensive Metabolic Panel    Collection Time: 02/10/25 11:52 PM    Specimen: Blood   Result Value Ref Range    Glucose 126 (H) 65 - 99 mg/dL    BUN 18 6 - 20 mg/dL    Creatinine 1.29 (H) 0.57 - 1.00 mg/dL    Sodium 138 136 - 145 mmol/L    Potassium 3.3 (L) 3.5 - 5.2 mmol/L    Chloride 103 98 - 107 mmol/L    CO2 23.5 22.0 - 29.0 mmol/L    Calcium 9.2 8.6 - 10.5 mg/dL    Total Protein 7.9 6.0 - 8.5 g/dL    Albumin 4.3 3.5 - 5.2 g/dL    ALT (SGPT) 17 1 - 33 U/L    AST (SGOT) 26 1 - 32 U/L    Alkaline Phosphatase 94 39 - 117 U/L    Total Bilirubin 0.9 0.0 - 1.2 mg/dL    Globulin 3.6 gm/dL    A/G Ratio 1.2 g/dL    BUN/Creatinine Ratio 14.0 7.0 - 25.0    Anion Gap 11.5 5.0 - 15.0 mmol/L    eGFR 50.4 (L) >60.0 mL/min/1.73   BNP    Collection Time: 02/10/25 11:52 PM    Specimen: Blood   Result Value Ref Range    proBNP 7,479.0 (H) 0.0 - 900.0 pg/mL   High Sensitivity Troponin T    Collection Time: 02/10/25 11:52 PM    Specimen: Blood   Result Value Ref Range    HS Troponin T 30 (H) <14 ng/L   CBC Auto Differential    Collection Time: 02/10/25 11:52 PM    Specimen: Blood   Result Value Ref Range    WBC 7.61 3.40 - 10.80 10*3/mm3    RBC 4.70 3.77 - 5.28 10*6/mm3    Hemoglobin 13.4 12.0 - 15.9 g/dL    Hematocrit 41.9 34.0 - 46.6 %    MCV 89.1 79.0 - 97.0 fL    MCH 28.5 26.6 - 33.0 pg    MCHC 32.0 31.5 - 35.7 g/dL    RDW 14.8 12.3 - 15.4 %    RDW-SD 49.0 37.0 - 54.0 fl    MPV 9.5 6.0 - 12.0 fL    Platelets 225 140 - 450 10*3/mm3    Neutrophil % 61.6 42.7 - 76.0 %    Lymphocyte % 28.9 19.6 - 45.3 %    Monocyte % 7.0 5.0 - 12.0 %    Eosinophil % 2.0 0.3 - 6.2 %    Basophil % 0.4 0.0 - 1.5 %    Immature Grans % 0.1 0.0 - 0.5 %    Neutrophils, Absolute 4.69 1.70 - 7.00 10*3/mm3    Lymphocytes, Absolute 2.20 0.70 - 3.10 10*3/mm3    Monocytes, Absolute 0.53 0.10 - 0.90 10*3/mm3    Eosinophils, Absolute 0.15 0.00 - 0.40 10*3/mm3    Basophils, Absolute 0.03 0.00 - 0.20 10*3/mm3    Immature Grans, Absolute 0.01 0.00 - 0.05  10*3/mm3       Ordered the above labs and independently reviewed the results.      RADIOLOGY RESULTS  XR Chest PA & Lateral    Result Date: 2/10/2025  XR CHEST PA AND LATERAL Date of Exam: 2/10/2025 11:40 PM EST Indication: cough Comparison: 7/7/2024. Findings: Mild patchy airspace disease is present within the lung bases bilaterally. No pleural fluid. No pneumothorax. The pulmonary vasculature appears within normal limits. The heart appears enlarged, stable. Stable left subclavian/AICD/pacemaker device.. No acute osseous abnormality identified.     Impression: Mild patchy airspace disease present within the lung bases bilaterally, likely related to pneumonia. Electronically Signed: Althea Valladares MD  2/10/2025 11:52 PM EST  Workstation ID: [id number]        I ordered the above noted radiological studies. Independently reviewed by me and discussed with radiologist.  See dictation above for official radiology interpretation.      Orders placed during this visit:  Orders Placed This Encounter   Procedures    COVID-19 and FLU A/B PCR, 1 HR TAT - Swab, Nasopharynx    XR Chest PA & Lateral    Comprehensive Metabolic Panel    BNP    High Sensitivity Troponin T    CBC Auto Differential    High Sensitivity Troponin T 1Hr    NPO Diet NPO Type: Strict NPO    Undress & Gown    Continuous Pulse Oximetry    Vital Signs    Oxygen Therapy- Nasal Cannula; Titrate 1-6 LPM Per SpO2; 90 - 95%    ECG 12 Lead ED Triage Standing Order; SOA    Insert Peripheral IV    Initiate Observation Status    CBC & Differential    ED Acknowledgement Form Needed;           PROCEDURES    Procedures        MEDICATIONS GIVEN IN ER    Medications   sodium chloride 0.9 % flush 10 mL (has no administration in time range)   cefTRIAXone (ROCEPHIN) 1,000 mg in sodium chloride 0.9 % 100 mL MBP (has no administration in time range)   azithromycin (ZITHROMAX) tablet 500 mg (has no administration in time range)   potassium chloride (KLOR-CON M20) CR tablet 40 mEq (has  no administration in time range)   furosemide (LASIX) injection 20 mg (has no administration in time range)         PROGRESS, DATA ANALYSIS, CONSULTS, AND MEDICAL DECISION MAKING    All labs and radiology studies have been independently reviewed by me.     ED Course as of 02/11/25 0058 Tue Feb 11, 2025   0049 Hospitalist paged for admission  [KJ]   0058 Spoke with Dr. Conn for admission [KJ]      ED Course User Index  [KJ] Bianca Dailey, APRN       AS OF 00:58 EST VITALS:    BP - 135/97  HR - 92  TEMP - 98.6 °F (37 °C) (Oral)  02 SATS - 94%    Medical Decision Making  Patient is a 49-year-old female who presents today with shortness of breath.  Patient an IV established and blood work was obtained. Presentation not consistent with acute cardiac etiologies to include pericardial effusion / tamponade . Presentation not consistent with acute respiratory etiologies to include pneumothorax , asthma, COPD exacerbation, allergic etiologies. Presentation also not consistent with non-cardiopulmonary causes to include toxidromes, metabolic etiologies such as acidemia or electrolyte derangements, sepsis, neurologic causes (i.e. demyelinating diseases).  Patient's troponin was noted to be 30, BNP 7479, potassium 3.3, creatinine 1.29.  Patient's chest x-ray shows findings for pneumonia.  Patient's heart score is 5.  Discussed admission versus discharge with the patient.  She reports that she would like to be admitted to the hospital as she attempted to contact her primary care provider but was not able to get a hold of anyone.  I spoke with Dr. Conn who will be accepting the patient for admission.  Patient will be transferred to [facility] upon inpatient room number assignment via ambulance.  Patient remained stable while in the emergency room.    Problems Addressed:  Chest pain, unspecified type: complicated acute illness or injury  Dyspnea, unspecified type: complicated acute illness or injury  Elevated  brain natriuretic peptide (BNP) level: complicated acute illness or injury  Elevated troponin: complicated acute illness or injury  History of CHF (congestive heart failure): complicated acute illness or injury  Pneumonia due to infectious organism, unspecified laterality, unspecified part of lung: complicated acute illness or injury    Amount and/or Complexity of Data Reviewed  Labs: ordered.  Radiology: ordered.  ECG/medicine tests: ordered.    Risk  Prescription drug management.  Decision regarding hospitalization.          DIAGNOSIS  Final diagnoses:   Pneumonia due to infectious organism, unspecified laterality, unspecified part of lung   Dyspnea, unspecified type   Chest pain, unspecified type   Elevated troponin   Elevated brain natriuretic peptide (BNP) level   History of CHF (congestive heart failure)       New Medications Ordered This Visit   Medications    sodium chloride 0.9 % flush 10 mL    cefTRIAXone (ROCEPHIN) 1,000 mg in sodium chloride 0.9 % 100 mL MBP    azithromycin (ZITHROMAX) tablet 500 mg    potassium chloride (KLOR-CON M20) CR tablet 40 mEq    furosemide (LASIX) injection 20 mg           I performed hand hygiene on entry into the pt room and upon exit.      Part of this note may be an electronic transcription/translation of spoken language to printed text using the Dragon Dictation System.     Appropriate PPE worn during exam.    Dictated utilizing Dragon dictation     Note Disclaimer: At [facility], we believe that sharing information builds trust and better relationships. You are receiving this note because you recently visited [facility]. It is possible you will see health information before a provider has talked with you about it. This kind of information can be easy to misunderstand. To help you fully understand what it means for your health, we urge you to discuss this note with your provider.

## 2025-02-11 NOTE — H&P
[facility] Medicine Services  History & Physical    Patient Name: Rafael Nunes  : 1975  MRN: [id number]  Primary Care Physician:  Phani Adames MD  Date of admission: 2/10/2025  Date and Time of Service: 2/10/2025 at 10:54 AM EST     Subjective      Chief Complaint: Pneumonia, CHF exacerbation    History of Present Illness: Rafael Nunes is a 49 y.o. female with a CMH of CAD status post CABG x 3, aortic valve regurgitation status post aortic valve replacement, ischemic cardiomyopathy with a EF of 45 to 50% status post AICD, COPD, hypertension, hyperlipidemia, hypothyroidism who presented to [facility] on 2/10/2025 with shortness of breath.     Patient reports that she started having some shortness of breath and coughing and some URI symptoms.  Patient has been exposed to influenza.  Patient decided to be evaluated at [facility] emergency department due to patient's increased shortness of breath.  Patient states that typically when she starts having abdominal distention and shortness of breath it is due to her congestive heart failure.  Patient states that at this time she does not see a cardiologist regularly.    In the ED at [facility], Labs remarkable for elevated proBNP of 7400s, creatinine of 1.29 (baseline 1.4-1.6), CBC unremarkable, respiratory panel negative, chest x-ray showed mild patchy airspace disease present within the lung bases bilaterally related to possible pneumonia, patient received dose of ceftriaxone and azithromycin as well as iv lasix 20 along with potassium 40 mEq in ED, patient admitted to medicine team for further management.       Review of Systems  Please see above, review of systems otherwise negative     Personal History     Past Medical History:   Diagnosis Date    Arrhythmia     Asthma     CAD (coronary artery disease)     Cardiomyopathy, dilated     Chronic systolic congestive heart failure     CKD (chronic kidney disease)  stage 2, GFR 60-89 ml/min     COPD (chronic obstructive pulmonary disease)     Essential hypertension     GERD without esophagitis     Hidradenitis 10/26/2020    Hyperlipidemia     Hypothyroidism (acquired)     ICD (implantable cardioverter-defibrillator), dual, in situ 7/22/2019    Nonrheumatic aortic valve insufficiency     Nonrheumatic mitral valve regurgitation     S/P AVR (aortic valve replacement)     S/P CABG x 3     Type 2 diabetes mellitus without complication, without long-term current use of insulin        Past Surgical History:   Procedure Laterality Date    AORTIC VALVE REPAIR/REPLACEMENT N/A 12/27/2019    Procedure: AORTIC VALVE REPAIR/REPLACEMENT;  Surgeon: Lane Stock MD;  Location: [facility] CVOR;  Service: Cardiothoracic    BREAST LUMPECTOMY Right     BENIGN    CARDIAC CATHETERIZATION N/A 12/24/2019    Procedure: Right and Left Heart Cath 12/24/19 @ 0900;  Surgeon: Wayne Luna MD;  Location: [facility] CATH INVASIVE LOCATION;  Service: Cardiovascular    CARDIAC CATHETERIZATION N/A 12/24/2019    Procedure: Coronary angiography;  Surgeon: Wayne Luna MD;  Location: [facility] CATH INVASIVE LOCATION;  Service: Cardiovascular    CARDIAC CATHETERIZATION N/A 11/10/2020    Procedure: Left and right Heart Cath;  Surgeon: Wayne Luna MD;  Location: [facility] CATH INVASIVE LOCATION;  Service: Cardiovascular;  Laterality: N/A;    CARDIAC CATHETERIZATION N/A 11/10/2020    Procedure: Coronary angiography;  Surgeon: Wayne Luna MD;  Location: [facility] CATH INVASIVE LOCATION;  Service: Cardiovascular;  Laterality: N/A;    CARDIAC CATHETERIZATION N/A 11/10/2020    Procedure: Right Heart Cath;  Surgeon: Wayne Luna MD;  Location: [facility] CATH INVASIVE LOCATION;  Service: Cardiovascular;  Laterality: N/A;    CARDIAC CATHETERIZATION N/A 11/25/2020    Procedure: Percutaneous coronary intervention of the left circumflex artery;  Surgeon: Wayne Luna MD;   Location: [facility] CATH INVASIVE LOCATION;  Service: Cardiovascular;  Laterality: N/A;    CARDIAC CATHETERIZATION N/A 1/22/2021    Procedure: LEFT HEART CATH with possible PCI;  Surgeon: Wayne Luna MD;  Location: [facility] CATH INVASIVE LOCATION;  Service: Cardiovascular;  Laterality: N/A;  Local and IV sedation    CARDIAC CATHETERIZATION N/A 1/22/2021    Procedure: Coronary angiography;  Surgeon: Wayne Luna MD;  Location: [facility] CATH INVASIVE LOCATION;  Service: Cardiovascular;  Laterality: N/A;    CARDIAC CATHETERIZATION N/A 1/22/2021    Procedure: Saphenous Vein Graft;  Surgeon: Wayne Luna MD;  Location: [facility] CATH INVASIVE LOCATION;  Service: Cardiovascular;  Laterality: N/A;    CARDIAC ELECTROPHYSIOLOGY PROCEDURE Left 6/28/2019    Procedure: Dual-chamber ICD insertion;  Surgeon: Héctor Eckert MD;  Location: [facility] CATH INVASIVE LOCATION;  Service: Cardiovascular    CARDIAC ELECTROPHYSIOLOGY PROCEDURE Left 8/14/2019    Procedure: Lead Revision;  Surgeon: Héctor Eckert MD;  Location: [facility] CATH INVASIVE LOCATION;  Service: Cardiovascular    CARDIAC SURGERY      CHOLECYSTECTOMY      CORONARY ARTERY BYPASS GRAFT N/A 12/27/2019    Procedure: CORONARY ARTERY BYPASS GRAFTING;  Surgeon: Lane Stock MD;  Location: [facility] CVOR;  Service: Cardiothoracic    HYSTERECTOMY      INSERT / REPLACE / REMOVE PACEMAKER      LYMPHADENECTOMY Bilateral     THYROID SURGERY         Family History: family history includes Heart disease in her father. Otherwise pertinent FHx was reviewed and not pertinent to current issue.    Social History:  reports that she quit smoking about 6 years ago. Her smoking use included cigarettes. She has never used smokeless tobacco. She reports that she does not drink alcohol and does not use drugs.    Home Medications:  Prior to Admission Medications       Prescriptions Last Dose Informant Patient Reported? Taking?    albuterol (PROVENTIL) (2.5 MG/3ML)  0.083% nebulizer solution   No No    Take 2.5 mg by nebulization Every 4 (Four) Hours As Needed for Wheezing.    albuterol sulfate  (90 Base) MCG/ACT inhaler   No No    Inhale 2 puffs Every 4 (Four) Hours As Needed for Wheezing.    Alcohol Swabs (Alcohol Prep) 70 % pads   No No    Apply 1 each topically Daily.    Alirocumab (Praluent) 75 MG/ML solution auto-injector   Yes No    Inject 1 mL under the skin into the appropriate area as directed.    allopurinol (ZYLOPRIM) 300 MG tablet   Yes No    Take 1 tablet by mouth Daily.    amLODIPine (NORVASC) 10 MG tablet   Yes No    atorvastatin (LIPITOR) 40 MG tablet   Yes No    benzonatate (TESSALON) 100 MG capsule   No No    Take 1 capsule by mouth 3 (Three) Times a Day As Needed for Cough for up to 30 doses.    carvedilol (COREG) 6.25 MG tablet   No No    Take 1 tablet by mouth 2 (Two) Times a Day With Meals.    cholecalciferol (VITAMIN D3) 1000 units tablet  Self Yes No    Take 1 tablet by mouth Daily.    clopidogrel (PLAVIX) 75 MG tablet   No No    Take 1 tablet by mouth Daily.    diphenhydrAMINE (BENADRYL) 25 mg capsule   Yes No    Take 1 capsule by mouth Every 6 (Six) Hours As Needed for Itching.    docusate sodium (COLACE) 100 MG capsule   Yes No    Take 1 capsule by mouth 2 (Two) Times a Day As Needed for Constipation.    Farxiga 10 MG tablet   Yes No    febuxostat (ULORIC) 80 MG tablet tablet   Yes No    Take 1 tablet by mouth Daily.    fenofibrate 160 MG tablet   Yes No    ferrous sulfate 325 (65 FE) MG tablet  Self Yes No    Take 1 tablet by mouth Daily With Breakfast.    fluconazole (DIFLUCAN) 50 MG tablet   Yes No    Take 1 tablet by mouth.    folic acid (FOLVITE) 1 MG tablet  Self Yes No    Take 1 tablet by mouth Daily.    gabapentin (NEURONTIN) 300 MG capsule   Yes No    As Needed.    glucose blood (OneTouch Verio) test strip   No No    Use one strip to test blood sugar once daily as directed.   Dx code: E11.9    hydrALAZINE (APRESOLINE) 25 MG tablet    No No    TAKE 1 TABLET BY MOUTH THREE TIMES A DAY    hydrOXYzine pamoate (VISTARIL) 50 MG capsule   Yes No    Lancets (OneTouch Delica Plus Dvormc91N) misc   No No    1 each Daily. Dx code: E11.9    levothyroxine (SYNTHROID, LEVOTHROID) 150 MCG tablet   Yes No    lidocaine (LIDODERM) 5 %   Yes No    methocarbamol (ROBAXIN) 500 MG tablet   No No    Take 1 tablet by mouth Every 8 (Eight) Hours As Needed for Muscle Spasms.    methylPREDNISolone (MEDROL) 4 MG dose pack   No No    Take as directed on package instructions.    multivitamin (THERAGRAN) tablet tablet   Yes No    Take 1 tablet by mouth Daily.    nitroglycerin (NITROSTAT) 0.4 MG SL tablet   No No    Place 1 tablet under the tongue Every 5 (Five) Minutes As Needed for Chest Pain. Take no more than 3 doses in 15 minutes. Call 911 if more than 3 doses needed.     oxyCODONE-acetaminophen (PERCOCET) 7.5-325 MG per tablet   Yes No    Take 1 tablet by mouth Every 6 (Six) Hours As Needed for Moderate Pain.    pantoprazole (PROTONIX) 40 MG EC tablet   No No    Take 1 tablet by mouth Daily.    potassium chloride 10 MEQ CR tablet   No No    Take 2 tablets by mouth 2 (Two) Times a Day.    predniSONE (DELTASONE) 20 MG tablet   Yes No    Take 2 tablets by mouth Daily.    promethazine-dextromethorphan (PROMETHAZINE-DM) 6.25-15 MG/5ML syrup   No No    Take 5 mL by mouth 4 (Four) Times a Day As Needed for Cough (Nausea).    torsemide (DEMADEX) 20 MG tablet   No No    Take 3 tablets by mouth Daily. Hold if persistent diarrhea    vitamin B-12 (CYANOCOBALAMIN) 1000 MCG tablet   Yes No    Take 1 tablet by mouth Daily.              Allergies:  Allergies   Allergen Reactions    Hydrocodone Hives    Penicillin G Unknown (See Comments)     Reaction occurred as a baby.      Penicillin interview complete 08/18/2022.    Contrast Dye (Echo Or Unknown Ct/Mr) GI Intolerance     She is pretty sure it's the contrast dye that makes her super sick and vomiting after heart cath    Gadolinium  Derivatives Itching    Varenicline Other (See Comments)     Encephalopathy       Objective      Vitals:   Temp:  [98 °F (36.7 °C)-98.6 °F (37 °C)] 98 °F (36.7 °C)  Heart Rate:  [78-97] 78  Resp:  [16-18] 16  BP: (107-156)/() 110/68  Body mass index is 30.84 kg/m².  Physical Exam    PHYSICAL EXAM  Constitutional:  Well-developed, well-nourished, no acute distress, nontoxic appearance   Eyes:  PERRL, conjunctivae normal, EOMI   HENT:  Atraumatic, external ears normal, nose normal, oropharynx moist, no pharyngeal exudates. Neck-normal range of motion, no tenderness, supple, trachea midline  Respiratory: Diminished, rhonchi, nonlabored respirations without accessory muscle use  Cardiovascular:  Normal rate, normal rhythm, no murmurs, no gallops, no rubs.  No lower leg extremity edema  GI:  Soft, distended normal bowel sounds, nontender, no organomegaly, no mass, no rebound, no guarding   Musculoskeletal:  No edema, no tenderness, no deformities  Neurologic:  Alert & oriented x 3, CN 2-12 normal, normal motor function, normal sensory function, no focal deficits noted   Psychiatric:  Speech and behavior appropriate      Diagnostic Data:  Lab Results (last 24 hours)       Procedure Component Value Units Date/Time    TSH Rfx On Abnormal To Free T4 [[id number]] Collected: 02/11/25 0055    Specimen: Blood Updated: 02/11/25 0340    Procalcitonin [[id number]] Collected: 02/11/25 0055    Specimen: Blood Updated: 02/11/25 0340    High Sensitivity Troponin T 1Hr [[id number]]  (Abnormal) Collected: 02/11/25 0055    Specimen: Blood Updated: 02/11/25 0113     HS Troponin T 29 ng/L      Troponin T Numeric Delta -1 ng/L      Troponin T % Delta -3    Narrative:      High Sensitive Troponin T Reference Range:  <14.0 ng/L- Negative Female for AMI  <22.0 ng/L- Negative Male for AMI  >=14 - Abnormal Female indicating possible myocardial injury.  >=22 - Abnormal Male indicating possible myocardial injury.   Clinicians would have to  utilize clinical acumen, EKG, Troponin, and serial changes to determine if it is an Acute Myocardial Infarction or myocardial injury due to an underlying chronic condition.         Comprehensive Metabolic Panel [[id number]]  (Abnormal) Collected: 02/10/25 2352    Specimen: Blood Updated: 02/11/25 0032     Glucose 126 mg/dL      BUN 18 mg/dL      Creatinine 1.29 mg/dL      Sodium 138 mmol/L      Potassium 3.3 mmol/L      Comment: Slight hemolysis detected by analyzer. Result may be falsely elevated.        Chloride 103 mmol/L      CO2 23.5 mmol/L      Calcium 9.2 mg/dL      Total Protein 7.9 g/dL      Albumin 4.3 g/dL      ALT (SGPT) 17 U/L      AST (SGOT) 26 U/L      Comment: Slight hemolysis detected by analyzer. Result may be falsely elevated.        Alkaline Phosphatase 94 U/L      Total Bilirubin 0.9 mg/dL      Globulin 3.6 gm/dL      A/G Ratio 1.2 g/dL      BUN/Creatinine Ratio 14.0     Anion Gap 11.5 mmol/L      eGFR 50.4 mL/min/1.73     Narrative:      GFR Categories in Chronic Kidney Disease (CKD)      GFR Category          GFR (mL/min/1.73)    Interpretation  G1                     90 or greater         Normal or high (1)  G2                      60-89                Mild decrease (1)  G3a                   45-59                Mild to moderate decrease  G3b                   30-44                Moderate to severe decrease  G4                    15-29                Severe decrease  G5                    14 or less           Kidney failure          (1)In the absence of evidence of kidney disease, neither GFR category G1 or G2 fulfill the criteria for CKD.    eGFR calculation 2021 CKD-EPI creatinine equation, which does not include race as a factor    BNP [[id number]]  (Abnormal) Collected: 02/10/25 2352    Specimen: Blood Updated: 02/11/25 0031     proBNP 7,479.0 pg/mL     Narrative:      This assay is used as an aid in the diagnosis of individuals suspected of having heart failure. It can be used as an aid  in the diagnosis of acute decompensated heart failure (ADHF) in patients presenting with signs and symptoms of ADHF to the emergency department (ED). In addition, NT-proBNP of <300 pg/mL indicates ADHF is not likely.    Age Range Result Interpretation  NT-proBNP Concentration (pg/mL:      <50             Positive            >450                   Gray                 300-450                    Negative             <300    50-75           Positive            >900                  Gray                300-900                  Negative            <300      >75             Positive            >1800                  Gray                300-1800                  Negative            <300    High Sensitivity Troponin T [[id number]]  (Abnormal) Collected: 02/10/25 2352    Specimen: Blood Updated: 02/11/25 0030     HS Troponin T 30 ng/L     Narrative:      High Sensitive Troponin T Reference Range:  <14.0 ng/L- Negative Female for AMI  <22.0 ng/L- Negative Male for AMI  >=14 - Abnormal Female indicating possible myocardial injury.  >=22 - Abnormal Male indicating possible myocardial injury.   Clinicians would have to utilize clinical acumen, EKG, Troponin, and serial changes to determine if it is an Acute Myocardial Infarction or myocardial injury due to an underlying chronic condition.         CBC & Differential [[id number]]  (Normal) Collected: 02/10/25 2352    Specimen: Blood Updated: 02/11/25 0003    Narrative:      The following orders were created for panel order CBC & Differential.  Procedure                               Abnormality         Status                     ---------                               -----------         ------                     CBC Auto Differential[[id number]]        Normal              Final result                 Please view results for these tests on the individual orders.    CBC Auto Differential [[id number]]  (Normal) Collected: 02/10/25 2352    Specimen: Blood Updated: 02/11/25 0003     WBC  7.61 10*3/mm3      RBC 4.70 10*6/mm3      Hemoglobin 13.4 g/dL      Hematocrit 41.9 %      MCV 89.1 fL      MCH 28.5 pg      MCHC 32.0 g/dL      RDW 14.8 %      RDW-SD 49.0 fl      MPV 9.5 fL      Platelets 225 10*3/mm3      Neutrophil % 61.6 %      Lymphocyte % 28.9 %      Monocyte % 7.0 %      Eosinophil % 2.0 %      Basophil % 0.4 %      Immature Grans % 0.1 %      Neutrophils, Absolute 4.69 10*3/mm3      Lymphocytes, Absolute 2.20 10*3/mm3      Monocytes, Absolute 0.53 10*3/mm3      Eosinophils, Absolute 0.15 10*3/mm3      Basophils, Absolute 0.03 10*3/mm3      Immature Grans, Absolute 0.01 10*3/mm3     COVID-19 and FLU A/B PCR, 1 HR TAT - Swab, Nasopharynx [[id number]]  (Normal) Collected: 02/10/25 2328    Specimen: Swab from Nasopharynx Updated: 02/10/25 2349     COVID19 Not Detected     Influenza A PCR Not Detected     Influenza B PCR Not Detected    Narrative:      Fact sheet for providers: https://www.fda.gov/media/722799/download    Fact sheet for patients: https://www.fda.gov/media/694689/download    Test performed by PCR.             Imaging Results (Last 24 Hours)       Procedure Component Value Units Date/Time    XR Chest PA & Lateral [[id number]] Collected: 02/10/25 2351     Updated: 02/10/25 2354    Narrative:      XR CHEST PA AND LATERAL    Date of Exam: 2/10/2025 11:40 PM EST    Indication: cough    Comparison: 7/7/2024.    Findings:  Mild patchy airspace disease is present within the lung bases bilaterally. No pleural fluid. No pneumothorax. The pulmonary vasculature appears within normal limits. The heart appears enlarged, stable. Stable left subclavian/AICD/pacemaker device.. No   acute osseous abnormality identified.      Impression:      Impression:  Mild patchy airspace disease present within the lung bases bilaterally, likely related to pneumonia.        Electronically Signed: Althea Valladares MD    2/10/2025 11:52 PM EST    Workstation ID: [id number]              Assessment & Plan        This is a  49 y.o. female with:    Active and Resolved Problems  Active Hospital Problems    Diagnosis  POA    **Pneumonia [J18.9]  Yes      Resolved Hospital Problems   No resolved problems to display.       Assessments  Acute hypoxic respiratory failure: PNA vs CHF exacerbation  Elevated proBNP in 7000  Elevated troponins likely type II demand ischemia/ckd  CAD status post CABG  HFmEF,  LVEF of 45 to 50% last echo in 2023  ICMP S/p AICD   AV Regurgitation, s/p AVR  CKD, with her baseline around 1.4-1.5  Hypokalemia  Hypertension  Type 2 diabetes  Hyperlipidemia  Hypothyroidism     Plan:   -Start DuoNebs, Pulmicort, continue supplemental oxygen as needed  -Continue ceftriaxone, obtain Pro-Rohit  -Give Lasix 40 daily IV, check echo for eval of LVEF for possible CHF exacerbation patient states she typically takes torsemide at home  -Monitor creatinine likely this is her baseline, monitor strict ins and out  -Continue Plavix, statins for CAD, monitor LFTs  -Continue Coreg 6.25 twice daily, and her SGLT2 inhibitor, and other GDMT as tolerable  -Start SSI for type 2 diabetes, adjust as needed  -Check TSH/T4 given her history of hypothyroidism  -Resume other home medication once reconciled for medical appropriate  -Prophylaxis with Lovenox, GFR currently more than 30  -AM labs  -cardiology consult    VTE Prophylaxis:  No VTE prophylaxis order currently exists.        The patient desires to be as follows:    CODE STATUS:           Demarcus Hanks, who can be contacted at [phone], is the designated person to make medical decisions on the patient's behalf if She is incapable of doing so. This was clarified with patient and/or next of kin on 2/10/2025 during the course of this H&P.    Admission Status:  I believe this patient meets inpatient status.    Expected Length of Stay: 3    PDMP and Medication Dispenses via Sidebar reviewed and consistent with patient reported medications.    I discussed the patient's findings and my  recommendations with patient.      Signature:     This document has been electronically signed by ALEX Atkins on February 11, 2025 10:54 EST   [facility] Hospitalist Team

## 2025-02-11 NOTE — CONSULTS
[facility] CARDIOLOGY CONSULT  [facility]        Subjective:     Encounter Date:02/10/2025      Patient ID: Rafael Nunes is a 49 y.o. female.    Chief Complaint: HF      HPI:  Rafael Nunes is a 49 y.o. female known to Dr. Luna and Dr. Eckert with a past cardiac history of cardiomyopathy status post Biotronik dual-chamber ICD in 2019, CAD status post 3V CABG in 2019 with prior PCI, and VHD status post tissue AVR in 2019.  Last 2D echo 12/2023 shows an EF of 46 to 50% with mild MR.  PMH includes HTN, HLD, DM, CKD stage III, COPD, INDRA, and thyroid dysfunction.  Patient presents with complaints of shortness of breath and found with pneumonia and hypervolemia.  Cardiology was consulted for evaluation for congestive heart failure.    Patient tells me she has had progressively worsened dyspnea over the past 3 days.  Furthermore she reports significant abdominal distention and a near 20 pound weight gain in the last 1 to 2 weeks.  Her PCP had her taking additional water pills without resolution, despite pt report of good urine output on this.  She denies chest pain save some inspiratory discomfort.  She was lost to follow-up in 2023 when she lost her health insurance during a divorce.  She has not had her device checked since then.      Past Medical History:   Diagnosis Date    Arrhythmia     Asthma     CAD (coronary artery disease)     Cardiomyopathy, dilated     Chronic systolic congestive heart failure     CKD (chronic kidney disease) stage 2, GFR 60-89 ml/min     COPD (chronic obstructive pulmonary disease)     Essential hypertension     GERD without esophagitis     Hidradenitis 10/26/2020    Hyperlipidemia     Hypothyroidism (acquired)     ICD (implantable cardioverter-defibrillator), dual, in situ 7/22/2019    Nonrheumatic aortic valve insufficiency     Nonrheumatic mitral valve regurgitation     S/P AVR (aortic valve replacement)     S/P CABG x 3      Type 2 diabetes mellitus without complication, without long-term current use of insulin          Past Surgical History:   Procedure Laterality Date    AORTIC VALVE REPAIR/REPLACEMENT N/A 12/27/2019    Procedure: AORTIC VALVE REPAIR/REPLACEMENT;  Surgeon: Lane Stock MD;  Location: [facility] CVOR;  Service: Cardiothoracic    BREAST LUMPECTOMY Right     BENIGN    CARDIAC CATHETERIZATION N/A 12/24/2019    Procedure: Right and Left Heart Cath 12/24/19 @ 0900;  Surgeon: Wayne Luna MD;  Location: [facility] CATH INVASIVE LOCATION;  Service: Cardiovascular    CARDIAC CATHETERIZATION N/A 12/24/2019    Procedure: Coronary angiography;  Surgeon: Wayne Luna MD;  Location: [facility] CATH INVASIVE LOCATION;  Service: Cardiovascular    CARDIAC CATHETERIZATION N/A 11/10/2020    Procedure: Left and right Heart Cath;  Surgeon: Wayne Luna MD;  Location: [facility] CATH INVASIVE LOCATION;  Service: Cardiovascular;  Laterality: N/A;    CARDIAC CATHETERIZATION N/A 11/10/2020    Procedure: Coronary angiography;  Surgeon: Wayne Luna MD;  Location: [facility] CATH INVASIVE LOCATION;  Service: Cardiovascular;  Laterality: N/A;    CARDIAC CATHETERIZATION N/A 11/10/2020    Procedure: Right Heart Cath;  Surgeon: Wayne Luna MD;  Location: [facility] CATH INVASIVE LOCATION;  Service: Cardiovascular;  Laterality: N/A;    CARDIAC CATHETERIZATION N/A 11/25/2020    Procedure: Percutaneous coronary intervention of the left circumflex artery;  Surgeon: Wayne Luna MD;  Location: [facility] CATH INVASIVE LOCATION;  Service: Cardiovascular;  Laterality: N/A;    CARDIAC CATHETERIZATION N/A 1/22/2021    Procedure: LEFT HEART CATH with possible PCI;  Surgeon: Wayne Luna MD;  Location: [facility] CATH INVASIVE LOCATION;  Service: Cardiovascular;  Laterality: N/A;  Local and IV sedation    CARDIAC CATHETERIZATION N/A 1/22/2021    Procedure: Coronary angiography;  Surgeon: Wayne Luna  MD KAY;  Location: [facility] CATH INVASIVE LOCATION;  Service: Cardiovascular;  Laterality: N/A;    CARDIAC CATHETERIZATION N/A 2021    Procedure: Saphenous Vein Graft;  Surgeon: Wayne Luna MD;  Location: [facility] CATH INVASIVE LOCATION;  Service: Cardiovascular;  Laterality: N/A;    CARDIAC ELECTROPHYSIOLOGY PROCEDURE Left 2019    Procedure: Dual-chamber ICD insertion;  Surgeon: Héctor Eckert MD;  Location: [facility] CATH INVASIVE LOCATION;  Service: Cardiovascular    CARDIAC ELECTROPHYSIOLOGY PROCEDURE Left 2019    Procedure: Lead Revision;  Surgeon: Héctor Eckert MD;  Location: [facility] CATH INVASIVE LOCATION;  Service: Cardiovascular    CARDIAC SURGERY      CHOLECYSTECTOMY      CORONARY ARTERY BYPASS GRAFT N/A 2019    Procedure: CORONARY ARTERY BYPASS GRAFTING;  Surgeon: Lane Stock MD;  Location: [facility] CVOR;  Service: Cardiothoracic    HYSTERECTOMY      INSERT / REPLACE / REMOVE PACEMAKER      LYMPHADENECTOMY Bilateral     THYROID SURGERY           Social History     Socioeconomic History    Marital status:    Tobacco Use    Smoking status: Former     Current packs/day: 0.00     Types: Cigarettes     Quit date: 2019     Years since quittin.1    Smokeless tobacco: Never   Vaping Use    Vaping status: Never Used   Substance and Sexual Activity    Alcohol use: No    Drug use: No    Sexual activity: Defer       Family History   Problem Relation Age of Onset    Heart disease Father          Allergies   Allergen Reactions    Hydrocodone Hives    Penicillin G Unknown (See Comments)     Reaction occurred as a baby.      Penicillin interview complete 2022.    Contrast Dye (Echo Or Unknown Ct/Mr) GI Intolerance     She is pretty sure it's the contrast dye that makes her super sick and vomiting after heart cath    Gadolinium Derivatives Itching    Varenicline Other (See Comments)     Encephalopathy       Current Medications:   Scheduled Meds:[START ON  "2/12/2025] amLODIPine, 10 mg, Oral, Q24H  [START ON 2/12/2025] azithromycin, 500 mg, Oral, Q24H  [START ON 2/12/2025] cefTRIAXone, 2,000 mg, Intravenous, Q24H  [Held by provider] fenofibrate, 145 mg, Oral, Daily  ferrous sulfate, 325 mg, Oral, Daily With Breakfast  furosemide, 20 mg, Intravenous, BID Diuretics  hydrALAZINE, 25 mg, Oral, TID  ipratropium-albuterol, 3 mL, Nebulization, 4x Daily - RT  [Held by provider] torsemide, 60 mg, Oral, Daily      Continuous Infusions:     ROS  All other systems reviewed and are negative.       Objective:         /99 (BP Location: Right arm, Patient Position: Lying)   Pulse 89   Temp 98.5 °F (36.9 °C) (Oral)   Resp 18   Ht 170.2 cm (67\")   Wt 89.3 kg (196 lb 14.4 oz)   LMP  (LMP Unknown)   SpO2 99%   BMI 30.84 kg/m²       General: Well-developed in NAD.  Neuro: AAOx3. No gross deficits.  HEENT: Sclerae clear, no xanthelasmas.  CV: S1S2, RRR. Grade 2/6 systolic murmur.  Resp: Breathing is unlabored. Lungs faint course RLL.  GI: BS+.  Abdominal distention  Ext: Pedal pulses are palpable. Extremities are nonedematous.  MS: moves all extremities, no weakness.  Skin: warm, dry.  Psych: calm and cooperative.            Lab Review:     Results from last 7 days   Lab Units 02/10/25  2352   SODIUM mmol/L 138   POTASSIUM mmol/L 3.3*   CHLORIDE mmol/L 103   CO2 mmol/L 23.5   BUN mg/dL 18   CREATININE mg/dL 1.29*   GLUCOSE mg/dL 126*   CALCIUM mg/dL 9.2   AST (SGOT) U/L 26   ALT (SGPT) U/L 17     Results from last 7 days   Lab Units 02/11/25  0055 02/10/25  2352   HSTROP T ng/L 29* 30*     Results from last 7 days   Lab Units 02/10/25  2352   WBC 10*3/mm3 7.61   HEMOGLOBIN g/dL 13.4   HEMATOCRIT % 41.9   PLATELETS 10*3/mm3 225             Results from last 7 days   Lab Units 02/11/25  1307   CHOLESTEROL mg/dL 242*   TRIGLYCERIDES mg/dL 148   HDL CHOL mg/dL 58     Results from last 7 days   Lab Units 02/10/25  2352   PROBNP pg/mL 7,479.0*           Recent Radiology:  Imaging " Results (Most Recent)       Procedure Component Value Units Date/Time    XR Chest PA & Lateral [[id number]] Collected: 02/10/25 2351     Updated: 02/10/25 2354    Narrative:      XR CHEST PA AND LATERAL    Date of Exam: 2/10/2025 11:40 PM EST    Indication: cough    Comparison: 7/7/2024.    Findings:  Mild patchy airspace disease is present within the lung bases bilaterally. No pleural fluid. No pneumothorax. The pulmonary vasculature appears within normal limits. The heart appears enlarged, stable. Stable left subclavian/AICD/pacemaker device.. No   acute osseous abnormality identified.      Impression:      Impression:  Mild patchy airspace disease present within the lung bases bilaterally, likely related to pneumonia.        Electronically Signed: Althea Valladares MD    2/10/2025 11:52 PM EST    Workstation ID: [id number]              ECHOCARDIOGRAM:    Results for orders placed during the hospital encounter of 12/08/23    Adult Transthoracic Echo Complete W/ Cont if Necessary Per Protocol    Interpretation Summary    Left ventricular systolic function is low normal. Calculated left ventricular EF = 46.3% Left ventricular ejection fraction appears to be 46 - 50%.    Left ventricular wall thickness is consistent with mild concentric hypertrophy.    Left ventricular diastolic dysfunction is noted.    The left atrial cavity is mildly dilated.    There is a bioprosthetic aortic valve present.            Assessment:         Active Hospital Problems    Diagnosis  POA    **Pneumonia [J18.9]  Yes     1) acute on chronic systolic heart failure / cardiomyopathy s/p Biotronik dual-chamber ICD in 2019  -BNP 7479  -High-sensitivity troponin 30, 29  -CXR shows pneumonia  -Last 2D echo 12/2023 shows an EF of 46 to 50% with mild MR   -GDMT: on hydralazine; coreg on hold, farxiga on hold given LINSEY  -started on IV lasix    2) PNA    3) CAD status post 3V CABG in 2019 with prior PCI    4) VHD status post tissue AVR in 2019      5)  HTN    6) HLD    7) DM    8) LINSEY on CKD stage III  - Cr 1.62 --> 1.29    9) COPD    10) INDRA    11) thyroid dysfunction             Plan:   Continue on IV diuretics.  Monitor daily weights, strict I/Os, renal function, and electrolytes closely.  Replete K.  Check Mg.  Will obtain repeat 2D echo.  Check device interrogation.  Further recommendations per attending cardiologist.         Electronically signed by ALEX Ramirez, 02/11/25, 2:30 PM EST.     Patient well-known to me.  Patient has known history of coronary artery disease with dual-chamber ICD and tissue AVR in the past.  Patient presented with progressive shortness of breath and multiple family members had flu in the house and started having cough without any productive phlegm  .  Started having dyspnea with weight gain and on the abdominal wall  Also complains of atypical left precordial chest pain      Physical Exam    General:      well developed, well nourished, in no acute distress.    Head:      normocephalic and atraumatic.    Eyes:      PERRL/EOM intact, conjunctivae and sclerae clear without nystagmus.    Neck:      no  thyromegaly, trachea central with normal respiratory effort  Lungs:      clear bilaterally to auscultation.    Heart:       regular rate and rhythm, S1, S2 without murmurs, rubs, or gallops  Skin:      intact without lesions or rashes.    Psych:      alert and cooperative; normal mood and affect; normal attention span and concentration.        Start patient on Synthroid since patient is markedly hypothyroid  Start patient on aspirin  Start patient on Crestor  Endocrinology consultation requested  Continue diuresis with renal panel monitoring    Electronically signed by Héctor Eckert MD, 02/11/25, 5:51 PM EST.

## 2025-02-12 ENCOUNTER — APPOINTMENT (OUTPATIENT)
Dept: CARDIOLOGY | Facility: HOSPITAL | Age: 50
DRG: 193 | End: 2025-02-12
Payer: MEDICARE

## 2025-02-12 LAB
ANION GAP SERPL CALCULATED.3IONS-SCNC: 10.8 MMOL/L (ref 5–15)
AV MEAN PRESS GRAD SYS DOP V1V2: 16 MMHG
AV VMAX SYS DOP: 281 CM/SEC
BH CV ECHO LEFT VENTRICLE GLOBAL LONGITUDINAL STRAIN: -10.1 %
BH CV ECHO MEAS - AO MAX PG: 31.6 MMHG
BH CV ECHO MEAS - AO V2 VTI: 52.1 CM
BH CV ECHO MEAS - AVA(I,D): 0.56 CM2
BH CV ECHO MEAS - EDV(CUBED): 227 ML
BH CV ECHO MEAS - EDV(MOD-SP4): 183 ML
BH CV ECHO MEAS - EF(MOD-SP4): 34.4 %
BH CV ECHO MEAS - ESV(CUBED): 117.6 ML
BH CV ECHO MEAS - ESV(MOD-SP4): 120 ML
BH CV ECHO MEAS - FS: 19.7 %
BH CV ECHO MEAS - IVS/LVPW: 0.9 CM
BH CV ECHO MEAS - IVSD: 0.9 CM
BH CV ECHO MEAS - LA DIMENSION: 4.4 CM
BH CV ECHO MEAS - LAT PEAK E' VEL: 6.1 CM/SEC
BH CV ECHO MEAS - LV DIASTOLIC VOL/BSA (35-75): 91.3 CM2
BH CV ECHO MEAS - LV MASS(C)D: 237.7 GRAMS
BH CV ECHO MEAS - LV MAX PG: 4 MMHG
BH CV ECHO MEAS - LV MEAN PG: 2 MMHG
BH CV ECHO MEAS - LV SYSTOLIC VOL/BSA (12-30): 59.9 CM2
BH CV ECHO MEAS - LV V1 MAX: 99.6 CM/SEC
BH CV ECHO MEAS - LV V1 VTI: 16.5 CM
BH CV ECHO MEAS - LVIDD: 6.1 CM
BH CV ECHO MEAS - LVIDS: 4.9 CM
BH CV ECHO MEAS - LVOT AREA: 1.77 CM2
BH CV ECHO MEAS - LVOT DIAM: 1.5 CM
BH CV ECHO MEAS - LVPWD: 1 CM
BH CV ECHO MEAS - MED PEAK E' VEL: 4.8 CM/SEC
BH CV ECHO MEAS - MR MAX PG: 102.8 MMHG
BH CV ECHO MEAS - MR MAX VEL: 507 CM/SEC
BH CV ECHO MEAS - MR MEAN PG: 65 MMHG
BH CV ECHO MEAS - MR MEAN VEL: 379 CM/SEC
BH CV ECHO MEAS - MR VTI: 157 CM
BH CV ECHO MEAS - MV A MAX VEL: 104 CM/SEC
BH CV ECHO MEAS - MV DEC SLOPE: 564 CM/SEC2
BH CV ECHO MEAS - MV DEC TIME: 0.23 SEC
BH CV ECHO MEAS - MV E MAX VEL: 149 CM/SEC
BH CV ECHO MEAS - MV E/A: 1.43
BH CV ECHO MEAS - MV MAX PG: 16 MMHG
BH CV ECHO MEAS - MV MEAN PG: 6 MMHG
BH CV ECHO MEAS - MV P1/2T: 102.3 MSEC
BH CV ECHO MEAS - MV V2 VTI: 48.1 CM
BH CV ECHO MEAS - MVA(P1/2T): 2.15 CM2
BH CV ECHO MEAS - MVA(VTI): 0.61 CM2
BH CV ECHO MEAS - PA ACC TIME: 0.09 SEC
BH CV ECHO MEAS - PA V2 MAX: 74.1 CM/SEC
BH CV ECHO MEAS - RAP SYSTOLE: 3 MMHG
BH CV ECHO MEAS - RV MAX PG: 1.14 MMHG
BH CV ECHO MEAS - RV V1 MAX: 53.5 CM/SEC
BH CV ECHO MEAS - RV V1 VTI: 9.9 CM
BH CV ECHO MEAS - RVDD: 4.4 CM
BH CV ECHO MEAS - RVSP: 22 MMHG
BH CV ECHO MEAS - SV(LVOT): 29.2 ML
BH CV ECHO MEAS - SV(MOD-SP4): 63 ML
BH CV ECHO MEAS - SVI(LVOT): 14.5 ML/M2
BH CV ECHO MEAS - SVI(MOD-SP4): 31.4 ML/M2
BH CV ECHO MEAS - TAPSE (>1.6): 1.24 CM
BH CV ECHO MEAS - TR MAX PG: 19 MMHG
BH CV ECHO MEAS - TR MAX VEL: 218 CM/SEC
BH CV ECHO MEASUREMENTS AVERAGE E/E' RATIO: 27.34
BH CV XLRA - TDI S': 7.3 CM/SEC
BUN SERPL-MCNC: 16 MG/DL (ref 6–20)
BUN/CREAT SERPL: 12.9 (ref 7–25)
CALCIUM SPEC-SCNC: 9.2 MG/DL (ref 8.6–10.5)
CHLORIDE SERPL-SCNC: 104 MMOL/L (ref 98–107)
CO2 SERPL-SCNC: 23.2 MMOL/L (ref 22–29)
CREAT SERPL-MCNC: 1.24 MG/DL (ref 0.57–1)
EGFRCR SERPLBLD CKD-EPI 2021: 52.8 ML/MIN/1.73
GLUCOSE BLDC GLUCOMTR-MCNC: 129 MG/DL (ref 70–105)
GLUCOSE BLDC GLUCOMTR-MCNC: 129 MG/DL (ref 70–105)
GLUCOSE BLDC GLUCOMTR-MCNC: 142 MG/DL (ref 70–105)
GLUCOSE SERPL-MCNC: 140 MG/DL (ref 65–99)
LEFT ATRIUM VOLUME INDEX: 49 ML/M2
LV EF 2D ECHO EST: 38 %
LV EF BIPLANE MOD: 34 %
MAGNESIUM SERPL-MCNC: 2.2 MG/DL (ref 1.6–2.6)
POTASSIUM SERPL-SCNC: 3.5 MMOL/L (ref 3.5–5.2)
SINUS: 2.1 CM
SODIUM SERPL-SCNC: 138 MMOL/L (ref 136–145)
STJ: 1.6 CM

## 2025-02-12 PROCEDURE — 25010000002 FUROSEMIDE PER 20 MG: Performed by: NURSE PRACTITIONER

## 2025-02-12 PROCEDURE — 93306 TTE W/DOPPLER COMPLETE: CPT | Performed by: INTERNAL MEDICINE

## 2025-02-12 PROCEDURE — 94799 UNLISTED PULMONARY SVC/PX: CPT

## 2025-02-12 PROCEDURE — 25010000002 ONDANSETRON PER 1 MG: Performed by: STUDENT IN AN ORGANIZED HEALTH CARE EDUCATION/TRAINING PROGRAM

## 2025-02-12 PROCEDURE — 83735 ASSAY OF MAGNESIUM: CPT | Performed by: NURSE PRACTITIONER

## 2025-02-12 PROCEDURE — 99232 SBSQ HOSP IP/OBS MODERATE 35: CPT | Performed by: INTERNAL MEDICINE

## 2025-02-12 PROCEDURE — 82948 REAGENT STRIP/BLOOD GLUCOSE: CPT

## 2025-02-12 PROCEDURE — 25010000002 CEFTRIAXONE PER 250 MG: Performed by: NURSE PRACTITIONER

## 2025-02-12 PROCEDURE — 93356 MYOCRD STRAIN IMG SPCKL TRCK: CPT

## 2025-02-12 PROCEDURE — 93306 TTE W/DOPPLER COMPLETE: CPT

## 2025-02-12 PROCEDURE — 93356 MYOCRD STRAIN IMG SPCKL TRCK: CPT | Performed by: INTERNAL MEDICINE

## 2025-02-12 PROCEDURE — 94664 DEMO&/EVAL PT USE INHALER: CPT

## 2025-02-12 PROCEDURE — 25010000002 FUROSEMIDE PER 20 MG: Performed by: STUDENT IN AN ORGANIZED HEALTH CARE EDUCATION/TRAINING PROGRAM

## 2025-02-12 PROCEDURE — 94761 N-INVAS EAR/PLS OXIMETRY MLT: CPT

## 2025-02-12 PROCEDURE — 80048 BASIC METABOLIC PNL TOTAL CA: CPT | Performed by: NURSE PRACTITIONER

## 2025-02-12 RX ORDER — ISOSORBIDE MONONITRATE 30 MG/1
30 TABLET, EXTENDED RELEASE ORAL
Status: DISCONTINUED | OUTPATIENT
Start: 2025-02-12 | End: 2025-02-18 | Stop reason: HOSPADM

## 2025-02-12 RX ORDER — FUROSEMIDE 10 MG/ML
60 INJECTION INTRAMUSCULAR; INTRAVENOUS
Status: DISCONTINUED | OUTPATIENT
Start: 2025-02-12 | End: 2025-02-13

## 2025-02-12 RX ORDER — METOPROLOL SUCCINATE 50 MG/1
50 TABLET, EXTENDED RELEASE ORAL
Status: DISCONTINUED | OUTPATIENT
Start: 2025-02-12 | End: 2025-02-18 | Stop reason: HOSPADM

## 2025-02-12 RX ORDER — ONDANSETRON 2 MG/ML
4 INJECTION INTRAMUSCULAR; INTRAVENOUS EVERY 6 HOURS PRN
Status: DISCONTINUED | OUTPATIENT
Start: 2025-02-12 | End: 2025-02-18 | Stop reason: HOSPADM

## 2025-02-12 RX ADMIN — CEFTRIAXONE 2000 MG: 2 INJECTION, POWDER, FOR SOLUTION INTRAMUSCULAR; INTRAVENOUS at 00:14

## 2025-02-12 RX ADMIN — HYDRALAZINE HYDROCHLORIDE 25 MG: 25 TABLET ORAL at 21:26

## 2025-02-12 RX ADMIN — METOPROLOL SUCCINATE 50 MG: 50 TABLET, EXTENDED RELEASE ORAL at 17:18

## 2025-02-12 RX ADMIN — ROSUVASTATIN CALCIUM 10 MG: 10 TABLET, FILM COATED ORAL at 21:26

## 2025-02-12 RX ADMIN — OXYCODONE 7.5 MG: 5 TABLET ORAL at 00:21

## 2025-02-12 RX ADMIN — HYDRALAZINE HYDROCHLORIDE 25 MG: 25 TABLET ORAL at 08:00

## 2025-02-12 RX ADMIN — ISOSORBIDE MONONITRATE 30 MG: 30 TABLET, EXTENDED RELEASE ORAL at 17:18

## 2025-02-12 RX ADMIN — FUROSEMIDE 60 MG: 10 INJECTION, SOLUTION INTRAMUSCULAR; INTRAVENOUS at 17:17

## 2025-02-12 RX ADMIN — IPRATROPIUM BROMIDE AND ALBUTEROL SULFATE 3 ML: .5; 3 SOLUTION RESPIRATORY (INHALATION) at 06:57

## 2025-02-12 RX ADMIN — OXYCODONE 7.5 MG: 5 TABLET ORAL at 17:23

## 2025-02-12 RX ADMIN — IPRATROPIUM BROMIDE AND ALBUTEROL SULFATE 3 ML: .5; 3 SOLUTION RESPIRATORY (INHALATION) at 14:42

## 2025-02-12 RX ADMIN — AZITHROMYCIN 500 MG: 250 TABLET, FILM COATED ORAL at 08:00

## 2025-02-12 RX ADMIN — FERROUS SULFATE TAB 325 MG (65 MG ELEMENTAL FE) 325 MG: 325 (65 FE) TAB at 08:00

## 2025-02-12 RX ADMIN — IPRATROPIUM BROMIDE AND ALBUTEROL SULFATE 3 ML: .5; 3 SOLUTION RESPIRATORY (INHALATION) at 19:52

## 2025-02-12 RX ADMIN — IPRATROPIUM BROMIDE AND ALBUTEROL SULFATE 3 ML: .5; 3 SOLUTION RESPIRATORY (INHALATION) at 11:02

## 2025-02-12 RX ADMIN — ONDANSETRON 4 MG: 2 INJECTION INTRAMUSCULAR; INTRAVENOUS at 12:52

## 2025-02-12 RX ADMIN — FUROSEMIDE 20 MG: 10 INJECTION, SOLUTION INTRAMUSCULAR; INTRAVENOUS at 08:00

## 2025-02-12 RX ADMIN — HYDRALAZINE HYDROCHLORIDE 25 MG: 25 TABLET ORAL at 17:18

## 2025-02-12 RX ADMIN — ASPIRIN 81 MG: 81 TABLET, COATED ORAL at 08:00

## 2025-02-12 RX ADMIN — LEVOTHYROXINE SODIUM 100 MCG: 100 TABLET ORAL at 04:35

## 2025-02-12 RX ADMIN — OXYCODONE 7.5 MG: 5 TABLET ORAL at 08:03

## 2025-02-12 RX ADMIN — AMLODIPINE BESYLATE 10 MG: 5 TABLET ORAL at 08:00

## 2025-02-12 NOTE — PLAN OF CARE
Problem: Adult Inpatient Plan of Care  Goal: Plan of Care Review  2/11/2025 2252 by Maine Quintanilla RN  Outcome: Progressing  2/11/2025 2251 by Maine Quintanilla RN  Outcome: Progressing  Goal: Patient-Specific Goal (Individualized)  2/11/2025 2252 by Maine Quintanilla RN  Outcome: Progressing  2/11/2025 2251 by Maine Quintanilla RN  Outcome: Progressing  Goal: Absence of Hospital-Acquired Illness or Injury  2/11/2025 2252 by Maine Quintanilla RN  Outcome: Progressing  2/11/2025 2251 by Maine Quintanilla RN  Outcome: Progressing  Intervention: Identify and Manage Fall Risk  Recent Flowsheet Documentation  Taken 2/11/2025 2000 by Maine Quintanilla RN  Safety Promotion/Fall Prevention:   fall prevention program maintained   safety round/check completed  Intervention: Prevent and Manage VTE (Venous Thromboembolism) Risk  Recent Flowsheet Documentation  Taken 2/11/2025 2000 by Maine Quintanilla RN  VTE Prevention/Management:   bilateral   SCDs (sequential compression devices) off   patient refused intervention  Goal: Optimal Comfort and Wellbeing  2/11/2025 2252 by Maine Quintanilla RN  Outcome: Progressing  2/11/2025 2251 by Maine Quintanilla RN  Outcome: Progressing  Goal: Readiness for Transition of Care  2/11/2025 2252 by Maine Quintanilla RN  Outcome: Progressing  2/11/2025 2251 by Maine Quintanilla RN  Outcome: Progressing     Problem: Pneumonia  Goal: Fluid Balance  Outcome: Progressing  Goal: Absence of Infection Signs and Symptoms  Outcome: Progressing  Goal: Effective Oxygenation and Ventilation  Outcome: Progressing   Goal Outcome Evaluation:

## 2025-02-12 NOTE — CONSULTS
[facility]    Referring Provider: Dr. Héctor Eckert  Reason for Consultation: Thyroid evaluation & management.    Patient Care Team:  Phani Adames MD as PCP - General (Internal Medicine)  Ashish Smalls MD as Consulting Physician (Nephrology)  Wayne Luna MD as Consulting Physician (Cardiology)  Héctor Eckert MD as Consulting Physician (Cardiac Electrophysiology)  Lane Stock MD as Surgeon (Cardiothoracic Surgery)    Chief complaint Flu Symptoms      Subjective .     History of present illness:    This is a  49 y.o. female with hypothyroidism due to thyroidectomy in 2016.  Says thyroidectomy was done because she was suppose to have cancer, but it turned out to be benign.  Has been on levothyroxine 150 mcg daily, but ran out 2 months ago.  Could not see her doctor to get a refill due to no insurance.  C/o swelling & dyspnea.  Admitted with pneumonia. Has history of cardiomyopathy.  Started on levothyroxine 100 mcg daily.    Review of Systems  Review of Systems   Constitutional:  Positive for unexpected weight gain.   Eyes:  Negative for blurred vision.   Respiratory:  Positive for shortness of breath.    Cardiovascular:  Positive for leg swelling.   Gastrointestinal:  Negative for nausea.   Endocrine: Negative for polyuria.   Neurological:  Negative for headache.       History  Past Medical History:   Diagnosis Date    Arrhythmia     Asthma     CAD (coronary artery disease)     Cardiomyopathy, dilated     Chronic systolic congestive heart failure     CKD (chronic kidney disease) stage 2, GFR 60-89 ml/min     COPD (chronic obstructive pulmonary disease)     Essential hypertension     GERD without esophagitis     Hidradenitis 10/26/2020    Hyperlipidemia     Hypothyroidism (acquired)     ICD (implantable cardioverter-defibrillator), dual, in situ 7/22/2019    Nonrheumatic aortic valve insufficiency     Nonrheumatic mitral valve regurgitation      S/P AVR (aortic valve replacement)     S/P CABG x 3     Type 2 diabetes mellitus without complication, without long-term current use of insulin      Past Surgical History:   Procedure Laterality Date    AORTIC VALVE REPAIR/REPLACEMENT N/A 12/27/2019    Procedure: AORTIC VALVE REPAIR/REPLACEMENT;  Surgeon: Lane Stock MD;  Location: [facility] CVOR;  Service: Cardiothoracic    BREAST LUMPECTOMY Right     BENIGN    CARDIAC CATHETERIZATION N/A 12/24/2019    Procedure: Right and Left Heart Cath 12/24/19 @ 0900;  Surgeon: Wayne Luna MD;  Location: [facility] CATH INVASIVE LOCATION;  Service: Cardiovascular    CARDIAC CATHETERIZATION N/A 12/24/2019    Procedure: Coronary angiography;  Surgeon: Wayne Luna MD;  Location: [facility] CATH INVASIVE LOCATION;  Service: Cardiovascular    CARDIAC CATHETERIZATION N/A 11/10/2020    Procedure: Left and right Heart Cath;  Surgeon: Wayne Luna MD;  Location: [facility] CATH INVASIVE LOCATION;  Service: Cardiovascular;  Laterality: N/A;    CARDIAC CATHETERIZATION N/A 11/10/2020    Procedure: Coronary angiography;  Surgeon: Wayne Luna MD;  Location: [facility] CATH INVASIVE LOCATION;  Service: Cardiovascular;  Laterality: N/A;    CARDIAC CATHETERIZATION N/A 11/10/2020    Procedure: Right Heart Cath;  Surgeon: Wayne Luna MD;  Location: [facility] CATH INVASIVE LOCATION;  Service: Cardiovascular;  Laterality: N/A;    CARDIAC CATHETERIZATION N/A 11/25/2020    Procedure: Percutaneous coronary intervention of the left circumflex artery;  Surgeon: Wayne Luna MD;  Location: [facility] CATH INVASIVE LOCATION;  Service: Cardiovascular;  Laterality: N/A;    CARDIAC CATHETERIZATION N/A 1/22/2021    Procedure: LEFT HEART CATH with possible PCI;  Surgeon: Wayne Luna MD;  Location: [facility] CATH INVASIVE LOCATION;  Service: Cardiovascular;  Laterality: N/A;  Local and IV sedation    CARDIAC CATHETERIZATION N/A 1/22/2021    Procedure:  Coronary angiography;  Surgeon: Wayne Luna MD;  Location: [facility] CATH INVASIVE LOCATION;  Service: Cardiovascular;  Laterality: N/A;    CARDIAC CATHETERIZATION N/A 2021    Procedure: Saphenous Vein Graft;  Surgeon: Wayne Luna MD;  Location: [facility] CATH INVASIVE LOCATION;  Service: Cardiovascular;  Laterality: N/A;    CARDIAC ELECTROPHYSIOLOGY PROCEDURE Left 2019    Procedure: Dual-chamber ICD insertion;  Surgeon: Héctor Eckert MD;  Location: [facility] CATH INVASIVE LOCATION;  Service: Cardiovascular    CARDIAC ELECTROPHYSIOLOGY PROCEDURE Left 2019    Procedure: Lead Revision;  Surgeon: Héctor Eckert MD;  Location: [facility] CATH INVASIVE LOCATION;  Service: Cardiovascular    CARDIAC SURGERY      CHOLECYSTECTOMY      CORONARY ARTERY BYPASS GRAFT N/A 2019    Procedure: CORONARY ARTERY BYPASS GRAFTING;  Surgeon: Lane Stock MD;  Location: [facility] CVOR;  Service: Cardiothoracic    HYSTERECTOMY      INSERT / REPLACE / REMOVE PACEMAKER      LYMPHADENECTOMY Bilateral     THYROID SURGERY       Family History   Problem Relation Age of Onset    Heart disease Father      Social History     Tobacco Use    Smoking status: Former     Current packs/day: 0.00     Types: Cigarettes     Quit date: 2019     Years since quittin.1    Smokeless tobacco: Never   Vaping Use    Vaping status: Never Used   Substance Use Topics    Alcohol use: No    Drug use: No     Medications Prior to Admission   Medication Sig Dispense Refill Last Dose/Taking    oxyCODONE-acetaminophen (PERCOCET) 7.5-325 MG per tablet Take 1 tablet by mouth Every 6 (Six) Hours As Needed for Moderate Pain.   2/10/2025    febuxostat (ULORIC) 80 MG tablet tablet Take 1 tablet by mouth Daily.        Scheduled Meds:  [START ON 2025] amLODIPine, 10 mg, Oral, Q24H  aspirin, 81 mg, Oral, Daily  [START ON 2025] azithromycin, 500 mg, Oral, Q24H  [START ON 2025] cefTRIAXone, 2,000 mg, Intravenous,  Q24H  ferrous sulfate, 325 mg, Oral, Daily With Breakfast  furosemide, 20 mg, Intravenous, BID Diuretics  hydrALAZINE, 25 mg, Oral, TID  ipratropium-albuterol, 3 mL, Nebulization, 4x Daily - RT  [START ON 2/12/2025] levothyroxine, 100 mcg, Oral, Q AM  rosuvastatin, 10 mg, Oral, Nightly  [Held by provider] torsemide, 60 mg, Oral, Daily      Continuous Infusions:     PRN Meds:    albuterol    benzonatate    dextrose    dextrose    diphenhydrAMINE    glucagon (human recombinant)    influenza vaccine    nitroglycerin    oxyCODONE    sodium chloride  Allergies:  Hydrocodone, Penicillin g, Contrast dye (echo or unknown ct/mr), Gadolinium derivatives, and Varenicline    Objective     Vital Signs   Temp:  [97.6 °F (36.4 °C)-98.6 °F (37 °C)] 98.5 °F (36.9 °C)  Heart Rate:  [78-97] 93  Resp:  [16-23] 20  BP: (107-156)/() 139/96    Physical Exam:     General Appearance:    Alert, cooperative, in no acute distress   Head:    Normocephalic, without obvious abnormality, atraumatic   Eyes:            Lids and lashes normal, conjunctivae and sclerae normal, no   icterus, no pallor, corneas clear, PERRLA   Throat:   No oral lesions,  oral mucosa moist   Neck:   Scar from thyroidectomy, no carotid bruit   Lungs:     Decreased breath sounds    Heart:    Regular rhythm and normal rate   Chest Wall:    No abnormalities observed   Abdomen:     Normal bowel sounds, soft                 Extremities:   Moves all extremities well, trace edema               Pulses:   Pulses palpable and equal bilaterally   Skin:   Dry   Neurologic:  DTR absent in ankles       Results Review  I have reviewed the patient's new clinical results, labs & imaging.    Lab Results (last 24 hours)       Procedure Component Value Units Date/Time    POC Glucose Once [[id number]]  (Abnormal) Collected: 02/11/25 2014    Specimen: Blood Updated: 02/11/25 2015     Glucose 152 mg/dL      Comment: Serial Number: [id number]:  [id number]       Procalcitonin  "[[id number]]  (Normal) Collected: 02/11/25 0055    Specimen: Blood Updated: 02/11/25 1532     Procalcitonin 0.06 ng/mL     Narrative:      As a Marker for Sepsis (Non-Neonates):    1. <0.5 ng/mL represents a low risk of severe sepsis and/or septic shock.  2. >2 ng/mL represents a high risk of severe sepsis and/or septic shock.    As a Marker for Lower Respiratory Tract Infections that require antibiotic therapy:    PCT on Admission    Antibiotic Therapy       6-12 Hrs later    >0.5                Strongly Recommended  >0.25 - <0.5        Recommended   0.1 - 0.25          Discouraged              Remeasure/reassess PCT  <0.1                Strongly Discouraged     Remeasure/reassess PCT    As 28 day mortality risk marker: \"Change in Procalcitonin Result\" (>80% or <=80%) if Day 0 (or Day 1) and Day 4 values are available. Refer to http://www.Peer60[facility] [place]-pct-calculator.com    Change in PCT <=80%  A decrease of PCT levels below or equal to 80% defines a positive change in PCT test result representing a higher risk for 28-day all-cause mortality of patients diagnosed with severe sepsis for septic shock.    Change in PCT >80%  A decrease of PCT levels of more than 80% defines a negative change in PCT result representing a lower risk for 28-day all-cause mortality of patients diagnosed with severe sepsis or septic shock.       TSH [[id number]]  (Abnormal) Collected: 02/11/25 0055    Specimen: Blood Updated: 02/11/25 1532     TSH 68.400 uIU/mL     POC Glucose Once [[id number]]  (Abnormal) Collected: 02/11/25 1451    Specimen: Blood Updated: 02/11/25 1453     Glucose 113 mg/dL      Comment: Serial Number: [id number]:  [id number]       Lipid Panel [[id number]]  (Abnormal) Collected: 02/11/25 1307    Specimen: Blood Updated: 02/11/25 1336     Total Cholesterol 242 mg/dL      Triglycerides 148 mg/dL      HDL Cholesterol 58 mg/dL      LDL Cholesterol  157 mg/dL      VLDL Cholesterol 27 mg/dL      LDL/HDL Ratio 2.66    Narrative:   "    Cholesterol Reference Ranges  (U.S. Department of Health and Human Services ATP III Classifications)    Desirable          <200 mg/dL  Borderline High    200-239 mg/dL  High Risk          >240 mg/dL      Triglyceride Reference Ranges  (U.S. Department of Health and Human Services ATP III Classifications)    Normal           <150 mg/dL  Borderline High  150-199 mg/dL  High             200-499 mg/dL  Very High        >500 mg/dL    HDL Reference Ranges  (U.S. Department of Health and Human Services ATP III Classifications)    Low     <40 mg/dl (major risk factor for CHD)  High    >60 mg/dl ('negative' risk factor for CHD)        LDL Reference Ranges  (U.S. Department of Health and Human Services ATP III Classifications)    Optimal          <100 mg/dL  Near Optimal     100-129 mg/dL  Borderline High  130-159 mg/dL  High             160-189 mg/dL  Very High        >189 mg/dL    LDL is calculated using the NIH LDL-C calculation.      POC Glucose Once [[id number]]  (Abnormal) Collected: 02/11/25 1142    Specimen: Blood Updated: 02/11/25 1143     Glucose 120 mg/dL      Comment: Serial Number: [id number]:  [id number]       High Sensitivity Troponin T 1Hr [[id number]]  (Abnormal) Collected: 02/11/25 0055    Specimen: Blood Updated: 02/11/25 0113     HS Troponin T 29 ng/L      Troponin T Numeric Delta -1 ng/L      Troponin T % Delta -3    Narrative:      High Sensitive Troponin T Reference Range:  <14.0 ng/L- Negative Female for AMI  <22.0 ng/L- Negative Male for AMI  >=14 - Abnormal Female indicating possible myocardial injury.  >=22 - Abnormal Male indicating possible myocardial injury.   Clinicians would have to utilize clinical acumen, EKG, Troponin, and serial changes to determine if it is an Acute Myocardial Infarction or myocardial injury due to an underlying chronic condition.         Comprehensive Metabolic Panel [[id number]]  (Abnormal) Collected: 02/10/25 2352    Specimen: Blood Updated: 02/11/25 0032      Glucose 126 mg/dL      BUN 18 mg/dL      Creatinine 1.29 mg/dL      Sodium 138 mmol/L      Potassium 3.3 mmol/L      Comment: Slight hemolysis detected by analyzer. Result may be falsely elevated.        Chloride 103 mmol/L      CO2 23.5 mmol/L      Calcium 9.2 mg/dL      Total Protein 7.9 g/dL      Albumin 4.3 g/dL      ALT (SGPT) 17 U/L      AST (SGOT) 26 U/L      Comment: Slight hemolysis detected by analyzer. Result may be falsely elevated.        Alkaline Phosphatase 94 U/L      Total Bilirubin 0.9 mg/dL      Globulin 3.6 gm/dL      A/G Ratio 1.2 g/dL      BUN/Creatinine Ratio 14.0     Anion Gap 11.5 mmol/L      eGFR 50.4 mL/min/1.73     Narrative:      GFR Categories in Chronic Kidney Disease (CKD)      GFR Category          GFR (mL/min/1.73)    Interpretation  G1                     90 or greater         Normal or high (1)  G2                      60-89                Mild decrease (1)  G3a                   45-59                Mild to moderate decrease  G3b                   30-44                Moderate to severe decrease  G4                    15-29                Severe decrease  G5                    14 or less           Kidney failure          (1)In the absence of evidence of kidney disease, neither GFR category G1 or G2 fulfill the criteria for CKD.    eGFR calculation 2021 CKD-EPI creatinine equation, which does not include race as a factor    BNP [[id number]]  (Abnormal) Collected: 02/10/25 2352    Specimen: Blood Updated: 02/11/25 0031     proBNP 7,479.0 pg/mL     Narrative:      This assay is used as an aid in the diagnosis of individuals suspected of having heart failure. It can be used as an aid in the diagnosis of acute decompensated heart failure (ADHF) in patients presenting with signs and symptoms of ADHF to the emergency department (ED). In addition, NT-proBNP of <300 pg/mL indicates ADHF is not likely.    Age Range Result Interpretation  NT-proBNP Concentration (pg/mL:      <50              Positive            >450                   Gray                 300-450                    Negative             <300    50-75           Positive            >900                  Gray                300-900                  Negative            <300      >75             Positive            >1800                  Gray                300-1800                  Negative            <300    High Sensitivity Troponin T [[id number]]  (Abnormal) Collected: 02/10/25 2352    Specimen: Blood Updated: 02/11/25 0030     HS Troponin T 30 ng/L     Narrative:      High Sensitive Troponin T Reference Range:  <14.0 ng/L- Negative Female for AMI  <22.0 ng/L- Negative Male for AMI  >=14 - Abnormal Female indicating possible myocardial injury.  >=22 - Abnormal Male indicating possible myocardial injury.   Clinicians would have to utilize clinical acumen, EKG, Troponin, and serial changes to determine if it is an Acute Myocardial Infarction or myocardial injury due to an underlying chronic condition.         CBC & Differential [[id number]]  (Normal) Collected: 02/10/25 2352    Specimen: Blood Updated: 02/11/25 0003    Narrative:      The following orders were created for panel order CBC & Differential.  Procedure                               Abnormality         Status                     ---------                               -----------         ------                     CBC Auto Differential[[id number]]        Normal              Final result                 Please view results for these tests on the individual orders.    CBC Auto Differential [[id number]]  (Normal) Collected: 02/10/25 2352    Specimen: Blood Updated: 02/11/25 0003     WBC 7.61 10*3/mm3      RBC 4.70 10*6/mm3      Hemoglobin 13.4 g/dL      Hematocrit 41.9 %      MCV 89.1 fL      MCH 28.5 pg      MCHC 32.0 g/dL      RDW 14.8 %      RDW-SD 49.0 fl      MPV 9.5 fL      Platelets 225 10*3/mm3      Neutrophil % 61.6 %      Lymphocyte % 28.9 %      Monocyte % 7.0 %       Eosinophil % 2.0 %      Basophil % 0.4 %      Immature Grans % 0.1 %      Neutrophils, Absolute 4.69 10*3/mm3      Lymphocytes, Absolute 2.20 10*3/mm3      Monocytes, Absolute 0.53 10*3/mm3      Eosinophils, Absolute 0.15 10*3/mm3      Basophils, Absolute 0.03 10*3/mm3      Immature Grans, Absolute 0.01 10*3/mm3     COVID-19 and FLU A/B PCR, 1 HR TAT - Swab, Nasopharynx [[id number]]  (Normal) Collected: 02/10/25 2328    Specimen: Swab from Nasopharynx Updated: 02/10/25 2349     COVID19 Not Detected     Influenza A PCR Not Detected     Influenza B PCR Not Detected    Narrative:      Fact sheet for providers: https://www.fda.gov/media/653581/download    Fact sheet for patients: https://www.fda.gov/media/685681/download    Test performed by PCR.          Lab Results   Component Value Date    HGBA1C 6 (H) 08/22/2022     Lab Results   Component Value Date    TSH 68.400 (H) 02/11/2025       Assessment & Plan     Hypothyroidism, post surgical  Pneumonia  Cardiomyopathy    Agree with restarting levothyroxine at a lower dose than she took previously to avoid cardiac strain.  Recheck TFT's in 4 weeks & adjust dose then if needed.  Thank you for the consult.  I discussed the patients findings and my recommendations with patient.    Sebastien Qureshi MD  02/11/25  21:28 EST

## 2025-02-12 NOTE — CASE MANAGEMENT/SOCIAL WORK
Discharge Planning Assessment   [facility]     Patient Name: Rafael Nunes  MRN: [id number]  Today's Date: 2/12/2025    Admit Date: 2/10/2025    Plan: From home with family.   Discharge Needs Assessment       Row Name 02/12/25 1557       Living Environment    People in Home child(jasbir), adult;grandchild(jasbir)    Name(s) of People in Home Son Eligio and daughter Meredith, minor grandson    Current Living Arrangements home    Potentially Unsafe Housing Conditions none    In the past 12 months has the electric, gas, oil, or water company threatened to shut off services in your home? No    Primary Care Provided by self    Provides Primary Care For no one    Family Caregiver if Needed child(jasbir), adult    Family Caregiver Names Son Eligio and daughter Meredith    Quality of Family Relationships helpful;involved;supportive    Able to Return to Prior Arrangements yes       Resource/Environmental Concerns    Resource/Environmental Concerns none    Transportation Concerns none       Transportation Needs    In the past 12 months, has lack of transportation kept you from medical appointments or from getting medications? no    In the past 12 months, has lack of transportation kept you from meetings, work, or from getting things needed for daily living? No       Food Insecurity    Within the past 12 months, you worried that your food would run out before you got the money to buy more. Never true    Within the past 12 months, the food you bought just didn't last and you didn't have money to get more. Never true       Transition Planning    Patient/Family Anticipates Transition to home with family    Patient/Family Anticipated Services at Transition none    Transportation Anticipated car, drives self;family or friend will provide       Discharge Needs Assessment    Readmission Within the Last 30 Days no previous admission in last 30 days    Equipment Currently Used at Home cpap;cane, straight;walker, standard;commode;oxygen   O2 concentrator    Concerns to be Addressed no discharge needs identified;denies needs/concerns at this time    Do you want help finding or keeping work or a job? I do not need or want help    Do you want help with school or training? For example, starting or completing job training or getting a high school diploma, GED or equivalent No    Anticipated Changes Related to Illness none    Equipment Needed After Discharge none    Provided Post Acute Provider List? N/A    Provided Post Acute Provider Quality & Resource List? N/A    Offered/Gave Vendor List no                   Discharge Plan       Row Name 02/12/25 7474       Plan    Plan From home with family.    Patient/Family in Agreement with Plan yes    Provided Post Acute Provider List? N/A    Provided Post Acute Provider Quality & Resource List? N/A    Plan Comments CM met with patient at the bedside to review discharge planning. Patient lives at home with son Eligio, Daughter Meredith and minor grandson, is IADLs and drives. Daughter Meredith to transport patient at d/c. Confirmed PCP, insurance, and pharmacy. Patient accepts [facility]. Patient denies any difficulty affording food, utilities, or medications. Patient is not current with any HHC/OPPT/OT services. Patient asked CM about getting her applied for Medicaid as her secondary payor source however the patient will have to initiate these services. Patient resports having an O2 concentrator at home but doesn't currently require O2. DC Barriers: Endocrine consult pending, Cards following, IV Lasix                  Continued Care and Services - Admitted Since 2/10/2025    No active coordination exists for this encounter.       Expected Discharge Date and Time       Expected Discharge Date Expected Discharge Time    Feb 13, 2025            Demographic Summary       Row Name 02/12/25 5021       General Information    Admission Type observation    Arrived From emergency department    Required Notices Provided  Observation Status Notice    Referral Source admission list    Reason for Consult discharge planning    Preferred Language English       Contact Information    Permission Granted to Share Info With     Contact Information Obtained for                    Functional Status       Row Name 02/12/25 1555       Functional Status    Usual Activity Tolerance good    Current Activity Tolerance moderate       Functional Status, IADL    Medications independent    Meal Preparation independent    Housekeeping independent    Laundry independent    Shopping independent    If for any reason you need help with day-to-day activities such as bathing, preparing meals, shopping, managing finances, etc., do you get the help you need? I get all the help I need       Mental Status    General Appearance WDL WDL       Mental Status Summary    Recent Changes in Mental Status/Cognitive Functioning no changes             Linn Abdullahi RN     [phone]  [email or web address]

## 2025-02-12 NOTE — PROGRESS NOTES
[facility] MEDICINE SERVICE  DAILY PROGRESS NOTE    NAME: Rafael Nunes  : 1975  MRN: [id number]      LOS: 0 days     PROVIDER OF SERVICE: Jony Johnson MD    Chief Complaint: Pneumonia    Subjective:     Interval History:  History taken from: patient    Still feels overloaded.        Review of Systems:   Review of Systems    Objective:     Vital Signs  Temp:  [97.6 °F (36.4 °C)-98.5 °F (36.9 °C)] 97.7 °F (36.5 °C)  Heart Rate:  [79-93] 82  Resp:  [16-23] 17  BP: (112-139)/(77-96) 112/81   Body mass index is 31.39 kg/m².    Physical Exam  Physical Exam  Constitutional:       Appearance: Normal appearance.   Cardiovascular:      Rate and Rhythm: Normal rate and regular rhythm.      Pulses: Normal pulses.      Heart sounds: Normal heart sounds.   Pulmonary:      Effort: Pulmonary effort is normal.      Breath sounds: Normal breath sounds.   Abdominal:      Comments: Swelling noted   Neurological:      Mental Status: She is alert.            Diagnostic Data    Results from last 7 days   Lab Units 25  0450 02/10/25  2352   WBC 10*3/mm3  --  7.61   HEMOGLOBIN g/dL  --  13.4   HEMATOCRIT %  --  41.9   PLATELETS 10*3/mm3  --  225   GLUCOSE mg/dL 140* 126*   CREATININE mg/dL 1.24* 1.29*   BUN mg/dL 16 18   SODIUM mmol/L 138 138   POTASSIUM mmol/L 3.5 3.3*   AST (SGOT) U/L  --  26   ALT (SGPT) U/L  --  17   ALK PHOS U/L  --  94   BILIRUBIN mg/dL  --  0.9   ANION GAP mmol/L 10.8 11.5       XR Chest PA & Lateral    Result Date: 2/10/2025  Impression: Mild patchy airspace disease present within the lung bases bilaterally, likely related to pneumonia. Electronically Signed: Althea Valladares MD  2/10/2025 11:52 PM EST  Workstation ID: [id number]       I reviewed the patient's new clinical results.    Assessment/Plan:     Active and Resolved Problems  Active Hospital Problems    Diagnosis  POA    **Pneumonia [J18.9]  Yes      Resolved Hospital Problems   No resolved problems to display.        Assessments  Acute hypoxic respiratory failure: PNA vs CHF exacerbation  Elevated proBNP in 7000  Elevated troponins likely type II demand ischemia/ckd  CAD status post CABG  HFmEF,  LVEF of 45 to 50% last echo in 2023  ICMP S/p AICD   AV Regurgitation, s/p AVR  CKD, with her baseline around 1.4-1.5  Hypokalemia  Hypertension  Type 2 diabetes  Hyperlipidemia  Hypothyroidism     Plan:   -Would recommend continuing the diuresis as the patient does not feel euvolemic at this time.  Does not feel like she is peeing very much.  -We feel that we are underdosing the loop diuretic.  Increase the Lasix to 60 IV mg twice daily  -Continue thyroid supplementation per endocrine recommendation  -Overall I doubt infection in this patient and I am stopping the antibiotic.  Monitor off antibiotics.  -Breathing treatments to be continued.    VTE Prophylaxis:  No VTE prophylaxis order currently exists.             Disposition Planning:     Barriers to Discharge: Diuresis  Anticipated Date of Discharge: 12/13/2025  Place of Discharge: Home      Time: 35 minutes     There are no questions and answers to display.       Signature: Electronically signed by Jnoy Johnson MD, 02/12/25, 13:50 EST.  [facility] Hospitalist Team

## 2025-02-12 NOTE — PROGRESS NOTES
CC---  acute on chronic systolic heart failure    Sub     clinically my impression but still has increased abnormal fluid   started back on medical treatment and appreciate endocrine consultation      Past Medical History:   Diagnosis Date    Arrhythmia     Asthma     CAD (coronary artery disease)     Cardiomyopathy, dilated     Chronic systolic congestive heart failure     CKD (chronic kidney disease) stage 2, GFR 60-89 ml/min     COPD (chronic obstructive pulmonary disease)     Essential hypertension     GERD without esophagitis     Hidradenitis 10/26/2020    Hyperlipidemia     Hypothyroidism (acquired)     ICD (implantable cardioverter-defibrillator), dual, in situ 7/22/2019    Nonrheumatic aortic valve insufficiency     Nonrheumatic mitral valve regurgitation     S/P AVR (aortic valve replacement)     S/P CABG x 3     Type 2 diabetes mellitus without complication, without long-term current use of insulin      Past Surgical History:   Procedure Laterality Date    AORTIC VALVE REPAIR/REPLACEMENT N/A 12/27/2019    Procedure: AORTIC VALVE REPAIR/REPLACEMENT;  Surgeon: Lane Stock MD;  [facility]  Service: Cardiothoracic    BREAST LUMPECTOMY Right     BENIGN    CARDIAC CATHETERIZATION N/A 12/24/2019    Procedure: Right and Left Heart Cath 12/24/19 @ 0900;  Surgeon: Wayne Luna MD;  Location: [facility] CATH INVASIVE LOCATION;  Service: Cardiovascular    CARDIAC CATHETERIZATION N/A 12/24/2019    Procedure: Coronary angiography;  Surgeon: Wayne Luna MD;  Location: [facility] CATH INVASIVE LOCATION;  Service: Cardiovascular    CARDIAC CATHETERIZATION N/A 11/10/2020    Procedure: Left and right Heart Cath;  Surgeon: Wayne Luna MD;  Location: [facility] CATH INVASIVE LOCATION;  Service: Cardiovascular;  Laterality: N/A;    CARDIAC CATHETERIZATION N/A 11/10/2020    Procedure: Coronary angiography;  Surgeon: Wayne Luna MD;  Location: [facility] CATH INVASIVE LOCATION;   Service: Cardiovascular;  Laterality: N/A;    CARDIAC CATHETERIZATION N/A 11/10/2020    Procedure: Right Heart Cath;  Surgeon: Wayne Luna MD;  Location: [facility] CATH INVASIVE LOCATION;  Service: Cardiovascular;  Laterality: N/A;    CARDIAC CATHETERIZATION N/A 11/25/2020    Procedure: Percutaneous coronary intervention of the left circumflex artery;  Surgeon: Wayne Luna MD;  Location: [facility] CATH INVASIVE LOCATION;  Service: Cardiovascular;  Laterality: N/A;    CARDIAC CATHETERIZATION N/A 1/22/2021    Procedure: LEFT HEART CATH with possible PCI;  Surgeon: Wayne Luna MD;  Location: [facility] CATH INVASIVE LOCATION;  Service: Cardiovascular;  Laterality: N/A;  Local and IV sedation    CARDIAC CATHETERIZATION N/A 1/22/2021    Procedure: Coronary angiography;  Surgeon: Wayne Luna MD;  Location: [facility] CATH INVASIVE LOCATION;  Service: Cardiovascular;  Laterality: N/A;    CARDIAC CATHETERIZATION N/A 1/22/2021    Procedure: Saphenous Vein Graft;  Surgeon: Wayne Luna MD;  Location: [facility] CATH INVASIVE LOCATION;  Service: Cardiovascular;  Laterality: N/A;    CARDIAC ELECTROPHYSIOLOGY PROCEDURE Left 6/28/2019    Procedure: Dual-chamber ICD insertion;  Surgeon: Héctor Eckert MD;  Location: [facility] CATH INVASIVE LOCATION;  Service: Cardiovascular    CARDIAC ELECTROPHYSIOLOGY PROCEDURE Left 8/14/2019    Procedure: Lead Revision;  Surgeon: Héctor Eckert MD;  Location: [facility] CATH INVASIVE LOCATION;  Service: Cardiovascular    CARDIAC SURGERY      CHOLECYSTECTOMY      CORONARY ARTERY BYPASS GRAFT N/A 12/27/2019    Procedure: CORONARY ARTERY BYPASS GRAFTING;  Surgeon: Lane Stock MD;  Location: [facility] CVOR;  Service: Cardiothoracic    HYSTERECTOMY      INSERT / REPLACE / REMOVE PACEMAKER      LYMPHADENECTOMY Bilateral     THYROID SURGERY         Physical Exam    General:      well developed, well nourished, in no acute distress.    Head:       normocephalic and atraumatic.    Eyes:      PERRL/EOM intact, conjunctivae and sclerae clear without nystagmus.    Neck:      no  thyromegaly, trachea central with normal respiratory effort  Lungs:      clear bilaterally to auscultation.    Heart:       regular rate and rhythm, S1, S2 without murmurs, rubs, or gallops  Skin:      intact without lesions or rashes.    Psych:      alert and cooperative; normal mood and affect; normal attention span and concentration.        CBC    Results from last 7 days   Lab Units 02/10/25  2352   WBC 10*3/mm3 7.61   HEMOGLOBIN g/dL 13.4   PLATELETS 10*3/mm3 225     BMP   Results from last 7 days   Lab Units 02/12/25  0450 02/10/25  2352   SODIUM mmol/L 138 138   POTASSIUM mmol/L 3.5 3.3*   CHLORIDE mmol/L 104 103   CO2 mmol/L 23.2 23.5   BUN mg/dL 16 18   CREATININE mg/dL 1.24* 1.29*   GLUCOSE mg/dL 140* 126*   MAGNESIUM mg/dL 2.2  --      Radiology(recent) XR Chest PA & Lateral    Result Date: 2/10/2025  Impression: Mild patchy airspace disease present within the lung bases bilaterally, likely related to pneumonia. Electronically Signed: Althea Valladares MD  2/10/2025 11:52 PM EST  Workstation ID: [id number]          assessment plan     acute on chronic systolic heart failure   ejection fraction 35 to 40%  Ischemic cardiomyopathy with presence of tissue AVR   Echo reveals mean gradient of 15-17 across aortic valve and moderate to severe eccentric MR prior to laterally and posteriorly.    Stop amlodipine and start Toprol-XL   continue diuresis   continue Synthroid   medical therapy for now   follow renal panel      Electronically signed by Héctor Eckert MD, 02/12/25, 4:35 PM EST.

## 2025-02-12 NOTE — PLAN OF CARE
Problem: Adult Inpatient Plan of Care  Goal: Plan of Care Review  Outcome: Progressing  Goal: Patient-Specific Goal (Individualized)  Outcome: Progressing  Goal: Absence of Hospital-Acquired Illness or Injury  Outcome: Progressing  Intervention: Identify and Manage Fall Risk  Recent Flowsheet Documentation  Taken 2/11/2025 2000 by Maine Quintanilla RN  Safety Promotion/Fall Prevention:   fall prevention program maintained   safety round/check completed  Intervention: Prevent and Manage VTE (Venous Thromboembolism) Risk  Recent Flowsheet Documentation  Taken 2/11/2025 2000 by Maine Quintanilla RN  VTE Prevention/Management:   bilateral   SCDs (sequential compression devices) off   patient refused intervention  Goal: Optimal Comfort and Wellbeing  Outcome: Progressing  Goal: Readiness for Transition of Care  Outcome: Progressing   Goal Outcome Evaluation:

## 2025-02-13 LAB
ANION GAP SERPL CALCULATED.3IONS-SCNC: 10.4 MMOL/L (ref 5–15)
BUN SERPL-MCNC: 15 MG/DL (ref 6–20)
BUN/CREAT SERPL: 12.1 (ref 7–25)
CALCIUM SPEC-SCNC: 9.4 MG/DL (ref 8.6–10.5)
CHLORIDE SERPL-SCNC: 102 MMOL/L (ref 98–107)
CO2 SERPL-SCNC: 25.6 MMOL/L (ref 22–29)
CREAT SERPL-MCNC: 1.24 MG/DL (ref 0.57–1)
EGFRCR SERPLBLD CKD-EPI 2021: 52.8 ML/MIN/1.73
GLUCOSE BLDC GLUCOMTR-MCNC: 103 MG/DL (ref 70–105)
GLUCOSE BLDC GLUCOMTR-MCNC: 113 MG/DL (ref 70–105)
GLUCOSE BLDC GLUCOMTR-MCNC: 176 MG/DL (ref 70–105)
GLUCOSE SERPL-MCNC: 98 MG/DL (ref 65–99)
POTASSIUM SERPL-SCNC: 3.8 MMOL/L (ref 3.5–5.2)
SODIUM SERPL-SCNC: 138 MMOL/L (ref 136–145)

## 2025-02-13 PROCEDURE — 99232 SBSQ HOSP IP/OBS MODERATE 35: CPT | Performed by: INTERNAL MEDICINE

## 2025-02-13 PROCEDURE — 94799 UNLISTED PULMONARY SVC/PX: CPT

## 2025-02-13 PROCEDURE — 25010000002 FUROSEMIDE PER 20 MG: Performed by: STUDENT IN AN ORGANIZED HEALTH CARE EDUCATION/TRAINING PROGRAM

## 2025-02-13 PROCEDURE — 82948 REAGENT STRIP/BLOOD GLUCOSE: CPT | Performed by: STUDENT IN AN ORGANIZED HEALTH CARE EDUCATION/TRAINING PROGRAM

## 2025-02-13 PROCEDURE — 94761 N-INVAS EAR/PLS OXIMETRY MLT: CPT

## 2025-02-13 PROCEDURE — 80048 BASIC METABOLIC PNL TOTAL CA: CPT | Performed by: NURSE PRACTITIONER

## 2025-02-13 PROCEDURE — 94664 DEMO&/EVAL PT USE INHALER: CPT

## 2025-02-13 PROCEDURE — 82948 REAGENT STRIP/BLOOD GLUCOSE: CPT

## 2025-02-13 PROCEDURE — 25010000002 ONDANSETRON PER 1 MG: Performed by: STUDENT IN AN ORGANIZED HEALTH CARE EDUCATION/TRAINING PROGRAM

## 2025-02-13 RX ORDER — FUROSEMIDE 10 MG/ML
80 INJECTION INTRAMUSCULAR; INTRAVENOUS
Status: DISCONTINUED | OUTPATIENT
Start: 2025-02-14 | End: 2025-02-17

## 2025-02-13 RX ORDER — POTASSIUM CHLORIDE 1.5 G/1.58G
40 POWDER, FOR SOLUTION ORAL ONCE
Status: COMPLETED | OUTPATIENT
Start: 2025-02-13 | End: 2025-02-13

## 2025-02-13 RX ORDER — FUROSEMIDE 10 MG/ML
80 INJECTION INTRAMUSCULAR; INTRAVENOUS ONCE
Status: COMPLETED | OUTPATIENT
Start: 2025-02-13 | End: 2025-02-13

## 2025-02-13 RX ADMIN — FERROUS SULFATE TAB 325 MG (65 MG ELEMENTAL FE) 325 MG: 325 (65 FE) TAB at 08:47

## 2025-02-13 RX ADMIN — ROSUVASTATIN CALCIUM 10 MG: 10 TABLET, FILM COATED ORAL at 21:01

## 2025-02-13 RX ADMIN — FUROSEMIDE 80 MG: 10 INJECTION, SOLUTION INTRAMUSCULAR; INTRAVENOUS at 17:26

## 2025-02-13 RX ADMIN — POTASSIUM CHLORIDE 40 MEQ: 1.5 FOR SOLUTION ORAL at 13:54

## 2025-02-13 RX ADMIN — IPRATROPIUM BROMIDE AND ALBUTEROL SULFATE 3 ML: .5; 3 SOLUTION RESPIRATORY (INHALATION) at 19:42

## 2025-02-13 RX ADMIN — HYDRALAZINE HYDROCHLORIDE 25 MG: 25 TABLET ORAL at 08:47

## 2025-02-13 RX ADMIN — IPRATROPIUM BROMIDE AND ALBUTEROL SULFATE 3 ML: .5; 3 SOLUTION RESPIRATORY (INHALATION) at 16:02

## 2025-02-13 RX ADMIN — OXYCODONE 7.5 MG: 5 TABLET ORAL at 08:47

## 2025-02-13 RX ADMIN — Medication 10 ML: at 21:01

## 2025-02-13 RX ADMIN — FUROSEMIDE 60 MG: 10 INJECTION, SOLUTION INTRAMUSCULAR; INTRAVENOUS at 08:47

## 2025-02-13 RX ADMIN — ISOSORBIDE MONONITRATE 30 MG: 30 TABLET, EXTENDED RELEASE ORAL at 08:47

## 2025-02-13 RX ADMIN — LEVOTHYROXINE SODIUM 100 MCG: 100 TABLET ORAL at 03:34

## 2025-02-13 RX ADMIN — ONDANSETRON 4 MG: 2 INJECTION INTRAMUSCULAR; INTRAVENOUS at 13:54

## 2025-02-13 RX ADMIN — IPRATROPIUM BROMIDE AND ALBUTEROL SULFATE 3 ML: .5; 3 SOLUTION RESPIRATORY (INHALATION) at 12:30

## 2025-02-13 RX ADMIN — METOPROLOL SUCCINATE 50 MG: 50 TABLET, EXTENDED RELEASE ORAL at 08:47

## 2025-02-13 RX ADMIN — ASPIRIN 81 MG: 81 TABLET, COATED ORAL at 08:47

## 2025-02-13 NOTE — CASE MANAGEMENT/SOCIAL WORK
Continued Stay Note  [facility]     Patient Name: Rafael Nunes  MRN: [id number]  Today's Date: 2/13/2025    Admit Date: 2/10/2025    Plan: From home with family.   Discharge Plan       Row Name 02/13/25 1512       Plan    Plan Comments Barriers: Endorcrinology and Cardiology following. IMM explained and given to Ms. Heena Mccann who is a/o. IV Lasix increased. She plans to return home at discharge                      Dina AGUILAR,RN Case Manager  [facility]  Phone: desk- [phone] cell- [phone]

## 2025-02-13 NOTE — CASE MANAGEMENT/SOCIAL WORK
Social Work Assessment  [facility]     Patient Name: Rafael Nunes  MRN: [id number]  Today's Date: 2/13/2025    Admit Date: 2/10/2025     Discharge Plan       Row Name 02/13/25 1526       Plan    Plan Comments LSW consulted for financial resources. Met with patient at bedside, introduced self/role and reason for visit. Patient reports difficulty paying monthly utilities and splits cost with her adult son (22yo). Patient states that she is on a housing waitlist, because of inability to continue affording monthly rent/expenses at current renting location. Concerns that landlord does not address concerns in the home that should be handled. Agreeable to [facility] referral for EYAL counseling, submitted online. No additional needs. Provided patient with [facility] Resource Guide and educated on information included. Patient thanked this writer, no further needs.                  JEAN Andrade, LSW, [facility]  Lead   Phone: [phone]  Cell: [phone]  Fax: [phone]  [email or web address]

## 2025-02-13 NOTE — PROGRESS NOTES
[facility] MEDICINE SERVICE  DAILY PROGRESS NOTE    NAME: Rafael Nunes  : 1975  MRN: [id number]      LOS: 1 day     PROVIDER OF SERVICE: Jony Johnson MD    Chief Complaint: Pneumonia    Subjective:     Interval History:  History taken from: patient    Diuresis improving but not optimal.        Review of Systems:   Review of Systems    Objective:     Vital Signs  Temp:  [97.4 °F (36.3 °C)-98.3 °F (36.8 °C)] 97.4 °F (36.3 °C)  Heart Rate:  [64-86] 64  Resp:  [11-19] 14  BP: ()/(59-81) 95/67   Body mass index is 31.28 kg/m².    Physical Exam  Physical Exam  Constitutional:       Appearance: Normal appearance.   Cardiovascular:      Rate and Rhythm: Normal rate and regular rhythm.      Pulses: Normal pulses.      Heart sounds: Normal heart sounds.   Pulmonary:      Effort: Pulmonary effort is normal.      Breath sounds: Normal breath sounds.   Abdominal:      Comments: Swelling noted   Neurological:      Mental Status: She is alert.          Diagnostic Data    Results from last 7 days   Lab Units 25  0328 25  0450 02/10/25  2352   WBC 10*3/mm3  --   --  7.61   HEMOGLOBIN g/dL  --   --  13.4   HEMATOCRIT %  --   --  41.9   PLATELETS 10*3/mm3  --   --  225   GLUCOSE mg/dL 98   < > 126*   CREATININE mg/dL 1.24*   < > 1.29*   BUN mg/dL 15   < > 18   SODIUM mmol/L 138   < > 138   POTASSIUM mmol/L 3.8   < > 3.3*   AST (SGOT) U/L  --   --  26   ALT (SGPT) U/L  --   --  17   ALK PHOS U/L  --   --  94   BILIRUBIN mg/dL  --   --  0.9   ANION GAP mmol/L 10.4   < > 11.5    < > = values in this interval not displayed.       No radiology results for the last day      I reviewed the patient's new clinical results.    Assessment/Plan:     Active and Resolved Problems  Active Hospital Problems    Diagnosis  POA   • **Pneumonia [J18.9]  Yes      Resolved Hospital Problems   No resolved problems to display.       Assessments  Acute hypoxic respiratory failure: PNA vs CHF exacerbation  Elevated  proBNP in 7000  Elevated troponins likely type II demand ischemia/ckd  CAD status post CABG  HFmEF,  LVEF of 45 to 50% last echo in 2023  ICMP S/p AICD   AV Regurgitation, s/p AVR  CKD, with her baseline around 1.4-1.5  Hypokalemia  Hypertension  Type 2 diabetes  Hyperlipidemia  Hypothyroidism     Plan:   -Patient still not euvolemic.  Diuresis improving on the increased dose of Lasix but not optimal.  Lasix moved to 80 mg twice daily.  I will give her an extra dose of Lasix 80 mg at 1400 today  -Continue thyroid supplementation per endocrine recommendation  -Overall I doubt infection in this patient and I am stopping the antibiotic.  Monitor off antibiotics.  -Breathing treatments to be continued.    VTE Prophylaxis:  No VTE prophylaxis order currently exists.             Disposition Planning:     Barriers to Discharge: Diuresis  Anticipated Date of Discharge: 12/13/2025  Place of Discharge: Home      Time: 35 minutes     There are no questions and answers to display.       Signature: Electronically signed by Jony Johnson MD, 02/13/25, 12:30 EST.  [facility] Hospitalist Team

## 2025-02-13 NOTE — PROGRESS NOTES
CC---  acute on chronic systolic heart failure    Sub  Clinically  improving slowly with diuresis       Past Medical History:   Diagnosis Date    Arrhythmia     Asthma     CAD (coronary artery disease)     Cardiomyopathy, dilated     Chronic systolic congestive heart failure     CKD (chronic kidney disease) stage 2, GFR 60-89 ml/min     COPD (chronic obstructive pulmonary disease)     Essential hypertension     GERD without esophagitis     Hidradenitis 10/26/2020    Hyperlipidemia     Hypothyroidism (acquired)     ICD (implantable cardioverter-defibrillator), dual, in situ 7/22/2019    Nonrheumatic aortic valve insufficiency     Nonrheumatic mitral valve regurgitation     S/P AVR (aortic valve replacement)     S/P CABG x 3     Type 2 diabetes mellitus without complication, without long-term current use of insulin      Past Surgical History:   Procedure Laterality Date    AORTIC VALVE REPAIR/REPLACEMENT N/A 12/27/2019    Procedure: AORTIC VALVE REPAIR/REPLACEMENT;  Surgeon: Lane Stock MD;  Location: [facility] CVOR;  Service: Cardiothoracic    BREAST LUMPECTOMY Right     BENIGN    CARDIAC CATHETERIZATION N/A 12/24/2019    Procedure: Right and Left Heart Cath 12/24/19 @ 0900;  Surgeon: Wayne Luna MD;  Location: [facility] CATH INVASIVE LOCATION;  Service: Cardiovascular    CARDIAC CATHETERIZATION N/A 12/24/2019    Procedure: Coronary angiography;  Surgeon: Wayne Luna MD;  Location: [facility] CATH INVASIVE LOCATION;  Service: Cardiovascular    CARDIAC CATHETERIZATION N/A 11/10/2020    Procedure: Left and right Heart Cath;  Surgeon: Wayne Luna MD;  Location: [facility] CATH INVASIVE LOCATION;  Service: Cardiovascular;  Laterality: N/A;    CARDIAC CATHETERIZATION N/A 11/10/2020    Procedure: Coronary angiography;  Surgeon: Wayne Luna MD;  Location: [facility] CATH INVASIVE LOCATION;  Service: Cardiovascular;  Laterality: N/A;    CARDIAC CATHETERIZATION N/A 11/10/2020    Procedure:  Right Heart Cath;  Surgeon: Wayne Luna MD;  Location: [facility] CATH INVASIVE LOCATION;  Service: Cardiovascular;  Laterality: N/A;    CARDIAC CATHETERIZATION N/A 11/25/2020    Procedure: Percutaneous coronary intervention of the left circumflex artery;  Surgeon: Wayne Luna MD;  Location: [facility] CATH INVASIVE LOCATION;  Service: Cardiovascular;  Laterality: N/A;    CARDIAC CATHETERIZATION N/A 1/22/2021    Procedure: LEFT HEART CATH with possible PCI;  Surgeon: Wayne Luna MD;  Location: [facility] CATH INVASIVE LOCATION;  Service: Cardiovascular;  Laterality: N/A;  Local and IV sedation    CARDIAC CATHETERIZATION N/A 1/22/2021    Procedure: Coronary angiography;  Surgeon: Wayne Luna MD;  Location: [facility] CATH INVASIVE LOCATION;  Service: Cardiovascular;  Laterality: N/A;    CARDIAC CATHETERIZATION N/A 1/22/2021    Procedure: Saphenous Vein Graft;  Surgeon: Wayne Luna MD;  Location: [facility] CATH INVASIVE LOCATION;  Service: Cardiovascular;  Laterality: N/A;    CARDIAC ELECTROPHYSIOLOGY PROCEDURE Left 6/28/2019    Procedure: Dual-chamber ICD insertion;  Surgeon: Héctor Eckert MD;  Location: [facility] CATH INVASIVE LOCATION;  Service: Cardiovascular    CARDIAC ELECTROPHYSIOLOGY PROCEDURE Left 8/14/2019    Procedure: Lead Revision;  Surgeon: Héctor Eckert MD;  Location: [facility] CATH INVASIVE LOCATION;  Service: Cardiovascular    CARDIAC SURGERY      CHOLECYSTECTOMY      CORONARY ARTERY BYPASS GRAFT N/A 12/27/2019    Procedure: CORONARY ARTERY BYPASS GRAFTING;  Surgeon: Lane Stock MD;  Location: [facility];  Service: Cardiothoracic    HYSTERECTOMY      INSERT / REPLACE / REMOVE PACEMAKER      LYMPHADENECTOMY Bilateral     THYROID SURGERY           Physical Exam    General:      well developed, well nourished, in no acute distress.    Head:      normocephalic and atraumatic.    Eyes:      PERRL/EOM intact, conjunctivae and sclerae clear without  nystagmus.    Neck:      no  thyromegaly, trachea central with normal respiratory effort  Lungs:      clear bilaterally to auscultation.    Heart:       regular rate and rhythm, S1, S2 without murmurs, rubs, or gallops  Skin:      intact without lesions or rashes.    Psych:      alert and cooperative; normal mood and affect; normal attention span and concentration.            CBC    Results from last 7 days   Lab Units 02/10/25  2352   WBC 10*3/mm3 7.61   HEMOGLOBIN g/dL 13.4   PLATELETS 10*3/mm3 225     BMP   Results from last 7 days   Lab Units 02/13/25  0328 02/12/25  0450 02/10/25  2352   SODIUM mmol/L 138 138 138   POTASSIUM mmol/L 3.8 3.5 3.3*   CHLORIDE mmol/L 102 104 103   CO2 mmol/L 25.6 23.2 23.5   BUN mg/dL 15 16 18   CREATININE mg/dL 1.24* 1.24* 1.29*   GLUCOSE mg/dL 98 140* 126*   MAGNESIUM mg/dL  --  2.2  --           assessment plan    Acute on chronic systolic heart failure with EF between 35 to 40% with moderate to severe MR and aortic valve area with mild AS  Hypothyroidism and started back on Synthroid  Patient is off amlodipine currently on Toprol and hypertension controlled  Renal panel stable  Dual-chamber ICD in situ  Continue heart failure treatment with diuretics, hydralazine and Toprol and isosorbide  Potential discharge today or tomorrow  Follow-up in our office in a month and will sign off and call if needed        Electronically signed by Héctor Eckert MD, 02/13/25, 12:49 PM EST.

## 2025-02-13 NOTE — PLAN OF CARE
Problem: Adult Inpatient Plan of Care  Goal: Plan of Care Review  Outcome: Progressing  Flowsheets (Taken 2/13/2025 1345)  Progress: no change  Plan of Care Reviewed With: patient  Goal: Patient-Specific Goal (Individualized)  Outcome: Progressing  Goal: Absence of Hospital-Acquired Illness or Injury  Outcome: Progressing  Intervention: Identify and Manage Fall Risk  Recent Flowsheet Documentation  Taken 2/13/2025 1206 by Eloise Thomason RN  Safety Promotion/Fall Prevention:   assistive device/personal items within reach   clutter free environment maintained   fall prevention program maintained   nonskid shoes/slippers when out of bed   safety round/check completed   room organization consistent  Taken 2/13/2025 0732 by Eloise Thomason RN  Safety Promotion/Fall Prevention:   assistive device/personal items within reach   clutter free environment maintained   fall prevention program maintained   nonskid shoes/slippers when out of bed   safety round/check completed   room organization consistent  Intervention: Prevent Skin Injury  Recent Flowsheet Documentation  Taken 2/13/2025 1206 by Eloise Thomason RN  Body Position: position changed independently  Taken 2/13/2025 0732 by Eloise Thomason RN  Body Position: position changed independently  Intervention: Prevent and Manage VTE (Venous Thromboembolism) Risk  Recent Flowsheet Documentation  Taken 2/13/2025 1206 by Eloise Thomason RN  VTE Prevention/Management:   bilateral   SCDs (sequential compression devices) off   patient refused intervention  Taken 2/13/2025 0732 by Eloise Thomason RN  VTE Prevention/Management:   bilateral   SCDs (sequential compression devices) off   patient refused intervention  Intervention: Prevent Infection  Recent Flowsheet Documentation  Taken 2/13/2025 1206 by Eloise Thomason RN  Infection Prevention:   environmental surveillance performed   hand hygiene promoted   rest/sleep promoted   single patient room provided  Taken 2/13/2025 0732 by  Moerer, Eloise, RN  Infection Prevention:   environmental surveillance performed   hand hygiene promoted   rest/sleep promoted   single patient room provided  Goal: Optimal Comfort and Wellbeing  Outcome: Progressing  Intervention: Provide Person-Centered Care  Recent Flowsheet Documentation  Taken 2/13/2025 1206 by Eloise Thomason RN  Trust Relationship/Rapport:   care explained   thoughts/feelings acknowledged  Taken 2/13/2025 0732 by Eloise Thomason RN  Trust Relationship/Rapport:   care explained   thoughts/feelings acknowledged  Goal: Readiness for Transition of Care  Outcome: Progressing     Problem: Pneumonia  Goal: Fluid Balance  Outcome: Progressing  Goal: Absence of Infection Signs and Symptoms  Outcome: Progressing  Goal: Effective Oxygenation and Ventilation  Outcome: Progressing  Intervention: Promote Airway Secretion Clearance  Recent Flowsheet Documentation  Taken 2/13/2025 1206 by Eloise Thomason RN  Activity Management: up ad maria [place]  Taken 2/13/2025 0732 by Eloise Thomason RN  Activity Management: up ad maria del rosario  Intervention: Optimize Oxygenation and Ventilation  Recent Flowsheet Documentation  Taken 2/13/2025 1206 by Eloise Thomason RN  Head of Bed (HOB) Positioning: HOB elevated  Taken 2/13/2025 0732 by Eloise Thomason RN  Head of Bed (HOB) Positioning: HOB elevated   Goal Outcome Evaluation:  Plan of Care Reviewed With: patient        Progress: no change

## 2025-02-13 NOTE — PLAN OF CARE
Problem: Adult Inpatient Plan of Care  Goal: Plan of Care Review  Outcome: Progressing  Goal: Patient-Specific Goal (Individualized)  Outcome: Progressing  Goal: Absence of Hospital-Acquired Illness or Injury  Outcome: Progressing  Intervention: Identify and Manage Fall Risk  Recent Flowsheet Documentation  Taken 2/12/2025 2000 by Maine Quintanilla RN  Safety Promotion/Fall Prevention:   fall prevention program maintained   safety round/check completed  Intervention: Prevent and Manage VTE (Venous Thromboembolism) Risk  Recent Flowsheet Documentation  Taken 2/12/2025 2000 by Maine Quintanilla RN  VTE Prevention/Management:   bilateral   SCDs (sequential compression devices) off   patient refused intervention  Intervention: Prevent Infection  Recent Flowsheet Documentation  Taken 2/12/2025 2000 by Maine Quintanilla RN  Infection Prevention: rest/sleep promoted  Goal: Optimal Comfort and Wellbeing  Outcome: Progressing  Goal: Readiness for Transition of Care  Outcome: Progressing     Problem: Pneumonia  Goal: Fluid Balance  Outcome: Progressing  Goal: Absence of Infection Signs and Symptoms  Outcome: Progressing  Goal: Effective Oxygenation and Ventilation  Outcome: Progressing   Goal Outcome Evaluation:

## 2025-02-14 LAB
ANION GAP SERPL CALCULATED.3IONS-SCNC: 13.4 MMOL/L (ref 5–15)
BUN SERPL-MCNC: 19 MG/DL (ref 6–20)
BUN/CREAT SERPL: 16.1 (ref 7–25)
CALCIUM SPEC-SCNC: 9.7 MG/DL (ref 8.6–10.5)
CHLORIDE SERPL-SCNC: 99 MMOL/L (ref 98–107)
CO2 SERPL-SCNC: 25.6 MMOL/L (ref 22–29)
CREAT SERPL-MCNC: 1.18 MG/DL (ref 0.57–1)
EGFRCR SERPLBLD CKD-EPI 2021: 56 ML/MIN/1.73
GLUCOSE BLDC GLUCOMTR-MCNC: 106 MG/DL (ref 70–105)
GLUCOSE BLDC GLUCOMTR-MCNC: 114 MG/DL (ref 70–105)
GLUCOSE BLDC GLUCOMTR-MCNC: 177 MG/DL (ref 70–105)
GLUCOSE BLDC GLUCOMTR-MCNC: 218 MG/DL (ref 70–105)
GLUCOSE SERPL-MCNC: 116 MG/DL (ref 65–99)
POTASSIUM SERPL-SCNC: 3.5 MMOL/L (ref 3.5–5.2)
POTASSIUM SERPL-SCNC: 4.9 MMOL/L (ref 3.5–5.2)
SODIUM SERPL-SCNC: 138 MMOL/L (ref 136–145)

## 2025-02-14 PROCEDURE — 80048 BASIC METABOLIC PNL TOTAL CA: CPT | Performed by: NURSE PRACTITIONER

## 2025-02-14 PROCEDURE — 99232 SBSQ HOSP IP/OBS MODERATE 35: CPT | Performed by: NURSE PRACTITIONER

## 2025-02-14 PROCEDURE — 82948 REAGENT STRIP/BLOOD GLUCOSE: CPT

## 2025-02-14 PROCEDURE — 82948 REAGENT STRIP/BLOOD GLUCOSE: CPT | Performed by: STUDENT IN AN ORGANIZED HEALTH CARE EDUCATION/TRAINING PROGRAM

## 2025-02-14 PROCEDURE — 94761 N-INVAS EAR/PLS OXIMETRY MLT: CPT

## 2025-02-14 PROCEDURE — 94664 DEMO&/EVAL PT USE INHALER: CPT

## 2025-02-14 PROCEDURE — 94799 UNLISTED PULMONARY SVC/PX: CPT

## 2025-02-14 PROCEDURE — 84132 ASSAY OF SERUM POTASSIUM: CPT | Performed by: STUDENT IN AN ORGANIZED HEALTH CARE EDUCATION/TRAINING PROGRAM

## 2025-02-14 PROCEDURE — 25010000002 FUROSEMIDE PER 20 MG: Performed by: STUDENT IN AN ORGANIZED HEALTH CARE EDUCATION/TRAINING PROGRAM

## 2025-02-14 RX ORDER — POTASSIUM CHLORIDE 1.5 G/1.58G
40 POWDER, FOR SOLUTION ORAL EVERY 4 HOURS
Status: COMPLETED | OUTPATIENT
Start: 2025-02-14 | End: 2025-02-14

## 2025-02-14 RX ADMIN — LEVOTHYROXINE SODIUM 100 MCG: 100 TABLET ORAL at 06:19

## 2025-02-14 RX ADMIN — FERROUS SULFATE TAB 325 MG (65 MG ELEMENTAL FE) 325 MG: 325 (65 FE) TAB at 09:53

## 2025-02-14 RX ADMIN — POTASSIUM CHLORIDE 40 MEQ: 1.5 FOR SOLUTION ORAL at 09:53

## 2025-02-14 RX ADMIN — ISOSORBIDE MONONITRATE 30 MG: 30 TABLET, EXTENDED RELEASE ORAL at 09:53

## 2025-02-14 RX ADMIN — FUROSEMIDE 80 MG: 10 INJECTION, SOLUTION INTRAMUSCULAR; INTRAVENOUS at 17:57

## 2025-02-14 RX ADMIN — IPRATROPIUM BROMIDE AND ALBUTEROL SULFATE 3 ML: .5; 3 SOLUTION RESPIRATORY (INHALATION) at 09:05

## 2025-02-14 RX ADMIN — POTASSIUM CHLORIDE 40 MEQ: 1.5 FOR SOLUTION ORAL at 06:19

## 2025-02-14 RX ADMIN — HYDRALAZINE HYDROCHLORIDE 25 MG: 25 TABLET ORAL at 20:03

## 2025-02-14 RX ADMIN — IPRATROPIUM BROMIDE AND ALBUTEROL SULFATE 3 ML: .5; 3 SOLUTION RESPIRATORY (INHALATION) at 20:04

## 2025-02-14 RX ADMIN — FUROSEMIDE 80 MG: 10 INJECTION, SOLUTION INTRAMUSCULAR; INTRAVENOUS at 09:53

## 2025-02-14 RX ADMIN — IPRATROPIUM BROMIDE AND ALBUTEROL SULFATE 3 ML: .5; 3 SOLUTION RESPIRATORY (INHALATION) at 15:30

## 2025-02-14 RX ADMIN — ASPIRIN 81 MG: 81 TABLET, COATED ORAL at 09:53

## 2025-02-14 RX ADMIN — OXYCODONE 7.5 MG: 5 TABLET ORAL at 10:03

## 2025-02-14 RX ADMIN — METOPROLOL SUCCINATE 50 MG: 50 TABLET, EXTENDED RELEASE ORAL at 09:53

## 2025-02-14 RX ADMIN — IPRATROPIUM BROMIDE AND ALBUTEROL SULFATE 3 ML: .5; 3 SOLUTION RESPIRATORY (INHALATION) at 12:48

## 2025-02-14 RX ADMIN — ROSUVASTATIN CALCIUM 10 MG: 10 TABLET, FILM COATED ORAL at 20:03

## 2025-02-14 NOTE — PROGRESS NOTES
[facility] MEDICINE SERVICE  DAILY PROGRESS NOTE    NAME: Rafael Nunes  : 1975  MRN: [id number]      LOS: 2 days     PROVIDER OF SERVICE: Jony Johnson MD    Chief Complaint: Pneumonia    Subjective:     Interval History:  History taken from: patient    Diuresis improving substantially.        Review of Systems:   Review of Systems    Objective:     Vital Signs  Temp:  [97.5 °F (36.4 °C)-98.6 °F (37 °C)] 97.5 °F (36.4 °C)  Heart Rate:  [73-83] 74  Resp:  [10-20] 17  BP: (104-119)/(65-87) 105/65   Body mass index is 29.85 kg/m².    Physical Exam  Physical Exam  Constitutional:       Appearance: Normal appearance.   Cardiovascular:      Rate and Rhythm: Normal rate and regular rhythm.      Pulses: Normal pulses.      Heart sounds: Normal heart sounds.   Pulmonary:      Effort: Pulmonary effort is normal.      Breath sounds: Normal breath sounds.   Abdominal:      Comments: Swelling noted   Neurological:      Mental Status: She is alert.            Diagnostic Data    Results from last 7 days   Lab Units 25  1554 25  0411 25  0450 02/10/25  2352   WBC 10*3/mm3  --   --   --  7.61   HEMOGLOBIN g/dL  --   --   --  13.4   HEMATOCRIT %  --   --   --  41.9   PLATELETS 10*3/mm3  --   --   --  225   GLUCOSE mg/dL  --  116*   < > 126*   CREATININE mg/dL  --  1.18*   < > 1.29*   BUN mg/dL  --  19   < > 18   SODIUM mmol/L  --  138   < > 138   POTASSIUM mmol/L 4.9 3.5   < > 3.3*   AST (SGOT) U/L  --   --   --  26   ALT (SGPT) U/L  --   --   --  17   ALK PHOS U/L  --   --   --  94   BILIRUBIN mg/dL  --   --   --  0.9   ANION GAP mmol/L  --  13.4   < > 11.5    < > = values in this interval not displayed.       No radiology results for the last day      I reviewed the patient's new clinical results.    Assessment/Plan:     Active and Resolved Problems  Active Hospital Problems    Diagnosis  POA    **Pneumonia [J18.9]  Yes      Resolved Hospital Problems   No resolved problems to display.        Assessments  Acute hypoxic respiratory failure: PNA vs CHF exacerbation  Elevated proBNP in 7000  Elevated troponins likely type II demand ischemia/ckd  CAD status post CABG  HFmEF,  LVEF of 45 to 50% last echo in 2023  ICMP S/p AICD   AV Regurgitation, s/p AVR  CKD, with her baseline around 1.4-1.5  Hypokalemia  Hypertension  Type 2 diabetes  Hyperlipidemia  Hypothyroidism     Plan:   -Much better with increased diuresis. Likely able to DC tomorrow if volume improves a bit more and cardiology finishes their investigation  -Continue thyroid supplementation per endocrine recommendation  -Overall I doubt infection in this patient and I am stopping the antibiotic.  Monitor off antibiotics.  -Breathing treatments to be continued.    VTE Prophylaxis:  No VTE prophylaxis order currently exists.             Disposition Planning:     Barriers to Discharge: Diuresis  Anticipated Date of Discharge: 12/15/2025  Place of Discharge: Home      Time: 35 minutes     There are no questions and answers to display.       Signature: Electronically signed by Jony Johnson MD, 02/14/25, 17:37 EST.  [facility]

## 2025-02-14 NOTE — CASE MANAGEMENT/SOCIAL WORK
Continued Stay Note  MIRIAM Olivares     Patient Name: Rafael Nunes  MRN: [id number]  Today's Date: 2/14/2025    Admit Date: 2/10/2025    Plan: Routine home with family.   Discharge Plan       Row Name 02/14/25 1408       Plan    Plan Routine home with family.    Patient/Family in Agreement with Plan yes    Plan Comments DC Barriers: cardiology following, IV lasix BID, cardiac monitoring               Evelin Man RN    Office: [phone]

## 2025-02-14 NOTE — PROGRESS NOTES
[facility] ([facility] CARDIOLOGY  [facility]        LOS:  LOS: 2 days   Patient Name: Rafael Nunes  Age/Sex: 49 y.o. female  : 1975  MRN: [id number]    Day of Service: 25   Length of Stay: 2  Encounter Provider: ALEX Ramirez  Place of Service: [facility] CARDIOLOGY  Patient Care Team:  Phani Adames MD as PCP - General (Internal Medicine)  Ashish Smalls MD as Consulting Physician (Nephrology)  Wayne Luna MD as Consulting Physician (Cardiology)  Héctor Eckert MD as Consulting Physician (Cardiac Electrophysiology)  Lane Stock MD as Surgeon (Cardiothoracic Surgery)    Subjective:     Chief Complaint:  F/U HF    Subjective:   Patient reports improving dyspnea and abdominal distention but still does not feel ready to go home.  She tells me that initially her urine output was not good but has finally picked up.    Current Medications:   Scheduled Meds:aspirin, 81 mg, Oral, Daily  ferrous sulfate, 325 mg, Oral, Daily With Breakfast  furosemide, 80 mg, Intravenous, BID Diuretics  hydrALAZINE, 25 mg, Oral, TID  ipratropium-albuterol, 3 mL, Nebulization, 4x Daily - RT  isosorbide mononitrate, 30 mg, Oral, Q24H  levothyroxine, 100 mcg, Oral, Q AM  metoprolol succinate XL, 50 mg, Oral, Q24H  rosuvastatin, 10 mg, Oral, Nightly  [Held by provider] torsemide, 60 mg, Oral, Daily      Continuous Infusions:     Allergies:  Allergies   Allergen Reactions    Hydrocodone Hives    Penicillin G Unknown (See Comments)     Reaction occurred as a baby.      Penicillin interview complete 2022.    Contrast Dye (Echo Or Unknown Ct/Mr) GI Intolerance     She is pretty sure it's the contrast dye that makes her super sick and vomiting after heart cath    Gadolinium Derivatives Itching    Varenicline Other (See Comments)     Encephalopathy       ROS    Objective:     Temp:  [97.5 °F (36.4 °C)-98.6 °F (37  °C)] 98.5 °F (36.9 °C)  Heart Rate:  [68-83] 80  Resp:  [11-20] 15  BP: ()/(66-87) 111/87     Intake/Output Summary (Last 24 hours) at 2/14/2025 1247  Last data filed at 2/14/2025 1100  Gross per 24 hour   Intake 960 ml   Output 900 ml   Net 60 ml     Body mass index is 29.85 kg/m².      02/12/25  0800 02/13/25  0330 02/14/25  1208   Weight: 90.9 kg (200 lb 6.4 oz) 90.6 kg (199 lb 11.8 oz) 86.5 kg (190 lb 9.6 oz)         Physical Exam:  Neuro:  CV:  Resp:  GI:  Ext:  Tele: AAOx3, no gross deficits  S1S2 RRR, 2/6 systolic murmur  Nonlabored, CTA  BS+, abdominal ascites  Pedal pulses palp, no edema  A paced on demand                                                   Lab Review:   Results from last 7 days   Lab Units 02/14/25  0411 02/13/25  0328 02/12/25  0450 02/10/25  2352   SODIUM mmol/L 138 138   < > 138   POTASSIUM mmol/L 3.5 3.8   < > 3.3*   CHLORIDE mmol/L 99 102   < > 103   CO2 mmol/L 25.6 25.6   < > 23.5   BUN mg/dL 19 15   < > 18   CREATININE mg/dL 1.18* 1.24*   < > 1.29*   GLUCOSE mg/dL 116* 98   < > 126*   CALCIUM mg/dL 9.7 9.4   < > 9.2   AST (SGOT) U/L  --   --   --  26   ALT (SGPT) U/L  --   --   --  17    < > = values in this interval not displayed.     Results from last 7 days   Lab Units 02/11/25  0055 02/10/25  2352   HSTROP T ng/L 29* 30*     Results from last 7 days   Lab Units 02/10/25  2352   WBC 10*3/mm3 7.61   HEMOGLOBIN g/dL 13.4   HEMATOCRIT % 41.9   PLATELETS 10*3/mm3 225         Results from last 7 days   Lab Units 02/12/25  0450   MAGNESIUM mg/dL 2.2     Results from last 7 days   Lab Units 02/11/25  1307   CHOLESTEROL mg/dL 242*   TRIGLYCERIDES mg/dL 148   HDL CHOL mg/dL 58     Results from last 7 days   Lab Units 02/10/25  2352   PROBNP pg/mL 7,479.0*     Results from last 7 days   Lab Units 02/11/25  0055   TSH uIU/mL 68.400*       Recent Radiology:  Imaging Results (Most Recent)       Procedure Component Value Units Date/Time    XR Chest PA & Lateral [[id number]] Collected:  02/10/25 2351     Updated: 02/10/25 2354    Narrative:      XR CHEST PA AND LATERAL    Date of Exam: 2/10/2025 11:40 PM EST    Indication: cough    Comparison: 7/7/2024.    Findings:  Mild patchy airspace disease is present within the lung bases bilaterally. No pleural fluid. No pneumothorax. The pulmonary vasculature appears within normal limits. The heart appears enlarged, stable. Stable left subclavian/AICD/pacemaker device.. No   acute osseous abnormality identified.      Impression:      Impression:  Mild patchy airspace disease present within the lung bases bilaterally, likely related to pneumonia.        Electronically Signed: Althea Valladares MD    2/10/2025 11:52 PM EST    Workstation ID: [id number]            ECHOCARDIOGRAM:    Results for orders placed during the hospital encounter of 02/10/25    Adult Transthoracic Echo Complete W/ Cont if Necessary Per Protocol    Interpretation Summary    Left ventricular systolic function is moderately decreased. Estimated left ventricular EF = 38%    The left ventricular cavity is mild to moderately dilated.    Left ventricular wall thickness is consistent with mild to moderate eccentric hypertrophy.    Left ventricular diastolic function is consistent with (grade II w/high LAP) pseudonormalization and age.    The right ventricular cavity is mildly dilated.    The left atrial cavity is moderate to severely dilated.    The right atrial cavity is borderline dilated.    Mild aortic valve stenosis is present.    Abnormal mitral valve structure consistent with dilated annulus.    Moderate to severe mitral valve regurgitation is present.    Estimated right ventricular systolic pressure from tricuspid regurgitation is normal (<35 mmHg).    Transthoracic echocardiography reveals dilated LV with eccentric LVH with EF between 36 to 40%  Severe left atrial enlargement and the mitral valve apparatus calcified and appears rheumatic without mitral stenosis and moderate to severe MR  directed laterally and posteriorly  Aortic valve is calcified and not well-visualized with a mean gradient of 16 mmHg.  Mild TR without pulmonary hypertension  No effusion      Electronically signed by Héctor Eckert MD, 02/12/25, 1:19 PM EST.        I reviewed the patient's new clinical results.    EKG:      Assessment:       Pneumonia    1) acute on chronic combined systolic/diastolic heart failure / cardiomyopathy s/p Biotronik dual-chamber ICD in 2019  -BNP 7479  -High-sensitivity troponin 30, 29  -CXR shows pneumonia  -Last 2D echo 12/2023 shows an EF of 46 to 50% with mild MR   -GDMT: on hydralazine; coreg stopped --> Toprol XL, farxiga on hold given LINSEY  -started on IV lasix increased to 80 IV bid  - repeat 2D echo 2/2025 shows EF = 36-40% with grade 2 diastolic dysfunction, moderate to severe MR, and mild AS.     2) PNA     3) CAD status post 3V CABG in 2019 with prior PCI     4) VHD status post tissue AVR in 2019       5) HTN  - stop norvasc to make room for GDMT     6) HLD     7) DM     8) LINSEY on CKD stage III  - Cr 1.62 --> 1.24 --> 1.18     9) COPD     10) INDRA     11) Hypothyroidism  - Endocrine following         Plan:   Improving hypervolemia on increased lasix dose with significantly improved renal function.  I/Os and daily weights are not being done and d/w nursing.  Repeat GDMT includes Toprol XL.  Will hold off on ACEi/ARB with improving renal function and soft BP.  Device interrogation has not been done yet and reordered.  Repeat 2D echo shows reduced EF.  Will d/w attending regarding any plan for future ischemic eval.          Electronically signed by ALEX Ramirez, 02/14/25, 12:59 PM EST.

## 2025-02-15 LAB
ANION GAP SERPL CALCULATED.3IONS-SCNC: 15 MMOL/L (ref 5–15)
BUN SERPL-MCNC: 20 MG/DL (ref 6–20)
BUN/CREAT SERPL: 15.7 (ref 7–25)
CALCIUM SPEC-SCNC: 9.8 MG/DL (ref 8.6–10.5)
CHLORIDE SERPL-SCNC: 98 MMOL/L (ref 98–107)
CO2 SERPL-SCNC: 25 MMOL/L (ref 22–29)
CREAT SERPL-MCNC: 1.27 MG/DL (ref 0.57–1)
EGFRCR SERPLBLD CKD-EPI 2021: 51.3 ML/MIN/1.73
GLUCOSE BLDC GLUCOMTR-MCNC: 119 MG/DL (ref 70–105)
GLUCOSE BLDC GLUCOMTR-MCNC: 122 MG/DL (ref 70–105)
GLUCOSE BLDC GLUCOMTR-MCNC: 138 MG/DL (ref 70–105)
GLUCOSE SERPL-MCNC: 124 MG/DL (ref 65–99)
POTASSIUM SERPL-SCNC: 3.9 MMOL/L (ref 3.5–5.2)
QT INTERVAL: 410 MS
QTC INTERVAL: 491 MS
SODIUM SERPL-SCNC: 138 MMOL/L (ref 136–145)

## 2025-02-15 PROCEDURE — 25010000002 ONDANSETRON PER 1 MG: Performed by: STUDENT IN AN ORGANIZED HEALTH CARE EDUCATION/TRAINING PROGRAM

## 2025-02-15 PROCEDURE — 25010000002 HEPARIN (PORCINE) PER 1000 UNITS: Performed by: STUDENT IN AN ORGANIZED HEALTH CARE EDUCATION/TRAINING PROGRAM

## 2025-02-15 PROCEDURE — 94799 UNLISTED PULMONARY SVC/PX: CPT

## 2025-02-15 PROCEDURE — 25010000002 FUROSEMIDE PER 20 MG: Performed by: STUDENT IN AN ORGANIZED HEALTH CARE EDUCATION/TRAINING PROGRAM

## 2025-02-15 PROCEDURE — 94761 N-INVAS EAR/PLS OXIMETRY MLT: CPT

## 2025-02-15 PROCEDURE — 99232 SBSQ HOSP IP/OBS MODERATE 35: CPT | Performed by: INTERNAL MEDICINE

## 2025-02-15 PROCEDURE — 82948 REAGENT STRIP/BLOOD GLUCOSE: CPT | Performed by: STUDENT IN AN ORGANIZED HEALTH CARE EDUCATION/TRAINING PROGRAM

## 2025-02-15 PROCEDURE — 82948 REAGENT STRIP/BLOOD GLUCOSE: CPT

## 2025-02-15 PROCEDURE — 94664 DEMO&/EVAL PT USE INHALER: CPT

## 2025-02-15 RX ORDER — HEPARIN SODIUM 5000 [USP'U]/ML
5000 INJECTION, SOLUTION INTRAVENOUS; SUBCUTANEOUS EVERY 8 HOURS SCHEDULED
Status: DISCONTINUED | OUTPATIENT
Start: 2025-02-15 | End: 2025-02-18 | Stop reason: HOSPADM

## 2025-02-15 RX ADMIN — HYDRALAZINE HYDROCHLORIDE 25 MG: 25 TABLET ORAL at 16:42

## 2025-02-15 RX ADMIN — ONDANSETRON 4 MG: 2 INJECTION INTRAMUSCULAR; INTRAVENOUS at 16:43

## 2025-02-15 RX ADMIN — ONDANSETRON 4 MG: 2 INJECTION INTRAMUSCULAR; INTRAVENOUS at 10:26

## 2025-02-15 RX ADMIN — FUROSEMIDE 80 MG: 10 INJECTION, SOLUTION INTRAMUSCULAR; INTRAVENOUS at 10:22

## 2025-02-15 RX ADMIN — ROSUVASTATIN CALCIUM 10 MG: 10 TABLET, FILM COATED ORAL at 21:24

## 2025-02-15 RX ADMIN — METOPROLOL SUCCINATE 50 MG: 50 TABLET, EXTENDED RELEASE ORAL at 10:21

## 2025-02-15 RX ADMIN — IPRATROPIUM BROMIDE AND ALBUTEROL SULFATE 3 ML: .5; 3 SOLUTION RESPIRATORY (INHALATION) at 08:41

## 2025-02-15 RX ADMIN — LEVOTHYROXINE SODIUM 100 MCG: 100 TABLET ORAL at 06:23

## 2025-02-15 RX ADMIN — HEPARIN SODIUM 5000 UNITS: 5000 INJECTION INTRAVENOUS; SUBCUTANEOUS at 14:13

## 2025-02-15 RX ADMIN — IPRATROPIUM BROMIDE AND ALBUTEROL SULFATE 3 ML: .5; 3 SOLUTION RESPIRATORY (INHALATION) at 11:56

## 2025-02-15 RX ADMIN — FERROUS SULFATE TAB 325 MG (65 MG ELEMENTAL FE) 325 MG: 325 (65 FE) TAB at 10:21

## 2025-02-15 RX ADMIN — ISOSORBIDE MONONITRATE 30 MG: 30 TABLET, EXTENDED RELEASE ORAL at 10:21

## 2025-02-15 RX ADMIN — HYDRALAZINE HYDROCHLORIDE 25 MG: 25 TABLET ORAL at 10:21

## 2025-02-15 RX ADMIN — OXYCODONE 7.5 MG: 5 TABLET ORAL at 16:48

## 2025-02-15 RX ADMIN — HYDRALAZINE HYDROCHLORIDE 25 MG: 25 TABLET ORAL at 21:22

## 2025-02-15 RX ADMIN — ASPIRIN 81 MG: 81 TABLET, COATED ORAL at 10:22

## 2025-02-15 RX ADMIN — IPRATROPIUM BROMIDE AND ALBUTEROL SULFATE 3 ML: .5; 3 SOLUTION RESPIRATORY (INHALATION) at 15:53

## 2025-02-15 NOTE — PLAN OF CARE
Problem: Adult Inpatient Plan of Care  Goal: Plan of Care Review  Outcome: Progressing  Goal: Patient-Specific Goal (Individualized)  Outcome: Progressing  Goal: Absence of Hospital-Acquired Illness or Injury  Outcome: Progressing  Intervention: Identify and Manage Fall Risk  Recent Flowsheet Documentation  Taken 2/14/2025 2000 by Maine Quintanilla RN  Safety Promotion/Fall Prevention:   fall prevention program maintained   safety round/check completed  Intervention: Prevent Infection  Recent Flowsheet Documentation  Taken 2/14/2025 2000 by Maine Quintanilla RN  Infection Prevention: rest/sleep promoted  Goal: Optimal Comfort and Wellbeing  Outcome: Progressing  Goal: Readiness for Transition of Care  Outcome: Progressing     Problem: Pneumonia  Goal: Fluid Balance  Outcome: Progressing  Goal: Absence of Infection Signs and Symptoms  Outcome: Progressing  Goal: Effective Oxygenation and Ventilation  Outcome: Progressing     Problem: Adult Inpatient Plan of Care  Goal: Plan of Care Review  Outcome: Progressing  Goal: Patient-Specific Goal (Individualized)  Outcome: Progressing  Goal: Absence of Hospital-Acquired Illness or Injury  Outcome: Progressing  Intervention: Identify and Manage Fall Risk  Recent Flowsheet Documentation  Taken 2/14/2025 2000 by Maine Quintanilla RN  Safety Promotion/Fall Prevention:   fall prevention program maintained   safety round/check completed  Intervention: Prevent Infection  Recent Flowsheet Documentation  Taken 2/14/2025 2000 by Maine Quintanilla RN  Infection Prevention: rest/sleep promoted  Goal: Optimal Comfort and Wellbeing  Outcome: Progressing  Goal: Readiness for Transition of Care  Outcome: Progressing     Problem: Pneumonia  Goal: Fluid Balance  Outcome: Progressing  Goal: Absence of Infection Signs and Symptoms  Outcome: Progressing  Goal: Effective Oxygenation and Ventilation  Outcome: Progressing   Goal Outcome Evaluation:

## 2025-02-15 NOTE — PROGRESS NOTES
[facility] MEDICINE SERVICE  DAILY PROGRESS NOTE    NAME: Rafael Nunes  : 1975  MRN: [id number]      LOS: 3 days     PROVIDER OF SERVICE: Aide Rivera MD    Chief Complaint: Pneumonia    Subjective:     Interval History:    Patient seen and evaluated at bedside. Reports feeling much better. States breathing improved and abdominal swelling lessened. Good urine output reported.     Treatment plan discussed with patient. All questions addressed.     Review of Systems:   Denies fevers, chills  Denies chest pain, edema  Denies shortness of breath, cough  +nausea, denies vomiting, diarrhea  Denies dysuria, hematuria    Objective:     Vital Signs  Temp:  [97.4 °F (36.3 °C)-98.2 °F (36.8 °C)] 97.4 °F (36.3 °C)  Heart Rate:  [73-89] 88  Resp:  [10-20] 11  BP: ()/(62-89) 126/81   Body mass index is 30.13 kg/m².    Physical Exam   General: No acute distress, alert and oriented  CV: RRR, no peripheral edema  Pulm: Bilateral rhonchi, no increased work of breathing  Abd: Soft, nontender, minimally distended  Skin: No rashes or lesions on exposed skin  Psych: Appropriate mood and affect    Scheduled Meds   aspirin, 81 mg, Oral, Daily  ferrous sulfate, 325 mg, Oral, Daily With Breakfast  furosemide, 80 mg, Intravenous, BID Diuretics  heparin (porcine), 5,000 Units, Subcutaneous, Q8H  hydrALAZINE, 25 mg, Oral, TID  ipratropium-albuterol, 3 mL, Nebulization, 4x Daily - RT  isosorbide mononitrate, 30 mg, Oral, Q24H  levothyroxine, 100 mcg, Oral, Q AM  metoprolol succinate XL, 50 mg, Oral, Q24H  rosuvastatin, 10 mg, Oral, Nightly  [Held by provider] torsemide, 60 mg, Oral, Daily       PRN Meds     albuterol    benzonatate    Calcium Replacement - Follow Nurse / BPA Driven Protocol    dextrose    dextrose    diphenhydrAMINE    glucagon (human recombinant)    influenza vaccine    Magnesium Standard Dose Replacement - Follow Nurse / BPA Driven Protocol    nitroglycerin    ondansetron    oxyCODONE     Phosphorus Replacement - Follow Nurse / BPA Driven Protocol    Potassium Replacement - Follow Nurse / BPA Driven Protocol    sodium chloride   Infusions         Diagnostic Data    Results from last 7 days   Lab Units 02/14/25  2347 02/12/25  0450 02/10/25  2352   WBC 10*3/mm3  --   --  7.61   HEMOGLOBIN g/dL  --   --  13.4   HEMATOCRIT %  --   --  41.9   PLATELETS 10*3/mm3  --   --  225   GLUCOSE mg/dL 124*   < > 126*   CREATININE mg/dL 1.27*   < > 1.29*   BUN mg/dL 20   < > 18   SODIUM mmol/L 138   < > 138   POTASSIUM mmol/L 3.9   < > 3.3*   AST (SGOT) U/L  --   --  26   ALT (SGPT) U/L  --   --  17   ALK PHOS U/L  --   --  94   BILIRUBIN mg/dL  --   --  0.9   ANION GAP mmol/L 15.0   < > 11.5    < > = values in this interval not displayed.       No radiology results for the last day    Interval results reviewed.    Assessment/Plan:     Acute hypoxic respiratory failure  Acute on chronic heart failure with mid range ejection fraction  Elevated troponin  Coronary artery disease  Aortic valve regurgitation s/p repair  Chronic kidney disease  Hypokalemia  Hypertension  Type 2 diabetes mellitus  Hyperlipidemia  Hypothyroidism    - Cardiology following, appreciate recs  - Continue diuresis, currently on IV lasix with good response  - Continue levothyroxine per endocrinology recommendations  - Antibiotics stopped yesterday; patient continues to improve  - Will follow up cardiology recommendations    VTE Prophylaxis:  Pharmacologic VTE prophylaxis orders are present.    Plan for disposition: Home 2/17/25    Barriers to discharge: IV diuresis, cardiology following    Time: 35+ minutes     Signature: Electronically signed by Aide Rivera MD, 02/15/25, 12:28 EST.  [facility]

## 2025-02-15 NOTE — PROGRESS NOTES
CARDIOLOGY progress note  [facility]       LOS:  LOS: 3 days   Patient Name: Rafael Nunes  Age/Sex: 49 y.o. female  : 1975  MRN: [id number]    Day of Service: 02/15/25   Length of Stay: 3  Encounter Provider: Boston Shah MD  Place of Service: [facility]   Patient Care Team:  Phani Adames MD as PCP - General (Internal Medicine)  Ashish Smalls MD as Consulting Physician (Nephrology)  Wayne Luna MD as Consulting Physician (Cardiology)  Héctor Eckert MD as Consulting Physician (Cardiac Electrophysiology)  Lane Stock MD as Surgeon (Cardiothoracic Surgery)    Subjective:     Chief Complaint:  Follow-up for heart failure    Subjective:   Patient seen and examined.  Chart reviewed.  Labs reviewed.  Discussed with RN taking care of patient.  Patient is in sinus rhythm.      Current Medications:   Scheduled Meds:aspirin, 81 mg, Oral, Daily  ferrous sulfate, 325 mg, Oral, Daily With Breakfast  furosemide, 80 mg, Intravenous, BID Diuretics  hydrALAZINE, 25 mg, Oral, TID  ipratropium-albuterol, 3 mL, Nebulization, 4x Daily - RT  isosorbide mononitrate, 30 mg, Oral, Q24H  levothyroxine, 100 mcg, Oral, Q AM  metoprolol succinate XL, 50 mg, Oral, Q24H  rosuvastatin, 10 mg, Oral, Nightly  [Held by provider] torsemide, 60 mg, Oral, Daily      Continuous Infusions:     Allergies:  Allergies   Allergen Reactions    Hydrocodone Hives    Penicillin G Unknown (See Comments)     Reaction occurred as a baby.      Penicillin interview complete 2022.    Contrast Dye (Echo Or Unknown Ct/Mr) GI Intolerance     She is pretty sure it's the contrast dye that makes her super sick and vomiting after heart cath    Gadolinium Derivatives Itching    Varenicline Other (See Comments)     Encephalopathy       ROS    Objective:     Temp:  [97.5 °F (36.4 °C)-98.5 °F (36.9 °C)] 98 °F (36.7 °C)  Heart Rate:  [73-88] 88  Resp:  [10-20] 18  BP: ()/(62-89)  110/89     Intake/Output Summary (Last 24 hours) at 2/15/2025 1049  Last data filed at 2/15/2025 1000  Gross per 24 hour   Intake 61779 ml   Output 3250 ml   Net 8090 ml     Body mass index is 30.13 kg/m².      02/13/25  0330 02/14/25  1208 02/15/25  1000   Weight: 90.6 kg (199 lb 11.8 oz) 86.5 kg (190 lb 9.6 oz) 87.3 kg (192 lb 6.4 oz)         Physical Exam:  Neuro:  CV:  Resp:  GI:  Ext:  Tele: AAOx3, no gross deficits  S1S2 RRR, 2/6 systolic murmur  Nonlabored, CTA  BS+, abdominal ascites  Pedal pulses palp, no edema  A paced on demand                                               Lab Review:   Results from last 7 days   Lab Units 02/14/25  2347 02/14/25  1554 02/14/25  0411 02/12/25  0450 02/10/25  2352   SODIUM mmol/L 138  --  138   < > 138   POTASSIUM mmol/L 3.9 4.9 3.5   < > 3.3*   CHLORIDE mmol/L 98  --  99   < > 103   CO2 mmol/L 25.0  --  25.6   < > 23.5   BUN mg/dL 20  --  19   < > 18   CREATININE mg/dL 1.27*  --  1.18*   < > 1.29*   GLUCOSE mg/dL 124*  --  116*   < > 126*   CALCIUM mg/dL 9.8  --  9.7   < > 9.2   AST (SGOT) U/L  --   --   --   --  26   ALT (SGPT) U/L  --   --   --   --  17    < > = values in this interval not displayed.     Results from last 7 days   Lab Units 02/11/25  0055 02/10/25  2352   HSTROP T ng/L 29* 30*     Results from last 7 days   Lab Units 02/10/25  2352   WBC 10*3/mm3 7.61   HEMOGLOBIN g/dL 13.4   HEMATOCRIT % 41.9   PLATELETS 10*3/mm3 225         Results from last 7 days   Lab Units 02/12/25  0450   MAGNESIUM mg/dL 2.2     Results from last 7 days   Lab Units 02/11/25  1307   CHOLESTEROL mg/dL 242*   TRIGLYCERIDES mg/dL 148   HDL CHOL mg/dL 58     Results from last 7 days   Lab Units 02/10/25  2352   PROBNP pg/mL 7,479.0*     Results from last 7 days   Lab Units 02/11/25  0055   TSH uIU/mL 68.400*       Recent Radiology:  Imaging Results (Most Recent)       Procedure Component Value Units Date/Time    XR Chest PA & Lateral [[id number]] Collected: 02/10/25 2351     Updated:  02/10/25 2354    Narrative:      XR CHEST PA AND LATERAL    Date of Exam: 2/10/2025 11:40 PM EST    Indication: cough    Comparison: 7/7/2024.    Findings:  Mild patchy airspace disease is present within the lung bases bilaterally. No pleural fluid. No pneumothorax. The pulmonary vasculature appears within normal limits. The heart appears enlarged, stable. Stable left subclavian/AICD/pacemaker device.. No   acute osseous abnormality identified.      Impression:      Impression:  Mild patchy airspace disease present within the lung bases bilaterally, likely related to pneumonia.        Electronically Signed: Althea Valladares MD    2/10/2025 11:52 PM EST    Workstation ID: [id number]            ECHOCARDIOGRAM:    Results for orders placed during the hospital encounter of 02/10/25    Adult Transthoracic Echo Complete W/ Cont if Necessary Per Protocol    Interpretation Summary    Left ventricular systolic function is moderately decreased. Estimated left ventricular EF = 38%    The left ventricular cavity is mild to moderately dilated.    Left ventricular wall thickness is consistent with mild to moderate eccentric hypertrophy.    Left ventricular diastolic function is consistent with (grade II w/high LAP) pseudonormalization and age.    The right ventricular cavity is mildly dilated.    The left atrial cavity is moderate to severely dilated.    The right atrial cavity is borderline dilated.    Mild aortic valve stenosis is present.    Abnormal mitral valve structure consistent with dilated annulus.    Moderate to severe mitral valve regurgitation is present.    Estimated right ventricular systolic pressure from tricuspid regurgitation is normal (<35 mmHg).    Transthoracic echocardiography reveals dilated LV with eccentric LVH with EF between 36 to 40%  Severe left atrial enlargement and the mitral valve apparatus calcified and appears rheumatic without mitral stenosis and moderate to severe MR directed laterally and  posteriorly  Aortic valve is calcified and not well-visualized with a mean gradient of 16 mmHg.  Mild TR without pulmonary hypertension  No effusion      Electronically signed by Héctor Eckert MD, 02/12/25, 1:19 PM EST.        I reviewed the patient's new clinical results.    EKG:      Assessment:       Pneumonia    -Acute on chronic combined systolic/diastolic heart failure/cardiomyopathy, status post Biotronik dual-chamber ICD in 2019  BNP was elevated at 7479 on 2/10/2025.  HS troponin is flat at 30 and 29.  Chest x-ray is revealing pneumonia.  Patient had 2D echo 12/2023 which revealed EF of 46 to 50% with mild MR  Repeat echo 2/2025 revealed EF of 36 to 40% with grade 2 diastolic dysfunction moderate to severe MR and mild AS.    Continue guideline directed medical therapy with hydralazine, metoprolol succinate  Patient's Farxiga is on hold due to LINSEY  Patient is on diuretics.  IV furosemide 80 twice daily.  Monitor renal function urine output and electrolytes.  Continue medical management with aspirin, furosemide, hydralazine, isosorbide, metoprolol succinate, rosuvastatin  function, moderate to severe MR, and mild AS.     --Pneumonia   per primary team.    -- CAD, CABG, history of PCI  --Valvular heart disease status post tissue AVR in 2019  --Hypertension  --Hyperlipidemia  --Diabetes  --LINSEY on CKD 3  Renal function is improving.  Will continue to monitor.  --COPD, INDRA  --Hypothyroidism  Endocrinology is following.       Plan:   Continue IV diuresis  Monitor renal function urine output electrolytes  Continue medical management with aspirin, furosemide, hydralazine, isosorbide, levothyroxine, metoprolol succinate, Crestor  Monitor rhythm, hemodynamics and labs  Will hold off on ACE inhibitors, ARB, Arni, Aldactone due to renal dysfunction.  Continue to hold amlodipine.  Patient is in sinus rhythm.  Patient is complaining of nausea vomiting will defer to primary team.  Ischemia workup as outpatient  electively.

## 2025-02-16 LAB
ANION GAP SERPL CALCULATED.3IONS-SCNC: 15.5 MMOL/L (ref 5–15)
BUN SERPL-MCNC: 23 MG/DL (ref 6–20)
BUN/CREAT SERPL: 17.3 (ref 7–25)
CALCIUM SPEC-SCNC: 9.8 MG/DL (ref 8.6–10.5)
CHLORIDE SERPL-SCNC: 96 MMOL/L (ref 98–107)
CO2 SERPL-SCNC: 23.5 MMOL/L (ref 22–29)
CREAT SERPL-MCNC: 1.33 MG/DL (ref 0.57–1)
DEPRECATED RDW RBC AUTO: 47.8 FL (ref 37–54)
EGFRCR SERPLBLD CKD-EPI 2021: 48.5 ML/MIN/1.73
ERYTHROCYTE [DISTWIDTH] IN BLOOD BY AUTOMATED COUNT: 15 % (ref 12.3–15.4)
GLUCOSE BLDC GLUCOMTR-MCNC: 123 MG/DL (ref 70–105)
GLUCOSE BLDC GLUCOMTR-MCNC: 131 MG/DL (ref 70–105)
GLUCOSE BLDC GLUCOMTR-MCNC: 169 MG/DL (ref 70–105)
GLUCOSE BLDC GLUCOMTR-MCNC: 170 MG/DL (ref 70–105)
GLUCOSE SERPL-MCNC: 115 MG/DL (ref 65–99)
HCT VFR BLD AUTO: 45.7 % (ref 34–46.6)
HGB BLD-MCNC: 14.6 G/DL (ref 12–15.9)
MCH RBC QN AUTO: 28.2 PG (ref 26.6–33)
MCHC RBC AUTO-ENTMCNC: 31.9 G/DL (ref 31.5–35.7)
MCV RBC AUTO: 88.2 FL (ref 79–97)
PLATELET # BLD AUTO: 269 10*3/MM3 (ref 140–450)
PMV BLD AUTO: 9.7 FL (ref 6–12)
POTASSIUM SERPL-SCNC: 4.1 MMOL/L (ref 3.5–5.2)
RBC # BLD AUTO: 5.18 10*6/MM3 (ref 3.77–5.28)
SODIUM SERPL-SCNC: 135 MMOL/L (ref 136–145)
WBC NRBC COR # BLD AUTO: 7.65 10*3/MM3 (ref 3.4–10.8)

## 2025-02-16 PROCEDURE — 25010000002 ONDANSETRON PER 1 MG: Performed by: STUDENT IN AN ORGANIZED HEALTH CARE EDUCATION/TRAINING PROGRAM

## 2025-02-16 PROCEDURE — 82948 REAGENT STRIP/BLOOD GLUCOSE: CPT | Performed by: STUDENT IN AN ORGANIZED HEALTH CARE EDUCATION/TRAINING PROGRAM

## 2025-02-16 PROCEDURE — 99232 SBSQ HOSP IP/OBS MODERATE 35: CPT | Performed by: INTERNAL MEDICINE

## 2025-02-16 PROCEDURE — 85027 COMPLETE CBC AUTOMATED: CPT | Performed by: STUDENT IN AN ORGANIZED HEALTH CARE EDUCATION/TRAINING PROGRAM

## 2025-02-16 PROCEDURE — 94664 DEMO&/EVAL PT USE INHALER: CPT

## 2025-02-16 PROCEDURE — 94761 N-INVAS EAR/PLS OXIMETRY MLT: CPT

## 2025-02-16 PROCEDURE — 80048 BASIC METABOLIC PNL TOTAL CA: CPT | Performed by: NURSE PRACTITIONER

## 2025-02-16 PROCEDURE — 82948 REAGENT STRIP/BLOOD GLUCOSE: CPT

## 2025-02-16 PROCEDURE — 94799 UNLISTED PULMONARY SVC/PX: CPT

## 2025-02-16 PROCEDURE — 25010000002 FUROSEMIDE PER 20 MG: Performed by: STUDENT IN AN ORGANIZED HEALTH CARE EDUCATION/TRAINING PROGRAM

## 2025-02-16 PROCEDURE — 25010000002 HEPARIN (PORCINE) PER 1000 UNITS: Performed by: STUDENT IN AN ORGANIZED HEALTH CARE EDUCATION/TRAINING PROGRAM

## 2025-02-16 RX ADMIN — ISOSORBIDE MONONITRATE 30 MG: 30 TABLET, EXTENDED RELEASE ORAL at 08:13

## 2025-02-16 RX ADMIN — OXYCODONE 7.5 MG: 5 TABLET ORAL at 05:23

## 2025-02-16 RX ADMIN — Medication 10 ML: at 08:11

## 2025-02-16 RX ADMIN — FERROUS SULFATE TAB 325 MG (65 MG ELEMENTAL FE) 325 MG: 325 (65 FE) TAB at 08:13

## 2025-02-16 RX ADMIN — HEPARIN SODIUM 5000 UNITS: 5000 INJECTION INTRAVENOUS; SUBCUTANEOUS at 05:22

## 2025-02-16 RX ADMIN — IPRATROPIUM BROMIDE AND ALBUTEROL SULFATE 3 ML: .5; 3 SOLUTION RESPIRATORY (INHALATION) at 21:28

## 2025-02-16 RX ADMIN — ROSUVASTATIN CALCIUM 10 MG: 10 TABLET, FILM COATED ORAL at 20:16

## 2025-02-16 RX ADMIN — ONDANSETRON 4 MG: 2 INJECTION INTRAMUSCULAR; INTRAVENOUS at 08:13

## 2025-02-16 RX ADMIN — ASPIRIN 81 MG: 81 TABLET, COATED ORAL at 08:13

## 2025-02-16 RX ADMIN — HEPARIN SODIUM 5000 UNITS: 5000 INJECTION INTRAVENOUS; SUBCUTANEOUS at 22:32

## 2025-02-16 RX ADMIN — LEVOTHYROXINE SODIUM 100 MCG: 100 TABLET ORAL at 05:22

## 2025-02-16 RX ADMIN — FUROSEMIDE 80 MG: 10 INJECTION, SOLUTION INTRAMUSCULAR; INTRAVENOUS at 17:32

## 2025-02-16 RX ADMIN — FUROSEMIDE 80 MG: 10 INJECTION, SOLUTION INTRAMUSCULAR; INTRAVENOUS at 08:13

## 2025-02-16 RX ADMIN — IPRATROPIUM BROMIDE AND ALBUTEROL SULFATE 3 ML: .5; 3 SOLUTION RESPIRATORY (INHALATION) at 07:01

## 2025-02-16 RX ADMIN — IPRATROPIUM BROMIDE AND ALBUTEROL SULFATE 3 ML: .5; 3 SOLUTION RESPIRATORY (INHALATION) at 15:30

## 2025-02-16 RX ADMIN — HEPARIN SODIUM 5000 UNITS: 5000 INJECTION INTRAVENOUS; SUBCUTANEOUS at 15:25

## 2025-02-16 NOTE — PLAN OF CARE
Problem: Adult Inpatient Plan of Care  Goal: Plan of Care Review  Outcome: Progressing  Goal: Patient-Specific Goal (Individualized)  Outcome: Progressing  Goal: Absence of Hospital-Acquired Illness or Injury  Outcome: Progressing  Intervention: Identify and Manage Fall Risk  Intervention: Prevent Skin Injury  Intervention: Prevent Infection  Goal: Optimal Comfort and Wellbeing  Outcome: Progressing  Intervention: Provide Person-Centered Care  Goal: Readiness for Transition of Care  Outcome: Progressing     Problem: Adult Inpatient Plan of Care  Goal: Absence of Hospital-Acquired Illness or Injury  Intervention: Prevent Skin Injury     Problem: Adult Inpatient Plan of Care  Goal: Absence of Hospital-Acquired Illness or Injury  Intervention: Prevent Infection     Problem: Adult Inpatient Plan of Care  Goal: Optimal Comfort and Wellbeing  Outcome: Progressing  Intervention: Provide Person-Centered Care     Problem: Fall Injury Risk  Goal: Absence of Fall and Fall-Related Injury  Outcome: Progressing  Intervention: Identify and Manage Contributors  Intervention: Promote Injury-Free Environment     Problem: Fall Injury Risk  Goal: Absence of Fall and Fall-Related Injury  Intervention: Promote Injury-Free Environment   Goal Outcome Evaluation:  Plan of Care Reviewed With: patient        Progress: no change

## 2025-02-16 NOTE — PROGRESS NOTES
[facility] MEDICINE SERVICE  DAILY PROGRESS NOTE    NAME: Rafael Nunes  : 1975  MRN: [id number]      LOS: 4 days     PROVIDER OF SERVICE: Timothy Duane Brammell, MD    Chief Complaint: Pneumonia    Subjective:     Interval History:  History taken from: patient    Patient without any acute issues.  She denies any chest pain or palpitations.  She is attempting diet.  Denies any pain complaints.  Denies any other additional acute issues.        Review of Systems:   Review of Systems   All other systems reviewed and are negative.      Objective:     Vital Signs  Temp:  [97 °F (36.1 °C)-98.8 °F (37.1 °C)] 97.7 °F (36.5 °C)  Heart Rate:  [73-94] 73  Resp:  [6-21] 21  BP: ()/(63-84) 90/66   Body mass index is 31.15 kg/m².    Physical Exam  Physical Exam  Constitutional:       General: She is not in acute distress.     Appearance: Normal appearance.   HENT:      Head: Normocephalic.   Cardiovascular:      Rate and Rhythm: Normal rate and regular rhythm.   Pulmonary:      Effort: Pulmonary effort is normal.      Breath sounds: Normal breath sounds.   Abdominal:      General: Bowel sounds are normal.      Palpations: Abdomen is soft.      Tenderness: There is no abdominal tenderness.   Musculoskeletal:         General: No swelling.   Neurological:      Mental Status: She is alert. Mental status is at baseline.            Diagnostic Data    Results from last 7 days   Lab Units 25  0512 25  0401 25  0450 02/10/25  2352   WBC 10*3/mm3  --  7.65  --  7.61   HEMOGLOBIN g/dL  --  14.6  --  13.4   HEMATOCRIT %  --  45.7  --  41.9   PLATELETS 10*3/mm3  --  269  --  225   GLUCOSE mg/dL 115*  --    < > 126*   CREATININE mg/dL 1.33*  --    < > 1.29*   BUN mg/dL 23*  --    < > 18   SODIUM mmol/L 135*  --    < > 138   POTASSIUM mmol/L 4.1  --    < > 3.3*   AST (SGOT) U/L  --   --   --  26   ALT (SGPT) U/L  --   --   --  17   ALK PHOS U/L  --   --   --  94   BILIRUBIN mg/dL  --   --   --   0.9   ANION GAP mmol/L 15.5*  --    < > 11.5    < > = values in this interval not displayed.       No radiology results for the last day          Assessment:       Acute hypoxic respiratory failure  Acute on chronic heart failure with mid range ejection fraction  Elevated troponin  Coronary artery disease  Aortic valve regurgitation s/p repair  Chronic kidney disease  Hypokalemia  Hypertension  Type 2 diabetes mellitus  Hyperlipidemia  Hypothyroidism      Plan: Ongoing diuresis as per cardiology.  Awaiting cardiology clearance for  discharge.      Active and Resolved Problems  Active Hospital Problems    Diagnosis  POA    **Pneumonia [J18.9]  Yes      Resolved Hospital Problems   No resolved problems to display.           VTE Prophylaxis:  Pharmacologic VTE prophylaxis orders are present.             Disposition Planning:     Barriers to Discharge:cardiology clearance  Anticipated Date of Discharge: 2/17  Place of Discharge: home      Time: 30 minutes     Code Status and Medical Interventions: CPR (Attempt to Resuscitate); Full Support   Ordered at: 02/16/25 0223     Code Status (Patient has no pulse and is not breathing):    CPR (Attempt to Resuscitate)     Medical Interventions (Patient has pulse or is breathing):    Full Support       Signature: Electronically signed by Timothy Duane Brammell, MD, 02/16/25, 15:05 EST.  [facility] Hospitalist Team

## 2025-02-16 NOTE — PLAN OF CARE
Problem: Adult Inpatient Plan of Care  Goal: Plan of Care Review  Outcome: Progressing  Goal: Patient-Specific Goal (Individualized)  Outcome: Progressing  Goal: Absence of Hospital-Acquired Illness or Injury  Outcome: Progressing  Intervention: Identify and Manage Fall Risk  Recent Flowsheet Documentation  Taken 2/16/2025 1405 by Martha Wellington LPN  Safety Promotion/Fall Prevention:   activity supervised   assistive device/personal items within reach   clutter free environment maintained   lighting adjusted   room organization consistent   safety round/check completed  Taken 2/16/2025 1210 by Martha Wellington LPN  Safety Promotion/Fall Prevention:   activity supervised   clutter free environment maintained   assistive device/personal items within reach   lighting adjusted   room organization consistent   safety round/check completed  Taken 2/16/2025 1022 by Martha Wellington LPN  Safety Promotion/Fall Prevention:   safety round/check completed   room organization consistent   lighting adjusted   activity supervised   assistive device/personal items within reach   clutter free environment maintained  Taken 2/16/2025 0813 by Martha Wellington LPN  Safety Promotion/Fall Prevention:   activity supervised   assistive device/personal items within reach   clutter free environment maintained   lighting adjusted   room organization consistent   safety round/check completed  Intervention: Prevent Skin Injury  Recent Flowsheet Documentation  Taken 2/16/2025 0813 by Martha Wellington LPN  Skin Protection: incontinence pads utilized  Intervention: Prevent Infection  Recent Flowsheet Documentation  Taken 2/16/2025 1405 by Martha Wellington LPN  Infection Prevention:   cohorting utilized   environmental surveillance performed   hand hygiene promoted   personal protective equipment utilized   rest/sleep promoted   single patient room provided   visitors restricted/screened  Taken 2/16/2025 1210 by Martha Wellington LPN  Infection  Prevention:   cohorting utilized   environmental surveillance performed   hand hygiene promoted   personal protective equipment utilized   rest/sleep promoted   single patient room provided   visitors restricted/screened  Taken 2/16/2025 1022 by Martha Wellington LPN  Infection Prevention:   cohorting utilized   environmental surveillance performed   hand hygiene promoted   personal protective equipment utilized   rest/sleep promoted   single patient room provided   visitors restricted/screened  Taken 2/16/2025 0813 by Martha Wellington LPN  Infection Prevention:   cohorting utilized   environmental surveillance performed   hand hygiene promoted   personal protective equipment utilized   rest/sleep promoted   single patient room provided   visitors restricted/screened  Goal: Optimal Comfort and Wellbeing  Outcome: Progressing  Intervention: Monitor Pain and Promote Comfort  Recent Flowsheet Documentation  Taken 2/16/2025 0813 by Martha Wellington LPN  Pain Management Interventions:   care clustered   diversional activity provided   pillow support provided   position adjusted  Intervention: Provide Person-Centered Care  Recent Flowsheet Documentation  Taken 2/16/2025 1210 by Martha Wellington LPN  Trust Relationship/Rapport:   care explained   choices provided  Taken 2/16/2025 0813 by Martha Wellington LPN  Trust Relationship/Rapport:   care explained   choices provided   thoughts/feelings acknowledged  Goal: Readiness for Transition of Care  Outcome: Progressing   Goal Outcome Evaluation:        Patient has been up walking around in room. No c/o pain at this time. Patient has no sob noted at this time. Patient on room air. Patient has call light within reach

## 2025-02-16 NOTE — PROGRESS NOTES
CARDIOLOGY progress note  [facility]       LOS:  LOS: 4 days   Patient Name: Rafael Nunes  Age/Sex: 49 y.o. female  : 1975  MRN: [id number]    Day of Service: 25   Length of Stay: 4  Encounter Provider: Boston Shah MD  Place of Service: [facility]   Patient Care Team:  Phani Adames MD as PCP - General (Internal Medicine)  Ashish Smalls MD as Consulting Physician (Nephrology)  Wayne Luna MD as Consulting Physician (Cardiology)  Héctor Eckert MD as Consulting Physician (Cardiac Electrophysiology)  Lane Stock MD as Surgeon (Cardiothoracic Surgery)    Subjective:     Chief Complaint:  Follow-up for heart failure    Subjective:   Patient seen and examined.  Chart reviewed.  Labs reviewed.  Discussed with RN taking care of patient.  Patient is in sinus rhythm.  He is on IV furosemide 80 mg every 12.      Current Medications:   Scheduled Meds:aspirin, 81 mg, Oral, Daily  ferrous sulfate, 325 mg, Oral, Daily With Breakfast  furosemide, 80 mg, Intravenous, BID Diuretics  heparin (porcine), 5,000 Units, Subcutaneous, Q8H  hydrALAZINE, 25 mg, Oral, TID  ipratropium-albuterol, 3 mL, Nebulization, 4x Daily - RT  isosorbide mononitrate, 30 mg, Oral, Q24H  levothyroxine, 100 mcg, Oral, Q AM  metoprolol succinate XL, 50 mg, Oral, Q24H  rosuvastatin, 10 mg, Oral, Nightly  [Held by provider] torsemide, 60 mg, Oral, Daily      Continuous Infusions:     Allergies:  Allergies   Allergen Reactions    Hydrocodone Hives    Penicillin G Unknown (See Comments)     Reaction occurred as a baby.      Penicillin interview complete 2022.    Contrast Dye (Echo Or Unknown Ct/Mr) GI Intolerance     She is pretty sure it's the contrast dye that makes her super sick and vomiting after heart cath    Gadolinium Derivatives Itching    Varenicline Other (See Comments)     Encephalopathy       ROS    Objective:     Temp:  [97 °F (36.1 °C)-98.8 °F (37.1 °C)]  97.7 °F (36.5 °C)  Heart Rate:  [76-94] 76  Resp:  [6-20] 20  BP: ()/(63-84) 99/77     Intake/Output Summary (Last 24 hours) at 2/16/2025 1018  Last data filed at 2/16/2025 0400  Gross per 24 hour   Intake 460 ml   Output 1500 ml   Net -1040 ml     Body mass index is 31.15 kg/m².      02/14/25  1208 02/15/25  1000 02/16/25  0438   Weight: 86.5 kg (190 lb 9.6 oz) 87.3 kg (192 lb 6.4 oz) 90.2 kg (198 lb 13.7 oz)         Physical Exam:  Neuro:  CV:  Resp:  GI:  Ext:  Tele: AAOx3, no gross deficits  S1S2 RRR, 2/6 systolic murmur  Nonlabored, CTA  BS+, abdominal ascites  Pedal pulses palp, no edema  A paced on demand                                               Lab Review:   Results from last 7 days   Lab Units 02/16/25  0512 02/14/25  2347 02/12/25  0450 02/10/25  2352   SODIUM mmol/L 135* 138   < > 138   POTASSIUM mmol/L 4.1 3.9   < > 3.3*   CHLORIDE mmol/L 96* 98   < > 103   CO2 mmol/L 23.5 25.0   < > 23.5   BUN mg/dL 23* 20   < > 18   CREATININE mg/dL 1.33* 1.27*   < > 1.29*   GLUCOSE mg/dL 115* 124*   < > 126*   CALCIUM mg/dL 9.8 9.8   < > 9.2   AST (SGOT) U/L  --   --   --  26   ALT (SGPT) U/L  --   --   --  17    < > = values in this interval not displayed.     Results from last 7 days   Lab Units 02/11/25  0055 02/10/25  2352   HSTROP T ng/L 29* 30*     Results from last 7 days   Lab Units 02/16/25  0401 02/10/25  2352   WBC 10*3/mm3 7.65 7.61   HEMOGLOBIN g/dL 14.6 13.4   HEMATOCRIT % 45.7 41.9   PLATELETS 10*3/mm3 269 225         Results from last 7 days   Lab Units 02/12/25  0450   MAGNESIUM mg/dL 2.2     Results from last 7 days   Lab Units 02/11/25  1307   CHOLESTEROL mg/dL 242*   TRIGLYCERIDES mg/dL 148   HDL CHOL mg/dL 58     Results from last 7 days   Lab Units 02/10/25  2352   PROBNP pg/mL 7,479.0*     Results from last 7 days   Lab Units 02/11/25  0055   TSH uIU/mL 68.400*       Recent Radiology:  Imaging Results (Most Recent)       Procedure Component Value Units Date/Time    XR Chest PA &  Lateral [[id number]] Collected: 02/10/25 2351     Updated: 02/10/25 2354    Narrative:      XR CHEST PA AND LATERAL    Date of Exam: 2/10/2025 11:40 PM EST    Indication: cough    Comparison: 7/7/2024.    Findings:  Mild patchy airspace disease is present within the lung bases bilaterally. No pleural fluid. No pneumothorax. The pulmonary vasculature appears within normal limits. The heart appears enlarged, stable. Stable left subclavian/AICD/pacemaker device.. No   acute osseous abnormality identified.      Impression:      Impression:  Mild patchy airspace disease present within the lung bases bilaterally, likely related to pneumonia.        Electronically Signed: Althea Valladares MD    2/10/2025 11:52 PM EST    Workstation ID: [id number]            ECHOCARDIOGRAM:    Results for orders placed during the hospital encounter of 02/10/25    Adult Transthoracic Echo Complete W/ Cont if Necessary Per Protocol    Interpretation Summary    Left ventricular systolic function is moderately decreased. Estimated left ventricular EF = 38%    The left ventricular cavity is mild to moderately dilated.    Left ventricular wall thickness is consistent with mild to moderate eccentric hypertrophy.    Left ventricular diastolic function is consistent with (grade II w/high LAP) pseudonormalization and age.    The right ventricular cavity is mildly dilated.    The left atrial cavity is moderate to severely dilated.    The right atrial cavity is borderline dilated.    Mild aortic valve stenosis is present.    Abnormal mitral valve structure consistent with dilated annulus.    Moderate to severe mitral valve regurgitation is present.    Estimated right ventricular systolic pressure from tricuspid regurgitation is normal (<35 mmHg).    Transthoracic echocardiography reveals dilated LV with eccentric LVH with EF between 36 to 40%  Severe left atrial enlargement and the mitral valve apparatus calcified and appears rheumatic without mitral stenosis  and moderate to severe MR directed laterally and posteriorly  Aortic valve is calcified and not well-visualized with a mean gradient of 16 mmHg.  Mild TR without pulmonary hypertension  No effusion      Electronically signed by Héctor Eckert MD, 02/12/25, 1:19 PM EST.        I reviewed the patient's new clinical results.    EKG:      Assessment:       Pneumonia    -Acute on chronic combined systolic/diastolic heart failure/cardiomyopathy, status post [employer] dual-chamber ICD in 2019  BNP was elevated at 7479 on 2/10/2025.  HS troponin is flat at 30 and 29.  Chest x-ray is revealing pneumonia.  Patient had 2D echo 12/2023 which revealed EF of 46 to 50% with mild MR  Repeat echo 2/2025 revealed EF of 36 to 40% with grade 2 diastolic dysfunction moderate to severe MR and mild AS.    Continue guideline directed medical therapy with hydralazine, metoprolol succinate  Patient's Farxiga is on hold due to LINSEY  Patient is on diuretics.  IV furosemide 80 twice daily.  Monitor renal function urine output and electrolytes.  Continue medical management with aspirin, furosemide, hydralazine, isosorbide, metoprolol succinate, rosuvastatin  function, moderate to severe MR, and mild AS.     --Pneumonia   per primary team.    -- CAD, CABG, history of PCI  --Valvular heart disease status post tissue AVR in 2019  --Hypertension  --Hyperlipidemia  --Diabetes  --LINSEY on CKD 3  Renal function is improving.  Will continue to monitor.  --COPD, INDRA  --Hypothyroidism  Endocrinology is following.       Plan:   Continue IV diuresis  Monitor renal function urine output and electrolytes.  Normal CBC  BUN/creatinine at 23/1.33, potassium 4.1.  Continue medical management with aspirin, furosemide IV, hydralazine, metoprolol, rosuvastatin as tolerated.  Monitor rhythm, hemodynamics, labs.  Will hold off on ACE inhibitors, ARB, Arni, Aldactone due to renal dysfunction.  Ischemia workup as outpatient electively once more stable.  Discussed with  RN taking care of patient to coordinate care.  Limited review/interpreted by me reveals sinus rhythm.

## 2025-02-17 LAB
ANION GAP SERPL CALCULATED.3IONS-SCNC: 16 MMOL/L (ref 5–15)
BACTERIA UR QL AUTO: ABNORMAL /HPF
BILIRUB UR QL STRIP: NEGATIVE
BUN SERPL-MCNC: 27 MG/DL (ref 6–20)
BUN/CREAT SERPL: 14.4 (ref 7–25)
CALCIUM SPEC-SCNC: 9.6 MG/DL (ref 8.6–10.5)
CHLORIDE SERPL-SCNC: 98 MMOL/L (ref 98–107)
CLARITY UR: CLEAR
CO2 SERPL-SCNC: 25 MMOL/L (ref 22–29)
COLOR UR: YELLOW
CREAT SERPL-MCNC: 1.88 MG/DL (ref 0.57–1)
EGFRCR SERPLBLD CKD-EPI 2021: 32 ML/MIN/1.73
GLUCOSE BLDC GLUCOMTR-MCNC: 116 MG/DL (ref 70–105)
GLUCOSE BLDC GLUCOMTR-MCNC: 126 MG/DL (ref 70–105)
GLUCOSE BLDC GLUCOMTR-MCNC: 133 MG/DL (ref 70–105)
GLUCOSE BLDC GLUCOMTR-MCNC: 137 MG/DL (ref 70–105)
GLUCOSE SERPL-MCNC: 126 MG/DL (ref 65–99)
GLUCOSE UR STRIP-MCNC: NEGATIVE MG/DL
HGB UR QL STRIP.AUTO: ABNORMAL
HYALINE CASTS UR QL AUTO: ABNORMAL /LPF
KETONES UR QL STRIP: NEGATIVE
LEUKOCYTE ESTERASE UR QL STRIP.AUTO: ABNORMAL
NITRITE UR QL STRIP: NEGATIVE
PH UR STRIP.AUTO: <=5 [PH] (ref 5–8)
POTASSIUM SERPL-SCNC: 3.7 MMOL/L (ref 3.5–5.2)
PROT UR QL STRIP: NEGATIVE
RBC # UR STRIP: ABNORMAL /HPF
REF LAB TEST METHOD: ABNORMAL
SODIUM SERPL-SCNC: 139 MMOL/L (ref 136–145)
SP GR UR STRIP: 1.01 (ref 1–1.03)
SQUAMOUS #/AREA URNS HPF: ABNORMAL /HPF
UROBILINOGEN UR QL STRIP: ABNORMAL
WBC # UR STRIP: ABNORMAL /HPF

## 2025-02-17 PROCEDURE — 99232 SBSQ HOSP IP/OBS MODERATE 35: CPT | Performed by: INTERNAL MEDICINE

## 2025-02-17 PROCEDURE — 82570 ASSAY OF URINE CREATININE: CPT | Performed by: INTERNAL MEDICINE

## 2025-02-17 PROCEDURE — 82948 REAGENT STRIP/BLOOD GLUCOSE: CPT | Performed by: STUDENT IN AN ORGANIZED HEALTH CARE EDUCATION/TRAINING PROGRAM

## 2025-02-17 PROCEDURE — 94799 UNLISTED PULMONARY SVC/PX: CPT

## 2025-02-17 PROCEDURE — 25010000002 HEPARIN (PORCINE) PER 1000 UNITS: Performed by: STUDENT IN AN ORGANIZED HEALTH CARE EDUCATION/TRAINING PROGRAM

## 2025-02-17 PROCEDURE — 80048 BASIC METABOLIC PNL TOTAL CA: CPT | Performed by: NURSE PRACTITIONER

## 2025-02-17 PROCEDURE — 81001 URINALYSIS AUTO W/SCOPE: CPT | Performed by: INTERNAL MEDICINE

## 2025-02-17 PROCEDURE — 84156 ASSAY OF PROTEIN URINE: CPT | Performed by: INTERNAL MEDICINE

## 2025-02-17 PROCEDURE — 82948 REAGENT STRIP/BLOOD GLUCOSE: CPT

## 2025-02-17 RX ORDER — HYDRALAZINE HYDROCHLORIDE 10 MG/1
10 TABLET, FILM COATED ORAL 3 TIMES DAILY
Status: DISCONTINUED | OUTPATIENT
Start: 2025-02-17 | End: 2025-02-18 | Stop reason: HOSPADM

## 2025-02-17 RX ADMIN — Medication 10 ML: at 07:44

## 2025-02-17 RX ADMIN — HYDRALAZINE HYDROCHLORIDE 10 MG: 10 TABLET ORAL at 15:34

## 2025-02-17 RX ADMIN — IPRATROPIUM BROMIDE AND ALBUTEROL SULFATE 3 ML: .5; 3 SOLUTION RESPIRATORY (INHALATION) at 12:27

## 2025-02-17 RX ADMIN — LEVOTHYROXINE SODIUM 100 MCG: 100 TABLET ORAL at 05:10

## 2025-02-17 RX ADMIN — OXYCODONE 7.5 MG: 5 TABLET ORAL at 19:04

## 2025-02-17 RX ADMIN — HEPARIN SODIUM 5000 UNITS: 5000 INJECTION INTRAVENOUS; SUBCUTANEOUS at 21:58

## 2025-02-17 RX ADMIN — HYDRALAZINE HYDROCHLORIDE 10 MG: 10 TABLET ORAL at 20:19

## 2025-02-17 RX ADMIN — ROSUVASTATIN CALCIUM 10 MG: 10 TABLET, FILM COATED ORAL at 20:19

## 2025-02-17 RX ADMIN — HEPARIN SODIUM 5000 UNITS: 5000 INJECTION INTRAVENOUS; SUBCUTANEOUS at 05:10

## 2025-02-17 RX ADMIN — HEPARIN SODIUM 5000 UNITS: 5000 INJECTION INTRAVENOUS; SUBCUTANEOUS at 15:34

## 2025-02-17 RX ADMIN — ASPIRIN 81 MG: 81 TABLET, COATED ORAL at 07:45

## 2025-02-17 RX ADMIN — FERROUS SULFATE TAB 325 MG (65 MG ELEMENTAL FE) 325 MG: 325 (65 FE) TAB at 07:45

## 2025-02-17 RX ADMIN — IPRATROPIUM BROMIDE AND ALBUTEROL SULFATE 3 ML: .5; 3 SOLUTION RESPIRATORY (INHALATION) at 16:37

## 2025-02-17 NOTE — CONSULTS
NEPHROLOGY CONSULTATION-----[facility]    [facility]  [phone]  Adalid Sterling MD    Patient Care Team:  Phani Adames MD as PCP - General (Internal Medicine)  Ashish Smalls MD as Consulting Physician (Nephrology)  Wayne Luna MD as Consulting Physician (Cardiology)  Héctor Eckert MD as Consulting Physician (Cardiac Electrophysiology)  Lane Stock MD as Surgeon (Cardiothoracic Surgery)    CC/REASON FOR CONSULTATION:   Elevated creatinine  PHYSICIAN REQUESTING CONSULTATION: Kelby Tolbert MD     History of Present Illness  49-year-old -American female with past medical history of CKD stage III, hypertension, coronary artery disease status post CABG, dilated cardiomyopathy presented on 2/10/2025 complains of increased shortness of breath.  Her BNP was elevated.  She was receiving IV diuretics.  Her creatinine is 1.3 has increased to 1.8.  Denies any dysuria or gross hematuria.  No chest pain.  No vomiting or diarrhea.Her blood pressure has been soft in the 90s.    Review of Systems   As noted above otherwise 10 systems were reviewed and were negative.    Past Medical History:   Diagnosis Date    Arrhythmia     Asthma     CAD (coronary artery disease)     Cardiomyopathy, dilated     Chronic systolic congestive heart failure     CKD (chronic kidney disease) stage 2, GFR 60-89 ml/min     COPD (chronic obstructive pulmonary disease)     Essential hypertension     GERD without esophagitis     Hidradenitis 10/26/2020    Hyperlipidemia     Hypothyroidism (acquired)     ICD (implantable cardioverter-defibrillator), dual, in situ 7/22/2019    Nonrheumatic aortic valve insufficiency     Nonrheumatic mitral valve regurgitation     S/P AVR (aortic valve replacement)     S/P CABG x 3     Type 2 diabetes mellitus without complication, without long-term current use of insulin        Past Surgical History:    Procedure Laterality Date    AORTIC VALVE REPAIR/REPLACEMENT N/A 12/27/2019    Procedure: AORTIC VALVE REPAIR/REPLACEMENT;  Surgeon: Lane Stock MD;  [facility]  Service: Cardiothoracic    BREAST LUMPECTOMY Right     BENIGN    CARDIAC CATHETERIZATION N/A 12/24/2019    Procedure: Right and Left Heart Cath 12/24/19 @ 0900;  Surgeon: Wayne Luna MD;  Location: [facility] CATH INVASIVE LOCATION;  Service: Cardiovascular    CARDIAC CATHETERIZATION N/A 12/24/2019    Procedure: Coronary angiography;  Surgeon: Wayne Luna MD;  Location: [facility] CATH INVASIVE LOCATION;  Service: Cardiovascular    CARDIAC CATHETERIZATION N/A 11/10/2020    Procedure: Left and right Heart Cath;  Surgeon: Wayne Luna MD;  Location: [facility] CATH INVASIVE LOCATION;  Service: Cardiovascular;  Laterality: N/A;    CARDIAC CATHETERIZATION N/A 11/10/2020    Procedure: Coronary angiography;  Surgeon: Wayne Luna MD;  Location: [facility] CATH INVASIVE LOCATION;  Service: Cardiovascular;  Laterality: N/A;    CARDIAC CATHETERIZATION N/A 11/10/2020    Procedure: Right Heart Cath;  Surgeon: Wayne Luna MD;  Location: [facility] CATH INVASIVE LOCATION;  Service: Cardiovascular;  Laterality: N/A;    CARDIAC CATHETERIZATION N/A 11/25/2020    Procedure: Percutaneous coronary intervention of the left circumflex artery;  Surgeon: Wayne uLna MD;  Location: [facility] CATH INVASIVE LOCATION;  Service: Cardiovascular;  Laterality: N/A;    CARDIAC CATHETERIZATION N/A 1/22/2021    Procedure: LEFT HEART CATH with possible PCI;  Surgeon: Wayne Luna MD;  Location: [facility] CATH INVASIVE LOCATION;  Service: Cardiovascular;  Laterality: N/A;  Local and IV sedation    CARDIAC CATHETERIZATION N/A 1/22/2021    Procedure: Coronary angiography;  Surgeon: Wayne Luna MD;  Location: [facility] CATH INVASIVE LOCATION;  Service: Cardiovascular;  Laterality: N/A;    CARDIAC CATHETERIZATION  N/A 2021    Procedure: Saphenous Vein Graft;  Surgeon: Wayne Luna MD;  Location: [facility] CATH INVASIVE LOCATION;  Service: Cardiovascular;  Laterality: N/A;    CARDIAC ELECTROPHYSIOLOGY PROCEDURE Left 2019    Procedure: Dual-chamber ICD insertion;  Surgeon: Héctor Eckert MD;  Location: [facility] CATH INVASIVE LOCATION;  Service: Cardiovascular    CARDIAC ELECTROPHYSIOLOGY PROCEDURE Left 2019    Procedure: Lead Revision;  Surgeon: Héctor Eckert MD;  Location: [facility] CATH INVASIVE LOCATION;  Service: Cardiovascular    CARDIAC SURGERY      CHOLECYSTECTOMY      CORONARY ARTERY BYPASS GRAFT N/A 2019    Procedure: CORONARY ARTERY BYPASS GRAFTING;  Surgeon: Lane Stock MD;  Location: [facility] CVOR;  Service: Cardiothoracic    HYSTERECTOMY      INSERT / REPLACE / REMOVE PACEMAKER      LYMPHADENECTOMY Bilateral     THYROID SURGERY         Family History   Problem Relation Age of Onset    Heart disease Father        Social History     Tobacco Use    Smoking status: Former     Current packs/day: 0.00     Types: Cigarettes     Quit date: 2019     Years since quittin.1    Smokeless tobacco: Never   Vaping Use    Vaping status: Never Used   Substance Use Topics    Alcohol use: No    Drug use: No       Home Meds:   Medications Prior to Admission   Medication Sig Dispense Refill Last Dose/Taking    oxyCODONE-acetaminophen (PERCOCET) 7.5-325 MG per tablet Take 1 tablet by mouth Every 6 (Six) Hours As Needed for Moderate Pain.   2/10/2025    febuxostat (ULORIC) 80 MG tablet tablet Take 1 tablet by mouth Daily.          Scheduled Meds:  aspirin, 81 mg, Oral, Daily  ferrous sulfate, 325 mg, Oral, Daily With Breakfast  heparin (porcine), 5,000 Units, Subcutaneous, Q8H  hydrALAZINE, 25 mg, Oral, TID  ipratropium-albuterol, 3 mL, Nebulization, 4x Daily - RT  isosorbide mononitrate, 30 mg, Oral, Q24H  levothyroxine, 100 mcg, Oral, Q AM  metoprolol succinate XL, 50 mg, Oral,  "Q24H  rosuvastatin, 10 mg, Oral, Nightly  [Held by provider] torsemide, 60 mg, Oral, Daily        Continuous Infusions:       PRN Meds:    albuterol    benzonatate    Calcium Replacement - Follow Nurse / BPA Driven Protocol    dextrose    dextrose    diphenhydrAMINE    glucagon (human recombinant)    influenza vaccine    Magnesium Standard Dose Replacement - Follow Nurse / BPA Driven Protocol    nitroglycerin    ondansetron    oxyCODONE    Phosphorus Replacement - Follow Nurse / BPA Driven Protocol    Potassium Replacement - Follow Nurse / BPA Driven Protocol    sodium chloride    Allergies:  Hydrocodone, Penicillin g, Contrast dye (echo or unknown ct/mr), Gadolinium derivatives, and Varenicline    OBJECTIVE    Vital Signs  Temp:  [97.6 °F (36.4 °C)-98.3 °F (36.8 °C)] 98.3 °F (36.8 °C)  Heart Rate:  [73-85] 79  Resp:  [12-21] 14  BP: ()/(63-77) 110/77    No intake/output data recorded.  I/O last 3 completed shifts:  In: 720 [P.O.:720]  Out: 2250 [Urine:2250]    Physical Exam:  General Appearance: alert, appears stated age and cooperative  Head: normocephalic, without obvious abnormality and atraumatic  Eyes: conjunctivae and sclerae normal and no icterus  Neck: supple and no JVD  Lungs: clear to auscultation and respirations regular  Heart: regular rhythm & normal rate and normal S1, S2  Chest Wall: no abnormalities observed  Abdomen: normal bowel sounds and soft, nontender  Extremities: moves extremities well, no edema, no cyanosis  Skin: no bleeding, bruising or rash  Neurologic: Alert, and oriented. No focal deficits    Results Review:    I reviewed the patient's new clinical results.    WBC WBC   Date Value Ref Range Status   02/16/2025 7.65 3.40 - 10.80 10*3/mm3 Final      HGB Hemoglobin   Date Value Ref Range Status   02/16/2025 14.6 12.0 - 15.9 g/dL Final      HCT Hematocrit   Date Value Ref Range Status   02/16/2025 45.7 34.0 - 46.6 % Final      Platelets No results found for: \"LABPLAT\"   MCV MCV " "  Date Value Ref Range Status   02/16/2025 88.2 79.0 - 97.0 fL Final          Sodium Sodium   Date Value Ref Range Status   02/17/2025 139 136 - 145 mmol/L Final   02/16/2025 135 (L) 136 - 145 mmol/L Final   02/14/2025 138 136 - 145 mmol/L Final      Potassium Potassium   Date Value Ref Range Status   02/17/2025 3.7 3.5 - 5.2 mmol/L Final   02/16/2025 4.1 3.5 - 5.2 mmol/L Final     Comment:     Specimen hemolyzed.  Result may be falsely elevated.   02/14/2025 3.9 3.5 - 5.2 mmol/L Final     Comment:     Specimen hemolyzed.  Result may be falsely elevated.   02/14/2025 4.9 3.5 - 5.2 mmol/L Final     Comment:     Result checked        Chloride Chloride   Date Value Ref Range Status   02/17/2025 98 98 - 107 mmol/L Final   02/16/2025 96 (L) 98 - 107 mmol/L Final   02/14/2025 98 98 - 107 mmol/L Final      CO2 CO2   Date Value Ref Range Status   02/17/2025 25.0 22.0 - 29.0 mmol/L Final   02/16/2025 23.5 22.0 - 29.0 mmol/L Final   02/14/2025 25.0 22.0 - 29.0 mmol/L Final      BUN BUN   Date Value Ref Range Status   02/17/2025 27 (H) 6 - 20 mg/dL Final   02/16/2025 23 (H) 6 - 20 mg/dL Final   02/14/2025 20 6 - 20 mg/dL Final      Creatinine Creatinine   Date Value Ref Range Status   02/17/2025 1.88 (H) 0.57 - 1.00 mg/dL Final   02/16/2025 1.33 (H) 0.57 - 1.00 mg/dL Final   02/14/2025 1.27 (H) 0.57 - 1.00 mg/dL Final      Calcium Calcium   Date Value Ref Range Status   02/17/2025 9.6 8.6 - 10.5 mg/dL Final   02/16/2025 9.8 8.6 - 10.5 mg/dL Final   02/14/2025 9.8 8.6 - 10.5 mg/dL Final      PO4 No results found for: \"CAPO4\"   Albumin No results found for: \"ALBUMIN\"   Magnesium No results found for: \"MG\"   Uric Acid No results found for: \"URICACID\"       Imaging Results (Last 72 Hours)       ** No results found for the last 72 hours. **              Results for orders placed during the hospital encounter of 02/10/25    XR Chest PA & Lateral    Narrative  XR CHEST PA AND LATERAL    Date of Exam: 2/10/2025 11:40 PM " EST    Indication: cough    Comparison: 7/7/2024.    Findings:  Mild patchy airspace disease is present within the lung bases bilaterally. No pleural fluid. No pneumothorax. The pulmonary vasculature appears within normal limits. The heart appears enlarged, stable. Stable left subclavian/AICD/pacemaker device.. No  acute osseous abnormality identified.    Impression  Impression:  Mild patchy airspace disease present within the lung bases bilaterally, likely related to pneumonia.        Electronically Signed: Althea Valladares MD  2/10/2025 11:52 PM EST  Workstation ID: [id number]      Results for orders placed during the hospital encounter of 07/07/24    XR Chest 2 View    Narrative  XR CHEST 2 VW    Date of Exam: 7/7/2024 11:40 AM EDT    Indication: Dyspnea and leg swelling with history of CHF    Comparison: 12/9/2023 1119 hours    FINDINGS:  No new consolidations or pleural effusions are observed. The cardiac silhouette and mediastinum are unchanged. Postoperative changes are noted. A cardiac pacemaker/AICD is observed with leads intact. No acute osseous abnormalities are seen.    Impression  No change from the previous study with no evidence for acute cardiopulmonary process.      Electronically Signed: Lino Han MD  7/7/2024 11:48 AM EDT  Workstation ID: [id number]      Results for orders placed during the hospital encounter of 12/08/23    XR Chest PA & Lateral    Narrative  XR CHEST PA AND LATERAL    Date of Exam: 12/9/2023 10:42 AM CST    Indication: elevated d dimer chest pain allery to CT contrast    Comparison: None available.    Findings:  Cardiomediastinal silhouette is unremarkable. There is a 2-lead left-sided pacemaker/AICD. There is minimal left mid to lower lung scarring. No airspace disease, pneumothorax, nor pleural effusion. No acute osseous abnormality identified.    Impression  Impression:  No acute process identified.      Electronically Signed: Inocencio Rodriguez MD  12/9/2023 2:45 PM CST  Workstation  ID: [id number]        Results for orders placed during the hospital encounter of 05/16/20    Duplex Venous Lower Extremity - Bilateral CAR    Interpretation Summary  · Normal bilateral lower extremity venous duplex scan.      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 3 times daily-avoid hypotension  Further workup as deemed necessary  Avoid ACE inhibitors or ARB's for now        I discussed the patient's findings and my recommendations with patient    Will follow along closely. Thank you for allowing us to see this patient in renal consultation.    [facility]  [phone]  MD Adalid Zamora MD  02/17/25  12:06 EST

## 2025-02-17 NOTE — PROGRESS NOTES
[facility] MEDICINE SERVICE  DAILY PROGRESS NOTE    NAME: Rafael Nunes  : 1975  MRN: [id number]      LOS: 5 days     PROVIDER OF SERVICE: Kelby Tolbert MD    Chief Complaint: Pneumonia    Subjective:     Interval History: Patient overall feeling better today.  She states her breathing is improving and she has been able to ambulate with much less dyspnea.        Review of Systems:   Review of Systems    Objective:     Vital Signs  Temp:  [97.6 °F (36.4 °C)-98.3 °F (36.8 °C)] 98.3 °F (36.8 °C)  Heart Rate:  [] 84  Resp:  [10-21] 10  BP: ()/(63-77) 110/77   Body mass index is 32.08 kg/m².    Physical Exam  Physical Exam  General Appearance:  Alert, cooperative, no distress, appears stated age  Head:  Normocephalic, without obvious abnormality, atraumatic  Eyes:  PERRL, conjunctiva/corneas clear, EOM's intact, fundi benign, both eyes  Ears:  Normal TM's and external ear canals, both ears  Nose: Nares normal, septum midline, mucosa normal, no drainage or sinus tenderness  Throat: Lips, mucosa, and tongue normal; teeth and gums normal  Neck: Supple, symmetrical, trachea midline, no adenopathy, thyroid: not enlarged, symmetric, no tenderness/mass/nodules, no carotid bruit or JVD  Lungs:   Clear to auscultation bilaterally, respirations unlabored  Heart:  Regular rate and rhythm, S1, S2 normal, no murmur, rub or gallop  Abdomen:  Soft, non-tender, bowel sounds active all four quadrants,  no masses, no organomegaly  Extremities: Extremities normal, atraumatic, no cyanosis or edema  Pulses: 2+ and symmetric  Skin: Skin color, texture, turgor normal, no rashes or lesions  Neurologic: Normal         Diagnostic Data    Results from last 7 days   Lab Units 25  0516 25  0512 25  0401 25  0450 02/10/25  9392   WBC 10*3/mm3  --   --  7.65  --  7.61   HEMOGLOBIN g/dL  --   --  14.6  --  13.4   HEMATOCRIT %  --   --  45.7  --  41.9   PLATELETS 10*3/mm3  --   --  269  --   225   GLUCOSE mg/dL 126*   < >  --    < > 126*   CREATININE mg/dL 1.88*   < >  --    < > 1.29*   BUN mg/dL 27*   < >  --    < > 18   SODIUM mmol/L 139   < >  --    < > 138   POTASSIUM mmol/L 3.7   < >  --    < > 3.3*   AST (SGOT) U/L  --   --   --   --  26   ALT (SGPT) U/L  --   --   --   --  17   ALK PHOS U/L  --   --   --   --  94   BILIRUBIN mg/dL  --   --   --   --  0.9   ANION GAP mmol/L 16.0*   < >  --    < > 11.5    < > = values in this interval not displayed.       No radiology results for the last day      I reviewed the patient's new clinical results.    Assessment/Plan:     Active and Resolved Problems  Active Hospital Problems    Diagnosis  POA    **Pneumonia [J18.9]  Yes      Resolved Hospital Problems   No resolved problems to display.       Acute community-acquired bacterial pneumonia, unspecified organism  -Clinically improving  -Completed course of antibiotics    Acute on chronic systolic heart failure  -Diuresed well with improvement in respiratory symptoms  -Slight increase in creatinine; I have held diuretics today  -Monitor renal function today and likely restart oral diuretics tomorrow if improvement in renal function  -Nephrology consult  -Cardiology consult appreciated    LINSEY on CKD stage III  -Patient has baseline renal dysfunction with creatinine around 1.3 at baseline  -Increased creatinine today likely secondary to overdiuresis  -Holding diuretics and monitor renal function  Nephrology consult appreciated    CAD  -Continue GDMT    Acute hypokalemia  -Repleted    Hypertension  -Continue medications for BP control    Dyslipidemia  -Continue statin    Hypothyroidism  -Continue Synthroid        VTE Prophylaxis:  Pharmacologic VTE prophylaxis orders are present.             Disposition Planning:     Barriers to Discharge: Improvement in renal function  Anticipated Date of Discharge: 2/18  Place of Discharge: Home      Time: 45 minutes     Code Status and Medical Interventions: CPR (Attempt to  Resuscitate); Full Support   Ordered at: 02/16/25 0223     Code Status (Patient has no pulse and is not breathing):    CPR (Attempt to Resuscitate)     Medical Interventions (Patient has pulse or is breathing):    Full Support       Signature: Electronically signed by Kelby Tolbert MD, 02/17/25, 14:21 EST.  [facility]

## 2025-02-17 NOTE — PROGRESS NOTES
Cardiology Progress Note-EP      Patient Care Team:  Phani Adames MD as PCP - General (Internal Medicine)  Ashish Smalls MD as Consulting Physician (Nephrology)  Wayne Luna MD as Consulting Physician (Cardiology)  Héctor Eckert MD as Consulting Physician (Cardiac Electrophysiology)  Lane Stock MD as Surgeon (Cardiothoracic Surgery)    PATIENT IDENTIFICATION  Name: Rafael Nunes  Age: 49 y.o.  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  She is lying supine in bed asleep upon entry with normal respiratory effort.  She appears to not feel well, but denies any specific symptoms of angina or shortness of breath.  Rhythm sinus with intermittently paced beats.  Blood pressure 110/77.  She is on room air.      SUBJECTIVE    Reports not feeling well  No chest discomfort  No shortness of breath  No GI complaints      REVIEW OF SYSTEMS:  Pertinent items are noted in HPI, all other systems reviewed and negative    OBJECTIVE   Creatinine 1.88 (1.33)      ASSESSMENT  Acute on chronic heart failure with midrange ejection fraction  ?  Pneumonia  Status post Biotronik dual-chamber ICD implant   Valvular heart disease-severe MR/mild AS  Diastolic dysfunction-grade 2  LINSEY on CKD 3  Diabetes mellitus 2  Multivessel coronary artery disease status post prior CABG  Primary hypertension  Dyslipidemia        RECOMMENDATIONS  Diuretic on hold due to worsening renal function.  Per documentation-she is currently -1.2L  Continue aspirin, nitrate, BB, statin  Nephrology has been consulted-appreciate their recs        CHF Guideline Directed Medical Therapy  Beta Blocker:   ARNI/ACE/ARB:  "  SGLT 2 inhibitors:   Diuretics:   Aldosterone Antagonist:   Vasodilators & Nitrates:     Vital Signs  Visit Vitals  /77 (BP Location: Left arm, Patient Position: Lying)   Pulse 79   Temp 98.3 °F (36.8 °C) (Oral)   Resp 14   Ht 170.2 cm (67\")   Wt 92.9 kg (204 lb 12.9 oz)   LMP  (LMP Unknown)   SpO2 93%   BMI 32.08 kg/m²     Oxygen Therapy  SpO2: 93 %  Pulse Oximetry Type: Continuous  Device (Oxygen Therapy): room air  Oxygen Concentration (%): 21  Flowsheet Rows      Flowsheet Row First Filed Value   Admission Height 170.2 cm (67\") Documented at 02/10/2025 2326   Admission Weight 89.3 kg (196 lb 14.4 oz) Documented at 02/10/2025 2326          Intake & Output (last 3 days)         02/14 0701  02/15 0700 02/15 0701  02/16 0700 02/16 0701  02/17 0700 02/17 0701  02/18 0700    P.O. 1920 1360 720     Total Intake(mL/kg) 1920 (22.2) 1360 (15.1) 720 (7.8)     Urine (mL/kg/hr) 2500 (1.2) 2250 (1) 1750 (0.8)     Stool  0      Total Output 2500 2250 1750     Net -580 -890 -1030             Urine Unmeasured Occurrence  0 x      Stool Unmeasured Occurrence  0 x            Lines, Drains & Airways       Active LDAs       Name Placement date Placement time Site Days    Peripheral IV 02/13/25 0915 Anterior;Left Forearm 02/13/25  0915  Forearm  4                           /77 (BP Location: Left arm, Patient Position: Lying)   Pulse 79   Temp 98.3 °F (36.8 °C) (Oral)   Resp 14   Ht 170.2 cm (67\")   Wt 92.9 kg (204 lb 12.9 oz)   LMP  (LMP Unknown)   SpO2 93%   BMI 32.08 kg/m²   Intake/Output last 3 shifts:  I/O last 3 completed shifts:  In: 720 [P.O.:720]  Out: 2250 [Urine:2250]  Intake/Output this shift:  No intake/output data recorded.    PHYSICAL EXAM:    General: Alert, cooperative, no distress, appears stated age  Head:  Normocephalic, atraumatic, mucous membranes moist  Eyes:  Conjunctivae/corneas clear, EOM's intact     Neck:  Supple,  no adenopathy; no JVD or bruit  Lungs:  Clear to auscultation " bilaterally, no wheezes, rhonchi or rales are noted  Chest wall: No tenderness  Heart::  Regular rate and rhythm, S1 and S2 normal, no murmur, rub or gallop  Abdomen: Soft, nontender, nondistended, bowel sounds active  Extremities: No cyanosis, clubbing, or edema   Pulses: 2+ and symmetric all extremities  Skin:  No rashes or lesions  Neuro/psych: A&O x3. CN II through XII are grossly intact with appropriate affect    Scheduled Meds:      aspirin, 81 mg, Oral, Daily  ferrous sulfate, 325 mg, Oral, Daily With Breakfast  heparin (porcine), 5,000 Units, Subcutaneous, Q8H  hydrALAZINE, 25 mg, Oral, TID  ipratropium-albuterol, 3 mL, Nebulization, 4x Daily - RT  isosorbide mononitrate, 30 mg, Oral, Q24H  levothyroxine, 100 mcg, Oral, Q AM  metoprolol succinate XL, 50 mg, Oral, Q24H  rosuvastatin, 10 mg, Oral, Nightly  [Held by provider] torsemide, 60 mg, Oral, Daily        Continuous Infusions:         PRN Meds:      albuterol    benzonatate    Calcium Replacement - Follow Nurse / BPA Driven Protocol    dextrose    dextrose    diphenhydrAMINE    glucagon (human recombinant)    influenza vaccine    Magnesium Standard Dose Replacement - Follow Nurse / BPA Driven Protocol    nitroglycerin    ondansetron    oxyCODONE    Phosphorus Replacement - Follow Nurse / BPA Driven Protocol    Potassium Replacement - Follow Nurse / BPA Driven Protocol    sodium chloride        Results Review:     I reviewed the patient's new clinical results.    CBC    Results from last 7 days   Lab Units 02/16/25  0401 02/10/25  2352   WBC 10*3/mm3 7.65 7.61   HEMOGLOBIN g/dL 14.6 13.4   PLATELETS 10*3/mm3 269 225     Cr Clearance Estimated Creatinine Clearance: 41.4 mL/min (A) (by C-G formula based on SCr of 1.88 mg/dL (H)).  Coag     HbA1C   Lab Results   Component Value Date    HGBA1C 6 (H) 08/22/2022    HGBA1C 6.2 (H) 05/31/2021    HGBA1C 6.0 (H) 01/18/2021     Blood Glucose   Glucose   Date/Time Value Ref Range Status   02/17/2025 0833 126 (H) 70 -  "105 mg/dL Final     Comment:     Serial Number: [id number]:  [id number]   02/16/2025 2056 170 (H) 70 - 105 mg/dL Final     Comment:     Serial Number: [id number]:  [id number]   02/16/2025 1657 123 (H) 70 - 105 mg/dL Final     Comment:     Serial Number: [id number]:  [id number]   02/16/2025 1248 131 (H) 70 - 105 mg/dL Final     Comment:     Serial Number: [id number]:  [id number]   02/16/2025 0758 169 (H) 70 - 105 mg/dL Final     Comment:     Serial Number: [id number]:  [id number]   02/15/2025 1645 138 (H) 70 - 105 mg/dL Final     Comment:     Serial Number: [id number]:  [id number]   02/15/2025 1113 119 (H) 70 - 105 mg/dL Final     Comment:     Serial Number: [id number]:  [id number]   02/15/2025 0657 122 (H) 70 - 105 mg/dL Final     Comment:     Serial Number: [id number]:  [id number]     Infection   Results from last 7 days   Lab Units 02/11/25  0055   PROCALCITONIN ng/mL 0.06     CMP   Results from last 7 days   Lab Units 02/17/25  0516 02/16/25  0512 02/14/25  2347 02/14/25  1554 02/14/25  0411 02/13/25  0328 02/12/25  0450 02/10/25  2352   SODIUM mmol/L 139 135* 138  --  138 138 138 138   POTASSIUM mmol/L 3.7 4.1 3.9 4.9 3.5 3.8 3.5 3.3*   CHLORIDE mmol/L 98 96* 98  --  99 102 104 103   CO2 mmol/L 25.0 23.5 25.0  --  25.6 25.6 23.2 23.5   BUN mg/dL 27* 23* 20  --  19 15 16 18   CREATININE mg/dL 1.88* 1.33* 1.27*  --  1.18* 1.24* 1.24* 1.29*   GLUCOSE mg/dL 126* 115* 124*  --  116* 98 140* 126*   ALBUMIN g/dL  --   --   --   --   --   --   --  4.3   BILIRUBIN mg/dL  --   --   --   --   --   --   --  0.9   ALK PHOS U/L  --   --   --   --   --   --   --  94   AST (SGOT) U/L  --   --   --   --   --   --   --  26   ALT (SGPT) U/L  --   --   --   --   --   --   --  17     ABG      UA      SEMAJ  No results found for: \"POCMETH\", \"POCAMPHET\", \"POCBARBITUR\", \"POCBENZO\", \"POCCOCAINE\", \"POCOPIATES\", \"POCOXYCODO\", \"POCPHENCYC\", \"POCPROPOXY\", \"POCTHC\", \"POCTRICYC\"  Lysis Labs "   Results from last 7 days   Lab Units 02/17/25  0516 02/16/25  0512 02/16/25  0401 02/14/25  2347 02/14/25  0411 02/13/25  0328 02/12/25  0450 02/10/25  2352   HEMOGLOBIN g/dL  --   --  14.6  --   --   --   --  13.4   PLATELETS 10*3/mm3  --   --  269  --   --   --   --  225   CREATININE mg/dL 1.88* 1.33*  --  1.27* 1.18* 1.24* 1.24* 1.29*     Radiology(recent) No radiology results for the last day      Results from last 7 days   Lab Units 02/11/25  0055   HSTROP T ng/L 29*       X-rays, labs reviewed personally by physician.    ECG/EMG Results (most recent)       Procedure Component Value Units Date/Time    Telemetry Scan [[id number]] Resulted: 02/10/25     Updated: 02/11/25 1158    ECG 12 Lead Chest Pain [[id number]] Collected: 02/10/25 2349     Updated: 02/11/25 1329     QT Interval 391 ms      QTC Interval 492 ms     Narrative:      HEART RATE=95  bpm  RR Lqpjzyil=351  ms  MA Fwpvqeiz=992  ms  P Horizontal Axis=-10  deg  P Front Axis=60  deg  QRSD Sxthhslp=569  ms  QT Cntriysn=063  ms  FJmN=835  ms  QRS Axis=-44  deg  T Wave Axis=104  deg  - ABNORMAL ECG -  Sinus rhythm  LAE, consider biatrial enlargement  Left anterior fascicular block  Left ventricular hypertrophy  Abnormal T, consider ischemia, lateral leads  When compared with ECG of 07-Jul-2024 14:07:56,  Significant change in rhythm  New or worsened ischemia or infarction  Electronically Signed By: Tanisha Schultz (JAY JAY) 2025-02-11 13:28:45  Date and Time of Study:2025-02-10 23:49:27    Telemetry Scan [[id number]] Resulted: 02/10/25     Updated: 02/12/25 0811    Telemetry Scan [[id number]] Resulted: 02/10/25     Updated: 02/12/25 1001    Adult Transthoracic Echo Complete W/ Cont if Necessary Per Protocol [[id number]] Resulted: 02/12/25 1319     Updated: 02/12/25 1319     LV GLOBAL STRAIN  -10.1 %      LVIDd 6.1 cm      LVIDs 4.9 cm      IVSd 0.90 cm      LVPWd 1.00 cm      FS 19.7 %      IVS/LVPW 0.90 cm      ESV(cubed) 117.6 ml      LV Sys Vol (BSA corrected)  59.9 cm2      EDV(cubed) 227.0 ml      LV Valdez Vol (BSA corrected) 91.3 cm2      LV mass(C)d 237.7 grams      LVOT area 1.77 cm2      LVOT diam 1.50 cm      EDV(MOD-sp4) 183.0 ml      ESV(MOD-sp4) 120.0 ml      SV(MOD-sp4) 63.0 ml      SVi(MOD-SP4) 31.4 ml/m2      SVi (LVOT) 14.5 ml/m2      EF(MOD-sp4) 34.4 %      MV E max fabrizio 149.0 cm/sec      MV A max fabrizio 104.0 cm/sec      MV dec time 0.23 sec      MV E/A 1.43     LA ESV Index (BP) 49.0 ml/m2      Med Peak E' Fabrizio 4.8 cm/sec      Lat Peak E' Fabrizio 6.1 cm/sec      TR max fabrizio 218.0 cm/sec      Avg E/e' ratio 27.34     SV(LVOT) 29.2 ml      RVIDd 4.4 cm      TAPSE (>1.6) 1.24 cm      RV S' 7.3 cm/sec      LA dimension (2D)  4.4 cm      LV V1 max 99.6 cm/sec      LV V1 max PG 4.0 mmHg      LV V1 mean PG 2.00 mmHg      LV V1 VTI 16.5 cm      Ao pk fabrizio 281.0 cm/sec      Ao max PG 31.6 mmHg      Ao mean PG 16.0 mmHg      Ao V2 VTI 52.1 cm      PAUL(I,D) 0.56 cm2      MV max PG 16.0 mmHg      MV mean PG 6.0 mmHg      MV V2 VTI 48.1 cm      MV P1/2t 102.3 msec      MVA(P1/2t) 2.15 cm2      MVA(VTI) 0.61 cm2      MV dec slope 564.0 cm/sec2      MR max fabrizio 507.0 cm/sec      MR max .8 mmHg      MR mean fabrizio 379.0 cm/sec      MR mean PG 65.0 mmHg      MR .0 cm      TR max PG 19.0 mmHg      RVSP(TR) 22.0 mmHg      RAP systole 3.0 mmHg      RV V1 max PG 1.14 mmHg      RV V1 max 53.5 cm/sec      RV V1 VTI 9.9 cm      PA V2 max 74.1 cm/sec      PA acc time 0.09 sec      Sinus 2.10 cm      STJ 1.60 cm      EF(MOD-bp) 34.0 %      Echo EF Estimated 38.0 %     Narrative:        Left ventricular systolic function is moderately decreased. Estimated   left ventricular EF = 38%    The left ventricular cavity is mild to moderately dilated.    Left ventricular wall thickness is consistent with mild to moderate   eccentric hypertrophy.    Left ventricular diastolic function is consistent with (grade II w/high   LAP) pseudonormalization and age.    The right ventricular cavity is  mildly dilated.    The left atrial cavity is moderate to severely dilated.    The right atrial cavity is borderline dilated.    Mild aortic valve stenosis is present.    Abnormal mitral valve structure consistent with dilated annulus.    Moderate to severe mitral valve regurgitation is present.    Estimated right ventricular systolic pressure from tricuspid   regurgitation is normal (<35 mmHg).    Transthoracic echocardiography reveals dilated LV with eccentric LVH with   EF between 36 to 40%  Severe left atrial enlargement and the mitral valve apparatus calcified   and appears rheumatic without mitral stenosis and moderate to severe MR   directed laterally and posteriorly  Aortic valve is calcified and not well-visualized with a mean gradient of   16 mmHg.  Mild TR without pulmonary hypertension  No effusion      Electronically signed by Héctor Eckert MD, 02/12/25, 1:19 PM EST.    Telemetry Scan [[id number]] Resulted: 02/10/25     Updated: 02/12/25 1359    Telemetry Scan [[id number]] Resulted: 02/10/25     Updated: 02/13/25 0841    Telemetry Scan [[id number]] Resulted: 02/10/25     Updated: 02/13/25 0906    Telemetry Scan [[id number]] Resulted: 02/10/25     Updated: 02/13/25 0953    Telemetry Scan [[id number]] Resulted: 02/10/25     Updated: 02/13/25 1231    Telemetry Scan [[id number]] Resulted: 02/10/25     Updated: 02/14/25 0833    Telemetry Scan [[id number]] Resulted: 02/10/25     Updated: 02/14/25 0841    Telemetry Scan [[id number]] Resulted: 02/10/25     Updated: 02/14/25 0854    Telemetry Scan [[id number]] Resulted: 02/10/25     Updated: 02/14/25 1042    ECG 12 Lead ED Triage Standing Order; SOA [[id number]] Collected: 02/11/25 1122     Updated: 02/15/25 1355     QT Interval 410 ms      QTC Interval 491 ms     Narrative:      HEART RATE=86  bpm  RR Pyyjtzlp=573  ms  FL Ooqxfyrs=256  ms  P Horizontal Axis=-16  deg  P Front Axis=68  deg  QRSD Xhtwwsqw=546  ms  QT Uhmusotn=949  ms  JRfC=871  ms  QRS Axis=-47  deg  T  Wave Axis=98  deg  - ABNORMAL ECG -  Sinus rhythm  LAE, consider biatrial enlargement  Left anterior fascicular block  Left ventricular hypertrophy  Nonspecific  T abnormalities, lateral leads  When compared with ECG of 07-Jul-2024 14:07:56,  Significant change in rhythm  Electronically Signed By: Chava Hester ([facility]) 2025-02-15 13:55:10  Date and Time of Study:2025-02-11 11:22:00    Telemetry Scan [[id number]] Resulted: 02/10/25     Updated: 02/17/25 0814    Telemetry Scan [[id number]] Resulted: 02/10/25     Updated: 02/17/25 0905    Telemetry Scan [[id number]] Resulted: 02/10/25     Updated: 02/17/25 0942    Telemetry Scan [[id number]] Resulted: 02/10/25     Updated: 02/17/25 1005    Telemetry Scan [[id number]] Resulted: 02/10/25     Updated: 02/17/25 1021    Telemetry Scan [[id number]] Resulted: 02/10/25     Updated: 02/17/25 1052    Telemetry Scan [[id number]] Resulted: 02/10/25     Updated: 02/17/25 1129    Telemetry Scan [[id number]] Resulted: 02/10/25     Updated: 02/17/25 1139    Telemetry Scan [[id number]] Resulted: 02/10/25     Updated: 02/17/25 1150              Medication Review:   I have reviewed the patient's current medication list  Scheduled Meds:aspirin, 81 mg, Oral, Daily  ferrous sulfate, 325 mg, Oral, Daily With Breakfast  heparin (porcine), 5,000 Units, Subcutaneous, Q8H  hydrALAZINE, 25 mg, Oral, TID  ipratropium-albuterol, 3 mL, Nebulization, 4x Daily - RT  isosorbide mononitrate, 30 mg, Oral, Q24H  levothyroxine, 100 mcg, Oral, Q AM  metoprolol succinate XL, 50 mg, Oral, Q24H  rosuvastatin, 10 mg, Oral, Nightly  [Held by provider] torsemide, 60 mg, Oral, Daily      Continuous Infusions:   PRN Meds:.  albuterol    benzonatate    Calcium Replacement - Follow Nurse / BPA Driven Protocol    dextrose    dextrose    diphenhydrAMINE    glucagon (human recombinant)    influenza vaccine    Magnesium Standard Dose Replacement - Follow Nurse / BPA Driven Protocol    nitroglycerin    ondansetron    oxyCODONE     "Phosphorus Replacement - Follow Nurse / BPA Driven Protocol    Potassium Replacement - Follow Nurse / BPA Driven Protocol    sodium chloride    Imaging:  Imaging Results (Last 72 Hours)       ** No results found for the last 72 hours. **              ALEX Hua  02/17/25  12:18 EST      EMR Dragon/Transcription:   \"Dictated utilizing Dragon dictation\".        Electronically signed by LAEX Hua, 02/17/25, 12:18 PM EST.  Copied text in this note has been reviewed by me and is accurate as of 02/17/25.      Patient seen and examined  Slow clinical improvement  Diuretics held because of elevated creatinine      Physical Exam    General:      well developed, well nourished, in no acute distress.    Head:      normocephalic and atraumatic.    Eyes:      PERRL/EOM intact, conjunctivae and sclerae clear without nystagmus.    Neck:      no  thyromegaly, trachea central with normal respiratory effort  Lungs:      clear bilaterally to auscultation.    Heart:       regular rate and rhythm, S1, S2 without murmurs, rubs, or gallops  Skin:      intact without lesions or rashes.    Psych:      alert and cooperative; normal mood and affect; normal attention span and concentration.        Monitor renal panel tomorrow  If stable patient can be discharged home  Severe hypothyroidism restarted on levothyroxine  Hypertension controlled  Dual-chamber ICD in situ      Electronically signed by Héctor Eckert MD, 02/17/25, 4:51 PM EST.      "

## 2025-02-17 NOTE — CASE MANAGEMENT/SOCIAL WORK
Continued Stay Note  MIRIAM Olivares     Patient Name: Rafael Nunes  MRN: [id number]  Today's Date: 2/17/2025    Admit Date: 2/10/2025    Plan: DC PLAN: Routine home with family     Discharge Plan       Row Name 02/17/25 1516       Plan    Plan DC PLAN: Routine home with family      Patient/Family in Agreement with Plan yes    Plan Comments DC BARRIERS: Elevated BUN/CRT. Nephrology following. IV Diuretics, Cardiology following. Anticipate discharge tomorrow. 2/18.                      Expected Discharge Date and Time       Expected Discharge Date Expected Discharge Time    Feb 18, 2025           Emma Mejía RN    Case Management  [phone]

## 2025-02-17 NOTE — PLAN OF CARE
Goal Outcome Evaluation:   Pt is alert and oriented. Pt is on IV diuretics. Pt denies pain/soa at this time.

## 2025-02-18 ENCOUNTER — READMISSION MANAGEMENT (OUTPATIENT)
Dept: CALL CENTER | Facility: HOSPITAL | Age: 50
End: 2025-02-18
Payer: MEDICARE

## 2025-02-18 VITALS
SYSTOLIC BLOOD PRESSURE: 96 MMHG | RESPIRATION RATE: 16 BRPM | WEIGHT: 204.81 LBS | OXYGEN SATURATION: 99 % | BODY MASS INDEX: 32.15 KG/M2 | HEIGHT: 67 IN | HEART RATE: 70 BPM | DIASTOLIC BLOOD PRESSURE: 65 MMHG | TEMPERATURE: 98.4 F

## 2025-02-18 LAB
ALBUMIN SERPL-MCNC: 4.1 G/DL (ref 3.5–5.2)
ANION GAP SERPL CALCULATED.3IONS-SCNC: 12.7 MMOL/L (ref 5–15)
BUN SERPL-MCNC: 27 MG/DL (ref 6–20)
BUN/CREAT SERPL: 14.7 (ref 7–25)
CALCIUM SPEC-SCNC: 9.4 MG/DL (ref 8.6–10.5)
CHLORIDE SERPL-SCNC: 98 MMOL/L (ref 98–107)
CO2 SERPL-SCNC: 26.3 MMOL/L (ref 22–29)
CREAT SERPL-MCNC: 1.84 MG/DL (ref 0.57–1)
CREAT UR-MCNC: 98.5 MG/DL
EGFRCR SERPLBLD CKD-EPI 2021: 32.9 ML/MIN/1.73
GLUCOSE BLDC GLUCOMTR-MCNC: 131 MG/DL (ref 70–105)
GLUCOSE BLDC GLUCOMTR-MCNC: 133 MG/DL (ref 70–105)
GLUCOSE SERPL-MCNC: 128 MG/DL (ref 65–99)
HBA1C MFR BLD: 6.04 % (ref 4.8–5.6)
MAGNESIUM SERPL-MCNC: 2.5 MG/DL (ref 1.6–2.6)
PHOSPHATE SERPL-MCNC: 4.6 MG/DL (ref 2.5–4.5)
POTASSIUM SERPL-SCNC: 3.4 MMOL/L (ref 3.5–5.2)
PROT ?TM UR-MCNC: 14.5 MG/DL
PROT/CREAT UR: 147.2 MG/G CREA (ref 0–200)
SODIUM SERPL-SCNC: 137 MMOL/L (ref 136–145)
URATE SERPL-MCNC: 11.2 MG/DL (ref 2.4–5.7)

## 2025-02-18 PROCEDURE — 94761 N-INVAS EAR/PLS OXIMETRY MLT: CPT

## 2025-02-18 PROCEDURE — 25810000003 SODIUM CHLORIDE 0.9 % SOLUTION: Performed by: INTERNAL MEDICINE

## 2025-02-18 PROCEDURE — 83036 HEMOGLOBIN GLYCOSYLATED A1C: CPT | Performed by: INTERNAL MEDICINE

## 2025-02-18 PROCEDURE — 84550 ASSAY OF BLOOD/URIC ACID: CPT | Performed by: INTERNAL MEDICINE

## 2025-02-18 PROCEDURE — 94799 UNLISTED PULMONARY SVC/PX: CPT

## 2025-02-18 PROCEDURE — 82948 REAGENT STRIP/BLOOD GLUCOSE: CPT

## 2025-02-18 PROCEDURE — 94664 DEMO&/EVAL PT USE INHALER: CPT

## 2025-02-18 PROCEDURE — 25010000002 HEPARIN (PORCINE) PER 1000 UNITS: Performed by: STUDENT IN AN ORGANIZED HEALTH CARE EDUCATION/TRAINING PROGRAM

## 2025-02-18 PROCEDURE — 80069 RENAL FUNCTION PANEL: CPT | Performed by: INTERNAL MEDICINE

## 2025-02-18 PROCEDURE — 83735 ASSAY OF MAGNESIUM: CPT | Performed by: INTERNAL MEDICINE

## 2025-02-18 RX ORDER — HYDRALAZINE HYDROCHLORIDE 10 MG/1
10 TABLET, FILM COATED ORAL 3 TIMES DAILY
Qty: 90 TABLET | Refills: 2 | Status: SHIPPED | OUTPATIENT
Start: 2025-02-18

## 2025-02-18 RX ORDER — TORSEMIDE 20 MG/1
40 TABLET ORAL DAILY
Qty: 180 TABLET | Refills: 0 | Status: SHIPPED | OUTPATIENT
Start: 2025-02-18

## 2025-02-18 RX ORDER — ASPIRIN 81 MG/1
81 TABLET ORAL DAILY
Start: 2025-02-19

## 2025-02-18 RX ORDER — POTASSIUM CHLORIDE 1500 MG/1
40 TABLET, EXTENDED RELEASE ORAL EVERY 4 HOURS
Status: COMPLETED | OUTPATIENT
Start: 2025-02-18 | End: 2025-02-18

## 2025-02-18 RX ORDER — METOPROLOL SUCCINATE 25 MG/1
25 TABLET, EXTENDED RELEASE ORAL
Qty: 90 TABLET | Refills: 0 | Status: SHIPPED | OUTPATIENT
Start: 2025-02-19

## 2025-02-18 RX ORDER — LEVOTHYROXINE SODIUM 100 UG/1
100 TABLET ORAL
Qty: 90 TABLET | Refills: 0 | Status: SHIPPED | OUTPATIENT
Start: 2025-02-19

## 2025-02-18 RX ORDER — ISOSORBIDE MONONITRATE 30 MG/1
30 TABLET, EXTENDED RELEASE ORAL
Qty: 90 TABLET | Refills: 0 | Status: SHIPPED | OUTPATIENT
Start: 2025-02-19

## 2025-02-18 RX ADMIN — SODIUM CHLORIDE 500 ML: 0.9 INJECTION, SOLUTION INTRAVENOUS at 12:05

## 2025-02-18 RX ADMIN — HYDRALAZINE HYDROCHLORIDE 10 MG: 10 TABLET ORAL at 09:18

## 2025-02-18 RX ADMIN — ISOSORBIDE MONONITRATE 30 MG: 30 TABLET, EXTENDED RELEASE ORAL at 09:18

## 2025-02-18 RX ADMIN — HEPARIN SODIUM 5000 UNITS: 5000 INJECTION INTRAVENOUS; SUBCUTANEOUS at 06:07

## 2025-02-18 RX ADMIN — POTASSIUM CHLORIDE 40 MEQ: 1500 TABLET, EXTENDED RELEASE ORAL at 11:54

## 2025-02-18 RX ADMIN — POTASSIUM CHLORIDE 40 MEQ: 1500 TABLET, EXTENDED RELEASE ORAL at 06:09

## 2025-02-18 RX ADMIN — IPRATROPIUM BROMIDE AND ALBUTEROL SULFATE 3 ML: .5; 3 SOLUTION RESPIRATORY (INHALATION) at 07:00

## 2025-02-18 RX ADMIN — FERROUS SULFATE TAB 325 MG (65 MG ELEMENTAL FE) 325 MG: 325 (65 FE) TAB at 09:18

## 2025-02-18 RX ADMIN — LEVOTHYROXINE SODIUM 100 MCG: 100 TABLET ORAL at 06:06

## 2025-02-18 RX ADMIN — METOPROLOL SUCCINATE 50 MG: 50 TABLET, EXTENDED RELEASE ORAL at 09:18

## 2025-02-18 RX ADMIN — ASPIRIN 81 MG: 81 TABLET, COATED ORAL at 09:18

## 2025-02-18 NOTE — PROGRESS NOTES
"NEPHROLOGY PROGRESS NOTE------[facility]    [facility]  [phone]  Adalid Sterling MD      Patient Care Team:  Phani Adames MD as PCP - General (Internal Medicine)  Ashish Smalls MD as Consulting Physician (Nephrology)  Wayne Luna MD as Consulting Physician (Cardiology)  Héctor Eckert MD as Consulting Physician (Cardiac Electrophysiology)  Lane Stock MD as Surgeon (Cardiothoracic Surgery)      Provider:  Adalid Sterling MD  Patient Name: Rafael Nunes  :  1975    SUBJECTIVE:  F/U LINSEY/CKD  No chest pain or SOA    Medication:  aspirin, 81 mg, Oral, Daily  ferrous sulfate, 325 mg, Oral, Daily With Breakfast  heparin (porcine), 5,000 Units, Subcutaneous, Q8H  hydrALAZINE, 10 mg, Oral, TID  ipratropium-albuterol, 3 mL, Nebulization, 4x Daily - RT  isosorbide mononitrate, 30 mg, Oral, Q24H  levothyroxine, 100 mcg, Oral, Q AM  metoprolol succinate XL, 50 mg, Oral, Q24H  rosuvastatin, 10 mg, Oral, Nightly  sodium chloride, 500 mL, Intravenous, Once  [Held by provider] torsemide, 60 mg, Oral, Daily           OBJECTIVE    Vital Sign Min/Max for last 24 hours  Temp  Min: 98 °F (36.7 °C)  Max: 98.4 °F (36.9 °C)   BP  Min: 81/44  Max: 122/83   Pulse  Min: 70  Max: 90   Resp  Min: 14  Max: 20   SpO2  Min: 93 %  Max: 100 %   No data recorded   No data recorded     Flowsheet Rows      Flowsheet Row First Filed Value   Admission Height 170.2 cm (67\") Documented at 02/10/2025 2326   Admission Weight 89.3 kg (196 lb 14.4 oz) Documented at 02/10/2025 2326            No intake/output data recorded.  I/O last 3 completed shifts:  In: 1440 [P.O.:1440]  Out: 850 [Urine:850]    Physical Exam:  General Appearance: alert, appears stated age and cooperative  Head: normocephalic, without obvious abnormality and atraumatic  Eyes: conjunctivae and sclerae normal and no icterus  Neck: supple and no JVD  Lungs: clear to " "auscultation and respirations regular  Heart: regular rhythm & normal rate and normal S1, S2  Chest: Wall no abnormalities observed  Abdomen: normal bowel sounds and soft, nontender  Extremities: moves extremities well, no edema, no cyanosis and no redness  Skin: no bleeding, bruising or rash, turgor normal, color normal and no lesions noted  Neurologic: Alert, and oriented. No focal deficits    Labs:    WBC WBC   Date Value Ref Range Status   02/16/2025 7.65 3.40 - 10.80 10*3/mm3 Final      HGB Hemoglobin   Date Value Ref Range Status   02/16/2025 14.6 12.0 - 15.9 g/dL Final      HCT Hematocrit   Date Value Ref Range Status   02/16/2025 45.7 34.0 - 46.6 % Final      Platelets No results found for: \"LABPLAT\"   MCV MCV   Date Value Ref Range Status   02/16/2025 88.2 79.0 - 97.0 fL Final          Sodium Sodium   Date Value Ref Range Status   02/18/2025 137 136 - 145 mmol/L Final   02/17/2025 139 136 - 145 mmol/L Final   02/16/2025 135 (L) 136 - 145 mmol/L Final      Potassium Potassium   Date Value Ref Range Status   02/18/2025 3.4 (L) 3.5 - 5.2 mmol/L Final   02/17/2025 3.7 3.5 - 5.2 mmol/L Final   02/16/2025 4.1 3.5 - 5.2 mmol/L Final     Comment:     Specimen hemolyzed.  Result may be falsely elevated.      Chloride Chloride   Date Value Ref Range Status   02/18/2025 98 98 - 107 mmol/L Final   02/17/2025 98 98 - 107 mmol/L Final   02/16/2025 96 (L) 98 - 107 mmol/L Final      CO2 CO2   Date Value Ref Range Status   02/18/2025 26.3 22.0 - 29.0 mmol/L Final   02/17/2025 25.0 22.0 - 29.0 mmol/L Final   02/16/2025 23.5 22.0 - 29.0 mmol/L Final      BUN BUN   Date Value Ref Range Status   02/18/2025 27 (H) 6 - 20 mg/dL Final   02/17/2025 27 (H) 6 - 20 mg/dL Final   02/16/2025 23 (H) 6 - 20 mg/dL Final      Creatinine Creatinine   Date Value Ref Range Status   02/18/2025 1.84 (H) 0.57 - 1.00 mg/dL Final   02/17/2025 1.88 (H) 0.57 - 1.00 mg/dL Final   02/16/2025 1.33 (H) 0.57 - 1.00 mg/dL Final      Calcium Calcium   Date " "Value Ref Range Status   02/18/2025 9.4 8.6 - 10.5 mg/dL Final   02/17/2025 9.6 8.6 - 10.5 mg/dL Final   02/16/2025 9.8 8.6 - 10.5 mg/dL Final      PO4 No components found for: \"PO4\"   Albumin Albumin   Date Value Ref Range Status   02/18/2025 4.1 3.5 - 5.2 g/dL Final      Magnesium Magnesium   Date Value Ref Range Status   02/18/2025 2.5 1.6 - 2.6 mg/dL Final      Uric Acid No components found for: \"URIC ACID\"     Imaging Results (Last 72 Hours)       ** No results found for the last 72 hours. **            Results for orders placed during the hospital encounter of 02/10/25    XR Chest PA & Lateral    Narrative  XR CHEST PA AND LATERAL    Date of Exam: 2/10/2025 11:40 PM EST    Indication: cough    Comparison: 7/7/2024.    Findings:  Mild patchy airspace disease is present within the lung bases bilaterally. No pleural fluid. No pneumothorax. The pulmonary vasculature appears within normal limits. The heart appears enlarged, stable. Stable left subclavian/AICD/pacemaker device.. No  acute osseous abnormality identified.    Impression  Impression:  Mild patchy airspace disease present within the lung bases bilaterally, likely related to pneumonia.        Electronically Signed: Althea Valladares MD  2/10/2025 11:52 PM EST  Workstation ID: [id number]      Results for orders placed during the hospital encounter of 07/07/24    XR Chest 2 View    Narrative  XR CHEST 2 VW    Date of Exam: 7/7/2024 11:40 AM EDT    Indication: Dyspnea and leg swelling with history of CHF    Comparison: 12/9/2023 1119 hours    FINDINGS:  No new consolidations or pleural effusions are observed. The cardiac silhouette and mediastinum are unchanged. Postoperative changes are noted. A cardiac pacemaker/AICD is observed with leads intact. No acute osseous abnormalities are seen.    Impression  No change from the previous study with no evidence for acute cardiopulmonary process.      Electronically Signed: Lino Han MD  7/7/2024 11:48 AM " EDT  Workstation ID: [id number]      Results for orders placed during the hospital encounter of 12/08/23    XR Chest PA & Lateral    Narrative  XR CHEST PA AND LATERAL    Date of Exam: 12/9/2023 10:42 AM CST    Indication: elevated d dimer chest pain allery to CT contrast    Comparison: None available.    Findings:  Cardiomediastinal silhouette is unremarkable. There is a 2-lead left-sided pacemaker/AICD. There is minimal left mid to lower lung scarring. No airspace disease, pneumothorax, nor pleural effusion. No acute osseous abnormality identified.    Impression  Impression:  No acute process identified.      Electronically Signed: Inocencio Rodriguez MD  12/9/2023 2:45 PM CST  Workstation ID: [id number]      Results for orders placed during the hospital encounter of 05/16/20    Duplex Venous Lower Extremity - Bilateral CAR    Interpretation Summary  · Normal bilateral lower extremity venous duplex scan.        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

## 2025-02-18 NOTE — DISCHARGE SUMMARY
[facility]  Discharge Summary    Date of Service: 2025  Patient Name: Rafael Nunes  : 1975  MRN: [id number]    Date of Admission: 2/10/2025  Discharge Diagnosis:   Possible community-acquired bacterial pneumonia, unspecified organism  Acute on chronic systolic heart failure  LINSEY on CKD stage III  CAD  Acute hypokalemia  Hypertension  Dyslipidemia  Hypothyroidism  Obesity  Non-MI related elevated Troponin, Type 2 demand ischemia ruled in  Acute hypoxic respiratory failure not supported    Date of Discharge: 2025  Primary Care Physician: Phani Adames MD      Presenting Problem:   Pneumonia [J18.9]  Elevated troponin [R79.89]  History of CHF (congestive heart failure) [Z86.79]  Elevated brain natriuretic peptide (BNP) level [R79.89]  Dyspnea, unspecified type [R06.00]  Chest pain, unspecified type [R07.9]  Pneumonia due to infectious organism, unspecified laterality, unspecified part of lung [J18.9]    Active and Resolved Hospital Problems:  Active Hospital Problems    Diagnosis POA    **Pneumonia [J18.9] Yes      Resolved Hospital Problems   No resolved problems to display.         Hospital Course     HPI:    Patient is a 49-year-old female who presented to the hospital complaint of shortness of breath.  Please see H&P for details.    Hospital Course:  The patient was admitted for possible pneumonia with congestive heart failure exacerbation.  She was started on broad-spectrum IV antibiotics with ceftriaxone and received 3 days worth of treatment, however antibiotics were stopped in favor of possible decompensated heart failure instead.  She was initiated on diuretics as well and had significant improvement in her respiratory status.  She did develop some slight increase in her creatinine from her baseline of around 1.32 up to 1.9.  Diuretics were held with some improvement in her renal function.  The patient has not had any follow-up for her  chronic kidney disease in several years and therefore she was evaluated by nephrology during her hospital stay.  She will follow-up with them as an outpatient on discharge.  She will need repeat BMP in 2 weeks.  Her antihypertensive medications were adjusted for guideline directed medical therapy for her heart failure.  She is stable for discharge.        DISCHARGE Follow Up Recommendations for labs and diagnostics: Follow-up with cardiology in 2 weeks.  Follow-up with nephrology in 2 weeks.        Day of Discharge     Vital Signs:  Temp:  [98 °F (36.7 °C)-98.4 °F (36.9 °C)] 98.4 °F (36.9 °C)  Heart Rate:  [70-90] 70  Resp:  [14-20] 16  BP: ()/(44-83) 96/65    Physical Exam:  Physical Exam   General Appearance:  Alert, cooperative, no distress, appears stated age  Head:  Normocephalic, without obvious abnormality, atraumatic  Eyes:  PERRL, conjunctiva/corneas clear, EOM's intact, fundi benign, both eyes  Ears:  Normal TM's and external ear canals, both ears  Nose: Nares normal, septum midline, mucosa normal, no drainage or sinus tenderness  Throat: Lips, mucosa, and tongue normal; teeth and gums normal  Neck: Supple, symmetrical, trachea midline, no adenopathy, thyroid: not enlarged, symmetric, no tenderness/mass/nodules, no carotid bruit or JVD  Lungs:   Clear to auscultation bilaterally, respirations unlabored  Heart:  Regular rate and rhythm, S1, S2 normal, no murmur, rub or gallop  Abdomen:  Soft, non-tender, bowel sounds active all four quadrants,  no masses, no organomegaly  Extremities: Extremities normal, atraumatic, no cyanosis or edema  Pulses: 2+ and symmetric  Skin: Skin color, texture, turgor normal, no rashes or lesions  Neurologic: Normal        Pertinent  and/or Most Recent Results     LAB RESULTS:      Lab 02/16/25  0401   WBC 7.65   HEMOGLOBIN 14.6   HEMATOCRIT 45.7   PLATELETS 269   MCV 88.2         Lab 02/18/25  0415 02/17/25  0516 02/16/25  0512 02/14/25  2347 02/14/25  1554 02/14/25  0411  02/13/25  0328 02/12/25  0450   SODIUM 137 139 135* 138  --  138   < > 138   POTASSIUM 3.4* 3.7 4.1 3.9 4.9 3.5   < > 3.5   CHLORIDE 98 98 96* 98  --  99   < > 104   CO2 26.3 25.0 23.5 25.0  --  25.6   < > 23.2   ANION GAP 12.7 16.0* 15.5* 15.0  --  13.4   < > 10.8   BUN 27* 27* 23* 20  --  19   < > 16   CREATININE 1.84* 1.88* 1.33* 1.27*  --  1.18*   < > 1.24*   EGFR 32.9* 32.0* 48.5* 51.3*  --  56.0*   < > 52.8*   GLUCOSE 128* 126* 115* 124*  --  116*   < > 140*   CALCIUM 9.4 9.6 9.8 9.8  --  9.7   < > 9.2   MAGNESIUM 2.5  --   --   --   --   --   --  2.2   PHOSPHORUS 4.6*  --   --   --   --   --   --   --    HEMOGLOBIN A1C 6.04*  --   --   --   --   --   --   --     < > = values in this interval not displayed.         Lab 02/18/25  0415   ALBUMIN 4.1                     Brief Urine Lab Results  (Last result in the past 365 days)        Color   Clarity   Blood   Leuk Est   Nitrite   Protein   CREAT   Urine HCG        02/17/25 1750             98.5         02/17/25 1750 Yellow   Clear   Trace   Trace   Negative   Negative                 Microbiology Results (last 10 days)       Procedure Component Value - Date/Time    COVID-19 and FLU A/B PCR, 1 HR TAT - Swab, Nasopharynx [[id number]]  (Normal) Collected: 02/10/25 2328    Lab Status: Final result Specimen: Swab from Nasopharynx Updated: 02/10/25 2349     COVID19 Not Detected     Influenza A PCR Not Detected     Influenza B PCR Not Detected    Narrative:      Fact sheet for providers: https://www.fda.gov/media/343154/download    Fact sheet for patients: https://www.fda.gov/media/186589/download    Test performed by PCR.            XR Chest PA & Lateral    Result Date: 2/10/2025  Impression: Impression: Mild patchy airspace disease present within the lung bases bilaterally, likely related to pneumonia. Electronically Signed: Althea Valladares MD  2/10/2025 11:52 PM EST  Workstation ID: [id number]     Results for orders placed during the hospital encounter of  05/16/20    Duplex Venous Lower Extremity - Bilateral CAR    Interpretation Summary  · Normal bilateral lower extremity venous duplex scan.      Results for orders placed during the hospital encounter of 05/16/20    Duplex Venous Lower Extremity - Bilateral CAR    Interpretation Summary  · Normal bilateral lower extremity venous duplex scan.      Results for orders placed during the hospital encounter of 02/10/25    Adult Transthoracic Echo Complete W/ Cont if Necessary Per Protocol    Interpretation Summary    Left ventricular systolic function is moderately decreased. Estimated left ventricular EF = 38%    The left ventricular cavity is mild to moderately dilated.    Left ventricular wall thickness is consistent with mild to moderate eccentric hypertrophy.    Left ventricular diastolic function is consistent with (grade II w/high LAP) pseudonormalization and age.    The right ventricular cavity is mildly dilated.    The left atrial cavity is moderate to severely dilated.    The right atrial cavity is borderline dilated.    Mild aortic valve stenosis is present.    Abnormal mitral valve structure consistent with dilated annulus.    Moderate to severe mitral valve regurgitation is present.    Estimated right ventricular systolic pressure from tricuspid regurgitation is normal (<35 mmHg).    Transthoracic echocardiography reveals dilated LV with eccentric LVH with EF between 36 to 40%  Severe left atrial enlargement and the mitral valve apparatus calcified and appears rheumatic without mitral stenosis and moderate to severe MR directed laterally and posteriorly  Aortic valve is calcified and not well-visualized with a mean gradient of 16 mmHg.  Mild TR without pulmonary hypertension  No effusion      Electronically signed by Héctor Eckert MD, 02/12/25, 1:19 PM EST.      Labs Pending at Discharge:  Pending Results       Procedure [Order ID] Specimen - Date/Time    Potassium [[id number]]     Specimen: Blood              Procedures Performed           Consults:   Consults       Date and Time Order Name Status Description    2/17/2025 11:51 AM Inpatient Nephrology Consult Completed     2/11/2025  5:46 PM Inpatient Endocrinology Consult Completed     2/11/2025 12:26 PM Inpatient Cardiology Consult Completed               Discharge Details        Discharge Medications        New Medications        Instructions Start Date   aspirin 81 MG EC tablet   81 mg, Oral, Daily   Start Date: February 19, 2025     isosorbide mononitrate 30 MG 24 hr tablet  Commonly known as: IMDUR   30 mg, Oral, Every 24 Hours Scheduled   Start Date: February 19, 2025     levothyroxine 100 MCG tablet  Commonly known as: SYNTHROID, LEVOTHROID   100 mcg, Oral, Every Early Morning   Start Date: February 19, 2025     metoprolol succinate XL 25 MG 24 hr tablet  Commonly known as: TOPROL-XL   25 mg, Oral, Every 24 Hours Scheduled   Start Date: February 19, 2025            Changes to Medications        Instructions Start Date   hydrALAZINE 10 MG tablet  Commonly known as: APRESOLINE  What changed:   medication strength  how much to take   10 mg, Oral, 3 Times Daily      torsemide 20 MG tablet  Commonly known as: DEMADEX  What changed:   how much to take  additional instructions   40 mg, Oral, Daily             Continue These Medications        Instructions Start Date   febuxostat 80 MG tablet tablet  Commonly known as: ULORIC   80 mg, Oral, Daily      oxyCODONE-acetaminophen 7.5-325 MG per tablet  Commonly known as: PERCOCET   1 tablet, Every 6 Hours PRN               Allergies   Allergen Reactions    Hydrocodone Hives    Penicillin G Unknown (See Comments)     Reaction occurred as a baby.      Penicillin interview complete 08/18/2022.    Contrast Dye (Echo Or Unknown Ct/Mr) GI Intolerance     She is pretty sure it's the contrast dye that makes her super sick and vomiting after heart cath    Gadolinium Derivatives Itching    Varenicline Other (See Comments)      Encephalopathy         Discharge Disposition:     Home or Self Care    Diet:  Hospital:  Diet Order   Procedures    Diet: Cardiac; Healthy Heart (2-3 Na+); Fluid Consistency: Thin (IDDSI 0)         Discharge Activity:         CODE STATUS:  Code Status and Medical Interventions: CPR (Attempt to Resuscitate); Full Support   Ordered at: 02/16/25 0223     Code Status (Patient has no pulse and is not breathing):    CPR (Attempt to Resuscitate)     Medical Interventions (Patient has pulse or is breathing):    Full Support         Future Appointments   Date Time Provider Department Center   3/3/2025 11:00 AM Tosin Bardales APRN [facility]       Additional Instructions for the Follow-ups that You Need to Schedule       Discharge Follow-up with Specified Provider: Follow-up with cardiology in 2 weeks.; 2 Weeks   As directed      To: Follow-up with cardiology in 2 weeks.   Follow Up: 2 Weeks        Discharge Follow-up with Specified Provider: Follow-up with nephrology in 2 weeks.; 2 Weeks   As directed      To: Follow-up with nephrology in 2 weeks.   Follow Up: 2 Weeks                Time spent on Discharge including face to face service: Greater than 30 minutes    Signature: Electronically signed by Kelby Tolbert MD, 02/18/25, 13:35 ESTConchita Olivares Hospitalist Team

## 2025-02-18 NOTE — PLAN OF CARE
Problem: Adult Inpatient Plan of Care  Goal: Absence of Hospital-Acquired Illness or Injury  Intervention: Identify and Manage Fall Risk  Recent Flowsheet Documentation  Taken 2/18/2025 0431 by Mandy Reyes RN  Safety Promotion/Fall Prevention:   assistive device/personal items within reach   clutter free environment maintained   fall prevention program maintained   lighting adjusted   nonskid shoes/slippers when out of bed   room organization consistent   safety round/check completed  Taken 2/18/2025 0245 by Mandy Reyes RN  Safety Promotion/Fall Prevention:   assistive device/personal items within reach   clutter free environment maintained   fall prevention program maintained   lighting adjusted   nonskid shoes/slippers when out of bed   room organization consistent   safety round/check completed  Intervention: Prevent Skin Injury  Recent Flowsheet Documentation  Taken 2/18/2025 0431 by Mandy Reyes RN  Body Position: position changed independently  Taken 2/18/2025 0245 by Mandy Reyes RN  Body Position: position changed independently  Intervention: Prevent Infection  Recent Flowsheet Documentation  Taken 2/18/2025 0431 by Mandy Reyes RN  Infection Prevention:   hand hygiene promoted   personal protective equipment utilized  Taken 2/18/2025 0245 by Mandy Reyes RN  Infection Prevention:   hand hygiene promoted   personal protective equipment utilized  Goal: Optimal Comfort and Wellbeing  Intervention: Provide Person-Centered Care  Recent Flowsheet Documentation  Taken 2/18/2025 0431 by Mandy Reyes RN  Trust Relationship/Rapport: care explained     Problem: Pneumonia  Goal: Effective Oxygenation and Ventilation  Intervention: Promote Airway Secretion Clearance  Recent Flowsheet Documentation  Taken 2/18/2025 0431 by Mandy Reyes RN  Activity Management: up ad maria del rosario  Taken 2/18/2025 0245 by Mandy Reyes RN  Activity Management: up ad maria del rosario  Intervention: Optimize Oxygenation and Ventilation  Recent  Flowsheet Documentation  Taken 2/18/2025 0431 by Mandy Reyes RN  Head of Bed (HOB) Positioning: HOB elevated  Taken 2/18/2025 0245 by Mandy Reyes RN  Head of Bed (HOB) Positioning: HOB elevated     Problem: Fall Injury Risk  Goal: Absence of Fall and Fall-Related Injury  Intervention: Identify and Manage Contributors  Recent Flowsheet Documentation  Taken 2/18/2025 0431 by Mandy Reyes RN  Medication Review/Management: medications reviewed  Taken 2/18/2025 0245 by Mandy Reyes RN  Medication Review/Management: medications reviewed  Intervention: Promote Injury-Free Environment  Recent Flowsheet Documentation  Taken 2/18/2025 0431 by Mandy Reyes RN  Safety Promotion/Fall Prevention:   assistive device/personal items within reach   clutter free environment maintained   fall prevention program maintained   lighting adjusted   nonskid shoes/slippers when out of bed   room organization consistent   safety round/check completed  Taken 2/18/2025 0245 by Mandy Reyes RN  Safety Promotion/Fall Prevention:   assistive device/personal items within reach   clutter free environment maintained   fall prevention program maintained   lighting adjusted   nonskid shoes/slippers when out of bed   room organization consistent   safety round/check completed   Goal Outcome Evaluation:      PT with no complaints this shift. Pt resting in bed, call light in reach. Plan of care ongoing.

## 2025-02-18 NOTE — PLAN OF CARE
Problem: Adult Inpatient Plan of Care  Goal: Absence of Hospital-Acquired Illness or Injury  Intervention: Identify and Manage Fall Risk  Recent Flowsheet Documentation  Taken 2/18/2025 1213 by Mandy Reyes, YUE  Safety Promotion/Fall Prevention:   assistive device/personal items within reach   clutter free environment maintained   fall prevention program maintained   lighting adjusted   nonskid shoes/slippers when out of bed   room organization consistent   safety round/check completed  Taken 2/18/2025 1008 by Mandy Reyes, YUE  Safety Promotion/Fall Prevention:   assistive device/personal items within reach   clutter free environment maintained   fall prevention program maintained   lighting adjusted   nonskid shoes/slippers when out of bed   safety round/check completed   room organization consistent  Taken 2/18/2025 0850 by Mandy Reyes RN  Safety Promotion/Fall Prevention:   assistive device/personal items within reach   clutter free environment maintained   fall prevention program maintained   lighting adjusted   nonskid shoes/slippers when out of bed   room organization consistent   safety round/check completed  Taken 2/18/2025 0614 by Mandy Reyes, YUE  Safety Promotion/Fall Prevention:   assistive device/personal items within reach   clutter free environment maintained   fall prevention program maintained   lighting adjusted   nonskid shoes/slippers when out of bed   room organization consistent   safety round/check completed  Taken 2/18/2025 0431 by Mandy Reyes, YUE  Safety Promotion/Fall Prevention:   assistive device/personal items within reach   clutter free environment maintained   fall prevention program maintained   lighting adjusted   nonskid shoes/slippers when out of bed   room organization consistent   safety round/check completed  Taken 2/18/2025 0245 by Mnady Reyes, YUE  Safety Promotion/Fall Prevention:   assistive device/personal items within reach   clutter free environment maintained   fall  prevention program maintained   lighting adjusted   nonskid shoes/slippers when out of bed   room organization consistent   safety round/check completed  Intervention: Prevent Skin Injury  Recent Flowsheet Documentation  Taken 2/18/2025 1213 by Mandy Reyes RN  Body Position: position changed independently  Taken 2/18/2025 1008 by Mandy Reyes RN  Body Position: position changed independently  Taken 2/18/2025 0850 by Mandy Reyes RN  Body Position: position changed independently  Taken 2/18/2025 0614 by Mandy Reyes RN  Body Position: position changed independently  Taken 2/18/2025 0431 by Mandy Reyes RN  Body Position: position changed independently  Taken 2/18/2025 0245 by Mandy Reyes RN  Body Position: position changed independently  Intervention: Prevent Infection  Recent Flowsheet Documentation  Taken 2/18/2025 1213 by Mandy Reyes RN  Infection Prevention:   hand hygiene promoted   personal protective equipment utilized  Taken 2/18/2025 1008 by Mandy Reyes RN  Infection Prevention:   hand hygiene promoted   personal protective equipment utilized  Taken 2/18/2025 0850 by Mandy Reyes RN  Infection Prevention:   hand hygiene promoted   personal protective equipment utilized  Taken 2/18/2025 0614 by Mandy Reyes RN  Infection Prevention:   hand hygiene promoted   personal protective equipment utilized  Taken 2/18/2025 0431 by Mandy Reyes RN  Infection Prevention:   hand hygiene promoted   personal protective equipment utilized  Taken 2/18/2025 0245 by Mandy Reyes RN  Infection Prevention:   hand hygiene promoted   personal protective equipment utilized  Goal: Optimal Comfort and Wellbeing  Intervention: Provide Person-Centered Care  Recent Flowsheet Documentation  Taken 2/18/2025 0431 by Madny Reyes RN  Trust Relationship/Rapport: care explained     Problem: Pneumonia  Goal: Effective Oxygenation and Ventilation  Intervention: Promote Airway Secretion Clearance  Recent Flowsheet  Documentation  Taken 2/18/2025 1213 by Mandy Reyes RN  Activity Management: up ad maria del rosario  Taken 2/18/2025 1008 by Mandy Reyes RN  Activity Management: up ad maria del rosario  Taken 2/18/2025 0850 by Mandy Reyes RN  Activity Management: up ad maria del rosario  Taken 2/18/2025 0614 by Mandy Reyes RN  Activity Management: up ad maria del rosario  Taken 2/18/2025 0431 by Mandy Reyes RN  Activity Management: up ad maria del rosario  Taken 2/18/2025 0245 by Mandy Reyes RN  Activity Management: up ad maria del rosario  Intervention: Optimize Oxygenation and Ventilation  Recent Flowsheet Documentation  Taken 2/18/2025 1213 by Mandy Reyes RN  Head of Bed (HOB) Positioning: HOB elevated  Taken 2/18/2025 1008 by Mandy Reyes RN  Head of Bed (HOB) Positioning: HOB elevated  Taken 2/18/2025 0850 by Mandy Reyes RN  Head of Bed (HOB) Positioning: HOB elevated  Taken 2/18/2025 0614 by Mandy Reyes RN  Head of Bed (HOB) Positioning: HOB elevated  Taken 2/18/2025 0431 by Mandy Reyes RN  Head of Bed (HOB) Positioning: HOB elevated  Taken 2/18/2025 0245 by Mandy Reyes RN  Head of Bed (HOB) Positioning: HOB elevated     Problem: Fall Injury Risk  Goal: Absence of Fall and Fall-Related Injury  Intervention: Identify and Manage Contributors  Recent Flowsheet Documentation  Taken 2/18/2025 1213 by Mandy Reyes RN  Medication Review/Management: medications reviewed  Taken 2/18/2025 1008 by Mandy Reyes RN  Medication Review/Management: medications reviewed  Taken 2/18/2025 0850 by Mandy Reyes RN  Medication Review/Management: medications reviewed  Taken 2/18/2025 0614 by Mandy Reyes RN  Medication Review/Management: medications reviewed  Taken 2/18/2025 0431 by Mandy Reyes RN  Medication Review/Management: medications reviewed  Taken 2/18/2025 0245 by Mandy Reyes RN  Medication Review/Management: medications reviewed  Intervention: Promote Injury-Free Environment  Recent Flowsheet Documentation  Taken 2/18/2025 1213 by Mandy Reyes RN  Safety Promotion/Fall Prevention:    assistive device/personal items within reach   clutter free environment maintained   fall prevention program maintained   lighting adjusted   nonskid shoes/slippers when out of bed   room organization consistent   safety round/check completed  Taken 2/18/2025 1008 by Mandy Reyes RN  Safety Promotion/Fall Prevention:   assistive device/personal items within reach   clutter free environment maintained   fall prevention program maintained   lighting adjusted   nonskid shoes/slippers when out of bed   safety round/check completed   room organization consistent  Taken 2/18/2025 0850 by Mandy Reyes RN  Safety Promotion/Fall Prevention:   assistive device/personal items within reach   clutter free environment maintained   fall prevention program maintained   lighting adjusted   nonskid shoes/slippers when out of bed   room organization consistent   safety round/check completed  Taken 2/18/2025 0614 by Mandy Reyes RN  Safety Promotion/Fall Prevention:   assistive device/personal items within reach   clutter free environment maintained   fall prevention program maintained   lighting adjusted   nonskid shoes/slippers when out of bed   room organization consistent   safety round/check completed  Taken 2/18/2025 0431 by Mandy Reyes RN  Safety Promotion/Fall Prevention:   assistive device/personal items within reach   clutter free environment maintained   fall prevention program maintained   lighting adjusted   nonskid shoes/slippers when out of bed   room organization consistent   safety round/check completed  Taken 2/18/2025 0245 by Mandy Reyes RN  Safety Promotion/Fall Prevention:   assistive device/personal items within reach   clutter free environment maintained   fall prevention program maintained   lighting adjusted   nonskid shoes/slippers when out of bed   room organization consistent   safety round/check completed   Goal Outcome Evaluation:         Pt with DC orders. Awaiting transportation. No complaints at  this time.

## 2025-02-18 NOTE — PLAN OF CARE
Problem: Adult Inpatient Plan of Care  Goal: Absence of Hospital-Acquired Illness or Injury  Intervention: Identify and Manage Fall Risk  Recent Flowsheet Documentation  Taken 2/18/2025 1213 by Mandy Reyes, YUE  Safety Promotion/Fall Prevention:   assistive device/personal items within reach   clutter free environment maintained   fall prevention program maintained   lighting adjusted   nonskid shoes/slippers when out of bed   room organization consistent   safety round/check completed  Taken 2/18/2025 1008 by Mandy Reyes, YUE  Safety Promotion/Fall Prevention:   assistive device/personal items within reach   clutter free environment maintained   fall prevention program maintained   lighting adjusted   nonskid shoes/slippers when out of bed   safety round/check completed   room organization consistent  Taken 2/18/2025 0850 by Mandy Reyes RN  Safety Promotion/Fall Prevention:   assistive device/personal items within reach   clutter free environment maintained   fall prevention program maintained   lighting adjusted   nonskid shoes/slippers when out of bed   room organization consistent   safety round/check completed  Taken 2/18/2025 0614 by Mandy Reyes, YUE  Safety Promotion/Fall Prevention:   assistive device/personal items within reach   clutter free environment maintained   fall prevention program maintained   lighting adjusted   nonskid shoes/slippers when out of bed   room organization consistent   safety round/check completed  Taken 2/18/2025 0431 by Mandy Reyes, YUE  Safety Promotion/Fall Prevention:   assistive device/personal items within reach   clutter free environment maintained   fall prevention program maintained   lighting adjusted   nonskid shoes/slippers when out of bed   room organization consistent   safety round/check completed  Taken 2/18/2025 0245 by Mandy Reyes, YUE  Safety Promotion/Fall Prevention:   assistive device/personal items within reach   clutter free environment maintained   fall  prevention program maintained   lighting adjusted   nonskid shoes/slippers when out of bed   room organization consistent   safety round/check completed  Intervention: Prevent Skin Injury  Recent Flowsheet Documentation  Taken 2/18/2025 1213 by Mandy Reyes RN  Body Position: position changed independently  Taken 2/18/2025 1008 by Mandy Reyes RN  Body Position: position changed independently  Taken 2/18/2025 0850 by Mandy Reyes RN  Body Position: position changed independently  Taken 2/18/2025 0614 by Mandy Reyes RN  Body Position: position changed independently  Taken 2/18/2025 0431 by Mandy Reyes RN  Body Position: position changed independently  Taken 2/18/2025 0245 by Mandy Reyes RN  Body Position: position changed independently  Intervention: Prevent Infection  Recent Flowsheet Documentation  Taken 2/18/2025 1213 by Mandy Reyes RN  Infection Prevention:   hand hygiene promoted   personal protective equipment utilized  Taken 2/18/2025 1008 by Mandy Reyes RN  Infection Prevention:   hand hygiene promoted   personal protective equipment utilized  Taken 2/18/2025 0850 by Mandy Reyes RN  Infection Prevention:   hand hygiene promoted   personal protective equipment utilized  Taken 2/18/2025 0614 by Mandy Reyes RN  Infection Prevention:   hand hygiene promoted   personal protective equipment utilized  Taken 2/18/2025 0431 by Mandy Reyes RN  Infection Prevention:   hand hygiene promoted   personal protective equipment utilized  Taken 2/18/2025 0245 by Mandy Reyes RN  Infection Prevention:   hand hygiene promoted   personal protective equipment utilized  Goal: Optimal Comfort and Wellbeing  Intervention: Provide Person-Centered Care  Recent Flowsheet Documentation  Taken 2/18/2025 0431 by Mandy Reyes RN  Trust Relationship/Rapport: care explained     Problem: Pneumonia  Goal: Effective Oxygenation and Ventilation  Intervention: Promote Airway Secretion Clearance  Recent Flowsheet  Documentation  Taken 2/18/2025 1213 by Mandy Reyes RN  Activity Management: up ad maria del rosario  Taken 2/18/2025 1008 by Mandy Reyes RN  Activity Management: up ad maria del rosario  Taken 2/18/2025 0850 by Mandy Reyes RN  Activity Management: up ad maria del rosario  Taken 2/18/2025 0614 by Mandy Reyes RN  Activity Management: up ad maria del rosario  Taken 2/18/2025 0431 by Mandy Reyes RN  Activity Management: up ad maria del rosario  Taken 2/18/2025 0245 by Mandy Reyes RN  Activity Management: up ad maria dle rosario  Intervention: Optimize Oxygenation and Ventilation  Recent Flowsheet Documentation  Taken 2/18/2025 1213 by Mandy Reyes RN  Head of Bed (HOB) Positioning: HOB elevated  Taken 2/18/2025 1008 by Mandy Reyes RN  Head of Bed (HOB) Positioning: HOB elevated  Taken 2/18/2025 0850 by Mandy Reyes RN  Head of Bed (HOB) Positioning: HOB elevated  Taken 2/18/2025 0614 by Mandy Reyes RN  Head of Bed (HOB) Positioning: HOB elevated  Taken 2/18/2025 0431 by Mandy Reyes RN  Head of Bed (HOB) Positioning: HOB elevated  Taken 2/18/2025 0245 by Mandy Reyes RN  Head of Bed (HOB) Positioning: HOB elevated     Problem: Fall Injury Risk  Goal: Absence of Fall and Fall-Related Injury  Intervention: Identify and Manage Contributors  Recent Flowsheet Documentation  Taken 2/18/2025 1213 by Mandy Reyes RN  Medication Review/Management: medications reviewed  Taken 2/18/2025 1008 by Mandy Reyes RN  Medication Review/Management: medications reviewed  Taken 2/18/2025 0850 by Mandy Reyes RN  Medication Review/Management: medications reviewed  Taken 2/18/2025 0614 by Mandy Reyes RN  Medication Review/Management: medications reviewed  Taken 2/18/2025 0431 by Mandy Reyes RN  Medication Review/Management: medications reviewed  Taken 2/18/2025 0245 by Mandy Reyes RN  Medication Review/Management: medications reviewed  Intervention: Promote Injury-Free Environment  Recent Flowsheet Documentation  Taken 2/18/2025 1213 by Mandy Reyes RN  Safety Promotion/Fall Prevention:    assistive device/personal items within reach   clutter free environment maintained   fall prevention program maintained   lighting adjusted   nonskid shoes/slippers when out of bed   room organization consistent   safety round/check completed  Taken 2/18/2025 1008 by Mandy Reyes RN  Safety Promotion/Fall Prevention:   assistive device/personal items within reach   clutter free environment maintained   fall prevention program maintained   lighting adjusted   nonskid shoes/slippers when out of bed   safety round/check completed   room organization consistent  Taken 2/18/2025 0850 by Mandy Reyes RN  Safety Promotion/Fall Prevention:   assistive device/personal items within reach   clutter free environment maintained   fall prevention program maintained   lighting adjusted   nonskid shoes/slippers when out of bed   room organization consistent   safety round/check completed  Taken 2/18/2025 0614 by Mandy Reyes RN  Safety Promotion/Fall Prevention:   assistive device/personal items within reach   clutter free environment maintained   fall prevention program maintained   lighting adjusted   nonskid shoes/slippers when out of bed   room organization consistent   safety round/check completed  Taken 2/18/2025 0431 by Mandy Reyes RN  Safety Promotion/Fall Prevention:   assistive device/personal items within reach   clutter free environment maintained   fall prevention program maintained   lighting adjusted   nonskid shoes/slippers when out of bed   room organization consistent   safety round/check completed  Taken 2/18/2025 0245 by Mandy Reyes, YUE  Safety Promotion/Fall Prevention:   assistive device/personal items within reach   clutter free environment maintained   fall prevention program maintained   lighting adjusted   nonskid shoes/slippers when out of bed   room organization consistent   safety round/check completed   Goal Outcome Evaluation:   Pt with DC orders. Pt awaiting daughter for transport.

## 2025-02-19 NOTE — CASE MANAGEMENT/SOCIAL WORK
Case Management Discharge Note      Final Note: Routine home    Provided Post Acute Provider List?: N/A  Provided Post Acute Provider Quality & Resource List?: N/A    Selected Continued Care - Discharged on 2/18/2025 Admission date: 2/10/2025 - Discharge disposition: Home or Self Care           Transportation Services  Private: Car    Final Discharge Disposition Code: 01 - home or self-care

## 2025-02-19 NOTE — OUTREACH NOTE
Prep Survey      Flowsheet Row Responses   [facility] facility patient discharged from? [facility]   Is LACE score < 7 ? No   Eligibility Readm Mgmt   Discharge diagnosis Pneumonia   Does the patient have one of the following disease processes/diagnoses(primary or secondary)? Pneumonia   Prep survey completed? Yes            Veronica PARKS - Registered Nurse

## 2025-02-25 ENCOUNTER — READMISSION MANAGEMENT (OUTPATIENT)
Dept: CALL CENTER | Facility: HOSPITAL | Age: 50
End: 2025-02-25
Payer: MEDICARE

## 2025-02-25 NOTE — OUTREACH NOTE
COPD/PN Week 1 Survey      Flowsheet Row Responses   [facility] facility patient discharged from? [facility]   Does the patient have one of the following disease processes/diagnoses(primary or secondary)? Pneumonia   Week 1 attempt successful? No   Unsuccessful attempts Attempt 1            Erinn Irizarry Licensed Nurse

## 2025-03-03 ENCOUNTER — READMISSION MANAGEMENT (OUTPATIENT)
Dept: CALL CENTER | Facility: HOSPITAL | Age: 50
End: 2025-03-03
Payer: MEDICARE

## 2025-03-03 NOTE — OUTREACH NOTE
COPD/PN Week 1 Survey      Flowsheet Row Responses   [facility] facility patient discharged from? [facility]   Does the patient have one of the following disease processes/diagnoses(primary or secondary)? Pneumonia   Week 1 attempt successful? No   Unsuccessful attempts Attempt 2            MICKI MANCINI - Registered Nurse

## 2025-03-13 ENCOUNTER — READMISSION MANAGEMENT (OUTPATIENT)
Dept: CALL CENTER | Facility: HOSPITAL | Age: 50
End: 2025-03-13
Payer: MEDICARE

## 2025-03-13 NOTE — OUTREACH NOTE
COPD/PN Week 3 Survey      Flowsheet Row Responses   [facility] patient discharged from? [facility]   Does the patient have one of the following disease processes/diagnoses(primary or secondary)? Pneumonia   Week 3 attempt successful? Yes   Call start time 1435   Unsuccessful attempts Attempt 1  [attempted pt and dtr]   Call end time 1436   Discharge diagnosis Pneumonia   Person spoke with today (if not patient) and relationship Demarcus, dtr   Meds reviewed with patient/caregiver? Yes   Is the patient having any side effects they believe may be caused by any medication additions or changes? No   Does the patient have all medications ordered at discharge? Yes   Is the patient taking all medications as directed (includes completed medication regime)? Yes   Medication comments Reminded dtr of need for pt to f/u with MD regarding new meds/refills as noted started on ASA, Isosorbide, levothroid and metoprolol--also dose changes on hydralazine and torsemide   Does the patient have a primary care provider?  Yes   Comments regarding PCP Dtr reports that pt has seen PCP since discharge, encouraged f/u with MDs   Nursing Interventions Educated on importance of keeping appointment   Did the patient receive a copy of their discharge instructions? Yes   Nursing interventions Reviewed instructions with patient   What is the patient's perception of their health status since discharge? Improving  [dtr reports that pt is doing much better and has been able to return to work, no issues with new meds.  Unable to obain many specific details from dtr at this time.  Encoruaged to ensure f/u with MDs as noted many med changes.]   Nursing Interventions Nurse provided patient education   Is the patient/caregiver able to teach back the hierarchy of who to call/visit for symptoms/problems? PCP, Specialist, Home health nurse, Urgent Care, ED, 911 Yes   Is the patient/caregiver able to teach back signs and symptoms of worsening condition:  Fever/chills, Shortness of breath, Chest pain  [reviewed]   Week 3 call completed? Yes   Graduated Yes   Call end time [id number]            AUTUMN PAGE - Registered Nurse

## 2025-04-15 ENCOUNTER — APPOINTMENT (OUTPATIENT)
Dept: GENERAL RADIOLOGY | Facility: HOSPITAL | Age: 50
DRG: 321 | End: 2025-04-15
Payer: MEDICARE

## 2025-04-15 ENCOUNTER — HOSPITAL ENCOUNTER (INPATIENT)
Facility: HOSPITAL | Age: 50
LOS: 2 days | Discharge: HOME | DRG: 321 | End: 2025-04-17
Attending: EMERGENCY MEDICINE | Admitting: STUDENT IN AN ORGANIZED HEALTH CARE EDUCATION/TRAINING PROGRAM
Payer: MEDICARE

## 2025-04-15 DIAGNOSIS — R79.89 ELEVATED TROPONIN: ICD-10-CM

## 2025-04-15 DIAGNOSIS — I21.A1 TYPE 2 MYOCARDIAL INFARCTION: ICD-10-CM

## 2025-04-15 DIAGNOSIS — I21.4 NSTEMI (NON-ST ELEVATED MYOCARDIAL INFARCTION): Primary | ICD-10-CM

## 2025-04-15 DIAGNOSIS — I50.22 CHRONIC SYSTOLIC CONGESTIVE HEART FAILURE: Chronic | ICD-10-CM

## 2025-04-15 LAB
ALBUMIN SERPL-MCNC: 4.3 G/DL (ref 3.5–5.2)
ALBUMIN/GLOB SERPL: 1.2 G/DL
ALP SERPL-CCNC: 95 U/L (ref 39–117)
ALT SERPL W P-5'-P-CCNC: 32 U/L (ref 1–33)
ANION GAP SERPL CALCULATED.3IONS-SCNC: 12.8 MMOL/L (ref 5–15)
APTT PPP: 37.2 SECONDS (ref 22.7–35.4)
AST SERPL-CCNC: 57 U/L (ref 1–32)
BASOPHILS # BLD AUTO: 0.02 10*3/MM3 (ref 0–0.2)
BASOPHILS NFR BLD AUTO: 0.3 % (ref 0–1.5)
BILIRUB SERPL-MCNC: 1 MG/DL (ref 0–1.2)
BUN SERPL-MCNC: 19 MG/DL (ref 6–20)
BUN/CREAT SERPL: 12.5 (ref 7–25)
CALCIUM SPEC-SCNC: 9.5 MG/DL (ref 8.6–10.5)
CHLORIDE SERPL-SCNC: 102 MMOL/L (ref 98–107)
CO2 SERPL-SCNC: 25.2 MMOL/L (ref 22–29)
CREAT SERPL-MCNC: 1.52 MG/DL (ref 0.57–1)
DEPRECATED RDW RBC AUTO: 48.6 FL (ref 37–54)
EGFRCR SERPLBLD CKD-EPI 2021: 41.1 ML/MIN/1.73
EOSINOPHIL # BLD AUTO: 0.12 10*3/MM3 (ref 0–0.4)
EOSINOPHIL NFR BLD AUTO: 1.6 % (ref 0.3–6.2)
ERYTHROCYTE [DISTWIDTH] IN BLOOD BY AUTOMATED COUNT: 14.5 % (ref 12.3–15.4)
FLUAV SUBTYP SPEC NAA+PROBE: NOT DETECTED
FLUBV RNA ISLT QL NAA+PROBE: NOT DETECTED
GEN 5 1HR TROPONIN T REFLEX: 121 NG/L
GLOBULIN UR ELPH-MCNC: 3.7 GM/DL
GLUCOSE BLDC GLUCOMTR-MCNC: 109 MG/DL (ref 70–105)
GLUCOSE BLDC GLUCOMTR-MCNC: 109 MG/DL (ref 70–105)
GLUCOSE BLDC GLUCOMTR-MCNC: 183 MG/DL (ref 70–105)
GLUCOSE BLDC GLUCOMTR-MCNC: 215 MG/DL (ref 70–105)
GLUCOSE SERPL-MCNC: 131 MG/DL (ref 65–99)
HCT VFR BLD AUTO: 41.6 % (ref 34–46.6)
HGB BLD-MCNC: 12.8 G/DL (ref 12–15.9)
IMM GRANULOCYTES # BLD AUTO: 0.02 10*3/MM3 (ref 0–0.05)
IMM GRANULOCYTES NFR BLD AUTO: 0.3 % (ref 0–0.5)
INR PPP: 1.1 (ref 0.8–1.2)
INR PPP: 1.28 (ref 0.9–1.1)
LYMPHOCYTES # BLD AUTO: 1.97 10*3/MM3 (ref 0.7–3.1)
LYMPHOCYTES NFR BLD AUTO: 25.6 % (ref 19.6–45.3)
MCH RBC QN AUTO: 27.9 PG (ref 26.6–33)
MCHC RBC AUTO-ENTMCNC: 30.8 G/DL (ref 31.5–35.7)
MCV RBC AUTO: 90.6 FL (ref 79–97)
MONOCYTES # BLD AUTO: 0.51 10*3/MM3 (ref 0.1–0.9)
MONOCYTES NFR BLD AUTO: 6.6 % (ref 5–12)
NEUTROPHILS NFR BLD AUTO: 5.05 10*3/MM3 (ref 1.7–7)
NEUTROPHILS NFR BLD AUTO: 65.6 % (ref 42.7–76)
NT-PROBNP SERPL-MCNC: 7120 PG/ML (ref 0–900)
PLATELET # BLD AUTO: 273 10*3/MM3 (ref 140–450)
PMV BLD AUTO: 9.3 FL (ref 6–12)
POTASSIUM SERPL-SCNC: 3.9 MMOL/L (ref 3.5–5.2)
PROT SERPL-MCNC: 8 G/DL (ref 6–8.5)
PROTHROMBIN TIME: 13.4 SECONDS
PROTHROMBIN TIME: 16 SECONDS (ref 11.7–14.2)
QT INTERVAL: 383 MS
QT INTERVAL: 448 MS
QTC INTERVAL: 483 MS
QTC INTERVAL: 506 MS
RBC # BLD AUTO: 4.59 10*6/MM3 (ref 3.77–5.28)
SARS-COV-2 RNA RESP QL NAA+PROBE: NOT DETECTED
SODIUM SERPL-SCNC: 140 MMOL/L (ref 136–145)
TROPONIN T % DELTA: -5
TROPONIN T NUMERIC DELTA: -6 NG/L
TROPONIN T SERPL HS-MCNC: 127 NG/L
WBC NRBC COR # BLD AUTO: 7.69 10*3/MM3 (ref 3.4–10.8)

## 2025-04-15 PROCEDURE — 85610 PROTHROMBIN TIME: CPT | Performed by: NURSE PRACTITIONER

## 2025-04-15 PROCEDURE — 63710000001 INSULIN LISPRO (HUMAN) PER 5 UNITS

## 2025-04-15 PROCEDURE — 63710000001 PREDNISONE PER 5 MG: Performed by: NURSE PRACTITIONER

## 2025-04-15 PROCEDURE — 85025 COMPLETE CBC W/AUTO DIFF WBC: CPT | Performed by: EMERGENCY MEDICINE

## 2025-04-15 PROCEDURE — 99285 EMERGENCY DEPT VISIT HI MDM: CPT

## 2025-04-15 PROCEDURE — 25810000003 SODIUM CHLORIDE 0.9 % SOLUTION: Performed by: INTERNAL MEDICINE

## 2025-04-15 PROCEDURE — 84484 ASSAY OF TROPONIN QUANT: CPT | Performed by: EMERGENCY MEDICINE

## 2025-04-15 PROCEDURE — 93010 ELECTROCARDIOGRAM REPORT: CPT | Performed by: STUDENT IN AN ORGANIZED HEALTH CARE EDUCATION/TRAINING PROGRAM

## 2025-04-15 PROCEDURE — 93005 ELECTROCARDIOGRAM TRACING: CPT | Performed by: EMERGENCY MEDICINE

## 2025-04-15 PROCEDURE — 85610 PROTHROMBIN TIME: CPT | Performed by: EMERGENCY MEDICINE

## 2025-04-15 PROCEDURE — 87636 SARSCOV2 & INF A&B AMP PRB: CPT | Performed by: EMERGENCY MEDICINE

## 2025-04-15 PROCEDURE — 85730 THROMBOPLASTIN TIME PARTIAL: CPT | Performed by: EMERGENCY MEDICINE

## 2025-04-15 PROCEDURE — 71046 X-RAY EXAM CHEST 2 VIEWS: CPT

## 2025-04-15 PROCEDURE — 82948 REAGENT STRIP/BLOOD GLUCOSE: CPT

## 2025-04-15 PROCEDURE — 93010 ELECTROCARDIOGRAM REPORT: CPT | Performed by: EMERGENCY MEDICINE

## 2025-04-15 PROCEDURE — 25010000002 HEPARIN (PORCINE) 25000-0.45 UT/250ML-% SOLUTION: Performed by: NURSE PRACTITIONER

## 2025-04-15 PROCEDURE — 25010000002 ENOXAPARIN PER 10 MG: Performed by: EMERGENCY MEDICINE

## 2025-04-15 PROCEDURE — 63710000001 DIPHENHYDRAMINE PER 50 MG: Performed by: NURSE PRACTITIONER

## 2025-04-15 PROCEDURE — 99223 1ST HOSP IP/OBS HIGH 75: CPT | Performed by: INTERNAL MEDICINE

## 2025-04-15 PROCEDURE — 36415 COLL VENOUS BLD VENIPUNCTURE: CPT

## 2025-04-15 PROCEDURE — 93005 ELECTROCARDIOGRAM TRACING: CPT | Performed by: NURSE PRACTITIONER

## 2025-04-15 PROCEDURE — 80053 COMPREHEN METABOLIC PANEL: CPT | Performed by: EMERGENCY MEDICINE

## 2025-04-15 PROCEDURE — 83880 ASSAY OF NATRIURETIC PEPTIDE: CPT | Performed by: EMERGENCY MEDICINE

## 2025-04-15 PROCEDURE — 99285 EMERGENCY DEPT VISIT HI MDM: CPT | Performed by: EMERGENCY MEDICINE

## 2025-04-15 RX ORDER — DIPHENHYDRAMINE HCL 25 MG
25 CAPSULE ORAL EVERY 8 HOURS
Status: COMPLETED | OUTPATIENT
Start: 2025-04-15 | End: 2025-04-16

## 2025-04-15 RX ORDER — SODIUM CHLORIDE 9 MG/ML
40 INJECTION, SOLUTION INTRAVENOUS AS NEEDED
Status: DISCONTINUED | OUTPATIENT
Start: 2025-04-15 | End: 2025-04-17 | Stop reason: HOSPADM

## 2025-04-15 RX ORDER — NITROGLYCERIN 0.4 MG/1
0.4 TABLET SUBLINGUAL
Status: DISCONTINUED | OUTPATIENT
Start: 2025-04-15 | End: 2025-04-16 | Stop reason: SDUPTHER

## 2025-04-15 RX ORDER — BISACODYL 10 MG
10 SUPPOSITORY, RECTAL RECTAL DAILY PRN
Status: DISCONTINUED | OUTPATIENT
Start: 2025-04-15 | End: 2025-04-17 | Stop reason: HOSPADM

## 2025-04-15 RX ORDER — HEPARIN SODIUM 10000 [USP'U]/100ML
10.5 INJECTION, SOLUTION INTRAVENOUS
Status: DISCONTINUED | OUTPATIENT
Start: 2025-04-15 | End: 2025-04-15

## 2025-04-15 RX ORDER — FAMOTIDINE 20 MG/1
20 TABLET, FILM COATED ORAL ONCE
Status: COMPLETED | OUTPATIENT
Start: 2025-04-15 | End: 2025-04-15

## 2025-04-15 RX ORDER — INSULIN LISPRO 100 [IU]/ML
2-7 INJECTION, SOLUTION INTRAVENOUS; SUBCUTANEOUS EVERY 6 HOURS SCHEDULED
Status: DISCONTINUED | OUTPATIENT
Start: 2025-04-15 | End: 2025-04-17 | Stop reason: HOSPADM

## 2025-04-15 RX ORDER — BISACODYL 5 MG/1
5 TABLET, DELAYED RELEASE ORAL DAILY PRN
Status: DISCONTINUED | OUTPATIENT
Start: 2025-04-15 | End: 2025-04-17 | Stop reason: HOSPADM

## 2025-04-15 RX ORDER — DIPHENHYDRAMINE HCL 25 MG
25 CAPSULE ORAL
Status: COMPLETED | OUTPATIENT
Start: 2025-04-16 | End: 2025-04-16

## 2025-04-15 RX ORDER — IBUPROFEN 600 MG/1
1 TABLET ORAL
Status: DISCONTINUED | OUTPATIENT
Start: 2025-04-15 | End: 2025-04-17 | Stop reason: HOSPADM

## 2025-04-15 RX ORDER — ASPIRIN 81 MG/1
324 TABLET, CHEWABLE ORAL ONCE
Status: COMPLETED | OUTPATIENT
Start: 2025-04-15 | End: 2025-04-15

## 2025-04-15 RX ORDER — PREDNISONE 50 MG/1
50 TABLET ORAL
Status: COMPLETED | OUTPATIENT
Start: 2025-04-16 | End: 2025-04-16

## 2025-04-15 RX ORDER — METOPROLOL SUCCINATE 25 MG/1
25 TABLET, EXTENDED RELEASE ORAL
Status: DISCONTINUED | OUTPATIENT
Start: 2025-04-15 | End: 2025-04-17 | Stop reason: HOSPADM

## 2025-04-15 RX ORDER — ENOXAPARIN SODIUM 100 MG/ML
1 INJECTION SUBCUTANEOUS ONCE
Status: COMPLETED | OUTPATIENT
Start: 2025-04-15 | End: 2025-04-15

## 2025-04-15 RX ORDER — SODIUM CHLORIDE 9 MG/ML
75 INJECTION, SOLUTION INTRAVENOUS CONTINUOUS
Status: DISCONTINUED | OUTPATIENT
Start: 2025-04-15 | End: 2025-04-17 | Stop reason: HOSPADM

## 2025-04-15 RX ORDER — AMOXICILLIN 250 MG
2 CAPSULE ORAL 2 TIMES DAILY PRN
Status: DISCONTINUED | OUTPATIENT
Start: 2025-04-15 | End: 2025-04-17 | Stop reason: HOSPADM

## 2025-04-15 RX ORDER — NICOTINE POLACRILEX 4 MG
15 LOZENGE BUCCAL
Status: DISCONTINUED | OUTPATIENT
Start: 2025-04-15 | End: 2025-04-17 | Stop reason: HOSPADM

## 2025-04-15 RX ORDER — SODIUM CHLORIDE 0.9 % (FLUSH) 0.9 %
10 SYRINGE (ML) INJECTION EVERY 12 HOURS SCHEDULED
Status: DISCONTINUED | OUTPATIENT
Start: 2025-04-15 | End: 2025-04-17 | Stop reason: HOSPADM

## 2025-04-15 RX ORDER — IPRATROPIUM BROMIDE AND ALBUTEROL SULFATE 2.5; .5 MG/3ML; MG/3ML
3 SOLUTION RESPIRATORY (INHALATION) EVERY 6 HOURS PRN
Status: DISCONTINUED | OUTPATIENT
Start: 2025-04-15 | End: 2025-04-17 | Stop reason: HOSPADM

## 2025-04-15 RX ORDER — HEPARIN SODIUM 10000 [USP'U]/100ML
10.5 INJECTION, SOLUTION INTRAVENOUS
Status: DISCONTINUED | OUTPATIENT
Start: 2025-04-15 | End: 2025-04-16

## 2025-04-15 RX ORDER — PREDNISONE 50 MG/1
50 TABLET ORAL EVERY 8 HOURS
Status: COMPLETED | OUTPATIENT
Start: 2025-04-15 | End: 2025-04-16

## 2025-04-15 RX ORDER — POLYETHYLENE GLYCOL 3350 17 G/17G
17 POWDER, FOR SOLUTION ORAL DAILY PRN
Status: DISCONTINUED | OUTPATIENT
Start: 2025-04-15 | End: 2025-04-17 | Stop reason: HOSPADM

## 2025-04-15 RX ORDER — DEXTROSE MONOHYDRATE 25 G/50ML
25 INJECTION, SOLUTION INTRAVENOUS
Status: DISCONTINUED | OUTPATIENT
Start: 2025-04-15 | End: 2025-04-17 | Stop reason: HOSPADM

## 2025-04-15 RX ORDER — SODIUM CHLORIDE 0.9 % (FLUSH) 0.9 %
10 SYRINGE (ML) INJECTION AS NEEDED
Status: DISCONTINUED | OUTPATIENT
Start: 2025-04-15 | End: 2025-04-17 | Stop reason: HOSPADM

## 2025-04-15 RX ADMIN — METOPROLOL SUCCINATE 25 MG: 25 TABLET, EXTENDED RELEASE ORAL at 11:34

## 2025-04-15 RX ADMIN — FAMOTIDINE 20 MG: 20 TABLET, FILM COATED ORAL at 20:14

## 2025-04-15 RX ADMIN — Medication 10 ML: at 20:14

## 2025-04-15 RX ADMIN — DIPHENHYDRAMINE HYDROCHLORIDE 25 MG: 25 CAPSULE ORAL at 20:14

## 2025-04-15 RX ADMIN — SENNOSIDES AND DOCUSATE SODIUM 2 TABLET: 50; 8.6 TABLET ORAL at 20:19

## 2025-04-15 RX ADMIN — HEPARIN SODIUM 10.5 UNITS/KG/HR: 10000 INJECTION, SOLUTION INTRAVENOUS at 17:26

## 2025-04-15 RX ADMIN — INSULIN LISPRO 3 UNITS: 100 INJECTION, SOLUTION INTRAVENOUS; SUBCUTANEOUS at 17:11

## 2025-04-15 RX ADMIN — ASPIRIN 81 MG CHEWABLE TABLET 324 MG: 81 TABLET CHEWABLE at 00:58

## 2025-04-15 RX ADMIN — Medication 10 ML: at 10:57

## 2025-04-15 RX ADMIN — PREDNISONE 50 MG: 50 TABLET ORAL at 20:14

## 2025-04-15 RX ADMIN — ENOXAPARIN SODIUM 90 MG: 100 INJECTION SUBCUTANEOUS at 02:42

## 2025-04-15 RX ADMIN — SODIUM CHLORIDE 50 ML/HR: 9 INJECTION, SOLUTION INTRAVENOUS at 17:10

## 2025-04-15 RX ADMIN — Medication 600 MG: at 20:14

## 2025-04-15 NOTE — FSED PROVIDER NOTE
Subjective   History of Present Illness  50 yof presents with shortness of breath. The patient states she has been short of breath for the past 2 days. She also reports she has been having chest pain. She notes the pain comes on with exertion and is better with rest. This morning while at work she was having chest pain and shortness of breath. She is a  and she had to sit down and rest for awhile before her pain subsided. She notes that the shortness of breath never completely resolved. She notes she has h/o CHF and was admitted a few months ago due to worsening heart failure. She was concerned that could be happening again. She feels like her abdomen is more swollen but denies swelling to her legs.       Review of Systems   Constitutional:  Positive for fatigue.   Respiratory:  Positive for shortness of breath.    Cardiovascular:  Positive for chest pain. Negative for palpitations and leg swelling.   Gastrointestinal:  Positive for abdominal distention. Negative for abdominal pain.   Musculoskeletal: Negative.    Neurological: Negative.    All other systems reviewed and are negative.      Past Medical History:   Diagnosis Date    Arrhythmia     Asthma     CAD (coronary artery disease)     Cardiomyopathy, dilated     Chronic systolic congestive heart failure     CKD (chronic kidney disease) stage 2, GFR 60-89 ml/min     COPD (chronic obstructive pulmonary disease)     Essential hypertension     GERD without esophagitis     Hidradenitis 10/26/2020    Hyperlipidemia     Hypothyroidism (acquired)     ICD (implantable cardioverter-defibrillator), dual, in situ 7/22/2019    Nonrheumatic aortic valve insufficiency     Nonrheumatic mitral valve regurgitation     S/P AVR (aortic valve replacement)     S/P CABG x 3     Type 2 diabetes mellitus without complication, without long-term current use of insulin        Allergies   Allergen Reactions    Hydrocodone Hives    Penicillin G Unknown (See Comments)     Reaction  occurred as a baby.      Penicillin interview complete 08/18/2022.    Contrast Dye (Echo Or Unknown Ct/Mr) GI Intolerance     She is pretty sure it's the contrast dye that makes her super sick and vomiting after heart cath    Gadolinium Derivatives Itching    Varenicline Other (See Comments)     Encephalopathy       Past Surgical History:   Procedure Laterality Date    AORTIC VALVE REPAIR/REPLACEMENT N/A 12/27/2019    Procedure: AORTIC VALVE REPAIR/REPLACEMENT;  Surgeon: Lane Stock MD;  Location: [facility];  Service: Cardiothoracic    BREAST LUMPECTOMY Right     BENIGN    CARDIAC CATHETERIZATION N/A 12/24/2019    Procedure: Right and Left Heart Cath 12/24/19 @ 0900;  Surgeon: Wayne Luna MD;  Location: [facility] CATH INVASIVE LOCATION;  Service: Cardiovascular    CARDIAC CATHETERIZATION N/A 12/24/2019    Procedure: Coronary angiography;  Surgeon: Wayne Luna MD;  Location: [facility] CATH INVASIVE LOCATION;  Service: Cardiovascular    CARDIAC CATHETERIZATION N/A 11/10/2020    Procedure: Left and right Heart Cath;  Surgeon: Wayne Luna MD;  Location: [facility] CATH INVASIVE LOCATION;  Service: Cardiovascular;  Laterality: N/A;    CARDIAC CATHETERIZATION N/A 11/10/2020    Procedure: Coronary angiography;  Surgeon: Wayne Luna MD;  Location: [facility] CATH INVASIVE LOCATION;  Service: Cardiovascular;  Laterality: N/A;    CARDIAC CATHETERIZATION N/A 11/10/2020    Procedure: Right Heart Cath;  Surgeon: Wayne Luna MD;  Location: [facility] CATH INVASIVE LOCATION;  Service: Cardiovascular;  Laterality: N/A;    CARDIAC CATHETERIZATION N/A 11/25/2020    Procedure: Percutaneous coronary intervention of the left circumflex artery;  Surgeon: Wayne Luna MD;  Location: [facility] CATH INVASIVE LOCATION;  Service: Cardiovascular;  Laterality: N/A;    CARDIAC CATHETERIZATION N/A 1/22/2021    Procedure: LEFT HEART CATH with possible PCI;  Surgeon: Wayne Luna  MD;  Location: [facility] CATH INVASIVE LOCATION;  Service: Cardiovascular;  Laterality: N/A;  Local and IV sedation    CARDIAC CATHETERIZATION N/A 2021    Procedure: Coronary angiography;  Surgeon: Wayne Luna MD;  Location: [facility] CATH INVASIVE LOCATION;  Service: Cardiovascular;  Laterality: N/A;    CARDIAC CATHETERIZATION N/A 2021    Procedure: Saphenous Vein Graft;  Surgeon: Wayne Luna MD;  Location: [facility] CATH INVASIVE LOCATION;  Service: Cardiovascular;  Laterality: N/A;    CARDIAC ELECTROPHYSIOLOGY PROCEDURE Left 2019    Procedure: Dual-chamber ICD insertion;  Surgeon: Héctor Eckert MD;  Location: [facility] CATH INVASIVE LOCATION;  Service: Cardiovascular    CARDIAC ELECTROPHYSIOLOGY PROCEDURE Left 2019    Procedure: Lead Revision;  Surgeon: Héctor Eckert MD;  Location: [facility] CATH INVASIVE LOCATION;  Service: Cardiovascular    CARDIAC SURGERY      CHOLECYSTECTOMY      CORONARY ARTERY BYPASS GRAFT N/A 2019    Procedure: CORONARY ARTERY BYPASS GRAFTING;  Surgeon: Lane Stock MD;  Location: [facility];  Service: Cardiothoracic    HYSTERECTOMY      INSERT / REPLACE / REMOVE PACEMAKER      LYMPHADENECTOMY Bilateral     THYROID SURGERY         Family History   Problem Relation Age of Onset    Heart disease Father        Social History     Socioeconomic History    Marital status:    Tobacco Use    Smoking status: Former     Current packs/day: 0.00     Types: Cigarettes     Quit date: 2019     Years since quittin.2    Smokeless tobacco: Never   Vaping Use    Vaping status: Never Used   Substance and Sexual Activity    Alcohol use: No    Drug use: No    Sexual activity: Defer           Objective   Physical Exam  Vitals reviewed.   Constitutional:       General: She is not in acute distress.     Appearance: She is not ill-appearing.   HENT:      Head: Normocephalic and atraumatic.   Eyes:      Extraocular Movements: Extraocular movements  intact.      Pupils: Pupils are equal, round, and reactive to light.   Cardiovascular:      Rate and Rhythm: Normal rate and regular rhythm.   Pulmonary:      Effort: Pulmonary effort is normal.   Abdominal:      Palpations: Abdomen is soft.      Tenderness: There is no abdominal tenderness.   Musculoskeletal:         General: Normal range of motion.      Cervical back: Normal range of motion and neck supple.      Right lower leg: No edema.      Left lower leg: No edema.   Skin:     General: Skin is warm and dry.      Capillary Refill: Capillary refill takes less than 2 seconds.   Neurological:      General: No focal deficit present.      Mental Status: She is alert.         ECG 12 Lead Chest Pain      Date/Time: 4/15/2025 12:15 AM    Performed by: Tanisha Schultz MD  Authorized by: Tanisha Schultz MD  Interpreted by ED physician  Comparison: compared with previous ECG from 2/11/2025  Comparison to previous ECG: Non specific ST abnormalities in lateral leads  Rhythm: sinus rhythm  Rate: normal  BPM: 83  Conduction: conduction normal  ST Flattening: V5 and V6  T flattening: V5 and V6  Other findings: LVH and LAE  Clinical impression: non-specific ECG               ED Course  ED Course as of 04/15/25 0506   Tue Apr 15, 2025   0101 Pt is not currently having chest pain [BM]   0101 Paged cardiology [BM]   0113 Spoke with Cardiology, agree with plan [BM]   0227 Hospitalist accepts for admission [BM]      ED Course User Index  [BM] Tanisha Schultz MD                HEART Score: 8                            Medical Decision Making  Patient presented with chest pain concerning and shortness of breath for ACS, EKG was non STEMI, however troponin was elevated concerning for NSTEMI, and the patient was given aspirin and started on Lovenox, patient was chest pain free in the ER, cardiology was consulted and patient was admitted. Patient with no signs of acute heart failure. Given history and story considered but low  risk for aortic dissection, pneumonia, or PE.     Problems Addressed:  NSTEMI (non-ST elevated myocardial infarction): complicated acute illness or injury    Amount and/or Complexity of Data Reviewed  Labs: ordered.  Radiology: ordered.  ECG/medicine tests: ordered and independent interpretation performed.    Risk  OTC drugs.  Prescription drug management.  Decision regarding hospitalization.        Final diagnoses:   NSTEMI (non-ST elevated myocardial infarction)       ED Disposition  ED Disposition       ED Disposition   Decision to Admit    Condition   --    Comment   Level of Care: Telemetry [5]   Diagnosis: NSTEMI (non-ST elevated myocardial infarction) [793749]   Admitting Physician: LEONEL FOX [[id number]]   Attending Physician: RICHARD KIDD [[id number]]   Isolate for COVID?: No [0]   Certification: I Certify That Inpatient Hospital Services Are Medically Necessary For Greater Than 2 Midnights                 No follow-up provider specified.       Medication List      No changes were made to your prescriptions during this visit.

## 2025-04-15 NOTE — CASE MANAGEMENT/SOCIAL WORK
Discharge Planning Assessment   [place]     Patient Name: Rafael Nunes  MRN: [id number]  Today's Date: 4/15/2025    Admit Date: 4/15/2025    Plan: DC PLAN: Routine home     Discharge Needs Assessment       Row Name 04/15/25 1423       Living Environment    People in Home child(jasbir), adult    Current Living Arrangements home    Potentially Unsafe Housing Conditions none    In the past 12 months has the electric, gas, oil, or water company threatened to shut off services in your home? No    Primary Care Provided by self    Provides Primary Care For no one    Family Caregiver if Needed spouse    Quality of Family Relationships helpful;involved;supportive    Able to Return to Prior Arrangements yes       Resource/Environmental Concerns    Resource/Environmental Concerns none    Transportation Concerns none       Transportation Needs    In the past 12 months, has lack of transportation kept you from medical appointments or from getting medications? no    In the past 12 months, has lack of transportation kept you from meetings, work, or from getting things needed for daily living? No       Food Insecurity    Within the past 12 months, you worried that your food would run out before you got the money to buy more. Never true    Within the past 12 months, the food you bought just didn't last and you didn't have money to get more. Never true       Transition Planning    Patient/Family Anticipates Transition to home with family    Patient/Family Anticipated Services at Transition none    Transportation Anticipated car, drives self;family or friend will provide       Discharge Needs Assessment    Readmission Within the Last 30 Days no previous admission in last 30 days    Equipment Currently Used at Home none    Anticipated Changes Related to Illness none    Equipment Needed After Discharge none                   Discharge Plan       Row Name 04/15/25 1423       Plan    Plan DC PLAN: Routine home      Patient/Family  in Agreement with Plan yes    Plan Comments CM Met with patient at bedside, from routine home with daughter. Independent with ADL's uses a cane from time to time. PCP is [facility], Pharmacy is [facility]. Agreeable to use [facility], able to afford medications , denies any issues with food or utilities. Denies any transportation issues, still drives, family will provide at time of discharge. Denies any HHC or SNF needs. Denies any concerns about return home.                    Expected Discharge Date and Time       Expected Discharge Date Expected Discharge Time    Apr 16, 2025            Demographic Summary       Row Name 04/15/25 1423       General Information    Admission Type inpatient    Arrived From emergency department    Required Notices Provided Important Message from Medicare    Referral Source admission list    Reason for Consult discharge planning    Preferred Language English                   Functional Status       Row Name 04/15/25 1423       Functional Status    Usual Activity Tolerance excellent    Current Activity Tolerance excellent       Physical Activity    On average, how many days per week do you engage in moderate to strenuous exercise (like a brisk walk)? 0 days    On average, how many minutes do you engage in exercise at this level? 0 min    Number of minutes of exercise per week 0       Assessment of Health Literacy    How often do you have someone help you read hospital materials? Sometimes    How often do you have problems learning about your medical condition because of difficulty understanding written information? Sometimes    How often do you have a problem understanding what is told to you about your medical condition? Sometimes    How confident are you filling out medical forms by yourself? Somewhat    Health Literacy Moderate       Functional Status, IADL    Medications independent    Meal Preparation independent    Housekeeping independent    Laundry independent    Shopping  independent       Mental Status    General Appearance WDL WDL       Mental Status Summary    Recent Changes in Mental Status/Cognitive Functioning no changes                       Patient Forms       Row Name 04/15/25 1422       Patient Forms    Important Message from Medicare ([facility]) Delivered  IMM 4/15/2025 per cm    Delivered to Patient    Method of delivery In person                  Met with patient in room wearing PPE:     Maintained distance greater than six feet and spent less than 15 minutes in the room.      Emma Meíja RN    Case Management  [phone]

## 2025-04-15 NOTE — CONSULTS
[facility] CARDIOLOGY CONSULT  [facility]        Subjective:     Encounter Date:04/15/2025      Patient ID: Rafael Nunes is a 50 y.o. female.    Chief Complaint: Chest Pain      HPI:  Rafael Nunes is a 50 y.o. female known to Dr. Luna and Dr. Eckert with a past cardiac history of cardiomyopathy status post Biotronik dual-chamber ICD in 2019, CAD status post 3V CABG in 2019 with prior PCI, and VHD status post tissue AVR in 2019. Last 2D echo 2/2025 shows an EF of 36 to 40% with grade 2 diastolic dysfunction moderate to severe MR and mild AS. PMH includes HTN, HLD, DM, CKD stage III, COPD, INDRA, and thyroid dysfunction.  Patient presents with complaints of chest pain and found with elevated troponin in which cardiology was consulted for further evaluation and management.    Patient tells me that after she was discharged her meds were gone to cost $200 so she only filled some of them.  She has not been taking her beta-blocker or long-acting nitrate.  For the last 2 days she is experienced constant left arm numbness as well as intermittent chest pain precipitated by any exertion such as with her trying to work as a .  She has been having to take frequent rest breaks.This feels similar to her prior angina.  She also complains of worsened dyspnea with return of abdominal distention and 10 pound weight gain.  He is currently chest pain-free.      Past Medical History:   Diagnosis Date    Arrhythmia     Asthma     CAD (coronary artery disease)     Cardiomyopathy, dilated     Chronic systolic congestive heart failure     CKD (chronic kidney disease) stage 2, GFR 60-89 ml/min     COPD (chronic obstructive pulmonary disease)     Essential hypertension     GERD without esophagitis     Hidradenitis 10/26/2020    Hyperlipidemia     Hypothyroidism (acquired)     ICD (implantable cardioverter-defibrillator), dual, in situ 7/22/2019    Nonrheumatic aortic valve  insufficiency     Nonrheumatic mitral valve regurgitation     S/P AVR (aortic valve replacement)     S/P CABG x 3     Type 2 diabetes mellitus without complication, without long-term current use of insulin          Past Surgical History:   Procedure Laterality Date    AORTIC VALVE REPAIR/REPLACEMENT N/A 12/27/2019    Procedure: AORTIC VALVE REPAIR/REPLACEMENT;  Surgeon: Lane Stock MD;  [facility]  Service: Cardiothoracic    BREAST LUMPECTOMY Right     BENIGN    CARDIAC CATHETERIZATION N/A 12/24/2019    Procedure: Right and Left Heart Cath 12/24/19 @ 0900;  Surgeon: Wayne Luna MD;  Location: [facility] CATH INVASIVE LOCATION;  Service: Cardiovascular    CARDIAC CATHETERIZATION N/A 12/24/2019    Procedure: Coronary angiography;  Surgeon: Wayne Luna MD;  Location: [facility] CATH INVASIVE LOCATION;  Service: Cardiovascular    CARDIAC CATHETERIZATION N/A 11/10/2020    Procedure: Left and right Heart Cath;  Surgeon: Wayne Luna MD;  Location: [facility] CATH INVASIVE LOCATION;  Service: Cardiovascular;  Laterality: N/A;    CARDIAC CATHETERIZATION N/A 11/10/2020    Procedure: Coronary angiography;  Surgeon: Wayne Luna MD;  Location: [facility] CATH INVASIVE LOCATION;  Service: Cardiovascular;  Laterality: N/A;    CARDIAC CATHETERIZATION N/A 11/10/2020    Procedure: Right Heart Cath;  Surgeon: Wayne Luna MD;  Location: [facility] CATH INVASIVE LOCATION;  Service: Cardiovascular;  Laterality: N/A;    CARDIAC CATHETERIZATION N/A 11/25/2020    Procedure: Percutaneous coronary intervention of the left circumflex artery;  Surgeon: Wayne Luna MD;  Location: [facility] CATH INVASIVE LOCATION;  Service: Cardiovascular;  Laterality: N/A;    CARDIAC CATHETERIZATION N/A 1/22/2021    Procedure: LEFT HEART CATH with possible PCI;  Surgeon: Wayne Luna MD;  Location: [facility] CATH INVASIVE LOCATION;  Service: Cardiovascular;  Laterality: N/A;  Local and  IV sedation    CARDIAC CATHETERIZATION N/A 2021    Procedure: Coronary angiography;  Surgeon: Wayne Luna MD;  Location: [facility] CATH INVASIVE LOCATION;  Service: Cardiovascular;  Laterality: N/A;    CARDIAC CATHETERIZATION N/A 2021    Procedure: Saphenous Vein Graft;  Surgeon: Wayne Luna MD;  Location: [facility] CATH INVASIVE LOCATION;  Service: Cardiovascular;  Laterality: N/A;    CARDIAC ELECTROPHYSIOLOGY PROCEDURE Left 2019    Procedure: Dual-chamber ICD insertion;  Surgeon: Héctor Eckert MD;  Location: [facility] CATH INVASIVE LOCATION;  Service: Cardiovascular    CARDIAC ELECTROPHYSIOLOGY PROCEDURE Left 2019    Procedure: Lead Revision;  Surgeon: Héctor Eckert MD;  Location: [facility] CATH INVASIVE LOCATION;  Service: Cardiovascular    CARDIAC SURGERY      CHOLECYSTECTOMY      CORONARY ARTERY BYPASS GRAFT N/A 2019    Procedure: CORONARY ARTERY BYPASS GRAFTING;  Surgeon: Lane Stock MD;  Location: [facility] CVOR;  Service: Cardiothoracic    HYSTERECTOMY      INSERT / REPLACE / REMOVE PACEMAKER      LYMPHADENECTOMY Bilateral     THYROID SURGERY           Social History     Socioeconomic History    Marital status:    Tobacco Use    Smoking status: Former     Current packs/day: 0.00     Types: Cigarettes     Quit date: 2019     Years since quittin.2    Smokeless tobacco: Never   Vaping Use    Vaping status: Never Used   Substance and Sexual Activity    Alcohol use: No    Drug use: No    Sexual activity: Defer       Family History   Problem Relation Age of Onset    Heart disease Father          Allergies   Allergen Reactions    Hydrocodone Hives    Penicillin G Unknown (See Comments)     Reaction occurred as a baby.      Penicillin interview complete 2022.    Contrast Dye (Echo Or Unknown Ct/Mr) GI Intolerance     She is pretty sure it's the contrast dye that makes her super sick and vomiting after heart cath    Gadolinium Derivatives  "Itching    Varenicline Other (See Comments)     Encephalopathy       Current Medications:   Scheduled Meds:insulin lispro, 2-7 Units, Subcutaneous, Q6H  sodium chloride, 10 mL, Intravenous, Q12H      Continuous Infusions:     ROS  All other systems reviewed and are negative.       Objective:         /89 (BP Location: Right arm, Patient Position: Lying)   Pulse 77   Temp 97.3 °F (36.3 °C) (Axillary)   Resp 16   Ht 170.2 cm (67\")   Wt 95 kg (209 lb 7 oz)   LMP  (LMP Unknown)   SpO2 96%   BMI 32.80 kg/m²       General: Well-developed in NAD.  Neuro: AAOx3. No gross deficits.  HEENT: Sclerae clear, no xanthelasmas.  CV: S1S2, RRR. No murmurs or gallops.  Resp: Breathing is unlabored. Lungs CTA throughout.  GI: BS+.  Abdominal distention.  Ext: Pedal pulses are palpable. Extremities are nonedematous.  MS: moves all extremities, no weakness.  Skin: warm, dry.  Psych: calm and cooperative.            Lab Review:     Results from last 7 days   Lab Units 04/15/25  0017   SODIUM mmol/L 140   POTASSIUM mmol/L 3.9   CHLORIDE mmol/L 102   CO2 mmol/L 25.2   BUN mg/dL 19   CREATININE mg/dL 1.52*   GLUCOSE mg/dL 131*   CALCIUM mg/dL 9.5   AST (SGOT) U/L 57*   ALT (SGPT) U/L 32     Results from last 7 days   Lab Units 04/15/25  0135 04/15/25  0017   HSTROP T ng/L 121* 127*     Results from last 7 days   Lab Units 04/15/25  0017   WBC 10*3/mm3 7.69   HEMOGLOBIN g/dL 12.8   HEMATOCRIT % 41.6   PLATELETS 10*3/mm3 273     Results from last 7 days   Lab Units 04/15/25  2355 04/15/25  0135   INR  1.1  --    APTT seconds  --  37.2*               Invalid input(s): \"LDLCALC\"  Results from last 7 days   Lab Units 04/15/25  0017   PROBNP pg/mL 7,120.0*           Recent Radiology:  Imaging Results (Most Recent)       Procedure Component Value Units Date/Time    XR Chest 2 View [[id number]] Collected: 04/15/25 0028     Updated: 04/15/25 0031    Narrative:      XR CHEST 2 VW    Date of Exam: 4/15/2025 12:17 AM EDT    Indication: SOA " Triage Protocol    Comparison: 2/10/2025.    Findings:  There are no airspace consolidations. No pleural fluid. No pneumothorax. The pulmonary vasculature appears within normal limits. Heart appears enlarged, stable. Stable left subclavian AICD/pacemaker device. Median sternotomy wires are present.. No acute   osseous abnormality identified.      Impression:      Impression:  No acute cardiopulmonary process.      Electronically Signed: Althea Valladares MD    4/15/2025 12:29 AM EDT    Workstation ID: [id number]              ECHOCARDIOGRAM:    Results for orders placed during the hospital encounter of 02/10/25    Adult Transthoracic Echo Complete W/ Cont if Necessary Per Protocol    Interpretation Summary    Left ventricular systolic function is moderately decreased. Estimated left ventricular EF = 38%    The left ventricular cavity is mild to moderately dilated.    Left ventricular wall thickness is consistent with mild to moderate eccentric hypertrophy.    Left ventricular diastolic function is consistent with (grade II w/high LAP) pseudonormalization and age.    The right ventricular cavity is mildly dilated.    The left atrial cavity is moderate to severely dilated.    The right atrial cavity is borderline dilated.    Mild aortic valve stenosis is present.    Abnormal mitral valve structure consistent with dilated annulus.    Moderate to severe mitral valve regurgitation is present.    Estimated right ventricular systolic pressure from tricuspid regurgitation is normal (<35 mmHg).    Transthoracic echocardiography reveals dilated LV with eccentric LVH with EF between 36 to 40%  Severe left atrial enlargement and the mitral valve apparatus calcified and appears rheumatic without mitral stenosis and moderate to severe MR directed laterally and posteriorly  Aortic valve is calcified and not well-visualized with a mean gradient of 16 mmHg.  Mild TR without pulmonary hypertension  No effusion      Electronically signed by  Héctor Eckert MD, 02/12/25, 1:19 PM EST.            Assessment:         Active Hospital Problems    Diagnosis  POA    **NSTEMI (non-ST elevated myocardial infarction) [I21.4]  Yes     1) NSTEMI  - High-sensitivity troponin 127, 121  - EKG shows no acute ST abnormality    2) CAD status post 3V CABG in 2019 with prior PCI    3) acute on chronic combined systolic/diastolic heart failure / cardiomyopathy status post Biotronik dual-chamber ICD in 2019  -BNP 7120  -CXR shows no acute abnormality  - Last 2D echo 2/2025 shows an EF of 36 to 40% with grade 2 diastolic dysfunction moderate to severe MR and mild AS.    4) VHD status post tissue AVR in 2019   - Last 2D echo 2/2025 shows an EF of 36 to 40% with grade 2 diastolic dysfunction moderate to severe MR and mild AS.    5) HTN    6) HLD    7) DM    8) CKD stage III    9) COPD    10) INDRA    11) thyroid dysfunction             Plan:   Restart beta blocker and start heparin gtt.  Can add nitrates for any recurrent chest pain.  As d/w Dr. Garrido, will tentatively plan for left heart cath tomorrow.  Will consult renal for preop clearance.  Will premedicate patient for contrast allergy.           Electronically signed by ALEX Ramirez, 04/15/25, 2:59 PM EDT.    Cardiology attending:  Seen and examined.  Chart and labs reviewed. Independent interpretations of cardiac testing was performed. History and exam findings are verified with above changes noted.  Assessment and plan notated by APC after being formulated by attending consultant.  Note that greater than 50% of the time spent in care of the patient was provided by attending consultant.    Patient reports a little bit of shortness of breath and some chest pressure.  She reports her symptoms are improved.  She is currently on a heparin drip.  Troponin is elevated but flat at roughly 120.    Patient has history of coronary arterial bypass grafting in December 2019.  She had a saphenous vein graft to the  diagonal and a saphenous vein graft to the PDA and then sequential to the OM 3.    Risk-benefit and options discussed.  She wishes to proceed with cardiac catheterization tomorrow.  She is on heparin and may need to start IV nitrates if her discomfort or troponins worsen.    Physical Exam:    General: Alert, cooperative, no distress, appears stated age  Head:  Normocephalic, atraumatic, mucous membranes moist  Eyes:  Conjunctivae/corneas clear, EOM's intact     Neck:  Supple,  no adenopathy;      Lungs:            Clear to auscultation bilaterally, no wheezes rhonchi rales are noted  Chest wall: No tenderness.  ICD noted  Heart::  Regular rate and rhythm, S1 and S2 normal, 1/6 holosystolic murmur.  No rub or gallop  Abdomen: Soft, nontender, nondistended bowel sounds active  Extremities: No cyanosis, clubbing, or edema  Pulses:            2+ and symmetric all extremities  Skin:  No rashes or lesions  Neuro/psych: A&O x3. CN II through XII are grossly intact with appropriate affect

## 2025-04-15 NOTE — H&P
[facility] Medicine Services  History & Physical    Patient Name: Rafael Nunes  : 1975  MRN: [id number]  Primary Care Physician:  Phani Adames MD  Date of admission: 4/15/2025  Date and Time of Service: 4/15/2025 at 0705    Subjective      Chief Complaint: chest pain, shortness of breath    History of Present Illness: Rafael Nunes is a 50 y.o. female with a CMH of CAD status post CABG x 3, AVR, ICD in situ, HFrEF, hypertension, hyperlipidemia, diabetes type 2, CKD IIIa, hypothyroidism, COPD who transferred to [facility] from [facility] on 4/15/2025 with chest pain and shortness of breath. Patient reported she has been experiencing shortness of breath for the past two days. Patient reported she was experiencing chest pain this morning while at work, which improved with rest but did not fully resolve.  Patient reported she has experienced the symptoms before and reported it has been happening more frequently.  In addition, patient reported she has been experiencing constipation recently.  Patient reported she usually has one bowel movement every day, but for the past couple of weeks she has been having about four bowel movements per week.    At [facility] ED: pertinent labs include HS trop 127->121, proBNP 7,120.0, Cr 1.52, , CBC unremarkable. EKG showed SR with LAFB, LVH with secondary repolarization abnormality, rate 95, QTC of 483  ms. CXR showed no acute cardiopulmonary process. Patient received aspirin 324 mg, lovenox 90 mg. Hospitalist team to admit for further management.      Review of Systems   Constitutional:  Negative for chills and fever.   Respiratory:  Positive for shortness of breath.    Cardiovascular:  Positive for chest pain. Negative for leg swelling.   Gastrointestinal:  Positive for constipation. Negative for nausea and vomiting.   Genitourinary:  Negative for dysuria and hematuria.       Personal History     Past Medical  History:   Diagnosis Date    Arrhythmia     Asthma     CAD (coronary artery disease)     Cardiomyopathy, dilated     Chronic systolic congestive heart failure     CKD (chronic kidney disease) stage 2, GFR 60-89 ml/min     COPD (chronic obstructive pulmonary disease)     Essential hypertension     GERD without esophagitis     Hidradenitis 10/26/2020    Hyperlipidemia     Hypothyroidism (acquired)     ICD (implantable cardioverter-defibrillator), dual, in situ 7/22/2019    Nonrheumatic aortic valve insufficiency     Nonrheumatic mitral valve regurgitation     S/P AVR (aortic valve replacement)     S/P CABG x 3     Type 2 diabetes mellitus without complication, without long-term current use of insulin        Past Surgical History:   Procedure Laterality Date    AORTIC VALVE REPAIR/REPLACEMENT N/A 12/27/2019    Procedure: AORTIC VALVE REPAIR/REPLACEMENT;  Surgeon: Lane Stock MD;  Location: [facility] CVOR;  Service: Cardiothoracic    BREAST LUMPECTOMY Right     BENIGN    CARDIAC CATHETERIZATION N/A 12/24/2019    Procedure: Right and Left Heart Cath 12/24/19 @ 0900;  Surgeon: Wayne Luna MD;  Location: [facility] CATH INVASIVE LOCATION;  Service: Cardiovascular    CARDIAC CATHETERIZATION N/A 12/24/2019    Procedure: Coronary angiography;  Surgeon: Wayne Luna MD;  Location: [facility] CATH INVASIVE LOCATION;  Service: Cardiovascular    CARDIAC CATHETERIZATION N/A 11/10/2020    Procedure: Left and right Heart Cath;  Surgeon: Wayne Luna MD;  Location: [facility] CATH INVASIVE LOCATION;  Service: Cardiovascular;  Laterality: N/A;    CARDIAC CATHETERIZATION N/A 11/10/2020    Procedure: Coronary angiography;  Surgeon: Wayne Luna MD;  Location: [facility] CATH INVASIVE LOCATION;  Service: Cardiovascular;  Laterality: N/A;    CARDIAC CATHETERIZATION N/A 11/10/2020    Procedure: Right Heart Cath;  Surgeon: Wayne Luna MD;  Location: [facility] CATH INVASIVE LOCATION;  Service:  Cardiovascular;  Laterality: N/A;    CARDIAC CATHETERIZATION N/A 11/25/2020    Procedure: Percutaneous coronary intervention of the left circumflex artery;  Surgeon: Wayne Luna MD;  Location: [facility] CATH INVASIVE LOCATION;  Service: Cardiovascular;  Laterality: N/A;    CARDIAC CATHETERIZATION N/A 1/22/2021    Procedure: LEFT HEART CATH with possible PCI;  Surgeon: Wayne Luna MD;  Location: [facility] CATH INVASIVE LOCATION;  Service: Cardiovascular;  Laterality: N/A;  Local and IV sedation    CARDIAC CATHETERIZATION N/A 1/22/2021    Procedure: Coronary angiography;  Surgeon: Wayne Luna MD;  Location: [facility] CATH INVASIVE LOCATION;  Service: Cardiovascular;  Laterality: N/A;    CARDIAC CATHETERIZATION N/A 1/22/2021    Procedure: Saphenous Vein Graft;  Surgeon: Wayne Luna MD;  Location: [facility] CATH INVASIVE LOCATION;  Service: Cardiovascular;  Laterality: N/A;    CARDIAC ELECTROPHYSIOLOGY PROCEDURE Left 6/28/2019    Procedure: Dual-chamber ICD insertion;  Surgeon: Héctor Eckert MD;  Location: [facility] CATH INVASIVE LOCATION;  Service: Cardiovascular    CARDIAC ELECTROPHYSIOLOGY PROCEDURE Left 8/14/2019    Procedure: Lead Revision;  Surgeon: Héctor Eckert MD;  Location: [facility] CATH INVASIVE LOCATION;  Service: Cardiovascular    CARDIAC SURGERY      CHOLECYSTECTOMY      CORONARY ARTERY BYPASS GRAFT N/A 12/27/2019    Procedure: CORONARY ARTERY BYPASS GRAFTING;  Surgeon: Lane Stock MD;  Location: [facility] CVOR;  Service: Cardiothoracic    HYSTERECTOMY      INSERT / REPLACE / REMOVE PACEMAKER      LYMPHADENECTOMY Bilateral     THYROID SURGERY         Family History: family history includes Heart disease in her father. Otherwise pertinent FHx was reviewed and not pertinent to current issue.    Social History:  reports that she quit smoking about 6 years ago. Her smoking use included cigarettes. She has never used smokeless tobacco. She reports that she does not  drink alcohol and does not use drugs.    Home Medications:  Prior to Admission Medications       Prescriptions Last Dose Informant Patient Reported? Taking?    aspirin 81 MG EC tablet 4/14/2025  No Yes    Take 1 tablet by mouth Daily.    febuxostat (ULORIC) 80 MG tablet tablet 4/14/2025  Yes Yes    Take 1 tablet by mouth Daily.    hydrALAZINE (APRESOLINE) 10 MG tablet 4/14/2025  No Yes    Take 1 tablet by mouth 3 (Three) Times a Day.    isosorbide mononitrate (IMDUR) 30 MG 24 hr tablet 4/14/2025  No Yes    Take 1 tablet by mouth Daily.    levothyroxine (SYNTHROID, LEVOTHROID) 100 MCG tablet 4/14/2025  No Yes    Take 1 tablet by mouth Every Morning.    metoprolol succinate XL (TOPROL-XL) 25 MG 24 hr tablet 4/14/2025  No Yes    Take 1 tablet by mouth Daily.    oxyCODONE-acetaminophen (PERCOCET) 7.5-325 MG per tablet 4/14/2025  Yes Yes    Take 1 tablet by mouth Every 6 (Six) Hours As Needed for Moderate Pain.    torsemide (DEMADEX) 20 MG tablet 4/14/2025  No Yes    Take 2 tablets by mouth Daily.              Allergies:  Allergies   Allergen Reactions    Hydrocodone Hives    Penicillin G Unknown (See Comments)     Reaction occurred as a baby.      Penicillin interview complete 08/18/2022.    Contrast Dye (Echo Or Unknown Ct/Mr) GI Intolerance     She is pretty sure it's the contrast dye that makes her super sick and vomiting after heart cath    Gadolinium Derivatives Itching    Varenicline Other (See Comments)     Encephalopathy       Objective      Vitals:   Temp:  [97.2 °F (36.2 °C)-98.4 °F (36.9 °C)] 97.2 °F (36.2 °C)  Heart Rate:  [76-93] 76  Resp:  [15-18] 16  BP: (117-130)/(76-97) 124/97  Body mass index is 32.8 kg/m².  Physical Exam  Constitutional:       Appearance: She is obese.   HENT:      Head: Normocephalic and atraumatic.      Nose: Nose normal.      Mouth/Throat:      Mouth: Mucous membranes are moist.      Pharynx: Oropharynx is clear.   Eyes:      Extraocular Movements: Extraocular movements intact.       Conjunctiva/sclera: Conjunctivae normal.      Pupils: Pupils are equal, round, and reactive to light.   Cardiovascular:      Rate and Rhythm: Normal rate and regular rhythm.      Pulses: Normal pulses.      Heart sounds: Normal heart sounds.   Pulmonary:      Effort: Pulmonary effort is normal.      Breath sounds: Normal breath sounds.   Abdominal:      General: Bowel sounds are normal. There is no distension.      Palpations: Abdomen is soft.      Tenderness: There is no abdominal tenderness. There is no guarding.   Musculoskeletal:         General: Normal range of motion.      Cervical back: Neck supple.   Skin:     General: Skin is warm and dry.   Neurological:      General: No focal deficit present.      Mental Status: She is alert and oriented to person, place, and time.   Psychiatric:         Mood and Affect: Mood normal.         Behavior: Behavior normal.         Thought Content: Thought content normal.         Judgment: Judgment normal.         Diagnostic Data:  Lab Results (last 24 hours)       Procedure Component Value Units Date/Time    POC Glucose Once [[id number]]  (Abnormal) Collected: 04/15/25 0748    Specimen: Blood Updated: 04/15/25 0750     Glucose 109 mg/dL      Comment: Serial Number: [id number]:  [id number]       aPTT [[id number]]  (Abnormal) Collected: 04/15/25 0135    Specimen: Blood from Arm, Left Updated: 04/15/25 0258     PTT 37.2 seconds     High Sensitivity Troponin T 1Hr [[id number]]  (Abnormal) Collected: 04/15/25 0135    Specimen: Blood Updated: 04/15/25 0211     HS Troponin T 121 ng/L      Troponin T Numeric Delta -6 ng/L      Troponin T % Delta -5    Narrative:      High Sensitive Troponin T Reference Range:  <14.0 ng/L- Negative Female for AMI  <22.0 ng/L- Negative Male for AMI  >=14 - Abnormal Female indicating possible myocardial injury.  >=22 - Abnormal Male indicating possible myocardial injury.   Clinicians would have to utilize clinical acumen, EKG, Troponin, and serial  changes to determine if it is an Acute Myocardial Infarction or myocardial injury due to an underlying chronic condition.         POC Protime / INR [[id number]] Collected: 04/15/25 2355    Specimen: Blood Updated: 04/15/25 0151     Protime 13.4 seconds      INR 1.1    Comprehensive Metabolic Panel [[id number]]  (Abnormal) Collected: 04/15/25 0017    Specimen: Blood Updated: 04/15/25 0048     Glucose 131 mg/dL      BUN 19 mg/dL      Creatinine 1.52 mg/dL      Sodium 140 mmol/L      Potassium 3.9 mmol/L      Comment: Slight hemolysis detected by analyzer. Result may be falsely elevated.        Chloride 102 mmol/L      CO2 25.2 mmol/L      Calcium 9.5 mg/dL      Total Protein 8.0 g/dL      Albumin 4.3 g/dL      ALT (SGPT) 32 U/L      AST (SGOT) 57 U/L      Comment: Slight hemolysis detected by analyzer. Result may be falsely elevated.        Alkaline Phosphatase 95 U/L      Total Bilirubin 1.0 mg/dL      Globulin 3.7 gm/dL      A/G Ratio 1.2 g/dL      BUN/Creatinine Ratio 12.5     Anion Gap 12.8 mmol/L      eGFR 41.1 mL/min/1.73     Narrative:      GFR Categories in Chronic Kidney Disease (CKD)      GFR Category          GFR (mL/min/1.73)    Interpretation  G1                     90 or greater         Normal or high (1)  G2                      60-89                Mild decrease (1)  G3a                   45-59                Mild to moderate decrease  G3b                   30-44                Moderate to severe decrease  G4                    15-29                Severe decrease  G5                    14 or less           Kidney failure          (1)In the absence of evidence of kidney disease, neither GFR category G1 or G2 fulfill the criteria for CKD.    eGFR calculation 2021 CKD-EPI creatinine equation, which does not include race as a factor    High Sensitivity Troponin T [[id number]]  (Abnormal) Collected: 04/15/25 0017    Specimen: Blood Updated: 04/15/25 0048     HS Troponin T 127 ng/L     Narrative:      High  Sensitive Troponin T Reference Range:  <14.0 ng/L- Negative Female for AMI  <22.0 ng/L- Negative Male for AMI  >=14 - Abnormal Female indicating possible myocardial injury.  >=22 - Abnormal Male indicating possible myocardial injury.   Clinicians would have to utilize clinical acumen, EKG, Troponin, and serial changes to determine if it is an Acute Myocardial Infarction or myocardial injury due to an underlying chronic condition.         BNP [[id number]]  (Abnormal) Collected: 04/15/25 0017    Specimen: Blood Updated: 04/15/25 0047     proBNP 7,120.0 pg/mL     Narrative:      This assay is used as an aid in the diagnosis of individuals suspected of having heart failure. It can be used as an aid in the diagnosis of acute decompensated heart failure (ADHF) in patients presenting with signs and symptoms of ADHF to the emergency department (ED). In addition, NT-proBNP of <300 pg/mL indicates ADHF is not likely.    Age Range Result Interpretation  NT-proBNP Concentration (pg/mL:      <50             Positive            >450                   Gray                 300-450                    Negative             <300    50-75           Positive            >900                  Gray                300-900                  Negative            <300      >75             Positive            >1800                  Gray                300-1800                  Negative            <300    COVID-19 and FLU A/B PCR, 1 HR TAT - Swab, Nasopharynx [[id number]]  (Normal) Collected: 04/15/25 0020    Specimen: Swab from Nasopharynx Updated: 04/15/25 0041     COVID19 Not Detected     Influenza A PCR Not Detected     Influenza B PCR Not Detected    Narrative:      Fact sheet for providers: https://www.fda.gov/media/225761/download    Fact sheet for patients: https://www.fda.gov/media/021899/download    Test performed by PCR.    CBC & Differential [[id number]]  (Abnormal) Collected: 04/15/25 0017    Specimen: Blood Updated: 04/15/25 0021     Narrative:      The following orders were created for panel order CBC & Differential.  Procedure                               Abnormality         Status                     ---------                               -----------         ------                     CBC Auto Differential[[id number]]        Abnormal            Final result                 Please view results for these tests on the individual orders.    CBC Auto Differential [[id number]]  (Abnormal) Collected: 04/15/25 0017    Specimen: Blood Updated: 04/15/25 0021     WBC 7.69 10*3/mm3      RBC 4.59 10*6/mm3      Hemoglobin 12.8 g/dL      Hematocrit 41.6 %      MCV 90.6 fL      MCH 27.9 pg      MCHC 30.8 g/dL      RDW 14.5 %      RDW-SD 48.6 fl      MPV 9.3 fL      Platelets 273 10*3/mm3      Neutrophil % 65.6 %      Lymphocyte % 25.6 %      Monocyte % 6.6 %      Eosinophil % 1.6 %      Basophil % 0.3 %      Immature Grans % 0.3 %      Neutrophils, Absolute 5.05 10*3/mm3      Lymphocytes, Absolute 1.97 10*3/mm3      Monocytes, Absolute 0.51 10*3/mm3      Eosinophils, Absolute 0.12 10*3/mm3      Basophils, Absolute 0.02 10*3/mm3      Immature Grans, Absolute 0.02 10*3/mm3              Imaging Results (Last 24 Hours)       Procedure Component Value Units Date/Time    XR Chest 2 View [[id number]] Collected: 04/15/25 0028     Updated: 04/15/25 0031    Narrative:      XR CHEST 2 VW    Date of Exam: 4/15/2025 12:17 AM EDT    Indication: SOA Triage Protocol    Comparison: 2/10/2025.    Findings:  There are no airspace consolidations. No pleural fluid. No pneumothorax. The pulmonary vasculature appears within normal limits. Heart appears enlarged, stable. Stable left subclavian AICD/pacemaker device. Median sternotomy wires are present.. No acute   osseous abnormality identified.      Impression:      Impression:  No acute cardiopulmonary process.      Electronically Signed: Althea Valladares MD    4/15/2025 12:29 AM EDT    Workstation ID: [id number]              Assessment  & Plan        This is a 50 y.o. female with:    Active and Resolved Problems  Active Hospital Problems    Diagnosis  POA    **NSTEMI (non-ST elevated myocardial infarction) [I21.4]  Yes      Resolved Hospital Problems   No resolved problems to display.       NSTEMI  HFrEF  Status post ICD in situ  CAD status post CABG x 3, AVR  - CXR: no acute cardiopulmonary process  - ECHO on 2/12/25 with LVEF 36-40%  - Serial Trops: 127-> 121; proBNP: 7,120.0  - Hgb A1c: 6.04; Lipid panel: ; TSH: 68.400  - Weight-based lovenox BID  - No NSAIDs except ASA  - NPO until seen by cardiology  - Continuous cardiac monitor  - Titrate O2 to keep O2 sat >92%  - Heart Failure Navigator consulted  - Cardiology consulted     Hyperlipidemia  - Continue statin  - Last lipid panel on 2/12/25    Chronic kidney disease stage IIIa  - Stable, Cr 1.52  - Replace electrolytes per protocol  - Monitor I/Os, daily weight  - Follow AM labs  - Nephrology consult if condition warrants    Diabetes, Type 2  - Holding oral medications while inpatient  - SSI and accuchecks Q6H  - Hypoglycemia protocol  - HgbA1c 6.09 on 2/18/2025  - NPO for now     Hypothyroidism  - Continue home dose of levothyroxine  - TSH: 68.400 on 2/11/25    COPD  - Stable, not in exacerbation  - Continue home medications  - Continuous pulse ox     Change in bowel habits  - Change from daily BM to 4 BM per week  - Will schedule daily miralax when able to advance diet  - Recommend outpatient follow up with PCP    Home medications for chronic conditions will be restarted as appropriate when verified by pharmacy.     VTE Prophylaxis:  No VTE prophylaxis order currently exists.        The patient desires to be as follows:    CODE STATUS:    Code Status (Patient has no pulse and is not breathing): CPR (Attempt to Resuscitate)  Medical Interventions (Patient has pulse or is breathing): Full Support        Demarcus Hanks, daughter, who can be contacted at [phone], is the designated person to  make medical decisions on the patient's behalf if She is incapable of doing so. This was clarified with patient and/or next of kin on 4/15/2025 during the course of this H&P.    Admission Status:  I believe this patient meets inpatient status.    Expected Length of Stay: Greater than 2 midnights    PDMP and Medication Dispenses via Sidebar reviewed and consistent with patient reported medications.    I discussed the patient's findings and my recommendations with patient.      Signature:     This document has been electronically signed by ALEX Downey on April 15, 2025 11:45 EDT   [facility] Hospitalist Team

## 2025-04-15 NOTE — Clinical Note
First balloon inflation max pressure = 12 kati. First balloon inflation duration = 20 seconds. Second inflation of balloon - Max pressure = 12 kati. 2nd Inflation of balloon - Duration = 20 seconds.

## 2025-04-15 NOTE — CONSULTS
"Heart Failure Program  Nurse Navigator  Discharge Planning    Patient Name:Rafael Nunes  :1975  Cardiologist:Dr. Eckert  Current Admission Date: 4/15/2025   Previous Admission: 2/10/2025  Admission frequency: 2 admissions in 6 months    Heart Failure history per record:    Symptoms on admission:c/o SOA, swelling ankles, orthopnea began over past 2-3 days. Pt advises she has had insurance changes and unable to afford her medications. She states she has some but could not afford all of them. Advises to having farxiga currently, unsure of any other heart failure medications.       Admission Weight:  Flowsheet Rows      Flowsheet Row First Filed Value   Admission Height 170.2 cm (67\") Documented at 04/15/2025 0015   Admission Weight 95 kg (209 lb 7 oz) Documented at 04/15/2025 0015              Current Home Medications:  Prior to Admission medications    Medication Sig Start Date End Date Taking? Authorizing Provider   aspirin 81 MG EC tablet Take 1 tablet by mouth Daily. 25  Yes Kelby Tolbert MD   oxyCODONE-acetaminophen (PERCOCET) 7.5-325 MG per tablet Take 1 tablet by mouth Every 6 (Six) Hours As Needed for Moderate Pain.   Yes ProviderDheeraj MD   febuxostat (ULORIC) 80 MG tablet tablet Take 1 tablet by mouth Daily.  4/15/25 Yes Dheeraj Alvarez MD   hydrALAZINE (APRESOLINE) 10 MG tablet Take 1 tablet by mouth 3 (Three) Times a Day. 2/18/25 4/15/25 Yes Kelby Tolbert MD   isosorbide mononitrate (IMDUR) 30 MG 24 hr tablet Take 1 tablet by mouth Daily. 2/19/25 4/15/25 Yes Kelby Tolbert MD   levothyroxine (SYNTHROID, LEVOTHROID) 100 MCG tablet Take 1 tablet by mouth Every Morning. 2/19/25 4/15/25 Yes Kelby Tolbert MD   metoprolol succinate XL (TOPROL-XL) 25 MG 24 hr tablet Take 1 tablet by mouth Daily. 2/19/25 4/15/25 Yes Kelby Tolbert MD   torsemide (DEMADEX) 20 MG tablet Take 2 tablets by mouth Daily. 2/18/25 4/15/25 Yes Kelby Tolbert MD       Social history:   Pt from home, " unable to afford her medications, pt states she was told to contact Medicaid to apply on last visit but has not felt well to do so    Smoking status:     Diagnostics Testing:  proBNP level: 7120    Echocardiogram:Results for orders placed during the hospital encounter of 02/10/25    Adult Transthoracic Echo Complete W/ Cont if Necessary Per Protocol    Interpretation Summary    Left ventricular systolic function is moderately decreased. Estimated left ventricular EF = 38%    The left ventricular cavity is mild to moderately dilated.    Left ventricular wall thickness is consistent with mild to moderate eccentric hypertrophy.    Left ventricular diastolic function is consistent with (grade II w/high LAP) pseudonormalization and age.    The right ventricular cavity is mildly dilated.    The left atrial cavity is moderate to severely dilated.    The right atrial cavity is borderline dilated.    Mild aortic valve stenosis is present.    Abnormal mitral valve structure consistent with dilated annulus.    Moderate to severe mitral valve regurgitation is present.    Estimated right ventricular systolic pressure from tricuspid regurgitation is normal (<35 mmHg).    Transthoracic echocardiography reveals dilated LV with eccentric LVH with EF between 36 to 40%  Severe left atrial enlargement and the mitral valve apparatus calcified and appears rheumatic without mitral stenosis and moderate to severe MR directed laterally and posteriorly  Aortic valve is calcified and not well-visualized with a mean gradient of 16 mmHg.  Mild TR without pulmonary hypertension  No effusion      Electronically signed by Héctor Eckert MD, 02/12/25, 1:19 PM EST.        Patient Assessment:   Pt lying in bed, resp even and unlabored. No SOA with conversation. No pedal edema, pt advises abd bloating but has resolved.     Current O2:    Home O2:      Education provided to patient:  yes- Heart Failure disease education  yes -Symptom  identification/management  yes -Daily Weights  yes- Diet education  - Fluid restriction (if ordered)  yes- Activity education  yes- Medication education   - Smoking cessation  yes- Follow-up Appointments   -Provided information on how to access [facility] My HF Guide/Heart Failure Interactive workbook    Acceptance of learning: acceptance, cooperative    Heart Failure education interactive teaching session time: 20 minutes    GWTG: EF 38%    Identified needs/barriers:   Volume status, GDMT - toprol xl, needs 7 day apt and cardiology apt    Intervention:   Education, HF clinic apt made, CM - discuss insurance issue

## 2025-04-16 LAB
ACT BLD: 153 SECONDS (ref 89–137)
ACT BLD: 153 SECONDS (ref 89–137)
ACT BLD: 164 SECONDS (ref 89–137)
ACT BLD: 170 SECONDS (ref 89–137)
ACT BLD: 314 SECONDS (ref 89–137)
ANION GAP SERPL CALCULATED.3IONS-SCNC: 19.8 MMOL/L (ref 5–15)
APTT PPP: 55 SECONDS (ref 22.7–35.4)
APTT PPP: 68.5 SECONDS (ref 22.7–35.4)
BUN SERPL-MCNC: 19 MG/DL (ref 6–20)
BUN/CREAT SERPL: 13.8 (ref 7–25)
CALCIUM SPEC-SCNC: 9.2 MG/DL (ref 8.6–10.5)
CHLORIDE SERPL-SCNC: 100 MMOL/L (ref 98–107)
CO2 SERPL-SCNC: 17.2 MMOL/L (ref 22–29)
CREAT SERPL-MCNC: 1.38 MG/DL (ref 0.57–1)
EGFRCR SERPLBLD CKD-EPI 2021: 46.2 ML/MIN/1.73
GLUCOSE BLDC GLUCOMTR-MCNC: 121 MG/DL (ref 70–105)
GLUCOSE BLDC GLUCOMTR-MCNC: 132 MG/DL (ref 70–105)
GLUCOSE BLDC GLUCOMTR-MCNC: 155 MG/DL (ref 70–105)
GLUCOSE BLDC GLUCOMTR-MCNC: 176 MG/DL (ref 70–105)
GLUCOSE SERPL-MCNC: 192 MG/DL (ref 65–99)
HBA1C MFR BLD: 5.8 % (ref 4.8–5.6)
MAGNESIUM SERPL-MCNC: 2.1 MG/DL (ref 1.6–2.6)
POTASSIUM SERPL-SCNC: 4.1 MMOL/L (ref 3.5–5.2)
SODIUM SERPL-SCNC: 137 MMOL/L (ref 136–145)
TSH SERPL DL<=0.05 MIU/L-ACNC: 1.96 UIU/ML (ref 0.27–4.2)
WHOLE BLOOD HOLD SPECIMEN: NORMAL

## 2025-04-16 PROCEDURE — 80048 BASIC METABOLIC PNL TOTAL CA: CPT | Performed by: INTERNAL MEDICINE

## 2025-04-16 PROCEDURE — 93010 ELECTROCARDIOGRAM REPORT: CPT | Performed by: INTERNAL MEDICINE

## 2025-04-16 PROCEDURE — B2151ZZ FLUOROSCOPY OF LEFT HEART USING LOW OSMOLAR CONTRAST: ICD-10-PCS | Performed by: INTERNAL MEDICINE

## 2025-04-16 PROCEDURE — B2111ZZ FLUOROSCOPY OF MULTIPLE CORONARY ARTERIES USING LOW OSMOLAR CONTRAST: ICD-10-PCS | Performed by: INTERNAL MEDICINE

## 2025-04-16 PROCEDURE — 63710000001 INSULIN LISPRO (HUMAN) PER 5 UNITS

## 2025-04-16 PROCEDURE — 82948 REAGENT STRIP/BLOOD GLUCOSE: CPT

## 2025-04-16 PROCEDURE — C9604 PERC D-E COR REVASC T CABG S: HCPCS | Performed by: INTERNAL MEDICINE

## 2025-04-16 PROCEDURE — 85730 THROMBOPLASTIN TIME PARTIAL: CPT | Performed by: INTERNAL MEDICINE

## 2025-04-16 PROCEDURE — 027034Z DILATION OF CORONARY ARTERY, ONE ARTERY WITH DRUG-ELUTING INTRALUMINAL DEVICE, PERCUTANEOUS APPROACH: ICD-10-PCS | Performed by: INTERNAL MEDICINE

## 2025-04-16 PROCEDURE — 25010000002 FENTANYL CITRATE (PF) 100 MCG/2ML SOLUTION: Performed by: INTERNAL MEDICINE

## 2025-04-16 PROCEDURE — C1725 CATH, TRANSLUMIN NON-LASER: HCPCS | Performed by: INTERNAL MEDICINE

## 2025-04-16 PROCEDURE — 25010000002 LIDOCAINE 2% SOLUTION: Performed by: INTERNAL MEDICINE

## 2025-04-16 PROCEDURE — 25010000002 NITROGLYCERIN 5 MG/ML SOLUTION: Performed by: INTERNAL MEDICINE

## 2025-04-16 PROCEDURE — 84443 ASSAY THYROID STIM HORMONE: CPT

## 2025-04-16 PROCEDURE — 25010000002 HYDROMORPHONE 1 MG/ML SOLUTION: Performed by: INTERNAL MEDICINE

## 2025-04-16 PROCEDURE — 99153 MOD SED SAME PHYS/QHP EA: CPT | Performed by: INTERNAL MEDICINE

## 2025-04-16 PROCEDURE — 85347 COAGULATION TIME ACTIVATED: CPT

## 2025-04-16 PROCEDURE — 83036 HEMOGLOBIN GLYCOSYLATED A1C: CPT

## 2025-04-16 PROCEDURE — C1769 GUIDE WIRE: HCPCS | Performed by: INTERNAL MEDICINE

## 2025-04-16 PROCEDURE — 4A023N7 MEASUREMENT OF CARDIAC SAMPLING AND PRESSURE, LEFT HEART, PERCUTANEOUS APPROACH: ICD-10-PCS | Performed by: INTERNAL MEDICINE

## 2025-04-16 PROCEDURE — 25510000001 IOPAMIDOL PER 1 ML: Performed by: INTERNAL MEDICINE

## 2025-04-16 PROCEDURE — 63710000001 DIPHENHYDRAMINE PER 50 MG: Performed by: NURSE PRACTITIONER

## 2025-04-16 PROCEDURE — 83735 ASSAY OF MAGNESIUM: CPT | Performed by: NURSE PRACTITIONER

## 2025-04-16 PROCEDURE — 25010000002 DIPHENHYDRAMINE PER 50 MG: Performed by: INTERNAL MEDICINE

## 2025-04-16 PROCEDURE — C1894 INTRO/SHEATH, NON-LASER: HCPCS | Performed by: INTERNAL MEDICINE

## 2025-04-16 PROCEDURE — 85730 THROMBOPLASTIN TIME PARTIAL: CPT | Performed by: FAMILY MEDICINE

## 2025-04-16 PROCEDURE — B2121ZZ FLUOROSCOPY OF SINGLE CORONARY ARTERY BYPASS GRAFT USING LOW OSMOLAR CONTRAST: ICD-10-PCS | Performed by: INTERNAL MEDICINE

## 2025-04-16 PROCEDURE — 93459 L HRT ART/GRFT ANGIO: CPT | Performed by: INTERNAL MEDICINE

## 2025-04-16 PROCEDURE — 63710000001 PREDNISONE PER 5 MG: Performed by: NURSE PRACTITIONER

## 2025-04-16 PROCEDURE — 25010000002 ONDANSETRON PER 1 MG: Performed by: INTERNAL MEDICINE

## 2025-04-16 PROCEDURE — C1874 STENT, COATED/COV W/DEL SYS: HCPCS | Performed by: INTERNAL MEDICINE

## 2025-04-16 PROCEDURE — C1887 CATHETER, GUIDING: HCPCS | Performed by: INTERNAL MEDICINE

## 2025-04-16 PROCEDURE — 92937 PRQ TRLUML REVSC CAB GRF 1: CPT | Performed by: INTERNAL MEDICINE

## 2025-04-16 PROCEDURE — C1884 EMBOLIZATION PROTECT SYST: HCPCS | Performed by: INTERNAL MEDICINE

## 2025-04-16 PROCEDURE — 25810000003 SODIUM CHLORIDE 0.9 % SOLUTION: Performed by: INTERNAL MEDICINE

## 2025-04-16 PROCEDURE — 99152 MOD SED SAME PHYS/QHP 5/>YRS: CPT | Performed by: INTERNAL MEDICINE

## 2025-04-16 PROCEDURE — 93005 ELECTROCARDIOGRAM TRACING: CPT | Performed by: INTERNAL MEDICINE

## 2025-04-16 PROCEDURE — 25010000002 HEPARIN (PORCINE) PER 1000 UNITS: Performed by: INTERNAL MEDICINE

## 2025-04-16 PROCEDURE — 25010000002 MIDAZOLAM PER 1 MG: Performed by: INTERNAL MEDICINE

## 2025-04-16 DEVICE — XIENCE SKYPOINT™ EVEROLIMUS ELUTING CORONARY STENT SYSTEM 3.25 MM X 38 MM / RAPID-EXCHANGE
Type: IMPLANTABLE DEVICE | Status: FUNCTIONAL
Brand: XIENCE SKYPOINT™

## 2025-04-16 RX ORDER — DIPHENHYDRAMINE HYDROCHLORIDE 50 MG/ML
INJECTION, SOLUTION INTRAMUSCULAR; INTRAVENOUS
Status: DISCONTINUED | OUTPATIENT
Start: 2025-04-16 | End: 2025-04-16 | Stop reason: HOSPADM

## 2025-04-16 RX ORDER — SODIUM CHLORIDE 9 MG/ML
250 INJECTION, SOLUTION INTRAVENOUS ONCE AS NEEDED
Status: DISCONTINUED | OUTPATIENT
Start: 2025-04-16 | End: 2025-04-17 | Stop reason: HOSPADM

## 2025-04-16 RX ORDER — DIPHENHYDRAMINE HCL 25 MG
25 CAPSULE ORAL EVERY 6 HOURS PRN
Status: DISCONTINUED | OUTPATIENT
Start: 2025-04-16 | End: 2025-04-17 | Stop reason: HOSPADM

## 2025-04-16 RX ORDER — ATORVASTATIN CALCIUM 40 MG/1
80 TABLET, FILM COATED ORAL NIGHTLY
Status: DISCONTINUED | OUTPATIENT
Start: 2025-04-16 | End: 2025-04-17 | Stop reason: HOSPADM

## 2025-04-16 RX ORDER — ONDANSETRON 2 MG/ML
INJECTION INTRAMUSCULAR; INTRAVENOUS
Status: DISCONTINUED | OUTPATIENT
Start: 2025-04-16 | End: 2025-04-16 | Stop reason: HOSPADM

## 2025-04-16 RX ORDER — LIDOCAINE HYDROCHLORIDE 20 MG/ML
INJECTION, SOLUTION INFILTRATION; PERINEURAL
Status: DISCONTINUED | OUTPATIENT
Start: 2025-04-16 | End: 2025-04-16 | Stop reason: HOSPADM

## 2025-04-16 RX ORDER — ONDANSETRON 2 MG/ML
4 INJECTION INTRAMUSCULAR; INTRAVENOUS EVERY 6 HOURS PRN
Status: DISCONTINUED | OUTPATIENT
Start: 2025-04-16 | End: 2025-04-16

## 2025-04-16 RX ORDER — ASPIRIN 81 MG/1
81 TABLET ORAL DAILY
Status: DISCONTINUED | OUTPATIENT
Start: 2025-04-16 | End: 2025-04-17 | Stop reason: HOSPADM

## 2025-04-16 RX ORDER — MECLIZINE HYDROCHLORIDE 25 MG/1
25 TABLET ORAL 2 TIMES DAILY PRN
Status: DISCONTINUED | OUTPATIENT
Start: 2025-04-16 | End: 2025-04-17 | Stop reason: HOSPADM

## 2025-04-16 RX ORDER — ONDANSETRON 4 MG/1
4 TABLET, ORALLY DISINTEGRATING ORAL EVERY 6 HOURS PRN
Status: DISCONTINUED | OUTPATIENT
Start: 2025-04-16 | End: 2025-04-17 | Stop reason: HOSPADM

## 2025-04-16 RX ORDER — FENTANYL CITRATE 50 UG/ML
INJECTION, SOLUTION INTRAMUSCULAR; INTRAVENOUS
Status: DISCONTINUED | OUTPATIENT
Start: 2025-04-16 | End: 2025-04-16 | Stop reason: HOSPADM

## 2025-04-16 RX ORDER — NITROGLYCERIN 5 MG/ML
INJECTION, SOLUTION INTRAVENOUS
Status: DISCONTINUED | OUTPATIENT
Start: 2025-04-16 | End: 2025-04-16 | Stop reason: HOSPADM

## 2025-04-16 RX ORDER — ASPIRIN 81 MG/1
81 TABLET ORAL DAILY
Status: DISCONTINUED | OUTPATIENT
Start: 2025-04-16 | End: 2025-04-16 | Stop reason: SDUPTHER

## 2025-04-16 RX ORDER — HEPARIN SODIUM 1000 [USP'U]/ML
INJECTION, SOLUTION INTRAVENOUS; SUBCUTANEOUS
Status: DISCONTINUED | OUTPATIENT
Start: 2025-04-16 | End: 2025-04-16 | Stop reason: HOSPADM

## 2025-04-16 RX ORDER — NITROGLYCERIN 0.4 MG/1
0.4 TABLET SUBLINGUAL
Status: DISCONTINUED | OUTPATIENT
Start: 2025-04-16 | End: 2025-04-17 | Stop reason: HOSPADM

## 2025-04-16 RX ORDER — MIDAZOLAM HYDROCHLORIDE 1 MG/ML
INJECTION, SOLUTION INTRAMUSCULAR; INTRAVENOUS
Status: DISCONTINUED | OUTPATIENT
Start: 2025-04-16 | End: 2025-04-16 | Stop reason: HOSPADM

## 2025-04-16 RX ORDER — ONDANSETRON 2 MG/ML
4 INJECTION INTRAMUSCULAR; INTRAVENOUS EVERY 6 HOURS PRN
Status: DISCONTINUED | OUTPATIENT
Start: 2025-04-16 | End: 2025-04-17 | Stop reason: HOSPADM

## 2025-04-16 RX ORDER — NICOTINE 21 MG/24HR
1 PATCH, TRANSDERMAL 24 HOURS TRANSDERMAL DAILY PRN
Status: DISCONTINUED | OUTPATIENT
Start: 2025-04-16 | End: 2025-04-17 | Stop reason: HOSPADM

## 2025-04-16 RX ORDER — ACETAMINOPHEN 325 MG/1
650 TABLET ORAL EVERY 4 HOURS PRN
Status: DISCONTINUED | OUTPATIENT
Start: 2025-04-16 | End: 2025-04-17 | Stop reason: HOSPADM

## 2025-04-16 RX ORDER — ALUMINA, MAGNESIA, AND SIMETHICONE 2400; 2400; 240 MG/30ML; MG/30ML; MG/30ML
15 SUSPENSION ORAL EVERY 6 HOURS PRN
Status: DISCONTINUED | OUTPATIENT
Start: 2025-04-16 | End: 2025-04-17 | Stop reason: HOSPADM

## 2025-04-16 RX ORDER — SODIUM CHLORIDE 9 MG/ML
30 INJECTION, SOLUTION INTRAVENOUS CONTINUOUS
Status: DISPENSED | OUTPATIENT
Start: 2025-04-16 | End: 2025-04-17

## 2025-04-16 RX ADMIN — INSULIN LISPRO 2 UNITS: 100 INJECTION, SOLUTION INTRAVENOUS; SUBCUTANEOUS at 00:16

## 2025-04-16 RX ADMIN — SODIUM CHLORIDE 30 ML/HR: 9 INJECTION, SOLUTION INTRAVENOUS at 18:11

## 2025-04-16 RX ADMIN — PREDNISONE 50 MG: 50 TABLET ORAL at 06:06

## 2025-04-16 RX ADMIN — PREDNISONE 50 MG: 50 TABLET ORAL at 13:44

## 2025-04-16 RX ADMIN — ASPIRIN 81 MG: 81 TABLET, COATED ORAL at 18:01

## 2025-04-16 RX ADMIN — DIPHENHYDRAMINE HYDROCHLORIDE 25 MG: 25 CAPSULE ORAL at 13:44

## 2025-04-16 RX ADMIN — INSULIN LISPRO 2 UNITS: 100 INJECTION, SOLUTION INTRAVENOUS; SUBCUTANEOUS at 06:06

## 2025-04-16 RX ADMIN — METOPROLOL SUCCINATE 25 MG: 25 TABLET, EXTENDED RELEASE ORAL at 08:11

## 2025-04-16 RX ADMIN — DIPHENHYDRAMINE HYDROCHLORIDE 25 MG: 25 CAPSULE ORAL at 06:06

## 2025-04-16 RX ADMIN — ATORVASTATIN CALCIUM 80 MG: 40 TABLET, FILM COATED ORAL at 20:48

## 2025-04-16 RX ADMIN — Medication 10 ML: at 08:11

## 2025-04-16 RX ADMIN — Medication 600 MG: at 08:11

## 2025-04-16 RX ADMIN — Medication 10 ML: at 20:48

## 2025-04-16 NOTE — PROGRESS NOTES
[facility] MEDICINE SERVICE  DAILY PROGRESS NOTE    NAME: Rafael Nunes  : 1975  MRN: [id number]      LOS: 1 day     PROVIDER OF SERVICE: Hasmukh Montero MD    Chief Complaint: NSTEMI (non-ST elevated myocardial infarction)    Subjective:     Interval History:  History taken from: patient    Patient seen and examined at bedside this morning.  States her shortness of breath and chest pain has improved after coming to the hospital, currently on heparin drip        Review of Systems: Negative except as described above  Review of Systems    Objective:     Vital Signs  Temp:  [97 °F (36.1 °C)-98 °F (36.7 °C)] 97.9 °F (36.6 °C)  Heart Rate:  [77-89] 79  Resp:  [11-19] 11  BP: (118-165)/() 121/92   Body mass index is 28.35 kg/m².    Physical Exam  Physical Exam  Constitutional:       Comments: NAD    Cardiovascular:      Comments:  RRR, S1 & S2   Pulmonary:      Comments:  Lungs CTA   Abdominal:      Comments:  ABD soft, NT            Diagnostic Data    Results from last 7 days   Lab Units 04/15/25  0017   WBC 10*3/mm3 7.69   HEMOGLOBIN g/dL 12.8   HEMATOCRIT % 41.6   PLATELETS 10*3/mm3 273   GLUCOSE mg/dL 131*   CREATININE mg/dL 1.52*   BUN mg/dL 19   SODIUM mmol/L 140   POTASSIUM mmol/L 3.9   AST (SGOT) U/L 57*   ALT (SGPT) U/L 32   ALK PHOS U/L 95   BILIRUBIN mg/dL 1.0   ANION GAP mmol/L 12.8       XR Chest 2 View  Result Date: 4/15/2025  Impression: No acute cardiopulmonary process. Electronically Signed: Althea Valladares MD  4/15/2025 12:29 AM EDT  Workstation ID: [id number]        I reviewed the patient's new clinical results.    Assessment/Plan:     Active and Resolved Problems  Active Hospital Problems    Diagnosis  POA    **NSTEMI (non-ST elevated myocardial infarction) [I21.4]  Yes    Elevated troponin [R79.89]  Unknown      Resolved Hospital Problems   No resolved problems to display.       NSTEMI  HFrEF  Status post ICD in situ  CAD status post CABG x 3, AVR  - CXR: no acute  cardiopulmonary process  - ECHO on 2/12/25 with LVEF 36-40%,   - Serial Trops: 127-> 121; proBNP: 7,120.0  - Continue patient on heparin drip  - Continue aspirin, statin, beta-blocker  - Continuous cardiac monitor  - Titrate O2 to keep O2 sat >92%  - Heart Failure Navigator consulted  -Lipid panel, A1c, TSH ordered  - Cardiology consulted, patient scheduled for cardiac cath     Hyperlipidemia  - Continue statin  - Last lipid panel on 2/12/25, repeat ordered     Chronic kidney disease stage IIIa  - Stable, Cr 1.52  - Replace electrolytes per protocol  - Monitor I/Os, daily weight  - Follow AM labs  - Nephrology consult if condition warrants     Diabetes, Type 2  - Holding oral medications while inpatient  - SSI and accuchecks Q6H  - Hypoglycemia protocol  - HgbA1c 6.09 on 2/18/2025  - NPO for now     Hypothyroidism  - Continue home dose of levothyroxine  - TSH: 68.400 on 2/11/25     COPD  - Stable, not in exacerbation  - Continue home medications  - Continuous pulse ox      Change in bowel habits  - Change from daily BM to 4 BM per week  - Will schedule daily miralax when able to advance diet  - Recommend outpatient follow up with PCP       VTE Prophylaxis:  Pharmacologic VTE prophylaxis orders are present.             Disposition Planning:        Anticipated Date of Discharge: tbd         Time: 35 minutes     Code Status and Medical Interventions: CPR (Attempt to Resuscitate); Full Support   Ordered at: 04/15/25 1145     Code Status (Patient has no pulse and is not breathing):    CPR (Attempt to Resuscitate)     Medical Interventions (Patient has pulse or is breathing):    Full Support       Signature: Electronically signed by Hasmukh Montero MD, 04/16/25, 11:36 EDT.  [facility] Hospitalist Team

## 2025-04-16 NOTE — CONSULTS
Nephrology Consult Note                                                [facility]      Patient Identification:  Name: Rafael Nunes  Age: 50 y.o.  Sex: female  :  1975  MRN: [id number]               Requesting Physician: Irineo Conn MD  Reason for Consultation: management recommendations      History of Present Illness:    Patient is a 50-year-old -American female patient with history of coronary artery disease status post CABG AVR ICD congestive heart failure hypertension hyperlipidemia diabetes underlying CKD stage II not followed by nephrology history of hypothyroidism COPD who presented to the hospital because of shortness of breath and now is scheduled to have cardiac catheterization  I was consulted to manage acute kidney injury with possible CKD stage II and help prepare and reduce risk of contrast nephropathy  Problem List:    NSTEMI (non-ST elevated myocardial infarction)    Elevated troponin     Past Medical History:  Past Medical History:   Diagnosis Date    Arrhythmia     Asthma     CAD (coronary artery disease)     Cardiomyopathy, dilated     Chronic systolic congestive heart failure     CKD (chronic kidney disease) stage 2, GFR 60-89 ml/min     COPD (chronic obstructive pulmonary disease)     Essential hypertension     GERD without esophagitis     Hidradenitis 10/26/2020    Hyperlipidemia     Hypothyroidism (acquired)     ICD (implantable cardioverter-defibrillator), dual, in situ 2019    Nonrheumatic aortic valve insufficiency     Nonrheumatic mitral valve regurgitation     S/P AVR (aortic valve replacement)     S/P CABG x 3     Type 2 diabetes mellitus without complication, without long-term current use of insulin      Past Surgical History:  Past Surgical History:   Procedure Laterality Date    AORTIC VALVE REPAIR/REPLACEMENT N/A 2019    Procedure: AORTIC VALVE REPAIR/REPLACEMENT;  Surgeon: Lane Stock MD;  Location:   [facility];  Service: Cardiothoracic    BREAST LUMPECTOMY Right     BENIGN    CARDIAC CATHETERIZATION N/A 12/24/2019    Procedure: Right and Left Heart Cath 12/24/19 @ 0900;  Surgeon: Wayne Luna MD;  Location:  [facility];  Service: Cardiovascular    CARDIAC CATHETERIZATION N/A 12/24/2019    Procedure: Coronary angiography;  Surgeon: Wayne Luna MD;  Location:  [facility];  Service: Cardiovascular    CARDIAC CATHETERIZATION N/A 11/10/2020    Procedure: Left and right Heart Cath;  Surgeon: Wayne Luna MD;  Location:  [facility];  Service: Cardiovascular;  Laterality: N/A;    CARDIAC CATHETERIZATION N/A 11/10/2020    Procedure: Coronary angiography;  Surgeon: Wayne Luna MD;  Location:  [facility];  Service: Cardiovascular;  Laterality: N/A;    CARDIAC CATHETERIZATION N/A 11/10/2020    Procedure: Right Heart Cath;  Surgeon: Wayne Luna MD;  Location:  [facility];  Service: Cardiovascular;  Laterality: N/A;    CARDIAC CATHETERIZATION N/A 11/25/2020    Procedure: Percutaneous coronary intervention of the left circumflex artery;  Surgeon: Wayne Luna MD;  Location:  [facility];  Service: Cardiovascular;  Laterality: N/A;    CARDIAC CATHETERIZATION N/A 1/22/2021    Procedure: LEFT HEART CATH with possible PCI;  Surgeon: Wyane Luna MD;  Location: [facility] CATH INVASIVE LOCATION;  Service: Cardiovascular;  Laterality: N/A;  Local and IV sedation    CARDIAC CATHETERIZATION N/A 1/22/2021    Procedure: Coronary angiography;  Surgeon: Wayne Luna MD;  Location:  [facility];  Service: Cardiovascular;  Laterality: N/A;    CARDIAC CATHETERIZATION N/A 1/22/2021    Procedure: Saphenous Vein Graft;  Surgeon: Wayne Luna MD;  Location:  [facility];  Service: Cardiovascular;  Laterality:  N/A;    CARDIAC ELECTROPHYSIOLOGY PROCEDURE Left 6/28/2019    Procedure: Dual-chamber ICD insertion;  Surgeon: Héctor Eckert MD;  Location: [facility] CATH INVASIVE LOCATION;  Service: Cardiovascular    CARDIAC ELECTROPHYSIOLOGY PROCEDURE Left 8/14/2019    Procedure: Lead Revision;  Surgeon: Héctor Eckert MD;  Location: [facility] CATH INVASIVE LOCATION;  Service: Cardiovascular    CARDIAC SURGERY      CHOLECYSTECTOMY      CORONARY ARTERY BYPASS GRAFT N/A 12/27/2019    Procedure: CORONARY ARTERY BYPASS GRAFTING;  Surgeon: Lane Stock MD;  Location: [facility];  Service: Cardiothoracic    HYSTERECTOMY      INSERT / REPLACE / REMOVE PACEMAKER      LYMPHADENECTOMY Bilateral     THYROID SURGERY        Home Meds:  Medications Prior to Admission   Medication Sig Dispense Refill Last Dose/Taking    aspirin 81 MG EC tablet Take 1 tablet by mouth Daily.   4/14/2025 Morning    oxyCODONE-acetaminophen (PERCOCET) 7.5-325 MG per tablet Take 1 tablet by mouth Every 6 (Six) Hours As Needed for Moderate Pain.   4/14/2025     Current Meds:     Current Facility-Administered Medications:     Acetylcysteine capsule 600 mg, 600 mg, Oral, BID, Kane Mooney MD, 600 mg at 04/15/25 2014    sennosides-docusate (PERICOLACE) 8.6-50 MG per tablet 2 tablet, 2 tablet, Oral, BID PRN, 2 tablet at 04/15/25 2019 **AND** polyethylene glycol (MIRALAX) packet 17 g, 17 g, Oral, Daily PRN **AND** bisacodyl (DULCOLAX) EC tablet 5 mg, 5 mg, Oral, Daily PRN **AND** bisacodyl (DULCOLAX) suppository 10 mg, 10 mg, Rectal, Daily PRN, Irineo Conn MD    Calcium Replacement - Follow Nurse / BPA Driven Protocol, , Not Applicable, PRN, Irineo Conn MD    dextrose (D50W) (25 g/50 mL) IV injection 25 g, 25 g, Intravenous, Q15 Min PRN, Anjali Sousaa G, APRN    dextrose (GLUTOSE) oral gel 15 g, 15 g, Oral, Q15 Min PRN, Anjali Sousaa G, APRN    diphenhydrAMINE (BENADRYL) capsule 25 mg, 25 mg, Oral, On Call, Tosin Bardales,  ALEX    glucagon (GLUCAGEN) injection 1 mg, 1 mg, Intramuscular, Q15 Min PRN, Sabine Sousa APRN    heparin 91504 units/250 mL (100 units/mL) in 0.45 % NaCl infusion, 10.5 Units/kg/hr, Intravenous, Titrated, Tosin Bardales APRN, Last Rate: 9.44 mL/hr at 04/16/25 0215, 11.5 Units/kg/hr at 04/16/25 0215    insulin lispro (HUMALOG/ADMELOG) injection 2-7 Units, 2-7 Units, Subcutaneous, Q6H, Sabine Sousa APRN, 2 Units at 04/16/25 0606    ipratropium-albuterol (DUO-NEB) nebulizer solution 3 mL, 3 mL, Nebulization, Q6H PRN, Sabine Sousa APRN    Magnesium Standard Dose Replacement - Follow Nurse / BPA Driven Protocol, , Not Applicable, PRN, Irineo Conn MD    metoprolol succinate XL (TOPROL-XL) 24 hr tablet 25 mg, 25 mg, Oral, Q24H, Tosin Bardales APRN, 25 mg at 04/15/25 1134    nitroglycerin (NITROSTAT) SL tablet 0.4 mg, 0.4 mg, Sublingual, Q5 Min PRFabien KAUFMAN Sukhjinder S, MD    Phosphorus Replacement - Follow Nurse / BPA Driven Protocol, , Not Applicable, PRFabien KAUFMAN Sukhjinder S, MD    Potassium Replacement - Follow Nurse / BPA Driven Protocol, , Not Applicable, Fabien PEREZ Sukhjinder S, MD    predniSONE (DELTASONE) tablet 50 mg, 50 mg, Oral, On Call, Tosin Bardales, APRN    sodium chloride 0.9 % flush 10 mL, 10 mL, Intravenous, PRN, Tanisha Schultz MD    sodium chloride 0.9 % flush 10 mL, 10 mL, Intravenous, Q12H, Irineo Conn MD, 10 mL at 04/15/25 2014    sodium chloride 0.9 % flush 10 mL, 10 mL, Intravenous, PRN, Irineo Conn MD    sodium chloride 0.9 % infusion 40 mL, 40 mL, Intravenous, PRN, Irineo Conn MD    sodium chloride 0.9 % infusion, 50 mL/hr, Intravenous, Continuous, Kane Mooney MD, Last Rate: 50 mL/hr at 04/15/25 1710, 50 mL/hr at 04/15/25 1710    Allergies:  Allergies   Allergen Reactions    Hydrocodone Hives    Penicillin G Unknown (See Comments)     Reaction occurred as a baby.      Penicillin interview complete  "2022.    Contrast Dye (Echo Or Unknown Ct/Mr) GI Intolerance     She is pretty sure it's the contrast dye that makes her super sick and vomiting after heart cath    Gadolinium Derivatives Itching    Varenicline Other (See Comments)     Encephalopathy     Social History:   Social History     Tobacco Use    Smoking status: Former     Current packs/day: 0.00     Types: Cigarettes     Quit date: 2019     Years since quittin.2    Smokeless tobacco: Never   Substance Use Topics    Alcohol use: No      Family History:  Family History   Problem Relation Age of Onset    Heart disease Father         Review of Systems  Reviewed 12 systems were reviewed, all negative except for those mentioned in HPI    Objective:  Vitals:   /83 (BP Location: Right arm, Patient Position: Lying)   Pulse 77   Temp 97.7 °F (36.5 °C) (Oral)   Resp 18   Ht 170.2 cm (67\")   Wt 82.1 kg (181 lb)   LMP  (LMP Unknown)   SpO2 92%   BMI 28.35 kg/m²   I/O:     Intake/Output Summary (Last 24 hours) at 2025 0718  Last data filed at 4/15/2025 1125  Gross per 24 hour   Intake 0 ml   Output 0 ml   Net 0 ml       Exam:  General Appearance:  Alert  Head:  Normocephalic, without obvious abnormality, atraumatic  Eyes:  PERRL, conjunctiva/corneas clear     Neck:  Supple,  no adenopathy;      Lungs:  Decreased BS occasion ronchi  Heart:  Regular rate and rhythm, S1 and S2 normal  Abdomen:  Soft, non-tender, bowel sounds active   Extremities: trace edema  Pulses: 2+ and symmetric all extremities  Skin:  No rashes or lesions  Data Review:  All labs (24hrs):   Recent Results (from the past 24 hours)   POC Glucose Once    Collection Time: 04/15/25  7:48 AM    Specimen: Blood   Result Value Ref Range    Glucose 109 (H) 70 - 105 mg/dL   ECG 12 Lead Chest Pain    Collection Time: 04/15/25 11:09 AM   Result Value Ref Range    QT Interval 448 ms    QTC Interval 506 ms   POC Glucose Once    Collection Time: 04/15/25 11:59 AM    Specimen: Blood "   Result Value Ref Range    Glucose 109 (H) 70 - 105 mg/dL   Protime-INR    Collection Time: 04/15/25  2:46 PM    Specimen: Arm, Left; Blood   Result Value Ref Range    Protime 16.0 (H) 11.7 - 14.2 Seconds    INR 1.28 (H) 0.90 - 1.10   POC Glucose Once    Collection Time: 04/15/25  4:18 PM    Specimen: Blood   Result Value Ref Range    Glucose 215 (H) 70 - 105 mg/dL   POC Glucose Once    Collection Time: 04/15/25 11:31 PM    Specimen: Blood   Result Value Ref Range    Glucose 183 (H) 70 - 105 mg/dL   POC Protime / INR    Collection Time: 04/15/25 11:55 PM    Specimen: Blood   Result Value Ref Range    Protime 13.4 seconds    INR 1.1 0.8 - 1.2   aPTT    Collection Time: 04/16/25 12:14 AM    Specimen: Hand, Right; Blood   Result Value Ref Range    PTT 55.0 (C) 22.7 - 35.4 seconds   Magnesium    Collection Time: 04/16/25 12:14 AM    Specimen: Hand, Right; Blood   Result Value Ref Range    Magnesium 2.1 1.6 - 2.6 mg/dL   Lavender Top    Collection Time: 04/16/25 12:14 AM   Result Value Ref Range    Extra Tube hold for add-on    POC Glucose Once    Collection Time: 04/16/25  5:47 AM    Specimen: Blood   Result Value Ref Range    Glucose 176 (H) 70 - 105 mg/dL       Current Facility-Administered Medications:     Acetylcysteine capsule 600 mg, 600 mg, Oral, BID, Kane Mooney MD, 600 mg at 04/15/25 2014    sennosides-docusate (PERICOLACE) 8.6-50 MG per tablet 2 tablet, 2 tablet, Oral, BID PRN, 2 tablet at 04/15/25 2019 **AND** polyethylene glycol (MIRALAX) packet 17 g, 17 g, Oral, Daily PRN **AND** bisacodyl (DULCOLAX) EC tablet 5 mg, 5 mg, Oral, Daily PRN **AND** bisacodyl (DULCOLAX) suppository 10 mg, 10 mg, Rectal, Daily PRN, Irineo Conn MD    Calcium Replacement - Follow Nurse / BPA Driven Protocol, , Not Applicable, PRN, Irineo Conn MD    dextrose (D50W) (25 g/50 mL) IV injection 25 g, 25 g, Intravenous, Q15 Min PRN, Sabine Sousa APRN    dextrose (GLUTOSE) oral gel 15 g, 15 g, Oral, Q15 Min  PRN, Sabine Sousa APRN    diphenhydrAMINE (BENADRYL) capsule 25 mg, 25 mg, Oral, On Call, Tosin Bardales APRN    glucagon (GLUCAGEN) injection 1 mg, 1 mg, Intramuscular, Q15 Min PRN, Sabine Sousa APRN    heparin 51642 units/250 mL (100 units/mL) in 0.45 % NaCl infusion, 10.5 Units/kg/hr, Intravenous, Titrated, Tosin Bardales APRN, Last Rate: 9.44 mL/hr at 04/16/25 0215, 11.5 Units/kg/hr at 04/16/25 0215    insulin lispro (HUMALOG/ADMELOG) injection 2-7 Units, 2-7 Units, Subcutaneous, Q6H, Sabine Sousa APRN, 2 Units at 04/16/25 0606    ipratropium-albuterol (DUO-NEB) nebulizer solution 3 mL, 3 mL, Nebulization, Q6H PRN, Sabine Sousa APRN    Magnesium Standard Dose Replacement - Follow Nurse / BPA Driven Protocol, , Not Applicable, PRN, Irnieo Conn MD    metoprolol succinate XL (TOPROL-XL) 24 hr tablet 25 mg, 25 mg, Oral, Q24H, Tosin Bardales APRN, 25 mg at 04/15/25 1134    nitroglycerin (NITROSTAT) SL tablet 0.4 mg, 0.4 mg, Sublingual, Q5 Min PRFabien KAUFMAN Sukhjinder S, MD    Phosphorus Replacement - Follow Nurse / BPA Driven Protocol, , Not Applicable, PRFabien KAUFMAN Sukhjinder S, MD    Potassium Replacement - Follow Nurse / BPA Driven Protocol, , Not Applicable, Fabien PEREZ Sukhjinder S, MD    predniSONE (DELTASONE) tablet 50 mg, 50 mg, Oral, On Call, Tosin Bardales APRN    sodium chloride 0.9 % flush 10 mL, 10 mL, Intravenous, PRN, Tanisha Schultz MD    sodium chloride 0.9 % flush 10 mL, 10 mL, Intravenous, Q12H, Irineo Conn MD, 10 mL at 04/15/25 2014    sodium chloride 0.9 % flush 10 mL, 10 mL, Intravenous, PRN, Irineo Conn MD    sodium chloride 0.9 % infusion 40 mL, 40 mL, Intravenous, PRN, Irineo Conn MD    sodium chloride 0.9 % infusion, 50 mL/hr, Intravenous, Continuous, Kane Mooney MD, Last Rate: 50 mL/hr at 04/15/25 1710, 50 mL/hr at 04/15/25 1710    Assessment:  Acute kidney injury on top of CKD stage  II  Congestive heart failure  Chest pain  CAD status post CABG  History of AVR  CHF with AICD  Hypertension  Diabetes  Hypothyroidism  COPD      Recommendations:  Risk of contrast nephropathy  Added IV fluid and Mucomyst  Reordered labs this morning  May proceed with cardiac catheterization  The patient understand the risk of contrast on the kidneys  She understands what we are doing to reduce that risk  She will need to have workup for her CKD  Will check urine studies while she is here        Kane Mooney MD  4/16/2025  07:18 EDT

## 2025-04-16 NOTE — PLAN OF CARE
Goal Outcome Evaluation:  Plan of Care Reviewed With: patient        Progress: improving             Pt c/o headache at beginning of shift but has been resting comfortably with no other complaints. Heparin gtt infusing. NPO for cardiac cath later today. Will continue to monitor...

## 2025-04-16 NOTE — CASE MANAGEMENT/SOCIAL WORK
Social Work Assessment  [facility]     Patient Name: Rafael Nunes  MRN: [id number]  Today's Date: 4/16/2025    Admit Date: 4/15/2025     Discharge Plan       Row Name 04/16/25 1739       Plan    Plan Comments SW attempted to meet with patient who was out of her room and learned she went for Heart Cath.  SW will follow.               YANI Sauer MSW    Phone: [phone]  Cell: [phone]  Fax: [phone]  [email or web address]

## 2025-04-16 NOTE — CONSULTS
Heart Failure Program  Nurse Navigator  Discharge Planning: Follow-up Note    Patient Name:Rafael Nunes  :1975  Current Admission Date: 4/15/2025       Last 3 Weights:  Wt Readings from Last 3 Encounters:   04/15/25 82.1 kg (181 lb)   25 92.9 kg (204 lb 12.9 oz)   24 94.3 kg (207 lb 14.3 oz)       Intake and Output totals: No intake/output data recorded.  No intake/output data recorded.          Patient Assessment:   Pt sitting up in bed, resp even and unlabored. No soa with conversation. No edema ankles, pt reports less bloating abd today      Patient Education:   Review HF s/s, medication adherence and keeping follow up apt    Review HF Education provided to patient:  yes-Symptoms worsening  yes-Prescribed medications  yes-HF self-care  yes-Follow-up Appointments       Acceptance of learning: acceptance, cooperative    Heart Failure education interactive teaching session time: 20 minutes      Identified needs/barriers:   Volume status improved, pending heart cath. JENY - toprol xl. Needs 7 day apt and cardiology apt    Intervention follow-up:  Education, HF clinic scheduled 25

## 2025-04-17 ENCOUNTER — READMISSION MANAGEMENT (OUTPATIENT)
Dept: CALL CENTER | Facility: HOSPITAL | Age: 50
End: 2025-04-17
Payer: MEDICARE

## 2025-04-17 VITALS
DIASTOLIC BLOOD PRESSURE: 95 MMHG | HEART RATE: 81 BPM | TEMPERATURE: 97.4 F | HEIGHT: 67 IN | OXYGEN SATURATION: 98 % | SYSTOLIC BLOOD PRESSURE: 124 MMHG | WEIGHT: 208.34 LBS | BODY MASS INDEX: 32.7 KG/M2 | RESPIRATION RATE: 16 BRPM

## 2025-04-17 PROBLEM — I21.A1 TYPE 2 MYOCARDIAL INFARCTION: Status: ACTIVE | Noted: 2025-04-17

## 2025-04-17 PROBLEM — I25.82 TOTAL OCCLUSION OF CORONARY ARTERY, CHRONIC: Status: ACTIVE | Noted: 2025-04-17

## 2025-04-17 LAB
ANION GAP SERPL CALCULATED.3IONS-SCNC: 12.8 MMOL/L (ref 5–15)
BASOPHILS # BLD AUTO: 0.02 10*3/MM3 (ref 0–0.2)
BASOPHILS NFR BLD AUTO: 0.1 % (ref 0–1.5)
BUN SERPL-MCNC: 19 MG/DL (ref 6–20)
BUN/CREAT SERPL: 14.5 (ref 7–25)
CALCIUM SPEC-SCNC: 8.7 MG/DL (ref 8.6–10.5)
CHLORIDE SERPL-SCNC: 104 MMOL/L (ref 98–107)
CHOLEST SERPL-MCNC: 203 MG/DL (ref 0–200)
CO2 SERPL-SCNC: 21.2 MMOL/L (ref 22–29)
CREAT SERPL-MCNC: 1.31 MG/DL (ref 0.57–1)
DEPRECATED RDW RBC AUTO: 45.1 FL (ref 37–54)
EGFRCR SERPLBLD CKD-EPI 2021: 49.1 ML/MIN/1.73
EOSINOPHIL # BLD AUTO: 0 10*3/MM3 (ref 0–0.4)
EOSINOPHIL NFR BLD AUTO: 0 % (ref 0.3–6.2)
ERYTHROCYTE [DISTWIDTH] IN BLOOD BY AUTOMATED COUNT: 14 % (ref 12.3–15.4)
GLUCOSE BLDC GLUCOMTR-MCNC: 147 MG/DL (ref 70–105)
GLUCOSE BLDC GLUCOMTR-MCNC: 167 MG/DL (ref 70–105)
GLUCOSE SERPL-MCNC: 187 MG/DL (ref 65–99)
HCT VFR BLD AUTO: 36.4 % (ref 34–46.6)
HDLC SERPL-MCNC: 48 MG/DL (ref 40–60)
HGB BLD-MCNC: 11.3 G/DL (ref 12–15.9)
IMM GRANULOCYTES # BLD AUTO: 0.1 10*3/MM3 (ref 0–0.05)
IMM GRANULOCYTES NFR BLD AUTO: 0.7 % (ref 0–0.5)
LDLC SERPL CALC-MCNC: 121 MG/DL (ref 0–100)
LDLC/HDLC SERPL: 2.43 {RATIO}
LYMPHOCYTES # BLD AUTO: 0.9 10*3/MM3 (ref 0.7–3.1)
LYMPHOCYTES NFR BLD AUTO: 6.2 % (ref 19.6–45.3)
MCH RBC QN AUTO: 27.5 PG (ref 26.6–33)
MCHC RBC AUTO-ENTMCNC: 31 G/DL (ref 31.5–35.7)
MCV RBC AUTO: 88.6 FL (ref 79–97)
MONOCYTES # BLD AUTO: 0.51 10*3/MM3 (ref 0.1–0.9)
MONOCYTES NFR BLD AUTO: 3.5 % (ref 5–12)
NEUTROPHILS NFR BLD AUTO: 13.02 10*3/MM3 (ref 1.7–7)
NEUTROPHILS NFR BLD AUTO: 89.5 % (ref 42.7–76)
NRBC BLD AUTO-RTO: 0 /100 WBC (ref 0–0.2)
PLATELET # BLD AUTO: 270 10*3/MM3 (ref 140–450)
PMV BLD AUTO: 9.7 FL (ref 6–12)
POTASSIUM SERPL-SCNC: 3.6 MMOL/L (ref 3.5–5.2)
POTASSIUM SERPL-SCNC: 4.2 MMOL/L (ref 3.5–5.2)
QT INTERVAL: 447 MS
QTC INTERVAL: 487 MS
RBC # BLD AUTO: 4.11 10*6/MM3 (ref 3.77–5.28)
SODIUM SERPL-SCNC: 138 MMOL/L (ref 136–145)
TRIGL SERPL-MCNC: 191 MG/DL (ref 0–150)
VLDLC SERPL-MCNC: 34 MG/DL (ref 5–40)
WBC NRBC COR # BLD AUTO: 14.55 10*3/MM3 (ref 3.4–10.8)

## 2025-04-17 PROCEDURE — 80048 BASIC METABOLIC PNL TOTAL CA: CPT | Performed by: INTERNAL MEDICINE

## 2025-04-17 PROCEDURE — 99232 SBSQ HOSP IP/OBS MODERATE 35: CPT | Performed by: INTERNAL MEDICINE

## 2025-04-17 PROCEDURE — 63710000001 INSULIN LISPRO (HUMAN) PER 5 UNITS: Performed by: INTERNAL MEDICINE

## 2025-04-17 PROCEDURE — 84132 ASSAY OF SERUM POTASSIUM: CPT | Performed by: STUDENT IN AN ORGANIZED HEALTH CARE EDUCATION/TRAINING PROGRAM

## 2025-04-17 PROCEDURE — 85025 COMPLETE CBC W/AUTO DIFF WBC: CPT | Performed by: INTERNAL MEDICINE

## 2025-04-17 PROCEDURE — 82948 REAGENT STRIP/BLOOD GLUCOSE: CPT

## 2025-04-17 PROCEDURE — 94799 UNLISTED PULMONARY SVC/PX: CPT

## 2025-04-17 PROCEDURE — 94640 AIRWAY INHALATION TREATMENT: CPT

## 2025-04-17 PROCEDURE — 80061 LIPID PANEL: CPT | Performed by: INTERNAL MEDICINE

## 2025-04-17 RX ORDER — CLOPIDOGREL BISULFATE 75 MG/1
300 TABLET ORAL ONCE
Status: COMPLETED | OUTPATIENT
Start: 2025-04-17 | End: 2025-04-17

## 2025-04-17 RX ORDER — CLOPIDOGREL BISULFATE 75 MG/1
TABLET ORAL
Qty: 30 TABLET | Refills: 0 | Status: SHIPPED | OUTPATIENT
Start: 2025-04-18 | End: 2025-04-17

## 2025-04-17 RX ORDER — ASPIRIN 81 MG/1
81 TABLET ORAL DAILY
Qty: 90 TABLET | Refills: 0
Start: 2025-04-17 | End: 2025-04-17

## 2025-04-17 RX ORDER — ATORVASTATIN CALCIUM 80 MG/1
80 TABLET, FILM COATED ORAL NIGHTLY
Qty: 90 TABLET | Refills: 0 | Status: SHIPPED | OUTPATIENT
Start: 2025-04-17 | End: 2025-04-17

## 2025-04-17 RX ORDER — METOPROLOL SUCCINATE 25 MG/1
25 TABLET, EXTENDED RELEASE ORAL
Qty: 30 TABLET | Refills: 0 | Status: SHIPPED | OUTPATIENT
Start: 2025-04-18

## 2025-04-17 RX ORDER — ASPIRIN 81 MG/1
81 TABLET ORAL DAILY
Qty: 90 TABLET | Refills: 0
Start: 2025-04-17

## 2025-04-17 RX ORDER — CLOPIDOGREL BISULFATE 75 MG/1
TABLET ORAL
Qty: 30 TABLET | Refills: 0 | Status: SHIPPED | OUTPATIENT
Start: 2025-04-18 | End: 2025-05-19

## 2025-04-17 RX ORDER — POTASSIUM CHLORIDE 1500 MG/1
40 TABLET, EXTENDED RELEASE ORAL EVERY 4 HOURS
Status: COMPLETED | OUTPATIENT
Start: 2025-04-17 | End: 2025-04-17

## 2025-04-17 RX ORDER — CLOPIDOGREL BISULFATE 75 MG/1
300 TABLET ORAL ONCE
Status: DISCONTINUED | OUTPATIENT
Start: 2025-04-18 | End: 2025-04-17

## 2025-04-17 RX ORDER — METOPROLOL SUCCINATE 25 MG/1
25 TABLET, EXTENDED RELEASE ORAL
Qty: 30 TABLET | Refills: 0 | Status: SHIPPED | OUTPATIENT
Start: 2025-04-18 | End: 2025-04-17

## 2025-04-17 RX ORDER — CLOPIDOGREL BISULFATE 75 MG/1
75 TABLET ORAL DAILY
Status: DISCONTINUED | OUTPATIENT
Start: 2025-04-19 | End: 2025-04-17

## 2025-04-17 RX ORDER — CLOPIDOGREL BISULFATE 75 MG/1
75 TABLET ORAL DAILY
Status: DISCONTINUED | OUTPATIENT
Start: 2025-04-18 | End: 2025-04-17 | Stop reason: HOSPADM

## 2025-04-17 RX ORDER — ATORVASTATIN CALCIUM 80 MG/1
80 TABLET, FILM COATED ORAL NIGHTLY
Qty: 90 TABLET | Refills: 0 | Status: SHIPPED | OUTPATIENT
Start: 2025-04-17

## 2025-04-17 RX ADMIN — INSULIN LISPRO 2 UNITS: 100 INJECTION, SOLUTION INTRAVENOUS; SUBCUTANEOUS at 05:32

## 2025-04-17 RX ADMIN — MUPIROCIN 1 APPLICATION: 20 OINTMENT TOPICAL at 08:31

## 2025-04-17 RX ADMIN — Medication 10 ML: at 08:31

## 2025-04-17 RX ADMIN — METOPROLOL SUCCINATE 25 MG: 25 TABLET, EXTENDED RELEASE ORAL at 08:31

## 2025-04-17 RX ADMIN — TICAGRELOR 90 MG: 90 TABLET ORAL at 00:46

## 2025-04-17 RX ADMIN — POTASSIUM CHLORIDE 40 MEQ: 1500 TABLET, EXTENDED RELEASE ORAL at 08:31

## 2025-04-17 RX ADMIN — POTASSIUM CHLORIDE 40 MEQ: 1500 TABLET, EXTENDED RELEASE ORAL at 05:26

## 2025-04-17 RX ADMIN — CLOPIDOGREL BISULFATE 300 MG: 75 TABLET, FILM COATED ORAL at 14:35

## 2025-04-17 RX ADMIN — TICAGRELOR 90 MG: 90 TABLET ORAL at 08:31

## 2025-04-17 RX ADMIN — IPRATROPIUM BROMIDE AND ALBUTEROL SULFATE 3 ML: .5; 3 SOLUTION RESPIRATORY (INHALATION) at 16:17

## 2025-04-17 RX ADMIN — ASPIRIN 81 MG: 81 TABLET, COATED ORAL at 08:31

## 2025-04-17 NOTE — PROGRESS NOTES
"                                                                                                                                      Nephrology  Progress Note                                        [facility]    Patient Identification    Name: Rafael Nunes  Age: 50 y.o.  Sex: female  :  1975  MRN: [id number]      DATE OF SERVICE:  2025        Subective    Status post cardiac cath and angioplasty     Objective   Scheduled Meds:[Transfer Hold] Acetylcysteine, 600 mg, Oral, BID  aspirin, 81 mg, Oral, Daily  atorvastatin, 80 mg, Oral, Nightly  insulin lispro, 2-7 Units, Subcutaneous, Q6H  metoprolol succinate XL, 25 mg, Oral, Q24H  mupirocin, 1 Application, Each Nare, BID  potassium chloride ER, 40 mEq, Oral, Q4H  sodium chloride, 10 mL, Intravenous, Q12H  ticagrelor, 90 mg, Oral, BID          Continuous Infusions:sodium chloride, 75 mL/hr, Last Rate: 75 mL/hr (25 1331)        PRN Meds:  acetaminophen    aluminum-magnesium hydroxide-simethicone    atropine    senna-docusate sodium **AND** polyethylene glycol **AND** bisacodyl **AND** bisacodyl    Calcium Replacement - Follow Nurse / BPA Driven Protocol    dextrose    dextrose    diphenhydrAMINE    glucagon (human recombinant)    ipratropium-albuterol    Magnesium Standard Dose Replacement - Follow Nurse / BPA Driven Protocol    meclizine    nicotine    nitroglycerin    ondansetron ODT **OR** ondansetron    Phosphorus Replacement - Follow Nurse / BPA Driven Protocol    Potassium Replacement - Follow Nurse / BPA Driven Protocol    sodium chloride    sodium chloride    sodium chloride    sodium chloride     Exam:  /78 (BP Location: Right arm, Patient Position: Lying)   Pulse 71   Temp 97.5 °F (36.4 °C) (Oral)   Resp 13   Ht 170.2 cm (67\")   Wt 94.5 kg (208 lb 5.4 oz)   LMP  (LMP Unknown)   SpO2 95%   BMI 32.63 kg/m²     Intake/Output last 3 shifts:  I/O last 3 completed shifts:  In: 0   Out: 400 " [Urine:400]    Intake/Output this shift:  I/O this shift:  In: 1055 [P.O.:360; I.V.:695]  Out: 250 [Urine:250]    Physical exam:  General Appearance:  Alert  Head:  Normocephalic, without obvious abnormality, atraumatic  Eyes:  PERRL, conjunctiva/corneas clear     Neck:  Supple,  no adenopathy;      Lungs:  Decreased BS occasion ronchi  Heart:  Regular rate and rhythm, S1 and S2 normal  Abdomen:  Soft, non-tender, bowel sounds active   Extremities: trace edema  Pulses: 2+ and symmetric all extremities  Skin:  No rashes or lesions       Data Review:  All labs (24hrs):   Recent Results (from the past 24 hours)   aPTT    Collection Time: 04/16/25  8:19 AM    Specimen: Blood   Result Value Ref Range    PTT 68.5 (C) 22.7 - 35.4 seconds   POC Glucose Once    Collection Time: 04/16/25 11:53 AM    Specimen: Blood   Result Value Ref Range    Glucose 155 (H) 70 - 105 mg/dL   POC Activated Clotting Time    Collection Time: 04/16/25  2:53 PM    Specimen: Arterial Blood   Result Value Ref Range    Activated Clotting Time  164 (H) 89 - 137 Seconds   POC Activated Clotting Time    Collection Time: 04/16/25  3:30 PM    Specimen: Arterial Blood   Result Value Ref Range    Activated Clotting Time  314 (H) 89 - 137 Seconds   ECG 12 Lead Other; Post Procedure    Collection Time: 04/16/25  5:11 PM   Result Value Ref Range    QT Interval 447 ms    QTC Interval 487 ms   POC Glucose Once    Collection Time: 04/16/25  5:14 PM    Specimen: Blood   Result Value Ref Range    Glucose 132 (H) 70 - 105 mg/dL   POC Activated Clotting Time    Collection Time: 04/16/25  6:02 PM    Specimen: Blood   Result Value Ref Range    Activated Clotting Time  170 (H) 89 - 137 Seconds   POC Activated Clotting Time    Collection Time: 04/16/25  8:49 PM    Specimen: Blood   Result Value Ref Range    Activated Clotting Time  153 (H) 89 - 137 Seconds   POC Activated Clotting Time    Collection Time: 04/16/25  9:52 PM    Specimen: Blood   Result Value Ref Range     Activated Clotting Time  153 (H) 89 - 137 Seconds   POC Glucose Once    Collection Time: 04/16/25 11:31 PM    Specimen: Blood   Result Value Ref Range    Glucose 121 (H) 70 - 105 mg/dL   Basic Metabolic Panel    Collection Time: 04/17/25  3:30 AM    Specimen: Blood   Result Value Ref Range    Glucose 187 (H) 65 - 99 mg/dL    BUN 19 6 - 20 mg/dL    Creatinine 1.31 (H) 0.57 - 1.00 mg/dL    Sodium 138 136 - 145 mmol/L    Potassium 3.6 3.5 - 5.2 mmol/L    Chloride 104 98 - 107 mmol/L    CO2 21.2 (L) 22.0 - 29.0 mmol/L    Calcium 8.7 8.6 - 10.5 mg/dL    BUN/Creatinine Ratio 14.5 7.0 - 25.0    Anion Gap 12.8 5.0 - 15.0 mmol/L    eGFR 49.1 (L) >60.0 mL/min/1.73   CBC Auto Differential    Collection Time: 04/17/25  3:30 AM    Specimen: Blood   Result Value Ref Range    WBC 14.55 (H) 3.40 - 10.80 10*3/mm3    RBC 4.11 3.77 - 5.28 10*6/mm3    Hemoglobin 11.3 (L) 12.0 - 15.9 g/dL    Hematocrit 36.4 34.0 - 46.6 %    MCV 88.6 79.0 - 97.0 fL    MCH 27.5 26.6 - 33.0 pg    MCHC 31.0 (L) 31.5 - 35.7 g/dL    RDW 14.0 12.3 - 15.4 %    RDW-SD 45.1 37.0 - 54.0 fl    MPV 9.7 6.0 - 12.0 fL    Platelets 270 140 - 450 10*3/mm3    Neutrophil % 89.5 (H) 42.7 - 76.0 %    Lymphocyte % 6.2 (L) 19.6 - 45.3 %    Monocyte % 3.5 (L) 5.0 - 12.0 %    Eosinophil % 0.0 (L) 0.3 - 6.2 %    Basophil % 0.1 0.0 - 1.5 %    Immature Grans % 0.7 (H) 0.0 - 0.5 %    Neutrophils, Absolute 13.02 (H) 1.70 - 7.00 10*3/mm3    Lymphocytes, Absolute 0.90 0.70 - 3.10 10*3/mm3    Monocytes, Absolute 0.51 0.10 - 0.90 10*3/mm3    Eosinophils, Absolute 0.00 0.00 - 0.40 10*3/mm3    Basophils, Absolute 0.02 0.00 - 0.20 10*3/mm3    Immature Grans, Absolute 0.10 (H) 0.00 - 0.05 10*3/mm3    nRBC 0.0 0.0 - 0.2 /100 WBC   Lipid Panel    Collection Time: 04/17/25  3:30 AM    Specimen: Blood   Result Value Ref Range    Total Cholesterol 203 (H) 0 - 200 mg/dL    Triglycerides 191 (H) 0 - 150 mg/dL    HDL Cholesterol 48 40 - 60 mg/dL    LDL Cholesterol  121 (H) 0 - 100 mg/dL    VLDL  Cholesterol 34 5 - 40 mg/dL    LDL/HDL Ratio 2.43    POC Glucose Once    Collection Time: 04/17/25  5:28 AM    Specimen: Blood   Result Value Ref Range    Glucose 167 (H) 70 - 105 mg/dL          Imaging:  XR Chest 2 View  Result Date: 4/15/2025  Impression: No acute cardiopulmonary process. Electronically Signed: Althea Valladares MD  4/15/2025 12:29 AM EDT  Workstation ID: [id number]      Assessment/Plan:     NSTEMI (non-ST elevated myocardial infarction)    Elevated troponin         Acute kidney injury on top of CKD stage II  Congestive heart failure  Chest pain  CAD status post CABG  History of AVR  CHF with AICD  Hypertension  Diabetes  Hypothyroidism  COPD        Recommendations:  S/p cardiac cath and angioplasty  Creatinine is stable at 1.3  Received prophylaxis prior to the cath and angioplasty  Complaining of shortness of shortness of breath  Currently off IV fluid

## 2025-04-17 NOTE — CASE MANAGEMENT/SOCIAL WORK
Case Management Discharge Note      Final Note: CM spoke with Hospitalist and nursing for morning rounds and reviewed chart for clinical updates. Cardiology agreeable for DC home today. DC orders in place. Plavix for AC plan. No additional service needs identified. Patient to transport home via private vehicle with family. No barriers        Transportation Services  Private: Car (with family)    Final Discharge Disposition Code: 01 - home or self-care    Nida Elizabeth, RN    Office Phone: [phone]  Office Cell:     [phone]

## 2025-04-17 NOTE — CASE MANAGEMENT/SOCIAL WORK
Social Work Assessment  [facility]     Patient Name: Rafael Nunes  MRN: [id number]  Today's Date: 4/17/2025    Admit Date: 4/15/2025     Demographic Summary       Row Name 04/17/25 1410       General Information    Reason for Consult medication concerns      General Information Comments SW was consulted re: inability to afford meds. Pt has Medicare Part A&B only. SW met with pt at bedside to discuss further. Pt reports receiving SSDI $983 and works as a  2 days per week with checks averaging $275 every 2 weeks. Pt reports previously being told she is over income for SNAP and Medicaid - her son resides with her and works, which is likely why. Pt is aware of [employer] but says she is charged $100 to see pain doctor and another $100 for pain medication, which she says is her highest priority because it allows her to function. [facility] provided pt with a $4 [employer] med list to review with her PCP and also informed her of the Extra Help program through [employer] - contact info for [employer] was provided for pt to apply. Pt also inquired of other resources and an [facility] resource guide was provided at pt's request. Pt thanked this writer and denies further needs at this time.             Emma Chow, MSW, W  Medical Social Worker  Ph [phone]  Fax [phone]  [email or web address]

## 2025-04-17 NOTE — DISCHARGE INSTR - LAB
Follow up with Dr. Garrido's Cardiology office in two weeks. Call [phone] to make appointment. Ok to see ALEX.

## 2025-04-17 NOTE — PROGRESS NOTES
"Cardiology Progress  Note      Patient Care Team:  Phani Adames MD as PCP - General (Internal Medicine)  Ashish Smalls MD as Consulting Physician (Nephrology)  Wayne Luna MD as Consulting Physician (Cardiology)  Héctor Eckert MD as Consulting Physician (Cardiac Electrophysiology)  Lane Stock MD as Surgeon (Cardiothoracic Surgery)    PATIENT IDENTIFICATION  Name: Rafael Nunes  Age: 50 y.o.  Sex: female  :  1975  MRN: [id number]      Length of stay:    LOS: 2 days           REASON FOR FOLLOW-UP:  Chest pain  Coronary artery disease status post prior CABG  Severe ischemic cardiomyopathy    Left heart catheterization 2025:  Successful PCI ANNELIESE SVG-RCA-OMB  Patent SVG-DG with a 85% stenosis noted distal to the anastomosis  Severe native vessel coronary artery disease  Residual distal/apical LAD disease that is too small for intervention  Severe global LV systolic dysfunction LVEF 20%      INTERVAL HISTORY  Patient seen and examined, chart and labs reviewed.  Patient sitting up in bed watching television.  States she is not hungry today and did not eat breakfast.  She denies any shortness of breath.  She reports \"a little\" chest discomfort overnight.  Patient had questions about her procedure that were explained to her satisfaction.  Groin site is without hematoma.      SUBJECTIVE    \"A little\" chest discomfort  No shortness of breath  No GI complaints  No dizziness      REVIEW OF SYSTEMS:  Pertinent items are noted in HPI, all other systems reviewed and negative    OBJECTIVE       ASSESSMENT  Acute non-ST elevation MI  Coronary artery disease status post three-vessel CABG in 2019  History of prior PCI  Acute on chronic combined systolic/diastolic heart failure  Severe ischemic cardiomyopathy  Dual-chamber ICD in situ-2019  Valvular heart disease status post tissue AVR 2019  Primary hypertension  Dyslipidemia  Diabetes mellitus 2  Obstructive sleep " "apnea  Chronic kidney disease stage III  Poor medical compliance      RECOMMENDATIONS  Continue uninterrupted dual antiplatelet therapy consisting of aspirin 81 mg daily and ticagrelor 90 mg twice daily  LV function has worsened-she has been without her GDMT.  Will continue BB.  Diuretics per recommendations of nephrology.  Patient needs follow-up in our office next week and consistent cardiology care  Continue GDMT and titrate as hemodynamics/renal function allows  CV status stable for discharge when okay with nephrology and attending          CHF Guideline Directed Medical Therapy  Beta Blocker: Metoprolol succinate 25 mg daily  ARNI/ACE/ARB: Contraindicated due to CKD  SGLT 2 inhibitors: Contraindicated due to CKD  Diuretics: Per nephrology recommendations  Aldosterone Antagonist: Contraindicated due to CKD  Vasodilators & Nitrates:       Vital Signs  Visit Vitals  /84 (BP Location: Right arm, Patient Position: Lying)   Pulse 69   Temp 97.5 °F (36.4 °C) (Oral)   Resp 13   Ht 170.2 cm (67\")   Wt 94.5 kg (208 lb 5.4 oz)   LMP  (LMP Unknown)   SpO2 97%   BMI 32.63 kg/m²     Oxygen Therapy  SpO2: 97 %  Pulse Oximetry Type: Continuous  Device (Oxygen Therapy): nasal cannula  Flow (L/min) (Oxygen Therapy): 5  Oxygen Concentration (%): 40  Flowsheet Rows      Flowsheet Row First Filed Value   Admission Height 170.2 cm (67\") Documented at 04/15/2025 0015   Admission Weight 95 kg (209 lb 7 oz) Documented at 04/15/2025 0015          Intake & Output (last 3 days)         04/14 0701  04/15 0700 04/15 0701  04/16 0700 04/16 0701  04/17 0700 04/17 0701  04/18 0700    P.O.  0 360     I.V. (mL/kg)   695 (7.4)     Total Intake(mL/kg)  0 (0) 1055 (11.2)     Urine (mL/kg/hr)  0 (0) 650 (0.3) 200 (0.6)    Emesis/NG output   0     Stool  0 0     Total Output  0 650 200    Net  0 +405 -200            Urine Unmeasured Occurrence  1 x 0 x     Stool Unmeasured Occurrence   0 x     Emesis Unmeasured Occurrence   0 x           Lines, " "Drains & Airways       Active LDAs       Name Placement date Placement time Site Days    Peripheral IV 04/15/25 0309 Anterior;Left Forearm 04/15/25  0309  Forearm  2    External Urinary Catheter 04/16/25  1820  --  less than 1                           /84 (BP Location: Right arm, Patient Position: Lying)   Pulse 69   Temp 97.5 °F (36.4 °C) (Oral)   Resp 13   Ht 170.2 cm (67\")   Wt 94.5 kg (208 lb 5.4 oz)   LMP  (LMP Unknown)   SpO2 97%   BMI 32.63 kg/m²   Intake/Output last 3 shifts:  I/O last 3 completed shifts:  In: 1055 [P.O.:360; I.V.:695]  Out: 650 [Urine:650]  Intake/Output this shift:  I/O this shift:  In: -   Out: 200 [Urine:200]    PHYSICAL EXAM:    General: Alert, cooperative, no distress, appears stated age  Head:  Normocephalic, atraumatic, mucous membranes moist  Eyes:  Conjunctivae/corneas clear, EOM's intact     Neck:  Supple,  no adenopathy; no JVD or bruit  Lungs: Clear to auscultation bilaterally, no wheezes, rhonchi or rales are noted  Chest wall: No tenderness  Heart::  Regular rate and rhythm, S1 and S2 normal, no murmur, rub or gallop  Abdomen: Soft, nontender, nondistended, bowel sounds active  Extremities: No cyanosis, clubbing, or edema   Pulses: 2+ and symmetric all extremities  Skin:  No rashes or lesions  Neuro/psych: A&O x3. CN II through XII are grossly intact with appropriate affect        Scheduled Meds:      [Transfer Hold] Acetylcysteine, 600 mg, Oral, BID  aspirin, 81 mg, Oral, Daily  atorvastatin, 80 mg, Oral, Nightly  insulin lispro, 2-7 Units, Subcutaneous, Q6H  metoprolol succinate XL, 25 mg, Oral, Q24H  mupirocin, 1 Application, Each Nare, BID  sodium chloride, 10 mL, Intravenous, Q12H  ticagrelor, 90 mg, Oral, BID        Continuous Infusions:    sodium chloride, 75 mL/hr, Last Rate: Stopped (04/17/25 0940)        PRN Meds:      acetaminophen    aluminum-magnesium hydroxide-simethicone    atropine    senna-docusate sodium **AND** polyethylene glycol **AND** " bisacodyl **AND** bisacodyl    Calcium Replacement - Follow Nurse / BPA Driven Protocol    dextrose    dextrose    diphenhydrAMINE    glucagon (human recombinant)    ipratropium-albuterol    Magnesium Standard Dose Replacement - Follow Nurse / BPA Driven Protocol    meclizine    nicotine    nitroglycerin    ondansetron ODT **OR** ondansetron    Phosphorus Replacement - Follow Nurse / BPA Driven Protocol    Potassium Replacement - Follow Nurse / BPA Driven Protocol    sodium chloride    sodium chloride    sodium chloride    sodium chloride        Results Review:     I reviewed the patient's new clinical results.    CBC    Results from last 7 days   Lab Units 04/17/25  0330 04/15/25  0017   WBC 10*3/mm3 14.55* 7.69   HEMOGLOBIN g/dL 11.3* 12.8   PLATELETS 10*3/mm3 270 273     Cr Clearance Estimated Creatinine Clearance: 59.3 mL/min (A) (by C-G formula based on SCr of 1.31 mg/dL (H)).  Coag   Results from last 7 days   Lab Units 04/16/25  0819 04/16/25  0014 04/15/25  2355 04/15/25  1446 04/15/25  0135   INR   --   --  1.1 1.28*  --    APTT seconds 68.5* 55.0*  --   --  37.2*     HbA1C   Lab Results   Component Value Date    HGBA1C 5.80 (H) 04/16/2025    HGBA1C 6.04 (H) 02/18/2025    HGBA1C 6 (H) 08/22/2022     Blood Glucose   Glucose   Date/Time Value Ref Range Status   04/17/2025 0528 167 (H) 70 - 105 mg/dL Final     Comment:     Serial Number: [id number]:  [id number]   04/16/2025 2331 121 (H) 70 - 105 mg/dL Final     Comment:     Serial Number: [id number]:  [id number]   04/16/2025 1714 132 (H) 70 - 105 mg/dL Final     Comment:     Serial Number: [id number]:  [id number]   04/16/2025 1153 155 (H) 70 - 105 mg/dL Final     Comment:     Serial Number: [id number]:  [id number]   04/16/2025 0547 176 (H) 70 - 105 mg/dL Final     Comment:     Serial Number: [id number]:  [id number]   04/15/2025 2331 183 (H) 70 - 105 mg/dL Final     Comment:     Serial Number: [id number]:  [id number]  "  04/15/2025 1618 215 (H) 70 - 105 mg/dL Final     Comment:     Serial Number: [id number]:  [id number]   04/15/2025 1159 109 (H) 70 - 105 mg/dL Final     Comment:     Serial Number: [id number]:  [id number]     Infection     CMP   Results from last 7 days   Lab Units 04/17/25  0330 04/16/25  0014 04/15/25  0017   SODIUM mmol/L 138 137 140   POTASSIUM mmol/L 3.6 4.1 3.9   CHLORIDE mmol/L 104 100 102   CO2 mmol/L 21.2* 17.2* 25.2   BUN mg/dL 19 19 19   CREATININE mg/dL 1.31* 1.38* 1.52*   GLUCOSE mg/dL 187* 192* 131*   ALBUMIN g/dL  --   --  4.3   BILIRUBIN mg/dL  --   --  1.0   ALK PHOS U/L  --   --  95   AST (SGOT) U/L  --   --  57*   ALT (SGPT) U/L  --   --  32     ABG      UA      SEMAJ  No results found for: \"POCMETH\", \"POCAMPHET\", \"POCBARBITUR\", \"POCBENZO\", \"POCCOCAINE\", \"POCOPIATES\", \"POCOXYCODO\", \"POCPHENCYC\", \"POCPROPOXY\", \"POCTHC\", \"POCTRICYC\"  Lysis Labs   Results from last 7 days   Lab Units 04/17/25  0330 04/16/25  0819 04/16/25  0014 04/15/25  2355 04/15/25  1446 04/15/25  0135 04/15/25  0017   INR   --   --   --  1.1 1.28*  --   --    APTT seconds  --  68.5* 55.0*  --   --  37.2*  --    HEMOGLOBIN g/dL 11.3*  --   --   --   --   --  12.8   PLATELETS 10*3/mm3 270  --   --   --   --   --  273   CREATININE mg/dL 1.31*  --  1.38*  --   --   --  1.52*     Radiology(recent) No radiology results for the last day      Results from last 7 days   Lab Units 04/15/25  0135   HSTROP T ng/L 121*       X-rays, labs reviewed personally by physician.    ECG/EMG Results (most recent)       Procedure Component Value Units Date/Time    ECG 12 Lead Chest Pain [[id number]] Resulted: 04/15/25 0506     Updated: 04/15/25 0506    ECG 12 Lead Dyspnea [[id number]] Collected: 04/15/25 0015     Updated: 04/15/25 0524     QT Interval 383 ms      QTC Interval 483 ms     Narrative:      HEART RATE=95  bpm  RR Ptjrhhvj=697  ms  SD Ncsfpxgj=205  ms  P Horizontal Axis=-8  deg  P Front Axis=64  deg  QRSD Rvnlitzm=980  ms  QT " Ucuggjyz=634  ms  YDjS=429  ms  QRS Axis=-45  deg  T Wave Axis=117  deg  - ABNORMAL ECG -  Sinus rhythm  Left atrial enlargement  Left anterior fascicular block  LVH with secondary repolarization abnormality  Repolarization abnormalities  When compared with ECG of 11-Feb-2025 11:22:00,  Significant axis, voltage or hypertrophy change  Electronically Signed By: Tanisha Schultz ([facility]) 2025-04-15 05:23:56  Date and Time of Study:2025-04-15 00:15:34    Telemetry Scan [[id number]] Resulted: 04/15/25     Updated: 04/15/25 0804    Telemetry Scan [[id number]] Resulted: 04/15/25     Updated: 04/15/25 1246    Telemetry Scan [[id number]] Resulted: 04/15/25     Updated: 04/15/25 1536    ECG 12 Lead Chest Pain [[id number]] Collected: 04/15/25 1109     Updated: 04/15/25 1644     QT Interval 448 ms      QTC Interval 506 ms     Narrative:      HEART RATE=76  bpm  RR Xntcnjaf=698  ms  AK Rornxwjg=435  ms  P Horizontal Axis=46  deg  P Front Axis=Invalid  deg  QRSD Fsdbilrd=507  ms  QT Qmtucvrg=107  ms  EHbJ=132  ms  QRS Axis=103  deg  T Wave Axis=-54  deg  - ABNORMAL ECG -  Sinus rhythm  Right axis deviation  Consider  left ventricular hypertrophy  Nonspecific  T abnormalities, inferior leads  When compared with ECG of 15-Apr-2025 00:15:34,  Significant repolarization change  Significant axis, voltage or hypertrophy change  Electronically Signed By: Ozzie Haider ([facility]) 2025-04-15 16:32:28  Date and Time of Study:2025-04-15 11:09:06    Telemetry Scan [[id number]] Resulted: 04/15/25     Updated: 04/15/25 2318    Telemetry Scan [[id number]] Resulted: 04/15/25     Updated: 04/16/25 0223    Telemetry Scan [[id number]] Resulted: 04/15/25     Updated: 04/16/25 0527    Telemetry Scan [[id number]] Resulted: 04/15/25     Updated: 04/16/25 0802    Telemetry Scan [[id number]] Resulted: 04/15/25     Updated: 04/16/25 1331    Telemetry Scan [[id number]] Resulted: 04/15/25     Updated: 04/16/25 2323    Telemetry Scan [[id number]] Resulted: 04/15/25     Updated:  04/17/25 0002    Telemetry Scan [[id number]] Resulted: 04/15/25     Updated: 04/17/25 0215    Telemetry Scan [[id number]] Resulted: 04/15/25     Updated: 04/17/25 0501    ECG 12 Lead Other; Post Procedure [[id number]] Collected: 04/16/25 1711     Updated: 04/17/25 0711     QT Interval 447 ms      QTC Interval 487 ms     Narrative:      HEART RATE=71  bpm  RR Yrescrnx=742  ms  OH Fkcgzzxe=407  ms  P Horizontal Axis=-25  deg  P Front Axis=60  deg  QRSD Echhkbye=038  ms  QT Whnpgxnu=328  ms  FWmC=337  ms  QRS Axis=-43  deg  T Wave Axis=116  deg  - ABNORMAL ECG -  Sinus rhythm  LAE, consider biatrial enlargement  Left anterior fascicular block  LVH with secondary repolarization abnormality  Repolarization abnormalities  When compared with ECG of 15-Apr-2025 11:09:06,  New conduction abnormality  Significant axis, voltage or hypertrophy change  Electronically Signed By: Chava Hester ([facility]) 2025-04-17 07:08:07  Date and Time of Study:2025-04-16 17:11:37    Telemetry Scan [[id number]] Resulted: 04/15/25     Updated: 04/17/25 0757              Medication Review:   I have reviewed the patient's current medication list  Scheduled Meds:[Transfer Hold] Acetylcysteine, 600 mg, Oral, BID  aspirin, 81 mg, Oral, Daily  atorvastatin, 80 mg, Oral, Nightly  insulin lispro, 2-7 Units, Subcutaneous, Q6H  metoprolol succinate XL, 25 mg, Oral, Q24H  mupirocin, 1 Application, Each Nare, BID  sodium chloride, 10 mL, Intravenous, Q12H  ticagrelor, 90 mg, Oral, BID      Continuous Infusions:sodium chloride, 75 mL/hr, Last Rate: Stopped (04/17/25 0940)      PRN Meds:.  acetaminophen    aluminum-magnesium hydroxide-simethicone    atropine    senna-docusate sodium **AND** polyethylene glycol **AND** bisacodyl **AND** bisacodyl    Calcium Replacement - Follow Nurse / BPA Driven Protocol    dextrose    dextrose    diphenhydrAMINE    glucagon (human recombinant)    ipratropium-albuterol    Magnesium Standard Dose Replacement - Follow Nurse / BPA Driven  "Protocol    meclizine    nicotine    nitroglycerin    ondansetron ODT **OR** ondansetron    Phosphorus Replacement - Follow Nurse / BPA Driven Protocol    Potassium Replacement - Follow Nurse / BPA Driven Protocol    sodium chloride    sodium chloride    sodium chloride    sodium chloride    Imaging:  Imaging Results (Last 72 Hours)       Procedure Component Value Units Date/Time    XR Chest 2 View [[id number]] Collected: 04/15/25 0028     Updated: 04/15/25 0031    Narrative:      XR CHEST 2 VW    Date of Exam: 4/15/2025 12:17 AM EDT    Indication: SOA Triage Protocol    Comparison: 2/10/2025.    Findings:  There are no airspace consolidations. No pleural fluid. No pneumothorax. The pulmonary vasculature appears within normal limits. Heart appears enlarged, stable. Stable left subclavian AICD/pacemaker device. Median sternotomy wires are present.. No acute   osseous abnormality identified.      Impression:      Impression:  No acute cardiopulmonary process.      Electronically Signed: Althea Valladares MD    4/15/2025 12:29 AM EDT    Workstation ID: [id number]              ALEX Hua  04/17/25  10:22 EDT       EMR Dragon/Transcription:   \"Dictated utilizing Dragon dictation\".       Electronically signed by ALEX Hua, 04/17/25, 10:22 AM EDT.  Copied text in this note has been reviewed by me and is accurate as of 04/17/25.    Cardiology attending:  Seen and examined.  Chart and labs reviewed. Independent interpretations of cardiac testing was performed. History and exam findings are verified with above changes noted.  Assessment and plan notated by APC after being formulated by attending consultant.  Note that greater than 50% of the time spent in care of the patient was provided by attending consultant.    She has no chest pain pressure heaviness or tightness.  Discussed findings of cardiac catheterization with patient.  Discussed her shortness of breath that is likely related to the ticagrelor.  " Unfortunately, her insurance does not cover ticagrelor.  We will make the transition to clopidogrel.  Patient wishes to enroll in cardiac rehab which will be arranged as an outpatient.  She has an appointment with heart failure clinic next week.  Cardiology status is otherwise appropriate for discharge next destination of care when okay with other services.  Follow-up with our office in 2 weeks.    Physical Exam:    General: Alert, cooperative, no distress, appears stated age  Head:  Normocephalic, atraumatic, mucous membranes moist  Eyes:  Conjunctivae/corneas clear, EOM's intact     Neck:  Supple,  no bruit  Lungs:            Clear to auscultation bilaterally, no wheezes rhonchi rales are noted  Chest wall: No tenderness  Heart::  Regular rate and rhythm, S1 and S2 normal, no murmur, rub or gallop  Abdomen: Soft, nontender, nondistended bowel sounds active  Extremities: No cyanosis, clubbing, or edema  Pulses:            2+ and symmetric all extremities  Skin:  No rashes or lesions  Neuro/psych: A&O x3. CN II through XII are grossly intact with appropriate affect

## 2025-04-17 NOTE — DISCHARGE INSTR - OTHER ORDERS
Apply for Medicaid by calling [employer] at [phone].    Apply for the Extra Help Program by calling [employer] at [phone].

## 2025-04-18 PROBLEM — Z91.148 NONCOMPLIANCE WITH MEDICATIONS: Chronic | Status: ACTIVE | Noted: 2025-04-18

## 2025-04-18 PROBLEM — I42.0 ISCHEMIC DILATED CARDIOMYOPATHY: Chronic | Status: ACTIVE | Noted: 2025-04-18

## 2025-04-18 PROBLEM — N18.31 STAGE 3A CHRONIC KIDNEY DISEASE: Status: RESOLVED | Noted: 2020-05-16 | Resolved: 2025-04-18

## 2025-04-18 PROBLEM — R79.89 LFT ELEVATION: Chronic | Status: ACTIVE | Noted: 2025-04-18

## 2025-04-18 PROBLEM — E88.810 METABOLIC SYNDROME X: Chronic | Status: ACTIVE | Noted: 2025-04-18

## 2025-04-18 PROBLEM — I50.42 CHRONIC COMBINED SYSTOLIC AND DIASTOLIC HEART FAILURE: Chronic | Status: ACTIVE | Noted: 2025-04-18

## 2025-04-18 PROBLEM — Z91.89 AT RISK FOR FLUID VOLUME OVERLOAD: Status: ACTIVE | Noted: 2025-04-18

## 2025-04-18 PROBLEM — I25.5 ISCHEMIC DILATED CARDIOMYOPATHY: Chronic | Status: ACTIVE | Noted: 2025-04-18

## 2025-04-18 PROBLEM — I38 VHD (VALVULAR HEART DISEASE): Chronic | Status: ACTIVE | Noted: 2025-04-18

## 2025-04-18 PROBLEM — N18.32 STAGE 3B CHRONIC KIDNEY DISEASE: Chronic | Status: ACTIVE | Noted: 2025-04-18

## 2025-04-18 RX ORDER — IOPAMIDOL 755 MG/ML
INJECTION, SOLUTION INTRAVASCULAR
Status: DISCONTINUED | OUTPATIENT
Start: 2025-04-16 | End: 2025-04-18 | Stop reason: HOSPADM

## 2025-04-18 NOTE — PROGRESS NOTES
"Cardiology Heart Failure Clinic Note  Enoc \"Marquise\" Essence JOHNSON [facility]    Patient ID: Rafael Nunes  is a 50 y.o. female.    Encounter Date:04/21/2025    HPI:  50-year-old -American female patient of known to Dr. Luna, Dr. Eckert  &  with a past medical history of ischemic dilated cardiomyopathy [LVIDd 6.1] known combined systolic and diastolic heart failure (HFrEF) (TTE 2/10/25) LVEF 38% with grade 2 diastolic dysfunction, s/p 2019 Biotronik dual-chamber ICD, known  valvular heart disease  s/p 2019 AVRwith a mild aortic stenosis PAUL of 0.56 cm² peak mean PG 31.6 & 16 mmHg moderate mitral valve calcification with moderate to severe MR, stage III CKD, ASCAD h/o PCI\stents, s/p 2019 CABG x 3, noncompliance with medications secondary to affordability, COPD who smoked until 2019 known INDRA, GERD, mildly elevated LFTs,, hypothyroidism acquired metabolic syndrome with hypertension, dyslipidemia, ID T2DM.  Most recently admitted 4/15/2025 due to SOA was found to be having a multifactorial volume overload with a proBNP of 7120 with a chest x-ray with normal pulmonary vasculature comes in for her initial heart failure clinic visit 21 April 2025 since recent hospitalization has done relatively well from a volume standpoint, unfortunately she is been self-medicating herself with several of her old heart failure medications and had some residual Entresto she has been taking some residual Farxiga she has been taking as well as Demadex 40 mg twice daily.       Assessment:    Diagnoses and all orders for this visit:    1. Ischemic dilated cardiomyopathy (Primary)  Overview:  [LVIDd 6.1]   known combined systolic and diastolic heart failure (HFrEF)   (TTE 2/10/25) LVEF 38% with grade 2 diastolic dysfunction,   s/p 2019 Biotronik dual-chamber ICD      2. VHD (valvular heart disease)  Overview:  A.  Unsure if it has rheumatic versus nonrheumatic   I. There is a notation of the mitral valve on echocardiography where " it was thought to be rheumatic   II. There are multiple documentations of nonrheumatic valvular heart disease    B.  Aortic stenosis   I. s/p 2019 (bioprosthetic)  AVR   1. (TTE 2/10/2025) with a mild aortic stenosis                          a.  PAUL of 0.56 cm²      b. peak mean PG 31.6 & 16 mmHg   C.  moderate mitral valve calcification   I.   moderate to severe MR,      3. Chronic combined systolic and diastolic heart failure (HFrEF)  Overview:  (TTE 2/10/25) LVEF 38% with grade 2 diastolic dysfunction,   s/p 2019 Biotronik dual-chamber ICD      4. Stage 3b chronic kidney disease    5. At risk for fluid volume overload  Overview:  Systolic and diastolic HFrEF  Valvular heart disease  CKD 3B      6. ASCAD  Overview:   A.  h/o PCI\stents   I.   Most recent s/p 4/16/2025 PCI/ ANNELEISE SVG-RCA-OMB    II.  Patent SVG-DG with a 85% stenosis noted distal to the anastomosis             III.  Severe native vessel coronary artery disease             IV.  Residual distal/apical LAD disease that is too small for intervention   B. s/p 2019 CABG x 3      7. Pulmonary emphysema, unspecified emphysema type  Overview:  With smoking in 2019      8. Dyspnea, unspecified type    9. h/o Noncompliance with medications 2/2 financial burden    10. Intractable low back pain    11. Mild LFT elevation  Overview:  SGOT      12. Chronic pain syndrome    13. GERD without esophagitis    14. Hypothyroidism (acquired)    15. Metabolic syndrome X 4 of 4 components  Overview:  A.  Benign essential hypertension  B.  Mixed dyslipidemia  C.  Obesity with BMI at 32.6 kg/m²    I.  Resultant INDRA  D.  T2DM      16. INDRA (obstructive sleep apnea)    17. T2DM without  use of insulin              Plan/discussion    Volume overload    Heart Failure Core Measures addressed    Type of Overload multifactorial  Systolic and diastolic HFrEF  Valvular heart disease  CKD 3      Most recent EF: (TTE 2/10/25) LVEF 38% with grade 2 diastolic dysfunction,     New [place] Heart  association Class & Stage : IIIc    HF Meds Pre Visit    Beta Blocker: Toprol-XL 25 mg daily  ARNI/ACE/ARB: None  SGLT 2 inhibitors: None  Diuretics: None  Aldosterone Antagonist: None  Digitalis: N/A  Vasodilators & Nitrates: None    HF Meds Post Visit   were still unable to completely sort out what she is taking at home she is going to call and discussed later  We are initiating a patient assistance requests today for the Entresto and Farxiga    Beta Blocker: Toprol-XL 25 mg daily  ARNI/ACE/ARB: Entresto ?  Dosage twice daily  SGLT 2 inhibitors: Farxiga 10 mg daily  Diuretics: Demadex 40 mg twice daily  Furoscix: None due to finances  Aldosterone Antagonist: None due to renal function  Digitalis: N/A  Nitrates: N/A    Plavix 75 mg daily  EC ASA 81 mg daily  Lipitor 80 mg daily      Cardiac medicines reviewed with risk, benefits, and necessity of each discussed with myself and [facility].       _________________________________________________________    Discussion    On her initial visit she is already on heart failure core measures including beta-blocker, and questionably a as needed nitrate  Obviously would like to establish the pillars of heart failure  Known limited by financial constraints & hypotension  Pharmacy team will work with patient   and see if they can get patient assistance for an SGLT2, ARNI (she is currently taking some old samples she had at the house)  Would like to add at some point a MRA inhibitor but certainly not until we can get the rest of her medications sorted out  Given her recent PCI\ANNELIESE will obviously need DAPT & is taking that ecasa & Plavix   She has a follow-up appointment with cardiology  She has a follow-up appointment with nephrology would obviously want nephrology to dose diuretics if possible  My clinic can certainly assist in preventing a readmission by coming in for diuresis should she become volume overloaded she was given my card and asked to call us in the event that  occurs  She is asked for referral to an endocrinologist who we would defer her elevated TSH and A1c to their expertise  would add typical heart failure nursing interventions including:        A. Fluid restriction at less than 2000 cc daily       B. Daily weights        C.  1 g low-sodium diet        I did the following activities:preparing for the visit, with Rafael Nunes  including reviewing tests, once pt arrived in clinic I also performed a medically appropriate examination and/or evaluation , I personally spent considerable time counseling and educating the patient/family/caregiver, ordering medications, tests, or procedures, referring and communicating with other health care professionals , and documenting information in the medical record. I estimate including preparation 45 minutes             Subjective:   No chief complaint on file.      ROS:  Constitutional: + weakness, + fatigue persisted posthospitalization, No fever, rigors, chills   Eyes: No recent vision changes, eye pain   ENT/oropharynx: No recent difficulty swallowing, sore throat, epistaxis, changes in hearing   Cardiovascular: No recent chest pain, chest tightness, palpitations except as noted in HPI, paroxysmal nocturnal dyspnea, orthopnea, diaphoresis, dizziness & no pre or drake syncopal episodes   Respiratory: No real at rest shortness of breath, + dyspnea on exertion, no significant productive cough, no wheezing and no hemoptysis   Gastrointestinal: No abdominal pain, nausea, vomiting, diarrhea, bloody stools   Genitourinary: No hematuria, dysuria other than increased frequency   Neurological: No headache, tremors, numbness,  or hemiparesis    Musculoskeletal: No change in typical cramps, myalgias, no new joint pain, joint swelling   Integument: No recent rash, without edema      Patient Active Problem List   Diagnosis    COPD (chronic obstructive pulmonary disease)    Essential hypertension    ICD (implantable  cardioverter-defibrillator), dual, in situ    GERD without esophagitis    Hypothyroidism (acquired)    ASCAD    S/P AVR (aortic valve replacement)    S/P CABG x 3    Dyspnea    Nonrheumatic mitral valve regurgitation    Cellulitis of left axilla    Cellulitis of right axilla    Hidradenitis    T2DM without  use of insulin    Hypokalemia    Dizziness with near syncope    Acute diarrhea    Mixed hyperlipidemia    INDRA (obstructive sleep apnea)    Chronic pain syndrome    Intractable low back pain    Elevated troponin    Pneumonia    NSTEMI (non-ST elevated myocardial infarction)    Total occlusion of coronary artery, chronic    Type 2 myocardial infarction    Ischemic dilated cardiomyopathy    Chronic combined systolic and diastolic heart failure (HFrEF)    VHD (valvular heart disease)    Stage 3b chronic kidney disease    At risk for fluid volume overload    h/o Noncompliance with medications 2/2 financial burden    Mild LFT elevation    Metabolic syndrome X 4 of 4 components       Past Medical History:   Diagnosis Date    Arrhythmia     ASCAD 12/22/2019    Asthma     CAD (coronary artery disease)     Cardiomyopathy, dilated     Chronic systolic congestive heart failure     CKD (chronic kidney disease) stage 2, GFR 60-89 ml/min     COPD (chronic obstructive pulmonary disease)     Essential hypertension     GERD without esophagitis     Hidradenitis 10/26/2020    Hyperlipidemia     Hypothyroidism (acquired)     ICD (implantable cardioverter-defibrillator), dual, in situ 07/22/2019    Nonrheumatic aortic valve insufficiency     Nonrheumatic mitral valve regurgitation     S/P AVR (aortic valve replacement)     S/P CABG x 3     Type 2 diabetes mellitus without complication, without long-term current use of insulin        Past Surgical History:   Procedure Laterality Date    AORTIC VALVE REPAIR/REPLACEMENT N/A 12/27/2019    Procedure: AORTIC VALVE REPAIR/REPLACEMENT;  Surgeon: Lane Stock MD;  Location: [facility];   Service: Cardiothoracic    BREAST LUMPECTOMY Right     BENIGN    CARDIAC CATHETERIZATION N/A 12/24/2019    Procedure: Right and Left Heart Cath 12/24/19 @ 0900;  Surgeon: Wayne Luna MD;  Location: [facility] CATH INVASIVE LOCATION;  Service: Cardiovascular    CARDIAC CATHETERIZATION N/A 12/24/2019    Procedure: Coronary angiography;  Surgeon: Wayne Luna MD;  Location: [facility] CATH INVASIVE LOCATION;  Service: Cardiovascular    CARDIAC CATHETERIZATION N/A 11/10/2020    Procedure: Left and right Heart Cath;  Surgeon: Wayne Luna MD;  Location: [facility] CATH INVASIVE LOCATION;  Service: Cardiovascular;  Laterality: N/A;    CARDIAC CATHETERIZATION N/A 11/10/2020    Procedure: Coronary angiography;  Surgeon: Wayne Luna MD;  Location: [facility] CATH INVASIVE LOCATION;  Service: Cardiovascular;  Laterality: N/A;    CARDIAC CATHETERIZATION N/A 11/10/2020    Procedure: Right Heart Cath;  Surgeon: Wayne Luna MD;  Location: [facility] CATH INVASIVE LOCATION;  Service: Cardiovascular;  Laterality: N/A;    CARDIAC CATHETERIZATION N/A 11/25/2020    Procedure: Percutaneous coronary intervention of the left circumflex artery;  Surgeon: Wayne Luna MD;  Location: [facility] CATH INVASIVE LOCATION;  Service: Cardiovascular;  Laterality: N/A;    CARDIAC CATHETERIZATION N/A 1/22/2021    Procedure: LEFT HEART CATH with possible PCI;  Surgeon: Wayne Luna MD;  Location: [facility] CATH INVASIVE LOCATION;  Service: Cardiovascular;  Laterality: N/A;  Local and IV sedation    CARDIAC CATHETERIZATION N/A 1/22/2021    Procedure: Coronary angiography;  Surgeon: Wayne Luna MD;  Location: [facility] CATH INVASIVE LOCATION;  Service: Cardiovascular;  Laterality: N/A;    CARDIAC CATHETERIZATION N/A 1/22/2021    Procedure: Saphenous Vein Graft;  Surgeon: Wayne Luna MD;  Location: [facility] CATH INVASIVE LOCATION;  Service: Cardiovascular;  Laterality: N/A;     CARDIAC CATHETERIZATION N/A 2025    Procedure: Left Heart Cath;  Surgeon: Marcello Garrido DO;  Location: [facility] CATH INVASIVE LOCATION;  Service: Cardiovascular;  Laterality: N/A;    CARDIAC ELECTROPHYSIOLOGY PROCEDURE Left 2019    Procedure: Dual-chamber ICD insertion;  Surgeon: Héctor Eckert MD;  Location: [facility] CATH INVASIVE LOCATION;  Service: Cardiovascular    CARDIAC ELECTROPHYSIOLOGY PROCEDURE Left 2019    Procedure: Lead Revision;  Surgeon: Héctor Eckert MD;  Location: [facility] CATH INVASIVE LOCATION;  Service: Cardiovascular    CARDIAC SURGERY      CHOLECYSTECTOMY      CORONARY ARTERY BYPASS GRAFT N/A 2019    Procedure: CORONARY ARTERY BYPASS GRAFTING;  Surgeon: Lane Stock MD;  Location: [facility] CVOR;  Service: Cardiothoracic    HYSTERECTOMY      INSERT / REPLACE / REMOVE PACEMAKER      LYMPHADENECTOMY Bilateral     THYROID SURGERY         Social History     Socioeconomic History    Marital status:    Tobacco Use    Smoking status: Former     Current packs/day: 0.00     Types: Cigarettes     Quit date: 2019     Years since quittin.2    Smokeless tobacco: Never   Vaping Use    Vaping status: Never Used   Substance and Sexual Activity    Alcohol use: No    Drug use: No    Sexual activity: Defer       Allergies   Allergen Reactions    Hydrocodone Hives    Penicillin G Unknown (See Comments)     Reaction occurred as a baby.      Penicillin interview complete 2022.    Contrast Dye (Echo Or Unknown Ct/Mr) GI Intolerance     She is pretty sure it's the contrast dye that makes her super sick and vomiting after heart cath    Gadolinium Derivatives Itching    Varenicline Other (See Comments)     Encephalopathy         Current Outpatient Medications:     aspirin 81 MG EC tablet, Take 1 tablet by mouth Daily., Disp: 90 tablet, Rfl: 0    atorvastatin (LIPITOR) 80 MG tablet, Take 1 tablet by mouth Every Night., Disp: 90 tablet, Rfl: 0    clopidogrel  (PLAVIX) 75 MG tablet, Take 4 tablets by mouth Daily for 1 day, THEN 1 tablet Daily for 30 days., Disp: 30 tablet, Rfl: 0    metoprolol succinate XL (TOPROL-XL) 25 MG 24 hr tablet, Take 1 tablet by mouth Daily., Disp: 30 tablet, Rfl: 0    oxyCODONE-acetaminophen (PERCOCET) 7.5-325 MG per tablet, Take 1 tablet by mouth Every 6 (Six) Hours As Needed for Moderate Pain., Disp: , Rfl:   No current facility-administered medications for this visit.    Immunization History   Administered Date(s) Administered    COVID-19 (MODERNA) 1st,2nd,3rd Dose Monovalent 12/29/2021, 01/26/2022    Fluzone (or Fluarix & Flulaval for VFC) >6mos 03/02/2021       Most recent EKG as reviewed:  Procedures     Most recent echo as reviewed:  Results for orders placed during the hospital encounter of 02/10/25    Adult Transthoracic Echo Complete W/ Cont if Necessary Per Protocol    Interpretation Summary    Left ventricular systolic function is moderately decreased. Estimated left ventricular EF = 38%    The left ventricular cavity is mild to moderately dilated.    Left ventricular wall thickness is consistent with mild to moderate eccentric hypertrophy.    Left ventricular diastolic function is consistent with (grade II w/high LAP) pseudonormalization and age.    The right ventricular cavity is mildly dilated.    The left atrial cavity is moderate to severely dilated.    The right atrial cavity is borderline dilated.    Mild aortic valve stenosis is present.    Abnormal mitral valve structure consistent with dilated annulus.    Moderate to severe mitral valve regurgitation is present.    Estimated right ventricular systolic pressure from tricuspid regurgitation is normal (<35 mmHg).    Transthoracic echocardiography reveals dilated LV with eccentric LVH with EF between 36 to 40%  Severe left atrial enlargement and the mitral valve apparatus calcified and appears rheumatic without mitral stenosis and moderate to severe MR directed laterally and  posteriorly  Aortic valve is calcified and not well-visualized with a mean gradient of 16 mmHg.  Mild TR without pulmonary hypertension  No effusion      Electronically signed by Héctor Eckert MD, 25, 1:19 PM EST.      Most recent stress test results:  Results for orders placed during the hospital encounter of 20    Treadmill Stress Test - Rehab Protocol    Interpretation Summary  Stress portion of this exam was supervised by: Ursula Ann NP      Most recent cardiac catheterization results:  Results for orders placed during the hospital encounter of 04/15/25    Cardiac Catheterization/Vascular Study    Conclusion  Table formatting from the original result was not included.  Cardiac Catheterization with PCI Report  PATIENT IDENTIFICATION  Name: Rafael Nunes  Age: 50 y.o. Sex: female : 1975  MRN: [id number]    Date: 2025  PCP: @PCP@    Requesting Provider  [unfilled]    She underwent cardiac catheterization left heart catheterization with selective coronary angiography, left ventriculography, saphenous vein graft angiography, LIMA angiography, PCI ANNELIESE SVG-RCA-OM, intracoronary nitroglycerin administration, postintervention angiography x 4.    Indications for the procedure include: acute coronary syndrome.    Procedure Details:  The risks, benefits, complications, treatment options, and expected outcomes were discussed with the patient. The patient and/or family concurred with the proposed plan, giving informed consent.    After informed consent the patient was brought to the cath lab after appropriate IV hydration was begun and oral premedication was given. She was further sedated with fentanyl, midazolam, and Dilaudid . She was prepped and draped in the usual manner. Using the modified Seldinger access technique and a micropuncture kit, a 6 Swazi sheath was placed in the femoral artery. A left heart catheterization with coronary arteriography was performed. Findings are  discussed below.    The decision was made to proceed with intervention. She received Heparin as per protocol. The details of the intervention are as follows:    A 6 Japanese multipurpose guide with sideholes was placed into the ostium of the target saphenous vein graft.  A filter wire was placed with the basket just proximal to the anastomosis of the RCA.  The lesion was pretreated with a 3.0 x 25 NC trek.  The lesion was then treated with a 3.25 x 38 Xience.  Intracoronary nitroglycerin was administered to relieve vasospasm.  Postintervention angiography demonstrated an excellent result with PAUL-3 flow and no evidence of dissection or thrombus following PCI.    After the procedure was completed, sedation was stopped and the sheaths and catheters were all removed according to established hospital protocols.    Conscious sedation:  Conscious sedation was performed according to protocol.  I supervised and directed an independent trained observer with the assistance of monitoring the patient's level of consciousness and physiologic status throughout the procedure.  Intraoperative service time was 85 minutes.    Findings:    Hemodynamics LVEDP: 26  LV: 118/9  Ao: 117/80  Left Main No significant disease  RCA Diffusely diseased throughout the entirety of the proximal mid and distal RCA with multiple islands of 99% stenosis  LAD Luminal regularities in the proximal and midportion.  Distal/apical LAD is diffusely diseased with multiple areas of 70 to 90% stenosis.  Small vessel caliber at this area is prohibitive of intervention.  Circ 70 to 75% proximal  99% mid prior to the bifurcation of the first decent sized obtuse marginal.  This obtuse marginal branch has a stent noted in the proximal to midportion that is patent with only luminal regularities noted within the stent.  There is a 60 to 70% stenosis noted distal to the stent.  Vessel caliber is of questionable size for intervention.  There is competitive flow noted  beyond the bifurcation of this obtuse marginal branch.  The remainder of this vessel fills via the saphenous vein graft to this vessel.  SVG(s) SVG-RCA-OMB: 99% stenosis in the midportion of the graft to the RCA with PAUL II flow noted in the distal vasculature.  SVG-DG: Patent with a 85% stenosis noted distal to the anastomosis.  Small vessel caliber is prohibitive for intervention  LORI No disease and not utilized  LV Severe global LV systolic dysfunction with LVEF 20%  Coronary dominance Codominant with circumflex    Interventions/Vessels PCI ANNELIESE SVG-RCA-OM  Guides/Wires 6 Urdu multipurpose guide with sideholes  Filter wire  Devices 3.0 x 25 NC trek  3.25 x 38 Xience  Post % Stenosis 0%  Pre-procedure PAUL flow 2  Post procedure PAUL flow 3  Closure Device Not applicable  Complications None immediate    Estimated Blood Loss:  Minimal    Specimens: None    Complications:  None; patient tolerated the procedure well.    Disposition: PACU - hemodynamically stable    Condition: stable    Impressions:  Successful PCI ANNELIESE SVG-RCA-OMB  Patent SVG-DG with a 85% stenosis noted distal to the anastomosis  Severe native vessel coronary artery disease  Residual distal/apical LAD disease that is too small for intervention  Severe global LV systolic dysfunction LVEF 20%    Recommendations:  Dual antiplatelet therapy consisting of aspirin and ticagrelor  Medical therapy and risk factor modification for residual disease        Imaging:    Results for orders placed during the hospital encounter of 04/15/25    XR Chest 2 View    Narrative  XR CHEST 2 VW    Date of Exam: 4/15/2025 12:17 AM EDT    Indication: SOA Triage Protocol    Comparison: 2/10/2025.    Findings:  There are no airspace consolidations. No pleural fluid. No pneumothorax. The pulmonary vasculature appears within normal limits. Heart appears enlarged, stable. Stable left subclavian AICD/pacemaker device. Median sternotomy wires are present.. No acute  osseous  abnormality identified.    Impression  Impression:  No acute cardiopulmonary process.      Electronically Signed: Althea Valladares MD  4/15/2025 12:29 AM EDT  Workstation ID: [id number]       Results for orders placed during the hospital encounter of 03/22/24    CT Head Without Contrast    Narrative  CT HEAD WO CONTRAST    Date of Exam: 3/22/2024 1:37 PM EDT    Indication: right arm numbness, weakness; hypertensive.    Comparison: None available.    Technique: Axial CT images were obtained of the head without contrast administration.  Coronal reconstructions were performed.  Automated exposure control and iterative reconstruction methods were used.      Findings:  Ventricles are normal in size and shape and are midline. There is no mass effect or edema. There is no CVA or hemorrhage. Visualized portions of paranasal sinuses are clear. Mastoid air cells are clear.    Impression  Impression:  Negative.      Electronically Signed: Martha Browne MD  3/22/2024 1:52 PM EDT  Workstation ID: [id number]      Results for orders placed during the hospital encounter of 03/22/24    CT Head Without Contrast    Narrative  CT HEAD WO CONTRAST    Date of Exam: 3/22/2024 1:37 PM EDT    Indication: right arm numbness, weakness; hypertensive.    Comparison: None available.    Technique: Axial CT images were obtained of the head without contrast administration.  Coronal reconstructions were performed.  Automated exposure control and iterative reconstruction methods were used.      Findings:  Ventricles are normal in size and shape and are midline. There is no mass effect or edema. There is no CVA or hemorrhage. Visualized portions of paranasal sinuses are clear. Mastoid air cells are clear.    Impression  Impression:  Negative.      Electronically Signed: Martha Browne MD  3/22/2024 1:52 PM EDT  Workstation ID: [id number]      Historical data copied forward from previous encounters in EMR including the history, exam, and assessment/plan has  been reviewed and is unchanged except as I have noted and otherwise indicated.      Objective:         LMP  (LMP Unknown)     Physical Examination    General:  Well-developed, [place] well-nourished, cooperative, no acute distress, appears stated age   Neuro:  A&O x3. No significantly obvious focal neuro deficet    Psych:  Pleasant - mildly flattened affect    HENT:  Normocephalic, atraumatic, moist mucous membranes , Normal ear placement,Throat not injected   Eyes:  PERRLA, Conjunctivae not injected, EOM's intact, conjunctiva does not appear significantly injected   Neck:  Supple, Mildly thickened, no lymph adenopathy nor thyromegaly no obvious JVD or bruit    Lungs:    Symmetrical rise & fall of chest with baseline respiratory pattern. To auscultation lungs are Clear bilaterally, faint late phase expiratory wheezes, no rhonchi or rales are noted, bases bilaterally   Chest wall:  No significant tenderness when palpated. PMI @ 6th ICS MAL    Heart:  Regular rate and rhythm, S1 and S2 normal, no S3 or S4, Gr I/VI systolic ejection murmur best heard at the apex , no obvious rub, click or gallop.    Abdomen:  non-distended, non-tender, bowel sounds noted in the 4 quadrants of the abdomen, significant central abdominal adipose tissue is identified    Extremities:  Equal color motion temperature and sensitivity, Rapid capillary refill noted within the nailbeds of fingers and toes bilaterally without evidence of any lower extremity edema.    Pulses:  2+ and symmetric all extremities, rapid capillary refill    Skin:  No obvious rashes, significant lesions identified, warm dry and of normal turgor      In-Office Procedure(s):  No orders to display        Lab Review:   Admission on 04/15/2025, Discharged on 04/17/2025   Component Date Value    Glucose 04/15/2025 131 (H)     BUN 04/15/2025 19     Creatinine 04/15/2025 1.52 (H)     Sodium 04/15/2025 140     Potassium 04/15/2025 3.9     Chloride 04/15/2025 102     CO2 04/15/2025  25.2     Calcium 04/15/2025 9.5     Total Protein 04/15/2025 8.0     Albumin 04/15/2025 4.3     ALT (SGPT) 04/15/2025 32     AST (SGOT) 04/15/2025 57 (H)     Alkaline Phosphatase 04/15/2025 95     Total Bilirubin 04/15/2025 1.0     Globulin 04/15/2025 3.7     A/G Ratio 04/15/2025 1.2     BUN/Creatinine Ratio 04/15/2025 12.5     Anion Gap 04/15/2025 12.8     eGFR 04/15/2025 41.1 (L)     proBNP 04/15/2025 7,120.0 (H)     HS Troponin T 04/15/2025 127 (C)     WBC 04/15/2025 7.69     RBC 04/15/2025 4.59     Hemoglobin 04/15/2025 12.8     Hematocrit 04/15/2025 41.6     MCV 04/15/2025 90.6     MCH 04/15/2025 27.9     MCHC 04/15/2025 30.8 (L)     RDW 04/15/2025 14.5     RDW-SD 04/15/2025 48.6     MPV 04/15/2025 9.3     Platelets 04/15/2025 273     Neutrophil % 04/15/2025 65.6     Lymphocyte % 04/15/2025 25.6     Monocyte % 04/15/2025 6.6     Eosinophil % 04/15/2025 1.6     Basophil % 04/15/2025 0.3     Immature Grans % 04/15/2025 0.3     Neutrophils, Absolute 04/15/2025 5.05     Lymphocytes, Absolute 04/15/2025 1.97     Monocytes, Absolute 04/15/2025 0.51     Eosinophils, Absolute 04/15/2025 0.12     Basophils, Absolute 04/15/2025 0.02     Immature Grans, Absolute 04/15/2025 0.02     QT Interval 04/15/2025 383     QTC Interval 04/15/2025 483     COVID19 04/15/2025 Not Detected     Influenza A PCR 04/15/2025 Not Detected     Influenza B PCR 04/15/2025 Not Detected     HS Troponin T 04/15/2025 121 (C)     Troponin T Numeric Delta 04/15/2025 -6     Troponin T % Delta 04/15/2025 -5     Protime 04/15/2025 13.4     INR 04/15/2025 1.1     PTT 04/15/2025 37.2 (H)     Glucose 04/15/2025 109 (H)     QT Interval 04/15/2025 448     QTC Interval 04/15/2025 506     Glucose 04/15/2025 109 (H)     Protime 04/15/2025 16.0 (H)     INR 04/15/2025 1.28 (H)     Glucose 04/15/2025 215 (H)     PTT 04/16/2025 55.0 (C)     Magnesium 04/16/2025 2.1     Glucose 04/15/2025 183 (H)     Extra Tube 04/16/2025 hold for add-on     PTT 04/16/2025 68.5 (C)      Glucose 04/16/2025 176 (H)     TSH 04/16/2025 1.960     Hemoglobin A1C 04/16/2025 5.80 (H)     Glucose 04/16/2025 155 (H)     Glucose 04/16/2025 192 (H)     BUN 04/16/2025 19     Creatinine 04/16/2025 1.38 (H)     Sodium 04/16/2025 137     Potassium 04/16/2025 4.1     Chloride 04/16/2025 100     CO2 04/16/2025 17.2 (L)     Calcium 04/16/2025 9.2     BUN/Creatinine Ratio 04/16/2025 13.8     Anion Gap 04/16/2025 19.8 (H)     eGFR 04/16/2025 46.2 (L)     Glucose 04/17/2025 187 (H)     BUN 04/17/2025 19     Creatinine 04/17/2025 1.31 (H)     Sodium 04/17/2025 138     Potassium 04/17/2025 3.6     Chloride 04/17/2025 104     CO2 04/17/2025 21.2 (L)     Calcium 04/17/2025 8.7     BUN/Creatinine Ratio 04/17/2025 14.5     Anion Gap 04/17/2025 12.8     eGFR 04/17/2025 49.1 (L)     QT Interval 04/16/2025 447     QTC Interval 04/16/2025 487     Glucose 04/16/2025 132 (H)     Activated Clotting Time  04/16/2025 164 (H)     Activated Clotting Time  04/16/2025 314 (H)     Activated Clotting Time  04/16/2025 170 (H)     Activated Clotting Time  04/16/2025 153 (H)     WBC 04/17/2025 14.55 (H)     RBC 04/17/2025 4.11     Hemoglobin 04/17/2025 11.3 (L)     Hematocrit 04/17/2025 36.4     MCV 04/17/2025 88.6     MCH 04/17/2025 27.5     MCHC 04/17/2025 31.0 (L)     RDW 04/17/2025 14.0     RDW-SD 04/17/2025 45.1     MPV 04/17/2025 9.7     Platelets 04/17/2025 270     Neutrophil % 04/17/2025 89.5 (H)     Lymphocyte % 04/17/2025 6.2 (L)     Monocyte % 04/17/2025 3.5 (L)     Eosinophil % 04/17/2025 0.0 (L)     Basophil % 04/17/2025 0.1     Immature Grans % 04/17/2025 0.7 (H)     Neutrophils, Absolute 04/17/2025 13.02 (H)     Lymphocytes, Absolute 04/17/2025 0.90     Monocytes, Absolute 04/17/2025 0.51     Eosinophils, Absolute 04/17/2025 0.00     Basophils, Absolute 04/17/2025 0.02     Immature Grans, Absolute 04/17/2025 0.10 (H)     nRBC 04/17/2025 0.0     Activated Clotting Time  04/16/2025 153 (H)     Total Cholesterol 04/17/2025 203  "(H)     Triglycerides 04/17/2025 191 (H)     HDL Cholesterol 04/17/2025 48     LDL Cholesterol  04/17/2025 121 (H)     VLDL Cholesterol 04/17/2025 34     LDL/HDL Ratio 04/17/2025 2.43     Glucose 04/16/2025 121 (H)     Potassium 04/17/2025 4.2     Glucose 04/17/2025 167 (H)     Glucose 04/17/2025 147 (H)    No results displayed because visit has over 200 results.        Recent labs reviewed and interpreted for clinical significance and application    Rafael Mae I went over each of the lab findings while she was still here in the heart failure clinic            It is a pleasure to be involved in this patient's cardiovascular care relating to their heart failure.  Please feel free to call me with any questions or concerns.    Enoc \"Marquise\" Essence JOHNSON, [facility]  Heart failure program clinical provider    ALEX Stark   04/18/25  .  "

## 2025-04-18 NOTE — OUTREACH NOTE
Prep Survey      Flowsheet Row Responses   [facility] patient discharged from? [facility]   Is LACE score < 7 ? No   Eligibility Readm Mgmt   Discharge diagnosis NSTEMI (non-ST elevated myocardial infarction)   Does the patient have one of the following disease processes/diagnoses(primary or secondary)? Acute MI (STEMI,NSTEMI)   Does the patient have Home health ordered? No   Is there a DME ordered? No   Prep survey completed? Yes            LIGIA PAGE - Registered Nurse

## 2025-04-21 ENCOUNTER — DISEASE STATE MANAGEMENT VISIT (OUTPATIENT)
Dept: PHARMACY | Facility: HOSPITAL | Age: 50
End: 2025-04-21
Payer: MEDICARE

## 2025-04-21 ENCOUNTER — HOSPITAL ENCOUNTER (OUTPATIENT)
Facility: HOSPITAL | Age: 50
Discharge: HOME | End: 2025-04-21
Payer: MEDICARE

## 2025-04-21 ENCOUNTER — HOSPITAL ENCOUNTER (OUTPATIENT)
Dept: CARDIOLOGY | Facility: HOSPITAL | Age: 50
Discharge: HOME | End: 2025-04-21
Payer: MEDICARE

## 2025-04-21 VITALS
BODY MASS INDEX: 29.91 KG/M2 | DIASTOLIC BLOOD PRESSURE: 80 MMHG | RESPIRATION RATE: 18 BRPM | OXYGEN SATURATION: 98 % | HEART RATE: 71 BPM | SYSTOLIC BLOOD PRESSURE: 110 MMHG | WEIGHT: 191 LBS

## 2025-04-21 DIAGNOSIS — R06.00 DYSPNEA, UNSPECIFIED TYPE: ICD-10-CM

## 2025-04-21 DIAGNOSIS — I38 VHD (VALVULAR HEART DISEASE): Chronic | ICD-10-CM

## 2025-04-21 DIAGNOSIS — I50.42 CHRONIC COMBINED SYSTOLIC AND DIASTOLIC HEART FAILURE: Chronic | ICD-10-CM

## 2025-04-21 DIAGNOSIS — M54.59 INTRACTABLE LOW BACK PAIN: ICD-10-CM

## 2025-04-21 DIAGNOSIS — E11.9 TYPE 2 DIABETES MELLITUS WITHOUT COMPLICATION, WITHOUT LONG-TERM CURRENT USE OF INSULIN: Chronic | ICD-10-CM

## 2025-04-21 DIAGNOSIS — Z91.89 AT RISK FOR FLUID VOLUME OVERLOAD: ICD-10-CM

## 2025-04-21 DIAGNOSIS — I25.5 ISCHEMIC DILATED CARDIOMYOPATHY: Primary | Chronic | ICD-10-CM

## 2025-04-21 DIAGNOSIS — Z91.148 NONCOMPLIANCE WITH MEDICATIONS: Chronic | ICD-10-CM

## 2025-04-21 DIAGNOSIS — E88.810 METABOLIC SYNDROME X: Chronic | ICD-10-CM

## 2025-04-21 DIAGNOSIS — E03.9 HYPOTHYROIDISM (ACQUIRED): Chronic | ICD-10-CM

## 2025-04-21 DIAGNOSIS — N18.32 STAGE 3B CHRONIC KIDNEY DISEASE: Chronic | ICD-10-CM

## 2025-04-21 DIAGNOSIS — I42.0 ISCHEMIC DILATED CARDIOMYOPATHY: Primary | Chronic | ICD-10-CM

## 2025-04-21 DIAGNOSIS — G47.33 OSA (OBSTRUCTIVE SLEEP APNEA): ICD-10-CM

## 2025-04-21 DIAGNOSIS — Z95.810 ICD (IMPLANTABLE CARDIOVERTER-DEFIBRILLATOR), DUAL, IN SITU: Chronic | ICD-10-CM

## 2025-04-21 DIAGNOSIS — J43.9 PULMONARY EMPHYSEMA, UNSPECIFIED EMPHYSEMA TYPE: Chronic | ICD-10-CM

## 2025-04-21 DIAGNOSIS — I25.118 CORONARY ARTERY DISEASE OF NATIVE ARTERY OF NATIVE HEART WITH STABLE ANGINA PECTORIS: Chronic | ICD-10-CM

## 2025-04-21 DIAGNOSIS — K21.9 GERD WITHOUT ESOPHAGITIS: Chronic | ICD-10-CM

## 2025-04-21 DIAGNOSIS — G89.4 CHRONIC PAIN SYNDROME: Chronic | ICD-10-CM

## 2025-04-21 DIAGNOSIS — R79.89 LFT ELEVATION: Chronic | ICD-10-CM

## 2025-04-21 LAB
ANION GAP SERPL CALCULATED.3IONS-SCNC: 11.9 MMOL/L (ref 5–15)
BASOPHILS # BLD AUTO: 0.03 10*3/MM3 (ref 0–0.2)
BASOPHILS NFR BLD AUTO: 0.4 % (ref 0–1.5)
BUN SERPL-MCNC: 21 MG/DL (ref 6–20)
BUN/CREAT SERPL: 14.9 (ref 7–25)
CALCIUM SPEC-SCNC: 9.4 MG/DL (ref 8.6–10.5)
CHLORIDE SERPL-SCNC: 106 MMOL/L (ref 98–107)
CO2 SERPL-SCNC: 22.1 MMOL/L (ref 22–29)
CREAT SERPL-MCNC: 1.41 MG/DL (ref 0.57–1)
DEPRECATED RDW RBC AUTO: 47.4 FL (ref 37–54)
EGFRCR SERPLBLD CKD-EPI 2021: 45 ML/MIN/1.73
EOSINOPHIL # BLD AUTO: 0.11 10*3/MM3 (ref 0–0.4)
EOSINOPHIL NFR BLD AUTO: 1.5 % (ref 0.3–6.2)
ERYTHROCYTE [DISTWIDTH] IN BLOOD BY AUTOMATED COUNT: 14.5 % (ref 12.3–15.4)
GLUCOSE SERPL-MCNC: 95 MG/DL (ref 65–99)
HBA1C MFR BLD: 5.95 % (ref 4.8–5.6)
HCT VFR BLD AUTO: 40.3 % (ref 34–46.6)
HGB BLD-MCNC: 12.4 G/DL (ref 12–15.9)
IMM GRANULOCYTES # BLD AUTO: 0.03 10*3/MM3 (ref 0–0.05)
IMM GRANULOCYTES NFR BLD AUTO: 0.4 % (ref 0–0.5)
LYMPHOCYTES # BLD AUTO: 1.96 10*3/MM3 (ref 0.7–3.1)
LYMPHOCYTES NFR BLD AUTO: 27.2 % (ref 19.6–45.3)
MAGNESIUM SERPL-MCNC: 2.3 MG/DL (ref 1.6–2.6)
MCH RBC QN AUTO: 27.9 PG (ref 26.6–33)
MCHC RBC AUTO-ENTMCNC: 30.8 G/DL (ref 31.5–35.7)
MCV RBC AUTO: 90.6 FL (ref 79–97)
MONOCYTES # BLD AUTO: 0.67 10*3/MM3 (ref 0.1–0.9)
MONOCYTES NFR BLD AUTO: 9.3 % (ref 5–12)
NEUTROPHILS NFR BLD AUTO: 4.41 10*3/MM3 (ref 1.7–7)
NEUTROPHILS NFR BLD AUTO: 61.2 % (ref 42.7–76)
NRBC BLD AUTO-RTO: 0 /100 WBC (ref 0–0.2)
NT-PROBNP SERPL-MCNC: 2542 PG/ML (ref 0–900)
PLATELET # BLD AUTO: 259 10*3/MM3 (ref 140–450)
PMV BLD AUTO: 9.6 FL (ref 6–12)
POTASSIUM SERPL-SCNC: 4 MMOL/L (ref 3.5–5.2)
QT INTERVAL: 426 MS
QTC INTERVAL: 469 MS
RBC # BLD AUTO: 4.45 10*6/MM3 (ref 3.77–5.28)
SODIUM SERPL-SCNC: 140 MMOL/L (ref 136–145)
TSH SERPL DL<=0.05 MIU/L-ACNC: 8.28 UIU/ML (ref 0.27–4.2)
WBC NRBC COR # BLD AUTO: 7.21 10*3/MM3 (ref 3.4–10.8)

## 2025-04-21 PROCEDURE — G2211 COMPLEX E/M VISIT ADD ON: HCPCS | Performed by: NURSE PRACTITIONER

## 2025-04-21 PROCEDURE — 84443 ASSAY THYROID STIM HORMONE: CPT | Performed by: NURSE PRACTITIONER

## 2025-04-21 PROCEDURE — 83036 HEMOGLOBIN GLYCOSYLATED A1C: CPT | Performed by: NURSE PRACTITIONER

## 2025-04-21 PROCEDURE — 93010 ELECTROCARDIOGRAM REPORT: CPT | Performed by: INTERNAL MEDICINE

## 2025-04-21 PROCEDURE — 99204 OFFICE O/P NEW MOD 45 MIN: CPT | Performed by: NURSE PRACTITIONER

## 2025-04-21 PROCEDURE — 80048 BASIC METABOLIC PNL TOTAL CA: CPT | Performed by: NURSE PRACTITIONER

## 2025-04-21 PROCEDURE — 83880 ASSAY OF NATRIURETIC PEPTIDE: CPT | Performed by: NURSE PRACTITIONER

## 2025-04-21 PROCEDURE — 85025 COMPLETE CBC W/AUTO DIFF WBC: CPT | Performed by: NURSE PRACTITIONER

## 2025-04-21 PROCEDURE — 83735 ASSAY OF MAGNESIUM: CPT | Performed by: NURSE PRACTITIONER

## 2025-04-21 PROCEDURE — G0463 HOSPITAL OUTPT CLINIC VISIT: HCPCS

## 2025-04-21 PROCEDURE — 93005 ELECTROCARDIOGRAM TRACING: CPT | Performed by: NURSE PRACTITIONER

## 2025-04-21 RX ORDER — LEVOTHYROXINE SODIUM 150 UG/1
150 TABLET ORAL
COMMUNITY

## 2025-04-21 RX ORDER — SACUBITRIL AND VALSARTAN 24; 26 MG/1; MG/1
1 TABLET, FILM COATED ORAL 2 TIMES DAILY
COMMUNITY

## 2025-04-21 NOTE — ADDENDUM NOTE
Encounter addended by: Carey Wade RN on: 4/21/2025 12:53 PM   Actions taken: Charge Capture section accepted

## 2025-04-21 NOTE — LETTER
"April 21, 2025     Marcello Garrido DO  [address]  [place] [address]    Patient: Rafael Nunes   YOB: 1975   Date of Visit: 4/21/2025     Dear Marecllo Garrido DO:       The hospitalist referred a Rafael Nunes to me for evaluation. Below are the relevant portions of my assessment and plan of care.    If you have questions, please do not hesitate to call me. I look forward to following Rafael along with you.         Sincerely,        ALEX Stark        CC: MD Essence Tinoco Paul T, APRN  04/21/25 1221  Signed  Cardiology Heart Failure Clinic Note  Enoc \"Marquise\" Essence JOHNSON, [facility]    Patient ID: Rafael Nunes  is a 50 y.o. female.    Encounter Date:04/21/2025    HPI:  50-year-old -American female patient of known to Dr. Luna, Dr. Eckert  &  with a past medical history of ischemic dilated cardiomyopathy [LVIDd 6.1] known combined systolic and diastolic heart failure (HFrEF) (TTE 2/10/25) LVEF 38% with grade 2 diastolic dysfunction, s/p 2019 Biotronik dual-chamber ICD, known  valvular heart disease  s/p 2019 AVRwith a mild aortic stenosis PAUL of 0.56 cm² peak mean PG 31.6 & 16 mmHg moderate mitral valve calcification with moderate to severe MR, stage III CKD, ASCAD h/o PCI\stents, s/p 2019 CABG x 3, noncompliance with medications secondary to affordability, COPD who smoked until 2019 known INDRA, GERD, mildly elevated LFTs,, hypothyroidism acquired metabolic syndrome with hypertension, dyslipidemia, ID T2DM.  Most recently admitted 4/15/2025 due to SOA was found to be having a multifactorial volume overload with a proBNP of 7120 with a chest x-ray with normal pulmonary vasculature comes in for her initial heart failure clinic visit 21 April 2025 since recent hospitalization has done relatively well from a volume standpoint, unfortunately she is been self-medicating herself with several of her old heart failure " medications and had some residual Entresto she has been taking some residual Farxiga she has been taking as well as Demadex 40 mg twice daily.       Assessment:    Diagnoses and all orders for this visit:    1. Ischemic dilated cardiomyopathy (Primary)  Overview:  [LVIDd 6.1]   known combined systolic and diastolic heart failure (HFrEF)   (TTE 2/10/25) LVEF 38% with grade 2 diastolic dysfunction,   s/p 2019 Biotronik dual-chamber ICD      2. VHD (valvular heart disease)  Overview:  A.  Unsure if it has rheumatic versus nonrheumatic   I. There is a notation of the mitral valve on echocardiography where it was thought to be rheumatic   II. There are multiple documentations of nonrheumatic valvular heart disease    B.  Aortic stenosis   I. s/p 2019 (bioprosthetic)  AVR   1. (TTE 2/10/2025) with a mild aortic stenosis                          a.  PAUL of 0.56 cm²      b. peak mean PG 31.6 & 16 mmHg   C.  moderate mitral valve calcification   I.   moderate to severe MR,      3. Chronic combined systolic and diastolic heart failure (HFrEF)  Overview:  (TTE 2/10/25) LVEF 38% with grade 2 diastolic dysfunction,   s/p 2019 Biotronik dual-chamber ICD      4. Stage 3b chronic kidney disease    5. At risk for fluid volume overload  Overview:  Systolic and diastolic HFrEF  Valvular heart disease  CKD 3B      6. ASCAD  Overview:   A.  h/o PCI\stents   I.   Most recent s/p 4/16/2025 PCI/ ANNELIESE SVG-RCA-OMB    II.  Patent SVG-DG with a 85% stenosis noted distal to the anastomosis             III.  Severe native vessel coronary artery disease             IV.  Residual distal/apical LAD disease that is too small for intervention   B. s/p 2019 CABG x 3      7. Pulmonary emphysema, unspecified emphysema type  Overview:  With smoking in 2019      8. Dyspnea, unspecified type    9. h/o Noncompliance with medications 2/2 financial burden    10. Intractable low back pain    11. Mild LFT elevation  Overview:  SGOT      12. Chronic pain  syndrome    13. GERD without esophagitis    14. Hypothyroidism (acquired)    15. Metabolic syndrome X 4 of 4 components  Overview:  A.  Benign essential hypertension  B.  Mixed dyslipidemia  C.  Obesity with BMI at 32.6 kg/m²    I.  Resultant INDRA  D.  T2DM      16. INDRA (obstructive sleep apnea)    17. T2DM without  use of insulin              Plan/discussion    Volume overload    Heart Failure Core Measures addressed    Type of Overload multifactorial  Systolic and diastolic HFrEF  Valvular heart disease  CKD 3      Most recent EF: (TTE 2/10/25) LVEF 38% with grade 2 diastolic dysfunction,     [place] Heart association Class & Stage : IIIc    HF Meds Pre Visit    Beta Blocker: Toprol-XL 25 mg daily  ARNI/ACE/ARB: None  SGLT 2 inhibitors: None  Diuretics: None  Aldosterone Antagonist: None  Digitalis: N/A  Vasodilators & Nitrates: None    HF Meds Post Visit   were still unable to completely sort out what she is taking at home she is going to call and discussed later  We are initiating a patient assistance requests today for the Entresto and Farxiga    Beta Blocker: Toprol-XL 25 mg daily  ARNI/ACE/ARB: Entresto ?  Dosage twice daily  SGLT 2 inhibitors: Farxiga 10 mg daily  Diuretics: Demadex 40 mg twice daily  Furoscix: None due to finances  Aldosterone Antagonist: None due to renal function  Digitalis: N/A  Nitrates: N/A    Plavix 75 mg daily  EC ASA 81 mg daily  Lipitor 80 mg daily      Cardiac medicines reviewed with risk, benefits, and necessity of each discussed with myself and LTAC, located within [facility].       _________________________________________________________    Discussion    On her initial visit she is already on heart failure core measures including beta-blocker, and questionably a as needed nitrate  Obviously would like to establish the pillars of heart failure  Known limited by financial constraints & hypotension  Pharmacy team will work with patient   and see if they can get patient assistance for an SGLT2, ARNI (she is  currently taking some old samples she had at the house)  Would like to add at some point a MRA inhibitor but certainly not until we can get the rest of her medications sorted out  Given her recent PCI\ANNELIESE will obviously need DAPT & is taking that ecasa & Plavix   She has a follow-up appointment with cardiology  She has a follow-up appointment with nephrology would obviously want nephrology to dose diuretics if possible  My clinic can certainly assist in preventing a readmission by coming in for diuresis should she become volume overloaded she was given my card and asked to call us in the event that occurs  She is asked for referral to an endocrinologist who we would defer her elevated TSH and A1c to their expertise  would add typical heart failure nursing interventions including:        A. Fluid restriction at less than 2000 cc daily       B. Daily weights        C.  1 g low-sodium diet        I did the following activities:preparing for the visit, with Rafael Nunes  including reviewing tests, once pt arrived in clinic I also performed a medically appropriate examination and/or evaluation , I personally spent considerable time counseling and educating the patient/family/caregiver, ordering medications, tests, or procedures, referring and communicating with other health care professionals , and documenting information in the medical record. I estimate including preparation 45 minutes             Subjective:   No chief complaint on file.      ROS:  Constitutional: + weakness, + fatigue persisted posthospitalization, No fever, rigors, chills   Eyes: No recent vision changes, eye pain   ENT/oropharynx: No recent difficulty swallowing, sore throat, epistaxis, changes in hearing   Cardiovascular: No recent chest pain, chest tightness, palpitations except as noted in HPI, paroxysmal nocturnal dyspnea, orthopnea, diaphoresis, dizziness & no pre or drake syncopal episodes   Respiratory: No real at rest shortness of  breath, + dyspnea on exertion, no significant productive cough, no wheezing and no hemoptysis   Gastrointestinal: No abdominal pain, nausea, vomiting, diarrhea, bloody stools   Genitourinary: No hematuria, dysuria other than increased frequency   Neurological: No headache, tremors, numbness,  or hemiparesis    Musculoskeletal: No change in typical cramps, myalgias, no new joint pain, joint swelling   Integument: No recent rash, without edema      Patient Active Problem List   Diagnosis   • COPD (chronic obstructive pulmonary disease)   • Essential hypertension   • ICD (implantable cardioverter-defibrillator), dual, in situ   • GERD without esophagitis   • Hypothyroidism (acquired)   • ASCAD   • S/P AVR (aortic valve replacement)   • S/P CABG x 3   • Dyspnea   • Nonrheumatic mitral valve regurgitation   • Cellulitis of left axilla   • Cellulitis of right axilla   • Hidradenitis   • T2DM without  use of insulin   • Hypokalemia   • Dizziness with near syncope   • Acute diarrhea   • Mixed hyperlipidemia   • INDRA (obstructive sleep apnea)   • Chronic pain syndrome   • Intractable low back pain   • Elevated troponin   • Pneumonia   • NSTEMI (non-ST elevated myocardial infarction)   • Total occlusion of coronary artery, chronic   • Type 2 myocardial infarction   • Ischemic dilated cardiomyopathy   • Chronic combined systolic and diastolic heart failure (HFrEF)   • VHD (valvular heart disease)   • Stage 3b chronic kidney disease   • At risk for fluid volume overload   • h/o Noncompliance with medications 2/2 financial burden   • Mild LFT elevation   • Metabolic syndrome X 4 of 4 components       Past Medical History:   Diagnosis Date   • Arrhythmia    • ASCAD 12/22/2019   • Asthma    • CAD (coronary artery disease)    • Cardiomyopathy, dilated    • Chronic systolic congestive heart failure    • CKD (chronic kidney disease) stage 2, GFR 60-89 ml/min    • COPD (chronic obstructive pulmonary disease)    • Essential hypertension     • GERD without esophagitis    • Hidradenitis 10/26/2020   • Hyperlipidemia    • Hypothyroidism (acquired)    • ICD (implantable cardioverter-defibrillator), dual, in situ 07/22/2019   • Nonrheumatic aortic valve insufficiency    • Nonrheumatic mitral valve regurgitation    • S/P AVR (aortic valve replacement)    • S/P CABG x 3    • Type 2 diabetes mellitus without complication, without long-term current use of insulin        Past Surgical History:   Procedure Laterality Date   • AORTIC VALVE REPAIR/REPLACEMENT N/A 12/27/2019    Procedure: AORTIC VALVE REPAIR/REPLACEMENT;  Surgeon: Lane Stock MD;  Location: [facility] CVOR;  Service: Cardiothoracic   • BREAST LUMPECTOMY Right     BENIGN   • CARDIAC CATHETERIZATION N/A 12/24/2019    Procedure: Right and Left Heart Cath 12/24/19 @ 0900;  Surgeon: Wayne Luna MD;  Location: [facility] CATH INVASIVE LOCATION;  Service: Cardiovascular   • CARDIAC CATHETERIZATION N/A 12/24/2019    Procedure: Coronary angiography;  Surgeon: Wayne Luna MD;  Location: [facility] CATH INVASIVE LOCATION;  Service: Cardiovascular   • CARDIAC CATHETERIZATION N/A 11/10/2020    Procedure: Left and right Heart Cath;  Surgeon: Wayne Luna MD;  Location: [facility] CATH INVASIVE LOCATION;  Service: Cardiovascular;  Laterality: N/A;   • CARDIAC CATHETERIZATION N/A 11/10/2020    Procedure: Coronary angiography;  Surgeon: Wayne Luna MD;  Location: [facility] CATH INVASIVE LOCATION;  Service: Cardiovascular;  Laterality: N/A;   • CARDIAC CATHETERIZATION N/A 11/10/2020    Procedure: Right Heart Cath;  Surgeon: Wayne Luna MD;  Location: [facility] CATH INVASIVE LOCATION;  Service: Cardiovascular;  Laterality: N/A;   • CARDIAC CATHETERIZATION N/A 11/25/2020    Procedure: Percutaneous coronary intervention of the left circumflex artery;  Surgeon: Wayne Luna MD;  Location: [facility] CATH INVASIVE LOCATION;  Service: Cardiovascular;  Laterality: N/A;    • CARDIAC CATHETERIZATION N/A 2021    Procedure: LEFT HEART CATH with possible PCI;  Surgeon: Wayne Luna MD;  Location: [facility] CATH INVASIVE LOCATION;  Service: Cardiovascular;  Laterality: N/A;  Local and IV sedation   • CARDIAC CATHETERIZATION N/A 2021    Procedure: Coronary angiography;  Surgeon: Wayne Luna MD;  Location: [facility] CATH INVASIVE LOCATION;  Service: Cardiovascular;  Laterality: N/A;   • CARDIAC CATHETERIZATION N/A 2021    Procedure: Saphenous Vein Graft;  Surgeon: Wayne Luna MD;  Location:  [facility] CATH INVASIVE LOCATION;  Service: Cardiovascular;  Laterality: N/A;   • CARDIAC CATHETERIZATION N/A 2025    Procedure: Left Heart Cath;  Surgeon: Marcello Garrido DO;  Location: [facility] CATH INVASIVE LOCATION;  Service: Cardiovascular;  Laterality: N/A;   • CARDIAC ELECTROPHYSIOLOGY PROCEDURE Left 2019    Procedure: Dual-chamber ICD insertion;  Surgeon: Héctor Eckert MD;  Location: [facility] CATH INVASIVE LOCATION;  Service: Cardiovascular   • CARDIAC ELECTROPHYSIOLOGY PROCEDURE Left 2019    Procedure: Lead Revision;  Surgeon: Héctor Eckert MD;  Location: [facility] CATH INVASIVE LOCATION;  Service: Cardiovascular   • CARDIAC SURGERY     • CHOLECYSTECTOMY     • CORONARY ARTERY BYPASS GRAFT N/A 2019    Procedure: CORONARY ARTERY BYPASS GRAFTING;  Surgeon: Lane Stock MD;  Location: [facility] CVOR;  Service: Cardiothoracic   • HYSTERECTOMY     • INSERT / REPLACE / REMOVE PACEMAKER     • LYMPHADENECTOMY Bilateral    • THYROID SURGERY         Social History     Socioeconomic History   • Marital status:    Tobacco Use   • Smoking status: Former     Current packs/day: 0.00     Types: Cigarettes     Quit date: 2019     Years since quittin.2   • Smokeless tobacco: Never   Vaping Use   • Vaping status: Never Used   Substance and Sexual Activity   • Alcohol use: No   • Drug use: No   • Sexual activity: Defer        Allergies   Allergen Reactions   • Hydrocodone Hives   • Penicillin G Unknown (See Comments)     Reaction occurred as a baby.      Penicillin interview complete 08/18/2022.   • Contrast Dye (Echo Or Unknown Ct/Mr) GI Intolerance     She is pretty sure it's the contrast dye that makes her super sick and vomiting after heart cath   • Gadolinium Derivatives Itching   • Varenicline Other (See Comments)     Encephalopathy         Current Outpatient Medications:   •  aspirin 81 MG EC tablet, Take 1 tablet by mouth Daily., Disp: 90 tablet, Rfl: 0  •  atorvastatin (LIPITOR) 80 MG tablet, Take 1 tablet by mouth Every Night., Disp: 90 tablet, Rfl: 0  •  clopidogrel (PLAVIX) 75 MG tablet, Take 4 tablets by mouth Daily for 1 day, THEN 1 tablet Daily for 30 days., Disp: 30 tablet, Rfl: 0  •  metoprolol succinate XL (TOPROL-XL) 25 MG 24 hr tablet, Take 1 tablet by mouth Daily., Disp: 30 tablet, Rfl: 0  •  oxyCODONE-acetaminophen (PERCOCET) 7.5-325 MG per tablet, Take 1 tablet by mouth Every 6 (Six) Hours As Needed for Moderate Pain., Disp: , Rfl:   No current facility-administered medications for this visit.    Immunization History   Administered Date(s) Administered   • COVID-19 (MODERNA) 1st,2nd,3rd Dose Monovalent 12/29/2021, 01/26/2022   • Fluzone (or Fluarix & Flulaval for VFC) >6mos 03/02/2021       Most recent EKG as reviewed:  Procedures     Most recent echo as reviewed:  Results for orders placed during the hospital encounter of 02/10/25    Adult Transthoracic Echo Complete W/ Cont if Necessary Per Protocol    Interpretation Summary  •  Left ventricular systolic function is moderately decreased. Estimated left ventricular EF = 38%  •  The left ventricular cavity is mild to moderately dilated.  •  Left ventricular wall thickness is consistent with mild to moderate eccentric hypertrophy.  •  Left ventricular diastolic function is consistent with (grade II w/high LAP) pseudonormalization and age.  •  The right  ventricular cavity is mildly dilated.  •  The left atrial cavity is moderate to severely dilated.  •  The right atrial cavity is borderline dilated.  •  Mild aortic valve stenosis is present.  •  Abnormal mitral valve structure consistent with dilated annulus.  •  Moderate to severe mitral valve regurgitation is present.  •  Estimated right ventricular systolic pressure from tricuspid regurgitation is normal (<35 mmHg).    Transthoracic echocardiography reveals dilated LV with eccentric LVH with EF between 36 to 40%  Severe left atrial enlargement and the mitral valve apparatus calcified and appears rheumatic without mitral stenosis and moderate to severe MR directed laterally and posteriorly  Aortic valve is calcified and not well-visualized with a mean gradient of 16 mmHg.  Mild TR without pulmonary hypertension  No effusion      Electronically signed by Héctor Eckert MD, 25, 1:19 PM EST.      Most recent stress test results:  Results for orders placed during the hospital encounter of 20    Treadmill Stress Test - Rehab Protocol    Interpretation Summary  Stress portion of this exam was supervised by: Ursula Ann NP      Most recent cardiac catheterization results:  Results for orders placed during the hospital encounter of 04/15/25    Cardiac Catheterization/Vascular Study    Conclusion  Table formatting from the original result was not included.  Cardiac Catheterization with PCI Report  PATIENT IDENTIFICATION  Name: Rafael Nunes  Age: 50 y.o. Sex: female : 1975  MRN: [id number]    Date: 2025  PCP: @PCP@    Requesting Provider  [unfilled]    She underwent cardiac catheterization left heart catheterization with selective coronary angiography, left ventriculography, saphenous vein graft angiography, LIMA angiography, PCI ANNELIESE SVG-RCA-OM, intracoronary nitroglycerin administration, postintervention angiography x 4.    Indications for the procedure include: acute coronary  syndrome.    Procedure Details:  The risks, benefits, complications, treatment options, and expected outcomes were discussed with the patient. The patient and/or family concurred with the proposed plan, giving informed consent.    After informed consent the patient was brought to the cath lab after appropriate IV hydration was begun and oral premedication was given. She was further sedated with fentanyl, midazolam, and Dilaudid . She was prepped and draped in the usual manner. Using the modified Seldinger access technique and a micropuncture kit, a 6 Irish sheath was placed in the femoral artery. A left heart catheterization with coronary arteriography was performed. Findings are discussed below.    The decision was made to proceed with intervention. She received Heparin as per protocol. The details of the intervention are as follows:    A 6 Irish multipurpose guide with sideholes was placed into the ostium of the target saphenous vein graft.  A filter wire was placed with the basket just proximal to the anastomosis of the RCA.  The lesion was pretreated with a 3.0 x 25 NC trek.  The lesion was then treated with a 3.25 x 38 Xience.  Intracoronary nitroglycerin was administered to relieve vasospasm.  Postintervention angiography demonstrated an excellent result with PAUL-3 flow and no evidence of dissection or thrombus following PCI.    After the procedure was completed, sedation was stopped and the sheaths and catheters were all removed according to established hospital protocols.    Conscious sedation:  Conscious sedation was performed according to protocol.  I supervised and directed an independent trained observer with the assistance of monitoring the patient's level of consciousness and physiologic status throughout the procedure.  Intraoperative service time was 85 minutes.    Findings:    Hemodynamics LVEDP: 26  LV: 118/9  Ao: 117/80  Left Main No significant disease  RCA Diffusely diseased throughout the  entirety of the proximal mid and distal RCA with multiple islands of 99% stenosis  LAD Luminal regularities in the proximal and midportion.  Distal/apical LAD is diffusely diseased with multiple areas of 70 to 90% stenosis.  Small vessel caliber at this area is prohibitive of intervention.  Circ 70 to 75% proximal  99% mid prior to the bifurcation of the first decent sized obtuse marginal.  This obtuse marginal branch has a stent noted in the proximal to midportion that is patent with only luminal regularities noted within the stent.  There is a 60 to 70% stenosis noted distal to the stent.  Vessel caliber is of questionable size for intervention.  There is competitive flow noted beyond the bifurcation of this obtuse marginal branch.  The remainder of this vessel fills via the saphenous vein graft to this vessel.  SVG(s) SVG-RCA-OMB: 99% stenosis in the midportion of the graft to the RCA with PAUL II flow noted in the distal vasculature.  SVG-DG: Patent with a 85% stenosis noted distal to the anastomosis.  Small vessel caliber is prohibitive for intervention  LORI No disease and not utilized  LV Severe global LV systolic dysfunction with LVEF 20%  Coronary dominance Codominant with circumflex    Interventions/Vessels PCI ANNELIESE SVG-RCA-OM  Guides/Wires 6 Slovak multipurpose guide with sideholes  Filter wire  Devices 3.0 x 25 NC trek  3.25 x 38 Xience  Post % Stenosis 0%  Pre-procedure PAUL flow 2  Post procedure PAUL flow 3  Closure Device Not applicable  Complications None immediate    Estimated Blood Loss:  Minimal    Specimens: None    Complications:  None; patient tolerated the procedure well.    Disposition: PACU - hemodynamically stable    Condition: stable    Impressions:  Successful PCI ANNELIESE SVG-RCA-OMB  Patent SVG-DG with a 85% stenosis noted distal to the anastomosis  Severe native vessel coronary artery disease  Residual distal/apical LAD disease that is too small for intervention  Severe global LV systolic  dysfunction LVEF 20%    Recommendations:  Dual antiplatelet therapy consisting of aspirin and ticagrelor  Medical therapy and risk factor modification for residual disease        Imaging:    Results for orders placed during the hospital encounter of 04/15/25    XR Chest 2 View    Narrative  XR CHEST 2 VW    Date of Exam: 4/15/2025 12:17 AM EDT    Indication: SOA Triage Protocol    Comparison: 2/10/2025.    Findings:  There are no airspace consolidations. No pleural fluid. No pneumothorax. The pulmonary vasculature appears within normal limits. Heart appears enlarged, stable. Stable left subclavian AICD/pacemaker device. Median sternotomy wires are present.. No acute  osseous abnormality identified.    Impression  Impression:  No acute cardiopulmonary process.      Electronically Signed: Althea Valladares MD  4/15/2025 12:29 AM EDT  Workstation ID: [id number]       Results for orders placed during the hospital encounter of 03/22/24    CT Head Without Contrast    Narrative  CT HEAD WO CONTRAST    Date of Exam: 3/22/2024 1:37 PM EDT    Indication: right arm numbness, weakness; hypertensive.    Comparison: None available.    Technique: Axial CT images were obtained of the head without contrast administration.  Coronal reconstructions were performed.  Automated exposure control and iterative reconstruction methods were used.      Findings:  Ventricles are normal in size and shape and are midline. There is no mass effect or edema. There is no CVA or hemorrhage. Visualized portions of paranasal sinuses are clear. Mastoid air cells are clear.    Impression  Impression:  Negative.      Electronically Signed: Martha Browne MD  3/22/2024 1:52 PM EDT  Workstation ID: [id number]      Results for orders placed during the hospital encounter of 03/22/24    CT Head Without Contrast    Narrative  CT HEAD WO CONTRAST    Date of Exam: 3/22/2024 1:37 PM EDT    Indication: right arm numbness, weakness; hypertensive.    Comparison: None  available.    Technique: Axial CT images were obtained of the head without contrast administration.  Coronal reconstructions were performed.  Automated exposure control and iterative reconstruction methods were used.      Findings:  Ventricles are normal in size and shape and are midline. There is no mass effect or edema. There is no CVA or hemorrhage. Visualized portions of paranasal sinuses are clear. Mastoid air cells are clear.    Impression  Impression:  Negative.      Electronically Signed: Martha Browne MD  3/22/2024 1:52 PM EDT  Workstation ID: [id number]      Historical data copied forward from previous encounters in EMR including the history, exam, and assessment/plan has been reviewed and is unchanged except as I have noted and otherwise indicated.      Objective:         LMP  (LMP Unknown)     Physical Examination    General:  Well-developed, [place] well-nourished, cooperative, no acute distress, appears stated age   Neuro:  A&O x3. No significantly obvious focal neuro deficet    Psych:  Pleasant - mildly flattened affect    HENT:  Normocephalic, atraumatic, moist mucous membranes , Normal ear placement,Throat not injected   Eyes:  PERRLA, Conjunctivae not injected, EOM's intact, conjunctiva does not appear significantly injected   Neck:  Supple, Mildly thickened, no lymph adenopathy nor thyromegaly no obvious JVD or bruit    Lungs:    Symmetrical rise & fall of chest with baseline respiratory pattern. To auscultation lungs are Clear bilaterally, faint late phase expiratory wheezes, no rhonchi or rales are noted, bases bilaterally   Chest wall:  No significant tenderness when palpated. PMI @ 6th ICS MAL    Heart:  Regular rate and rhythm, S1 and S2 normal, no S3 or S4, Gr I/VI systolic ejection murmur best heard at the apex , no obvious rub, click or gallop.    Abdomen:  non-distended, non-tender, bowel sounds noted in the 4 quadrants of the abdomen, significant central abdominal adipose tissue is  identified    Extremities:  Equal color motion temperature and sensitivity, Rapid capillary refill noted within the nailbeds of fingers and toes bilaterally without evidence of any lower extremity edema.    Pulses:  2+ and symmetric all extremities, rapid capillary refill    Skin:  No obvious rashes, significant lesions identified, warm dry and of normal turgor      In-Office Procedure(s):  No orders to display        Lab Review:   Admission on 04/15/2025, Discharged on 04/17/2025   Component Date Value   • Glucose 04/15/2025 131 (H)    • BUN 04/15/2025 19    • Creatinine 04/15/2025 1.52 (H)    • Sodium 04/15/2025 140    • Potassium 04/15/2025 3.9    • Chloride 04/15/2025 102    • CO2 04/15/2025 25.2    • Calcium 04/15/2025 9.5    • Total Protein 04/15/2025 8.0    • Albumin 04/15/2025 4.3    • ALT (SGPT) 04/15/2025 32    • AST (SGOT) 04/15/2025 57 (H)    • Alkaline Phosphatase 04/15/2025 95    • Total Bilirubin 04/15/2025 1.0    • Globulin 04/15/2025 3.7    • A/G Ratio 04/15/2025 1.2    • BUN/Creatinine Ratio 04/15/2025 12.5    • Anion Gap 04/15/2025 12.8    • eGFR 04/15/2025 41.1 (L)    • proBNP 04/15/2025 7,120.0 (H)    • HS Troponin T 04/15/2025 127 (C)    • WBC 04/15/2025 7.69    • RBC 04/15/2025 4.59    • Hemoglobin 04/15/2025 12.8    • Hematocrit 04/15/2025 41.6    • MCV 04/15/2025 90.6    • MCH 04/15/2025 27.9    • MCHC 04/15/2025 30.8 (L)    • RDW 04/15/2025 14.5    • RDW-SD 04/15/2025 48.6    • MPV 04/15/2025 9.3    • Platelets 04/15/2025 273    • Neutrophil % 04/15/2025 65.6    • Lymphocyte % 04/15/2025 25.6    • Monocyte % 04/15/2025 6.6    • Eosinophil % 04/15/2025 1.6    • Basophil % 04/15/2025 0.3    • Immature Grans % 04/15/2025 0.3    • Neutrophils, Absolute 04/15/2025 5.05    • Lymphocytes, Absolute 04/15/2025 1.97    • Monocytes, Absolute 04/15/2025 0.51    • Eosinophils, Absolute 04/15/2025 0.12    • Basophils, Absolute 04/15/2025 0.02    • Immature Grans, Absolute 04/15/2025 0.02    • QT Interval  04/15/2025 383    • QTC Interval 04/15/2025 483    • COVID19 04/15/2025 Not Detected    • Influenza A PCR 04/15/2025 Not Detected    • Influenza B PCR 04/15/2025 Not Detected    • HS Troponin T 04/15/2025 121 (C)    • Troponin T Numeric Delta 04/15/2025 -6    • Troponin T % Delta 04/15/2025 -5    • Protime 04/15/2025 13.4    • INR 04/15/2025 1.1    • PTT 04/15/2025 37.2 (H)    • Glucose 04/15/2025 109 (H)    • QT Interval 04/15/2025 448    • QTC Interval 04/15/2025 506    • Glucose 04/15/2025 109 (H)    • Protime 04/15/2025 16.0 (H)    • INR 04/15/2025 1.28 (H)    • Glucose 04/15/2025 215 (H)    • PTT 04/16/2025 55.0 (C)    • Magnesium 04/16/2025 2.1    • Glucose 04/15/2025 183 (H)    • Extra Tube 04/16/2025 hold for add-on    • PTT 04/16/2025 68.5 (C)    • Glucose 04/16/2025 176 (H)    • TSH 04/16/2025 1.960    • Hemoglobin A1C 04/16/2025 5.80 (H)    • Glucose 04/16/2025 155 (H)    • Glucose 04/16/2025 192 (H)    • BUN 04/16/2025 19    • Creatinine 04/16/2025 1.38 (H)    • Sodium 04/16/2025 137    • Potassium 04/16/2025 4.1    • Chloride 04/16/2025 100    • CO2 04/16/2025 17.2 (L)    • Calcium 04/16/2025 9.2    • BUN/Creatinine Ratio 04/16/2025 13.8    • Anion Gap 04/16/2025 19.8 (H)    • eGFR 04/16/2025 46.2 (L)    • Glucose 04/17/2025 187 (H)    • BUN 04/17/2025 19    • Creatinine 04/17/2025 1.31 (H)    • Sodium 04/17/2025 138    • Potassium 04/17/2025 3.6    • Chloride 04/17/2025 104    • CO2 04/17/2025 21.2 (L)    • Calcium 04/17/2025 8.7    • BUN/Creatinine Ratio 04/17/2025 14.5    • Anion Gap 04/17/2025 12.8    • eGFR 04/17/2025 49.1 (L)    • QT Interval 04/16/2025 447    • QTC Interval 04/16/2025 487    • Glucose 04/16/2025 132 (H)    • Activated Clotting Time  04/16/2025 164 (H)    • Activated Clotting Time  04/16/2025 314 (H)    • Activated Clotting Time  04/16/2025 170 (H)    • Activated Clotting Time  04/16/2025 153 (H)    • WBC 04/17/2025 14.55 (H)    • RBC 04/17/2025 4.11    • Hemoglobin 04/17/2025  "11.3 (L)    • Hematocrit 04/17/2025 36.4    • MCV 04/17/2025 88.6    • MCH 04/17/2025 27.5    • MCHC 04/17/2025 31.0 (L)    • RDW 04/17/2025 14.0    • RDW-SD 04/17/2025 45.1    • MPV 04/17/2025 9.7    • Platelets 04/17/2025 270    • Neutrophil % 04/17/2025 89.5 (H)    • Lymphocyte % 04/17/2025 6.2 (L)    • Monocyte % 04/17/2025 3.5 (L)    • Eosinophil % 04/17/2025 0.0 (L)    • Basophil % 04/17/2025 0.1    • Immature Grans % 04/17/2025 0.7 (H)    • Neutrophils, Absolute 04/17/2025 13.02 (H)    • Lymphocytes, Absolute 04/17/2025 0.90    • Monocytes, Absolute 04/17/2025 0.51    • Eosinophils, Absolute 04/17/2025 0.00    • Basophils, Absolute 04/17/2025 0.02    • Immature Grans, Absolute 04/17/2025 0.10 (H)    • nRBC 04/17/2025 0.0    • Activated Clotting Time  04/16/2025 153 (H)    • Total Cholesterol 04/17/2025 203 (H)    • Triglycerides 04/17/2025 191 (H)    • HDL Cholesterol 04/17/2025 48    • LDL Cholesterol  04/17/2025 121 (H)    • VLDL Cholesterol 04/17/2025 34    • LDL/HDL Ratio 04/17/2025 2.43    • Glucose 04/16/2025 121 (H)    • Potassium 04/17/2025 4.2    • Glucose 04/17/2025 167 (H)    • Glucose 04/17/2025 147 (H)    No results displayed because visit has over 200 results.        Recent labs reviewed and interpreted for clinical significance and application    Rafael Mae I went over each of the lab findings while she was still here in the heart failure clinic            It is a pleasure to be involved in this patient's cardiovascular care relating to their heart failure.  Please feel free to call me with any questions or concerns.    Enoc \"Marquise\" Essence JOHNSON, [facility]  Heart failure program clinical provider    Enoc Lowry, ALEX   04/18/25  .  "

## 2025-04-21 NOTE — PROGRESS NOTES
[facility] HEART FAILURE CLINIC - PHARMACY SERVICE    Patient Name: Rafael Nunes  :1975  Age: 50 y.o.  Sex: female  Primary Cardiologist: Radhika/ALEX Ann - hospital follow-up appointment made today by RN in HF Clinic for 25 with ALEX Ann  Nephrology: Jesica - RN in HF Clinic contacted office to schedule hospital follow-up but did not receive call back until patient's appointment was over. Dr. Mooney's office will contact patient directly (did not have patient's phone number but this was provided to them by HF Clinic).  PCP: Zacarias     HFrEF with EF 38% (Last Echo: 25).    NYHA Class III C     ECHO:    Results for orders placed during the hospital encounter of 02/10/25    Adult Transthoracic Echo Complete W/ Cont if Necessary Per Protocol    Interpretation Summary    Left ventricular systolic function is moderately decreased. Estimated left ventricular EF = 38%    The left ventricular cavity is mild to moderately dilated.    Left ventricular wall thickness is consistent with mild to moderate eccentric hypertrophy.    Left ventricular diastolic function is consistent with (grade II w/high LAP) pseudonormalization and age.    The right ventricular cavity is mildly dilated.    The left atrial cavity is moderate to severely dilated.    The right atrial cavity is borderline dilated.    Mild aortic valve stenosis is present.    Abnormal mitral valve structure consistent with dilated annulus.    Moderate to severe mitral valve regurgitation is present.    Estimated right ventricular systolic pressure from tricuspid regurgitation is normal (<35 mmHg).    Transthoracic echocardiography reveals dilated LV with eccentric LVH with EF between 36 to 40%  Severe left atrial enlargement and the mitral valve apparatus calcified and appears rheumatic without mitral stenosis and moderate to severe MR directed laterally and posteriorly  Aortic valve is calcified and not well-visualized with a mean  gradient of 16 mmHg.  Mild TR without pulmonary hypertension  No effusion      Electronically signed by Héctor Eckert MD, 02/12/25, 1:19 PM EST.      The patient's last EKG was reviewed from today and shows a QTcB of 469 ms.     ICD: yes - 2019 Biotronik dual-chamber    CKD: Stage 3a eGFR 45-59 mL/min    BMP          4/16/2025    00:14 4/17/2025    03:30 4/17/2025    13:06 4/21/2025    10:57   BMP   BUN 19 19 21    Creatinine 1.38  1.31   1.41    Sodium 137  138   140    Potassium 4.1  3.6  4.2  4.0    Chloride 100  104   106    CO2 17.2  21.2   22.1    Calcium 9.2  8.7   9.4        Lab Results   Component Value Date    EGFR 45.0 (L) 04/21/2025    EGFR 49.1 (L) 04/17/2025    EGFR 46.2 (L) 04/16/2025       Lab Results   Component Value Date    PROBNP 2,542.0 (H) 04/21/2025    PROBNP 7,120.0 (H) 04/15/2025    PROBNP 7,479.0 (H) 02/10/2025       Recent Vitals         4/17/2025 4/17/2025 4/21/2025       BP: -- -- 110/80     Pulse: 74 81 71     Weight: -- -- 86.6 kg (191 lb)     BMI (Calculated): -- -- 29.9              -CHF-specific BB:      Pre Visit Dose: Metoprolol succinate XL 25 mg PO daily    Post Visit Dose: Metoprolol succinate XL 25 mg PO daily (HR 71 bpm, /80 mmHg)     - Target Dose: Metoprolol succinate  mg PO daily.     - Goal HR of 50s to 60s.     - Patient should be seen every 10 to 14 days for a pulse check with plans for up-titration until target heart rate is achieved.        -ACE/ARB/ARNI:     Pre Visit Dose: Sacubitril/valsartan 24/26 mg BID - Patient reports she has been taking this but no fill history was found. Patient unable to say which pharmacy she got them from but then told ALEX Lowry that she obtained this via samples.    Post Visit Dose: Sacubitril/valsartan 24/26 mg BID (/80 mmHg, SCr 1.41 mg/dL)    - Target Dose: Sacubitril/valsartan 97/103 mg PO BID    - Patient should have a follow-up appointment every 2 to 4 weeks for a hemodynamic check with possible  up-titration to target dose.         -SGLT2 inhibitor therapy:    Pre Visit Dose: None - However patient reports she has dapagliflozin supply at home but has currently not been taking. Patient unable to say whether supply at home is 5 mg or 10 mg tablets. Told ALEX Lowry that she obtained this via samples as well.    Post Visit Dose:  Instructed patient to resume dapagliflozin from home supply and will be pursuing patient assistance for future fills (see Patient Assistance section).    - Target Dose: Dapagliflozin 10 mg PO daily : CrCl > 20 mL/min which has shown benefit in patients with HF    -DIURETIC:     Pre Visit Dose: Torsemide (unsure of dose or frequency) - Patient reports she has been taking this at home but also no fill history was found for this. Unable to say which pharmacy this was obtained from.    Post Visit Dose:  Per documentation during hospitalization by Dr. Garrido, diuretic dosing to be determined by Nephrology.      -MRA:     Pre Visit Dose: None    Post Visit Dose: None    - Target Dose:  N/A    - K is < 5 mEq/L and SCr is </= 2 mg/dL in female and eGFR > 30 mL/min/1.73m3    Lab Results   Component Value Date    K 4.0 04/21/2025       Lab Results   Component Value Date    CREATININE 1.41 (H) 04/21/2025         -MAGNESIUM:     Mag is > 2 mg/dL    Lab Results   Component Value Date    MG 2.3 04/21/2025    MG 2.1 04/16/2025    MG 2.5 02/18/2025       Pre Visit Dose:  None    Post Visit Dose: None      -ANTICOAGULATION:     None    -OTHER CV MEDS:     Pre Visit Dose: ASA 81 mg PO daily, atorvastatin 80 mg PO QHS, clopidogrel 75 mg PO daily    Post Visit Dose: No changes to above    -Clinic Administered Medications:     None         Patient Assistance:      Applications for [place] and [employer] patient assistance completed and signed today by patient. [place] application submitted today via fax. [employer] application placed in locked cabinet in HF Clinic and patient was informed proof of income would  need to be brought to HF Clinic prior to application submission.           PLAN:  Initiation/Discontinuation/Dose Adjustment: No medication changes at this time given financial constraints (no longer has prescription insurance (only Medicare A/B), awaiting specialist recommendations (Nephrology for diuretics).  Education provided: None by pharmacist today  Coordination of Care: See Patient Assistance section  Refills: N/A      Thank you for allowing me to participate in the care of your patient.    Eliza Blackwell, [place]  [facility] Heart Failure Clinic  04/21/25  13:46 EDT

## 2025-04-21 NOTE — ADDENDUM NOTE
Encounter addended by: Eliza Blackwell PharmD on: 4/21/2025 1:45 PM   Actions taken: Order Reconciliation Section accessed

## 2025-04-22 ENCOUNTER — READMISSION MANAGEMENT (OUTPATIENT)
Dept: CALL CENTER | Facility: HOSPITAL | Age: 50
End: 2025-04-22
Payer: MEDICARE

## 2025-04-22 ENCOUNTER — TELEPHONE (OUTPATIENT)
Facility: HOSPITAL | Age: 50
End: 2025-04-22
Payer: MEDICARE

## 2025-04-22 ENCOUNTER — APPOINTMENT (OUTPATIENT)
Facility: HOSPITAL | Age: 50
DRG: 321 | End: 2025-04-22
Payer: MEDICARE

## 2025-04-22 NOTE — OUTREACH NOTE
AMI Week 1 Survey      Flowsheet Row Responses   [facility] facility patient discharged from? [facility]   Does the patient have one of the following disease processes/diagnoses(primary or secondary)? Acute MI (STEMI,NSTEMI)   Week 1 attempt successful? No   Unsuccessful attempts Attempt 1            LIGIA PAGE - Registered Nurse

## 2025-04-22 NOTE — TELEPHONE ENCOUNTER
Heart Failure Clinic: [facility]  Clinical Outreach     Rafael Nunes is a 50 y.o. female and is a patient of the [facility] heart failure clinic.     Attempted to contact patient to let her know that she was approved for free Farxiga through [place] [employer] [place] program through 12/31/2025. Should receive shipment in 10 - 14 business days.   Left Voicemail.      Allie Scott, PharmD  Ambulatory Care Clinical Pharmacist  4/22/2025  16:27 EDT

## 2025-04-23 LAB
QT INTERVAL: 418 MS
QTC INTERVAL: 493 MS

## 2025-05-05 ENCOUNTER — OFFICE VISIT (OUTPATIENT)
Dept: CARDIOLOGY | Facility: CLINIC | Age: 50
End: 2025-05-05
Payer: MEDICARE

## 2025-05-05 VITALS
DIASTOLIC BLOOD PRESSURE: 87 MMHG | HEIGHT: 67 IN | SYSTOLIC BLOOD PRESSURE: 118 MMHG | OXYGEN SATURATION: 98 % | WEIGHT: 195 LBS | HEART RATE: 89 BPM | BODY MASS INDEX: 30.61 KG/M2

## 2025-05-05 DIAGNOSIS — Z95.1 S/P CABG X 3: ICD-10-CM

## 2025-05-05 DIAGNOSIS — Z95.2 S/P AVR (AORTIC VALVE REPLACEMENT): ICD-10-CM

## 2025-05-05 DIAGNOSIS — R06.09 DYSPNEA ON EXERTION: ICD-10-CM

## 2025-05-05 DIAGNOSIS — I10 ESSENTIAL HYPERTENSION: Chronic | ICD-10-CM

## 2025-05-05 DIAGNOSIS — Z95.810 ICD (IMPLANTABLE CARDIOVERTER-DEFIBRILLATOR), DUAL, IN SITU: Chronic | ICD-10-CM

## 2025-05-05 DIAGNOSIS — I50.42 CHRONIC COMBINED SYSTOLIC AND DIASTOLIC HEART FAILURE: Primary | Chronic | ICD-10-CM

## 2025-05-05 PROCEDURE — 99214 OFFICE O/P EST MOD 30 MIN: CPT | Performed by: NURSE PRACTITIONER

## 2025-05-05 PROCEDURE — 1159F MED LIST DOCD IN RCRD: CPT | Performed by: NURSE PRACTITIONER

## 2025-05-05 PROCEDURE — 3074F SYST BP LT 130 MM HG: CPT | Performed by: NURSE PRACTITIONER

## 2025-05-05 PROCEDURE — G2211 COMPLEX E/M VISIT ADD ON: HCPCS | Performed by: NURSE PRACTITIONER

## 2025-05-05 PROCEDURE — 1160F RVW MEDS BY RX/DR IN RCRD: CPT | Performed by: NURSE PRACTITIONER

## 2025-05-05 PROCEDURE — 3079F DIAST BP 80-89 MM HG: CPT | Performed by: NURSE PRACTITIONER

## 2025-05-05 RX ORDER — TORSEMIDE 5 MG/1
5 TABLET ORAL DAILY
COMMUNITY
Start: 2025-05-05

## 2025-05-05 NOTE — PROGRESS NOTES
[facility] CARDIOLOGY      REASON FOR FOLLOW-UP:  Follow-up heart cath          Chief Complaint   Patient presents with    Heart Problem         Dear Phani Adames MD        History of Present Illness   It was my pleasure to see Rafael Tanya in the office today.  She is a 50 y.o. female with a past medical history of of CAD status post CABG x 3, AVR, ICD in situ, HFrEF, hypertension, hyperlipidemia, diabetes type 2, CKD IIIa, hypothyroidism, COPD.  Patient was transferred to [facility] from [facility] on 4/15/2025 with chest pain and shortness of breath. Patient reported she has been experiencing shortness of breath for the past two days.  underwent C with successful PCI with ANNELIESE SVG-RCA-OMG. Residual distal LAD disease too small for ANNELIESE. Severe LV dysfunction of 20% EF. Optimized medically after procedure for her heart failure and post stent care before discharge home with followup.  She presents today in follow-up from that procedure.    Today, the patient reports that she believes she is retaining fluid.  She does not develop lower extremity edema, however abdominal distention and swelling in her face.  She has had 4 pound weight gain since discharge from the hospital.  She denies any drake chest pain, pressure or tightness.  No palpitations, dizziness or lightheadedness.  She does weigh herself daily and reports compliance with her medications.  She is not currently on replacement prescriptive potassium, but takes over-the-counter potassium replacement until her insurance is able to pay for the prescription.      ASSESSMENT:  Severe multivessel coronary artery disease  History of prior CABG  Chronic systolic heart failure  Ischemic cardiomyopathy  Chronic renal insufficiency  Diabetes mellitus 2  Thyroid disorder  Valvular heart disease      PLAN:  Patient may take an extra torsemide x 2 days.  Continue daily weights.  Continue to decrease sodium  intake, monitor fluid intake.  Continue current CV plan of care to include aspirin, statin, Plavix, BB, Entresto.  Surveillance labs per primary care.  Follow-up with primary cardiologist in 3 months or sooner if needed        CHF Guideline Directed Medical Therapy  Beta Blocker:   ARNI/ACE/ARB:   SGLT 2 inhibitors:   Diuretics:   Aldosterone Antagonist:   Vasodilators & Nitrates:       Diagnoses and all orders for this visit:    1. Chronic combined systolic and diastolic heart failure (HFrEF) (Primary)  Overview:  (TTE 2/10/25) LVEF 38% with grade 2 diastolic dysfunction,   s/p 2019 Biotronik dual-chamber ICD      2. ICD (implantable cardioverter-defibrillator), dual, in situ    3. Essential hypertension    4. S/P AVR (aortic valve replacement)    5. S/P CABG x 3    6. Dyspnea on exertion          The following portions of the patient's history were reviewed and updated as appropriate: allergies, current medications, past family history, past medical history, past social history, past surgical history, and problem list.    REVIEW OF SYSTEMS:    Review of Systems   Cardiovascular:  Positive for dyspnea on exertion.   All other systems reviewed and are negative.      Vitals:    05/05/25 1445   BP: 118/87   Pulse: 89   SpO2: 98%         PHYSICAL EXAM:    General: Alert, cooperative, no distress, appears stated age  Head:  Normocephalic, atraumatic, mucous membranes moist  Eyes:  Conjunctiva/corneas clear, EOM's intact     Neck:  Supple,  no JVD or bruit     Lungs: Clear to auscultation bilaterally, no wheezes rhonchi rales are noted  Chest wall: No tenderness  Musculoskeletal:   Ambulates freely without assistance  Heart::  Regular rate and rhythm, S1 and S2 normal, 1/6 murmur at the base  Abdomen: Soft, non-tender, nondistended, bowel sounds active, no abdominal bruit  Extremities: No cyanosis, clubbing, or edema   Pulses: 2+ and symmetric all extremities  Skin:  No rashes or lesions  Neuro/psych: A&O x3. CN II  through XII are grossly intact with appropriate affect        Past Medical History:   Diagnosis Date    Arrhythmia     ASCAD 12/22/2019    Asthma     CAD (coronary artery disease)     Cardiomyopathy, dilated     Chronic systolic congestive heart failure     CKD (chronic kidney disease) stage 2, GFR 60-89 ml/min     COPD (chronic obstructive pulmonary disease)     Essential hypertension     GERD without esophagitis     Hidradenitis 10/26/2020    Hyperlipidemia     Hypothyroidism (acquired)     ICD (implantable cardioverter-defibrillator), dual, in situ 07/22/2019    Nonrheumatic aortic valve insufficiency     Nonrheumatic mitral valve regurgitation     S/P AVR (aortic valve replacement)     S/P CABG x 3     Type 2 diabetes mellitus without complication, without long-term current use of insulin        Past Surgical History:   Procedure Laterality Date    AORTIC VALVE REPAIR/REPLACEMENT N/A 12/27/2019    Procedure: AORTIC VALVE REPAIR/REPLACEMENT;  Surgeon: Lane Stock MD;  Location: [facility] CVOR;  Service: Cardiothoracic    BREAST LUMPECTOMY Right     BENIGN    CARDIAC CATHETERIZATION N/A 12/24/2019    Procedure: Right and Left Heart Cath 12/24/19 @ 0900;  Surgeon: Wayne Luna MD;  Location: [facility] CATH INVASIVE LOCATION;  Service: Cardiovascular    CARDIAC CATHETERIZATION N/A 12/24/2019    Procedure: Coronary angiography;  Surgeon: Wayne Luna MD;  Location: [facility] CATH INVASIVE LOCATION;  Service: Cardiovascular    CARDIAC CATHETERIZATION N/A 11/10/2020    Procedure: Left and right Heart Cath;  Surgeon: Wayne Luna MD;  Location: [facility] CATH INVASIVE LOCATION;  Service: Cardiovascular;  Laterality: N/A;    CARDIAC CATHETERIZATION N/A 11/10/2020    Procedure: Coronary angiography;  Surgeon: Wayne Luna MD;  Location: [facility] CATH INVASIVE LOCATION;  Service: Cardiovascular;  Laterality: N/A;    CARDIAC CATHETERIZATION N/A 11/10/2020    Procedure: Right Heart Cath;   Surgeon: Wayne Luna MD;  Location: [facility] CATH INVASIVE LOCATION;  Service: Cardiovascular;  Laterality: N/A;    CARDIAC CATHETERIZATION N/A 11/25/2020    Procedure: Percutaneous coronary intervention of the left circumflex artery;  Surgeon: Wayne Luna MD;  Location: [facility] CATH INVASIVE LOCATION;  Service: Cardiovascular;  Laterality: N/A;    CARDIAC CATHETERIZATION N/A 1/22/2021    Procedure: LEFT HEART CATH with possible PCI;  Surgeon: Wayne Luna MD;  Location: [facility] CATH INVASIVE LOCATION;  Service: Cardiovascular;  Laterality: N/A;  Local and IV sedation    CARDIAC CATHETERIZATION N/A 1/22/2021    Procedure: Coronary angiography;  Surgeon: Wayne Luna MD;  Location: [facility] CATH INVASIVE LOCATION;  Service: Cardiovascular;  Laterality: N/A;    CARDIAC CATHETERIZATION N/A 1/22/2021    Procedure: Saphenous Vein Graft;  Surgeon: Wayne Luna MD;  Location: [facility] CATH INVASIVE LOCATION;  Service: Cardiovascular;  Laterality: N/A;    CARDIAC CATHETERIZATION N/A 4/16/2025    Procedure: Left Heart Cath;  Surgeon: Marcello Garrido DO;  Location: [facility] CATH INVASIVE LOCATION;  Service: Cardiovascular;  Laterality: N/A;    CARDIAC ELECTROPHYSIOLOGY PROCEDURE Left 6/28/2019    Procedure: Dual-chamber ICD insertion;  Surgeon: Héctor Eckert MD;  Location: [facility] CATH INVASIVE LOCATION;  Service: Cardiovascular    CARDIAC ELECTROPHYSIOLOGY PROCEDURE Left 8/14/2019    Procedure: Lead Revision;  Surgeon: Héctor Eckert MD;  Location: [facility] CATH INVASIVE LOCATION;  Service: Cardiovascular    CARDIAC SURGERY      CHOLECYSTECTOMY      CORONARY ARTERY BYPASS GRAFT N/A 12/27/2019    Procedure: CORONARY ARTERY BYPASS GRAFTING;  Surgeon: Lane Stock MD;  Location: [facility] CVOR;  Service: Cardiothoracic    HYSTERECTOMY      INSERT / REPLACE / REMOVE PACEMAKER      LYMPHADENECTOMY Bilateral     THYROID SURGERY           Current Outpatient  "Medications:     aspirin 81 MG EC tablet, Take 1 tablet by mouth Daily., Disp: 90 tablet, Rfl: 0    atorvastatin (LIPITOR) 80 MG tablet, Take 1 tablet by mouth Every Night., Disp: 90 tablet, Rfl: 0    clopidogrel (PLAVIX) 75 MG tablet, Take 4 tablets by mouth Daily for 1 day, THEN 1 tablet Daily for 30 days., Disp: 30 tablet, Rfl: 0    levothyroxine (SYNTHROID, LEVOTHROID) 150 MCG tablet, Take 1 tablet by mouth Every Morning., Disp: , Rfl:     metoprolol succinate XL (TOPROL-XL) 25 MG 24 hr tablet, Take 1 tablet by mouth Daily., Disp: 30 tablet, Rfl: 0    oxyCODONE-acetaminophen (PERCOCET) 7.5-325 MG per tablet, Take 1 tablet by mouth Every 6 (Six) Hours As Needed for Moderate Pain., Disp: , Rfl:     sacubitril-valsartan (Entresto) 24-26 MG tablet, Take 1 tablet by mouth 2 (Two) Times a Day., Disp: , Rfl:     torsemide (DEMADEX) 5 MG tablet, Take 1 tablet by mouth Daily., Disp: , Rfl:     Allergies   Allergen Reactions    Hydrocodone Hives    Penicillin G Unknown (See Comments)     Reaction occurred as a baby.      Penicillin interview complete 2022.    Contrast Dye (Echo Or Unknown Ct/Mr) GI Intolerance     She is pretty sure it's the contrast dye that makes her super sick and vomiting after heart cath    Gadolinium Derivatives Itching    Varenicline Other (See Comments)     Encephalopathy       Family History   Problem Relation Age of Onset    Heart disease Father        Social History     Tobacco Use    Smoking status: Former     Current packs/day: 0.00     Types: Cigarettes     Quit date: 2019     Years since quittin.3    Smokeless tobacco: Never   Substance Use Topics    Alcohol use: No           Current Electrocardiogram:  Procedures        EMR Dragon/Transcription:   \"Dictated utilizing Dragon dictation\".     Copied text in this note has been reviewed by me and is accurate as of 25.    "

## 2025-05-07 ENCOUNTER — READMISSION MANAGEMENT (OUTPATIENT)
Dept: CALL CENTER | Facility: HOSPITAL | Age: 50
End: 2025-05-07
Payer: MEDICARE

## 2025-05-07 NOTE — OUTREACH NOTE
AMI Week 3 Survey      Flowsheet Row Responses   [facility] facility patient discharged from? [facility]   Does the patient have one of the following disease processes/diagnoses(primary or secondary)? Acute MI (STEMI,NSTEMI)   Week 3 attempt successful? No   Unsuccessful attempts Attempt 1            MICKI MANCINI - Registered Nurse

## 2025-05-12 ENCOUNTER — READMISSION MANAGEMENT (OUTPATIENT)
Dept: CALL CENTER | Facility: HOSPITAL | Age: 50
End: 2025-05-12
Payer: MEDICARE

## 2025-05-12 NOTE — OUTREACH NOTE
AMI Week 3 Survey      Flowsheet Row Responses   [facility] facility patient discharged from? [facility]   Does the patient have one of the following disease processes/diagnoses(primary or secondary)? Acute MI (STEMI,NSTEMI)   Week 3 attempt successful? No   Unsuccessful attempts Attempt 2   Revoke Other            Dunia H - Registered Nurse

## 2025-05-16 NOTE — PROGRESS NOTES
"Cardiology Heart Failure Clinic Note  Enoc \"Marquise\" Essence JOHNSON [facility]    Patient ID: Rafael Nunes  is a 50 y.o. female.    Encounter Date:05/19/2025    HPI:  50-year-old -American female patient of known to Dr. Luna, Dr. Eckert  &  with a past medical history of ischemic dilated cardiomyopathy [LVIDd 6.1] known combined systolic and diastolic heart failure (HFrEF) (TTE 2/10/25) LVEF 38% with grade 2 diastolic dysfunction, s/p 2019 Biotronik dual-chamber ICD, known  valvular heart disease  s/p 2019 AVRwith a mild aortic stenosis PAUL of 0.56 cm² peak mean PG 31.6 & 16 mmHg moderate mitral valve calcification with moderate to severe MR, stage III CKD, ASCAD h/o PCI\stents, s/p 2019 CABG x 3, noncompliance with medications secondary to affordability, COPD who smoked until 2019 known INDRA, GERD, mildly elevated LFTs,, hypothyroidism acquired metabolic syndrome with hypertension, dyslipidemia, ID T2DM.  Most recently admitted 4/15/2025 due to SOA was found to be having a multifactorial volume overload with a proBNP of 7120 with a chest x-ray with normal pulmonary vasculature comes in for her initial heart failure clinic visit 21 April 2025 since recent hospitalization has done relatively well from a volume standpoint, unfortunately she is been self-medicating herself with several of her old heart failure medications and had some residual Entresto she has been taking some residual Farxiga she has been taking as well as Demadex 40 mg twice daily.  Comes back in on 5/19/2025 since last seen in our heart failure clinic       Assessment:    Diagnoses and all orders for this visit:    1. Ischemic dilated cardiomyopathy (Primary)  Overview:  [LVIDd 6.1]   known combined systolic and diastolic heart failure (HFrEF)   (TTE 2/10/25) LVEF 38% with grade 2 diastolic dysfunction,   s/p 2019 Biotronik dual-chamber ICD    Orders:  -     proBNP; Standing    2. Chronic combined systolic and diastolic heart failure " (HFrEF)  Overview:  (TTE 2/10/25) LVEF 38% with grade 2 diastolic dysfunction,   s/p 2019 Biotronik dual-chamber ICD    Orders:  -     proBNP; Standing    3. VHD (valvular heart disease)  Overview:  A.  Unsure if it has rheumatic versus nonrheumatic   I. There is a notation of the mitral valve on echocardiography where it was thought to be rheumatic   II. There are multiple documentations of nonrheumatic valvular heart disease    B.  Aortic stenosis   I. s/p 2019 (bioprosthetic)  AVR   1. (TTE 2/10/2025) with a mild aortic stenosis                          a.  PAUL of 0.56 cm²      b. peak mean PG 31.6 & 16 mmHg   C.  moderate mitral valve calcification   I.   moderate to severe MR,      4. Stage 3b chronic kidney disease  -     Basic Metabolic Panel; Standing    5. At risk for fluid volume overload  Overview:  Systolic and diastolic HFrEF  Valvular heart disease  CKD 3B      6. ASCAD  Overview:   A.  h/o PCI\stents   I.   Most recent s/p 4/16/2025 PCI/ ANNELIESE SVG-RCA-OMB    II.  Patent SVG-DG with a 85% stenosis noted distal to the anastomosis             III.  Severe native vessel coronary artery disease             IV.  Residual distal/apical LAD disease that is too small for intervention   B. s/p 2019 CABG x 3      7. Pulmonary emphysema, unspecified emphysema type  Overview:  With smoking in 2019      8. Dyspnea on exertion  -     proBNP; Standing  -     Basic Metabolic Panel; Standing  -     Ambulatory Referral to Gastroenterology    9. h/o Noncompliance with medications 2/2 financial burden  -     Ambulatory Referral to Gastroenterology    10. Chronic pain syndrome    11. Hypothyroidism (acquired)    12. Intractable low back pain    13. GERD without esophagitis    14. Mild LFT elevation  Overview:  SGOT    Orders:  -     Ambulatory Referral to Gastroenterology    15. Metabolic syndrome X 4 of 4 components  Overview:  A.  Benign essential hypertension  B.  Mixed dyslipidemia  C.  Obesity with BMI at 32.6 kg/m²     I.  Resultant INDRA  D.  T2DM      16. INDRA (obstructive sleep apnea)    17. T2DM without  use of insulin    18. Dyspnea, unspecified type  -     proBNP; Standing  -     Basic Metabolic Panel; Standing    19. Mixed hyperlipidemia  -     Ambulatory Referral to Gastroenterology    Other orders  -     bumetanide (BUMEX) injection 1 mg  -     potassium chloride (KLOR-CON M20) CR tablet 20 mEq  -     metoprolol succinate XL (TOPROL-XL) 25 MG 24 hr tablet; Take 1 tablet by mouth Daily.  Dispense: 90 tablet; Refill: 2  -     sacubitril-valsartan (Entresto) 24-26 MG tablet; Take 1 tablet by mouth 2 (Two) Times a Day.  Dispense: 180 tablet; Refill: 2                Plan/discussion    Volume overload    Heart Failure Core Measures addressed    Type of Overload multifactorial  Systolic and diastolic HFrEF  Valvular heart disease  CKD 3      Most recent EF: (TTE 2/10/25) LVEF 38% with grade 2 diastolic dysfunction,     [place] Heart association Class & Stage : IIIc        HF Meds    were still unable to completely sort out what she is taking at home she is going to call and discussed later  We are initiating a patient assistance requests today for the Entresto and Farxiga    Beta Blocker: Toprol-XL 25 mg daily  ARNI/ACE/ARB: Entresto ?  Dosage twice daily  SGLT 2 inhibitors: Farxiga 10 mg daily  Diuretics: Demadex 40 mg twice daily  Furoscix: None due to finances  Aldosterone Antagonist: None due to renal function  Digitalis: N/A  Nitrates: N/A    Plavix 75 mg daily  EC ASA 81 mg daily  Lipitor 80 mg daily      Cardiac medicines reviewed with risk, benefits, and necessity of each discussed with myself and [facility].       _________________________________________________________    Discussion    On her initial visit she is already on heart failure core measures including beta-blocker, and questionably a as needed nitrate  Obviously would like to establish the pillars of heart failure  Issues and obtaining the GDMT domain her known  financial constraints & hypotension  Pharmacy team will work with patient   and see if they can get patient assistance for an SGLT2, WENDY (she is currently taking some old samples she had at the house)  Would like to add at some point a MRA inhibitor but certainly not until we can get the rest of her medications sorted out  There is no way to determine her exact medication she is taking  at home  Given her recent PCI\ANNELIESE will obviously need to continue DAPT given her ASCAD & is taking that ecasa & Plavix   She has a follow-up appointment with cardiology  She has a follow-up appointment with nephrology would obviously want nephrology to dose diuretics if possible  My clinic can certainly assist in preventing a readmission by coming in for diuresis should she become volume overloaded she was given my card and asked to call us in the event that occurs  Unfortunately 5/19/2025 she has missed that appointment thus far  She is asked for referral to an endocrinologist who we would defer her elevated TSH and A1c to their expertise  Given the presence of ascites and history of mildly elevated LFTs would make a referral to GI  5/19/2025 does appear to be both volume overloaded by discal exam and lab work with her proBNP increasing to 3373, her increased abdominal girth, and mildly diminished bases  would continue with typical heart failure nursing interventions including:        A. Fluid restriction at less than 2000 cc daily       B. Daily weights        C.  1 g low-sodium diet        I did the following activities:preparing for the visit, with Rafael Nunes  including reviewing tests, once pt arrived in clinic I also performed a medically appropriate examination and/or evaluation , I personally spent considerable time counseling and educating the patient/family/caregiver, ordering medications, tests, or procedures, referring and communicating with other health care professionals , and documenting information in the  medical record. I estimate including preparation 30 minutes             Subjective:     Chief Complaint   Patient presents with    Congestive Heart Failure       ROS:  Constitutional: + weakness, + fatigue continues if not slightly worsened from last visit no fever, rigors, chills   Eyes: No recent vision changes, eye pain   ENT/oropharynx: No recent difficulty swallowing, sore throat, epistaxis, changes in hearing   Cardiovascular: No recent chest pain, chest tightness, palpitations except as noted in HPI, paroxysmal nocturnal dyspnea, orthopnea, diaphoresis, dizziness & no pre or drake syncopal episodes   Respiratory: No real at rest shortness of breath, + dyspnea on exertion consistently needing less effort to attain, no significant productive cough, no wheezing and no hemoptysis   Gastrointestinal: + Bloating, no abdominal pain, nausea, vomiting, diarrhea, bloody stools   Genitourinary: No hematuria, dysuria other than increased frequency   Neurological: No headache, tremors, numbness,  or hemiparesis    Musculoskeletal: No change in typical cramps, myalgias, no new joint pain, joint swelling   Integument: No recent rash, without lower extremity edema      Patient Active Problem List   Diagnosis    COPD (chronic obstructive pulmonary disease)    Essential hypertension    ICD (implantable cardioverter-defibrillator), dual, in situ    GERD without esophagitis    Hypothyroidism (acquired)    ASCAD    S/P AVR (aortic valve replacement)    S/P CABG x 3    Dyspnea    Nonrheumatic mitral valve regurgitation    Cellulitis of left axilla    Cellulitis of right axilla    Hidradenitis    T2DM without  use of insulin    Hypokalemia    Dizziness with near syncope    Acute diarrhea    Mixed hyperlipidemia    INDRA (obstructive sleep apnea)    Chronic pain syndrome    Intractable low back pain    Elevated troponin    Pneumonia    NSTEMI (non-ST elevated myocardial infarction)    Total occlusion of coronary artery, chronic     Type 2 myocardial infarction    Ischemic dilated cardiomyopathy    Chronic combined systolic and diastolic heart failure (HFrEF)    VHD (valvular heart disease)    Stage 3b chronic kidney disease    At risk for fluid volume overload    h/o Noncompliance with medications 2/2 financial burden    Mild LFT elevation    Metabolic syndrome X 4 of 4 components       Past Medical History:   Diagnosis Date    Arrhythmia     ASCAD 12/22/2019    Asthma     CAD (coronary artery disease)     Cardiomyopathy, dilated     Chronic systolic congestive heart failure     CKD (chronic kidney disease) stage 2, GFR 60-89 ml/min     COPD (chronic obstructive pulmonary disease)     Essential hypertension     GERD without esophagitis     Hidradenitis 10/26/2020    Hyperlipidemia     Hypothyroidism (acquired)     ICD (implantable cardioverter-defibrillator), dual, in situ 07/22/2019    Nonrheumatic aortic valve insufficiency     Nonrheumatic mitral valve regurgitation     S/P AVR (aortic valve replacement)     S/P CABG x 3     Type 2 diabetes mellitus without complication, without long-term current use of insulin        Past Surgical History:   Procedure Laterality Date    AORTIC VALVE REPAIR/REPLACEMENT N/A 12/27/2019    Procedure: AORTIC VALVE REPAIR/REPLACEMENT;  Surgeon: Lane Stock MD;  Location: [facility];  Service: Cardiothoracic    BREAST LUMPECTOMY Right     BENIGN    CARDIAC CATHETERIZATION N/A 12/24/2019    Procedure: Right and Left Heart Cath 12/24/19 @ 0900;  Surgeon: Wayne Luna MD;  Location: [facility] CATH INVASIVE LOCATION;  Service: Cardiovascular    CARDIAC CATHETERIZATION N/A 12/24/2019    Procedure: Coronary angiography;  Surgeon: Wayne Luna MD;  Location: [facility] CATH INVASIVE LOCATION;  Service: Cardiovascular    CARDIAC CATHETERIZATION N/A 11/10/2020    Procedure: Left and right Heart Cath;  Surgeon: Wayne Luna MD;  Location: [facility] CATH INVASIVE LOCATION;  Service:  Cardiovascular;  Laterality: N/A;    CARDIAC CATHETERIZATION N/A 11/10/2020    Procedure: Coronary angiography;  Surgeon: Wayne Luna MD;  Location: [facility] CATH INVASIVE LOCATION;  Service: Cardiovascular;  Laterality: N/A;    CARDIAC CATHETERIZATION N/A 11/10/2020    Procedure: Right Heart Cath;  Surgeon: Wayne Luna MD;  Location: [facility] CATH INVASIVE LOCATION;  Service: Cardiovascular;  Laterality: N/A;    CARDIAC CATHETERIZATION N/A 11/25/2020    Procedure: Percutaneous coronary intervention of the left circumflex artery;  Surgeon: Wayne Luna MD;  Location: [facility] CATH INVASIVE LOCATION;  Service: Cardiovascular;  Laterality: N/A;    CARDIAC CATHETERIZATION N/A 1/22/2021    Procedure: LEFT HEART CATH with possible PCI;  Surgeon: Wayne Luna MD;  Location: [facility] CATH INVASIVE LOCATION;  Service: Cardiovascular;  Laterality: N/A;  Local and IV sedation    CARDIAC CATHETERIZATION N/A 1/22/2021    Procedure: Coronary angiography;  Surgeon: Wayne Luna MD;  Location: [facility] CATH INVASIVE LOCATION;  Service: Cardiovascular;  Laterality: N/A;    CARDIAC CATHETERIZATION N/A 1/22/2021    Procedure: Saphenous Vein Graft;  Surgeon: Wayne Luna MD;  Location: [facility] CATH INVASIVE LOCATION;  Service: Cardiovascular;  Laterality: N/A;    CARDIAC CATHETERIZATION N/A 4/16/2025    Procedure: Left Heart Cath;  Surgeon: Marcello Garrido DO;  Location: [facility] CATH INVASIVE LOCATION;  Service: Cardiovascular;  Laterality: N/A;    CARDIAC ELECTROPHYSIOLOGY PROCEDURE Left 6/28/2019    Procedure: Dual-chamber ICD insertion;  Surgeon: Héctor Eckert MD;  Location: [facility] CATH INVASIVE LOCATION;  Service: Cardiovascular    CARDIAC ELECTROPHYSIOLOGY PROCEDURE Left 8/14/2019    Procedure: Lead Revision;  Surgeon: Héctor Eckert MD;  Location: [facility] CATH INVASIVE LOCATION;  Service: Cardiovascular    CARDIAC SURGERY      CHOLECYSTECTOMY       CORONARY ARTERY BYPASS GRAFT N/A 2019    Procedure: CORONARY ARTERY BYPASS GRAFTING;  Surgeon: Lane Stock MD;  Location: [facility];  Service: Cardiothoracic    HYSTERECTOMY      INSERT / REPLACE / REMOVE PACEMAKER      LYMPHADENECTOMY Bilateral     THYROID SURGERY         Social History     Socioeconomic History    Marital status:    Tobacco Use    Smoking status: Former     Current packs/day: 0.00     Types: Cigarettes     Quit date: 2019     Years since quittin.3    Smokeless tobacco: Never   Vaping Use    Vaping status: Never Used   Substance and Sexual Activity    Alcohol use: No    Drug use: No    Sexual activity: Defer       Allergies   Allergen Reactions    Hydrocodone Hives    Penicillin G Unknown (See Comments)     Reaction occurred as a baby.      Penicillin interview complete 2022.    Contrast Dye (Echo Or Unknown Ct/Mr) GI Intolerance     She is pretty sure it's the contrast dye that makes her super sick and vomiting after heart cath    Gadolinium Derivatives Itching    Varenicline Other (See Comments)     Encephalopathy         Current Outpatient Medications:     aspirin 81 MG EC tablet, Take 1 tablet by mouth Daily., Disp: 90 tablet, Rfl: 0    atorvastatin (LIPITOR) 80 MG tablet, Take 1 tablet by mouth Every Night., Disp: 90 tablet, Rfl: 0    clopidogrel (PLAVIX) 75 MG tablet, Take 4 tablets by mouth Daily for 1 day, THEN 1 tablet Daily for 30 days., Disp: 30 tablet, Rfl: 0    levothyroxine (SYNTHROID, LEVOTHROID) 200 MCG tablet, Take 1 tablet by mouth Every Morning., Disp: , Rfl:     metoprolol succinate XL (TOPROL-XL) 25 MG 24 hr tablet, Take 1 tablet by mouth Daily., Disp: 90 tablet, Rfl: 2    oxyCODONE-acetaminophen (PERCOCET) 7.5-325 MG per tablet, Take 1 tablet by mouth Every 6 (Six) Hours As Needed for Moderate Pain., Disp: , Rfl:     sacubitril-valsartan (Entresto) 24-26 MG tablet, Take 1 tablet by mouth 2 (Two) Times a Day., Disp: 180 tablet, Rfl: 2     torsemide (DEMADEX) 20 MG tablet, Take 1 tablet by mouth Daily., Disp: , Rfl:   No current facility-administered medications for this encounter.    Immunization History   Administered Date(s) Administered    COVID-19 (MODERNA) 1st,2nd,3rd Dose Monovalent 12/29/2021, 01/26/2022    Fluzone (or Fluarix & Flulaval for VFC) >6mos 03/02/2021       Most recent EKG as reviewed:  Procedures     Most recent echo as reviewed:  Results for orders placed during the hospital encounter of 02/10/25    Adult Transthoracic Echo Complete W/ Cont if Necessary Per Protocol    Interpretation Summary    Left ventricular systolic function is moderately decreased. Estimated left ventricular EF = 38%    The left ventricular cavity is mild to moderately dilated.    Left ventricular wall thickness is consistent with mild to moderate eccentric hypertrophy.    Left ventricular diastolic function is consistent with (grade II w/high LAP) pseudonormalization and age.    The right ventricular cavity is mildly dilated.    The left atrial cavity is moderate to severely dilated.    The right atrial cavity is borderline dilated.    Mild aortic valve stenosis is present.    Abnormal mitral valve structure consistent with dilated annulus.    Moderate to severe mitral valve regurgitation is present.    Estimated right ventricular systolic pressure from tricuspid regurgitation is normal (<35 mmHg).    Transthoracic echocardiography reveals dilated LV with eccentric LVH with EF between 36 to 40%  Severe left atrial enlargement and the mitral valve apparatus calcified and appears rheumatic without mitral stenosis and moderate to severe MR directed laterally and posteriorly  Aortic valve is calcified and not well-visualized with a mean gradient of 16 mmHg.  Mild TR without pulmonary hypertension  No effusion      Electronically signed by Héctor Eckert MD, 02/12/25, 1:19 PM EST.      Most recent stress test results:  Results for orders placed during the  hospital encounter of 20    Treadmill Stress Test - Rehab Protocol    Interpretation Summary  Stress portion of this exam was supervised by: Ursula Ann NP      Most recent cardiac catheterization results:  Results for orders placed during the hospital encounter of 04/15/25    Cardiac Catheterization/Vascular Study    Conclusion  Table formatting from the original result was not included.  Cardiac Catheterization with PCI Report  PATIENT IDENTIFICATION  Name: Rafael Nunes  Age: 50 y.o. Sex: female : 1975  MRN: [id number]    Date: 2025  PCP: @PCP@    Requesting Provider  [unfilled]    She underwent cardiac catheterization left heart catheterization with selective coronary angiography, left ventriculography, saphenous vein graft angiography, LIMA angiography, PCI ANNELIESE SVG-RCA-OM, intracoronary nitroglycerin administration, postintervention angiography x 4.    Indications for the procedure include: acute coronary syndrome.    Procedure Details:  The risks, benefits, complications, treatment options, and expected outcomes were discussed with the patient. The patient and/or family concurred with the proposed plan, giving informed consent.    After informed consent the patient was brought to the cath lab after appropriate IV hydration was begun and oral premedication was given. She was further sedated with fentanyl, midazolam, and Dilaudid . She was prepped and draped in the usual manner. Using the modified Seldinger access technique and a micropuncture kit, a 6 [place] sheath was placed in the femoral artery. A left heart catheterization with coronary arteriography was performed. Findings are discussed below.    The decision was made to proceed with intervention. She received Heparin as per protocol. The details of the intervention are as follows:    A 6 [place] multipurpose guide with sideholes was placed into the ostium of the target saphenous vein graft.  A filter wire was placed with  the basket just proximal to the anastomosis of the RCA.  The lesion was pretreated with a 3.0 x 25 NC trek.  The lesion was then treated with a 3.25 x 38 Xience.  Intracoronary nitroglycerin was administered to relieve vasospasm.  Postintervention angiography demonstrated an excellent result with PAUL-3 flow and no evidence of dissection or thrombus following PCI.    After the procedure was completed, sedation was stopped and the sheaths and catheters were all removed according to established hospital protocols.    Conscious sedation:  Conscious sedation was performed according to protocol.  I supervised and directed an independent trained observer with the assistance of monitoring the patient's level of consciousness and physiologic status throughout the procedure.  Intraoperative service time was 85 minutes.    Findings:    Hemodynamics LVEDP: 26  LV: 118/9  Ao: 117/80  Left Main No significant disease  RCA Diffusely diseased throughout the entirety of the proximal mid and distal RCA with multiple islands of 99% stenosis  LAD Luminal regularities in the proximal and midportion.  Distal/apical LAD is diffusely diseased with multiple areas of 70 to 90% stenosis.  Small vessel caliber at this area is prohibitive of intervention.  Circ 70 to 75% proximal  99% mid prior to the bifurcation of the first decent sized obtuse marginal.  This obtuse marginal branch has a stent noted in the proximal to midportion that is patent with only luminal regularities noted within the stent.  There is a 60 to 70% stenosis noted distal to the stent.  Vessel caliber is of questionable size for intervention.  There is competitive flow noted beyond the bifurcation of this obtuse marginal branch.  The remainder of this vessel fills via the saphenous vein graft to this vessel.  SVG(s) SVG-RCA-OMB: 99% stenosis in the midportion of the graft to the RCA with PAUL II flow noted in the distal vasculature.  SVG-DG: Patent with a 85% stenosis  noted distal to the anastomosis.  Small vessel caliber is prohibitive for intervention  LORI No disease and not utilized  LV Severe global LV systolic dysfunction with LVEF 20%  Coronary dominance Codominant with circumflex    Interventions/Vessels PCI ANNELIESE SVG-RCA-OM  Guides/Wires 6 [place] multipurpose guide with sideholes  Filter wire  Devices 3.0 x 25 NC trek  3.25 x 38 Xience  Post % Stenosis 0%  Pre-procedure PAUL flow 2  Post procedure PAUL flow 3  Closure Device Not applicable  Complications None immediate    Estimated Blood Loss:  Minimal    Specimens: None    Complications:  None; patient tolerated the procedure well.    Disposition: PACU - hemodynamically stable    Condition: stable    Impressions:  Successful PCI ANNELIESE SVG-RCA-OMB  Patent SVG-DG with a 85% stenosis noted distal to the anastomosis  Severe native vessel coronary artery disease  Residual distal/apical LAD disease that is too small for intervention  Severe global LV systolic dysfunction LVEF 20%    Recommendations:  Dual antiplatelet therapy consisting of aspirin and ticagrelor  Medical therapy and risk factor modification for residual disease        Imaging:    Results for orders placed during the hospital encounter of 04/15/25    XR Chest 2 View    Narrative  XR CHEST 2 VW    Date of Exam: 4/15/2025 12:17 AM EDT    Indication: SOA Triage Protocol    Comparison: 2/10/2025.    Findings:  There are no airspace consolidations. No pleural fluid. No pneumothorax. The pulmonary vasculature appears within normal limits. Heart appears enlarged, stable. Stable left subclavian AICD/pacemaker device. Median sternotomy wires are present.. No acute  osseous abnormality identified.    Impression  Impression:  No acute cardiopulmonary process.      Electronically Signed: Althea Valladares MD  4/15/2025 12:29 AM EDT  Workstation ID: [id number]       Results for orders placed during the hospital encounter of 03/22/24    CT Head Without Contrast    Narrative  CT HEAD WO  CONTRAST    Date of Exam: 3/22/2024 1:37 PM EDT    Indication: right arm numbness, weakness; hypertensive.    Comparison: None available.    Technique: Axial CT images were obtained of the head without contrast administration.  Coronal reconstructions were performed.  Automated exposure control and iterative reconstruction methods were used.      Findings:  Ventricles are normal in size and shape and are midline. There is no mass effect or edema. There is no CVA or hemorrhage. Visualized portions of paranasal sinuses are clear. Mastoid air cells are clear.    Impression  Impression:  Negative.      Electronically Signed: Martha Browne MD  3/22/2024 1:52 PM EDT  Workstation ID: [id number]      Results for orders placed during the hospital encounter of 03/22/24    CT Head Without Contrast    Narrative  CT HEAD WO CONTRAST    Date of Exam: 3/22/2024 1:37 PM EDT    Indication: right arm numbness, weakness; hypertensive.    Comparison: None available.    Technique: Axial CT images were obtained of the head without contrast administration.  Coronal reconstructions were performed.  Automated exposure control and iterative reconstruction methods were used.      Findings:  Ventricles are normal in size and shape and are midline. There is no mass effect or edema. There is no CVA or hemorrhage. Visualized portions of paranasal sinuses are clear. Mastoid air cells are clear.    Impression  Impression:  Negative.      Electronically Signed: Martha Browne MD  3/22/2024 1:52 PM EDT  Workstation ID: [id number]      Historical data copied forward from previous encounters in EMR including the history, exam, and assessment/plan has been reviewed and is unchanged except as I have noted and otherwise indicated.      Objective:         /90   Pulse 73   Resp 18   Wt 86.4 kg (190 lb 6.4 oz)   LMP  (LMP Unknown)   SpO2 96%   BMI 29.82 kg/m²     Physical Examination    General:  Well-developed, [place] well-nourished, cooperative,  no acute distress, appears stated age   Neuro:  A&O x3. No significantly obvious focal neuro deficet    Psych:  Pleasant yet somewhat flattened affect    HENT:  Normocephalic, atraumatic, moist mucous membranes , Normal ear placement,Throat not injected   Eyes:  PERRLA, Conjunctivae not injected, EOM's intact, conjunctiva does not appear significantly injected   Neck:  Supple, Mildly thickened, no lymph adenopathy nor thyromegaly no obvious JVD or bruit    Lungs:    Symmetrical rise & fall of chest with baseline respiratory pattern. To auscultation lungs are Clear bilaterally, faint late phase expiratory wheezes, no rhonchi or rales are noted, bases bilaterally are mildly diminished   Chest wall:  No significant tenderness when palpated. PMI @ 6th ICS MAL    Heart:  Regular rate and rhythm, S1 and S2 normal, no S3 or S4, Gr I/VI systolic ejection murmur best heard at the apex , no obvious rub, click or gallop.    Abdomen:  distended, with likely ascites, non-tender, bowel sounds noted in the 4 quadrants of the abdomen, significant central abdominal adipose tissue is identified versus ascites   Extremities:  Equal color motion temperature and sensitivity, Rapid capillary refill noted within the nailbeds of fingers and toes bilaterally without evidence of any lower extremity edema.    Pulses:  2+ and symmetric all extremities, rapid capillary refill    Skin:  No obvious rashes, significant lesions identified, warm dry and of normal turgor      In-Office Procedure(s):  No orders to display        Lab Review:   Lab on 05/19/2025   Component Date Value    proBNP 05/19/2025 3,373.0 (H)     Glucose 05/19/2025 90     BUN 05/19/2025 17     Creatinine 05/19/2025 1.40 (H)     Sodium 05/19/2025 139     Potassium 05/19/2025 3.6     Chloride 05/19/2025 106     CO2 05/19/2025 24.0     Calcium 05/19/2025 9.6     BUN/Creatinine Ratio 05/19/2025 12.1     Anion Gap 05/19/2025 9.0     eGFR 05/19/2025 45.4 (L)    Hospital Outpatient  Visit on 04/21/2025   Component Date Value    QT Interval 04/21/2025 426     QTC Interval 04/21/2025 469    Hospital Outpatient Visit on 04/21/2025   Component Date Value    Magnesium 04/21/2025 2.3     proBNP 04/21/2025 2,542.0 (H)     Glucose 04/21/2025 95     BUN 04/21/2025 21 (H)     Creatinine 04/21/2025 1.41 (H)     Sodium 04/21/2025 140     Potassium 04/21/2025 4.0     Chloride 04/21/2025 106     CO2 04/21/2025 22.1     Calcium 04/21/2025 9.4     BUN/Creatinine Ratio 04/21/2025 14.9     Anion Gap 04/21/2025 11.9     eGFR 04/21/2025 45.0 (L)     TSH 04/21/2025 8.280 (H)     Hemoglobin A1C 04/21/2025 5.95 (H)     WBC 04/21/2025 7.21     RBC 04/21/2025 4.45     Hemoglobin 04/21/2025 12.4     Hematocrit 04/21/2025 40.3     MCV 04/21/2025 90.6     MCH 04/21/2025 27.9     MCHC 04/21/2025 30.8 (L)     RDW 04/21/2025 14.5     RDW-SD 04/21/2025 47.4     MPV 04/21/2025 9.6     Platelets 04/21/2025 259     Neutrophil % 04/21/2025 61.2     Lymphocyte % 04/21/2025 27.2     Monocyte % 04/21/2025 9.3     Eosinophil % 04/21/2025 1.5     Basophil % 04/21/2025 0.4     Immature Grans % 04/21/2025 0.4     Neutrophils, Absolute 04/21/2025 4.41     Lymphocytes, Absolute 04/21/2025 1.96     Monocytes, Absolute 04/21/2025 0.67     Eosinophils, Absolute 04/21/2025 0.11     Basophils, Absolute 04/21/2025 0.03     Immature Grans, Absolute 04/21/2025 0.03     nRBC 04/21/2025 0.0    Admission on 04/15/2025, Discharged on 04/17/2025   Component Date Value    Glucose 04/15/2025 131 (H)     BUN 04/15/2025 19     Creatinine 04/15/2025 1.52 (H)     Sodium 04/15/2025 140     Potassium 04/15/2025 3.9     Chloride 04/15/2025 102     CO2 04/15/2025 25.2     Calcium 04/15/2025 9.5     Total Protein 04/15/2025 8.0     Albumin 04/15/2025 4.3     ALT (SGPT) 04/15/2025 32     AST (SGOT) 04/15/2025 57 (H)     Alkaline Phosphatase 04/15/2025 95     Total Bilirubin 04/15/2025 1.0     Globulin 04/15/2025 3.7     A/G Ratio 04/15/2025 1.2     BUN/Creatinine  Ratio 04/15/2025 12.5     Anion Gap 04/15/2025 12.8     eGFR 04/15/2025 41.1 (L)     proBNP 04/15/2025 7,120.0 (H)     HS Troponin T 04/15/2025 127 (C)     WBC 04/15/2025 7.69     RBC 04/15/2025 4.59     Hemoglobin 04/15/2025 12.8     Hematocrit 04/15/2025 41.6     MCV 04/15/2025 90.6     MCH 04/15/2025 27.9     MCHC 04/15/2025 30.8 (L)     RDW 04/15/2025 14.5     RDW-SD 04/15/2025 48.6     MPV 04/15/2025 9.3     Platelets 04/15/2025 273     Neutrophil % 04/15/2025 65.6     Lymphocyte % 04/15/2025 25.6     Monocyte % 04/15/2025 6.6     Eosinophil % 04/15/2025 1.6     Basophil % 04/15/2025 0.3     Immature Grans % 04/15/2025 0.3     Neutrophils, Absolute 04/15/2025 5.05     Lymphocytes, Absolute 04/15/2025 1.97     Monocytes, Absolute 04/15/2025 0.51     Eosinophils, Absolute 04/15/2025 0.12     Basophils, Absolute 04/15/2025 0.02     Immature Grans, Absolute 04/15/2025 0.02     QT Interval 04/15/2025 383     QTC Interval 04/15/2025 483     COVID19 04/15/2025 Not Detected     Influenza A PCR 04/15/2025 Not Detected     Influenza B PCR 04/15/2025 Not Detected     HS Troponin T 04/15/2025 121 (C)     Troponin T Numeric Delta 04/15/2025 -6     Troponin T % Delta 04/15/2025 -5     QT Interval 04/15/2025 418     QTC Interval 04/15/2025 493     Protime 04/15/2025 13.4     INR 04/15/2025 1.1     PTT 04/15/2025 37.2 (H)     Glucose 04/15/2025 109 (H)     QT Interval 04/15/2025 448     QTC Interval 04/15/2025 506     Glucose 04/15/2025 109 (H)     Protime 04/15/2025 16.0 (H)     INR 04/15/2025 1.28 (H)     Glucose 04/15/2025 215 (H)     PTT 04/16/2025 55.0 (C)     Magnesium 04/16/2025 2.1     Glucose 04/15/2025 183 (H)     Extra Tube 04/16/2025 hold for add-on     PTT 04/16/2025 68.5 (C)     Glucose 04/16/2025 176 (H)     TSH 04/16/2025 1.960     Hemoglobin A1C 04/16/2025 5.80 (H)     Glucose 04/16/2025 155 (H)     Glucose 04/16/2025 192 (H)     BUN 04/16/2025 19     Creatinine 04/16/2025 1.38 (H)     Sodium 04/16/2025 137      Potassium 04/16/2025 4.1     Chloride 04/16/2025 100     CO2 04/16/2025 17.2 (L)     Calcium 04/16/2025 9.2     BUN/Creatinine Ratio 04/16/2025 13.8     Anion Gap 04/16/2025 19.8 (H)     eGFR 04/16/2025 46.2 (L)     Glucose 04/17/2025 187 (H)     BUN 04/17/2025 19     Creatinine 04/17/2025 1.31 (H)     Sodium 04/17/2025 138     Potassium 04/17/2025 3.6     Chloride 04/17/2025 104     CO2 04/17/2025 21.2 (L)     Calcium 04/17/2025 8.7     BUN/Creatinine Ratio 04/17/2025 14.5     Anion Gap 04/17/2025 12.8     eGFR 04/17/2025 49.1 (L)     QT Interval 04/16/2025 447     QTC Interval 04/16/2025 487     Glucose 04/16/2025 132 (H)     Activated Clotting Time  04/16/2025 164 (H)     Activated Clotting Time  04/16/2025 314 (H)     Activated Clotting Time  04/16/2025 170 (H)     Activated Clotting Time  04/16/2025 153 (H)     WBC 04/17/2025 14.55 (H)     RBC 04/17/2025 4.11     Hemoglobin 04/17/2025 11.3 (L)     Hematocrit 04/17/2025 36.4     MCV 04/17/2025 88.6     MCH 04/17/2025 27.5     MCHC 04/17/2025 31.0 (L)     RDW 04/17/2025 14.0     RDW-SD 04/17/2025 45.1     MPV 04/17/2025 9.7     Platelets 04/17/2025 270     Neutrophil % 04/17/2025 89.5 (H)     Lymphocyte % 04/17/2025 6.2 (L)     Monocyte % 04/17/2025 3.5 (L)     Eosinophil % 04/17/2025 0.0 (L)     Basophil % 04/17/2025 0.1     Immature Grans % 04/17/2025 0.7 (H)     Neutrophils, Absolute 04/17/2025 13.02 (H)     Lymphocytes, Absolute 04/17/2025 0.90     Monocytes, Absolute 04/17/2025 0.51     Eosinophils, Absolute 04/17/2025 0.00     Basophils, Absolute 04/17/2025 0.02     Immature Grans, Absolute 04/17/2025 0.10 (H)     nRBC 04/17/2025 0.0     Activated Clotting Time  04/16/2025 153 (H)     Total Cholesterol 04/17/2025 203 (H)     Triglycerides 04/17/2025 191 (H)     HDL Cholesterol 04/17/2025 48     LDL Cholesterol  04/17/2025 121 (H)     VLDL Cholesterol 04/17/2025 34     LDL/HDL Ratio 04/17/2025 2.43     Glucose 04/16/2025 121 (H)     Potassium 04/17/2025  "4.2     Glucose 04/17/2025 167 (H)     Glucose 04/17/2025 147 (H)    No results displayed because visit has over 200 results.        Recent labs reviewed and interpreted for clinical significance and application    Rafael Mae I went over each of the available lab findings while she was still here in the heart failure clinic awaiting her diuresis today            It is a pleasure to be involved in this patient's cardiovascular care relating to their heart failure.  Please feel free to call me with any questions or concerns.    Enoc \"Marquise\" Essence JOHNSON, [facility]  Heart failure program clinical provider    ALEX Stark   04/18/25  .  "

## 2025-05-19 ENCOUNTER — HOSPITAL ENCOUNTER (OUTPATIENT)
Facility: HOSPITAL | Age: 50
Discharge: HOME | End: 2025-05-19
Payer: MEDICARE

## 2025-05-19 ENCOUNTER — DISEASE STATE MANAGEMENT VISIT (OUTPATIENT)
Facility: HOSPITAL | Age: 50
End: 2025-05-19
Payer: MEDICARE

## 2025-05-19 ENCOUNTER — LAB (OUTPATIENT)
Dept: LAB | Facility: HOSPITAL | Age: 50
End: 2025-05-19
Payer: MEDICARE

## 2025-05-19 VITALS
RESPIRATION RATE: 18 BRPM | DIASTOLIC BLOOD PRESSURE: 90 MMHG | WEIGHT: 190.4 LBS | HEART RATE: 73 BPM | OXYGEN SATURATION: 96 % | SYSTOLIC BLOOD PRESSURE: 119 MMHG | BODY MASS INDEX: 29.82 KG/M2

## 2025-05-19 DIAGNOSIS — I25.118 CORONARY ARTERY DISEASE OF NATIVE ARTERY OF NATIVE HEART WITH STABLE ANGINA PECTORIS: Chronic | ICD-10-CM

## 2025-05-19 DIAGNOSIS — R06.09 DYSPNEA ON EXERTION: ICD-10-CM

## 2025-05-19 DIAGNOSIS — J43.9 PULMONARY EMPHYSEMA, UNSPECIFIED EMPHYSEMA TYPE: Chronic | ICD-10-CM

## 2025-05-19 DIAGNOSIS — K21.9 GERD WITHOUT ESOPHAGITIS: Chronic | ICD-10-CM

## 2025-05-19 DIAGNOSIS — R79.89 LFT ELEVATION: Chronic | ICD-10-CM

## 2025-05-19 DIAGNOSIS — I38 VHD (VALVULAR HEART DISEASE): Chronic | ICD-10-CM

## 2025-05-19 DIAGNOSIS — E03.9 HYPOTHYROIDISM (ACQUIRED): Chronic | ICD-10-CM

## 2025-05-19 DIAGNOSIS — R06.00 DYSPNEA, UNSPECIFIED TYPE: ICD-10-CM

## 2025-05-19 DIAGNOSIS — E78.2 MIXED HYPERLIPIDEMIA: Chronic | ICD-10-CM

## 2025-05-19 DIAGNOSIS — Z91.89 AT RISK FOR FLUID VOLUME OVERLOAD: ICD-10-CM

## 2025-05-19 DIAGNOSIS — E11.9 TYPE 2 DIABETES MELLITUS WITHOUT COMPLICATION, WITHOUT LONG-TERM CURRENT USE OF INSULIN: Chronic | ICD-10-CM

## 2025-05-19 DIAGNOSIS — E88.810 METABOLIC SYNDROME X: Chronic | ICD-10-CM

## 2025-05-19 DIAGNOSIS — I50.42 CHRONIC COMBINED SYSTOLIC AND DIASTOLIC HEART FAILURE: Chronic | ICD-10-CM

## 2025-05-19 DIAGNOSIS — M54.59 INTRACTABLE LOW BACK PAIN: ICD-10-CM

## 2025-05-19 DIAGNOSIS — G47.33 OSA (OBSTRUCTIVE SLEEP APNEA): ICD-10-CM

## 2025-05-19 DIAGNOSIS — I42.0 ISCHEMIC DILATED CARDIOMYOPATHY: Chronic | ICD-10-CM

## 2025-05-19 DIAGNOSIS — Z91.148 NONCOMPLIANCE WITH MEDICATIONS: Chronic | ICD-10-CM

## 2025-05-19 DIAGNOSIS — N18.32 STAGE 3B CHRONIC KIDNEY DISEASE: Chronic | ICD-10-CM

## 2025-05-19 DIAGNOSIS — G89.4 CHRONIC PAIN SYNDROME: Chronic | ICD-10-CM

## 2025-05-19 DIAGNOSIS — I25.5 ISCHEMIC DILATED CARDIOMYOPATHY: Chronic | ICD-10-CM

## 2025-05-19 DIAGNOSIS — I42.0 ISCHEMIC DILATED CARDIOMYOPATHY: Primary | Chronic | ICD-10-CM

## 2025-05-19 DIAGNOSIS — I25.5 ISCHEMIC DILATED CARDIOMYOPATHY: Primary | Chronic | ICD-10-CM

## 2025-05-19 LAB
ANION GAP SERPL CALCULATED.3IONS-SCNC: 9 MMOL/L (ref 5–15)
BUN SERPL-MCNC: 17 MG/DL (ref 6–20)
BUN/CREAT SERPL: 12.1 (ref 7–25)
CALCIUM SPEC-SCNC: 9.6 MG/DL (ref 8.6–10.5)
CHLORIDE SERPL-SCNC: 106 MMOL/L (ref 98–107)
CO2 SERPL-SCNC: 24 MMOL/L (ref 22–29)
CREAT SERPL-MCNC: 1.4 MG/DL (ref 0.57–1)
EGFRCR SERPLBLD CKD-EPI 2021: 45.4 ML/MIN/1.73
GLUCOSE SERPL-MCNC: 90 MG/DL (ref 65–99)
NT-PROBNP SERPL-MCNC: 3373 PG/ML (ref 0–900)
POTASSIUM SERPL-SCNC: 3.6 MMOL/L (ref 3.5–5.2)
SODIUM SERPL-SCNC: 139 MMOL/L (ref 136–145)

## 2025-05-19 PROCEDURE — 80048 BASIC METABOLIC PNL TOTAL CA: CPT

## 2025-05-19 PROCEDURE — 83880 ASSAY OF NATRIURETIC PEPTIDE: CPT

## 2025-05-19 PROCEDURE — 96374 THER/PROPH/DIAG INJ IV PUSH: CPT

## 2025-05-19 PROCEDURE — 25010000002 BUMETANIDE PER 0.5 MG: Performed by: NURSE PRACTITIONER

## 2025-05-19 RX ORDER — POTASSIUM CHLORIDE 1500 MG/1
20 TABLET, EXTENDED RELEASE ORAL ONCE
Status: COMPLETED | OUTPATIENT
Start: 2025-05-19 | End: 2025-05-19

## 2025-05-19 RX ORDER — METOPROLOL SUCCINATE 25 MG/1
25 TABLET, EXTENDED RELEASE ORAL
Qty: 90 TABLET | Refills: 2 | Status: SHIPPED | OUTPATIENT
Start: 2025-05-19

## 2025-05-19 RX ORDER — BUMETANIDE 0.25 MG/ML
1 INJECTION, SOLUTION INTRAMUSCULAR; INTRAVENOUS ONCE
Status: COMPLETED | OUTPATIENT
Start: 2025-05-19 | End: 2025-05-19

## 2025-05-19 RX ORDER — SACUBITRIL AND VALSARTAN 24; 26 MG/1; MG/1
1 TABLET, FILM COATED ORAL 2 TIMES DAILY
Qty: 180 TABLET | Refills: 2 | Status: SHIPPED | OUTPATIENT
Start: 2025-05-19

## 2025-05-19 RX ADMIN — BUMETANIDE 1 MG: 0.25 INJECTION INTRAMUSCULAR; INTRAVENOUS at 14:16

## 2025-05-19 RX ADMIN — POTASSIUM CHLORIDE 20 MEQ: 1500 TABLET, EXTENDED RELEASE ORAL at 14:16

## 2025-05-19 NOTE — ADDENDUM NOTE
Encounter addended by: Carey Wade RN on: 5/19/2025 2:22 PM   Actions taken: LDA properties accepted, MAR administration accepted, Charge Capture section accepted

## 2025-05-19 NOTE — PROGRESS NOTES
[facility] HEART FAILURE CLINIC - PHARMACY SERVICE    Patient Name: Rafael Nunes  :1975  Age: 50 y.o.  Sex: female  Primary Cardiologist: Radhika/ ALEX Ann (next appointment scheduled with Dr. Luna 25)  Nephrology: Jesica (patient reports no recent appointment despite heart failure clinic arranging for Dr. Mooney's office to call her to schedule appointment. HF clinic nurse once again reaching out to Dr. Mooney's office to try to get patient scheduled for appointment)  PCP: Zacarias     HFrEF with EF 36 - 40% (Last Echo: 25).    NYHA Class III C     ECHO:    Results for orders placed during the hospital encounter of 02/10/25    Adult Transthoracic Echo Complete W/ Cont if Necessary Per Protocol    Interpretation Summary    Left ventricular systolic function is moderately decreased. Estimated left ventricular EF = 38%    The left ventricular cavity is mild to moderately dilated.    Left ventricular wall thickness is consistent with mild to moderate eccentric hypertrophy.    Left ventricular diastolic function is consistent with (grade II w/high LAP) pseudonormalization and age.    The right ventricular cavity is mildly dilated.    The left atrial cavity is moderate to severely dilated.    The right atrial cavity is borderline dilated.    Mild aortic valve stenosis is present.    Abnormal mitral valve structure consistent with dilated annulus.    Moderate to severe mitral valve regurgitation is present.    Estimated right ventricular systolic pressure from tricuspid regurgitation is normal (<35 mmHg).    Transthoracic echocardiography reveals dilated LV with eccentric LVH with EF between 36 to 40%  Severe left atrial enlargement and the mitral valve apparatus calcified and appears rheumatic without mitral stenosis and moderate to severe MR directed laterally and posteriorly  Aortic valve is calcified and not well-visualized with a mean gradient of 16 mmHg.  Mild TR without pulmonary  hypertension  No effusion      Electronically signed by Héctor Eckert MD, 02/12/25, 1:19 PM EST.      The patient's last EKG was reviewed from 4/21/25 and shows a QTcB of 469 ms.     ICD: yes- 2019 [employer]ix dual-chamber    CKD: Stage 3a eGFR 45-59 mL/min    BMP          4/17/2025    03:30 4/17/2025    13:06 4/21/2025    10:57 5/19/2025    12:35   BMP   BUN 19 21 17    Creatinine 1.31   1.41  1.40    Sodium 138   140  139    Potassium 3.6  4.2  4.0  3.6    Chloride 104   106  106    CO2 21.2   22.1  24.0    Calcium 8.7   9.4  9.6        Lab Results   Component Value Date    EGFR 45.4 (L) 05/19/2025    EGFR 45.0 (L) 04/21/2025    EGFR 49.1 (L) 04/17/2025       Lab Results   Component Value Date    PROBNP 3,373.0 (H) 05/19/2025    PROBNP 2,542.0 (H) 04/21/2025    PROBNP 7,120.0 (H) 04/15/2025       Recent Vitals         5/5/2025 5/19/2025 5/19/2025       BP: 118/87 120/90 119/90     Pulse: 89 92 73     Weight: 88.5 kg (195 lb) 86.4 kg (190 lb 6.4 oz) --     BMI (Calculated): 30.5 29.8 --              -CHF-specific BB:      Pre Visit Dose: Metoprolol succinate XL 25 mg PO daily    Post Visit Dose: Metoprolol succinate XL 25 mg PO daily     - Target Dose: Metoprolol succinate  mg PO daily.     - Goal HR of 50s to 60s.     - Patient should be seen every 10 to 14 days for a pulse check with plans for up-titration until target heart rate is achieved.        -ACE/ARB/ARNI:     Pre Visit Dose: Sacubitril/valsartan 24/26 mg BID (no recent fill history-patient reports utilizing samples she received from another clinic)    Post Visit Dose: Sacubitril/valsartan 24/26 mg BID    - Target Dose: Sacubitril/valsartan 97/103 mg PO BID    - Patient should have a follow-up appointment every 2 to 4 weeks for a hemodynamic check with possible up-titration to target dose.         -SGLT2 inhibitor therapy:    Pre Visit Dose: Dapagliflozin 10 mg PO daily    Post Visit Dose: Dapagliflozin 10 mg PO daily    - Target Dose:  Dapagliflozin 10 mg PO daily : CrCl > 20 mL/min which has shown benefit in patients with HF    -DIURETIC:     Pre Visit Dose: Torsemide 20 mg daily (patient reported. No known fill history or prescription)    Post Visit Dose: Torsemide 20 mg daily (nephrology to manage diuresis per cardiology-see notes about attempts at making appointment with Dr. Mooney)     -MRA:     Pre Visit Dose: None    Post Visit Dose: None    - Target Dose:  N/A    - K is < 5 mEq/L and SCr is </= 2 mg/dL in female and eGFR > 30 mL/min/1.73m3    Lab Results   Component Value Date    K 3.6 05/19/2025       Lab Results   Component Value Date    CREATININE 1.40 (H) 05/19/2025         -MAGNESIUM:     Mag not assessed today    Lab Results   Component Value Date    MG 2.3 04/21/2025    MG 2.1 04/16/2025    MG 2.5 02/18/2025       Pre Visit Dose: None    Post Visit Dose: None      -ANTICOAGULATION:     None    -OTHER CV MEDS:     Pre Visit Dose: ASA 81 mg PO daily, atorvastatin 80 mg PO QHS, clopidogrel 75 mg daily    Post Visit Dose: No changes    -Clinic Administered Medications:     Bumetanide 1 mg IV x1 + KCl 20 mEq PO x1         Patient Assistance:      Patient approved for Farxiga PAP through 12/31/2025     Entresto PAP application signed and in cabinet awaiting proof of income to submit. Reminded patient that this was needed today.      PLAN:  Initiation/Discontinuation/Dose Adjustment: No medication changes  Education provided: Discussed clinic-administered med and importance of making follow- up appointment with Dr. Mooney.   Coordination of Care: HF clinic RN reached out to Dr. Mooney's office, endocrinology, and gastroenterology to attempt to make appointments for patient   Refills: Entresto and metoprolol  Follow-up: Patient to be seen back in clinic in one month.       Thank you for allowing me to participate in the care of your patient.    Venice Rodas, PharmD  [facility] Heart Failure Clinic  05/19/25  15:36 EDT

## 2025-05-19 NOTE — ADDENDUM NOTE
Encounter addended by: Enoc Lowry APRN on: 5/19/2025 3:45 PM   Actions taken: Order list changed, Clinical Note Signed

## 2025-05-19 NOTE — ADDENDUM NOTE
Encounter addended by: Carey Wade RN on: 5/19/2025 2:24 PM   Actions taken: Vitals modified, LDA properties accepted

## 2025-05-21 ENCOUNTER — TELEPHONE (OUTPATIENT)
Facility: HOSPITAL | Age: 50
End: 2025-05-21
Payer: MEDICARE

## 2025-05-21 NOTE — TELEPHONE ENCOUNTER
Heart Failure Clinic: [facility]  Clinical Outreach     Rafael Nunes is a 50 y.o. female and is a patient of the [facility] heart failure clinic.   Patient had referral for appointment with Dr Mooney.   Notes and labs faxed 5/20/2025, left VM.    Called office 5/21/25.  Appointment set for 6/26/25 @1045 am.   Appointment at [facility], [address] [place] [address], [address].       Called patient to notify.  No answer, left voicemail.  Awaiting response.      Carey Wade RN  [facility] Heart Failure Clinic  5/21/2025  11:53 EDT

## 2025-06-05 ENCOUNTER — HOSPITAL ENCOUNTER (OUTPATIENT)
Facility: HOSPITAL | Age: 50
Discharge: HOME | End: 2025-06-05
Payer: MEDICARE

## 2025-06-05 ENCOUNTER — LAB (OUTPATIENT)
Dept: LAB | Facility: HOSPITAL | Age: 50
End: 2025-06-05
Payer: MEDICARE

## 2025-06-05 VITALS
HEART RATE: 75 BPM | WEIGHT: 184 LBS | BODY MASS INDEX: 28.82 KG/M2 | DIASTOLIC BLOOD PRESSURE: 89 MMHG | RESPIRATION RATE: 16 BRPM | OXYGEN SATURATION: 95 % | SYSTOLIC BLOOD PRESSURE: 114 MMHG

## 2025-06-05 DIAGNOSIS — N18.32 STAGE 3B CHRONIC KIDNEY DISEASE: Chronic | ICD-10-CM

## 2025-06-05 DIAGNOSIS — R06.09 DYSPNEA ON EXERTION: ICD-10-CM

## 2025-06-05 DIAGNOSIS — I42.0 ISCHEMIC DILATED CARDIOMYOPATHY: Primary | Chronic | ICD-10-CM

## 2025-06-05 DIAGNOSIS — I38 VHD (VALVULAR HEART DISEASE): Chronic | ICD-10-CM

## 2025-06-05 DIAGNOSIS — Z91.89 AT RISK FOR FLUID VOLUME OVERLOAD: ICD-10-CM

## 2025-06-05 DIAGNOSIS — I25.118 CORONARY ARTERY DISEASE OF NATIVE ARTERY OF NATIVE HEART WITH STABLE ANGINA PECTORIS: Chronic | ICD-10-CM

## 2025-06-05 DIAGNOSIS — Z91.148 NONCOMPLIANCE WITH MEDICATIONS: Chronic | ICD-10-CM

## 2025-06-05 DIAGNOSIS — R06.00 DYSPNEA, UNSPECIFIED TYPE: ICD-10-CM

## 2025-06-05 DIAGNOSIS — I50.42 CHRONIC COMBINED SYSTOLIC AND DIASTOLIC HEART FAILURE: Chronic | ICD-10-CM

## 2025-06-05 DIAGNOSIS — I42.0 ISCHEMIC DILATED CARDIOMYOPATHY: Chronic | ICD-10-CM

## 2025-06-05 DIAGNOSIS — I25.5 ISCHEMIC DILATED CARDIOMYOPATHY: Chronic | ICD-10-CM

## 2025-06-05 DIAGNOSIS — I25.5 ISCHEMIC DILATED CARDIOMYOPATHY: Primary | Chronic | ICD-10-CM

## 2025-06-05 LAB
ANION GAP SERPL CALCULATED.3IONS-SCNC: 14 MMOL/L (ref 5–15)
BUN SERPL-MCNC: 23.8 MG/DL (ref 6–20)
BUN/CREAT SERPL: 14.6 (ref 7–25)
CALCIUM SPEC-SCNC: 10 MG/DL (ref 8.6–10.5)
CHLORIDE SERPL-SCNC: 102 MMOL/L (ref 98–107)
CO2 SERPL-SCNC: 25 MMOL/L (ref 22–29)
CREAT SERPL-MCNC: 1.63 MG/DL (ref 0.57–1)
EGFRCR SERPLBLD CKD-EPI 2021: 37.8 ML/MIN/1.73
GLUCOSE SERPL-MCNC: 113 MG/DL (ref 65–99)
NT-PROBNP SERPL-MCNC: 5791 PG/ML (ref 0–900)
POTASSIUM SERPL-SCNC: 3.6 MMOL/L (ref 3.5–5.2)
SODIUM SERPL-SCNC: 141 MMOL/L (ref 136–145)

## 2025-06-05 PROCEDURE — 83880 ASSAY OF NATRIURETIC PEPTIDE: CPT

## 2025-06-05 PROCEDURE — 25010000002 BUMETANIDE PER 0.5 MG: Performed by: NURSE PRACTITIONER

## 2025-06-05 PROCEDURE — 96374 THER/PROPH/DIAG INJ IV PUSH: CPT

## 2025-06-05 PROCEDURE — 80048 BASIC METABOLIC PNL TOTAL CA: CPT

## 2025-06-05 RX ORDER — POTASSIUM CHLORIDE 1500 MG/1
20 TABLET, EXTENDED RELEASE ORAL ONCE
Status: COMPLETED | OUTPATIENT
Start: 2025-06-05 | End: 2025-06-05

## 2025-06-05 RX ORDER — BUMETANIDE 0.25 MG/ML
2 INJECTION, SOLUTION INTRAMUSCULAR; INTRAVENOUS ONCE
Status: COMPLETED | OUTPATIENT
Start: 2025-06-05 | End: 2025-06-05

## 2025-06-05 RX ADMIN — BUMETANIDE 2 MG: 0.25 INJECTION INTRAMUSCULAR; INTRAVENOUS at 12:22

## 2025-06-05 RX ADMIN — POTASSIUM CHLORIDE 20 MEQ: 1500 TABLET, EXTENDED RELEASE ORAL at 12:22

## 2025-06-05 NOTE — PATIENT INSTRUCTIONS
Nephrologist-  Dr Mooney.  6/26/25 @1045 am.   Appointment at [place] location, [address] [place] [address], [address].      SURESH Mclaughlin  [phone] - We have faxed referral twice. They will call you,or you can schedule online portal [email or web address]  [email or web address]

## 2025-06-05 NOTE — PROGRESS NOTES
"  [facility]  P.T. \"Marquise\" Essence JOHNSON, [place]      History of Present Illness  50-year-old -American female patient of known to Dr. Luna, Dr. Eckert  &  with a past medical history of ischemic dilated cardiomyopathy [LVIDd 6.1] known combined systolic and diastolic heart failure (HFrEF) (TTE 2/10/25) LVEF 38% with grade 2 diastolic dysfunction, s/p 2019 Biotronik dual-chamber ICD, known  valvular heart disease  s/p 2019 AVRwith a mild aortic stenosis PAUL of 0.56 cm² peak mean PG 31.6 & 16 mmHg moderate mitral valve calcification with moderate to severe MR, stage III CKD, ASCAD h/o PCI\stents, s/p 2019 CABG x 3, noncompliance with medications secondary to affordability, COPD who smoked until 2019 known INDRA, GERD, mildly elevated LFTs,, hypothyroidism acquired metabolic syndrome with hypertension, dyslipidemia, ID T2DM.  Most recently admitted 4/15/2025 due to SOA was found to be having a multifactorial volume overload with a proBNP of 7120 with a chest x-ray with normal pulmonary vasculature comes in for her initial heart failure clinic visit 21 April 2025 since recent hospitalization has done relatively well from a volume standpoint, unfortunately she is been self-medicating herself with several of her old heart failure medications and had some residual Entresto she has been taking some residual Farxiga she has been taking as well as Demadex 40 mg twice daily.  Comes back in on 5/19/2025 since last seen in our heart failure clinic, we were unable to sort out exactly what she has been taking medication wise and we were attempting to get her patient assistance for Entresto and Farxiga.  She was volume overloaded and required some IV diuresis.  She called on 6/4/2025 as she felt like she has been gaining weight and felt volume overloaded she was asked to come in and get labs which she did on 6/5/2025 and had an impromptu visit that also required IV diuresis we are attempting to " rectify her medication problems however I am relatively unsure as to what she is actually taking at home                     Subjective        Review of Systems -      Constitutional: + weakness, + fatigue continues if not slightly worsened from last visit no fever, rigors, chills   Eyes: No recent vision changes, eye pain   ENT/oropharynx: No recent difficulty swallowing, sore throat, epistaxis, changes in hearing   Cardiovascular: No recent chest pain, chest tightness, palpitations except as noted in HPI, paroxysmal nocturnal dyspnea, orthopnea, diaphoresis, dizziness & no pre or drake syncopal episodes   Respiratory: No real at rest shortness of breath, + dyspnea on exertion consistently needing less effort to attain and has reportedly been worse the last 2 days, no significant productive cough, no wheezing and no hemoptysis   Gastrointestinal: + Bloating, no abdominal pain, nausea, vomiting, diarrhea, nor has she had any bloody stools   Genitourinary: No hematuria, dysuria other than increased frequency   Neurological: No headache, tremors, numbness,  or hemiparesis    Musculoskeletal: No change in typical cramps, myalgias, no new joint pain, joint swelling   Integument: No recent rash, without lower extremity edema          All other systems were reviewed and were negative.    Patient Active Problem List   Diagnosis    COPD (chronic obstructive pulmonary disease)    Essential hypertension    ICD (implantable cardioverter-defibrillator), dual, in situ    GERD without esophagitis    Hypothyroidism (acquired)    ASCAD    S/P AVR (aortic valve replacement)    S/P CABG x 3    Dyspnea    Nonrheumatic mitral valve regurgitation    Cellulitis of left axilla    Cellulitis of right axilla    Hidradenitis    T2DM without  use of insulin    Hypokalemia    Dizziness with near syncope    Acute diarrhea    Mixed hyperlipidemia    INDRA (obstructive sleep apnea)    Chronic pain syndrome    Intractable low back pain    Elevated  troponin    Pneumonia    NSTEMI (non-ST elevated myocardial infarction)    Total occlusion of coronary artery, chronic    Type 2 myocardial infarction    Ischemic dilated cardiomyopathy    Chronic combined systolic and diastolic heart failure (HFrEF)    VHD (valvular heart disease)    Stage 3b chronic kidney disease    At risk for fluid volume overload    h/o Noncompliance with medications 2/2 financial burden    Mild LFT elevation    Metabolic syndrome X 4 of 4 components     family history includes Heart disease in her father.   reports that she quit smoking about 6 years ago. Her smoking use included cigarettes. She has never used smokeless tobacco. She reports that she does not drink alcohol and does not use drugs.  Allergies   Allergen Reactions    Hydrocodone Hives    Penicillin G Unknown (See Comments)     Reaction occurred as a baby.      Penicillin interview complete 08/18/2022.    Contrast Dye (Echo Or Unknown Ct/Mr) GI Intolerance     She is pretty sure it's the contrast dye that makes her super sick and vomiting after heart cath    Gadolinium Derivatives Itching    Varenicline Other (See Comments)     Encephalopathy     Physical Activity: Inactive (4/15/2025)    Exercise Vital Sign     Days of Exercise per Week: 0 days     Minutes of Exercise per Session: 0 min          Current Outpatient Medications:     aspirin 81 MG EC tablet, Take 1 tablet by mouth Daily., Disp: 90 tablet, Rfl: 0    atorvastatin (LIPITOR) 80 MG tablet, Take 1 tablet by mouth Every Night., Disp: 90 tablet, Rfl: 0    levothyroxine (SYNTHROID, LEVOTHROID) 200 MCG tablet, Take 1 tablet by mouth Every Morning., Disp: , Rfl:     metoprolol succinate XL (TOPROL-XL) 25 MG 24 hr tablet, Take 1 tablet by mouth Daily., Disp: 90 tablet, Rfl: 2    oxyCODONE-acetaminophen (PERCOCET) 7.5-325 MG per tablet, Take 1 tablet by mouth Every 6 (Six) Hours As Needed for Moderate Pain., Disp: , Rfl:     sacubitril-valsartan (Entresto) 24-26 MG tablet, Take  1 tablet by mouth 2 (Two) Times a Day., Disp: 180 tablet, Rfl: 2    torsemide (DEMADEX) 20 MG tablet, Take 1 tablet by mouth Daily., Disp: , Rfl:     Current Facility-Administered Medications:     bumetanide (BUMEX) injection 2 mg, 2 mg, Intravenous, Once, Enoc Lowry, ALEX    potassium chloride (KLOR-CON M20) CR tablet 20 mEq, 20 mEq, Oral, Once, Enoc Lowry APRN    Objective   Vital Sign Review:   Vitals:    06/05/25 1131   BP: 111/84   Pulse: 81   Resp: 18   SpO2: 95%     Body mass index is 28.82 kg/m².      06/05/25  1131   Weight: 83.5 kg (184 lb)     Physical Exam:    General:  Well-developed, [place] well-nourished, cooperative, no acute distress, appears stated age came with her grandson   Neuro:  A&O x3. No significantly obvious focal neuro deficet    Psych:  Pleasant affect    HENT:  Normocephalic, atraumatic, moist mucous membranes , Normal ear placement,Throat not injected   Eyes:  PERRLA, Conjunctivae not injected, EOM's intact, conjunctiva does not appear significantly injected   Neck:  Supple, Mildly thickened, no lymph adenopathy nor thyromegaly no obvious JVD or bruit    Lungs:    Symmetrical rise & fall of chest with baseline respiratory pattern. To auscultation lungs are for the most part clear bilaterally, there does seem to be a faint late phase expiratory wheeze, I heard no rhonchi or rales are noted, bases bilaterally are mildly diminished   Chest wall:  No significant tenderness when palpated. PMI not palpable due to body habitus   Heart:  Regular rate in the low 80s and relatively regular rhythm, S1 and S2 normal, no S3 or S4, Gr II-III/VI systolic ejection murmur heard at the LSB as well as apex , no obvious rub, click or gallop.    Abdomen:  distended, with likely ascites, non-tender, bowel sounds noted in the 4 quadrants of the abdomen, significant central abdominal adipose tissue is identified versus ascites   Extremities:  Equal color motion temperature and sensitivity, Rapid  capillary refill noted within the nailbeds of fingers and toes bilaterally without any real evidence of any lower extremity edema.    Pulses:  2+ and symmetric all extremities, rapid capillary refill    Skin:  No obvious rashes, significant lesions identified, warm dry and of normal turgor        DATA REVIEWED:   EKG:      ---------------------------------------------------  ECHO:    Results for orders placed during the hospital encounter of 02/10/25    Adult Transthoracic Echo Complete W/ Cont if Necessary Per Protocol    Interpretation Summary    Left ventricular systolic function is moderately decreased. Estimated left ventricular EF = 38%    The left ventricular cavity is mild to moderately dilated.    Left ventricular wall thickness is consistent with mild to moderate eccentric hypertrophy.    Left ventricular diastolic function is consistent with (grade II w/high LAP) pseudonormalization and age.    The right ventricular cavity is mildly dilated.    The left atrial cavity is moderate to severely dilated.    The right atrial cavity is borderline dilated.    Mild aortic valve stenosis is present.    Abnormal mitral valve structure consistent with dilated annulus.    Moderate to severe mitral valve regurgitation is present.    Estimated right ventricular systolic pressure from tricuspid regurgitation is normal (<35 mmHg).    Transthoracic echocardiography reveals dilated LV with eccentric LVH with EF between 36 to 40%  Severe left atrial enlargement and the mitral valve apparatus calcified and appears rheumatic without mitral stenosis and moderate to severe MR directed laterally and posteriorly  Aortic valve is calcified and not well-visualized with a mean gradient of 16 mmHg.  Mild TR without pulmonary hypertension  No effusion      Electronically signed by Héctor Eckert MD, 02/12/25, 1:19 PM EST.          -----------------------------------------------------  RHC/LHC    Results for orders placed during  the hospital encounter of 04/15/25    Cardiac Catheterization/Vascular Study    Conclusion  Table formatting from the original result was not included.  Cardiac Catheterization with PCI Report  PATIENT IDENTIFICATION  Name: Rafael Nunes  Age: 50 y.o. Sex: female : 1975  MRN: [id number]    Date: 2025  PCP: @PCP@    Requesting Provider  [unfilled]    She underwent cardiac catheterization left heart catheterization with selective coronary angiography, left ventriculography, saphenous vein graft angiography, LIMA angiography, PCI ANNELIESE SVG-RCA-OM, intracoronary nitroglycerin administration, postintervention angiography x 4.    Indications for the procedure include: acute coronary syndrome.    Procedure Details:  The risks, benefits, complications, treatment options, and expected outcomes were discussed with the patient. The patient and/or family concurred with the proposed plan, giving informed consent.    After informed consent the patient was brought to the cath lab after appropriate IV hydration was begun and oral premedication was given. She was further sedated with fentanyl, midazolam, and Dilaudid . She was prepped and draped in the usual manner. Using the modified Seldinger access technique and a micropuncture kit, a 6 Tamazight sheath was placed in the femoral artery. A left heart catheterization with coronary arteriography was performed. Findings are discussed below.    The decision was made to proceed with intervention. She received Heparin as per protocol. The details of the intervention are as follows:    A 6 Tamazight multipurpose guide with sideholes was placed into the ostium of the target saphenous vein graft.  A filter wire was placed with the basket just proximal to the anastomosis of the RCA.  The lesion was pretreated with a 3.0 x 25 NC trek.  The lesion was then treated with a 3.25 x 38 Xience.  Intracoronary nitroglycerin was administered to relieve vasospasm.  Postintervention  angiography demonstrated an excellent result with PAUL-3 flow and no evidence of dissection or thrombus following PCI.    After the procedure was completed, sedation was stopped and the sheaths and catheters were all removed according to established hospital protocols.    Conscious sedation:  Conscious sedation was performed according to protocol.  I supervised and directed an independent trained observer with the assistance of monitoring the patient's level of consciousness and physiologic status throughout the procedure.  Intraoperative service time was 85 minutes.    Findings:    Hemodynamics LVEDP: 26  LV: 118/9  Ao: 117/80  Left Main No significant disease  RCA Diffusely diseased throughout the entirety of the proximal mid and distal RCA with multiple islands of 99% stenosis  LAD Luminal regularities in the proximal and midportion.  Distal/apical LAD is diffusely diseased with multiple areas of 70 to 90% stenosis.  Small vessel caliber at this area is prohibitive of intervention.  Circ 70 to 75% proximal  99% mid prior to the bifurcation of the first decent sized obtuse marginal.  This obtuse marginal branch has a stent noted in the proximal to midportion that is patent with only luminal regularities noted within the stent.  There is a 60 to 70% stenosis noted distal to the stent.  Vessel caliber is of questionable size for intervention.  There is competitive flow noted beyond the bifurcation of this obtuse marginal branch.  The remainder of this vessel fills via the saphenous vein graft to this vessel.  SVG(s) SVG-RCA-OMB: 99% stenosis in the midportion of the graft to the RCA with PAUL II flow noted in the distal vasculature.  SVG-DG: Patent with a 85% stenosis noted distal to the anastomosis.  Small vessel caliber is prohibitive for intervention  LORI No disease and not utilized  LV Severe global LV systolic dysfunction with LVEF 20%  Coronary dominance Codominant with circumflex    Interventions/Vessels PCI  ANNELIESE SVG-RCA-OM  Guides/Wires 6 Maltese multipurpose guide with sideholes  Filter wire  Devices 3.0 x 25 NC trek  3.25 x 38 Xience  Post % Stenosis 0%  Pre-procedure PAUL flow 2  Post procedure PAUL flow 3  Closure Device Not applicable  Complications None immediate    Estimated Blood Loss:  Minimal    Specimens: None    Complications:  None; patient tolerated the procedure well.    Disposition: PACU - hemodynamically stable    Condition: stable    Impressions:  Successful PCI ANNELIESE SVG-RCA-OMB  Patent SVG-DG with a 85% stenosis noted distal to the anastomosis  Severe native vessel coronary artery disease  Residual distal/apical LAD disease that is too small for intervention  Severe global LV systolic dysfunction LVEF 20%    Recommendations:  Dual antiplatelet therapy consisting of aspirin and ticagrelor  Medical therapy and risk factor modification for residual disease      ---------------------------------------------------------------------------  CXR/Imaging:     Imaging Results (Most Recent)       None                -----------------------------------------------------------------------------  CT:   No radiology results for the last 30 days.        --------------------------------------------------------------------------------------------------------------    Laboratory evaluations:      Lab Results   Component Value Date    GLUCOSE 113 (H) 06/05/2025    BUN 23.8 (H) 06/05/2025    CREATININE 1.63 (H) 06/05/2025    EGFRIFAFRI 57 (L) 08/29/2022    BCR 14.6 06/05/2025    K 3.6 06/05/2025    CO2 25.0 06/05/2025    CALCIUM 10.0 06/05/2025    ALBUMIN 4.3 04/15/2025    LABIL2 1.3 08/21/2022    AST 57 (H) 04/15/2025    ALT 32 04/15/2025     Lab Results   Component Value Date    WBC 7.21 04/21/2025    HGB 12.4 04/21/2025    HCT 40.3 04/21/2025    MCV 90.6 04/21/2025     04/21/2025     Lab Results   Component Value Date    HGBA1C 5.95 (H) 04/21/2025     Lab Results   Component Value Date    HGBA1C 5.95 (H)  04/21/2025    HGBA1C 5.80 (H) 04/16/2025    HGBA1C 6.04 (H) 02/18/2025     Lab Results   Component Value Date    CREATININE 1.63 (H) 06/05/2025     Lab Results   Component Value Date    FERRITIN 99.26 05/16/2020       Result Review:  I have personally reviewed the results from the time of this admission to 6/5/2025 12:12 EDT and agree with these findings:  [x]  Laboratory list / accordion  []  Microbiology  []  Radiology  []  EKG/Telemetry   []  Cardiology/Vascular   []  Pathology  [x]  Old records  []  Other:  Most notable findings include: Elevated proBNP up to 5791 from 3373, her eGFR has worsened to 38 since recently seen her creatinines at 1.6 BUN at 24 and did discuss those findings while she was still here in the clinic         Diagnoses and all orders for this visit:    1. Ischemic dilated cardiomyopathy (Primary)  Overview:  [LVIDd 6.1]   known combined systolic and diastolic heart failure (HFrEF)   (TTE 2/10/25) LVEF 38% with grade 2 diastolic dysfunction,   s/p 2019 Biotronik dual-chamber ICD      2. VHD (valvular heart disease)  Overview:  A.  Unsure if it has rheumatic versus nonrheumatic   I. There is a notation of the mitral valve on echocardiography where it was thought to be rheumatic   II. There are multiple documentations of nonrheumatic valvular heart disease    B.  Aortic stenosis   I. s/p 2019 (bioprosthetic)  AVR   1. (TTE 2/10/2025) with a mild aortic stenosis                          a.  PAUL of 0.56 cm²      b. peak mean PG 31.6 & 16 mmHg   C.  moderate mitral valve calcification   I.   moderate to severe MR,      3. At risk for fluid volume overload  Overview:  Systolic and diastolic HFrEF  Valvular heart disease  CKD 3B      4. h/o Noncompliance with medications 2/2 financial burden    5. ASCAD  Overview:   A.  h/o PCI\stents   I.   Most recent s/p 4/16/2025 PCI/ ANNELIESE SVG-RCA-OMB    II.  Patent SVG-DG with a 85% stenosis noted distal to the anastomosis             III.  Severe native vessel  coronary artery disease             IV.  Residual distal/apical LAD disease that is too small for intervention   B. s/p 2019 CABG x 3      Other orders  -     bumetanide (BUMEX) injection 2 mg  -     potassium chloride (KLOR-CON M20) CR tablet 20 mEq        NYHA STAGE C; FC-III.  Clinical status was assessed and has worsened.       Today, Patient is currently volume overloaded. and with  Moderate perfusion. The patient's hemodynamics are currently unacceptable. HR is: normal and is at goal. BP/MAP was reviewed and there is not room for medication up-titration.  The patient's clinical course has been impacted by Worsening Renal Failure.  And financial constraints along with medication compliance    GDMT Assessment: The patient is currently on relatively unidentifiable therapy.  As I cannot determine what she is actually taking.  we do not have room to optimize their medications based on HD assessment today, GFR, and electrolytes.     GDMT changes recommended today: Obviously would like for her to take medications as directed      BB:   continue Metoprolol Succinate 25 mg daily  Monitor heart rate.  Please call the [facility] for HR<50, dizziness, lightheadedness, syncope  There is no fill history available for this drug, unsure if she is actually taking    A/A/A:     continue Entresto 24/26mg BID  Says she still has samples at home (unsure as to the actual dosage) unfortunately cost was over $2000 which she is unable to afford  May well have to change her to an ACE or ARB next visit which is scheduled in 11 days   occasional monitoring of Chem-7 is recommended; call for the development of a new cough or S/Sx angioedema    DFB3M-T:  continue Dapagliflozin (Farxiga) 10mg daily  Call for S/Sx genital mycotic infections  Do not take when in adequate oral intake, NPO, GI illness    MRA:  Currently not taking due to renal insufficiency      Diuretic Regimen:    On toresemide 20 mg two times daily till she has the opportunity to  let nephrology dose her diuretic she is tentatively scheduled for follow-up appointment on 6/26/2025     Labs/Diagnostics/Referrals:    Labs - BNP and BMP were done today   Imaging -No orders placed today   Referrals Referral to Nephrology redone since she missed for scheduled appointment   Currently Pending: none       HF-Specific Device Therapy/AHF    ICD/CRT-D  -was not indicated.   -Deferred due to: Ejection fraction greater than 35%     TMVR  -was not indicated.      CardioMEMS  - Not available at [facility]       Discussion  Obviously would like to establish the pillars of heart failure  Issues and obtaining the GDMT domain her known financial constraints & hypotension  Pharmacy team will work with patient   and see if they can get patient assistance for an SGLT2, ARNI (she is currently taking some old samples she had at the house)  We did get her approved for Farxiga 10 mg daily and she has that at home and says she has been taking  Would like to add at some point a MRA inhibitor but certainly not until we can get the rest of her medications sorted out and get an okay from her nephrologist  There is no way to determine her exact medication she is actually taking  at home  She has been taking what appears to be half of her prescribed diuretic Demadex 20 mg daily  I.Would encourage her to take that twice daily until she sees nephrology on the 26th  Pharmacist tells me there is no fill history for the majority of her medications  Given her recent PCI\ANNELIESE will obviously need to continue DAPT given her ASCAD & is taking that ecasa & Plavix   She has a follow-up appointment with cardiology  She has a follow-up appointment with nephrology would obviously want nephrology to dose diuretics if possible  Has apparently missed previous appointments that were scheduled for her  My clinic can certainly assist in preventing a readmission by coming in for diuresis should she become volume overloaded she was given my card  and asked to call us in the event that occurs  Called 6/4/2025 and felt she was getting significantly volume overloaded asked to come in and get labs which do show her to have increased her proBNP it does appear to be up 2000 to 5,791 since she was last seen on 5/19/2024  Unfortunately 5/19/2025 she has missed that appointment thus far we have rescheduled her for 26 June 2025  Previously she asked for referral to an endocrinologist who we would defer her elevated TSH and A1c to their expertise  Given the presence of ascites and history of mildly elevated LFTs would make a referral to GI  She has failed to make that appointment as well  5/19/2025 does appear to be both volume overloaded by physical exam and lab work with her proBNP increasing to 3373, her increased abdominal girth, and mildly diminished bases  Similar findings were noted on 6/5/2025 at her impromptu visit  A.  Going ahead and treating with 2 mg of IV Bumex  B.  Will add 20 mill equivalents of K-Dur p.o.  10.  Has scheduled appointment with me in 10 days        Lifestyle Advice: A hand-out has been provided to the patient.   - call office if I gain more than 2 pounds in one day or 5 pounds in one week   - keep legs up while sitting   - use salt in moderation   - watch for swelling in feet, ankles and legs every day   - weigh myself daily   -call for concerning s/sx   -- activity or exercise based on tolerance encouraged     CODE STATUS: FULL

## 2025-08-15 ENCOUNTER — OFFICE VISIT (OUTPATIENT)
Dept: ENDOCRINOLOGY | Facility: CLINIC | Age: 50
End: 2025-08-15
Payer: MEDICARE

## 2025-08-15 VITALS
HEART RATE: 81 BPM | SYSTOLIC BLOOD PRESSURE: 116 MMHG | OXYGEN SATURATION: 95 % | HEIGHT: 67 IN | WEIGHT: 191 LBS | DIASTOLIC BLOOD PRESSURE: 70 MMHG | BODY MASS INDEX: 29.98 KG/M2

## 2025-08-15 DIAGNOSIS — E89.0 POSTSURGICAL HYPOTHYROIDISM: Primary | ICD-10-CM

## 2025-08-15 DIAGNOSIS — E66.3 OVERWEIGHT: ICD-10-CM

## 2025-08-15 RX ORDER — LEVOTHYROXINE SODIUM 200 UG/1
200 TABLET ORAL
Qty: 30 TABLET | Refills: 11 | Status: SHIPPED | OUTPATIENT
Start: 2025-08-15 | End: 2025-08-16

## 2025-08-16 ENCOUNTER — RESULTS FOLLOW-UP (OUTPATIENT)
Dept: ENDOCRINOLOGY | Facility: CLINIC | Age: 50
End: 2025-08-16
Payer: MEDICARE

## 2025-08-16 LAB
T4 FREE SERPL-MCNC: 0.84 NG/DL (ref 0.82–1.77)
TSH SERPL DL<=0.005 MIU/L-ACNC: 25.1 UIU/ML (ref 0.45–4.5)

## 2025-08-16 RX ORDER — LEVOTHYROXINE SODIUM 200 MCG
200 TABLET ORAL DAILY
Qty: 90 TABLET | Refills: 1 | Status: SHIPPED | OUTPATIENT
Start: 2025-08-16 | End: 2026-02-12

## 2025-08-16 RX ORDER — LEVOTHYROXINE SODIUM 50 MCG
50 TABLET ORAL DAILY
Qty: 90 TABLET | Refills: 1 | Status: SHIPPED | OUTPATIENT
Start: 2025-08-16 | End: 2026-02-12

## (undated) DEVICE — CANN RETRGR STYLET RSCP 15F

## (undated) DEVICE — PK PERFUS CUST W/CARDIOPLEGIA

## (undated) DEVICE — PK TRY HEART CATH 50

## (undated) DEVICE — PACEMAKER CDS: Brand: MEDLINE INDUSTRIES, INC.

## (undated) DEVICE — ANTIBACTERIAL UNDYED BRAIDED (POLYGLACTIN 910), SYNTHETIC ABSORBABLE SUTURE: Brand: COATED VICRYL

## (undated) DEVICE — PINNACLE INTRODUCER SHEATH: Brand: PINNACLE

## (undated) DEVICE — IRRIGATOR BULB ASEPTO 60CC STRL

## (undated) DEVICE — SCANLAN® VASCU-STATT® II SINGLE-USE BULLDOG CLAMP W/FIRMER CLAMPING PRESS - MIDI ANGLED 45° (YELLOW), CLAMPING PRESSURE 75-80 G (2/STERILE PKG): Brand: SCANLAN® VASCU-STATT® II SINGLE-USE BULLDOG CLAMP W/FIRMER CLAMPING PRESS

## (undated) DEVICE — STPCK 3WY HP ROT

## (undated) DEVICE — SKIN PREP TRAY W/CHG: Brand: MEDLINE INDUSTRIES, INC.

## (undated) DEVICE — ST ACC MICROPUNCTURE STFF/CANN PLAT/TP 4F 21G 40CM

## (undated) DEVICE — SUT PROLN 7/0 BV1 D/A 30IN 8703H

## (undated) DEVICE — BNDG ELAS MATRX V/CLS 4IN 5YD LF

## (undated) DEVICE — BLD SCLPL BEAVR MINI STR 2BVL 180D LF

## (undated) DEVICE — CABL BIPOL W/ALLGTR CLIP/SM 12FT

## (undated) DEVICE — SEALANT HEMO SURG COSEAL PREMIX 8ML

## (undated) DEVICE — BG BLD SYS

## (undated) DEVICE — GW PTFE EMERALD HEPCOAT FC J TIP STD .035 3MM 150CM

## (undated) DEVICE — RADIFOCUS OBTURATOR: Brand: RADIFOCUS

## (undated) DEVICE — DEV INFL COMPAK W/ACCESSPLUS IN4530

## (undated) DEVICE — BNDG ELAS MATRX V/CLS 6IN 5YD LF

## (undated) DEVICE — Device

## (undated) DEVICE — SUCTION CANISTER, 1500CC,SAFELINER: Brand: DEROYAL

## (undated) DEVICE — CATH DIAG IMPULSE FL4 6F 100CM

## (undated) DEVICE — CONTAINER,SPECIMEN,OR STERILE,4OZ: Brand: MEDLINE

## (undated) DEVICE — 3M™ IOBAN™ 2 ANTIMICROBIAL INCISE DRAPE 6650EZ: Brand: IOBAN™ 2

## (undated) DEVICE — SUT MNCRYL 3/0 SH 27 IN Y416H

## (undated) DEVICE — SYS PERFUS SEP PLATLT W TIPS CUST

## (undated) DEVICE — 6F .070 XB 3.5 100CM: Brand: VISTA BRITE TIP

## (undated) DEVICE — SUT MNCRYL 2/0 CT1 36IN

## (undated) DEVICE — SKIN AFFIX SURG ADHESIVE 72/CS 0.55ML: Brand: MEDLINE

## (undated) DEVICE — SUT PDS 0 CT-1 Z340H

## (undated) DEVICE — SUT PROLN 6/0 RB2 D/A 30IN 8711H

## (undated) DEVICE — CATH DIAG IMPULSE FR4 6F 100CM

## (undated) DEVICE — GAUZE,SPONGE,4"X4",32PLY,XRAY,STRL,LF: Brand: MEDLINE

## (undated) DEVICE — 3M™ STERI-STRIP™ REINFORCED ADHESIVE SKIN CLOSURES, R1547, 1/2 IN X 4 IN (12 MM X 100 MM), 6 STRIPS/ENVELOPE: Brand: 3M™ STERI-STRIP™

## (undated) DEVICE — SYR LL TP 10ML STRL

## (undated) DEVICE — CATH DIAG IMPULSE PIG .056 6F 110CM

## (undated) DEVICE — ADHS LIQ MASTISOL 2/3ML

## (undated) DEVICE — PRESSURE TUBING: Brand: TRUWAVE

## (undated) DEVICE — KT DYEVERT PLS EZ W/SMART SYR FOR HI VISC CONTRST DISP

## (undated) DEVICE — SENSR CERBRL O2 PK/2

## (undated) DEVICE — HEMOCONCENTRATOR PERFUS LPS06

## (undated) DEVICE — SUT SILK 0 CT1 CR8 18IN C021D

## (undated) DEVICE — SOL NACL 0.9PCT 1000ML

## (undated) DEVICE — CONNECT Y INTERSEPT W/LL 3/8 X 3/8 X 3/8IN

## (undated) DEVICE — BOWL PLSTC MD 16OZ BLU STRL

## (undated) DEVICE — TEMP PACING WIRE: Brand: MYO/WIRE

## (undated) DEVICE — ACCESSRAIL PLATFORM (STANDARD BLADE): Brand: ACCESSRAIL PLATFORM (STANDARD BLADE)

## (undated) DEVICE — CATH DIAG IMPULSE MPA2 SH 5F 100CM

## (undated) DEVICE — BLOWER MISTER CLEARVIEW W/TBG

## (undated) DEVICE — SWAN-GANZ TRUE SIZE THERMODILUTION "S" TIP: Brand: SWAN-GANZ TRUE SIZE

## (undated) DEVICE — CANN AORT ROOT DLP VNT/8IN 14G 7F

## (undated) DEVICE — SUT PROLENE PP 7/0 BV175-6 24IN

## (undated) DEVICE — ROTATING SURGICAL PUNCHES, 1 PER POUCH: Brand: A&E MEDICAL / ROTATING SURGICAL PUNCHES

## (undated) DEVICE — GLV SURG BIOGEL LTX PF 8

## (undated) DEVICE — ELECTRD DEFIB M/FUNC PROPADZ RADIOL 2PK

## (undated) DEVICE — TOWEL,OR,DSP,ST,WHITE,DLX,4/PK,20PK/CS: Brand: MEDLINE

## (undated) DEVICE — VASOVIEW HEMOPRO: Brand: VASOVIEW HEMOPRO

## (undated) DEVICE — HI-TORQUE WHISPER MS GUIDE WIRE .014 STRAIGHT TIP 3.0 CM X 190 CM: Brand: HI-TORQUE WHISPER

## (undated) DEVICE — CATH DIAG IMPULSE MPA 6F 100CM

## (undated) DEVICE — SUT SILK 2/0 SH CR8 18IN CR8 C012D

## (undated) DEVICE — ELECTRD DEFIB M/FUNC PROPADZ STRL 2PK

## (undated) DEVICE — INTRO CLASSIC SAFESHEATH SHT 8F 13CM

## (undated) DEVICE — SUT SILK 2 SUTUPAK TIE 60IN SA8H 2STRAND

## (undated) DEVICE — CONTRST ISOVUE300 61PCT 50ML

## (undated) DEVICE — PK ATS CUST W CARDIOTOMY RESEVOIR

## (undated) DEVICE — ELECTRD BLD EZ CLN STD 6.5IN

## (undated) DEVICE — SPNG GZ AVANT 6PLY 4X4IN STRL PK/2

## (undated) DEVICE — SUT SILK 4/0 TIES 18IN A183H

## (undated) DEVICE — SOL IRRIG H2O 1000ML STRL

## (undated) DEVICE — NDL PERC 1PRT THNWALL W/BASEPLT 18G 7CM

## (undated) DEVICE — BOOT SUT XRAY DETECT STD YEL/BLU CA/50

## (undated) DEVICE — TP UMB COTN 1/8X36 U12T

## (undated) DEVICE — 28 FR STRAIGHT – SOFT PVC CATHETER: Brand: PVC THORACIC CATHETERS

## (undated) DEVICE — ST PERFUS M/

## (undated) DEVICE — GW FC J TFE/COAT .025 3MM 180CM

## (undated) DEVICE — INTENDED FOR TISSUE SEPARATION, AND OTHER PROCEDURES THAT REQUIRE A SHARP SURGICAL BLADE TO PUNCTURE OR CUT.: Brand: BARD-PARKER ® CARBON RIB-BACK BLADES

## (undated) DEVICE — NDL HYPO PRECISIONGLIDE REG 22G 1 1/2

## (undated) DEVICE — GUIDE CATHETER: Brand: MACH1™

## (undated) DEVICE — STPCK 3WY W 14IN PRESS LN

## (undated) DEVICE — PK OPN HEART WHT WRP 50

## (undated) DEVICE — SUT PROLN 4/0 V7 36IN 8975H

## (undated) DEVICE — COUNT NDL MAG XLG

## (undated) DEVICE — SUT ETHIB 0/0 MO6 I8IN CX45D

## (undated) DEVICE — NC TREK NEO™ CORONARY DILATATION CATHETER 3.00 MM X 25 MM / RAPID-EXCHANGE: Brand: NC TREK NEO™

## (undated) DEVICE — DGW .035 FC J3MM 150CM TEF HEP: Brand: EMERALD

## (undated) DEVICE — ORGANIZER SUT SHELIGH 3T 213013

## (undated) DEVICE — TUBING, SUCTION, 1/4" X 12', STRAIGHT: Brand: MEDLINE

## (undated) DEVICE — SUT PROLN 5/0 V5 36IN 8934H

## (undated) DEVICE — CORONARY ARTERY BYPASS GRAFT MARKERS, STAINLESS STEEL, DISTAL, WITHOUT HOLDER: Brand: ANASTOMARK CORONARY ARTERY BYPASS GRAFT MARKERS, STAINLESS STEEL, DISTAL

## (undated) DEVICE — DRSNG WND BORDR/ADHS NONADHR/GZ LF 4X4IN STRL

## (undated) DEVICE — MEDICINE CUP, GRADUATED, STER: Brand: MEDLINE

## (undated) DEVICE — 3M™ PATIENT PLATE, CORDED, SPLIT, LARGE, 40 PER CASE, 1179: Brand: 3M™

## (undated) DEVICE — INTRO SHEATH PRELUDE SNAP .038 6F 13CM W/SDPRT

## (undated) DEVICE — CATH IV INSYTE AUTOGARD SHLD 22G 1IN BK

## (undated) DEVICE — SUT SILK 2/0 TIES 18IN A185H

## (undated) DEVICE — SOL IRRIG NACL 9PCT 1000ML BTL

## (undated) DEVICE — INSUFFLATION TUBING,LAPAROSCOPIC: Brand: DEROYAL

## (undated) DEVICE — PAD GRND E/S W/CORD SPLT A/

## (undated) DEVICE — EMBOLIC PROTECTION SYSTEM: Brand: FILTERWIRE EZ™

## (undated) DEVICE — TBG NAMIC PRESS MONTR A/ F/M 12IN

## (undated) DEVICE — CATH DIAG IMPULSE ARMOD 5F 100CM

## (undated) DEVICE — SUT PROLN 3/0 V7 D/A 36IN 8976H